# Patient Record
Sex: MALE | Race: ASIAN | NOT HISPANIC OR LATINO | Employment: STUDENT | URBAN - METROPOLITAN AREA
[De-identification: names, ages, dates, MRNs, and addresses within clinical notes are randomized per-mention and may not be internally consistent; named-entity substitution may affect disease eponyms.]

---

## 2017-03-03 ENCOUNTER — OFFICE VISIT - HEALTHEAST (OUTPATIENT)
Dept: FAMILY MEDICINE | Facility: CLINIC | Age: 22
End: 2017-03-03

## 2017-03-03 DIAGNOSIS — B86 SCABIES: ICD-10-CM

## 2017-03-31 ENCOUNTER — OFFICE VISIT - HEALTHEAST (OUTPATIENT)
Dept: FAMILY MEDICINE | Facility: CLINIC | Age: 22
End: 2017-03-31

## 2017-03-31 DIAGNOSIS — R21 RASH: ICD-10-CM

## 2017-03-31 DIAGNOSIS — S86.811A STRAIN OF CALF MUSCLE, RIGHT, INITIAL ENCOUNTER: ICD-10-CM

## 2019-08-07 ENCOUNTER — OFFICE VISIT - HEALTHEAST (OUTPATIENT)
Dept: FAMILY MEDICINE | Facility: CLINIC | Age: 24
End: 2019-08-07

## 2019-08-07 DIAGNOSIS — L73.9 FOLLICULITIS: ICD-10-CM

## 2019-08-07 DIAGNOSIS — E66.813 CLASS 3 SEVERE OBESITY WITH BODY MASS INDEX (BMI) OF 45.0 TO 49.9 IN ADULT, UNSPECIFIED OBESITY TYPE, UNSPECIFIED WHETHER SERIOUS COMORBIDITY PRESENT (H): ICD-10-CM

## 2019-08-07 DIAGNOSIS — E66.01 CLASS 3 SEVERE OBESITY WITH BODY MASS INDEX (BMI) OF 45.0 TO 49.9 IN ADULT, UNSPECIFIED OBESITY TYPE, UNSPECIFIED WHETHER SERIOUS COMORBIDITY PRESENT (H): ICD-10-CM

## 2019-08-07 DIAGNOSIS — L83 ACANTHOSIS NIGRICANS: ICD-10-CM

## 2019-08-07 ASSESSMENT — MIFFLIN-ST. JEOR: SCORE: 2345.69

## 2019-09-30 ENCOUNTER — OFFICE VISIT - HEALTHEAST (OUTPATIENT)
Dept: FAMILY MEDICINE | Facility: CLINIC | Age: 24
End: 2019-09-30

## 2019-09-30 DIAGNOSIS — Z02.89 ENCOUNTER FOR COMPLETION OF FORM WITH PATIENT: ICD-10-CM

## 2019-09-30 DIAGNOSIS — Z23 NEED FOR VACCINATION: ICD-10-CM

## 2020-02-18 ENCOUNTER — OFFICE VISIT - HEALTHEAST (OUTPATIENT)
Dept: FAMILY MEDICINE | Facility: CLINIC | Age: 25
End: 2020-02-18

## 2020-02-18 DIAGNOSIS — R19.7 DIARRHEA, UNSPECIFIED TYPE: ICD-10-CM

## 2020-02-18 LAB
ALBUMIN SERPL-MCNC: 3.9 G/DL (ref 3.5–5)
ALP SERPL-CCNC: 66 U/L (ref 45–120)
ALT SERPL W P-5'-P-CCNC: 21 U/L (ref 0–45)
ANION GAP SERPL CALCULATED.3IONS-SCNC: 11 MMOL/L (ref 5–18)
AST SERPL W P-5'-P-CCNC: 21 U/L (ref 0–40)
BILIRUB SERPL-MCNC: 1 MG/DL (ref 0–1)
BUN SERPL-MCNC: 8 MG/DL (ref 8–22)
C REACTIVE PROTEIN LHE: 4.5 MG/DL (ref 0–0.8)
CALCIUM SERPL-MCNC: 9.4 MG/DL (ref 8.5–10.5)
CHLORIDE BLD-SCNC: 106 MMOL/L (ref 98–107)
CO2 SERPL-SCNC: 24 MMOL/L (ref 22–31)
CREAT SERPL-MCNC: 0.9 MG/DL (ref 0.7–1.3)
ERYTHROCYTE [DISTWIDTH] IN BLOOD BY AUTOMATED COUNT: 11.6 % (ref 11–14.5)
GFR SERPL CREATININE-BSD FRML MDRD: >60 ML/MIN/1.73M2
GLUCOSE BLD-MCNC: 109 MG/DL (ref 70–125)
HCT VFR BLD AUTO: 43.8 % (ref 40–54)
HGB BLD-MCNC: 13.8 G/DL (ref 14–18)
LIPASE SERPL-CCNC: 15 U/L (ref 0–52)
MCH RBC QN AUTO: 26.6 PG (ref 27–34)
MCHC RBC AUTO-ENTMCNC: 31.5 G/DL (ref 32–36)
MCV RBC AUTO: 84 FL (ref 80–100)
PLATELET # BLD AUTO: 414 THOU/UL (ref 140–440)
PMV BLD AUTO: 7 FL (ref 7–10)
POTASSIUM BLD-SCNC: 3.6 MMOL/L (ref 3.5–5)
PROT SERPL-MCNC: 7.6 G/DL (ref 6–8)
RBC # BLD AUTO: 5.18 MILL/UL (ref 4.4–6.2)
SODIUM SERPL-SCNC: 141 MMOL/L (ref 136–145)
WBC: 14.6 THOU/UL (ref 4–11)

## 2020-02-19 ENCOUNTER — OFFICE VISIT - HEALTHEAST (OUTPATIENT)
Dept: FAMILY MEDICINE | Facility: CLINIC | Age: 25
End: 2020-02-19

## 2020-02-19 DIAGNOSIS — F81.9 LEARNING DISABILITIES: ICD-10-CM

## 2020-02-19 DIAGNOSIS — C78.6 PERITONEAL CARCINOMATOSIS (H): ICD-10-CM

## 2020-02-19 DIAGNOSIS — D72.829 LEUKOCYTOSIS, UNSPECIFIED TYPE: ICD-10-CM

## 2020-02-20 ENCOUNTER — COMMUNICATION - HEALTHEAST (OUTPATIENT)
Dept: FAMILY MEDICINE | Facility: CLINIC | Age: 25
End: 2020-02-20

## 2020-02-21 ENCOUNTER — HOSPITAL ENCOUNTER (OUTPATIENT)
Dept: INTERVENTIONAL RADIOLOGY/VASCULAR | Facility: CLINIC | Age: 25
Discharge: HOME | End: 2020-02-21
Attending: FAMILY MEDICINE

## 2020-03-05 ENCOUNTER — COMMUNICATION - HEALTHEAST (OUTPATIENT)
Dept: FAMILY MEDICINE | Facility: CLINIC | Age: 25
End: 2020-03-05

## 2020-03-10 ENCOUNTER — OFFICE VISIT - HEALTHEAST (OUTPATIENT)
Dept: FAMILY MEDICINE | Facility: CLINIC | Age: 25
End: 2020-03-10

## 2020-03-10 DIAGNOSIS — R19.7 DIARRHEA, UNSPECIFIED TYPE: ICD-10-CM

## 2020-03-10 DIAGNOSIS — C78.6 PERITONEAL CARCINOMATOSIS (H): ICD-10-CM

## 2020-03-10 DIAGNOSIS — Z01.818 PREOPERATIVE EXAMINATION: ICD-10-CM

## 2020-03-10 DIAGNOSIS — I15.9 HYPERTENSION, SECONDARY: ICD-10-CM

## 2020-03-10 LAB
ALBUMIN SERPL-MCNC: 4 G/DL (ref 3.5–5)
ALP SERPL-CCNC: 65 U/L (ref 45–120)
ALT SERPL W P-5'-P-CCNC: 33 U/L (ref 0–45)
ANION GAP SERPL CALCULATED.3IONS-SCNC: 15 MMOL/L (ref 5–18)
AST SERPL W P-5'-P-CCNC: 29 U/L (ref 0–40)
BILIRUB SERPL-MCNC: 0.8 MG/DL (ref 0–1)
BUN SERPL-MCNC: 6 MG/DL (ref 8–22)
CALCIUM SERPL-MCNC: 9.7 MG/DL (ref 8.5–10.5)
CHLORIDE BLD-SCNC: 106 MMOL/L (ref 98–107)
CO2 SERPL-SCNC: 23 MMOL/L (ref 22–31)
CREAT SERPL-MCNC: 0.89 MG/DL (ref 0.7–1.3)
ERYTHROCYTE [DISTWIDTH] IN BLOOD BY AUTOMATED COUNT: 12.3 % (ref 11–14.5)
GFR SERPL CREATININE-BSD FRML MDRD: >60 ML/MIN/1.73M2
GLUCOSE BLD-MCNC: 91 MG/DL (ref 70–125)
HCT VFR BLD AUTO: 44 % (ref 40–54)
HGB BLD-MCNC: 14.1 G/DL (ref 14–18)
MCH RBC QN AUTO: 26.6 PG (ref 27–34)
MCHC RBC AUTO-ENTMCNC: 32.1 G/DL (ref 32–36)
MCV RBC AUTO: 83 FL (ref 80–100)
PLATELET # BLD AUTO: 504 THOU/UL (ref 140–440)
PMV BLD AUTO: 7.2 FL (ref 7–10)
POTASSIUM BLD-SCNC: 3.8 MMOL/L (ref 3.5–5)
PROT SERPL-MCNC: 7.8 G/DL (ref 6–8)
RBC # BLD AUTO: 5.31 MILL/UL (ref 4.4–6.2)
SODIUM SERPL-SCNC: 144 MMOL/L (ref 136–145)
WBC: 11.6 THOU/UL (ref 4–11)

## 2020-03-10 ASSESSMENT — MIFFLIN-ST. JEOR: SCORE: 2315.29

## 2020-03-11 ENCOUNTER — COMMUNICATION - HEALTHEAST (OUTPATIENT)
Dept: FAMILY MEDICINE | Facility: CLINIC | Age: 25
End: 2020-03-11

## 2020-03-11 LAB
ATRIAL RATE - MUSE: 116 BPM
DIASTOLIC BLOOD PRESSURE - MUSE: 100 MMHG
INTERPRETATION ECG - MUSE: NORMAL
P AXIS - MUSE: 51 DEGREES
PR INTERVAL - MUSE: 132 MS
QRS DURATION - MUSE: 86 MS
QT - MUSE: 332 MS
QTC - MUSE: 461 MS
R AXIS - MUSE: 39 DEGREES
SYSTOLIC BLOOD PRESSURE - MUSE: 160 MMHG
T AXIS - MUSE: -6 DEGREES
VENTRICULAR RATE- MUSE: 116 BPM

## 2020-03-12 ENCOUNTER — TRANSFERRED RECORDS (OUTPATIENT)
Dept: HEALTH INFORMATION MANAGEMENT | Facility: CLINIC | Age: 25
End: 2020-03-12

## 2020-03-13 ENCOUNTER — AMBULATORY - HEALTHEAST (OUTPATIENT)
Dept: ONCOLOGY | Facility: HOSPITAL | Age: 25
End: 2020-03-13

## 2020-03-13 DIAGNOSIS — C78.6 PERITONEAL CARCINOMATOSIS (H): ICD-10-CM

## 2020-03-16 ENCOUNTER — HOSPITAL ENCOUNTER (OUTPATIENT)
Dept: INTERVENTIONAL RADIOLOGY/VASCULAR | Facility: CLINIC | Age: 25
Discharge: HOME | End: 2020-03-16
Attending: FAMILY MEDICINE

## 2020-03-16 ENCOUNTER — AMBULATORY - HEALTHEAST (OUTPATIENT)
Dept: CARE COORDINATION | Facility: CLINIC | Age: 25
End: 2020-03-16

## 2020-03-16 DIAGNOSIS — C78.6 PERITONEAL CARCINOMATOSIS (H): ICD-10-CM

## 2020-03-16 DIAGNOSIS — E66.01 MORBID OBESITY (H): ICD-10-CM

## 2020-03-16 DIAGNOSIS — F81.9 LEARNING DISABILITIES: ICD-10-CM

## 2020-03-18 ENCOUNTER — OFFICE VISIT - HEALTHEAST (OUTPATIENT)
Dept: FAMILY MEDICINE | Facility: CLINIC | Age: 25
End: 2020-03-18

## 2020-03-18 DIAGNOSIS — I10 BENIGN ESSENTIAL HYPERTENSION: ICD-10-CM

## 2020-03-18 DIAGNOSIS — C78.6 PERITONEAL CARCINOMATOSIS (H): ICD-10-CM

## 2020-03-18 DIAGNOSIS — L30.9 DERMATITIS: ICD-10-CM

## 2020-03-20 ENCOUNTER — COMMUNICATION - HEALTHEAST (OUTPATIENT)
Dept: NURSING | Facility: CLINIC | Age: 25
End: 2020-03-20

## 2020-03-20 ENCOUNTER — HOSPITAL ENCOUNTER (OUTPATIENT)
Dept: PET IMAGING | Facility: HOSPITAL | Age: 25
Discharge: HOME | End: 2020-03-20
Attending: INTERNAL MEDICINE

## 2020-03-20 DIAGNOSIS — C78.6 PERITONEAL CARCINOMATOSIS (H): ICD-10-CM

## 2020-03-20 LAB — GLUCOSE BLDC GLUCOMTR-MCNC: 88 MG/DL (ref 70–139)

## 2020-04-07 ENCOUNTER — OFFICE VISIT - HEALTHEAST (OUTPATIENT)
Dept: ONCOLOGY | Facility: CLINIC | Age: 25
End: 2020-04-07

## 2020-04-07 DIAGNOSIS — K59.01 SLOW TRANSIT CONSTIPATION: ICD-10-CM

## 2020-04-07 DIAGNOSIS — C49.9 SARCOMA (H): ICD-10-CM

## 2020-04-08 ENCOUNTER — TRANSCRIBE ORDERS (OUTPATIENT)
Dept: OTHER | Age: 25
End: 2020-04-08

## 2020-04-08 DIAGNOSIS — C49.9 SARCOMA (H): Primary | ICD-10-CM

## 2020-04-08 NOTE — TELEPHONE ENCOUNTER
ONCOLOGY INTAKE: Records Information      APPT INFORMATION:  Referring provider:  Dr. Earline Hidalgo MD  Referring provider s clinic: [facility]  Reason for visit/diagnosis: Sarcoma    Has patient been notified of appointment date and time?: Yes    RECORDS INFORMATION:  Were the records received with the referral (via [employer])? Yes    Has patient been seen for any external appt for this diagnosis? Yes    If yes, where? [facility]    Has patient had any imaging or procedures outside of [facility] for this condition? Yes      If Yes, where? [facility]    ADDITIONAL INFORMATION:  None

## 2020-04-09 ENCOUNTER — PATIENT OUTREACH (OUTPATIENT)
Dept: ONCOLOGY | Facility: CLINIC | Age: 25
End: 2020-04-09

## 2020-04-09 NOTE — TELEPHONE ENCOUNTER
RECORDS STATUS - ALL OTHER DIAGNOSIS      RECORDS RECEIVED FROM: [facility]    DATE RECEIVED: 4/9/20   NOTES STATUS DETAILS   OFFICE NOTE from referring provider YES CE-4/7/20  [facility]    OFFICE NOTE from medical oncologist YES CE-4/7/20  [facility]   DISCHARGE SUMMARY from hospital YES CE-3/10/20  [facility]  PERITONEAL CARCINOMATOSIS   DISCHARGE REPORT from the ER YES CE-2/18/20  [facility]  PERITONEAL CARCINOMATOSIS     OPERATIVE REPORT NA    MEDICATION LIST YES CE   CLINICAL TRIAL TREATMENTS TO DATE NA    LABS YES CE   PATHOLOGY REPORTS YES CE-3/16/20  [facility]  SLIDES REQUESTED 4/9   ANYTHING RELATED TO DIAGNOSIS NA    GENONOMIC TESTING NA    TYPE:     IMAGING (NEED IMAGES & REPORT)     ULTRASOUND IYES 3/12/20  IN PACS   PET YES 3/20/20  IN PACS

## 2020-04-09 NOTE — PROGRESS NOTES
Spoke at length with patient's sister Mariela.  Patient suffers from a cognitive disability and sister helps out.  Patient and Mariela will have video visit with Dr. Sims tomorrow.  Worked with Mariela today to get set up on [place] and to download the [employer] touchpoint application so video visit can occur tomorrow.    Roula Newman MBA, MSN, RN, ONC  RN Care Coordinator  [facility]

## 2020-04-10 ENCOUNTER — PRE VISIT (OUTPATIENT)
Dept: ONCOLOGY | Facility: CLINIC | Age: 25
End: 2020-04-10

## 2020-04-10 ENCOUNTER — VIRTUAL VISIT (OUTPATIENT)
Dept: ONCOLOGY | Facility: CLINIC | Age: 25
End: 2020-04-10
Attending: INTERNAL MEDICINE
Payer: COMMERCIAL

## 2020-04-10 DIAGNOSIS — C49.9 DESMOPLASTIC SMALL ROUND CELL TUMOR (H): Primary | ICD-10-CM

## 2020-04-10 PROCEDURE — 99204 OFFICE O/P NEW MOD 45 MIN: CPT | Mod: 95 | Performed by: INTERNAL MEDICINE

## 2020-04-10 RX ORDER — METOPROLOL TARTRATE 50 MG
50 TABLET ORAL DAILY
COMMUNITY
Start: 2020-03-14 | End: 2020-05-01

## 2020-04-10 RX ORDER — TRAMADOL HYDROCHLORIDE 50 MG/1
50 TABLET ORAL PRN
COMMUNITY
Start: 2020-03-10 | End: 2021-02-11

## 2020-04-10 RX ORDER — HYDROCHLOROTHIAZIDE 12.5 MG/1
12.5 TABLET ORAL DAILY
COMMUNITY
Start: 2020-03-18 | End: 2020-05-01

## 2020-04-10 NOTE — PROGRESS NOTES
"Diego Buchanan is a 24 year old male who is being evaluated via a billable video visit.      The patient has been notified of following:     \"This video visit will be conducted via a call between you and your physician/provider. We have found that certain health care needs can be provided without the need for an in-person physical exam.  This service lets us provide the care you need with a video conversation.  If a prescription is necessary we can send it directly to your pharmacy.  If lab work is needed we can place an order for that and you can then stop by our lab to have the test done at a later time.    Video visits are billed at different rates depending on your insurance coverage.  Please reach out to your insurance provider with any questions.    If during the course of the call the physician/provider feels a video visit is not appropriate, you will not be charged for this service.\"    Patient has given verbal consent for Video visit? Yes    How would you like to obtain your AVS? Qinghart    Patient would like the video invitation sent by: Send to e-mail at: [email or web address]      Video Start Time: 13:20    Diego Buchanan complains of    Chief Complaint   Patient presents with     Video Visit       I have reviewed and updated the patient's Past Medical History, Social History, Family History and Medication List.    ALLERGIES  Patient has no known allergies.     *No new   *No needs today.*  Kayla Hahn CMA on 4/10/2020 at 12:48 PM  Rooming team # [phone]      Additional provider notes:     Video-Visit Details    Type of service:  Video Visit    Video End Time (time video stopped): 14:05    Originating Location (pt. Location): Home    Distant Location (provider location):  [facility]     Mode of Communication:  Video Conference via [facility]  MEDICAL ONCOLOGY CONSULT  Apr 10, 2020    CHIEF COMPLAINT: Peritoneal carcinomatosis, possible " sarcoma    HISTORY OF PRESENT ILLNESS  Diego Buchanan is a 24 year old male with a history of learning disability and recent diagnosis of sarcoma. He is referred by Dr. Earline Hidalgo for evaluation of new diagnosis of sarcoma.    He presented initially with abdominal pain, diarrhea, and progressive abdominal distention for 3 months duration. Initial CT scan in February 2020 showed severe peritoneal carcinomatosis with large peritoneal tumor implants throughout the entire abdomen and pelvis, but mostly prominently in the pelvis.     The family decided to do traditional medications, decided against biopsy and further evaluation initially. Unfortunately, he presented to emergency room a month later with worsening symptoms. Repeat CT scan showed interval increase in the amount of peritoneal carcinomatosis. On 3/12/2020, he had ultrasound-guided core needle biopsy of a peritoneal implant (Case: [id number]), which showed a completely undifferentiated appearance, but stains with pancytokeratin, indicating an epithelial origin. 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 Tumor markers for CA-19-9, CEA, beta-hCG, alpha-fetoprotein were unremarkable. Because of the inconclusive diagnosis cancer type ID molecular testing by [employer] sent to determine the exact diagnosis and to assist in further treatment planning. 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 Relative probability of less than 5% of other subtype of sarcoma.     He has had a decrease in his oral intake and appetite. He is also having trouble passing stools. Yesterday he had a very small bowel movement containing liquid and stools are very smelly. He has not had truly normal bowel movements since about early March. His last formed stool was last week. His diet has been changed to include more bland foods. He has lost  about 15-20 lbs in the last month.    No nausea or vomiting. No fevers or chills. Abdomen feels tight and discomfort/pain is diffuse in nature and currently rated 5 out of 10. At its worst it has been up to 9 or 10. He is currently taking Tramadol as needed for pain.     ECOG performance status is 1.    No results found for any visits on 04/10/20.      REVIEW OF SYSTEMS  A 12-point ROS negative except as in HPI    Current Outpatient Medications   Medication Sig Dispense Refill     hydrochlorothiazide (HYDRODIURIL) 12.5 MG tablet Take 12.5 mg by mouth daily       metoprolol tartrate (LOPRESSOR) 50 MG tablet Take 50 mg by mouth daily       traMADol (ULTRAM) 50 MG tablet Take 50 mg by mouth as needed         No Known Allergies    PAST MEDICAL HISTORY  1. Hypertension  2. Learning disability  3. Peritoneal carcinomatosis      SOCIAL HISTORY  History   Smoking Status     Not on file   Smokeless Tobacco     Not on file    Social History    Substance and Sexual Activity      Alcohol use: Not on file     History   Drug Use Not on file       FAMILY HISTORY  No family history of cancer.    PHYSICAL EXAMINATION  There were no vitals taken for this visit.  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 Mr. 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 This is an aggressive tumor which disproportionately effects young adult males, but typically involves the abdominal and pelvic cavity. It is a malignant serosa related small round cell tumor with epithelial growth pattern. Extra-abdominal disease, such as I suspect in his case, confers poor prognosis.  With appropriate treatment, median overall survival has been reported to be 16 months.    I would like to confirm the diagnosis with additional pathologic review by a sarcoma pathologist, and additional molecular testing. Typically these tumors contain a t(11;22) translocation leading to EWSR1-WT1 fusion. This is detectable via in situ hybridization or rtPCR.    Based on the information we have I am not yet certain we should proceed with treatment, though patient is obviously symptomatic and requires treatment. Family is still uncertain if they would agree with multiagent chemotherapy. I did inform them, that if the diagnosis is confirmed, that I would typically recommend starting multiagent chemotherapy with either an alternating regimen of CAV/IMV or VIDE. At this point, he does not have resectable disease and he is at high risk for development of metastasis.     He recently had PET-CT scan, which shows his disease predominantly involves abdominal cavity with extensive peritoneal carcinomatosis, as well as intraabdominal and subdiaphragmatic lymph nodes. For further evaluation I would ideally recommend a bone marrow biopsy to rule out marrow involvement. I would also recommend MRI brain to rule out CNS involvement.     PLAN  -Request biopsy material for review ASAP  -Return 4/17 at 11:30 am to review.  -Consider MRI brain and bone marrow biopsy to complete staging evaluation prior to starting chemotherapy    Farzaneh Burciaga  M.D.   of Medicine  Hematology, Oncology and Transplantation

## 2020-04-13 NOTE — TELEPHONE ENCOUNTER
Action 4/13/20 10:29 AM - Daniela   Action Taken  Pathology received from [facility] and sent to be reviewed with filled out pathology form.

## 2020-04-14 PROCEDURE — 88341 IMHCHEM/IMCYTCHM EA ADD ANTB: CPT | Performed by: INTERNAL MEDICINE

## 2020-04-14 PROCEDURE — 00000346 ZZHCL STATISTIC REVIEW OUTSIDE SLIDES TC 88321: Performed by: INTERNAL MEDICINE

## 2020-04-14 PROCEDURE — 88323 CONSLTJ&REPRT MATRL PREP SLD: CPT | Performed by: INTERNAL MEDICINE

## 2020-04-14 PROCEDURE — 88342 IMHCHEM/IMCYTCHM 1ST ANTB: CPT | Performed by: INTERNAL MEDICINE

## 2020-04-15 LAB — COPATH REPORT: NORMAL

## 2020-04-17 ENCOUNTER — VIRTUAL VISIT (OUTPATIENT)
Dept: ONCOLOGY | Facility: CLINIC | Age: 25
End: 2020-04-17
Attending: INTERNAL MEDICINE
Payer: COMMERCIAL

## 2020-04-17 DIAGNOSIS — C49.9 DESMOPLASTIC SMALL ROUND CELL TUMOR (H): Primary | ICD-10-CM

## 2020-04-17 PROBLEM — C78.6 PERITONEAL CARCINOMATOSIS (H): Status: ACTIVE | Noted: 2020-02-20

## 2020-04-17 PROCEDURE — 99214 OFFICE O/P EST MOD 30 MIN: CPT | Mod: 95 | Performed by: INTERNAL MEDICINE

## 2020-04-17 PROCEDURE — 40000114 ZZH STATISTIC NO CHARGE CLINIC VISIT

## 2020-04-17 NOTE — PROGRESS NOTES
"Diego Buchanan is a 24 year old male who is being evaluated via a billable video visit.      The patient has been notified of following:     \"This video visit will be conducted via a call between you and your physician/provider. We have found that certain health care needs can be provided without the need for an in-person physical exam.  This service lets us provide the care you need with a video conversation.  If a prescription is necessary we can send it directly to your pharmacy.  If lab work is needed we can place an order for that and you can then stop by our lab to have the test done at a later time.    Video visits are billed at different rates depending on your insurance coverage.  Please reach out to your insurance provider with any questions.    If during the course of the call the physician/provider feels a video visit is not appropriate, you will not be charged for this service.\"    Patient has given verbal consent for Video visit? Yes    How would you like to obtain your AVS? MyChart    Patient would like the video invitation sent by: Text to cell phone: [phone]            I have reviewed and updated the patient's allergies and medication list.    Concerns: Patient has no new concerns.      Refills: N/A      Jerrod Rendon, EMT      [facility]  MEDICAL ONCOLOGY PROGRESS NOTE  Apr 10, 2020     CHIEF COMPLAINT: Peritoneal carcinomatosis, possible sarcoma    Oncologic History:  1. He presented initially with abdominal pain, diarrhea, and progressive abdominal distention for 3 months duration. 2. Initial CT scan in February 2020 showed severe peritoneal carcinomatosis with large peritoneal tumor implants throughout the entire abdomen and pelvis, but mostly prominently in the pelvis.   2. PETCT scan showed interval increase in the amount of peritoneal carcinomatosis.  3. On 3/12/2020, he had ultrasound-guided core needle biopsy of a peritoneal implant (Case: [id number]), which showed a " completely undifferentiated appearance, but stains with pancytokeratin, indicating an epithelial origin. 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 Tumor markers for CA-19-9, CEA, beta-hCG, alpha-fetoprotein were unremarkable. Cancer type ID molecular testing by [employer] sent to determine the exact diagnosis and to assist in further treatment planning. 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 Relative probability of less than 5% of other subtype of sarcoma. EWSR1-WT1 fusion detected.     HISTORY OF PRESENT ILLNESS  Diego Buchanan is a 24 year old male with a history of learning disability and recent diagnosis of sarcoma.    He continues to have decreased oral intake and appetite. He is also having trouble passing stools. He has lost about 20 lbs in the last month. Abdomen feels tight and discomfort/pain is diffuse. At its worst it has been up to 9 or 10. He is currently taking Tramadol as needed for pain.  He notes no major changes in his clinical status since last week.      Current Outpatient Medications   Medication     hydrochlorothiazide (HYDRODIURIL) 12.5 MG tablet     metoprolol tartrate (LOPRESSOR) 50 MG tablet     traMADol (ULTRAM) 50 MG tablet     No current facility-administered medications for this visit.       No Known Allergies    PAST MEDICAL HISTORY  1. Hypertension  2. Learning disability  3. Peritoneal carcinomatosis     SOCIAL HISTORY  He lives at home with his family. He is a nonsmoker.      History   Smoking Status     Not on file   Smokeless Tobacco     Not on file    Social History    Substance and Sexual Activity      Alcohol use: Not on file         History   Drug Use Not on file        FAMILY HISTORY  No family history of cancer.    PHYSICAL EXAM  None performed today as video visit.    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 Mr. 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     [employer] testing did reveal the t(11;22) translocation and resultant EWSR1-WT1 fusion. Family is still uncertain about treatment with multiagent chemotherapy. They will review and decide next steps. I would typically recommend starting multiagent chemotherapy with either an alternating regimen of CAV/IMV or VIDE. For further evaluation I would recommend a bone marrow biopsy to rule out marrow involvement and MRI brain to rule out CNS involvement.     -Patients family to reach out with decision  -If plan to proceed will obtain MRI brain and bone marrow biopsy to complete staging evaluation prior to starting chemotherapy  -CAV/IMV chemotherapy    Farzaneh Burciaga M.D.   of Medicine  Hematology, Oncology and Transplantation      Video-Visit Details    Type of service:  Video Visit    Video Start Time: 11:35    Video End Time (time video stopped): 11:55    Originating Location (pt. Location): Home    Distant Location (provider location):  [facility]     Mode of Communication:  Video Conference via [employer]

## 2020-04-23 DIAGNOSIS — C49.9 DESMOPLASTIC SMALL ROUND CELL TUMOR (H): ICD-10-CM

## 2020-04-23 DIAGNOSIS — C41.9 SARCOMA, EWINGS (H): ICD-10-CM

## 2020-04-27 LAB — COPATH REPORT: NORMAL

## 2020-04-28 ENCOUNTER — TELEPHONE (OUTPATIENT)
Dept: INTERVENTIONAL RADIOLOGY/VASCULAR | Facility: CLINIC | Age: 25
End: 2020-04-28

## 2020-04-28 NOTE — TELEPHONE ENCOUNTER
COVID Telephone Screen     Step 1  Patient has been called and travel (COVID) screen has been completed. Yes    The patient is negative for symptoms (fever, cough, sore throat, rash): Yes    If patient is positive for symptoms, do not complete Step 2 and complete Step 3    Step 2  Patient informed of the no visitor policy: Yes  Patient informed to contact ordering provider if the patient develops symptoms (fever, cough, sore throat, rash) prior to procedure (ordering provider to determine if they can still have the procedure or need to reschedule): Yes

## 2020-04-29 ENCOUNTER — APPOINTMENT (OUTPATIENT)
Dept: INTERVENTIONAL RADIOLOGY/VASCULAR | Facility: CLINIC | Age: 25
End: 2020-04-29
Attending: INTERNAL MEDICINE
Payer: COMMERCIAL

## 2020-04-29 ENCOUNTER — HOSPITAL ENCOUNTER (OUTPATIENT)
Facility: CLINIC | Age: 25
Discharge: HOME | End: 2020-04-29
Attending: RADIOLOGY | Admitting: RADIOLOGY
Payer: COMMERCIAL

## 2020-04-29 VITALS
SYSTOLIC BLOOD PRESSURE: 145 MMHG | DIASTOLIC BLOOD PRESSURE: 94 MMHG | TEMPERATURE: 96.3 F | HEART RATE: 131 BPM | OXYGEN SATURATION: 96 % | RESPIRATION RATE: 16 BRPM

## 2020-04-29 DIAGNOSIS — C49.9 SARCOMA (H): ICD-10-CM

## 2020-04-29 LAB
INR PPP: 0.97 (ref 0.86–1.14)
PLATELET # BLD AUTO: 552 10E9/L (ref 150–450)

## 2020-04-29 PROCEDURE — 85049 AUTOMATED PLATELET COUNT: CPT | Performed by: NURSE PRACTITIONER

## 2020-04-29 PROCEDURE — 40000854 ZZH STATISTIC SIMPLE TUBE INSERTION/CHARGE, PORT, CATH, FISTULOGRAM

## 2020-04-29 PROCEDURE — 27210886 ZZH ACCESSORY CR5

## 2020-04-29 PROCEDURE — 85610 PROTHROMBIN TIME: CPT | Performed by: NURSE PRACTITIONER

## 2020-04-29 PROCEDURE — C1769 GUIDE WIRE: HCPCS

## 2020-04-29 PROCEDURE — 27211193 ZZ H WOUND GLUE CR1

## 2020-04-29 PROCEDURE — 25000125 ZZHC RX 250: Performed by: NURSE PRACTITIONER

## 2020-04-29 PROCEDURE — 25000128 H RX IP 250 OP 636: Performed by: NURSE PRACTITIONER

## 2020-04-29 PROCEDURE — 36415 COLL VENOUS BLD VENIPUNCTURE: CPT

## 2020-04-29 PROCEDURE — C1751 CATH, INF, PER/CENT/MIDLINE: HCPCS

## 2020-04-29 PROCEDURE — 36558 INSERT TUNNELED CV CATH: CPT | Mod: LT

## 2020-04-29 PROCEDURE — 25000128 H RX IP 250 OP 636: Performed by: RADIOLOGY

## 2020-04-29 RX ORDER — CEFAZOLIN SODIUM 2 G/100ML
2 INJECTION, SOLUTION INTRAVENOUS
Status: COMPLETED | OUTPATIENT
Start: 2020-04-29 | End: 2020-04-29

## 2020-04-29 RX ORDER — HEPARIN SODIUM (PORCINE) LOCK FLUSH IV SOLN 100 UNIT/ML 100 UNIT/ML
500 SOLUTION INTRAVENOUS ONCE
Status: COMPLETED | OUTPATIENT
Start: 2020-04-29 | End: 2020-04-29

## 2020-04-29 RX ORDER — FENTANYL CITRATE 50 UG/ML
25-50 INJECTION, SOLUTION INTRAMUSCULAR; INTRAVENOUS EVERY 5 MIN PRN
Status: DISCONTINUED | OUTPATIENT
Start: 2020-04-29 | End: 2020-04-29 | Stop reason: HOSPADM

## 2020-04-29 RX ORDER — FLUMAZENIL 0.1 MG/ML
0.2 INJECTION, SOLUTION INTRAVENOUS
Status: DISCONTINUED | OUTPATIENT
Start: 2020-04-29 | End: 2020-04-29 | Stop reason: HOSPADM

## 2020-04-29 RX ORDER — HEPARIN SODIUM 200 [USP'U]/100ML
1 INJECTION, SOLUTION INTRAVENOUS CONTINUOUS PRN
Status: DISCONTINUED | OUTPATIENT
Start: 2020-04-29 | End: 2020-04-29 | Stop reason: HOSPADM

## 2020-04-29 RX ORDER — ACETAMINOPHEN 325 MG/1
650 TABLET ORAL
Status: DISCONTINUED | OUTPATIENT
Start: 2020-04-29 | End: 2020-04-29 | Stop reason: HOSPADM

## 2020-04-29 RX ORDER — HEPARIN SODIUM,PORCINE 10 UNIT/ML
5-10 VIAL (ML) INTRAVENOUS
Status: DISCONTINUED | OUTPATIENT
Start: 2020-04-29 | End: 2020-04-29 | Stop reason: HOSPADM

## 2020-04-29 RX ORDER — HEPARIN SODIUM,PORCINE 10 UNIT/ML
5 VIAL (ML) INTRAVENOUS EVERY 24 HOURS
Status: DISCONTINUED | OUTPATIENT
Start: 2020-04-29 | End: 2020-04-29 | Stop reason: HOSPADM

## 2020-04-29 RX ORDER — NALOXONE HYDROCHLORIDE 0.4 MG/ML
.1-.4 INJECTION, SOLUTION INTRAMUSCULAR; INTRAVENOUS; SUBCUTANEOUS
Status: DISCONTINUED | OUTPATIENT
Start: 2020-04-29 | End: 2020-04-29 | Stop reason: HOSPADM

## 2020-04-29 RX ADMIN — MIDAZOLAM HYDROCHLORIDE 1 MG: 1 INJECTION, SOLUTION INTRAMUSCULAR; INTRAVENOUS at 10:12

## 2020-04-29 RX ADMIN — LIDOCAINE HYDROCHLORIDE 10 ML: 10 INJECTION, SOLUTION INFILTRATION; PERINEURAL at 10:49

## 2020-04-29 RX ADMIN — FENTANYL CITRATE 50 MCG: 50 INJECTION, SOLUTION INTRAMUSCULAR; INTRAVENOUS at 10:13

## 2020-04-29 RX ADMIN — HEPARIN SODIUM (PORCINE) LOCK FLUSH IV SOLN 100 UNIT/ML 500 UNITS: 100 SOLUTION at 11:00

## 2020-04-29 RX ADMIN — FENTANYL CITRATE 50 MCG: 50 INJECTION, SOLUTION INTRAMUSCULAR; INTRAVENOUS at 10:47

## 2020-04-29 RX ADMIN — MIDAZOLAM HYDROCHLORIDE 1 MG: 1 INJECTION, SOLUTION INTRAMUSCULAR; INTRAVENOUS at 10:47

## 2020-04-29 RX ADMIN — CEFAZOLIN SODIUM 2 G: 2 INJECTION, SOLUTION INTRAVENOUS at 09:54

## 2020-04-29 SDOH — HEALTH STABILITY: MENTAL HEALTH: HOW OFTEN DO YOU HAVE A DRINK CONTAINING ALCOHOL?: NEVER

## 2020-04-29 NOTE — PROGRESS NOTES
PATIENT WELLNESS SCREENING      1. In the last month, have you been in contact with someone who was confirmed or suspected to have Coronavirus/COVID-19? No    2. Do you have the following symptoms?   Fever? No   Cough? No   Shortness of breath? No   Skin rash? No    3. Have you traveled internationally in the last month? No     Care Suites Admission Nursing Note    Patient Information  Name: Diego Buchanan  Age: 24 year old  Reason for admission: Tunnel Cath   Care Suites arrival time: ~0900    Patient Admission/Assessment   Pre-procedure assessment complete: Yes  If abnormal assessment/labs, provider notified: No  NPO: Yes  Medications held per instructions/orders: Yes  Consent: obtained  Patient oriented to room: Yes  Education/questions answered: Yes  Plan/other: Procedure ~1000    Discharge Planning  Accompanied by: Rick Sierra  Discharge name/phone number: Mariela- [phone]  Overnight post sedation caregiver:    Discharge location: home    Melina Siddiqi RN

## 2020-04-29 NOTE — PROCEDURES
[facility]    Procedure: Imaging Procedure Note    Date/Time: 4/29/2020 11:10 AM  Performed by: Sherry Barbosa MD  Authorized by: Sherry Barbosa MD     UNIVERSAL PROTOCOL   Site Marked: Yes  Prior Images Obtained and Reviewed:  Yes  Required items: Required blood products, implants, devices and special equipment available    Patient identity confirmed:  Verbally with patient  Patient was reevaluated immediately before administering moderate or deep sedation or anesthesia  Confirmation Checklist:  Patient's identity using two indicators, relevant allergies, procedure was appropriate and matched the consent or emergent situation and correct equipment/implants were available  Time out: Immediately prior to the procedure a time out was called    Universal Protocol: the Joint Commission Universal Protocol was followed    Preparation: Patient was prepped and draped in usual sterile fashion        See dictated procedure note for full details.  PROCEDURE   Patient Tolerance:  Patient tolerated the procedure well with no immediate complications  Describe Procedure: Tunneled right internal jugular PICC in good position  Length of time physician/provider present for 1:1 monitoring during sedation: 0

## 2020-04-29 NOTE — DISCHARGE INSTRUCTIONS
Tunnel Cath Insertion Discharge Instructions     After you go home:      You may resume your normal diet    Have an adult stay with you for 6 hours       For 24 hours - due to the sedation you received:    Relax and take it easy    Do NOT make any important or legal decisions    Do NOT drive or operate machines at home or at work    Do NOT drink alcohol    Care of Puncture Site:      Keep the dressing clean & dry    Check the site twice a day for signs of infection    Do not swim while you have the tunnel catheter (to prevent infection)    If you shower, place a waterproof cover over the dressing (such as plastic wrap)    You may take a bath if the water stays below your waist. Sponge bathe your upper body to keep the dressing from getting wet    Caring for the Catheter:      Check the skin around the tube each day for redness, swelling, drainage or tenderness. These are all signs of infection    Take your temperature daily     Check your catheter every day:      Make sure the clamps are closed over both ends of the tubing    Check that the caps are tight    If a cap comes off, you may have bleeding from the tube, or air could get into the bloodstream.  Wrap the end of the tube in a clean gauze and call your provider or dialysis unit immediately    Your catheter is not likely to fall out. It is secured with stitches to your skin.  Also, a small cuff under the skin helps to hold the catheter in place.  If the catheter does fall out, put firm pressure on the exit site with a clean gauze & seek medical attention immediately    Activity:       You may go back to normal activity in 24 hours     Avoid heavy lifting (greater than 10 pounds), strenuous activity or the overuse of your shoulder for 3 days    Bleeding:      If you start bleeding from the incision sites in your chest or neck - or have swelling in your neck, sit down and press on the site for 5-10 minutes.     If bleeding has not stopped after 10 minutes, call  your provider.        Call 911 right away if you have heavy bleeding or bleeding that does not stop.      Medicines:      You may resume all medications    Resume your Warfarin/Coumadin at your regular dose today. Follow up with your provider to have your INR rechecked    Resume your Platelet Inhibitors and Aspirin tomorrow at your regular dose    For minor pain, you may take Acetaminophen (Tylenol) or Ibuprofen (Advil)                 Call the provider who ordered this procedure if:      The site is red, swollen, hot or tender or there is drainage at the site    You have chills or a fever greater than 100 F (37.8 C)    A cap comes off    The catheter falls out    Any questions or concerns    Call  911 or go to the Emergency Room if you have:      Trouble breathing    Chest or shoulder pain not related to procedure    Bleeding that you cannot control      If you have questions call:          [facility] Radiology Dept @ [phone]

## 2020-04-29 NOTE — PROGRESS NOTES
Care Suites Post Procedure Note    Patient Information  Name: Diego Buchanan  Age: 24 year old    Post Procedure  Procedure: Dual Power Tunnel cath   Time patient returned to Care Suites: 1120  Concerns/abnormal assessment: Tachycardia  If abnormal assessment, provider notified: as baseline Plan/Other: Continue post procedure plan of care    Tunnel cath site dressing CDI, VS stable except being tachy 130's as baseline. Denies any pain or discomfort. Taking po fluids well.   Reinforce discharge instruction.  All questions are answered.  Quin Jackson RN     Care Suites Discharge Nursing Note    Patient Information  Name: Diego Buchanan  Age: 24 year old    Discharge Education:  Discharge instructions reviewed: Yes  Additional education/resources provided: NA  Patient/patient representative verbalizes understanding:  Yes     Discharge Plans:   Discharge location:  Door [address]  Discharge ride contacted: sister Mariela is here    Approximate discharge time: 1200    Discharge Criteria:  Discharge criteria met and vital signs stable: yes    Patient Belongs:  Patient belongings returned to patient: yes    Quin Jackson RN

## 2020-04-29 NOTE — IR NOTE
Interventional Radiology Intra-procedural Nursing Note    Patient Name: Diego Buchanan  Medical Record Number: [id number]  Today's Date: April 29, 2020    Start Time: 1045  End of procedure time: 1108  Procedure: Tunneled Power Line Central Venous Catheter  Report given to: Giuseppe GOMEZ, care suites  Time pt departs:  1120    Other Notes: patient into IR suite 1 via cart with ancef infusing to left PIV. Patient to table in supine position, prepped and draped with 2% chlorhexidine to right chest/neck. VSS, sinus tachycardia in 130's. Dr. Barbosa in room, timeout and procedure started. New 6 Fr dual lumen tunneled central line placed. Flushed with heparin. Patient tolerated procedure well. Debrief with Dr. Barbosa. Dressing clean, dry and intact. Ready to use. Patient back to care suites via cart.    MEDICATIONS:  Last sedation 1045  Versed 2 mg   Fentanyl 100 mcg  Ancef 2 gm  Lidocaine 13 ml  Heparin 500 units (250 units per lumen)    Whit Canas RN

## 2020-04-29 NOTE — PRE-PROCEDURE
GENERAL PRE-PROCEDURE:   Procedure:  Tunneled central power line placement with intravenous moderate sedation   Date/Time:  4/29/2020 9:40 AM    Written consent obtained?: Yes    Risks and benefits: Risks, benefits and alternatives were discussed    Consent given by:  Patient  Patient states understanding of procedure being performed: Yes    Patient's understanding of procedure matches consent: Yes    Procedure consent matches procedure scheduled: Yes    Expected level of sedation:  Moderate  Appropriately NPO:  Yes  ASA Class:  Class 3- Severe systemic disease, definite functional limitations  Mallampati  :  Grade 3- soft palate visible, posterior pharyngeal wall not visible  Lungs:  Lungs clear with good breath sounds bilaterally  Heart:  Normal heart sounds with tachycardia  History & Physical reviewed:  History and physical reviewed and no updates needed  Statement of review:  I have reviewed the lab findings, diagnostic data, medications, and the plan for sedation

## 2020-05-01 ENCOUNTER — INFUSION THERAPY VISIT (OUTPATIENT)
Dept: ONCOLOGY | Facility: CLINIC | Age: 25
End: 2020-05-01
Attending: INTERNAL MEDICINE
Payer: COMMERCIAL

## 2020-05-01 ENCOUNTER — PATIENT OUTREACH (OUTPATIENT)
Dept: ONCOLOGY | Facility: CLINIC | Age: 25
End: 2020-05-01

## 2020-05-01 ENCOUNTER — ANCILLARY PROCEDURE (OUTPATIENT)
Dept: MRI IMAGING | Facility: CLINIC | Age: 25
End: 2020-05-01
Attending: PHYSICIAN ASSISTANT
Payer: COMMERCIAL

## 2020-05-01 ENCOUNTER — ANCILLARY PROCEDURE (OUTPATIENT)
Dept: CARDIOLOGY | Facility: CLINIC | Age: 25
End: 2020-05-01
Attending: INTERNAL MEDICINE
Payer: COMMERCIAL

## 2020-05-01 ENCOUNTER — APPOINTMENT (OUTPATIENT)
Dept: LAB | Facility: CLINIC | Age: 25
End: 2020-05-01
Attending: INTERNAL MEDICINE
Payer: COMMERCIAL

## 2020-05-01 ENCOUNTER — HOME INFUSION (PRE-WILLOW HOME INFUSION) (OUTPATIENT)
Dept: PHARMACY | Facility: CLINIC | Age: 25
End: 2020-05-01

## 2020-05-01 VITALS
OXYGEN SATURATION: 96 % | SYSTOLIC BLOOD PRESSURE: 145 MMHG | TEMPERATURE: 96.6 F | DIASTOLIC BLOOD PRESSURE: 95 MMHG | HEART RATE: 112 BPM | RESPIRATION RATE: 16 BRPM

## 2020-05-01 VITALS — OXYGEN SATURATION: 95 % | RESPIRATION RATE: 16 BRPM | HEART RATE: 103 BPM

## 2020-05-01 DIAGNOSIS — C49.9 DESMOPLASTIC SMALL ROUND CELL TUMOR (H): Primary | ICD-10-CM

## 2020-05-01 DIAGNOSIS — C41.9 SARCOMA, EWINGS (H): Primary | ICD-10-CM

## 2020-05-01 DIAGNOSIS — C49.9 DESMOPLASTIC SMALL ROUND CELL TUMOR (H): ICD-10-CM

## 2020-05-01 DIAGNOSIS — C41.9 SARCOMA, EWINGS (H): ICD-10-CM

## 2020-05-01 PROCEDURE — 99215 OFFICE O/P EST HI 40 MIN: CPT | Mod: ZP | Performed by: PHYSICIAN ASSISTANT

## 2020-05-01 PROCEDURE — 96360 HYDRATION IV INFUSION INIT: CPT

## 2020-05-01 PROCEDURE — G0463 HOSPITAL OUTPT CLINIC VISIT: HCPCS | Mod: ZF

## 2020-05-01 PROCEDURE — 25000128 H RX IP 250 OP 636: Mod: ZF | Performed by: INTERNAL MEDICINE

## 2020-05-01 PROCEDURE — 25800030 ZZH RX IP 258 OP 636: Mod: ZF | Performed by: PHYSICIAN ASSISTANT

## 2020-05-01 RX ORDER — ALBUTEROL SULFATE 0.83 MG/ML
2.5 SOLUTION RESPIRATORY (INHALATION)
Status: CANCELLED | OUTPATIENT
Start: 2020-05-04

## 2020-05-01 RX ORDER — LORAZEPAM 0.5 MG/1
0.5 TABLET ORAL EVERY 4 HOURS PRN
Qty: 30 TABLET | Refills: 5 | Status: CANCELLED | OUTPATIENT
Start: 2020-05-01

## 2020-05-01 RX ORDER — HEPARIN SODIUM,PORCINE 10 UNIT/ML
5 VIAL (ML) INTRAVENOUS 2 TIMES DAILY PRN
Status: DISCONTINUED | OUTPATIENT
Start: 2020-05-01 | End: 2020-05-01 | Stop reason: HOSPADM

## 2020-05-01 RX ORDER — SODIUM CHLORIDE 9 MG/ML
1000 INJECTION, SOLUTION INTRAVENOUS CONTINUOUS PRN
Status: CANCELLED
Start: 2020-05-04

## 2020-05-01 RX ORDER — METHYLPREDNISOLONE SODIUM SUCCINATE 125 MG/2ML
125 INJECTION, POWDER, LYOPHILIZED, FOR SOLUTION INTRAMUSCULAR; INTRAVENOUS
Status: CANCELLED
Start: 2020-05-04

## 2020-05-01 RX ORDER — PROCHLORPERAZINE MALEATE 10 MG
10 TABLET ORAL EVERY 6 HOURS PRN
Qty: 30 TABLET | Refills: 5 | Status: CANCELLED | OUTPATIENT
Start: 2020-05-01

## 2020-05-01 RX ORDER — POLYETHYLENE GLYCOL 3350 17 G/17G
17 POWDER, FOR SOLUTION ORAL
Status: ON HOLD | COMMUNITY
Start: 2020-03-15 | End: 2022-01-01

## 2020-05-01 RX ORDER — HEPARIN SODIUM (PORCINE) LOCK FLUSH IV SOLN 100 UNIT/ML 100 UNIT/ML
5 SOLUTION INTRAVENOUS ONCE
Status: DISCONTINUED | OUTPATIENT
Start: 2020-05-01 | End: 2020-05-01 | Stop reason: HOSPADM

## 2020-05-01 RX ORDER — MEPERIDINE HYDROCHLORIDE 25 MG/ML
25 INJECTION INTRAMUSCULAR; INTRAVENOUS; SUBCUTANEOUS EVERY 30 MIN PRN
Status: CANCELLED | OUTPATIENT
Start: 2020-05-04

## 2020-05-01 RX ORDER — HEPARIN SODIUM,PORCINE 10 UNIT/ML
5 VIAL (ML) INTRAVENOUS ONCE
Status: COMPLETED | OUTPATIENT
Start: 2020-05-01 | End: 2020-05-01

## 2020-05-01 RX ORDER — LORAZEPAM 2 MG/ML
0.5 INJECTION INTRAMUSCULAR EVERY 4 HOURS PRN
Status: CANCELLED
Start: 2020-05-04

## 2020-05-01 RX ORDER — PALONOSETRON 0.05 MG/ML
0.25 INJECTION, SOLUTION INTRAVENOUS ONCE
Status: CANCELLED
Start: 2020-05-04

## 2020-05-01 RX ORDER — ALBUTEROL SULFATE 90 UG/1
1-2 AEROSOL, METERED RESPIRATORY (INHALATION)
Status: CANCELLED
Start: 2020-05-04

## 2020-05-01 RX ORDER — DEXAMETHASONE 4 MG/1
8 TABLET ORAL
Qty: 6 TABLET | Refills: 8 | Status: CANCELLED | OUTPATIENT
Start: 2020-05-01

## 2020-05-01 RX ORDER — METOPROLOL TARTRATE 50 MG
75 TABLET ORAL DAILY
COMMUNITY
Start: 2020-05-01 | End: 2020-06-18

## 2020-05-01 RX ORDER — GADOBUTROL 604.72 MG/ML
10 INJECTION INTRAVENOUS ONCE
Status: COMPLETED | OUTPATIENT
Start: 2020-05-01 | End: 2020-05-01

## 2020-05-01 RX ORDER — DIPHENHYDRAMINE HYDROCHLORIDE 50 MG/ML
50 INJECTION INTRAMUSCULAR; INTRAVENOUS
Status: CANCELLED
Start: 2020-05-04

## 2020-05-01 RX ORDER — HEPARIN SODIUM (PORCINE) LOCK FLUSH IV SOLN 100 UNIT/ML 100 UNIT/ML
5 SOLUTION INTRAVENOUS
Status: CANCELLED | OUTPATIENT
Start: 2020-05-04

## 2020-05-01 RX ORDER — HEPARIN SODIUM,PORCINE 10 UNIT/ML
5 VIAL (ML) INTRAVENOUS
Status: CANCELLED | OUTPATIENT
Start: 2020-05-04

## 2020-05-01 RX ORDER — NALOXONE HYDROCHLORIDE 0.4 MG/ML
.1-.4 INJECTION, SOLUTION INTRAMUSCULAR; INTRAVENOUS; SUBCUTANEOUS
Status: CANCELLED | OUTPATIENT
Start: 2020-05-04

## 2020-05-01 RX ORDER — EPINEPHRINE 1 MG/ML
0.3 INJECTION, SOLUTION INTRAMUSCULAR; SUBCUTANEOUS EVERY 5 MIN PRN
Status: CANCELLED | OUTPATIENT
Start: 2020-05-04

## 2020-05-01 RX ADMIN — Medication 4 ML: at 16:15

## 2020-05-01 RX ADMIN — Medication 5 ML: at 17:08

## 2020-05-01 RX ADMIN — Medication 5 ML: at 13:50

## 2020-05-01 RX ADMIN — SODIUM CHLORIDE 1000 ML: 9 INJECTION, SOLUTION INTRAVENOUS at 12:20

## 2020-05-01 RX ADMIN — Medication 5 ML: at 17:07

## 2020-05-01 RX ADMIN — GADOBUTROL 10 ML: 604.72 INJECTION INTRAVENOUS at 16:17

## 2020-05-01 NOTE — NURSING NOTE
Oncology Rooming Note    May 1, 2020 10:11 AM   Diego Buchanan is a 24 year old male who presents for:    Chief Complaint   Patient presents with     RECHECK     Return: [place]     Initial Vitals: BP (!) 145/95   Pulse 112   Temp 96.6  F (35.9  C) (Tympanic)   Resp 16   SpO2 96%  There is no height or weight on file to calculate BMI. There is no height or weight on file to calculate BSA.  No Pain (0) Comment: Data Unavailable   No LMP for male patient.  Allergies reviewed: Yes  Medications reviewed: Yes    Medications: Medication refills not needed today.  Pharmacy name entered into Bag Borrow or Steal: [id number] - [place], [place] - [address] AT [facility]    Clinical concerns: None       Almaz Bauman CMA

## 2020-05-01 NOTE — PROGRESS NOTES
Infusion Nursing Note:  Diego Buchanan presents today for 1 L NS.  Patient seen by provider today: Yes: KATHLEEN Howard   present during visit today: Patient and patient's sister refused  services and a waiver form has been signed.     Note: Patient presents to infusion with his sister Mariela who will accompany him to his infusions due to his learning disability. Oriented to infusion area including call bell and bathrooms. Written handouts given for Vincristine, Cyclophosphamide, Mesna, and Doxorubicin. Thermometer and My Cancer Guidebook given. KAREL completed and placed in bin to be scanned.    TORB 5/1/20 1040 Dr. Morales Sims/Ramandeep Garcia RN: Bone marrow biopsy not needed prior to start of chemotherapy.     TORB 5/1/20 1215 Leona WANG/Ramandeep Garcia RN: No chemotherapy to be given today. Give 1 L NS in infusion. Patient to have ECHO and Brain MRI today and return Monday for labs, RTC, and infusion.    Intravenous Access:  Esparza. Both lumens heparin locked per protocol. Dressing change due 5/6/20.    Post Infusion Assessment:  Patient tolerated infusion without incident.  Blood return noted pre and post infusion.  Site patent and intact, free from redness, edema or discomfort.     Discharge Plan:   Patient declined prescription refills.  Discharge instructions reviewed with: Patient and Family.  Patient and family verbalized understanding of discharge instructions and all questions answered.  Copy of AVS reviewed with patient and family. Patient will return 5/4/20 for next appointment.  Patient discharged in stable condition accompanied by: sister to ECHO and MRI.  Face to Face time: 2.    RAMANDEEP GARCIA RN

## 2020-05-01 NOTE — PROGRESS NOTES
Patient has been set up with line cares through [facility].  They will meet with family tomorrow, Saturday May 2nd for teaching and will manage line throughout chemo treatment.  Reviewed plan with [facility] home care nurse.  Family is aware of and amenable to plan.      Roula Newman MBA, MSN, RN, ONC  RN Care Coordinator  [facility]

## 2020-05-01 NOTE — PATIENT INSTRUCTIONS
Mariela    This is what we discussed today    1. Buy some Ensure or Boost (generic is OK).  Start giving Robert 2-3 a day, plus milk, juice or water. Gatorade is also a good electrolyte replacement  2. Start monitoring his bowel movements  3. Buy a thermometer  4. I'll write out chemo instructions for you on Monday    Also, I would like you to stop his hydrochlorothiazide (blood pressure pill).  Please increase his metoprolol to 1.5 pills daily. The hydrochlorothiazide can cause dehydration and he is already dehydrated.     See you Monday.  Leona Laws Triage and after hours / weekends / holidays:  [phone]    Please call the triage or after hours line if you experience a temperature greater than or equal to 100.5, shaking chills, have uncontrolled nausea, vomiting and/or diarrhea, dizziness, shortness of breath, chest pain, bleeding, unexplained bruising, or if you have any other new/concerning symptoms, questions or concerns.      If you are having any concerning symptoms or wish to speak to a provider before your next infusion visit, please call your care coordinator or triage to notify them so we can adequately serve you.     If you need a refill on a narcotic prescription or other medication, please call before your infusion appointment.     Recent Results (from the past 24 hour(s))   CBC with platelets differential    Collection Time: 05/01/20 10:02 AM   Result Value Ref Range    WBC 13.3 (H) 4.0 - 11.0 10e9/L    RBC Count 5.41 4.4 - 5.9 10e12/L    Hemoglobin 13.6 13.3 - 17.7 g/dL    Hematocrit 43.5 40.0 - 53.0 %    MCV 80 78 - 100 fl    MCH 25.1 (L) 26.5 - 33.0 pg    MCHC 31.3 (L) 31.5 - 36.5 g/dL    RDW 15.6 (H) 10.0 - 15.0 %    Platelet Count 472 (H) 150 - 450 10e9/L    Diff Method Automated Method     % Neutrophils 79.6 %    % Lymphocytes 10.9 %    % Monocytes 6.7 %    % Eosinophils 1.4 %    % Basophils 0.6 %    % Immature Granulocytes 0.8 %    Nucleated RBCs 0 0 /100    Absolute Neutrophil 10.6 (H) 1.6  - 8.3 10e9/L    Absolute Lymphocytes 1.5 0.8 - 5.3 10e9/L    Absolute Monocytes 0.9 0.0 - 1.3 10e9/L    Absolute Eosinophils 0.2 0.0 - 0.7 10e9/L    Absolute Basophils 0.1 0.0 - 0.2 10e9/L    Abs Immature Granulocytes 0.1 0 - 0.4 10e9/L    Absolute Nucleated RBC 0.0    Comprehensive metabolic panel    Collection Time: 05/01/20 10:02 AM   Result Value Ref Range    Sodium 132 (L) 133 - 144 mmol/L    Potassium 3.5 3.4 - 5.3 mmol/L    Chloride 97 94 - 109 mmol/L    Carbon Dioxide 25 20 - 32 mmol/L    Anion Gap 10 3 - 14 mmol/L    Glucose 90 70 - 99 mg/dL    Urea Nitrogen 12 7 - 30 mg/dL    Creatinine 0.86 0.66 - 1.25 mg/dL    GFR Estimate >90 >60 mL/min/[1.73_m2]    GFR Estimate If Black >90 >60 mL/min/[1.73_m2]    Calcium 9.3 8.5 - 10.1 mg/dL    Bilirubin Total 0.8 0.2 - 1.3 mg/dL    Albumin 3.5 3.4 - 5.0 g/dL    Protein Total 8.5 6.8 - 8.8 g/dL    Alkaline Phosphatase 70 40 - 150 U/L    ALT 13 0 - 70 U/L    AST 17 0 - 45 U/L

## 2020-05-01 NOTE — PROGRESS NOTES
[facility]INIC  FOLLOW-UP VISIT NOTE  Date of visit: May 1, 2020             CHIEF COMPLAINT: Peritoneal carcinomatosis, sarcoma, here to discuss POC    Oncologic History:  1. He presented initially with abdominal pain, diarrhea, and progressive abdominal distention for 3 months duration. 2. Initial CT scan in February 2020 showed severe peritoneal carcinomatosis with large peritoneal tumor implants throughout the entire abdomen and pelvis, but mostly prominently in the pelvis.   2. PETCT scan showed interval increase in the amount of peritoneal carcinomatosis.  3. On 3/12/2020, he had ultrasound-guided core needle biopsy of a peritoneal implant (Case: [id number]), which showed a completely undifferentiated appearance, but stains with pancytokeratin, indicating an epithelial origin. 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 Tumor markers for CA-19-9, CEA, beta-hCG, alpha-fetoprotein were unremarkable. Cancer type ID molecular testing by [facility] sent to determine the exact diagnosis and to assist in further treatment planning. 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 Relative probability of less than 5% of other subtype of sarcoma. EWSR1-WT1 fusion detected.  4. Discussion with DR Sims about starting palliative chemotherapy     HISTORY OF PRESENT ILLNESS  Diego Buchanan is a 24 year old male with a history of learning disability and recent diagnosis of sarcoma.  He was seen in person today prior to starting CAV.  He is here with his sister Mariela.  Diego lives at home with his mom and dad and 4 adult siblings.  3 of the other siblings do work and currently are in and out of the home.  Mariela will do the majority of the care taking for him.  Robert is his own medical decision maker, his parents are the legal guardians.  He did finish  some HS and post HS education, he has a .  He does all his ADLs himself, but doesn't leave the home without family, does not drive and is not left alone.   Per Robert- things are stable, his pain is a 2/10 in his abdomen.  He is not hungry or thirsty.  He gets hot sweats on and off during the week x 1 week now.  No fevers or chills.  No problems breathing or urinating.  He is passing gas and small stools every few days.   His mood is a little scared but most of the time he is happy.  He does wake up at night and wanders the house. He denies feeling dizzy or light headed.  He has last 65 pounds in one month.     Per Mariela- they are offering him only liquids currently.  He has drank 2 x 6 oz glass of water or juice daily for the last 2.5 weeks, since seeing Dr Sims.  They feel not giving him food has dramatically reduced his abdominal pain and now he is not taking any tramadol.      In regard to his understanding of his disease, he knows this cancer is terminal.  Mariela and the family initially discussed Asian herbal treatments (parents idea) but Mariela and the siblings discussed trying palliative chemotherapy.      EXAM: BP (!) 145/95   Pulse 112   Temp 96.6  F (35.9  C) (Tympanic)   Resp 16   SpO2 96%   Wt Readings from Last 4 Encounters:   05/01/20 113.3 kg (249 lb 12.8 oz)     Vital signs were reviewed.   Patient alert and oriented.  He is diaphoretic.    PERRLA. EOMI. No scleral icterus noted. OP without thrush/sores.  Neck exam: No palpable cervical, supraclavicular or axillary nodes bilaterally.   Heart: tachy. Esparza in place.   Lungs: clear to auscultation bilaterally.  No crackles or wheezing.   Abd: nearly quiet BS.  Abdomen is distended with lumpy firmness in the majority of the lower abdomen.  No tenderness elicited with palpation.   Extremities: No lower extremity edema.   Neuro: grossly intact.   Mood and affect is stable-- he has the comprehension of like an 8 year old.        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  [facility] testing did reveal the t(11;22) translocation and resultant EWSR1-WT1 fusion. Dr Sims recommended starting multiagent chemotherapy with either an alternating regimen of CAV/IMV.  Initially family was considering herbal medicine, but has changed their mind and would like to pursue chemotherapy.     Concerns today were regarding safety of chemotherapy.      FEN: Robert has lost 65 pounds in the last 1-2 months.  His nutrition is sub-optimal and hydration has been ~12-16 oz daily.  While he is not symptomatic for dehydration, I worry his severe malnutrition is leading towards some of his ongoing fatigue.  I also have concerns that he likely has a partial bowel obstruction.  Solid food was causing severe abdominal pain.  With a liquid diet, he has reduced his pain to 2/10 but he is not taking in liquid supplements.  Mariela and I discussed starting Boost/Ensure supplements ~3 a day, in addition to other soft foods/liquids like ice cream, soups, etc.  They were willing to try this.  If this fails, we may need to discuss enteral feeds.     GI: He is strongly at risk for a full bowel obstruction.  His abdomen is completely quiet today.  He denies pain or emesis, but Mariela and I discussed this extensively  today.  They need to be monitoring his BM daily. Given his PET was done 6 weeks ago, we'll repeat a CT CAP within a week.    ID: no f/s/c.  He has had leukocytosis that goes back a few months. He also has some diaphoresis that has been coming/going for the last ~2 weeks as well.  He has no dysuria, cough, ear/sinus/throat pain.  Continue to monitor, this is likely due to his cancer.      Line: lind was placed on 4/29, we will place orders for [facility] to help with line care and daily flushes    BP: he is taking hydrochlorothiazide and metoprolol daily for his BP.  Given his limited intake, I'll stop the hydrochlorothiazide and increase the metoprolol to 75 mg daily.    Psych: mood is stable, he has good support at home.  mariela will be his primary care give at home.  He seemed surprised to know that his cancer is not curable, but Mariela states they have discussed it at home and have told him.  His general understanding of his disease is limited.        Final plan: IVF today, ECHO today,  [facility] and [facility] this weekend.  Next week will be chemo, CT CAP and brain MRI.  Will schedule daily IVF given concerns on hydration/nutrition and periodic labs to assess for TLS.     Leona Tracey PA-C      Over 75 min of direct face to face time spent with patient with more than 50% time spent in counseling and coordinating care.

## 2020-05-02 ENCOUNTER — HOME INFUSION (PRE-WILLOW HOME INFUSION) (OUTPATIENT)
Dept: PHARMACY | Facility: CLINIC | Age: 25
End: 2020-05-02

## 2020-05-04 ENCOUNTER — APPOINTMENT (OUTPATIENT)
Dept: LAB | Facility: CLINIC | Age: 25
End: 2020-05-04
Attending: INTERNAL MEDICINE
Payer: COMMERCIAL

## 2020-05-04 ENCOUNTER — ONCOLOGY VISIT (OUTPATIENT)
Dept: ONCOLOGY | Facility: CLINIC | Age: 25
End: 2020-05-04
Attending: PHYSICIAN ASSISTANT
Payer: COMMERCIAL

## 2020-05-04 ENCOUNTER — INFUSION THERAPY VISIT (OUTPATIENT)
Dept: ONCOLOGY | Facility: CLINIC | Age: 25
End: 2020-05-04
Attending: INTERNAL MEDICINE
Payer: COMMERCIAL

## 2020-05-04 ENCOUNTER — HOME INFUSION (PRE-WILLOW HOME INFUSION) (OUTPATIENT)
Dept: PHARMACY | Facility: CLINIC | Age: 25
End: 2020-05-04

## 2020-05-04 ENCOUNTER — ANCILLARY PROCEDURE (OUTPATIENT)
Dept: CT IMAGING | Facility: CLINIC | Age: 25
End: 2020-05-04
Attending: PHYSICIAN ASSISTANT
Payer: COMMERCIAL

## 2020-05-04 ENCOUNTER — TELEPHONE (OUTPATIENT)
Dept: PHARMACY | Facility: CLINIC | Age: 25
End: 2020-05-04

## 2020-05-04 VITALS
SYSTOLIC BLOOD PRESSURE: 124 MMHG | TEMPERATURE: 98.2 F | HEART RATE: 109 BPM | OXYGEN SATURATION: 96 % | RESPIRATION RATE: 16 BRPM | DIASTOLIC BLOOD PRESSURE: 83 MMHG | WEIGHT: 252.6 LBS

## 2020-05-04 VITALS — HEIGHT: 68 IN | BODY MASS INDEX: 38.41 KG/M2

## 2020-05-04 DIAGNOSIS — C41.9 SARCOMA, EWINGS (H): ICD-10-CM

## 2020-05-04 DIAGNOSIS — C49.9 DESMOPLASTIC SMALL ROUND CELL TUMOR (H): Primary | ICD-10-CM

## 2020-05-04 DIAGNOSIS — C49.9 DESMOPLASTIC SMALL ROUND CELL TUMOR (H): ICD-10-CM

## 2020-05-04 LAB
ALBUMIN SERPL-MCNC: 3.2 G/DL (ref 3.4–5)
ALP SERPL-CCNC: 69 U/L (ref 40–150)
ALT SERPL W P-5'-P-CCNC: 13 U/L (ref 0–70)
ANION GAP SERPL CALCULATED.3IONS-SCNC: 7 MMOL/L (ref 3–14)
AST SERPL W P-5'-P-CCNC: 15 U/L (ref 0–45)
BASOPHILS # BLD AUTO: 0.1 10E9/L (ref 0–0.2)
BASOPHILS NFR BLD AUTO: 0.5 %
BILIRUB SERPL-MCNC: 0.8 MG/DL (ref 0.2–1.3)
BUN SERPL-MCNC: 9 MG/DL (ref 7–30)
CALCIUM SERPL-MCNC: 9.2 MG/DL (ref 8.5–10.1)
CHLORIDE SERPL-SCNC: 99 MMOL/L (ref 94–109)
CO2 SERPL-SCNC: 26 MMOL/L (ref 20–32)
CREAT SERPL-MCNC: 0.78 MG/DL (ref 0.66–1.25)
DIFFERENTIAL METHOD BLD: ABNORMAL
EOSINOPHIL # BLD AUTO: 0.2 10E9/L (ref 0–0.7)
EOSINOPHIL NFR BLD AUTO: 1.5 %
ERYTHROCYTE [DISTWIDTH] IN BLOOD BY AUTOMATED COUNT: 15.3 % (ref 10–15)
GFR SERPL CREATININE-BSD FRML MDRD: >90 ML/MIN/{1.73_M2}
GLUCOSE SERPL-MCNC: 132 MG/DL (ref 70–99)
HCT VFR BLD AUTO: 43.8 % (ref 40–53)
HGB BLD-MCNC: 13.6 G/DL (ref 13.3–17.7)
IMM GRANULOCYTES # BLD: 0.1 10E9/L (ref 0–0.4)
IMM GRANULOCYTES NFR BLD: 0.5 %
LYMPHOCYTES # BLD AUTO: 1.2 10E9/L (ref 0.8–5.3)
LYMPHOCYTES NFR BLD AUTO: 9.1 %
MCH RBC QN AUTO: 24.8 PG (ref 26.5–33)
MCHC RBC AUTO-ENTMCNC: 31.1 G/DL (ref 31.5–36.5)
MCV RBC AUTO: 80 FL (ref 78–100)
MONOCYTES # BLD AUTO: 0.9 10E9/L (ref 0–1.3)
MONOCYTES NFR BLD AUTO: 7.3 %
NEUTROPHILS # BLD AUTO: 10.3 10E9/L (ref 1.6–8.3)
NEUTROPHILS NFR BLD AUTO: 81.1 %
NRBC # BLD AUTO: 0 10*3/UL
NRBC BLD AUTO-RTO: 0 /100
PLATELET # BLD AUTO: 515 10E9/L (ref 150–450)
POTASSIUM SERPL-SCNC: 3.8 MMOL/L (ref 3.4–5.3)
PROT SERPL-MCNC: 8.3 G/DL (ref 6.8–8.8)
RBC # BLD AUTO: 5.49 10E12/L (ref 4.4–5.9)
SODIUM SERPL-SCNC: 132 MMOL/L (ref 133–144)
WBC # BLD AUTO: 12.8 10E9/L (ref 4–11)

## 2020-05-04 PROCEDURE — 25000128 H RX IP 250 OP 636: Mod: ZF | Performed by: PHYSICIAN ASSISTANT

## 2020-05-04 PROCEDURE — 96411 CHEMO IV PUSH ADDL DRUG: CPT

## 2020-05-04 PROCEDURE — G0498 CHEMO EXTEND IV INFUS W/PUMP: HCPCS

## 2020-05-04 PROCEDURE — 85025 COMPLETE CBC W/AUTO DIFF WBC: CPT | Performed by: INTERNAL MEDICINE

## 2020-05-04 PROCEDURE — 25800030 ZZH RX IP 258 OP 636: Mod: ZF | Performed by: INTERNAL MEDICINE

## 2020-05-04 PROCEDURE — 96361 HYDRATE IV INFUSION ADD-ON: CPT

## 2020-05-04 PROCEDURE — 96415 CHEMO IV INFUSION ADDL HR: CPT

## 2020-05-04 PROCEDURE — 99215 OFFICE O/P EST HI 40 MIN: CPT | Mod: ZP | Performed by: PHYSICIAN ASSISTANT

## 2020-05-04 PROCEDURE — 84550 ASSAY OF BLOOD/URIC ACID: CPT | Performed by: PHYSICIAN ASSISTANT

## 2020-05-04 PROCEDURE — 96375 TX/PRO/DX INJ NEW DRUG ADDON: CPT

## 2020-05-04 PROCEDURE — G0463 HOSPITAL OUTPT CLINIC VISIT: HCPCS | Mod: ZF

## 2020-05-04 PROCEDURE — 25000128 H RX IP 250 OP 636: Mod: ZF | Performed by: INTERNAL MEDICINE

## 2020-05-04 PROCEDURE — 25800030 ZZH RX IP 258 OP 636: Mod: ZF | Performed by: PHYSICIAN ASSISTANT

## 2020-05-04 PROCEDURE — 96367 TX/PROPH/DG ADDL SEQ IV INF: CPT

## 2020-05-04 PROCEDURE — 80053 COMPREHEN METABOLIC PANEL: CPT | Performed by: INTERNAL MEDICINE

## 2020-05-04 PROCEDURE — 96413 CHEMO IV INFUSION 1 HR: CPT

## 2020-05-04 PROCEDURE — G0463 HOSPITAL OUTPT CLINIC VISIT: HCPCS | Mod: 25,27

## 2020-05-04 RX ORDER — IOPAMIDOL 755 MG/ML
135 INJECTION, SOLUTION INTRAVASCULAR ONCE
Status: COMPLETED | OUTPATIENT
Start: 2020-05-04 | End: 2020-05-04

## 2020-05-04 RX ORDER — LORAZEPAM 0.5 MG/1
0.5 TABLET ORAL EVERY 4 HOURS PRN
Qty: 30 TABLET | Refills: 5 | Status: SHIPPED | OUTPATIENT
Start: 2020-05-04 | End: 2021-12-01

## 2020-05-04 RX ORDER — PROCHLORPERAZINE MALEATE 10 MG
10 TABLET ORAL EVERY 6 HOURS PRN
Qty: 30 TABLET | Refills: 5 | Status: SHIPPED | OUTPATIENT
Start: 2020-05-04 | End: 2021-12-01

## 2020-05-04 RX ORDER — HEPARIN SODIUM,PORCINE 10 UNIT/ML
5 VIAL (ML) INTRAVENOUS
Status: DISCONTINUED | OUTPATIENT
Start: 2020-05-04 | End: 2020-05-04 | Stop reason: HOSPADM

## 2020-05-04 RX ORDER — PALONOSETRON 0.05 MG/ML
0.25 INJECTION, SOLUTION INTRAVENOUS ONCE
Status: COMPLETED | OUTPATIENT
Start: 2020-05-04 | End: 2020-05-04

## 2020-05-04 RX ORDER — HEPARIN SODIUM (PORCINE) LOCK FLUSH IV SOLN 100 UNIT/ML 100 UNIT/ML
5 SOLUTION INTRAVENOUS EVERY 8 HOURS PRN
Status: DISCONTINUED | OUTPATIENT
Start: 2020-05-04 | End: 2020-05-04 | Stop reason: HOSPADM

## 2020-05-04 RX ORDER — DEXAMETHASONE 4 MG/1
8 TABLET ORAL
Qty: 6 TABLET | Refills: 8 | Status: SHIPPED | OUTPATIENT
Start: 2020-05-04 | End: 2021-01-17

## 2020-05-04 RX ADMIN — Medication 5 ML: at 08:48

## 2020-05-04 RX ADMIN — FOSAPREPITANT: 150 INJECTION, POWDER, LYOPHILIZED, FOR SOLUTION INTRAVENOUS at 10:51

## 2020-05-04 RX ADMIN — IOPAMIDOL 135 ML: 755 INJECTION, SOLUTION INTRAVASCULAR at 10:19

## 2020-05-04 RX ADMIN — PALONOSETRON HYDROCHLORIDE 0.25 MG: 0.25 INJECTION, SOLUTION INTRAVENOUS at 10:44

## 2020-05-04 RX ADMIN — VINCRISTINE SULFATE 2 MG: 1 INJECTION, SOLUTION INTRAVENOUS at 11:54

## 2020-05-04 RX ADMIN — MESNA 3000 MG: 100 INJECTION, SOLUTION INTRAVENOUS at 12:01

## 2020-05-04 RX ADMIN — SODIUM CHLORIDE 250 ML: 9 INJECTION, SOLUTION INTRAVENOUS at 10:43

## 2020-05-04 ASSESSMENT — PAIN SCALES - GENERAL: PAINLEVEL: NO PAIN (0)

## 2020-05-04 NOTE — PROGRESS NOTES
[facility] CANCER CLINIC  FOLLOW-UP VISIT NOTE  Date of visit: May 4, 2020             CHIEF COMPLAINT: Peritoneal carcinomatosis, sarcoma, here to discuss POC    Oncologic History:  1. He presented initially with abdominal pain, diarrhea, and progressive abdominal distention for 3 months duration. 2. Initial CT scan in February 2020 showed severe peritoneal carcinomatosis with large peritoneal tumor implants throughout the entire abdomen and pelvis, but mostly prominently in the pelvis.   2. PETCT scan showed interval increase in the amount of peritoneal carcinomatosis.  3. On 3/12/2020, he had ultrasound-guided core needle biopsy of a peritoneal implant (Case: [id number]), which showed a completely undifferentiated appearance, but stains with pancytokeratin, indicating an epithelial origin. 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 Tumor markers for CA-19-9, CEA, beta-hCG, alpha-fetoprotein were unremarkable. Cancer type ID molecular testing by [facility] sent to determine the exact diagnosis and to assist in further treatment planning. 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 Relative probability of less than 5% of other subtype of sarcoma. EWSR1-WT1 fusion detected.  4. Discussion with DR Sims about starting palliative chemotherapy- plan for CAV/IMV     HISTORY OF PRESENT ILLNESS  Diego Buchanan is a 24 year old male with a history of learning disability and recent diagnosis of sarcoma.  He was seen in person today prior to starting CAV.  He is here with his sister Mariela.  Diego lives at home with his mom and dad and 4 adult siblings.  3 of the other siblings do work and currently are in and out of the home.  Mariela will do the majority of the care taking for him.  Robert is his own medical decision maker, his parents are the legal  guardians.  He did finish some HS and post HS education, he has a .  He does all his ADLs himself, but doesn't leave the home without family, does not drive and is not left alone.       Per Robert- things are stable, his pain is a 3-5/10 in his abdomen (mostly left lower) he did take a tramadol yesterday which helped.  Mariela was able to give him 3 Ensure daily this weekend and 6 glasses of water or juice.  He tolerated all of this well.  He is passing gas, but no BM since last Wednesday.   He gets hot sweats on and off during the week x 1 week now.  No fevers or chills.  No problems breathing or urinating.   He denies feeling dizzy or light headed.  He has last 65 pounds in one month. However after the weekend of better food/fluids, this is stable today.       In regard to his understanding of his disease, he knows this cancer is terminal.  Mariela and the family initially discussed Asian herbal treatments (parents idea) but Mariela and the siblings discussed trying palliative chemotherapy.      EXAM: /83 (BP Location: Right arm, Patient Position: Sitting, Cuff Size: Adult Regular)   Pulse 109   Temp 98.2  F (36.8  C) (Oral)   Resp 16   Wt 114.6 kg (252 lb 9.6 oz)   SpO2 96%   Wt Readings from Last 4 Encounters:   05/04/20 114.6 kg (252 lb 9.6 oz)   05/01/20 113.3 kg (249 lb 12.8 oz)     Vital signs were reviewed.   Patient alert and oriented.  He is diaphoretic.    PERRLA. EOMI. No scleral icterus noted. OP without thrush/sores.  Neck exam: No palpable cervical, supraclavicular or axillary nodes bilaterally.   Heart: tachy. Esparza in place.   Lungs: clear to auscultation bilaterally.  No crackles or wheezing. Dullness at bases  Abd: nearly quiet BS.  Abdomen is distended with lumpy firmness in the majority of the lower abdomen.  No tenderness elicited with palpation.   Extremities: No lower extremity edema.   Neuro: grossly intact.   Mood and affect is stable-- he has the comprehension of like an 8 year  old.        5/1/2020 10:02 5/4/2020 08:51   Sodium 132 (L) 132 (L)   Potassium 3.5 3.8   Chloride 97 99   Carbon Dioxide 25 26   Urea Nitrogen 12 9   Creatinine 0.86 0.78   GFR Estimate >90 >90   GFR Estimate If Black >90 >90   Calcium 9.3 9.2   Anion Gap 10 7   Albumin 3.5 3.2 (L)   Protein Total 8.5 8.3   Bilirubin Total 0.8 0.8   Alkaline Phosphatase 70 69   ALT 13 13   AST 17 15   Glucose 90 132 (H)   WBC 13.3 (H) 12.8 (H)   Hemoglobin 13.6 13.6   Hematocrit 43.5 43.8   Platelet Count 472 (H) 515 (H)   RBC Count 5.41 5.49   MCV 80 80   MCH 25.1 (L) 24.8 (L)   MCHC 31.3 (L) 31.1 (L)   RDW 15.6 (H) 15.3 (H)   Diff Method Automated Method Automated Method   % Neutrophils 79.6 81.1   % Lymphocytes 10.9 9.1   % Monocytes 6.7 7.3   % Eosinophils 1.4 1.5   % Basophils 0.6 0.5   % Immature Granulocytes 0.8 0.5   Nucleated RBCs 0 0   Absolute Neutrophil 10.6 (H) 10.3 (H)     CT CAP IMPRESSION: In this patient with a history of metastatic sarcoma,  1. Extensive mixed solid and cystic masses throughout the abdomen and  pelvis consistent with peritoneal carcinomatosis, increased in  comparison with PET/CT 3/20/2020. The largest mass measures  approximately 20 cm in the pelvis. No evidence of bowel obstruction at  this time.  2. Metastatic mixed solid and cystic masses seen in hepatic segments 5  and 6 and hepatic segment 7. The masses appear to be contiguous with  the retrohepatic peritoneal carcinomatosis.  3. Small volume fluid about the spleen favored to represent malignant  ascites.  4. Stable mild-to-moderate right hydronephrosis related to mass effect  upon the distal ureter in the pelvis.  5. Multifocal atelectasis greatest in the left lower lobe with small  left pleural effusion, increased from comparison exam favored to  represent malignant effusion.  6. 1.0 cm right paratracheal lymph node increased in comparison with  2/18/2020, indeterminate.  7. The IVC and iliac veins are compressed due to the  extensive  peritoneal carcinomatosis without findings to suggest deep venous  Thrombosis.    BRAIN MRI: Impression: No evidence of abnormal enhancing lesions intracranially.     ECHOInterpretation Summary  Global and regional left ventricular function is normal with an EF of 55-60%.  Global right ventricular function is normal.  No significant valvular dysfunction present.  The inferior vena cava was normal in size with preserved respiratory  variability.  No pericardial effusion is present.    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  [employer] testing did reveal the t(11;22) translocation and resultant EWSR1-WT1 fusion. Dr Sims recommended starting multiagent chemotherapy with either an alternating regimen of CAV/IMV.  Initially family was considering herbal medicine, but has changed their mind and would like to pursue chemotherapy.     Concerns today were regarding safety of chemotherapy.  We had a long discussion last Friday and today- Robert has VERY advanced disease- putting pressure on his bowels, bladder, kidneys etc.  We discussed again chemo concerns- fevers/infections, bowel obstruction/perferation, in addition to N/V, dehydration, malnutrition, etc.  Family willing to move forward today.  Will have him come daily for IVF given sub optimal fluids at home.     FEN: Robert has lost 65 pounds in the last 1-2 months.  His nutrition is sub-optimal, Mariela was able to give him 4-6 glasses of water/juice a day and 3 Boost.  He tolerated this well.  Will increase to 4 Boost daily, continue juice.  Add in ice cream, jello, popsicles, etc.     GI: He is strongly at risk for a full bowel obstruction.  His abdomen is completely quiet today.   He denies pain or emesis, but Mariela and I discussed this extensively today.  They need to be monitoring his BM daily. Encouraged increase to Miralax BID given its been 4-5 days since last BM.  No SBO noted on CT CAP, reviewed this with radiology.     ID: no f/s/c.  He has had leukocytosis that goes back a few months. He also has some diaphoresis that has been coming/going for the last ~2 weeks as well.  He has no dysuria, cough, ear/sinus/throat pain.  Continue to monitor, this is likely due to his cancer.  No concerns on CT regarding infection either.     Line: lind was placed on 4/29, we will place orders for  HI to help with line care and daily flushes    BP: last week I stopped hydrochlorothiazide and increased his metoprolol daily for his BP.  Stable.    Psych: mood is stable, he has good support at home.  mariela will be his primary care give at home.  He seemed surprised to know that his cancer is not curable, but Mariela states they have discussed it at home and have told him.  His general understanding of his disease is limited.          Leona Tracey PA-C

## 2020-05-04 NOTE — PROGRESS NOTES
Infusion Nursing Note:  Diego Buchanan presents today for Cycle 1 Day 1 of Vincristine, Cytoxan, PO Mesna, and Adriamycin pump.    Patient seen by provider today: Yes: KATHLEEN Howard   present during visit today: Patient refused  services and a waiver form has been signed. Pt's sister accompanying patient.     Note: Patient is receiving chemotherapy for the first time. New patient teaching done previously. Pt received new pt folder prior to coming to infusion. Writer reinforced chemotherapy teaching/side effects and schedule. Patient instructed to call triage with questions/concerns or if he/she has chills and/or temperature greater than or equal to 100.5. Triage number: [phone]; After hours, weekends, or holidays, call [phone] and ask for oncology doctor on call.    TORB: Amara Eaton RN/ KATHLEEN Howard at 5/4/20 at 9:50AM. Patient good to go for treatment today. Has been having issues with being diaphoretic, but provider believes it is due to advanced disease. Patient normally has abdominal discomfort between 2-6/10. Will get CT moved prior to chemo, but okay to start without results. Sister is with patient and is his primary caregiver.     TORB: Amara Eaton RN/ KATHLEEN Howard at 5/4/20 at 1:21PM. RN to updated patient and sister on CT results. Aside from worsening disease, there is no bowel obstruction or anything else urgent seen on scan. Patient and sister, Mariela, updated on results.     Intravenous Access:  Esparza.    Treatment Conditions:  Lab Results   Component Value Date    HGB 13.6 05/04/2020     Lab Results   Component Value Date    WBC 12.8 05/04/2020      Lab Results   Component Value Date    ANEU 10.3 05/04/2020     Lab Results   Component Value Date     05/04/2020      Lab Results   Component Value Date     05/04/2020                   Lab Results   Component Value Date    POTASSIUM 3.8 05/04/2020           No results found for: MAG         Lab  "Results   Component Value Date    CR 0.78 05/04/2020                   Lab Results   Component Value Date    FAISAL 9.2 05/04/2020                Lab Results   Component Value Date    BILITOTAL 0.8 05/04/2020           Lab Results   Component Value Date    ALBUMIN 3.2 05/04/2020                    Lab Results   Component Value Date    ALT 13 05/04/2020           Lab Results   Component Value Date    AST 15 05/04/2020       Results reviewed, labs MET treatment parameters, ok to proceed with treatment.  ECHO/MUGA completed 5/1/20  EF 55-60%.      Post Infusion Assessment:  Patient tolerated infusion without incident.  Blood return noted pre and post infusion.  Blood return noted during Vincristine administration every 2 cc.  Site patent and intact, free from redness, edema or discomfort.  No evidence of extravasations.     Prior to discharge: Esparza is secured in place with tegaderm and flushed with 10cc NS with positive blood return noted. Purple lumen clamped.  Continuous home infusion CADD pump connected to RED lumen.   All connectors secured in place and clamps taped open. Double checked with Karly TINOCO RN    Pump started, \"running\" noted on display (CADD): YES.  Patient instructed to call our clinic or [facility] with any questions or concerns at home.  Patient verbalized understanding.    Patient set up for pump disconnect at our clinic (will disconnect here due to already being scheduled for IVF here- has coverage to be disconnected at home) on 5/6 with Neulasta Onpro at 2:45PM.      Patient told to take Mesna at 6:07PM and 10:07PM tonight; 4 hours and 8 hours after the completion of Cytoxan. (wrote down on AVS)    Discharge Plan:   Prescription refills given for Ativan, Compazine, Decadron, and PO Mesna.  Discharge instructions reviewed with: Patient and Family.  Patient and/or family verbalized understanding of discharge instructions and all questions answered.  Copy of AVS reviewed with patient " and/or family.  Patient will return 5/5/20 for next appointment.  Patient discharged in stable condition accompanied by: sister.  Departure Mode: Ambulatory.    Amara Eaton RN

## 2020-05-04 NOTE — NURSING NOTE
Oncology Rooming Note    May 4, 2020 9:00 AM   Diego Buchanan is a 24 year old male who presents for:    Chief Complaint   Patient presents with     Blood Draw     Labs drawn via CVC by RN in lab. VS taken.     Oncology Clinic Visit     Return; Ewings Sarcoma     Initial Vitals: /83 (BP Location: Right arm, Patient Position: Sitting, Cuff Size: Adult Regular)   Pulse 109   Temp 98.2  F (36.8  C) (Oral)   Resp 16   Wt 114.6 kg (252 lb 9.6 oz)   SpO2 96%  There is no height or weight on file to calculate BMI. There is no height or weight on file to calculate BSA.  No Pain (0) Comment: Data Unavailable   No LMP for male patient.  Allergies reviewed: Yes  Medications reviewed: Yes    Medications: Medication refills not needed today.  Pharmacy name entered into [employer]: [id number] - [place], [place] - [address] AT [address]    Clinical concerns: Has been having problems with sweating for approx 1 week.        Yisel Strange, CMA

## 2020-05-04 NOTE — NURSING NOTE
Chief Complaint   Patient presents with     Blood Draw     Labs drawn via CVC by RN in lab. VS taken.     Chelsie Esquivel RN

## 2020-05-04 NOTE — TELEPHONE ENCOUNTER
[facility] PRIOR AUTHORIZATION REQUEST     Drug including DOSE: Neulasta Onpro 6 mg  J Code:  NDC: [address]-50  ICD 10 code:C49.9, C41.9    Date(s) of Service: 05/04/2020    Insurance Name: [employer]  Insurance ID: [id number]    Provider: KATHLEEN Ontiveros  Provider NPI:[id number]      [facility]  NPI: [id number]

## 2020-05-04 NOTE — PROGRESS NOTES
This is a recent snapshot of the patient's [facility] medical record.  For current drug dose and complete information and questions, call [phone] or In Basket pool, fv home infusion ([id number])  CSN Number:  [id number]

## 2020-05-04 NOTE — PATIENT INSTRUCTIONS
Mariela- this is the information we discussed last Friday and today    1. Nausea:   -Give Robert 8 mg of Dexamethasone daily 5/5, 5/6, 5/7 with breakfast  -Give Robert 10 mg of compazine every 6 hours if he needs it for nausea  -Ativan is a good nausea medication to take at night if he is anxious or nauseated.  It is sedating so only use this medication in the evenings.     2. Fluids: try and give Robert >40oz of fluids daily - water, juice, milk, gatorade    3. Increase Boost/Ensure to 4 x daily.  OK to add in ice cream, jello, etc.     4. If he develops sores in his mouth- use 1 teaspoon baking soda + 1 teaspoon salt + 8 oz of water- gargle/spit every 2-3 hours    5. If he develops fevers, worsening constipation, severe pain, decreased urine output or anything concerning, call clinic or bring to ED      Contact Numbers  [facility]: [phone]        Please call the [facility] Triage line if you experience a temperature greater than or equal to 100.5, shaking chills, have uncontrolled nausea, vomiting and/or diarrhea, dizziness, shortness of breath, chest pain, bleeding, unexplained bruising, or if you have any other new/concerning symptoms, questions or concerns.     If it is after hours, weekends, or holidays, please call the main hospital  at  [phone] and ask to speak to the Oncology doctor on call.     If you are having any concerning symptoms or wish to speak to a provider before your next infusion visit, please call your care coordinator or triage to notify them so we can adequately serve you.     If you need a refill on a narcotic prescription or other medication, please call triage before your infusion appointment.     Lab Results:  Recent Results (from the past 12 hour(s))   CBC with platelets differential    Collection Time: 05/04/20  8:51 AM   Result Value Ref Range    WBC 12.8 (H) 4.0 - 11.0 10e9/L    RBC Count 5.49 4.4 - 5.9 10e12/L    Hemoglobin 13.6 13.3 - 17.7 g/dL    Hematocrit 43.8  40.0 - 53.0 %    MCV 80 78 - 100 fl    MCH 24.8 (L) 26.5 - 33.0 pg    MCHC 31.1 (L) 31.5 - 36.5 g/dL    RDW 15.3 (H) 10.0 - 15.0 %    Platelet Count 515 (H) 150 - 450 10e9/L    Diff Method Automated Method     % Neutrophils 81.1 %    % Lymphocytes 9.1 %    % Monocytes 7.3 %    % Eosinophils 1.5 %    % Basophils 0.5 %    % Immature Granulocytes 0.5 %    Nucleated RBCs 0 0 /100    Absolute Neutrophil 10.3 (H) 1.6 - 8.3 10e9/L    Absolute Lymphocytes 1.2 0.8 - 5.3 10e9/L    Absolute Monocytes 0.9 0.0 - 1.3 10e9/L    Absolute Eosinophils 0.2 0.0 - 0.7 10e9/L    Absolute Basophils 0.1 0.0 - 0.2 10e9/L    Abs Immature Granulocytes 0.1 0 - 0.4 10e9/L    Absolute Nucleated RBC 0.0    Comprehensive metabolic panel    Collection Time: 05/04/20  8:51 AM   Result Value Ref Range    Sodium 132 (L) 133 - 144 mmol/L    Potassium 3.8 3.4 - 5.3 mmol/L    Chloride 99 94 - 109 mmol/L    Carbon Dioxide 26 20 - 32 mmol/L    Anion Gap 7 3 - 14 mmol/L    Glucose 132 (H) 70 - 99 mg/dL    Urea Nitrogen 9 7 - 30 mg/dL    Creatinine 0.78 0.66 - 1.25 mg/dL    GFR Estimate >90 >60 mL/min/[1.73_m2]    GFR Estimate If Black >90 >60 mL/min/[1.73_m2]    Calcium 9.2 8.5 - 10.1 mg/dL    Bilirubin Total 0.8 0.2 - 1.3 mg/dL    Albumin 3.2 (L) 3.4 - 5.0 g/dL    Protein Total 8.3 6.8 - 8.8 g/dL    Alkaline Phosphatase 69 40 - 150 U/L    ALT 13 0 - 70 U/L    AST 15 0 - 45 U/L

## 2020-05-05 ENCOUNTER — INFUSION THERAPY VISIT (OUTPATIENT)
Dept: ONCOLOGY | Facility: CLINIC | Age: 25
End: 2020-05-05
Attending: PHYSICIAN ASSISTANT
Payer: COMMERCIAL

## 2020-05-05 VITALS
OXYGEN SATURATION: 98 % | TEMPERATURE: 97.9 F | SYSTOLIC BLOOD PRESSURE: 112 MMHG | HEART RATE: 95 BPM | DIASTOLIC BLOOD PRESSURE: 80 MMHG | RESPIRATION RATE: 20 BRPM

## 2020-05-05 DIAGNOSIS — C41.9 SARCOMA, EWINGS (H): Primary | ICD-10-CM

## 2020-05-05 DIAGNOSIS — C78.6 PERITONEAL CARCINOMATOSIS (H): ICD-10-CM

## 2020-05-05 LAB
ANION GAP SERPL CALCULATED.3IONS-SCNC: 9 MMOL/L (ref 3–14)
BUN SERPL-MCNC: 10 MG/DL (ref 7–30)
CALCIUM SERPL-MCNC: 9.5 MG/DL (ref 8.5–10.1)
CHLORIDE SERPL-SCNC: 101 MMOL/L (ref 94–109)
CO2 SERPL-SCNC: 26 MMOL/L (ref 20–32)
CREAT SERPL-MCNC: 0.63 MG/DL (ref 0.66–1.25)
GFR SERPL CREATININE-BSD FRML MDRD: >90 ML/MIN/{1.73_M2}
GLUCOSE SERPL-MCNC: 150 MG/DL (ref 70–99)
POTASSIUM SERPL-SCNC: 3.5 MMOL/L (ref 3.4–5.3)
SODIUM SERPL-SCNC: 135 MMOL/L (ref 133–144)
URATE SERPL-MCNC: 10.8 MG/DL (ref 3.5–7.2)
URATE SERPL-MCNC: 12.6 MG/DL (ref 3.5–7.2)

## 2020-05-05 PROCEDURE — 96360 HYDRATION IV INFUSION INIT: CPT

## 2020-05-05 PROCEDURE — 96361 HYDRATE IV INFUSION ADD-ON: CPT

## 2020-05-05 PROCEDURE — 80048 BASIC METABOLIC PNL TOTAL CA: CPT | Performed by: PHYSICIAN ASSISTANT

## 2020-05-05 PROCEDURE — 84550 ASSAY OF BLOOD/URIC ACID: CPT | Performed by: PHYSICIAN ASSISTANT

## 2020-05-05 PROCEDURE — 25000128 H RX IP 250 OP 636: Mod: ZF | Performed by: PHYSICIAN ASSISTANT

## 2020-05-05 PROCEDURE — 25800030 ZZH RX IP 258 OP 636: Mod: ZF | Performed by: PHYSICIAN ASSISTANT

## 2020-05-05 RX ORDER — HEPARIN SODIUM,PORCINE 10 UNIT/ML
5 VIAL (ML) INTRAVENOUS ONCE
Status: COMPLETED | OUTPATIENT
Start: 2020-05-05 | End: 2020-05-05

## 2020-05-05 RX ORDER — ALLOPURINOL 300 MG/1
300 TABLET ORAL DAILY
Qty: 30 TABLET | Refills: 0 | Status: SHIPPED | OUTPATIENT
Start: 2020-05-05 | End: 2020-09-11

## 2020-05-05 RX ADMIN — SODIUM CHLORIDE 1000 ML: 9 INJECTION, SOLUTION INTRAVENOUS at 12:05

## 2020-05-05 RX ADMIN — Medication 5 ML: at 13:59

## 2020-05-05 ASSESSMENT — PAIN SCALES - GENERAL: PAINLEVEL: NO PAIN (0)

## 2020-05-05 NOTE — PROGRESS NOTES
Infusion Nursing Note:  Diego Buchanan presents today for 1L of IVF.    Patient seen by provider today: No   present during visit today: Not Applicable.    Note: Patient accompanied by his sister, Mariela. Reports having an okay night last night. Continues to have a lot of diaphorisis, but no fevers or chills overnight. Hydrating well and denies any nausea or vomiting. Hasn't been able to have a bowel movement since last Thursday. Had discussed with provider yesterday and restarted Miralax 2X/day. Does report that his abdomen feels full. Denies any pain today, and hasn't have to take anything for pain in the last 24 hours. Forgot to take the Decadron this morning, but will take it when he gets home. Did take his Mesna last night as prescribed. HR slightly elevated today, patient denies any pain in his chest, dizziness or shortness of breath. No other new issues or concerns today.    TORB: Amara Eaton RN/ KATHLEEN Howard on 5/5/20 at 12:15PM. Recheck vitals post fluids to see if HR gets better. Patient should continue Miralax and give it another day or 2 before we add any other stool softeners in. If need to add another stool softener patient could try Milk of Magnesia or Magnesium Citrate. PA will keep an eye out for lab results today. Patient and Mariela updated on information.     TORB: Amara Eaton RN/ KATHLEEN Howard on 5/5/20 at 1:54PM. Provider aware of elevated uric acid today. Patient to push IVF at home. Will send allopurinol to [facility] pharmacy for patient to start. Will recheck labs tomorrow, please schedule lab appt at 1:30 tomorrow prior to patient's infusion. Patient and sister updated with all information.     Intravenous Access:  Esparza. Adriamycin pump running with no issues through Red lumen.     Treatment Conditions:  Lab Results   Component Value Date     05/05/2020                   Lab Results   Component Value Date    POTASSIUM 3.5 05/05/2020           No results found for: MAG          Lab Results   Component Value Date    CR 0.63 05/05/2020                   Lab Results   Component Value Date    FAISAL 9.5 05/05/2020                Lab Results   Component Value Date    BILITOTAL 0.8 05/04/2020           Lab Results   Component Value Date    ALBUMIN 3.2 05/04/2020                    Lab Results   Component Value Date    ALT 13 05/04/2020           Lab Results   Component Value Date    AST 15 05/04/2020       Results reviewed, labs MET treatment parameters, ok to proceed with treatment.      Post Infusion Assessment:  Patient tolerated infusion without incident.  Blood return noted pre and post infusion.  Site patent and intact, free from redness, edema or discomfort.  No evidence of extravasations.       Discharge Plan:   Patient declined prescription refills.  Discharge instructions reviewed with: Patient and Family.  Patient and/or family verbalized understanding of discharge instructions and all questions answered.  Copy of AVS reviewed with patient and/or family.  Patient will return 5/6/20 for next appointment.  Patient discharged in stable condition accompanied by: sister.  Departure Mode: Ambulatory.    Amara Eaton RN

## 2020-05-05 NOTE — TELEPHONE ENCOUNTER
RECORDS STATUS - ALL OTHER DIAGNOSIS      RECORDS RECEIVED FROM: [facility] - Internal Referral   DATE RECEIVED: 5/5/20   NOTES STATUS DETAILS   OFFICE NOTE from referring provider     OFFICE NOTE from medical oncologist     DISCHARGE SUMMARY from hospital     DISCHARGE REPORT from the ER     OPERATIVE REPORT     MEDICATION LIST     CLINICAL TRIAL TREATMENTS TO DATE     LABS     PATHOLOGY REPORTS     ANYTHING RELATED TO DIAGNOSIS     GENONOMIC TESTING     TYPE:     IMAGING (NEED IMAGES & REPORT)     CT SCANS     MRI     MAMMO     ULTRASOUND     PET

## 2020-05-05 NOTE — PROGRESS NOTES
This is a recent snapshot of the patient's [facility] medical record.  For current drug dose and complete information and questions, call [phone] or In Basket pool, fv home infusion ([id number])  CSN Number:  [id number]

## 2020-05-05 NOTE — PROGRESS NOTES
This is a recent snapshot of the patient's [facility] medical record.  For current drug dose and complete information and questions, call [phone] or In Basket pool, fv home infusion ([id number])  CSN Number:  [id number]

## 2020-05-05 NOTE — TELEPHONE ENCOUNTER
PA Initiation    Medication: Neulasta Onpro 6 mg  Insurance Company: [employer] - Phone [phone] Fax [phone]  Pharmacy Filling the Rx: [facility]  Filling Pharmacy Phone: [phone]  Filling Pharmacy Fax:    Start Date: 5/5/2020    Central Prior Authorization Team   Phone: [phone]

## 2020-05-05 NOTE — PATIENT INSTRUCTIONS
Contact Numbers  [facility]: [phone]        Please call the [facility] Triage line if you experience a temperature greater than or equal to 100.5, shaking chills, have uncontrolled nausea, vomiting and/or diarrhea, dizziness, shortness of breath, chest pain, bleeding, unexplained bruising, or if you have any other new/concerning symptoms, questions or concerns.     If it is after hours, weekends, or holidays, please call the main hospital  at  [phone] and ask to speak to the Oncology doctor on call.     If you are having any concerning symptoms or wish to speak to a provider before your next infusion visit, please call your care coordinator or triage to notify them so we can adequately serve you.     If you need a refill on a narcotic prescription or other medication, please call triage before your infusion appointment.       May 2020      Manjinder Monday Tuesday Wednesday Thursday Friday Saturday                            1    [facility] LAB DRAW   9:45 AM   (15 min.)   [facility] DRAW   [facility] Lab Draw    [facility] RETURN  10:05 AM   (50 min.)   Stacey Tracey PA-C   [facility]  12:30 PM   (120 min.)    ONCOLOGY INFUSION   [facility]    ECHO COMPLETE   3:00 PM   (60 min.)   ECHCR2   [facility]    MR BRAIN WWO   3:30 PM   (45 min.)   [id number]   [facility] MRI 2       3     4    P [facility] LAB DRAW   8:15 AM   (15 min.)    [facility] LAB DRAW   [facility] Lab Draw    [facility] RETURN   8:25 AM   (50 min.)   Stacey Tracey PA-C   [facility]    CT CHEST/ABDOMEN/PELVIS W  10:20 AM   (20 min.)   UCCT2   [facility] CT    [facility] ONC INFUSION 240  11:30 AM   (240 min.)    ONCOLOGY INFUSION   [facility] 5    [facility] ONC INFUSION 120  11:30 AM   (120 min.)    ONCOLOGY INFUSION   [facility] 6    [facility] ONC INFUSION 120    2:00 PM   (120 min.)   [facility] ONCOLOGY INFUSION   [facility] 7  [facility]!    [facility] ONC INFUSION 120  11:30 AM   (120 min.)   [facility] ONCOLOGY INFUSION   [facility] 8    [facility] ONC INFUSION 120  12:00 PM   (120 min.)   [facility] ONCOLOGY INFUSION   [facility]    [facility] RETURN  12:15 PM   (50 min.)   Stacey Tracey PA-C   [facility] 9    [facility] ONC INFUSION 120   8:00 AM   (120 min.)   [facility] ONCOLOGY INFUSION   [facility]   10     11     12     13     14     15    [facility] LAB DRAW  11:00 AM   (15 min.)   [facility] LAB DRAW   [facility] Lab Draw    [facility] RETURN  11:25 AM   (50 min.)   Stacey Tracey PA-C   [facility] 16       17     18     19    [facility] LAB DRAW   9:30 AM   (15 min.)   [facility] DRAW   [facility] Lab Draw    [facility] RETURN  10:05 AM   (50 min.)   Mushtaq Causey PA   [facility]    [facility] ONC INFUSION 120  11:30 AM   (120 min.)   [facility] ONCOLOGY INFUSION   [facility] 20     21     22     23       24     25     26     27     28     29     30       31 June 2020 Sunday Monday Tuesday Wednesday Thursday Friday Saturday        1     2     3     4     5    [facility] DRAW  12:15 PM   (15 min.)   [facility] DRAW   [facility] Lab Draw    VIDEO VISIT RETURN  12:45 PM   (60 min.)   Farzaneh Young MD   [facility] 6       7     8     9     10     11     12     13       14     15     16     17     18     19     20       21     22     23     24     25     26     27       28     29     30                                         Lab Results:  Recent Results (from the past 12 hour(s))   Basic metabolic panel    Collection Time: 05/05/20 12:06 PM   Result Value Ref Range    Sodium 135 133 - 144 mmol/L    Potassium 3.5 3.4 - 5.3 mmol/L    Chloride 101 94 - 109 mmol/L     Carbon Dioxide 26 20 - 32 mmol/L    Anion Gap 9 3 - 14 mmol/L    Glucose 150 (H) 70 - 99 mg/dL    Urea Nitrogen 10 7 - 30 mg/dL    Creatinine 0.63 (L) 0.66 - 1.25 mg/dL    GFR Estimate >90 >60 mL/min/[1.73_m2]    GFR Estimate If Black >90 >60 mL/min/[1.73_m2]    Calcium 9.5 8.5 - 10.1 mg/dL   Uric acid    Collection Time: 05/05/20 12:06 PM   Result Value Ref Range    Uric Acid 10.8 (H) 3.5 - 7.2 mg/dL

## 2020-05-06 ENCOUNTER — HOME INFUSION (PRE-WILLOW HOME INFUSION) (OUTPATIENT)
Dept: PHARMACY | Facility: CLINIC | Age: 25
End: 2020-05-06

## 2020-05-06 ENCOUNTER — TELEPHONE (OUTPATIENT)
Dept: ONCOLOGY | Facility: CLINIC | Age: 25
End: 2020-05-06

## 2020-05-06 ENCOUNTER — MEDICAL CORRESPONDENCE (OUTPATIENT)
Dept: HEALTH INFORMATION MANAGEMENT | Facility: CLINIC | Age: 25
End: 2020-05-06

## 2020-05-06 ENCOUNTER — PATIENT OUTREACH (OUTPATIENT)
Dept: ONCOLOGY | Facility: CLINIC | Age: 25
End: 2020-05-06

## 2020-05-06 LAB
ALBUMIN SERPL-MCNC: 3 G/DL (ref 3.4–5)
ALP SERPL-CCNC: 65 U/L (ref 40–150)
ALT SERPL W P-5'-P-CCNC: 17 U/L (ref 0–70)
ANION GAP SERPL CALCULATED.3IONS-SCNC: 5 MMOL/L (ref 3–14)
AST SERPL W P-5'-P-CCNC: 21 U/L (ref 0–45)
BASOPHILS # BLD AUTO: 0 10E9/L (ref 0–0.2)
BASOPHILS NFR BLD AUTO: 0.1 %
BILIRUB SERPL-MCNC: 0.3 MG/DL (ref 0.2–1.3)
BUN SERPL-MCNC: 14 MG/DL (ref 7–30)
CALCIUM SERPL-MCNC: 9.1 MG/DL (ref 8.5–10.1)
CHLORIDE SERPL-SCNC: 104 MMOL/L (ref 94–109)
CO2 SERPL-SCNC: 28 MMOL/L (ref 20–32)
CREAT SERPL-MCNC: 0.62 MG/DL (ref 0.66–1.25)
DIFFERENTIAL METHOD BLD: ABNORMAL
EOSINOPHIL # BLD AUTO: 0 10E9/L (ref 0–0.7)
EOSINOPHIL NFR BLD AUTO: 0.1 %
ERYTHROCYTE [DISTWIDTH] IN BLOOD BY AUTOMATED COUNT: 15.3 % (ref 10–15)
GFR SERPL CREATININE-BSD FRML MDRD: >90 ML/MIN/{1.73_M2}
GLUCOSE SERPL-MCNC: 104 MG/DL (ref 70–99)
HCT VFR BLD AUTO: 37.1 % (ref 40–53)
HGB BLD-MCNC: 12 G/DL (ref 13.3–17.7)
IMM GRANULOCYTES # BLD: 0 10E9/L (ref 0–0.4)
IMM GRANULOCYTES NFR BLD: 0.1 %
LYMPHOCYTES # BLD AUTO: 0.4 10E9/L (ref 0.8–5.3)
LYMPHOCYTES NFR BLD AUTO: 3.7 %
MCH RBC QN AUTO: 25.3 PG (ref 26.5–33)
MCHC RBC AUTO-ENTMCNC: 32.3 G/DL (ref 31.5–36.5)
MCV RBC AUTO: 78 FL (ref 78–100)
MONOCYTES # BLD AUTO: 0.2 10E9/L (ref 0–1.3)
MONOCYTES NFR BLD AUTO: 1.8 %
NEUTROPHILS # BLD AUTO: 9 10E9/L (ref 1.6–8.3)
NEUTROPHILS NFR BLD AUTO: 94.2 %
NRBC # BLD AUTO: 0 10*3/UL
NRBC BLD AUTO-RTO: 0 /100
PLATELET # BLD AUTO: 450 10E9/L (ref 150–450)
POTASSIUM SERPL-SCNC: 4.5 MMOL/L (ref 3.4–5.3)
PROT SERPL-MCNC: 7.6 G/DL (ref 6.8–8.8)
RBC # BLD AUTO: 4.74 10E12/L (ref 4.4–5.9)
SODIUM SERPL-SCNC: 137 MMOL/L (ref 133–144)
URATE SERPL-MCNC: 9.3 MG/DL (ref 3.5–7.2)
WBC # BLD AUTO: 9.5 10E9/L (ref 4–11)

## 2020-05-06 PROCEDURE — 84550 ASSAY OF BLOOD/URIC ACID: CPT | Performed by: PHYSICIAN ASSISTANT

## 2020-05-06 PROCEDURE — 85025 COMPLETE CBC W/AUTO DIFF WBC: CPT | Performed by: PHYSICIAN ASSISTANT

## 2020-05-06 PROCEDURE — 80053 COMPREHEN METABOLIC PANEL: CPT | Performed by: PHYSICIAN ASSISTANT

## 2020-05-06 NOTE — TELEPHONE ENCOUNTER
HC RN Milagros called in to triage reporting pt's temp is 94.8 and 95.2. She is there to disconnect his pump, report he is sweating profusely but feels cool to touch and looks pale. He reports feeling fine though weak and tired. Notes from yesterday's visit does report he was sweating then too. His uric acid was elevated and was supposed to come back today for lab recheck. Milagros stated she did not get lab orders, she does not have the correct lab tubes, will have office send them to her and collect today to send as stat (cbc, cmp, uric acid). VS today wnl /80 P 100 O2 99%, pt drinking about 48 oz fluids today. Discussed with Leona WANG who would also like labs drawn on Friday. Verbal orders given to Milagros who verbalized understanding.

## 2020-05-06 NOTE — PROGRESS NOTES
Patient was scheduled to come to clinic today for IVF, pump disconnect and onpro.  This has been changed to [facility].  They will go out to patient's house, give him fluids, disconnect the pump and place onpro.  Scheduled for 2 PM with pump disconnect at 2:45 PM.      Roula Newman MBA, MSN, RN, ONC  RN Care Coordinator  [facility]

## 2020-05-07 ENCOUNTER — MEDICAL CORRESPONDENCE (OUTPATIENT)
Dept: HEALTH INFORMATION MANAGEMENT | Facility: CLINIC | Age: 25
End: 2020-05-07

## 2020-05-07 ENCOUNTER — HOME INFUSION (PRE-WILLOW HOME INFUSION) (OUTPATIENT)
Dept: PHARMACY | Facility: CLINIC | Age: 25
End: 2020-05-07

## 2020-05-07 LAB
ALBUMIN SERPL-MCNC: 2.8 G/DL (ref 3.4–5)
ALP SERPL-CCNC: 60 U/L (ref 40–150)
ALT SERPL W P-5'-P-CCNC: 17 U/L (ref 0–70)
ANION GAP SERPL CALCULATED.3IONS-SCNC: 9 MMOL/L (ref 3–14)
AST SERPL W P-5'-P-CCNC: 19 U/L (ref 0–45)
BASOPHILS # BLD AUTO: 0 10E9/L (ref 0–0.2)
BASOPHILS NFR BLD AUTO: 0 %
BILIRUB SERPL-MCNC: 0.3 MG/DL (ref 0.2–1.3)
BUN SERPL-MCNC: 10 MG/DL (ref 7–30)
CALCIUM SERPL-MCNC: 8.6 MG/DL (ref 8.5–10.1)
CHLORIDE SERPL-SCNC: 106 MMOL/L (ref 94–109)
CO2 SERPL-SCNC: 26 MMOL/L (ref 20–32)
CREAT SERPL-MCNC: 0.61 MG/DL (ref 0.66–1.25)
DIFFERENTIAL METHOD BLD: ABNORMAL
EOSINOPHIL # BLD AUTO: 0.1 10E9/L (ref 0–0.7)
EOSINOPHIL NFR BLD AUTO: 0.8 %
ERYTHROCYTE [DISTWIDTH] IN BLOOD BY AUTOMATED COUNT: 15.5 % (ref 10–15)
GFR SERPL CREATININE-BSD FRML MDRD: >90 ML/MIN/{1.73_M2}
GLUCOSE SERPL-MCNC: 106 MG/DL (ref 70–99)
HCT VFR BLD AUTO: 36.5 % (ref 40–53)
HGB BLD-MCNC: 11.6 G/DL (ref 13.3–17.7)
IMM GRANULOCYTES # BLD: 0 10E9/L (ref 0–0.4)
IMM GRANULOCYTES NFR BLD: 0.2 %
LYMPHOCYTES # BLD AUTO: 1 10E9/L (ref 0.8–5.3)
LYMPHOCYTES NFR BLD AUTO: 11.3 %
MCH RBC QN AUTO: 24.9 PG (ref 26.5–33)
MCHC RBC AUTO-ENTMCNC: 31.8 G/DL (ref 31.5–36.5)
MCV RBC AUTO: 78 FL (ref 78–100)
MONOCYTES # BLD AUTO: 0.1 10E9/L (ref 0–1.3)
MONOCYTES NFR BLD AUTO: 0.9 %
NEUTROPHILS # BLD AUTO: 7.8 10E9/L (ref 1.6–8.3)
NEUTROPHILS NFR BLD AUTO: 86.8 %
NRBC # BLD AUTO: 0 10*3/UL
NRBC BLD AUTO-RTO: 0 /100
PLATELET # BLD AUTO: 439 10E9/L (ref 150–450)
POTASSIUM SERPL-SCNC: 3.4 MMOL/L (ref 3.4–5.3)
PROT SERPL-MCNC: 6.9 G/DL (ref 6.8–8.8)
RBC # BLD AUTO: 4.66 10E12/L (ref 4.4–5.9)
SODIUM SERPL-SCNC: 141 MMOL/L (ref 133–144)
URATE SERPL-MCNC: 8.1 MG/DL (ref 3.5–7.2)
WBC # BLD AUTO: 9 10E9/L (ref 4–11)

## 2020-05-07 PROCEDURE — 84550 ASSAY OF BLOOD/URIC ACID: CPT | Performed by: PHYSICIAN ASSISTANT

## 2020-05-07 PROCEDURE — 80053 COMPREHEN METABOLIC PANEL: CPT | Performed by: PHYSICIAN ASSISTANT

## 2020-05-07 PROCEDURE — 85025 COMPLETE CBC W/AUTO DIFF WBC: CPT | Performed by: PHYSICIAN ASSISTANT

## 2020-05-07 NOTE — PROGRESS NOTES
This is a recent snapshot of the patient's [facility] medical record.  For current drug dose and complete information and questions, call [phone] or In Basket pool, fv home infusion ([id number])  CSN Number:  [id number]

## 2020-05-08 ENCOUNTER — VIRTUAL VISIT (OUTPATIENT)
Dept: ONCOLOGY | Facility: CLINIC | Age: 25
End: 2020-05-08
Attending: PHYSICIAN ASSISTANT
Payer: COMMERCIAL

## 2020-05-08 ENCOUNTER — HOME INFUSION (PRE-WILLOW HOME INFUSION) (OUTPATIENT)
Dept: PHARMACY | Facility: CLINIC | Age: 25
End: 2020-05-08

## 2020-05-08 DIAGNOSIS — C41.9 SARCOMA, EWINGS (H): Primary | ICD-10-CM

## 2020-05-08 LAB
ALBUMIN SERPL-MCNC: 2.9 G/DL (ref 3.4–5)
ALP SERPL-CCNC: 65 U/L (ref 40–150)
ALT SERPL W P-5'-P-CCNC: 19 U/L (ref 0–70)
ANION GAP SERPL CALCULATED.3IONS-SCNC: 9 MMOL/L (ref 3–14)
AST SERPL W P-5'-P-CCNC: 18 U/L (ref 0–45)
BASOPHILS # BLD AUTO: 0 10E9/L (ref 0–0.2)
BASOPHILS NFR BLD AUTO: 0.1 %
BILIRUB SERPL-MCNC: 0.3 MG/DL (ref 0.2–1.3)
BUN SERPL-MCNC: 12 MG/DL (ref 7–30)
CALCIUM SERPL-MCNC: 8.6 MG/DL (ref 8.5–10.1)
CHLORIDE SERPL-SCNC: 107 MMOL/L (ref 94–109)
CO2 SERPL-SCNC: 26 MMOL/L (ref 20–32)
CREAT SERPL-MCNC: 0.64 MG/DL (ref 0.66–1.25)
DIFFERENTIAL METHOD BLD: ABNORMAL
EOSINOPHIL # BLD AUTO: 0.1 10E9/L (ref 0–0.7)
EOSINOPHIL NFR BLD AUTO: 0.4 %
ERYTHROCYTE [DISTWIDTH] IN BLOOD BY AUTOMATED COUNT: 15.5 % (ref 10–15)
GFR SERPL CREATININE-BSD FRML MDRD: >90 ML/MIN/{1.73_M2}
GLUCOSE SERPL-MCNC: 89 MG/DL (ref 70–99)
HCT VFR BLD AUTO: 36.2 % (ref 40–53)
HGB BLD-MCNC: 11.5 G/DL (ref 13.3–17.7)
IMM GRANULOCYTES # BLD: 0.2 10E9/L (ref 0–0.4)
IMM GRANULOCYTES NFR BLD: 0.8 %
LYMPHOCYTES # BLD AUTO: 1 10E9/L (ref 0.8–5.3)
LYMPHOCYTES NFR BLD AUTO: 4 %
MCH RBC QN AUTO: 25.4 PG (ref 26.5–33)
MCHC RBC AUTO-ENTMCNC: 31.8 G/DL (ref 31.5–36.5)
MCV RBC AUTO: 80 FL (ref 78–100)
MONOCYTES # BLD AUTO: 0 10E9/L (ref 0–1.3)
MONOCYTES NFR BLD AUTO: 0.1 %
NEUTROPHILS # BLD AUTO: 24.8 10E9/L (ref 1.6–8.3)
NEUTROPHILS NFR BLD AUTO: 94.6 %
NRBC # BLD AUTO: 0 10*3/UL
NRBC BLD AUTO-RTO: 0 /100
PLATELET # BLD AUTO: 399 10E9/L (ref 150–450)
POTASSIUM SERPL-SCNC: 3.8 MMOL/L (ref 3.4–5.3)
PROT SERPL-MCNC: 7 G/DL (ref 6.8–8.8)
RBC # BLD AUTO: 4.52 10E12/L (ref 4.4–5.9)
SODIUM SERPL-SCNC: 142 MMOL/L (ref 133–144)
URATE SERPL-MCNC: 7.2 MG/DL (ref 3.5–7.2)
WBC # BLD AUTO: 26.2 10E9/L (ref 4–11)

## 2020-05-08 PROCEDURE — 99214 OFFICE O/P EST MOD 30 MIN: CPT | Mod: 95 | Performed by: PHYSICIAN ASSISTANT

## 2020-05-08 PROCEDURE — 40000114 ZZH STATISTIC NO CHARGE CLINIC VISIT

## 2020-05-08 PROCEDURE — 80053 COMPREHEN METABOLIC PANEL: CPT | Performed by: PHYSICIAN ASSISTANT

## 2020-05-08 PROCEDURE — 85025 COMPLETE CBC W/AUTO DIFF WBC: CPT | Performed by: PHYSICIAN ASSISTANT

## 2020-05-08 PROCEDURE — 84550 ASSAY OF BLOOD/URIC ACID: CPT | Performed by: PHYSICIAN ASSISTANT

## 2020-05-08 NOTE — PROGRESS NOTES
"Diego Buchanan is a 25 year old male who is being evaluated via a billable video visit.      The patient has been notified of following:     \"This video visit will be conducted via a call between you and your physician/provider. We have found that certain health care needs can be provided without the need for an in-person physical exam.  This service lets us provide the care you need with a video conversation.  If a prescription is necessary we can send it directly to your pharmacy.  If lab work is needed we can place an order for that and you can then stop by our lab to have the test done at a later time.    Video visits are billed at different rates depending on your insurance coverage.  Please reach out to your insurance provider with any questions.    If during the course of the call the physician/provider feels a video visit is not appropriate, you will not be charged for this service.\"    Patient has given verbal consent for Video visit? Yes    How would you like to obtain your AVS? MyChart    Patient would like the video invitation sent by: Text to cell phone: [phone]    Will anyone else be joining your video visit? No      Secondary Video Option (Doximity), send text message to:  [phone]    I have reviewed and updated the patient's allergies and medication list.    Concerns: Patient has no new concerns.      Refills: None      KERON Clayton    [facility]  FOLLOW-UP VISIT NOTE  May 8, 2020                   CHIEF COMPLAINT: Peritoneal carcinomatosis, sarcoma, here for labs/toxicity check     Oncologic History:  1. He presented initially with abdominal pain, diarrhea, and progressive abdominal distention for 3 months duration. 2. Initial CT scan in February 2020 showed severe peritoneal carcinomatosis with large peritoneal tumor implants throughout the entire abdomen and pelvis, but mostly prominently in the pelvis.   2. PETCT scan showed interval increase " in the amount of peritoneal carcinomatosis.  3. On 3/12/2020, he had ultrasound-guided core needle biopsy of a peritoneal implant (Case: [id number]), which showed a completely undifferentiated appearance, but stains with pancytokeratin, indicating an epithelial origin. 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 Tumor markers for CA-19-9, CEA, beta-hCG, alpha-fetoprotein were unremarkable. Cancer type ID molecular testing by [employer] sent to determine the exact diagnosis and to assist in further treatment planning. 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 Relative probability of less than 5% of other subtype of sarcoma. EWSR1-WT1 fusion detected.  4. Discussion with DR Sims about starting palliative chemotherapy- plan for CAV/IMV  5. CAV started 5/4/2020     HISTORY OF PRESENT ILLNESS  Diego Buchanan is a 24 year old male with a history of learning disability and recent diagnosis of sarcoma.  Robert started CAV on 5/4.  He was called today and spoken to with his sister Mariela.  Diego lives at home with his mom and dad and 4 adult siblings.  3 of the other siblings do work and currently are in and out of the home.  Mariela will do the majority of the care taking for him.  Robert is his own medical decision maker, his parents are the legal guardians.  He did finish some HS and post HS education, he has a .  He does all his ADLs himself, but doesn't leave the home without family, does not drive and is not left alone.        Per Robert since starting chemo- things are better!  No belly pain (mostly points to LLQ) this week, except once today when he ate a bowel of pasta.  Robert has been on a liquid diet- Mariela is giving him 3 Ensure daily and 6 glasses of water or juice.  He has been doing great with the liquid diet- no belly pain.  He went 5 days with no BM, she  started giving him miralax and now he is having ~2 soft stools a day.    Mariela reports his color is better and his excessive sweating improved.  No fevers or chills.  No problems breathing or urinating.   He denies feeling dizzy or light headed.  no edema or rash.          EXAM:   Video physical exam  General: Patient appears well in no acute distress. Obese body habitus.  His coloring is less pale and no diaphoresis today!  Skin: No visualized rash or lesions on visualized skin  Eyes: EOMI, no erythema, sclera icterus or discharge noted  Resp: Appears to be breathing comfortably without accessory muscle usage, speaking in full sentences, no cough  MSK: Appears to have normal range of motion based on visualized movements  Neurologic: No apparent tremors, facial movements symmetric  Psych: affect brigh, alert and oriented, comprehension is like an 8 year old  The rest of a comprehensive physical examination is deferred due to PHE (public health emergency) video restrictions     5/5/2020 12:06 5/6/2020 17:30 5/7/2020 11:20   Sodium 135 137 141   Potassium 3.5 4.5 3.4   Chloride 101 104 106   Carbon Dioxide 26 28 26   Urea Nitrogen 10 14 10   Creatinine 0.63 (L) 0.62 (L) 0.61 (L)   GFR Estimate >90 >90 >90   GFR Estimate If Black >90 >90 >90   Calcium 9.5 9.1 8.6   Anion Gap 9 5 9   Albumin  3.0 (L) 2.8 (L)   Protein Total  7.6 6.9   Bilirubin Total  0.3 0.3   Alkaline Phosphatase  65 60   ALT  17 17   AST  21 19   Uric Acid 10.8 (H) 9.3 (H) 8.1 (H)   Glucose 150 (H) 104 (H) 106 (H)   WBC  9.5 9.0   Hemoglobin  12.0 (L) 11.6 (L)   Hematocrit  37.1 (L) 36.5 (L)   Platelet Count  450 439   RBC Count  4.74 4.66   MCV  78 78            ASSESSMENT AND PLAN  #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  [employer] testing did reveal the t(11;22) translocation and resultant EWSR1-WT1 fusion. Dr Sims recommended starting multiagent chemotherapy with either an alternating regimen of CAV/IMV.  Initially family was considering herbal medicine, but has changed their mind and would like to pursue chemotherapy. Robert's ECHO is WNL, CT CAP was repeated showing very advanced disease without emergent findings that would preclude further therapy and brain MRI negative.  Started CAV on 5/4 without concerns and clearly has shown improvements this week- eating/drinking better, moving bowels with ease, less diaphoresis and coloring is better.  Sister Mariela thrilled that he is looking better than Monday.  Will continue with close follow up and IMV on 5/19     FEN: Robert has lost 65 pounds in the last 1-2 months.  His nutrition is sub-optimal, Mariela was able to give him 4-6 glasses of water/juice a day and 3 Boost.  He tolerated this well.  Will increase to 4 Boost daily, continue juice.  Add in ice cream, jello, popsicles, etc.      GI: He is strongly at risk for a full bowel obstruction.  No SBO noted on CT CAP on 5/4.  With daily miralax, he has had a daily BM.      ID: no f/s/c.  He has had leukocytosis that goes back a few months. He also has some diaphoresis that has been coming/going for the last ~2 weeks as well.  He has no dysuria, cough, ear/sinus/throat pain.  Continue to monitor, this is likely due to his cancer.  No concerns on CT regarding infection either.      Line: lind was placed on 4/29, we will place orders for  HI to help with line care and daily flushes, they converted to home IVF daily.  Will give IVF on 5/9 and then will switch to PRN next week     BP: last week I stopped hydrochlorothiazide and increased his metoprolol daily for his BP.  Stable.     Psych: mood is stable, he has good support at home.  mariela will be his primary care give at home.  He seemed surprised to  know that his cancer is not curable, but Mariela states they have discussed it at home and have told him.  His general understanding of his disease is limited.                 Video-Visit Details    Type of service:  Video Visit    Video Start Time: 12:40  Video End Time: 12:56    Originating Location (pt. Location): Home    Distant Location (provider location):  [facility] CANCER [facility]     Platform used for Video Visit: Valentina Tracey PA-C

## 2020-05-08 NOTE — LETTER
"5/8/2020      RE: Diego Ramirez  [place] [address]       Diego Buchanan is a 25 year old male who is being evaluated via a billable video visit.      The patient has been notified of following:     \"This video visit will be conducted via a call between you and your physician/provider. We have found that certain health care needs can be provided without the need for an in-person physical exam.  This service lets us provide the care you need with a video conversation.  If a prescription is necessary we can send it directly to your pharmacy.  If lab work is needed we can place an order for that and you can then stop by our lab to have the test done at a later time.    Video visits are billed at different rates depending on your insurance coverage.  Please reach out to your insurance provider with any questions.    If during the course of the call the physician/provider feels a video visit is not appropriate, you will not be charged for this service.\"    Patient has given verbal consent for Video visit? Yes    How would you like to obtain your AVS? MyChart    Patient would like the video invitation sent by: Text to cell phone: [phone]    Will anyone else be joining your video visit? No      Secondary Video Option (Doximity), send text message to:  [phone]    I have reviewed and updated the patient's allergies and medication list.    Concerns: Patient has no new concerns.      Refills: None      KERON Clayton    [facility]  FOLLOW-UP VISIT NOTE  May 8, 2020                   CHIEF COMPLAINT: Peritoneal carcinomatosis, sarcoma, here for labs/toxicity check     Oncologic History:  1. He presented initially with abdominal pain, diarrhea, and progressive abdominal distention for 3 months duration. 2. Initial CT scan in February 2020 showed severe peritoneal carcinomatosis with large peritoneal tumor implants throughout the entire abdomen and pelvis, " but mostly prominently in the pelvis.   2. PETCT scan showed interval increase in the amount of peritoneal carcinomatosis.  3. On 3/12/2020, he had ultrasound-guided core needle biopsy of a peritoneal implant (Case: [id number]), which showed a completely undifferentiated appearance, but stains with pancytokeratin, indicating an epithelial origin. 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 Tumor markers for CA-19-9, CEA, beta-hCG, alpha-fetoprotein were unremarkable. Cancer type ID molecular testing by [employer] sent to determine the exact diagnosis and to assist in further treatment planning. 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 Relative probability of less than 5% of other subtype of sarcoma. EWSR1-WT1 fusion detected.  4. Discussion with DR Sims about starting palliative chemotherapy- plan for CAV/IMV  5. CAV started 5/4/2020     HISTORY OF PRESENT ILLNESS  Diego Buchanan is a 24 year old male with a history of learning disability and recent diagnosis of sarcoma.  Robert started CAV on 5/4.  He was called today and spoken to with his sister Mariela.  Diego lives at home with his mom and dad and 4 adult siblings.  3 of the other siblings do work and currently are in and out of the home.  Mariela will do the majority of the care taking for him.  Robert is his own medical decision maker, his parents are the legal guardians.  He did finish some HS and post HS education, he has a .  He does all his ADLs himself, but doesn't leave the home without family, does not drive and is not left alone.        Per Robert since starting chemo- things are better!  No belly pain (mostly points to LLQ) this week, except once today when he ate a bowel of pasta.  Robert has been on a liquid diet- Mariela is giving him 3 Ensure daily and 6 glasses of water or juice.  He has been doing  great with the liquid diet- no belly pain.  He went 5 days with no BM, she started giving him miralax and now he is having ~2 soft stools a day.    Mariela reports his color is better and his excessive sweating improved.  No fevers or chills.  No problems breathing or urinating.   He denies feeling dizzy or light headed.  no edema or rash.          EXAM:   Video physical exam  General: Patient appears well in no acute distress. Obese body habitus.  His coloring is less pale and no diaphoresis today!  Skin: No visualized rash or lesions on visualized skin  Eyes: EOMI, no erythema, sclera icterus or discharge noted  Resp: Appears to be breathing comfortably without accessory muscle usage, speaking in full sentences, no cough  MSK: Appears to have normal range of motion based on visualized movements  Neurologic: No apparent tremors, facial movements symmetric  Psych: affect brigh, alert and oriented, comprehension is like an 8 year old  The rest of a comprehensive physical examination is deferred due to PHE (public health emergency) video restrictions     5/5/2020 12:06 5/6/2020 17:30 5/7/2020 11:20   Sodium 135 137 141   Potassium 3.5 4.5 3.4   Chloride 101 104 106   Carbon Dioxide 26 28 26   Urea Nitrogen 10 14 10   Creatinine 0.63 (L) 0.62 (L) 0.61 (L)   GFR Estimate >90 >90 >90   GFR Estimate If Black >90 >90 >90   Calcium 9.5 9.1 8.6   Anion Gap 9 5 9   Albumin  3.0 (L) 2.8 (L)   Protein Total  7.6 6.9   Bilirubin Total  0.3 0.3   Alkaline Phosphatase  65 60   ALT  17 17   AST  21 19   Uric Acid 10.8 (H) 9.3 (H) 8.1 (H)   Glucose 150 (H) 104 (H) 106 (H)   WBC  9.5 9.0   Hemoglobin  12.0 (L) 11.6 (L)   Hematocrit  37.1 (L) 36.5 (L)   Platelet Count  450 439   RBC Count  4.74 4.66   MCV  78 78            ASSESSMENT AND PLAN  #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  [employer] testing did reveal the t(11;22) translocation and resultant EWSR1-WT1 fusion. Dr Sims recommended starting multiagent chemotherapy with either an alternating regimen of CAV/IMV.  Initially family was considering herbal medicine, but has changed their mind and would like to pursue chemotherapy. Robert's ECHO is WNL, CT CAP was repeated showing very advanced disease without emergent findings that would preclude further therapy and brain MRI negative.  Started CAV on 5/4 without concerns and clearly has shown improvements this week- eating/drinking better, moving bowels with ease, less diaphoresis and coloring is better.  Sister Mariela thrilled that he is looking better than Monday.  Will continue with close follow up and IMV on 5/19     FEN: Robert has lost 65 pounds in the last 1-2 months.  His nutrition is sub-optimal, Mariela was able to give him 4-6 glasses of water/juice a day and 3 Boost.  He tolerated this well.  Will increase to 4 Boost daily, continue juice.  Add in ice cream, jello, popsicles, etc.      GI: He is strongly at risk for a full bowel obstruction.  No SBO noted on CT CAP on 5/4.  With daily miralax, he has had a daily BM.      ID: no f/s/c.  He has had leukocytosis that goes back a few months. He also has some diaphoresis that has been coming/going for the last ~2 weeks as well.  He has no dysuria, cough, ear/sinus/throat pain.  Continue to monitor, this is likely due to his cancer.  No concerns on CT regarding infection either.      Line: lind was placed on 4/29, we will place orders for [facility] to help with line care and daily flushes, they converted to home IVF daily.  Will give IVF on 5/9 and then will switch to PRN next week     BP: last week I stopped hydrochlorothiazide and increased his metoprolol daily for his BP.  Stable.     Psych: mood is stable, he has good support at home.   mariela will be his primary care give at home.  He seemed surprised to know that his cancer is not curable, but Mariela states they have discussed it at home and have told him.  His general understanding of his disease is limited.                 Video-Visit Details    Type of service:  Video Visit    Video Start Time: 12:40  Video End Time: 12:56    Originating Location (pt. Location): Home    Distant Location (provider location):  [facility] CANCER [facility]     Platform used for Video Visit: ADELE Larios PA-C

## 2020-05-08 NOTE — PROGRESS NOTES
This is a recent snapshot of the patient's [facility] medical record.  For current drug dose and complete information and questions, call [phone] or In Basket pool, [facility] home infusion ([id number])  CSN Number:  [id number]

## 2020-05-09 ENCOUNTER — HOME INFUSION (PRE-WILLOW HOME INFUSION) (OUTPATIENT)
Dept: PHARMACY | Facility: CLINIC | Age: 25
End: 2020-05-09

## 2020-05-09 ENCOUNTER — MEDICAL CORRESPONDENCE (OUTPATIENT)
Dept: HEALTH INFORMATION MANAGEMENT | Facility: CLINIC | Age: 25
End: 2020-05-09

## 2020-05-09 LAB
ALBUMIN SERPL-MCNC: 2.8 G/DL (ref 3.4–5)
ALP SERPL-CCNC: 67 U/L (ref 40–150)
ALT SERPL W P-5'-P-CCNC: 18 U/L (ref 0–70)
ANION GAP SERPL CALCULATED.3IONS-SCNC: 6 MMOL/L (ref 3–14)
AST SERPL W P-5'-P-CCNC: 15 U/L (ref 0–45)
BASOPHILS # BLD AUTO: 0 10E9/L (ref 0–0.2)
BASOPHILS NFR BLD AUTO: 0.1 %
BILIRUB SERPL-MCNC: 0.3 MG/DL (ref 0.2–1.3)
BUN SERPL-MCNC: 9 MG/DL (ref 7–30)
CALCIUM SERPL-MCNC: 8.6 MG/DL (ref 8.5–10.1)
CHLORIDE SERPL-SCNC: 109 MMOL/L (ref 94–109)
CO2 SERPL-SCNC: 27 MMOL/L (ref 20–32)
CREAT SERPL-MCNC: 0.64 MG/DL (ref 0.66–1.25)
DIFFERENTIAL METHOD BLD: ABNORMAL
EOSINOPHIL # BLD AUTO: 0.2 10E9/L (ref 0–0.7)
EOSINOPHIL NFR BLD AUTO: 1.2 %
ERYTHROCYTE [DISTWIDTH] IN BLOOD BY AUTOMATED COUNT: 15.7 % (ref 10–15)
GFR SERPL CREATININE-BSD FRML MDRD: >90 ML/MIN/{1.73_M2}
GLUCOSE SERPL-MCNC: 75 MG/DL (ref 70–99)
HCT VFR BLD AUTO: 36.2 % (ref 40–53)
HGB BLD-MCNC: 11.2 G/DL (ref 13.3–17.7)
IMM GRANULOCYTES # BLD: 0.6 10E9/L (ref 0–0.4)
IMM GRANULOCYTES NFR BLD: 3.3 %
LYMPHOCYTES # BLD AUTO: 0.8 10E9/L (ref 0.8–5.3)
LYMPHOCYTES NFR BLD AUTO: 4.2 %
MCH RBC QN AUTO: 24.6 PG (ref 26.5–33)
MCHC RBC AUTO-ENTMCNC: 30.9 G/DL (ref 31.5–36.5)
MCV RBC AUTO: 80 FL (ref 78–100)
MONOCYTES # BLD AUTO: 0 10E9/L (ref 0–1.3)
MONOCYTES NFR BLD AUTO: 0.1 %
NEUTROPHILS # BLD AUTO: 17.5 10E9/L (ref 1.6–8.3)
NEUTROPHILS NFR BLD AUTO: 91.1 %
NRBC # BLD AUTO: 0 10*3/UL
NRBC BLD AUTO-RTO: 0 /100
PLATELET # BLD AUTO: 365 10E9/L (ref 150–450)
POTASSIUM SERPL-SCNC: 4.1 MMOL/L (ref 3.4–5.3)
PROT SERPL-MCNC: 6.5 G/DL (ref 6.8–8.8)
RBC # BLD AUTO: 4.55 10E12/L (ref 4.4–5.9)
SODIUM SERPL-SCNC: 142 MMOL/L (ref 133–144)
URATE SERPL-MCNC: 7.3 MG/DL (ref 3.5–7.2)
WBC # BLD AUTO: 19.2 10E9/L (ref 4–11)

## 2020-05-09 PROCEDURE — 84550 ASSAY OF BLOOD/URIC ACID: CPT | Performed by: PHYSICIAN ASSISTANT

## 2020-05-09 PROCEDURE — 85025 COMPLETE CBC W/AUTO DIFF WBC: CPT | Performed by: PHYSICIAN ASSISTANT

## 2020-05-09 PROCEDURE — 80053 COMPREHEN METABOLIC PANEL: CPT | Performed by: PHYSICIAN ASSISTANT

## 2020-05-10 ENCOUNTER — HOME INFUSION (PRE-WILLOW HOME INFUSION) (OUTPATIENT)
Dept: PHARMACY | Facility: CLINIC | Age: 25
End: 2020-05-10

## 2020-05-11 ENCOUNTER — HOME INFUSION (PRE-WILLOW HOME INFUSION) (OUTPATIENT)
Dept: PHARMACY | Facility: CLINIC | Age: 25
End: 2020-05-11

## 2020-05-11 NOTE — PROGRESS NOTES
This is a recent snapshot of the patient's [facility] medical record.  For current drug dose and complete information and questions, call [phone] or In Basket pool, [facility] home infusion ([id number])  CSN Number:  [id number]

## 2020-05-11 NOTE — PROGRESS NOTES
This is a recent snapshot of the patient's [facility] medical record.  For current drug dose and complete information and questions, call [phone] or In Basket pool, [facility] home infusion ([id number])  CSN Number:  [id number]

## 2020-05-11 NOTE — PROGRESS NOTES
This is a recent snapshot of the patient's [facility] medical record.  For current drug dose and complete information and questions, call [phone] or In Basket pool, [facility] home infusion ([id number])  CSN Number:  [id number]

## 2020-05-12 ENCOUNTER — HOME INFUSION (PRE-WILLOW HOME INFUSION) (OUTPATIENT)
Dept: PHARMACY | Facility: CLINIC | Age: 25
End: 2020-05-12

## 2020-05-12 NOTE — TELEPHONE ENCOUNTER
Prior Authorization Follow Up    Called [employer] - Phone [phone] Fax [phone] to check status of Neulasta Onpro 6 mg. Per rep the previous one was cancelled stating a PA is not needed, but on her end it shows that it does, she restarted a case for me # [id number].

## 2020-05-13 ENCOUNTER — HOME INFUSION (PRE-WILLOW HOME INFUSION) (OUTPATIENT)
Dept: PHARMACY | Facility: CLINIC | Age: 25
End: 2020-05-13

## 2020-05-13 ENCOUNTER — TELEPHONE (OUTPATIENT)
Dept: ONCOLOGY | Facility: CLINIC | Age: 25
End: 2020-05-13

## 2020-05-13 ENCOUNTER — TELEPHONE (OUTPATIENT)
Dept: PHARMACY | Facility: CLINIC | Age: 25
End: 2020-05-13

## 2020-05-13 ENCOUNTER — MEDICAL CORRESPONDENCE (OUTPATIENT)
Dept: HEALTH INFORMATION MANAGEMENT | Facility: CLINIC | Age: 25
End: 2020-05-13

## 2020-05-13 DIAGNOSIS — C41.9 SARCOMA, EWINGS (H): Primary | ICD-10-CM

## 2020-05-13 DIAGNOSIS — C49.9 DESMOPLASTIC SMALL ROUND CELL TUMOR (H): ICD-10-CM

## 2020-05-13 DIAGNOSIS — C78.6 PERITONEAL CARCINOMATOSIS (H): ICD-10-CM

## 2020-05-13 LAB
ALBUMIN SERPL-MCNC: 2.8 G/DL (ref 3.4–5)
ALP SERPL-CCNC: 64 U/L (ref 40–150)
ALT SERPL W P-5'-P-CCNC: 18 U/L (ref 0–70)
ANION GAP SERPL CALCULATED.3IONS-SCNC: 7 MMOL/L (ref 3–14)
ANISOCYTOSIS BLD QL SMEAR: SLIGHT
AST SERPL W P-5'-P-CCNC: 13 U/L (ref 0–45)
BASOPHILS # BLD AUTO: 0 10E9/L (ref 0–0.2)
BASOPHILS NFR BLD AUTO: 5.3 %
BILIRUB SERPL-MCNC: 0.4 MG/DL (ref 0.2–1.3)
BUN SERPL-MCNC: 6 MG/DL (ref 7–30)
CALCIUM SERPL-MCNC: 8.8 MG/DL (ref 8.5–10.1)
CHLORIDE SERPL-SCNC: 109 MMOL/L (ref 94–109)
CO2 SERPL-SCNC: 25 MMOL/L (ref 20–32)
CREAT SERPL-MCNC: 0.65 MG/DL (ref 0.66–1.25)
DIFFERENTIAL METHOD BLD: ABNORMAL
ELLIPTOCYTES BLD QL SMEAR: SLIGHT
EOSINOPHIL # BLD AUTO: 0.1 10E9/L (ref 0–0.7)
EOSINOPHIL NFR BLD AUTO: 7.9 %
ERYTHROCYTE [DISTWIDTH] IN BLOOD BY AUTOMATED COUNT: 15.4 % (ref 10–15)
GFR SERPL CREATININE-BSD FRML MDRD: >90 ML/MIN/{1.73_M2}
GLUCOSE SERPL-MCNC: 85 MG/DL (ref 70–99)
HCT VFR BLD AUTO: 35.8 % (ref 40–53)
HGB BLD-MCNC: 11.1 G/DL (ref 13.3–17.7)
LYMPHOCYTES # BLD AUTO: 0.5 10E9/L (ref 0.8–5.3)
LYMPHOCYTES NFR BLD AUTO: 71 %
MCH RBC QN AUTO: 24.6 PG (ref 26.5–33)
MCHC RBC AUTO-ENTMCNC: 31 G/DL (ref 31.5–36.5)
MCV RBC AUTO: 79 FL (ref 78–100)
MICROCYTES BLD QL SMEAR: PRESENT
MONOCYTES # BLD AUTO: 0 10E9/L (ref 0–1.3)
MONOCYTES NFR BLD AUTO: 7 %
NEUTROPHILS # BLD AUTO: 0.1 10E9/L (ref 1.6–8.3)
NEUTROPHILS NFR BLD AUTO: 8.8 %
PLATELET # BLD AUTO: 255 10E9/L (ref 150–450)
PLATELET # BLD EST: NORMAL 10*3/UL
POIKILOCYTOSIS BLD QL SMEAR: SLIGHT
POTASSIUM SERPL-SCNC: 3.8 MMOL/L (ref 3.4–5.3)
PROT SERPL-MCNC: 6.9 G/DL (ref 6.8–8.8)
RBC # BLD AUTO: 4.51 10E12/L (ref 4.4–5.9)
RBC INCLUSIONS BLD: SLIGHT
SODIUM SERPL-SCNC: 141 MMOL/L (ref 133–144)
URATE SERPL-MCNC: 7.1 MG/DL (ref 3.5–7.2)
WBC # BLD AUTO: 0.7 10E9/L (ref 4–11)

## 2020-05-13 PROCEDURE — 84550 ASSAY OF BLOOD/URIC ACID: CPT | Performed by: PHYSICIAN ASSISTANT

## 2020-05-13 PROCEDURE — 80053 COMPREHEN METABOLIC PANEL: CPT | Performed by: PHYSICIAN ASSISTANT

## 2020-05-13 PROCEDURE — 85025 COMPLETE CBC W/AUTO DIFF WBC: CPT | Performed by: PHYSICIAN ASSISTANT

## 2020-05-13 NOTE — TELEPHONE ENCOUNTER
[facility] calling with critical lab value. WBC is 0.7.    Paged to Leona Tracey PA-C @ 1303 with acknowledgement.     Called to sister Mariela to review neutropenic precautions.

## 2020-05-13 NOTE — TELEPHONE ENCOUNTER
Prior Authorization Approval    Authorization Effective Date: 4/13/2020  Authorization Expiration Date: 11/9/2020  Medication: Neulasta Onpro 6 mg  Approved Dose/Quantity: 1  Reference #: [id number]   Insurance Company: [employer] - Phone [phone] Fax [phone]  Which Pharmacy is filling the prescription (Not needed for infusion/clinic administered): [facility]  Pharmacy Notified: Yes

## 2020-05-13 NOTE — TELEPHONE ENCOUNTER
[facility] PRIOR AUTHORIZATION REQUEST     Drug including DOSE: NACL 10 ml  J Code:  NDC: [address]-47  ICD 10 code: C49.9, C41.9    Date(s) of Service: 05/04/2020    Insurance Name: [employer]   Insurance ID:[id number]    Provider: KATHLEEN Ontiveros  Provider NPI: [id number]      [facility]  NPI: [id number]

## 2020-05-13 NOTE — TELEPHONE ENCOUNTER
PA Initiation    Medication: NACL 10 ml  Insurance Company: [employer] - Phone [phone] Fax [phone]  Pharmacy Filling the Rx: [facility]  Filling Pharmacy Phone: [phone]  Filling Pharmacy Fax:    Start Date: 5/13/2020    Central Prior Authorization Team   Phone: [phone]

## 2020-05-13 NOTE — PROGRESS NOTES
This is a recent snapshot of the patient's [facility] medical record.  For current drug dose and complete information and questions, call [phone] or In Basket pool, [facility] home infusion ([id number])  CSN Number:  [id number]

## 2020-05-13 NOTE — TELEPHONE ENCOUNTER
PA Initiation    Medication: Doxorubicin 175.5 mg  Insurance Company: [employer] - Phone [phone] Fax [phone]  Pharmacy Filling the Rx: [facility]  Filling Pharmacy Phone: [phone]  Filling Pharmacy Fax:    Start Date: 5/13/2020    Central Prior Authorization Team   Phone: [phone]

## 2020-05-13 NOTE — TELEPHONE ENCOUNTER
DATE:  5/13/20  TIME OF RECEIPT FROM LAB:  1410  LAB TEST:  ANC=0.1  RESULTS PAGED TO (PROVIDER): Leona Tracey.    TIME LAB VALUE REPORTED TO PROVIDER: 2692    Second critical value called on Pt. Previous encounter today shows that JASON Mejia has called and spoken with Pt's family about neutropenic precautions.

## 2020-05-13 NOTE — TELEPHONE ENCOUNTER
[facility] PRIOR AUTHORIZATION REQUEST     Drug including DOSE: Doxorubicin 175.5 mg  J Code:  NDC: [address]-66  ICD 10 code: C49.9, C41.9    Date(s) of Service: 05/04/2020    Insurance Name: [employer]  Insurance ID: [id number]    Provider: KATHLEEN Ontiveros  Provider NPI:[id number]      [facility]  NPI: [id number]

## 2020-05-14 NOTE — PROGRESS NOTES
This is a recent snapshot of the patient's [facility] medical record.  For current drug dose and complete information and questions, call [phone] or In [facility] pool, [facility] home infusion ([id number])  CSN Number:  [id number]

## 2020-05-14 NOTE — TELEPHONE ENCOUNTER
Prior Authorization Approval    Authorization Effective Date: 4/13/2020  Authorization Expiration Date: 5/13/2021  Medication: Doxorubicin 175.5 mg  Approved Dose/Quantity:   Reference #: [id number]   Insurance Company: [employer] - Phone [phone] Fax [phone]  Which Pharmacy is filling the prescription (Not needed for infusion/clinic administered): [facility]  Pharmacy Notified: Yes

## 2020-05-15 ENCOUNTER — VIRTUAL VISIT (OUTPATIENT)
Dept: ONCOLOGY | Facility: CLINIC | Age: 25
End: 2020-05-15
Attending: PHYSICIAN ASSISTANT
Payer: COMMERCIAL

## 2020-05-15 DIAGNOSIS — C41.9 SARCOMA, EWINGS (H): Primary | ICD-10-CM

## 2020-05-15 PROCEDURE — 40000114 ZZH STATISTIC NO CHARGE CLINIC VISIT

## 2020-05-15 PROCEDURE — 99214 OFFICE O/P EST MOD 30 MIN: CPT | Mod: 95 | Performed by: PHYSICIAN ASSISTANT

## 2020-05-15 NOTE — LETTER
"5/15/2020      RE: Diego Ramirez  [place] [address]       Diego Buchanan is a 25 year old male who is being evaluated via a billable video visit.      The patient has been notified of following:     \"This video visit will be conducted via a call between you and your physician/provider. We have found that certain health care needs can be provided without the need for an in-person physical exam.  This service lets us provide the care you need with a video conversation.  If a prescription is necessary we can send it directly to your pharmacy.  If lab work is needed we can place an order for that and you can then stop by our lab to have the test done at a later time.    Video visits are billed at different rates depending on your insurance coverage.  Please reach out to your insurance provider with any questions.    If during the course of the call the physician/provider feels a video visit is not appropriate, you will not be charged for this service.\"    Patient has given verbal consent for Video visit? Yes    How would you like to obtain your AVS? MyChart    Patient would like the video invitation sent by: Text to cell phone: [phone]    Will anyone else be joining your video visit? No         Secondary Video Option (D[employer]), send text message to:  [phone]    I have reviewed and updated the patient's allergies and medication list.    Concerns: Patient has no new concerns.      Refills: None      KERON Clayton      [facility]  FOLLOW-UP VISIT NOTE  May 15, 2020                   CHIEF COMPLAINT: Peritoneal carcinomatosis, sarcoma, here for labs/toxicity check     Oncologic History:  1. He presented initially with abdominal pain, diarrhea, and progressive abdominal distention for 3 months duration. 2. Initial CT scan in February 2020 showed severe peritoneal carcinomatosis with large peritoneal tumor implants throughout the entire abdomen and " pelvis, but mostly prominently in the pelvis.   2. PETCT scan showed interval increase in the amount of peritoneal carcinomatosis.  3. On 3/12/2020, he had ultrasound-guided core needle biopsy of a peritoneal implant (Case: [id number]), which showed a completely undifferentiated appearance, but stains with pancytokeratin, indicating an epithelial origin. 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 Tumor markers for CA-19-9, CEA, beta-hCG, alpha-fetoprotein were unremarkable. Cancer type ID molecular testing by [employer] sent to determine the exact diagnosis and to assist in further treatment planning. 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 Relative probability of less than 5% of other subtype of sarcoma. EWSR1-WT1 fusion detected.  4. Discussion with DR Sims about starting palliative chemotherapy- plan for CAV/IMV  5. CAV started 5/4/2020     HISTORY OF PRESENT ILLNESS  Diego Buchanan is a 24 year old male with a history of learning disability and recent diagnosis of sarcoma.  Robert started CAV on 5/4.  He was called today and spoken to with his sister Mariela.  Diego lives at home with his mom and dad and 4 adult siblings.  3 of the other siblings do work and currently are in and out of the home.  Mariela will do the majority of the care taking for him.  Robert is his own medical decision maker, his parents are the legal guardians.  He did finish some HS and post HS education, he has a .  He does all his ADLs himself, but doesn't leave the home without family, does not drive and is not left alone.        Per Robert since starting chemo- things are better!  No belly pain (mostly points to LLQ) this week.  Robert has been on a liquid diet- Mariela is giving him 3-4 Ensure daily and 3 x 16 oz of water or juice.  He has been doing great with the liquid diet- no belly  pain.  Mariela cut back to once a day Miralax and now he has been having hard stools every 2 dasy.    Mariela reports Robert' color is better and his excessive sweating improved.  No fevers or chills.  No problems breathing or urinating. When asked directly, he has some occ pain with urination.  He denies feeling dizzy or light headed.  no edema or rash.  Unsure how Robert is sleeping as he cannot articulate.  He has bags under his eyes, Mariela worried he isn't sleeping great.         EXAM:   Video physical exam  General: Patient appears well in no acute distress. Obese body habitus.  His coloring is less pale and no diaphoresis today!  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  [employer] testing did reveal the t(11;22) translocation and resultant EWSR1-WT1 fusion. Dr Sims recommended starting multiagent chemotherapy with either an alternating regimen of CAV/IMV.  Initially family was considering herbal medicine, but has changed their mind and would like to pursue chemotherapy. Robert's ECHO is WNL, CT CAP was repeated showing very advanced disease without emergent findings that would preclude  "further therapy and brain MRI negative.  Started CAV on 5/4 without concerns and clearly has shown improvements this week- eating/drinking better, less diaphoresis and coloring is better.  Sister Mariela and mother interjected on phone- he is looking better than 2 weeks ago.  Will continue with close follow up and IMV on 5/26     FEN: Robert has lost 65 pounds in the last 1-2 months.  His nutrition is sub-optimal, Mariela was able to give him 4-6 glasses of water/juice a day and 3 Boost.  He tolerated this well.  Will increase to 4 Boost daily, continue juice.  Add in ice cream, jello, popsicles, etc. She occasionally tries mash potatoes and Robert doesn't like soft food, says it feels \"stuck\".  Will stick to liquid diet.  Was getting IVF daily this week.  Will stop this.  Has some puffy legs.      GI: He is strongly at risk for a full bowel obstruction.  No SBO noted on CT CAP on 5/4.  With some hard stools, will increase to BID     ID: no f/s/c.  He has had leukocytosis that goes back a few months. He also has some diaphoresis that has been coming/going for the last ~2 weeks as well.  He has no dysuria, cough, ear/sinus/throat pain.  Continue to monitor, this is likely due to his cancer.  No concerns on CT regarding infection either.      Line: lind was placed on 4/29, has [facility] for line cares and labs     BP: last week I stopped hydrochlorothiazide and increased his metoprolol daily for his BP.  Stable.     Psych: mood is stable, he has good support at home.  mariela will be his primary care give at home.  He seemed surprised to know that his cancer is not curable, but Mariela states they have discussed it at home and have told him.  His general understanding of his disease is limited.             Video-Visit Details    Type of service:  Video Visit    Video Start Time: 11:40  Video End Time: 12:17    Originating Location (pt. Location): Home    Distant Location (provider location):  [facility]     Platform " used for Video Visit: ADELE Larios PA-C

## 2020-05-15 NOTE — PROGRESS NOTES
"Diego Buchanan is a 25 year old male who is being evaluated via a billable video visit.      The patient has been notified of following:     \"This video visit will be conducted via a call between you and your physician/provider. We have found that certain health care needs can be provided without the need for an in-person physical exam.  This service lets us provide the care you need with a video conversation.  If a prescription is necessary we can send it directly to your pharmacy.  If lab work is needed we can place an order for that and you can then stop by our lab to have the test done at a later time.    Video visits are billed at different rates depending on your insurance coverage.  Please reach out to your insurance provider with any questions.    If during the course of the call the physician/provider feels a video visit is not appropriate, you will not be charged for this service.\"    Patient has given verbal consent for Video visit? Yes    How would you like to obtain your AVS? MyChart    Patient would like the video invitation sent by: Text to cell phone: [phone]    Will anyone else be joining your video visit? No         Secondary Video Option (Doximity), send text message to:  [phone]    I have reviewed and updated the patient's allergies and medication list.    Concerns: Patient has no new concerns.      Refills: None      KERON Clayton      [facility]  FOLLOW-UP VISIT NOTE  May 15, 2020                   CHIEF COMPLAINT: Peritoneal carcinomatosis, sarcoma, here for labs/toxicity check     Oncologic History:  1. He presented initially with abdominal pain, diarrhea, and progressive abdominal distention for 3 months duration. 2. Initial CT scan in February 2020 showed severe peritoneal carcinomatosis with large peritoneal tumor implants throughout the entire abdomen and pelvis, but mostly prominently in the pelvis.   2. PETCT scan showed interval " increase in the amount of peritoneal carcinomatosis.  3. On 3/12/2020, he had ultrasound-guided core needle biopsy of a peritoneal implant (Case: [id number]), which showed a completely undifferentiated appearance, but stains with pancytokeratin, indicating an epithelial origin. 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 Tumor markers for CA-19-9, CEA, beta-hCG, alpha-fetoprotein were unremarkable. Cancer type ID molecular testing by [email or web address] sent to determine the exact diagnosis and to assist in further treatment planning. 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 Relative probability of less than 5% of other subtype of sarcoma. EWSR1-WT1 fusion detected.  4. Discussion with DR Sims about starting palliative chemotherapy- plan for CAV/IMV  5. CAV started 5/4/2020     HISTORY OF PRESENT ILLNESS  Diego Buchanan is a 24 year old male with a history of learning disability and recent diagnosis of sarcoma.  Robert started CAV on 5/4.  He was called today and spoken to with his sister Mariela.  Diego lives at home with his mom and dad and 4 adult siblings.  3 of the other siblings do work and currently are in and out of the home.  Mariela will do the majority of the care taking for him.  Robert is his own medical decision maker, his parents are the legal guardians.  He did finish some HS and post HS education, he has a .  He does all his ADLs himself, but doesn't leave the home without family, does not drive and is not left alone.        Per Robert since starting chemo- things are better!  No belly pain (mostly points to LLQ) this week.  Robert has been on a liquid diet- Mariela is giving him 3-4 Ensure daily and 3 x 16 oz of water or juice.  He has been doing great with the liquid diet- no belly pain.  Mariela cut back to once a day Miralax and now he has been having hard stools  every 2 dasy.    Mariela reports Robert' color is better and his excessive sweating improved.  No fevers or chills.  No problems breathing or urinating. When asked directly, he has some occ pain with urination.  He denies feeling dizzy or light headed.  no edema or rash.  Unsure how Robert is sleeping as he cannot articulate.  He has bags under his eyes, Mariela worried he isn't sleeping great.         EXAM:   Video physical exam  General: Patient appears well in no acute distress. Obese body habitus.  His coloring is less pale and no diaphoresis today!  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  [employer] testing did reveal the t(11;22) translocation and resultant EWSR1-WT1 fusion. Dr Sims recommended starting multiagent chemotherapy with either an alternating regimen of CAV/IMV.  Initially family was considering herbal medicine, but has changed their mind and would like to pursue chemotherapy. Robert's ECHO is WNL, CT CAP was repeated showing very advanced disease without emergent findings that would preclude further therapy and brain MRI negative.  Started CAV on 5/4 without concerns and  "clearly has shown improvements this week- eating/drinking better, less diaphoresis and coloring is better.  Sister Mariela and mother interjected on phone- he is looking better than 2 weeks ago.  Will continue with close follow up and IMV on 5/26     FEN: Robert has lost 65 pounds in the last 1-2 months.  His nutrition is sub-optimal, Mariela was able to give him 4-6 glasses of water/juice a day and 3 Boost.  He tolerated this well.  Will increase to 4 Boost daily, continue juice.  Add in ice cream, jello, popsicles, etc. She occasionally tries mash potatoes and Robert doesn't like soft food, says it feels \"stuck\".  Will stick to liquid diet.  Was getting IVF daily this week.  Will stop this.  Has some puffy legs.      GI: He is strongly at risk for a full bowel obstruction.  No SBO noted on CT CAP on 5/4.  With some hard stools, will increase to BID     ID: no f/s/c.  He has had leukocytosis that goes back a few months. He also has some diaphoresis that has been coming/going for the last ~2 weeks as well.  He has no dysuria, cough, ear/sinus/throat pain.  Continue to monitor, this is likely due to his cancer.  No concerns on CT regarding infection either.      Line: lind was placed on 4/29, has [facility] for line cares and labs     BP: last week I stopped hydrochlorothiazide and increased his metoprolol daily for his BP.  Stable.     Psych: mood is stable, he has good support at home.  mariela will be his primary care give at home.  He seemed surprised to know that his cancer is not curable, but Mariela states they have discussed it at home and have told him.  His general understanding of his disease is limited.             Video-Visit Details    Type of service:  Video Visit    Video Start Time: 11:40  Video End Time: 12:17    Originating Location (pt. Location): Home    Distant Location (provider location):  [facility]     Platform used for Video Visit: Valentina Tracey PA-C        "

## 2020-05-19 ENCOUNTER — HOME INFUSION (PRE-WILLOW HOME INFUSION) (OUTPATIENT)
Dept: PHARMACY | Facility: CLINIC | Age: 25
End: 2020-05-19

## 2020-05-20 ENCOUNTER — HOME INFUSION (PRE-WILLOW HOME INFUSION) (OUTPATIENT)
Dept: PHARMACY | Facility: CLINIC | Age: 25
End: 2020-05-20

## 2020-05-20 NOTE — PROGRESS NOTES
This is a recent snapshot of the patient's [facility] medical record.  For current drug dose and complete information and questions, call [phone] or In Basket pool, [facility] ([id number])  CSN Number:  [id number]

## 2020-05-21 ENCOUNTER — VIRTUAL VISIT (OUTPATIENT)
Dept: ONCOLOGY | Facility: CLINIC | Age: 25
End: 2020-05-21
Attending: INTERNAL MEDICINE
Payer: COMMERCIAL

## 2020-05-21 ENCOUNTER — RECORDS - HEALTHEAST (OUTPATIENT)
Dept: ADMINISTRATIVE | Facility: OTHER | Age: 25
End: 2020-05-21

## 2020-05-21 DIAGNOSIS — R22.1 NECK SWELLING: Primary | ICD-10-CM

## 2020-05-21 PROCEDURE — 40000114 ZZH STATISTIC NO CHARGE CLINIC VISIT

## 2020-05-21 PROCEDURE — 99443: CPT | Performed by: INTERNAL MEDICINE

## 2020-05-21 NOTE — PROGRESS NOTES
"Diego Buchanan is a 25 year old male who is being evaluated via a billable telephone visit.      The patient has been notified of following:     \"This telephone visit will be conducted via a call between you and your physician/provider. We have found that certain health care needs can be provided without the need for a physical exam.  This service lets us provide the care you need with a short phone conversation.  If a prescription is necessary we can send it directly to your pharmacy.  If lab work is needed we can place an order for that and you can then stop by our lab to have the test done at a later time.    Telephone visits are billed at different rates depending on your insurance coverage. During this emergency period, for some insurers they may be billed the same as an in-person visit.  Please reach out to your insurance provider with any questions.    If during the course of the call the physician/provider feels a telephone visit is not appropriate, you will not be charged for this service.\"    Patient has given verbal consent for Telephone visit?  Yes    What phone number would you like to be contacted at? [phone]    Was only be able to get a hold of the mother. Called daughter phone was off. Mother didn't know the appt was today, didn't have any clue on how to do a video visit. Will be changing appt to telephone visit. Mother said daughter is with patient and is coming over.    How would you like to obtain your AVS? Mail a copy     Esequiel Tong LPN    Phone call duration: 25 minutes      [facility]  MEDICAL ONCOLOGY PROGRESS NOTE  May 21, 2020     CHIEF COMPLAINT: Desmoplastic small round cell tumor    Oncologic History:  1. He presented initially with abdominal pain, diarrhea, and progressive abdominal distention for 3 months duration. 2. Initial CT scan in February 2020 showed severe peritoneal carcinomatosis with large peritoneal tumor implants throughout the entire abdomen and " pelvis, but mostly prominently in the pelvis.   2. PETCT scan showed interval increase in the amount of peritoneal carcinomatosis.  3. On 3/12/2020, he had ultrasound-guided core needle biopsy of a peritoneal implant (Case: [id number]), which showed a completely undifferentiated appearance, but stains with pancytokeratin, indicating an epithelial origin. 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 Tumor markers for CA-19-9, CEA, beta-hCG, alpha-fetoprotein were unremarkable. Cancer type ID molecular testing by [employer] sent to determine the exact diagnosis and to assist in further treatment planning. 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 Relative probability of less than 5% of other subtype of sarcoma. EWSR1-WT1 fusion detected.  4. 5/1/2020, MRI brain negative for brain metastasis, and baseline CT-CAP obtained showing extensive mixed solid and cystic masses throughout the abdomen and pelvis consistent with peritoneal carcinomatosis. Largest mass measures approximately 20 cm in the pelvis. Metastatic mixed solid and cystic masses seen in hepatic segments 5 and 6 and hepatic segment 7. The masses appear to be contiguous with the retrohepatic peritoneal carcinomatosis. Small volume fluid about the spleen favored to represent malignant ascites, stable mild-to-moderate right hydronephrosis related to mass effect upon the distal ureter in the pelvis, the IVC and iliac veins are compressed due to the extensive peritoneal carcinomatosis without findings to suggest deep venous thrombosis.  5. 5/4/2020, he begins cycle 1 of CAV/IMV alternating chemotherapy.     HISTORY OF PRESENT ILLNESS  Diego Buchanan is a 25 year old male with a learning disability and recent diagnosis of DSRCT.  He tolerated his first cycle of CAV well at home. His sister, Mariela, notes that there has  been some difficulty with flushes of one of the ports. After a recent IV fluid bolus she also noted an increase in swelling in the right neck area.    Labs showed an elevation in uric acid levels. Creatinine stable. Alex on blood work occurred on day 9 (5/13) with WBC 0.7. Today WBC is 15.9. Hemoglobin 11.3. Platelets are 390.    Following his first cycle of treatment he has had significant reduction in pain. He is no longer requiring pain medications. He continues to have decreased oral intake but appetite is improving. He is also passing stools more regularly now. Overall, family is pleased that he is doing better overall.    ECOG performance status 1-2.    Current Outpatient Medications   Medication     allopurinol (ZYLOPRIM) 300 MG tablet     LORazepam (ATIVAN) 0.5 MG tablet     metoprolol tartrate (LOPRESSOR) 50 MG tablet     polyethylene glycol (MIRALAX) 17 g packet     prochlorperazine (COMPAZINE) 10 MG tablet     traMADol (ULTRAM) 50 MG tablet     dexamethasone (DECADRON) 4 MG tablet     mesna (MESNEX) 400 MG TABS tablet     No current facility-administered medications for this visit.      Facility-Administered Medications Ordered in Other Visits   Medication     sodium chloride (PF) 0.9% PF flush 30 mL      No Known Allergies    PAST MEDICAL HISTORY  1. Hypertension  2. Learning disability  3. Peritoneal carcinomatosis     SOCIAL HISTORY  He lives at home with his family. He is a nonsmoker.      History   Smoking Status     Not on file   Smokeless Tobacco     Not on file    Social History    Substance and Sexual Activity      Alcohol use: Not on file         History   Drug Use Not on file        FAMILY HISTORY  No family history of cancer.    PHYSICAL EXAM  None performed today as video visit.    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 Buchanan is a 24 year old male with advanced  intraabdominal DSRCT with extensive peritoneal disease, lymph node metastasis, and liver and bone invovlement. He has tolerated cycle 1 of chemotherapy well with improvement in symptoms.    We will plan to proceed with cycle 2 of IMV at home. Mariela requests being able to come into the [facility] with him, since he has learning disability and finds it difficult to navigate infusion days without his sister (care giver) present. We will work on this and see if any additional accommodations are possible.    We will also obtain an ultrasound of the right neck to rule out blood clot or hematoma.    -Continue with cycle 2 IMV chemotherapy as scheduled  -Neck ultrasound    Farzaneh Burciaga M.D.   of Medicine  Hematology, Oncology and Transplantation    ADDENDUM (5/22/20):   Neck ultrasound was negative for clot or hematoma.

## 2020-05-21 NOTE — LETTER
"    5/21/2020         RE: Diego Buchanan  [address]  [place] [address]        Dear Colleague,    Thank you for referring your patient, Diego Buchanan, to the [facility]. Please see a copy of my visit note below.    Diego Buchanan is a 25 year old male who is being evaluated via a billable telephone visit.      The patient has been notified of following:     \"This telephone visit will be conducted via a call between you and your physician/provider. We have found that certain health care needs can be provided without the need for a physical exam.  This service lets us provide the care you need with a short phone conversation.  If a prescription is necessary we can send it directly to your pharmacy.  If lab work is needed we can place an order for that and you can then stop by our lab to have the test done at a later time.    Telephone visits are billed at different rates depending on your insurance coverage. During this emergency period, for some insurers they may be billed the same as an in-person visit.  Please reach out to your insurance provider with any questions.    If during the course of the call the physician/provider feels a telephone visit is not appropriate, you will not be charged for this service.\"    Patient has given verbal consent for Telephone visit?  Yes    What phone number would you like to be contacted at? [phone]    Was only be able to get a hold of the mother. Called daughter phone was off. Mother didn't know the appt was today, didn't have any clue on how to do a video visit. Will be changing appt to telephone visit. Mother said daughter is with patient and is coming over.    How would you like to obtain your AVS? Mail a copy     Esequiel Tong LPN    Phone call duration: 25 minutes      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 He presented " initially with abdominal pain, diarrhea, and progressive abdominal distention for 3 months duration. 2. Initial CT scan in February 2020 showed severe peritoneal carcinomatosis with large peritoneal tumor implants throughout the entire abdomen and pelvis, but mostly prominently in the pelvis.   2. PETCT scan showed interval increase in the amount of peritoneal carcinomatosis.  3. On 3/12/2020, he had ultrasound-guided core needle biopsy of a peritoneal implant (Case: [id number]), which showed a completely undifferentiated appearance, but stains with pancytokeratin, indicating an epithelial origin. 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 Tumor markers for CA-19-9, CEA, beta-hCG, alpha-fetoprotein were unremarkable. Cancer type ID molecular testing by [facility] sent to determine the exact diagnosis and to assist in further treatment planning. 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 Relative probability of less than 5% of other subtype of sarcoma. EWSR1-WT1 fusion detected.  4. 5/1/2020, MRI brain negative for brain metastasis, and baseline CT-CAP obtained showing extensive mixed solid and cystic masses throughout the abdomen and pelvis consistent with peritoneal carcinomatosis. Largest mass measures approximately 20 cm in the pelvis. Metastatic mixed solid and cystic masses seen in hepatic segments 5 and 6 and hepatic segment 7. The masses appear to be contiguous with the retrohepatic peritoneal carcinomatosis. Small volume fluid about the spleen favored to represent malignant ascites, stable mild-to-moderate right hydronephrosis related to mass effect upon the distal ureter in the pelvis, the IVC and iliac veins are compressed due to the extensive peritoneal carcinomatosis without findings to suggest deep venous thrombosis.  5. 5/4/2020, he begins cycle 1 of  CAV/IMV alternating chemotherapy.     HISTORY OF PRESENT ILLNESS  Diego Buchanan is a 25 year old male with a learning disability and recent diagnosis of DSRCT.  He tolerated his first cycle of CAV well at home. His sister, Mariela, notes that there has been some difficulty with flushes of one of the ports. After a recent IV fluid bolus she also noted an increase in swelling in the right neck area.    Labs showed an elevation in uric acid levels. Creatinine stable. Alex on blood work occurred on day 9 (5/13) with WBC 0.7. Today WBC is 15.9. Hemoglobin 11.3. Platelets are 390.    Following his first cycle of treatment he has had significant reduction in pain. He is no longer requiring pain medications. He continues to have decreased oral intake but appetite is improving. He is also passing stools more regularly now. Overall, family is pleased that he is doing better overall.    ECOG performance status 1-2.    Current Outpatient Medications   Medication     allopurinol (ZYLOPRIM) 300 MG tablet     LORazepam (ATIVAN) 0.5 MG tablet     metoprolol tartrate (LOPRESSOR) 50 MG tablet     polyethylene glycol (MIRALAX) 17 g packet     prochlorperazine (COMPAZINE) 10 MG tablet     traMADol (ULTRAM) 50 MG tablet     dexamethasone (DECADRON) 4 MG tablet     mesna (MESNEX) 400 MG TABS tablet     No current facility-administered medications for this visit.      Facility-Administered Medications Ordered in Other Visits   Medication     sodium chloride (PF) 0.9% PF flush 30 mL      No Known Allergies    PAST MEDICAL HISTORY  1. Hypertension  2. Learning disability  3. Peritoneal carcinomatosis     SOCIAL HISTORY  He lives at home with his family. He is a nonsmoker.      History   Smoking Status     Not on file   Smokeless Tobacco     Not on file    Social History    Substance and Sexual Activity      Alcohol use: Not on file         History   Drug Use Not on file        FAMILY HISTORY  No family history of cancer.    PHYSICAL  EXAM  None performed today as video visit.    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 Buchanan is a 24 year old male with advanced intraabdominal DSRCT with extensive peritoneal disease, lymph node metastasis, and liver and bone invovlement. He has tolerated cycle 1 of chemotherapy well with improvement in symptoms.    We will plan to proceed with cycle 2 of IMV at home. Mariela requests being able to come [place] [facility] [place] since he has learning disability and finds it difficult to navigate infusion days without his sister (care giver) present. We will work on this and see if any additional accommodations are possible.    We will also obtain an ultrasound of the right neck to rule out blood clot or hematoma.    -Continue with cycle 2 IMV chemotherapy as scheduled  -Neck ultrasound    Farzaneh Burciaga M.D.   of Medicine  Hematology, Oncology and Transplantation    ADDENDUM (5/22/20):   Neck ultrasound was negative for clot or hematoma.

## 2020-05-21 NOTE — PROGRESS NOTES
This is a recent snapshot of the patient's [facility] medical record.  For current drug dose and complete information and questions, call [phone] or In Basket pool, fv home infusion ([id number])  CSN Number:  [id number]

## 2020-05-22 ENCOUNTER — INFUSION THERAPY VISIT (OUTPATIENT)
Dept: ONCOLOGY | Facility: CLINIC | Age: 25
End: 2020-05-22
Attending: INTERNAL MEDICINE
Payer: COMMERCIAL

## 2020-05-22 ENCOUNTER — ANCILLARY PROCEDURE (OUTPATIENT)
Dept: ULTRASOUND IMAGING | Facility: CLINIC | Age: 25
End: 2020-05-22
Attending: INTERNAL MEDICINE
Payer: COMMERCIAL

## 2020-05-22 DIAGNOSIS — C41.9 SARCOMA, EWINGS (H): ICD-10-CM

## 2020-05-22 DIAGNOSIS — C49.9 DESMOPLASTIC SMALL ROUND CELL TUMOR (H): Primary | ICD-10-CM

## 2020-05-22 DIAGNOSIS — R22.1 NECK SWELLING: ICD-10-CM

## 2020-05-22 DIAGNOSIS — C41.9 SARCOMA, EWINGS (H): Primary | ICD-10-CM

## 2020-05-22 DIAGNOSIS — C78.6 PERITONEAL CARCINOMATOSIS (H): ICD-10-CM

## 2020-05-22 DIAGNOSIS — C49.9 DESMOPLASTIC SMALL ROUND CELL TUMOR (H): ICD-10-CM

## 2020-05-22 LAB
ALBUMIN SERPL-MCNC: 3 G/DL (ref 3.4–5)
ALP SERPL-CCNC: 81 U/L (ref 40–150)
ALT SERPL W P-5'-P-CCNC: 11 U/L (ref 0–70)
ANION GAP SERPL CALCULATED.3IONS-SCNC: 5 MMOL/L (ref 3–14)
AST SERPL W P-5'-P-CCNC: 12 U/L (ref 0–45)
BASOPHILS # BLD AUTO: 0 10E9/L (ref 0–0.2)
BASOPHILS NFR BLD AUTO: 0 %
BILIRUB SERPL-MCNC: 0.3 MG/DL (ref 0.2–1.3)
BUN SERPL-MCNC: 5 MG/DL (ref 7–30)
CALCIUM SERPL-MCNC: 9.2 MG/DL (ref 8.5–10.1)
CHLORIDE SERPL-SCNC: 109 MMOL/L (ref 94–109)
CO2 SERPL-SCNC: 27 MMOL/L (ref 20–32)
CREAT SERPL-MCNC: 0.72 MG/DL (ref 0.66–1.25)
DIFFERENTIAL METHOD BLD: ABNORMAL
EOSINOPHIL # BLD AUTO: 0 10E9/L (ref 0–0.7)
EOSINOPHIL NFR BLD AUTO: 0 %
ERYTHROCYTE [DISTWIDTH] IN BLOOD BY AUTOMATED COUNT: 17.2 % (ref 10–15)
GFR SERPL CREATININE-BSD FRML MDRD: >90 ML/MIN/{1.73_M2}
GLUCOSE SERPL-MCNC: 84 MG/DL (ref 70–99)
HCT VFR BLD AUTO: 36.7 % (ref 40–53)
HGB BLD-MCNC: 11.3 G/DL (ref 13.3–17.7)
LYMPHOCYTES # BLD AUTO: 0.7 10E9/L (ref 0.8–5.3)
LYMPHOCYTES NFR BLD AUTO: 4.3 %
MCH RBC QN AUTO: 24.9 PG (ref 26.5–33)
MCHC RBC AUTO-ENTMCNC: 30.8 G/DL (ref 31.5–36.5)
MCV RBC AUTO: 81 FL (ref 78–100)
MONOCYTES # BLD AUTO: 0.6 10E9/L (ref 0–1.3)
MONOCYTES NFR BLD AUTO: 3.5 %
MYELOCYTES # BLD: 0.3 10E9/L
MYELOCYTES NFR BLD MANUAL: 1.7 %
NEUTROPHILS # BLD AUTO: 14.4 10E9/L (ref 1.6–8.3)
NEUTROPHILS NFR BLD AUTO: 90.5 %
NRBC # BLD AUTO: 0.3 10*3/UL
NRBC BLD AUTO-RTO: 2 /100
PLATELET # BLD AUTO: 390 10E9/L (ref 150–450)
PLATELET # BLD EST: ABNORMAL 10*3/UL
POTASSIUM SERPL-SCNC: 3.9 MMOL/L (ref 3.4–5.3)
PROT SERPL-MCNC: 7.1 G/DL (ref 6.8–8.8)
RBC # BLD AUTO: 4.53 10E12/L (ref 4.4–5.9)
RBC MORPH BLD: NORMAL
SODIUM SERPL-SCNC: 141 MMOL/L (ref 133–144)
URATE SERPL-MCNC: 7.8 MG/DL (ref 3.5–7.2)
WBC # BLD AUTO: 15.9 10E9/L (ref 4–11)

## 2020-05-22 PROCEDURE — G0463 HOSPITAL OUTPT CLINIC VISIT: HCPCS | Mod: 25

## 2020-05-22 PROCEDURE — 85025 COMPLETE CBC W/AUTO DIFF WBC: CPT | Performed by: PHYSICIAN ASSISTANT

## 2020-05-22 PROCEDURE — 25000128 H RX IP 250 OP 636: Performed by: INTERNAL MEDICINE

## 2020-05-22 PROCEDURE — 25000128 H RX IP 250 OP 636: Mod: ZF | Performed by: INTERNAL MEDICINE

## 2020-05-22 PROCEDURE — 80053 COMPREHEN METABOLIC PANEL: CPT | Performed by: PHYSICIAN ASSISTANT

## 2020-05-22 PROCEDURE — 84550 ASSAY OF BLOOD/URIC ACID: CPT | Performed by: PHYSICIAN ASSISTANT

## 2020-05-22 PROCEDURE — 36591 DRAW BLOOD OFF VENOUS DEVICE: CPT

## 2020-05-22 RX ORDER — HEPARIN SODIUM,PORCINE 10 UNIT/ML
5 VIAL (ML) INTRAVENOUS
Status: CANCELLED | OUTPATIENT
Start: 2020-05-26

## 2020-05-22 RX ORDER — ALBUTEROL SULFATE 0.83 MG/ML
2.5 SOLUTION RESPIRATORY (INHALATION)
Status: CANCELLED | OUTPATIENT
Start: 2020-05-26

## 2020-05-22 RX ORDER — ALBUTEROL SULFATE 90 UG/1
1-2 AEROSOL, METERED RESPIRATORY (INHALATION)
Status: CANCELLED
Start: 2020-05-26

## 2020-05-22 RX ORDER — HEPARIN SODIUM,PORCINE 10 UNIT/ML
5 VIAL (ML) INTRAVENOUS ONCE
Status: COMPLETED | OUTPATIENT
Start: 2020-05-22 | End: 2020-05-22

## 2020-05-22 RX ORDER — LORAZEPAM 2 MG/ML
0.5 INJECTION INTRAMUSCULAR EVERY 4 HOURS PRN
Status: CANCELLED
Start: 2020-05-26

## 2020-05-22 RX ORDER — MEPERIDINE HYDROCHLORIDE 25 MG/ML
25 INJECTION INTRAMUSCULAR; INTRAVENOUS; SUBCUTANEOUS EVERY 30 MIN PRN
Status: CANCELLED | OUTPATIENT
Start: 2020-05-26

## 2020-05-22 RX ORDER — HEPARIN SODIUM (PORCINE) LOCK FLUSH IV SOLN 100 UNIT/ML 100 UNIT/ML
5 SOLUTION INTRAVENOUS
Status: CANCELLED | OUTPATIENT
Start: 2020-05-26

## 2020-05-22 RX ORDER — DIPHENHYDRAMINE HYDROCHLORIDE 50 MG/ML
50 INJECTION INTRAMUSCULAR; INTRAVENOUS
Status: CANCELLED
Start: 2020-05-26

## 2020-05-22 RX ORDER — SODIUM CHLORIDE 9 MG/ML
1000 INJECTION, SOLUTION INTRAVENOUS CONTINUOUS PRN
Status: CANCELLED
Start: 2020-05-26

## 2020-05-22 RX ORDER — EPINEPHRINE 1 MG/ML
0.3 INJECTION, SOLUTION, CONCENTRATE INTRAVENOUS EVERY 5 MIN PRN
Status: CANCELLED | OUTPATIENT
Start: 2020-05-26

## 2020-05-22 RX ORDER — NALOXONE HYDROCHLORIDE 0.4 MG/ML
.1-.4 INJECTION, SOLUTION INTRAMUSCULAR; INTRAVENOUS; SUBCUTANEOUS
Status: CANCELLED | OUTPATIENT
Start: 2020-05-26

## 2020-05-22 RX ADMIN — Medication 5 ML: at 09:57

## 2020-05-22 RX ADMIN — SODIUM CHLORIDE, PRESERVATIVE FREE 5 ML: 5 INJECTION INTRAVENOUS at 10:47

## 2020-05-22 NOTE — NURSING NOTE
Chief Complaint   Patient presents with     Blood Draw     CVC.  Labs drawn without difficulty from purple lumen.  CVC heparin locked.  Unable to get blood return from red lumen.  MD notified     Patient has double lumen power PICC.  Labs drawn without difficulty from purple lumen.  Red lumen flushes with mild resistance but has no blood return.  Dr. Morales Sims notified.  Patient's sister who is with patient today, states that she has had difficulty flushing the red lumen at home the past few days.    5/22/2020 1018 TORB:  Dr. Gualberto SCANLON/Mushtaq Norton RN  - Please put TPA in red lumen CVC today as patient scheduled for chemo on Monday.    Infusion notified of request and patient added on to their schedule for TPA.    MUSHTAQ NORTON, RN on 5/22/2020 at 10:21 AM

## 2020-05-22 NOTE — PROGRESS NOTES
Patient arrived from lab after attempts to receive blood from Red lumen. After 3 successful attempts of receiving brisk blood return without any additional interventions in infusion, Red port heparin locked with 10units/ml and lumen clamped. Patients sister stated that Red port had some resistance to flushing at home, however red port flushed without resistance during this infusion RN's assessment. Patient will return 5/25 for infusion. Patient and sister verbalized understanding that blood return was obtained and TPA not needed at this time. Patient and sister comfortable going home.

## 2020-05-26 ENCOUNTER — INFUSION THERAPY VISIT (OUTPATIENT)
Dept: ONCOLOGY | Facility: CLINIC | Age: 25
End: 2020-05-26
Attending: INTERNAL MEDICINE
Payer: COMMERCIAL

## 2020-05-26 ENCOUNTER — APPOINTMENT (OUTPATIENT)
Dept: LAB | Facility: CLINIC | Age: 25
End: 2020-05-26
Attending: INTERNAL MEDICINE
Payer: COMMERCIAL

## 2020-05-26 ENCOUNTER — HOME INFUSION (PRE-WILLOW HOME INFUSION) (OUTPATIENT)
Dept: PHARMACY | Facility: CLINIC | Age: 25
End: 2020-05-26

## 2020-05-26 VITALS
TEMPERATURE: 98.2 F | HEART RATE: 125 BPM | OXYGEN SATURATION: 97 % | BODY MASS INDEX: 38.77 KG/M2 | SYSTOLIC BLOOD PRESSURE: 132 MMHG | WEIGHT: 255 LBS | DIASTOLIC BLOOD PRESSURE: 77 MMHG | RESPIRATION RATE: 18 BRPM

## 2020-05-26 DIAGNOSIS — C49.9 DESMOPLASTIC SMALL ROUND CELL TUMOR (H): ICD-10-CM

## 2020-05-26 DIAGNOSIS — C41.9 SARCOMA, EWINGS (H): Primary | ICD-10-CM

## 2020-05-26 LAB
ALBUMIN SERPL-MCNC: 2.9 G/DL (ref 3.4–5)
ALP SERPL-CCNC: 72 U/L (ref 40–150)
ALT SERPL W P-5'-P-CCNC: 13 U/L (ref 0–70)
ANION GAP SERPL CALCULATED.3IONS-SCNC: 9 MMOL/L (ref 3–14)
AST SERPL W P-5'-P-CCNC: 10 U/L (ref 0–45)
BASOPHILS # BLD AUTO: 0.2 10E9/L (ref 0–0.2)
BASOPHILS NFR BLD AUTO: 1.1 %
BILIRUB SERPL-MCNC: 0.3 MG/DL (ref 0.2–1.3)
BUN SERPL-MCNC: 6 MG/DL (ref 7–30)
CALCIUM SERPL-MCNC: 9 MG/DL (ref 8.5–10.1)
CHLORIDE SERPL-SCNC: 110 MMOL/L (ref 94–109)
CO2 SERPL-SCNC: 24 MMOL/L (ref 20–32)
CREAT SERPL-MCNC: 0.68 MG/DL (ref 0.66–1.25)
DIFFERENTIAL METHOD BLD: ABNORMAL
EOSINOPHIL # BLD AUTO: 0 10E9/L (ref 0–0.7)
EOSINOPHIL NFR BLD AUTO: 0.2 %
ERYTHROCYTE [DISTWIDTH] IN BLOOD BY AUTOMATED COUNT: 18.4 % (ref 10–15)
GFR SERPL CREATININE-BSD FRML MDRD: >90 ML/MIN/{1.73_M2}
GLUCOSE SERPL-MCNC: 95 MG/DL (ref 70–99)
HCT VFR BLD AUTO: 37 % (ref 40–53)
HGB BLD-MCNC: 11.3 G/DL (ref 13.3–17.7)
IMM GRANULOCYTES # BLD: 0.2 10E9/L (ref 0–0.4)
IMM GRANULOCYTES NFR BLD: 1.7 %
LYMPHOCYTES # BLD AUTO: 1 10E9/L (ref 0.8–5.3)
LYMPHOCYTES NFR BLD AUTO: 7.2 %
MCH RBC QN AUTO: 24.9 PG (ref 26.5–33)
MCHC RBC AUTO-ENTMCNC: 30.5 G/DL (ref 31.5–36.5)
MCV RBC AUTO: 82 FL (ref 78–100)
MONOCYTES # BLD AUTO: 1 10E9/L (ref 0–1.3)
MONOCYTES NFR BLD AUTO: 7.4 %
NEUTROPHILS # BLD AUTO: 11.2 10E9/L (ref 1.6–8.3)
NEUTROPHILS NFR BLD AUTO: 82.4 %
NRBC # BLD AUTO: 0 10*3/UL
NRBC BLD AUTO-RTO: 0 /100
PLATELET # BLD AUTO: 482 10E9/L (ref 150–450)
POTASSIUM SERPL-SCNC: 4.1 MMOL/L (ref 3.4–5.3)
PROT SERPL-MCNC: 7.1 G/DL (ref 6.8–8.8)
RBC # BLD AUTO: 4.53 10E12/L (ref 4.4–5.9)
SODIUM SERPL-SCNC: 142 MMOL/L (ref 133–144)
WBC # BLD AUTO: 13.6 10E9/L (ref 4–11)

## 2020-05-26 PROCEDURE — 80053 COMPREHEN METABOLIC PANEL: CPT | Performed by: INTERNAL MEDICINE

## 2020-05-26 PROCEDURE — 25800030 ZZH RX IP 258 OP 636: Mod: ZF | Performed by: INTERNAL MEDICINE

## 2020-05-26 PROCEDURE — 25000128 H RX IP 250 OP 636: Mod: ZF | Performed by: INTERNAL MEDICINE

## 2020-05-26 PROCEDURE — G0498 CHEMO EXTEND IV INFUS W/PUMP: HCPCS

## 2020-05-26 PROCEDURE — 85025 COMPLETE CBC W/AUTO DIFF WBC: CPT | Performed by: INTERNAL MEDICINE

## 2020-05-26 PROCEDURE — 96375 TX/PRO/DX INJ NEW DRUG ADDON: CPT

## 2020-05-26 PROCEDURE — 96374 THER/PROPH/DIAG INJ IV PUSH: CPT

## 2020-05-26 RX ORDER — HEPARIN SODIUM,PORCINE 10 UNIT/ML
5 VIAL (ML) INTRAVENOUS
Status: DISCONTINUED | OUTPATIENT
Start: 2020-05-26 | End: 2020-05-26 | Stop reason: HOSPADM

## 2020-05-26 RX ORDER — ETOPOSIDE 50 MG/1
100 CAPSULE ORAL DAILY
Qty: 30 CAPSULE | Refills: 0 | Status: SHIPPED | OUTPATIENT
Start: 2020-05-26 | End: 2020-06-19

## 2020-05-26 RX ORDER — GRANISETRON HYDROCHLORIDE 1 MG/ML
1 INJECTION INTRAVENOUS ONCE
Status: COMPLETED | OUTPATIENT
Start: 2020-05-26 | End: 2020-05-26

## 2020-05-26 RX ADMIN — Medication 5 ML: at 12:51

## 2020-05-26 RX ADMIN — SODIUM CHLORIDE 250 ML: 9 INJECTION, SOLUTION INTRAVENOUS at 13:50

## 2020-05-26 RX ADMIN — GRANISETRON HYDROCHLORIDE 1 MG: 1 INJECTION, SOLUTION INTRAVENOUS at 14:09

## 2020-05-26 RX ADMIN — DEXAMETHASONE SODIUM PHOSPHATE 12 MG: 10 INJECTION, SOLUTION INTRAMUSCULAR; INTRAVENOUS at 13:50

## 2020-05-26 RX ADMIN — Medication 5 ML: at 12:52

## 2020-05-26 ASSESSMENT — PAIN SCALES - GENERAL: PAINLEVEL: NO PAIN (0)

## 2020-05-26 NOTE — PROGRESS NOTES
Infusion Nursing Note:  Diego Buchanan presents today for Cycle 2 Day 1 Ifex/Mesna pump connect, PO Etoposide.    Patient seen by provider today: No   present during visit today: Patient refused  services and a waiver form has been signed.     Note: Patient presents to infusion today stating he feels well. Patient and his sister deny any concerns or symptoms today. Patient denies any pain and states he has had minimal to no pain since starting chemotherapy.     Intravenous Access:  Esparza.    Treatment Conditions:  Lab Results   Component Value Date    HGB 11.3 05/26/2020     Lab Results   Component Value Date    WBC 13.6 05/26/2020      Lab Results   Component Value Date    ANEU 11.2 05/26/2020     Lab Results   Component Value Date     05/26/2020      Lab Results   Component Value Date     05/26/2020                   Lab Results   Component Value Date    POTASSIUM 4.1 05/26/2020           No results found for: MAG         Lab Results   Component Value Date    CR 0.68 05/26/2020                   Lab Results   Component Value Date    FAISAL 9.0 05/26/2020                Lab Results   Component Value Date    BILITOTAL 0.3 05/26/2020           Lab Results   Component Value Date    ALBUMIN 2.9 05/26/2020                    Lab Results   Component Value Date    ALT 13 05/26/2020           Lab Results   Component Value Date    AST 10 05/26/2020       Results reviewed, labs MET treatment parameters, ok to proceed with treatment.      Post Infusion Assessment:  Patient tolerated infusion without incident.  Blood return noted pre and post infusion.  Site patent and intact, free from redness, edema or discomfort.  No evidence of extravasations.       Prior to discharge: Esparza is secured in place with tegaderm and flushed with 10cc NS with positive blood return noted.  Continuous home infusion CADD pump connected.    All connectors secured in place and clamps taped open.  Double checked by  "JASON Taveras.  Pump started, \"running\" noted on display (CADD): YES.  Patient instructed to call our clinic or [facility] with any questions or concerns at home.  Patient verbalized understanding.    Patient set up for pump disconnect at home with [facility] on Monday 6/1/20 at 1530 with Neulasta OnPro and Mesna bag change. [facility].          Discharge Plan:   Prescription refills given for Etoposide.  Discharge instructions reviewed with: Patient. Medication information, disconnect schedule, and discharge instructions also reviewed with Mariela, patient's sister via telephone. Written handouts for medications sent with patient.   Patient and/or family verbalized understanding of discharge instructions and all questions answered.  Copy of AVS reviewed with patient and/or family.  Patient will return 6/11/20 for next appointment.  Patient discharged in stable condition accompanied by: RN escorted patient to exit to meet sister for transportation home.   Departure Mode: Ambulatory.    Karly Sarabia RN                        "

## 2020-05-26 NOTE — ORAL ONC MGMT
"Oral Chemotherapy Monitoring Program    Primary Oncologist: Dr Morales Sims  Primary Oncology Clinic: [facility]  Cancer Diagnosis: Desmoplastic small round cell tumor (DSRCT)    Drug: etoposide  Start Date: 5/26/2020  Dose is appropriate for patients: 2.34 BSA   Expected duration of therapy: Until disease progression or unacceptable toxicity    Drug Interaction Assessment: no drug interactions identified    Subjective/Objective:  Diego Buchanan is a 25 year old male seen in clinic for an initial visit for oral chemotherapy education.  Patient's sister, Mariela, was contacted by phone as well to provide chemotherapy education.      Vitals:  BP:   BP Readings from Last 1 Encounters:   05/26/20 132/77     Wt Readings from Last 1 Encounters:   05/26/20 115.7 kg (255 lb)     Estimated body surface area is 2.36 meters squared as calculated from the following:    Height as of 5/4/20: 1.727 m (5' 8\").    Weight as of an earlier encounter on 5/26/20: 115.7 kg (255 lb).      Labs:  _  Result Component Current Result Ref Range   Sodium 142 (5/26/2020) 133 - 144 mmol/L     _  Result Component Current Result Ref Range   Potassium 4.1 (5/26/2020) 3.4 - 5.3 mmol/L     _  Result Component Current Result Ref Range   Calcium 9.0 (5/26/2020) 8.5 - 10.1 mg/dL     No results found for Mag within last 30 days.     No results found for Phos within last 30 days.     _  Result Component Current Result Ref Range   Albumin 2.9 (L) (5/26/2020) 3.4 - 5.0 g/dL     _  Result Component Current Result Ref Range   Urea Nitrogen 6 (L) (5/26/2020) 7 - 30 mg/dL     _  Result Component Current Result Ref Range   Creatinine 0.68 (5/26/2020) 0.66 - 1.25 mg/dL       _  Result Component Current Result Ref Range   AST 10 (5/26/2020) 0 - 45 U/L     _  Result Component Current Result Ref Range   ALT 13 (5/26/2020) 0 - 70 U/L     _  Result Component Current Result Ref Range   Bilirubin Total 0.3 (5/26/2020) 0.2 - 1.3 mg/dL       _  Result Component " Current Result Ref Range   WBC 13.6 (H) (5/26/2020) 4.0 - 11.0 10e9/L     _  Result Component Current Result Ref Range   Hemoglobin 11.3 (L) (5/26/2020) 13.3 - 17.7 g/dL     _  Result Component Current Result Ref Range   Platelet Count 482 (H) (5/26/2020) 150 - 450 10e9/L     _  Result Component Current Result Ref Range   Absolute Neutrophil 11.2 (H) (5/26/2020) 1.6 - 8.3 10e9/L       Assessment:  Patient is appropriate to start therapy.    Plan:  Basic chemotherapy teaching was reviewed with the patient and the patient's family (sister, Mariela) including indication, start date of therapy, dose, administration, adverse effects, missed doses, food and drug interactions, monitoring, side effect management, office contact information, and safe handling. Written materials were provided and all questions answered.    Follow-Up:  Per protocol    Liliam Rock, [facility], BCPS

## 2020-05-26 NOTE — PATIENT INSTRUCTIONS
Contact Numbers    [facility] Main Line/Triage and after hours / weekends / holidays: [phone]      Please call the triage or after hours line if you experience a temperature greater than or equal to 100.5, shaking chills, have uncontrolled nausea, vomiting and/or diarrhea, dizziness, shortness of breath, chest pain, bleeding, unexplained bruising, or if you have any other new/concerning symptoms, questions or concerns.      If you are having any concerning symptoms or wish to speak to a provider before your next infusion visit, please call your care coordinator or triage to notify them so we can adequately serve you.     If you need a refill on a narcotic prescription or other medication, please call before your infusion appointment.       May 2020      Manjinder Monday Tuesday Wednesday Thursday Friday Saturday                            1    [facility] LAB DRAW   9:45 AM   (15 min.)   [facility] DRAW   [facility] Lab Draw    [facility] RETURN  10:05 AM   (50 min.)   Stacey Tracey PA-C   LT[facility], located within [facility] ONC INFUSION 120  12:30 PM   (120 min.)    ONCOLOGY INFUSION   [facility]    ECHO COMPLETE   3:00 PM   (60 min.)   [id number]   [facility] Cardiac Services    MR BRAIN WWO   3:30 PM   (45 min.)   [id number]   [facility] MRI    LAB   6:45 PM   (15 min.)   [facility] LAB HOME INFUSION   [facility] [place], Laboratory Services 2       3     4    [facility] LAB DRAW   8:15 AM   (15 min.)    M[facility] LAB DRAW   [facility] Lab Draw    [facility] RETURN   8:25 AM   (50 min.)   Stacey Tracey PA-C   [facility]    CT CHEST/ABDOMEN/PELVIS W  10:20 AM   (20 min.)   [email or web address]   [facility] CT    [facility] ONC INFUSION 240  11:30 AM   (240 min.)   [facility] ONCOLOGY INFUSION   [facility] 5    [facility] ONC INFUSION 120  11:30 AM   (120 min.)    ONCOLOGY INFUSION   [facility] 6     7  Happy Birthday!     8    TELEPHONE VISIT  NEW  12:15 PM   (15 min.)   OTHER  SERVICES    Services Department    VIDEO VISIT RETURN  12:15 PM   (50 min.)   Stacey Tracey PA-C   [facility] 9       10     11     12     13    PHONE  10:00 AM   (15 min.)   PHONE,     Services Department 14     15    VIDEO VISIT RETURN  11:25 AM   (50 min.)   Stacey Tracey PA-C   [facility] 16       17     18     19     20     21    TELEPHONE VISIT RETURN   1:15 PM   (30 min.)   Farzaneh Young MD   [facility] 22     HEAD NECK SOFT TISSUE   8:30 AM   (45 min.)   [address] [place] [address] US    [facility] DRAW   9:30 AM   (15 min.)    [facility] LAB DRAW   [facility] Lab Draw    [facility] DRAW   9:45 AM   (15 min.)    [facility]    [facility] ONC INFUSION 120  10:30 AM   (120 min.)   [facility] ONCOLOGY INFUSION   [facility] 23       24     25     26    [facility] LAB DRAW  12:15 PM   (15 min.)    MASONIC LAB DRAW   [facility] Mason[facility] Lab Draw    [facility] ONC INFUSION 120   1:00 PM   (120 min.)    ONCOLOGY INFUSION   [facility] 27     28     29     30       31 June 2020 Sunday Monday Tuesday Wednesday Thursday Friday Saturday        1     2     3     4     5     6       7     8     9     10    CT CHEST/ABDOMEN/PELVIS W  12:00 PM   (20 min.)   [address] [place] [address] CT 11    VIDEO VISIT RETURN  10:30 AM   (30 min.)   Farzaneh Young MD   [facility] 12     13       14     15     16     17     18     19     20       21     22     23     24     25     26     27       28     29     30                                         Lab Results:  Recent Results (from the past 12 hour(s))   CBC with platelets differential    Collection Time: 05/26/20 12:49 PM   Result Value Ref Range    WBC 13.6 (H) 4.0  - 11.0 10e9/L    RBC Count 4.53 4.4 - 5.9 10e12/L    Hemoglobin 11.3 (L) 13.3 - 17.7 g/dL    Hematocrit 37.0 (L) 40.0 - 53.0 %    MCV 82 78 - 100 fl    MCH 24.9 (L) 26.5 - 33.0 pg    MCHC 30.5 (L) 31.5 - 36.5 g/dL    RDW 18.4 (H) 10.0 - 15.0 %    Platelet Count 482 (H) 150 - 450 10e9/L    Diff Method Automated Method     % Neutrophils 82.4 %    % Lymphocytes 7.2 %    % Monocytes 7.4 %    % Eosinophils 0.2 %    % Basophils 1.1 %    % Immature Granulocytes 1.7 %    Nucleated RBCs 0 0 /100    Absolute Neutrophil 11.2 (H) 1.6 - 8.3 10e9/L    Absolute Lymphocytes 1.0 0.8 - 5.3 10e9/L    Absolute Monocytes 1.0 0.0 - 1.3 10e9/L    Absolute Eosinophils 0.0 0.0 - 0.7 10e9/L    Absolute Basophils 0.2 0.0 - 0.2 10e9/L    Abs Immature Granulocytes 0.2 0 - 0.4 10e9/L    Absolute Nucleated RBC 0.0    Comprehensive metabolic panel    Collection Time: 05/26/20 12:49 PM   Result Value Ref Range    Sodium 142 133 - 144 mmol/L    Potassium 4.1 3.4 - 5.3 mmol/L    Chloride 110 (H) 94 - 109 mmol/L    Carbon Dioxide 24 20 - 32 mmol/L    Anion Gap 9 3 - 14 mmol/L    Glucose 95 70 - 99 mg/dL    Urea Nitrogen 6 (L) 7 - 30 mg/dL    Creatinine 0.68 0.66 - 1.25 mg/dL    GFR Estimate >90 >60 mL/min/[1.73_m2]    GFR Estimate If Black >90 >60 mL/min/[1.73_m2]    Calcium 9.0 8.5 - 10.1 mg/dL    Bilirubin Total 0.3 0.2 - 1.3 mg/dL    Albumin 2.9 (L) 3.4 - 5.0 g/dL    Protein Total 7.1 6.8 - 8.8 g/dL    Alkaline Phosphatase 72 40 - 150 U/L    ALT 13 0 - 70 U/L    AST 10 0 - 45 U/L

## 2020-05-27 ENCOUNTER — HOME INFUSION (PRE-WILLOW HOME INFUSION) (OUTPATIENT)
Dept: PHARMACY | Facility: CLINIC | Age: 25
End: 2020-05-27

## 2020-05-27 NOTE — PROGRESS NOTES
This is a recent snapshot of the patient's [facility] medical record.  For current drug dose and complete information and questions, call [phone] or In Basket pool, fv home infusion ([id number])  CSN Number:  [id number]

## 2020-05-28 ENCOUNTER — MEDICAL CORRESPONDENCE (OUTPATIENT)
Dept: HEALTH INFORMATION MANAGEMENT | Facility: CLINIC | Age: 25
End: 2020-05-28

## 2020-05-28 ENCOUNTER — HOME INFUSION (PRE-WILLOW HOME INFUSION) (OUTPATIENT)
Dept: PHARMACY | Facility: CLINIC | Age: 25
End: 2020-05-28

## 2020-05-28 LAB
ALBUMIN SERPL-MCNC: 2.7 G/DL (ref 3.4–5)
ALP SERPL-CCNC: 70 U/L (ref 40–150)
ALT SERPL W P-5'-P-CCNC: 11 U/L (ref 0–70)
ANION GAP SERPL CALCULATED.3IONS-SCNC: 8 MMOL/L (ref 3–14)
AST SERPL W P-5'-P-CCNC: 9 U/L (ref 0–45)
BASOPHILS # BLD AUTO: 0.1 10E9/L (ref 0–0.2)
BASOPHILS NFR BLD AUTO: 0.6 %
BILIRUB SERPL-MCNC: 0.4 MG/DL (ref 0.2–1.3)
BUN SERPL-MCNC: 9 MG/DL (ref 7–30)
CALCIUM SERPL-MCNC: 8.6 MG/DL (ref 8.5–10.1)
CHLORIDE SERPL-SCNC: 109 MMOL/L (ref 94–109)
CO2 SERPL-SCNC: 24 MMOL/L (ref 20–32)
CREAT SERPL-MCNC: 0.69 MG/DL (ref 0.66–1.25)
DIFFERENTIAL METHOD BLD: ABNORMAL
EOSINOPHIL # BLD AUTO: 0.1 10E9/L (ref 0–0.7)
EOSINOPHIL NFR BLD AUTO: 0.5 %
ERYTHROCYTE [DISTWIDTH] IN BLOOD BY AUTOMATED COUNT: 18 % (ref 10–15)
GFR SERPL CREATININE-BSD FRML MDRD: >90 ML/MIN/{1.73_M2}
GLUCOSE SERPL-MCNC: 74 MG/DL (ref 70–99)
HCT VFR BLD AUTO: 33.6 % (ref 40–53)
HGB BLD-MCNC: 10.6 G/DL (ref 13.3–17.7)
IMM GRANULOCYTES # BLD: 0.1 10E9/L (ref 0–0.4)
IMM GRANULOCYTES NFR BLD: 0.8 %
LYMPHOCYTES # BLD AUTO: 1.1 10E9/L (ref 0.8–5.3)
LYMPHOCYTES NFR BLD AUTO: 10.4 %
MAGNESIUM SERPL-MCNC: 1.9 MG/DL (ref 1.6–2.3)
MCH RBC QN AUTO: 25.1 PG (ref 26.5–33)
MCHC RBC AUTO-ENTMCNC: 31.5 G/DL (ref 31.5–36.5)
MCV RBC AUTO: 79 FL (ref 78–100)
MONOCYTES # BLD AUTO: 0.5 10E9/L (ref 0–1.3)
MONOCYTES NFR BLD AUTO: 4.4 %
NEUTROPHILS # BLD AUTO: 8.9 10E9/L (ref 1.6–8.3)
NEUTROPHILS NFR BLD AUTO: 83.3 %
NRBC # BLD AUTO: 0 10*3/UL
NRBC BLD AUTO-RTO: 0 /100
PHOSPHATE SERPL-MCNC: 4.8 MG/DL (ref 2.5–4.5)
PLATELET # BLD AUTO: 433 10E9/L (ref 150–450)
POTASSIUM SERPL-SCNC: 3.8 MMOL/L (ref 3.4–5.3)
PROT SERPL-MCNC: 6.8 G/DL (ref 6.8–8.8)
RBC # BLD AUTO: 4.23 10E12/L (ref 4.4–5.9)
SODIUM SERPL-SCNC: 141 MMOL/L (ref 133–144)
WBC # BLD AUTO: 10.7 10E9/L (ref 4–11)

## 2020-05-28 PROCEDURE — 84100 ASSAY OF PHOSPHORUS: CPT | Performed by: PHYSICIAN ASSISTANT

## 2020-05-28 PROCEDURE — 80053 COMPREHEN METABOLIC PANEL: CPT | Performed by: PHYSICIAN ASSISTANT

## 2020-05-28 PROCEDURE — 85025 COMPLETE CBC W/AUTO DIFF WBC: CPT | Performed by: PHYSICIAN ASSISTANT

## 2020-05-28 PROCEDURE — 83735 ASSAY OF MAGNESIUM: CPT | Performed by: PHYSICIAN ASSISTANT

## 2020-05-28 NOTE — PROGRESS NOTES
This is a recent snapshot of the patient's [facility] medical record.  For current drug dose and complete information and questions, call [phone] or In Basket pool, fv home infusion ([id number])  CSN Number:  [id number]

## 2020-05-29 ENCOUNTER — MEDICAL CORRESPONDENCE (OUTPATIENT)
Dept: HEALTH INFORMATION MANAGEMENT | Facility: CLINIC | Age: 25
End: 2020-05-29

## 2020-05-29 LAB
ALBUMIN SERPL-MCNC: 3 G/DL (ref 3.4–5)
ALP SERPL-CCNC: 73 U/L (ref 40–150)
ALT SERPL W P-5'-P-CCNC: 10 U/L (ref 0–70)
ANION GAP SERPL CALCULATED.3IONS-SCNC: 10 MMOL/L (ref 3–14)
AST SERPL W P-5'-P-CCNC: 12 U/L (ref 0–45)
BASOPHILS # BLD AUTO: 0 10E9/L (ref 0–0.2)
BASOPHILS NFR BLD AUTO: 0.3 %
BILIRUB SERPL-MCNC: 0.4 MG/DL (ref 0.2–1.3)
BUN SERPL-MCNC: 12 MG/DL (ref 7–30)
CALCIUM SERPL-MCNC: 8.8 MG/DL (ref 8.5–10.1)
CHLORIDE SERPL-SCNC: 108 MMOL/L (ref 94–109)
CO2 SERPL-SCNC: 24 MMOL/L (ref 20–32)
CREAT SERPL-MCNC: 0.71 MG/DL (ref 0.66–1.25)
DIFFERENTIAL METHOD BLD: ABNORMAL
EOSINOPHIL # BLD AUTO: 0 10E9/L (ref 0–0.7)
EOSINOPHIL NFR BLD AUTO: 0.3 %
ERYTHROCYTE [DISTWIDTH] IN BLOOD BY AUTOMATED COUNT: 18.3 % (ref 10–15)
GFR SERPL CREATININE-BSD FRML MDRD: >90 ML/MIN/{1.73_M2}
GLUCOSE SERPL-MCNC: 79 MG/DL (ref 70–99)
HCT VFR BLD AUTO: 34.8 % (ref 40–53)
HGB BLD-MCNC: 11 G/DL (ref 13.3–17.7)
IMM GRANULOCYTES # BLD: 0 10E9/L (ref 0–0.4)
IMM GRANULOCYTES NFR BLD: 0.4 %
LYMPHOCYTES # BLD AUTO: 1 10E9/L (ref 0.8–5.3)
LYMPHOCYTES NFR BLD AUTO: 10.9 %
MAGNESIUM SERPL-MCNC: 1.9 MG/DL (ref 1.6–2.3)
MCH RBC QN AUTO: 25.1 PG (ref 26.5–33)
MCHC RBC AUTO-ENTMCNC: 31.6 G/DL (ref 31.5–36.5)
MCV RBC AUTO: 80 FL (ref 78–100)
MONOCYTES # BLD AUTO: 0.5 10E9/L (ref 0–1.3)
MONOCYTES NFR BLD AUTO: 5.1 %
NEUTROPHILS # BLD AUTO: 7.6 10E9/L (ref 1.6–8.3)
NEUTROPHILS NFR BLD AUTO: 83 %
NRBC # BLD AUTO: 0 10*3/UL
NRBC BLD AUTO-RTO: 0 /100
PHOSPHATE SERPL-MCNC: 3.9 MG/DL (ref 2.5–4.5)
PLATELET # BLD AUTO: 456 10E9/L (ref 150–450)
POTASSIUM SERPL-SCNC: 4 MMOL/L (ref 3.4–5.3)
PROT SERPL-MCNC: 7.2 G/DL (ref 6.8–8.8)
RBC # BLD AUTO: 4.38 10E12/L (ref 4.4–5.9)
SODIUM SERPL-SCNC: 142 MMOL/L (ref 133–144)
WBC # BLD AUTO: 9.2 10E9/L (ref 4–11)

## 2020-05-29 PROCEDURE — 84100 ASSAY OF PHOSPHORUS: CPT | Performed by: PHYSICIAN ASSISTANT

## 2020-05-29 PROCEDURE — 83735 ASSAY OF MAGNESIUM: CPT | Performed by: PHYSICIAN ASSISTANT

## 2020-05-29 PROCEDURE — 80053 COMPREHEN METABOLIC PANEL: CPT | Performed by: PHYSICIAN ASSISTANT

## 2020-05-29 PROCEDURE — 85025 COMPLETE CBC W/AUTO DIFF WBC: CPT | Performed by: PHYSICIAN ASSISTANT

## 2020-05-29 NOTE — TELEPHONE ENCOUNTER
Prior Authorization Approval    Authorization Effective Date: 4/28/2020  Authorization Expiration Date: 5/28/2021  Medication: NACL 10 ml  Approved Dose/Quantity: 10ml  Reference #: [id number]   Insurance Company: [employer] - Phone [phone] Fax [phone]  Which Pharmacy is filling the prescription (Not needed for infusion/clinic administered): [facility]  Pharmacy Notified: Yes

## 2020-05-29 NOTE — PROGRESS NOTES
This is a recent snapshot of the patient's [facility] medical record.  For current drug dose and complete information and questions, call [phone] or In Basket pool, fv home infusion ([id number])  CSN Number:  [id number]

## 2020-05-30 ENCOUNTER — HOME INFUSION (PRE-WILLOW HOME INFUSION) (OUTPATIENT)
Dept: PHARMACY | Facility: CLINIC | Age: 25
End: 2020-05-30

## 2020-05-30 ENCOUNTER — MEDICAL CORRESPONDENCE (OUTPATIENT)
Dept: HEALTH INFORMATION MANAGEMENT | Facility: CLINIC | Age: 25
End: 2020-05-30

## 2020-05-30 LAB
ALBUMIN SERPL-MCNC: 3.1 G/DL (ref 3.4–5)
ALP SERPL-CCNC: 76 U/L (ref 40–150)
ALT SERPL W P-5'-P-CCNC: 11 U/L (ref 0–70)
ANION GAP SERPL CALCULATED.3IONS-SCNC: 12 MMOL/L (ref 3–14)
AST SERPL W P-5'-P-CCNC: 12 U/L (ref 0–45)
BILIRUB SERPL-MCNC: 0.5 MG/DL (ref 0.2–1.3)
BUN SERPL-MCNC: 13 MG/DL (ref 7–30)
CALCIUM SERPL-MCNC: 9.2 MG/DL (ref 8.5–10.1)
CHLORIDE SERPL-SCNC: 108 MMOL/L (ref 94–109)
CO2 SERPL-SCNC: 21 MMOL/L (ref 20–32)
CREAT SERPL-MCNC: 0.69 MG/DL (ref 0.66–1.25)
ERYTHROCYTE [DISTWIDTH] IN BLOOD BY AUTOMATED COUNT: 18.4 % (ref 10–15)
GFR SERPL CREATININE-BSD FRML MDRD: >90 ML/MIN/{1.73_M2}
GLUCOSE SERPL-MCNC: 65 MG/DL (ref 70–99)
HCT VFR BLD AUTO: 35.7 % (ref 40–53)
HGB BLD-MCNC: 11.2 G/DL (ref 13.3–17.7)
MAGNESIUM SERPL-MCNC: 1.8 MG/DL (ref 1.6–2.3)
MCH RBC QN AUTO: 25 PG (ref 26.5–33)
MCHC RBC AUTO-ENTMCNC: 31.4 G/DL (ref 31.5–36.5)
MCV RBC AUTO: 80 FL (ref 78–100)
PHOSPHATE SERPL-MCNC: 3.5 MG/DL (ref 2.5–4.5)
PLATELET # BLD AUTO: 472 10E9/L (ref 150–450)
POTASSIUM SERPL-SCNC: 4.3 MMOL/L (ref 3.4–5.3)
PROT SERPL-MCNC: 7.5 G/DL (ref 6.8–8.8)
RBC # BLD AUTO: 4.48 10E12/L (ref 4.4–5.9)
SODIUM SERPL-SCNC: 141 MMOL/L (ref 133–144)
WBC # BLD AUTO: 9.9 10E9/L (ref 4–11)

## 2020-05-30 PROCEDURE — 83735 ASSAY OF MAGNESIUM: CPT | Performed by: PHYSICIAN ASSISTANT

## 2020-05-30 PROCEDURE — 80053 COMPREHEN METABOLIC PANEL: CPT | Performed by: PHYSICIAN ASSISTANT

## 2020-05-30 PROCEDURE — 84100 ASSAY OF PHOSPHORUS: CPT | Performed by: PHYSICIAN ASSISTANT

## 2020-05-30 PROCEDURE — 85027 COMPLETE CBC AUTOMATED: CPT | Performed by: PHYSICIAN ASSISTANT

## 2020-06-01 ENCOUNTER — HOSPITAL ENCOUNTER (OUTPATIENT)
Facility: CLINIC | Age: 25
Setting detail: SPECIMEN
Discharge: HOME | End: 2020-06-01
Admitting: PHYSICIAN ASSISTANT
Payer: COMMERCIAL

## 2020-06-01 ENCOUNTER — HOME INFUSION (PRE-WILLOW HOME INFUSION) (OUTPATIENT)
Dept: PHARMACY | Facility: CLINIC | Age: 25
End: 2020-06-01

## 2020-06-01 ENCOUNTER — MEDICAL CORRESPONDENCE (OUTPATIENT)
Dept: HEALTH INFORMATION MANAGEMENT | Facility: CLINIC | Age: 25
End: 2020-06-01

## 2020-06-01 ENCOUNTER — TELEPHONE (OUTPATIENT)
Dept: ONCOLOGY | Facility: CLINIC | Age: 25
End: 2020-06-01

## 2020-06-01 LAB
ALBUMIN SERPL-MCNC: 3.2 G/DL (ref 3.4–5)
ALP SERPL-CCNC: 71 U/L (ref 40–150)
ALT SERPL W P-5'-P-CCNC: 14 U/L (ref 0–70)
ANION GAP SERPL CALCULATED.3IONS-SCNC: 10 MMOL/L (ref 3–14)
AST SERPL W P-5'-P-CCNC: 14 U/L (ref 0–45)
BASOPHILS # BLD AUTO: 0 10E9/L (ref 0–0.2)
BASOPHILS NFR BLD AUTO: 0.2 %
BILIRUB SERPL-MCNC: 0.5 MG/DL (ref 0.2–1.3)
BUN SERPL-MCNC: 18 MG/DL (ref 7–30)
CALCIUM SERPL-MCNC: 9.1 MG/DL (ref 8.5–10.1)
CHLORIDE SERPL-SCNC: 108 MMOL/L (ref 94–109)
CO2 SERPL-SCNC: 20 MMOL/L (ref 20–32)
CREAT SERPL-MCNC: 1.02 MG/DL (ref 0.66–1.25)
DIFFERENTIAL METHOD BLD: ABNORMAL
EOSINOPHIL # BLD AUTO: 0 10E9/L (ref 0–0.7)
EOSINOPHIL NFR BLD AUTO: 0.3 %
ERYTHROCYTE [DISTWIDTH] IN BLOOD BY AUTOMATED COUNT: 18.2 % (ref 10–15)
GFR SERPL CREATININE-BSD FRML MDRD: >90 ML/MIN/{1.73_M2}
GLUCOSE SERPL-MCNC: 96 MG/DL (ref 70–99)
HCT VFR BLD AUTO: 32.9 % (ref 40–53)
HGB BLD-MCNC: 10.8 G/DL (ref 13.3–17.7)
IMM GRANULOCYTES # BLD: 0 10E9/L (ref 0–0.4)
IMM GRANULOCYTES NFR BLD: 0.3 %
LYMPHOCYTES # BLD AUTO: 0.4 10E9/L (ref 0.8–5.3)
LYMPHOCYTES NFR BLD AUTO: 4.4 %
MAGNESIUM SERPL-MCNC: 2.1 MG/DL (ref 1.6–2.3)
MCH RBC QN AUTO: 25.4 PG (ref 26.5–33)
MCHC RBC AUTO-ENTMCNC: 32.8 G/DL (ref 31.5–36.5)
MCV RBC AUTO: 77 FL (ref 78–100)
MONOCYTES # BLD AUTO: 0 10E9/L (ref 0–1.3)
MONOCYTES NFR BLD AUTO: 0.3 %
NEUTROPHILS # BLD AUTO: 9 10E9/L (ref 1.6–8.3)
NEUTROPHILS NFR BLD AUTO: 94.5 %
NRBC # BLD AUTO: 0 10*3/UL
NRBC BLD AUTO-RTO: 0 /100
PHOSPHATE SERPL-MCNC: 2.8 MG/DL (ref 2.5–4.5)
PLATELET # BLD AUTO: 532 10E9/L (ref 150–450)
POTASSIUM SERPL-SCNC: 3.8 MMOL/L (ref 3.4–5.3)
PROT SERPL-MCNC: 8 G/DL (ref 6.8–8.8)
RBC # BLD AUTO: 4.25 10E12/L (ref 4.4–5.9)
SODIUM SERPL-SCNC: 138 MMOL/L (ref 133–144)
WBC # BLD AUTO: 9.5 10E9/L (ref 4–11)

## 2020-06-01 PROCEDURE — 85025 COMPLETE CBC W/AUTO DIFF WBC: CPT | Performed by: PHYSICIAN ASSISTANT

## 2020-06-01 PROCEDURE — 84100 ASSAY OF PHOSPHORUS: CPT | Performed by: PHYSICIAN ASSISTANT

## 2020-06-01 PROCEDURE — 83735 ASSAY OF MAGNESIUM: CPT | Performed by: PHYSICIAN ASSISTANT

## 2020-06-01 PROCEDURE — 80053 COMPREHEN METABOLIC PANEL: CPT | Performed by: PHYSICIAN ASSISTANT

## 2020-06-01 NOTE — PROGRESS NOTES
This is a recent snapshot of the patient's [facility] medical record.  For current drug dose and complete information and questions, call [phone] or In Basket pool, fv home infusion ([id number])  CSN Number:  [id number]

## 2020-06-01 NOTE — TELEPHONE ENCOUNTER
Pts sister and fv home infusion calling to report in the past 2 days pt has had difficulty swallowing.  Feels like throat is tight and having difficulty getting food down. Fluids do not seem to be a problem. Today he tried eating chicken and potatoes and did cough that up as It felt like it got stuck. Also took his oral etoposide and coughed up 4 or the 5 pills. Wondering what to do and if he should repeat the meds.    Paged to Dr Sims @[phone]    Please call Mariela ospina back to update.

## 2020-06-01 NOTE — TELEPHONE ENCOUNTER
Per Dr. Sims: if etoposide is still whole try to wash off and retake. Would not recommend taking a 2nd dose as pt was only dispensed enough for 6 days. Recommend pt try nausea medications before eating, if still having difficulty swallowing foods after trying nausea meds, call back and may need a swallow study or other imaging. Called mom Mariela and reached pippa steve with above information and also asked her to call clinic back that she got the message. As it is 5 pm, calls will not route through anymore and informed her after hours nurse can also reiterate message in note.

## 2020-06-02 ENCOUNTER — HOME INFUSION (PRE-WILLOW HOME INFUSION) (OUTPATIENT)
Dept: PHARMACY | Facility: CLINIC | Age: 25
End: 2020-06-02

## 2020-06-02 ENCOUNTER — TELEPHONE (OUTPATIENT)
Dept: PHARMACY | Facility: CLINIC | Age: 25
End: 2020-06-02

## 2020-06-02 NOTE — TELEPHONE ENCOUNTER
[facility]     Drug including DOSE: Ifos/Mesna  J Code:Ifos - , Mesna -   NDC: Ifos -[address]-63(20ml) and [address]-29(60ml) and Mesna- [address]-10  ICD 10 code: Z45.2, C49.9, C41.9    Date(s) of Service: 5/26/2020    Insurance Name:  Insurance ID: [id number]    Provider: Stacey Tracey  Provider NPI: [id number]    Please obtain a PA for a compound of Ifosfamide 50MG/ML, one 20ML SDV and one 60ML  SDV and Mesna 100MG/ML with ISAIAH with a start date of 5/26/2020.    [facility]  NPI: [id number]

## 2020-06-02 NOTE — PROGRESS NOTES
This is a recent snapshot of the patient's [facility] medical record.  For current drug dose and complete information and questions, call [phone] or In Basket pool, fv home infusion ([id number])  CSN Number:  [id number]

## 2020-06-03 ENCOUNTER — HOME INFUSION (PRE-WILLOW HOME INFUSION) (OUTPATIENT)
Dept: PHARMACY | Facility: CLINIC | Age: 25
End: 2020-06-03

## 2020-06-03 ENCOUNTER — TELEPHONE (OUTPATIENT)
Dept: ONCOLOGY | Facility: CLINIC | Age: 25
End: 2020-06-03

## 2020-06-03 NOTE — PROGRESS NOTES
This is a recent snapshot of the patient's [facility] medical record.  For current drug dose and complete information and questions, call [phone] or In [facility] pool, fv home infusion ([id number])  CSN Number:  [id number]

## 2020-06-03 NOTE — ORAL ONC MGMT
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  Today I spoke with Diego's sister Mariela. She said Diego is doing well right now. She said he had some nausea while he was on the chemo pump. She gave him nausea medication which was effective for him. She said on Monday he did cough up part of his dose of etoposide and so he did only get one 50mg etoposide capsule that day as the others were soiled. Dr. Sims is aware and approved this handling of the missed dose. She said he drinks 3-4 bottles of water per day. She also administers IV saline as needed when he looks dehydrated. She thanked me for the call and care.    ORAL CHEMOTHERAPY 6/3/2020   Drug Name (No Data)   Current Dosage 250mg   Current Schedule Daily   Cycle Details Other   Start Date of Last Cycle 5/27/2020   Planned next cycle start date 7/7/2020   Doses missed in last 2 weeks 0   Adherence Assessment Adherent   Adverse Effects Nausea   Nausea Grade 1   Pharmacist Intervention(nausea) Yes   Intervention(s) Patient education   Any new drug interactions? No   Is the dose as ordered appropriate for the patient? Yes       Vitals:  BP:   BP Readings from Last 1 Encounters:   05/26/20 132/77     Wt Readings from Last 1  "Encounters:   05/26/20 115.7 kg (255 lb)     Estimated body surface area is 2.36 meters squared as calculated from the following:    Height as of 5/4/20: 1.727 m (5' 8\").    Weight as of 5/26/20: 115.7 kg (255 lb).    Labs:  _  Result Component Current Result Ref Range   Sodium 138 (6/1/2020) 133 - 144 mmol/L     _  Result Component Current Result Ref Range   Potassium 3.8 (6/1/2020) 3.4 - 5.3 mmol/L     _  Result Component Current Result Ref Range   Calcium 9.1 (6/1/2020) 8.5 - 10.1 mg/dL     _  Result Component Current Result Ref Range   Magnesium 2.1 (6/1/2020) 1.6 - 2.3 mg/dL     _  Result Component Current Result Ref Range   Phosphorus 2.8 (6/1/2020) 2.5 - 4.5 mg/dL     _  Result Component Current Result Ref Range   Albumin 3.2 (L) (6/1/2020) 3.4 - 5.0 g/dL     _  Result Component Current Result Ref Range   Urea Nitrogen 18 (6/1/2020) 7 - 30 mg/dL     _  Result Component Current Result Ref Range   Creatinine 1.02 (6/1/2020) 0.66 - 1.25 mg/dL       _  Result Component Current Result Ref Range   AST 14 (6/1/2020) 0 - 45 U/L     _  Result Component Current Result Ref Range   ALT 14 (6/1/2020) 0 - 70 U/L     _  Result Component Current Result Ref Range   Bilirubin Total 0.5 (6/1/2020) 0.2 - 1.3 mg/dL       _  Result Component Current Result Ref Range   WBC 9.5 (6/1/2020) 4.0 - 11.0 10e9/L     _  Result Component Current Result Ref Range   Hemoglobin 10.8 (L) (6/1/2020) 13.3 - 17.7 g/dL     _  Result Component Current Result Ref Range   Platelet Count 532 (H) (6/1/2020) 150 - 450 10e9/L     _  Result Component Current Result Ref Range   Absolute Neutrophil 9.0 (H) (6/1/2020) 1.6 - 8.3 10e9/L       Assessment:  Diego appears to be doing well with the first round of oral etoposide.    Plan:  Continue treatment. Increase water intake to 64 ounces per day.    Follow-Up:  Pending next cycle of etoposide in July.    Refill Due:  By July 7, 2020.    Mykel Trejo PharmD  [facility]  [phone]  June 3, " 2020

## 2020-06-04 ENCOUNTER — TELEPHONE (OUTPATIENT)
Dept: PHARMACY | Facility: CLINIC | Age: 25
End: 2020-06-04

## 2020-06-04 NOTE — TELEPHONE ENCOUNTER
[facility] PRIOR AUTHORIZATION REQUEST      Drug including DOSE: Mesna  J Code:Mesna -   NDC: Mesna- [address]-67  ICD 10 code: Z45.2, C49.9, C41.9     Date(s) of Service: 5/26/2020     Insurance Name:  Insurance ID: [id number]     Provider: Stacey Tracey  Provider NPI: [id number]     Please obtain a PA for a compound of Ifosfamide 50MG/ML, one 20ML SDV and one 60ML  SDV and Mesna 100MG/ML with ISAIAH with a start date of 5/26/2020.     [facility]  NPI: [id number]

## 2020-06-04 NOTE — TELEPHONE ENCOUNTER
PA Initiation    Medication: Mesna  [facility] - Phone [phone] Fax [phone]  Pharmacy Filling the Rx: [facility]  Filling Pharmacy Phone: [phone]  Filling Pharmacy Fax:    Start Date: 6/4/2020    [facility] Prior Authorization Team   Phone: [phone]

## 2020-06-04 NOTE — PROGRESS NOTES
This is a recent snapshot of the patient's [facility] medical record.  For current drug dose and complete information and questions, call [phone] or In Basket pool, fv home infusion ([id number])  CSN Number:  [id number]

## 2020-06-04 NOTE — TELEPHONE ENCOUNTER
PA Initiation    Medication: IfVobi  Insurance Company: [facility] - Phone [phone] Fax [phone]  Pharmacy Filling the Rx: [facility]  Filling Pharmacy Phone: [phone]  Filling Pharmacy Fax:    Start Date: 6/4/2020    Central Prior Authorization Team   Phone: [phone]

## 2020-06-05 ENCOUNTER — PRE VISIT (OUTPATIENT)
Dept: ONCOLOGY | Facility: CLINIC | Age: 25
End: 2020-06-05

## 2020-06-08 ENCOUNTER — TELEPHONE (OUTPATIENT)
Dept: PHARMACY | Facility: CLINIC | Age: 25
End: 2020-06-08

## 2020-06-08 NOTE — TELEPHONE ENCOUNTER
PA Initiation    Medication: Ifos 60ml vial  Insurance Company: [employer] - Phone [phone] Fax [phone]  Pharmacy Filling the Rx: [facility]  Filling Pharmacy Phone: [phone]  Filling Pharmacy Fax:    Start Date: 6/8/2020    [place] Prior Authorization Team   Phone: [phone]

## 2020-06-08 NOTE — TELEPHONE ENCOUNTER
Prior Authorization Not Needed per Insurance    Medication: Mesna  Insurance Company: [employer] - Phone [phone] Fax [phone]  Pharmacy Filling the Rx: [facility]  Pharmacy Notified: Yes

## 2020-06-08 NOTE — TELEPHONE ENCOUNTER
Prior Authorization Approval    Authorization Effective Date: 5/5/2020  Authorization Expiration Date: 6/4/2021  Medication: Ifos  Approved Dose/Quantity:   Reference #: [id number]   Insurance Company: [employer] - Phone [phone] Fax [phone]  Which Pharmacy is filling the prescription (Not needed for infusion/clinic administered): [facility]  Pharmacy Notified: Yes  Patient Notified:  **Instructed pharmacy to notify patient when script is ready to /ship.**        Awaiting on the 60ml vial

## 2020-06-10 ENCOUNTER — ANCILLARY PROCEDURE (OUTPATIENT)
Dept: CT IMAGING | Facility: CLINIC | Age: 25
End: 2020-06-10
Attending: PHYSICIAN ASSISTANT
Payer: COMMERCIAL

## 2020-06-10 DIAGNOSIS — C41.9 SARCOMA, EWINGS (H): ICD-10-CM

## 2020-06-10 RX ORDER — IOPAMIDOL 755 MG/ML
135 INJECTION, SOLUTION INTRAVASCULAR ONCE
Status: COMPLETED | OUTPATIENT
Start: 2020-06-10 | End: 2020-06-10

## 2020-06-10 RX ADMIN — IOPAMIDOL 135 ML: 755 INJECTION, SOLUTION INTRAVASCULAR at 12:17

## 2020-06-11 ENCOUNTER — VIRTUAL VISIT (OUTPATIENT)
Dept: ONCOLOGY | Facility: CLINIC | Age: 25
End: 2020-06-11
Attending: INTERNAL MEDICINE
Payer: COMMERCIAL

## 2020-06-11 ENCOUNTER — RECORDS - HEALTHEAST (OUTPATIENT)
Dept: ADMINISTRATIVE | Facility: OTHER | Age: 25
End: 2020-06-11

## 2020-06-11 DIAGNOSIS — R31.0 GROSS HEMATURIA: ICD-10-CM

## 2020-06-11 DIAGNOSIS — C49.9 DESMOPLASTIC SMALL ROUND CELL TUMOR (H): Primary | ICD-10-CM

## 2020-06-11 PROCEDURE — 99213 OFFICE O/P EST LOW 20 MIN: CPT | Mod: GT | Performed by: INTERNAL MEDICINE

## 2020-06-11 PROCEDURE — 40000114 ZZH STATISTIC NO CHARGE CLINIC VISIT

## 2020-06-11 NOTE — LETTER
"    6/11/2020         RE: Diego Buchanan  [address]  [place] [address]        Dear Colleague,    Thank you for referring your patient, Diego Buchanan, to the [facility]. Please see a copy of my visit note below.    Diego Buchanan is a 25 year old male who is being evaluated via a billable video visit.      The patient has been notified of following:     \"This video visit will be conducted via a call between you and your physician/provider. We have found that certain health care needs can be provided without the need for an in-person physical exam.  This service lets us provide the care you need with a video conversation.  If a prescription is necessary we can send it directly to your pharmacy.  If lab work is needed we can place an order for that and you can then stop by our lab to have the test done at a later time.    Video visits are billed at different rates depending on your insurance coverage.  Please reach out to your insurance provider with any questions.    If during the course of the call the physician/provider feels a video visit is not appropriate, you will not be charged for this service.\"    Patient has given verbal consent for Video visit? Yes    Will anyone else be joining your video visit? No          Secondary Video Option (Doximity), send text message to:  [phone]    I have reviewed and updated the patient's allergies and medication list.    Concerns: The past couple of days, since treatment he has been having blood in his urine.    Pt's hands are having a black/dark coloration around the fingernails.    Refills: Metoprolol     KERON Clayton      Video-Visit Details    Type of service:  Video Visit     Video Start Time: 10:58 AM  Video End Time: 11:23    Originating Location (pt. Location): Home    Distant Location (provider location):  [facility]     Platform used for Video Visit: [facility]  MEDICAL ONCOLOGY " PROGRESS NOTE  Jun 11, 2020     CHIEF COMPLAINT: Desmoplastic small round cell tumor    Oncologic History:  1. He presented initially with abdominal pain, diarrhea, and progressive abdominal distention for 3 months duration. 2. Initial CT scan in February 2020 showed severe peritoneal carcinomatosis with large peritoneal tumor implants throughout the entire abdomen and pelvis, but mostly prominently in the pelvis.   2. PETCT scan showed interval increase in the amount of peritoneal carcinomatosis.  3. On 3/12/2020, he had ultrasound-guided core needle biopsy of a peritoneal implant (Case: [id number]), which showed a completely undifferentiated appearance, but stains with pancytokeratin, indicating an epithelial origin. 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 Tumor markers for CA-19-9, CEA, beta-hCG, alpha-fetoprotein were unremarkable. Cancer type ID molecular testing by [employer] sent to determine the exact diagnosis and to assist in further treatment planning. 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 Relative probability of less than 5% of other subtype of sarcoma. EWSR1-WT1 fusion detected.  4. 5/1/2020, MRI brain negative for brain metastasis, and baseline CT-CAP obtained showing extensive mixed solid and cystic masses throughout the abdomen and pelvis consistent with peritoneal carcinomatosis. Largest mass measures approximately 20 cm in the pelvis. Metastatic mixed solid and cystic masses seen in hepatic segments 5 and 6 and hepatic segment 7. The masses appear to be contiguous with the retrohepatic peritoneal carcinomatosis. Small volume fluid about the spleen favored to represent malignant ascites, stable mild-to-moderate right hydronephrosis related to mass effect upon the distal ureter in the pelvis, the IVC and iliac veins are compressed due to the  extensive peritoneal carcinomatosis without findings to suggest deep venous thrombosis.  5. 5/4/2020, he begins cycle 1 of CAV/IMV alternating chemotherapy.     HISTORY OF PRESENT ILLNESS  Diego Buchanan is a 25 year old male with a learning disability and recent diagnosis of DSRCT.     He tolerated cycle 2 Ifos/etoposide fairly well with expected toxicities. He had some nausea and vomiting episodes, but feels antiemetics work well. He and his sister are working on timing of   nausea medicine to make sure he always has something on a schedule. He also noticed some hematuria these last 4-5 days. He describes the urine has light red, with gradual lightening of the color. He feels it has stopped or maybe he has very little blood left in the urine.    He continues to note a significant reduction in pain from baseline. No longer requires pain medications.    Labs from 6/1/2020 (cycle 2, day 7) showed further improvement in albumin level to 3.2. Hemoglobin 10.8 (MCV 77), WBC 9.5 and Platelet count 532. Creatinine 1.02.     CT-CAP for restaging after the first 2 cycles of therapy already shows mild initial improvement in peritoneal sarcomatosis and mixed solid and cystic abdominal tumors.     ECOG performance status 1-2.    Current Outpatient Medications   Medication     allopurinol (ZYLOPRIM) 300 MG tablet     LORazepam (ATIVAN) 0.5 MG tablet     metoprolol tartrate (LOPRESSOR) 50 MG tablet     polyethylene glycol (MIRALAX) 17 g packet     prochlorperazine (COMPAZINE) 10 MG tablet     traMADol (ULTRAM) 50 MG tablet     dexamethasone (DECADRON) 4 MG tablet     etoposide (VEPESID) 50 MG capsule     mesna (MESNEX) 400 MG TABS tablet     No current facility-administered medications for this visit.      Facility-Administered Medications Ordered in Other Visits   Medication     sodium chloride (PF) 0.9% PF flush 30 mL      No Known Allergies    PAST MEDICAL HISTORY  1. Hypertension  2. Learning disability  3. Peritoneal  carcinomatosis     SOCIAL HISTORY  He lives at home with his family. He is a nonsmoker.      History   Smoking Status     Not on file   Smokeless Tobacco     Not on file    Social History    Substance and Sexual Activity      Alcohol use: Not on file         History   Drug Use Not on file        FAMILY HISTORY  No family history of cancer.    PHYSICAL EXAM  General: Well appearing, seated next to sister on couch  Head: Normocephalic, alopecia  Eyes: No icterus  ENT: oropharynx clear  Pulm: Easy breathing on room air, speaking in complete sentences  Skin: No rashes on exposed skin surfaces, he does have some mild hyperpigmentary changes over the DIP and PIP joints bilaterally.  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 Buchanan is a 24 year old male with advanced intraabdominal DSRCT with extensive peritoneal disease, lymph node metastasis, and liver and bone involvement. He has tolerated 2 cycles of chemotherapy well with anticipated side effects. CT-scan shows mild initial improvements.    We will obtain BMP and urinalysis to work-up the hematuria, which I suspect is related to Ifosfamide.  May need to review with pharmacy to see if additional Mesna could be given to prevent future occurrence. Otherwise, plan to continue chemotherapy.    -Continue with CAV/IMV, next cycle 6/18  -BMP and UA to be obtained today/tomorrow      Farzaneh Burciaga M.D.   of Medicine  Hematology, Oncology and Transplantation

## 2020-06-11 NOTE — PROGRESS NOTES
"Diego Buchanan is a 25 year old male who is being evaluated via a billable video visit.      The patient has been notified of following:     \"This video visit will be conducted via a call between you and your physician/provider. We have found that certain health care needs can be provided without the need for an in-person physical exam.  This service lets us provide the care you need with a video conversation.  If a prescription is necessary we can send it directly to your pharmacy.  If lab work is needed we can place an order for that and you can then stop by our lab to have the test done at a later time.    Video visits are billed at different rates depending on your insurance coverage.  Please reach out to your insurance provider with any questions.    If during the course of the call the physician/provider feels a video visit is not appropriate, you will not be charged for this service.\"    Patient has given verbal consent for Video visit? Yes    Will anyone else be joining your video visit? No          Secondary Video Option (Doximity), send text message to:  [phone]    I have reviewed and updated the patient's allergies and medication list.    Concerns: The past couple of days, since treatment he has been having blood in his urine.    Pt's hands are having a black/dark coloration around the fingernails.    Refills: Metoprolol     Jerrod Rendon, EMT      Video-Visit Details    Type of service:  Video Visit     Video Start Time: 10:58 AM  Video End Time: 11:23    Originating Location (pt. Location): Home    Distant Location (provider location):  [facility]     Platform used for Video Visit: [facility]  MEDICAL ONCOLOGY PROGRESS NOTE  Jun 11, 2020     CHIEF COMPLAINT: Desmoplastic small round cell tumor    Oncologic History:  1. He presented initially with abdominal pain, diarrhea, and progressive abdominal distention for 3 months duration. 2. Initial CT scan " in February 2020 showed severe peritoneal carcinomatosis with large peritoneal tumor implants throughout the entire abdomen and pelvis, but mostly prominently in the pelvis.   2. PETCT scan showed interval increase in the amount of peritoneal carcinomatosis.  3. On 3/12/2020, he had ultrasound-guided core needle biopsy of a peritoneal implant (Case: [id number]), which showed a completely undifferentiated appearance, but stains with pancytokeratin, indicating an epithelial origin. 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 Tumor markers for CA-19-9, CEA, beta-hCG, alpha-fetoprotein were unremarkable. Cancer type ID molecular testing by [employer] sent to determine the exact diagnosis and to assist in further treatment planning. 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 Relative probability of less than 5% of other subtype of sarcoma. EWSR1-WT1 fusion detected.  4. 5/1/2020, MRI brain negative for brain metastasis, and baseline CT-CAP obtained showing extensive mixed solid and cystic masses throughout the abdomen and pelvis consistent with peritoneal carcinomatosis. Largest mass measures approximately 20 cm in the pelvis. Metastatic mixed solid and cystic masses seen in hepatic segments 5 and 6 and hepatic segment 7. The masses appear to be contiguous with the retrohepatic peritoneal carcinomatosis. Small volume fluid about the spleen favored to represent malignant ascites, stable mild-to-moderate right hydronephrosis related to mass effect upon the distal ureter in the pelvis, the IVC and iliac veins are compressed due to the extensive peritoneal carcinomatosis without findings to suggest deep venous thrombosis.  5. 5/4/2020, he begins cycle 1 of CAV/IMV alternating chemotherapy.     HISTORY OF PRESENT ILLNESS  Diego Buchanan is a 25 year old male with a learning  disability and recent diagnosis of DSRCT.     He tolerated cycle 2 Ifos/etoposide fairly well with expected toxicities. He had some nausea and vomiting episodes, but feels antiemetics work well. He and his sister are working on timing of   nausea medicine to make sure he always has something on a schedule. He also noticed some hematuria these last 4-5 days. He describes the urine has light red, with gradual lightening of the color. He feels it has stopped or maybe he has very little blood left in the urine.    He continues to note a significant reduction in pain from baseline. No longer requires pain medications.    Labs from 6/1/2020 (cycle 2, day 7) showed further improvement in albumin level to 3.2. Hemoglobin 10.8 (MCV 77), WBC 9.5 and Platelet count 532. Creatinine 1.02.     CT-CAP for restaging after the first 2 cycles of therapy already shows mild initial improvement in peritoneal sarcomatosis and mixed solid and cystic abdominal tumors.     ECOG performance status 1-2.    Current Outpatient Medications   Medication     allopurinol (ZYLOPRIM) 300 MG tablet     LORazepam (ATIVAN) 0.5 MG tablet     metoprolol tartrate (LOPRESSOR) 50 MG tablet     polyethylene glycol (MIRALAX) 17 g packet     prochlorperazine (COMPAZINE) 10 MG tablet     traMADol (ULTRAM) 50 MG tablet     dexamethasone (DECADRON) 4 MG tablet     etoposide (VEPESID) 50 MG capsule     mesna (MESNEX) 400 MG TABS tablet     No current facility-administered medications for this visit.      Facility-Administered Medications Ordered in Other Visits   Medication     sodium chloride (PF) 0.9% PF flush 30 mL      No Known Allergies    PAST MEDICAL HISTORY  1. Hypertension  2. Learning disability  3. Peritoneal carcinomatosis     SOCIAL HISTORY  He lives at home with his family. He is a nonsmoker.      History   Smoking Status     Not on file   Smokeless Tobacco     Not on file    Social History    Substance and Sexual Activity      Alcohol use: Not on  file         History   Drug Use Not on file        FAMILY HISTORY  No family history of cancer.    PHYSICAL EXAM  General: Well appearing, seated next to sister on couch  Head: Normocephalic, alopecia  Eyes: No icterus  ENT: oropharynx clear  Pulm: Easy breathing on room air, speaking in complete sentences  Skin: No rashes on exposed skin surfaces, he does have some mild hyperpigmentary changes over the DIP and PIP joints bilaterally.  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 Buchanan is a 24 year old male with advanced intraabdominal DSRCT with extensive peritoneal disease, lymph node metastasis, and liver and bone involvement. He has tolerated 2 cycles of chemotherapy well with anticipated side effects. CT-scan shows mild initial improvements.    We will obtain BMP and urinalysis to work-up the hematuria, which I suspect is related to Ifosfamide.  May need to review with pharmacy to see if additional Mesna could be given to prevent future occurrence. Otherwise, plan to continue chemotherapy.    -Continue with CAV/IMV, next cycle 6/18  -BMP and UA to be obtained today/tomorrow      Farzaneh Burciaga M.D.   of Medicine  Hematology, Oncology and Transplantation

## 2020-06-12 ENCOUNTER — HOME INFUSION (PRE-WILLOW HOME INFUSION) (OUTPATIENT)
Dept: PHARMACY | Facility: CLINIC | Age: 25
End: 2020-06-12

## 2020-06-13 ENCOUNTER — HOSPITAL ENCOUNTER (OUTPATIENT)
Facility: CLINIC | Age: 25
Setting detail: SPECIMEN
Discharge: HOME | End: 2020-06-13
Admitting: PHYSICIAN ASSISTANT
Payer: COMMERCIAL

## 2020-06-13 ENCOUNTER — HOME INFUSION (PRE-WILLOW HOME INFUSION) (OUTPATIENT)
Dept: PHARMACY | Facility: CLINIC | Age: 25
End: 2020-06-13

## 2020-06-13 LAB
ALBUMIN UR-MCNC: 10 MG/DL
ANION GAP SERPL CALCULATED.3IONS-SCNC: 8 MMOL/L (ref 3–14)
APPEARANCE UR: CLEAR
BILIRUB UR QL STRIP: NEGATIVE
BUN SERPL-MCNC: 3 MG/DL (ref 7–30)
CALCIUM SERPL-MCNC: 8.5 MG/DL (ref 8.5–10.1)
CHLORIDE SERPL-SCNC: 109 MMOL/L (ref 94–109)
CO2 SERPL-SCNC: 24 MMOL/L (ref 20–32)
COLOR UR AUTO: YELLOW
CREAT SERPL-MCNC: 0.63 MG/DL (ref 0.66–1.25)
GFR SERPL CREATININE-BSD FRML MDRD: >90 ML/MIN/{1.73_M2}
GLUCOSE SERPL-MCNC: 59 MG/DL (ref 70–99)
GLUCOSE UR STRIP-MCNC: NEGATIVE MG/DL
HGB UR QL STRIP: NEGATIVE
KETONES UR STRIP-MCNC: NEGATIVE MG/DL
LEUKOCYTE ESTERASE UR QL STRIP: ABNORMAL
MUCOUS THREADS #/AREA URNS LPF: PRESENT /LPF
NITRATE UR QL: NEGATIVE
PH UR STRIP: 6.5 PH (ref 5–7)
POTASSIUM SERPL-SCNC: 4.2 MMOL/L (ref 3.4–5.3)
RBC #/AREA URNS AUTO: 8 /HPF (ref 0–2)
SODIUM SERPL-SCNC: 141 MMOL/L (ref 133–144)
SOURCE: ABNORMAL
SP GR UR STRIP: 1.01 (ref 1–1.03)
SQUAMOUS #/AREA URNS AUTO: 2 /HPF (ref 0–1)
UROBILINOGEN UR STRIP-MCNC: NORMAL MG/DL (ref 0–2)
WBC #/AREA URNS AUTO: 15 /HPF (ref 0–5)

## 2020-06-13 PROCEDURE — 80048 BASIC METABOLIC PNL TOTAL CA: CPT | Performed by: PHYSICIAN ASSISTANT

## 2020-06-13 PROCEDURE — 81001 URINALYSIS AUTO W/SCOPE: CPT | Performed by: PHYSICIAN ASSISTANT

## 2020-06-15 DIAGNOSIS — C41.9 SARCOMA, EWINGS (H): Primary | ICD-10-CM

## 2020-06-15 RX ORDER — CIPROFLOXACIN 500 MG/1
500 TABLET, FILM COATED ORAL 2 TIMES DAILY
Qty: 10 TABLET | Refills: 0 | Status: SHIPPED | OUTPATIENT
Start: 2020-06-15 | End: 2020-06-20

## 2020-06-15 NOTE — PROGRESS NOTES
This is a recent snapshot of the patient's [facility] medical record.  For current drug dose and complete information and questions, call [phone] or In Basket pool, fv home infusion ([id number])  CSN Number:  [id number]

## 2020-06-16 ENCOUNTER — HOME INFUSION (PRE-WILLOW HOME INFUSION) (OUTPATIENT)
Dept: PHARMACY | Facility: CLINIC | Age: 25
End: 2020-06-16

## 2020-06-17 ENCOUNTER — MEDICAL CORRESPONDENCE (OUTPATIENT)
Dept: HEALTH INFORMATION MANAGEMENT | Facility: CLINIC | Age: 25
End: 2020-06-17

## 2020-06-17 ENCOUNTER — HOSPITAL ENCOUNTER (OUTPATIENT)
Facility: CLINIC | Age: 25
Setting detail: SPECIMEN
Discharge: HOME | End: 2020-06-17
Admitting: PHYSICIAN ASSISTANT
Payer: COMMERCIAL

## 2020-06-17 ENCOUNTER — HOME INFUSION (PRE-WILLOW HOME INFUSION) (OUTPATIENT)
Dept: PHARMACY | Facility: CLINIC | Age: 25
End: 2020-06-17

## 2020-06-17 LAB
ALBUMIN SERPL-MCNC: 3 G/DL (ref 3.4–5)
ALP SERPL-CCNC: 75 U/L (ref 40–150)
ALT SERPL W P-5'-P-CCNC: 14 U/L (ref 0–70)
ANION GAP SERPL CALCULATED.3IONS-SCNC: 7 MMOL/L (ref 3–14)
AST SERPL W P-5'-P-CCNC: 15 U/L (ref 0–45)
BASOPHILS # BLD AUTO: 0 10E9/L (ref 0–0.2)
BASOPHILS NFR BLD AUTO: 0.2 %
BILIRUB SERPL-MCNC: 0.3 MG/DL (ref 0.2–1.3)
BUN SERPL-MCNC: 5 MG/DL (ref 7–30)
CALCIUM SERPL-MCNC: 9.1 MG/DL (ref 8.5–10.1)
CHLORIDE SERPL-SCNC: 108 MMOL/L (ref 94–109)
CO2 SERPL-SCNC: 25 MMOL/L (ref 20–32)
CREAT SERPL-MCNC: 0.64 MG/DL (ref 0.66–1.25)
DIFFERENTIAL METHOD BLD: ABNORMAL
EOSINOPHIL # BLD AUTO: 0 10E9/L (ref 0–0.7)
EOSINOPHIL NFR BLD AUTO: 0.3 %
ERYTHROCYTE [DISTWIDTH] IN BLOOD BY AUTOMATED COUNT: 19.6 % (ref 10–15)
GFR SERPL CREATININE-BSD FRML MDRD: >90 ML/MIN/{1.73_M2}
GLUCOSE SERPL-MCNC: 101 MG/DL (ref 70–99)
HCT VFR BLD AUTO: 33 % (ref 40–53)
HGB BLD-MCNC: 10.4 G/DL (ref 13.3–17.7)
IMM GRANULOCYTES # BLD: 0.1 10E9/L (ref 0–0.4)
IMM GRANULOCYTES NFR BLD: 0.7 %
LYMPHOCYTES # BLD AUTO: 0.8 10E9/L (ref 0.8–5.3)
LYMPHOCYTES NFR BLD AUTO: 5.9 %
MAGNESIUM SERPL-MCNC: 1.8 MG/DL (ref 1.6–2.3)
MCH RBC QN AUTO: 25.6 PG (ref 26.5–33)
MCHC RBC AUTO-ENTMCNC: 31.5 G/DL (ref 31.5–36.5)
MCV RBC AUTO: 81 FL (ref 78–100)
MONOCYTES # BLD AUTO: 1 10E9/L (ref 0–1.3)
MONOCYTES NFR BLD AUTO: 7.2 %
NEUTROPHILS # BLD AUTO: 11.4 10E9/L (ref 1.6–8.3)
NEUTROPHILS NFR BLD AUTO: 85.7 %
NRBC # BLD AUTO: 0.1 10*3/UL
NRBC BLD AUTO-RTO: 0 /100
PHOSPHATE SERPL-MCNC: 4.1 MG/DL (ref 2.5–4.5)
PLATELET # BLD AUTO: 401 10E9/L (ref 150–450)
POTASSIUM SERPL-SCNC: 4.1 MMOL/L (ref 3.4–5.3)
PROT SERPL-MCNC: 7.4 G/DL (ref 6.8–8.8)
RBC # BLD AUTO: 4.07 10E12/L (ref 4.4–5.9)
SODIUM SERPL-SCNC: 140 MMOL/L (ref 133–144)
WBC # BLD AUTO: 13.3 10E9/L (ref 4–11)

## 2020-06-17 PROCEDURE — 85025 COMPLETE CBC W/AUTO DIFF WBC: CPT | Performed by: PHYSICIAN ASSISTANT

## 2020-06-17 PROCEDURE — 80053 COMPREHEN METABOLIC PANEL: CPT | Performed by: PHYSICIAN ASSISTANT

## 2020-06-17 PROCEDURE — 83735 ASSAY OF MAGNESIUM: CPT | Performed by: PHYSICIAN ASSISTANT

## 2020-06-17 PROCEDURE — 84100 ASSAY OF PHOSPHORUS: CPT | Performed by: PHYSICIAN ASSISTANT

## 2020-06-17 NOTE — TELEPHONE ENCOUNTER
Prior Authorization Follow Up    Called [facility] - Phone [phone] Fax [phone] to check status of Ifos 60ml vial. Request was dismissed as once a medication is approved they do a 'expanded' approval where all strengths are approved so this vial is also approved under it with dates 05/05/2020-06/04/2021 case# [id number]. Please see telephone encounter from 06/02/2020 for approval letter.

## 2020-06-17 NOTE — TELEPHONE ENCOUNTER
Called [facility] - Phone [phone] to check status of Ifos 60ml vial. Request was dismissed as once a medication is approved they do a 'expanded' approval where all strengths are approved so this vial is also approved under it with dates 05/05/2020-06/04/2021 case# [id number].

## 2020-06-18 ENCOUNTER — VIRTUAL VISIT (OUTPATIENT)
Dept: ONCOLOGY | Facility: CLINIC | Age: 25
End: 2020-06-18
Attending: INTERNAL MEDICINE
Payer: COMMERCIAL

## 2020-06-18 ENCOUNTER — RECORDS - HEALTHEAST (OUTPATIENT)
Dept: ADMINISTRATIVE | Facility: OTHER | Age: 25
End: 2020-06-18

## 2020-06-18 VITALS — WEIGHT: 255 LBS | HEIGHT: 68 IN | BODY MASS INDEX: 38.65 KG/M2

## 2020-06-18 DIAGNOSIS — C41.9 SARCOMA, EWINGS (H): ICD-10-CM

## 2020-06-18 DIAGNOSIS — C49.9 DESMOPLASTIC SMALL ROUND CELL TUMOR (H): Primary | ICD-10-CM

## 2020-06-18 DIAGNOSIS — I10 BENIGN ESSENTIAL HYPERTENSION: ICD-10-CM

## 2020-06-18 PROCEDURE — 99214 OFFICE O/P EST MOD 30 MIN: CPT | Mod: 95 | Performed by: INTERNAL MEDICINE

## 2020-06-18 PROCEDURE — 40000114 ZZH STATISTIC NO CHARGE CLINIC VISIT

## 2020-06-18 RX ORDER — ALBUTEROL SULFATE 90 UG/1
1-2 AEROSOL, METERED RESPIRATORY (INHALATION)
Status: CANCELLED
Start: 2020-06-19

## 2020-06-18 RX ORDER — PALONOSETRON 0.05 MG/ML
0.25 INJECTION, SOLUTION INTRAVENOUS ONCE
Status: CANCELLED
Start: 2020-06-19

## 2020-06-18 RX ORDER — NALOXONE HYDROCHLORIDE 0.4 MG/ML
.1-.4 INJECTION, SOLUTION INTRAMUSCULAR; INTRAVENOUS; SUBCUTANEOUS
Status: CANCELLED | OUTPATIENT
Start: 2020-06-19

## 2020-06-18 RX ORDER — MEPERIDINE HYDROCHLORIDE 25 MG/ML
25 INJECTION INTRAMUSCULAR; INTRAVENOUS; SUBCUTANEOUS EVERY 30 MIN PRN
Status: CANCELLED | OUTPATIENT
Start: 2020-06-19

## 2020-06-18 RX ORDER — LORAZEPAM 2 MG/ML
0.5 INJECTION INTRAMUSCULAR EVERY 4 HOURS PRN
Status: CANCELLED
Start: 2020-06-19

## 2020-06-18 RX ORDER — METOPROLOL TARTRATE 50 MG
75 TABLET ORAL DAILY
Qty: 45 TABLET | Refills: 3 | Status: SHIPPED | OUTPATIENT
Start: 2020-06-18 | End: 2021-05-13

## 2020-06-18 RX ORDER — EPINEPHRINE 1 MG/ML
0.3 INJECTION, SOLUTION INTRAMUSCULAR; SUBCUTANEOUS EVERY 5 MIN PRN
Status: CANCELLED | OUTPATIENT
Start: 2020-06-19

## 2020-06-18 RX ORDER — DIPHENHYDRAMINE HYDROCHLORIDE 50 MG/ML
50 INJECTION INTRAMUSCULAR; INTRAVENOUS
Status: CANCELLED
Start: 2020-06-19

## 2020-06-18 RX ORDER — HEPARIN SODIUM (PORCINE) LOCK FLUSH IV SOLN 100 UNIT/ML 100 UNIT/ML
5 SOLUTION INTRAVENOUS
Status: CANCELLED | OUTPATIENT
Start: 2020-06-19

## 2020-06-18 RX ORDER — SODIUM CHLORIDE 9 MG/ML
1000 INJECTION, SOLUTION INTRAVENOUS CONTINUOUS PRN
Status: CANCELLED
Start: 2020-06-19

## 2020-06-18 RX ORDER — METHYLPREDNISOLONE SODIUM SUCCINATE 125 MG/2ML
125 INJECTION, POWDER, LYOPHILIZED, FOR SOLUTION INTRAMUSCULAR; INTRAVENOUS
Status: CANCELLED
Start: 2020-06-19

## 2020-06-18 RX ORDER — ALBUTEROL SULFATE 0.83 MG/ML
2.5 SOLUTION RESPIRATORY (INHALATION)
Status: CANCELLED | OUTPATIENT
Start: 2020-06-19

## 2020-06-18 RX ORDER — HEPARIN SODIUM,PORCINE 10 UNIT/ML
5 VIAL (ML) INTRAVENOUS
Status: CANCELLED | OUTPATIENT
Start: 2020-06-19

## 2020-06-18 ASSESSMENT — MIFFLIN-ST. JEOR: SCORE: 2116.17

## 2020-06-18 ASSESSMENT — PAIN SCALES - GENERAL: PAINLEVEL: NO PAIN (0)

## 2020-06-18 NOTE — LETTER
"    6/18/2020         RE: Diego Buchanan  [address]  [place] [address]        Dear Colleague,    Thank you for referring your patient, Diego Buchanan, to the [facility] CANCER [facility]. Please see a copy of my visit note below.    Diego Buchanan is a 25 year old male who is being evaluated via a billable video visit.      The patient has been notified of following:     \"This video visit will be conducted via a call between you and your physician/provider. We have found that certain health care needs can be provided without the need for an in-person physical exam.  This service lets us provide the care you need with a video conversation.  If a prescription is necessary we can send it directly to your pharmacy.  If lab work is needed we can place an order for that and you can then stop by our lab to have the test done at a later time.    Video visits are billed at different rates depending on your insurance coverage.  Please reach out to your insurance provider with any questions.    If during the course of the call the physician/provider feels a video visit is not appropriate, you will not be charged for this service.\"    Patient has given verbal consent for Video visit? Yes    Will anyone else be joining your video visit? No       I have reviewed and updated the patient's allergies and medication list.    Concerns: No concerns  Refills: Metoprolol 50mg      Yisel Strange CMA      Video-Visit Details    Type of service:  Video Visit    Video Start Time: 2:15  Video End Time: 2:35    Originating Location (pt. 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 He presented initially with abdominal pain, diarrhea, and progressive abdominal distention for 3 months " duration. 2. Initial CT scan in February 2020 showed severe peritoneal carcinomatosis with large peritoneal tumor implants throughout the entire abdomen and pelvis, but mostly prominently in the pelvis.   2. PETCT scan showed interval increase in the amount of peritoneal carcinomatosis.  3. On 3/12/2020, he had ultrasound-guided core needle biopsy of a peritoneal implant (Case: [id number]), which showed a completely undifferentiated appearance, but stains with pancytokeratin, indicating an epithelial origin. 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 Tumor markers for CA-19-9, CEA, beta-hCG, alpha-fetoprotein were unremarkable. Cancer type ID molecular testing by [employer] sent to determine the exact diagnosis and to assist in further treatment planning. 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 Relative probability of less than 5% of other subtype of sarcoma. EWSR1-WT1 fusion detected.  4. 5/1/2020, MRI brain negative for brain metastasis, and baseline CT-CAP obtained showing extensive mixed solid and cystic masses throughout the abdomen and pelvis consistent with peritoneal carcinomatosis. Largest mass measures approximately 20 cm in the pelvis. Metastatic mixed solid and cystic masses seen in hepatic segments 5 and 6 and hepatic segment 7. The masses appear to be contiguous with the retrohepatic peritoneal carcinomatosis. Small volume fluid about the spleen favored to represent malignant ascites, stable mild-to-moderate right hydronephrosis related to mass effect upon the distal ureter in the pelvis, the IVC and iliac veins are compressed due to the extensive peritoneal carcinomatosis without findings to suggest deep venous thrombosis.  5. 5/4/2020, he begins cycle 1 of CAV/IMV alternating chemotherapy.       HISTORY OF PRESENT ILLNESS  Diego Buchanan is a 25 year  old male with a learning disability and recent diagnosis of DSRCT.     He tolerated cycle 2 Ifos/etoposide fairly well with expected toxicities. He had some hematuria and dysuria and UA showed leukocyte esterase was positive and he had white cells and red cells present. He was prescribed ciprofloxacin 500 mg twice daily x 5 days, but did not start treatment yet. Today he notes hematuria has resolved, but he continues with dysuria symptoms.    Otherwise, he was able to start Feosol for anemia. He is also now eating a bit more solid food. His family is giving him rice and veggies in addition to a full liquid diet. He denies any issues with vomiting and he has 1 bm every 3-4 days. He notes the bms are still small.    He continues to note reduction in pain from baseline. No longer requires pain medications. He denies any fevers or chills. No back/flank pain. No chest pain, shortness of breath, cough. No diarrhea.    Labs from 6/17/2020 (cycle 2, day 19) show normal renal function with creatinine 0.64. Hemoglobin 10.4 (MCV 81), WBC 13.3 and Platelet count 401.    ECOG performance status 1-2.      REVIEW OF SYSTEMS  A 12 point ROS negative except as in HPI    Current Outpatient Medications   Medication     allopurinol (ZYLOPRIM) 300 MG tablet     ciprofloxacin (CIPRO) 500 MG tablet     LORazepam (ATIVAN) 0.5 MG tablet     metoprolol tartrate (LOPRESSOR) 50 MG tablet     polyethylene glycol (MIRALAX) 17 g packet     prochlorperazine (COMPAZINE) 10 MG tablet     traMADol (ULTRAM) 50 MG tablet     dexamethasone (DECADRON) 4 MG tablet     etoposide (VEPESID) 50 MG capsule     mesna (MESNEX) 400 MG TABS tablet     No current facility-administered medications for this visit.      Facility-Administered Medications Ordered in Other Visits   Medication     sodium chloride (PF) 0.9% PF flush 30 mL      No Known Allergies    PAST MEDICAL HISTORY  1. Hypertension  2. Learning disability  3. Peritoneal carcinomatosis     SOCIAL  HISTORY  He lives at home with his family. He is a nonsmoker.      History   Smoking Status     Not on file   Smokeless Tobacco     Not on file    Social History    Substance and Sexual Activity      Alcohol use: Not on file         History   Drug Use Not on file        FAMILY HISTORY  No family history of cancer.    PHYSICAL EXAM  General: Well appearing, seated next to sister on couch  Head: Normocephalic, alopecia  Eyes: No icterus  ENT: oropharynx clear  Pulm: Easy breathing on room air, speaking in complete sentences  Skin: His Esparza site appears free of infection. There is a rash underneath the Tegaderm, which is very itchy.  Neuro: Cranial nerves 2-12 grossly intact, non-focal  Psych: Euthymic    The rest of a comprehensive physical examination is deferred due to PHE (public health emergency) video visit restrictions.      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 Buchanan is a 24 year old male with advanced intraabdominal DSRCT with extensive peritoneal disease, lymph node metastasis, and liver and bone involvement. He has tolerated 2 cycles of chemotherapy well with anticipated side effects and recent CT-scan showed mild initial improvements.    We will continue with chemotherapy.    -Continue with CAV/IMV, next cycle 6/19    #2 Urinary tract infection, cystitis  Recent UA with appearance of white cells and LeukEst positive. Complete the course of ciprofloxacin 500 mg bid x 5 days.     #3 Tegaderm rash, likely  Possible allergy to the adhesive. May need alternative dressing. Hopefully infusion nurses can help sort out an alternative when he is seen tomorrow in clinic.    #4 Anemia, microcytic  Continue Feosol daily.    Farzaneh Burciaga M.D.   of Medicine  Hematology, Oncology and Transplantation

## 2020-06-18 NOTE — PROGRESS NOTES
"Diego Buchanan is a 25 year old male who is being evaluated via a billable video visit.      The patient has been notified of following:     \"This video visit will be conducted via a call between you and your physician/provider. We have found that certain health care needs can be provided without the need for an in-person physical exam.  This service lets us provide the care you need with a video conversation.  If a prescription is necessary we can send it directly to your pharmacy.  If lab work is needed we can place an order for that and you can then stop by our lab to have the test done at a later time.    Video visits are billed at different rates depending on your insurance coverage.  Please reach out to your insurance provider with any questions.    If during the course of the call the physician/provider feels a video visit is not appropriate, you will not be charged for this service.\"    Patient has given verbal consent for Video visit? Yes    Will anyone else be joining your video visit? No       I have reviewed and updated the patient's allergies and medication list.    Concerns: No concerns  Refills: Metoprolol 50mg      Yisel Strange CMA      Video-Visit Details    Type of service:  Video Visit    Video Start Time: 2:15  Video End Time: 2:35    Originating Location (pt. 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 He presented initially with abdominal pain, diarrhea, and progressive abdominal distention for 3 months duration. 2. Initial CT scan in February 2020 showed severe peritoneal carcinomatosis with large peritoneal tumor implants throughout the entire abdomen and pelvis, but mostly prominently in the pelvis.   2. PETCT scan showed interval increase in " the amount of peritoneal carcinomatosis.  3. On 3/12/2020, he had ultrasound-guided core needle biopsy of a peritoneal implant (Case: [id number]), which showed a completely undifferentiated appearance, but stains with pancytokeratin, indicating an epithelial origin. 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 Tumor markers for CA-19-9, CEA, beta-hCG, alpha-fetoprotein were unremarkable. Cancer type ID molecular testing by [employer] sent to determine the exact diagnosis and to assist in further treatment planning. 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 Relative probability of less than 5% of other subtype of sarcoma. EWSR1-WT1 fusion detected.  4. 5/1/2020, MRI brain negative for brain metastasis, and baseline CT-CAP obtained showing extensive mixed solid and cystic masses throughout the abdomen and pelvis consistent with peritoneal carcinomatosis. Largest mass measures approximately 20 cm in the pelvis. Metastatic mixed solid and cystic masses seen in hepatic segments 5 and 6 and hepatic segment 7. The masses appear to be contiguous with the retrohepatic peritoneal carcinomatosis. Small volume fluid about the spleen favored to represent malignant ascites, stable mild-to-moderate right hydronephrosis related to mass effect upon the distal ureter in the pelvis, the IVC and iliac veins are compressed due to the extensive peritoneal carcinomatosis without findings to suggest deep venous thrombosis.  5. 5/4/2020, he begins cycle 1 of CAV/IMV alternating chemotherapy.       HISTORY OF PRESENT ILLNESS  Diego Buchanan is a 25 year old male with a learning disability and recent diagnosis of DSRCT.     He tolerated cycle 2 Ifos/etoposide fairly well with expected toxicities. He had some hematuria and dysuria and UA showed leukocyte esterase was positive and he had white cells  and red cells present. He was prescribed ciprofloxacin 500 mg twice daily x 5 days, but did not start treatment yet. Today he notes hematuria has resolved, but he continues with dysuria symptoms.    Otherwise, he was able to start Feosol for anemia. He is also now eating a bit more solid food. His family is giving him rice and veggies in addition to a full liquid diet. He denies any issues with vomiting and he has 1 bm every 3-4 days. He notes the bms are still small.    He continues to note reduction in pain from baseline. No longer requires pain medications. He denies any fevers or chills. No back/flank pain. No chest pain, shortness of breath, cough. No diarrhea.    Labs from 6/17/2020 (cycle 2, day 19) show normal renal function with creatinine 0.64. Hemoglobin 10.4 (MCV 81), WBC 13.3 and Platelet count 401.    ECOG performance status 1-2.      REVIEW OF SYSTEMS  A 12 point ROS negative except as in HPI    Current Outpatient Medications   Medication     allopurinol (ZYLOPRIM) 300 MG tablet     ciprofloxacin (CIPRO) 500 MG tablet     LORazepam (ATIVAN) 0.5 MG tablet     metoprolol tartrate (LOPRESSOR) 50 MG tablet     polyethylene glycol (MIRALAX) 17 g packet     prochlorperazine (COMPAZINE) 10 MG tablet     traMADol (ULTRAM) 50 MG tablet     dexamethasone (DECADRON) 4 MG tablet     etoposide (VEPESID) 50 MG capsule     mesna (MESNEX) 400 MG TABS tablet     No current facility-administered medications for this visit.      Facility-Administered Medications Ordered in Other Visits   Medication     sodium chloride (PF) 0.9% PF flush 30 mL      No Known Allergies    PAST MEDICAL HISTORY  1. Hypertension  2. Learning disability  3. Peritoneal carcinomatosis     SOCIAL HISTORY  He lives at home with his family. He is a nonsmoker.      History   Smoking Status     Not on file   Smokeless Tobacco     Not on file    Social History    Substance and Sexual Activity      Alcohol use: Not on file         History   Drug Use  Not on file        FAMILY HISTORY  No family history of cancer.    PHYSICAL EXAM  General: Well appearing, seated next to sister on couch  Head: Normocephalic, alopecia  Eyes: No icterus  ENT: oropharynx clear  Pulm: Easy breathing on room air, speaking in complete sentences  Skin: His Esparza site appears free of infection. There is a rash underneath the Tegaderm, which is very itchy.  Neuro: Cranial nerves 2-12 grossly intact, non-focal  Psych: Euthymic    The rest of a comprehensive physical examination is deferred due to PHE (public health emergency) video visit restrictions.      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 Buchanan is a 24 year old male with advanced intraabdominal DSRCT with extensive peritoneal disease, lymph node metastasis, and liver and bone involvement. He has tolerated 2 cycles of chemotherapy well with anticipated side effects and recent CT-scan showed mild initial improvements.    We will continue with chemotherapy.    -Continue with CAV/IMV, next cycle 6/19    #2 Urinary tract infection, cystitis  Recent UA with appearance of white cells and LeukEst positive. Complete the course of ciprofloxacin 500 mg bid x 5 days.     #3 Tegaderm rash, likely  Possible allergy to the adhesive. May need alternative dressing. Hopefully infusion nurses can help sort out an alternative when he is seen tomorrow in clinic.    #4 Anemia, microcytic  Continue Feosol daily.    Farzaneh Burciaga M.D.   of Medicine  Hematology, Oncology and Transplantation

## 2020-06-19 ENCOUNTER — INFUSION THERAPY VISIT (OUTPATIENT)
Dept: ONCOLOGY | Facility: CLINIC | Age: 25
End: 2020-06-19
Attending: INTERNAL MEDICINE
Payer: COMMERCIAL

## 2020-06-19 ENCOUNTER — HOME INFUSION (PRE-WILLOW HOME INFUSION) (OUTPATIENT)
Dept: PHARMACY | Facility: CLINIC | Age: 25
End: 2020-06-19

## 2020-06-19 VITALS
WEIGHT: 234.6 LBS | DIASTOLIC BLOOD PRESSURE: 84 MMHG | TEMPERATURE: 98.3 F | RESPIRATION RATE: 18 BRPM | BODY MASS INDEX: 35.67 KG/M2 | OXYGEN SATURATION: 99 % | SYSTOLIC BLOOD PRESSURE: 134 MMHG

## 2020-06-19 DIAGNOSIS — C41.9 SARCOMA, EWINGS (H): Primary | ICD-10-CM

## 2020-06-19 DIAGNOSIS — C49.9 DESMOPLASTIC SMALL ROUND CELL TUMOR (H): ICD-10-CM

## 2020-06-19 PROCEDURE — 96415 CHEMO IV INFUSION ADDL HR: CPT

## 2020-06-19 PROCEDURE — 96413 CHEMO IV INFUSION 1 HR: CPT

## 2020-06-19 PROCEDURE — 96367 TX/PROPH/DG ADDL SEQ IV INF: CPT

## 2020-06-19 PROCEDURE — 25800030 ZZH RX IP 258 OP 636: Mod: ZF | Performed by: INTERNAL MEDICINE

## 2020-06-19 PROCEDURE — 96375 TX/PRO/DX INJ NEW DRUG ADDON: CPT

## 2020-06-19 PROCEDURE — 96411 CHEMO IV PUSH ADDL DRUG: CPT

## 2020-06-19 PROCEDURE — G0463 HOSPITAL OUTPT CLINIC VISIT: HCPCS | Mod: 25

## 2020-06-19 PROCEDURE — 36593 DECLOT VASCULAR DEVICE: CPT

## 2020-06-19 PROCEDURE — 80053 COMPREHEN METABOLIC PANEL: CPT | Performed by: INTERNAL MEDICINE

## 2020-06-19 PROCEDURE — 25000128 H RX IP 250 OP 636: Mod: ZF | Performed by: INTERNAL MEDICINE

## 2020-06-19 RX ORDER — HEPARIN SODIUM,PORCINE 10 UNIT/ML
5 VIAL (ML) INTRAVENOUS
Status: DISCONTINUED | OUTPATIENT
Start: 2020-06-19 | End: 2020-06-19 | Stop reason: HOSPADM

## 2020-06-19 RX ORDER — PALONOSETRON 0.05 MG/ML
0.25 INJECTION, SOLUTION INTRAVENOUS ONCE
Status: COMPLETED | OUTPATIENT
Start: 2020-06-19 | End: 2020-06-19

## 2020-06-19 RX ADMIN — DEXAMETHASONE SODIUM PHOSPHATE: 10 INJECTION, SOLUTION INTRAMUSCULAR; INTRAVENOUS at 12:32

## 2020-06-19 RX ADMIN — Medication 5 ML: at 14:04

## 2020-06-19 RX ADMIN — SODIUM CHLORIDE 250 ML: 9 INJECTION, SOLUTION INTRAVENOUS at 12:30

## 2020-06-19 RX ADMIN — VINCRISTINE SULFATE 2 MG: 1 INJECTION, SOLUTION INTRAVENOUS at 12:59

## 2020-06-19 RX ADMIN — MESNA 3000 MG: 100 INJECTION, SOLUTION INTRAVENOUS at 13:08

## 2020-06-19 RX ADMIN — PALONOSETRON HYDROCHLORIDE 0.25 MG: 0.25 INJECTION, SOLUTION INTRAVENOUS at 12:30

## 2020-06-19 RX ADMIN — ALTEPLASE 2 MG: 2.2 INJECTION, POWDER, LYOPHILIZED, FOR SOLUTION INTRAVENOUS at 13:09

## 2020-06-19 NOTE — PROGRESS NOTES
Infusion Nursing Note:  Diego Buchanan presents today for cycle 3, day 1 vincristine, cytoxan, adriamycin pump.    Patient seen by provider today: No   present during visit today: Not Applicable.    Note: Patient had a visit with Dr Sims yesterday.  RN called and talked to patient's sister and caregiver Mariela.  She confirmed that the patient has not had any changes since his visit with the MD yesterday.  He is taking antibiotics for his UTI.  Patient denies any blood in his urine and thinks his symptoms have improved a little.    Intravenous Access:  Esparza.  Both lumens of catheter flushed with ease on arrival, but red lumen had no blood return.  TPA instilled into red lumen and withdrawn after 45 minutes with brisk blood return.  End caps on both lumens changed today. Purple lumen used for infusion.     Patient complained of itching under the tegaderm dressing that was in place. Dressing last changed on 6/17.  RN noted a rash with some blistering under the tegaderm.  Dressing changed. Site cleansed with betadine and alcohol and skin prep applied. New IV 3000 dressing placed.  Patient stated that the site was less itchy after dressing change.  Patient sent home with a couple of opsite, IV 3000, and primapore dressings.  Sister updated.  Sergo at [facility] updated and asked to have RN assess site on 6/21 with pump disconnect.    Treatment Conditions:  Lab Results   Component Value Date    HGB 10.4 06/17/2020     Lab Results   Component Value Date    WBC 13.3 06/17/2020      Lab Results   Component Value Date    ANEU 11.4 06/17/2020     Lab Results   Component Value Date     06/17/2020      Lab Results   Component Value Date     06/17/2020                   Lab Results   Component Value Date    POTASSIUM 4.1 06/17/2020           Lab Results   Component Value Date    MAG 1.8 06/17/2020            Lab Results   Component Value Date    CR 0.64 06/17/2020                   Lab Results   Component  "Value Date    FAISAL 9.1 06/17/2020                Lab Results   Component Value Date    BILITOTAL 0.3 06/17/2020           Lab Results   Component Value Date    ALBUMIN 3.0 06/17/2020                    Lab Results   Component Value Date    ALT 14 06/17/2020           Lab Results   Component Value Date    AST 15 06/17/2020       Results reviewed, labs MET treatment parameters, ok to proceed with treatment.  ECHO/MUGA completed 5/1/20  EF 55-60%.      Post Infusion Assessment:  Patient tolerated infusion without incident.  Blood return noted pre and post infusion from purple lumen of Esparza and pre, mid, and post vincristine infusion by gravity.  Site patent and intact, free from redness, edema or discomfort.  No evidence of extravasations.  Red lumen of Esparza heplocked, purple lumen infusing at discharge.     Prior to discharge: Purple lumen of Esparza secured with IV 3000 dressing and flushed with 10cc NS with positive blood return noted.  Continuous home infusion CADD pump connected.    All connectors secured in place and clamps taped open and double checked by Hawa Willingham RN    Pump started, \"running\" noted on display (CADD): YES.  Patient instructed to call our clinic or [facility] with any questions or concerns at home.  Patient verbalized understanding.    Patient set up for pump disconnect at home with [facility] on Sunday 6/21 at 1515.  Patient to receive on-body neulasta at that time.  Plan confirmed with Sergo at [facility] and with patient's sister Mariela.       Discharge Plan:   Prescription refills given for dexamethasone and mesna.  RN reviewed instructions with Mariela by phone on how to take both medications including mesna at 7 PM and 11 PM this evening.  Patient also provided with written instructions.  Discharge instructions reviewed with: Patient.  Patient and/or family verbalized understanding of discharge instructions and all questions answered.  Copy of AVS reviewed " with patient and/or family.  Patient will return 7/10/2020 for next appointment.  Patient discharged in stable condition accompanied by: self.  Departure Mode: Ambulatory.  Face to Face time: 0.    Daniela Hooper RN

## 2020-06-19 NOTE — PROGRESS NOTES
This is a recent snapshot of the patient's [facility] medical record.  For current drug dose and complete information and questions, call [phone] or In Basket pool, fv home infusion ([id number])  CSN Number:  [id number]

## 2020-06-19 NOTE — PATIENT INSTRUCTIONS
-Take your mesna pills tonight at 7:00 PM and 11:00 PM    -Take your dexamethasone pills starting tomorrow morning with breakfast for three days (Saturday, Sunday, and Monday)     -I put an IV 3000 dressing on your central line today.  I've sent a couple home as well as a couple of other alternative dressings.  Have the home infusion nurse assess the site on Sunday and change the dressing if needed or if still bothering you    -[facility] will come out on Sunday 6/21 around 3:15 to unhook your chemo pump and put your neulasta patch on          [facility] Triage and after hours / weekends / holidays:  [phone]    Please call the triage or after hours line if you experience a temperature greater than or equal to 100.5, shaking chills, have uncontrolled nausea, vomiting and/or diarrhea, dizziness, shortness of breath, chest pain, bleeding, unexplained bruising, or if you have any other new/concerning symptoms, questions or concerns.      If you are having any concerning symptoms or wish to speak to a provider before your next infusion visit, please call your care coordinator or triage to notify them so we can adequately serve you.     If you need a refill on a narcotic prescription or other medication, please call before your infusion appointment.         June 2020 Sunday Monday Tuesday Wednesday Thursday Friday Saturday        1    LAB   6:00 AM   (15 min.)   [facility] HOME INFUSION   [facility], Laboratory Services    LAB   7:30 AM   (15 min.)   [facility] HOME INFUSION   [facility], Laboratory Services    Outpatient Visit   1:42 PM   Stacey Tracey PA-C   [facility],  Laboratory Services 2     3     4     5     6       7     8     9     10    CT CHEST/ABDOMEN/PELVIS W  12:00 PM   (20 min.)   UCCT2   [facility] CT 11    VIDEO VISIT RETURN  10:30 AM   (30 min.)   Farzaneh Young MD   [facility] 12     13    Outpatient Visit   2:05 PM   Alexy  Stacey BURKS PA-C   [facility],  Laboratory Services   14     15     16     17    Outpatient Visit  12:52 PM   [facility],  Laboratory Services 18    VIDEO VISIT RETURN   2:00 PM   (30 min.)   Farzaneh Young MD   [facility] 19    [facility] ONC INFUSION 180  12:00 PM   (180 min.)    ONCOLOGY INFUSION   [facility] 20       21     22     23     24     25     26     27       28     29     30 July 2020 Sunday Monday Tuesday Wednesday Thursday Friday Saturday                  1     2     3     4       5     6     7     8     9    VIDEO VISIT RETURN   8:30 AM   (30 min.)   Farzaneh Young MD   [facility] 10    [facility] LAB DRAW   1:30 PM   (15 min.)    [facility] DRAW   [facility] Lab Draw    [facility] ONC INFUSION 180   2:00 PM   (180 min.)    ONCOLOGY INFUSION   [facility] 11       12     13     14     15     16     17     18       19     20     21     22     23     24     25       26     27     28     29     30     31                       No results found for this or any previous visit (from the past 24 hour(s)).

## 2020-06-19 NOTE — PROGRESS NOTES
This is a recent snapshot of the patient's [facility] medical record.  For current drug dose and complete information and questions, call [phone] or In Basket pool, fv home infusion ([id number])  CSN Number:  [id number]

## 2020-06-21 ENCOUNTER — HOME INFUSION (PRE-WILLOW HOME INFUSION) (OUTPATIENT)
Dept: PHARMACY | Facility: CLINIC | Age: 25
End: 2020-06-21

## 2020-06-22 ENCOUNTER — HOME INFUSION (PRE-WILLOW HOME INFUSION) (OUTPATIENT)
Dept: PHARMACY | Facility: CLINIC | Age: 25
End: 2020-06-22

## 2020-06-22 NOTE — PROGRESS NOTES
This is a recent snapshot of the patient's [facility] medical record.  For current drug dose and complete information and questions, call [phone] or In Basket pool, fv home infusion ([id number])  CSN Number:  [id number]

## 2020-06-23 ENCOUNTER — PATIENT OUTREACH (OUTPATIENT)
Dept: CARE COORDINATION | Facility: CLINIC | Age: 25
End: 2020-06-23

## 2020-06-23 NOTE — PROGRESS NOTES
This is a recent snapshot of the patient's [facility] medical record.  For current drug dose and complete information and questions, call [phone] or In Basket pool, fv home infusion ([id number])  CSN Number:  [id number]

## 2020-06-24 NOTE — PROGRESS NOTES
Social Work Follow-Up Encounter Visit  Oncology Clinic    Data/Intervention:  Patient Name:  Diego Buchanan  /Age:  1995 (25 year old)    Reason for Follow-Up:  Patient request information about establishing POA legal guardianship.   Collaborated With:    -Patient's sister Mariela Power      Resources Provided:  health care directive form  cancer legal line number  number for [facility]    's contact information      Assessment:   inqured about Patient's sister's request in regards to the need for legal guardianship.  Sister explained the patient was a vunerable adult who did not understand questions asked of them by medical professions. Sister reported the patient would answer yes to any question regardless of what the question was and regardless if it was a yes or no question. Sister stated prior to Covid 19 they would accompany patient to their appointments to assist patient in their medical care. Sister expressed concern that patient does not understand what they are being asked and what they are responding to and concerned that patient may not receive proper care because of this.  asked about sister's experience with the staff calling sister during the patient's appointment to seek their assistance with patient's medical care. Sister stated that they did feel the patient was supported by this service. They expressed it was the first time going through this procedure and so they had some anxiety about patient being asked questions without them present.  Patient asked about obtaining legal guardianship and POA.  informed sister there were different types of guardianship and types POA depending on what the goal or purpose is in obtaining either.  stated that with either POA or legal guardianship would take away the patient's decision making capacity in some manner if not completely.  discussed a health care directive,  naming sister as a health care agent and seeking an exemption from the clinic in being able to attend clinic visits with patient.  also stated they could leave a message for patient's care team to call sister as soon as possible with any questions regarding patient's medical care during appointments. Sister appreciated this and stated they still hoped to pursue legal guardianship and possible POA.  stated if they did want to pursue either of those legal processes they would need to go through an  for POA and through their county services for the legal guardianship.  offered to supply sister with the number to cancer legal line so that they may look into a referral for an  and ask an additional questions they have about the process of obtaining legal guardianship or POA with a legal representative. Per sister's request  will send my chart message with health care directive form, cancer legal line number and the number for [facility].       Plan:  Previously provided patient/family with writer's contact information and availability.    will send my chart message with health care directive form, cancer legal line number and the number for [facility]; and message care team about coordinating with sister during appointments for [facility] care. Patient to follow up on resources provided.    will continue to remain available.      Hetal BARNETT, MIKE  - Oncology  Phone : [phone]  Pager: [phone]

## 2020-06-30 ENCOUNTER — HOME INFUSION (PRE-WILLOW HOME INFUSION) (OUTPATIENT)
Dept: PHARMACY | Facility: CLINIC | Age: 25
End: 2020-06-30

## 2020-07-01 NOTE — PROGRESS NOTES
This is a recent snapshot of the patient's [facility] medical record.  For current drug dose and complete information and questions, call [phone] or In Basket pool, [facility]v home infusion ([id number])  CSN Number:  [id number]

## 2020-07-04 ENCOUNTER — HOME INFUSION (PRE-WILLOW HOME INFUSION) (OUTPATIENT)
Dept: PHARMACY | Facility: CLINIC | Age: 25
End: 2020-07-04

## 2020-07-06 DIAGNOSIS — C41.9 SARCOMA, EWINGS (H): ICD-10-CM

## 2020-07-06 DIAGNOSIS — C49.9 DESMOPLASTIC SMALL ROUND CELL TUMOR (H): Primary | ICD-10-CM

## 2020-07-06 RX ORDER — ETOPOSIDE 50 MG/1
100 CAPSULE ORAL DAILY
Qty: 30 CAPSULE | Refills: 0 | Status: SHIPPED | OUTPATIENT
Start: 2020-07-06 | End: 2020-10-09

## 2020-07-06 NOTE — PROGRESS NOTES
This is a recent snapshot of the patient's [facility] medical record.  For current drug dose and complete information and questions, call [phone] or In Basket pool, fv home infusion ([id number])  CSN Number:  [id number]

## 2020-07-09 ENCOUNTER — VIRTUAL VISIT (OUTPATIENT)
Dept: ONCOLOGY | Facility: CLINIC | Age: 25
End: 2020-07-09
Attending: INTERNAL MEDICINE
Payer: COMMERCIAL

## 2020-07-09 ENCOUNTER — RECORDS - HEALTHEAST (OUTPATIENT)
Dept: ADMINISTRATIVE | Facility: OTHER | Age: 25
End: 2020-07-09

## 2020-07-09 DIAGNOSIS — C49.9 DESMOPLASTIC SMALL ROUND CELL TUMOR (H): Primary | ICD-10-CM

## 2020-07-09 DIAGNOSIS — C41.9 SARCOMA, EWINGS (H): ICD-10-CM

## 2020-07-09 PROCEDURE — 40001009 ZZH VIDEO/TELEPHONE VISIT; NO CHARGE

## 2020-07-09 PROCEDURE — 99214 OFFICE O/P EST MOD 30 MIN: CPT | Mod: 95 | Performed by: INTERNAL MEDICINE

## 2020-07-09 RX ORDER — ALBUTEROL SULFATE 0.83 MG/ML
2.5 SOLUTION RESPIRATORY (INHALATION)
Status: CANCELLED | OUTPATIENT
Start: 2020-07-09

## 2020-07-09 RX ORDER — LORAZEPAM 2 MG/ML
0.5 INJECTION INTRAMUSCULAR EVERY 4 HOURS PRN
Status: CANCELLED
Start: 2020-07-09

## 2020-07-09 RX ORDER — HEPARIN SODIUM (PORCINE) LOCK FLUSH IV SOLN 100 UNIT/ML 100 UNIT/ML
5 SOLUTION INTRAVENOUS
Status: CANCELLED | OUTPATIENT
Start: 2020-07-09

## 2020-07-09 RX ORDER — HEPARIN SODIUM,PORCINE 10 UNIT/ML
5 VIAL (ML) INTRAVENOUS
Status: CANCELLED | OUTPATIENT
Start: 2020-07-09

## 2020-07-09 RX ORDER — MEPERIDINE HYDROCHLORIDE 25 MG/ML
25 INJECTION INTRAMUSCULAR; INTRAVENOUS; SUBCUTANEOUS EVERY 30 MIN PRN
Status: CANCELLED | OUTPATIENT
Start: 2020-07-09

## 2020-07-09 RX ORDER — EPINEPHRINE 1 MG/ML
0.3 INJECTION, SOLUTION INTRAMUSCULAR; SUBCUTANEOUS EVERY 5 MIN PRN
Status: CANCELLED | OUTPATIENT
Start: 2020-07-09

## 2020-07-09 RX ORDER — ALBUTEROL SULFATE 90 UG/1
1-2 AEROSOL, METERED RESPIRATORY (INHALATION)
Status: CANCELLED
Start: 2020-07-09

## 2020-07-09 RX ORDER — DIPHENHYDRAMINE HYDROCHLORIDE 50 MG/ML
50 INJECTION INTRAMUSCULAR; INTRAVENOUS
Status: CANCELLED
Start: 2020-07-09

## 2020-07-09 RX ORDER — METHYLPREDNISOLONE SODIUM SUCCINATE 125 MG/2ML
125 INJECTION, POWDER, LYOPHILIZED, FOR SOLUTION INTRAMUSCULAR; INTRAVENOUS
Status: CANCELLED
Start: 2020-07-09

## 2020-07-09 RX ORDER — NALOXONE HYDROCHLORIDE 0.4 MG/ML
.1-.4 INJECTION, SOLUTION INTRAMUSCULAR; INTRAVENOUS; SUBCUTANEOUS
Status: CANCELLED | OUTPATIENT
Start: 2020-07-09

## 2020-07-09 RX ORDER — SODIUM CHLORIDE 9 MG/ML
1000 INJECTION, SOLUTION INTRAVENOUS CONTINUOUS PRN
Status: CANCELLED
Start: 2020-07-09

## 2020-07-09 ASSESSMENT — PAIN SCALES - GENERAL: PAINLEVEL: NO PAIN (0)

## 2020-07-09 NOTE — PROGRESS NOTES
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 He presented initially with abdominal pain, diarrhea, and progressive abdominal distention for 3 months duration. 2. Initial CT scan in February 2020 showed severe peritoneal carcinomatosis with large peritoneal tumor implants throughout the entire abdomen and pelvis, but mostly prominently in the pelvis.   2. PETCT scan showed interval increase in the amount of peritoneal carcinomatosis.  3. On 3/12/2020, he had ultrasound-guided core needle biopsy of a peritoneal implant (Case: [id number]), which showed a completely undifferentiated appearance, but stains with pancytokeratin, indicating an epithelial origin. 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 Tumor markers for CA-19-9, CEA, beta-hCG, alpha-fetoprotein were unremarkable. Cancer type ID molecular testing by [facility] sent to determine the exact diagnosis and to assist in further treatment planning. 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 Relative probability of less than 5% of other subtype of sarcoma. EWSR1-WT1 fusion detected.  4. 5/1/2020, MRI brain negative for brain metastasis, and baseline CT-CAP obtained showing extensive mixed solid and cystic masses throughout the abdomen and pelvis consistent with peritoneal carcinomatosis. Largest mass measures approximately 20 cm in the pelvis. Metastatic mixed solid and cystic masses seen in hepatic segments 5 and 6 and hepatic segment 7. The masses appear to be contiguous with the retrohepatic peritoneal carcinomatosis. Small volume fluid about the spleen favored to represent malignant ascites, stable mild-to-moderate right hydronephrosis related to mass effect upon the distal ureter in the pelvis, the IVC and  iliac veins are compressed due to the extensive peritoneal carcinomatosis without findings to suggest deep venous thrombosis.  5. 5/4/2020, he begins cycle 1 of CAV/IMV alternating chemotherapy.       HISTORY OF PRESENT ILLNESS  Diego Buchanan is a 25 year old male with a learning disability and recent diagnosis of DSRCT.     He tolerated cycle 3 of treatment fairly well with expected toxicities.     He rates his abdominal discomfort about 3 out of 10. He does not take pain medications. He continues eating some rice and veggies in addition to a full liquid diet. Over the last week he has lost someof his appetite. Yesterday he had tomato soup in the morning and then had chicken noodle soup in the evening. His weight is stable. He denies any nausea or vomiting.  He remains on Feosol for anemia.  He is having bowel movements, but they are still small volume. Lately stools have been more liquid in consistency.    He denies any fevers or chills. No hematuria or dysuria. No chest pain, shortness of breath, cough.    ECOG performance status 1.      REVIEW OF SYSTEMS  A 12 point ROS negative except as in HPI    Current Outpatient Medications   Medication     allopurinol (ZYLOPRIM) 300 MG tablet     etoposide (VEPESID) 50 MG capsule     LORazepam (ATIVAN) 0.5 MG tablet     metoprolol tartrate (LOPRESSOR) 50 MG tablet     polyethylene glycol (MIRALAX) 17 g packet     prochlorperazine (COMPAZINE) 10 MG tablet     traMADol (ULTRAM) 50 MG tablet     dexamethasone (DECADRON) 4 MG tablet     mesna (MESNEX) 400 MG TABS tablet     No current facility-administered medications for this visit.      Facility-Administered Medications Ordered in Other Visits   Medication     sodium chloride (PF) 0.9% PF flush 30 mL      No Known Allergies    PAST MEDICAL HISTORY  1. Hypertension  2. Learning disability  3. Peritoneal carcinomatosis     SOCIAL HISTORY  He lives at home with his family. He is a nonsmoker.      History   Smoking Status      Not on file   Smokeless Tobacco     Not on file    Social History    Substance and Sexual Activity      Alcohol use: Not on file         History   Drug Use Not on file        FAMILY HISTORY  No family history of cancer.    PHYSICAL EXAM  GENERAL: Healthy, alert and no distress. Seated next to sister on bed.  EYES: Eyes grossly normal to inspection.  No discharge or erythema, or obvious scleral/conjunctival abnormalities.  HENT: Normal cephalic/atraumatic.  External ears, nose and mouth without ulcers or lesions.  No nasal drainage visible.  NECK: No asymmetry, visible masses or scars  RESP: No audible wheeze, cough, or visible cyanosis.  No visible retractions or increased work of breathing.    SKIN: Visible skin clear. No significant rash, abnormal pigmentation or lesions.  NEURO: Cranial nerves grossly intact.  Mentation and speech appropriate for age.  PSYCH: Mentation appears normal, affect normal/bright, judgement and insight intact, normal speech and appearance well-groomed.      The rest of a comprehensive physical examination is deferred due to PHE (public health emergency) video visit restrictions.      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 He has tolerated 3 cycles of chemotherapy well with anticipated side effects and mild initial improvements.    We will continue with the current chemotherapy plan. I have let his family know that there is some flexibility of giving chemotherapy every 3 or 4 weeks, as he tolerates.     Will plan to obtain CT-scan prior to cycle 5 to ensure continued benefit of the regimen.    -Continue with CAV/IMV  -add CT-CAP in 3 weeks prior to cycle 5     #2 Anemia, microcytic, improving  Continue Feosol  "daily. Anemia appears to be improving.    #3 Malnutrition, secondary to #1 above  Continue to offer multiple small meals daily. Add in Boost shakes to supplement his nutrition. OK to add protein powder to icecream to make him tasty, calorie-packed shakes.     Farzaneh Burciaga M.D.   of Medicine  Hematology, Oncology and Transplantation        Diego Buchanan is a 25 year old male who is being evaluated via a billable video visit.      The patient has been notified of following:     \"This video visit will be conducted via a call between you and your physician/provider. We have found that certain health care needs can be provided without the need for an in-person physical exam.  This service lets us provide the care you need with a video conversation.  If a prescription is necessary we can send it directly to your pharmacy.  If lab work is needed we can place an order for that and you can then stop by our lab to have the test done at a later time.    Video visits are billed at different rates depending on your insurance coverage.  Please reach out to your insurance provider with any questions.    If during the course of the call the physician/provider feels a video visit is not appropriate, you will not be charged for this service.\"    Patient has given verbal consent for Video visit? Yes    Will anyone else be joining your video visit? No        I have reviewed and updated the patient's allergies and medication list.    Concerns: No concerns  Refills: No refills needed.     Vitals - Patient Reported  Weight (Patient Reported): 106.1 kg (234 lb)  Height (Patient Reported): 172.7 cm (5' 8\")  BMI (Based on Pt Reported Ht/Wt): 35.58      Reports no pain today.     Dox[employer]ity if cannot connect: [phone]     Yisel Strange CMA      Video-Visit Details    Type of service:  Video Visit    Video Start Time: 9:00 AM  Video End Time: 9:33 AM    Originating Location (pt. Location): Home    Distant " Location (provider location):  [facility]CER [facility]     Platform used for Video Visit: Valentina

## 2020-07-09 NOTE — LETTER
7/9/2020         RE: Diego Buchanan  [address]  [place] [address]        Dear Colleague,    Thank you for referring your patient, Diego Buchanan, to the [facility]. Please see a copy of my visit note below.    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 He presented initially with abdominal pain, diarrhea, and progressive abdominal distention for 3 months duration. 2. Initial CT scan in February 2020 showed severe peritoneal carcinomatosis with large peritoneal tumor implants throughout the entire abdomen and pelvis, but mostly prominently in the pelvis.   2. PETCT scan showed interval increase in the amount of peritoneal carcinomatosis.  3. On 3/12/2020, he had ultrasound-guided core needle biopsy of a peritoneal implant (Case: [id number]), which showed a completely undifferentiated appearance, but stains with pancytokeratin, indicating an epithelial origin. 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 Tumor markers for CA-19-9, CEA, beta-hCG, alpha-fetoprotein were unremarkable. Cancer type ID molecular testing by [facility] sent to determine the exact diagnosis and to assist in further treatment planning. 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 Relative probability of less than 5% of other subtype of sarcoma. EWSR1-WT1 fusion detected.  4. 5/1/2020, MRI brain negative for brain metastasis, and baseline CT-CAP obtained showing extensive mixed solid and cystic masses throughout the abdomen and pelvis consistent with peritoneal carcinomatosis. Largest mass measures approximately 20 cm in the pelvis. Metastatic mixed solid and cystic masses seen in hepatic segments 5 and 6 and hepatic segment 7. The masses appear to be contiguous  with the retrohepatic peritoneal carcinomatosis. Small volume fluid about the spleen favored to represent malignant ascites, stable mild-to-moderate right hydronephrosis related to mass effect upon the distal ureter in the pelvis, the IVC and iliac veins are compressed due to the extensive peritoneal carcinomatosis without findings to suggest deep venous thrombosis.  5. 5/4/2020, he begins cycle 1 of CAV/IMV alternating chemotherapy.       HISTORY OF PRESENT ILLNESS  Diego Buchanan is a 25 year old male with a learning disability and recent diagnosis of DSRCT.     He tolerated cycle 3 of treatment fairly well with expected toxicities.     He rates his abdominal discomfort about 3 out of 10. He does not take pain medications. He continues eating some rice and veggies in addition to a full liquid diet. Over the last week he has lost someof his appetite. Yesterday he had tomato soup in the morning and then had chicken noodle soup in the evening. His weight is stable. He denies any nausea or vomiting.  He remains on Feosol for anemia.  He is having bowel movements, but they are still small volume. Lately stools have been more liquid in consistency.    He denies any fevers or chills. No hematuria or dysuria. No chest pain, shortness of breath, cough.    ECOG performance status 1.      REVIEW OF SYSTEMS  A 12 point ROS negative except as in HPI    Current Outpatient Medications   Medication     allopurinol (ZYLOPRIM) 300 MG tablet     etoposide (VEPESID) 50 MG capsule     LORazepam (ATIVAN) 0.5 MG tablet     metoprolol tartrate (LOPRESSOR) 50 MG tablet     polyethylene glycol (MIRALAX) 17 g packet     prochlorperazine (COMPAZINE) 10 MG tablet     traMADol (ULTRAM) 50 MG tablet     dexamethasone (DECADRON) 4 MG tablet     mesna (MESNEX) 400 MG TABS tablet     No current facility-administered medications for this visit.      Facility-Administered Medications Ordered in Other Visits   Medication     sodium chloride (PF) 0.9%  PF flush 30 mL      No Known Allergies    PAST MEDICAL HISTORY  1. Hypertension  2. Learning disability  3. Peritoneal carcinomatosis     SOCIAL HISTORY  He lives at home with his family. He is a nonsmoker.      History   Smoking Status     Not on file   Smokeless Tobacco     Not on file    Social History    Substance and Sexual Activity      Alcohol use: Not on file         History   Drug Use Not on file        FAMILY HISTORY  No family history of cancer.    PHYSICAL EXAM  GENERAL: Healthy, alert and no distress. Seated next to sister on bed.  EYES: Eyes grossly normal to inspection.  No discharge or erythema, or obvious scleral/conjunctival abnormalities.  HENT: Normal cephalic/atraumatic.  External ears, nose and mouth without ulcers or lesions.  No nasal drainage visible.  NECK: No asymmetry, visible masses or scars  RESP: No audible wheeze, cough, or visible cyanosis.  No visible retractions or increased work of breathing.    SKIN: Visible skin clear. No significant rash, abnormal pigmentation or lesions.  NEURO: Cranial nerves grossly intact.  Mentation and speech appropriate for age.  PSYCH: Mentation appears normal, affect normal/bright, judgement and insight intact, normal speech and appearance well-groomed.      The rest of a comprehensive physical examination is deferred due to PHE (public health emergency) video visit restrictions.      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 He has tolerated 3 cycles of chemotherapy well with anticipated side effects and mild initial improvements.    We will continue with the current chemotherapy plan. I have let his family know that there is some flexibility of giving chemotherapy every 3 or 4  "weeks, as he tolerates.     Will plan to obtain CT-scan prior to cycle 5 to ensure continued benefit of the regimen.    -Continue with CAV/IMV  -add CT-CAP in 3 weeks prior to cycle 5     #2 Anemia, microcytic, improving  Continue Feosol daily. Anemia appears to be improving.    #3 Malnutrition, secondary to #1 above  Continue to offer multiple small meals daily. Add in Boost shakes to supplement his nutrition. OK to add protein powder to icecream to make him tasty, calorie-packed shakes.     Farzaneh Burciaga M.D.   of Medicine  Hematology, Oncology and Transplantation        Diego Buchanan is a 25 year old male who is being evaluated via a billable video visit.      The patient has been notified of following:     \"This video visit will be conducted via a call between you and your physician/provider. We have found that certain health care needs can be provided without the need for an in-person physical exam.  This service lets us provide the care you need with a video conversation.  If a prescription is necessary we can send it directly to your pharmacy.  If lab work is needed we can place an order for that and you can then stop by our lab to have the test done at a later time.    Video visits are billed at different rates depending on your insurance coverage.  Please reach out to your insurance provider with any questions.    If during the course of the call the physician/provider feels a video visit is not appropriate, you will not be charged for this service.\"    Patient has given verbal consent for Video visit? Yes    Will anyone else be joining your video visit? No        I have reviewed and updated the patient's allergies and medication list.    Concerns: No concerns  Refills: No refills needed.     Vitals - Patient Reported  Weight (Patient Reported): 106.1 kg (234 lb)  Height (Patient Reported): 172.7 cm (5' 8\")  BMI (Based on Pt Reported Ht/Wt): 35.58      Reports no pain today. "     Doximity if cannot connect: [phone]     Yisel Strange CMA      Video-Visit Details    Type of service:  Video Visit    Video Start Time: 9:00 AM  Video End Time: 9:33 AM    Originating Location (pt. Location): Home    Distant Location (provider location):  [facility]     Platform used for Video Visit: [email or web address]            Again, thank you for allowing me to participate in the care of your patient.        Sincerely,        Farzaneh Sims MD

## 2020-07-10 ENCOUNTER — TELEPHONE (OUTPATIENT)
Dept: ONCOLOGY | Facility: CLINIC | Age: 25
End: 2020-07-10

## 2020-07-10 NOTE — TELEPHONE ENCOUNTER
Pt's Sister Mariela calling to report that at 0600 today Pt was awakened by pain in his back midway bilaterally. Pt is having extreme fatigue and diaphoresis. Caller was INCREDIBLY nervous throughout call and kept asking if she should call an ambulance. Pt was not having any breathing issues, just intense back pain rated 10/10.   Discussed what clinic capabilities are, and that I may not hear from the Dr for a few hours even as he could be seeing Pts or in a procedure. Explained that unless pain was unmanageable my advice would be to see if medication can be added to today's infusion. Mariela did not want this delay and decided she wanted to call an ambulance to go to [facility]. Advised that [facility] may be a better option, Pt is more comfortable with [facility]. Called ahead and gave report to charge RN.

## 2020-07-15 ENCOUNTER — ANCILLARY PROCEDURE (OUTPATIENT)
Dept: CT IMAGING | Facility: CLINIC | Age: 25
End: 2020-07-15
Attending: INTERNAL MEDICINE
Payer: COMMERCIAL

## 2020-07-15 DIAGNOSIS — C41.9 SARCOMA, EWINGS (H): ICD-10-CM

## 2020-07-15 DIAGNOSIS — C49.9 DESMOPLASTIC SMALL ROUND CELL TUMOR (H): ICD-10-CM

## 2020-07-15 RX ORDER — IOPAMIDOL 755 MG/ML
135 INJECTION, SOLUTION INTRAVASCULAR ONCE
Status: COMPLETED | OUTPATIENT
Start: 2020-07-15 | End: 2020-07-15

## 2020-07-15 RX ADMIN — IOPAMIDOL 135 ML: 755 INJECTION, SOLUTION INTRAVASCULAR at 16:26

## 2020-07-16 ENCOUNTER — APPOINTMENT (OUTPATIENT)
Dept: LAB | Facility: CLINIC | Age: 25
End: 2020-07-16
Attending: INTERNAL MEDICINE
Payer: COMMERCIAL

## 2020-07-16 ENCOUNTER — INFUSION THERAPY VISIT (OUTPATIENT)
Dept: ONCOLOGY | Facility: CLINIC | Age: 25
End: 2020-07-16
Attending: INTERNAL MEDICINE
Payer: COMMERCIAL

## 2020-07-16 ENCOUNTER — HOME INFUSION (PRE-WILLOW HOME INFUSION) (OUTPATIENT)
Dept: PHARMACY | Facility: CLINIC | Age: 25
End: 2020-07-16

## 2020-07-16 VITALS
DIASTOLIC BLOOD PRESSURE: 71 MMHG | HEART RATE: 101 BPM | SYSTOLIC BLOOD PRESSURE: 114 MMHG | BODY MASS INDEX: 33.98 KG/M2 | RESPIRATION RATE: 18 BRPM | WEIGHT: 223.5 LBS | TEMPERATURE: 98.3 F | OXYGEN SATURATION: 99 %

## 2020-07-16 DIAGNOSIS — C41.9 SARCOMA, EWINGS (H): Primary | ICD-10-CM

## 2020-07-16 DIAGNOSIS — C49.9 DESMOPLASTIC SMALL ROUND CELL TUMOR (H): ICD-10-CM

## 2020-07-16 LAB
ALBUMIN SERPL-MCNC: 2.9 G/DL (ref 3.4–5)
ALP SERPL-CCNC: 78 U/L (ref 40–150)
ALT SERPL W P-5'-P-CCNC: 9 U/L (ref 0–70)
ANION GAP SERPL CALCULATED.3IONS-SCNC: 7 MMOL/L (ref 3–14)
AST SERPL W P-5'-P-CCNC: 8 U/L (ref 0–45)
BASOPHILS # BLD AUTO: 0.1 10E9/L (ref 0–0.2)
BASOPHILS NFR BLD AUTO: 1.1 %
BILIRUB SERPL-MCNC: 0.6 MG/DL (ref 0.2–1.3)
BUN SERPL-MCNC: 2 MG/DL (ref 7–30)
CALCIUM SERPL-MCNC: 8.6 MG/DL (ref 8.5–10.1)
CHLORIDE SERPL-SCNC: 106 MMOL/L (ref 94–109)
CO2 SERPL-SCNC: 26 MMOL/L (ref 20–32)
CREAT SERPL-MCNC: 0.56 MG/DL (ref 0.66–1.25)
DIFFERENTIAL METHOD BLD: ABNORMAL
EOSINOPHIL # BLD AUTO: 0.1 10E9/L (ref 0–0.7)
EOSINOPHIL NFR BLD AUTO: 0.4 %
ERYTHROCYTE [DISTWIDTH] IN BLOOD BY AUTOMATED COUNT: 23.1 % (ref 10–15)
GFR SERPL CREATININE-BSD FRML MDRD: >90 ML/MIN/{1.73_M2}
GLUCOSE SERPL-MCNC: 86 MG/DL (ref 70–99)
HCT VFR BLD AUTO: 29.3 % (ref 40–53)
HGB BLD-MCNC: 9 G/DL (ref 13.3–17.7)
IMM GRANULOCYTES # BLD: 0.1 10E9/L (ref 0–0.4)
IMM GRANULOCYTES NFR BLD: 0.8 %
LYMPHOCYTES # BLD AUTO: 0.7 10E9/L (ref 0.8–5.3)
LYMPHOCYTES NFR BLD AUTO: 5.6 %
MCH RBC QN AUTO: 26.5 PG (ref 26.5–33)
MCHC RBC AUTO-ENTMCNC: 30.7 G/DL (ref 31.5–36.5)
MCV RBC AUTO: 86 FL (ref 78–100)
MONOCYTES # BLD AUTO: 1.6 10E9/L (ref 0–1.3)
MONOCYTES NFR BLD AUTO: 12.1 %
NEUTROPHILS # BLD AUTO: 10.5 10E9/L (ref 1.6–8.3)
NEUTROPHILS NFR BLD AUTO: 80 %
NRBC # BLD AUTO: 0 10*3/UL
NRBC BLD AUTO-RTO: 0 /100
PLATELET # BLD AUTO: 544 10E9/L (ref 150–450)
POTASSIUM SERPL-SCNC: 3.3 MMOL/L (ref 3.4–5.3)
PROT SERPL-MCNC: 7.4 G/DL (ref 6.8–8.8)
RBC # BLD AUTO: 3.4 10E12/L (ref 4.4–5.9)
SODIUM SERPL-SCNC: 139 MMOL/L (ref 133–144)
WBC # BLD AUTO: 13.1 10E9/L (ref 4–11)

## 2020-07-16 PROCEDURE — 25000132 ZZH RX MED GY IP 250 OP 250 PS 637: Mod: ZF | Performed by: INTERNAL MEDICINE

## 2020-07-16 PROCEDURE — 85025 COMPLETE CBC W/AUTO DIFF WBC: CPT | Performed by: INTERNAL MEDICINE

## 2020-07-16 PROCEDURE — 96375 TX/PRO/DX INJ NEW DRUG ADDON: CPT

## 2020-07-16 PROCEDURE — 25800030 ZZH RX IP 258 OP 636: Mod: ZF | Performed by: INTERNAL MEDICINE

## 2020-07-16 PROCEDURE — 96367 TX/PROPH/DG ADDL SEQ IV INF: CPT

## 2020-07-16 PROCEDURE — 80053 COMPREHEN METABOLIC PANEL: CPT | Performed by: INTERNAL MEDICINE

## 2020-07-16 PROCEDURE — G0498 CHEMO EXTEND IV INFUS W/PUMP: HCPCS

## 2020-07-16 PROCEDURE — 25000128 H RX IP 250 OP 636: Mod: ZF | Performed by: INTERNAL MEDICINE

## 2020-07-16 PROCEDURE — 96365 THER/PROPH/DIAG IV INF INIT: CPT

## 2020-07-16 RX ORDER — GRANISETRON HYDROCHLORIDE 1 MG/ML
1 INJECTION INTRAVENOUS ONCE
Status: COMPLETED | OUTPATIENT
Start: 2020-07-16 | End: 2020-07-16

## 2020-07-16 RX ORDER — OXYCODONE HYDROCHLORIDE 5 MG/1
5 CAPSULE ORAL EVERY 6 HOURS PRN
COMMUNITY
End: 2021-02-11

## 2020-07-16 RX ORDER — POTASSIUM CHLORIDE 1500 MG/1
20 TABLET, EXTENDED RELEASE ORAL ONCE
Status: COMPLETED | OUTPATIENT
Start: 2020-07-16 | End: 2020-07-16

## 2020-07-16 RX ORDER — HEPARIN SODIUM,PORCINE 10 UNIT/ML
5 VIAL (ML) INTRAVENOUS ONCE
Status: COMPLETED | OUTPATIENT
Start: 2020-07-16 | End: 2020-07-16

## 2020-07-16 RX ADMIN — Medication 5 ML: at 10:20

## 2020-07-16 RX ADMIN — POTASSIUM CHLORIDE 20 MEQ: 149 INJECTION, SOLUTION, CONCENTRATE INTRAVENOUS at 12:52

## 2020-07-16 RX ADMIN — GRANISETRON HYDROCHLORIDE 1 MG: 1 INJECTION, SOLUTION INTRAVENOUS at 12:16

## 2020-07-16 RX ADMIN — POTASSIUM CHLORIDE 20 MEQ: 1500 TABLET, EXTENDED RELEASE ORAL at 12:42

## 2020-07-16 RX ADMIN — SODIUM CHLORIDE 250 ML: 9 INJECTION, SOLUTION INTRAVENOUS at 12:15

## 2020-07-16 RX ADMIN — DEXAMETHASONE SODIUM PHOSPHATE 12 MG: 10 INJECTION, SOLUTION INTRAMUSCULAR; INTRAVENOUS at 12:18

## 2020-07-16 ASSESSMENT — PAIN SCALES - GENERAL: PAINLEVEL: NO PAIN (0)

## 2020-07-16 NOTE — NURSING NOTE
Chief Complaint   Patient presents with     Blood Draw     labs drawn via cvc by RN in lab     /71 (BP Location: Left arm, Patient Position: Chair, Cuff Size: Adult Regular)   Pulse 101   Temp 98.3  F (36.8  C) (Oral)   Resp 18   Wt 101.4 kg (223 lb 8 oz)   SpO2 99%   BMI 33.98 kg/m      Lines accessed by RN in lab. Labs collected through red lumen and sent. Both caps changed, lines flushed with Normal Saline & Heparin. Pt tolerated well.   Pt checked in for next appointment.    Latrice Lau RN

## 2020-07-16 NOTE — PROGRESS NOTES
Infusion Nursing Note:  Diego Buchanan presents today for C4 D1 Ifex/Mesna pump connect/PO Etoposide/Potassium Replacement.    Patient seen by provider today: No   present during visit today: Hmong - sister as .    Note: Patient arrives with sister, Mariela, to infusion. Patient also understands/speaks some English. Patient was in the ED on 7/10 for acute lower back pain. Workup unremarkable per pt's sister. Sent home with 5mg Oxycodone. Pt took a dose last night which helped, but denies having back pain or pain in general today. Patient/sister's biggest concern is eating. Appetite is up and down, and has mainly been sticking to protein shakes and soft/liquid diet. Per sister, historically, patient has not tolerated eating solids well. She reports that he tried eating solid food the night before went to the ED. She is unsure of the cause. She has discussed his diet concerns with Dr. Sims. RN sent message to BOY Morrell/copied Dr. Sims to follow-up with rescheduling future appointments and dietitian referral request. Sister also inquiring about continuing on Allopurinol (completed 30 day supply). Included request in InBasket.    Intravenous Access:  Esparza. End caps changed and lumens flushed at home earlier today per sister, Mariela. Patient arrived with tegaderm dressing but wishes to change it due to itching. Slight rash noted on site. Dressing changed in clinic. Betadine and [id number] used as patient seems to have allergy to tegaderm and/or CHG. Added these to allergy list in Epic.    IR notified of sutures unattached from skin. StatLock placed. Pt/sister have not noticed line being pulled out/longer than usual. No dried blood noted around insertion site.    Per Rui from IR, given that StatLock placed, no dried blood around insertion site, or length of catheter notably extended, line is probably okay. IR provider to observe site during infusion visit. Patient/sister agreeable with  plan.    Radiologist came to assess site. Per radiologist, continue to use StatLock but cuff is unexposed so no further intervention required.    Treatment Conditions:  Lab Results   Component Value Date    HGB 9.0 07/16/2020     Lab Results   Component Value Date    WBC 13.1 07/16/2020      Lab Results   Component Value Date    ANEU 10.5 07/16/2020     Lab Results   Component Value Date     07/16/2020      Lab Results   Component Value Date     07/16/2020                   Lab Results   Component Value Date    POTASSIUM 3.3 07/16/2020           Lab Results   Component Value Date    MAG 1.8 06/17/2020            Lab Results   Component Value Date    CR 0.56 07/16/2020                   Lab Results   Component Value Date    FAISAL 8.6 07/16/2020                Lab Results   Component Value Date    BILITOTAL 0.6 07/16/2020           Lab Results   Component Value Date    ALBUMIN 2.9 07/16/2020                    Lab Results   Component Value Date    ALT 9 07/16/2020           Lab Results   Component Value Date    AST 8 07/16/2020       Results reviewed, labs MET treatment parameters, ok to proceed with treatment.    See TORB in attached RN note from Dr. Sims to replace potassium per protocol.      Post Infusion Assessment:  Patient tolerated infusion without incident.  Blood return noted pre and post infusion.  Site patent and intact, free from redness, edema or discomfort.  No evidence of extravasations.     Ifex/Mesna continuous infusion pump connected at 1415.  Positive blood return from [facility] (red lumen) at time of pump hook up. All connections secured, taped, and double-checked by Elaine Voss RN.  Pump to infuse over 144 hours at 7cc/hour.  Continuous pump will be disconnected on 7/22 at 1400 by [facility] with Neulasta On-Pro and Mesna bag change. Confirmed with JASON Martinez from [facility]. [facility] also aware of lab dates. Patient aware of pump disconnect date and time.      Discharge Plan:   Prescription refills  given for Etoposide.  Discharge instructions reviewed with: Patient and sister.  Patient and/or family verbalized understanding of discharge instructions and all questions answered.  AVS to patient via [employer].  Patient will return in 3 weeks for next appointment. IB sent to BOY Celeste to get next appointments pushed back to ~8/6. Patient/sister aware.   Patient discharged in stable condition accompanied by: self.  Departure Mode: Ambulatory.  Face to Face: 30.    Marcella Tao RN

## 2020-07-16 NOTE — PROGRESS NOTES
GRETCHEN 7/16/2020 12:09 Dr. Smis/EMELYN Willingham RN: Replace K per protocol    Hawa Willingham RN

## 2020-07-17 NOTE — PROGRESS NOTES
This is a recent snapshot of the patient's [facility] medical record.  For current drug dose and complete information and questions, call [phone] or In Basket pool, fv home infusion ([id number])  CSN Number:  [id number]

## 2020-07-19 ENCOUNTER — MEDICAL CORRESPONDENCE (OUTPATIENT)
Dept: HEALTH INFORMATION MANAGEMENT | Facility: CLINIC | Age: 25
End: 2020-07-19

## 2020-07-19 ENCOUNTER — HOME INFUSION (PRE-WILLOW HOME INFUSION) (OUTPATIENT)
Dept: PHARMACY | Facility: CLINIC | Age: 25
End: 2020-07-19

## 2020-07-19 LAB
ANION GAP SERPL CALCULATED.3IONS-SCNC: 7 MMOL/L (ref 3–14)
BUN SERPL-MCNC: 6 MG/DL (ref 7–30)
CALCIUM SERPL-MCNC: 8.7 MG/DL (ref 8.5–10.1)
CHLORIDE SERPL-SCNC: 108 MMOL/L (ref 94–109)
CO2 SERPL-SCNC: 25 MMOL/L (ref 20–32)
CREAT SERPL-MCNC: 0.58 MG/DL (ref 0.66–1.25)
GFR SERPL CREATININE-BSD FRML MDRD: >90 ML/MIN/{1.73_M2}
GLUCOSE SERPL-MCNC: 74 MG/DL (ref 70–99)
MAGNESIUM SERPL-MCNC: 1.6 MG/DL (ref 1.6–2.3)
PHOSPHATE SERPL-MCNC: 3.2 MG/DL (ref 2.5–4.5)
POTASSIUM SERPL-SCNC: 3.6 MMOL/L (ref 3.4–5.3)
SODIUM SERPL-SCNC: 140 MMOL/L (ref 133–144)

## 2020-07-19 PROCEDURE — 80048 BASIC METABOLIC PNL TOTAL CA: CPT | Performed by: PHYSICIAN ASSISTANT

## 2020-07-19 PROCEDURE — 83735 ASSAY OF MAGNESIUM: CPT | Performed by: PHYSICIAN ASSISTANT

## 2020-07-19 PROCEDURE — 84100 ASSAY OF PHOSPHORUS: CPT | Performed by: PHYSICIAN ASSISTANT

## 2020-07-20 ENCOUNTER — MEDICAL CORRESPONDENCE (OUTPATIENT)
Dept: HEALTH INFORMATION MANAGEMENT | Facility: CLINIC | Age: 25
End: 2020-07-20

## 2020-07-20 ENCOUNTER — HOME INFUSION (PRE-WILLOW HOME INFUSION) (OUTPATIENT)
Dept: PHARMACY | Facility: CLINIC | Age: 25
End: 2020-07-20

## 2020-07-20 LAB
ANION GAP SERPL CALCULATED.3IONS-SCNC: 3 MMOL/L (ref 3–14)
BUN SERPL-MCNC: 7 MG/DL (ref 7–30)
CALCIUM SERPL-MCNC: 9.2 MG/DL (ref 8.5–10.1)
CHLORIDE SERPL-SCNC: 111 MMOL/L (ref 94–109)
CO2 SERPL-SCNC: 26 MMOL/L (ref 20–32)
CREAT SERPL-MCNC: 0.58 MG/DL (ref 0.66–1.25)
GFR SERPL CREATININE-BSD FRML MDRD: >90 ML/MIN/{1.73_M2}
GLUCOSE SERPL-MCNC: 73 MG/DL (ref 70–99)
MAGNESIUM SERPL-MCNC: 2.1 MG/DL (ref 1.6–2.3)
PHOSPHATE SERPL-MCNC: 3.4 MG/DL (ref 2.5–4.5)
POTASSIUM SERPL-SCNC: 4 MMOL/L (ref 3.4–5.3)
SODIUM SERPL-SCNC: 140 MMOL/L (ref 133–144)

## 2020-07-20 PROCEDURE — 84100 ASSAY OF PHOSPHORUS: CPT | Performed by: PHYSICIAN ASSISTANT

## 2020-07-20 PROCEDURE — 83735 ASSAY OF MAGNESIUM: CPT | Performed by: PHYSICIAN ASSISTANT

## 2020-07-20 PROCEDURE — 80048 BASIC METABOLIC PNL TOTAL CA: CPT | Performed by: PHYSICIAN ASSISTANT

## 2020-07-21 ENCOUNTER — HOME INFUSION (PRE-WILLOW HOME INFUSION) (OUTPATIENT)
Dept: PHARMACY | Facility: CLINIC | Age: 25
End: 2020-07-21

## 2020-07-21 LAB
ANION GAP SERPL CALCULATED.3IONS-SCNC: 6 MMOL/L (ref 3–14)
BUN SERPL-MCNC: 10 MG/DL (ref 7–30)
CALCIUM SERPL-MCNC: 9.2 MG/DL (ref 8.5–10.1)
CHLORIDE SERPL-SCNC: 109 MMOL/L (ref 94–109)
CO2 SERPL-SCNC: 23 MMOL/L (ref 20–32)
CREAT SERPL-MCNC: 0.63 MG/DL (ref 0.66–1.25)
GFR SERPL CREATININE-BSD FRML MDRD: >90 ML/MIN/{1.73_M2}
GLUCOSE SERPL-MCNC: 74 MG/DL (ref 70–99)
MAGNESIUM SERPL-MCNC: 2.1 MG/DL (ref 1.6–2.3)
PHOSPHATE SERPL-MCNC: 3.1 MG/DL (ref 2.5–4.5)
POTASSIUM SERPL-SCNC: 3.9 MMOL/L (ref 3.4–5.3)
SODIUM SERPL-SCNC: 138 MMOL/L (ref 133–144)

## 2020-07-21 PROCEDURE — 80048 BASIC METABOLIC PNL TOTAL CA: CPT | Performed by: PHYSICIAN ASSISTANT

## 2020-07-21 PROCEDURE — 83735 ASSAY OF MAGNESIUM: CPT | Performed by: PHYSICIAN ASSISTANT

## 2020-07-21 PROCEDURE — 84100 ASSAY OF PHOSPHORUS: CPT | Performed by: PHYSICIAN ASSISTANT

## 2020-07-21 NOTE — PROGRESS NOTES
This is a recent snapshot of the patient's [facility] medical record.  For current drug dose and complete information and questions, call [phone] or In Basket pool, fv home infusion ([id number])  CSN Number:  [id number]

## 2020-07-22 ENCOUNTER — HOME INFUSION (PRE-WILLOW HOME INFUSION) (OUTPATIENT)
Dept: PHARMACY | Facility: CLINIC | Age: 25
End: 2020-07-22

## 2020-07-22 LAB
ANION GAP SERPL CALCULATED.3IONS-SCNC: 9 MMOL/L (ref 3–14)
BUN SERPL-MCNC: 11 MG/DL (ref 7–30)
CALCIUM SERPL-MCNC: 8.6 MG/DL (ref 8.5–10.1)
CHLORIDE SERPL-SCNC: 108 MMOL/L (ref 94–109)
CO2 SERPL-SCNC: 21 MMOL/L (ref 20–32)
CREAT SERPL-MCNC: 0.65 MG/DL (ref 0.66–1.25)
GFR SERPL CREATININE-BSD FRML MDRD: >90 ML/MIN/{1.73_M2}
GLUCOSE SERPL-MCNC: 119 MG/DL (ref 70–99)
MAGNESIUM SERPL-MCNC: 1.9 MG/DL (ref 1.6–2.3)
PHOSPHATE SERPL-MCNC: 2.5 MG/DL (ref 2.5–4.5)
POTASSIUM SERPL-SCNC: 3.3 MMOL/L (ref 3.4–5.3)
SODIUM SERPL-SCNC: 138 MMOL/L (ref 133–144)

## 2020-07-22 PROCEDURE — 84100 ASSAY OF PHOSPHORUS: CPT | Performed by: PHYSICIAN ASSISTANT

## 2020-07-22 PROCEDURE — 80048 BASIC METABOLIC PNL TOTAL CA: CPT | Performed by: PHYSICIAN ASSISTANT

## 2020-07-22 PROCEDURE — 83735 ASSAY OF MAGNESIUM: CPT | Performed by: PHYSICIAN ASSISTANT

## 2020-07-22 NOTE — PROGRESS NOTES
This is a recent snapshot of the patient's [facility] medical record.  For current drug dose and complete information and questions, call [phone] or In Basket pool, fv home infusion ([id number])  CSN Number:  [id number]

## 2020-07-22 NOTE — PROGRESS NOTES
Skilled nurse visit in the home, for discontinuation of Ifosfamide 21.5 g/Mesna 21.5 g in 1028 mL NS infused over 144hrs.      MOHINDER LindoN RN  Field Clinician  [facility]   [address] [place] [address]  [email or web address]  [email or web address]   Cell: [phone] Fax [phone]

## 2020-07-23 ENCOUNTER — HOME INFUSION (PRE-WILLOW HOME INFUSION) (OUTPATIENT)
Dept: PHARMACY | Facility: CLINIC | Age: 25
End: 2020-07-23

## 2020-07-23 ENCOUNTER — MEDICAL CORRESPONDENCE (OUTPATIENT)
Dept: HEALTH INFORMATION MANAGEMENT | Facility: CLINIC | Age: 25
End: 2020-07-23

## 2020-07-23 LAB
ANION GAP SERPL CALCULATED.3IONS-SCNC: 7 MMOL/L (ref 3–14)
BUN SERPL-MCNC: 11 MG/DL (ref 7–30)
CALCIUM SERPL-MCNC: 8.7 MG/DL (ref 8.5–10.1)
CHLORIDE SERPL-SCNC: 111 MMOL/L (ref 94–109)
CO2 SERPL-SCNC: 19 MMOL/L (ref 20–32)
CREAT SERPL-MCNC: 0.56 MG/DL (ref 0.66–1.25)
GFR SERPL CREATININE-BSD FRML MDRD: >90 ML/MIN/{1.73_M2}
GLUCOSE SERPL-MCNC: 80 MG/DL (ref 70–99)
MAGNESIUM SERPL-MCNC: 2.3 MG/DL (ref 1.6–2.3)
PHOSPHATE SERPL-MCNC: 2.4 MG/DL (ref 2.5–4.5)
POTASSIUM SERPL-SCNC: 3.9 MMOL/L (ref 3.4–5.3)
SODIUM SERPL-SCNC: 137 MMOL/L (ref 133–144)

## 2020-07-23 PROCEDURE — 84100 ASSAY OF PHOSPHORUS: CPT | Performed by: PHYSICIAN ASSISTANT

## 2020-07-23 PROCEDURE — 80048 BASIC METABOLIC PNL TOTAL CA: CPT | Performed by: PHYSICIAN ASSISTANT

## 2020-07-23 PROCEDURE — 83735 ASSAY OF MAGNESIUM: CPT | Performed by: PHYSICIAN ASSISTANT

## 2020-07-23 NOTE — PROGRESS NOTES
This is a recent snapshot of the patient's [facility] medical record.  For current drug dose and complete information and questions, call [phone] or In Basket pool, fv home infusion ([id number])  CSN Number:  [id number]

## 2020-07-24 ENCOUNTER — HOME INFUSION (PRE-WILLOW HOME INFUSION) (OUTPATIENT)
Dept: PHARMACY | Facility: CLINIC | Age: 25
End: 2020-07-24

## 2020-07-27 ENCOUNTER — RECORDS - HEALTHEAST (OUTPATIENT)
Dept: ADMINISTRATIVE | Facility: OTHER | Age: 25
End: 2020-07-27

## 2020-07-27 ENCOUNTER — VIRTUAL VISIT (OUTPATIENT)
Dept: ONCOLOGY | Facility: CLINIC | Age: 25
End: 2020-07-27
Attending: DIETITIAN, REGISTERED
Payer: COMMERCIAL

## 2020-07-27 DIAGNOSIS — C41.9 SARCOMA, EWINGS (H): Primary | ICD-10-CM

## 2020-07-27 PROCEDURE — 97802 MEDICAL NUTRITION INDIV IN: CPT | Mod: GT,ZF | Performed by: DIETITIAN, REGISTERED

## 2020-07-27 NOTE — LETTER
7/27/2020         RE: Diego Buchanan  [address]  [place] [address]        Dear Colleague,    Thank you for referring your patient, Diego Buchanan, to the [facility]. Please see a copy of my visit note below.    Diego Buchanan is a 25 year old male who is being evaluated via a billable telephone visit.          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 2200kcal, 100g protein/day  2. 5-6 smaller, frequent meals   Future/Additional Recommendations: appetite stimulant   Malnutrition: non-severe in the context of chronic illness     NUTRITION HISTORY  Factors affecting nutrition intake include:abdominal pain, constipation and decreased appetite  Current diet: mostly soft foods, full liquids, some solids  Current appetite/intake: poor  PEG Tube: No  Chemotherapy: CAV      RD spoke with Robert's sister, Shelia about his nutrition concerns with poor appetite and feeling sick when he eats solid foods.  With this, he has been losing weight.  He has a BM every 2-3 days.  Shelia will give him 1 serving of MiraLax when he hasn't had a BM in 3 days.  She is not able to associate his eating pain directly with constipation as he tends to have pain when eating any solids.    He has 3 smaller meals/day, mostly consisting of soft and pureed foods.  He will have rice and vegetables and chicken but tends to have more pain, upset stomach.   He eating a lot of soups, cream of wheat, mashed potatoes, diced chicken, pudding, yogurt and Boost or Ensure.  With the shakes (he recently started), he is still losing weight per his sister's report.  She tells me that he is a very picky eater as well and will not eat  "anything green, thus, she wonders if he is getting enough vitamins.     Diet Recall  Breakfast Cream of wheat   Lunch Mashed potatoes with gravy and chicken   Dinner Chicken noodle or tomato soup   Beverages Water, 2 Boost or Ensure Plus     ANTHROPOMETRICS  Height: 68\"  Weight: 223 lbs/101kg  BMI: 34  Weight Status:  Obesity Grade I BMI 30-34.9  IBW: 156 lbs (142%)  Weight History: down 25 lbs x past 2 months (13%)  Wt Readings from Last 7 Encounters:   07/16/20 101.4 kg (223 lb 8 oz)   06/19/20 106.4 kg (234 lb 9.6 oz)   06/18/20 115.7 kg (255 lb)   05/26/20 115.7 kg (255 lb)   05/04/20 114.6 kg (252 lb 9.6 oz)   05/01/20 113.3 kg (249 lb 12.8 oz)     Dosing Weight: 78kg    Medications/vitamins/minerals/herbals:   Reviewed    Labs:   Labs reviewed    NUTRITION FOCUSED PHYSICAL ASSESSMENT FOR DIAGNOSING MALNUTRITION:  Not observed     ASSESSED NUTRITION NEEDS:  Estimated Energy Needs: 2200 kcals (30 Kcal/Kg)  Justification: maintenance   Estimated Protein Needs: 100 grams protein (1.2 g pro/Kg)  Justification: maintenance  Estimated Fluid Needs: 2000  mL   Justification: maintenance    MALNUTRITION:  % Weight Loss:  > 7.5% in 3 months (severe malnutrition)  % Intake:  </= 50% for >/= 1 month (severe malnutrition)  Subcutaneous Fat Loss:  Not observed  Muscle Loss:  Not observed  Fluid Retention:  Not noted    Malnutrition Diagnosis: Severe malnutrition  In Context of:  Chronic illness or disease    NUTRITION DIAGNOSIS:  Inadequate protein-energy intake related to decreased appetite and pain as evidenced by 13% wt loss x past 2 months    INTERVENTIONS  Provided written & verbal education:   - Discussed ways to maximize and fortify foods with calories and protein via small frequent meals.    - Advised pt to aim for at least 2200kcal and 100g protein daily.    - Reviewed common barriers to eating with treatment and as treatment progresses. Discussed ways to cope with decreased appetite.  Will recommend appetite " stimulant if PO does not improve in the next 2 weeks and weight continues to drop.    - Reviewed ONS options (Ensure Enlive, Ensure Plus/Boost Plus, CIB, Benecalorie etc). Encouraged utilizing these ONS in home made shakes/smoothies to prevent flavor fatigue.  Encouraged pt to start consuming 2 ONS or home made shakes/smoothies daily.       Provided pt with corresponding education materials/handouts on:   High Calorie, High Protein Recipe booklet, Tips to Increase the Calories in Your Diet - sent to his sister, Shelia via secure email     Goals  1.  Aim for 5-6 small frequent meals  2.  Aim for 2200kcal and 100g protein/day  3. Weight maintenance       Follow-Up Plans: Pt has RD contact information for questions.        Hetal Rogers RDN, LD

## 2020-07-27 NOTE — PROGRESS NOTES
This is a recent snapshot of the patient's [facility] medical record.  For current drug dose and complete information and questions, call [phone] or In Basket pool, fv home infusion ([id number])  CSN Number:  [id number]

## 2020-07-27 NOTE — PROGRESS NOTES
"Diego Buchanan is a 25 year old male who is being evaluated via a billable telephone visit.      The patient has been notified of following:     \"This telephone visit will be conducted via a call between you and your physician/provider. We have found that certain health care needs can be provided without the need for a physical exam.  This service lets us provide the care you need with a short phone conversation.  If a prescription is necessary we can send it directly to your pharmacy.  If lab work is needed we can place an order for that and you can then stop by our lab to have the test done at a later time.    Telephone visits are billed at different rates depending on your insurance coverage. During this emergency period, for some insurers they may be billed the same as an in-person visit.  Please reach out to your insurance provider with any questions.    If during the course of the call the physician/provider feels a telephone visit is not appropriate, you will not be charged for this service.\"    Patient has given verbal consent for Telephone visit?  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 2200kcal, 100g protein/day  2. 5-6 smaller, frequent meals   Future/Additional Recommendations: appetite stimulant   Malnutrition: non-severe in the context of chronic illness     NUTRITION HISTORY  Factors affecting nutrition intake include:abdominal pain, constipation and decreased appetite  Current diet: mostly soft foods, full liquids, some solids  Current appetite/intake: poor  PEG Tube: No  Chemotherapy: MARGOT      RD spoke with Robert's sisterShelia about his nutrition concerns with poor appetite and feeling " "sick when he eats solid foods.  With this, he has been losing weight.  He has a BM every 2-3 days.  Shelia will give him 1 serving of MiraLax when he hasn't had a BM in 3 days.  She is not able to associate his eating pain directly with constipation as he tends to have pain when eating any solids.    He has 3 smaller meals/day, mostly consisting of soft and pureed foods.  He will have rice and vegetables and chicken but tends to have more pain, upset stomach.   He eating a lot of soups, cream of wheat, mashed potatoes, diced chicken, pudding, yogurt and Boost or Ensure.  With the shakes (he recently started), he is still losing weight per his sister's report.  She tells me that he is a very picky eater as well and will not eat anything green, thus, she wonders if he is getting enough vitamins.     Diet Recall  Breakfast Cream of wheat   Lunch Mashed potatoes with gravy and chicken   Dinner Chicken noodle or tomato soup   Beverages Water, 2 Boost or Ensure Plus     ANTHROPOMETRICS  Height: 68\"  Weight: 223 lbs/101kg  BMI: 34  Weight Status:  Obesity Grade I BMI 30-34.9  IBW: 156 lbs (142%)  Weight History: down 25 lbs x past 2 months (13%)  Wt Readings from Last 7 Encounters:   07/16/20 101.4 kg (223 lb 8 oz)   06/19/20 106.4 kg (234 lb 9.6 oz)   06/18/20 115.7 kg (255 lb)   05/26/20 115.7 kg (255 lb)   05/04/20 114.6 kg (252 lb 9.6 oz)   05/01/20 113.3 kg (249 lb 12.8 oz)     Dosing Weight: 78kg    Medications/vitamins/minerals/herbals:   Reviewed    Labs:   Labs reviewed    NUTRITION FOCUSED PHYSICAL ASSESSMENT FOR DIAGNOSING MALNUTRITION:  Not observed     ASSESSED NUTRITION NEEDS:  Estimated Energy Needs: 2200 kcals (30 Kcal/Kg)  Justification: maintenance   Estimated Protein Needs: 100 grams protein (1.2 g pro/Kg)  Justification: maintenance  Estimated Fluid Needs: 2000  mL   Justification: maintenance    MALNUTRITION:  % Weight Loss:  > 7.5% in 3 months (severe malnutrition)  % Intake:  </= 50% for >/= 1 month " (severe malnutrition)  Subcutaneous Fat Loss:  Not observed  Muscle Loss:  Not observed  Fluid Retention:  Not noted    Malnutrition Diagnosis: Severe malnutrition  In Context of:  Chronic illness or disease    NUTRITION DIAGNOSIS:  Inadequate protein-energy intake related to decreased appetite and pain as evidenced by 13% wt loss x past 2 months    INTERVENTIONS  Provided written & verbal education:   - Discussed ways to maximize and fortify foods with calories and protein via small frequent meals.    - Advised pt to aim for at least 2200kcal and 100g protein daily.    - Reviewed common barriers to eating with treatment and as treatment progresses. Discussed ways to cope with decreased appetite.  Will recommend appetite stimulant if PO does not improve in the next 2 weeks and weight continues to drop.    - Reviewed ONS options (Ensure Enlive, Ensure Plus/Boost Plus, CIB, Benecalorie etc). Encouraged utilizing these ONS in home made shakes/smoothies to prevent flavor fatigue.  Encouraged pt to start consuming 2 ONS or home made shakes/smoothies daily.       Provided pt with corresponding education materials/handouts on:   High Calorie, High Protein Recipe booklet, Tips to Increase the Calories in Your Diet - sent to his sister, Shelia via secure email     Goals  1.  Aim for 5-6 small frequent meals  2.  Aim for 2200kcal and 100g protein/day  3. Weight maintenance       Follow-Up Plans: Pt has RD contact information for questions.        Hetal Rogers RDN, LD

## 2020-07-27 NOTE — PROGRESS NOTES
This is a recent snapshot of the patient's [facility] medical record.  For current drug dose and complete information and questions, call [phone] or In Basket pool, fv home infusion ([id number])  CSN Number:  [id number]

## 2020-07-29 ENCOUNTER — PATIENT OUTREACH (OUTPATIENT)
Dept: ONCOLOGY | Facility: CLINIC | Age: 25
End: 2020-07-29

## 2020-07-29 ENCOUNTER — HOME INFUSION (PRE-WILLOW HOME INFUSION) (OUTPATIENT)
Dept: PHARMACY | Facility: CLINIC | Age: 25
End: 2020-07-29

## 2020-07-29 DIAGNOSIS — R31.9 HEMATURIA: Primary | ICD-10-CM

## 2020-07-29 RX ORDER — CIPROFLOXACIN 500 MG/1
500 TABLET, FILM COATED ORAL 2 TIMES DAILY
Qty: 10 TABLET | Refills: 0 | Status: SHIPPED | OUTPATIENT
Start: 2020-07-29 | End: 2020-08-03

## 2020-07-29 NOTE — PROGRESS NOTES
Noted Pt got Cipro previously on 6/15.  Paged Leona KRUEGER  Spoke with Leona. Ok to Rx Cipro 500 mg BID, but get a UA/UC prior so as not to skew results.  Called and relayed information to Pt's sister, who verbalized understanding.  Called [facility] and asked the to get a UA ASAP.

## 2020-07-29 NOTE — PROGRESS NOTES
Sister called to report some blood in his urine, which started last week on either Thursday or Friday. He feels pain on urination but he is not going more frequently and does not have urgency. She wonder if he can get an antibiotic, she believes this happened last time. I let her know that in fact it did, but he did end up submitting a urine sample before starting antibiotics. I told her I would check with his team and we would call her back.    So Blancas RN

## 2020-07-30 ENCOUNTER — MEDICAL CORRESPONDENCE (OUTPATIENT)
Dept: HEALTH INFORMATION MANAGEMENT | Facility: CLINIC | Age: 25
End: 2020-07-30

## 2020-07-30 LAB
ALBUMIN UR-MCNC: 30 MG/DL
APPEARANCE UR: ABNORMAL
BILIRUB UR QL STRIP: NEGATIVE
COLOR UR AUTO: YELLOW
GLUCOSE UR STRIP-MCNC: NEGATIVE MG/DL
HGB UR QL STRIP: ABNORMAL
KETONES UR STRIP-MCNC: 5 MG/DL
LEUKOCYTE ESTERASE UR QL STRIP: NEGATIVE
MUCOUS THREADS #/AREA URNS LPF: PRESENT /LPF
NITRATE UR QL: NEGATIVE
PH UR STRIP: 5.5 PH (ref 5–7)
RBC #/AREA URNS AUTO: 24 /HPF (ref 0–2)
SOURCE: ABNORMAL
SP GR UR STRIP: 1.02 (ref 1–1.03)
SQUAMOUS #/AREA URNS AUTO: <1 /HPF (ref 0–1)
TRANS CELLS #/AREA URNS HPF: 2 /HPF (ref 0–1)
URATE CRY #/AREA URNS HPF: ABNORMAL /HPF
UROBILINOGEN UR STRIP-MCNC: NORMAL MG/DL (ref 0–2)
WBC #/AREA URNS AUTO: 6 /HPF (ref 0–5)

## 2020-07-30 PROCEDURE — 81001 URINALYSIS AUTO W/SCOPE: CPT | Performed by: PHYSICIAN ASSISTANT

## 2020-07-30 NOTE — PROGRESS NOTES
This is a recent snapshot of the patient's [facility] medical record.  For current drug dose and complete information and questions, call [phone] or In Basket pool, fv home infusion ([id number])  CSN Number:  [id number]

## 2020-08-01 ENCOUNTER — APPOINTMENT (OUTPATIENT)
Dept: LAB | Facility: CLINIC | Age: 25
End: 2020-08-01
Attending: PHYSICIAN ASSISTANT
Payer: COMMERCIAL

## 2020-08-06 ENCOUNTER — RECORDS - HEALTHEAST (OUTPATIENT)
Dept: ADMINISTRATIVE | Facility: OTHER | Age: 25
End: 2020-08-06

## 2020-08-06 ENCOUNTER — VIRTUAL VISIT (OUTPATIENT)
Dept: ONCOLOGY | Facility: CLINIC | Age: 25
End: 2020-08-06
Attending: INTERNAL MEDICINE
Payer: COMMERCIAL

## 2020-08-06 VITALS
DIASTOLIC BLOOD PRESSURE: 70 MMHG | TEMPERATURE: 98.4 F | RESPIRATION RATE: 16 BRPM | OXYGEN SATURATION: 95 % | BODY MASS INDEX: 32.75 KG/M2 | WEIGHT: 215.4 LBS | SYSTOLIC BLOOD PRESSURE: 119 MMHG | HEART RATE: 95 BPM

## 2020-08-06 DIAGNOSIS — C49.9 DESMOPLASTIC SMALL ROUND CELL TUMOR (H): Primary | ICD-10-CM

## 2020-08-06 DIAGNOSIS — C41.9 SARCOMA, EWINGS (H): ICD-10-CM

## 2020-08-06 LAB
ALBUMIN SERPL-MCNC: 3 G/DL (ref 3.4–5)
ALP SERPL-CCNC: 90 U/L (ref 40–150)
ALT SERPL W P-5'-P-CCNC: 13 U/L (ref 0–70)
ALT SERPL W/O P-5'-P-CCNC: 13 U/L (ref 0–70)
ANION GAP SERPL CALCULATED.3IONS-SCNC: 6 MMOL/L (ref 3–14)
AST SERPL W P-5'-P-CCNC: 12 U/L (ref 0–45)
AST SERPL-CCNC: 12 U/L (ref 0–45)
BASOPHILS # BLD AUTO: 0.1 10E9/L (ref 0–0.2)
BASOPHILS NFR BLD AUTO: 0.4 %
BILIRUB SERPL-MCNC: 0.3 MG/DL (ref 0.2–1.3)
BUN SERPL-MCNC: 5 MG/DL (ref 7–30)
CALCIUM SERPL-MCNC: 8.7 MG/DL (ref 8.5–10.1)
CHLORIDE SERPL-SCNC: 111 MMOL/L (ref 94–109)
CO2 SERPL-SCNC: 25 MMOL/L (ref 20–32)
CREAT SERPL-MCNC: 0.54 MG/DL (ref 0.66–1.25)
CREAT SERPL-MCNC: 0.54 MG/DL (ref 0.66–1.25)
DIFFERENTIAL METHOD BLD: ABNORMAL
EOSINOPHIL # BLD AUTO: 0.2 10E9/L (ref 0–0.7)
EOSINOPHIL NFR BLD AUTO: 1.4 %
ERYTHROCYTE [DISTWIDTH] IN BLOOD BY AUTOMATED COUNT: 23.6 % (ref 10–15)
GFR ESTIMATE EXT - HISTORICAL: >90 ML/MIN/1.73M2
GFR ESTIMATE, IF BLACK EXT - HISTORICAL: >90 ML/MIN/1.73M2
GFR SERPL CREATININE-BSD FRML MDRD: >90 ML/MIN/{1.73_M2}
GLUCOSE SERPL-MCNC: 84 MG/DL (ref 70–99)
HCT VFR BLD AUTO: 25.8 % (ref 40–53)
HGB BLD-MCNC: 7.7 G/DL (ref 13.3–17.7)
IMM GRANULOCYTES # BLD: 0.2 10E9/L (ref 0–0.4)
IMM GRANULOCYTES NFR BLD: 1.2 %
LYMPHOCYTES # BLD AUTO: 0.7 10E9/L (ref 0.8–5.3)
LYMPHOCYTES NFR BLD AUTO: 4.5 %
MCH RBC QN AUTO: 28.3 PG (ref 26.5–33)
MCHC RBC AUTO-ENTMCNC: 29.8 G/DL (ref 31.5–36.5)
MCV RBC AUTO: 95 FL (ref 78–100)
MONOCYTES # BLD AUTO: 0.8 10E9/L (ref 0–1.3)
MONOCYTES NFR BLD AUTO: 5.3 %
NEUTROPHILS # BLD AUTO: 12.9 10E9/L (ref 1.6–8.3)
NEUTROPHILS NFR BLD AUTO: 87.2 %
NRBC # BLD AUTO: 0 10*3/UL
NRBC BLD AUTO-RTO: 0 /100
PLATELET # BLD AUTO: 252 10E9/L (ref 150–450)
POTASSIUM SERPL-SCNC: 4 MMOL/L (ref 3.4–5.3)
PROT SERPL-MCNC: 7.4 G/DL (ref 6.8–8.8)
RBC # BLD AUTO: 2.72 10E12/L (ref 4.4–5.9)
SODIUM SERPL-SCNC: 142 MMOL/L (ref 133–144)
WBC # BLD AUTO: 14.8 10E9/L (ref 4–11)

## 2020-08-06 PROCEDURE — 85025 COMPLETE CBC W/AUTO DIFF WBC: CPT | Performed by: INTERNAL MEDICINE

## 2020-08-06 PROCEDURE — 36592 COLLECT BLOOD FROM PICC: CPT

## 2020-08-06 PROCEDURE — 25000128 H RX IP 250 OP 636: Performed by: INTERNAL MEDICINE

## 2020-08-06 PROCEDURE — 40001009 ZZH VIDEO/TELEPHONE VISIT; NO CHARGE

## 2020-08-06 PROCEDURE — 80053 COMPREHEN METABOLIC PANEL: CPT | Performed by: INTERNAL MEDICINE

## 2020-08-06 PROCEDURE — 99214 OFFICE O/P EST MOD 30 MIN: CPT | Mod: 95 | Performed by: INTERNAL MEDICINE

## 2020-08-06 RX ORDER — NALOXONE HYDROCHLORIDE 0.4 MG/ML
.1-.4 INJECTION, SOLUTION INTRAMUSCULAR; INTRAVENOUS; SUBCUTANEOUS
Status: CANCELLED | OUTPATIENT
Start: 2020-08-07

## 2020-08-06 RX ORDER — HEPARIN SODIUM (PORCINE) LOCK FLUSH IV SOLN 100 UNIT/ML 100 UNIT/ML
5 SOLUTION INTRAVENOUS
Status: CANCELLED | OUTPATIENT
Start: 2020-08-07

## 2020-08-06 RX ORDER — HEPARIN SODIUM,PORCINE 10 UNIT/ML
5 VIAL (ML) INTRAVENOUS ONCE
Status: COMPLETED | OUTPATIENT
Start: 2020-08-06 | End: 2020-08-06

## 2020-08-06 RX ORDER — MEPERIDINE HYDROCHLORIDE 25 MG/ML
25 INJECTION INTRAMUSCULAR; INTRAVENOUS; SUBCUTANEOUS EVERY 30 MIN PRN
Status: CANCELLED | OUTPATIENT
Start: 2020-08-07

## 2020-08-06 RX ORDER — EPINEPHRINE 1 MG/ML
0.3 INJECTION, SOLUTION INTRAMUSCULAR; SUBCUTANEOUS EVERY 5 MIN PRN
Status: CANCELLED | OUTPATIENT
Start: 2020-08-07

## 2020-08-06 RX ORDER — SODIUM CHLORIDE 9 MG/ML
1000 INJECTION, SOLUTION INTRAVENOUS CONTINUOUS PRN
Status: CANCELLED
Start: 2020-08-07

## 2020-08-06 RX ORDER — METHYLPREDNISOLONE SODIUM SUCCINATE 125 MG/2ML
125 INJECTION, POWDER, LYOPHILIZED, FOR SOLUTION INTRAMUSCULAR; INTRAVENOUS
Status: CANCELLED
Start: 2020-08-07

## 2020-08-06 RX ORDER — LORAZEPAM 2 MG/ML
0.5 INJECTION INTRAMUSCULAR EVERY 4 HOURS PRN
Status: CANCELLED
Start: 2020-08-07

## 2020-08-06 RX ORDER — DIPHENHYDRAMINE HYDROCHLORIDE 50 MG/ML
50 INJECTION INTRAMUSCULAR; INTRAVENOUS
Status: CANCELLED
Start: 2020-08-07

## 2020-08-06 RX ORDER — HEPARIN SODIUM,PORCINE 10 UNIT/ML
5 VIAL (ML) INTRAVENOUS
Status: CANCELLED | OUTPATIENT
Start: 2020-08-07

## 2020-08-06 RX ORDER — PALONOSETRON 0.05 MG/ML
0.25 INJECTION, SOLUTION INTRAVENOUS ONCE
Status: CANCELLED
Start: 2020-08-07

## 2020-08-06 RX ORDER — ALBUTEROL SULFATE 0.83 MG/ML
2.5 SOLUTION RESPIRATORY (INHALATION)
Status: CANCELLED | OUTPATIENT
Start: 2020-08-07

## 2020-08-06 RX ORDER — ALBUTEROL SULFATE 90 UG/1
1-2 AEROSOL, METERED RESPIRATORY (INHALATION)
Status: CANCELLED
Start: 2020-08-07

## 2020-08-06 RX ADMIN — Medication 5 ML: at 12:33

## 2020-08-06 ASSESSMENT — PAIN SCALES - GENERAL: PAINLEVEL: NO PAIN (0)

## 2020-08-06 NOTE — LETTER
"    8/6/2020         RE: Diego Buchanan  [address]  [place] [address]        Dear Colleague,    Thank you for referring your patient, Diego Buchanan, to the [facility]. Please see a copy of my visit note below.    Diego Buchanan is a 25 year old male who is being evaluated via a billable video visit.      The patient has been notified of following:     \"This video visit will be conducted via a call between you and your physician/provider. We have found that certain health care needs can be provided without the need for an in-person physical exam.  This service lets us provide the care you need with a video conversation.  If a prescription is necessary we can send it directly to your pharmacy.  If lab work is needed we can place an order for that and you can then stop by our lab to have the test done at a later time.    Video visits are billed at different rates depending on your insurance coverage.  Please reach out to your insurance provider with any questions.    If during the course of the call the physician/provider feels a video visit is not appropriate, you will not be charged for this service.\"    Patient has given verbal consent for Video visit? Yes  How would you like to obtain your AVS? MyChart  If you are dropped from the video visit, the video invite should be resent to: Text to cell phone: [phone]  Will anyone else be joining your video visit? No        Video-Visit Details    Type of service:  Video Visit    Video Start Time: 5:30 PM  Video End Time: 6:09 PM    Originating Location (pt. 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 He presented initially with abdominal pain, diarrhea, and progressive abdominal distention for 3 months duration. " 2. Initial CT scan in February 2020 showed severe peritoneal carcinomatosis with large peritoneal tumor implants throughout the entire abdomen and pelvis, but mostly prominently in the pelvis.   2. PETCT scan showed interval increase in the amount of peritoneal carcinomatosis.  3. On 3/12/2020, he had ultrasound-guided core needle biopsy of a peritoneal implant (Case: [id number]), which showed a completely undifferentiated appearance, but stains with pancytokeratin, indicating an epithelial origin. 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 Tumor markers for CA-19-9, CEA, beta-hCG, alpha-fetoprotein were unremarkable. Cancer type ID molecular testing by [employer] sent to determine the exact diagnosis and to assist in further treatment planning. 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 Relative probability of less than 5% of other subtype of sarcoma. EWSR1-WT1 fusion detected.  4. 5/1/2020, MRI brain negative for brain metastasis, and baseline CT-CAP obtained showing extensive mixed solid and cystic masses throughout the abdomen and pelvis consistent with peritoneal carcinomatosis. Largest mass measures approximately 20 cm in the pelvis. Metastatic mixed solid and cystic masses seen in hepatic segments 5 and 6 and hepatic segment 7. The masses appear to be contiguous with the retrohepatic peritoneal carcinomatosis. Small volume fluid about the spleen favored to represent malignant ascites, stable mild-to-moderate right hydronephrosis related to mass effect upon the distal ureter in the pelvis, the IVC and iliac veins are compressed due to the extensive peritoneal carcinomatosis without findings to suggest deep venous thrombosis.  5. 5/4/2020, he begins CAV/IMV alternating chemotherapy.     HISTORY OF PRESENT ILLNESS  Diego Buchanan is a 25 year old male with a  learning disability and recent diagnosis of DSRCT. He has tolerated cycle 4 aside from fatigue.     Stomach pain improving. Sister has started giving him solid foods (meats, rice, veggies). She reports his appetite improved, he has been having regular bowel movements. Denies any N/V and having BM Q2-3 days.     He denies any fevers or chills. No cough. Last session of chemo, after 2-3 days he endorsed dysuria and little bit of blood, he was prescribed a course of Cipro.    He also did visit the ED 7/10 for back pain that has since improved.     HISTORY OF PRESENT ILLNESS    Current Outpatient Medications   Medication Sig Dispense Refill     allopurinol (ZYLOPRIM) 300 MG tablet Take 1 tablet (300 mg) by mouth daily 30 tablet 0     metoprolol tartrate (LOPRESSOR) 50 MG tablet Take 1.5 tablets (75 mg) by mouth daily 45 tablet 3     oxyCODONE (OXY-IR) 5 MG capsule Take 5 mg by mouth every 6 hours as needed for severe pain Prescribed in ED 7/6/20       polyethylene glycol (MIRALAX) 17 g packet Take 17 g by mouth       traMADol (ULTRAM) 50 MG tablet Take 50 mg by mouth as needed       dexamethasone (DECADRON) 4 MG tablet Take 2 tablets (8 mg) by mouth daily (with breakfast) for 3 days Start the day after doxorubicin, cyclophosphamide, and vincristine (odd cycles). 6 tablet 8     etoposide (VEPESID) 50 MG capsule Take 5 capsules (250 mg) by mouth daily for 6 days Days 1 through 6. 30 capsule 0     LORazepam (ATIVAN) 0.5 MG tablet Take 1 tablet (0.5 mg) by mouth every 4 hours as needed (Anxiety, Nausea/Vomiting or Sleep) (Patient not taking: Reported on 7/16/2020) 30 tablet 5     mesna (MESNEX) 400 MG TABS tablet Take 1 tablet (400 mg) by mouth every 4 hours for 2 doses Take one tablet 4 hours and 8 hours after end of cyclophosphamide infusion. 2 tablet 0     prochlorperazine (COMPAZINE) 10 MG tablet Take 1 tablet (10 mg) by mouth every 6 hours as needed (Nausea/Vomiting) (Patient not taking: Reported on 7/16/2020) 30  tablet 5       REVIEW OF SYSTEMS  12-point ROS negative except as in HPI    Past Medical History:   Diagnosis Date     Hypertension      Learning disability     but pt is his own gaurdian     Peritoneal carcinomatosis (H)      Past Surgical History:   Procedure Laterality Date     BIOPSY      liver     IR CVC TUNNEL PLACEMENT > 5 YRS OF AGE  4/29/2020     No family history on file.      LABS/IMAGING  CBC RESULTS:   Recent Labs   Lab Test 08/06/20  1239   WBC 14.8*   RBC 2.72*   HGB 7.7*   HCT 25.8*   MCV 95   MCH 28.3   MCHC 29.8*   RDW 23.6*        Last Comprehensive Metabolic Panel:  Sodium   Date Value Ref Range Status   08/06/2020 142 133 - 144 mmol/L Final     Potassium   Date Value Ref Range Status   08/06/2020 4.0 3.4 - 5.3 mmol/L Final     Chloride   Date Value Ref Range Status   08/06/2020 111 (H) 94 - 109 mmol/L Final     Carbon Dioxide   Date Value Ref Range Status   08/06/2020 25 20 - 32 mmol/L Final     Anion Gap   Date Value Ref Range Status   08/06/2020 6 3 - 14 mmol/L Final     Glucose   Date Value Ref Range Status   08/06/2020 84 70 - 99 mg/dL Final     Urea Nitrogen   Date Value Ref Range Status   08/06/2020 5 (L) 7 - 30 mg/dL Final     Creatinine   Date Value Ref Range Status   08/06/2020 0.54 (L) 0.66 - 1.25 mg/dL Final     GFR Estimate   Date Value Ref Range Status   08/06/2020 >90 >60 mL/min/[1.73_m2] Final     Comment:     Non  GFR Calc  Starting 12/18/2018, serum creatinine based estimated GFR (eGFR) will be   calculated using the Chronic Kidney Disease Epidemiology Collaboration   (CKD-EPI) equation.       Calcium   Date Value Ref Range Status   08/06/2020 8.7 8.5 - 10.1 mg/dL Final     Bilirubin Total   Date Value Ref Range Status   08/06/2020 0.3 0.2 - 1.3 mg/dL Final     Alkaline Phosphatase   Date Value Ref Range Status   08/06/2020 90 40 - 150 U/L Final     ALT   Date Value Ref Range Status   08/06/2020 13 0 - 70 U/L Final     AST   Date Value Ref Range Status    08/06/2020 12 0 - 45 U/L Final       CT-CAP, 7/13/2020  IMPRESSION: In this patient with a history of desmoplastic small round  cell tumor:  1. No appreciable change in extensive peritoneal carcinomatosis and  malignant ascites throughout the abdomen and pelvis, as described.  2. Cholelithiasis with increased gallbladder distention and new  gallbladder wall thickening, nonspecific in the setting of malignant  ascites. Consider further evaluation with right upper quadrant  ultrasound if there is concern for acute cholecystitis.  3. Minimally increased size of a right upper mediastinal lymph node  measuring 10 mm, previously 8 mm.  4. Continued mass effect on the distal IVC and bilateral iliac veins  which appear patent.  5. Stable to minimally decreased small left pleural effusion.  6. Mild right hydronephrosis is unchanged.     PHYSICAL EXAMINATION  There were no vitals taken for this visit.  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 CT-CAP stable/mild improvement. Unclear which regimen is working at this time, however, given clinical improvement (tolerating solid foods) will continue with current regimen.   - Continue with CAV/IMV  - CT-CAP after cycle 8      #2 Anemia, normocytic   Initially  microcytic, now normocytic. Hgb 7.7, likely 2/2 iron deficiency and chemotherapy.   - Continue Feosol daily.   - Trend, no transfusions needed at this time      #3 Malnutrition, secondary to #1 above  Saw nutritionist on 7/27, pt tolerating solid foods.   - Goals per nutritionist:   1.  Aim for 5-6 small frequent meals   2.  Aim for 2200kcal and 100g protein/day   3. Weight maintenance .   - Continue Boost shakes to supplement his nutrition    #4. Leukocytosis   Likely 2/2 growth factor. No signs of infection.   - Trend      Pt seen and discussed w/ Dr. Sims.     [facility]  [facility] Medicine, PGY2    ---  I evaluated the patient and reviewed the plan of care with the patient and the Resident. I agree with the assessment and plan documented in the clinical note.    Diego Buchanan has received 4 cycles of alternating CAV/IMV. CT-CAP shows no major change in the full extent of the peritoneal carcinomatosis. However, solid areas appear to have become cystic/necrotic and there are large cystic/loculated fluid collections, which have grown and may resorb with time. Patient continues to demonstrate clinical improvements as evidenced by reduction in pain, nausea, vomiting, and no further small bowel obstructions. He is moving his bowels regularly and his family is able to give him solid foods now. I reviewed my recommendation to continue chemotherapy and CT-CAP for reassessment after another 4 cycles of treatment. Patient and family agree.    Multiple questions answered.    Farzaneh Burciaga M.D.   of Medicine  Hematology, Oncology and Transplantation

## 2020-08-06 NOTE — PROGRESS NOTES
"Diego Buchanan is a 25 year old male who is being evaluated via a billable video visit.      The patient has been notified of following:     \"This video visit will be conducted via a call between you and your physician/provider. We have found that certain health care needs can be provided without the need for an in-person physical exam.  This service lets us provide the care you need with a video conversation.  If a prescription is necessary we can send it directly to your pharmacy.  If lab work is needed we can place an order for that and you can then stop by our lab to have the test done at a later time.    Video visits are billed at different rates depending on your insurance coverage.  Please reach out to your insurance provider with any questions.    If during the course of the call the physician/provider feels a video visit is not appropriate, you will not be charged for this service.\"    Patient has given verbal consent for Video visit? Yes  How would you like to obtain your AVS? MyChart  If you are dropped from the video visit, the video invite should be resent to: Text to cell phone: [phone]  Will anyone else be joining your video visit? No        Video-Visit Details    Type of service:  Video Visit    Video Start Time: 5:30 PM  Video End Time: 6:09 PM    Originating Location (pt. 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 He presented initially with abdominal pain, diarrhea, and progressive abdominal distention for 3 months duration. 2. Initial CT scan in February 2020 showed severe peritoneal carcinomatosis with large peritoneal tumor implants throughout the entire abdomen and pelvis, but mostly prominently in the pelvis.   2. PETCT scan showed interval increase in the " amount of peritoneal carcinomatosis.  3. On 3/12/2020, he had ultrasound-guided core needle biopsy of a peritoneal implant (Case: [id number]), which showed a completely undifferentiated appearance, but stains with pancytokeratin, indicating an epithelial origin. 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 Tumor markers for CA-19-9, CEA, beta-hCG, alpha-fetoprotein were unremarkable. Cancer type ID molecular testing by [employer] sent to determine the exact diagnosis and to assist in further treatment planning. 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 Relative probability of less than 5% of other subtype of sarcoma. EWSR1-WT1 fusion detected.  4. 5/1/2020, MRI brain negative for brain metastasis, and baseline CT-CAP obtained showing extensive mixed solid and cystic masses throughout the abdomen and pelvis consistent with peritoneal carcinomatosis. Largest mass measures approximately 20 cm in the pelvis. Metastatic mixed solid and cystic masses seen in hepatic segments 5 and 6 and hepatic segment 7. The masses appear to be contiguous with the retrohepatic peritoneal carcinomatosis. Small volume fluid about the spleen favored to represent malignant ascites, stable mild-to-moderate right hydronephrosis related to mass effect upon the distal ureter in the pelvis, the IVC and iliac veins are compressed due to the extensive peritoneal carcinomatosis without findings to suggest deep venous thrombosis.  5. 5/4/2020, he begins CAV/IMV alternating chemotherapy.     HISTORY OF PRESENT ILLNESS  Diego Buchanan is a 25 year old male with a learning disability and recent diagnosis of DSRCT. He has tolerated cycle 4 aside from fatigue.     Stomach pain improving. Sister has started giving him solid foods (meats, rice, veggies). She reports his appetite improved, he has been having  regular bowel movements. Denies any N/V and having BM Q2-3 days.     He denies any fevers or chills. No cough. Last session of chemo, after 2-3 days he endorsed dysuria and little bit of blood, he was prescribed a course of Cipro.    He also did visit the ED 7/10 for back pain that has since improved.     HISTORY OF PRESENT ILLNESS    Current Outpatient Medications   Medication Sig Dispense Refill     allopurinol (ZYLOPRIM) 300 MG tablet Take 1 tablet (300 mg) by mouth daily 30 tablet 0     metoprolol tartrate (LOPRESSOR) 50 MG tablet Take 1.5 tablets (75 mg) by mouth daily 45 tablet 3     oxyCODONE (OXY-IR) 5 MG capsule Take 5 mg by mouth every 6 hours as needed for severe pain Prescribed in ED 7/6/20       polyethylene glycol (MIRALAX) 17 g packet Take 17 g by mouth       traMADol (ULTRAM) 50 MG tablet Take 50 mg by mouth as needed       dexamethasone (DECADRON) 4 MG tablet Take 2 tablets (8 mg) by mouth daily (with breakfast) for 3 days Start the day after doxorubicin, cyclophosphamide, and vincristine (odd cycles). 6 tablet 8     etoposide (VEPESID) 50 MG capsule Take 5 capsules (250 mg) by mouth daily for 6 days Days 1 through 6. 30 capsule 0     LORazepam (ATIVAN) 0.5 MG tablet Take 1 tablet (0.5 mg) by mouth every 4 hours as needed (Anxiety, Nausea/Vomiting or Sleep) (Patient not taking: Reported on 7/16/2020) 30 tablet 5     mesna (MESNEX) 400 MG TABS tablet Take 1 tablet (400 mg) by mouth every 4 hours for 2 doses Take one tablet 4 hours and 8 hours after end of cyclophosphamide infusion. 2 tablet 0     prochlorperazine (COMPAZINE) 10 MG tablet Take 1 tablet (10 mg) by mouth every 6 hours as needed (Nausea/Vomiting) (Patient not taking: Reported on 7/16/2020) 30 tablet 5       REVIEW OF SYSTEMS  12-point ROS negative except as in HPI    Past Medical History:   Diagnosis Date     Hypertension      Learning disability     but pt is his own gaurdian     Peritoneal carcinomatosis (H)      Past Surgical History:    Procedure Laterality Date     BIOPSY      liver     IR CVC TUNNEL PLACEMENT > 5 YRS OF AGE  4/29/2020     No family history on file.      LABS/IMAGING  CBC RESULTS:   Recent Labs   Lab Test 08/06/20  1239   WBC 14.8*   RBC 2.72*   HGB 7.7*   HCT 25.8*   MCV 95   MCH 28.3   MCHC 29.8*   RDW 23.6*        Last Comprehensive Metabolic Panel:  Sodium   Date Value Ref Range Status   08/06/2020 142 133 - 144 mmol/L Final     Potassium   Date Value Ref Range Status   08/06/2020 4.0 3.4 - 5.3 mmol/L Final     Chloride   Date Value Ref Range Status   08/06/2020 111 (H) 94 - 109 mmol/L Final     Carbon Dioxide   Date Value Ref Range Status   08/06/2020 25 20 - 32 mmol/L Final     Anion Gap   Date Value Ref Range Status   08/06/2020 6 3 - 14 mmol/L Final     Glucose   Date Value Ref Range Status   08/06/2020 84 70 - 99 mg/dL Final     Urea Nitrogen   Date Value Ref Range Status   08/06/2020 5 (L) 7 - 30 mg/dL Final     Creatinine   Date Value Ref Range Status   08/06/2020 0.54 (L) 0.66 - 1.25 mg/dL Final     GFR Estimate   Date Value Ref Range Status   08/06/2020 >90 >60 mL/min/[1.73_m2] Final     Comment:     Non  GFR Calc  Starting 12/18/2018, serum creatinine based estimated GFR (eGFR) will be   calculated using the Chronic Kidney Disease Epidemiology Collaboration   (CKD-EPI) equation.       Calcium   Date Value Ref Range Status   08/06/2020 8.7 8.5 - 10.1 mg/dL Final     Bilirubin Total   Date Value Ref Range Status   08/06/2020 0.3 0.2 - 1.3 mg/dL Final     Alkaline Phosphatase   Date Value Ref Range Status   08/06/2020 90 40 - 150 U/L Final     ALT   Date Value Ref Range Status   08/06/2020 13 0 - 70 U/L Final     AST   Date Value Ref Range Status   08/06/2020 12 0 - 45 U/L Final       CT-CAP, 7/13/2020  IMPRESSION: In this patient with a history of desmoplastic small round  cell tumor:  1. No appreciable change in extensive peritoneal carcinomatosis and  malignant ascites throughout the abdomen  and pelvis, as described.  2. Cholelithiasis with increased gallbladder distention and new  gallbladder wall thickening, nonspecific in the setting of malignant  ascites. Consider further evaluation with right upper quadrant  ultrasound if there is concern for acute cholecystitis.  3. Minimally increased size of a right upper mediastinal lymph node  measuring 10 mm, previously 8 mm.  4. Continued mass effect on the distal IVC and bilateral iliac veins  which appear patent.  5. Stable to minimally decreased small left pleural effusion.  6. Mild right hydronephrosis is unchanged.     PHYSICAL EXAMINATION  There were no vitals taken for this visit.  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 CT-CAP stable/mild improvement. Unclear which regimen is working at this time, however, given clinical improvement (tolerating solid foods) will continue with current regimen.   - Continue with CAV/IMV  - CT-CAP after cycle 8      #2 Anemia, normocytic   Initially microcytic, now normocytic. Hgb 7.7, likely 2/2 iron deficiency and chemotherapy.   - Continue Feosol daily.   - Trend, no transfusions needed at this time      #3 Malnutrition, secondary to #1 above  Saw nutritionist on 7/27, pt tolerating solid foods.   -  Goals per nutritionist:   1.  Aim for 5-6 small frequent meals   2.  Aim for 2200kcal and 100g protein/day   3. Weight maintenance .   - Continue Boost shakes to supplement his nutrition    #4. Leukocytosis   Likely 2/2 growth factor. No signs of infection.   - Trend      Pt seen and discussed w/ Dr. Sims.     [facility]  Internal Medicine, PGY2    ---  I evaluated the patient and reviewed the plan of care with the patient and the Resident. I agree with the assessment and plan documented in the clinical note.    Diego Buchanan has received 4 cycles of alternating CAV/IMV. CT-CAP shows no major change in the full extent of the peritoneal carcinomatosis. However, solid areas appear to have become cystic/necrotic and there are large cystic/loculated fluid collections, which have grown and may resorb with time. Patient continues to demonstrate clinical improvements as evidenced by reduction in pain, nausea, vomiting, and no further small bowel obstructions. He is moving his bowels regularly and his family is able to give him solid foods now. I reviewed my recommendation to continue chemotherapy and CT-CAP for reassessment after another 4 cycles of treatment. Patient and family agree.    Multiple questions answered.    Farzaneh Burciaga M.D.   of Medicine  Hematology, Oncology and Transplantation

## 2020-08-06 NOTE — NURSING NOTE
Chief Complaint   Patient presents with     Blood Draw     Labs drawn via CVC by RN in lab. VS taken.      Labs collected from CVC by RN, line flushed with saline and heparin.  Vitals taken.    Ximena Hook RN

## 2020-08-07 ENCOUNTER — INFUSION THERAPY VISIT (OUTPATIENT)
Dept: ONCOLOGY | Facility: CLINIC | Age: 25
End: 2020-08-07
Attending: INTERNAL MEDICINE
Payer: COMMERCIAL

## 2020-08-07 ENCOUNTER — HOME INFUSION (PRE-WILLOW HOME INFUSION) (OUTPATIENT)
Dept: PHARMACY | Facility: CLINIC | Age: 25
End: 2020-08-07

## 2020-08-07 VITALS
SYSTOLIC BLOOD PRESSURE: 137 MMHG | DIASTOLIC BLOOD PRESSURE: 80 MMHG | OXYGEN SATURATION: 99 % | TEMPERATURE: 97.4 F | HEART RATE: 107 BPM | RESPIRATION RATE: 18 BRPM

## 2020-08-07 DIAGNOSIS — C49.9 DESMOPLASTIC SMALL ROUND CELL TUMOR (H): ICD-10-CM

## 2020-08-07 DIAGNOSIS — C41.9 SARCOMA, EWINGS (H): Primary | ICD-10-CM

## 2020-08-07 PROCEDURE — 96375 TX/PRO/DX INJ NEW DRUG ADDON: CPT

## 2020-08-07 PROCEDURE — 25800030 ZZH RX IP 258 OP 636: Mod: ZF | Performed by: INTERNAL MEDICINE

## 2020-08-07 PROCEDURE — 25000128 H RX IP 250 OP 636: Mod: ZF | Performed by: INTERNAL MEDICINE

## 2020-08-07 PROCEDURE — 96367 TX/PROPH/DG ADDL SEQ IV INF: CPT

## 2020-08-07 PROCEDURE — 96415 CHEMO IV INFUSION ADDL HR: CPT

## 2020-08-07 PROCEDURE — 96413 CHEMO IV INFUSION 1 HR: CPT

## 2020-08-07 PROCEDURE — 96411 CHEMO IV PUSH ADDL DRUG: CPT

## 2020-08-07 RX ORDER — PALONOSETRON 0.05 MG/ML
0.25 INJECTION, SOLUTION INTRAVENOUS ONCE
Status: COMPLETED | OUTPATIENT
Start: 2020-08-07 | End: 2020-08-07

## 2020-08-07 RX ORDER — HEPARIN SODIUM,PORCINE 10 UNIT/ML
5 VIAL (ML) INTRAVENOUS
Status: DISCONTINUED | OUTPATIENT
Start: 2020-08-07 | End: 2020-08-07 | Stop reason: HOSPADM

## 2020-08-07 RX ADMIN — DEXAMETHASONE SODIUM PHOSPHATE: 10 INJECTION, SOLUTION INTRAMUSCULAR; INTRAVENOUS at 12:13

## 2020-08-07 RX ADMIN — VINCRISTINE SULFATE 2 MG: 1 INJECTION, SOLUTION INTRAVENOUS at 13:24

## 2020-08-07 RX ADMIN — SODIUM CHLORIDE 250 ML: 9 INJECTION, SOLUTION INTRAVENOUS at 12:13

## 2020-08-07 RX ADMIN — PALONOSETRON HYDROCHLORIDE 0.25 MG: 0.25 INJECTION, SOLUTION INTRAVENOUS at 12:13

## 2020-08-07 RX ADMIN — MESNA 2810 MG: 100 INJECTION, SOLUTION INTRAVENOUS at 13:31

## 2020-08-07 RX ADMIN — Medication 5 ML: at 15:39

## 2020-08-07 ASSESSMENT — PAIN SCALES - GENERAL: PAINLEVEL: NO PAIN (0)

## 2020-08-07 NOTE — PROGRESS NOTES
Infusion Nursing Note:  Diego Buchanan presents today for Cycle 5, Day 1 Vincristine, Cytoxan/Mesna, Adriamycin pump.    Patient seen by provider today: No   present during visit today: Not Applicable.    Note: Pt assessed upon arrival to infusion suite. Pt had virtual visit with Dr. Sims yesterday and denies any new issues or changes since.    Intravenous Access:  Esparza. Sister Mariela changed dressing and caps 8/5. Next change due 8/12.     Treatment Conditions:  Lab Results   Component Value Date    HGB 7.7 08/06/2020     Lab Results   Component Value Date    WBC 14.8 08/06/2020      Lab Results   Component Value Date    ANEU 12.9 08/06/2020     Lab Results   Component Value Date     08/06/2020      Lab Results   Component Value Date     08/06/2020                   Lab Results   Component Value Date    POTASSIUM 4.0 08/06/2020           Lab Results   Component Value Date    MAG 2.3 07/23/2020            Lab Results   Component Value Date    CR 0.54 08/06/2020                   Lab Results   Component Value Date    FAISAL 8.7 08/06/2020                Lab Results   Component Value Date    BILITOTAL 0.3 08/06/2020           Lab Results   Component Value Date    ALBUMIN 3.0 08/06/2020                    Lab Results   Component Value Date    ALT 13 08/06/2020           Lab Results   Component Value Date    AST 12 08/06/2020     Results reviewed, labs MET treatment parameters, ok to proceed with treatment.    Post Infusion Assessment:  Patient tolerated infusion without incident.  Blood return noted pre and post infusion.  Blood return noted during Vincristine gravity administration every 2-3 cc.  Site patent and intact, free from redness, edema or discomfort.  No evidence of extravasations.     Pt connected to Adriamycin pump. Infusing through pt's purple lumen of Esparza. Settings and tape connections double checked by Karly Sarabia RN. Pt will have pump disconnected on Sunday 8/9 by [facility]  home infusion at 1530. [facility] notified of date and time.     Discharge Plan:   Patient given refills for Mesna. Pt and pt's sister aware to take one pill at 7:30pm tonight and the second pill at 11:30pm tonight.   Copy of AVS reviewed with patient and/or family.  Patient will return 8/28/20 for next appointment.  Patient discharged in stable condition accompanied by: self.  Departure Mode: Ambulatory.    Margaret Oneal RN

## 2020-08-07 NOTE — PATIENT INSTRUCTIONS
Please take your Mesna pill at 7:30pm and 11:30pm         [facility] Triage and after hours / weekends / holidays:  [phone]    Please call the triage or after hours line if you experience a temperature greater than or equal to 100.5, shaking chills, have uncontrolled nausea, vomiting and/or diarrhea, dizziness, shortness of breath, chest pain, bleeding, unexplained bruising, or if you have any other new/concerning symptoms, questions or concerns.      If you are having any concerning symptoms or wish to speak to a provider before your next infusion visit, please call your care coordinator or triage to notify them so we can adequately serve you.     If you need a refill on a narcotic prescription or other medication, please call before your infusion appointment.                 August 2020 Sunday Monday Tuesday Wednesday Thursday Friday Saturday                                 1    LAB   6:00 AM   (15 min.)   [facility] LAB HOME INFUSION   [facility] Laboratory Services   2     3     4     5     6    [facility] LAB DRAW  12:15 PM   (15 min.)   [facility] LAB DRAW   [facility] Lab Draw    VIDEO VISIT RETURN   5:00 PM   (30 min.)   Farzaneh Young MD   [facility] 7    P ONC INFUSION 180  11:30 AM   (180 min.)    ONCOLOGY INFUSION   [facility] 8       9     10     11     12     13     14     15       16     17     18     19     20     21     22       23     24     25     26     27     28    [facility] ONC INFUSION 180  12:30 PM   (180 min.)   [facility] ONCOLOGY INFUSION   [facility] 29 30 31 September 2020 Sunday Monday Tuesday Wednesday Thursday Friday Saturday             1     2     3     4     5       6     7     8     9     10     11     12       13     14     15     16     17     18    [facility] ONC INFUSION 180   1:30 PM   (180 min.)    ONCOLOGY INFUSION   [facility] 19        20     21     22     23     24     25     26       27     28     29     30                                    Lab Results:  No results found for this or any previous visit (from the past 12 hour(s)).

## 2020-08-09 ENCOUNTER — HOME INFUSION (PRE-WILLOW HOME INFUSION) (OUTPATIENT)
Dept: PHARMACY | Facility: CLINIC | Age: 25
End: 2020-08-09

## 2020-08-10 NOTE — PROGRESS NOTES
This is a recent snapshot of the patient's [facility] medical record.  For current drug dose and complete information and questions, call [phone] or In Basket pool, fv home infusion ([id number])  CSN Number:  [id number]

## 2020-08-10 NOTE — PROGRESS NOTES
This is a recent snapshot of the patient's [facility] medical record.  For current drug dose and complete information and questions, call [phone] or In Basket pool, fv home infusion ([id number])  CSN Number:  [id number]

## 2020-08-11 ENCOUNTER — RECORDS - HEALTHEAST (OUTPATIENT)
Dept: HEALTH INFORMATION MANAGEMENT | Facility: CLINIC | Age: 25
End: 2020-08-11

## 2020-08-11 DIAGNOSIS — C41.9 SARCOMA, EWINGS (H): ICD-10-CM

## 2020-08-11 DIAGNOSIS — C49.9 DESMOPLASTIC SMALL ROUND CELL TUMOR (H): Primary | ICD-10-CM

## 2020-08-11 RX ORDER — ALBUTEROL SULFATE 0.83 MG/ML
2.5 SOLUTION RESPIRATORY (INHALATION)
Status: CANCELLED | OUTPATIENT
Start: 2020-08-27

## 2020-08-11 RX ORDER — ALBUTEROL SULFATE 90 UG/1
1-2 AEROSOL, METERED RESPIRATORY (INHALATION)
Status: CANCELLED
Start: 2020-08-27

## 2020-08-11 RX ORDER — LORAZEPAM 2 MG/ML
0.5 INJECTION INTRAMUSCULAR EVERY 4 HOURS PRN
Status: CANCELLED
Start: 2020-08-27

## 2020-08-11 RX ORDER — SODIUM CHLORIDE 9 MG/ML
1000 INJECTION, SOLUTION INTRAVENOUS CONTINUOUS PRN
Status: CANCELLED
Start: 2020-08-27

## 2020-08-11 RX ORDER — HEPARIN SODIUM,PORCINE 10 UNIT/ML
5 VIAL (ML) INTRAVENOUS
Status: CANCELLED | OUTPATIENT
Start: 2020-08-27

## 2020-08-11 RX ORDER — NALOXONE HYDROCHLORIDE 0.4 MG/ML
.1-.4 INJECTION, SOLUTION INTRAMUSCULAR; INTRAVENOUS; SUBCUTANEOUS
Status: CANCELLED | OUTPATIENT
Start: 2020-08-27

## 2020-08-11 RX ORDER — METHYLPREDNISOLONE SODIUM SUCCINATE 125 MG/2ML
125 INJECTION, POWDER, LYOPHILIZED, FOR SOLUTION INTRAMUSCULAR; INTRAVENOUS
Status: CANCELLED
Start: 2020-08-27

## 2020-08-11 RX ORDER — MEPERIDINE HYDROCHLORIDE 25 MG/ML
25 INJECTION INTRAMUSCULAR; INTRAVENOUS; SUBCUTANEOUS EVERY 30 MIN PRN
Status: CANCELLED | OUTPATIENT
Start: 2020-08-27

## 2020-08-11 RX ORDER — HEPARIN SODIUM (PORCINE) LOCK FLUSH IV SOLN 100 UNIT/ML 100 UNIT/ML
5 SOLUTION INTRAVENOUS
Status: CANCELLED | OUTPATIENT
Start: 2020-08-27

## 2020-08-11 RX ORDER — DIPHENHYDRAMINE HYDROCHLORIDE 50 MG/ML
50 INJECTION INTRAMUSCULAR; INTRAVENOUS
Status: CANCELLED
Start: 2020-08-27

## 2020-08-11 RX ORDER — EPINEPHRINE 1 MG/ML
0.3 INJECTION, SOLUTION INTRAMUSCULAR; SUBCUTANEOUS EVERY 5 MIN PRN
Status: CANCELLED | OUTPATIENT
Start: 2020-08-27

## 2020-08-25 DIAGNOSIS — C41.9 SARCOMA, EWINGS (H): ICD-10-CM

## 2020-08-25 DIAGNOSIS — C49.9 DESMOPLASTIC SMALL ROUND CELL TUMOR (H): Primary | ICD-10-CM

## 2020-08-25 RX ORDER — ETOPOSIDE 50 MG/1
100 CAPSULE ORAL DAILY
Qty: 30 CAPSULE | Refills: 0 | Status: SHIPPED | OUTPATIENT
Start: 2020-08-25 | End: 2020-10-09

## 2020-08-26 ENCOUNTER — TELEPHONE (OUTPATIENT)
Dept: ONCOLOGY | Facility: CLINIC | Age: 25
End: 2020-08-26

## 2020-08-26 NOTE — TELEPHONE ENCOUNTER
[facility] Telephone Triage Note    Assessment: Family Member sister Mariela called in to triage reporting the following symptoms: Robert has had swelling in his R foot for approximately 1 week and states that it is painful to touch. Does not have any fever, Mariela states that it is pale as well.    Writer placed call to Mariela to assess symptoms today. Mariela states that she discussed further with him after phone call yesterday and he is having pain with ambulation and has new onset of blood in urine. Writer placed hold on 12:40 clinic appt with Cheryle Julian PA-C and discussed with BOY Morrell. Roula will discuss with care team and advise of further triage needs once finalized care plan decided on.

## 2020-08-27 ENCOUNTER — ANCILLARY PROCEDURE (OUTPATIENT)
Dept: ULTRASOUND IMAGING | Facility: CLINIC | Age: 25
End: 2020-08-27
Attending: INTERNAL MEDICINE
Payer: COMMERCIAL

## 2020-08-27 ENCOUNTER — ONCOLOGY VISIT (OUTPATIENT)
Dept: ONCOLOGY | Facility: CLINIC | Age: 25
End: 2020-08-27
Attending: INTERNAL MEDICINE
Payer: COMMERCIAL

## 2020-08-27 ENCOUNTER — RECORDS - HEALTHEAST (OUTPATIENT)
Dept: ADMINISTRATIVE | Facility: OTHER | Age: 25
End: 2020-08-27

## 2020-08-27 ENCOUNTER — APPOINTMENT (OUTPATIENT)
Dept: LAB | Facility: CLINIC | Age: 25
End: 2020-08-27
Attending: INTERNAL MEDICINE
Payer: COMMERCIAL

## 2020-08-27 VITALS
WEIGHT: 227.4 LBS | TEMPERATURE: 97 F | BODY MASS INDEX: 34.58 KG/M2 | HEART RATE: 90 BPM | DIASTOLIC BLOOD PRESSURE: 72 MMHG | SYSTOLIC BLOOD PRESSURE: 123 MMHG | RESPIRATION RATE: 16 BRPM | OXYGEN SATURATION: 100 %

## 2020-08-27 DIAGNOSIS — R31.9 HEMATURIA: ICD-10-CM

## 2020-08-27 DIAGNOSIS — R30.0 DYSURIA: ICD-10-CM

## 2020-08-27 DIAGNOSIS — C41.9 SARCOMA, EWINGS (H): Primary | ICD-10-CM

## 2020-08-27 DIAGNOSIS — C41.9 SARCOMA, EWINGS (H): ICD-10-CM

## 2020-08-27 DIAGNOSIS — D64.9 ANEMIA, UNSPECIFIED TYPE: Primary | ICD-10-CM

## 2020-08-27 DIAGNOSIS — M79.89 LEG SWELLING: ICD-10-CM

## 2020-08-27 LAB
ABO + RH BLD: NORMAL
ABO + RH BLD: NORMAL
ALBUMIN SERPL-MCNC: 3 G/DL (ref 3.4–5)
ALBUMIN UR-MCNC: 30 MG/DL
ALP SERPL-CCNC: 89 U/L (ref 40–150)
ALT SERPL W P-5'-P-CCNC: 13 U/L (ref 0–70)
ALT SERPL W/O P-5'-P-CCNC: 13 U/L (ref 0–70)
ANION GAP SERPL CALCULATED.3IONS-SCNC: 7 MMOL/L (ref 3–14)
APPEARANCE UR: CLEAR
AST SERPL W P-5'-P-CCNC: 12 U/L (ref 0–45)
AST SERPL-CCNC: 12 U/L (ref 0–45)
BASOPHILS # BLD AUTO: 0.1 10E9/L (ref 0–0.2)
BASOPHILS NFR BLD AUTO: 0.5 %
BILIRUB SERPL-MCNC: 0.4 MG/DL (ref 0.2–1.3)
BILIRUB UR QL STRIP: NEGATIVE
BLD GP AB SCN SERPL QL: NORMAL
BLD PROD TYP BPU: NORMAL
BLOOD BANK CMNT PATIENT-IMP: NORMAL
BUN SERPL-MCNC: 3 MG/DL (ref 7–30)
CALCIUM SERPL-MCNC: 8.5 MG/DL (ref 8.5–10.1)
CHLORIDE SERPL-SCNC: 112 MMOL/L (ref 94–109)
CO2 SERPL-SCNC: 25 MMOL/L (ref 20–32)
COLOR UR AUTO: YELLOW
CREAT SERPL-MCNC: 0.62 MG/DL (ref 0.66–1.25)
CREAT SERPL-MCNC: 0.62 MG/DL (ref 0.66–1.25)
DIFFERENTIAL METHOD BLD: ABNORMAL
EOSINOPHIL # BLD AUTO: 0 10E9/L (ref 0–0.7)
EOSINOPHIL NFR BLD AUTO: 0.2 %
ERYTHROCYTE [DISTWIDTH] IN BLOOD BY AUTOMATED COUNT: 20.8 % (ref 10–15)
FERRITIN SERPL-MCNC: 482 NG/ML (ref 26–388)
FOLATE SERPL-MCNC: 3.4 NG/ML
GFR ESTIMATE EXT - HISTORICAL: >90 ML/MIN/1.73M2
GFR ESTIMATE, IF BLACK EXT - HISTORICAL: >90 ML/MIN/1.73M2
GFR SERPL CREATININE-BSD FRML MDRD: >90 ML/MIN/{1.73_M2}
GLUCOSE SERPL-MCNC: 96 MG/DL (ref 70–99)
GLUCOSE UR STRIP-MCNC: NEGATIVE MG/DL
HCT VFR BLD AUTO: 23.5 % (ref 40–53)
HGB BLD-MCNC: 6.7 G/DL (ref 13.3–17.7)
HGB UR QL STRIP: ABNORMAL
HYALINE CASTS #/AREA URNS LPF: 1 /LPF (ref 0–2)
IMM GRANULOCYTES # BLD: 0.3 10E9/L (ref 0–0.4)
IMM GRANULOCYTES NFR BLD: 3 %
IRON SATN MFR SERPL: 22 % (ref 15–46)
IRON SERPL-MCNC: 55 UG/DL (ref 35–180)
KETONES UR STRIP-MCNC: NEGATIVE MG/DL
LEUKOCYTE ESTERASE UR QL STRIP: NEGATIVE
LYMPHOCYTES # BLD AUTO: 0.6 10E9/L (ref 0.8–5.3)
LYMPHOCYTES NFR BLD AUTO: 5.7 %
MCH RBC QN AUTO: 28.8 PG (ref 26.5–33)
MCHC RBC AUTO-ENTMCNC: 28.5 G/DL (ref 31.5–36.5)
MCV RBC AUTO: 101 FL (ref 78–100)
MONOCYTES # BLD AUTO: 1.1 10E9/L (ref 0–1.3)
MONOCYTES NFR BLD AUTO: 9.7 %
MUCOUS THREADS #/AREA URNS LPF: PRESENT /LPF
NEUTROPHILS # BLD AUTO: 8.9 10E9/L (ref 1.6–8.3)
NEUTROPHILS NFR BLD AUTO: 80.9 %
NITRATE UR QL: NEGATIVE
NRBC # BLD AUTO: 0 10*3/UL
NRBC BLD AUTO-RTO: 0 /100
NUM BPU REQUESTED: 1
PH UR STRIP: 6 PH (ref 5–7)
PLATELET # BLD AUTO: 348 10E9/L (ref 150–450)
POTASSIUM SERPL-SCNC: 3.4 MMOL/L (ref 3.4–5.3)
PROT SERPL-MCNC: 7.2 G/DL (ref 6.8–8.8)
RBC # BLD AUTO: 2.33 10E12/L (ref 4.4–5.9)
RBC #/AREA URNS AUTO: 48 /HPF (ref 0–2)
SODIUM SERPL-SCNC: 144 MMOL/L (ref 133–144)
SOURCE: ABNORMAL
SP GR UR STRIP: 1.02 (ref 1–1.03)
SPECIMEN EXP DATE BLD: NORMAL
SQUAMOUS #/AREA URNS AUTO: <1 /HPF (ref 0–1)
TIBC SERPL-MCNC: 249 UG/DL (ref 240–430)
URATE SERPL-MCNC: 5.3 MG/DL (ref 3.5–7.2)
UROBILINOGEN UR STRIP-MCNC: 0 MG/DL (ref 0–2)
VIT B12 SERPL-MCNC: 912 PG/ML (ref 193–986)
WBC # BLD AUTO: 11 10E9/L (ref 4–11)
WBC #/AREA URNS AUTO: 11 /HPF (ref 0–5)

## 2020-08-27 PROCEDURE — 36592 COLLECT BLOOD FROM PICC: CPT

## 2020-08-27 PROCEDURE — 83550 IRON BINDING TEST: CPT | Performed by: INTERNAL MEDICINE

## 2020-08-27 PROCEDURE — 86923 COMPATIBILITY TEST ELECTRIC: CPT | Performed by: PHYSICIAN ASSISTANT

## 2020-08-27 PROCEDURE — G0463 HOSPITAL OUTPT CLINIC VISIT: HCPCS | Mod: ZF

## 2020-08-27 PROCEDURE — 80053 COMPREHEN METABOLIC PANEL: CPT | Performed by: INTERNAL MEDICINE

## 2020-08-27 PROCEDURE — 88112 CYTOPATH CELL ENHANCE TECH: CPT | Performed by: PHYSICIAN ASSISTANT

## 2020-08-27 PROCEDURE — 84550 ASSAY OF BLOOD/URIC ACID: CPT | Performed by: INTERNAL MEDICINE

## 2020-08-27 PROCEDURE — 82607 VITAMIN B-12: CPT | Performed by: INTERNAL MEDICINE

## 2020-08-27 PROCEDURE — 83540 ASSAY OF IRON: CPT | Performed by: INTERNAL MEDICINE

## 2020-08-27 PROCEDURE — 87086 URINE CULTURE/COLONY COUNT: CPT | Performed by: PHYSICIAN ASSISTANT

## 2020-08-27 PROCEDURE — 86900 BLOOD TYPING SEROLOGIC ABO: CPT | Performed by: PHYSICIAN ASSISTANT

## 2020-08-27 PROCEDURE — 85025 COMPLETE CBC W/AUTO DIFF WBC: CPT | Performed by: INTERNAL MEDICINE

## 2020-08-27 PROCEDURE — 82746 ASSAY OF FOLIC ACID SERUM: CPT | Performed by: PHYSICIAN ASSISTANT

## 2020-08-27 PROCEDURE — 83550 IRON BINDING TEST: CPT | Performed by: PHYSICIAN ASSISTANT

## 2020-08-27 PROCEDURE — 81001 URINALYSIS AUTO W/SCOPE: CPT | Performed by: PHYSICIAN ASSISTANT

## 2020-08-27 PROCEDURE — 99215 OFFICE O/P EST HI 40 MIN: CPT | Mod: ZP | Performed by: PHYSICIAN ASSISTANT

## 2020-08-27 PROCEDURE — 82728 ASSAY OF FERRITIN: CPT | Performed by: INTERNAL MEDICINE

## 2020-08-27 PROCEDURE — 86901 BLOOD TYPING SEROLOGIC RH(D): CPT | Performed by: PHYSICIAN ASSISTANT

## 2020-08-27 PROCEDURE — 86850 RBC ANTIBODY SCREEN: CPT | Performed by: PHYSICIAN ASSISTANT

## 2020-08-27 PROCEDURE — 25000128 H RX IP 250 OP 636: Mod: ZF | Performed by: PHYSICIAN ASSISTANT

## 2020-08-27 RX ORDER — SULFAMETHOXAZOLE/TRIMETHOPRIM 800-160 MG
1 TABLET ORAL 2 TIMES DAILY
Qty: 14 TABLET | Refills: 0 | Status: SHIPPED | OUTPATIENT
Start: 2020-08-27 | End: 2020-09-03

## 2020-08-27 RX ORDER — HEPARIN SODIUM (PORCINE) LOCK FLUSH IV SOLN 100 UNIT/ML 100 UNIT/ML
5 SOLUTION INTRAVENOUS
Status: CANCELLED | OUTPATIENT
Start: 2020-08-27

## 2020-08-27 RX ORDER — HEPARIN SODIUM,PORCINE 10 UNIT/ML
5 VIAL (ML) INTRAVENOUS
Status: DISCONTINUED | OUTPATIENT
Start: 2020-08-27 | End: 2020-08-27 | Stop reason: HOSPADM

## 2020-08-27 RX ORDER — HEPARIN SODIUM,PORCINE 10 UNIT/ML
5 VIAL (ML) INTRAVENOUS
Status: CANCELLED | OUTPATIENT
Start: 2020-08-27

## 2020-08-27 RX ORDER — FOLIC ACID 1 MG/1
1 TABLET ORAL DAILY
Qty: 30 TABLET | Refills: 3 | Status: SHIPPED | OUTPATIENT
Start: 2020-08-27 | End: 2021-02-11

## 2020-08-27 RX ADMIN — Medication 5 ML: at 12:13

## 2020-08-27 ASSESSMENT — PAIN SCALES - GENERAL: PAINLEVEL: SEVERE PAIN (6)

## 2020-08-27 NOTE — LETTER
8/27/2020         RE: Diego Buchanan  [address]  [place] [address]        Dear Colleague,    Thank you for referring your patient, Diego Buchanan, to the [facility]. Please see a copy of my visit note below.    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 He presented initially with abdominal pain, diarrhea, and progressive abdominal distention for 3 months duration. 2. Initial CT scan in February 2020 showed severe peritoneal carcinomatosis with large peritoneal tumor implants throughout the entire abdomen and pelvis, but mostly prominently in the pelvis.   2. PETCT scan showed interval increase in the amount of peritoneal carcinomatosis.  3. On 3/12/2020, he had ultrasound-guided core needle biopsy of a peritoneal implant (Case: [id number]), which showed a completely undifferentiated appearance, but stains with pancytokeratin, indicating an epithelial origin. 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 Tumor markers for CA-19-9, CEA, beta-hCG, alpha-fetoprotein were unremarkable. Cancer type ID molecular testing by [employer] sent to determine the exact diagnosis and to assist in further treatment planning. 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 Relative probability of less than 5% of other subtype of sarcoma. [id number] fusion detected.  4. 5/1/2020, MRI brain negative for brain metastasis, and baseline CT-CAP obtained showing extensive mixed solid and cystic masses throughout the abdomen and pelvis consistent with peritoneal carcinomatosis. Largest mass measures approximately 20 cm in the pelvis. Metastatic mixed solid and cystic masses seen in hepatic segments 5 and 6 and hepatic segment 7. The masses appear to be contiguous  with the retrohepatic peritoneal carcinomatosis. Small volume fluid about the spleen favored to represent malignant ascites, stable mild-to-moderate right hydronephrosis related to mass effect upon the distal ureter in the pelvis, the IVC and iliac veins are compressed due to the extensive peritoneal carcinomatosis without findings to suggest deep venous thrombosis.  5. 5/4/2020, he begins CAV/IMV alternating chemotherapy.    Interval History:  Diego Buchanan was met with for evaluation of leg swelling. His sister assists with information collection  - They states that they first noted the swelling on Thursday. It is in both legs, but right more than left. He is also having some right foot/ankle pain which he rates at a 4/10. No recent falls or trauma. Denies any redness to skin. No pain in calves, thighs, or hips. Abdominal distension overall is stable. Bowels continue to move and he has been getting solid food intake.  - Robert also mentioned that he has had blood in his urine the past 1-2 days. Also notes some pain with urination. No new back pain. No fevers or chills. He does feel more fatigued than usual. No chest pain, shortness of breath, cough, lightheadedness, headaches.     Current Outpatient Medications   Medication Sig Dispense Refill     allopurinol (ZYLOPRIM) 300 MG tablet Take 1 tablet (300 mg) by mouth daily 30 tablet 0     dexamethasone (DECADRON) 4 MG tablet Take 2 tablets (8 mg) by mouth daily (with breakfast) for 3 days Start the day after doxorubicin, cyclophosphamide, and vincristine (odd cycles). 6 tablet 8     etoposide (VEPESID) 50 MG capsule Take 5 capsules (250 mg) by mouth daily for 6 days Days 1 through 6. 30 capsule 0     etoposide (VEPESID) 50 MG capsule Take 5 capsules (250 mg) by mouth daily for 6 days Days 1 through 6. 30 capsule 0     LORazepam (ATIVAN) 0.5 MG tablet Take 1 tablet (0.5 mg) by mouth every 4 hours as needed (Anxiety, Nausea/Vomiting or Sleep) (Patient not taking:  Reported on 7/16/2020) 30 tablet 5     mesna (MESNEX) 400 MG TABS tablet Take 1 tablet (400 mg) by mouth every 4 hours for 2 doses Take one tablet 4 hours and 8 hours after end of cyclophosphamide infusion. 2 tablet 0     mesna (MESNEX) 400 MG TABS tablet Take 1 tablet (400 mg) by mouth every 4 hours for 2 doses Take one tablet 4 hours and 8 hours after end of cyclophosphamide infusion. 2 tablet 0     metoprolol tartrate (LOPRESSOR) 50 MG tablet Take 1.5 tablets (75 mg) by mouth daily 45 tablet 3     oxyCODONE (OXY-IR) 5 MG capsule Take 5 mg by mouth every 6 hours as needed for severe pain Prescribed in ED 7/6/20       polyethylene glycol (MIRALAX) 17 g packet Take 17 g by mouth       prochlorperazine (COMPAZINE) 10 MG tablet Take 1 tablet (10 mg) by mouth every 6 hours as needed (Nausea/Vomiting) (Patient not taking: Reported on 7/16/2020) 30 tablet 5     traMADol (ULTRAM) 50 MG tablet Take 50 mg by mouth as needed         Physical Examination:  /72 (BP Location: Right arm, Patient Position: Sitting, Cuff Size: Adult Regular)   Pulse 90   Temp 97  F (36.1  C) (Tympanic)   Resp 16   Wt 103.1 kg (227 lb 6.4 oz)   SpO2 100%   BMI 34.58 kg/m    Wt Readings from Last 10 Encounters:   08/27/20 103.1 kg (227 lb 6.4 oz)   08/06/20 97.7 kg (215 lb 6.4 oz)   07/16/20 101.4 kg (223 lb 8 oz)   06/19/20 106.4 kg (234 lb 9.6 oz)   06/18/20 115.7 kg (255 lb)   05/26/20 115.7 kg (255 lb)   05/04/20 114.6 kg (252 lb 9.6 oz)   05/01/20 113.3 kg (249 lb 12.8 oz)     Constitutional: Well-appearing male in no acute distress.  Eyes: EOMI, PERRL. No scleral icterus.  Lymphatic: Neck is supple without cervical or supraclavicular lymphadenopathy.   Cardiovascular: Regular rate and rhythm. No murmurs, gallops, or rubs. Bilateral LE edema, R> L, 1-2+ pitting.  Respiratory: Clear to auscultation bilaterally. No wheezes or crackles.  Gastrointestinal: Bowel sounds present. Abdomen distended, not tender.   Neurologic: Cranial nerves  II through XII are grossly intact.  Skin: No rashes, petechiae, or bruising noted on exposed skin.  MSK: mild tenderness diffusely in the right ankle and top of right foot. No crepitus. ROM intact.     Laboratory Data:  Results for RY GOMES (MRN [id number]) as of 8/27/2020 16:23   Ref. Range 8/27/2020 12:17   Sodium Latest Ref Range: 133 - 144 mmol/L 144   Potassium Latest Ref Range: 3.4 - 5.3 mmol/L 3.4   Chloride Latest Ref Range: 94 - 109 mmol/L 112 (H)   Carbon Dioxide Latest Ref Range: 20 - 32 mmol/L 25   Urea Nitrogen Latest Ref Range: 7 - 30 mg/dL 3 (L)   Creatinine Latest Ref Range: 0.66 - 1.25 mg/dL 0.62 (L)   GFR Estimate Latest Ref Range: >60 mL/min/1.73_m2 >90   GFR Estimate If Black Latest Ref Range: >60 mL/min/1.73_m2 >90   Calcium Latest Ref Range: 8.5 - 10.1 mg/dL 8.5   Anion Gap Latest Ref Range: 3 - 14 mmol/L 7   Albumin Latest Ref Range: 3.4 - 5.0 g/dL 3.0 (L)   Protein Total Latest Ref Range: 6.8 - 8.8 g/dL 7.2   Bilirubin Total Latest Ref Range: 0.2 - 1.3 mg/dL 0.4   Alkaline Phosphatase Latest Ref Range: 40 - 150 U/L 89   ALT Latest Ref Range: 0 - 70 U/L 13   AST Latest Ref Range: 0 - 45 U/L 12   Ferritin Latest Ref Range: 26 - 388 ng/mL 482 (H)   Iron Latest Ref Range: 35 - 180 ug/dL 55   Iron Binding Cap Latest Ref Range: 240 - 430 ug/dL 249   Iron Saturation Index Latest Ref Range: 15 - 46 % 22   Uric Acid Latest Ref Range: 3.5 - 7.2 mg/dL 5.3   Vitamin B12 Latest Ref Range: 193 - 986 pg/mL 912   Glucose Latest Ref Range: 70 - 99 mg/dL 96   WBC Latest Ref Range: 4.0 - 11.0 10e9/L 11.0   Hemoglobin Latest Ref Range: 13.3 - 17.7 g/dL 6.7 (LL)   Hematocrit Latest Ref Range: 40.0 - 53.0 % 23.5 (L)   Platelet Count Latest Ref Range: 150 - 450 10e9/L 348   RBC Count Latest Ref Range: 4.4 - 5.9 10e12/L 2.33 (L)   MCV Latest Ref Range: 78 - 100 fl 101 (H)   MCH Latest Ref Range: 26.5 - 33.0 pg 28.8   MCHC Latest Ref Range: 31.5 - 36.5 g/dL 28.5 (L)   RDW Latest Ref Range: 10.0 - 15.0 % 20.8  "(H)   Diff Method Unknown Automated Method   % Neutrophils Latest Units: % 80.9   % Lymphocytes Latest Units: % 5.7   % Monocytes Latest Units: % 9.7   % Eosinophils Latest Units: % 0.2   % Basophils Latest Units: % 0.5   % Immature Granulocytes Latest Units: % 3.0   Nucleated RBCs Latest Ref Range: 0 /100 0   Absolute Neutrophil Latest Ref Range: 1.6 - 8.3 10e9/L 8.9 (H)   Absolute Lymphocytes Latest Ref Range: 0.8 - 5.3 10e9/L 0.6 (L)   Absolute Monocytes Latest Ref Range: 0.0 - 1.3 10e9/L 1.1   Absolute Eosinophils Latest Ref Range: 0.0 - 0.7 10e9/L 0.0   Absolute Basophils Latest Ref Range: 0.0 - 0.2 10e9/L 0.1   Abs Immature Granulocytes Latest Ref Range: 0 - 0.4 10e9/L 0.3   Absolute Nucleated RBC Unknown 0.0     Study Result     Exam: Ultrasound of the deep venous system of right leg dated  8/27/2020 12:47 PM     Clinical information: Edema.     Comparison: None     Ordering provider: MINA KHAN     Technique: Gray-scale evaluation with compression and Doppler  assessment of deep venous system for spontaneous and phasic flow, as  well as the presence of distal augmentation. Color flow images  obtained as needed. Gray-scale images with compression of the great  saphenous vein obtained as needed.     Findings:     Right leg:     CFV: Thrombus: No, Phasic: Yes  Femoral vein, proximal: Thrombus: No, Phasic: Yes  Femoral vein, mid: Thrombus: No, Phasic: Yes  Femoral vein, distal: Thrombus: No, Phasic: Yes  Popliteal vein: Thrombus: No, Phasic: Yes  PTV: Thrombus: No  Peroneal vein: Thrombus: No                                                                      Impression:     Right leg: No deep venous thrombosis.      Reference: \"Duplex Ultrasound in the Diagnosis of Lower-Extremity Deep  Venous Thrombosis\"- Liya Auguste MD, S; Geraldo Hillman MD  (Circulation. 2014;129:917-921. 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 CT-CAP stable/mild improvement. Unclear which regimen is working at this time, however, given clinical improvement (tolerating solid foods) will continue with current regimen.   - Continue with CAV/IMV, but delay cycle 6 until next week to allow for urinary symptoms to recover. Review with Dr. Sims.   - CT-CAP after cycle 8      #2 Anemia, normocytic   Initially microcytic, now macrocytic but with acute blood loss in urine. Hgb 6.7, will arrange for 1 unit PRBC and repeat iron/B12/folate studies. .   - Continue Feosol daily.      #3 Malnutrition, secondary to #1 above  Saw nutritionist on 7/27, pt tolerating solid foods.   - Goals per nutritionist:              1.  Aim for 5-6 small frequent meals              2.  Aim for 2200kcal and 100g protein/day              3. Weight maintenance .   - Continue Boost shakes to supplement his nutrition  - Encourage protein intake.      #4. Dysuria, hematuria  - Will treat for UTI with DS bactrim x 7 days and follow up on culture. Advised to monitor symptoms closely and let us know if symptoms worsen or do not change. Delay ifos until next week to ensure urinary symptoms have resolved. Will also check urine cytology.     #5 Bilateral leg swelling, R>L.   - RLE doppler negative for DVT. Noted low albumin on blood work, encourage adequate protein intake. May also be due to poor lymphatic drainage. Does not appear consistent with gout and no trauma to suggest a break in bone. He is able to bear weight. Advised leg elevation, tylenol as needed, and compression stocking. Call if worsens or no improvement. Will also obtain  update echo.     ADDENDUM:  Folic acid low, will start supplement    Cheryle Julian PA-C  [facility] [place] [id number] [phone]    Again, thank you for allowing me to participate in the care of your patient.        Sincerely,        Cheryle Julian PA-C

## 2020-08-27 NOTE — NURSING NOTE
"Oncology Rooming Note    August 27, 2020 1:04 PM   Diego Buchanan is a 25 year old male who presents for:    Chief Complaint   Patient presents with     Blood Draw     labs drawn from cvc by rn.  vs taken     RECHECK     pt with hx of barton sarcoma here for md visit     Initial Vitals: /72 (BP Location: Right arm, Patient Position: Sitting, Cuff Size: Adult Regular)   Pulse 90   Temp 97  F (36.1  C) (Tympanic)   Resp 16   Wt 103.1 kg (227 lb 6.4 oz)   SpO2 100%   BMI 34.58 kg/m   Estimated body mass index is 34.58 kg/m  as calculated from the following:    Height as of 6/18/20: 1.727 m (5' 8\").    Weight as of this encounter: 103.1 kg (227 lb 6.4 oz). Body surface area is 2.22 meters squared.  Severe Pain (6) Comment: Data Unavailable   No LMP for male patient.  Allergies reviewed: Yes  Medications reviewed: Yes    Medications: Medication refills not needed today.  Pharmacy name entered into [facility]:    [id number] - [place], [place] - [address] [place] [address] & [facility] [place], [place] - [phone]    Clinical concerns: right foot swollen       Katie Harding RN            "

## 2020-08-27 NOTE — NURSING NOTE
Chief Complaint   Patient presents with     Blood Draw     labs drawn from cvc by rn.  vs taken     Labs drawn from CVC by rn; urine collected and sent as well.  Good blood return noted in both lumens.  Both lumens flushed with NS and heparin.  Pt tolerated well.  VS taken.  Pt checked in for next appt.    Jacqueline Alcantara RN

## 2020-08-27 NOTE — PROGRESS NOTES
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 He presented initially with abdominal pain, diarrhea, and progressive abdominal distention for 3 months duration. 2. Initial CT scan in February 2020 showed severe peritoneal carcinomatosis with large peritoneal tumor implants throughout the entire abdomen and pelvis, but mostly prominently in the pelvis.   2. PETCT scan showed interval increase in the amount of peritoneal carcinomatosis.  3. On 3/12/2020, he had ultrasound-guided core needle biopsy of a peritoneal implant (Case: [id number]), which showed a completely undifferentiated appearance, but stains with pancytokeratin, indicating an epithelial origin. 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 Tumor markers for CA-19-9, CEA, beta-hCG, alpha-fetoprotein were unremarkable. Cancer type ID molecular testing by [employer] sent to determine the exact diagnosis and to assist in further treatment planning. 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 Relative probability of less than 5% of other subtype of sarcoma. EWSR1-WT1 fusion detected.  4. 5/1/2020, MRI brain negative for brain metastasis, and baseline CT-CAP obtained showing extensive mixed solid and cystic masses throughout the abdomen and pelvis consistent with peritoneal carcinomatosis. Largest mass measures approximately 20 cm in the pelvis. Metastatic mixed solid and cystic masses seen in hepatic segments 5 and 6 and hepatic segment 7. The masses appear to be contiguous with the retrohepatic peritoneal carcinomatosis. Small volume fluid about the spleen favored to represent malignant ascites, stable mild-to-moderate right hydronephrosis related to mass effect upon the distal ureter in the pelvis, the IVC and  iliac veins are compressed due to the extensive peritoneal carcinomatosis without findings to suggest deep venous thrombosis.  5. 5/4/2020, he begins CAV/IMV alternating chemotherapy.    Interval History:  Diego Buchanan was met with for evaluation of leg swelling. His sister assists with information collection  - They states that they first noted the swelling on Thursday. It is in both legs, but right more than left. He is also having some right foot/ankle pain which he rates at a 4/10. No recent falls or trauma. Denies any redness to skin. No pain in calves, thighs, or hips. Abdominal distension overall is stable. Bowels continue to move and he has been getting solid food intake.  - Robert also mentioned that he has had blood in his urine the past 1-2 days. Also notes some pain with urination. No new back pain. No fevers or chills. He does feel more fatigued than usual. No chest pain, shortness of breath, cough, lightheadedness, headaches.     Current Outpatient Medications   Medication Sig Dispense Refill     allopurinol (ZYLOPRIM) 300 MG tablet Take 1 tablet (300 mg) by mouth daily 30 tablet 0     dexamethasone (DECADRON) 4 MG tablet Take 2 tablets (8 mg) by mouth daily (with breakfast) for 3 days Start the day after doxorubicin, cyclophosphamide, and vincristine (odd cycles). 6 tablet 8     etoposide (VEPESID) 50 MG capsule Take 5 capsules (250 mg) by mouth daily for 6 days Days 1 through 6. 30 capsule 0     etoposide (VEPESID) 50 MG capsule Take 5 capsules (250 mg) by mouth daily for 6 days Days 1 through 6. 30 capsule 0     LORazepam (ATIVAN) 0.5 MG tablet Take 1 tablet (0.5 mg) by mouth every 4 hours as needed (Anxiety, Nausea/Vomiting or Sleep) (Patient not taking: Reported on 7/16/2020) 30 tablet 5     mesna (MESNEX) 400 MG TABS tablet Take 1 tablet (400 mg) by mouth every 4 hours for 2 doses Take one tablet 4 hours and 8 hours after end of cyclophosphamide infusion. 2 tablet 0     mesna (MESNEX) 400 MG TABS  tablet Take 1 tablet (400 mg) by mouth every 4 hours for 2 doses Take one tablet 4 hours and 8 hours after end of cyclophosphamide infusion. 2 tablet 0     metoprolol tartrate (LOPRESSOR) 50 MG tablet Take 1.5 tablets (75 mg) by mouth daily 45 tablet 3     oxyCODONE (OXY-IR) 5 MG capsule Take 5 mg by mouth every 6 hours as needed for severe pain Prescribed in ED 7/6/20       polyethylene glycol (MIRALAX) 17 g packet Take 17 g by mouth       prochlorperazine (COMPAZINE) 10 MG tablet Take 1 tablet (10 mg) by mouth every 6 hours as needed (Nausea/Vomiting) (Patient not taking: Reported on 7/16/2020) 30 tablet 5     traMADol (ULTRAM) 50 MG tablet Take 50 mg by mouth as needed         Physical Examination:  /72 (BP Location: Right arm, Patient Position: Sitting, Cuff Size: Adult Regular)   Pulse 90   Temp 97  F (36.1  C) (Tympanic)   Resp 16   Wt 103.1 kg (227 lb 6.4 oz)   SpO2 100%   BMI 34.58 kg/m    Wt Readings from Last 10 Encounters:   08/27/20 103.1 kg (227 lb 6.4 oz)   08/06/20 97.7 kg (215 lb 6.4 oz)   07/16/20 101.4 kg (223 lb 8 oz)   06/19/20 106.4 kg (234 lb 9.6 oz)   06/18/20 115.7 kg (255 lb)   05/26/20 115.7 kg (255 lb)   05/04/20 114.6 kg (252 lb 9.6 oz)   05/01/20 113.3 kg (249 lb 12.8 oz)     Constitutional: Well-appearing male in no acute distress.  Eyes: EOMI, PERRL. No scleral icterus.  Lymphatic: Neck is supple without cervical or supraclavicular lymphadenopathy.   Cardiovascular: Regular rate and rhythm. No murmurs, gallops, or rubs. Bilateral LE edema, R> L, 1-2+ pitting.  Respiratory: Clear to auscultation bilaterally. No wheezes or crackles.  Gastrointestinal: Bowel sounds present. Abdomen distended, not tender.   Neurologic: Cranial nerves II through XII are grossly intact.  Skin: No rashes, petechiae, or bruising noted on exposed skin.  MSK: mild tenderness diffusely in the right ankle and top of right foot. No crepitus. ROM intact.     Laboratory Data:  Results for RY GOMES  (MRN [id number]) as of 8/27/2020 16:23   Ref. Range 8/27/2020 12:17   Sodium Latest Ref Range: 133 - 144 mmol/L 144   Potassium Latest Ref Range: 3.4 - 5.3 mmol/L 3.4   Chloride Latest Ref Range: 94 - 109 mmol/L 112 (H)   Carbon Dioxide Latest Ref Range: 20 - 32 mmol/L 25   Urea Nitrogen Latest Ref Range: 7 - 30 mg/dL 3 (L)   Creatinine Latest Ref Range: 0.66 - 1.25 mg/dL 0.62 (L)   GFR Estimate Latest Ref Range: >60 mL/min/1.73_m2 >90   GFR Estimate If Black Latest Ref Range: >60 mL/min/1.73_m2 >90   Calcium Latest Ref Range: 8.5 - 10.1 mg/dL 8.5   Anion Gap Latest Ref Range: 3 - 14 mmol/L 7   Albumin Latest Ref Range: 3.4 - 5.0 g/dL 3.0 (L)   Protein Total Latest Ref Range: 6.8 - 8.8 g/dL 7.2   Bilirubin Total Latest Ref Range: 0.2 - 1.3 mg/dL 0.4   Alkaline Phosphatase Latest Ref Range: 40 - 150 U/L 89   ALT Latest Ref Range: 0 - 70 U/L 13   AST Latest Ref Range: 0 - 45 U/L 12   Ferritin Latest Ref Range: 26 - 388 ng/mL 482 (H)   Iron Latest Ref Range: 35 - 180 ug/dL 55   Iron Binding Cap Latest Ref Range: 240 - 430 ug/dL 249   Iron Saturation Index Latest Ref Range: 15 - 46 % 22   Uric Acid Latest Ref Range: 3.5 - 7.2 mg/dL 5.3   Vitamin B12 Latest Ref Range: 193 - 986 pg/mL 912   Glucose Latest Ref Range: 70 - 99 mg/dL 96   WBC Latest Ref Range: 4.0 - 11.0 10e9/L 11.0   Hemoglobin Latest Ref Range: 13.3 - 17.7 g/dL 6.7 (LL)   Hematocrit Latest Ref Range: 40.0 - 53.0 % 23.5 (L)   Platelet Count Latest Ref Range: 150 - 450 10e9/L 348   RBC Count Latest Ref Range: 4.4 - 5.9 10e12/L 2.33 (L)   MCV Latest Ref Range: 78 - 100 fl 101 (H)   MCH Latest Ref Range: 26.5 - 33.0 pg 28.8   MCHC Latest Ref Range: 31.5 - 36.5 g/dL 28.5 (L)   RDW Latest Ref Range: 10.0 - 15.0 % 20.8 (H)   Diff Method Unknown Automated Method   % Neutrophils Latest Units: % 80.9   % Lymphocytes Latest Units: % 5.7   % Monocytes Latest Units: % 9.7   % Eosinophils Latest Units: % 0.2   % Basophils Latest Units: % 0.5   % Immature Granulocytes  "Latest Units: % 3.0   Nucleated RBCs Latest Ref Range: 0 /100 0   Absolute Neutrophil Latest Ref Range: 1.6 - 8.3 10e9/L 8.9 (H)   Absolute Lymphocytes Latest Ref Range: 0.8 - 5.3 10e9/L 0.6 (L)   Absolute Monocytes Latest Ref Range: 0.0 - 1.3 10e9/L 1.1   Absolute Eosinophils Latest Ref Range: 0.0 - 0.7 10e9/L 0.0   Absolute Basophils Latest Ref Range: 0.0 - 0.2 10e9/L 0.1   Abs Immature Granulocytes Latest Ref Range: 0 - 0.4 10e9/L 0.3   Absolute Nucleated RBC Unknown 0.0     Study Result     Exam: Ultrasound of the deep venous system of right leg dated  8/27/2020 12:47 PM     Clinical information: Edema.     Comparison: None     Ordering provider: MINA KHAN     Technique: Gray-scale evaluation with compression and Doppler  assessment of deep venous system for spontaneous and phasic flow, as  well as the presence of distal augmentation. Color flow images  obtained as needed. Gray-scale images with compression of the great  saphenous vein obtained as needed.     Findings:     Right leg:     CFV: Thrombus: No, Phasic: Yes  Femoral vein, proximal: Thrombus: No, Phasic: Yes  Femoral vein, mid: Thrombus: No, Phasic: Yes  Femoral vein, distal: Thrombus: No, Phasic: Yes  Popliteal vein: Thrombus: No, Phasic: Yes  PTV: Thrombus: No  Peroneal vein: Thrombus: No                                                                      Impression:     Right leg: No deep venous thrombosis.      Reference: \"Duplex Ultrasound in the Diagnosis of Lower-Extremity Deep  Venous Thrombosis\"- Liya Auguste MD, S; Geraldo Hillman MD  (Circulation. 2014;129:917-921. 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 Buchanan is a 25 year old male with advanced intraabdominal DSRCT with extensive peritoneal disease, lymph node metastasis, and liver " and bone involvement .Now on CAV/IMV,  he has tolerated 4 cycles of chemotherapy well with anticipated side effects . CT-CAP stable/mild improvement. Unclear which regimen is working at this time, however, given clinical improvement (tolerating solid foods) will continue with current regimen.   - Continue with CAV/IMV, but delay cycle 6 until next week to allow for urinary symptoms to recover. Review with Dr. Sims.   - CT-CAP after cycle 8      #2 Anemia, normocytic   Initially microcytic, now macrocytic but with acute blood loss in urine. Hgb 6.7, will arrange for 1 unit PRBC and repeat iron/B12/folate studies. .   - Continue Feosol daily.      #3 Malnutrition, secondary to #1 above  Saw nutritionist on 7/27, pt tolerating solid foods.   - Goals per nutritionist:              1.  Aim for 5-6 small frequent meals              2.  Aim for 2200kcal and 100g protein/day              3. Weight maintenance .   - Continue Boost shakes to supplement his nutrition  - Encourage protein intake.      #4. Dysuria, hematuria  - Will treat for UTI with DS bactrim x 7 days and follow up on culture. Advised to monitor symptoms closely and let us know if symptoms worsen or do not change. Delay ifos until next week to ensure urinary symptoms have resolved. Will also check urine cytology.     #5 Bilateral leg swelling, R>L.   - RLE doppler negative for DVT. Noted low albumin on blood work, encourage adequate protein intake. May also be due to poor lymphatic drainage. Does not appear consistent with gout and no trauma to suggest a break in bone. He is able to bear weight. Advised leg elevation, tylenol as needed, and compression stocking. Call if worsens or no improvement. Will also obtain update echo.     ADDENDUM:  Folic acid low, will start supplement    Cheryle Julian PA-C  [facility]  [address] [place] [address] [phone]

## 2020-08-28 ENCOUNTER — INFUSION THERAPY VISIT (OUTPATIENT)
Dept: INFUSION THERAPY | Facility: CLINIC | Age: 25
End: 2020-08-28
Attending: INTERNAL MEDICINE
Payer: COMMERCIAL

## 2020-08-28 VITALS
HEART RATE: 90 BPM | DIASTOLIC BLOOD PRESSURE: 66 MMHG | TEMPERATURE: 98.2 F | OXYGEN SATURATION: 100 % | SYSTOLIC BLOOD PRESSURE: 106 MMHG | RESPIRATION RATE: 16 BRPM

## 2020-08-28 DIAGNOSIS — C49.9 DESMOPLASTIC SMALL ROUND CELL TUMOR (H): Primary | ICD-10-CM

## 2020-08-28 LAB
BACTERIA SPEC CULT: NO GROWTH
BLD PROD TYP BPU: NORMAL
BLD UNIT ID BPU: 0
BLOOD PRODUCT CODE: NORMAL
BPU ID: NORMAL
COPATH REPORT: NORMAL
Lab: NORMAL
SPECIMEN SOURCE: NORMAL
TRANSFUSION STATUS PATIENT QL: NORMAL
TRANSFUSION STATUS PATIENT QL: NORMAL

## 2020-08-28 PROCEDURE — 36430 TRANSFUSION BLD/BLD COMPNT: CPT

## 2020-08-28 PROCEDURE — P9016 RBC LEUKOCYTES REDUCED: HCPCS | Performed by: PHYSICIAN ASSISTANT

## 2020-08-28 NOTE — PATIENT INSTRUCTIONS
Dear Diego Buchanan    Thank you for choosing [facility] ([facility]) for your infusion.  The following information is a summary of our appointment as well as important reminders.      We look forward in seeing you on your next appointment here at [facility] ([facility]).  Please don t hesitate to call us at [phone] to reschedule any of your appointments or to speak with one of the [facility] registered nurses.  It was a pleasure taking care of you today.    Sincerely,    [facility]   [facility]  [address] [place] [address]  [id number]  Phone:  [phone]    HOME CARE FOR PATIENTS RECEIVING BLOOD PRODUCTS    You have just received a blood product.  During the next 48 hours please be aware of the following symptoms of a blood product reaction.    The possible symptoms of a blood reaction are:      Chills    Fever temperature above 100.0 orally    Headache    Nausea    Persistent non-productive cough    Hives    Itching    Facial swelling or flushing    Difficulty breathing or wheezing    Bloody urine      If you experience any of these symptoms contact:    [phone]  Emergency Room    If you do not live locally you should contact your local physician.

## 2020-08-28 NOTE — PROGRESS NOTES
Nursing Note  Diego Buchanan [facility] for:   Chief Complaint   Patient presents with     Infusion     1 unit PRBCs     During today's [facility] appointment, orders from Cheryle Julian PA-C were completed.  Frequency: once    Progress note:  Patient identification verified by name and date of birth.  Assessment completed.  Vitals recorded in Doc Flowsheets.  Patient was provided with education regarding medication/procedure and possible side effects.  Patient verbalized understanding.     present during visit today: Not Applicable.    Treatment Conditions: Hgb 6.7 on 8/27/20    Premedications: were not ordered.    Drug Waste Record: No    Infusion length and rate:  Started at 120 ml/hr for the first 10 minutes and then increased to 225ml/hr for the remainder of infusion. VS monitored per protocol.    Labs: were drawn yesterday    Vascular access: Esparza used for infusion. Both lumens locked after transfusion given.     Post Infusion Assessment:  Patient tolerated infusion without incident.     Discharge Plan:   Follow up plan of care with: ordering provider as scheduled.  Discharge instructions were reviewed with patient and sister.   Patient/representative verbalized understanding of discharge instructions and all questions answered.  Patient discharged [facility] in stable condition.    Yasmine Lopez RN        /66   Pulse 90   Temp 98.2  F (36.8  C) (Oral)   Resp 16   SpO2 100%

## 2020-08-28 NOTE — LETTER
8/28/2020         RE: Diego Buchanan  [address]  [place] [address]        Dear Colleague,    Thank you for referring your patient, Diego Buchanan, to the [facility] [place] [facility]. Please see a copy of my visit note below.    Nursing Note  Diego Buchanan [facility] for:   Chief Complaint   Patient presents with     Infusion     1 unit PRBCs     During today's [facility] appointment, orders from Cheryle Julian PA-C were completed.  Frequency: once    Progress note:  Patient identification verified by name and date of birth.  Assessment completed.  Vitals recorded in Doc Flowsheets.  Patient was provided with education regarding medication/procedure and possible side effects.  Patient verbalized understanding.     present during visit today: Not Applicable.    Treatment Conditions: Hgb 6.7 on 8/27/20    Premedications: were not ordered.    Drug Waste Record: No    Infusion length and rate:  Started at 120 ml/hr for the first 10 minutes and then increased to 225ml/hr for the remainder of infusion. VS monitored per protocol.    Labs: were drawn yesterday    Vascular access: Esparza used for infusion. Both lumens locked after transfusion given.     Post Infusion Assessment:  Patient tolerated infusion without incident.     Discharge Plan:   Follow up plan of care with: ordering provider as scheduled.  Discharge instructions were reviewed with patient and sister.   Patient/representative verbalized understanding of discharge instructions and all questions answered.  [facility] discharged [facility] in stable condition.    Yasmine Lopez RN        /66   Pulse 90   Temp 98.2  F (36.8  C) (Oral)   Resp 16   SpO2 100%       Again, thank you for allowing me to participate in the care of your patient.        Sincerely,        VIDEO,

## 2020-09-01 ENCOUNTER — HOSPITAL ENCOUNTER (OUTPATIENT)
Dept: CARDIOLOGY | Facility: CLINIC | Age: 25
Discharge: HOME | End: 2020-09-01
Attending: PHYSICIAN ASSISTANT | Admitting: PHYSICIAN ASSISTANT
Payer: COMMERCIAL

## 2020-09-01 DIAGNOSIS — M79.89 LEG SWELLING: ICD-10-CM

## 2020-09-01 PROCEDURE — 93306 TTE W/DOPPLER COMPLETE: CPT

## 2020-09-01 PROCEDURE — 93306 TTE W/DOPPLER COMPLETE: CPT | Mod: 26 | Performed by: STUDENT IN AN ORGANIZED HEALTH CARE EDUCATION/TRAINING PROGRAM

## 2020-09-02 DIAGNOSIS — R31.9 HEMATURIA, UNSPECIFIED TYPE: Primary | ICD-10-CM

## 2020-09-03 ENCOUNTER — INFUSION THERAPY VISIT (OUTPATIENT)
Dept: ONCOLOGY | Facility: CLINIC | Age: 25
End: 2020-09-03
Attending: PHYSICIAN ASSISTANT
Payer: COMMERCIAL

## 2020-09-03 ENCOUNTER — HOME INFUSION (PRE-WILLOW HOME INFUSION) (OUTPATIENT)
Dept: PHARMACY | Facility: CLINIC | Age: 25
End: 2020-09-03

## 2020-09-03 ENCOUNTER — ANCILLARY PROCEDURE (OUTPATIENT)
Dept: ULTRASOUND IMAGING | Facility: CLINIC | Age: 25
End: 2020-09-03
Attending: PHYSICIAN ASSISTANT
Payer: COMMERCIAL

## 2020-09-03 ENCOUNTER — RECORDS - HEALTHEAST (OUTPATIENT)
Dept: ADMINISTRATIVE | Facility: OTHER | Age: 25
End: 2020-09-03

## 2020-09-03 VITALS
RESPIRATION RATE: 16 BRPM | WEIGHT: 220 LBS | HEIGHT: 68 IN | OXYGEN SATURATION: 100 % | BODY MASS INDEX: 33.34 KG/M2 | HEART RATE: 81 BPM | TEMPERATURE: 97.9 F | DIASTOLIC BLOOD PRESSURE: 74 MMHG | SYSTOLIC BLOOD PRESSURE: 117 MMHG

## 2020-09-03 DIAGNOSIS — C41.9 SARCOMA, EWINGS (H): Primary | ICD-10-CM

## 2020-09-03 DIAGNOSIS — R31.9 HEMATURIA, UNSPECIFIED TYPE: ICD-10-CM

## 2020-09-03 DIAGNOSIS — C49.9 DESMOPLASTIC SMALL ROUND CELL TUMOR (H): Primary | ICD-10-CM

## 2020-09-03 DIAGNOSIS — C49.9 DESMOPLASTIC SMALL ROUND CELL TUMOR (H): ICD-10-CM

## 2020-09-03 DIAGNOSIS — K59.00 CONSTIPATION, UNSPECIFIED CONSTIPATION TYPE: ICD-10-CM

## 2020-09-03 LAB
ACANTHOCYTES BLD QL SMEAR: SLIGHT
ALBUMIN SERPL-MCNC: 3.2 G/DL (ref 3.4–5)
ALBUMIN UR-MCNC: 30 MG/DL
ALP SERPL-CCNC: 125 U/L (ref 40–150)
ALT SERPL W P-5'-P-CCNC: 23 U/L (ref 0–70)
ANION GAP SERPL CALCULATED.3IONS-SCNC: 7 MMOL/L (ref 3–14)
ANISOCYTOSIS BLD QL SMEAR: ABNORMAL
APPEARANCE UR: CLEAR
AST SERPL W P-5'-P-CCNC: 31 U/L (ref 0–45)
BASOPHILS # BLD AUTO: 0.1 10E9/L (ref 0–0.2)
BASOPHILS NFR BLD AUTO: 1 %
BILIRUB SERPL-MCNC: 0.3 MG/DL (ref 0.2–1.3)
BILIRUB UR QL STRIP: NEGATIVE
BUN SERPL-MCNC: 11 MG/DL (ref 7–30)
CALCIUM SERPL-MCNC: 8.4 MG/DL (ref 8.5–10.1)
CHLORIDE SERPL-SCNC: 110 MMOL/L (ref 94–109)
CO2 SERPL-SCNC: 23 MMOL/L (ref 20–32)
COLOR UR AUTO: YELLOW
CREAT SERPL-MCNC: 0.87 MG/DL (ref 0.66–1.25)
DACRYOCYTES BLD QL SMEAR: SLIGHT
DIFFERENTIAL METHOD BLD: ABNORMAL
EOSINOPHIL # BLD AUTO: 0.1 10E9/L (ref 0–0.7)
EOSINOPHIL NFR BLD AUTO: 1 %
ERYTHROCYTE [DISTWIDTH] IN BLOOD BY AUTOMATED COUNT: 19.9 % (ref 10–15)
GFR SERPL CREATININE-BSD FRML MDRD: >90 ML/MIN/{1.73_M2}
GLUCOSE SERPL-MCNC: 123 MG/DL (ref 70–99)
GLUCOSE UR STRIP-MCNC: NEGATIVE MG/DL
HCT VFR BLD AUTO: 28.5 % (ref 40–53)
HGB BLD-MCNC: 8.5 G/DL (ref 13.3–17.7)
HGB UR QL STRIP: ABNORMAL
KETONES UR STRIP-MCNC: NEGATIVE MG/DL
LEUKOCYTE ESTERASE UR QL STRIP: NEGATIVE
LYMPHOCYTES # BLD AUTO: 0.5 10E9/L (ref 0.8–5.3)
LYMPHOCYTES NFR BLD AUTO: 6 %
MCH RBC QN AUTO: 29.6 PG (ref 26.5–33)
MCHC RBC AUTO-ENTMCNC: 29.8 G/DL (ref 31.5–36.5)
MCV RBC AUTO: 99 FL (ref 78–100)
MONOCYTES # BLD AUTO: 0.1 10E9/L (ref 0–1.3)
MONOCYTES NFR BLD AUTO: 1 %
MUCOUS THREADS #/AREA URNS LPF: PRESENT /LPF
NEUTROPHILS # BLD AUTO: 8 10E9/L (ref 1.6–8.3)
NEUTROPHILS NFR BLD AUTO: 91 %
NITRATE UR QL: NEGATIVE
OVALOCYTES BLD QL SMEAR: ABNORMAL
PH UR STRIP: 6 PH (ref 5–7)
PLATELET # BLD AUTO: 428 10E9/L (ref 150–450)
PLATELET # BLD EST: ABNORMAL 10*3/UL
POIKILOCYTOSIS BLD QL SMEAR: ABNORMAL
POTASSIUM SERPL-SCNC: 3.7 MMOL/L (ref 3.4–5.3)
PROT SERPL-MCNC: 7.4 G/DL (ref 6.8–8.8)
RBC # BLD AUTO: 2.87 10E12/L (ref 4.4–5.9)
RBC #/AREA URNS AUTO: 14 /HPF (ref 0–2)
SODIUM SERPL-SCNC: 140 MMOL/L (ref 133–144)
SOURCE: ABNORMAL
SP GR UR STRIP: 1.02 (ref 1–1.03)
SQUAMOUS #/AREA URNS AUTO: 1 /HPF (ref 0–1)
UROBILINOGEN UR STRIP-MCNC: 0 MG/DL (ref 0–2)
WBC # BLD AUTO: 8.8 10E9/L (ref 4–11)
WBC #/AREA URNS AUTO: 5 /HPF (ref 0–5)

## 2020-09-03 PROCEDURE — 81001 URINALYSIS AUTO W/SCOPE: CPT | Performed by: PHYSICIAN ASSISTANT

## 2020-09-03 PROCEDURE — 25000128 H RX IP 250 OP 636: Performed by: INTERNAL MEDICINE

## 2020-09-03 PROCEDURE — 80053 COMPREHEN METABOLIC PANEL: CPT | Performed by: INTERNAL MEDICINE

## 2020-09-03 PROCEDURE — 96361 HYDRATE IV INFUSION ADD-ON: CPT

## 2020-09-03 PROCEDURE — 99215 OFFICE O/P EST HI 40 MIN: CPT | Mod: ZP | Performed by: PHYSICIAN ASSISTANT

## 2020-09-03 PROCEDURE — G0463 HOSPITAL OUTPT CLINIC VISIT: HCPCS | Mod: ZF

## 2020-09-03 PROCEDURE — 25000128 H RX IP 250 OP 636: Mod: ZF | Performed by: PHYSICIAN ASSISTANT

## 2020-09-03 PROCEDURE — G0498 CHEMO EXTEND IV INFUS W/PUMP: HCPCS

## 2020-09-03 PROCEDURE — 96375 TX/PRO/DX INJ NEW DRUG ADDON: CPT

## 2020-09-03 PROCEDURE — 85025 COMPLETE CBC W/AUTO DIFF WBC: CPT | Performed by: INTERNAL MEDICINE

## 2020-09-03 PROCEDURE — 96374 THER/PROPH/DIAG INJ IV PUSH: CPT

## 2020-09-03 PROCEDURE — 25800030 ZZH RX IP 258 OP 636: Mod: ZF | Performed by: INTERNAL MEDICINE

## 2020-09-03 PROCEDURE — 25000128 H RX IP 250 OP 636: Mod: ZF | Performed by: INTERNAL MEDICINE

## 2020-09-03 PROCEDURE — G0463 HOSPITAL OUTPT CLINIC VISIT: HCPCS | Mod: 25

## 2020-09-03 RX ORDER — HEPARIN SODIUM,PORCINE 10 UNIT/ML
5 VIAL (ML) INTRAVENOUS
Status: DISCONTINUED | OUTPATIENT
Start: 2020-09-03 | End: 2020-09-11 | Stop reason: HOSPADM

## 2020-09-03 RX ORDER — AMOXICILLIN 250 MG
1 CAPSULE ORAL 2 TIMES DAILY
Qty: 60 TABLET | Refills: 0 | Status: SHIPPED | OUTPATIENT
Start: 2020-09-03 | End: 2021-04-23

## 2020-09-03 RX ORDER — GRANISETRON HYDROCHLORIDE 1 MG/ML
1 INJECTION INTRAVENOUS ONCE
Status: COMPLETED | OUTPATIENT
Start: 2020-09-03 | End: 2020-09-03

## 2020-09-03 RX ADMIN — Medication 5 ML: at 09:17

## 2020-09-03 RX ADMIN — Medication 5 ML: at 09:18

## 2020-09-03 RX ADMIN — DEXAMETHASONE SODIUM PHOSPHATE 12 MG: 10 INJECTION, SOLUTION INTRAMUSCULAR; INTRAVENOUS at 12:21

## 2020-09-03 RX ADMIN — GRANISETRON HYDROCHLORIDE 1 MG: 1 INJECTION, SOLUTION INTRAVENOUS at 12:19

## 2020-09-03 RX ADMIN — SODIUM CHLORIDE 500 ML: 9 INJECTION, SOLUTION INTRAVENOUS at 12:19

## 2020-09-03 ASSESSMENT — MIFFLIN-ST. JEOR: SCORE: 1957.28

## 2020-09-03 ASSESSMENT — PAIN SCALES - GENERAL: PAINLEVEL: NO PAIN (0)

## 2020-09-03 NOTE — PROGRESS NOTES
Infusion Nursing Note:  Diego Buchanan presents today for Cycle 6, day 1 Ifosfamide and Mesna pump connection, IVF.    Patient seen by provider today: Yes: KATHLEEN Butler    Note: Patient presents to clinic with his sister today feeling well with no questions.  Pt did not request or require any intervention for pain today.    TORB 9/3/2020 1201 KATHLEEN Ashley/EMELYN Willingham RN: Proceed with pump connection today despite blood in urine.  Will set up follow up appts with Urology and discuss with pt and pt's sister.  Administer 500cc NS IV ONCE today    Intravenous Access:  Esparza.    Treatment Conditions:  Lab Results   Component Value Date    HGB 8.5 09/03/2020     Lab Results   Component Value Date    WBC 8.8 09/03/2020      Lab Results   Component Value Date    ANEU 8.0 09/03/2020     Lab Results   Component Value Date     09/03/2020      Lab Results   Component Value Date     09/03/2020                   Lab Results   Component Value Date    POTASSIUM 3.7 09/03/2020           Lab Results   Component Value Date    MAG 2.3 07/23/2020            Lab Results   Component Value Date    CR 0.87 09/03/2020                   Lab Results   Component Value Date    FAISAL 8.4 09/03/2020                Lab Results   Component Value Date    BILITOTAL 0.3 09/03/2020           Lab Results   Component Value Date    ALBUMIN 3.2 09/03/2020                    Lab Results   Component Value Date    ALT 23 09/03/2020           Lab Results   Component Value Date    AST 31 09/03/2020     Results reviewed, labs MET treatment parameters, ok to proceed with treatment.    Post Infusion Assessment:  Patient tolerated infusion without incident.  Blood return noted pre and post infusion.  Site patent and intact, free from redness, edema or discomfort.  No evidence of extravasations.    Ifosfamide/Mesna pump connected per protocol.  Connections taped and checked by second RN with pump running.  Pump to infuse at 7ml/hr for 144 hours.   Disconnect time, mesna bag exchange and neulasta onbody placement of 1430 on 9/9/2020 called to [facility].  Spoke with JASON Xie.    Discharge Plan:   Prescription refills given for Etoposide.  Discharge instructions reviewed with: Patient.  Patient and/or family verbalized understanding of discharge instructions and all questions answered.  Copy of AVS reviewed with patient and/or family.  Patient will return next week for next appointment--yet to be scheduled, pt's sister aware.  Patient discharged in stable condition accompanied by: sister  Departure Mode: Ambulatory.  Face to Face time: 20 minutes.    Hawa Willingham RN

## 2020-09-03 NOTE — LETTER
9/3/2020         RE: Diego Buchanan  [address]  [place] [address]        Dear Colleague,    Thank you for referring your patient, Diego Buchanan, to the [facility]. Please see a copy of my visit note below.    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 He presented initially with abdominal pain, diarrhea, and progressive abdominal distention for 3 months duration. 2. Initial CT scan in February 2020 showed severe peritoneal carcinomatosis with large peritoneal tumor implants throughout the entire abdomen and pelvis, but mostly prominently in the pelvis.   2. PETCT scan showed interval increase in the amount of peritoneal carcinomatosis.  3. On 3/12/2020, he had ultrasound-guided core needle biopsy of a peritoneal implant (Case: [id number]), which showed a completely undifferentiated appearance, but stains with pancytokeratin, indicating an epithelial origin. 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 Tumor markers for CA-19-9, CEA, beta-hCG, alpha-fetoprotein were unremarkable. Cancer type ID molecular testing by [facility] sent to determine the exact diagnosis and to assist in further treatment planning. 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 Relative probability of less than 5% of other subtype of sarcoma. EWSR1-WT1 fusion detected.  4. 5/1/2020, MRI brain negative for brain metastasis, and baseline CT-CAP obtained showing extensive mixed solid and cystic masses throughout the abdomen and pelvis consistent with peritoneal carcinomatosis. Largest mass measures approximately 20 cm in the pelvis. Metastatic mixed solid and cystic masses seen in hepatic segments 5 and 6 and hepatic segment 7. The masses appear to be contiguous  "with the retrohepatic peritoneal carcinomatosis. Small volume fluid about the spleen favored to represent malignant ascites, stable mild-to-moderate right hydronephrosis related to mass effect upon the distal ureter in the pelvis, the IVC and iliac veins are compressed due to the extensive peritoneal carcinomatosis without findings to suggest deep venous thrombosis.  5. 5/4/2020, he begins CAV/IMV alternating chemotherapy.    Interval History:  Diego Buchanan presents for follow up with his sister.  - His leg pain and swelling is improving. He is still able to walk without difficulty. No new chest pain, shortness of breath, cough. No changes to skin of either leg.  - He continues to note \"a little\" blood in his urine with some mild pain with urination. Slight improvement from last week, but he is not sure. He has not been showing his urine to his family. Denies back pain, fevers, chills, urinary frequency, urinary urgency, but history is not completely reliable. He completed that antibiotic as instructed.   - Notes abdominal pain when pressing on stomach. He is unsure if he notices this at rest. He has not asked for any pain medications. States bowels are moving, but are small and hard.   - Energy level is much improved this week. He feels the blood helped a good amount.   - Oral intake seems to be hit or miss. Sisters states they let him eat what he wants, but he is picky. They were unable to estimate caloric intake.     Current Outpatient Medications   Medication Sig Dispense Refill     folic acid (FOLVITE) 1 MG tablet Take 1 tablet (1 mg) by mouth daily 30 tablet 3     LORazepam (ATIVAN) 0.5 MG tablet Take 1 tablet (0.5 mg) by mouth every 4 hours as needed (Anxiety, Nausea/Vomiting or Sleep) 30 tablet 5     metoprolol tartrate (LOPRESSOR) 50 MG tablet Take 1.5 tablets (75 mg) by mouth daily 45 tablet 3     oxyCODONE (OXY-IR) 5 MG capsule Take 5 mg by mouth every 6 hours as needed for severe pain Prescribed in ED " "7/6/20       polyethylene glycol (MIRALAX) 17 g packet Take 17 g by mouth       senna-docusate (SENNA S) 8.6-50 MG tablet Take 1 tablet by mouth 2 times daily 60 tablet 0     sulfamethoxazole-trimethoprim (BACTRIM DS) 800-160 MG tablet Take 1 tablet by mouth 2 times daily for 7 days 14 tablet 0     traMADol (ULTRAM) 50 MG tablet Take 50 mg by mouth as needed       allopurinol (ZYLOPRIM) 300 MG tablet Take 1 tablet (300 mg) by mouth daily (Patient not taking: Reported on 8/27/2020) 30 tablet 0     dexamethasone (DECADRON) 4 MG tablet Take 2 tablets (8 mg) by mouth daily (with breakfast) for 3 days Start the day after doxorubicin, cyclophosphamide, and vincristine (odd cycles). 6 tablet 8     etoposide (VEPESID) 50 MG capsule Take 5 capsules (250 mg) by mouth daily for 6 days Days 1 through 6. (Patient not taking: Reported on 8/27/2020) 30 capsule 0     etoposide (VEPESID) 50 MG capsule Take 5 capsules (250 mg) by mouth daily for 6 days Days 1 through 6. 30 capsule 0     mesna (MESNEX) 400 MG TABS tablet Take 1 tablet (400 mg) by mouth every 4 hours for 2 doses Take one tablet 4 hours and 8 hours after end of cyclophosphamide infusion. 2 tablet 0     mesna (MESNEX) 400 MG TABS tablet Take 1 tablet (400 mg) by mouth every 4 hours for 2 doses Take one tablet 4 hours and 8 hours after end of cyclophosphamide infusion. 2 tablet 0     prochlorperazine (COMPAZINE) 10 MG tablet Take 1 tablet (10 mg) by mouth every 6 hours as needed (Nausea/Vomiting) (Patient not taking: Reported on 7/16/2020) 30 tablet 5       Physical Examination:  /74   Pulse 81   Temp 97.9  F (36.6  C) (Oral)   Resp 16   Ht 1.727 m (5' 7.99\")   Wt 99.8 kg (220 lb)   SpO2 100%   BMI 33.46 kg/m    Wt Readings from Last 10 Encounters:   09/03/20 99.8 kg (220 lb)   08/27/20 103.1 kg (227 lb 6.4 oz)   08/06/20 97.7 kg (215 lb 6.4 oz)   07/16/20 101.4 kg (223 lb 8 oz)   06/19/20 106.4 kg (234 lb 9.6 oz)   06/18/20 115.7 kg (255 lb)   05/26/20 115.7 " kg (255 lb)   05/04/20 114.6 kg (252 lb 9.6 oz)   05/01/20 113.3 kg (249 lb 12.8 oz)     Constitutional: Well-appearing male in no acute distress.  Eyes: EOMI, PERRL. No scleral icterus.  Lymphatic: Neck is supple without cervical or supraclavicular lymphadenopathy.   Cardiovascular: Regular rate and rhythm. No murmurs, gallops, or rubs. Bilateral LE edema, R> L, 1+ pitting.  Respiratory: Clear to auscultation bilaterally. No wheezes or crackles.  Gastrointestinal: Bowel sounds present. Abdomen distended, some tenderness to palpation.   Neurologic: Cranial nerves II through XII are grossly intact.  Skin: No rashes, petechiae, or bruising noted on exposed skin.  MSK: mild tenderness diffusely in the right ankle and top of right foot. No crepitus. ROM intact.     Laboratory Data:  Results for RY GOMES (MRN [id number]) as of 9/3/2020 12:49   Ref. Range 9/3/2020 09:23 9/3/2020 10:28   Sodium Latest Ref Range: 133 - 144 mmol/L 140    Potassium Latest Ref Range: 3.4 - 5.3 mmol/L 3.7    Chloride Latest Ref Range: 94 - 109 mmol/L 110 (H)    Carbon Dioxide Latest Ref Range: 20 - 32 mmol/L 23    Urea Nitrogen Latest Ref Range: 7 - 30 mg/dL 11    Creatinine Latest Ref Range: 0.66 - 1.25 mg/dL 0.87    GFR Estimate Latest Ref Range: >60 mL/min/1.73_m2 >90    GFR Estimate If Black Latest Ref Range: >60 mL/min/1.73_m2 >90    Calcium Latest Ref Range: 8.5 - 10.1 mg/dL 8.4 (L)    Anion Gap Latest Ref Range: 3 - 14 mmol/L 7    Albumin Latest Ref Range: 3.4 - 5.0 g/dL 3.2 (L)    Protein Total Latest Ref Range: 6.8 - 8.8 g/dL 7.4    Bilirubin Total Latest Ref Range: 0.2 - 1.3 mg/dL 0.3    Alkaline Phosphatase Latest Ref Range: 40 - 150 U/L 125    ALT Latest Ref Range: 0 - 70 U/L 23    AST Latest Ref Range: 0 - 45 U/L 31    Glucose Latest Ref Range: 70 - 99 mg/dL 123 (H)    WBC Latest Ref Range: 4.0 - 11.0 10e9/L 8.8    Hemoglobin Latest Ref Range: 13.3 - 17.7 g/dL 8.5 (L)    Hematocrit Latest Ref Range: 40.0 - 53.0 % 28.5 (L)     Platelet Count Latest Ref Range: 150 - 450 10e9/L 428    RBC Count Latest Ref Range: 4.4 - 5.9 10e12/L 2.87 (L)    MCV Latest Ref Range: 78 - 100 fl 99    MCH Latest Ref Range: 26.5 - 33.0 pg 29.6    MCHC Latest Ref Range: 31.5 - 36.5 g/dL 29.8 (L)    RDW Latest Ref Range: 10.0 - 15.0 % 19.9 (H)    Diff Method Unknown Manual Differential    % Neutrophils Latest Units: % 91.0    % Lymphocytes Latest Units: % 6.0    % Monocytes Latest Units: % 1.0    % Eosinophils Latest Units: % 1.0    % Basophils Latest Units: % 1.0    Absolute Neutrophil Latest Ref Range: 1.6 - 8.3 10e9/L 8.0    Absolute Lymphocytes Latest Ref Range: 0.8 - 5.3 10e9/L 0.5 (L)    Absolute Monocytes Latest Ref Range: 0.0 - 1.3 10e9/L 0.1    Absolute Eosinophils Latest Ref Range: 0.0 - 0.7 10e9/L 0.1    Absolute Basophils Latest Ref Range: 0.0 - 0.2 10e9/L 0.1    Anisocytosis Unknown Moderate    Poikilocytosis Unknown Moderate    Teardrop Cells Unknown Slight    Acanthocytes Unknown Slight    Ovalocytes Unknown Moderate    Platelet Estimate Unknown Confirming automated cell count    Color Urine Unknown  Yellow   Appearance Urine Unknown  Clear   Glucose Urine Latest Ref Range: NEG^Negative mg/dL  Negative   Bilirubin Urine Latest Ref Range: NEG^Negative   Negative   Ketones Urine Latest Ref Range: NEG^Negative mg/dL  Negative   Specific Gravity Urine Latest Ref Range: 1.003 - 1.035   1.020   pH Urine Latest Ref Range: 5.0 - 7.0 pH  6.0   Protein Albumin Urine Latest Ref Range: NEG^Negative mg/dL  30 (A)   Urobilinogen mg/dL Latest Ref Range: 0.0 - 2.0 mg/dL  0.0   Nitrite Urine Latest Ref Range: NEG^Negative   Negative   Blood Urine Latest Ref Range: NEG^Negative   Small (A)   Leukocyte Esterase Urine Latest Ref Range: NEG^Negative   Negative   Source Unknown  ur   WBC Urine Latest Ref Range: 0 - 5 /HPF  5   RBC Urine Latest Ref Range: 0 - 2 /HPF  14 (H)   Squamous Epithelial /HPF Urine Latest Ref Range: 0 - 1 /HPF  1   Mucous Urine Latest Ref Range:  NEG^Negative /LPF  Present (A)     Echocardiogram Complete   Order: [id number]   Status:  Edited Result - FINAL   Visible to patient:  No (not released) Next appt:  Today at 09:00 AM in Lab ([facility] Draw) Dx:  Leg swelling   Details     Reading Physician  Reading Date  Result Priority    Cruz Saezn MD  [phone]  2020        Narrative & Impression      [id number]  [id number]  [id number]  [id number]^AMBER^MORRO^H           [facility]  Echocardiography Laboratory  [address] [place] [id number]     Name: RY GOMES  MRN: [id number]  : 1995  Study Date: 2020 02:49 PM  Age: 25 yrs  Gender: Male  Patient Location: [facility]  Reason For Study: Leg swelling  Ordering Physician: MORRO CLARK  Referring Physician: CHARLIE TATE  Performed By: LAINE Davenport     BSA: 2.2 m2  Height: 68 in  Weight: 227 lb  HR: 96  BP: 120/71 mmHg  _____________________________________________________________________________  __        Procedure  Echocardiogram with two-dimensional, color and spectral Doppler performed.  _____________________________________________________________________________  __        Interpretation Summary  Global and regional left ventricular function is normal with an EF of 60-65%.  Global peak LV longitudinal strain is averaged at -19.5%. This is within  reported normal limits (normal <-18%).  Global right ventricular size and function are normal.  No significant valvular disease.  Pulmonary artery systolic pressure is normal.  This study was compared with the study from 2020. There has been no  change from the prior echocardiogram.  _____________________________________________________________________________  __        Left Ventricle  Global and regional left ventricular function is normal with an EF of 60-65%.  Left ventricular size is normal. LVEF 62% based on volumetric 3D analysis.  Mild concentric wall thickening consistent with left  ventricular hypertrophy  is present. Left ventricular diastolic function is normal. Global peak LV  longitudinal strain is averaged at -19.5%. This is within reported normal  limits (normal <-18%).     Right Ventricle  Global right ventricular function is normal. The right ventricle is normal  size.     Atria  Both atria appear normal.     Mitral Valve  The mitral valve is normal. Trace mitral insufficiency is present.        Aortic Valve  Aortic valve is normal in structure and function. The aortic valve is  tricuspid.     Tricuspid Valve  The tricuspid valve is normal. Trace tricuspid insufficiency is present. Right  ventricular systolic pressure is 17mmHg above the right atrial pressure.  Pulmonary artery systolic pressure is normal.     Pulmonic Valve  The valve leaflets are not well visualized. On Doppler interrogation, there is  no significant stenosis or regurgitation.     Vessels  The aorta root is normal. The inferior vena cava cannot be assessed. Ascending  aorta 2.3 cm.     Pericardium  No pericardial effusion is present.        Compared to Previous Study  This study was compared with the study from 2020 . There has been no  change.     Attestation  I have personally viewed the imaging and agree with the interpretation and  report as documented by the fellow, Olman Holguin, and/or edited by me.  _____________________________________________________________________________  __     MMode/2D Measurements & Calculations  IVSd: 1.1 cm  LVIDd: 5.1 cm  LVIDs: 3.4 cm  LVPWd: 1.2 cm  FS: 32.9 %  LV mass(C)d: 228.1 grams  LV mass(C)dI: 105.8 grams/m2  Ao root diam: 2.5 cm  asc Aorta Diam: 2.3 cm  LVOT diam: 2.5 cm  LVOT area: 4.9 cm2  LA Volume (BP): 43.7 ml     LA Volume Index (BP): 20.2 ml/m2  RWT: 0.47        Doppler Measurements & Calculations  MV E max álvaro: 82.9 cm/sec  MV A max álvaro: 71.3 cm/sec  MV E/A: 1.2  MV dec slope: 512.0 cm/sec2  PA acc time: 0.14 sec  TR max álvaro: 204.0 cm/sec  TR max P.6  mmHg  E/E' av.2  Lateral E/e': 9.1  Medial E/e': 9.4     QLAB 2DQ/CMQ  10_EDV(AP2)(aCMQ): 93.5 ml  10_ESV(AP2)(aCMQ): 34.7 ml  10_EDV(AP4)(aCMQ): 118.4 ml     10_ESV(AP4)(aCMQ): 56.7 ml  10_EDV(Bi-Plane)(aCMQ): 106.8 ml  10_EF(Bi-Plane)(aCMQ): 58.3 %  10_EF(AP2)(aCMQ): 62.9 %  10_EF(AP4)(aCMQ): 52.1 %  10_ESV(Bi-Plane)(aCMQ): 44.5 ml     QLAB 3DQ Advanced  Stroke Vol: 63.2 ml  10_EDV(3DQA): 101.7 ml  10_ESV(3DQA): 38.5 ml  10_EF(3DQA): 62.2 %  10_Tmsv 3-6: -6.0 msec  10_Tmsv 3-5: -10.0 msec  10_Tmsv 3-6 (%): -0.93 %  10_Tmsv 3-5 (%): -1.4 %     _____________________________________________________________________________  __           Report approved by: Lindsay Green 2020 04:15 PM                  Assessment and Plan:  #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 CT-CAP stable/mild improvement. Unclear which regimen is working at this time, however, given clinical improvement (tolerating solid foods) continued with current regimen.   - Continue with CAV/IMV, delayed cycle 6 last week given hematuria. As hgb and hematuria has improved, will proceed with therapy today with additional IVF today as well as repeat labs and IVF next week. Will move CT scan up to after this cycle. Case discussed with Dr. Sims. Robert is aware to let us know if the blood in his urine or pain (urinary/abdominal) worsens.      #2 Anemia, normocytic   - Noting hematuria, likely contributing.   - S/p 1 unit PRBC last week- hgb 8.5 today. Started on folic acid supplement for low folate.    - Continue Feosol daily. Will be getting labs this week.       #3 Malnutrition, secondary to #1  above  Saw nutritionist on 7/27, pt tolerating solid foods, but this sounds to be sporadic.   - Goals per nutritionist:              1.  Aim for 5-6 small frequent meals              2.  Aim for 2200kcal and 100g protein/day              3. Weight maintenance .   - Continue Boost shakes to supplement his nutrition  - Encourage protein intake.   - Will try to get another appointment with nutritionist. Noted weight loss today, but his weight has been fluctuant and he has some improvement in edema this week.      #4. Dysuria, hematuria  - Treated for UTI, but no bacteria on UC and without improvement in symptoms. Urine cytology was negative. Will send for renal US and refer to urology. Concern for stone vs tumor invasion of bladder wall. Robert knows to watch this very closely and let us know if it worsens.     #5 Bilateral leg swelling, R>L.   - RLE doppler negative for DVT, echo unremarkable.   - Noted low albumin on blood work, encourage adequate protein intake. May also be due to poor lymphatic drainage.   - Advised leg elevation, tylenol as needed, and compression stocking. Noting some improvement this week with supportive cares.      #6. Constipation. Start senna-s BID and mirlax daily prn.     Cheryle Julian PA-C  [facility]  [address] [place] [address] [phone]    Again, thank you for allowing me to participate in the care of your patient.        Sincerely,        Cheryle Julian PA-C

## 2020-09-03 NOTE — NURSING NOTE
"Oncology Rooming Note    September 3, 2020 10:09 AM   Diego Buchanan is a 25 year old male who presents for:    Chief Complaint   Patient presents with     Lab Only     labs drawn via cvc by RN in lab, saline and heparin locked, vitals completed     Oncology Clinic Visit     [facility] RETURN -      Initial Vitals: /74   Pulse 81   Temp 97.9  F (36.6  C) (Oral)   Resp 16   Ht 1.727 m (5' 7.99\")   Wt 99.8 kg (220 lb)   SpO2 100%   BMI 33.46 kg/m   Estimated body mass index is 33.46 kg/m  as calculated from the following:    Height as of this encounter: 1.727 m (5' 7.99\").    Weight as of this encounter: 99.8 kg (220 lb). Body surface area is 2.19 meters squared.  No Pain (0) Comment: Data Unavailable   No LMP for male patient.  Allergies reviewed: Yes  Medications reviewed: Yes    Medications: Medication refills not needed today.  Pharmacy name entered into [employer]:    [id number] - [place], [place] - [address] [place] [address] & [facility] [place], [place] -  [facility] [phone]    Clinical concerns: No new concerns. Cheryle Julian was notified.      Esequiel Tong LPN            "

## 2020-09-03 NOTE — PROGRESS NOTES
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 He presented initially with abdominal pain, diarrhea, and progressive abdominal distention for 3 months duration. 2. Initial CT scan in February 2020 showed severe peritoneal carcinomatosis with large peritoneal tumor implants throughout the entire abdomen and pelvis, but mostly prominently in the pelvis.   2. PETCT scan showed interval increase in the amount of peritoneal carcinomatosis.  3. On 3/12/2020, he had ultrasound-guided core needle biopsy of a peritoneal implant (Case: [id number]), which showed a completely undifferentiated appearance, but stains with pancytokeratin, indicating an epithelial origin. 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 Tumor markers for CA-19-9, CEA, beta-hCG, alpha-fetoprotein were unremarkable. Cancer type ID molecular testing by [facility] sent to determine the exact diagnosis and to assist in further treatment planning. 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 Relative probability of less than 5% of other subtype of sarcoma. EWSR1-WT1 fusion detected.  4. 5/1/2020, MRI brain negative for brain metastasis, and baseline CT-CAP obtained showing extensive mixed solid and cystic masses throughout the abdomen and pelvis consistent with peritoneal carcinomatosis. Largest mass measures approximately 20 cm in the pelvis. Metastatic mixed solid and cystic masses seen in hepatic segments 5 and 6 and hepatic segment 7. The masses appear to be contiguous with the retrohepatic peritoneal carcinomatosis. Small volume fluid about the spleen favored to represent malignant ascites, stable mild-to-moderate right hydronephrosis related to mass effect upon the distal ureter in the pelvis, the IVC and  "iliac veins are compressed due to the extensive peritoneal carcinomatosis without findings to suggest deep venous thrombosis.  5. 5/4/2020, he begins CAV/IMV alternating chemotherapy.    Interval History:  Diego Buchanan presents for follow up with his sister.  - His leg pain and swelling is improving. He is still able to walk without difficulty. No new chest pain, shortness of breath, cough. No changes to skin of either leg.  - He continues to note \"a little\" blood in his urine with some mild pain with urination. Slight improvement from last week, but he is not sure. He has not been showing his urine to his family. Denies back pain, fevers, chills, urinary frequency, urinary urgency, but history is not completely reliable. He completed that antibiotic as instructed.   - Notes abdominal pain when pressing on stomach. He is unsure if he notices this at rest. He has not asked for any pain medications. States bowels are moving, but are small and hard.   - Energy level is much improved this week. He feels the blood helped a good amount.   - Oral intake seems to be hit or miss. Sisters states they let him eat what he wants, but he is picky. They were unable to estimate caloric intake.     Current Outpatient Medications   Medication Sig Dispense Refill     folic acid (FOLVITE) 1 MG tablet Take 1 tablet (1 mg) by mouth daily 30 tablet 3     LORazepam (ATIVAN) 0.5 MG tablet Take 1 tablet (0.5 mg) by mouth every 4 hours as needed (Anxiety, Nausea/Vomiting or Sleep) 30 tablet 5     metoprolol tartrate (LOPRESSOR) 50 MG tablet Take 1.5 tablets (75 mg) by mouth daily 45 tablet 3     oxyCODONE (OXY-IR) 5 MG capsule Take 5 mg by mouth every 6 hours as needed for severe pain Prescribed in ED 7/6/20       polyethylene glycol (MIRALAX) 17 g packet Take 17 g by mouth       senna-docusate (SENNA S) 8.6-50 MG tablet Take 1 tablet by mouth 2 times daily 60 tablet 0     sulfamethoxazole-trimethoprim (BACTRIM DS) 800-160 MG tablet Take 1 " "tablet by mouth 2 times daily for 7 days 14 tablet 0     traMADol (ULTRAM) 50 MG tablet Take 50 mg by mouth as needed       allopurinol (ZYLOPRIM) 300 MG tablet Take 1 tablet (300 mg) by mouth daily (Patient not taking: Reported on 8/27/2020) 30 tablet 0     dexamethasone (DECADRON) 4 MG tablet Take 2 tablets (8 mg) by mouth daily (with breakfast) for 3 days Start the day after doxorubicin, cyclophosphamide, and vincristine (odd cycles). 6 tablet 8     etoposide (VEPESID) 50 MG capsule Take 5 capsules (250 mg) by mouth daily for 6 days Days 1 through 6. (Patient not taking: Reported on 8/27/2020) 30 capsule 0     etoposide (VEPESID) 50 MG capsule Take 5 capsules (250 mg) by mouth daily for 6 days Days 1 through 6. 30 capsule 0     mesna (MESNEX) 400 MG TABS tablet Take 1 tablet (400 mg) by mouth every 4 hours for 2 doses Take one tablet 4 hours and 8 hours after end of cyclophosphamide infusion. 2 tablet 0     mesna (MESNEX) 400 MG TABS tablet Take 1 tablet (400 mg) by mouth every 4 hours for 2 doses Take one tablet 4 hours and 8 hours after end of cyclophosphamide infusion. 2 tablet 0     prochlorperazine (COMPAZINE) 10 MG tablet Take 1 tablet (10 mg) by mouth every 6 hours as needed (Nausea/Vomiting) (Patient not taking: Reported on 7/16/2020) 30 tablet 5       Physical Examination:  /74   Pulse 81   Temp 97.9  F (36.6  C) (Oral)   Resp 16   Ht 1.727 m (5' 7.99\")   Wt 99.8 kg (220 lb)   SpO2 100%   BMI 33.46 kg/m    Wt Readings from Last 10 Encounters:   09/03/20 99.8 kg (220 lb)   08/27/20 103.1 kg (227 lb 6.4 oz)   08/06/20 97.7 kg (215 lb 6.4 oz)   07/16/20 101.4 kg (223 lb 8 oz)   06/19/20 106.4 kg (234 lb 9.6 oz)   06/18/20 115.7 kg (255 lb)   05/26/20 115.7 kg (255 lb)   05/04/20 114.6 kg (252 lb 9.6 oz)   05/01/20 113.3 kg (249 lb 12.8 oz)     Constitutional: Well-appearing male in no acute distress.  Eyes: EOMI, PERRL. No scleral icterus.  Lymphatic: Neck is supple without cervical or " supraclavicular lymphadenopathy.   Cardiovascular: Regular rate and rhythm. No murmurs, gallops, or rubs. Bilateral LE edema, R> L, 1+ pitting.  Respiratory: Clear to auscultation bilaterally. No wheezes or crackles.  Gastrointestinal: Bowel sounds present. Abdomen distended, some tenderness to palpation.   Neurologic: Cranial nerves II through XII are grossly intact.  Skin: No rashes, petechiae, or bruising noted on exposed skin.  MSK: mild tenderness diffusely in the right ankle and top of right foot. No crepitus. ROM intact.     Laboratory Data:  Results for RY GOMES (MRN [id number]) as of 9/3/2020 12:49   Ref. Range 9/3/2020 09:23 9/3/2020 10:28   Sodium Latest Ref Range: 133 - 144 mmol/L 140    Potassium Latest Ref Range: 3.4 - 5.3 mmol/L 3.7    Chloride Latest Ref Range: 94 - 109 mmol/L 110 (H)    Carbon Dioxide Latest Ref Range: 20 - 32 mmol/L 23    Urea Nitrogen Latest Ref Range: 7 - 30 mg/dL 11    Creatinine Latest Ref Range: 0.66 - 1.25 mg/dL 0.87    GFR Estimate Latest Ref Range: >60 mL/min/1.73_m2 >90    GFR Estimate If Black Latest Ref Range: >60 mL/min/1.73_m2 >90    Calcium Latest Ref Range: 8.5 - 10.1 mg/dL 8.4 (L)    Anion Gap Latest Ref Range: 3 - 14 mmol/L 7    Albumin Latest Ref Range: 3.4 - 5.0 g/dL 3.2 (L)    Protein Total Latest Ref Range: 6.8 - 8.8 g/dL 7.4    Bilirubin Total Latest Ref Range: 0.2 - 1.3 mg/dL 0.3    Alkaline Phosphatase Latest Ref Range: 40 - 150 U/L 125    ALT Latest Ref Range: 0 - 70 U/L 23    AST Latest Ref Range: 0 - 45 U/L 31    Glucose Latest Ref Range: 70 - 99 mg/dL 123 (H)    WBC Latest Ref Range: 4.0 - 11.0 10e9/L 8.8    Hemoglobin Latest Ref Range: 13.3 - 17.7 g/dL 8.5 (L)    Hematocrit Latest Ref Range: 40.0 - 53.0 % 28.5 (L)    Platelet Count Latest Ref Range: 150 - 450 10e9/L 428    RBC Count Latest Ref Range: 4.4 - 5.9 10e12/L 2.87 (L)    MCV Latest Ref Range: 78 - 100 fl 99    MCH Latest Ref Range: 26.5 - 33.0 pg 29.6    MCHC Latest Ref Range: 31.5 -  36.5 g/dL 29.8 (L)    RDW Latest Ref Range: 10.0 - 15.0 % 19.9 (H)    Diff Method Unknown Manual Differential    % Neutrophils Latest Units: % 91.0    % Lymphocytes Latest Units: % 6.0    % Monocytes Latest Units: % 1.0    % Eosinophils Latest Units: % 1.0    % Basophils Latest Units: % 1.0    Absolute Neutrophil Latest Ref Range: 1.6 - 8.3 10e9/L 8.0    Absolute Lymphocytes Latest Ref Range: 0.8 - 5.3 10e9/L 0.5 (L)    Absolute Monocytes Latest Ref Range: 0.0 - 1.3 10e9/L 0.1    Absolute Eosinophils Latest Ref Range: 0.0 - 0.7 10e9/L 0.1    Absolute Basophils Latest Ref Range: 0.0 - 0.2 10e9/L 0.1    Anisocytosis Unknown Moderate    Poikilocytosis Unknown Moderate    Teardrop Cells Unknown Slight    Acanthocytes Unknown Slight    Ovalocytes Unknown Moderate    Platelet Estimate Unknown Confirming automated cell count    Color Urine Unknown  Yellow   Appearance Urine Unknown  Clear   Glucose Urine Latest Ref Range: NEG^Negative mg/dL  Negative   Bilirubin Urine Latest Ref Range: NEG^Negative   Negative   Ketones Urine Latest Ref Range: NEG^Negative mg/dL  Negative   Specific Gravity Urine Latest Ref Range: 1.003 - 1.035   1.020   pH Urine Latest Ref Range: 5.0 - 7.0 pH  6.0   Protein Albumin Urine Latest Ref Range: NEG^Negative mg/dL  30 (A)   Urobilinogen mg/dL Latest Ref Range: 0.0 - 2.0 mg/dL  0.0   Nitrite Urine Latest Ref Range: NEG^Negative   Negative   Blood Urine Latest Ref Range: NEG^Negative   Small (A)   Leukocyte Esterase Urine Latest Ref Range: NEG^Negative   Negative   Source Unknown  ur   WBC Urine Latest Ref Range: 0 - 5 /HPF  5   RBC Urine Latest Ref Range: 0 - 2 /HPF  14 (H)   Squamous Epithelial /HPF Urine Latest Ref Range: 0 - 1 /HPF  1   Mucous Urine Latest Ref Range: NEG^Negative /LPF  Present (A)     Echocardiogram Complete   Order: [id number]   Status:  Edited Result - FINAL   Visible to patient:  No (not released) Next appt:  Today at 09:00 AM in Lab ([facility] Lab Draw) Dx:  Leg swelling    Details     Reading Physician  Reading Date  Result Priority    Cruz Saenz MD  [phone]  2020        Narrative & Impression      [id number]  [id number]  [id number]  [id number]^AMBER^MORRO^H           [facility],[place]  Echocardiography Laboratory  [address] [place] [address]     Name: RY GOMES  MRN: [id number]  : 1995  Study Date: 2020 02:49 PM  Age: 25 yrs  Gender: Male  Patient [facility]: [facility]  Reason For Study: Leg swelling  Ordering Physician: MORRO CLARK  Referring Physician: CHARLIE TATE  Performed By: LAINE Davenport     BSA: 2.2 m2  Height: 68 in  Weight: 227 lb  HR: 96  BP: 120/71 mmHg  _____________________________________________________________________________  __        Procedure  Echocardiogram with two-dimensional, color and spectral Doppler performed.  _____________________________________________________________________________  __        Interpretation Summary  Global and regional left ventricular function is normal with an EF of 60-65%.  Global peak LV longitudinal strain is averaged at -19.5%. This is within  reported normal limits (normal <-18%).  Global right ventricular size and function are normal.  No significant valvular disease.  Pulmonary artery systolic pressure is normal.  This study was compared with the study from 2020. There has been no  change from the prior echocardiogram.  _____________________________________________________________________________  __        Left Ventricle  Global and regional left ventricular function is normal with an EF of 60-65%.  Left ventricular size is normal. LVEF 62% based on volumetric 3D analysis.  Mild concentric wall thickening consistent with left ventricular hypertrophy  is present. Left ventricular diastolic function is normal. Global peak LV  longitudinal strain is averaged at -19.5%. This is within reported normal  limits (normal <-18%).     Right Ventricle  Global  right ventricular function is normal. The right ventricle is normal  size.     Atria  Both atria appear normal.     Mitral Valve  The mitral valve is normal. Trace mitral insufficiency is present.        Aortic Valve  Aortic valve is normal in structure and function. The aortic valve is  tricuspid.     Tricuspid Valve  The tricuspid valve is normal. Trace tricuspid insufficiency is present. Right  ventricular systolic pressure is 17mmHg above the right atrial pressure.  Pulmonary artery systolic pressure is normal.     Pulmonic Valve  The valve leaflets are not well visualized. On Doppler interrogation, there is  no significant stenosis or regurgitation.     Vessels  The aorta root is normal. The inferior vena cava cannot be assessed. Ascending  aorta 2.3 cm.     Pericardium  No pericardial effusion is present.        Compared to Previous Study  This study was compared with the study from 2020 . There has been no  change.     Attestation  I have personally viewed the imaging and agree with the interpretation and  report as documented by the fellow, Olman Holguin, and/or edited by me.  _____________________________________________________________________________  __     MMode/2D Measurements & Calculations  IVSd: 1.1 cm  LVIDd: 5.1 cm  LVIDs: 3.4 cm  LVPWd: 1.2 cm  FS: 32.9 %  LV mass(C)d: 228.1 grams  LV mass(C)dI: 105.8 grams/m2  Ao root diam: 2.5 cm  asc Aorta Diam: 2.3 cm  LVOT diam: 2.5 cm  LVOT area: 4.9 cm2  LA Volume (BP): 43.7 ml     LA Volume Index (BP): 20.2 ml/m2  RWT: 0.47        Doppler Measurements & Calculations  MV E max álvaro: 82.9 cm/sec  MV A max álvaro: 71.3 cm/sec  MV E/A: 1.2  MV dec slope: 512.0 cm/sec2  PA acc time: 0.14 sec  TR max álvaro: 204.0 cm/sec  TR max P.6 mmHg  E/E' av.2  Lateral E/e': 9.1  Medial E/e': 9.4     QLAB 2DQ/CMQ  10_EDV(AP2)(aCMQ): 93.5 ml  10_ESV(AP2)(aCMQ): 34.7 ml  10_EDV(AP4)(aCMQ): 118.4 ml     10_ESV(AP4)(aCMQ): 56.7 ml  10_EDV(Bi-Plane)(aCMQ): 106.8  ml  10_EF(Bi-Plane)(aCMQ): 58.3 %  10_EF(AP2)(aCMQ): 62.9 %  10_EF(AP4)(aCMQ): 52.1 %  10_ESV(Bi-Plane)(aCMQ): 44.5 ml     QLAB 3DQ Advanced  Stroke Vol: 63.2 ml  10_EDV(3DQA): 101.7 ml  10_ESV(3DQA): 38.5 ml  10_EF(3DQA): 62.2 %  10_Tmsv 3-6: -6.0 msec  10_Tmsv 3-5: -10.0 msec  10_Tmsv 3-6 (%): -0.93 %  10_Tmsv 3-5 (%): -1.4 %     _____________________________________________________________________________  __           Report approved by: Lindsay Green 09/01/2020 04:15 PM                  Assessment and Plan:  #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 CT-CAP stable/mild improvement. Unclear which regimen is working at this time, however, given clinical improvement (tolerating solid foods) continued with current regimen.   - Continue with CAV/IMV, delayed cycle 6 last week given hematuria. As hgb and hematuria has improved, will proceed with therapy today with additional IVF today as well as repeat labs and IVF next week. Will move CT scan up to after this cycle. Case discussed with Dr. Sims. Robert is aware to let us know if the blood in his urine or pain (urinary/abdominal) worsens.      #2 Anemia, normocytic   - Noting hematuria, likely contributing.   - S/p 1 unit PRBC last week- hgb 8.5 today. Started on folic acid supplement for low folate.    - Continue Feosol daily. Will be getting labs this week.       #3 Malnutrition, secondary to #1 above  Saw nutritionist on 7/27, pt tolerating solid foods, but this sounds to be sporadic.   - Goals per nutritionist:              1.  Aim for 5-6 small frequent meals              2.  Aim for 2200kcal and 100g  protein/day              3. Weight maintenance .   - Continue Boost shakes to supplement his nutrition  - Encourage protein intake.   - Will try to get another appointment with nutritionist. Noted weight loss today, but his weight has been fluctuant and he has some improvement in edema this week.      #4. Dysuria, hematuria  - Treated for UTI, but no bacteria on UC and without improvement in symptoms. Urine cytology was negative. Will send for renal US and refer to urology. Concern for stone vs tumor invasion of bladder wall. Robert knows to watch this very closely and let us know if it worsens.     #5 Bilateral leg swelling, R>L.   - RLE doppler negative for DVT, echo unremarkable.   - Noted low albumin on blood work, encourage adequate protein intake. May also be due to poor lymphatic drainage.   - Advised leg elevation, tylenol as needed, and compression stocking. Noting some improvement this week with supportive cares.      #6. Constipation. Start senna-s BID and mirlax daily prn.     Cheryle Julian PA-C  [facility]  [address] [place] [address]  [phone]

## 2020-09-03 NOTE — PATIENT INSTRUCTIONS
Contact Numbers    [facility] Main [facility]: [phone]    Call with chills and/or temperature greater than or equal to 100.5, uncontrolled nausea/vomiting, diarrhea, constipation, dizziness, shortness of breath, chest pain, bleeding, unexplained bruising, or any new/concerning symptoms, questions/concerns.     If you are having any concerning symptoms or wish to speak to a provider before your next infusion visit, please call your care coordinator or triage to notify them so we can adequately serve you.       After Hours: [phone]    If after hours, weekends, or holidays, call main hospital  and ask for Oncology doctor on call.               Lab Results:  Recent Results (from the past 12 hour(s))   Comprehensive metabolic panel    Collection Time: 09/03/20  9:23 AM   Result Value Ref Range    Sodium 140 133 - 144 mmol/L    Potassium 3.7 3.4 - 5.3 mmol/L    Chloride 110 (H) 94 - 109 mmol/L    Carbon Dioxide 23 20 - 32 mmol/L    Anion Gap 7 3 - 14 mmol/L    Glucose 123 (H) 70 - 99 mg/dL    Urea Nitrogen 11 7 - 30 mg/dL    Creatinine 0.87 0.66 - 1.25 mg/dL    GFR Estimate >90 >60 mL/min/[1.73_m2]    GFR Estimate If Black >90 >60 mL/min/[1.73_m2]    Calcium 8.4 (L) 8.5 - 10.1 mg/dL    Bilirubin Total 0.3 0.2 - 1.3 mg/dL    Albumin 3.2 (L) 3.4 - 5.0 g/dL    Protein Total 7.4 6.8 - 8.8 g/dL    Alkaline Phosphatase 125 40 - 150 U/L    ALT 23 0 - 70 U/L    AST 31 0 - 45 U/L   CBC with platelets differential    Collection Time: 09/03/20  9:23 AM   Result Value Ref Range    WBC 8.8 4.0 - 11.0 10e9/L    RBC Count 2.87 (L) 4.4 - 5.9 10e12/L    Hemoglobin 8.5 (L) 13.3 - 17.7 g/dL    Hematocrit 28.5 (L) 40.0 - 53.0 %    MCV 99 78 - 100 fl    MCH 29.6 26.5 - 33.0 pg    MCHC 29.8 (L) 31.5 - 36.5 g/dL    RDW 19.9 (H) 10.0 - 15.0 %    Platelet Count 428 150 - 450 10e9/L    Diff Method Manual Differential     % Neutrophils 91.0 %    % Lymphocytes 6.0 %    % Monocytes 1.0 %    % Eosinophils 1.0 %    % Basophils 1.0 %     Absolute Neutrophil 8.0 1.6 - 8.3 10e9/L    Absolute Lymphocytes 0.5 (L) 0.8 - 5.3 10e9/L    Absolute Monocytes 0.1 0.0 - 1.3 10e9/L    Absolute Eosinophils 0.1 0.0 - 0.7 10e9/L    Absolute Basophils 0.1 0.0 - 0.2 10e9/L    Anisocytosis Moderate     Poikilocytosis Moderate     Teardrop Cells Slight     Acanthocytes Slight     Ovalocytes Moderate     Platelet Estimate Confirming automated cell count    Routine UA with micro reflex to culture    Collection Time: 09/03/20 10:28 AM    Specimen: Urine   Result Value Ref Range    Color Urine Yellow     Appearance Urine Clear     Glucose Urine Negative NEG^Negative mg/dL    Bilirubin Urine Negative NEG^Negative    Ketones Urine Negative NEG^Negative mg/dL    Specific Gravity Urine 1.020 1.003 - 1.035    Blood Urine Small (A) NEG^Negative    pH Urine 6.0 5.0 - 7.0 pH    Protein Albumin Urine 30 (A) NEG^Negative mg/dL    Urobilinogen mg/dL 0.0 0.0 - 2.0 mg/dL    Nitrite Urine Negative NEG^Negative    Leukocyte Esterase Urine Negative NEG^Negative    Source ur     WBC Urine 5 0 - 5 /HPF    RBC Urine 14 (H) 0 - 2 /HPF    Squamous Epithelial /HPF Urine 1 0 - 1 /HPF    Mucous Urine Present (A) NEG^Negative /LPF

## 2020-09-04 ENCOUNTER — HOME INFUSION (PRE-WILLOW HOME INFUSION) (OUTPATIENT)
Dept: PHARMACY | Facility: CLINIC | Age: 25
End: 2020-09-04

## 2020-09-04 NOTE — PROGRESS NOTES
This is a recent snapshot of the patient's [facility] medical record.  For current drug dose and complete information and questions, call [phone] or In Basket pool, fv home infusion ([id number])  CSN Number:  [id number]

## 2020-09-04 NOTE — TELEPHONE ENCOUNTER
MEDICAL RECORDS REQUEST   [facility]  Urology Clinic  [address] [place] [address]  PHONE: [phone]  Fax: [phone]        FUTURE VISIT INFORMATION                                                   DOMONIQUE Yeung: 1995 scheduled for future visit at [facility]    APPOINTMENT INFORMATION:    Date: 20 2:30PM    Provider:  Jesenia Villela PA-C    Reason for Visit/Diagnosis: Hematuria     REFERRAL INFORMATION:    Referring provider: CHARLIE TATE    Specialty: [facility]LE    Referring providers clinic:  [facility]    Clinic contact number:  [phone]    RECORDS REQUESTED FOR VISIT                                                     NOTES  STATUS/DETAILS   OFFICE NOTE from referring provider  yes   OFFICE NOTE from other specialist  no   DISCHARGE SUMMARY from hospital  no   DISCHARGE REPORT from the ER  no   OPERATIVE REPORT  no   MEDICATION LIST  yes   LABS     URINALYSIS (UA)  yes     PRE-VISIT CHECKLIST      Record collection complete Yes   Appointment appropriately scheduled           (right time/right provider) Yes   MyChart activation Yes   Questionnaire complete If no, please explain: In process      Completed by: Savana López

## 2020-09-05 ENCOUNTER — HOME INFUSION (PRE-WILLOW HOME INFUSION) (OUTPATIENT)
Dept: PHARMACY | Facility: CLINIC | Age: 25
End: 2020-09-05

## 2020-09-05 ENCOUNTER — MEDICAL CORRESPONDENCE (OUTPATIENT)
Dept: HEALTH INFORMATION MANAGEMENT | Facility: CLINIC | Age: 25
End: 2020-09-05

## 2020-09-05 LAB
ANION GAP SERPL CALCULATED.3IONS-SCNC: 10 MMOL/L (ref 3–14)
BASOPHILS # BLD AUTO: 0.1 10E9/L (ref 0–0.2)
BASOPHILS NFR BLD AUTO: 1.4 %
BUN SERPL-MCNC: 11 MG/DL (ref 7–30)
CALCIUM SERPL-MCNC: 8.6 MG/DL (ref 8.5–10.1)
CHLORIDE SERPL-SCNC: 109 MMOL/L (ref 94–109)
CO2 SERPL-SCNC: 24 MMOL/L (ref 20–32)
CREAT SERPL-MCNC: 0.59 MG/DL (ref 0.66–1.25)
DIFFERENTIAL METHOD BLD: ABNORMAL
EOSINOPHIL # BLD AUTO: 0.1 10E9/L (ref 0–0.7)
EOSINOPHIL NFR BLD AUTO: 0.6 %
ERYTHROCYTE [DISTWIDTH] IN BLOOD BY AUTOMATED COUNT: 20.1 % (ref 10–15)
GFR SERPL CREATININE-BSD FRML MDRD: >90 ML/MIN/{1.73_M2}
GLUCOSE SERPL-MCNC: 62 MG/DL (ref 70–99)
HCT VFR BLD AUTO: 28.5 % (ref 40–53)
HGB BLD-MCNC: 8.9 G/DL (ref 13.3–17.7)
IMM GRANULOCYTES # BLD: 0.2 10E9/L (ref 0–0.4)
IMM GRANULOCYTES NFR BLD: 2.6 %
LYMPHOCYTES # BLD AUTO: 0.7 10E9/L (ref 0.8–5.3)
LYMPHOCYTES NFR BLD AUTO: 8.2 %
MAGNESIUM SERPL-MCNC: 1.8 MG/DL (ref 1.6–2.3)
MCH RBC QN AUTO: 30.2 PG (ref 26.5–33)
MCHC RBC AUTO-ENTMCNC: 31.2 G/DL (ref 31.5–36.5)
MCV RBC AUTO: 97 FL (ref 78–100)
MONOCYTES # BLD AUTO: 0.9 10E9/L (ref 0–1.3)
MONOCYTES NFR BLD AUTO: 11.7 %
NEUTROPHILS # BLD AUTO: 6.1 10E9/L (ref 1.6–8.3)
NEUTROPHILS NFR BLD AUTO: 75.5 %
NRBC # BLD AUTO: 0 10*3/UL
NRBC BLD AUTO-RTO: 0 /100
PHOSPHATE SERPL-MCNC: 5.1 MG/DL (ref 2.5–4.5)
PLATELET # BLD AUTO: 451 10E9/L (ref 150–450)
POTASSIUM SERPL-SCNC: 3.3 MMOL/L (ref 3.4–5.3)
RBC # BLD AUTO: 2.95 10E12/L (ref 4.4–5.9)
SODIUM SERPL-SCNC: 143 MMOL/L (ref 133–144)
WBC # BLD AUTO: 8 10E9/L (ref 4–11)

## 2020-09-05 PROCEDURE — 80048 BASIC METABOLIC PNL TOTAL CA: CPT | Performed by: PHYSICIAN ASSISTANT

## 2020-09-05 PROCEDURE — 84100 ASSAY OF PHOSPHORUS: CPT | Performed by: PHYSICIAN ASSISTANT

## 2020-09-05 PROCEDURE — 83735 ASSAY OF MAGNESIUM: CPT | Performed by: PHYSICIAN ASSISTANT

## 2020-09-05 PROCEDURE — 85025 COMPLETE CBC W/AUTO DIFF WBC: CPT | Performed by: PHYSICIAN ASSISTANT

## 2020-09-06 ENCOUNTER — HOME INFUSION (PRE-WILLOW HOME INFUSION) (OUTPATIENT)
Dept: PHARMACY | Facility: CLINIC | Age: 25
End: 2020-09-06

## 2020-09-06 ENCOUNTER — MEDICAL CORRESPONDENCE (OUTPATIENT)
Dept: HEALTH INFORMATION MANAGEMENT | Facility: CLINIC | Age: 25
End: 2020-09-06

## 2020-09-06 LAB
ANION GAP SERPL CALCULATED.3IONS-SCNC: 8 MMOL/L (ref 3–14)
ANISOCYTOSIS BLD QL SMEAR: ABNORMAL
BASOPHILS # BLD AUTO: 0.1 10E9/L (ref 0–0.2)
BASOPHILS NFR BLD AUTO: 0.8 %
BUN SERPL-MCNC: 9 MG/DL (ref 7–30)
BURR CELLS BLD QL SMEAR: SLIGHT
CALCIUM SERPL-MCNC: 8.5 MG/DL (ref 8.5–10.1)
CHLORIDE SERPL-SCNC: 108 MMOL/L (ref 94–109)
CO2 SERPL-SCNC: 26 MMOL/L (ref 20–32)
CREAT SERPL-MCNC: 0.62 MG/DL (ref 0.66–1.25)
DACRYOCYTES BLD QL SMEAR: SLIGHT
DIFFERENTIAL METHOD BLD: ABNORMAL
EOSINOPHIL # BLD AUTO: 0 10E9/L (ref 0–0.7)
EOSINOPHIL NFR BLD AUTO: 0 %
ERYTHROCYTE [DISTWIDTH] IN BLOOD BY AUTOMATED COUNT: 19.8 % (ref 10–15)
GFR SERPL CREATININE-BSD FRML MDRD: >90 ML/MIN/{1.73_M2}
GLUCOSE SERPL-MCNC: 77 MG/DL (ref 70–99)
HCT VFR BLD AUTO: 29.4 % (ref 40–53)
HGB BLD-MCNC: 9 G/DL (ref 13.3–17.7)
LYMPHOCYTES # BLD AUTO: 0.5 10E9/L (ref 0.8–5.3)
LYMPHOCYTES NFR BLD AUTO: 5.1 %
MACROCYTES BLD QL SMEAR: PRESENT
MAGNESIUM SERPL-MCNC: 1.7 MG/DL (ref 1.6–2.3)
MCH RBC QN AUTO: 29.9 PG (ref 26.5–33)
MCHC RBC AUTO-ENTMCNC: 30.6 G/DL (ref 31.5–36.5)
MCV RBC AUTO: 98 FL (ref 78–100)
METAMYELOCYTES # BLD: 0.1 10E9/L
METAMYELOCYTES NFR BLD MANUAL: 0.8 %
MONOCYTES # BLD AUTO: 0.5 10E9/L (ref 0–1.3)
MONOCYTES NFR BLD AUTO: 5.9 %
NEUTROPHILS # BLD AUTO: 8 10E9/L (ref 1.6–8.3)
NEUTROPHILS NFR BLD AUTO: 87.4 %
PHOSPHATE SERPL-MCNC: 3.7 MG/DL (ref 2.5–4.5)
PLATELET # BLD AUTO: 447 10E9/L (ref 150–450)
PLATELET # BLD EST: ABNORMAL 10*3/UL
POIKILOCYTOSIS BLD QL SMEAR: SLIGHT
POTASSIUM SERPL-SCNC: 3.2 MMOL/L (ref 3.4–5.3)
RBC # BLD AUTO: 3.01 10E12/L (ref 4.4–5.9)
SODIUM SERPL-SCNC: 142 MMOL/L (ref 133–144)
WBC # BLD AUTO: 9.1 10E9/L (ref 4–11)

## 2020-09-06 PROCEDURE — 85025 COMPLETE CBC W/AUTO DIFF WBC: CPT | Performed by: PHYSICIAN ASSISTANT

## 2020-09-06 PROCEDURE — 84100 ASSAY OF PHOSPHORUS: CPT | Performed by: PHYSICIAN ASSISTANT

## 2020-09-06 PROCEDURE — 80048 BASIC METABOLIC PNL TOTAL CA: CPT | Performed by: PHYSICIAN ASSISTANT

## 2020-09-06 PROCEDURE — 83735 ASSAY OF MAGNESIUM: CPT | Performed by: PHYSICIAN ASSISTANT

## 2020-09-07 ENCOUNTER — MEDICAL CORRESPONDENCE (OUTPATIENT)
Dept: HEALTH INFORMATION MANAGEMENT | Facility: CLINIC | Age: 25
End: 2020-09-07

## 2020-09-07 ENCOUNTER — HOME INFUSION (PRE-WILLOW HOME INFUSION) (OUTPATIENT)
Dept: PHARMACY | Facility: CLINIC | Age: 25
End: 2020-09-07

## 2020-09-07 LAB
ANION GAP SERPL CALCULATED.3IONS-SCNC: 6 MMOL/L (ref 3–14)
BASOPHILS # BLD AUTO: 0 10E9/L (ref 0–0.2)
BASOPHILS NFR BLD AUTO: 0.6 %
BUN SERPL-MCNC: 10 MG/DL (ref 7–30)
CALCIUM SERPL-MCNC: 8.6 MG/DL (ref 8.5–10.1)
CHLORIDE SERPL-SCNC: 108 MMOL/L (ref 94–109)
CO2 SERPL-SCNC: 26 MMOL/L (ref 20–32)
CREAT SERPL-MCNC: 0.56 MG/DL (ref 0.66–1.25)
DIFFERENTIAL METHOD BLD: ABNORMAL
EOSINOPHIL # BLD AUTO: 0.1 10E9/L (ref 0–0.7)
EOSINOPHIL NFR BLD AUTO: 1.4 %
ERYTHROCYTE [DISTWIDTH] IN BLOOD BY AUTOMATED COUNT: 19.7 % (ref 10–15)
GFR SERPL CREATININE-BSD FRML MDRD: >90 ML/MIN/{1.73_M2}
GLUCOSE SERPL-MCNC: 73 MG/DL (ref 70–99)
HCT VFR BLD AUTO: 28.9 % (ref 40–53)
HGB BLD-MCNC: 8.8 G/DL (ref 13.3–17.7)
IMM GRANULOCYTES # BLD: 0.1 10E9/L (ref 0–0.4)
IMM GRANULOCYTES NFR BLD: 1.2 %
LYMPHOCYTES # BLD AUTO: 0.5 10E9/L (ref 0.8–5.3)
LYMPHOCYTES NFR BLD AUTO: 8.3 %
MAGNESIUM SERPL-MCNC: 1.9 MG/DL (ref 1.6–2.3)
MCH RBC QN AUTO: 28.9 PG (ref 26.5–33)
MCHC RBC AUTO-ENTMCNC: 30.4 G/DL (ref 31.5–36.5)
MCV RBC AUTO: 95 FL (ref 78–100)
MONOCYTES # BLD AUTO: 0.4 10E9/L (ref 0–1.3)
MONOCYTES NFR BLD AUTO: 5.8 %
NEUTROPHILS # BLD AUTO: 5.3 10E9/L (ref 1.6–8.3)
NEUTROPHILS NFR BLD AUTO: 82.7 %
NRBC # BLD AUTO: 0 10*3/UL
NRBC BLD AUTO-RTO: 0 /100
PHOSPHATE SERPL-MCNC: 3.4 MG/DL (ref 2.5–4.5)
PLATELET # BLD AUTO: 444 10E9/L (ref 150–450)
POTASSIUM SERPL-SCNC: 3.4 MMOL/L (ref 3.4–5.3)
RBC # BLD AUTO: 3.04 10E12/L (ref 4.4–5.9)
SODIUM SERPL-SCNC: 140 MMOL/L (ref 133–144)
WBC # BLD AUTO: 6.4 10E9/L (ref 4–11)

## 2020-09-07 PROCEDURE — 83735 ASSAY OF MAGNESIUM: CPT | Performed by: PHYSICIAN ASSISTANT

## 2020-09-07 PROCEDURE — 84100 ASSAY OF PHOSPHORUS: CPT | Performed by: PHYSICIAN ASSISTANT

## 2020-09-07 PROCEDURE — 80048 BASIC METABOLIC PNL TOTAL CA: CPT | Performed by: PHYSICIAN ASSISTANT

## 2020-09-07 PROCEDURE — 85025 COMPLETE CBC W/AUTO DIFF WBC: CPT | Performed by: PHYSICIAN ASSISTANT

## 2020-09-08 ENCOUNTER — HOME INFUSION (PRE-WILLOW HOME INFUSION) (OUTPATIENT)
Dept: PHARMACY | Facility: CLINIC | Age: 25
End: 2020-09-08

## 2020-09-08 ENCOUNTER — MEDICAL CORRESPONDENCE (OUTPATIENT)
Dept: HEALTH INFORMATION MANAGEMENT | Facility: CLINIC | Age: 25
End: 2020-09-08

## 2020-09-08 ENCOUNTER — RECORDS - HEALTHEAST (OUTPATIENT)
Dept: HEALTH INFORMATION MANAGEMENT | Facility: CLINIC | Age: 25
End: 2020-09-08

## 2020-09-08 LAB
ANION GAP SERPL CALCULATED.3IONS-SCNC: 8 MMOL/L (ref 3–14)
ANISOCYTOSIS BLD QL SMEAR: ABNORMAL
BASOPHILS # BLD AUTO: 0 10E9/L (ref 0–0.2)
BASOPHILS NFR BLD AUTO: 0 %
BUN SERPL-MCNC: 9 MG/DL (ref 7–30)
CALCIUM SERPL-MCNC: 8.7 MG/DL (ref 8.5–10.1)
CHLORIDE SERPL-SCNC: 109 MMOL/L (ref 94–109)
CO2 SERPL-SCNC: 24 MMOL/L (ref 20–32)
CREAT SERPL-MCNC: 0.58 MG/DL (ref 0.66–1.25)
DACRYOCYTES BLD QL SMEAR: SLIGHT
DIFFERENTIAL METHOD BLD: ABNORMAL
EOSINOPHIL # BLD AUTO: 0.2 10E9/L (ref 0–0.7)
EOSINOPHIL NFR BLD AUTO: 1.7 %
ERYTHROCYTE [DISTWIDTH] IN BLOOD BY AUTOMATED COUNT: 19.2 % (ref 10–15)
GFR SERPL CREATININE-BSD FRML MDRD: >90 ML/MIN/{1.73_M2}
GLUCOSE SERPL-MCNC: 79 MG/DL (ref 70–99)
HCT VFR BLD AUTO: 27.4 % (ref 40–53)
HGB BLD-MCNC: 8.5 G/DL (ref 13.3–17.7)
LYMPHOCYTES # BLD AUTO: 0.3 10E9/L (ref 0.8–5.3)
LYMPHOCYTES NFR BLD AUTO: 3.4 %
MACROCYTES BLD QL SMEAR: PRESENT
MAGNESIUM SERPL-MCNC: 2.1 MG/DL (ref 1.6–2.3)
MCH RBC QN AUTO: 29.8 PG (ref 26.5–33)
MCHC RBC AUTO-ENTMCNC: 31 G/DL (ref 31.5–36.5)
MCV RBC AUTO: 96 FL (ref 78–100)
METAMYELOCYTES # BLD: 0.1 10E9/L
METAMYELOCYTES NFR BLD MANUAL: 0.8 %
MONOCYTES # BLD AUTO: 0 10E9/L (ref 0–1.3)
MONOCYTES NFR BLD AUTO: 0 %
MYELOCYTES # BLD: 0.1 10E9/L
MYELOCYTES NFR BLD MANUAL: 0.8 %
NEUTROPHILS # BLD AUTO: 8.4 10E9/L (ref 1.6–8.3)
NEUTROPHILS NFR BLD AUTO: 93.3 %
PHOSPHATE SERPL-MCNC: 3.3 MG/DL (ref 2.5–4.5)
PLATELET # BLD AUTO: 409 10E9/L (ref 150–450)
PLATELET # BLD EST: ABNORMAL 10*3/UL
POIKILOCYTOSIS BLD QL SMEAR: SLIGHT
POTASSIUM SERPL-SCNC: 3.4 MMOL/L (ref 3.4–5.3)
RBC # BLD AUTO: 2.85 10E12/L (ref 4.4–5.9)
RBC INCLUSIONS BLD: SLIGHT
SODIUM SERPL-SCNC: 141 MMOL/L (ref 133–144)
WBC # BLD AUTO: 9 10E9/L (ref 4–11)

## 2020-09-08 PROCEDURE — 84100 ASSAY OF PHOSPHORUS: CPT | Performed by: PHYSICIAN ASSISTANT

## 2020-09-08 PROCEDURE — 85025 COMPLETE CBC W/AUTO DIFF WBC: CPT | Performed by: PHYSICIAN ASSISTANT

## 2020-09-08 PROCEDURE — 80048 BASIC METABOLIC PNL TOTAL CA: CPT | Performed by: PHYSICIAN ASSISTANT

## 2020-09-08 PROCEDURE — 83735 ASSAY OF MAGNESIUM: CPT | Performed by: PHYSICIAN ASSISTANT

## 2020-09-08 NOTE — PROGRESS NOTES
This is a recent snapshot of the patient's [facility] medical record.  For current drug dose and complete information and questions, call [phone] or In Basket pool, fv home infusion ([id number])  CSN Number:  [id number]

## 2020-09-08 NOTE — PROGRESS NOTES
This is a recent snapshot of the patient's [facility] medical record.  For current drug dose and complete information and questions, call [phone] or In Basket pool, fv home infusion ([id number])  CSN Number:  [id number]

## 2020-09-08 NOTE — PROGRESS NOTES
This is a recent snapshot of the patient's [facility] medical record.  For current drug dose and complete information and questions, call [phone] or In Basket pool, fv home infusion ([id number])  CSN Number:  [id number]

## 2020-09-09 ENCOUNTER — ONCOLOGY VISIT (OUTPATIENT)
Dept: ONCOLOGY | Facility: CLINIC | Age: 25
End: 2020-09-09
Attending: DIETITIAN, REGISTERED
Payer: COMMERCIAL

## 2020-09-09 ENCOUNTER — HOME INFUSION (PRE-WILLOW HOME INFUSION) (OUTPATIENT)
Dept: PHARMACY | Facility: CLINIC | Age: 25
End: 2020-09-09

## 2020-09-09 ENCOUNTER — RECORDS - HEALTHEAST (OUTPATIENT)
Dept: ADMINISTRATIVE | Facility: OTHER | Age: 25
End: 2020-09-09

## 2020-09-09 ENCOUNTER — MEDICAL CORRESPONDENCE (OUTPATIENT)
Dept: HEALTH INFORMATION MANAGEMENT | Facility: CLINIC | Age: 25
End: 2020-09-09

## 2020-09-09 DIAGNOSIS — C41.9 SARCOMA, EWINGS (H): Primary | ICD-10-CM

## 2020-09-09 LAB
ANION GAP SERPL CALCULATED.3IONS-SCNC: 8 MMOL/L (ref 3–14)
BASOPHILS # BLD AUTO: 0 10E9/L (ref 0–0.2)
BASOPHILS NFR BLD AUTO: 0.4 %
BUN SERPL-MCNC: 12 MG/DL (ref 7–30)
CALCIUM SERPL-MCNC: 8.6 MG/DL (ref 8.5–10.1)
CHLORIDE SERPL-SCNC: 110 MMOL/L (ref 94–109)
CO2 SERPL-SCNC: 23 MMOL/L (ref 20–32)
CREAT SERPL-MCNC: 0.58 MG/DL (ref 0.66–1.25)
DIFFERENTIAL METHOD BLD: ABNORMAL
EOSINOPHIL # BLD AUTO: 0.1 10E9/L (ref 0–0.7)
EOSINOPHIL NFR BLD AUTO: 1.6 %
ERYTHROCYTE [DISTWIDTH] IN BLOOD BY AUTOMATED COUNT: 18.7 % (ref 10–15)
GFR SERPL CREATININE-BSD FRML MDRD: >90 ML/MIN/{1.73_M2}
GLUCOSE SERPL-MCNC: 84 MG/DL (ref 70–99)
HCT VFR BLD AUTO: 26.8 % (ref 40–53)
HGB BLD-MCNC: 8.4 G/DL (ref 13.3–17.7)
IMM GRANULOCYTES # BLD: 0.1 10E9/L (ref 0–0.4)
IMM GRANULOCYTES NFR BLD: 1.1 %
LYMPHOCYTES # BLD AUTO: 0.4 10E9/L (ref 0.8–5.3)
LYMPHOCYTES NFR BLD AUTO: 5.5 %
MAGNESIUM SERPL-MCNC: 1.8 MG/DL (ref 1.6–2.3)
MCH RBC QN AUTO: 29.4 PG (ref 26.5–33)
MCHC RBC AUTO-ENTMCNC: 31.3 G/DL (ref 31.5–36.5)
MCV RBC AUTO: 94 FL (ref 78–100)
MONOCYTES # BLD AUTO: 0.1 10E9/L (ref 0–1.3)
MONOCYTES NFR BLD AUTO: 1.5 %
NEUTROPHILS # BLD AUTO: 6.7 10E9/L (ref 1.6–8.3)
NEUTROPHILS NFR BLD AUTO: 89.9 %
NRBC # BLD AUTO: 0 10*3/UL
NRBC BLD AUTO-RTO: 0 /100
PHOSPHATE SERPL-MCNC: 2.6 MG/DL (ref 2.5–4.5)
PLATELET # BLD AUTO: 401 10E9/L (ref 150–450)
POTASSIUM SERPL-SCNC: 3.1 MMOL/L (ref 3.4–5.3)
RBC # BLD AUTO: 2.86 10E12/L (ref 4.4–5.9)
SODIUM SERPL-SCNC: 141 MMOL/L (ref 133–144)
WBC # BLD AUTO: 7.5 10E9/L (ref 4–11)

## 2020-09-09 PROCEDURE — 85025 COMPLETE CBC W/AUTO DIFF WBC: CPT | Performed by: PHYSICIAN ASSISTANT

## 2020-09-09 PROCEDURE — 97803 MED NUTRITION INDIV SUBSEQ: CPT | Mod: ZF | Performed by: DIETITIAN, REGISTERED

## 2020-09-09 PROCEDURE — 80048 BASIC METABOLIC PNL TOTAL CA: CPT | Performed by: PHYSICIAN ASSISTANT

## 2020-09-09 PROCEDURE — 84100 ASSAY OF PHOSPHORUS: CPT | Performed by: PHYSICIAN ASSISTANT

## 2020-09-09 PROCEDURE — 83735 ASSAY OF MAGNESIUM: CPT | Performed by: PHYSICIAN ASSISTANT

## 2020-09-09 NOTE — PROGRESS NOTES
This is a recent snapshot of the patient's [facility] medical record.  For current drug dose and complete information and questions, call [phone] or In Basket pool, fv home infusion ([id number])  CSN Number:  [id number]

## 2020-09-09 NOTE — PROGRESS NOTES
"Diego Buchanan is a 25 year old male who is being evaluated via a billable telephone visit.      The patient has been notified of following:     \"This telephone visit will be conducted via a call between you and your physician/provider. We have found that certain health care needs can be provided without the need for a physical exam.  This service lets us provide the care you need with a short phone conversation.  If a prescription is necessary we can send it directly to your pharmacy.  If lab work is needed we can place an order for that and you can then stop by our lab to have the test done at a later time.    Telephone visits are billed at different rates depending on your insurance coverage. During this emergency period, for some insurers they may be billed the same as an in-person visit.  Please reach out to your insurance provider with any questions.    If during the course of the call the physician/provider feels a telephone visit is not appropriate, you will not be charged for this service.\"    Patient has given verbal consent for Telephone visit?  Yes    CLINICAL NUTRITION SERVICES - REASSESSMENT NOTE   EVALUATION OF PREVIOUS PLAN OF CARE:   Referring Physician: Gualberto  Visit type: phone, with sister Mariela  Current diet: general, increased from soft/liquids to solids  Current appetite/intake: improving      Monitoring from previous assessment:   -Food intake - Mariela tells me that Robert's intake has improved now that he has been able to tolerate more solid foods. She tells me that she has been offering and giving him almost anything he wants.  He has been eating a variety of solid foods, although, he remains a picky eater.    She tells me that his BM are every 2 days.  He does not complain of constipation or abdominal pain.   -Liquid meal replacement or supplement - He is no longer drinking shakes as she is tired of them.  Mariela tried to make home made shakes/smoothies but he doesn't like these.    -Weight " trends - +/- 5 lbs fluctuation  Wt Readings from Last 6 Encounters:   09/03/20 99.8 kg (220 lb)   08/27/20 103.1 kg (227 lb 6.4 oz)   08/06/20 97.7 kg (215 lb 6.4 oz)   07/16/20 101.4 kg (223 lb 8 oz)   06/19/20 106.4 kg (234 lb 9.6 oz)   06/18/20 115.7 kg (255 lb)     Previous Goals:   1.  Aim for 5-6 small frequent meals  2.  Aim for 2200kcal and 100g protein/day  3. Weight maintenance   Evaluation: Not met   Previous Nutrition Diagnosis:   Inadequate protein-energy intake related to decreased appetite and pain as evidenced by 13% wt loss x past 2 months  Evaluation: Improving   NEW FINDINGS:   No new findings   CURRENT NUTRITION DIAGNOSIS   Inadequate oral intake related to occasional low appetite, picky eater as evidenced by  Inability to gain weight.    INTERVENTIONS   General/healthful diet - reviewed ways to add calories/protein to diet.  Reviewed high calorie/high protein foods sources.  Reviewed calorie needs for weight gain. Encouraged to aim for at least 2200kcal and 100 g protein/day.   Mariela had no further questions.   Goals   1. 2200kcal, 100g protein/day via small frequent meals  2. Weight stability/weight gain as desired per Mariela.     Follow-Up Plans: Pt has RD contact information for questions.          Hetal Rogers RDN, LD

## 2020-09-09 NOTE — LETTER
"9/9/2020     RE: Diego Buchanan  [address]  [place] [address]    Dear Colleague,    Thank you for referring your patient, Diego Buchanan, to the [facility]. Please see a copy of my visit note below.    Diego Buchanan is a 25 year old male who is being evaluated via a billable telephone visit.      The patient has been notified of following:     \"This telephone visit will be conducted via a call between you and your physician/provider. We have found that certain health care needs can be provided without the need for a physical exam.  This service lets us provide the care you need with a short phone conversation.  If a prescription is necessary we can send it directly to your pharmacy.  If lab work is needed we can place an order for that and you can then stop by our lab to have the test done at a later time.    Telephone visits are billed at different rates depending on your insurance coverage. During this emergency period, for some insurers they may be billed the same as an in-person visit.  Please reach out to your insurance provider with any questions.    If during the course of the call the physician/provider feels a telephone visit is not appropriate, you will not be charged for this service.\"    Patient has given verbal consent for Telephone visit?  Yes    CLINICAL NUTRITION SERVICES - REASSESSMENT NOTE   EVALUATION OF PREVIOUS PLAN OF CARE:   Referring Physician: Gualberto  Visit type: phone, with sister Mariela  Current diet: general, increased from soft/liquids to solids  Current appetite/intake: improving      Monitoring from previous assessment:   -Food intake - Mariela tells me that Robert's intake has improved now that he has been able to tolerate more solid foods. She tells me that she has been offering and giving him almost anything he wants.  He has been eating a variety of solid foods, although, he remains a picky eater.    She tells me that his BM are every 2 days.  He does " not complain of constipation or abdominal pain.   -Liquid meal replacement or supplement - He is no longer drinking shakes as she is tired of them.  Mariela tried to make home made shakes/smoothies but he doesn't like these.    -Weight trends - +/- 5 lbs fluctuation  Wt Readings from Last 6 Encounters:   09/03/20 99.8 kg (220 lb)   08/27/20 103.1 kg (227 lb 6.4 oz)   08/06/20 97.7 kg (215 lb 6.4 oz)   07/16/20 101.4 kg (223 lb 8 oz)   06/19/20 106.4 kg (234 lb 9.6 oz)   06/18/20 115.7 kg (255 lb)     Previous Goals:   1.  Aim for 5-6 small frequent meals  2.  Aim for 2200kcal and 100g protein/day  3. Weight maintenance   Evaluation: Not met   Previous Nutrition Diagnosis:   Inadequate protein-energy intake related to decreased appetite and pain as evidenced by 13% wt loss x past 2 months  Evaluation: Improving   NEW FINDINGS:   No new findings   CURRENT NUTRITION DIAGNOSIS   Inadequate oral intake related to occasional low appetite, picky eater as evidenced by  Inability to gain weight.    INTERVENTIONS   General/healthful diet - reviewed ways to add calories/protein to diet.  Reviewed high calorie/high protein foods sources.  Reviewed calorie needs for weight gain. Encouraged to aim for at least 2200kcal and 100 g protein/day.   Mariela had no further questions.   Goals   1. 2200kcal, 100g protein/day via small frequent meals  2. Weight stability/weight gain as desired per Mariela.     Follow-Up Plans: Pt has RD contact information for questions.          Hetal Rogers RDN, LD

## 2020-09-10 ENCOUNTER — HOME INFUSION (PRE-WILLOW HOME INFUSION) (OUTPATIENT)
Dept: PHARMACY | Facility: CLINIC | Age: 25
End: 2020-09-10

## 2020-09-10 NOTE — PROGRESS NOTES
This is a recent snapshot of the patient's [facility] medical record.  For current drug dose and complete information and questions, call [phone] or In Basket pool, [facility] home infusion ([id number])  CSN Number:  [id number]

## 2020-09-10 NOTE — PROGRESS NOTES
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 He presented initially with abdominal pain, diarrhea, and progressive abdominal distention for 3 months duration. 2. Initial CT scan in February 2020 showed severe peritoneal carcinomatosis with large peritoneal tumor implants throughout the entire abdomen and pelvis, but mostly prominently in the pelvis.   2. PETCT scan showed interval increase in the amount of peritoneal carcinomatosis.  3. On 3/12/2020, he had ultrasound-guided core needle biopsy of a peritoneal implant (Case: [id number]), which showed a completely undifferentiated appearance, but stains with pancytokeratin, indicating an epithelial origin. 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 Tumor markers for CA-19-9, CEA, beta-hCG, alpha-fetoprotein were unremarkable. Cancer type ID molecular testing by [facility] sent to determine the exact diagnosis and to assist in further treatment planning. 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 Relative probability of less than 5% of other subtype of sarcoma. EWSR1-WT1 fusion detected.  4. 5/1/2020, MRI brain negative for brain metastasis, and baseline CT-CAP obtained showing extensive mixed solid and cystic masses throughout the abdomen and pelvis consistent with peritoneal carcinomatosis. Largest mass measures approximately 20 cm in the pelvis. Metastatic mixed solid and cystic masses seen in hepatic segments 5 and 6 and hepatic segment 7. The masses appear to be contiguous with the retrohepatic peritoneal carcinomatosis. Small volume fluid about the spleen favored to represent malignant ascites, stable mild-to-moderate right hydronephrosis related to mass effect upon the distal ureter in the pelvis, the IVC and  "iliac veins are compressed due to the extensive peritoneal carcinomatosis without findings to suggest deep venous thrombosis.  5. 5/4/2020, he begins CAV/IMV alternating chemotherapy.    Interval History:  Diego Buchanan was met with for follow up. He is feeling fatigued- states he is sleeping about 11-12 hours consistently at night. Not reporting naps during the day be feels fatigue. Completed chemotherapy last week and did not note any significant side effects. No nausea. Continues with abdominal pain when he pushes on his abdomen and he feels like his belly is getting bigger.   Rash to chest wall is improving, per sister- this occurred \"a few weeks ago\" per sister from prior bandage. She has been cleaning the site well and the redness has resolved. Robert no longer reports blood in his urine or dysuria. He denies frequency, urgency, or inability to empty bladder.   - Weight continues to decline. Robert is unable to pinpoint why. He denies nausea, mouth pain. States he just sometimes does not feel like eating. Admits to abdominal pain when someone pushes on his belly but is not sure if he is having it otherwise.   - Lower extremity edema is much better and he is no longer having ankle pain.   - Bowels are slowly moving- states they are hard but having one every other day. No blood in stool per his report. Last week he reported that they were pellets, but now he thinks they are a little softer. Advised to start senna last week, but they did not.   - Denies fevers, chills.       Current Outpatient Medications   Medication Sig Dispense Refill     allopurinol (ZYLOPRIM) 300 MG tablet Take 1 tablet (300 mg) by mouth daily (Patient not taking: Reported on 8/27/2020) 30 tablet 0     dexamethasone (DECADRON) 4 MG tablet Take 2 tablets (8 mg) by mouth daily (with breakfast) for 3 days Start the day after doxorubicin, cyclophosphamide, and vincristine (odd cycles). 6 tablet 8     etoposide (VEPESID) 50 MG capsule Take 5 " capsules (250 mg) by mouth daily for 6 days Days 1 through 6. (Patient not taking: Reported on 8/27/2020) 30 capsule 0     etoposide (VEPESID) 50 MG capsule Take 5 capsules (250 mg) by mouth daily for 6 days Days 1 through 6. 30 capsule 0     folic acid (FOLVITE) 1 MG tablet Take 1 tablet (1 mg) by mouth daily 30 tablet 3     LORazepam (ATIVAN) 0.5 MG tablet Take 1 tablet (0.5 mg) by mouth every 4 hours as needed (Anxiety, Nausea/Vomiting or Sleep) 30 tablet 5     mesna (MESNEX) 400 MG TABS tablet Take 1 tablet (400 mg) by mouth every 4 hours for 2 doses Take one tablet 4 hours and 8 hours after end of cyclophosphamide infusion. 2 tablet 0     mesna (MESNEX) 400 MG TABS tablet Take 1 tablet (400 mg) by mouth every 4 hours for 2 doses Take one tablet 4 hours and 8 hours after end of cyclophosphamide infusion. 2 tablet 0     metoprolol tartrate (LOPRESSOR) 50 MG tablet Take 1.5 tablets (75 mg) by mouth daily 45 tablet 3     oxyCODONE (OXY-IR) 5 MG capsule Take 5 mg by mouth every 6 hours as needed for severe pain Prescribed in ED 7/6/20       polyethylene glycol (MIRALAX) 17 g packet Take 17 g by mouth       prochlorperazine (COMPAZINE) 10 MG tablet Take 1 tablet (10 mg) by mouth every 6 hours as needed (Nausea/Vomiting) (Patient not taking: Reported on 7/16/2020) 30 tablet 5     senna-docusate (SENNA S) 8.6-50 MG tablet Take 1 tablet by mouth 2 times daily 60 tablet 0     traMADol (ULTRAM) 50 MG tablet Take 50 mg by mouth as needed         Physical Examination:  There were no vitals taken for this visit.  Wt Readings from Last 10 Encounters:   09/03/20 99.8 kg (220 lb)   08/27/20 103.1 kg (227 lb 6.4 oz)   08/06/20 97.7 kg (215 lb 6.4 oz)   07/16/20 101.4 kg (223 lb 8 oz)   06/19/20 106.4 kg (234 lb 9.6 oz)   06/18/20 115.7 kg (255 lb)   05/26/20 115.7 kg (255 lb)   05/04/20 114.6 kg (252 lb 9.6 oz)   05/01/20 113.3 kg (249 lb 12.8 oz)     Constitutional: Well-appearing male in no acute distress.  Eyes: EOMI, PERRL.  No scleral icterus.  Lymphatic: Neck is supple without cervical or supraclavicular lymphadenopathy.   Cardiovascular: Regular rate and rhythm. No murmurs, gallops, or rubs. Bilateral LE edema is resolved  Respiratory: Clear to auscultation bilaterally. No wheezes or crackles.  Gastrointestinal: Bowel sounds present. Abdomen distended and diffusely tender to palpation. No guarding.   Neurologic: Cranial nerves II through XII are grossly intact.  Skin: No rashes, petechiae, or bruising noted on exposed skin.    Laboratory Data:  Results for RY GOMES (MRN [id number]) as of 9/11/2020 10:36   Ref. Range 9/11/2020 09:34   Sodium Latest Ref Range: 133 - 144 mmol/L 138   Potassium Latest Ref Range: 3.4 - 5.3 mmol/L 3.2 (L)   Chloride Latest Ref Range: 94 - 109 mmol/L 109   Carbon Dioxide Latest Ref Range: 20 - 32 mmol/L 21   Urea Nitrogen Latest Ref Range: 7 - 30 mg/dL 9   Creatinine Latest Ref Range: 0.66 - 1.25 mg/dL 0.66   GFR Estimate Latest Ref Range: >60 mL/min/1.73_m2 >90   GFR Estimate If Black Latest Ref Range: >60 mL/min/1.73_m2 >90   Calcium Latest Ref Range: 8.5 - 10.1 mg/dL 9.0   Anion Gap Latest Ref Range: 3 - 14 mmol/L 8   Magnesium Latest Ref Range: 1.6 - 2.3 mg/dL 2.0   Phosphorus Latest Ref Range: 2.5 - 4.5 mg/dL 2.6   Albumin Latest Ref Range: 3.4 - 5.0 g/dL 3.2 (L)   Protein Total Latest Ref Range: 6.8 - 8.8 g/dL 7.5   Bilirubin Total Latest Ref Range: 0.2 - 1.3 mg/dL 0.7   Alkaline Phosphatase Latest Ref Range: 40 - 150 U/L 130   ALT Latest Ref Range: 0 - 70 U/L 26   AST Latest Ref Range: 0 - 45 U/L 21   Glucose Latest Ref Range: 70 - 99 mg/dL 90   WBC Latest Ref Range: 4.0 - 11.0 10e9/L 17.5 (H)   Hemoglobin Latest Ref Range: 13.3 - 17.7 g/dL 8.0 (L)   Hematocrit Latest Ref Range: 40.0 - 53.0 % 25.6 (L)   Platelet Count Latest Ref Range: 150 - 450 10e9/L 320   RBC Count Latest Ref Range: 4.4 - 5.9 10e12/L 2.67 (L)   MCV Latest Ref Range: 78 - 100 fl 96   MCH Latest Ref Range: 26.5 - 33.0 pg 30.0    MCHC Latest Ref Range: 31.5 - 36.5 g/dL 31.3 (L)   RDW Latest Ref Range: 10.0 - 15.0 % 18.4 (H)   Diff Method Unknown Manual Differential   % Neutrophils Latest Units: % 99.1   % Lymphocytes Latest Units: % 0.9   % Monocytes Latest Units: % 0.0   % Eosinophils Latest Units: % 0.0   % Basophils Latest Units: % 0.0   Absolute Neutrophil Latest Ref Range: 1.6 - 8.3 10e9/L 17.3 (H)   Absolute Lymphocytes Latest Ref Range: 0.8 - 5.3 10e9/L 0.2 (L)   Absolute Monocytes Latest Ref Range: 0.0 - 1.3 10e9/L 0.0   Absolute Eosinophils Latest Ref Range: 0.0 - 0.7 10e9/L 0.0   Absolute Basophils Latest Ref Range: 0.0 - 0.2 10e9/L 0.0   Anisocytosis Unknown Slight   Poikilocytosis Unknown Slight   Polychromasia Unknown Slight   Teardrop Cells Unknown Slight   Acanthocytes Unknown Slight   Ovalocytes Unknown Slight   Macrocytes Unknown Present   Platelet Estimate Unknown Confirming automated cell count       Assessment and Plan:  #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 CT-CAP stable/mild improvement. Unclear which regimen is working at this time, however, given clinical improvement (tolerating solid foods) continued with current regimen.   - Continue with CAV/IMV, delayed cycle 6 x 1 week given hematuria- now completed. Given increase in abdominal pain/distention, will move CT scan to today.       #2 Anemia, normocytic   - Previously noting patricia hematuria, which was also seen on UA. Robert reports this has improved. He did require a PRBC transfusion given hgb of 6.7. Will arrange for a possible PRBC transfusion if hgb drops below 8.   - Remains on folic acid supplement and Feosol  daily.       #3 Malnutrition, secondary to #1 above  Met with nutritionist again on 9/9/2020, pt tolerating solid foods per report but he continues to lose weight. Robert was unable to identify why he does not feel like eating, but I suspect it may be due to pain.   - Continue Boost shakes to supplement his nutrition  - Encourage protein intake.      #4. Recent hematuria  - Renal US unremarkable,  Referred to urology. Now noting increase in protein and glucose in urine. Concern tumor invasion of bladder wall. Robert knows to watch this very closely and let us know if it worsens.     #5 H/o Bilateral leg swelling, R>L.   - RLE doppler negative for DVT, echo unremarkable.   - Noted low albumin on blood work, encourage adequate protein intake. May also be due to poor lymphatic drainage.   - Resolved with supportive cares    #6. Constipation. Advised to start senna-s BID and mirlax daily prn.     #7 Abdominal pain. While this if definitely present on palpation, Robert does not report much at rest though I suspect he may be having some. Advised tylenol prn and he also has a script for oxycodone that he can use if he is having pain. Suspect related to malignancy +/- constipation     #8 Leukocytosis, may be a more timely response from Neulasta but give increase in fatigue and tachycardia will initiate infectious work up. UA not overtly evident of UTI, blood cultures in process, and radiology is reviewing CT CAP to identify any infectious origins- thus far per verbal report there is nothing significant.     Cheryle Julian PA-C  [facility]  [address] [place] [address] [phone]

## 2020-09-11 ENCOUNTER — RESULTS ONLY (OUTPATIENT)
Dept: LAB | Facility: CLINIC | Age: 25
End: 2020-09-11

## 2020-09-11 ENCOUNTER — ONCOLOGY VISIT (OUTPATIENT)
Dept: ONCOLOGY | Facility: CLINIC | Age: 25
End: 2020-09-11
Attending: PHYSICIAN ASSISTANT
Payer: COMMERCIAL

## 2020-09-11 ENCOUNTER — ANCILLARY PROCEDURE (OUTPATIENT)
Dept: CT IMAGING | Facility: CLINIC | Age: 25
End: 2020-09-11
Attending: INTERNAL MEDICINE
Payer: COMMERCIAL

## 2020-09-11 ENCOUNTER — APPOINTMENT (OUTPATIENT)
Dept: LAB | Facility: CLINIC | Age: 25
End: 2020-09-11
Attending: PHYSICIAN ASSISTANT
Payer: COMMERCIAL

## 2020-09-11 VITALS
RESPIRATION RATE: 18 BRPM | OXYGEN SATURATION: 100 % | WEIGHT: 215 LBS | BODY MASS INDEX: 32.7 KG/M2 | HEART RATE: 117 BPM | TEMPERATURE: 98.4 F | DIASTOLIC BLOOD PRESSURE: 72 MMHG | SYSTOLIC BLOOD PRESSURE: 119 MMHG

## 2020-09-11 DIAGNOSIS — C41.9 SARCOMA, EWINGS (H): ICD-10-CM

## 2020-09-11 DIAGNOSIS — D72.829 LEUKOCYTOSIS, UNSPECIFIED TYPE: ICD-10-CM

## 2020-09-11 DIAGNOSIS — E87.6 HYPOKALEMIA: ICD-10-CM

## 2020-09-11 DIAGNOSIS — C49.9 DESMOPLASTIC SMALL ROUND CELL TUMOR (H): Primary | ICD-10-CM

## 2020-09-11 DIAGNOSIS — C49.9 DESMOPLASTIC SMALL ROUND CELL TUMOR (H): ICD-10-CM

## 2020-09-11 LAB
ACANTHOCYTES BLD QL SMEAR: SLIGHT
ALBUMIN SERPL-MCNC: 3.2 G/DL (ref 3.4–5)
ALBUMIN UR-MCNC: 100 MG/DL
ALP SERPL-CCNC: 130 U/L (ref 40–150)
ALT SERPL W P-5'-P-CCNC: 26 U/L (ref 0–70)
ANION GAP SERPL CALCULATED.3IONS-SCNC: 8 MMOL/L (ref 3–14)
ANISOCYTOSIS BLD QL SMEAR: SLIGHT
APPEARANCE UR: ABNORMAL
AST SERPL W P-5'-P-CCNC: 21 U/L (ref 0–45)
BASOPHILS # BLD AUTO: 0 10E9/L (ref 0–0.2)
BASOPHILS NFR BLD AUTO: 0 %
BILIRUB SERPL-MCNC: 0.7 MG/DL (ref 0.2–1.3)
BILIRUB UR QL STRIP: NEGATIVE
BUN SERPL-MCNC: 9 MG/DL (ref 7–30)
CALCIUM SERPL-MCNC: 9 MG/DL (ref 8.5–10.1)
CHLORIDE SERPL-SCNC: 109 MMOL/L (ref 94–109)
CO2 SERPL-SCNC: 21 MMOL/L (ref 20–32)
COLOR UR AUTO: YELLOW
CREAT SERPL-MCNC: 0.66 MG/DL (ref 0.66–1.25)
DACRYOCYTES BLD QL SMEAR: SLIGHT
DIFFERENTIAL METHOD BLD: ABNORMAL
EOSINOPHIL # BLD AUTO: 0 10E9/L (ref 0–0.7)
EOSINOPHIL NFR BLD AUTO: 0 %
ERYTHROCYTE [DISTWIDTH] IN BLOOD BY AUTOMATED COUNT: 18.4 % (ref 10–15)
GFR SERPL CREATININE-BSD FRML MDRD: >90 ML/MIN/{1.73_M2}
GLUCOSE SERPL-MCNC: 90 MG/DL (ref 70–99)
GLUCOSE UR STRIP-MCNC: 50 MG/DL
HCT VFR BLD AUTO: 25.6 % (ref 40–53)
HGB BLD-MCNC: 8 G/DL (ref 13.3–17.7)
HGB UR QL STRIP: ABNORMAL
HYALINE CASTS #/AREA URNS LPF: 3 /LPF (ref 0–2)
KETONES UR STRIP-MCNC: 5 MG/DL
LEUKOCYTE ESTERASE UR QL STRIP: NEGATIVE
LYMPHOCYTES # BLD AUTO: 0.2 10E9/L (ref 0.8–5.3)
LYMPHOCYTES NFR BLD AUTO: 0.9 %
MACROCYTES BLD QL SMEAR: PRESENT
MAGNESIUM SERPL-MCNC: 2 MG/DL (ref 1.6–2.3)
MCH RBC QN AUTO: 30 PG (ref 26.5–33)
MCHC RBC AUTO-ENTMCNC: 31.3 G/DL (ref 31.5–36.5)
MCV RBC AUTO: 96 FL (ref 78–100)
MONOCYTES # BLD AUTO: 0 10E9/L (ref 0–1.3)
MONOCYTES NFR BLD AUTO: 0 %
MUCOUS THREADS #/AREA URNS LPF: PRESENT /LPF
NEUTROPHILS # BLD AUTO: 17.3 10E9/L (ref 1.6–8.3)
NEUTROPHILS NFR BLD AUTO: 99.1 %
NITRATE UR QL: NEGATIVE
OVALOCYTES BLD QL SMEAR: SLIGHT
PH UR STRIP: 6 PH (ref 5–7)
PHOSPHATE SERPL-MCNC: 2.6 MG/DL (ref 2.5–4.5)
PLATELET # BLD AUTO: 320 10E9/L (ref 150–450)
PLATELET # BLD EST: ABNORMAL 10*3/UL
POIKILOCYTOSIS BLD QL SMEAR: SLIGHT
POLYCHROMASIA BLD QL SMEAR: SLIGHT
POTASSIUM SERPL-SCNC: 3.2 MMOL/L (ref 3.4–5.3)
PROT SERPL-MCNC: 7.5 G/DL (ref 6.8–8.8)
RBC # BLD AUTO: 2.67 10E12/L (ref 4.4–5.9)
RBC #/AREA URNS AUTO: 2 /HPF (ref 0–2)
SODIUM SERPL-SCNC: 138 MMOL/L (ref 133–144)
SOURCE: ABNORMAL
SP GR UR STRIP: 1.02 (ref 1–1.03)
SQUAMOUS #/AREA URNS AUTO: <1 /HPF (ref 0–1)
TRANS CELLS #/AREA URNS HPF: 1 /HPF
UROBILINOGEN UR STRIP-MCNC: 2 MG/DL (ref 0–2)
WBC # BLD AUTO: 17.5 10E9/L (ref 4–11)
WBC #/AREA URNS AUTO: 15 /HPF (ref 0–5)

## 2020-09-11 PROCEDURE — 86923 COMPATIBILITY TEST ELECTRIC: CPT | Performed by: PHYSICIAN ASSISTANT

## 2020-09-11 PROCEDURE — 80053 COMPREHEN METABOLIC PANEL: CPT | Performed by: PHYSICIAN ASSISTANT

## 2020-09-11 PROCEDURE — 25000128 H RX IP 250 OP 636: Mod: ZF | Performed by: PHYSICIAN ASSISTANT

## 2020-09-11 PROCEDURE — 87040 BLOOD CULTURE FOR BACTERIA: CPT | Performed by: PHYSICIAN ASSISTANT

## 2020-09-11 PROCEDURE — 87086 URINE CULTURE/COLONY COUNT: CPT | Performed by: PHYSICIAN ASSISTANT

## 2020-09-11 PROCEDURE — G0463 HOSPITAL OUTPT CLINIC VISIT: HCPCS | Mod: ZF

## 2020-09-11 PROCEDURE — 99214 OFFICE O/P EST MOD 30 MIN: CPT | Mod: ZP | Performed by: PHYSICIAN ASSISTANT

## 2020-09-11 PROCEDURE — 36592 COLLECT BLOOD FROM PICC: CPT

## 2020-09-11 PROCEDURE — 86901 BLOOD TYPING SEROLOGIC RH(D): CPT | Performed by: PHYSICIAN ASSISTANT

## 2020-09-11 PROCEDURE — 83735 ASSAY OF MAGNESIUM: CPT | Performed by: PHYSICIAN ASSISTANT

## 2020-09-11 PROCEDURE — 86850 RBC ANTIBODY SCREEN: CPT | Performed by: PHYSICIAN ASSISTANT

## 2020-09-11 PROCEDURE — 85025 COMPLETE CBC W/AUTO DIFF WBC: CPT | Performed by: PHYSICIAN ASSISTANT

## 2020-09-11 PROCEDURE — 86900 BLOOD TYPING SEROLOGIC ABO: CPT | Performed by: PHYSICIAN ASSISTANT

## 2020-09-11 PROCEDURE — 81001 URINALYSIS AUTO W/SCOPE: CPT | Performed by: PHYSICIAN ASSISTANT

## 2020-09-11 PROCEDURE — 84100 ASSAY OF PHOSPHORUS: CPT | Performed by: PHYSICIAN ASSISTANT

## 2020-09-11 RX ORDER — HEPARIN SODIUM (PORCINE) LOCK FLUSH IV SOLN 100 UNIT/ML 100 UNIT/ML
5 SOLUTION INTRAVENOUS
Status: CANCELLED | OUTPATIENT
Start: 2020-09-11

## 2020-09-11 RX ORDER — IOPAMIDOL 755 MG/ML
132 INJECTION, SOLUTION INTRAVASCULAR ONCE
Status: COMPLETED | OUTPATIENT
Start: 2020-09-11 | End: 2020-09-11

## 2020-09-11 RX ORDER — ALLOPURINOL 300 MG/1
300 TABLET ORAL DAILY
Qty: 30 TABLET | Refills: 0 | Status: SHIPPED | OUTPATIENT
Start: 2020-09-11 | End: 2021-02-11

## 2020-09-11 RX ORDER — HEPARIN SODIUM,PORCINE 10 UNIT/ML
5 VIAL (ML) INTRAVENOUS
Status: CANCELLED | OUTPATIENT
Start: 2020-09-11

## 2020-09-11 RX ORDER — POTASSIUM CHLORIDE 1500 MG/1
20 TABLET, EXTENDED RELEASE ORAL DAILY
Qty: 5 TABLET | Refills: 0 | Status: SHIPPED | OUTPATIENT
Start: 2020-09-11 | End: 2020-10-21

## 2020-09-11 RX ORDER — HEPARIN SODIUM,PORCINE 10 UNIT/ML
5 VIAL (ML) INTRAVENOUS
Status: DISCONTINUED | OUTPATIENT
Start: 2020-09-11 | End: 2020-09-11 | Stop reason: HOSPADM

## 2020-09-11 RX ADMIN — IOPAMIDOL 132 ML: 755 INJECTION, SOLUTION INTRAVASCULAR at 12:32

## 2020-09-11 RX ADMIN — Medication 5 ML: at 09:41

## 2020-09-11 RX ADMIN — Medication 5 ML: at 09:42

## 2020-09-11 ASSESSMENT — PAIN SCALES - GENERAL: PAINLEVEL: NO PAIN (0)

## 2020-09-11 NOTE — PROGRESS NOTES
This is a recent snapshot of the patient's [facility] medical record.  For current drug dose and complete information and questions, call [phone] or In Basket pool, [facility] home infusion ([id number])  CSN Number:  [id number]

## 2020-09-11 NOTE — NURSING NOTE
"Oncology Rooming Note    September 11, 2020 9:49 AM   Diego Buchanan is a 25 year old male who presents for:    Chief Complaint   Patient presents with     Lab Only     cvc, heparin locked, vitals checked     Oncology Clinic Visit     Return; Ewings Sarcoma     Initial Vitals: /72   Pulse 117   Temp 98.4  F (36.9  C)   Resp 18   Wt 97.5 kg (215 lb)   SpO2 100%   BMI 32.70 kg/m   Estimated body mass index is 32.7 kg/m  as calculated from the following:    Height as of 9/3/20: 1.727 m (5' 7.99\").    Weight as of this encounter: 97.5 kg (215 lb). Body surface area is 2.16 meters squared.  No Pain (0) Comment: Data Unavailable   No LMP for male patient.  Allergies reviewed: Yes  Medications reviewed: Yes    Medications: Medication refills not needed today.  Pharmacy name entered into [employer]:    [id number] - [place], [place] - [address] [place] [address] & [facility] [place], [place] - [id number]    Clinical concerns: Patient states there are no new concerns to discuss with provider.  Cheryle was not notified.         Loretta Rankin CMA              "

## 2020-09-11 NOTE — Clinical Note
9/11/2020         RE: Diego Buchanan  [address]  [place] [address]        Dear Colleague,    Thank you for referring your patient, Diego Buchanan, to the [facility]. Please see a copy of my visit note below.    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 He presented initially with abdominal pain, diarrhea, and progressive abdominal distention for 3 months duration. 2. Initial CT scan in February 2020 showed severe peritoneal carcinomatosis with large peritoneal tumor implants throughout the entire abdomen and pelvis, but mostly prominently in the pelvis.   2. PETCT scan showed interval increase in the amount of peritoneal carcinomatosis.  3. On 3/12/2020, he had ultrasound-guided core needle biopsy of a peritoneal implant (Case: [id number]), which showed a completely undifferentiated appearance, but stains with pancytokeratin, indicating an epithelial origin. 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 Tumor markers for CA-19-9, CEA, beta-hCG, alpha-fetoprotein were unremarkable. Cancer type ID molecular testing by [employer] sent to determine the exact diagnosis and to assist in further treatment planning. 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 Relative probability of less than 5% of other subtype of sarcoma. EWSR1-WT1 fusion detected.  4. 5/1/2020, MRI brain negative for brain metastasis, and baseline CT-CAP obtained showing extensive mixed solid and cystic masses throughout the abdomen and pelvis consistent with peritoneal carcinomatosis. Largest mass measures approximately 20 cm in the pelvis. Metastatic mixed solid and cystic masses seen in hepatic segments 5 and 6 and hepatic segment 7. The masses appear to be contiguous  "with the retrohepatic peritoneal carcinomatosis. Small volume fluid about the spleen favored to represent malignant ascites, stable mild-to-moderate right hydronephrosis related to mass effect upon the distal ureter in the pelvis, the IVC and iliac veins are compressed due to the extensive peritoneal carcinomatosis without findings to suggest deep venous thrombosis.  5. 5/4/2020, he begins CAV/IMV alternating chemotherapy.    Interval History:  Diego Buchanan was met with for follow up. He is feeling fatigued- states he is sleeping about 11 hours a day consistently at night. Not reporting naps during the day be feels fatigue. Completed chemotherapy last week and did not note any significant side effects. No nausea. Continues with abdominal pain when he pushes on his abdomen and he feels like his belly is getting bigger.   Rash to chest wall is improving, per sister- this occurred \"a few weeks ago\" per sister from prior bandage. She has been cleaning the site well and the redness has resolved. Robert no longer reports blood in his urine or dysuria. He denies frequency, urgency, or inability to empty bladder.  ***  ***    Current Outpatient Medications   Medication Sig Dispense Refill     allopurinol (ZYLOPRIM) 300 MG tablet Take 1 tablet (300 mg) by mouth daily (Patient not taking: Reported on 8/27/2020) 30 tablet 0     dexamethasone (DECADRON) 4 MG tablet Take 2 tablets (8 mg) by mouth daily (with breakfast) for 3 days Start the day after doxorubicin, cyclophosphamide, and vincristine (odd cycles). 6 tablet 8     etoposide (VEPESID) 50 MG capsule Take 5 capsules (250 mg) by mouth daily for 6 days Days 1 through 6. (Patient not taking: Reported on 8/27/2020) 30 capsule 0     etoposide (VEPESID) 50 MG capsule Take 5 capsules (250 mg) by mouth daily for 6 days Days 1 through 6. 30 capsule 0     folic acid (FOLVITE) 1 MG tablet Take 1 tablet (1 mg) by mouth daily 30 tablet 3     LORazepam (ATIVAN) 0.5 MG tablet Take 1 " tablet (0.5 mg) by mouth every 4 hours as needed (Anxiety, Nausea/Vomiting or Sleep) 30 tablet 5     mesna (MESNEX) 400 MG TABS tablet Take 1 tablet (400 mg) by mouth every 4 hours for 2 doses Take one tablet 4 hours and 8 hours after end of cyclophosphamide infusion. 2 tablet 0     mesna (MESNEX) 400 MG TABS tablet Take 1 tablet (400 mg) by mouth every 4 hours for 2 doses Take one tablet 4 hours and 8 hours after end of cyclophosphamide infusion. 2 tablet 0     metoprolol tartrate (LOPRESSOR) 50 MG tablet Take 1.5 tablets (75 mg) by mouth daily 45 tablet 3     oxyCODONE (OXY-IR) 5 MG capsule Take 5 mg by mouth every 6 hours as needed for severe pain Prescribed in ED 7/6/20       polyethylene glycol (MIRALAX) 17 g packet Take 17 g by mouth       prochlorperazine (COMPAZINE) 10 MG tablet Take 1 tablet (10 mg) by mouth every 6 hours as needed (Nausea/Vomiting) (Patient not taking: Reported on 7/16/2020) 30 tablet 5     senna-docusate (SENNA S) 8.6-50 MG tablet Take 1 tablet by mouth 2 times daily 60 tablet 0     traMADol (ULTRAM) 50 MG tablet Take 50 mg by mouth as needed         Physical Examination:  There were no vitals taken for this visit.  Wt Readings from Last 10 Encounters:   09/03/20 99.8 kg (220 lb)   08/27/20 103.1 kg (227 lb 6.4 oz)   08/06/20 97.7 kg (215 lb 6.4 oz)   07/16/20 101.4 kg (223 lb 8 oz)   06/19/20 106.4 kg (234 lb 9.6 oz)   06/18/20 115.7 kg (255 lb)   05/26/20 115.7 kg (255 lb)   05/04/20 114.6 kg (252 lb 9.6 oz)   05/01/20 113.3 kg (249 lb 12.8 oz)     Constitutional: Well-appearing male in no acute distress.***  Eyes: EOMI, PERRL. No scleral icterus.  Lymphatic: Neck is supple without cervical or supraclavicular lymphadenopathy.   Cardiovascular: Regular rate and rhythm. No murmurs, gallops, or rubs. Bilateral LE edema, R> L, 1+ pitting.  Respiratory: Clear to auscultation bilaterally. No wheezes or crackles.  Gastrointestinal: Bowel sounds present. Abdomen distended, some tenderness to  palpation.   Neurologic: Cranial nerves II through XII are grossly intact.  Skin: No rashes, petechiae, or bruising noted on exposed skin.  MSK: mild tenderness diffusely in the right ankle and top of right foot. No crepitus. ROM intact.     Laboratory Data:  Results for RY GOMES (MRN [id number]) as of 9/11/2020 10:36   Ref. Range 9/11/2020 09:34   Sodium Latest Ref Range: 133 - 144 mmol/L 138   Potassium Latest Ref Range: 3.4 - 5.3 mmol/L 3.2 (L)   Chloride Latest Ref Range: 94 - 109 mmol/L 109   Carbon Dioxide Latest Ref Range: 20 - 32 mmol/L 21   Urea Nitrogen Latest Ref Range: 7 - 30 mg/dL 9   Creatinine Latest Ref Range: 0.66 - 1.25 mg/dL 0.66   GFR Estimate Latest Ref Range: >60 mL/min/1.73_m2 >90   GFR Estimate If Black Latest Ref Range: >60 mL/min/1.73_m2 >90   Calcium Latest Ref Range: 8.5 - 10.1 mg/dL 9.0   Anion Gap Latest Ref Range: 3 - 14 mmol/L 8   Magnesium Latest Ref Range: 1.6 - 2.3 mg/dL 2.0   Phosphorus Latest Ref Range: 2.5 - 4.5 mg/dL 2.6   Albumin Latest Ref Range: 3.4 - 5.0 g/dL 3.2 (L)   Protein Total Latest Ref Range: 6.8 - 8.8 g/dL 7.5   Bilirubin Total Latest Ref Range: 0.2 - 1.3 mg/dL 0.7   Alkaline Phosphatase Latest Ref Range: 40 - 150 U/L 130   ALT Latest Ref Range: 0 - 70 U/L 26   AST Latest Ref Range: 0 - 45 U/L 21   Glucose Latest Ref Range: 70 - 99 mg/dL 90   WBC Latest Ref Range: 4.0 - 11.0 10e9/L 17.5 (H)   Hemoglobin Latest Ref Range: 13.3 - 17.7 g/dL 8.0 (L)   Hematocrit Latest Ref Range: 40.0 - 53.0 % 25.6 (L)   Platelet Count Latest Ref Range: 150 - 450 10e9/L 320   RBC Count Latest Ref Range: 4.4 - 5.9 10e12/L 2.67 (L)   MCV Latest Ref Range: 78 - 100 fl 96   MCH Latest Ref Range: 26.5 - 33.0 pg 30.0   MCHC Latest Ref Range: 31.5 - 36.5 g/dL 31.3 (L)   RDW Latest Ref Range: 10.0 - 15.0 % 18.4 (H)   Diff Method Unknown Manual Differential   % Neutrophils Latest Units: % 99.1   % Lymphocytes Latest Units: % 0.9   % Monocytes Latest Units: % 0.0   % Eosinophils Latest  Units: % 0.0   % Basophils Latest Units: % 0.0   Absolute Neutrophil Latest Ref Range: 1.6 - 8.3 10e9/L 17.3 (H)   Absolute Lymphocytes Latest Ref Range: 0.8 - 5.3 10e9/L 0.2 (L)   Absolute Monocytes Latest Ref Range: 0.0 - 1.3 10e9/L 0.0   Absolute Eosinophils Latest Ref Range: 0.0 - 0.7 10e9/L 0.0   Absolute Basophils Latest Ref Range: 0.0 - 0.2 10e9/L 0.0   Anisocytosis Unknown Slight   Poikilocytosis Unknown Slight   Polychromasia Unknown Slight   Teardrop Cells Unknown Slight   Acanthocytes Unknown Slight   Ovalocytes Unknown Slight   Macrocytes Unknown Present   Platelet Estimate Unknown Confirming automated cell count       Assessment and Plan:  #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 CT-CAP stable/mild improvement. Unclear which regimen is working at this time, however, given clinical improvement (tolerating solid foods) continued with current regimen.   - Continue with CAV/IMV, delayed cycle 6 x 1 week given hematuria- now completed. Given increase in abdominal pain/distention, will move CT scan to today.       #2 Anemia, normocytic   - Previously noting patricia hematuria, which was also seen on UA. Robert reports this has improved. He did require a PRBC transfusion given hgb of 6.7. Will arrange for a possible PRBC transfusion if hgb drops below 8.   - Remains on folic acid supplement and Feosol daily.       #3 Malnutrition, secondary to #1 above  Met with nutritionist again on 9/9/2020, pt tolerating solid foods but he continues to lose weight. Orbert was unable to identify why he does not feel like eating, but I suspect it may be due to .   - Goals per  nutritionist:              1.  Aim for 5-6 small frequent meals              2.  Aim for 2200kcal and 100g protein/day              3. Weight maintenance .   - Continue Boost shakes to supplement his nutrition  - Encourage protein intake.   - Will try to get another appointment with nutritionist. Noted weight loss today, but his weight has been fluctuant and he has some improvement in edema this week.      #4. Dysuria, hematuria  - Treated for UTI, but no bacteria on UC and without improvement in symptoms. Urine cytology was negative. Will send for renal US and refer to urology. Concern for stone vs tumor invasion of bladder wall. Robert knows to watch this very closely and let us know if it worsens.     #5 Bilateral leg swelling, R>L.   - RLE doppler negative for DVT, echo unremarkable.   - Noted low albumin on blood work, encourage adequate protein intake. May also be due to poor lymphatic drainage.   - Advised leg elevation, tylenol as needed, and compression stocking. Noting some improvement this week with supportive cares.      #6. Constipation. Start senna-s BID and mirlax daily prn.     Cheryle Julian PA-C  [facility]  [address], [place] [id number] [phone]    Again, thank you for allowing me to participate in the care of your patient.        Sincerely,        Cheryle Julian PA-C

## 2020-09-11 NOTE — PROGRESS NOTES
This is a recent snapshot of the patient's [facility] medical record.  For current drug dose and complete information and questions, call [phone] or In Basket pool, [facility] home infusion ([id number])  CSN Number:  [id number]

## 2020-09-11 NOTE — NURSING NOTE
Chief Complaint   Patient presents with     Lab Only     cvc, heparin locked, vitals checked     Katie Harding RN on 9/11/2020 at 9:40 AM

## 2020-09-12 LAB
BACTERIA SPEC CULT: NO GROWTH
Lab: NORMAL
SPECIMEN SOURCE: NORMAL

## 2020-09-14 ENCOUNTER — INFUSION THERAPY VISIT (OUTPATIENT)
Dept: INFUSION THERAPY | Facility: CLINIC | Age: 25
End: 2020-09-14
Attending: INTERNAL MEDICINE
Payer: COMMERCIAL

## 2020-09-14 ENCOUNTER — APPOINTMENT (OUTPATIENT)
Dept: LAB | Facility: CLINIC | Age: 25
End: 2020-09-14
Attending: INTERNAL MEDICINE
Payer: COMMERCIAL

## 2020-09-14 VITALS
RESPIRATION RATE: 16 BRPM | HEART RATE: 88 BPM | TEMPERATURE: 98 F | OXYGEN SATURATION: 100 % | WEIGHT: 220.1 LBS | DIASTOLIC BLOOD PRESSURE: 70 MMHG | SYSTOLIC BLOOD PRESSURE: 106 MMHG | BODY MASS INDEX: 33.47 KG/M2

## 2020-09-14 DIAGNOSIS — C49.9 DESMOPLASTIC SMALL ROUND CELL TUMOR (H): Primary | ICD-10-CM

## 2020-09-14 LAB
ABO + RH BLD: NORMAL
ABO + RH BLD: NORMAL
ALBUMIN SERPL-MCNC: 3 G/DL (ref 3.4–5)
ALP SERPL-CCNC: 253 U/L (ref 40–150)
ALT SERPL W P-5'-P-CCNC: 50 U/L (ref 0–70)
ANION GAP SERPL CALCULATED.3IONS-SCNC: 9 MMOL/L (ref 3–14)
ANISOCYTOSIS BLD QL SMEAR: SLIGHT
AST SERPL W P-5'-P-CCNC: 32 U/L (ref 0–45)
BASOPHILS # BLD AUTO: 0 10E9/L (ref 0–0.2)
BASOPHILS NFR BLD AUTO: 0.9 %
BILIRUB SERPL-MCNC: 0.2 MG/DL (ref 0.2–1.3)
BLD GP AB SCN SERPL QL: NORMAL
BLD PROD TYP BPU: NORMAL
BLD PROD TYP BPU: NORMAL
BLD UNIT ID BPU: 0
BLOOD BANK CMNT PATIENT-IMP: NORMAL
BLOOD PRODUCT CODE: NORMAL
BPU ID: NORMAL
BUN SERPL-MCNC: 6 MG/DL (ref 7–30)
CALCIUM SERPL-MCNC: 8.7 MG/DL (ref 8.5–10.1)
CHLORIDE SERPL-SCNC: 109 MMOL/L (ref 94–109)
CO2 SERPL-SCNC: 23 MMOL/L (ref 20–32)
CREAT SERPL-MCNC: 0.62 MG/DL (ref 0.66–1.25)
DIFFERENTIAL METHOD BLD: ABNORMAL
EOSINOPHIL # BLD AUTO: 0.3 10E9/L (ref 0–0.7)
EOSINOPHIL NFR BLD AUTO: 6.9 %
ERYTHROCYTE [DISTWIDTH] IN BLOOD BY AUTOMATED COUNT: 17.7 % (ref 10–15)
GFR SERPL CREATININE-BSD FRML MDRD: >90 ML/MIN/{1.73_M2}
GLUCOSE SERPL-MCNC: 91 MG/DL (ref 70–99)
HCT VFR BLD AUTO: 23.9 % (ref 40–53)
HGB BLD-MCNC: 7.5 G/DL (ref 13.3–17.7)
LYMPHOCYTES # BLD AUTO: 0.4 10E9/L (ref 0.8–5.3)
LYMPHOCYTES NFR BLD AUTO: 9.6 %
MAGNESIUM SERPL-MCNC: 1.8 MG/DL (ref 1.6–2.3)
MCH RBC QN AUTO: 29.8 PG (ref 26.5–33)
MCHC RBC AUTO-ENTMCNC: 31.4 G/DL (ref 31.5–36.5)
MCV RBC AUTO: 95 FL (ref 78–100)
MONOCYTES # BLD AUTO: 0 10E9/L (ref 0–1.3)
MONOCYTES NFR BLD AUTO: 0.9 %
NEUTROPHILS # BLD AUTO: 3.7 10E9/L (ref 1.6–8.3)
NEUTROPHILS NFR BLD AUTO: 81.7 %
NUM BPU REQUESTED: 1
PHOSPHATE SERPL-MCNC: 3.4 MG/DL (ref 2.5–4.5)
PLATELET # BLD AUTO: 228 10E9/L (ref 150–450)
PLATELET # BLD EST: ABNORMAL 10*3/UL
POIKILOCYTOSIS BLD QL SMEAR: SLIGHT
POTASSIUM SERPL-SCNC: 3.2 MMOL/L (ref 3.4–5.3)
PROT SERPL-MCNC: 7.1 G/DL (ref 6.8–8.8)
RBC # BLD AUTO: 2.52 10E12/L (ref 4.4–5.9)
RBC INCLUSIONS BLD: SLIGHT
SODIUM SERPL-SCNC: 141 MMOL/L (ref 133–144)
SPECIMEN EXP DATE BLD: NORMAL
TRANSFUSION STATUS PATIENT QL: NORMAL
TRANSFUSION STATUS PATIENT QL: NORMAL
WBC # BLD AUTO: 4.5 10E9/L (ref 4–11)

## 2020-09-14 PROCEDURE — 80053 COMPREHEN METABOLIC PANEL: CPT | Performed by: PHYSICIAN ASSISTANT

## 2020-09-14 PROCEDURE — 85025 COMPLETE CBC W/AUTO DIFF WBC: CPT | Performed by: PHYSICIAN ASSISTANT

## 2020-09-14 PROCEDURE — 25000128 H RX IP 250 OP 636: Mod: ZF | Performed by: INTERNAL MEDICINE

## 2020-09-14 PROCEDURE — 84100 ASSAY OF PHOSPHORUS: CPT | Performed by: PHYSICIAN ASSISTANT

## 2020-09-14 PROCEDURE — 36430 TRANSFUSION BLD/BLD COMPNT: CPT

## 2020-09-14 PROCEDURE — 83735 ASSAY OF MAGNESIUM: CPT | Performed by: PHYSICIAN ASSISTANT

## 2020-09-14 PROCEDURE — P9016 RBC LEUKOCYTES REDUCED: HCPCS | Performed by: PHYSICIAN ASSISTANT

## 2020-09-14 RX ORDER — HEPARIN SODIUM (PORCINE) LOCK FLUSH IV SOLN 100 UNIT/ML 100 UNIT/ML
5 SOLUTION INTRAVENOUS
Status: DISCONTINUED | OUTPATIENT
Start: 2020-09-14 | End: 2020-09-14 | Stop reason: HOSPADM

## 2020-09-14 RX ORDER — HEPARIN SODIUM,PORCINE 10 UNIT/ML
5 VIAL (ML) INTRAVENOUS
Status: DISCONTINUED | OUTPATIENT
Start: 2020-09-14 | End: 2020-09-14 | Stop reason: HOSPADM

## 2020-09-14 RX ADMIN — Medication 5 ML: at 07:07

## 2020-09-14 RX ADMIN — Medication 5 ML: at 07:06

## 2020-09-14 ASSESSMENT — PAIN SCALES - GENERAL: PAINLEVEL: MODERATE PAIN (4)

## 2020-09-14 NOTE — NURSING NOTE
Chief Complaint   Patient presents with     Blood Draw     labs drawn from cvc by rn.  vs taken     Labs drawn from CVC by rn.  Good blood return noted in both lumens.  Both lumens flushed with NS and heparin.  Pt tolerated well.  VS taken.  Pt checked in for next appt.    Jacqueline Alcantara RN

## 2020-09-14 NOTE — LETTER
2020         RE: Diego Buchanan  [address]  [place] [address]        Dear Colleague,    Thank you for referring your patient, Diego Buchanan, to the [facility]. Please see a copy of my visit note below.    Blood Product Transfusion Nursing Note:    Diego Buchanan presents today to [facility] for a 1 Unit PRBC transfusion.  During today's [facility] appointment orders from Cheryle Julian PA-C were completed.    Progress note:  ID verified by name and .  Assessment completed.  Vitals were stable throughout time in [facility].    Verbal and printed education given to patient and patient's sister regarding transfusion and possible side effects.  Patient/representative verbalized understanding.     present during visit today: Not Applicable. Patient's sister at bedside during appointment.    All pertinent labs reviewed prior to infusion: YES. Hemoglobin level today of 7.5, platelets of 228. Meets parameters for 1 unit packed red blood cell transfusion.    Date of consent or authorization: 2020    Patient tolerated the procedure well    Transfusion given over approximately 90 minutes. Infusion started at 120ml/hr for ten minutes, then increased to 220ml/hr for remainder of infusion.     Discharge Plan:    Discharge instructions were reviewed with patient Yes  Patient/representative verbalized understanding of discharge instructions and all questions answered Yes.        Nohemy Hammer RN    /67 (BP Location: Right arm, Patient Position: Sitting, Cuff Size: Adult Regular)   Pulse 85   Temp 98.2  F (36.8  C) (Tympanic)   Resp 16   Wt 99.8 kg (220 lb 1.6 oz)   SpO2 100%   BMI 33.47 kg/m      Administrations This Visit     heparin lock flush 10 UNIT/ML injection 5 mL     Admin Date  2020 Action  Given Dose  5 mL Route  Intracatheter Administered By  uSzy Alcantara RN           Admin Date  2020 Action  Given Dose  5 mL  Route  Intracatheter Administered By  Suzy Alcantara RN          sodium chloride (PF) 0.9% PF flush 20 mL     Admin Date  09/14/2020 Action  Given Dose  10 mL Route  Intracatheter Administered By  Suzy Alcantara RN                Again, thank you for allowing me to participate in the care of your patient.        Sincerely,        VIDEO,

## 2020-09-14 NOTE — PATIENT INSTRUCTIONS
Dear Diego Buchanan    Thank you for choosing [facility] ([facility]) for your Blood transfusion.  The following information is a summary of our appointment as well as important reminders.      Patient Education     Blood Transfusion  Questions and Answers  What is a transfusion?  During your treatment, we may need to give you blood. This is called a transfusion. Blood is given through a needle in the vein. It takes 1 to 4 hours. It may include:    Red blood cells. These help replace blood you may have lost through bleeding or illness. They will increase your blood s ability to carry oxygen.    Platelets. These help blood to clot. They are used for low platelet counts and some bleeding disorders.    Plasma and cryoprecipitate. These help the blood to clot. They are used to treat some bleeding disorders.    Granulocytes (a type of white blood cell). These help your body fight infection.  If you need a transfusion, your doctor will prescribe one. We will ask you to sign a consent form before your first transfusion. Do not sign this form until all your questions have been answered.  Before your transfusion, we will double check your identity for your safety.  What is the risk of getting a disease from a transfusion?  The chances are low. Donors are carefully screened before giving blood. Also, before any donated blood is used, it must be tested for infectious diseases.   Here are example of your risk for infection:    Hepatitis B: 1 in 200,000    Hepatitis C: 1 in 1.8 million    HIV: 1 in 2.3 million    HTLV-I: 1 in 3 million    Bacterial infection:  ? 1 in 500,000 if receiving red blood cells  ? 1 in 75,000 if receiving platelets    Other infections (such as West Nile virus): less than 1 in 7 million    Other diseases (such as Chagas s disease): very rare    Babesiosis: rare, but risk is higher in summer  What are some of the side effects?  While most  transfusions have no side effects, you could have some of the symptoms listed below. If youthink you may be having a reaction, tell your doctor or nurse right away.  Common reactions include:    Fever or chills    Skin rash, hives, itching or flushing    Wheezing or trouble breathing    Chest or back pain    Facial swelling    New or ongoing cough    Bruising    Red or brown urine, or less urine output    Jaundice (yellow eyes and skin)    Irritation at the infusion site.  Some symptoms may occur up to four weeks after a transfusion. If you have any kind of symptom, call your doctor.  What are the risks of not having a transfusion?    Anemia (low red blood cells). This might cause a fast heartbeat and make you feel weak, tired, and breathless. If severe, it might lead to organ failure and death.    Bleeding. If your blood doesn t clot well, blood loss might lead to anemia, brain damage or death.    Weaker immune system. Your body might be less able to fight infection or heal wounds.  Are there other options besides transfusion?  Medicine (such as erythropoietin or iron pills) can cause the body to make more red blood cells. It may take months to replace all of the red blood cells you have lost.  In some cases, you can donate your own blood before surgery. The blood may be given back to you during your surgery. This is called autologous donation.   For informational purposes only. Not to replace the advice of your health care provider.   Copyright ?  2005 [employer]. All rights reserved. [employer] 579759 - REV 04/16.    For informational purposes only. Not to replace the advice of your health care provider.  Copyright   2018 [employer]. All rights reserved.    Patient Education     After Your Blood Transfusion  Discharge Instructions  After you leave  After a blood transfusion (received red blood cells, platelets, plasma, cryo or granulocytes), you will need to watch for signs of a  reaction for the next 48 hours.  Call your clinic or 911 (or go to the Emergency room) if you have any signs of a reaction:    Shaking or chills    Fever (temperature above 100.0 F)    Headache    Nausea    Hives    Itching    Swelling of the face or feeling flushed    Ongoing dry cough (nothing is coughed up)    Trouble breathing or wheezing  Some signs of a reaction won't show up for a few [id number].  These may include:    Fatigue    Dizziness    Pink or red urine  Your clinic is:   ________________________________________  ________________________________________  For informational purposes only. Not to replace the advice of your health care provider.   Copyright   2015 [employer]. All rights reserved. [employer] [id number] - Rev 07/16.  For informational purposes only. Not to replace the advice of your health care provider.  Copyright   2018 [employer]. All rights reserved.                  We look forward in seeing you on your next appointment here at [facility] ([facility]).  Please don t hesitate to call us at [phone] to reschedule any of your appointments or to speak with one of the [facility] registered nurses.  It was a pleasure taking care of you today.    Sincerely,    [facility]  [address] [place] [address]  [id number]  Phone:  [phone]

## 2020-09-14 NOTE — PROGRESS NOTES
Blood Product Transfusion Nursing Note:    Diego Buchanan presents today to [facility] for a 1 Unit PRBC transfusion.  During today's [facility] appointment orders from Cheryle Julian PA-C were completed.    Progress note:  ID verified by name and .  Assessment completed.  Vitals were stable throughout time in [facility].    Verbal and printed education given to patient and patient's sister regarding transfusion and possible side effects.  Patient/representative verbalized understanding.     present during visit today: Not Applicable. Patient's sister at bedside during appointment.    All pertinent labs reviewed prior to infusion: YES. Hemoglobin level today of 7.5, platelets of 228. Meets parameters for 1 unit packed red blood cell transfusion.    Date of consent or authorization: 2020    Patient tolerated the procedure well    Transfusion given over approximately 90 minutes. Infusion started at 120ml/hr for ten minutes, then increased to 220ml/hr for remainder of infusion.     Discharge Plan:    Discharge instructions were reviewed with patient Yes  Patient/representative verbalized understanding of discharge instructions and all questions answered Yes.        Nohemy Hammer RN    /67 (BP Location: Right arm, Patient Position: Sitting, Cuff Size: Adult Regular)   Pulse 85   Temp 98.2  F (36.8  C) (Tympanic)   Resp 16   Wt 99.8 kg (220 lb 1.6 oz)   SpO2 100%   BMI 33.47 kg/m      Administrations This Visit     heparin lock flush 10 UNIT/ML injection 5 mL     Admin Date  2020 Action  Given Dose  5 mL Route  Intracatheter Administered By  Suzy Alcantara RN           Admin Date  2020 Action  Given Dose  5 mL Route  Intracatheter Administered By  Suzy Alcantara RN          sodium chloride (PF) 0.9% PF flush 20 mL     Admin Date  2020 Action  Given Dose  10 mL Route  Intracatheter Administered By  Suzy Alcantara RN

## 2020-09-15 ENCOUNTER — PRE VISIT (OUTPATIENT)
Dept: UROLOGY | Facility: CLINIC | Age: 25
End: 2020-09-15

## 2020-09-15 NOTE — TELEPHONE ENCOUNTER
Chief Complaint : Referral - Cheryle Julian PA-C    Hx/Sx: Hematuria, desmoplastic small round cell tumor (DSRCT)    Records/Orders: Available    Pt Contacted: N/a    At Rooming: Telephone [phone]    Fran Smith, EMT

## 2020-09-16 VITALS
OXYGEN SATURATION: 100 % | TEMPERATURE: 98.2 F | RESPIRATION RATE: 18 BRPM | SYSTOLIC BLOOD PRESSURE: 124 MMHG | HEART RATE: 100 BPM | BODY MASS INDEX: 33.98 KG/M2 | DIASTOLIC BLOOD PRESSURE: 80 MMHG | WEIGHT: 223.4 LBS

## 2020-09-16 DIAGNOSIS — E87.6 HYPOKALEMIA: Primary | ICD-10-CM

## 2020-09-16 DIAGNOSIS — C49.9 DESMOPLASTIC SMALL ROUND CELL TUMOR (H): ICD-10-CM

## 2020-09-16 LAB
ACANTHOCYTES BLD QL SMEAR: SLIGHT
ALBUMIN SERPL-MCNC: 2.9 G/DL (ref 3.4–5)
ALP SERPL-CCNC: 284 U/L (ref 40–150)
ALT SERPL W P-5'-P-CCNC: 61 U/L (ref 0–70)
ANION GAP SERPL CALCULATED.3IONS-SCNC: 8 MMOL/L (ref 3–14)
ANISOCYTOSIS BLD QL SMEAR: ABNORMAL
AST SERPL W P-5'-P-CCNC: 37 U/L (ref 0–45)
BASOPHILS # BLD AUTO: 0.2 10E9/L (ref 0–0.2)
BASOPHILS NFR BLD AUTO: 2.6 %
BILIRUB SERPL-MCNC: 0.3 MG/DL (ref 0.2–1.3)
BUN SERPL-MCNC: 5 MG/DL (ref 7–30)
CALCIUM SERPL-MCNC: 8.5 MG/DL (ref 8.5–10.1)
CHLORIDE SERPL-SCNC: 110 MMOL/L (ref 94–109)
CO2 SERPL-SCNC: 24 MMOL/L (ref 20–32)
CREAT SERPL-MCNC: 0.62 MG/DL (ref 0.66–1.25)
DACRYOCYTES BLD QL SMEAR: SLIGHT
DIFFERENTIAL METHOD BLD: ABNORMAL
EOSINOPHIL # BLD AUTO: 0.2 10E9/L (ref 0–0.7)
EOSINOPHIL NFR BLD AUTO: 2.6 %
ERYTHROCYTE [DISTWIDTH] IN BLOOD BY AUTOMATED COUNT: 18.4 % (ref 10–15)
GFR SERPL CREATININE-BSD FRML MDRD: >90 ML/MIN/{1.73_M2}
GLUCOSE SERPL-MCNC: 111 MG/DL (ref 70–99)
HCT VFR BLD AUTO: 29.8 % (ref 40–53)
HGB BLD-MCNC: 9.4 G/DL (ref 13.3–17.7)
LYMPHOCYTES # BLD AUTO: 0.4 10E9/L (ref 0.8–5.3)
LYMPHOCYTES NFR BLD AUTO: 7.1 %
MAGNESIUM SERPL-MCNC: 1.9 MG/DL (ref 1.6–2.3)
MCH RBC QN AUTO: 29.9 PG (ref 26.5–33)
MCHC RBC AUTO-ENTMCNC: 31.5 G/DL (ref 31.5–36.5)
MCV RBC AUTO: 95 FL (ref 78–100)
METAMYELOCYTES # BLD: 0.1 10E9/L
METAMYELOCYTES NFR BLD MANUAL: 0.9 %
MONOCYTES # BLD AUTO: 1.3 10E9/L (ref 0–1.3)
MONOCYTES NFR BLD AUTO: 22.1 %
MYELOCYTES # BLD: 0.1 10E9/L
MYELOCYTES NFR BLD MANUAL: 0.9 %
NEUTROPHILS # BLD AUTO: 3.5 10E9/L (ref 1.6–8.3)
NEUTROPHILS NFR BLD AUTO: 61.1 %
NRBC # BLD AUTO: 0.3 10*3/UL
NRBC BLD AUTO-RTO: 4 /100
OVALOCYTES BLD QL SMEAR: SLIGHT
PHOSPHATE SERPL-MCNC: 3.7 MG/DL (ref 2.5–4.5)
PLATELET # BLD AUTO: 253 10E9/L (ref 150–450)
PLATELET # BLD EST: ABNORMAL 10*3/UL
POIKILOCYTOSIS BLD QL SMEAR: SLIGHT
POLYCHROMASIA BLD QL SMEAR: SLIGHT
POTASSIUM SERPL-SCNC: 3 MMOL/L (ref 3.4–5.3)
PROMYELOCYTES # BLD MANUAL: 0.2 10E9/L
PROMYELOCYTES NFR BLD MANUAL: 2.7 %
PROT SERPL-MCNC: 7.2 G/DL (ref 6.8–8.8)
RBC # BLD AUTO: 3.14 10E12/L (ref 4.4–5.9)
SODIUM SERPL-SCNC: 143 MMOL/L (ref 133–144)
WBC # BLD AUTO: 5.8 10E9/L (ref 4–11)

## 2020-09-16 PROCEDURE — 84100 ASSAY OF PHOSPHORUS: CPT | Performed by: PHYSICIAN ASSISTANT

## 2020-09-16 PROCEDURE — 25000128 H RX IP 250 OP 636: Performed by: INTERNAL MEDICINE

## 2020-09-16 PROCEDURE — 85025 COMPLETE CBC W/AUTO DIFF WBC: CPT | Performed by: PHYSICIAN ASSISTANT

## 2020-09-16 PROCEDURE — 80053 COMPREHEN METABOLIC PANEL: CPT | Performed by: PHYSICIAN ASSISTANT

## 2020-09-16 PROCEDURE — 83735 ASSAY OF MAGNESIUM: CPT | Performed by: PHYSICIAN ASSISTANT

## 2020-09-16 RX ORDER — POTASSIUM CHLORIDE 1500 MG/1
20 TABLET, EXTENDED RELEASE ORAL 2 TIMES DAILY
Qty: 6 TABLET | Refills: 0 | Status: SHIPPED | OUTPATIENT
Start: 2020-09-16 | End: 2020-09-19

## 2020-09-16 RX ORDER — HEPARIN SODIUM,PORCINE 10 UNIT/ML
5 VIAL (ML) INTRAVENOUS
Status: DISCONTINUED | OUTPATIENT
Start: 2020-09-16 | End: 2020-09-24 | Stop reason: HOSPADM

## 2020-09-16 RX ADMIN — Medication 5 ML: at 10:45

## 2020-09-16 RX ADMIN — Medication 5 ML: at 10:46

## 2020-09-16 ASSESSMENT — PAIN SCALES - GENERAL: PAINLEVEL: NO PAIN (0)

## 2020-09-16 NOTE — NURSING NOTE
Chief Complaint   Patient presents with     Blood Draw     VS done, labs collected via CVC by lab RN, heparin locked x 2.  Hx desmoplastic small round cell tumor.

## 2020-09-16 NOTE — PROGRESS NOTES
Noted that potassium is low- sent in replacement supplement 20 meq BID x 3 days.     Cheryle Julian PA-C

## 2020-09-17 ENCOUNTER — VIRTUAL VISIT (OUTPATIENT)
Dept: ONCOLOGY | Facility: CLINIC | Age: 25
End: 2020-09-17
Attending: INTERNAL MEDICINE
Payer: COMMERCIAL

## 2020-09-17 ENCOUNTER — RECORDS - HEALTHEAST (OUTPATIENT)
Dept: ADMINISTRATIVE | Facility: OTHER | Age: 25
End: 2020-09-17

## 2020-09-17 DIAGNOSIS — C41.9 SARCOMA, EWINGS (H): ICD-10-CM

## 2020-09-17 DIAGNOSIS — C49.9 DESMOPLASTIC SMALL ROUND CELL TUMOR (H): Primary | ICD-10-CM

## 2020-09-17 LAB
BACTERIA SPEC CULT: NO GROWTH
BACTERIA SPEC CULT: NO GROWTH
SPECIMEN SOURCE: NORMAL
SPECIMEN SOURCE: NORMAL

## 2020-09-17 PROCEDURE — 99215 OFFICE O/P EST HI 40 MIN: CPT | Mod: 95 | Performed by: INTERNAL MEDICINE

## 2020-09-17 PROCEDURE — 40001009 ZZH VIDEO/TELEPHONE VISIT; NO CHARGE

## 2020-09-17 RX ORDER — HEPARIN SODIUM (PORCINE) LOCK FLUSH IV SOLN 100 UNIT/ML 100 UNIT/ML
5 SOLUTION INTRAVENOUS
Status: CANCELLED | OUTPATIENT
Start: 2020-10-09

## 2020-09-17 RX ORDER — EPINEPHRINE 1 MG/ML
0.3 INJECTION, SOLUTION INTRAMUSCULAR; SUBCUTANEOUS EVERY 5 MIN PRN
Status: CANCELLED | OUTPATIENT
Start: 2020-10-09

## 2020-09-17 RX ORDER — SODIUM CHLORIDE 9 MG/ML
1000 INJECTION, SOLUTION INTRAVENOUS CONTINUOUS PRN
Status: CANCELLED
Start: 2020-10-09

## 2020-09-17 RX ORDER — PALONOSETRON 0.05 MG/ML
0.25 INJECTION, SOLUTION INTRAVENOUS ONCE
Status: CANCELLED
Start: 2020-10-09

## 2020-09-17 RX ORDER — NALOXONE HYDROCHLORIDE 0.4 MG/ML
.1-.4 INJECTION, SOLUTION INTRAMUSCULAR; INTRAVENOUS; SUBCUTANEOUS
Status: CANCELLED | OUTPATIENT
Start: 2020-10-09

## 2020-09-17 RX ORDER — HEPARIN SODIUM,PORCINE 10 UNIT/ML
5 VIAL (ML) INTRAVENOUS
Status: CANCELLED | OUTPATIENT
Start: 2020-10-09

## 2020-09-17 RX ORDER — LORAZEPAM 2 MG/ML
0.5 INJECTION INTRAMUSCULAR EVERY 4 HOURS PRN
Status: CANCELLED
Start: 2020-10-09

## 2020-09-17 RX ORDER — DIPHENHYDRAMINE HYDROCHLORIDE 50 MG/ML
50 INJECTION INTRAMUSCULAR; INTRAVENOUS
Status: CANCELLED
Start: 2020-10-09

## 2020-09-17 RX ORDER — ALBUTEROL SULFATE 0.83 MG/ML
2.5 SOLUTION RESPIRATORY (INHALATION)
Status: CANCELLED | OUTPATIENT
Start: 2020-10-09

## 2020-09-17 RX ORDER — ALBUTEROL SULFATE 90 UG/1
1-2 AEROSOL, METERED RESPIRATORY (INHALATION)
Status: CANCELLED
Start: 2020-10-09

## 2020-09-17 RX ORDER — METHYLPREDNISOLONE SODIUM SUCCINATE 125 MG/2ML
125 INJECTION, POWDER, LYOPHILIZED, FOR SOLUTION INTRAMUSCULAR; INTRAVENOUS
Status: CANCELLED
Start: 2020-10-09

## 2020-09-17 RX ORDER — MEPERIDINE HYDROCHLORIDE 25 MG/ML
25 INJECTION INTRAMUSCULAR; INTRAVENOUS; SUBCUTANEOUS EVERY 30 MIN PRN
Status: CANCELLED | OUTPATIENT
Start: 2020-10-09

## 2020-09-17 NOTE — LETTER
9/17/2020         RE: Diego Buchanan  [address]  [place] [address]        Dear Colleague,    Thank you for referring your patient, Diego Buchanan, to the [facility]. Please see a copy of my visit note below.    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 He presented initially with abdominal pain, diarrhea, and progressive abdominal distention for 3 months duration. 2. Initial CT scan in February 2020 showed severe peritoneal carcinomatosis with large peritoneal tumor implants throughout the entire abdomen and pelvis, but mostly prominently in the pelvis.   2. PETCT scan showed interval increase in the amount of peritoneal carcinomatosis.  3. On 3/12/2020, he had ultrasound-guided core needle biopsy of a peritoneal implant (Case: [id number]), which showed a completely undifferentiated appearance, but stains with pancytokeratin, indicating an epithelial origin. 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 Tumor markers for CA-19-9, CEA, beta-hCG, alpha-fetoprotein were unremarkable. Cancer type ID molecular testing by [facility] sent to determine the exact diagnosis and to assist in further treatment planning. 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 Relative probability of less than 5% of other subtype of sarcoma. EWSR1-WT1 fusion detected.  4. 5/1/2020, MRI brain negative for brain metastasis, and baseline CT-CAP obtained showing extensive mixed solid and cystic masses throughout the abdomen and pelvis consistent with peritoneal carcinomatosis. Largest mass measures approximately 20 cm in the pelvis. Metastatic mixed solid and cystic masses seen in hepatic segments 5 and 6 and hepatic segment 7. The masses appear to be contiguous with the  retrohepatic peritoneal carcinomatosis. Small volume fluid about the spleen favored to represent malignant ascites, stable mild-to-moderate right hydronephrosis related to mass effect upon the distal ureter in the pelvis, the IVC and iliac veins are compressed due to the extensive peritoneal carcinomatosis without findings to suggest deep venous thrombosis.  5. 5/4/2020, he begins CAV/IMV alternating chemotherapy.  6. 9/18/2020, he switches to CAV every 21 days.     HISTORY OF PRESENT ILLNESS  Diego Buchanan is a 25 year old male with a learning disability and recent diagnosis of DSRCT. He has tolerated his treatments aside from fatigue.     He is eating solid foods (meats, rice, veggies). She reports appetite improved, he has been having regular bowel movements. Denies any N/V and having BM Q2-3 days. He has had some ongoing issues with hematuria.    He had ultrasound of the kidneys and bladder, which shows peritoneal carcinomatosis, but not significant urinary system findings.    He denies any fevers or chills. No cough.    HISTORY OF PRESENT ILLNESS    Current Outpatient Medications   Medication Sig Dispense Refill     allopurinol (ZYLOPRIM) 300 MG tablet Take 1 tablet (300 mg) by mouth daily 30 tablet 0     folic acid (FOLVITE) 1 MG tablet Take 1 tablet (1 mg) by mouth daily 30 tablet 3     LORazepam (ATIVAN) 0.5 MG tablet Take 1 tablet (0.5 mg) by mouth every 4 hours as needed (Anxiety, Nausea/Vomiting or Sleep) 30 tablet 5     metoprolol tartrate (LOPRESSOR) 50 MG tablet Take 1.5 tablets (75 mg) by mouth daily 45 tablet 3     oxyCODONE (OXY-IR) 5 MG capsule Take 5 mg by mouth every 6 hours as needed for severe pain Prescribed in ED 7/6/20       polyethylene glycol (MIRALAX) 17 g packet Take 17 g by mouth       potassium chloride ER (KLOR-CON M) 20 MEQ CR tablet Take 1 tablet (20 mEq) by mouth 2 times daily for 3 days 6 tablet 0     senna-docusate (SENNA S) 8.6-50 MG tablet Take 1 tablet by mouth 2 times daily  60 tablet 0     traMADol (ULTRAM) 50 MG tablet Take 50 mg by mouth as needed       dexamethasone (DECADRON) 4 MG tablet Take 2 tablets (8 mg) by mouth daily (with breakfast) for 3 days Start the day after doxorubicin, cyclophosphamide, and vincristine (odd cycles). (Patient not taking: Reported on 9/11/2020) 6 tablet 8     etoposide (VEPESID) 50 MG capsule Take 5 capsules (250 mg) by mouth daily for 6 days Days 1 through 6. (Patient not taking: Reported on 8/27/2020) 30 capsule 0     etoposide (VEPESID) 50 MG capsule Take 5 capsules (250 mg) by mouth daily for 6 days Days 1 through 6. 30 capsule 0     mesna (MESNEX) 400 MG TABS tablet Take 1 tablet (400 mg) by mouth every 4 hours for 2 doses Take one tablet 4 hours and 8 hours after end of cyclophosphamide infusion. 2 tablet 0     mesna (MESNEX) 400 MG TABS tablet Take 1 tablet (400 mg) by mouth every 4 hours for 2 doses Take one tablet 4 hours and 8 hours after end of cyclophosphamide infusion. 2 tablet 0     prochlorperazine (COMPAZINE) 10 MG tablet Take 1 tablet (10 mg) by mouth every 6 hours as needed (Nausea/Vomiting) (Patient not taking: Reported on 7/16/2020) 30 tablet 5       REVIEW OF SYSTEMS  12-point ROS negative except as in HPI    Past Medical History:   Diagnosis Date     Hypertension      Learning disability     but pt is his own gaurdian     Peritoneal carcinomatosis (H)      Past Surgical History:   Procedure Laterality Date     BIOPSY      liver     IR CVC TUNNEL PLACEMENT > 5 YRS OF AGE  4/29/2020     No family history on file.      LABS/IMAGING  CBC RESULTS:   Recent Labs   Lab Test 08/06/20  1239   WBC 14.8*   RBC 2.72*   HGB 7.7*   HCT 25.8*   MCV 95   MCH 28.3   MCHC 29.8*   RDW 23.6*        Last Comprehensive Metabolic Panel:  Sodium   Date Value Ref Range Status   09/16/2020 143 133 - 144 mmol/L Final     Potassium   Date Value Ref Range Status   09/16/2020 3.0 (L) 3.4 - 5.3 mmol/L Final     Chloride   Date Value Ref Range Status    09/16/2020 110 (H) 94 - 109 mmol/L Final     Carbon Dioxide   Date Value Ref Range Status   09/16/2020 24 20 - 32 mmol/L Final     Anion Gap   Date Value Ref Range Status   09/16/2020 8 3 - 14 mmol/L Final     Glucose   Date Value Ref Range Status   09/16/2020 111 (H) 70 - 99 mg/dL Final     Urea Nitrogen   Date Value Ref Range Status   09/16/2020 5 (L) 7 - 30 mg/dL Final     Creatinine   Date Value Ref Range Status   09/16/2020 0.62 (L) 0.66 - 1.25 mg/dL Final     GFR Estimate   Date Value Ref Range Status   09/16/2020 >90 >60 mL/min/[1.73_m2] Final     Comment:     Non  GFR Calc  Starting 12/18/2018, serum creatinine based estimated GFR (eGFR) will be   calculated using the Chronic Kidney Disease Epidemiology Collaboration   (CKD-EPI) equation.       Calcium   Date Value Ref Range Status   09/16/2020 8.5 8.5 - 10.1 mg/dL Final     Bilirubin Total   Date Value Ref Range Status   09/16/2020 0.3 0.2 - 1.3 mg/dL Final     Alkaline Phosphatase   Date Value Ref Range Status   09/16/2020 284 (H) 40 - 150 U/L Final     ALT   Date Value Ref Range Status   09/16/2020 61 0 - 70 U/L Final     AST   Date Value Ref Range Status   09/16/2020 37 0 - 45 U/L Final       CT-CAP, 9/11/2020  IMPRESSION: In this patient with history of desmoplastic small round  cell tumor, the current scan compared to CT dated 7/15/2020 shows:  1. Extensive peritoneal carcinomatosis with abdominal and pelvic  deposits encasing the bowel, not significantly changed compared to  prior CT dated 7/15/2020.  2. Persistent mass effect of pelvic deposits on the iliac vessels and  IVC with pelvic collaterals.  3. Increased gallbladder enhancement with hyperdense intraluminal  contents and pericholecystic fluid/wall thickening, again nonspecific  in the setting of malignant ascites. Consider ultrasound gallbladder  if clinical concern for cholecystitis.  4. Resolved left pleural effusion.  5. Unchanged mediastinal lymph nodes, and punctate  pulmonary nodules,  with no new lymphadenopathy or new suspicious pulmonary nodule  6. Mild anasarca.       PHYSICAL EXAMINATION  There were no vitals taken for this visit.  Wt Readings from Last 3 Encounters:   09/16/20 101.3 kg (223 lb 6.4 oz)   09/14/20 99.8 kg (220 lb 1.6 oz)   09/11/20 97.5 kg (215 lb)   GENERAL: NAD  HEAD: atraumatic   EYES: no scleral icterus   LUNGS: Breathing comfortably on room air  EXTREMITIES: moving upper extremities   SKIN: no skin changes  NEURO: alert, following conversations    The rest of a comprehensive physical examination is deferred due to PHE (public health emergency) video visit restrictions.      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 He has tolerated 6 alternating cycles of CAV/IMV with anticipated side effects . CT-CAP stable. Unclear which regimen is working at this time, but given 3 drugs in CAV regimen and shorter in duration, we will continue with CAV only for the next 4 cycles to see if this is driving most of his response.  - Continue with CAV every 21 days for next 3 cycles.  - CT-CAP before cycle 10     #2 Anemia, normocytic   Initially microcytic, now normocytic. Hgb 9.4, likely 2/2 iron deficiency and chemotherapy.   - Continue Feosol daily.   - Trend, no transfusions needed at this time      #3 Malnutrition, secondary to #1 above  Saw nutritionist on 7/27, pt tolerating solid foods.   - Goals per nutritionist:   1.  Aim for 5-6 small frequent meals   2.  Aim for 2200kcal and 100g protein/day   3. Weight maintenance .   - Continue Boost shakes to supplement his nutrition    Farzaneh Burciaga M.D.   of Medicine  Hematology, Oncology and  "Transplantation        Diego Buchanan is a 25 year old male who is being evaluated via a billable video visit.      The patient has been notified of following:     \"This video visit will be conducted via a call between you and your physician/provider. We have found that certain health care needs can be provided without the need for an in-person physical exam.  This service lets us provide the care you need with a video conversation.  If a prescription is necessary we can send it directly to your pharmacy.  If lab work is needed we can place an order for that and you can then stop by our lab to have the test done at a later time.    Video visits are billed at different rates depending on your insurance coverage.  Please reach out to your insurance provider with any questions.    If during the course of the call the physician/provider feels a video visit is not appropriate, you will not be charged for this service.\"    Patient has given verbal consent for Video visit? Yes  How would you like to obtain your AVS? MyChart  If you are dropped from the video visit, the video invite should be resent to: Text to cell phone: [phone]  , if needed, [phone] (sister, Mariela, is available to interpret as well)   Will anyone else be joining your video visit? No       Vitals - Patient Reported  Weight (Patient Reported): 101.2 kg (223 lb)  Height (Patient Reported): 172.7 cm (5' 7.99\")  BMI (Based on Pt Reported Ht/Wt): 33.91  Pain Score: No Pain (0)      I have reviewed and updated the patient's allergies and medication list.    Concerns: No concerns  Refills: No refills needed.        Yisel Strange CMA        Video-Visit Details    Type of service:  Video Visit    Video Start Time: 9:50 AM  Video End Time: 10:31 AM    Originating Location (pt. Location): Home    Distant Location (provider location):  [facility]     Platform used for Video Visit: Valentina            Again, thank you for " allowing me to participate in the care of your patient.        Sincerely,        Farzaneh Sims MD

## 2020-09-17 NOTE — PROGRESS NOTES
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 He presented initially with abdominal pain, diarrhea, and progressive abdominal distention for 3 months duration. 2. Initial CT scan in February 2020 showed severe peritoneal carcinomatosis with large peritoneal tumor implants throughout the entire abdomen and pelvis, but mostly prominently in the pelvis.   2. PETCT scan showed interval increase in the amount of peritoneal carcinomatosis.  3. On 3/12/2020, he had ultrasound-guided core needle biopsy of a peritoneal implant (Case: [id number]), which showed a completely undifferentiated appearance, but stains with pancytokeratin, indicating an epithelial origin. 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 Tumor markers for CA-19-9, CEA, beta-hCG, alpha-fetoprotein were unremarkable. Cancer type ID molecular testing by [facility] sent to determine the exact diagnosis and to assist in further treatment planning. 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 Relative probability of less than 5% of other subtype of sarcoma. EWSR1-WT1 fusion detected.  4. 5/1/2020, MRI brain negative for brain metastasis, and baseline CT-CAP obtained showing extensive mixed solid and cystic masses throughout the abdomen and pelvis consistent with peritoneal carcinomatosis. Largest mass measures approximately 20 cm in the pelvis. Metastatic mixed solid and cystic masses seen in hepatic segments 5 and 6 and hepatic segment 7. The masses appear to be contiguous with the retrohepatic peritoneal carcinomatosis. Small volume fluid about the spleen favored to represent malignant ascites, stable mild-to-moderate right hydronephrosis related to mass effect upon the distal ureter in the pelvis, the IVC and iliac  veins are compressed due to the extensive peritoneal carcinomatosis without findings to suggest deep venous thrombosis.  5. 5/4/2020, he begins CAV/IMV alternating chemotherapy.  6. 9/18/2020, he switches to CAV every 21 days.     HISTORY OF PRESENT ILLNESS  Diego Buchanan is a 25 year old male with a learning disability and recent diagnosis of DSRCT. He has tolerated his treatments aside from fatigue.     He is eating solid foods (meats, rice, veggies). She reports appetite improved, he has been having regular bowel movements. Denies any N/V and having BM Q2-3 days. He has had some ongoing issues with hematuria.    He had ultrasound of the kidneys and bladder, which shows peritoneal carcinomatosis, but not significant urinary system findings.    He denies any fevers or chills. No cough.    HISTORY OF PRESENT ILLNESS    Current Outpatient Medications   Medication Sig Dispense Refill     allopurinol (ZYLOPRIM) 300 MG tablet Take 1 tablet (300 mg) by mouth daily 30 tablet 0     folic acid (FOLVITE) 1 MG tablet Take 1 tablet (1 mg) by mouth daily 30 tablet 3     LORazepam (ATIVAN) 0.5 MG tablet Take 1 tablet (0.5 mg) by mouth every 4 hours as needed (Anxiety, Nausea/Vomiting or Sleep) 30 tablet 5     metoprolol tartrate (LOPRESSOR) 50 MG tablet Take 1.5 tablets (75 mg) by mouth daily 45 tablet 3     oxyCODONE (OXY-IR) 5 MG capsule Take 5 mg by mouth every 6 hours as needed for severe pain Prescribed in ED 7/6/20       polyethylene glycol (MIRALAX) 17 g packet Take 17 g by mouth       potassium chloride ER (KLOR-CON M) 20 MEQ CR tablet Take 1 tablet (20 mEq) by mouth 2 times daily for 3 days 6 tablet 0     senna-docusate (SENNA S) 8.6-50 MG tablet Take 1 tablet by mouth 2 times daily 60 tablet 0     traMADol (ULTRAM) 50 MG tablet Take 50 mg by mouth as needed       dexamethasone (DECADRON) 4 MG tablet Take 2 tablets (8 mg) by mouth daily (with breakfast) for 3 days Start the day after doxorubicin, cyclophosphamide, and  vincristine (odd cycles). (Patient not taking: Reported on 9/11/2020) 6 tablet 8     etoposide (VEPESID) 50 MG capsule Take 5 capsules (250 mg) by mouth daily for 6 days Days 1 through 6. (Patient not taking: Reported on 8/27/2020) 30 capsule 0     etoposide (VEPESID) 50 MG capsule Take 5 capsules (250 mg) by mouth daily for 6 days Days 1 through 6. 30 capsule 0     mesna (MESNEX) 400 MG TABS tablet Take 1 tablet (400 mg) by mouth every 4 hours for 2 doses Take one tablet 4 hours and 8 hours after end of cyclophosphamide infusion. 2 tablet 0     mesna (MESNEX) 400 MG TABS tablet Take 1 tablet (400 mg) by mouth every 4 hours for 2 doses Take one tablet 4 hours and 8 hours after end of cyclophosphamide infusion. 2 tablet 0     prochlorperazine (COMPAZINE) 10 MG tablet Take 1 tablet (10 mg) by mouth every 6 hours as needed (Nausea/Vomiting) (Patient not taking: Reported on 7/16/2020) 30 tablet 5       REVIEW OF SYSTEMS  12-point ROS negative except as in HPI    Past Medical History:   Diagnosis Date     Hypertension      Learning disability     but pt is his own gaurdian     Peritoneal carcinomatosis (H)      Past Surgical History:   Procedure Laterality Date     BIOPSY      liver     IR CVC TUNNEL PLACEMENT > 5 YRS OF AGE  4/29/2020     No family history on file.      LABS/IMAGING  CBC RESULTS:   Recent Labs   Lab Test 08/06/20  1239   WBC 14.8*   RBC 2.72*   HGB 7.7*   HCT 25.8*   MCV 95   MCH 28.3   MCHC 29.8*   RDW 23.6*        Last Comprehensive Metabolic Panel:  Sodium   Date Value Ref Range Status   09/16/2020 143 133 - 144 mmol/L Final     Potassium   Date Value Ref Range Status   09/16/2020 3.0 (L) 3.4 - 5.3 mmol/L Final     Chloride   Date Value Ref Range Status   09/16/2020 110 (H) 94 - 109 mmol/L Final     Carbon Dioxide   Date Value Ref Range Status   09/16/2020 24 20 - 32 mmol/L Final     Anion Gap   Date Value Ref Range Status   09/16/2020 8 3 - 14 mmol/L Final     Glucose   Date Value Ref Range  Status   09/16/2020 111 (H) 70 - 99 mg/dL Final     Urea Nitrogen   Date Value Ref Range Status   09/16/2020 5 (L) 7 - 30 mg/dL Final     Creatinine   Date Value Ref Range Status   09/16/2020 0.62 (L) 0.66 - 1.25 mg/dL Final     GFR Estimate   Date Value Ref Range Status   09/16/2020 >90 >60 mL/min/[1.73_m2] Final     Comment:     Non  GFR Calc  Starting 12/18/2018, serum creatinine based estimated GFR (eGFR) will be   calculated using the Chronic Kidney Disease Epidemiology Collaboration   (CKD-EPI) equation.       Calcium   Date Value Ref Range Status   09/16/2020 8.5 8.5 - 10.1 mg/dL Final     Bilirubin Total   Date Value Ref Range Status   09/16/2020 0.3 0.2 - 1.3 mg/dL Final     Alkaline Phosphatase   Date Value Ref Range Status   09/16/2020 284 (H) 40 - 150 U/L Final     ALT   Date Value Ref Range Status   09/16/2020 61 0 - 70 U/L Final     AST   Date Value Ref Range Status   09/16/2020 37 0 - 45 U/L Final       CT-CAP, 9/11/2020  IMPRESSION: In this patient with history of desmoplastic small round  cell tumor, the current scan compared to CT dated 7/15/2020 shows:  1. Extensive peritoneal carcinomatosis with abdominal and pelvic  deposits encasing the bowel, not significantly changed compared to  prior CT dated 7/15/2020.  2. Persistent mass effect of pelvic deposits on the iliac vessels and  IVC with pelvic collaterals.  3. Increased gallbladder enhancement with hyperdense intraluminal  contents and pericholecystic fluid/wall thickening, again nonspecific  in the setting of malignant ascites. Consider ultrasound gallbladder  if clinical concern for cholecystitis.  4. Resolved left pleural effusion.  5. Unchanged mediastinal lymph nodes, and punctate pulmonary nodules,  with no new lymphadenopathy or new suspicious pulmonary nodule  6. Mild anasarca.       PHYSICAL EXAMINATION  There were no vitals taken for this visit.  Wt Readings from Last 3 Encounters:   09/16/20 101.3 kg (223 lb 6.4 oz)  "  09/14/20 99.8 kg (220 lb 1.6 oz)   09/11/20 97.5 kg (215 lb)   GENERAL: NAD  HEAD: atraumatic   EYES: no scleral icterus   LUNGS: Breathing comfortably on room air  EXTREMITIES: moving upper extremities   SKIN: no skin changes  NEURO: alert, following conversations    The rest of a comprehensive physical examination is deferred due to PHE (public health emergency) video visit restrictions.      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 He has tolerated 6 alternating cycles of CAV/IMV with anticipated side effects . CT-CAP stable. Unclear which regimen is working at this time, but given 3 drugs in CAV regimen and shorter in duration, we will continue with CAV only for the next 4 cycles to see if this is driving most of his response.  - Continue with CAV every 21 days for next 3 cycles.  - CT-CAP before cycle 10     #2 Anemia, normocytic   Initially microcytic, now normocytic. Hgb 9.4, likely 2/2 iron deficiency and chemotherapy.   - Continue Feosol daily.   - Trend, no transfusions needed at this time      #3 Malnutrition, secondary to #1 above  Saw nutritionist on 7/27, pt tolerating solid foods.   - Goals per nutritionist:   1.  Aim for 5-6 small frequent meals   2.  Aim for 2200kcal and 100g protein/day   3. Weight maintenance .   - Continue Boost shakes to supplement his nutrition    Farzaneh Burciaga M.D.   of Medicine  Hematology, Oncology and Transplantation        Diego Buchanan is a 25 year old male who is being evaluated via a billable video visit.      The patient has been notified of following:     \"This video visit will be conducted via a call between you and your physician/provider. We have found that " "certain health care needs can be provided without the need for an in-person physical exam.  This service lets us provide the care you need with a video conversation.  If a prescription is necessary we can send it directly to your pharmacy.  If lab work is needed we can place an order for that and you can then stop by our lab to have the test done at a later time.    Video visits are billed at different rates depending on your insurance coverage.  Please reach out to your insurance provider with any questions.    If during the course of the call the physician/provider feels a video visit is not appropriate, you will not be charged for this service.\"    Patient has given verbal consent for Video visit? Yes  How would you like to obtain your AVS? MyChart  If you are dropped from the video visit, the video invite should be resent to: Text to cell phone: [phone]  , if needed, [phone] (sister, Mariela, is available to interpret as well)   Will anyone else be joining your video visit? No       Vitals - Patient Reported  Weight (Patient Reported): 101.2 kg (223 lb)  Height (Patient Reported): 172.7 cm (5' 7.99\")  BMI (Based on Pt Reported Ht/Wt): 33.91  Pain Score: No Pain (0)      I have reviewed and updated the patient's allergies and medication list.    Concerns: No concerns  Refills: No refills needed.        Yisel Strange CMA        Video-Visit Details    Type of service:  Video Visit    Video Start Time: 9:50 AM  Video End Time: 10:31 AM    Originating Location (pt. Location): Home    Distant Location (provider location):  [facility]     Platform used for Video Visit: Valentina"

## 2020-09-22 ENCOUNTER — VIRTUAL VISIT (OUTPATIENT)
Dept: UROLOGY | Facility: CLINIC | Age: 25
End: 2020-09-22
Attending: PHYSICIAN ASSISTANT
Payer: COMMERCIAL

## 2020-09-22 ENCOUNTER — PRE VISIT (OUTPATIENT)
Dept: UROLOGY | Facility: CLINIC | Age: 25
End: 2020-09-22

## 2020-09-22 ENCOUNTER — RECORDS - HEALTHEAST (OUTPATIENT)
Dept: ADMINISTRATIVE | Facility: OTHER | Age: 25
End: 2020-09-22

## 2020-09-22 DIAGNOSIS — R31.9 HEMATURIA, UNSPECIFIED TYPE: ICD-10-CM

## 2020-09-22 NOTE — LETTER
"9/22/2020       RE: Diego Buchanan  [address]  [place] [address]     Dear Colleague,    Thank you for referring your patient, Diego Buchanan, to the [facility] FOR PROSTATE AND UROLOGIC CANCERS at [facility]. Please see a copy of my visit note below.    Diego Buchanan is a 25 year old male who is being evaluated via a billable telephone visit.      The patient has been notified of following:     \"This telephone visit will be conducted via a call between you and your physician/provider. We have found that certain health care needs can be provided without the need for a physical exam.  This service lets us provide the care you need with a short phone conversation.  If a prescription is necessary we can send it directly to your pharmacy.  If lab work is needed we can place an order for that and you can then stop by our lab to have the test done at a later time.    Telephone visits are billed at different rates depending on your insurance coverage. During this emergency period, for some insurers they may be billed the same as an in-person visit.  Please reach out to your insurance provider with any questions.    If during the course of the call the physician/provider feels a telephone visit is not appropriate, you will not be charged for this service.\"    Patient has given verbal consent for Telephone visit?  Yes    What phone number would you like to be contacted at? [phone]    How would you like to obtain your AVS? Rene, mail a copy    It was my pleasure to meet Mr. Diego Buchanan, a 25 year old year old male seen in consultation today at the request of for chief complaint: Consult For (Hematuria)    HPI: Mr. Diego Buchanan has PMH significant for intellectual disability and an aggressive desmoplasma sarcoma (DSRCT), diagnosed in Spring 2020, for which he is followed by Dr. Morales Sims and has been undergoing CAV/IMV alternating chemotherapy. " "    Most recent imaging, a CT CAP from 9/11/20 shows extensive peritoneal crcinomatosis with mass effect on pelvic vessels, nonspecific gall bladder enhancement, left pleural effusion and unchanged pulmonary nodules and mediastinal LAD. Per review of records, there has been significant symptomatic benefit from chemotherapy.     Today he presents for gross hematuria, which presented at the end of August after undergoing his 4th chemo cycle. Along with the blood, he had dysuria and he has had urinalyses on 8/27 (WBC 11, RBC 48), 9/3 (WBC 5, RBC 14) and 9/11 (WBC 15, RBC 2), associated with urine cultures on 8/27 and 9/11/20 showing no growth.      Per his sister and caregiver, Mariela, \"every time the chemotherapy is disconnected, he sees blood in his urine.\"     Today the patient sees no visible blood and he denies dysuria.   He feels the is voiding small volumes, but he cannot articulate whether this is new or problematic for him.  According to Mariela he has also had difficulty passing stools, and is on a mediation now (senna?  Miralax?) that is helping.  The patient is passing several small stools per day.  He isn't eating well and he continues to lose weight, according to his sister. She is having him drink plenty of water to help with his bowels and to help hematuria.     We did ask Robert if he had ever been catheterized, and he said he hadn't.    Per review of records, Cr is normal 0.62mg/dL and imaging shows no hydronephrosis, renal masses or stones.    The patient has had anemia and required transfusions.  Currently HGB is 9.4g/dL.     REVIEW OF DIAGNOSTICS:  9/11/20 - urine culture negative  8/27/20 - UCx: negative.     Past Medical History:   Diagnosis Date     Hypertension      Learning disability     but pt is his own gaurdian     Peritoneal carcinomatosis (H)        Past Surgical History:   Procedure Laterality Date     BIOPSY      liver     IR CVC TUNNEL PLACEMENT > 5 YRS OF AGE  4/29/2020     SOCIAL HISTORY:    " reports that he has never smoked. He has never used smokeless tobacco.    Current Outpatient Medications   Medication Sig Dispense Refill     allopurinol (ZYLOPRIM) 300 MG tablet Take 1 tablet (300 mg) by mouth daily 30 tablet 0     dexamethasone (DECADRON) 4 MG tablet Take 2 tablets (8 mg) by mouth daily (with breakfast) for 3 days Start the day after doxorubicin, cyclophosphamide, and vincristine (odd cycles). (Patient not taking: Reported on 9/11/2020) 6 tablet 8     etoposide (VEPESID) 50 MG capsule Take 5 capsules (250 mg) by mouth daily for 6 days Days 1 through 6. (Patient not taking: Reported on 8/27/2020) 30 capsule 0     etoposide (VEPESID) 50 MG capsule Take 5 capsules (250 mg) by mouth daily for 6 days Days 1 through 6. 30 capsule 0     folic acid (FOLVITE) 1 MG tablet Take 1 tablet (1 mg) by mouth daily 30 tablet 3     LORazepam (ATIVAN) 0.5 MG tablet Take 1 tablet (0.5 mg) by mouth every 4 hours as needed (Anxiety, Nausea/Vomiting or Sleep) 30 tablet 5     mesna (MESNEX) 400 MG TABS tablet Take 1 tablet (400 mg) by mouth every 4 hours for 2 doses Take one tablet 4 hours and 8 hours after end of cyclophosphamide infusion. 2 tablet 0     mesna (MESNEX) 400 MG TABS tablet Take 1 tablet (400 mg) by mouth every 4 hours for 2 doses Take one tablet 4 hours and 8 hours after end of cyclophosphamide infusion. 2 tablet 0     metoprolol tartrate (LOPRESSOR) 50 MG tablet Take 1.5 tablets (75 mg) by mouth daily 45 tablet 3     oxyCODONE (OXY-IR) 5 MG capsule Take 5 mg by mouth every 6 hours as needed for severe pain Prescribed in ED 7/6/20       polyethylene glycol (MIRALAX) 17 g packet Take 17 g by mouth       prochlorperazine (COMPAZINE) 10 MG tablet Take 1 tablet (10 mg) by mouth every 6 hours as needed (Nausea/Vomiting) (Patient not taking: Reported on 7/16/2020) 30 tablet 5     senna-docusate (SENNA S) 8.6-50 MG tablet Take 1 tablet by mouth 2 times daily 60 tablet 0     traMADol (ULTRAM) 50 MG tablet Take 50  mg by mouth as needed         ALLERGIES: Tegaderm chg dressing [chlorhexidine] and Tegaderm transparent dressing (informational only)      REVIEW OF SYSTEMS:  As above in HPI    GENERAL PHYSICAL EXAM:   Vitals: There were no vitals taken for this visit.  There is no height or weight on file to calculate BMI.    NEURO: Alert and oriented x 3.  PSYCH: Normal mood and affect, pleasant and agreeable during interview and exam.    RADIOLOGY: The following tests were reviewed:   EXAMINATION: CT CHEST/ABDOMEN/PELVIS W CONTRAST, 9/11/2020 12:39 PM     TECHNIQUE:  Helical CT images from the lung apices through the  symphysis pubis were obtained with contrast.  Coronal and sagittal  reformatted images were generated at a workstation for further  assessment.     CONTRAST:  132 ml Isovue 370.     COMPARISON: CT chest abdomen pelvis 7/15/2020     HISTORY: DSRCT; Desmoplastic small round cell tumor (H); Sarcoma,  Ewings (H)     FINDINGS:     Lines and tubes: Right IJ catheter tip in the right atrium.     Mediastinum/Neck Base: No thyroid nodules. Central tracheobronchial  tree is patent. Heart size is normal. No pericardial effusion.  Normal  thoracic vasculature. Unchanged mediastinal lymph node including right  paratracheal lymph node measuring 9 mm (series 2, image 5) and 8 mm  (series 2, image 12) and left anterior cardiophrenic lymph node  (series 3, image 209).  Lungs: No consolidation. No pleural effusion or pneumothorax.  Unchanged punctate pulmonary nodules (series 6, image 140 and series  6, image 184).      Abdomen and pelvis:     Extensive solid and cystic masses throughout the peritoneal cavity  including along the peritoneum, omentum and mesentery encasing the  bowel loops. For example, there is is nodular soft tissue deposit  along the posterior surface of the liver with associated invasion of  segment 5 and 6 (series 2, image 55). Calcified deposit in the central  abdomen measuring 8.6 x 7.7 cm, previously  measuring 9.2 x 7.5 cm when  measured in a similar manner (series 3, image 315). The mass is seen  encasing the bowel loops with no definite small bowel obstruction. No  pneumatosis, no pneumoperitoneum. Unchanged configuration of the  septated loculated deposits. Small volume ascites.     Increased enhancement of the gallbladder with hyperdense intraluminal  contents mild gallbladder wall thickening/pericholecystic fluid  (series 3, image 252)     Spleen: Normal size.  Pancreas: No suspicious pancreatic lesions. The pancreatic duct is not  dilated.  Adrenal glands: No adrenal nodules.   Kidneys: No hydronephrosis or obstructing renal stones.    Bladder / Pelvic organs:  Extensive pelvic deposits with mass effect  and anterior displacement of the partially decompressed urinary  bladder. Loss of fat plane of the deposit with the urinary bladder and  rectum with no obvious invasion  Lymph nodes: No retroperitoneal, mesenteric, or pelvic  lymphadenopathy.  Peritoneum / Retroperitoneum: No free air within the abdomen.  Vessels: No infrarenal aortic aneurysm. Mass effect of the deposits on  IVC and iliac vein with multiple pelvic collaterals     Bones and soft tissues: No aggressive osseous lesion. Unchanged small  ventral hernia with hyperdense component (series 3, image 149). Mild  anasarca. Bilateral gynecomastia.                                                                   IMPRESSION: In this patient with history of desmoplastic small round  cell tumor, the current scan compared to CT dated 7/15/2020 shows:  1. Extensive peritoneal carcinomatosis with abdominal and pelvic  deposits encasing the bowel, not significantly changed compared to  prior CT dated 7/15/2020.  2. Persistent mass effect of pelvic deposits on the iliac vessels and  IVC with pelvic collaterals.  3. Increased gallbladder enhancement with hyperdense intraluminal  contents and pericholecystic fluid/wall thickening, again nonspecific  in the  setting of malignant ascites. Consider ultrasound gallbladder  if clinical concern for cholecystitis.  4. Resolved left pleural effusion.  5. Unchanged mediastinal lymph nodes, and punctate pulmonary nodules,  with no new lymphadenopathy or new suspicious pulmonary nodule  6. Mild anasarca.    LABS: The last test results for Ms. Diego Buchanan were reviewed.   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  There are no upper tract malignancies seen on multiple imaging studies.   - Discussed with patient and family that hematuria could be secondary to cyclophosphamide (despite mesna) but other pathology cannot be excluded at this time including: new bladder/urethral primary malignancies or metastatic bladder invasion.  Cystoscopy would be the next step in evaluating hematuria if it becomes important to fully define the etiology.  Will allow Oncology to set priorities.  If they feel cystoscopy would be beneficial, I will discuss with our urology team.  For Robert, an OR anesthetic would make cystoscopy less traumatic.     - Per discussion with Mariela --> their family is not desiring cystoscopy at this time.  They would prefer to save cystoscopy for refractory hematuria. Right now, the bleeding is not problematic    Phone  call duration: 24 minutes    Jesenia Villela PA-C  Department of Urologic Surgery

## 2020-09-22 NOTE — PROGRESS NOTES
"Diego Buchanan is a 25 year old male who is being evaluated via a billable telephone visit.      The patient has been notified of following:     \"This telephone visit will be conducted via a call between you and your physician/provider. We have found that certain health care needs can be provided without the need for a physical exam.  This service lets us provide the care you need with a short phone conversation.  If a prescription is necessary we can send it directly to your pharmacy.  If lab work is needed we can place an order for that and you can then stop by our lab to have the test done at a later time.    Telephone visits are billed at different rates depending on your insurance coverage. During this emergency period, for some insurers they may be billed the same as an in-person visit.  Please reach out to your insurance provider with any questions.    If during the course of the call the physician/provider feels a telephone visit is not appropriate, you will not be charged for this service.\"    Patient has given verbal consent for Telephone visit?  Yes    What phone number would you like to be contacted at? [phone]    How would you like to obtain your AVS? MyChart, mail a copy    It was my pleasure to meet Mr. Diego Buchanan, a 25 year old year old male seen in consultation today at the request of for chief complaint: Consult For (Hematuria)    HPI: Mr. Diego Buchanan has PMH significant for intellectual disability and an aggressive desmoplasma sarcoma (DSRCT), diagnosed in Spring 2020, for which he is followed by Dr. Morales Sims and has been undergoing CAV/IMV alternating chemotherapy.     Most recent imaging, a CT CAP from 9/11/20 shows extensive peritoneal crcinomatosis with mass effect on pelvic vessels, nonspecific gall bladder enhancement, left pleural effusion and unchanged pulmonary nodules and mediastinal LAD. Per review of records, there has been significant symptomatic benefit from " "chemotherapy.     Today he presents for gross hematuria, which presented at the end of August after undergoing his 4th chemo cycle. Along with the blood, he had dysuria and he has had urinalyses on 8/27 (WBC 11, RBC 48), 9/3 (WBC 5, RBC 14) and 9/11 (WBC 15, RBC 2), associated with urine cultures on 8/27 and 9/11/20 showing no growth.      Per his sister and caregiver, Mariela, \"every time the chemotherapy is disconnected, he sees blood in his urine.\"     Today the patient sees no visible blood and he denies dysuria.   He feels the is voiding small volumes, but he cannot articulate whether this is new or problematic for him.  According to Mariela he has also had difficulty passing stools, and is on a mediation now (senna?  Miralax?) that is helping.  The patient is passing several small stools per day.  He isn't eating well and he continues to lose weight, according to his sister. She is having him drink plenty of water to help with his bowels and to help hematuria.     We did ask Robert if he had ever been catheterized, and he said he hadn't.    Per review of records, Cr is normal 0.62mg/dL and imaging shows no hydronephrosis, renal masses or stones.    The patient has had anemia and required transfusions.  Currently HGB is 9.4g/dL.     REVIEW OF DIAGNOSTICS:  9/11/20 - urine culture negative  8/27/20 - UCx: negative.     Past Medical History:   Diagnosis Date     Hypertension      Learning disability     but pt is his own gaurdian     Peritoneal carcinomatosis (H)        Past Surgical History:   Procedure Laterality Date     BIOPSY      liver     IR CVC TUNNEL PLACEMENT > 5 YRS OF AGE  4/29/2020     SOCIAL HISTORY:    reports that he has never smoked. He has never used smokeless tobacco.    Current Outpatient Medications   Medication Sig Dispense Refill     allopurinol (ZYLOPRIM) 300 MG tablet Take 1 tablet (300 mg) by mouth daily 30 tablet 0     dexamethasone (DECADRON) 4 MG tablet Take 2 tablets (8 mg) by mouth daily " (with breakfast) for 3 days Start the day after doxorubicin, cyclophosphamide, and vincristine (odd cycles). (Patient not taking: Reported on 9/11/2020) 6 tablet 8     etoposide (VEPESID) 50 MG capsule Take 5 capsules (250 mg) by mouth daily for 6 days Days 1 through 6. (Patient not taking: Reported on 8/27/2020) 30 capsule 0     etoposide (VEPESID) 50 MG capsule Take 5 capsules (250 mg) by mouth daily for 6 days Days 1 through 6. 30 capsule 0     folic acid (FOLVITE) 1 MG tablet Take 1 tablet (1 mg) by mouth daily 30 tablet 3     LORazepam (ATIVAN) 0.5 MG tablet Take 1 tablet (0.5 mg) by mouth every 4 hours as needed (Anxiety, Nausea/Vomiting or Sleep) 30 tablet 5     mesna (MESNEX) 400 MG TABS tablet Take 1 tablet (400 mg) by mouth every 4 hours for 2 doses Take one tablet 4 hours and 8 hours after end of cyclophosphamide infusion. 2 tablet 0     mesna (MESNEX) 400 MG TABS tablet Take 1 tablet (400 mg) by mouth every 4 hours for 2 doses Take one tablet 4 hours and 8 hours after end of cyclophosphamide infusion. 2 tablet 0     metoprolol tartrate (LOPRESSOR) 50 MG tablet Take 1.5 tablets (75 mg) by mouth daily 45 tablet 3     oxyCODONE (OXY-IR) 5 MG capsule Take 5 mg by mouth every 6 hours as needed for severe pain Prescribed in ED 7/6/20       polyethylene glycol (MIRALAX) 17 g packet Take 17 g by mouth       prochlorperazine (COMPAZINE) 10 MG tablet Take 1 tablet (10 mg) by mouth every 6 hours as needed (Nausea/Vomiting) (Patient not taking: Reported on 7/16/2020) 30 tablet 5     senna-docusate (SENNA S) 8.6-50 MG tablet Take 1 tablet by mouth 2 times daily 60 tablet 0     traMADol (ULTRAM) 50 MG tablet Take 50 mg by mouth as needed         ALLERGIES: Tegaderm chg dressing [chlorhexidine] and Tegaderm transparent dressing (informational only)      REVIEW OF SYSTEMS:  As above in HPI    GENERAL PHYSICAL EXAM:   Vitals: There were no vitals taken for this visit.  There is no height or weight on file to calculate  BMI.    NEURO: Alert and oriented x 3.  PSYCH: Normal mood and affect, pleasant and agreeable during interview and exam.    RADIOLOGY: The following tests were reviewed:   EXAMINATION: CT CHEST/ABDOMEN/PELVIS W CONTRAST, 9/11/2020 12:39 PM     TECHNIQUE:  Helical CT images from the lung apices through the  symphysis pubis were obtained with contrast.  Coronal and sagittal  reformatted images were generated at a workstation for further  assessment.     CONTRAST:  132 ml Isovue 370.     COMPARISON: CT chest abdomen pelvis 7/15/2020     HISTORY: DSRCT; Desmoplastic small round cell tumor (H); Sarcoma,  Ewings (H)     FINDINGS:     Lines and tubes: Right IJ catheter tip in the right atrium.     Mediastinum/Neck Base: No thyroid nodules. Central tracheobronchial  tree is patent. Heart size is normal. No pericardial effusion.  Normal  thoracic vasculature. Unchanged mediastinal lymph node including right  paratracheal lymph node measuring 9 mm (series 2, image 5) and 8 mm  (series 2, image 12) and left anterior cardiophrenic lymph node  (series 3, image 209).  Lungs: No consolidation. No pleural effusion or pneumothorax.  Unchanged punctate pulmonary nodules (series 6, image 140 and series  6, image 184).      Abdomen and pelvis:     Extensive solid and cystic masses throughout the peritoneal cavity  including along the peritoneum, omentum and mesentery encasing the  bowel loops. For example, there is is nodular soft tissue deposit  along the posterior surface of the liver with associated invasion of  segment 5 and 6 (series 2, image 55). Calcified deposit in the central  abdomen measuring 8.6 x 7.7 cm, previously measuring 9.2 x 7.5 cm when  measured in a similar manner (series 3, image 315). The mass is seen  encasing the bowel loops with no definite small bowel obstruction. No  pneumatosis, no pneumoperitoneum. Unchanged configuration of the  septated loculated deposits. Small volume ascites.     Increased enhancement  of the gallbladder with hyperdense intraluminal  contents mild gallbladder wall thickening/pericholecystic fluid  (series 3, image 252)     Spleen: Normal size.  Pancreas: No suspicious pancreatic lesions. The pancreatic duct is not  dilated.  Adrenal glands: No adrenal nodules.   Kidneys: No hydronephrosis or obstructing renal stones.    Bladder / Pelvic organs:  Extensive pelvic deposits with mass effect  and anterior displacement of the partially decompressed urinary  bladder. Loss of fat plane of the deposit with the urinary bladder and  rectum with no obvious invasion  Lymph nodes: No retroperitoneal, mesenteric, or pelvic  lymphadenopathy.  Peritoneum / Retroperitoneum: No free air within the abdomen.  Vessels: No infrarenal aortic aneurysm. Mass effect of the deposits on  IVC and iliac vein with multiple pelvic collaterals     Bones and soft tissues: No aggressive osseous lesion. Unchanged small  ventral hernia with hyperdense component (series 3, image 149). Mild  anasarca. Bilateral gynecomastia.                                                                      IMPRESSION: In this patient with history of desmoplastic small round  cell tumor, the current scan compared to CT dated 7/15/2020 shows:  1. Extensive peritoneal carcinomatosis with abdominal and pelvic  deposits encasing the bowel, not significantly changed compared to  prior CT dated 7/15/2020.  2. Persistent mass effect of pelvic deposits on the iliac vessels and  IVC with pelvic collaterals.  3. Increased gallbladder enhancement with hyperdense intraluminal  contents and pericholecystic fluid/wall thickening, again nonspecific  in the setting of malignant ascites. Consider ultrasound gallbladder  if clinical concern for cholecystitis.  4. Resolved left pleural effusion.  5. Unchanged mediastinal lymph nodes, and punctate pulmonary nodules,  with no new lymphadenopathy or new suspicious pulmonary nodule  6. Mild anasarca.    LABS: The last test  results for Ms. Diego Buchanan were reviewed.   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  There are no upper tract malignancies seen on multiple imaging studies.   - Discussed with patient and family that hematuria could be secondary to cyclophosphamide (despite mesna) but other pathology cannot be excluded at this time including: new bladder/urethral primary malignancies or metastatic bladder invasion.  Cystoscopy would be the next step in evaluating hematuria if it becomes important to fully define the etiology.  Will allow Oncology to set priorities.  If they feel cystoscopy would be beneficial, I will discuss with our urology team.  For Robert, an OR anesthetic would make cystoscopy less traumatic.     - Per discussion with Mariela --> their family is not desiring cystoscopy at this time.  They would prefer to save cystoscopy for refractory hematuria. Right now, the bleeding is not problematic    Phone call duration: 24 minutes    Jesenia Villela PA-C  Department of Urologic Surgery

## 2020-09-25 ENCOUNTER — DOCUMENTATION ONLY (OUTPATIENT)
Dept: PHARMACY | Facility: CLINIC | Age: 25
End: 2020-09-25

## 2020-09-25 NOTE — PROGRESS NOTES
ORAL CHEMOTHERAPY DISCONTINUATION       Primary Oncologist:  Dr. Morales Sims  Primary Oncology Clinic: [facility]  Cancer Diagnosis:  Sarcoma  Therapy History:  Etoposide 250mg on days 1-6 of IMV cycle  Start Date: 5/27/2020  Therapy Ended On:  9/17/2020  Reason For Discontinuation: other/unknown (see additional notes)    Additional Notes:  9/17 Behtany note, pt is switching from CAV-IMV to CAV only for 4 cycles to see if CAV is driving his response to chemo.    Thanks you for the opportunity to be a part of this patient's oral chemotherapy. The oncology pharmacy will no longer be following this patient for oral chemotherapy. If there are any questions or the plan changes, feel free to contact us.    Diego Mcbride Pharmacy Intern  Oral Chemotherapy Monitoring Program  [phone]

## 2020-09-29 NOTE — PATIENT INSTRUCTIONS
Please follow up with Urology as needed. Please coordinate with Oncology as currently scheduled.     It was a pleasure meeting with you today.  Thank you for allowing me and my team the privilege of caring for you today.  YOU are the reason we are here, and I truly hope we provided you with the excellent service you deserve.  Please let us know if there is anything else we can do for you so that we can be sure you are leaving completely satisfied with your care experience.

## 2020-10-06 DIAGNOSIS — C49.9 DESMOPLASTIC SMALL ROUND CELL TUMOR (H): Primary | ICD-10-CM

## 2020-10-06 DIAGNOSIS — C41.9 SARCOMA, EWINGS (H): ICD-10-CM

## 2020-10-06 RX ORDER — METHYLPREDNISOLONE SODIUM SUCCINATE 125 MG/2ML
125 INJECTION, POWDER, LYOPHILIZED, FOR SOLUTION INTRAMUSCULAR; INTRAVENOUS
Status: CANCELLED
Start: 2020-10-30

## 2020-10-06 RX ORDER — HEPARIN SODIUM,PORCINE 10 UNIT/ML
5 VIAL (ML) INTRAVENOUS
Status: CANCELLED | OUTPATIENT
Start: 2020-10-30

## 2020-10-06 RX ORDER — PALONOSETRON 0.05 MG/ML
0.25 INJECTION, SOLUTION INTRAVENOUS ONCE
Status: CANCELLED
Start: 2020-10-30

## 2020-10-06 RX ORDER — ALBUTEROL SULFATE 90 UG/1
1-2 AEROSOL, METERED RESPIRATORY (INHALATION)
Status: CANCELLED
Start: 2020-10-30

## 2020-10-06 RX ORDER — DIPHENHYDRAMINE HYDROCHLORIDE 50 MG/ML
50 INJECTION INTRAMUSCULAR; INTRAVENOUS
Status: CANCELLED
Start: 2020-10-30

## 2020-10-06 RX ORDER — HEPARIN SODIUM (PORCINE) LOCK FLUSH IV SOLN 100 UNIT/ML 100 UNIT/ML
5 SOLUTION INTRAVENOUS
Status: CANCELLED | OUTPATIENT
Start: 2020-10-30

## 2020-10-06 RX ORDER — NALOXONE HYDROCHLORIDE 0.4 MG/ML
.1-.4 INJECTION, SOLUTION INTRAMUSCULAR; INTRAVENOUS; SUBCUTANEOUS
Status: CANCELLED | OUTPATIENT
Start: 2020-10-30

## 2020-10-06 RX ORDER — SODIUM CHLORIDE 9 MG/ML
1000 INJECTION, SOLUTION INTRAVENOUS CONTINUOUS PRN
Status: CANCELLED
Start: 2020-10-30

## 2020-10-06 RX ORDER — LORAZEPAM 2 MG/ML
0.5 INJECTION INTRAMUSCULAR EVERY 4 HOURS PRN
Status: CANCELLED
Start: 2020-10-30

## 2020-10-06 RX ORDER — MEPERIDINE HYDROCHLORIDE 25 MG/ML
25 INJECTION INTRAMUSCULAR; INTRAVENOUS; SUBCUTANEOUS EVERY 30 MIN PRN
Status: CANCELLED | OUTPATIENT
Start: 2020-10-30

## 2020-10-06 RX ORDER — ALBUTEROL SULFATE 0.83 MG/ML
2.5 SOLUTION RESPIRATORY (INHALATION)
Status: CANCELLED | OUTPATIENT
Start: 2020-10-30

## 2020-10-06 RX ORDER — EPINEPHRINE 1 MG/ML
0.3 INJECTION, SOLUTION INTRAMUSCULAR; SUBCUTANEOUS EVERY 5 MIN PRN
Status: CANCELLED | OUTPATIENT
Start: 2020-10-30

## 2020-10-09 ENCOUNTER — INFUSION THERAPY VISIT (OUTPATIENT)
Dept: ONCOLOGY | Facility: CLINIC | Age: 25
End: 2020-10-09
Attending: INTERNAL MEDICINE
Payer: COMMERCIAL

## 2020-10-09 ENCOUNTER — HOME INFUSION (PRE-WILLOW HOME INFUSION) (OUTPATIENT)
Dept: PHARMACY | Facility: CLINIC | Age: 25
End: 2020-10-09

## 2020-10-09 VITALS
OXYGEN SATURATION: 100 % | SYSTOLIC BLOOD PRESSURE: 109 MMHG | RESPIRATION RATE: 18 BRPM | TEMPERATURE: 98.6 F | HEART RATE: 84 BPM | DIASTOLIC BLOOD PRESSURE: 68 MMHG | BODY MASS INDEX: 32.7 KG/M2 | WEIGHT: 215 LBS

## 2020-10-09 DIAGNOSIS — C41.9 SARCOMA, EWINGS (H): Primary | ICD-10-CM

## 2020-10-09 DIAGNOSIS — C49.9 DESMOPLASTIC SMALL ROUND CELL TUMOR (H): ICD-10-CM

## 2020-10-09 LAB
ALBUMIN SERPL-MCNC: 3.3 G/DL (ref 3.4–5)
ALP SERPL-CCNC: 152 U/L (ref 40–150)
ALT SERPL W P-5'-P-CCNC: 21 U/L (ref 0–70)
ANION GAP SERPL CALCULATED.3IONS-SCNC: 6 MMOL/L (ref 3–14)
AST SERPL W P-5'-P-CCNC: 16 U/L (ref 0–45)
BASOPHILS # BLD AUTO: 0.1 10E9/L (ref 0–0.2)
BASOPHILS NFR BLD AUTO: 0.4 %
BILIRUB SERPL-MCNC: 1 MG/DL (ref 0.2–1.3)
BUN SERPL-MCNC: 7 MG/DL (ref 7–30)
CALCIUM SERPL-MCNC: 9 MG/DL (ref 8.5–10.1)
CHLORIDE SERPL-SCNC: 104 MMOL/L (ref 94–109)
CO2 SERPL-SCNC: 25 MMOL/L (ref 20–32)
CREAT SERPL-MCNC: 0.65 MG/DL (ref 0.66–1.25)
DIFFERENTIAL METHOD BLD: ABNORMAL
EOSINOPHIL # BLD AUTO: 0.5 10E9/L (ref 0–0.7)
EOSINOPHIL NFR BLD AUTO: 4.7 %
ERYTHROCYTE [DISTWIDTH] IN BLOOD BY AUTOMATED COUNT: 17 % (ref 10–15)
GFR SERPL CREATININE-BSD FRML MDRD: >90 ML/MIN/{1.73_M2}
GLUCOSE SERPL-MCNC: 106 MG/DL (ref 70–99)
HCT VFR BLD AUTO: 35.2 % (ref 40–53)
HGB BLD-MCNC: 11 G/DL (ref 13.3–17.7)
IMM GRANULOCYTES # BLD: 0 10E9/L (ref 0–0.4)
IMM GRANULOCYTES NFR BLD: 0.4 %
LYMPHOCYTES # BLD AUTO: 0.7 10E9/L (ref 0.8–5.3)
LYMPHOCYTES NFR BLD AUTO: 6.1 %
MCH RBC QN AUTO: 29.3 PG (ref 26.5–33)
MCHC RBC AUTO-ENTMCNC: 31.3 G/DL (ref 31.5–36.5)
MCV RBC AUTO: 94 FL (ref 78–100)
MONOCYTES # BLD AUTO: 0.9 10E9/L (ref 0–1.3)
MONOCYTES NFR BLD AUTO: 7.6 %
NEUTROPHILS # BLD AUTO: 9.1 10E9/L (ref 1.6–8.3)
NEUTROPHILS NFR BLD AUTO: 80.8 %
NRBC # BLD AUTO: 0 10*3/UL
NRBC BLD AUTO-RTO: 0 /100
PLATELET # BLD AUTO: 430 10E9/L (ref 150–450)
POTASSIUM SERPL-SCNC: 3.8 MMOL/L (ref 3.4–5.3)
PROT SERPL-MCNC: 7.7 G/DL (ref 6.8–8.8)
RBC # BLD AUTO: 3.76 10E12/L (ref 4.4–5.9)
SODIUM SERPL-SCNC: 135 MMOL/L (ref 133–144)
WBC # BLD AUTO: 11.2 10E9/L (ref 4–11)

## 2020-10-09 PROCEDURE — 96417 CHEMO IV INFUS EACH ADDL SEQ: CPT

## 2020-10-09 PROCEDURE — 250N000011 HC RX IP 250 OP 636: Performed by: INTERNAL MEDICINE

## 2020-10-09 PROCEDURE — 99207 PR NO CHARGE LOS: CPT

## 2020-10-09 PROCEDURE — 250N000011 HC RX IP 250 OP 636: Mod: JW | Performed by: INTERNAL MEDICINE

## 2020-10-09 PROCEDURE — 96413 CHEMO IV INFUSION 1 HR: CPT

## 2020-10-09 PROCEDURE — G0498 CHEMO EXTEND IV INFUS W/PUMP: HCPCS

## 2020-10-09 PROCEDURE — 96367 TX/PROPH/DG ADDL SEQ IV INF: CPT

## 2020-10-09 PROCEDURE — 258N000003 HC RX IP 258 OP 636: Performed by: INTERNAL MEDICINE

## 2020-10-09 PROCEDURE — 96415 CHEMO IV INFUSION ADDL HR: CPT

## 2020-10-09 PROCEDURE — 80053 COMPREHEN METABOLIC PANEL: CPT | Performed by: PATHOLOGY

## 2020-10-09 PROCEDURE — 96375 TX/PRO/DX INJ NEW DRUG ADDON: CPT

## 2020-10-09 PROCEDURE — 85025 COMPLETE CBC W/AUTO DIFF WBC: CPT | Performed by: PATHOLOGY

## 2020-10-09 RX ORDER — PALONOSETRON 0.05 MG/ML
0.25 INJECTION, SOLUTION INTRAVENOUS ONCE
Status: COMPLETED | OUTPATIENT
Start: 2020-10-09 | End: 2020-10-09

## 2020-10-09 RX ORDER — HEPARIN SODIUM,PORCINE 10 UNIT/ML
5 VIAL (ML) INTRAVENOUS
Status: DISCONTINUED | OUTPATIENT
Start: 2020-10-09 | End: 2020-10-09 | Stop reason: HOSPADM

## 2020-10-09 RX ADMIN — Medication 5 ML: at 13:12

## 2020-10-09 RX ADMIN — MESNA 2810 MG: 100 INJECTION, SOLUTION INTRAVENOUS at 15:15

## 2020-10-09 RX ADMIN — Medication 5 ML: at 13:11

## 2020-10-09 RX ADMIN — DEXAMETHASONE SODIUM PHOSPHATE: 10 INJECTION, SOLUTION INTRAMUSCULAR; INTRAVENOUS at 14:35

## 2020-10-09 RX ADMIN — VINCRISTINE SULFATE 2 MG: 1 INJECTION, SOLUTION INTRAVENOUS at 15:05

## 2020-10-09 RX ADMIN — SODIUM CHLORIDE 250 ML: 9 INJECTION, SOLUTION INTRAVENOUS at 14:28

## 2020-10-09 RX ADMIN — PALONOSETRON 0.25 MG: 0.05 INJECTION, SOLUTION INTRAVENOUS at 14:31

## 2020-10-09 ASSESSMENT — PAIN SCALES - GENERAL: PAINLEVEL: NO PAIN (0)

## 2020-10-09 NOTE — PATIENT INSTRUCTIONS
CARIENA DUE AT: 9:15pm and 1:15 am        [facility] Triage and after hours / weekends / holidays:  [phone]    Please call the triage or after hours line if you experience a temperature greater than or equal to 100.5, shaking chills, have uncontrolled nausea, vomiting and/or diarrhea, dizziness, shortness of breath, chest pain, bleeding, unexplained bruising, or if you have any other new/concerning symptoms, questions or concerns.      If you are having any concerning symptoms or wish to speak to a provider before your next infusion visit, please call your care coordinator or triage to notify them so we can adequately serve you.     If you need a refill on a narcotic prescription or other medication, please call before your infusion appointment.                 October 2020 Sunday Monday Tuesday Wednesday Thursday Friday Saturday                       1    LAB   6:00 AM   (15 min.)   [facility] HOME INFUSION   [facility] 2     3       4     5     6     7     8     9    [facility]   1:00 PM   (15 min.)   [facility]   [facility] ONC INFUSION 180   1:30 PM   (180 min.)   [facility] ONCOLOGY INFUSION   [facility] 10       11     12     13     14     15     16     17       18     19     20     21    [facility]  12:45 PM   (15 min.)   [facility]   [facility] 22    VIDEO VISIT RETURN   9:00 AM   (30 min.)   Farzaneh Young MD   [facility] 23     24       25     26     27     28     29     30    [facility]   1:00 PM   (15 min.)   [facility]   [facility] ONC INFUSION 180   1:30 PM   (180 min.)   [facility] ONCOLOGY INFUSION   [facility] 31 November 2020 Sunday Monday Tuesday Wednesday Thursday Friday Saturday   1     2     3     4      5     6     7       8     9     10     11     12     13     14       15     16     17     18     19    CT CHEST/ABDOMEN/PELVIS W  10:45 AM   (20 min.)   [facility]   [facility] [place]    VIDEO VISIT RETURN   1:30 PM   (30 min.)   Farzaneh Young MD   [facility] 20    [facility] ONC INFUSION 180   1:30 PM   (180 min.)   [facility] ONCOLOGY INFUSION   [facility] 21       22     23     24     25     26     27     28       29     30                                             Recent Results (from the past 24 hour(s))   CBC with platelets differential    Collection Time: 10/09/20  1:14 PM   Result Value Ref Range    WBC 11.2 (H) 4.0 - 11.0 10e9/L    RBC Count 3.76 (L) 4.4 - 5.9 10e12/L    Hemoglobin 11.0 (L) 13.3 - 17.7 g/dL    Hematocrit 35.2 (L) 40.0 - 53.0 %    MCV 94 78 - 100 fl    MCH 29.3 26.5 - 33.0 pg    MCHC 31.3 (L) 31.5 - 36.5 g/dL    RDW 17.0 (H) 10.0 - 15.0 %    Platelet Count 430 150 - 450 10e9/L    Diff Method Automated Method     % Neutrophils 80.8 %    % Lymphocytes 6.1 %    % Monocytes 7.6 %    % Eosinophils 4.7 %    % Basophils 0.4 %    % Immature Granulocytes 0.4 %    Nucleated RBCs 0 0 /100    Absolute Neutrophil 9.1 (H) 1.6 - 8.3 10e9/L    Absolute Lymphocytes 0.7 (L) 0.8 - 5.3 10e9/L    Absolute Monocytes 0.9 0.0 - 1.3 10e9/L    Absolute Eosinophils 0.5 0.0 - 0.7 10e9/L    Absolute Basophils 0.1 0.0 - 0.2 10e9/L    Abs Immature Granulocytes 0.0 0 - 0.4 10e9/L    Absolute Nucleated RBC 0.0    Comprehensive metabolic panel    Collection Time: 10/09/20  1:14 PM   Result Value Ref Range    Sodium 135 133 - 144 mmol/L    Potassium 3.8 3.4 - 5.3 mmol/L    Chloride 104 94 - 109 mmol/L    Carbon Dioxide 25 20 - 32 mmol/L    Anion Gap 6 3 - 14 mmol/L    Glucose 106 (H) 70 - 99 mg/dL    Urea Nitrogen 7 7 - 30 mg/dL    Creatinine 0.65 (L) 0.66 - 1.25 mg/dL    GFR Estimate >90 >60 mL/min/[1.73_m2]    GFR Estimate If Black >90  >60 mL/min/[1.73_m2]    Calcium 9.0 8.5 - 10.1 mg/dL    Bilirubin Total 1.0 0.2 - 1.3 mg/dL    Albumin 3.3 (L) 3.4 - 5.0 g/dL    Protein Total 7.7 6.8 - 8.8 g/dL    Alkaline Phosphatase 152 (H) 40 - 150 U/L    ALT 21 0 - 70 U/L    AST 16 0 - 45 U/L

## 2020-10-09 NOTE — PROGRESS NOTES
Infusion Nursing Note:  Diego Buchanan presents today for Cycle 7 Day 1 Vincristine, Cytoxan and Adriamycin pump connect.    Patient seen by provider today: No   present during visit today: Not Applicable.    Note: Pt arrives to infusion today with sister Mariela feeling well. Denies any new concerns or complaints. Denies any infectious s/s.     TORB 10/9/20 1415 Dr. Sims / Mehreen Vargas RN  - Okay to proceed with treatment today with elevated WBC 11.2.    Intravenous Access:  Esparza.    Treatment Conditions:  Lab Results   Component Value Date    HGB 11.0 10/09/2020     Lab Results   Component Value Date    WBC 11.2 10/09/2020      Lab Results   Component Value Date    ANEU 9.1 10/09/2020     Lab Results   Component Value Date     10/09/2020      Lab Results   Component Value Date     10/09/2020                   Lab Results   Component Value Date    POTASSIUM 3.8 10/09/2020           Lab Results   Component Value Date    MAG 1.9 09/16/2020            Lab Results   Component Value Date    CR 0.65 10/09/2020                   Lab Results   Component Value Date    FAISAL 9.0 10/09/2020                Lab Results   Component Value Date    BILITOTAL 1.0 10/09/2020           Lab Results   Component Value Date    ALBUMIN 3.3 10/09/2020                    Lab Results   Component Value Date    ALT 21 10/09/2020           Lab Results   Component Value Date    AST 16 10/09/2020     Results reviewed, labs MET treatment parameters, ok to proceed with treatment.  ECHO/MUGA completed 9/1/20  EF 60-65%.    Post Infusion Assessment:  Patient tolerated infusion without incident.  Blood return noted pre and post infusion.  Site patent and intact, free from redness, edema or discomfort.  No evidence of extravasations.     Prior to discharge: Port is secured in place with tegaderm and flushed with 10cc NS with positive blood return noted.  Continuous home infusion CADD pump connected.   All connectors  "secured in place and clamps taped open. Verified by Leda BYNUM RN.  Pump started, \"running\" noted on display (CADD): YES.  Patient instructed to call our clinic or [facility] with any questions or concerns at home.  Patient verbalized understanding.    Patient set up for pump disconnect & Neulasta OnPro at home with [facility] on 10/11/20 at 5:30pm. Verified with Terri at [facility].      Discharge Plan:   Prescription refills given for Mesna, Decadron and Folic Acid. Pt and sister verbalized an understanding of how to take decadron and Mesna as scheduled.   Copy of AVS sent to [facility].  Patient will return 10/30 for next appointment.  Patient discharged in stable condition accompanied by: self and sister.  Departure Mode: Ambulatory.  Face to Face time: 2.    Mehreen Vargas RN                      "

## 2020-10-11 ENCOUNTER — HOME INFUSION (PRE-WILLOW HOME INFUSION) (OUTPATIENT)
Dept: PHARMACY | Facility: CLINIC | Age: 25
End: 2020-10-11

## 2020-10-12 NOTE — PROGRESS NOTES
This is a recent snapshot of the patient's [facility] medical record.  For current drug dose and complete information and questions, call [phone] or In Basket pool, fv home infusion ([id number])  CSN Number:  [id number]

## 2020-10-12 NOTE — PROGRESS NOTES
Skilled nurse visit in the home, for discontinuation of Ifosfamide 21.5 g/Mesna 21.5 g in 1028 mL NS   infused over 48 hours.    Matthew Mosher RN/ [facility]   [email or web address]  [phone]

## 2020-10-21 ENCOUNTER — VIRTUAL VISIT (OUTPATIENT)
Dept: ONCOLOGY | Facility: CLINIC | Age: 25
End: 2020-10-21
Attending: INTERNAL MEDICINE
Payer: COMMERCIAL

## 2020-10-21 ENCOUNTER — RECORDS - HEALTHEAST (OUTPATIENT)
Dept: ADMINISTRATIVE | Facility: OTHER | Age: 25
End: 2020-10-21

## 2020-10-21 ENCOUNTER — APPOINTMENT (OUTPATIENT)
Dept: LAB | Facility: CLINIC | Age: 25
End: 2020-10-21
Attending: INTERNAL MEDICINE
Payer: COMMERCIAL

## 2020-10-21 VITALS
BODY MASS INDEX: 32.96 KG/M2 | RESPIRATION RATE: 16 BRPM | SYSTOLIC BLOOD PRESSURE: 138 MMHG | HEART RATE: 108 BPM | WEIGHT: 216.7 LBS | TEMPERATURE: 98 F | OXYGEN SATURATION: 99 % | DIASTOLIC BLOOD PRESSURE: 81 MMHG

## 2020-10-21 DIAGNOSIS — C49.9 DESMOPLASTIC SMALL ROUND CELL TUMOR (H): ICD-10-CM

## 2020-10-21 DIAGNOSIS — E87.6 HYPOKALEMIA: ICD-10-CM

## 2020-10-21 LAB
ALBUMIN SERPL-MCNC: 3.1 G/DL (ref 3.4–5)
ALP SERPL-CCNC: 180 U/L (ref 40–150)
ALT SERPL W P-5'-P-CCNC: 60 U/L (ref 0–70)
ANION GAP SERPL CALCULATED.3IONS-SCNC: 5 MMOL/L (ref 3–14)
AST SERPL W P-5'-P-CCNC: 19 U/L (ref 0–45)
BASOPHILS # BLD AUTO: 0 10E9/L (ref 0–0.2)
BASOPHILS NFR BLD AUTO: 1.2 %
BILIRUB SERPL-MCNC: 0.4 MG/DL (ref 0.2–1.3)
BUN SERPL-MCNC: 6 MG/DL (ref 7–30)
CALCIUM SERPL-MCNC: 8.6 MG/DL (ref 8.5–10.1)
CHLORIDE SERPL-SCNC: 107 MMOL/L (ref 94–109)
CO2 SERPL-SCNC: 26 MMOL/L (ref 20–32)
CREAT SERPL-MCNC: 0.59 MG/DL (ref 0.66–1.25)
DIFFERENTIAL METHOD BLD: ABNORMAL
EOSINOPHIL # BLD AUTO: 0.1 10E9/L (ref 0–0.7)
EOSINOPHIL NFR BLD AUTO: 3.9 %
ERYTHROCYTE [DISTWIDTH] IN BLOOD BY AUTOMATED COUNT: 16.1 % (ref 10–15)
GFR SERPL CREATININE-BSD FRML MDRD: >90 ML/MIN/{1.73_M2}
GLUCOSE SERPL-MCNC: 87 MG/DL (ref 70–99)
HCT VFR BLD AUTO: 29.6 % (ref 40–53)
HGB BLD-MCNC: 9.5 G/DL (ref 13.3–17.7)
IMM GRANULOCYTES # BLD: 0 10E9/L (ref 0–0.4)
IMM GRANULOCYTES NFR BLD: 0.4 %
LYMPHOCYTES # BLD AUTO: 0.4 10E9/L (ref 0.8–5.3)
LYMPHOCYTES NFR BLD AUTO: 16.1 %
MAGNESIUM SERPL-MCNC: 1.8 MG/DL (ref 1.6–2.3)
MCH RBC QN AUTO: 29.1 PG (ref 26.5–33)
MCHC RBC AUTO-ENTMCNC: 32.1 G/DL (ref 31.5–36.5)
MCV RBC AUTO: 91 FL (ref 78–100)
MONOCYTES # BLD AUTO: 0.4 10E9/L (ref 0–1.3)
MONOCYTES NFR BLD AUTO: 16.9 %
NEUTROPHILS # BLD AUTO: 1.6 10E9/L (ref 1.6–8.3)
NEUTROPHILS NFR BLD AUTO: 61.5 %
NRBC # BLD AUTO: 0 10*3/UL
NRBC BLD AUTO-RTO: 0 /100
PHOSPHATE SERPL-MCNC: 4 MG/DL (ref 2.5–4.5)
PLATELET # BLD AUTO: 106 10E9/L (ref 150–450)
PLATELET # BLD EST: ABNORMAL 10*3/UL
POTASSIUM SERPL-SCNC: 3.2 MMOL/L (ref 3.4–5.3)
PROT SERPL-MCNC: 7.2 G/DL (ref 6.8–8.8)
RBC # BLD AUTO: 3.26 10E12/L (ref 4.4–5.9)
SODIUM SERPL-SCNC: 139 MMOL/L (ref 133–144)
WBC # BLD AUTO: 2.5 10E9/L (ref 4–11)

## 2020-10-21 PROCEDURE — 83735 ASSAY OF MAGNESIUM: CPT | Mod: GT | Performed by: PHYSICIAN ASSISTANT

## 2020-10-21 PROCEDURE — 250N000011 HC RX IP 250 OP 636: Mod: GT | Performed by: INTERNAL MEDICINE

## 2020-10-21 PROCEDURE — 85025 COMPLETE CBC W/AUTO DIFF WBC: CPT | Mod: GT | Performed by: PHYSICIAN ASSISTANT

## 2020-10-21 PROCEDURE — 84100 ASSAY OF PHOSPHORUS: CPT | Mod: GT | Performed by: PHYSICIAN ASSISTANT

## 2020-10-21 PROCEDURE — 80053 COMPREHEN METABOLIC PANEL: CPT | Mod: GT | Performed by: PHYSICIAN ASSISTANT

## 2020-10-21 PROCEDURE — 99215 OFFICE O/P EST HI 40 MIN: CPT | Mod: 95 | Performed by: INTERNAL MEDICINE

## 2020-10-21 RX ORDER — HEPARIN SODIUM,PORCINE 10 UNIT/ML
5 VIAL (ML) INTRAVENOUS ONCE
Status: COMPLETED | OUTPATIENT
Start: 2020-10-21 | End: 2020-10-21

## 2020-10-21 RX ORDER — POTASSIUM CHLORIDE 1500 MG/1
20 TABLET, EXTENDED RELEASE ORAL DAILY
Qty: 5 TABLET | Refills: 0 | Status: SHIPPED | OUTPATIENT
Start: 2020-10-21 | End: 2020-11-20

## 2020-10-21 RX ADMIN — Medication 5 ML: at 13:06

## 2020-10-21 ASSESSMENT — PAIN SCALES - GENERAL: PAINLEVEL: NO PAIN (0)

## 2020-10-21 NOTE — NURSING NOTE
Chief Complaint   Patient presents with     Labs Only     labs drawn via cvc by RN in lab     /81 (BP Location: Left arm, Patient Position: Chair, Cuff Size: Adult Regular)   Pulse 108   Temp 98  F (36.7  C) (Oral)   Resp 16   Wt 98.3 kg (216 lb 11.2 oz)   SpO2 99%   BMI 32.96 kg/m      Lines accessed by RN in lab. Labs collected through purple lumen and sent. No blood return red lumen. Both caps changed, lines flushed with Normal Saline & Heparin. Pt tolerated well.     Latrice Lau, RN

## 2020-10-21 NOTE — LETTER
10/21/2020         RE: Diego Buchanna  [address]  [place] [address]        Dear Colleague,    Thank you for referring your patient, Diego Buchanan, to the [facility]. Please see a copy of my visit note below.    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 He presented initially with abdominal pain, diarrhea, and progressive abdominal distention for 3 months duration. 2. Initial CT scan in February 2020 showed severe peritoneal carcinomatosis with large peritoneal tumor implants throughout the entire abdomen and pelvis, but mostly prominently in the pelvis.   2. PETCT scan showed interval increase in the amount of peritoneal carcinomatosis.  3. On 3/12/2020, he had ultrasound-guided core needle biopsy of a peritoneal implant (Case: [id number]), which showed a completely undifferentiated appearance, but stains with pancytokeratin, indicating an epithelial origin. 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 Tumor markers for CA-19-9, CEA, beta-hCG, alpha-fetoprotein were unremarkable. Cancer type ID molecular testing by [employer] sent to determine the exact diagnosis and to assist in further treatment planning. 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 Relative probability of less than 5% of other subtype of sarcoma. EWSR1-WT1 fusion detected.  4. 5/1/2020, MRI brain negative for brain metastasis, and baseline CT-CAP obtained showing extensive mixed solid and cystic masses throughout the abdomen and pelvis consistent with peritoneal carcinomatosis. Largest mass measures approximately 20 cm in the pelvis. Metastatic mixed solid and cystic masses seen in hepatic segments 5 and 6 and hepatic segment 7. The masses appear to be contiguous  with the retrohepatic peritoneal carcinomatosis. Small volume fluid about the spleen favored to represent malignant ascites, stable mild-to-moderate right hydronephrosis related to mass effect upon the distal ureter in the pelvis, the IVC and iliac veins are compressed due to the extensive peritoneal carcinomatosis without findings to suggest deep venous thrombosis.  5. 5/4/2020, he begins CAV/IMV alternating chemotherapy. He receives 6 cycles of treatment. He symptomatically improves.  6. 9/11/2020, CT-CAP shows stable to mildly improved extensive peritoneal carcinomatosis. He would like to omit Ifosfamide/etoposide cycles and continue only with CAV.  7. 10/9/2020, he starts CAV every 21 days.       HISTORY OF PRESENT ILLNESS  Diego Buchanan is a 25 year old male with a learning disability and recent diagnosis of DSRCT. He has tolerated his treatments aside from fatigue and hematuria.    His most recent cycle of chemotherapy with CAV went well. He had no hematuria. He feels well and he is eating everything he want. He is eating solid foods and he has a good appetite. He has regular bowel movements every other day and takes daily Miralax and Senna to help with regularity. He denies any nausea and vomiting.    He denies any fevers or chills. No cough. No shortness of breath. He is spending most of his time at home and he is social distancing. He enjoys playing video games and doing projects in the home.    ECOG performance status 1.      Current Outpatient Medications   Medication Sig Dispense Refill     allopurinol (ZYLOPRIM) 300 MG tablet Take 1 tablet (300 mg) by mouth daily 30 tablet 0     folic acid (FOLVITE) 1 MG tablet Take 1 tablet (1 mg) by mouth daily 30 tablet 3     LORazepam (ATIVAN) 0.5 MG tablet Take 1 tablet (0.5 mg) by mouth every 4 hours as needed (Anxiety, Nausea/Vomiting or Sleep) 30 tablet 5     metoprolol tartrate (LOPRESSOR) 50 MG tablet Take 1.5 tablets (75 mg) by mouth daily 45 tablet 3      oxyCODONE (OXY-IR) 5 MG capsule Take 5 mg by mouth every 6 hours as needed for severe pain Prescribed in ED 7/6/20       polyethylene glycol (MIRALAX) 17 g packet Take 17 g by mouth       prochlorperazine (COMPAZINE) 10 MG tablet Take 1 tablet (10 mg) by mouth every 6 hours as needed (Nausea/Vomiting) 30 tablet 5     senna-docusate (SENNA S) 8.6-50 MG tablet Take 1 tablet by mouth 2 times daily 60 tablet 0     traMADol (ULTRAM) 50 MG tablet Take 50 mg by mouth as needed       dexamethasone (DECADRON) 4 MG tablet Take 2 tablets (8 mg) by mouth daily (with breakfast) for 3 days Start the day after doxorubicin, cyclophosphamide, and vincristine (odd cycles). (Patient not taking: Reported on 9/11/2020) 6 tablet 8     mesna (MESNEX) 400 MG TABS tablet Take 1 tablet (400 mg) by mouth every 4 hours for 2 doses Take one tablet 4 hours and 8 hours after end of cyclophosphamide infusion. 2 tablet 0       REVIEW OF SYSTEMS  12-point ROS negative except as in HPI    Past Medical History:   Diagnosis Date     Hypertension      Learning disability     but pt is his own gaurdian     Peritoneal carcinomatosis (H)      Past Surgical History:   Procedure Laterality Date     BIOPSY      liver     IR CVC TUNNEL PLACEMENT > 5 YRS OF AGE  4/29/2020     No family history on file.     PHYSICAL EXAMINATION  /81 (BP Location: Left arm, Patient Position: Chair, Cuff Size: Adult Regular)   Pulse 108   Temp 98  F (36.7  C) (Oral)   Resp 16   Wt 98.3 kg (216 lb 11.2 oz)   SpO2 99%   BMI 32.96 kg/m    Wt Readings from Last 3 Encounters:   10/21/20 98.3 kg (216 lb 11.2 oz)   10/09/20 97.5 kg (215 lb)   09/16/20 101.3 kg (223 lb 6.4 oz)   GENERAL: NAD  HEAD: atraumatic   EYES: no scleral icterus   LUNGS: Breathing comfortably on room air  EXTREMITIES: moving upper extremities   SKIN: no skin changes  NEURO: alert, following conversations    The rest of a comprehensive physical examination is deferred due to PHE (public health emergency)  video visit restrictions.      IMAGING  CT-CAP, 9/11/2020  IMPRESSION: In this patient with history of desmoplastic small round  cell tumor, the current scan compared to CT dated 7/15/2020 shows:  1. Extensive peritoneal carcinomatosis with abdominal and pelvic  deposits encasing the bowel, not significantly changed compared to  prior CT dated 7/15/2020.  2. Persistent mass effect of pelvic deposits on the iliac vessels and  IVC with pelvic collaterals.  3. Increased gallbladder enhancement with hyperdense intraluminal  contents and pericholecystic fluid/wall thickening, again nonspecific  in the setting of malignant ascites. Consider ultrasound gallbladder  if clinical concern for cholecystitis.  4. Resolved left pleural effusion.  5. Unchanged mediastinal lymph nodes, and punctate pulmonary nodules,  with no new lymphadenopathy or new suspicious pulmonary nodule  6. Mild anasarca.      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 He tolerated 6 alternating cycles of CAV/IMV with anticipated side effects. He developed hematuria with his last 2 cycles of Ifosfamide based therapy despite dose reduction and Mesna prophylaxis, and ultimately decided to continue with CAV alone.    - LISY visit next visit  - Continue with CAV every 21 days for next 3 cycles, next due 10/30/2020  - CT-CAP on 11/19/2020, with return to see me afterwards     #2 Anemia, normocytic   Initially microcytic, now normocytic. Hgb 9.5, likely 2/2 iron deficiency and chemotherapy.   - Continue Feosol daily.   - Trend, no transfusions needed at this time      #3 Malnutrition, secondary to #1 above  Saw nutritionist on 7/27, and he is now much better. Has an appetite and continues to  "tolerate solid foods.   - Goals per nutritionist:   1.  Aim for 5-6 small frequent meals   2.  Aim for 2200kcal and 100g protein/day   3. Weight maintenance .   - Continue Boost shakes to supplement his nutrition    Farzaneh Burciaga M.D.   of Medicine  Hematology, Oncology and Transplantation          Diego Buchanan is a 25 year old male who is being evaluated via a billable video visit.      The patient has been notified of following:     \"This video visit will be conducted via a call between you and your physician/provider. We have found that certain health care needs can be provided without the need for an in-person physical exam.  This service lets us provide the care you need with a video conversation.  If a prescription is necessary we can send it directly to your pharmacy.  If lab work is needed we can place an order for that and you can then stop by our lab to have the test done at a later time.    Video visits are billed at different rates depending on your insurance coverage.  Please reach out to your insurance provider with any questions.    If during the course of the call the physician/provider feels a video visit is not appropriate, you will not be charged for this service.\"    Patient has given verbal consent for Video visit? Yes    How would you like to obtain your AVS? MyChart     If you are dropped from the video visit, the video invite should be resent to: Send to e-mail at: [email or web address]     Will anyone else be joining your video visit? No         I have reviewed and updated the patient's allergies and pt confirmed any changes to their medication list.  Patient was asked to provide any patient recorded vital signs, height and/or weight.  Please see \"Patient Reported Vital Signs\" tab for that information.  Patient instructed to be in the virtual waiting room 10-15 minutes prior to appointment time.    /81 (BP Location: Left arm, Patient Position: Chair, Cuff " Size: Adult Regular)   Pulse 108   Temp 98  F (36.7  C) (Oral)   Resp 16   Wt 98.3 kg (216 lb 11.2 oz)   SpO2 99%   BMI 32.96 kg/m      Concerns: Patient has no new concerns.    Refills: None      KERON Clayton    Video-Visit Details    Type of service:  Video Visit    Video Start Time: 2:20 PM  Video End Time: 2:45 PM    Originating Location (pt. Location): Home    Distant Location (provider location):  [facility]     Platform used for Video Visit: AmWell              Again, thank you for allowing me to participate in the care of your patient.        Sincerely,        Farzaneh Sims MD

## 2020-10-21 NOTE — PROGRESS NOTES
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 He presented initially with abdominal pain, diarrhea, and progressive abdominal distention for 3 months duration. 2. Initial CT scan in February 2020 showed severe peritoneal carcinomatosis with large peritoneal tumor implants throughout the entire abdomen and pelvis, but mostly prominently in the pelvis.   2. PETCT scan showed interval increase in the amount of peritoneal carcinomatosis.  3. On 3/12/2020, he had ultrasound-guided core needle biopsy of a peritoneal implant (Case: [id number]), which showed a completely undifferentiated appearance, but stains with pancytokeratin, indicating an epithelial origin. 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 Tumor markers for CA-19-9, CEA, beta-hCG, alpha-fetoprotein were unremarkable. Cancer type ID molecular testing by [email or web address] sent to determine the exact diagnosis and to assist in further treatment planning. 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 Relative probability of less than 5% of other subtype of sarcoma. EWSR1-WT1 fusion detected.  4. 5/1/2020, MRI brain negative for brain metastasis, and baseline CT-CAP obtained showing extensive mixed solid and cystic masses throughout the abdomen and pelvis consistent with peritoneal carcinomatosis. Largest mass measures approximately 20 cm in the pelvis. Metastatic mixed solid and cystic masses seen in hepatic segments 5 and 6 and hepatic segment 7. The masses appear to be contiguous with the retrohepatic peritoneal carcinomatosis. Small volume fluid about the spleen favored to represent malignant ascites, stable mild-to-moderate right hydronephrosis related to mass effect upon the distal ureter in the pelvis, the IVC and iliac  veins are compressed due to the extensive peritoneal carcinomatosis without findings to suggest deep venous thrombosis.  5. 5/4/2020, he begins CAV/IMV alternating chemotherapy. He receives 6 cycles of treatment. He symptomatically improves.  6. 9/11/2020, CT-CAP shows stable to mildly improved extensive peritoneal carcinomatosis. He would like to omit Ifosfamide/etoposide cycles and continue only with CAV.  7. 10/9/2020, he starts CAV every 21 days.       HISTORY OF PRESENT ILLNESS  Diego Buchanan is a 25 year old male with a learning disability and recent diagnosis of DSRCT. He has tolerated his treatments aside from fatigue and hematuria.    His most recent cycle of chemotherapy with CAV went well. He had no hematuria. He feels well and he is eating everything he want. He is eating solid foods and he has a good appetite. He has regular bowel movements every other day and takes daily Miralax and Senna to help with regularity. He denies any nausea and vomiting.    He denies any fevers or chills. No cough. No shortness of breath. He is spending most of his time at home and he is social distancing. He enjoys playing video games and doing projects in the home.    ECOG performance status 1.      Current Outpatient Medications   Medication Sig Dispense Refill     allopurinol (ZYLOPRIM) 300 MG tablet Take 1 tablet (300 mg) by mouth daily 30 tablet 0     folic acid (FOLVITE) 1 MG tablet Take 1 tablet (1 mg) by mouth daily 30 tablet 3     LORazepam (ATIVAN) 0.5 MG tablet Take 1 tablet (0.5 mg) by mouth every 4 hours as needed (Anxiety, Nausea/Vomiting or Sleep) 30 tablet 5     metoprolol tartrate (LOPRESSOR) 50 MG tablet Take 1.5 tablets (75 mg) by mouth daily 45 tablet 3     oxyCODONE (OXY-IR) 5 MG capsule Take 5 mg by mouth every 6 hours as needed for severe pain Prescribed in ED 7/6/20       polyethylene glycol (MIRALAX) 17 g packet Take 17 g by mouth       prochlorperazine (COMPAZINE) 10 MG tablet Take 1 tablet (10 mg)  by mouth every 6 hours as needed (Nausea/Vomiting) 30 tablet 5     senna-docusate (SENNA S) 8.6-50 MG tablet Take 1 tablet by mouth 2 times daily 60 tablet 0     traMADol (ULTRAM) 50 MG tablet Take 50 mg by mouth as needed       dexamethasone (DECADRON) 4 MG tablet Take 2 tablets (8 mg) by mouth daily (with breakfast) for 3 days Start the day after doxorubicin, cyclophosphamide, and vincristine (odd cycles). (Patient not taking: Reported on 9/11/2020) 6 tablet 8     mesna (MESNEX) 400 MG TABS tablet Take 1 tablet (400 mg) by mouth every 4 hours for 2 doses Take one tablet 4 hours and 8 hours after end of cyclophosphamide infusion. 2 tablet 0       REVIEW OF SYSTEMS  12-point ROS negative except as in HPI    Past Medical History:   Diagnosis Date     Hypertension      Learning disability     but pt is his own gaurdian     Peritoneal carcinomatosis (H)      Past Surgical History:   Procedure Laterality Date     BIOPSY      liver     IR CVC TUNNEL PLACEMENT > 5 YRS OF AGE  4/29/2020     No family history on file.     PHYSICAL EXAMINATION  /81 (BP Location: Left arm, Patient Position: Chair, Cuff Size: Adult Regular)   Pulse 108   Temp 98  F (36.7  C) (Oral)   Resp 16   Wt 98.3 kg (216 lb 11.2 oz)   SpO2 99%   BMI 32.96 kg/m    Wt Readings from Last 3 Encounters:   10/21/20 98.3 kg (216 lb 11.2 oz)   10/09/20 97.5 kg (215 lb)   09/16/20 101.3 kg (223 lb 6.4 oz)   GENERAL: NAD  HEAD: atraumatic   EYES: no scleral icterus   LUNGS: Breathing comfortably on room air  EXTREMITIES: moving upper extremities   SKIN: no skin changes  NEURO: alert, following conversations    The rest of a comprehensive physical examination is deferred due to PHE (public health emergency) video visit restrictions.      IMAGING  CT-CAP, 9/11/2020  IMPRESSION: In this patient with history of desmoplastic small round  cell tumor, the current scan compared to CT dated 7/15/2020 shows:  1. Extensive peritoneal carcinomatosis with abdominal  and pelvic  deposits encasing the bowel, not significantly changed compared to  prior CT dated 7/15/2020.  2. Persistent mass effect of pelvic deposits on the iliac vessels and  IVC with pelvic collaterals.  3. Increased gallbladder enhancement with hyperdense intraluminal  contents and pericholecystic fluid/wall thickening, again nonspecific  in the setting of malignant ascites. Consider ultrasound gallbladder  if clinical concern for cholecystitis.  4. Resolved left pleural effusion.  5. Unchanged mediastinal lymph nodes, and punctate pulmonary nodules,  with no new lymphadenopathy or new suspicious pulmonary nodule  6. Mild anasarca.      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 He tolerated 6 alternating cycles of CAV/IMV with anticipated side effects. He developed hematuria with his last 2 cycles of Ifosfamide based therapy despite dose reduction and Mesna prophylaxis, and ultimately decided to continue with CAV alone.    - LISY visit next visit  - Continue with CAV every 21 days for next 3 cycles, next due 10/30/2020  - CT-CAP on 11/19/2020, with return to see me afterwards     #2 Anemia, normocytic   Initially microcytic, now normocytic. Hgb 9.5, likely 2/2 iron deficiency and chemotherapy.   - Continue Feosol daily.   - Trend, no transfusions needed at this time      #3 Malnutrition, secondary to #1 above  Saw nutritionist on 7/27, and he is now much better. Has an appetite and continues to tolerate solid foods.   - Goals per nutritionist:   1.  Aim for 5-6 small frequent meals   2.  Aim for 2200kcal and 100g protein/day   3. Weight maintenance .   - Continue Boost shakes to supplement his nutrition    Farzaneh Burciaga,  "M.D.   of Medicine  Hematology, Oncology and Transplantation          Diego Buchanan is a 25 year old male who is being evaluated via a billable video visit.      The patient has been notified of following:     \"This video visit will be conducted via a call between you and your physician/provider. We have found that certain health care needs can be provided without the need for an in-person physical exam.  This service lets us provide the care you need with a video conversation.  If a prescription is necessary we can send it directly to your pharmacy.  If lab work is needed we can place an order for that and you can then stop by our lab to have the test done at a later time.    Video visits are billed at different rates depending on your insurance coverage.  Please reach out to your insurance provider with any questions.    If during the course of the call the physician/provider feels a video visit is not appropriate, you will not be charged for this service.\"    Patient has given verbal consent for Video visit? Yes    How would you like to obtain your AVS? MyChart     If you are dropped from the video visit, the video invite should be resent to: Send to e-mail at: [email or web address]     Will anyone else be joining your video visit? No         I have reviewed and updated the patient's allergies and pt confirmed any changes to their medication list.  Patient was asked to provide any patient recorded vital signs, height and/or weight.  Please see \"Patient Reported Vital Signs\" tab for that information.  Patient instructed to be in the virtual waiting room 10-15 minutes prior to appointment time.    /81 (BP Location: Left arm, Patient Position: Chair, Cuff Size: Adult Regular)   Pulse 108   Temp 98  F (36.7  C) (Oral)   Resp 16   Wt 98.3 kg (216 lb 11.2 oz)   SpO2 99%   BMI 32.96 kg/m      Concerns: Patient has no new concerns.    Refills: None      Jerrod Rendon, EMT    Video-Visit " Details    Type of service:  Video Visit    Video Start Time: 2:20 PM  Video End Time: 2:45 PM    Originating Location (pt. Location): Home    Distant Location (provider location):  [facility]     Platform used for Video Visit: "[employer]

## 2020-10-30 ENCOUNTER — INFUSION THERAPY VISIT (OUTPATIENT)
Dept: ONCOLOGY | Facility: CLINIC | Age: 25
End: 2020-10-30
Attending: INTERNAL MEDICINE
Payer: COMMERCIAL

## 2020-10-30 VITALS
SYSTOLIC BLOOD PRESSURE: 130 MMHG | DIASTOLIC BLOOD PRESSURE: 81 MMHG | TEMPERATURE: 98.2 F | RESPIRATION RATE: 16 BRPM | WEIGHT: 220.4 LBS | BODY MASS INDEX: 33.52 KG/M2 | OXYGEN SATURATION: 98 % | HEART RATE: 88 BPM

## 2020-10-30 DIAGNOSIS — C49.9 DESMOPLASTIC SMALL ROUND CELL TUMOR (H): ICD-10-CM

## 2020-10-30 DIAGNOSIS — C41.9 SARCOMA, EWINGS (H): Primary | ICD-10-CM

## 2020-10-30 LAB
ALBUMIN SERPL-MCNC: 3.4 G/DL (ref 3.4–5)
ALP SERPL-CCNC: 136 U/L (ref 40–150)
ALT SERPL W P-5'-P-CCNC: 28 U/L (ref 0–70)
ANION GAP SERPL CALCULATED.3IONS-SCNC: 5 MMOL/L (ref 3–14)
AST SERPL W P-5'-P-CCNC: 14 U/L (ref 0–45)
BASOPHILS # BLD AUTO: 0 10E9/L (ref 0–0.2)
BASOPHILS NFR BLD AUTO: 0.4 %
BILIRUB SERPL-MCNC: 0.4 MG/DL (ref 0.2–1.3)
BUN SERPL-MCNC: 9 MG/DL (ref 7–30)
CALCIUM SERPL-MCNC: 8.6 MG/DL (ref 8.5–10.1)
CHLORIDE SERPL-SCNC: 110 MMOL/L (ref 94–109)
CO2 SERPL-SCNC: 26 MMOL/L (ref 20–32)
CREAT SERPL-MCNC: 0.62 MG/DL (ref 0.66–1.25)
DIFFERENTIAL METHOD BLD: ABNORMAL
EOSINOPHIL # BLD AUTO: 0 10E9/L (ref 0–0.7)
EOSINOPHIL NFR BLD AUTO: 0.3 %
ERYTHROCYTE [DISTWIDTH] IN BLOOD BY AUTOMATED COUNT: 18.2 % (ref 10–15)
GFR SERPL CREATININE-BSD FRML MDRD: >90 ML/MIN/{1.73_M2}
GLUCOSE SERPL-MCNC: 102 MG/DL (ref 70–99)
HCT VFR BLD AUTO: 32.8 % (ref 40–53)
HGB BLD-MCNC: 10.2 G/DL (ref 13.3–17.7)
IMM GRANULOCYTES # BLD: 0.1 10E9/L (ref 0–0.4)
IMM GRANULOCYTES NFR BLD: 0.5 %
LYMPHOCYTES # BLD AUTO: 0.5 10E9/L (ref 0.8–5.3)
LYMPHOCYTES NFR BLD AUTO: 5 %
MCH RBC QN AUTO: 29.1 PG (ref 26.5–33)
MCHC RBC AUTO-ENTMCNC: 31.1 G/DL (ref 31.5–36.5)
MCV RBC AUTO: 93 FL (ref 78–100)
MONOCYTES # BLD AUTO: 0.9 10E9/L (ref 0–1.3)
MONOCYTES NFR BLD AUTO: 9.6 %
NEUTROPHILS # BLD AUTO: 8.2 10E9/L (ref 1.6–8.3)
NEUTROPHILS NFR BLD AUTO: 84.2 %
NRBC # BLD AUTO: 0 10*3/UL
NRBC BLD AUTO-RTO: 0 /100
PLATELET # BLD AUTO: 319 10E9/L (ref 150–450)
POTASSIUM SERPL-SCNC: 3.4 MMOL/L (ref 3.4–5.3)
PROT SERPL-MCNC: 7.3 G/DL (ref 6.8–8.8)
RBC # BLD AUTO: 3.51 10E12/L (ref 4.4–5.9)
SODIUM SERPL-SCNC: 141 MMOL/L (ref 133–144)
WBC # BLD AUTO: 9.7 10E9/L (ref 4–11)

## 2020-10-30 PROCEDURE — 258N000003 HC RX IP 258 OP 636: Performed by: INTERNAL MEDICINE

## 2020-10-30 PROCEDURE — 96367 TX/PROPH/DG ADDL SEQ IV INF: CPT

## 2020-10-30 PROCEDURE — 85025 COMPLETE CBC W/AUTO DIFF WBC: CPT | Performed by: INTERNAL MEDICINE

## 2020-10-30 PROCEDURE — 96415 CHEMO IV INFUSION ADDL HR: CPT

## 2020-10-30 PROCEDURE — 250N000011 HC RX IP 250 OP 636: Performed by: INTERNAL MEDICINE

## 2020-10-30 PROCEDURE — G0498 CHEMO EXTEND IV INFUS W/PUMP: HCPCS

## 2020-10-30 PROCEDURE — 99207 PR NO CHARGE LOS: CPT

## 2020-10-30 PROCEDURE — 80053 COMPREHEN METABOLIC PANEL: CPT | Performed by: INTERNAL MEDICINE

## 2020-10-30 PROCEDURE — 96413 CHEMO IV INFUSION 1 HR: CPT

## 2020-10-30 PROCEDURE — 96375 TX/PRO/DX INJ NEW DRUG ADDON: CPT

## 2020-10-30 PROCEDURE — 96411 CHEMO IV PUSH ADDL DRUG: CPT

## 2020-10-30 RX ORDER — PALONOSETRON 0.05 MG/ML
0.25 INJECTION, SOLUTION INTRAVENOUS ONCE
Status: COMPLETED | OUTPATIENT
Start: 2020-10-30 | End: 2020-10-30

## 2020-10-30 RX ORDER — HEPARIN SODIUM,PORCINE 10 UNIT/ML
5 VIAL (ML) INTRAVENOUS ONCE
Status: COMPLETED | OUTPATIENT
Start: 2020-10-30 | End: 2020-10-30

## 2020-10-30 RX ADMIN — SODIUM CHLORIDE 250 ML: 9 INJECTION, SOLUTION INTRAVENOUS at 14:25

## 2020-10-30 RX ADMIN — SODIUM CHLORIDE, PRESERVATIVE FREE 5 ML: 5 INJECTION INTRAVENOUS at 13:51

## 2020-10-30 RX ADMIN — DEXAMETHASONE SODIUM PHOSPHATE: 10 INJECTION, SOLUTION INTRAMUSCULAR; INTRAVENOUS at 14:29

## 2020-10-30 RX ADMIN — VINCRISTINE SULFATE 2 MG: 1 INJECTION, SOLUTION INTRAVENOUS at 14:56

## 2020-10-30 RX ADMIN — MESNA 2810 MG: 100 INJECTION, SOLUTION INTRAVENOUS at 15:06

## 2020-10-30 RX ADMIN — PALONSETRON HYDROCHLORIDE 0.25 MG: 0.25 INJECTION, SOLUTION INTRAVENOUS at 14:26

## 2020-10-30 ASSESSMENT — PAIN SCALES - GENERAL: PAINLEVEL: NO PAIN (0)

## 2020-10-30 NOTE — NURSING NOTE
Chief Complaint   Patient presents with     Blood Draw     labs drawn from red lumen cvc by RN in lab      /81 (BP Location: Right arm, Patient Position: Sitting, Cuff Size: Adult Regular)   Pulse 88   Temp 98.2  F (36.8  C) (Oral)   Wt 100 kg (220 lb 6.4 oz)   SpO2 98%   BMI 33.52 kg/m      Lines accessed. Labs drawn via red lumen. Both caps changed, lines flushed with normal saline and heparin. Pt tolerated well. Checked into next appointment.    Priscilla Anderson RN on 10/30/2020 at 1:48 PM

## 2020-10-30 NOTE — PATIENT INSTRUCTIONS
Take MESNA tonight at 9pm and 1am.  Pump Disconnect on Sunday, 10/11 at 4pm.    Contact Numbers  [facility]: [phone] (for symptom and scheduling needs)    Please call the [facility] Triage line if you experience a temperature greater than or equal to 100.4, shaking chills, have uncontrolled nausea, vomiting and/or diarrhea, dizziness, shortness of breath, chest pain, bleeding, unexplained bruising, or if you have any other new/concerning symptoms, questions or concerns.     If you are having any concerning symptoms or wish to speak to a provider before your next infusion visit, please call your care coordinator or triage to notify them so we can adequately serve you.     If you need a refill on a narcotic prescription or other medication, please call triage before your infusion appointment.          October 2020 Sunday Monday Tuesday Wednesday Thursday Friday Saturday                       1    LAB   6:00 AM   (15 min.)   [facility] HOME INFUSION   [facility] 2     3       4     5     6     7     8     9    [facility] DRAW   1:00 PM   (15 min.)   [facility]   [facility] ONC INFUSION 180   1:30 PM   (180 min.)   [facility] ONCOLOGY INFUSION   [facility] 10       11     12     13     14     15     16     17       18     19     20     21    [facility] DRAW  12:45 PM   (60 min.)   [facility]   [facility]    VIDEO VISIT RETURN   2:00 PM   (90 min.)   Farzaneh Young MD   [facility] 22     23     24       25     26     27     28     29     30    [facility] DRAW   1:00 PM   (15 min.)   [facility] DRAW   [facility] ONC INFUSION 180   1:30 PM   (180 min.)   [facility] ONCOLOGY INFUSION   [facility] 31 November 2020 Sunday Monday Tuesday Wednesday  Thursday Friday Saturday   1     2     3     4     5     6     7       8     9     10     11     12     13     14       15     16     17     18     19    CT CHEST/ABDOMEN/PELVIS W  10:45 AM   (20 min.)   [facility]   [facility]    VIDEO VISIT RETURN   1:30 PM   (30 min.)   Farzaneh Young MD   [facility] 20    [facility] ONC INFUSION 180   1:30 PM   (180 min.)   [facility] ONCOLOGY INFUSION   [facility] 21       22     23     24     25     26     27     28       29     30                                              Lab Results:  Recent Results (from the past 12 hour(s))   CBC with platelets differential    Collection Time: 10/30/20  1:43 PM   Result Value Ref Range    WBC 9.7 4.0 - 11.0 10e9/L    RBC Count 3.51 (L) 4.4 - 5.9 10e12/L    Hemoglobin 10.2 (L) 13.3 - 17.7 g/dL    Hematocrit 32.8 (L) 40.0 - 53.0 %    MCV 93 78 - 100 fl    MCH 29.1 26.5 - 33.0 pg    MCHC 31.1 (L) 31.5 - 36.5 g/dL    RDW 18.2 (H) 10.0 - 15.0 %    Platelet Count 319 150 - 450 10e9/L    Diff Method Automated Method     % Neutrophils 84.2 %    % Lymphocytes 5.0 %    % Monocytes 9.6 %    % Eosinophils 0.3 %    % Basophils 0.4 %    % Immature Granulocytes 0.5 %    Nucleated RBCs 0 0 /100    Absolute Neutrophil 8.2 1.6 - 8.3 10e9/L    Absolute Lymphocytes 0.5 (L) 0.8 - 5.3 10e9/L    Absolute Monocytes 0.9 0.0 - 1.3 10e9/L    Absolute Eosinophils 0.0 0.0 - 0.7 10e9/L    Absolute Basophils 0.0 0.0 - 0.2 10e9/L    Abs Immature Granulocytes 0.1 0 - 0.4 10e9/L    Absolute Nucleated RBC 0.0    Comprehensive metabolic panel    Collection Time: 10/30/20  1:43 PM   Result Value Ref Range    Sodium 141 133 - 144 mmol/L    Potassium 3.4 3.4 - 5.3 mmol/L    Chloride 110 (H) 94 - 109 mmol/L    Carbon Dioxide 26 20 - 32 mmol/L    Anion Gap 5 3 - 14 mmol/L    Glucose 102 (H) 70 - 99 mg/dL    Urea Nitrogen 9 7 - 30 mg/dL    Creatinine 0.62 (L) 0.66 - 1.25 mg/dL    GFR  Estimate >90 >60 mL/min/[1.73_m2]    GFR Estimate If Black >90 >60 mL/min/[1.73_m2]    Calcium 8.6 8.5 - 10.1 mg/dL    Bilirubin Total 0.4 0.2 - 1.3 mg/dL    Albumin 3.4 3.4 - 5.0 g/dL    Protein Total 7.3 6.8 - 8.8 g/dL    Alkaline Phosphatase 136 40 - 150 U/L    ALT 28 0 - 70 U/L    AST 14 0 - 45 U/L

## 2020-10-30 NOTE — PROGRESS NOTES
Infusion Nursing Note:  Diego Buchanan presents today for Cycle 8 Day 1 Vincristine, Cyclophosphamide, Mesna and Doxorubicin Pump Connect.    Patient seen by provider today: No   present during visit today: Not Applicable.    Note: Patient arrives to infusion with sister Mariela feeling well. Denies any pain, shortness of breath, fever, cough, or chills. No new concerns or complaints today.     Patient and sister aware to take Mesna tonight at 9pm and 1am. Patient and sister verbalized understanding.    Intravenous Access:  Esparza.    Treatment Conditions:  Lab Results   Component Value Date    HGB 10.2 10/30/2020     Lab Results   Component Value Date    WBC 9.7 10/30/2020      Lab Results   Component Value Date    ANEU 8.2 10/30/2020     Lab Results   Component Value Date     10/30/2020      Lab Results   Component Value Date     10/30/2020                   Lab Results   Component Value Date    POTASSIUM 3.4 10/30/2020           Lab Results   Component Value Date    MAG 1.8 10/21/2020            Lab Results   Component Value Date    CR 0.62 10/30/2020                   Lab Results   Component Value Date    FAISAL 8.6 10/30/2020                Lab Results   Component Value Date    BILITOTAL 0.4 10/30/2020           Lab Results   Component Value Date    ALBUMIN 3.4 10/30/2020                    Lab Results   Component Value Date    ALT 28 10/30/2020           Lab Results   Component Value Date    AST 14 10/30/2020       Results reviewed, labs MET treatment parameters, ok to proceed with treatment.  ECHO/MUGA completed 9/1/2020  EF 60-65%.    Post Infusion Assessment:  Patient tolerated infusion without incident.  Blood return noted pre and post infusion.  Site patent and intact, free from redness, edema or discomfort.  No evidence of extravasations.     Prior to discharge: Esparza is secured in place with tegaderm and flushed with 10cc NS with positive blood return noted.  Continuous home infusion  "CADD pump connected in RED lumen.    All connectors secured in place and clamps taped open.    Pump started, \"running\" noted on display (CADD): YES.  Pump Connection double checked with Marcella Tao RN.  Patient instructed to call our clinic or [facility] with any questions or concerns at home.  Patient verbalized understanding.    Patient set up for pump disconnect and Neulasta OnPro at home with [facility] on Sunday, 11/1 at 4pm (because of daylight savings).    Verified time with Loretta at [facility].    Discharge Plan:   Prescription refills given for Mesna.  Discharge instructions reviewed with: Patient.  Patient and/or family verbalized understanding of discharge instructions and all questions answered.  AVS to patient via [employer].  Patient will return 11/20 for next appointment.   Patient discharged in stable condition accompanied by: sister.  Departure Mode: Ambulatory.    Liliam Clmeente RN                      "

## 2020-11-01 ENCOUNTER — APPOINTMENT (OUTPATIENT)
Dept: LAB | Facility: CLINIC | Age: 25
End: 2020-11-01
Attending: PHYSICIAN ASSISTANT
Payer: COMMERCIAL

## 2020-11-01 ENCOUNTER — HOME INFUSION (PRE-WILLOW HOME INFUSION) (OUTPATIENT)
Dept: PHARMACY | Facility: CLINIC | Age: 25
End: 2020-11-01

## 2020-11-02 ENCOUNTER — HOME INFUSION (PRE-WILLOW HOME INFUSION) (OUTPATIENT)
Dept: PHARMACY | Facility: CLINIC | Age: 25
End: 2020-11-02

## 2020-11-02 NOTE — PROGRESS NOTES
This is a recent snapshot of the patient's [facility] medical record.  For current drug dose and complete information and questions, call [phone] or In Basket pool, fv home infusion ([id number])  CSN Number:  [id number]

## 2020-11-02 NOTE — PROGRESS NOTES
This is a recent snapshot of the patient's [facility] medical record.  For current drug dose and complete information and questions, call [phone] or In Basket pool, fv home infusion ([id number])  CSN Number:  [id number]

## 2020-11-19 ENCOUNTER — ANCILLARY PROCEDURE (OUTPATIENT)
Dept: CT IMAGING | Facility: CLINIC | Age: 25
End: 2020-11-19
Attending: INTERNAL MEDICINE
Payer: COMMERCIAL

## 2020-11-19 ENCOUNTER — VIRTUAL VISIT (OUTPATIENT)
Dept: ONCOLOGY | Facility: CLINIC | Age: 25
End: 2020-11-19
Attending: INTERNAL MEDICINE
Payer: COMMERCIAL

## 2020-11-19 ENCOUNTER — APPOINTMENT (OUTPATIENT)
Dept: LAB | Facility: CLINIC | Age: 25
End: 2020-11-19
Attending: INTERNAL MEDICINE
Payer: COMMERCIAL

## 2020-11-19 VITALS
SYSTOLIC BLOOD PRESSURE: 121 MMHG | RESPIRATION RATE: 16 BRPM | HEART RATE: 92 BPM | WEIGHT: 216 LBS | OXYGEN SATURATION: 100 % | DIASTOLIC BLOOD PRESSURE: 75 MMHG | TEMPERATURE: 98.4 F | BODY MASS INDEX: 32.85 KG/M2

## 2020-11-19 DIAGNOSIS — C41.9 SARCOMA, EWINGS (H): ICD-10-CM

## 2020-11-19 DIAGNOSIS — C49.9 DESMOPLASTIC SMALL ROUND CELL TUMOR (H): Primary | ICD-10-CM

## 2020-11-19 DIAGNOSIS — C49.9 DESMOPLASTIC SMALL ROUND CELL TUMOR (H): ICD-10-CM

## 2020-11-19 LAB
ALBUMIN SERPL-MCNC: 3.3 G/DL (ref 3.4–5)
ALP SERPL-CCNC: 131 U/L (ref 40–150)
ALT SERPL W P-5'-P-CCNC: 23 U/L (ref 0–70)
ANION GAP SERPL CALCULATED.3IONS-SCNC: 6 MMOL/L (ref 3–14)
AST SERPL W P-5'-P-CCNC: 17 U/L (ref 0–45)
BASOPHILS # BLD AUTO: 0 10E9/L (ref 0–0.2)
BASOPHILS NFR BLD AUTO: 0.4 %
BILIRUB SERPL-MCNC: 0.4 MG/DL (ref 0.2–1.3)
BUN SERPL-MCNC: 5 MG/DL (ref 7–30)
CALCIUM SERPL-MCNC: 8.9 MG/DL (ref 8.5–10.1)
CHLORIDE SERPL-SCNC: 109 MMOL/L (ref 94–109)
CO2 SERPL-SCNC: 24 MMOL/L (ref 20–32)
CREAT SERPL-MCNC: 0.57 MG/DL (ref 0.66–1.25)
DIFFERENTIAL METHOD BLD: ABNORMAL
EOSINOPHIL # BLD AUTO: 0 10E9/L (ref 0–0.7)
EOSINOPHIL NFR BLD AUTO: 0.4 %
ERYTHROCYTE [DISTWIDTH] IN BLOOD BY AUTOMATED COUNT: 18.6 % (ref 10–15)
GFR SERPL CREATININE-BSD FRML MDRD: >90 ML/MIN/{1.73_M2}
GLUCOSE SERPL-MCNC: 89 MG/DL (ref 70–99)
HCT VFR BLD AUTO: 31.2 % (ref 40–53)
HGB BLD-MCNC: 9.6 G/DL (ref 13.3–17.7)
IMM GRANULOCYTES # BLD: 0.1 10E9/L (ref 0–0.4)
IMM GRANULOCYTES NFR BLD: 1 %
LYMPHOCYTES # BLD AUTO: 0.5 10E9/L (ref 0.8–5.3)
LYMPHOCYTES NFR BLD AUTO: 5 %
MCH RBC QN AUTO: 28.5 PG (ref 26.5–33)
MCHC RBC AUTO-ENTMCNC: 30.8 G/DL (ref 31.5–36.5)
MCV RBC AUTO: 93 FL (ref 78–100)
MONOCYTES # BLD AUTO: 1 10E9/L (ref 0–1.3)
MONOCYTES NFR BLD AUTO: 9.1 %
NEUTROPHILS # BLD AUTO: 9.1 10E9/L (ref 1.6–8.3)
NEUTROPHILS NFR BLD AUTO: 84.1 %
NRBC # BLD AUTO: 0 10*3/UL
NRBC BLD AUTO-RTO: 0 /100
PLATELET # BLD AUTO: 307 10E9/L (ref 150–450)
POTASSIUM SERPL-SCNC: 3.9 MMOL/L (ref 3.4–5.3)
PROT SERPL-MCNC: 7.8 G/DL (ref 6.8–8.8)
RBC # BLD AUTO: 3.37 10E12/L (ref 4.4–5.9)
SODIUM SERPL-SCNC: 139 MMOL/L (ref 133–144)
WBC # BLD AUTO: 10.8 10E9/L (ref 4–11)

## 2020-11-19 PROCEDURE — 99214 OFFICE O/P EST MOD 30 MIN: CPT | Mod: 95 | Performed by: INTERNAL MEDICINE

## 2020-11-19 PROCEDURE — 71260 CT THORAX DX C+: CPT | Performed by: RADIOLOGY

## 2020-11-19 PROCEDURE — 74177 CT ABD & PELVIS W/CONTRAST: CPT | Performed by: RADIOLOGY

## 2020-11-19 PROCEDURE — 36591 DRAW BLOOD OFF VENOUS DEVICE: CPT

## 2020-11-19 PROCEDURE — 80053 COMPREHEN METABOLIC PANEL: CPT | Performed by: INTERNAL MEDICINE

## 2020-11-19 PROCEDURE — 250N000011 HC RX IP 250 OP 636: Mod: GT | Performed by: INTERNAL MEDICINE

## 2020-11-19 PROCEDURE — 85025 COMPLETE CBC W/AUTO DIFF WBC: CPT | Performed by: INTERNAL MEDICINE

## 2020-11-19 RX ORDER — IOPAMIDOL 755 MG/ML
132 INJECTION, SOLUTION INTRAVASCULAR ONCE
Status: COMPLETED | OUTPATIENT
Start: 2020-11-19 | End: 2020-11-19

## 2020-11-19 RX ORDER — HEPARIN SODIUM,PORCINE 10 UNIT/ML
5 VIAL (ML) INTRAVENOUS
Status: DISCONTINUED | OUTPATIENT
Start: 2020-11-19 | End: 2020-11-20 | Stop reason: HOSPADM

## 2020-11-19 RX ADMIN — IOPAMIDOL 132 ML: 755 INJECTION, SOLUTION INTRAVASCULAR at 11:21

## 2020-11-19 RX ADMIN — Medication 5 ML: at 10:39

## 2020-11-19 ASSESSMENT — PAIN SCALES - GENERAL: PAINLEVEL: NO PAIN (0)

## 2020-11-19 NOTE — PROGRESS NOTES
"Diego Buchanan is a 25 year old male who is being evaluated via a billable video visit.      The patient has been notified of following:     \"This video visit will be conducted via a call between you and your physician/provider. We have found that certain health care needs can be provided without the need for an in-person physical exam.  This service lets us provide the care you need with a video conversation.  If a prescription is necessary we can send it directly to your pharmacy.  If lab work is needed we can place an order for that and you can then stop by our lab to have the test done at a later time.    Video visits are billed at different rates depending on your insurance coverage.  Please reach out to your insurance provider with any questions.    If during the course of the call the physician/provider feels a video visit is not appropriate, you will not be charged for this service.\"    Patient has given verbal consent for Video visit? Yes  How would you like to obtain your AVS? MyChart  If you are dropped from the video visit, the video invite should be resent to: Send to e-mail at: [email or web address]  Will anyone else be joining your video visit? No      Vitals - Patient Reported  Weight (Patient Reported): 98 kg (216 lb)  Pain Score: No Pain (0)    I have reviewed and updated patient's allergy and medication list.    Concerns: Sister to interp  Refills: None    Jeanie Potts CMA    Video-Visit Details    Type of service:  Video Visit    Video Start Time: 1:45 PM  Video End Time: 2:10 PM    Originating Location (pt. 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 He presented initially with abdominal pain, diarrhea, and progressive abdominal distention for 3 months " duration. 2. Initial CT scan in February 2020 showed severe peritoneal carcinomatosis with large peritoneal tumor implants throughout the entire abdomen and pelvis, but mostly prominently in the pelvis.   2. PETCT scan showed interval increase in the amount of peritoneal carcinomatosis.  3. On 3/12/2020, he had ultrasound-guided core needle biopsy of a peritoneal implant (Case: [id number]), which showed a completely undifferentiated appearance, but stains with pancytokeratin, indicating an epithelial origin. 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 Tumor markers for CA-19-9, CEA, beta-hCG, alpha-fetoprotein were unremarkable. Cancer type ID molecular testing by [employer] sent to determine the exact diagnosis and to assist in further treatment planning. 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 Relative probability of less than 5% of other subtype of sarcoma. EWSR1-WT1 fusion detected.  4. 5/1/2020, MRI brain negative for brain metastasis, and baseline CT-CAP obtained showing extensive mixed solid and cystic masses throughout the abdomen and pelvis consistent with peritoneal carcinomatosis. Largest mass measures approximately 20 cm in the pelvis. Metastatic mixed solid and cystic masses seen in hepatic segments 5 and 6 and hepatic segment 7. The masses appear to be contiguous with the retrohepatic peritoneal carcinomatosis. Small volume fluid about the spleen favored to represent malignant ascites, stable mild-to-moderate right hydronephrosis related to mass effect upon the distal ureter in the pelvis, the IVC and iliac veins are compressed due to the extensive peritoneal carcinomatosis without findings to suggest deep venous thrombosis.  5. 5/4/2020, he begins CAV/IMV alternating chemotherapy. He receives 6 cycles of treatment. He symptomatically improves.  6.  9/11/2020, CT-CAP shows stable to mildly improved extensive peritoneal carcinomatosis. He would like to omit Ifosfamide/etoposide cycles and continue only with CAV.  7. 10/9/2020, he starts CAV every 21 days.       HISTORY OF PRESENT ILLNESS  Diego Buchanan is a 25 year old male with a learning disability and recent diagnosis of DSRCT. He has tolerated his treatments aside from fatigue and hematuria.    His most recent cycle of chemotherapy with CAV went well. He had no hematuria. He feels well and he is eating everything he wants. He is eating solid foods and he has a good appetite. He has regular bowel movements every other day and takes daily Miralax and Senna to help with regularity. He denies any nausea and vomiting.    He denies any fevers or chills. No cough. No shortness of breath. He is spending most of his time at home and he is social distancing. He continues to enjoy playing video games and doing projects in the home.    ECOG performance status 1.      Current Outpatient Medications   Medication Sig Dispense Refill     allopurinol (ZYLOPRIM) 300 MG tablet Take 1 tablet (300 mg) by mouth daily 30 tablet 0     folic acid (FOLVITE) 1 MG tablet Take 1 tablet (1 mg) by mouth daily 30 tablet 3     metoprolol tartrate (LOPRESSOR) 50 MG tablet Take 1.5 tablets (75 mg) by mouth daily 45 tablet 3     oxyCODONE (OXY-IR) 5 MG capsule Take 5 mg by mouth every 6 hours as needed for severe pain Prescribed in ED 7/6/20       polyethylene glycol (MIRALAX) 17 g packet Take 17 g by mouth       potassium chloride ER (KLOR-CON M) 20 MEQ CR tablet Take 1 tablet (20 mEq) by mouth daily 5 tablet 0     senna-docusate (SENNA S) 8.6-50 MG tablet Take 1 tablet by mouth 2 times daily 60 tablet 0     traMADol (ULTRAM) 50 MG tablet Take 50 mg by mouth as needed       dexamethasone (DECADRON) 4 MG tablet Take 2 tablets (8 mg) by mouth daily (with breakfast) for 3 days Start the day after doxorubicin, cyclophosphamide, and vincristine  (odd cycles). (Patient not taking: Reported on 9/11/2020) 6 tablet 8     LORazepam (ATIVAN) 0.5 MG tablet Take 1 tablet (0.5 mg) by mouth every 4 hours as needed (Anxiety, Nausea/Vomiting or Sleep) (Patient not taking: Reported on 10/30/2020) 30 tablet 5     mesna (MESNEX) 400 MG TABS tablet Take 1 tablet (400 mg) by mouth every 4 hours for 2 doses Take one tablet 4 hours and 8 hours after end of cyclophosphamide infusion. 2 tablet 0     mesna (MESNEX) 400 MG TABS tablet Take 1 tablet (400 mg) by mouth every 4 hours for 2 doses Take one tablet 4 hours and 8 hours after end of cyclophosphamide infusion. 2 tablet 0     prochlorperazine (COMPAZINE) 10 MG tablet Take 1 tablet (10 mg) by mouth every 6 hours as needed (Nausea/Vomiting) (Patient not taking: Reported on 10/30/2020) 30 tablet 5       REVIEW OF SYSTEMS  12-point ROS negative except as in HPI    Past Medical History:   Diagnosis Date     Hypertension      Learning disability     but pt is his own gaurdian     Peritoneal carcinomatosis (H)      Past Surgical History:   Procedure Laterality Date     BIOPSY      liver     IR CVC TUNNEL PLACEMENT > 5 YRS OF AGE  4/29/2020     No family history on file.     PHYSICAL EXAMINATION  /75 (BP Location: Right arm, Patient Position: Sitting, Cuff Size: Adult Regular)   Pulse 92   Temp 98.4  F (36.9  C) (Oral)   Resp 16   Wt 98 kg (216 lb)   SpO2 100%   BMI 32.85 kg/m    Wt Readings from Last 3 Encounters:   11/19/20 98 kg (216 lb)   10/30/20 100 kg (220 lb 6.4 oz)   10/21/20 98.3 kg (216 lb 11.2 oz)   GENERAL: NAD  HEAD: atraumatic   EYES: no scleral icterus   LUNGS: Breathing comfortably on room air  EXTREMITIES: moving upper extremities   SKIN: no skin changes  NEURO: alert, following conversations    The rest of a comprehensive physical examination is deferred due to PHE (public health emergency) video visit restrictions.      IMAGING  CT-CAP,11/19/2020  IMPRESSION: Continued near complete replacement of  much of the  peritoneum by implants and masses which appear grossly unchanged.  Unchanged left anterior cardiophrenic angle borderline enlarged lymph  node. Unchanged ascites, which may be malignant.      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 He tolerated 6 alternating cycles of CAV/IMV with anticipated side effects. He developed hematuria with his last 2 cycles of Ifosfamide based therapy despite dose reduction and Mesna prophylaxis, and ultimately patient and family decided to continue with CAV alone. He has reached about 44% of lifetime maximum of doxorubicin.  - Continue with CAV every 21 days for next 2 cycles (per family preference), then plan to begin alternating again with IMV  - CT-CAP in 6 weeks with return to see me to review     #2 Anemia, normocytic   Initially microcytic, now normocytic. Hgb 9.5, likely 2/2 iron deficiency and chemotherapy.   - Continue Feosol daily.    - Trend, no transfusions needed at this time      #3 Malnutrition, secondary to #1 above  Saw nutritionist on 7/27. Appetite much improved and he continues to tolerate solid foods.   - Goals per nutritionist:   1.  Aim for 5-6 small frequent meals   2.  Aim for 2200kcal and 100g protein/day   3. Weight maintenance .   - Continue Boost shakes to supplement his nutrition     Farzaneh Burciaga M.D.   of Medicine  Hematology, Oncology and Transplantation

## 2020-11-19 NOTE — NURSING NOTE
Chief Complaint   Patient presents with     Blood Draw     Labs drawn via CVC by RN in lab .VS taken.        Rossy Osorio RN

## 2020-11-19 NOTE — LETTER
"    11/19/2020         RE: Diego Buchanan  [address] [place] [address]        Dear Colleague,    Thank you for referring your patient, Diego Buchanan, to the [facility]. Please see a copy of my visit note below.    Diego Buchanan is a 25 year old male who is being evaluated via a billable video visit.      The patient has been notified of following:     \"This video visit will be conducted via a call between you and your physician/provider. We have found that certain health care needs can be provided without the need for an in-person physical exam.  This service lets us provide the care you need with a video conversation.  If a prescription is necessary we can send it directly to your pharmacy.  If lab work is needed we can place an order for that and you can then stop by our lab to have the test done at a later time.    Video visits are billed at different rates depending on your insurance coverage.  Please reach out to your insurance provider with any questions.    If during the course of the call the physician/provider feels a video visit is not appropriate, you will not be charged for this service.\"    Patient has given verbal consent for Video visit? Yes  How would you like to obtain your AVS? MyChart  If you are dropped from the video visit, the video invite should be resent to: Send to e-mail at: [email or web address]  Will anyone else be joining your video visit? No      Vitals - Patient Reported  Weight (Patient Reported): 98 kg (216 lb)  Pain Score: No Pain (0)    I have reviewed and updated patient's allergy and medication list.    Concerns: Sister to interp  Refills: None    Jeanie Potts CMA    Video-Visit Details    Type of service:  Video Visit    Video Start Time: 1:45 PM  Video End Time: 2:10 PM    Originating Location (pt. Location): Home    Distant Location (provider location):  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 He presented initially with abdominal pain, diarrhea, and progressive abdominal distention for 3 months duration. 2. Initial CT scan in February 2020 showed severe peritoneal carcinomatosis with large peritoneal tumor implants throughout the entire abdomen and pelvis, but mostly prominently in the pelvis.   2. PETCT scan showed interval increase in the amount of peritoneal carcinomatosis.  3. On 3/12/2020, he had ultrasound-guided core needle biopsy of a peritoneal implant (Case: [id number]), which showed a completely undifferentiated appearance, but stains with pancytokeratin, indicating an epithelial origin. 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 Tumor markers for CA-19-9, CEA, beta-hCG, alpha-fetoprotein were unremarkable. Cancer type ID molecular testing by [facility] sent to determine the exact diagnosis and to assist in further treatment planning. 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 Relative probability of less than 5% of other subtype of sarcoma. EWSR1-WT1 fusion detected.  4. 5/1/2020, MRI brain negative for brain metastasis, and baseline CT-CAP obtained showing extensive mixed solid and cystic masses throughout the abdomen and pelvis consistent with peritoneal carcinomatosis. Largest mass measures approximately 20 cm in the pelvis. Metastatic mixed solid and cystic masses seen in hepatic segments 5 and 6 and hepatic segment 7. The masses appear to be contiguous with the retrohepatic peritoneal carcinomatosis. Small volume fluid about the spleen favored to represent malignant ascites, stable mild-to-moderate right hydronephrosis related to mass effect upon the distal ureter in the pelvis, the IVC  and iliac veins are compressed due to the extensive peritoneal carcinomatosis without findings to suggest deep venous thrombosis.  5. 5/4/2020, he begins CAV/IMV alternating chemotherapy. He receives 6 cycles of treatment. He symptomatically improves.  6. 9/11/2020, CT-CAP shows stable to mildly improved extensive peritoneal carcinomatosis. He would like to omit Ifosfamide/etoposide cycles and continue only with CAV.  7. 10/9/2020, he starts CAV every 21 days.       HISTORY OF PRESENT ILLNESS  Diego Buchanan is a 25 year old male with a learning disability and recent diagnosis of DSRCT. He has tolerated his treatments aside from fatigue and hematuria.    His most recent cycle of chemotherapy with CAV went well. He had no hematuria. He feels well and he is eating everything he wants. He is eating solid foods and he has a good appetite. He has regular bowel movements every other day and takes daily Miralax and Senna to help with regularity. He denies any nausea and vomiting.    He denies any fevers or chills. No cough. No shortness of breath. He is spending most of his time at home and he is social distancing. He continues to enjoy playing video games and doing projects in the home.    ECOG performance status 1.      Current Outpatient Medications   Medication Sig Dispense Refill     allopurinol (ZYLOPRIM) 300 MG tablet Take 1 tablet (300 mg) by mouth daily 30 tablet 0     folic acid (FOLVITE) 1 MG tablet Take 1 tablet (1 mg) by mouth daily 30 tablet 3     metoprolol tartrate (LOPRESSOR) 50 MG tablet Take 1.5 tablets (75 mg) by mouth daily 45 tablet 3     oxyCODONE (OXY-IR) 5 MG capsule Take 5 mg by mouth every 6 hours as needed for severe pain Prescribed in ED 7/6/20       polyethylene glycol (MIRALAX) 17 g packet Take 17 g by mouth       potassium chloride ER (KLOR-CON M) 20 MEQ CR tablet Take 1 tablet (20 mEq) by mouth daily 5 tablet 0     senna-docusate (SENNA S) 8.6-50 MG tablet Take 1 tablet by mouth 2 times  daily 60 tablet 0     traMADol (ULTRAM) 50 MG tablet Take 50 mg by mouth as needed       dexamethasone (DECADRON) 4 MG tablet Take 2 tablets (8 mg) by mouth daily (with breakfast) for 3 days Start the day after doxorubicin, cyclophosphamide, and vincristine (odd cycles). (Patient not taking: Reported on 9/11/2020) 6 tablet 8     LORazepam (ATIVAN) 0.5 MG tablet Take 1 tablet (0.5 mg) by mouth every 4 hours as needed (Anxiety, Nausea/Vomiting or Sleep) (Patient not taking: Reported on 10/30/2020) 30 tablet 5     mesna (MESNEX) 400 MG TABS tablet Take 1 tablet (400 mg) by mouth every 4 hours for 2 doses Take one tablet 4 hours and 8 hours after end of cyclophosphamide infusion. 2 tablet 0     mesna (MESNEX) 400 MG TABS tablet Take 1 tablet (400 mg) by mouth every 4 hours for 2 doses Take one tablet 4 hours and 8 hours after end of cyclophosphamide infusion. 2 tablet 0     prochlorperazine (COMPAZINE) 10 MG tablet Take 1 tablet (10 mg) by mouth every 6 hours as needed (Nausea/Vomiting) (Patient not taking: Reported on 10/30/2020) 30 tablet 5       REVIEW OF SYSTEMS  12-point ROS negative except as in HPI    Past Medical History:   Diagnosis Date     Hypertension      Learning disability     but pt is his own gaurdian     Peritoneal carcinomatosis (H)      Past Surgical History:   Procedure Laterality Date     BIOPSY      liver     IR CVC TUNNEL PLACEMENT > 5 YRS OF AGE  4/29/2020     No family history on file.     PHYSICAL EXAMINATION  /75 (BP Location: Right arm, Patient Position: Sitting, Cuff Size: Adult Regular)   Pulse 92   Temp 98.4  F (36.9  C) (Oral)   Resp 16   Wt 98 kg (216 lb)   SpO2 100%   BMI 32.85 kg/m    Wt Readings from Last 3 Encounters:   11/19/20 98 kg (216 lb)   10/30/20 100 kg (220 lb 6.4 oz)   10/21/20 98.3 kg (216 lb 11.2 oz)   GENERAL: NAD  HEAD: atraumatic   EYES: no scleral icterus   LUNGS: Breathing comfortably on room air  EXTREMITIES: moving upper extremities   SKIN: no skin  changes  NEURO: alert, following conversations    The rest of a comprehensive physical examination is deferred due to PHE (public health emergency) video visit restrictions.      IMAGING  CT-CAP,11/19/2020  IMPRESSION: Continued near complete replacement of much of the  peritoneum by implants and masses which appear grossly unchanged.  Unchanged left anterior cardiophrenic angle borderline enlarged lymph  node. Unchanged ascites, which may be malignant.      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 He tolerated 6 alternating cycles of CAV/IMV with anticipated side effects. He developed hematuria with his last 2 cycles of Ifosfamide based therapy despite dose reduction and Mesna prophylaxis, and ultimately patient and family decided to continue with CAV alone. He has reached about 44% of lifetime maximum of doxorubicin.  - Continue with CAV every 21 days for next 2 cycles (per family preference), then plan to begin alternating again with IMV  - CT-CAP in 6 weeks with return to see me to review     #2 Anemia, normocytic   Initially microcytic, now normocytic. Hgb 9.5, likely 2/2 iron deficiency and chemotherapy.   - Continue Feosol daily.    - Trend, no transfusions needed at this time      #3 Malnutrition, secondary to #1 above  Saw nutritionist on 7/27. Appetite much improved and he continues to tolerate solid foods.   - Goals per nutritionist:   1.  Aim for 5-6 small frequent meals   2.  Aim for 2200kcal and 100g protein/day   3. Weight maintenance .   - Continue Boost shakes to supplement his nutrition     Farzaneh Burciaga M.D.   of Medicine  Hematology, Oncology and Transplantation

## 2020-11-20 ENCOUNTER — INFUSION THERAPY VISIT (OUTPATIENT)
Dept: ONCOLOGY | Facility: CLINIC | Age: 25
End: 2020-11-20
Attending: INTERNAL MEDICINE
Payer: COMMERCIAL

## 2020-11-20 ENCOUNTER — HOME INFUSION (PRE-WILLOW HOME INFUSION) (OUTPATIENT)
Dept: PHARMACY | Facility: CLINIC | Age: 25
End: 2020-11-20

## 2020-11-20 VITALS
RESPIRATION RATE: 18 BRPM | HEART RATE: 109 BPM | DIASTOLIC BLOOD PRESSURE: 85 MMHG | SYSTOLIC BLOOD PRESSURE: 128 MMHG | TEMPERATURE: 97.8 F | OXYGEN SATURATION: 100 %

## 2020-11-20 DIAGNOSIS — C49.9 DESMOPLASTIC SMALL ROUND CELL TUMOR (H): ICD-10-CM

## 2020-11-20 DIAGNOSIS — C41.9 SARCOMA, EWINGS (H): Primary | ICD-10-CM

## 2020-11-20 PROCEDURE — 96411 CHEMO IV PUSH ADDL DRUG: CPT

## 2020-11-20 PROCEDURE — 250N000011 HC RX IP 250 OP 636: Performed by: INTERNAL MEDICINE

## 2020-11-20 PROCEDURE — G0498 CHEMO EXTEND IV INFUS W/PUMP: HCPCS

## 2020-11-20 PROCEDURE — 96367 TX/PROPH/DG ADDL SEQ IV INF: CPT

## 2020-11-20 PROCEDURE — 258N000003 HC RX IP 258 OP 636: Performed by: INTERNAL MEDICINE

## 2020-11-20 PROCEDURE — 96375 TX/PRO/DX INJ NEW DRUG ADDON: CPT

## 2020-11-20 PROCEDURE — 96415 CHEMO IV INFUSION ADDL HR: CPT

## 2020-11-20 PROCEDURE — 96413 CHEMO IV INFUSION 1 HR: CPT

## 2020-11-20 RX ORDER — EPINEPHRINE 1 MG/ML
0.3 INJECTION, SOLUTION INTRAMUSCULAR; SUBCUTANEOUS EVERY 5 MIN PRN
Status: CANCELLED | OUTPATIENT
Start: 2020-11-20

## 2020-11-20 RX ORDER — METHYLPREDNISOLONE SODIUM SUCCINATE 125 MG/2ML
125 INJECTION, POWDER, LYOPHILIZED, FOR SOLUTION INTRAMUSCULAR; INTRAVENOUS
Status: CANCELLED
Start: 2020-11-20

## 2020-11-20 RX ORDER — PALONOSETRON 0.05 MG/ML
0.25 INJECTION, SOLUTION INTRAVENOUS ONCE
Status: COMPLETED | OUTPATIENT
Start: 2020-11-20 | End: 2020-11-20

## 2020-11-20 RX ORDER — ALBUTEROL SULFATE 90 UG/1
1-2 AEROSOL, METERED RESPIRATORY (INHALATION)
Status: CANCELLED
Start: 2020-11-20

## 2020-11-20 RX ORDER — NALOXONE HYDROCHLORIDE 0.4 MG/ML
.1-.4 INJECTION, SOLUTION INTRAMUSCULAR; INTRAVENOUS; SUBCUTANEOUS
Status: CANCELLED | OUTPATIENT
Start: 2020-11-20

## 2020-11-20 RX ORDER — HEPARIN SODIUM,PORCINE 10 UNIT/ML
5 VIAL (ML) INTRAVENOUS
Status: CANCELLED | OUTPATIENT
Start: 2020-11-20

## 2020-11-20 RX ORDER — SODIUM CHLORIDE 9 MG/ML
1000 INJECTION, SOLUTION INTRAVENOUS CONTINUOUS PRN
Status: CANCELLED
Start: 2020-11-20

## 2020-11-20 RX ORDER — ALBUTEROL SULFATE 0.83 MG/ML
2.5 SOLUTION RESPIRATORY (INHALATION)
Status: CANCELLED | OUTPATIENT
Start: 2020-11-20

## 2020-11-20 RX ORDER — LORAZEPAM 2 MG/ML
0.5 INJECTION INTRAMUSCULAR EVERY 4 HOURS PRN
Status: CANCELLED
Start: 2020-11-20

## 2020-11-20 RX ORDER — DIPHENHYDRAMINE HYDROCHLORIDE 50 MG/ML
50 INJECTION INTRAMUSCULAR; INTRAVENOUS
Status: CANCELLED
Start: 2020-11-20

## 2020-11-20 RX ORDER — HEPARIN SODIUM (PORCINE) LOCK FLUSH IV SOLN 100 UNIT/ML 100 UNIT/ML
5 SOLUTION INTRAVENOUS
Status: CANCELLED | OUTPATIENT
Start: 2020-11-20

## 2020-11-20 RX ORDER — PALONOSETRON 0.05 MG/ML
0.25 INJECTION, SOLUTION INTRAVENOUS ONCE
Status: CANCELLED
Start: 2020-11-20

## 2020-11-20 RX ORDER — MEPERIDINE HYDROCHLORIDE 25 MG/ML
25 INJECTION INTRAMUSCULAR; INTRAVENOUS; SUBCUTANEOUS EVERY 30 MIN PRN
Status: CANCELLED | OUTPATIENT
Start: 2020-11-20

## 2020-11-20 RX ADMIN — SODIUM CHLORIDE 250 ML: 9 INJECTION, SOLUTION INTRAVENOUS at 13:57

## 2020-11-20 RX ADMIN — VINCRISTINE SULFATE 2 MG: 1 INJECTION, SOLUTION INTRAVENOUS at 14:29

## 2020-11-20 RX ADMIN — MESNA 2810 MG: 100 INJECTION, SOLUTION INTRAVENOUS at 14:52

## 2020-11-20 RX ADMIN — PALONOSETRON 0.25 MG: 0.05 INJECTION, SOLUTION INTRAVENOUS at 13:57

## 2020-11-20 RX ADMIN — DEXAMETHASONE SODIUM PHOSPHATE: 10 INJECTION, SOLUTION INTRAMUSCULAR; INTRAVENOUS at 14:02

## 2020-11-20 NOTE — PROGRESS NOTES
Infusion Nursing Note:  Diego Buchanan presents today for Cycle 9 Day 1 Vincristine, Cytoxan, Mesna, and Adriamycin home pump connect.    Patient seen by provider today: No   present during visit today: Not Applicable.    Note: Pt arrives to infusion today with sister, Mariela, feeling well. Visit with Dr. Sims yesterday. No changes or new complaints overnight.     Intravenous Access:  Esparza. Dsg changes and port flushes completed by sister. Due tomorrow for dsg change. Site looks clean, dry, intact.     Treatment Conditions:  Lab Results   Component Value Date    HGB 9.6 11/19/2020     Lab Results   Component Value Date    WBC 10.8 11/19/2020      Lab Results   Component Value Date    ANEU 9.1 11/19/2020     Lab Results   Component Value Date     11/19/2020      Lab Results   Component Value Date     11/19/2020                   Lab Results   Component Value Date    POTASSIUM 3.9 11/19/2020           Lab Results   Component Value Date    MAG 1.8 10/21/2020            Lab Results   Component Value Date    CR 0.57 11/19/2020                   Lab Results   Component Value Date    FAISAL 8.9 11/19/2020                Lab Results   Component Value Date    BILITOTAL 0.4 11/19/2020           Lab Results   Component Value Date    ALBUMIN 3.3 11/19/2020                    Lab Results   Component Value Date    ALT 23 11/19/2020           Lab Results   Component Value Date    AST 17 11/19/2020     Results reviewed, labs MET treatment parameters, ok to proceed with treatment.  ECHO/MUGA completed 9/1/20  EF 60-65%.    Post Infusion Assessment:  Patient tolerated infusion without incident.  Blood return noted pre and post infusion.  Site patent and intact, free from redness, edema or discomfort.  No evidence of extravasations.     Prior to discharge: Port is secured in place with tegaderm and flushed with 10cc NS with positive blood return noted.  Continuous home infusion CADD pump connected.    All  "connectors secured in place and clamps taped open.    Pump started, \"running\" noted on display (CADD): YES.  Pump Connection double checked with Elaine BURKS RN.  Patient instructed to call our clinic or [facility] with any questions or concerns at home.  Patient verbalized understanding.    Patient set up for pump disconnect and Neulasta OnPro at home with [facility] on 11/22/20 at 5pm. Verified with Kami at [facility].      Discharge Plan:   Prescription refills given for Decadron and Mesna. Pt and sister verbalized an understanding of when to take Mesna. Due at 5pm and 1am.  AVS to patient via [employer].  Patient will return 12/11 for next appointment. IB send to [facility] and Dr. Sims for check out orders for future appointments.   Patient discharged in stable condition accompanied by: self and sister.  Departure Mode: Ambulatory.  Face to Face time: 0.    Mehreen Vargas RN                      "

## 2020-11-22 ENCOUNTER — HOME INFUSION (PRE-WILLOW HOME INFUSION) (OUTPATIENT)
Dept: PHARMACY | Facility: CLINIC | Age: 25
End: 2020-11-22

## 2020-11-23 NOTE — PROGRESS NOTES
This is a recent snapshot of the patient's [facility] medical record.  For current drug dose and complete information and questions, call [phone] or In Basket pool, [facility]v home infusion ([id number])  CSN Number:  [id number]

## 2020-11-23 NOTE — PROGRESS NOTES
This is a recent snapshot of the patient's [facility] medical record.  For current drug dose and complete information and questions, call [phone] or In Basket pool, fv home infusion ([id number])  CSN Number:  [id number]

## 2020-12-01 ENCOUNTER — APPOINTMENT (OUTPATIENT)
Dept: LAB | Facility: CLINIC | Age: 25
End: 2020-12-01
Attending: PHYSICIAN ASSISTANT
Payer: COMMERCIAL

## 2020-12-01 DIAGNOSIS — C41.9 SARCOMA, EWINGS (H): Primary | ICD-10-CM

## 2020-12-08 ENCOUNTER — TELEPHONE (OUTPATIENT)
Dept: PHARMACY | Facility: CLINIC | Age: 25
End: 2020-12-08

## 2020-12-08 NOTE — TELEPHONE ENCOUNTER
[facility] PRIOR AUTHORIZATION REQUEST     Drug including DOSE: Neulasta 6 mg  J Code:  NDC:[address]-34  ICD 10 code: Z45.2, C49.9, C41.9    Date(s) of Service: 11/10/2020    Insurance Name:[employer]  Insurance ID:[id number]    Provider: Stacey Tracey  Provider NPI:[id number]      [facility]  NPI: [id number]

## 2020-12-09 VITALS
OXYGEN SATURATION: 98 % | RESPIRATION RATE: 18 BRPM | HEART RATE: 93 BPM | BODY MASS INDEX: 32.59 KG/M2 | DIASTOLIC BLOOD PRESSURE: 74 MMHG | SYSTOLIC BLOOD PRESSURE: 127 MMHG | WEIGHT: 214.3 LBS | TEMPERATURE: 98.1 F

## 2020-12-09 DIAGNOSIS — C49.9 DESMOPLASTIC SMALL ROUND CELL TUMOR (H): ICD-10-CM

## 2020-12-09 LAB
ACANTHOCYTES BLD QL SMEAR: SLIGHT
ALBUMIN SERPL-MCNC: 3.1 G/DL (ref 3.4–5)
ALP SERPL-CCNC: 119 U/L (ref 40–150)
ALT SERPL W P-5'-P-CCNC: 24 U/L (ref 0–70)
ANION GAP SERPL CALCULATED.3IONS-SCNC: 5 MMOL/L (ref 3–14)
ANISOCYTOSIS BLD QL SMEAR: ABNORMAL
AST SERPL W P-5'-P-CCNC: 12 U/L (ref 0–45)
BASOPHILS # BLD AUTO: 0.1 10E9/L (ref 0–0.2)
BASOPHILS NFR BLD AUTO: 0.9 %
BILIRUB SERPL-MCNC: 0.3 MG/DL (ref 0.2–1.3)
BUN SERPL-MCNC: 7 MG/DL (ref 7–30)
CALCIUM SERPL-MCNC: 8.6 MG/DL (ref 8.5–10.1)
CHLORIDE SERPL-SCNC: 112 MMOL/L (ref 94–109)
CO2 SERPL-SCNC: 24 MMOL/L (ref 20–32)
CREAT SERPL-MCNC: 0.71 MG/DL (ref 0.66–1.25)
DACRYOCYTES BLD QL SMEAR: SLIGHT
DIFFERENTIAL METHOD BLD: ABNORMAL
EOSINOPHIL # BLD AUTO: 0.1 10E9/L (ref 0–0.7)
EOSINOPHIL NFR BLD AUTO: 0.9 %
ERYTHROCYTE [DISTWIDTH] IN BLOOD BY AUTOMATED COUNT: 18.6 % (ref 10–15)
GFR SERPL CREATININE-BSD FRML MDRD: >90 ML/MIN/{1.73_M2}
GLUCOSE SERPL-MCNC: 95 MG/DL (ref 70–99)
HCT VFR BLD AUTO: 27.7 % (ref 40–53)
HGB BLD-MCNC: 8.5 G/DL (ref 13.3–17.7)
LYMPHOCYTES # BLD AUTO: 0.6 10E9/L (ref 0.8–5.3)
LYMPHOCYTES NFR BLD AUTO: 8.7 %
MAGNESIUM SERPL-MCNC: 1.9 MG/DL (ref 1.6–2.3)
MCH RBC QN AUTO: 28.5 PG (ref 26.5–33)
MCHC RBC AUTO-ENTMCNC: 30.7 G/DL (ref 31.5–36.5)
MCV RBC AUTO: 93 FL (ref 78–100)
MONOCYTES # BLD AUTO: 0.4 10E9/L (ref 0–1.3)
MONOCYTES NFR BLD AUTO: 6.1 %
NEUTROPHILS # BLD AUTO: 5.7 10E9/L (ref 1.6–8.3)
NEUTROPHILS NFR BLD AUTO: 83.4 %
OVALOCYTES BLD QL SMEAR: SLIGHT
PELGER HUET CELLS BLD QL SMEAR: PRESENT
PHOSPHATE SERPL-MCNC: 4.4 MG/DL (ref 2.5–4.5)
PLATELET # BLD AUTO: 282 10E9/L (ref 150–450)
PLATELET # BLD EST: ABNORMAL 10*3/UL
POIKILOCYTOSIS BLD QL SMEAR: SLIGHT
POLYCHROMASIA BLD QL SMEAR: SLIGHT
POTASSIUM SERPL-SCNC: 3.8 MMOL/L (ref 3.4–5.3)
PROT SERPL-MCNC: 7.2 G/DL (ref 6.8–8.8)
RBC # BLD AUTO: 2.98 10E12/L (ref 4.4–5.9)
RBC INCLUSIONS BLD: SLIGHT
SODIUM SERPL-SCNC: 141 MMOL/L (ref 133–144)
WBC # BLD AUTO: 6.8 10E9/L (ref 4–11)

## 2020-12-09 PROCEDURE — 83735 ASSAY OF MAGNESIUM: CPT | Performed by: PHYSICIAN ASSISTANT

## 2020-12-09 PROCEDURE — 250N000011 HC RX IP 250 OP 636: Performed by: PHYSICIAN ASSISTANT

## 2020-12-09 PROCEDURE — 85025 COMPLETE CBC W/AUTO DIFF WBC: CPT | Performed by: PHYSICIAN ASSISTANT

## 2020-12-09 PROCEDURE — 83540 ASSAY OF IRON: CPT | Performed by: PHYSICIAN ASSISTANT

## 2020-12-09 PROCEDURE — 82728 ASSAY OF FERRITIN: CPT | Performed by: PHYSICIAN ASSISTANT

## 2020-12-09 PROCEDURE — 83550 IRON BINDING TEST: CPT | Performed by: PHYSICIAN ASSISTANT

## 2020-12-09 PROCEDURE — 85045 AUTOMATED RETICULOCYTE COUNT: CPT | Performed by: PHYSICIAN ASSISTANT

## 2020-12-09 PROCEDURE — 84100 ASSAY OF PHOSPHORUS: CPT | Performed by: PHYSICIAN ASSISTANT

## 2020-12-09 PROCEDURE — 80053 COMPREHEN METABOLIC PANEL: CPT | Performed by: PHYSICIAN ASSISTANT

## 2020-12-09 PROCEDURE — 82607 VITAMIN B-12: CPT | Performed by: PHYSICIAN ASSISTANT

## 2020-12-09 RX ORDER — HEPARIN SODIUM,PORCINE 10 UNIT/ML
5 VIAL (ML) INTRAVENOUS ONCE
Status: COMPLETED | OUTPATIENT
Start: 2020-12-09 | End: 2020-12-09

## 2020-12-09 RX ADMIN — SODIUM CHLORIDE, PRESERVATIVE FREE 5 ML: 5 INJECTION INTRAVENOUS at 10:16

## 2020-12-09 RX ADMIN — SODIUM CHLORIDE, PRESERVATIVE FREE 5 ML: 5 INJECTION INTRAVENOUS at 10:15

## 2020-12-09 NOTE — TELEPHONE ENCOUNTER
PA Initiation    Medication: Neulasta 6 mg  Insurance Company: [employer] - Phone [phone] Fax [phone]  Pharmacy Filling the Rx: [facility]  Filling Pharmacy Phone:    Filling Pharmacy Fax:    Start Date: 12/9/2020    Central Prior Authorization Team   Phone: [phone]

## 2020-12-09 NOTE — NURSING NOTE
Chief Complaint   Patient presents with     Blood Draw     labs drawn via CVC by RN in lab      /74 (BP Location: Right arm, Patient Position: Sitting, Cuff Size: Adult Regular)   Pulse 93   Temp 98.1  F (36.7  C) (Oral)   Resp 18   Wt 97.2 kg (214 lb 4.8 oz)   SpO2 98%   BMI 32.59 kg/m      Lines accessed. Labs drawn via purple lumen. Both lines flushed with normal saline and heparin. Pt tolerated well. Checked into next appointment.    Priscilla Anderson RN on 12/9/2020 at 10:15 AM

## 2020-12-09 NOTE — TELEPHONE ENCOUNTER
Prior Authorization Approval    Authorization Effective Date: 11/9/2020  Authorization Expiration Date: 6/7/2021  Medication: Neulasta 6 mg  Approved Dose/Quantity: 1  Reference #: [id number]   Insurance Company: [employer] - Phone [phone] Fax [phone]  Which Pharmacy is filling the prescription (Not needed for infusion/clinic administered): [facility]

## 2020-12-09 NOTE — Clinical Note
12/9/2020         RE: Diego Buchanan  [address]  [place] [address]        Dear Colleague,    Thank you for referring your patient, Diego Buchanan, to the [facility]. Please see a copy of my visit note below.    No notes on file    Again, thank you for allowing me to participate in the care of your patient.        Sincerely,        [facility] Draw

## 2020-12-10 ENCOUNTER — RECORDS - HEALTHEAST (OUTPATIENT)
Dept: ADMINISTRATIVE | Facility: OTHER | Age: 25
End: 2020-12-10

## 2020-12-10 ENCOUNTER — VIRTUAL VISIT (OUTPATIENT)
Dept: ONCOLOGY | Facility: CLINIC | Age: 25
End: 2020-12-10
Attending: PHYSICIAN ASSISTANT
Payer: COMMERCIAL

## 2020-12-10 DIAGNOSIS — C49.9 DESMOPLASTIC SMALL ROUND CELL TUMOR (H): Primary | ICD-10-CM

## 2020-12-10 DIAGNOSIS — C41.9 SARCOMA, EWINGS (H): ICD-10-CM

## 2020-12-10 DIAGNOSIS — D64.9 ANEMIA, UNSPECIFIED TYPE: ICD-10-CM

## 2020-12-10 LAB
FERRITIN SERPL-MCNC: 432 NG/ML (ref 26–388)
IRON SATN MFR SERPL: 18 % (ref 15–46)
IRON SERPL-MCNC: 56 UG/DL (ref 35–180)
RETICS # AUTO: 109.6 10E9/L (ref 25–95)
RETICS/RBC NFR AUTO: 3.6 % (ref 0.5–2)
TIBC SERPL-MCNC: 314 UG/DL (ref 240–430)
VIT B12 SERPL-MCNC: 946 PG/ML (ref 193–986)

## 2020-12-10 PROCEDURE — 99214 OFFICE O/P EST MOD 30 MIN: CPT | Mod: 95 | Performed by: PHYSICIAN ASSISTANT

## 2020-12-10 RX ORDER — HEPARIN SODIUM (PORCINE) LOCK FLUSH IV SOLN 100 UNIT/ML 100 UNIT/ML
5 SOLUTION INTRAVENOUS
Status: CANCELLED | OUTPATIENT
Start: 2020-12-11

## 2020-12-10 RX ORDER — EPINEPHRINE 1 MG/ML
0.3 INJECTION, SOLUTION INTRAMUSCULAR; SUBCUTANEOUS EVERY 5 MIN PRN
Status: CANCELLED | OUTPATIENT
Start: 2020-12-11

## 2020-12-10 RX ORDER — MEPERIDINE HYDROCHLORIDE 25 MG/ML
25 INJECTION INTRAMUSCULAR; INTRAVENOUS; SUBCUTANEOUS EVERY 30 MIN PRN
Status: CANCELLED | OUTPATIENT
Start: 2020-12-11

## 2020-12-10 RX ORDER — LORAZEPAM 2 MG/ML
0.5 INJECTION INTRAMUSCULAR EVERY 4 HOURS PRN
Status: CANCELLED
Start: 2020-12-11

## 2020-12-10 RX ORDER — PALONOSETRON 0.05 MG/ML
0.25 INJECTION, SOLUTION INTRAVENOUS ONCE
Status: CANCELLED
Start: 2020-12-11

## 2020-12-10 RX ORDER — DIPHENHYDRAMINE HYDROCHLORIDE 50 MG/ML
50 INJECTION INTRAMUSCULAR; INTRAVENOUS
Status: CANCELLED
Start: 2020-12-11

## 2020-12-10 RX ORDER — METHYLPREDNISOLONE SODIUM SUCCINATE 125 MG/2ML
125 INJECTION, POWDER, LYOPHILIZED, FOR SOLUTION INTRAMUSCULAR; INTRAVENOUS
Status: CANCELLED
Start: 2020-12-11

## 2020-12-10 RX ORDER — HEPARIN SODIUM,PORCINE 10 UNIT/ML
5 VIAL (ML) INTRAVENOUS
Status: CANCELLED | OUTPATIENT
Start: 2020-12-11

## 2020-12-10 RX ORDER — ALBUTEROL SULFATE 0.83 MG/ML
2.5 SOLUTION RESPIRATORY (INHALATION)
Status: CANCELLED | OUTPATIENT
Start: 2020-12-11

## 2020-12-10 RX ORDER — ALBUTEROL SULFATE 90 UG/1
1-2 AEROSOL, METERED RESPIRATORY (INHALATION)
Status: CANCELLED
Start: 2020-12-11

## 2020-12-10 RX ORDER — NALOXONE HYDROCHLORIDE 0.4 MG/ML
.1-.4 INJECTION, SOLUTION INTRAMUSCULAR; INTRAVENOUS; SUBCUTANEOUS
Status: CANCELLED | OUTPATIENT
Start: 2020-12-11

## 2020-12-10 RX ORDER — SODIUM CHLORIDE 9 MG/ML
1000 INJECTION, SOLUTION INTRAVENOUS CONTINUOUS PRN
Status: CANCELLED
Start: 2020-12-11

## 2020-12-10 NOTE — LETTER
"    12/10/2020         RE: Diego Buchanan  [address]  [place] [address]        Dear Colleague,    Thank you for referring your patient, Diego Buchanan, to the [facility]. Please see a copy of my visit note below.    Diego Buchanan is a 25 year old male who is being evaluated via a billable video visit.      The patient has been notified of following:     \"This video visit will be conducted via a call between you and your physician/provider. We have found that certain health care needs can be provided without the need for an in-person physical exam.  This service lets us provide the care you need with a video conversation.  If a prescription is necessary we can send it directly to your pharmacy.  If lab work is needed we can place an order for that and you can then stop by our lab to have the test done at a later time.    Video visits are billed at different rates depending on your insurance coverage.  Please reach out to your insurance provider with any questions.    If during the course of the call the physician/provider feels a video visit is not appropriate, you will not be charged for this service.\"    Patient has given verbal consent for Video visit? Yes  How would you like to obtain your AVS? MyChart  If you are dropped from the video visit, the video invite should be resent to: Text to cell phone: [phone]  Will anyone else be joining your video visit? No    Vitals - Patient Reported  Weight (Patient Reported): 97.1 kg (214 lb)  Pain Score: No Pain (0)      I have reviewed and updated patient's allergy and medication list.    Concerns: NONE  Refills: NONE      Jeanie Potts CMA      Video-Visit Details    Type of service:  Video Visit    Video Start Time: 1:25 PM  Video End Time: 1:40 PM    Originating Location (pt. Location): Home    Distant Location (provider location):  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 He presented initially with abdominal pain, diarrhea, and progressive abdominal distention for 3 months duration. 2. Initial CT scan in February 2020 showed severe peritoneal carcinomatosis with large peritoneal tumor implants throughout the entire abdomen and pelvis, but mostly prominently in the pelvis.   2. PETCT scan showed interval increase in the amount of peritoneal carcinomatosis.  3. On 3/12/2020, he had ultrasound-guided core needle biopsy of a peritoneal implant (Case: [id number]), which showed a completely undifferentiated appearance, but stains with pancytokeratin, indicating an epithelial origin. 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 Tumor markers for CA-19-9, CEA, beta-hCG, alpha-fetoprotein were unremarkable. Cancer type ID molecular testing by [employer] sent to determine the exact diagnosis and to assist in further treatment planning. 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 Relative probability of less than 5% of other subtype of sarcoma. EWSR1-WT1 fusion detected.  4. 5/1/2020, MRI brain negative for brain metastasis, and baseline CT-CAP obtained showing extensive mixed solid and cystic masses throughout the abdomen and pelvis consistent with peritoneal carcinomatosis. Largest mass measures approximately 20 cm in the pelvis. Metastatic mixed solid and cystic masses seen in hepatic segments 5 and 6 and hepatic segment 7. The masses appear to be contiguous with the retrohepatic peritoneal carcinomatosis. Small volume fluid about the spleen favored to represent malignant ascites, stable mild-to-moderate right hydronephrosis related to mass effect upon the distal ureter in the pelvis, the IVC and iliac veins are  compressed due to the extensive peritoneal carcinomatosis without findings to suggest deep venous thrombosis.  5. 5/4/2020, he begins CAV/IMV alternating chemotherapy. He receives 6 cycles of treatment. He symptomatically improves.  6. 9/11/2020, CT-CAP shows stable to mildly improved extensive peritoneal carcinomatosis. He would like to omit Ifosfamide/etoposide cycles and continue only with CAV.  7. 10/9/2020, he starts CAV every 21 days.     HISTORY OF PRESENT ILLNESS  Diego Buchanan is a 25 year old male with a learning disability and DSRCT.     -Chemotherapy has been going okay. Seems to tolerate treatment well.   -Doing his regular activities. Has a good appetite.  -Blood in urine has resolved.   -Energy is fair. Takes some naps, though fatigue is better with this last cycle.   -Has intermittent lower abdominal pain. Not needing to take pain medication.   -Takes MiraLax prn for constipation, has bowel movements every other day.   -On 11/25, had blood in his urine x 1.   -Goes to sleep around midnight, up around 12:30pm-2pm.   -Had an allergic reaction to adhesive around [place], now resolved.     Review of Systems:  Patient denies any of the following except if noted above: fevers, chills, difficulty with energy, vision or hearing changes, chest pain, dyspnea, nausea, vomiting, diarrhea, other urinary concerns, headaches, numbness, tingling, issues with sleep or mood. He also denies lumps, bumps, rashes or skin lesions, other bleeding or bruising issues.    ECOG performance status 1.    Current Outpatient Medications   Medication Sig Dispense Refill     allopurinol (ZYLOPRIM) 300 MG tablet Take 1 tablet (300 mg) by mouth daily 30 tablet 0     folic acid (FOLVITE) 1 MG tablet Take 1 tablet (1 mg) by mouth daily 30 tablet 3     LORazepam (ATIVAN) 0.5 MG tablet Take 1 tablet (0.5 mg) by mouth every 4 hours as needed (Anxiety, Nausea/Vomiting or Sleep) 30 tablet 5     metoprolol tartrate (LOPRESSOR) 50 MG tablet  Take 1.5 tablets (75 mg) by mouth daily 45 tablet 3     oxyCODONE (OXY-IR) 5 MG capsule Take 5 mg by mouth every 6 hours as needed for severe pain Prescribed in ED 7/6/20       polyethylene glycol (MIRALAX) 17 g packet Take 17 g by mouth       senna-docusate (SENNA S) 8.6-50 MG tablet Take 1 tablet by mouth 2 times daily 60 tablet 0     traMADol (ULTRAM) 50 MG tablet Take 50 mg by mouth as needed       dexamethasone (DECADRON) 4 MG tablet Take 2 tablets (8 mg) by mouth daily (with breakfast) for 3 days Start the day after doxorubicin, cyclophosphamide, and vincristine (odd cycles). (Patient not taking: Reported on 9/11/2020) 6 tablet 8     mesna (MESNEX) 400 MG TABS tablet Take 1 tablet (400 mg) by mouth every 4 hours for 2 doses Take one tablet 4 hours and 8 hours after end of cyclophosphamide infusion. 2 tablet 0     prochlorperazine (COMPAZINE) 10 MG tablet Take 1 tablet (10 mg) by mouth every 6 hours as needed (Nausea/Vomiting) (Patient not taking: Reported on 10/30/2020) 30 tablet 5     Objective:  General: patient appears well in no acute distress, alert and oriented, speech clear and fluid  Skin: no visualized rash or lesions on visualized skin  Resp: Appears to be breathing comfortably without accessory muscle usage, speaking in full sentences, no audible wheezes or cough.  Psych: Coherent speech, normal rate and volume, able to articulate logical thoughts, able to abstract reason, no tangential thoughts, no hallucinations or delusions  Patient's affect is appropriate.    LABS   12/9/2020 10:10   Sodium 141   Potassium 3.8   Chloride 112 (H)   Carbon Dioxide 24   Urea Nitrogen 7   Creatinine 0.71   GFR Estimate >90   GFR Estimate If Black >90   Calcium 8.6   Anion Gap 5   Magnesium 1.9   Phosphorus 4.4   Albumin 3.1 (L)   Protein Total 7.2   Bilirubin Total 0.3   Alkaline Phosphatase 119   ALT 24   AST 12   Glucose 95   WBC 6.8   Hemoglobin 8.5 (L)   Hematocrit 27.7 (L)   Platelet Count 282   RBC Count 2.98  (L)   MCV 93   MCH 28.5   MCHC 30.7 (L)   RDW 18.6 (H)   Diff Method Manual Differential   % Neutrophils 83.4   % Lymphocytes 8.7   % Monocytes 6.1   % Eosinophils 0.9   % Basophils 0.9   Absolute Neutrophil 5.7   Absolute Lymphocytes 0.6 (L)   Absolute Monocytes 0.4   Absolute Eosinophils 0.1   Absolute Basophils 0.1   Anisocytosis Moderate   Poikilocytosis Slight   Polychromasia Slight   RBC Fragments Slight   Teardrop Cells Slight   Acanthocytes Slight   Ovalocytes Slight   Pelger Huet Cells Present   Platelet Estimate Confirming automated cell count     ASSESSMENT AND PLAN  #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 He tolerated 6 alternating cycles of CAV/IMV with anticipated side effects. He developed hematuria with his last 2 cycles of Ifosfamide based therapy despite dose reduction and Mesna prophylaxis, and ultimately patient and family decided to continue with CAV alone. He has reached about 44% of lifetime maximum of doxorubicin.  - Continue with CAV every 21 days for next 2 cycles (per family preference), then plan to begin alternating again with IMV.   - He is doing well today and will continue with CAV tomorrow. He will see Cheryle Julian PA-C prior to his next cycle. He will follow-up with Dr. Sims in early January with a CT CAP. He will call sooner for concerns.      #2 Anemia, normocytic   Initially microcytic, now normocytic. Hemoglobin is trending down. Will repeat anemia workup with retic, iron/TIBC, ferritin, folate, and vitamin B12. Will set him up for labs with possible transfusion next week given decline in hemoglobin. He remains on folic acid.      #3 Malnutrition, secondary to #1 above  Saw nutritionist on 7/27. Appetite much improved and  he continues to tolerate solid foods.   - Goals per nutritionist:   1.  Aim for 5-6 small frequent meals   2.  Aim for 2200kcal and 100g protein/day   3. Weight maintenance .   - Continue Boost shakes to supplement his nutrition   - No acute concerns today.    #4 Abdominal pain  Mild and intermittent. Not needing to take anything for pain currently. Question if abdominal pain may impact his sleep. Discussed trying a heat pad at bedtime to his abdomen.    Yasmine Mckeon PA-C  [facility]  [address] [place] [address] [phone]                  Again, thank you for allowing me to participate in the care of your patient.        Sincerely,        Yasmine Mckeon PA-C

## 2020-12-10 NOTE — PROGRESS NOTES
"Diego Buchanan is a 25 year old male who is being evaluated via a billable video visit.      The patient has been notified of following:     \"This video visit will be conducted via a call between you and your physician/provider. We have found that certain health care needs can be provided without the need for an in-person physical exam.  This service lets us provide the care you need with a video conversation.  If a prescription is necessary we can send it directly to your pharmacy.  If lab work is needed we can place an order for that and you can then stop by our lab to have the test done at a later time.    Video visits are billed at different rates depending on your insurance coverage.  Please reach out to your insurance provider with any questions.    If during the course of the call the physician/provider feels a video visit is not appropriate, you will not be charged for this service.\"    Patient has given verbal consent for Video visit? Yes  How would you like to obtain your AVS? MyChart  If you are dropped from the video visit, the video invite should be resent to: Text to cell phone: [phone]  Will anyone else be joining your video visit? No    Vitals - Patient Reported  Weight (Patient Reported): 97.1 kg (214 lb)  Pain Score: No Pain (0)      I have reviewed and updated patient's allergy and medication list.    Concerns: NONE  Refills: NONE      Jeanie Potts CMA      Video-Visit Details    Type of service:  Video Visit    Video Start Time: 1:25 PM  Video End Time: 1:40 PM    Originating Location (pt. 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 He presented initially with abdominal pain, diarrhea, and progressive abdominal distention for 3 months duration. 2. " Initial CT scan in February 2020 showed severe peritoneal carcinomatosis with large peritoneal tumor implants throughout the entire abdomen and pelvis, but mostly prominently in the pelvis.   2. PETCT scan showed interval increase in the amount of peritoneal carcinomatosis.  3. On 3/12/2020, he had ultrasound-guided core needle biopsy of a peritoneal implant (Case: [id number]), which showed a completely undifferentiated appearance, but stains with pancytokeratin, indicating an epithelial origin. 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 Tumor markers for CA-19-9, CEA, beta-hCG, alpha-fetoprotein were unremarkable. Cancer type ID molecular testing by [employer] sent to determine the exact diagnosis and to assist in further treatment planning. 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 Relative probability of less than 5% of other subtype of sarcoma. EWSR1-WT1 fusion detected.  4. 5/1/2020, MRI brain negative for brain metastasis, and baseline CT-CAP obtained showing extensive mixed solid and cystic masses throughout the abdomen and pelvis consistent with peritoneal carcinomatosis. Largest mass measures approximately 20 cm in the pelvis. Metastatic mixed solid and cystic masses seen in hepatic segments 5 and 6 and hepatic segment 7. The masses appear to be contiguous with the retrohepatic peritoneal carcinomatosis. Small volume fluid about the spleen favored to represent malignant ascites, stable mild-to-moderate right hydronephrosis related to mass effect upon the distal ureter in the pelvis, the IVC and iliac veins are compressed due to the extensive peritoneal carcinomatosis without findings to suggest deep venous thrombosis.  5. 5/4/2020, he begins CAV/IMV alternating chemotherapy. He receives 6 cycles of treatment. He symptomatically improves.  6. 9/11/2020, CT-CAP  shows stable to mildly improved extensive peritoneal carcinomatosis. He would like to omit Ifosfamide/etoposide cycles and continue only with CAV.  7. 10/9/2020, he starts CAV every 21 days.     HISTORY OF PRESENT ILLNESS  Diego Buchanan is a 25 year old male with a learning disability and DSRCT.     -Chemotherapy has been going okay. Seems to tolerate treatment well.   -Doing his regular activities. Has a good appetite.  -Blood in urine has resolved.   -Energy is fair. Takes some naps, though fatigue is better with this last cycle.   -Has intermittent lower abdominal pain. Not needing to take pain medication.   -Takes MiraLax prn for constipation, has bowel movements every other day.   -On 11/25, had blood in his urine x 1.   -Goes to sleep around midnight, up around 12:30pm-2pm.   -Had an allergic reaction to adhesive around [place], now resolved.     Review of Systems:  Patient denies any of the following except if noted above: fevers, chills, difficulty with energy, vision or hearing changes, chest pain, dyspnea, nausea, vomiting, diarrhea, other urinary concerns, headaches, numbness, tingling, issues with sleep or mood. He also denies lumps, bumps, rashes or skin lesions, other bleeding or bruising issues.    ECOG performance status 1.    Current Outpatient Medications   Medication Sig Dispense Refill     allopurinol (ZYLOPRIM) 300 MG tablet Take 1 tablet (300 mg) by mouth daily 30 tablet 0     folic acid (FOLVITE) 1 MG tablet Take 1 tablet (1 mg) by mouth daily 30 tablet 3     LORazepam (ATIVAN) 0.5 MG tablet Take 1 tablet (0.5 mg) by mouth every 4 hours as needed (Anxiety, Nausea/Vomiting or Sleep) 30 tablet 5     metoprolol tartrate (LOPRESSOR) 50 MG tablet Take 1.5 tablets (75 mg) by mouth daily 45 tablet 3     oxyCODONE (OXY-IR) 5 MG capsule Take 5 mg by mouth every 6 hours as needed for severe pain Prescribed in ED 7/6/20       polyethylene glycol (MIRALAX) 17 g packet Take 17 g by mouth        senna-docusate (SENNA S) 8.6-50 MG tablet Take 1 tablet by mouth 2 times daily 60 tablet 0     traMADol (ULTRAM) 50 MG tablet Take 50 mg by mouth as needed       dexamethasone (DECADRON) 4 MG tablet Take 2 tablets (8 mg) by mouth daily (with breakfast) for 3 days Start the day after doxorubicin, cyclophosphamide, and vincristine (odd cycles). (Patient not taking: Reported on 9/11/2020) 6 tablet 8     mesna (MESNEX) 400 MG TABS tablet Take 1 tablet (400 mg) by mouth every 4 hours for 2 doses Take one tablet 4 hours and 8 hours after end of cyclophosphamide infusion. 2 tablet 0     prochlorperazine (COMPAZINE) 10 MG tablet Take 1 tablet (10 mg) by mouth every 6 hours as needed (Nausea/Vomiting) (Patient not taking: Reported on 10/30/2020) 30 tablet 5     Objective:  General: patient appears well in no acute distress, alert and oriented, speech clear and fluid  Skin: no visualized rash or lesions on visualized skin  Resp: Appears to be breathing comfortably without accessory muscle usage, speaking in full sentences, no audible wheezes or cough.  Psych: Coherent speech, normal rate and volume, able to articulate logical thoughts, able to abstract reason, no tangential thoughts, no hallucinations or delusions  Patient's affect is appropriate.    LABS   12/9/2020 10:10   Sodium 141   Potassium 3.8   Chloride 112 (H)   Carbon Dioxide 24   Urea Nitrogen 7   Creatinine 0.71   GFR Estimate >90   GFR Estimate If Black >90   Calcium 8.6   Anion Gap 5   Magnesium 1.9   Phosphorus 4.4   Albumin 3.1 (L)   Protein Total 7.2   Bilirubin Total 0.3   Alkaline Phosphatase 119   ALT 24   AST 12   Glucose 95   WBC 6.8   Hemoglobin 8.5 (L)   Hematocrit 27.7 (L)   Platelet Count 282   RBC Count 2.98 (L)   MCV 93   MCH 28.5   MCHC 30.7 (L)   RDW 18.6 (H)   Diff Method Manual Differential   % Neutrophils 83.4   % Lymphocytes 8.7   % Monocytes 6.1   % Eosinophils 0.9   % Basophils 0.9   Absolute Neutrophil 5.7   Absolute Lymphocytes 0.6 (L)    Absolute Monocytes 0.4   Absolute Eosinophils 0.1   Absolute Basophils 0.1   Anisocytosis Moderate   Poikilocytosis Slight   Polychromasia Slight   RBC Fragments Slight   Teardrop Cells Slight   Acanthocytes Slight   Ovalocytes Slight   Pelger Huet Cells Present   Platelet Estimate Confirming automated cell count     ASSESSMENT AND PLAN  #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 He tolerated 6 alternating cycles of CAV/IMV with anticipated side effects. He developed hematuria with his last 2 cycles of Ifosfamide based therapy despite dose reduction and Mesna prophylaxis, and ultimately patient and family decided to continue with CAV alone. He has reached about 44% of lifetime maximum of doxorubicin.  - Continue with CAV every 21 days for next 2 cycles (per family preference), then plan to begin alternating again with IMV.   - He is doing well today and will continue with CAV tomorrow. He will see Cheryle Julian PA-C prior to his next cycle. He will follow-up with Dr. Sims in early January with a CT CAP. He will call sooner for concerns.      #2 Anemia, normocytic   Initially microcytic, now normocytic. Hemoglobin is trending down. Will repeat anemia workup with retic, iron/TIBC, ferritin, folate, and vitamin B12. Will set him up for labs with possible transfusion next week given decline in hemoglobin. He remains on folic acid.      #3 Malnutrition, secondary to #1 above  Saw nutritionist on 7/27. Appetite much improved and he continues to tolerate solid foods.   - Goals per nutritionist:   1.  Aim for 5-6 small frequent meals   2.  Aim for 2200kcal and 100g protein/day   3. Weight maintenance .   - Continue Boost shakes to supplement his nutrition   - No acute  concerns today.    #4 Abdominal pain  Mild and intermittent. Not needing to take anything for pain currently. Question if abdominal pain may impact his sleep. Discussed trying a heat pad at bedtime to his abdomen.    Yasmine Mckeon PA-C  [facility] [place] [id number] [place], [place] [address] [phone]      Addendum: Anemia labs consistent with anemia of chronic disease likely mixed with some blood loss given elevated retics. Will continue to monitor.

## 2020-12-11 ENCOUNTER — HOME INFUSION (PRE-WILLOW HOME INFUSION) (OUTPATIENT)
Dept: PHARMACY | Facility: CLINIC | Age: 25
End: 2020-12-11

## 2020-12-11 ENCOUNTER — INFUSION THERAPY VISIT (OUTPATIENT)
Dept: ONCOLOGY | Facility: CLINIC | Age: 25
End: 2020-12-11
Attending: INTERNAL MEDICINE
Payer: COMMERCIAL

## 2020-12-11 VITALS
HEART RATE: 107 BPM | OXYGEN SATURATION: 100 % | TEMPERATURE: 98.3 F | DIASTOLIC BLOOD PRESSURE: 78 MMHG | RESPIRATION RATE: 16 BRPM | SYSTOLIC BLOOD PRESSURE: 122 MMHG

## 2020-12-11 DIAGNOSIS — C41.9 SARCOMA, EWINGS (H): Primary | ICD-10-CM

## 2020-12-11 DIAGNOSIS — D64.9 ANEMIA, UNSPECIFIED TYPE: ICD-10-CM

## 2020-12-11 DIAGNOSIS — C49.9 DESMOPLASTIC SMALL ROUND CELL TUMOR (H): ICD-10-CM

## 2020-12-11 LAB — FOLATE SERPL-MCNC: 5.5 NG/ML

## 2020-12-11 PROCEDURE — 96375 TX/PRO/DX INJ NEW DRUG ADDON: CPT

## 2020-12-11 PROCEDURE — 258N000003 HC RX IP 258 OP 636: Performed by: PHYSICIAN ASSISTANT

## 2020-12-11 PROCEDURE — 82746 ASSAY OF FOLIC ACID SERUM: CPT | Performed by: PHYSICIAN ASSISTANT

## 2020-12-11 PROCEDURE — 36593 DECLOT VASCULAR DEVICE: CPT

## 2020-12-11 PROCEDURE — 96411 CHEMO IV PUSH ADDL DRUG: CPT

## 2020-12-11 PROCEDURE — 96415 CHEMO IV INFUSION ADDL HR: CPT

## 2020-12-11 PROCEDURE — G0498 CHEMO EXTEND IV INFUS W/PUMP: HCPCS

## 2020-12-11 PROCEDURE — 250N000011 HC RX IP 250 OP 636: Performed by: PHYSICIAN ASSISTANT

## 2020-12-11 PROCEDURE — 250N000011 HC RX IP 250 OP 636: Mod: JW | Performed by: PHYSICIAN ASSISTANT

## 2020-12-11 PROCEDURE — 96367 TX/PROPH/DG ADDL SEQ IV INF: CPT

## 2020-12-11 PROCEDURE — 96413 CHEMO IV INFUSION 1 HR: CPT

## 2020-12-11 PROCEDURE — 250N000011 HC RX IP 250 OP 636: Performed by: INTERNAL MEDICINE

## 2020-12-11 RX ORDER — HEPARIN SODIUM,PORCINE 10 UNIT/ML
5 VIAL (ML) INTRAVENOUS
Status: DISCONTINUED | OUTPATIENT
Start: 2020-12-11 | End: 2020-12-11 | Stop reason: HOSPADM

## 2020-12-11 RX ORDER — PALONOSETRON 0.05 MG/ML
0.25 INJECTION, SOLUTION INTRAVENOUS ONCE
Status: COMPLETED | OUTPATIENT
Start: 2020-12-11 | End: 2020-12-11

## 2020-12-11 RX ADMIN — DEXAMETHASONE SODIUM PHOSPHATE: 10 INJECTION, SOLUTION INTRAMUSCULAR; INTRAVENOUS at 13:14

## 2020-12-11 RX ADMIN — SODIUM CHLORIDE 250 ML: 9 INJECTION, SOLUTION INTRAVENOUS at 13:09

## 2020-12-11 RX ADMIN — PALONOSETRON HYDROCHLORIDE 0.25 MG: 0.25 INJECTION INTRAVENOUS at 13:12

## 2020-12-11 RX ADMIN — VINCRISTINE SULFATE 2 MG: 1 INJECTION, SOLUTION INTRAVENOUS at 13:49

## 2020-12-11 RX ADMIN — ALTEPLASE 2 MG: 2.2 INJECTION, POWDER, LYOPHILIZED, FOR SOLUTION INTRAVENOUS at 13:35

## 2020-12-11 RX ADMIN — MESNA 2810 MG: 100 INJECTION, SOLUTION INTRAVENOUS at 14:12

## 2020-12-11 RX ADMIN — Medication 5 ML: at 16:13

## 2020-12-11 ASSESSMENT — PAIN SCALES - GENERAL: PAINLEVEL: NO PAIN (0)

## 2020-12-11 NOTE — PATIENT INSTRUCTIONS
Contact Numbers  [facility] Nurse Triage: [phone]    Please call the [facility] Triage line if you experience a temperature greater than or equal to 100.5, shaking chills, have uncontrolled nausea, vomiting and/or diarrhea, dizziness, shortness of breath, chest pain, bleeding, unexplained bruising, or if you have any other new/concerning symptoms, questions or concerns.     If you are having any concerning symptoms or wish to speak to a provider before your next infusion visit, please call your care coordinator or triage to notify them so we can adequately serve you.     If you need a refill on a narcotic prescription or other medication, please call triage before your infusion appointment.       December 2020 Sunday Monday Tuesday Wednesday Thursday Friday Saturday             1    LAB   6:00 AM   (15 min.)   [facility] HOME INFUSION   [facility] Laboratory 2     3     4     5       6     7     8     9    LAB PERIPHERAL   9:45 AM   (15 min.)   [facility] DRAW   [facility] 10    VIDEO VISIT RETURN   1:05 PM   (90 min.)   Yasmine Mckeon PA-C   [facility] 11    [facility] ONC INFUSION 180  12:30 PM   (180 min.)    ONCOLOGY INFUSION   [facility] 12       13     14     15     16     17     18    LAB PERIPHERAL  12:30 PM   (15 min.)    [facility] DRAW   [facility] ONC INFUSION 180   1:00 PM   (180 min.)    ONCOLOGY INFUSION   [facility] 19       20     21     22     23     24     25     26       27     28     29    LAB PERIPHERAL  10:15 AM   (15 min.)   [facility] DRAW   [facility] 30    VIDEO VISIT RETURN   3:05 PM   (50 min.)   Cheryle Julian PA-C   [facility] 31    [facility] ONC INFUSION 180   2:00 PM   (180 min.)    ONCOLOGY INFUSION   [facility]  Cancer Clinic                       January 2021 Sunday Monday Tuesday Wednesday Thursday Friday Saturday                            1     2       3     4     5     6    CT CHEST/ABDOMEN/PELVIS W   9:25 AM   (20 min.)   [id number] [place] 7     8    VIDEO VISIT RETURN  10:45 AM   (30 min.)   Farzaneh Young MD   [facility] 9       10     11     12     13     14     15     16       17     18     19     20     21     22     23       24     25     26     27     28     29     30       31                                                   Lab Results:  No results found for this or any previous visit (from the past 12 hour(s)).

## 2020-12-11 NOTE — PROGRESS NOTES
Infusion Nursing Note:  Diego Buchnaan presents today for D1 C10 Vincristine, Cytoxan, Mesna, Adriamycin pump connect.    Patient seen by provider today: No    Intravenous Access:  Esparza. Dressing c/d/i.  Per patient's sister, dressing change due 12/13/20. States she completes this at home.    Treatment Conditions:  Lab Results   Component Value Date    HGB 8.5 12/09/2020     Lab Results   Component Value Date    WBC 6.8 12/09/2020      Lab Results   Component Value Date    ANEU 5.7 12/09/2020     Lab Results   Component Value Date     12/09/2020      Lab Results   Component Value Date     12/09/2020                   Lab Results   Component Value Date    POTASSIUM 3.8 12/09/2020           Lab Results   Component Value Date    MAG 1.9 12/09/2020            Lab Results   Component Value Date    CR 0.71 12/09/2020                   Lab Results   Component Value Date    FAISAL 8.6 12/09/2020                Lab Results   Component Value Date    BILITOTAL 0.3 12/09/2020           Lab Results   Component Value Date    ALBUMIN 3.1 12/09/2020                    Lab Results   Component Value Date    ALT 24 12/09/2020           Lab Results   Component Value Date    AST 12 12/09/2020       Results reviewed, labs MET treatment parameters, ok to proceed with treatment.  ECHO completed 9/1/20: EF 60-65%    Note: Patient evaluated by KATHLEEN Peña via virtual visit on 12/10/20. Patient denied any new issues or concerns. Sister reports that red lumen of Esparza is difficult to flush and home RN had difficulty getting a blood return. Writer able to flush line, but unable to witness blood return. tPA ordered and dwelled in line for 45 minutes. Able to flush and witness blood return at that time.    Prior to discharge: Esparza is secured in place with tegaderm and flushed with 10cc NS with positive blood return noted.  Continuous home infusion CADD pump connected.    All connectors secured in place and clamps taped  "open.    Pump started, \"running\" noted on display (CADD): YES.  Pump Connection double checked with Karly TINOCO RN.  Patient instructed to call our clinic or [facility] with any questions or concerns at home.  Patient verbalized understanding.    Patient set up for pump disconnect and Neulasta OnPro at home with [facility] on Sunday 12/13/20 @ 1630. Verified with Charley at [facility].      Patient instructed to take oral Mesna @ 2015 (Fri 12/11) and 0015 (Sat 12/12). Patient and sister verbalized understanding of times.    Post Infusion Assessment:  Patient tolerated infusion without incident.  Blood return noted pre and post infusion.  Site patent and intact, free from redness, edema or discomfort.  No evidence of extravasations.    Discharge Plan:   Prescription refills given for mesna and decadron.  Discharge instructions reviewed with: Patient and Family.  Patient and/or family verbalized understanding of discharge instructions and all questions answered.  AVS to patient via [employer].  Patient will return 12/14/20 for next appointment.   Patient discharged in stable condition accompanied by: self and sister, Mariela.  Departure Mode: Ambulatory.    Milagros An RN    "

## 2020-12-13 ENCOUNTER — HOME INFUSION (PRE-WILLOW HOME INFUSION) (OUTPATIENT)
Dept: PHARMACY | Facility: CLINIC | Age: 25
End: 2020-12-13

## 2020-12-14 ENCOUNTER — HOME INFUSION (PRE-WILLOW HOME INFUSION) (OUTPATIENT)
Dept: PHARMACY | Facility: CLINIC | Age: 25
End: 2020-12-14

## 2020-12-14 NOTE — PROGRESS NOTES
This is a recent snapshot of the patient's [facility] medical record.  For current drug dose and complete information and questions, call [phone] or In Basket pool, fv home infusion ([id number])  CSN Number:  [id number]

## 2020-12-14 NOTE — PROGRESS NOTES
This is a recent snapshot of the patient's [facility] medical record.  For current drug dose and complete information and questions, call [phone] or In Basket pool, fv home infusion ([id number])  CSN Number:  [id number]

## 2020-12-15 NOTE — PROGRESS NOTES
This is a recent snapshot of the patient's [facility] medical record.  For current drug dose and complete information and questions, call [phone] or In Basket pool, fv home infusion ([id number])  CSN Number:  [id number]

## 2020-12-17 VITALS
SYSTOLIC BLOOD PRESSURE: 130 MMHG | TEMPERATURE: 98.2 F | BODY MASS INDEX: 32.09 KG/M2 | DIASTOLIC BLOOD PRESSURE: 77 MMHG | WEIGHT: 211 LBS | HEART RATE: 103 BPM | OXYGEN SATURATION: 98 % | RESPIRATION RATE: 16 BRPM

## 2020-12-17 DIAGNOSIS — D64.9 ANEMIA, UNSPECIFIED TYPE: ICD-10-CM

## 2020-12-17 LAB
ABO + RH BLD: NORMAL
ABO + RH BLD: NORMAL
ANISOCYTOSIS BLD QL SMEAR: SLIGHT
BASOPHILS # BLD AUTO: 0 10E9/L (ref 0–0.2)
BASOPHILS NFR BLD AUTO: 0 %
BLD GP AB SCN SERPL QL: NORMAL
BLOOD BANK CMNT PATIENT-IMP: NORMAL
DACRYOCYTES BLD QL SMEAR: SLIGHT
DIFFERENTIAL METHOD BLD: ABNORMAL
EOSINOPHIL # BLD AUTO: 0 10E9/L (ref 0–0.7)
EOSINOPHIL NFR BLD AUTO: 0 %
ERYTHROCYTE [DISTWIDTH] IN BLOOD BY AUTOMATED COUNT: 18.2 % (ref 10–15)
HCT VFR BLD AUTO: 28.6 % (ref 40–53)
HGB BLD-MCNC: 8.7 G/DL (ref 13.3–17.7)
LYMPHOCYTES # BLD AUTO: 0.3 10E9/L (ref 0.8–5.3)
LYMPHOCYTES NFR BLD AUTO: 2.6 %
MCH RBC QN AUTO: 28.6 PG (ref 26.5–33)
MCHC RBC AUTO-ENTMCNC: 30.4 G/DL (ref 31.5–36.5)
MCV RBC AUTO: 94 FL (ref 78–100)
MONOCYTES # BLD AUTO: 0 10E9/L (ref 0–1.3)
MONOCYTES NFR BLD AUTO: 0 %
NEUTROPHILS # BLD AUTO: 10.5 10E9/L (ref 1.6–8.3)
NEUTROPHILS NFR BLD AUTO: 97.4 %
PLATELET # BLD AUTO: 237 10E9/L (ref 150–450)
PLATELET # BLD EST: ABNORMAL 10*3/UL
POIKILOCYTOSIS BLD QL SMEAR: SLIGHT
RBC # BLD AUTO: 3.04 10E12/L (ref 4.4–5.9)
SPECIMEN EXP DATE BLD: NORMAL
WBC # BLD AUTO: 10.8 10E9/L (ref 4–11)

## 2020-12-17 PROCEDURE — 85025 COMPLETE CBC W/AUTO DIFF WBC: CPT | Performed by: PHYSICIAN ASSISTANT

## 2020-12-17 PROCEDURE — 86901 BLOOD TYPING SEROLOGIC RH(D): CPT | Performed by: PHYSICIAN ASSISTANT

## 2020-12-17 PROCEDURE — 86850 RBC ANTIBODY SCREEN: CPT | Performed by: PHYSICIAN ASSISTANT

## 2020-12-17 PROCEDURE — 86900 BLOOD TYPING SEROLOGIC ABO: CPT | Performed by: PHYSICIAN ASSISTANT

## 2020-12-17 RX ORDER — HEPARIN SODIUM,PORCINE 10 UNIT/ML
5 VIAL (ML) INTRAVENOUS
Status: DISCONTINUED | OUTPATIENT
Start: 2020-12-17 | End: 2021-03-11 | Stop reason: HOSPADM

## 2020-12-17 ASSESSMENT — PAIN SCALES - GENERAL: PAINLEVEL: NO PAIN (0)

## 2020-12-17 NOTE — NURSING NOTE
Chief Complaint   Patient presents with     Blood Draw     Labs drawn via CVC by RN in lab. VS taken.     Rossy Osorio RN

## 2020-12-22 NOTE — PROGRESS NOTES
"  Diego Buchanan is a 25 year old male who is being evaluated via a billable video visit.      The patient has been notified of following:     \"This video visit will be conducted via a call between you and your physician/provider. We have found that certain health care needs can be provided without the need for an in-person physical exam.  This service lets us provide the care you need with a video conversation.  If a prescription is necessary we can send it directly to your pharmacy.  If lab work is needed we can place an order for that and you can then stop by our lab to have the test done at a later time.    Video visits are billed at different rates depending on your insurance coverage.  Please reach out to your insurance provider with any questions.    If during the course of the call the physician/provider feels a video visit is not appropriate, you will not be charged for this service.\"    Patient has given verbal consent for Video visit? Yes  How would you like to obtain your AVS? MyChart  If you are dropped from the video visit, the video invite should be resent to: Send to e-mail at: [email or web address]  Will anyone else be joining your video visit? No      Vitals - Patient Reported  Weight (Patient Reported): 98 kg (216 lb)  Height (Patient Reported): 172.7 cm (5' 8\")  BMI (Based on Pt Reported Ht/Wt): 32.84  Pain Score: No Pain (0)        I have reviewed and updated patient's allergy and medication list.    Concerns: NONE  Refills: NONE      Jeanie Potts CMA    Video-Visit Details    Type of service:  Video Visit    Video Start Time: 3:56 PM  Video End Time: 4:06 PM    Originating Location (pt. 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 He presented initially with abdominal pain, diarrhea, and progressive abdominal distention for 3 months duration. 2. Initial CT scan in February 2020 showed severe peritoneal carcinomatosis with large peritoneal tumor implants throughout the entire abdomen and pelvis, but mostly prominently in the pelvis.   2. PETCT scan showed interval increase in the amount of peritoneal carcinomatosis.  3. On 3/12/2020, he had ultrasound-guided core needle biopsy of a peritoneal implant (Case: [id number]), which showed a completely undifferentiated appearance, but stains with pancytokeratin, indicating an epithelial origin. 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 Tumor markers for CA-19-9, CEA, beta-hCG, alpha-fetoprotein were unremarkable. Cancer type ID molecular testing by [employer] sent to determine the exact diagnosis and to assist in further treatment planning. 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 Relative probability of less than 5% of other subtype of sarcoma. EWSR1-WT1 fusion detected.  4. 5/1/2020, MRI brain negative for brain metastasis, and baseline CT-CAP obtained showing extensive mixed solid and cystic masses throughout the abdomen and pelvis consistent with peritoneal carcinomatosis. Largest mass measures approximately 20 cm in the pelvis. Metastatic mixed solid and cystic masses seen in hepatic segments 5 and 6 and hepatic segment 7. The masses appear to be contiguous with the retrohepatic peritoneal carcinomatosis. Small volume fluid about the spleen favored to represent malignant ascites, stable mild-to-moderate right hydronephrosis related to mass effect upon the distal ureter in the pelvis, the IVC and iliac veins are compressed due to the extensive peritoneal carcinomatosis without findings to suggest deep venous thrombosis.  5. 5/4/2020,  he begins CAV/IMV alternating chemotherapy. He receives 6 cycles of treatment. He symptomatically improves.  6. 9/11/2020, CT-CAP shows stable to mildly improved extensive peritoneal carcinomatosis. He would like to omit Ifosfamide/etoposide cycles and continue only with CAV.  7. 10/9/2020, he starts CAV every 21 days.     HISTORY OF PRESENT ILLNESS  Diego Buchanan was met with over video with sister for follow up  - Not having any pain. Sister feels the abdominal mass may be larger but is unsure if this is due to weight loss.   - Still having hard, small stools.  Occasionally taking senna and MiraLAX.  - Energy level is diminished, but not significantly so.  He still able to get things done around the house.  - Little blood in the urine 2 days ago, but this is resolved.  This has been overall stable.  Denies any new urinary symptoms.  -Reaction near Esparza is now resolved    Review of Systems:  Patient denies any of the following except if noted above: fevers, chills, difficulty with energy, vision or hearing changes, chest pain, dyspnea, nausea, vomiting, diarrhea, other urinary concerns, headaches, numbness, tingling, issues with sleep or mood. He also denies lumps, bumps, rashes or skin lesions, other bleeding or bruising issues.    ECOG performance status 1.    Current Outpatient Medications   Medication Sig Dispense Refill     allopurinol (ZYLOPRIM) 300 MG tablet Take 1 tablet (300 mg) by mouth daily 30 tablet 0     dexamethasone (DECADRON) 4 MG tablet Take 2 tablets (8 mg) by mouth daily (with breakfast) for 3 days Start the day after doxorubicin, cyclophosphamide, and vincristine (odd cycles). (Patient not taking: Reported on 9/11/2020) 6 tablet 8     folic acid (FOLVITE) 1 MG tablet Take 1 tablet (1 mg) by mouth daily 30 tablet 3     LORazepam (ATIVAN) 0.5 MG tablet Take 1 tablet (0.5 mg) by mouth every 4 hours as needed (Anxiety, Nausea/Vomiting or Sleep) 30 tablet 5     mesna (MESNEX) 400 MG TABS tablet  Take 1 tablet (400 mg) by mouth every 4 hours for 2 doses Take one tablet 4 hours and 8 hours after end of cyclophosphamide infusion. 2 tablet 0     mesna (MESNEX) 400 MG TABS tablet Take 1 tablet (400 mg) by mouth every 4 hours for 2 doses Take one tablet 4 hours and 8 hours after end of cyclophosphamide infusion. 2 tablet 0     metoprolol tartrate (LOPRESSOR) 50 MG tablet Take 1.5 tablets (75 mg) by mouth daily 45 tablet 3     oxyCODONE (OXY-IR) 5 MG capsule Take 5 mg by mouth every 6 hours as needed for severe pain Prescribed in ED 7/6/20       polyethylene glycol (MIRALAX) 17 g packet Take 17 g by mouth       prochlorperazine (COMPAZINE) 10 MG tablet Take 1 tablet (10 mg) by mouth every 6 hours as needed (Nausea/Vomiting) (Patient not taking: Reported on 10/30/2020) 30 tablet 5     senna-docusate (SENNA S) 8.6-50 MG tablet Take 1 tablet by mouth 2 times daily 60 tablet 0     traMADol (ULTRAM) 50 MG tablet Take 50 mg by mouth as needed       Objective:  General: patient appears well in no acute distress, alert and oriented, speech clear and fluid  Skin: no visualized rash or lesions on visualized skin  Resp: Appears to be breathing comfortably without accessory muscle usage, speaking in full sentences, no audible wheezes or cough.  Psych: Coherent speech, normal rate and volume, able to articulate logical thoughts, able to abstract reason, no tangential thoughts, no hallucinations or delusions  Patient's affect is appropriate.    LABS  Results for RY GOMES (MRN [id number]) as of 12/30/2020 15:37   Ref. Range 12/29/2020 11:05   Sodium Latest Ref Range: 133 - 144 mmol/L 140   Potassium Latest Ref Range: 3.4 - 5.3 mmol/L 3.8   Chloride Latest Ref Range: 94 - 109 mmol/L 109   Carbon Dioxide Latest Ref Range: 20 - 32 mmol/L 26   Urea Nitrogen Latest Ref Range: 7 - 30 mg/dL 7   Creatinine Latest Ref Range: 0.66 - 1.25 mg/dL 0.62 (L)   GFR Estimate Latest Ref Range: >60 mL/min/1.73_m2 >90   GFR Estimate If Black  Latest Ref Range: >60 mL/min/1.73_m2 >90   Calcium Latest Ref Range: 8.5 - 10.1 mg/dL 8.7   Anion Gap Latest Ref Range: 3 - 14 mmol/L 5   Magnesium Latest Ref Range: 1.6 - 2.3 mg/dL 2.0   Phosphorus Latest Ref Range: 2.5 - 4.5 mg/dL 4.0   Albumin Latest Ref Range: 3.4 - 5.0 g/dL 3.4   Protein Total Latest Ref Range: 6.8 - 8.8 g/dL 7.1   Bilirubin Total Latest Ref Range: 0.2 - 1.3 mg/dL 0.3   Alkaline Phosphatase Latest Ref Range: 40 - 150 U/L 130   ALT Latest Ref Range: 0 - 70 U/L 37   AST Latest Ref Range: 0 - 45 U/L 21   Glucose Latest Ref Range: 70 - 99 mg/dL 85   WBC Latest Ref Range: 4.0 - 11.0 10e9/L 6.6   Hemoglobin Latest Ref Range: 13.3 - 17.7 g/dL 8.4 (L)   Hematocrit Latest Ref Range: 40.0 - 53.0 % 27.2 (L)   Platelet Count Latest Ref Range: 150 - 450 10e9/L 219   RBC Count Latest Ref Range: 4.4 - 5.9 10e12/L 2.92 (L)   MCV Latest Ref Range: 78 - 100 fl 93   MCH Latest Ref Range: 26.5 - 33.0 pg 28.8   MCHC Latest Ref Range: 31.5 - 36.5 g/dL 30.9 (L)   RDW Latest Ref Range: 10.0 - 15.0 % 18.8 (H)   Diff Method Unknown Manual Differential   % Neutrophils Latest Units: % 72.9   % Lymphocytes Latest Units: % 7.0   % Monocytes Latest Units: % 12.2   % Eosinophils Latest Units: % 0.9   % Basophils Latest Units: % 0.9   % Metamyelocytes Latest Units: % 2.6   % Myelocytes Latest Units: % 3.5   Nucleated RBCs Latest Ref Range: 0 /100 1 (H)   Absolute Neutrophil Latest Ref Range: 1.6 - 8.3 10e9/L 4.8   Absolute Lymphocytes Latest Ref Range: 0.8 - 5.3 10e9/L 0.5 (L)   Absolute Monocytes Latest Ref Range: 0.0 - 1.3 10e9/L 0.8   Absolute Eosinophils Latest Ref Range: 0.0 - 0.7 10e9/L 0.1   Absolute Basophils Latest Ref Range: 0.0 - 0.2 10e9/L 0.1   Absolute Metamyelocytes Latest Ref Range: 0 10e9/L 0.2 (H)   Absolute Myelocytes Latest Ref Range: 0 10e9/L 0.2 (H)   Absolute Nucleated RBC Unknown 0.1   Anisocytosis Unknown Slight   Poikilocytosis Unknown Slight   Polychromasia Unknown Slight   Teardrop Cells Unknown  Slight   Platelet Estimate Unknown Confirming automated cell count       ASSESSMENT AND PLAN  #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 He tolerated 6 alternating cycles of CAV/IMV with anticipated side effects. He developed hematuria with his last 2 cycles of Ifosfamide based therapy despite dose reduction and Mesna prophylaxis, and ultimately patient and family decided to continue with CAV alone.  - Continue with CAV every 21 days for next 2 cycles (per family preference), then plan to begin alternating again with IMV.   - He is doing well today and will continue with CAV tomorrow.  He will follow-up with Dr. Sims in early January with a CT CAP. He will call sooner for concerns.      #2 Anemia, normocytic   Initially microcytic, now normocytic. Hemoglobin is trending down. He remains on folic acid. Monitor closely     #3 Malnutrition, secondary to #1 above  Saw nutritionist on 7/27. Appetite much improved and he continues to tolerate solid foods.   - Goals per nutritionist:   1.  Aim for 5-6 small frequent meals   2.  Aim for 2200kcal and 100g protein/day   3. Weight maintenance .   - Continue Boost shakes to supplement his nutrition   - No acute concerns today.    #4 Abdominal pain  Mild and intermittent. Not needing to take anything for pain currently. Question if abdominal pain may impact his sleep. Discussed trying a heat pad at bedtime to his abdomen. Reports improvement today.     Cheryle Julian PA-C  [facility]  [address] [place] [address] [phone]

## 2020-12-29 VITALS
TEMPERATURE: 97.2 F | BODY MASS INDEX: 32.94 KG/M2 | WEIGHT: 216.6 LBS | DIASTOLIC BLOOD PRESSURE: 67 MMHG | RESPIRATION RATE: 16 BRPM | SYSTOLIC BLOOD PRESSURE: 106 MMHG | OXYGEN SATURATION: 100 % | HEART RATE: 98 BPM

## 2020-12-29 DIAGNOSIS — C49.9 DESMOPLASTIC SMALL ROUND CELL TUMOR (H): ICD-10-CM

## 2020-12-29 LAB
ALBUMIN SERPL-MCNC: 3.4 G/DL (ref 3.4–5)
ALP SERPL-CCNC: 130 U/L (ref 40–150)
ALT SERPL W P-5'-P-CCNC: 37 U/L (ref 0–70)
ANION GAP SERPL CALCULATED.3IONS-SCNC: 5 MMOL/L (ref 3–14)
ANISOCYTOSIS BLD QL SMEAR: SLIGHT
AST SERPL W P-5'-P-CCNC: 21 U/L (ref 0–45)
BASOPHILS # BLD AUTO: 0.1 10E9/L (ref 0–0.2)
BASOPHILS NFR BLD AUTO: 0.9 %
BILIRUB SERPL-MCNC: 0.3 MG/DL (ref 0.2–1.3)
BUN SERPL-MCNC: 7 MG/DL (ref 7–30)
CALCIUM SERPL-MCNC: 8.7 MG/DL (ref 8.5–10.1)
CHLORIDE SERPL-SCNC: 109 MMOL/L (ref 94–109)
CO2 SERPL-SCNC: 26 MMOL/L (ref 20–32)
CREAT SERPL-MCNC: 0.62 MG/DL (ref 0.66–1.25)
DACRYOCYTES BLD QL SMEAR: SLIGHT
DIFFERENTIAL METHOD BLD: ABNORMAL
EOSINOPHIL # BLD AUTO: 0.1 10E9/L (ref 0–0.7)
EOSINOPHIL NFR BLD AUTO: 0.9 %
ERYTHROCYTE [DISTWIDTH] IN BLOOD BY AUTOMATED COUNT: 18.8 % (ref 10–15)
GFR SERPL CREATININE-BSD FRML MDRD: >90 ML/MIN/{1.73_M2}
GLUCOSE SERPL-MCNC: 85 MG/DL (ref 70–99)
HCT VFR BLD AUTO: 27.2 % (ref 40–53)
HGB BLD-MCNC: 8.4 G/DL (ref 13.3–17.7)
LYMPHOCYTES # BLD AUTO: 0.5 10E9/L (ref 0.8–5.3)
LYMPHOCYTES NFR BLD AUTO: 7 %
MAGNESIUM SERPL-MCNC: 2 MG/DL (ref 1.6–2.3)
MCH RBC QN AUTO: 28.8 PG (ref 26.5–33)
MCHC RBC AUTO-ENTMCNC: 30.9 G/DL (ref 31.5–36.5)
MCV RBC AUTO: 93 FL (ref 78–100)
METAMYELOCYTES # BLD: 0.2 10E9/L
METAMYELOCYTES NFR BLD MANUAL: 2.6 %
MONOCYTES # BLD AUTO: 0.8 10E9/L (ref 0–1.3)
MONOCYTES NFR BLD AUTO: 12.2 %
MYELOCYTES # BLD: 0.2 10E9/L
MYELOCYTES NFR BLD MANUAL: 3.5 %
NEUTROPHILS # BLD AUTO: 4.8 10E9/L (ref 1.6–8.3)
NEUTROPHILS NFR BLD AUTO: 72.9 %
NRBC # BLD AUTO: 0.1 10*3/UL
NRBC BLD AUTO-RTO: 1 /100
PHOSPHATE SERPL-MCNC: 4 MG/DL (ref 2.5–4.5)
PLATELET # BLD AUTO: 219 10E9/L (ref 150–450)
PLATELET # BLD EST: ABNORMAL 10*3/UL
POIKILOCYTOSIS BLD QL SMEAR: SLIGHT
POLYCHROMASIA BLD QL SMEAR: SLIGHT
POTASSIUM SERPL-SCNC: 3.8 MMOL/L (ref 3.4–5.3)
PROT SERPL-MCNC: 7.1 G/DL (ref 6.8–8.8)
RBC # BLD AUTO: 2.92 10E12/L (ref 4.4–5.9)
SODIUM SERPL-SCNC: 140 MMOL/L (ref 133–144)
WBC # BLD AUTO: 6.6 10E9/L (ref 4–11)

## 2020-12-29 PROCEDURE — 84100 ASSAY OF PHOSPHORUS: CPT | Performed by: PHYSICIAN ASSISTANT

## 2020-12-29 PROCEDURE — 83735 ASSAY OF MAGNESIUM: CPT | Performed by: PHYSICIAN ASSISTANT

## 2020-12-29 PROCEDURE — 80053 COMPREHEN METABOLIC PANEL: CPT | Performed by: PHYSICIAN ASSISTANT

## 2020-12-29 PROCEDURE — 85025 COMPLETE CBC W/AUTO DIFF WBC: CPT | Performed by: PHYSICIAN ASSISTANT

## 2020-12-29 PROCEDURE — 250N000011 HC RX IP 250 OP 636: Performed by: INTERNAL MEDICINE

## 2020-12-29 RX ORDER — HEPARIN SODIUM,PORCINE 10 UNIT/ML
5 VIAL (ML) INTRAVENOUS ONCE
Status: COMPLETED | OUTPATIENT
Start: 2020-12-29 | End: 2020-12-29

## 2020-12-29 RX ADMIN — Medication 5 ML: at 11:05

## 2020-12-29 ASSESSMENT — PAIN SCALES - GENERAL: PAINLEVEL: NO PAIN (0)

## 2020-12-29 NOTE — NURSING NOTE
Chief Complaint   Patient presents with     Blood Draw     Labs drawn via CVC by RN. VS taken     Labs drawn from CVC by rn.  Good blood return noted in both lumens.  Both lumens flushed with NS and heparin.  Pt tolerated well.  VS taken.    Anand Barnes RN

## 2020-12-30 ENCOUNTER — VIRTUAL VISIT (OUTPATIENT)
Dept: ONCOLOGY | Facility: CLINIC | Age: 25
End: 2020-12-30
Attending: INTERNAL MEDICINE
Payer: COMMERCIAL

## 2020-12-30 DIAGNOSIS — R31.9 HEMATURIA, UNSPECIFIED TYPE: ICD-10-CM

## 2020-12-30 DIAGNOSIS — C41.9 SARCOMA, EWINGS (H): ICD-10-CM

## 2020-12-30 DIAGNOSIS — C49.9 DESMOPLASTIC SMALL ROUND CELL TUMOR (H): Primary | ICD-10-CM

## 2020-12-30 PROCEDURE — 99214 OFFICE O/P EST MOD 30 MIN: CPT | Mod: 95 | Performed by: PHYSICIAN ASSISTANT

## 2020-12-30 RX ORDER — ALBUTEROL SULFATE 90 UG/1
1-2 AEROSOL, METERED RESPIRATORY (INHALATION)
Status: CANCELLED
Start: 2021-01-01

## 2020-12-30 RX ORDER — MEPERIDINE HYDROCHLORIDE 25 MG/ML
25 INJECTION INTRAMUSCULAR; INTRAVENOUS; SUBCUTANEOUS EVERY 30 MIN PRN
Status: CANCELLED | OUTPATIENT
Start: 2021-01-01

## 2020-12-30 RX ORDER — SODIUM CHLORIDE 9 MG/ML
1000 INJECTION, SOLUTION INTRAVENOUS CONTINUOUS PRN
Status: CANCELLED
Start: 2021-01-01

## 2020-12-30 RX ORDER — LORAZEPAM 2 MG/ML
0.5 INJECTION INTRAMUSCULAR EVERY 4 HOURS PRN
Status: CANCELLED
Start: 2021-01-01

## 2020-12-30 RX ORDER — METHYLPREDNISOLONE SODIUM SUCCINATE 125 MG/2ML
125 INJECTION, POWDER, LYOPHILIZED, FOR SOLUTION INTRAMUSCULAR; INTRAVENOUS
Status: CANCELLED
Start: 2021-01-01

## 2020-12-30 RX ORDER — HEPARIN SODIUM,PORCINE 10 UNIT/ML
5 VIAL (ML) INTRAVENOUS
Status: CANCELLED | OUTPATIENT
Start: 2021-01-01

## 2020-12-30 RX ORDER — DIPHENHYDRAMINE HYDROCHLORIDE 50 MG/ML
50 INJECTION INTRAMUSCULAR; INTRAVENOUS
Status: CANCELLED
Start: 2021-01-01

## 2020-12-30 RX ORDER — HEPARIN SODIUM (PORCINE) LOCK FLUSH IV SOLN 100 UNIT/ML 100 UNIT/ML
5 SOLUTION INTRAVENOUS
Status: CANCELLED | OUTPATIENT
Start: 2021-01-01

## 2020-12-30 RX ORDER — ALBUTEROL SULFATE 0.83 MG/ML
2.5 SOLUTION RESPIRATORY (INHALATION)
Status: CANCELLED | OUTPATIENT
Start: 2021-01-01

## 2020-12-30 RX ORDER — PALONOSETRON 0.05 MG/ML
0.25 INJECTION, SOLUTION INTRAVENOUS ONCE
Status: CANCELLED
Start: 2021-01-01

## 2020-12-30 RX ORDER — NALOXONE HYDROCHLORIDE 0.4 MG/ML
.1-.4 INJECTION, SOLUTION INTRAMUSCULAR; INTRAVENOUS; SUBCUTANEOUS
Status: CANCELLED | OUTPATIENT
Start: 2021-01-01

## 2020-12-30 RX ORDER — EPINEPHRINE 1 MG/ML
0.3 INJECTION, SOLUTION INTRAMUSCULAR; SUBCUTANEOUS EVERY 5 MIN PRN
Status: CANCELLED | OUTPATIENT
Start: 2021-01-01

## 2020-12-30 NOTE — LETTER
"    12/30/2020         RE: Diego Buchanan  [address]  [place] [address]        Dear Colleague,    Thank you for referring your patient, Diego Buchanan, to the [facility]. Please see a copy of my visit note below.      Diego Buchanan is a 25 year old male who is being evaluated via a billable video visit.      The patient has been notified of following:     \"This video visit will be conducted via a call between you and your physician/provider. We have found that certain health care needs can be provided without the need for an in-person physical exam.  This service lets us provide the care you need with a video conversation.  If a prescription is necessary we can send it directly to your pharmacy.  If lab work is needed we can place an order for that and you can then stop by our lab to have the test done at a later time.    Video visits are billed at different rates depending on your insurance coverage.  Please reach out to your insurance provider with any questions.    If during the course of the call the physician/provider feels a video visit is not appropriate, you will not be charged for this service.\"    Patient has given verbal consent for Video visit? Yes  How would you like to obtain your AVS? MyChart  If you are dropped from the video visit, the video invite should be resent to: Send to e-mail at: [email or web address]  Will anyone else be joining your video visit? No      Vitals - Patient Reported  Weight (Patient Reported): 98 kg (216 lb)  Height (Patient Reported): 172.7 cm (5' 8\")  BMI (Based on Pt Reported Ht/Wt): 32.84  Pain Score: No Pain (0)        I have reviewed and updated patient's allergy and medication list.    Concerns: NONE  Refills: NONE      Jeanie Potts CMA    Video-Visit Details    Type of service:  Video Visit    Video Start Time: 3:56 PM  Video End Time: 4:06 PM    Originating Location (pt. 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 He presented initially with abdominal pain, diarrhea, and progressive abdominal distention for 3 months duration. 2. Initial CT scan in February 2020 showed severe peritoneal carcinomatosis with large peritoneal tumor implants throughout the entire abdomen and pelvis, but mostly prominently in the pelvis.   2. PETCT scan showed interval increase in the amount of peritoneal carcinomatosis.  3. On 3/12/2020, he had ultrasound-guided core needle biopsy of a peritoneal implant (Case: [id number]), which showed a completely undifferentiated appearance, but stains with pancytokeratin, indicating an epithelial origin. 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 Tumor markers for CA-19-9, CEA, beta-hCG, alpha-fetoprotein were unremarkable. Cancer type ID molecular testing by [employer] sent to determine the exact diagnosis and to assist in further treatment planning. 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 Relative probability of less than 5% of other subtype of sarcoma. EWSR1-WT1 fusion detected.  4. 5/1/2020, MRI brain negative for brain metastasis, and baseline CT-CAP obtained showing extensive mixed solid and cystic masses throughout the abdomen and pelvis consistent with peritoneal carcinomatosis. Largest mass measures approximately 20 cm in the pelvis. Metastatic mixed solid and cystic masses seen in hepatic segments 5 and 6 and hepatic segment 7. The masses appear to be contiguous with the retrohepatic peritoneal carcinomatosis. Small volume fluid about the spleen favored to represent malignant ascites, stable  mild-to-moderate right hydronephrosis related to mass effect upon the distal ureter in the pelvis, the IVC and iliac veins are compressed due to the extensive peritoneal carcinomatosis without findings to suggest deep venous thrombosis.  5. 5/4/2020, he begins CAV/IMV alternating chemotherapy. He receives 6 cycles of treatment. He symptomatically improves.  6. 9/11/2020, CT-CAP shows stable to mildly improved extensive peritoneal carcinomatosis. He would like to omit Ifosfamide/etoposide cycles and continue only with CAV.  7. 10/9/2020, he starts CAV every 21 days.     HISTORY OF PRESENT ILLNESS  Diego Buchanan was met with over video with sister for follow up  - Not having any pain. Sister feels the abdominal mass may be larger but is unsure if this is due to weight loss.   - Still having hard, small stools.  Occasionally taking senna and MiraLAX.  - Energy level is diminished, but not significantly so.  He still able to get things done around the house.  - Little blood in the urine 2 days ago, but this is resolved.  This has been overall stable.  Denies any new urinary symptoms.  -Reaction near [place] is now resolved    Review of Systems:  Patient denies any of the following except if noted above: fevers, chills, difficulty with energy, vision or hearing changes, chest pain, dyspnea, nausea, vomiting, diarrhea, other urinary concerns, headaches, numbness, tingling, issues with sleep or mood. He also denies lumps, bumps, rashes or skin lesions, other bleeding or bruising issues.    ECOG performance status 1.    Current Outpatient Medications   Medication Sig Dispense Refill     allopurinol (ZYLOPRIM) 300 MG tablet Take 1 tablet (300 mg) by mouth daily 30 tablet 0     dexamethasone (DECADRON) 4 MG tablet Take 2 tablets (8 mg) by mouth daily (with breakfast) for 3 days Start the day after doxorubicin, cyclophosphamide, and vincristine (odd cycles). (Patient not taking: Reported on 9/11/2020) 6 tablet 8     folic  acid (FOLVITE) 1 MG tablet Take 1 tablet (1 mg) by mouth daily 30 tablet 3     LORazepam (ATIVAN) 0.5 MG tablet Take 1 tablet (0.5 mg) by mouth every 4 hours as needed (Anxiety, Nausea/Vomiting or Sleep) 30 tablet 5     mesna (MESNEX) 400 MG TABS tablet Take 1 tablet (400 mg) by mouth every 4 hours for 2 doses Take one tablet 4 hours and 8 hours after end of cyclophosphamide infusion. 2 tablet 0     mesna (MESNEX) 400 MG TABS tablet Take 1 tablet (400 mg) by mouth every 4 hours for 2 doses Take one tablet 4 hours and 8 hours after end of cyclophosphamide infusion. 2 tablet 0     metoprolol tartrate (LOPRESSOR) 50 MG tablet Take 1.5 tablets (75 mg) by mouth daily 45 tablet 3     oxyCODONE (OXY-IR) 5 MG capsule Take 5 mg by mouth every 6 hours as needed for severe pain Prescribed in ED 7/6/20       polyethylene glycol (MIRALAX) 17 g packet Take 17 g by mouth       prochlorperazine (COMPAZINE) 10 MG tablet Take 1 tablet (10 mg) by mouth every 6 hours as needed (Nausea/Vomiting) (Patient not taking: Reported on 10/30/2020) 30 tablet 5     senna-docusate (SENNA S) 8.6-50 MG tablet Take 1 tablet by mouth 2 times daily 60 tablet 0     traMADol (ULTRAM) 50 MG tablet Take 50 mg by mouth as needed       Objective:  General: patient appears well in no acute distress, alert and oriented, speech clear and fluid  Skin: no visualized rash or lesions on visualized skin  Resp: Appears to be breathing comfortably without accessory muscle usage, speaking in full sentences, no audible wheezes or cough.  Psych: Coherent speech, normal rate and volume, able to articulate logical thoughts, able to abstract reason, no tangential thoughts, no hallucinations or delusions  Patient's affect is appropriate.    LABS  Results for RY GOMES (MRN [id number]) as of 12/30/2020 15:37   Ref. Range 12/29/2020 11:05   Sodium Latest Ref Range: 133 - 144 mmol/L 140   Potassium Latest Ref Range: 3.4 - 5.3 mmol/L 3.8   Chloride Latest Ref Range: 94 -  109 mmol/L 109   Carbon Dioxide Latest Ref Range: 20 - 32 mmol/L 26   Urea Nitrogen Latest Ref Range: 7 - 30 mg/dL 7   Creatinine Latest Ref Range: 0.66 - 1.25 mg/dL 0.62 (L)   GFR Estimate Latest Ref Range: >60 mL/min/1.73_m2 >90   GFR Estimate If Black Latest Ref Range: >60 mL/min/1.73_m2 >90   Calcium Latest Ref Range: 8.5 - 10.1 mg/dL 8.7   Anion Gap Latest Ref Range: 3 - 14 mmol/L 5   Magnesium Latest Ref Range: 1.6 - 2.3 mg/dL 2.0   Phosphorus Latest Ref Range: 2.5 - 4.5 mg/dL 4.0   Albumin Latest Ref Range: 3.4 - 5.0 g/dL 3.4   Protein Total Latest Ref Range: 6.8 - 8.8 g/dL 7.1   Bilirubin Total Latest Ref Range: 0.2 - 1.3 mg/dL 0.3   Alkaline Phosphatase Latest Ref Range: 40 - 150 U/L 130   ALT Latest Ref Range: 0 - 70 U/L 37   AST Latest Ref Range: 0 - 45 U/L 21   Glucose Latest Ref Range: 70 - 99 mg/dL 85   WBC Latest Ref Range: 4.0 - 11.0 10e9/L 6.6   Hemoglobin Latest Ref Range: 13.3 - 17.7 g/dL 8.4 (L)   Hematocrit Latest Ref Range: 40.0 - 53.0 % 27.2 (L)   Platelet Count Latest Ref Range: 150 - 450 10e9/L 219   RBC Count Latest Ref Range: 4.4 - 5.9 10e12/L 2.92 (L)   MCV Latest Ref Range: 78 - 100 fl 93   MCH Latest Ref Range: 26.5 - 33.0 pg 28.8   MCHC Latest Ref Range: 31.5 - 36.5 g/dL 30.9 (L)   RDW Latest Ref Range: 10.0 - 15.0 % 18.8 (H)   Diff Method Unknown Manual Differential   % Neutrophils Latest Units: % 72.9   % Lymphocytes Latest Units: % 7.0   % Monocytes Latest Units: % 12.2   % Eosinophils Latest Units: % 0.9   % Basophils Latest Units: % 0.9   % Metamyelocytes Latest Units: % 2.6   % Myelocytes Latest Units: % 3.5   Nucleated RBCs Latest Ref Range: 0 /100 1 (H)   Absolute Neutrophil Latest Ref Range: 1.6 - 8.3 10e9/L 4.8   Absolute Lymphocytes Latest Ref Range: 0.8 - 5.3 10e9/L 0.5 (L)   Absolute Monocytes Latest Ref Range: 0.0 - 1.3 10e9/L 0.8   Absolute Eosinophils Latest Ref Range: 0.0 - 0.7 10e9/L 0.1   Absolute Basophils Latest Ref Range: 0.0 - 0.2 10e9/L 0.1   Absolute  Metamyelocytes Latest Ref Range: 0 10e9/L 0.2 (H)   Absolute Myelocytes Latest Ref Range: 0 10e9/L 0.2 (H)   Absolute Nucleated RBC Unknown 0.1   Anisocytosis Unknown Slight   Poikilocytosis Unknown Slight   Polychromasia Unknown Slight   Teardrop Cells Unknown Slight   Platelet Estimate Unknown Confirming automated cell count       ASSESSMENT AND PLAN  #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 He tolerated 6 alternating cycles of CAV/IMV with anticipated side effects. He developed hematuria with his last 2 cycles of Ifosfamide based therapy despite dose reduction and Mesna prophylaxis, and ultimately patient and family decided to continue with CAV alone.  - Continue with CAV every 21 days for next 2 cycles (per family preference), then plan to begin alternating again with IMV.   - He is doing well today and will continue with CAV tomorrow.  He will follow-up with Dr. Sims in early January with a CT CAP. He will call sooner for concerns.      #2 Anemia, normocytic   Initially microcytic, now normocytic. Hemoglobin is trending down. He remains on folic acid. Monitor closely     #3 Malnutrition, secondary to #1 above  Saw nutritionist on 7/27. Appetite much improved and he continues to tolerate solid foods.   - Goals per nutritionist:   1.  Aim for 5-6 small frequent meals   2.  Aim for 2200kcal and 100g protein/day   3. Weight maintenance .   - Continue Boost shakes to supplement his nutrition   - No acute concerns today.    #4 Abdominal pain  Mild and intermittent. Not needing to take anything for pain currently. Question if abdominal pain may impact his sleep. Discussed trying a heat pad at bedtime to his abdomen. Reports improvement today.     Cheryle Julian,  ADELE  [facility]  [address], [place] [address] [phone]

## 2020-12-31 ENCOUNTER — HOME INFUSION (PRE-WILLOW HOME INFUSION) (OUTPATIENT)
Dept: PHARMACY | Facility: CLINIC | Age: 25
End: 2020-12-31

## 2020-12-31 ENCOUNTER — INFUSION THERAPY VISIT (OUTPATIENT)
Dept: ONCOLOGY | Facility: CLINIC | Age: 25
End: 2020-12-31
Attending: INTERNAL MEDICINE
Payer: COMMERCIAL

## 2020-12-31 VITALS
HEART RATE: 95 BPM | TEMPERATURE: 98.3 F | RESPIRATION RATE: 18 BRPM | SYSTOLIC BLOOD PRESSURE: 112 MMHG | OXYGEN SATURATION: 100 % | DIASTOLIC BLOOD PRESSURE: 68 MMHG

## 2020-12-31 DIAGNOSIS — C49.9 DESMOPLASTIC SMALL ROUND CELL TUMOR (H): ICD-10-CM

## 2020-12-31 DIAGNOSIS — C41.9 SARCOMA, EWINGS (H): Primary | ICD-10-CM

## 2020-12-31 PROCEDURE — 99207 PR NO BILLABLE SERVICE THIS VISIT: CPT

## 2020-12-31 PROCEDURE — 96413 CHEMO IV INFUSION 1 HR: CPT

## 2020-12-31 PROCEDURE — 96411 CHEMO IV PUSH ADDL DRUG: CPT

## 2020-12-31 PROCEDURE — G0498 CHEMO EXTEND IV INFUS W/PUMP: HCPCS

## 2020-12-31 PROCEDURE — 250N000011 HC RX IP 250 OP 636: Performed by: INTERNAL MEDICINE

## 2020-12-31 PROCEDURE — 96375 TX/PRO/DX INJ NEW DRUG ADDON: CPT

## 2020-12-31 PROCEDURE — 96367 TX/PROPH/DG ADDL SEQ IV INF: CPT

## 2020-12-31 PROCEDURE — 258N000003 HC RX IP 258 OP 636: Performed by: INTERNAL MEDICINE

## 2020-12-31 PROCEDURE — 96415 CHEMO IV INFUSION ADDL HR: CPT

## 2020-12-31 RX ORDER — PALONOSETRON 0.05 MG/ML
0.25 INJECTION, SOLUTION INTRAVENOUS ONCE
Status: COMPLETED | OUTPATIENT
Start: 2020-12-31 | End: 2020-12-31

## 2020-12-31 RX ORDER — HEPARIN SODIUM,PORCINE 10 UNIT/ML
5 VIAL (ML) INTRAVENOUS
Status: DISCONTINUED | OUTPATIENT
Start: 2020-12-31 | End: 2021-01-04 | Stop reason: HOSPADM

## 2020-12-31 RX ORDER — HEPARIN SODIUM (PORCINE) LOCK FLUSH IV SOLN 100 UNIT/ML 100 UNIT/ML
5 SOLUTION INTRAVENOUS
Status: DISCONTINUED | OUTPATIENT
Start: 2020-12-31 | End: 2021-01-04 | Stop reason: HOSPADM

## 2020-12-31 RX ADMIN — MESNA 2810 MG: 100 INJECTION, SOLUTION INTRAVENOUS at 11:36

## 2020-12-31 RX ADMIN — FOSAPREPITANT: 150 INJECTION, POWDER, LYOPHILIZED, FOR SOLUTION INTRAVENOUS at 11:02

## 2020-12-31 RX ADMIN — Medication 5 ML: at 13:47

## 2020-12-31 RX ADMIN — VINCRISTINE SULFATE 2 MG: 1 INJECTION, SOLUTION INTRAVENOUS at 11:29

## 2020-12-31 RX ADMIN — PALONOSETRON HYDROCHLORIDE 0.25 MG: 0.25 INJECTION, SOLUTION INTRAVENOUS at 10:53

## 2020-12-31 RX ADMIN — SODIUM CHLORIDE 250 ML: 9 INJECTION, SOLUTION INTRAVENOUS at 10:50

## 2020-12-31 ASSESSMENT — PAIN SCALES - GENERAL: PAINLEVEL: NO PAIN (0)

## 2020-12-31 NOTE — PATIENT INSTRUCTIONS
Your mesna is due today at 5:35 pm and 9:35 pm.      Contact Numbers:   [facility]: [phone]    Call triage to speak with triage if you are experiencing chills and/or temperature greater than or equal to 100.5, uncontrolled nausea/vomiting, diarrhea, constipation, dizziness, shortness of breath, chest pain, bleeding, unexplained bruising, or any new/concerning symptoms, questions/concerns.     If you are having any concerning symptoms or wish to speak to a provider before your next infusion visit, please call your care coordinator or triage to notify them so we can adequately serve you.     If you need a refill on a medication or narcotic prescription, please call triage or your care coordinator before your infusion appointment.                 December 2020 Sunday Monday Tuesday Wednesday Thursday Friday Saturday             1    LAB   6:00 AM   (15 min.)   [facility] HOME INFUSION   [facility] 2     3     4     5       6     7     8     9    LAB PERIPHERAL   9:45 AM   (15 min.)   [facility]   [facility] 10    VIDEO VISIT RETURN   1:05 PM   (90 min.)   Yasmine Mckeon PA-C   [facility] 11    [facility] ONC INFUSION 180  12:30 PM   (180 min.)   [facility]   [facility] 12       13     14     15     16     17    LAB PERIPHERAL   9:15 AM   (15 min.)   [facility]   [facility] 18     19       20     21     22     23     24     25     26       27     28     29    LAB PERIPHERAL  10:15 AM   (15 min.)   [facility]   [facility] 30    VIDEO VISIT RETURN   3:05 PM   (90 min.)   Cheryle Julian PA-C   [facility] 31    [facility] ONC INFUSION 180  10:00 AM   (180 min.)   [facility]   [facility]                       January 2021 Sunday Monday  Tuesday Wednesday Thursday Friday Saturday                            1    LAB   6:30 AM   (15 min.)   [facility] HOME INFUSION   [facility] 2       3     4     5     6    CT CHEST/ABDOMEN/PELVIS W   9:25 AM   (20 min.)   [id number]   [facility] 7     8    VIDEO VISIT RETURN  10:45 AM   (30 min.)   Farzaneh Young MD   [facility] 9       10     11     12     13     14     15     16       17     18     19     20     21     22     23       24     25     26     27     28     29     30       31                                                   Lab Results:  No results found for this or any previous visit (from the past 12 hour(s)).

## 2020-12-31 NOTE — PROGRESS NOTES
Infusion Nursing Note:  Diego Buchanan presents today for cycle 11 day 1 Vincristine, cytoxan, adriamycin pump connection.    Patient seen by provider today: No   present during visit today: Not Applicable.    Note: Patient arrives with his sister, stating he feels well. Patient had a virtual visit with KATHLEEN Butler yesterday and patient and sister state no chances since.    Patient and sister aware to take mesna at 5:35 pm and 9:35 pm    Intravenous Access:  [place].  Dressing done by sister yesterday.    Treatment Conditions:  Lab Results   Component Value Date    HGB 8.4 12/29/2020     Lab Results   Component Value Date    WBC 6.6 12/29/2020      Lab Results   Component Value Date    ANEU 4.8 12/29/2020     Lab Results   Component Value Date     12/29/2020      Lab Results   Component Value Date     12/29/2020                   Lab Results   Component Value Date    POTASSIUM 3.8 12/29/2020           Lab Results   Component Value Date    MAG 2.0 12/29/2020            Lab Results   Component Value Date    CR 0.62 12/29/2020                   Lab Results   Component Value Date    FAISAL 8.7 12/29/2020                Lab Results   Component Value Date    BILITOTAL 0.3 12/29/2020           Lab Results   Component Value Date    ALBUMIN 3.4 12/29/2020                    Lab Results   Component Value Date    ALT 37 12/29/2020           Lab Results   Component Value Date    AST 21 12/29/2020       Results reviewed, labs MET treatment parameters, ok to proceed with treatment.  ECHO/MUGA completed 9/1/20  EF 60-65%.      Post Infusion Assessment:  Patient tolerated infusion without incident.  Blood return noted pre and post infusion.    Site patent and intact, free from redness, edema or discomfort.  No evidence of extravasations.      Prior to discharge: Port is secured in place with tegaderm and flushed with 10cc NS with positive blood return noted.  Continuous home infusion adriamycin pump  connected.    All connectors secured in place and clamps taped open. Patient instructed to call  [facility] with any questions or concerns at home with the pump.  Patient verbalized understanding.    Patient and [facility] aware of set up for pump disconnect at home with [facility] on Saturday 1/2/21 at 1:30pm.         Discharge Plan:   Prescription refills given for mesna and dex.  Discharge instructions reviewed with: Patient.  Patient and/or family verbalized understanding of discharge instructions and all questions answered.  AVS to patient via [employer].  Patient will return 1/8/20 for next appointment.   Patient discharged in stable condition accompanied by: sister  Departure Mode: Ambulatory.    Precious Cosme RN

## 2021-01-04 ENCOUNTER — HEALTH MAINTENANCE LETTER (OUTPATIENT)
Age: 26
End: 2021-01-04

## 2021-01-05 NOTE — PROGRESS NOTES
This is a recent snapshot of the patient's [facility] medical record.  For current drug dose and complete information and questions, call [phone] or In [facility] pool, [facility]v home infusion ([id number])  CSN Number:  [id number]

## 2021-01-06 ENCOUNTER — ANCILLARY PROCEDURE (OUTPATIENT)
Dept: CT IMAGING | Facility: CLINIC | Age: 26
End: 2021-01-06
Attending: INTERNAL MEDICINE
Payer: COMMERCIAL

## 2021-01-06 DIAGNOSIS — C41.9 SARCOMA, EWINGS (H): ICD-10-CM

## 2021-01-06 PROCEDURE — 74177 CT ABD & PELVIS W/CONTRAST: CPT | Mod: GC | Performed by: RADIOLOGY

## 2021-01-06 PROCEDURE — 71260 CT THORAX DX C+: CPT | Mod: GC | Performed by: RADIOLOGY

## 2021-01-06 RX ORDER — IOPAMIDOL 755 MG/ML
132 INJECTION, SOLUTION INTRAVASCULAR ONCE
Status: COMPLETED | OUTPATIENT
Start: 2021-01-06 | End: 2021-01-06

## 2021-01-06 RX ADMIN — IOPAMIDOL 132 ML: 755 INJECTION, SOLUTION INTRAVASCULAR at 09:38

## 2021-01-08 ENCOUNTER — VIRTUAL VISIT (OUTPATIENT)
Dept: ONCOLOGY | Facility: CLINIC | Age: 26
End: 2021-01-08
Attending: INTERNAL MEDICINE
Payer: COMMERCIAL

## 2021-01-08 ENCOUNTER — RECORDS - HEALTHEAST (OUTPATIENT)
Dept: ADMINISTRATIVE | Facility: OTHER | Age: 26
End: 2021-01-08

## 2021-01-08 DIAGNOSIS — C41.9 SARCOMA, EWINGS (H): ICD-10-CM

## 2021-01-08 DIAGNOSIS — C49.9 DESMOPLASTIC SMALL ROUND CELL TUMOR (H): Primary | ICD-10-CM

## 2021-01-08 PROCEDURE — 999N001193 HC VIDEO/TELEPHONE VISIT; NO CHARGE

## 2021-01-08 PROCEDURE — 99214 OFFICE O/P EST MOD 30 MIN: CPT | Mod: 95 | Performed by: INTERNAL MEDICINE

## 2021-01-08 RX ORDER — SODIUM CHLORIDE 9 MG/ML
1000 INJECTION, SOLUTION INTRAVENOUS CONTINUOUS PRN
Status: CANCELLED
Start: 2021-01-23

## 2021-01-08 RX ORDER — HEPARIN SODIUM,PORCINE 10 UNIT/ML
5 VIAL (ML) INTRAVENOUS
Status: CANCELLED | OUTPATIENT
Start: 2021-01-23

## 2021-01-08 RX ORDER — NALOXONE HYDROCHLORIDE 0.4 MG/ML
.1-.4 INJECTION, SOLUTION INTRAMUSCULAR; INTRAVENOUS; SUBCUTANEOUS
Status: CANCELLED | OUTPATIENT
Start: 2021-01-23

## 2021-01-08 RX ORDER — DIPHENHYDRAMINE HYDROCHLORIDE 50 MG/ML
50 INJECTION INTRAMUSCULAR; INTRAVENOUS
Status: CANCELLED
Start: 2021-01-23

## 2021-01-08 RX ORDER — LORAZEPAM 2 MG/ML
0.5 INJECTION INTRAMUSCULAR EVERY 4 HOURS PRN
Status: CANCELLED
Start: 2021-01-23

## 2021-01-08 RX ORDER — MEPERIDINE HYDROCHLORIDE 25 MG/ML
25 INJECTION INTRAMUSCULAR; INTRAVENOUS; SUBCUTANEOUS EVERY 30 MIN PRN
Status: CANCELLED | OUTPATIENT
Start: 2021-01-23

## 2021-01-08 RX ORDER — ALBUTEROL SULFATE 0.83 MG/ML
2.5 SOLUTION RESPIRATORY (INHALATION)
Status: CANCELLED | OUTPATIENT
Start: 2021-01-23

## 2021-01-08 RX ORDER — METHYLPREDNISOLONE SODIUM SUCCINATE 125 MG/2ML
125 INJECTION, POWDER, LYOPHILIZED, FOR SOLUTION INTRAMUSCULAR; INTRAVENOUS
Status: CANCELLED
Start: 2021-01-23

## 2021-01-08 RX ORDER — HEPARIN SODIUM (PORCINE) LOCK FLUSH IV SOLN 100 UNIT/ML 100 UNIT/ML
5 SOLUTION INTRAVENOUS
Status: CANCELLED | OUTPATIENT
Start: 2021-01-23

## 2021-01-08 RX ORDER — EPINEPHRINE 1 MG/ML
0.3 INJECTION, SOLUTION INTRAMUSCULAR; SUBCUTANEOUS EVERY 5 MIN PRN
Status: CANCELLED | OUTPATIENT
Start: 2021-01-23

## 2021-01-08 RX ORDER — ALBUTEROL SULFATE 90 UG/1
1-2 AEROSOL, METERED RESPIRATORY (INHALATION)
Status: CANCELLED
Start: 2021-01-23

## 2021-01-08 NOTE — LETTER
"    1/8/2021         RE: Diego Buchanan  [address]  [place] [address]        Dear Colleague,    Thank you for referring your patient, Diego Buchanan, to the [facility]. Please see a copy of my visit note below.    Diego is a 25 year old who is being evaluated via a billable video visit.      How would you like to obtain your AVS? MyChart  If the video visit is dropped, the invitation should be resent by: Text to cell phone: [phone]  Will anyone else be joining your video visit? No      Vitals - Patient Reported  Weight (Patient Reported): 98 kg (216 lb)  Height (Patient Reported): 172.7 cm (5' 8\")  BMI (Based on Pt Reported Ht/Wt): 32.84  Pain Score: No Pain (0)      I have reviewed and updated patient's allergy and medication list.    Concerns: NONE  Refills: NONE      Jeanie Potts CMA    Video-Visit Details    Type of service:  Video Visit    Video Start Time: 11:00 AM   Video End Time:11:30 PM    Originating Location (pt. 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 He presented initially with abdominal pain, diarrhea, and progressive abdominal distention for 3 months duration. 2. Initial CT scan in February 2020 showed severe peritoneal carcinomatosis with large peritoneal tumor implants throughout the entire abdomen and pelvis, but mostly prominently in the pelvis.   2. PETCT scan showed interval increase in the amount of peritoneal carcinomatosis.  3. On 3/12/2020, he had ultrasound-guided core needle biopsy of a peritoneal implant (Case: [id number]), which showed a completely undifferentiated appearance, but stains with pancytokeratin, indicating an epithelial origin. 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 Tumor markers for CA-19-9, CEA, beta-hCG, alpha-fetoprotein were unremarkable. Cancer type ID molecular testing by [employer] sent to determine the exact diagnosis and to assist in further treatment planning. 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 Relative probability of less than 5% of other subtype of sarcoma. EWSR1-WT1 fusion detected.  4. 5/1/2020, MRI brain negative for brain metastasis, and baseline CT-CAP obtained showing extensive mixed solid and cystic masses throughout the abdomen and pelvis consistent with peritoneal carcinomatosis. Largest mass measures approximately 20 cm in the pelvis. Metastatic mixed solid and cystic masses seen in hepatic segments 5 and 6 and hepatic segment 7. The masses appear to be contiguous with the retrohepatic peritoneal carcinomatosis. Small volume fluid about the spleen favored to represent malignant ascites, stable mild-to-moderate right hydronephrosis related to mass effect upon the distal ureter in the pelvis, the IVC and iliac veins are compressed due to the extensive peritoneal carcinomatosis without findings to suggest deep venous thrombosis.  5. 5/4/2020, he begins CAV/IMV alternating chemotherapy. He receives 6 cycles of treatment. He symptomatically improves.  6. 9/11/2020, CT-CAP shows stable to mildly improved extensive peritoneal carcinomatosis. He would like to omit Ifosfamide/etoposide cycles and continue only with CAV.  7. 10/9/2020, he starts CAV every 21 days.    History of Present Illness:  Diego Buchanan is a 25 year old with advanced DSRCT. He presents via video visit with his sister for follow up.  He feels well and denies having any pain. He is eating pretty well, with no limitations, but he has had some weight loss.     He denies nausea or vomiting. He has small, hard stools and is taking senna and MiraLAX as  needed. Overall energy level is diminished, but he does all he wants to do around the house. No blood in the urine, shortness of breath, cough. No abdominal pain.    ECOG performance status 1.      Current Outpatient Medications   Medication Sig Dispense Refill     allopurinol (ZYLOPRIM) 300 MG tablet Take 1 tablet (300 mg) by mouth daily 30 tablet 0     folic acid (FOLVITE) 1 MG tablet Take 1 tablet (1 mg) by mouth daily 30 tablet 3     LORazepam (ATIVAN) 0.5 MG tablet Take 1 tablet (0.5 mg) by mouth every 4 hours as needed (Anxiety, Nausea/Vomiting or Sleep) 30 tablet 5     metoprolol tartrate (LOPRESSOR) 50 MG tablet Take 1.5 tablets (75 mg) by mouth daily 45 tablet 3     oxyCODONE (OXY-IR) 5 MG capsule Take 5 mg by mouth every 6 hours as needed for severe pain Prescribed in ED 7/6/20       polyethylene glycol (MIRALAX) 17 g packet Take 17 g by mouth       prochlorperazine (COMPAZINE) 10 MG tablet Take 1 tablet (10 mg) by mouth every 6 hours as needed (Nausea/Vomiting) 30 tablet 5     senna-docusate (SENNA S) 8.6-50 MG tablet Take 1 tablet by mouth 2 times daily 60 tablet 0     traMADol (ULTRAM) 50 MG tablet Take 50 mg by mouth as needed       dexamethasone (DECADRON) 4 MG tablet Take 2 tablets (8 mg) by mouth daily (with breakfast) for 3 days Start the day after doxorubicin, cyclophosphamide, and vincristine (odd cycles). (Patient not taking: Reported on 9/11/2020) 6 tablet 8     mesna (MESNEX) 400 MG TABS tablet Take 1 tablet (400 mg) by mouth every 4 hours for 2 doses Take one tablet 4 hours and 8 hours after end of cyclophosphamide infusion. 2 tablet 0     mesna (MESNEX) 400 MG TABS tablet Take 1 tablet (400 mg) by mouth every 4 hours for 2 doses Take one tablet 4 hours and 8 hours after end of cyclophosphamide infusion. 2 tablet 0     mesna (MESNEX) 400 MG TABS tablet Take 1 tablet (400 mg) by mouth every 4 hours for 2 doses Take one tablet 4 hours and 8 hours after end of cyclophosphamide infusion. 2 tablet  0     Physical Examination:  General: patient appears well in no acute distress, alert and oriented, speech clear and fluid  Skin: no visualized rash or lesions on visualized skin  Resp: Appears to be breathing comfortably without accessory muscle usage, speaking in full sentences, no audible wheezes or cough.  Psych: Coherent speech, normal rate and volume, able to articulate logical thoughts, able to abstract reason, no tangential thoughts, no hallucinations or delusions  Patient's affect is appropriate.    The rest of a comprehensive physical examination is deferred due to PHE (public health emergency) video visit restrictions.    Imaging:  Per my read, there is a mixed response in the mixed solid and cystic masses throughout the peritoneum. Some of the tumors are stable to mildly worse, with some of the peritoneal masses a bit larger, a few are smaller, one cystic tumor in the left abdomen is smaller, while tumors lower in the pelvis appear slightly larger.      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 He tolerated 6 alternating cycles of CAV/IMV with anticipated side effects. He developed hematuria with his last 2 cycles of Ifosfamide based therapy despite dose reduction and Mesna prophylaxis, and ultimately patient and family decided to continue with CAV alone.    He has continued to do well with regards to control of his disease. However, given some increase in the bulk of disease in the pelvis I have recommended we resume alternating therapy. Also, as we do not have many options for multiagent chemotherapy regimens following the current regimen, would like to maximize all components of the regimen before we run into attaining his  lifetime doses of doxorubicin.     Patient and family agree. We will resume his next cycle with IMV.      Farzaneh Burciaga M.D.   of Medicine  Hematology, Oncology and Transplantation

## 2021-01-08 NOTE — PROGRESS NOTES
"Diego is a 25 year old who is being evaluated via a billable video visit.      How would you like to obtain your AVS? MyChart  If the video visit is dropped, the invitation should be resent by: Text to cell phone: [phone]  Will anyone else be joining your video visit? No      Vitals - Patient Reported  Weight (Patient Reported): 98 kg (216 lb)  Height (Patient Reported): 172.7 cm (5' 8\")  BMI (Based on Pt Reported Ht/Wt): 32.84  Pain Score: No Pain (0)      I have reviewed and updated patient's allergy and medication list.    Concerns: NONE  Refills: NONE      Jeanie Potts CMA    Video-Visit Details    Type of service:  Video Visit    Video Start Time: 11:00 AM   Video End Time:11:30 PM    Originating Location (pt. 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 He presented initially with abdominal pain, diarrhea, and progressive abdominal distention for 3 months duration. 2. Initial CT scan in February 2020 showed severe peritoneal carcinomatosis with large peritoneal tumor implants throughout the entire abdomen and pelvis, but mostly prominently in the pelvis.   2. PETCT scan showed interval increase in the amount of peritoneal carcinomatosis.  3. On 3/12/2020, he had ultrasound-guided core needle biopsy of a peritoneal implant (Case: [id number]), which showed a completely undifferentiated appearance, but stains with pancytokeratin, indicating an epithelial origin. 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 Tumor markers for CA-19-9, CEA, beta-hCG, alpha-fetoprotein were unremarkable. Cancer type ID molecular testing by [employer] sent to determine the exact diagnosis and to assist in " further treatment planning. 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 Relative probability of less than 5% of other subtype of sarcoma. EWSR1-WT1 fusion detected.  4. 5/1/2020, MRI brain negative for brain metastasis, and baseline CT-CAP obtained showing extensive mixed solid and cystic masses throughout the abdomen and pelvis consistent with peritoneal carcinomatosis. Largest mass measures approximately 20 cm in the pelvis. Metastatic mixed solid and cystic masses seen in hepatic segments 5 and 6 and hepatic segment 7. The masses appear to be contiguous with the retrohepatic peritoneal carcinomatosis. Small volume fluid about the spleen favored to represent malignant ascites, stable mild-to-moderate right hydronephrosis related to mass effect upon the distal ureter in the pelvis, the IVC and iliac veins are compressed due to the extensive peritoneal carcinomatosis without findings to suggest deep venous thrombosis.  5. 5/4/2020, he begins CAV/IMV alternating chemotherapy. He receives 6 cycles of treatment. He symptomatically improves.  6. 9/11/2020, CT-CAP shows stable to mildly improved extensive peritoneal carcinomatosis. He would like to omit Ifosfamide/etoposide cycles and continue only with CAV.  7. 10/9/2020, he starts CAV every 21 days.    History of Present Illness:  Diego Buchanan is a 25 year old with advanced DSRCT. He presents via video visit with his sister for follow up.  He feels well and denies having any pain. He is eating pretty well, with no limitations, but he has had some weight loss.     He denies nausea or vomiting. He has small, hard stools and is taking senna and MiraLAX as needed. Overall energy level is diminished, but he does all he wants to do around the house. No blood in the urine, shortness of breath, cough. No abdominal pain.    ECOG performance status 1.      Current Outpatient Medications   Medication Sig  Dispense Refill     allopurinol (ZYLOPRIM) 300 MG tablet Take 1 tablet (300 mg) by mouth daily 30 tablet 0     folic acid (FOLVITE) 1 MG tablet Take 1 tablet (1 mg) by mouth daily 30 tablet 3     LORazepam (ATIVAN) 0.5 MG tablet Take 1 tablet (0.5 mg) by mouth every 4 hours as needed (Anxiety, Nausea/Vomiting or Sleep) 30 tablet 5     metoprolol tartrate (LOPRESSOR) 50 MG tablet Take 1.5 tablets (75 mg) by mouth daily 45 tablet 3     oxyCODONE (OXY-IR) 5 MG capsule Take 5 mg by mouth every 6 hours as needed for severe pain Prescribed in ED 7/6/20       polyethylene glycol (MIRALAX) 17 g packet Take 17 g by mouth       prochlorperazine (COMPAZINE) 10 MG tablet Take 1 tablet (10 mg) by mouth every 6 hours as needed (Nausea/Vomiting) 30 tablet 5     senna-docusate (SENNA S) 8.6-50 MG tablet Take 1 tablet by mouth 2 times daily 60 tablet 0     traMADol (ULTRAM) 50 MG tablet Take 50 mg by mouth as needed       dexamethasone (DECADRON) 4 MG tablet Take 2 tablets (8 mg) by mouth daily (with breakfast) for 3 days Start the day after doxorubicin, cyclophosphamide, and vincristine (odd cycles). (Patient not taking: Reported on 9/11/2020) 6 tablet 8     mesna (MESNEX) 400 MG TABS tablet Take 1 tablet (400 mg) by mouth every 4 hours for 2 doses Take one tablet 4 hours and 8 hours after end of cyclophosphamide infusion. 2 tablet 0     mesna (MESNEX) 400 MG TABS tablet Take 1 tablet (400 mg) by mouth every 4 hours for 2 doses Take one tablet 4 hours and 8 hours after end of cyclophosphamide infusion. 2 tablet 0     mesna (MESNEX) 400 MG TABS tablet Take 1 tablet (400 mg) by mouth every 4 hours for 2 doses Take one tablet 4 hours and 8 hours after end of cyclophosphamide infusion. 2 tablet 0     Physical Examination:  General: patient appears well in no acute distress, alert and oriented, speech clear and fluid  Skin: no visualized rash or lesions on visualized skin  Resp: Appears to be breathing comfortably without accessory  muscle usage, speaking in full sentences, no audible wheezes or cough.  Psych: Coherent speech, normal rate and volume, able to articulate logical thoughts, able to abstract reason, no tangential thoughts, no hallucinations or delusions  Patient's affect is appropriate.    The rest of a comprehensive physical examination is deferred due to PHE (public health emergency) video visit restrictions.    Imaging:  Per my read, there is a mixed response in the mixed solid and cystic masses throughout the peritoneum. Some of the tumors are stable to mildly worse, with some of the peritoneal masses a bit larger, a few are smaller, one cystic tumor in the left abdomen is smaller, while tumors lower in the pelvis appear slightly larger.      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 He tolerated 6 alternating cycles of CAV/IMV with anticipated side effects. He developed hematuria with his last 2 cycles of Ifosfamide based therapy despite dose reduction and Mesna prophylaxis, and ultimately patient and family decided to continue with CAV alone.    He has continued to do well with regards to control of his disease. However, given some increase in the bulk of disease in the pelvis I have recommended we resume alternating therapy. Also, as we do not have many options for multiagent chemotherapy regimens following the current regimen, would like to maximize all components of the regimen before we run into attaining his lifetime doses of doxorubicin.     Patient and family agree. We will resume his next cycle with IMV.      Farzaneh Burciaga M.D.   of Medicine  Hematology, Oncology and Transplantation

## 2021-01-17 RX ORDER — DOXORUBICIN HYDROCHLORIDE 2 MG/ML
INJECTION, SOLUTION INTRAVENOUS
COMMUNITY
Start: 2020-12-31 | End: 2021-06-25

## 2021-01-17 RX ORDER — POTASSIUM CHLORIDE 1500 MG/1
TABLET, EXTENDED RELEASE ORAL
COMMUNITY
Start: 2020-10-21 | End: 2021-02-11

## 2021-01-17 RX ORDER — ETOPOSIDE 50 MG/1
CAPSULE ORAL
COMMUNITY
Start: 2020-08-25 | End: 2021-04-22

## 2021-01-17 RX ORDER — PEGFILGRASTIM 6 MG/0.6ML
KIT SUBCUTANEOUS
Status: ON HOLD | COMMUNITY
Start: 2021-01-02 | End: 2022-01-01

## 2021-01-17 RX ORDER — SODIUM CHLORIDE 9 MG/ML
INJECTION, SOLUTION INTRAVENOUS
COMMUNITY
Start: 2020-12-28 | End: 2021-06-25

## 2021-01-20 ENCOUNTER — APPOINTMENT (OUTPATIENT)
Dept: LAB | Facility: CLINIC | Age: 26
End: 2021-01-20
Attending: INTERNAL MEDICINE
Payer: COMMERCIAL

## 2021-01-20 LAB
ALBUMIN SERPL-MCNC: 3.4 G/DL (ref 3.4–5)
ALP SERPL-CCNC: 130 U/L (ref 40–150)
ALT SERPL W P-5'-P-CCNC: 34 U/L (ref 0–70)
ANION GAP SERPL CALCULATED.3IONS-SCNC: 5 MMOL/L (ref 3–14)
ANISOCYTOSIS BLD QL SMEAR: ABNORMAL
AST SERPL W P-5'-P-CCNC: 14 U/L (ref 0–45)
BASOPHILS # BLD AUTO: 0 10E9/L (ref 0–0.2)
BASOPHILS NFR BLD AUTO: 0 %
BILIRUB SERPL-MCNC: 0.6 MG/DL (ref 0.2–1.3)
BUN SERPL-MCNC: 11 MG/DL (ref 7–30)
CALCIUM SERPL-MCNC: 8.9 MG/DL (ref 8.5–10.1)
CHLORIDE SERPL-SCNC: 109 MMOL/L (ref 94–109)
CO2 SERPL-SCNC: 26 MMOL/L (ref 20–32)
CREAT SERPL-MCNC: 0.64 MG/DL (ref 0.66–1.25)
DACRYOCYTES BLD QL SMEAR: ABNORMAL
DIFFERENTIAL METHOD BLD: ABNORMAL
EOSINOPHIL # BLD AUTO: 0 10E9/L (ref 0–0.7)
EOSINOPHIL NFR BLD AUTO: 0 %
ERYTHROCYTE [DISTWIDTH] IN BLOOD BY AUTOMATED COUNT: 20.7 % (ref 10–15)
GFR SERPL CREATININE-BSD FRML MDRD: >90 ML/MIN/{1.73_M2}
GLUCOSE SERPL-MCNC: 84 MG/DL (ref 70–99)
HCT VFR BLD AUTO: 25.3 % (ref 40–53)
HGB BLD-MCNC: 7.6 G/DL (ref 13.3–17.7)
LYMPHOCYTES # BLD AUTO: 0.4 10E9/L (ref 0.8–5.3)
LYMPHOCYTES NFR BLD AUTO: 6.1 %
MCH RBC QN AUTO: 29.5 PG (ref 26.5–33)
MCHC RBC AUTO-ENTMCNC: 30 G/DL (ref 31.5–36.5)
MCV RBC AUTO: 98 FL (ref 78–100)
MONOCYTES # BLD AUTO: 0.2 10E9/L (ref 0–1.3)
MONOCYTES NFR BLD AUTO: 3.5 %
NEUTROPHILS # BLD AUTO: 6.1 10E9/L (ref 1.6–8.3)
NEUTROPHILS NFR BLD AUTO: 90.4 %
PLATELET # BLD AUTO: 234 10E9/L (ref 150–450)
PLATELET # BLD EST: ABNORMAL 10*3/UL
POIKILOCYTOSIS BLD QL SMEAR: ABNORMAL
POLYCHROMASIA BLD QL SMEAR: SLIGHT
POTASSIUM SERPL-SCNC: 3.7 MMOL/L (ref 3.4–5.3)
PROT SERPL-MCNC: 7.4 G/DL (ref 6.8–8.8)
RBC # BLD AUTO: 2.58 10E12/L (ref 4.4–5.9)
RBC INCLUSIONS BLD: SLIGHT
SODIUM SERPL-SCNC: 140 MMOL/L (ref 133–144)
WBC # BLD AUTO: 6.7 10E9/L (ref 4–11)

## 2021-01-20 PROCEDURE — 86923 COMPATIBILITY TEST ELECTRIC: CPT | Performed by: PHYSICIAN ASSISTANT

## 2021-01-20 PROCEDURE — 86900 BLOOD TYPING SEROLOGIC ABO: CPT | Performed by: PHYSICIAN ASSISTANT

## 2021-01-20 PROCEDURE — 250N000011 HC RX IP 250 OP 636: Mod: GT | Performed by: PHYSICIAN ASSISTANT

## 2021-01-20 PROCEDURE — 85025 COMPLETE CBC W/AUTO DIFF WBC: CPT | Mod: GT | Performed by: INTERNAL MEDICINE

## 2021-01-20 PROCEDURE — 80053 COMPREHEN METABOLIC PANEL: CPT | Mod: GT | Performed by: INTERNAL MEDICINE

## 2021-01-20 PROCEDURE — 86901 BLOOD TYPING SEROLOGIC RH(D): CPT | Performed by: PHYSICIAN ASSISTANT

## 2021-01-20 PROCEDURE — 86850 RBC ANTIBODY SCREEN: CPT | Performed by: PHYSICIAN ASSISTANT

## 2021-01-20 RX ORDER — HEPARIN SODIUM,PORCINE 10 UNIT/ML
5 VIAL (ML) INTRAVENOUS ONCE
Status: COMPLETED | OUTPATIENT
Start: 2021-01-20 | End: 2021-01-20

## 2021-01-20 RX ADMIN — SODIUM CHLORIDE, PRESERVATIVE FREE 5 ML: 5 INJECTION INTRAVENOUS at 10:40

## 2021-01-20 ASSESSMENT — PAIN SCALES - GENERAL: PAINLEVEL: NO PAIN (0)

## 2021-01-20 NOTE — NURSING NOTE
Chief Complaint   Patient presents with     Blood Draw     labs drawn via CVC by RN in lab      /72   Pulse 94   Temp 96.6  F (35.9  C) (Tympanic)   Resp 18   Wt 99.4 kg (219 lb 3.2 oz)   SpO2 99%   BMI 33.34 kg/m      Lines accessed. Labs drawn via red lumen. Both lines flushed with normal saline and heparin. Pt tolerated well.    Priscilla Anderson RN on 1/20/2021 at 10:39 AM

## 2021-01-21 ENCOUNTER — TELEPHONE (OUTPATIENT)
Dept: ONCOLOGY | Facility: CLINIC | Age: 26
End: 2021-01-21

## 2021-01-21 ENCOUNTER — INFUSION THERAPY VISIT (OUTPATIENT)
Dept: ONCOLOGY | Facility: CLINIC | Age: 26
End: 2021-01-21
Attending: PHYSICIAN ASSISTANT
Payer: COMMERCIAL

## 2021-01-21 ENCOUNTER — HOME INFUSION (PRE-WILLOW HOME INFUSION) (OUTPATIENT)
Dept: PHARMACY | Facility: CLINIC | Age: 26
End: 2021-01-21

## 2021-01-21 ENCOUNTER — RECORDS - HEALTHEAST (OUTPATIENT)
Dept: ADMINISTRATIVE | Facility: OTHER | Age: 26
End: 2021-01-21

## 2021-01-21 VITALS
BODY MASS INDEX: 33.34 KG/M2 | SYSTOLIC BLOOD PRESSURE: 120 MMHG | DIASTOLIC BLOOD PRESSURE: 72 MMHG | RESPIRATION RATE: 18 BRPM | HEART RATE: 94 BPM | WEIGHT: 219.2 LBS | TEMPERATURE: 96.6 F | OXYGEN SATURATION: 99 %

## 2021-01-21 VITALS
SYSTOLIC BLOOD PRESSURE: 120 MMHG | RESPIRATION RATE: 20 BRPM | DIASTOLIC BLOOD PRESSURE: 73 MMHG | OXYGEN SATURATION: 100 % | HEART RATE: 105 BPM | TEMPERATURE: 97.8 F

## 2021-01-21 DIAGNOSIS — C49.9 DESMOPLASTIC SMALL ROUND CELL TUMOR (H): ICD-10-CM

## 2021-01-21 DIAGNOSIS — R31.9 HEMATURIA, UNSPECIFIED TYPE: ICD-10-CM

## 2021-01-21 DIAGNOSIS — D64.9 ANEMIA, UNSPECIFIED TYPE: ICD-10-CM

## 2021-01-21 DIAGNOSIS — C41.9 SARCOMA, EWINGS (H): Primary | ICD-10-CM

## 2021-01-21 DIAGNOSIS — R53.81 PHYSICAL DECONDITIONING: ICD-10-CM

## 2021-01-21 LAB
ABO + RH BLD: NORMAL
ABO + RH BLD: NORMAL
ALBUMIN UR-MCNC: NEGATIVE MG/DL
APPEARANCE UR: CLEAR
BILIRUB UR QL STRIP: NEGATIVE
BLD GP AB SCN SERPL QL: NORMAL
BLD PROD TYP BPU: NORMAL
BLOOD BANK CMNT PATIENT-IMP: NORMAL
COLOR UR AUTO: YELLOW
GLUCOSE UR STRIP-MCNC: NEGATIVE MG/DL
HGB UR QL STRIP: ABNORMAL
KETONES UR STRIP-MCNC: NEGATIVE MG/DL
LEUKOCYTE ESTERASE UR QL STRIP: NEGATIVE
MUCOUS THREADS #/AREA URNS LPF: PRESENT /LPF
NITRATE UR QL: NEGATIVE
NUM BPU REQUESTED: 1
PH UR STRIP: 6 PH (ref 5–7)
RBC #/AREA URNS AUTO: 30 /HPF (ref 0–2)
SOURCE: ABNORMAL
SP GR UR STRIP: 1.02 (ref 1–1.03)
SPECIMEN EXP DATE BLD: NORMAL
SQUAMOUS #/AREA URNS AUTO: 1 /HPF (ref 0–1)
UROBILINOGEN UR STRIP-MCNC: 0 MG/DL (ref 0–2)
WBC #/AREA URNS AUTO: 4 /HPF (ref 0–5)

## 2021-01-21 PROCEDURE — G0498 CHEMO EXTEND IV INFUS W/PUMP: HCPCS

## 2021-01-21 PROCEDURE — 250N000011 HC RX IP 250 OP 636: Performed by: PHYSICIAN ASSISTANT

## 2021-01-21 PROCEDURE — 258N000003 HC RX IP 258 OP 636: Performed by: INTERNAL MEDICINE

## 2021-01-21 PROCEDURE — 99215 OFFICE O/P EST HI 40 MIN: CPT | Mod: 95 | Performed by: PHYSICIAN ASSISTANT

## 2021-01-21 PROCEDURE — 96374 THER/PROPH/DIAG INJ IV PUSH: CPT

## 2021-01-21 PROCEDURE — 96375 TX/PRO/DX INJ NEW DRUG ADDON: CPT

## 2021-01-21 PROCEDURE — 250N000011 HC RX IP 250 OP 636: Performed by: INTERNAL MEDICINE

## 2021-01-21 PROCEDURE — 81001 URINALYSIS AUTO W/SCOPE: CPT | Performed by: PHYSICIAN ASSISTANT

## 2021-01-21 RX ORDER — HEPARIN SODIUM (PORCINE) LOCK FLUSH IV SOLN 100 UNIT/ML 100 UNIT/ML
5 SOLUTION INTRAVENOUS
Status: CANCELLED | OUTPATIENT
Start: 2021-01-21

## 2021-01-21 RX ORDER — HEPARIN SODIUM,PORCINE 10 UNIT/ML
5 VIAL (ML) INTRAVENOUS
Status: CANCELLED | OUTPATIENT
Start: 2021-01-21

## 2021-01-21 RX ORDER — GRANISETRON HYDROCHLORIDE 1 MG/ML
1 INJECTION INTRAVENOUS ONCE
Status: COMPLETED | OUTPATIENT
Start: 2021-01-21 | End: 2021-01-21

## 2021-01-21 RX ORDER — ETOPOSIDE 50 MG/1
100 CAPSULE ORAL DAILY
Qty: 24 CAPSULE | Refills: 0 | Status: SHIPPED | OUTPATIENT
Start: 2021-01-21 | End: 2021-04-22

## 2021-01-21 RX ADMIN — SODIUM CHLORIDE 250 ML: 9 INJECTION, SOLUTION INTRAVENOUS at 12:18

## 2021-01-21 RX ADMIN — DEXAMETHASONE SODIUM PHOSPHATE 12 MG: 10 INJECTION, SOLUTION INTRAMUSCULAR; INTRAVENOUS at 13:15

## 2021-01-21 RX ADMIN — GRANISETRON HYDROCHLORIDE 1 MG: 1 INJECTION, SOLUTION INTRAVENOUS at 12:18

## 2021-01-21 ASSESSMENT — PAIN SCALES - GENERAL: PAINLEVEL: NO PAIN (0)

## 2021-01-21 NOTE — ORAL ONC MGMT
"Oral Chemotherapy Monitoring Program    Lab Monitoring Plan  Labs drawn during infusions  Subjective/Objective:  Diego Buchanan is a 25 year old male seen in clinic for an initial visit for oral chemotherapy education. I spoke with Robert and his sister today during his infusion. He has taken etoposide before (last fill in August).     ORAL CHEMOTHERAPY 6/3/2020 1/21/2021   Assessment Type - New Teach   Diagnosis Code - (No Data)   Providers - (No Data)   Clinic Name/Location - [facility]   Drug Name (No Data) Etoposide   Dose - 200 mg   Current Schedule Daily Daily   Cycle Details Other (No Data)   Start Date of Last Cycle 5/27/2020 1/21/2021   Planned next cycle start date 7/7/2020 2/11/2021   Doses missed in last 2 weeks 0 -   Adherence Assessment Adherent -   Adverse Effects Nausea -   Nausea Grade 1 -   Pharmacist Intervention(nausea) Yes -   Any new drug interactions? No No   Is the dose as ordered appropriate for the patient? Yes -       Last PHQ-2 Score on record:   PHQ-2 ( 1999 [employer]) 9/22/2020   Q1: Little interest or pleasure in doing things 0   Q2: Feeling down, depressed or hopeless 0   PHQ-2 Score 0       Vitals:  BP:   BP Readings from Last 1 Encounters:   01/21/21 120/73     Wt Readings from Last 1 Encounters:   01/20/21 99.4 kg (219 lb 3.2 oz)     Estimated body surface area is 2.18 meters squared as calculated from the following:    Height as of 9/3/20: 1.727 m (5' 7.99\").    Weight as of 1/20/21: 99.4 kg (219 lb 3.2 oz).    Labs:  _  Result Component Current Result Ref Range   Sodium 140 (1/20/2021) 133 - 144 mmol/L     _  Result Component Current Result Ref Range   Potassium 3.7 (1/20/2021) 3.4 - 5.3 mmol/L     _  Result Component Current Result Ref Range   Calcium 8.9 (1/20/2021) 8.5 - 10.1 mg/dL     _  Result Component Current Result Ref Range   Magnesium 2.0 (12/29/2020) 1.6 - 2.3 mg/dL     _  Result Component Current Result Ref Range   Phosphorus 4.0 (12/29/2020) 2.5 - 4.5 mg/dL     _  Result " Component Current Result Ref Range   Albumin 3.4 (1/20/2021) 3.4 - 5.0 g/dL     _  Result Component Current Result Ref Range   Urea Nitrogen 11 (1/20/2021) 7 - 30 mg/dL     _  Result Component Current Result Ref Range   Creatinine 0.64 (L) (1/20/2021) 0.66 - 1.25 mg/dL     _  Result Component Current Result Ref Range   AST 14 (1/20/2021) 0 - 45 U/L     _  Result Component Current Result Ref Range   ALT 34 (1/20/2021) 0 - 70 U/L     _  Result Component Current Result Ref Range   Bilirubin Total 0.6 (1/20/2021) 0.2 - 1.3 mg/dL     _  Result Component Current Result Ref Range   WBC 6.7 (1/20/2021) 4.0 - 11.0 10e9/L     _  Result Component Current Result Ref Range   Hemoglobin 7.6 (L) (1/20/2021) 13.3 - 17.7 g/dL     _  Result Component Current Result Ref Range   Platelet Count 234 (1/20/2021) 150 - 450 10e9/L     _  Result Component Current Result Ref Range   Absolute Neutrophil 6.1 (1/20/2021) 1.6 - 8.3 10e9/L       Assessment:  Patient is appropriate to start therapy. Hemoglobin being monitored closely by Yasmine Mckeon with blood transfusion either tomorrow or this weekend.     Plan:  Basic chemotherapy teaching was reviewed with the patient and his sister including indication, start date of therapy, dose, administration, adverse effects, missed doses, food and drug interactions, monitoring, side effect management, office contact information, and safe handling. Written materials were provided and all questions answered.    Follow-Up:  Initial assessment in one week     Katty Fagan  Pharmacy Intern  [facility]  [phone]

## 2021-01-21 NOTE — PROGRESS NOTES
Infusion Nursing Note:  Diego Buchanan presents today for Cycle 12 Day 1 Ifosfamide/Mesna pump connect.    Patient seen by provider today: Yes: KATHLEEN Peña   present during visit today: Not Applicable.    Note: per Yasmine, ok to proceed with chemotherapy today; pt will receive blood transfusion tomorrow for hemoglobin 7.6. Pt also to collect a UA today, but does not need to wait for results. Sister present in infusion room throughout appointment today.    Pain: denies    Intravenous Access:  Esparza. Dressing changes at home per sister; not due today per sister.    Treatment Conditions:  Lab Results   Component Value Date    HGB 7.6 01/20/2021     Lab Results   Component Value Date    WBC 6.7 01/20/2021      Lab Results   Component Value Date    ANEU 6.1 01/20/2021     Lab Results   Component Value Date     01/20/2021      Lab Results   Component Value Date     01/20/2021                   Lab Results   Component Value Date    POTASSIUM 3.7 01/20/2021           Lab Results   Component Value Date     Lab Results   Component Value Date    CR 0.64 01/20/2021                   Lab Results   Component Value Date    FAISAL 8.9 01/20/2021                Lab Results   Component Value Date    BILITOTAL 0.6 01/20/2021           Lab Results   Component Value Date    ALBUMIN 3.4 01/20/2021                    Lab Results   Component Value Date    ALT 34 01/20/2021           Lab Results   Component Value Date    AST 14 01/20/2021       Results reviewed, labs MET treatment parameters, ok to proceed with treatment.      Post Infusion Assessment:  Patient tolerated infusion without incident.  Blood return noted pre and post infusion.  Site patent and intact, free from redness, edema or discomfort.  No evidence of extravasations.     Prior to discharge: Esparza is secured in place with tegaderm and flushed with 10cc NS with positive blood return noted.  Continuous home infusion CADD pump connected.    All  "connectors secured in place and clamps taped open.    Pump started, \"running\" noted on display (CADD): YES.  Pump Connection double checked with Precious Cosme RN.  Patient instructed to call our clinic or [facility] with any questions or concerns at home.  Patient verbalized understanding.    Patient set up for chemo pump disconnect/Mesna bag change/Neulasta Onpro at home with [facility] on 1/27. Mesna pump disconnect on 1/28 also per [facility]. Terri SAM GOMEZ, aware of dates and times.         Discharge Plan:   Prescription refills given for Etoposide.  AVS to patient via MYCHART.  Patient will return tomorrow for PRBCs. Next chemotherapy infusion arranged for 2/12.   Patient discharged in stable condition accompanied by: sister.  Departure Mode: Ambulatory.    LAUREN KNIGHT RN    "

## 2021-01-21 NOTE — LETTER
"    1/21/2021         RE: Diego Buchanan  [address]  [place] [address]        Dear Colleague,    Thank you for referring your patient, Diego Buchanan, to the [facility]. Please see a copy of my visit note below.    Diego is a 25 year old who is being evaluated via a billable video visit.      How would you like to obtain your AVS? MyChart  If the video visit is dropped, the invitation should be resent by: Text to cell phone: [phone]  Will anyone else be joining your video visit? No    Vitals - Patient Reported  Weight (Patient Reported): 99.3 kg (219 lb)  Height (Patient Reported): 172.7 cm (5' 7.99\")  BMI (Based on Pt Reported Ht/Wt): 33.31  Pain Score: No Pain (0)      Renuka REDDY      Video-Visit Details    Type of service:  Video Visit    Video Start Time: 10:26 AM    Video End Time:10:40 AM    Originating Location (pt. 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 He presented initially with abdominal pain, diarrhea, and progressive abdominal distention for 3 months duration. 2. Initial CT scan in February 2020 showed severe peritoneal carcinomatosis with large peritoneal tumor implants throughout the entire abdomen and pelvis, but mostly prominently in the pelvis.   2. PETCT scan showed interval increase in the amount of peritoneal carcinomatosis.  3. On 3/12/2020, he had ultrasound-guided core needle biopsy of a peritoneal implant (Case: [id number]), which showed a completely undifferentiated appearance, but stains with pancytokeratin, indicating an epithelial origin. 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 Tumor markers for CA-19-9, CEA, beta-hCG, alpha-fetoprotein were unremarkable. Cancer type ID molecular testing by [employer] sent to determine the exact diagnosis and to assist in further treatment planning. 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 Relative probability of less than 5% of other subtype of sarcoma. EWSR1-WT1 fusion detected.  4. 5/1/2020, MRI brain negative for brain metastasis, and baseline CT-CAP obtained showing extensive mixed solid and cystic masses throughout the abdomen and pelvis consistent with peritoneal carcinomatosis. Largest mass measures approximately 20 cm in the pelvis. Metastatic mixed solid and cystic masses seen in hepatic segments 5 and 6 and hepatic segment 7. The masses appear to be contiguous with the retrohepatic peritoneal carcinomatosis. Small volume fluid about the spleen favored to represent malignant ascites, stable mild-to-moderate right hydronephrosis related to mass effect upon the distal ureter in the pelvis, the IVC and iliac veins are compressed due to the extensive peritoneal carcinomatosis without findings to suggest deep venous thrombosis.  5. 5/4/2020, he begins CAV/IMV alternating chemotherapy. He receives 6 cycles of treatment. He symptomatically improves.  6. 9/11/2020, CT-CAP shows stable to mildly improved extensive peritoneal carcinomatosis. He would like to omit Ifosfamide/etoposide cycles and continue only with CAV.  7. 10/9/2020, he starts CAV every 21 days.  8. 1/6/2021, CT CAP showed a mixed response in the mixed solid and cystic masses throughout the peritoneum. Some of the tumors are stable to mildly worse, with some of the peritoneal masses a bit larger, a few are smaller, one cystic tumor in the left abdomen is smaller, while tumors lower in the pelvis appear  slightly larger.     HISTORY OF PRESENT ILLNESS  Diego Buchanan was met with over video with sister for follow up  -Has been tired.   -Denies any pain.   -Has been eating well.  -Has been doing okay with his bowel movements every other day.   -Has gone on occasional walks.   -Has intermittent blood in urine. Has mild pain with urination.   -Naps on occasion.     Review of Systems:  Patient denies any of the following except if noted above: fevers, chills, vision or hearing changes, chest pain, dyspnea, nausea, vomiting, diarrhea, headaches, numbness, tingling, issues with sleep or mood.       Current Outpatient Medications   Medication Sig Dispense Refill     allopurinol (ZYLOPRIM) 300 MG tablet Take 1 tablet (300 mg) by mouth daily 30 tablet 0     DOXOrubicin (ADRIAMYCIN) 2 MG/ML injection        etoposide (VEPESID) 50 MG capsule        folic acid (FOLVITE) 1 MG tablet Take 1 tablet (1 mg) by mouth daily 30 tablet 3     LORazepam (ATIVAN) 0.5 MG tablet Take 1 tablet (0.5 mg) by mouth every 4 hours as needed (Anxiety, Nausea/Vomiting or Sleep) 30 tablet 5     metoprolol tartrate (LOPRESSOR) 50 MG tablet Take 1.5 tablets (75 mg) by mouth daily 45 tablet 3     NEULASTA ONPRO 6 MG/0.6ML injection        oxyCODONE (OXY-IR) 5 MG capsule Take 5 mg by mouth every 6 hours as needed for severe pain Prescribed in ED 7/6/20       polyethylene glycol (MIRALAX) 17 g packet Take 17 g by mouth       potassium chloride ER (KLOR-CON M) 20 MEQ CR tablet        prochlorperazine (COMPAZINE) 10 MG tablet Take 1 tablet (10 mg) by mouth every 6 hours as needed (Nausea/Vomiting) 30 tablet 5     senna-docusate (SENNA S) 8.6-50 MG tablet Take 1 tablet by mouth 2 times daily 60 tablet 0     sodium chloride 0.9% infusion        traMADol (ULTRAM) 50 MG tablet Take 50 mg by mouth as needed       mesna (MESNEX) 400 MG TABS tablet Take 1 tablet (400 mg) by mouth every 4 hours for 2 doses Take one tablet 4 hours and 8 hours after end of  cyclophosphamide infusion. 2 tablet 0     mesna (MESNEX) 400 MG TABS tablet Take 1 tablet (400 mg) by mouth every 4 hours for 2 doses Take one tablet 4 hours and 8 hours after end of cyclophosphamide infusion. 2 tablet 0     mesna (MESNEX) 400 MG TABS tablet Take 1 tablet (400 mg) by mouth every 4 hours for 2 doses Take one tablet 4 hours and 8 hours after end of cyclophosphamide infusion. 2 tablet 0     Objective:  General: patient appears well in no acute distress, alert and oriented, speech clear and fluid  Skin: no visualized rash or lesions on visualized skin  Resp: Appears to be breathing comfortably without accessory muscle usage, speaking in full sentences, no audible wheezes or cough.  Psych: Coherent speech, normal rate and volume, able to articulate logical thoughts, able to abstract reason, no tangential thoughts, no hallucinations or delusions  Patient's affect is appropriate.    LABS   1/20/2021 10:35   Sodium 140   Potassium 3.7   Chloride 109   Carbon Dioxide 26   Urea Nitrogen 11   Creatinine 0.64 (L)   GFR Estimate >90   GFR Estimate If Black >90   Calcium 8.9   Anion Gap 5   Albumin 3.4   Protein Total 7.4   Bilirubin Total 0.6   Alkaline Phosphatase 130   ALT 34   AST 14   Glucose 84   WBC 6.7   Hemoglobin 7.6 (L)   Hematocrit 25.3 (L)   Platelet Count 234   RBC Count 2.58 (L)   MCV 98   MCH 29.5   MCHC 30.0 (L)   RDW 20.7 (H)   Diff Method Manual Differential   % Neutrophils 90.4   % Lymphocytes 6.1   % Monocytes 3.5   % Eosinophils 0.0   % Basophils 0.0   Absolute Neutrophil 6.1   Absolute Lymphocytes 0.4 (L)   Absolute Monocytes 0.2   Absolute Eosinophils 0.0   Absolute Basophils 0.0   Anisocytosis Moderate   Poikilocytosis Moderate   Polychromasia Slight   RBC Fragments Slight   Teardrop Cells Moderate   Platelet Estimate Confirming automated cell count       ASSESSMENT AND PLAN  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 Mr. 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 He tolerated 6 alternating cycles of CAV/IMV with anticipated side effects. He developed hematuria with his last 2 cycles of Ifosfamide based therapy despite dose reduction and Mesna prophylaxis, and ultimately patient and family decided to continue with CAV alone. Due to a mixed response on his last CT, it was decided to add back in alternating with IMV.  -He will continue with IMV today, alternating with CAV every 21 days. I will see him back in 3 weeks prior to his next cycle. Will plan to repeat imaging in April.      Anemia, normocytic. Initially microcytic, now normocytic. Hemoglobin is trending down. He remains on folic acid, last check of folate was normal in December 2020. Given worsening of anemia today, will plan to give a blood transfusion either tomorrow or this weekend. He is currently asymptomatic, but I anticipate further worsening of his anemia with IMV, so we will plan to give blood now.      Deconditioning. I encouraged him to go for daily walks for at least 10 minutes per day.     Hematuria. Recurrent. He previously saw urology in September 2020 who felt this could be secondary to cyclophosphamide (despite mesna), new bladder/urethral primary malignancies, or metastatic bladder invasion. He and his family opted against a cystoscopy at that time. Will check a UA today to rule out infection. I suspect it is due to metastatic bladder invasion.    Yasmine Mckeon PA-C  [facility] [address] [address] [phone]

## 2021-01-21 NOTE — PROGRESS NOTES
"Diego is a 25 year old who is being evaluated via a billable video visit.      How would you like to obtain your AVS? MyChart  If the video visit is dropped, the invitation should be resent by: Text to cell phone: [phone]  Will anyone else be joining your video visit? No    Vitals - Patient Reported  Weight (Patient Reported): 99.3 kg (219 lb)  Height (Patient Reported): 172.7 cm (5' 7.99\")  BMI (Based on Pt Reported Ht/Wt): 33.31  Pain Score: No Pain (0)      Renukaroya Camargo BARRY      Video-Visit Details    Type of service:  Video Visit    Video Start Time: 10:26 AM    Video End Time:10:40 AM    Originating Location (pt. 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 He presented initially with abdominal pain, diarrhea, and progressive abdominal distention for 3 months duration. 2. Initial CT scan in February 2020 showed severe peritoneal carcinomatosis with large peritoneal tumor implants throughout the entire abdomen and pelvis, but mostly prominently in the pelvis.   2. PETCT scan showed interval increase in the amount of peritoneal carcinomatosis.  3. On 3/12/2020, he had ultrasound-guided core needle biopsy of a peritoneal implant (Case: [id number]), which showed a completely undifferentiated appearance, but stains with pancytokeratin, indicating an epithelial origin. 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 Tumor markers for CA-19-9, CEA, beta-hCG, alpha-fetoprotein were unremarkable. Cancer type ID molecular testing by [employer] sent to " determine the exact diagnosis and to assist in further treatment planning. 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 Relative probability of less than 5% of other subtype of sarcoma. EWSR1-WT1 fusion detected.  4. 5/1/2020, MRI brain negative for brain metastasis, and baseline CT-CAP obtained showing extensive mixed solid and cystic masses throughout the abdomen and pelvis consistent with peritoneal carcinomatosis. Largest mass measures approximately 20 cm in the pelvis. Metastatic mixed solid and cystic masses seen in hepatic segments 5 and 6 and hepatic segment 7. The masses appear to be contiguous with the retrohepatic peritoneal carcinomatosis. Small volume fluid about the spleen favored to represent malignant ascites, stable mild-to-moderate right hydronephrosis related to mass effect upon the distal ureter in the pelvis, the IVC and iliac veins are compressed due to the extensive peritoneal carcinomatosis without findings to suggest deep venous thrombosis.  5. 5/4/2020, he begins CAV/IMV alternating chemotherapy. He receives 6 cycles of treatment. He symptomatically improves.  6. 9/11/2020, CT-CAP shows stable to mildly improved extensive peritoneal carcinomatosis. He would like to omit Ifosfamide/etoposide cycles and continue only with CAV.  7. 10/9/2020, he starts CAV every 21 days.  8. 1/6/2021, CT CAP showed a mixed response in the mixed solid and cystic masses throughout the peritoneum. Some of the tumors are stable to mildly worse, with some of the peritoneal masses a bit larger, a few are smaller, one cystic tumor in the left abdomen is smaller, while tumors lower in the pelvis appear slightly larger.     HISTORY OF PRESENT ILLNESS  Diego Buchanan was met with over video with sister for follow up  -Has been tired.   -Denies any pain.   -Has been eating well.  -Has been doing okay with his bowel movements every other day.   -Has gone  on occasional walks.   -Has intermittent blood in urine. Has mild pain with urination.   -Naps on occasion.     Review of Systems:  Patient denies any of the following except if noted above: fevers, chills, vision or hearing changes, chest pain, dyspnea, nausea, vomiting, diarrhea, headaches, numbness, tingling, issues with sleep or mood.       Current Outpatient Medications   Medication Sig Dispense Refill     allopurinol (ZYLOPRIM) 300 MG tablet Take 1 tablet (300 mg) by mouth daily 30 tablet 0     DOXOrubicin (ADRIAMYCIN) 2 MG/ML injection        etoposide (VEPESID) 50 MG capsule        folic acid (FOLVITE) 1 MG tablet Take 1 tablet (1 mg) by mouth daily 30 tablet 3     LORazepam (ATIVAN) 0.5 MG tablet Take 1 tablet (0.5 mg) by mouth every 4 hours as needed (Anxiety, Nausea/Vomiting or Sleep) 30 tablet 5     metoprolol tartrate (LOPRESSOR) 50 MG tablet Take 1.5 tablets (75 mg) by mouth daily 45 tablet 3     NEULASTA ONPRO 6 MG/0.6ML injection        oxyCODONE (OXY-IR) 5 MG capsule Take 5 mg by mouth every 6 hours as needed for severe pain Prescribed in ED 7/6/20       polyethylene glycol (MIRALAX) 17 g packet Take 17 g by mouth       potassium chloride ER (KLOR-CON M) 20 MEQ CR tablet        prochlorperazine (COMPAZINE) 10 MG tablet Take 1 tablet (10 mg) by mouth every 6 hours as needed (Nausea/Vomiting) 30 tablet 5     senna-docusate (SENNA S) 8.6-50 MG tablet Take 1 tablet by mouth 2 times daily 60 tablet 0     sodium chloride 0.9% infusion        traMADol (ULTRAM) 50 MG tablet Take 50 mg by mouth as needed       mesna (MESNEX) 400 MG TABS tablet Take 1 tablet (400 mg) by mouth every 4 hours for 2 doses Take one tablet 4 hours and 8 hours after end of cyclophosphamide infusion. 2 tablet 0     mesna (MESNEX) 400 MG TABS tablet Take 1 tablet (400 mg) by mouth every 4 hours for 2 doses Take one tablet 4 hours and 8 hours after end of cyclophosphamide infusion. 2 tablet 0     mesna (MESNEX) 400 MG TABS tablet Take  1 tablet (400 mg) by mouth every 4 hours for 2 doses Take one tablet 4 hours and 8 hours after end of cyclophosphamide infusion. 2 tablet 0     Objective:  General: patient appears well in no acute distress, alert and oriented, speech clear and fluid  Skin: no visualized rash or lesions on visualized skin  Resp: Appears to be breathing comfortably without accessory muscle usage, speaking in full sentences, no audible wheezes or cough.  Psych: Coherent speech, normal rate and volume, able to articulate logical thoughts, able to abstract reason, no tangential thoughts, no hallucinations or delusions  Patient's affect is appropriate.    LABS   1/20/2021 10:35   Sodium 140   Potassium 3.7   Chloride 109   Carbon Dioxide 26   Urea Nitrogen 11   Creatinine 0.64 (L)   GFR Estimate >90   GFR Estimate If Black >90   Calcium 8.9   Anion Gap 5   Albumin 3.4   Protein Total 7.4   Bilirubin Total 0.6   Alkaline Phosphatase 130   ALT 34   AST 14   Glucose 84   WBC 6.7   Hemoglobin 7.6 (L)   Hematocrit 25.3 (L)   Platelet Count 234   RBC Count 2.58 (L)   MCV 98   MCH 29.5   MCHC 30.0 (L)   RDW 20.7 (H)   Diff Method Manual Differential   % Neutrophils 90.4   % Lymphocytes 6.1   % Monocytes 3.5   % Eosinophils 0.0   % Basophils 0.0   Absolute Neutrophil 6.1   Absolute Lymphocytes 0.4 (L)   Absolute Monocytes 0.2   Absolute Eosinophils 0.0   Absolute Basophils 0.0   Anisocytosis Moderate   Poikilocytosis Moderate   Polychromasia Slight   RBC Fragments Slight   Teardrop Cells Moderate   Platelet Estimate Confirming automated cell count       ASSESSMENT AND PLAN  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 Mr. 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 He tolerated 6 alternating cycles of CAV/IMV with anticipated side effects. He  developed hematuria with his last 2 cycles of Ifosfamide based therapy despite dose reduction and Mesna prophylaxis, and ultimately patient and family decided to continue with CAV alone. Due to a mixed response on his last CT, it was decided to add back in alternating with IMV.  -He will continue with IMV today, alternating with CAV every 21 days. I will see him back in 3 weeks prior to his next cycle. Will plan to repeat imaging in April.      Anemia, normocytic. Initially microcytic, now normocytic. Hemoglobin is trending down. He remains on folic acid, last check of folate was normal in December 2020. Given worsening of anemia today, will plan to give a blood transfusion either tomorrow or this weekend. He is currently asymptomatic, but I anticipate further worsening of his anemia with IMV, so we will plan to give blood now.      Deconditioning. I encouraged him to go for daily walks for at least 10 minutes per day.     Hematuria. Recurrent. He previously saw urology in September 2020 who felt this could be secondary to cyclophosphamide (despite mesna), new bladder/urethral primary malignancies, or metastatic bladder invasion. He and his family opted against a cystoscopy at that time. Will check a UA today to rule out infection. I suspect it is due to metastatic bladder invasion.    Yasmine Mckeon PA-C  [facility]  [address] [place] [address] [phone]

## 2021-01-21 NOTE — PATIENT INSTRUCTIONS
Contact Numbers    [facility] Main Line: [phone]  [facility] Triage and after hours / weekends / holidays: [phone]      Please call the triage or after hours line if you experience a temperature greater than or equal to 100.4, shaking chills, have uncontrolled nausea, vomiting and/or diarrhea, dizziness, shortness of breath, chest pain, bleeding, unexplained bruising, or if you have any other new/concerning symptoms, questions or concerns.      If you are having any concerning symptoms or wish to speak to a provider before your next infusion visit, please call your care coordinator or triage to notify them so we can adequately serve you.     If you need a refill on a narcotic prescription or other medication, please call before your infusion appointment.       January 2021 Sunday Monday Tuesday Wednesday Thursday Friday Saturday                            1    LAB   6:30 AM   (15 min.)   [facility] HOME INFUSION   [facility] 2       3     4     5     6    CT CHEST/ABDOMEN/PELVIS W   9:25 AM   (60 min.)   [id number]   [facility] [place] 7     8    VIDEO VISIT RETURN  10:45 AM   (60 min.)   Farzaneh Young MD   [facility] 9       10     11     12     13     14     15     16       17     18     19     20    LAB PERIPHERAL  10:30 AM   (15 min.)   [facility] DRAW   [facility] 21    VIDEO VISIT RETURN  10:05 AM   (90 min.)   Yasmine Mckeon PA-C   [facility] ONC INFUSION 180  11:30 AM   (180 min.)   [facility] ONCOL[facility]Y INFUSION   [facility] 22    [facility] ONC INFUSION 360  12:00 PM   (360 min.)   [facility] ONCOLOGY INFUSION   [facility] 23       24     25     26     27     28     29     30       31                                               February 2021 Sunday Monday Tuesday Wednesday Thursday Friday  Saturday        1     2     3     4     5     6       7     8     9     10    LAB PERIPHERAL  10:30 AM   (15 min.)   [facility] LAB DRAW   [facility] 11    VIDEO VISIT RETURN  10:55 AM   (50 min.)   Yasmine Mckeon PA-C   [facility] 12    [facility] ONC INFUSION 180  12:30 PM   (180 min.)    ONCOLOGY INFUSION   [facility] 13       14     15     16     17     18     19     20       21     22     23     24     25     26     27       28                                                  Recent Results (from the past 24 hour(s))   Routine UA with micro reflex to culture    Collection Time: 01/21/21  2:30 PM    Specimen: Unspecified Urine   Result Value Ref Range    Color Urine Yellow     Appearance Urine Clear     Glucose Urine Negative NEG^Negative mg/dL    Bilirubin Urine Negative NEG^Negative    Ketones Urine Negative NEG^Negative mg/dL    Specific Gravity Urine 1.016 1.003 - 1.035    Blood Urine Small (A) NEG^Negative    pH Urine 6.0 5.0 - 7.0 pH    Protein Albumin Urine Negative NEG^Negative mg/dL    Urobilinogen mg/dL 0.0 0.0 - 2.0 mg/dL    Nitrite Urine Negative NEG^Negative    Leukocyte Esterase Urine Negative NEG^Negative    Source Unspecified Urine     WBC Urine 4 0 - 5 /HPF    RBC Urine 30 (H) 0 - 2 /HPF    Squamous Epithelial /HPF Urine 1 0 - 1 /HPF    Mucous Urine Present (A) NEG^Negative /LPF

## 2021-01-22 ENCOUNTER — HOME INFUSION (PRE-WILLOW HOME INFUSION) (OUTPATIENT)
Dept: PHARMACY | Facility: CLINIC | Age: 26
End: 2021-01-22

## 2021-01-22 ENCOUNTER — INFUSION THERAPY VISIT (OUTPATIENT)
Dept: ONCOLOGY | Facility: CLINIC | Age: 26
End: 2021-01-22
Attending: INTERNAL MEDICINE
Payer: COMMERCIAL

## 2021-01-22 VITALS
HEART RATE: 104 BPM | DIASTOLIC BLOOD PRESSURE: 80 MMHG | TEMPERATURE: 97.9 F | OXYGEN SATURATION: 100 % | SYSTOLIC BLOOD PRESSURE: 138 MMHG | RESPIRATION RATE: 20 BRPM

## 2021-01-22 DIAGNOSIS — C49.9 DESMOPLASTIC SMALL ROUND CELL TUMOR (H): Primary | ICD-10-CM

## 2021-01-22 LAB
BLD PROD TYP BPU: NORMAL
BLD UNIT ID BPU: 0
BLOOD PRODUCT CODE: NORMAL
BPU ID: NORMAL
TRANSFUSION STATUS PATIENT QL: NORMAL
TRANSFUSION STATUS PATIENT QL: NORMAL

## 2021-01-22 PROCEDURE — 250N000011 HC RX IP 250 OP 636: Mod: GT | Performed by: PHYSICIAN ASSISTANT

## 2021-01-22 PROCEDURE — 250N000011 HC RX IP 250 OP 636: Performed by: PHYSICIAN ASSISTANT

## 2021-01-22 PROCEDURE — P9016 RBC LEUKOCYTES REDUCED: HCPCS | Performed by: PHYSICIAN ASSISTANT

## 2021-01-22 PROCEDURE — 36430 TRANSFUSION BLD/BLD COMPNT: CPT

## 2021-01-22 RX ORDER — HEPARIN SODIUM,PORCINE 10 UNIT/ML
5 VIAL (ML) INTRAVENOUS
Status: DISCONTINUED | OUTPATIENT
Start: 2021-01-22 | End: 2021-01-22 | Stop reason: HOSPADM

## 2021-01-22 RX ADMIN — Medication 5 ML: at 14:17

## 2021-01-22 NOTE — PATIENT INSTRUCTIONS
Post Blood Products Discharge Instructions    You have just received a blood product.  During the next 48 hours, please be aware of the following symptoms of a blood product reaction.  If you should experience any of these symptoms call your physician.    -Temperature 100.0 or greater  -Shaking or chills  -Shortness of breath or wheezing  -Headache  -Persistent non-productive cough  -Hives  -Itching  -Extreme weakness  -Facial swelling or flushing  -Red urine    If you experience any of these symptoms, please call triage at [phone]

## 2021-01-22 NOTE — PROGRESS NOTES
Infusion Nursing Note:  Diego Buchanan presents today for 1 unit RBCs.    Patient seen by provider today: No   present during visit today: Not Applicable.    Note: Robert arrives to infusion today doing well. He denies pain, fevers, chills, nausea/vomiting, diarrhea or constipation. No shortness of breath or dizziness/lightheadedness.     Intravenous Access:  Esparza.    Treatment Conditions:  Lab Results   Component Value Date    HGB 7.6 01/20/2021     Lab Results   Component Value Date    WBC 6.7 01/20/2021      Lab Results   Component Value Date    ANEU 6.1 01/20/2021     Lab Results   Component Value Date     01/20/2021      Results reviewed, labs MET treatment parameters, ok to proceed with treatment.  Blood transfusion consent signed 08/27/20.    Post Infusion Assessment:  Patient tolerated infusion without incident.  Blood return noted pre and post infusion.  Esparza heparin locked prior to discharge.     Discharge Plan:   Patient declined prescription refills.  AVS to patient via [employer].  Patient will return 2/13 for next appointment.   Patient discharged in stable condition accompanied by: sister.    Janel Franco, RN

## 2021-01-23 ENCOUNTER — HOME INFUSION (PRE-WILLOW HOME INFUSION) (OUTPATIENT)
Dept: PHARMACY | Facility: CLINIC | Age: 26
End: 2021-01-23

## 2021-01-23 ENCOUNTER — MEDICAL CORRESPONDENCE (OUTPATIENT)
Dept: HEALTH INFORMATION MANAGEMENT | Facility: CLINIC | Age: 26
End: 2021-01-23

## 2021-01-23 LAB
ANION GAP SERPL CALCULATED.3IONS-SCNC: 6 MMOL/L (ref 3–14)
BUN SERPL-MCNC: 9 MG/DL (ref 7–30)
CALCIUM SERPL-MCNC: 8.5 MG/DL (ref 8.5–10.1)
CHLORIDE SERPL-SCNC: 111 MMOL/L (ref 94–109)
CO2 SERPL-SCNC: 26 MMOL/L (ref 20–32)
CREAT SERPL-MCNC: 0.61 MG/DL (ref 0.66–1.25)
GFR SERPL CREATININE-BSD FRML MDRD: >90 ML/MIN/{1.73_M2}
GLUCOSE SERPL-MCNC: 68 MG/DL (ref 70–99)
MAGNESIUM SERPL-MCNC: 2 MG/DL (ref 1.6–2.3)
PHOSPHATE SERPL-MCNC: 4.6 MG/DL (ref 2.5–4.5)
POTASSIUM SERPL-SCNC: 4.1 MMOL/L (ref 3.4–5.3)
SODIUM SERPL-SCNC: 143 MMOL/L (ref 133–144)

## 2021-01-23 PROCEDURE — 80048 BASIC METABOLIC PNL TOTAL CA: CPT | Performed by: PHYSICIAN ASSISTANT

## 2021-01-23 PROCEDURE — 83735 ASSAY OF MAGNESIUM: CPT | Performed by: PHYSICIAN ASSISTANT

## 2021-01-23 PROCEDURE — 84100 ASSAY OF PHOSPHORUS: CPT | Performed by: PHYSICIAN ASSISTANT

## 2021-01-24 ENCOUNTER — MEDICAL CORRESPONDENCE (OUTPATIENT)
Dept: HEALTH INFORMATION MANAGEMENT | Facility: CLINIC | Age: 26
End: 2021-01-24

## 2021-01-24 ENCOUNTER — HOME INFUSION (PRE-WILLOW HOME INFUSION) (OUTPATIENT)
Dept: PHARMACY | Facility: CLINIC | Age: 26
End: 2021-01-24

## 2021-01-24 LAB
ANION GAP SERPL CALCULATED.3IONS-SCNC: 5 MMOL/L (ref 3–14)
BUN SERPL-MCNC: 10 MG/DL (ref 7–30)
CALCIUM SERPL-MCNC: 8.8 MG/DL (ref 8.5–10.1)
CHLORIDE SERPL-SCNC: 110 MMOL/L (ref 94–109)
CO2 SERPL-SCNC: 27 MMOL/L (ref 20–32)
CREAT SERPL-MCNC: 0.58 MG/DL (ref 0.66–1.25)
GFR SERPL CREATININE-BSD FRML MDRD: >90 ML/MIN/{1.73_M2}
GLUCOSE SERPL-MCNC: 75 MG/DL (ref 70–99)
MAGNESIUM SERPL-MCNC: 2 MG/DL (ref 1.6–2.3)
PHOSPHATE SERPL-MCNC: 4.4 MG/DL (ref 2.5–4.5)
POTASSIUM SERPL-SCNC: 3.9 MMOL/L (ref 3.4–5.3)
SODIUM SERPL-SCNC: 142 MMOL/L (ref 133–144)

## 2021-01-24 PROCEDURE — 80048 BASIC METABOLIC PNL TOTAL CA: CPT | Performed by: INTERNAL MEDICINE

## 2021-01-24 PROCEDURE — 84100 ASSAY OF PHOSPHORUS: CPT | Performed by: INTERNAL MEDICINE

## 2021-01-24 PROCEDURE — 83735 ASSAY OF MAGNESIUM: CPT | Performed by: INTERNAL MEDICINE

## 2021-01-25 ENCOUNTER — MEDICAL CORRESPONDENCE (OUTPATIENT)
Dept: HEALTH INFORMATION MANAGEMENT | Facility: CLINIC | Age: 26
End: 2021-01-25

## 2021-01-25 ENCOUNTER — HOME INFUSION (PRE-WILLOW HOME INFUSION) (OUTPATIENT)
Dept: PHARMACY | Facility: CLINIC | Age: 26
End: 2021-01-25

## 2021-01-25 LAB
ANION GAP SERPL CALCULATED.3IONS-SCNC: 8 MMOL/L (ref 3–14)
BUN SERPL-MCNC: 12 MG/DL (ref 7–30)
CALCIUM SERPL-MCNC: 8.7 MG/DL (ref 8.5–10.1)
CHLORIDE SERPL-SCNC: 110 MMOL/L (ref 94–109)
CO2 SERPL-SCNC: 24 MMOL/L (ref 20–32)
CREAT SERPL-MCNC: 0.63 MG/DL (ref 0.66–1.25)
GFR SERPL CREATININE-BSD FRML MDRD: >90 ML/MIN/{1.73_M2}
GLUCOSE SERPL-MCNC: 84 MG/DL (ref 70–99)
MAGNESIUM SERPL-MCNC: 2.1 MG/DL (ref 1.6–2.3)
PHOSPHATE SERPL-MCNC: 4.6 MG/DL (ref 2.5–4.5)
POTASSIUM SERPL-SCNC: 4 MMOL/L (ref 3.4–5.3)
SODIUM SERPL-SCNC: 142 MMOL/L (ref 133–144)

## 2021-01-25 PROCEDURE — 84100 ASSAY OF PHOSPHORUS: CPT | Performed by: INTERNAL MEDICINE

## 2021-01-25 PROCEDURE — 80048 BASIC METABOLIC PNL TOTAL CA: CPT | Performed by: INTERNAL MEDICINE

## 2021-01-25 PROCEDURE — 83735 ASSAY OF MAGNESIUM: CPT | Performed by: INTERNAL MEDICINE

## 2021-01-25 NOTE — PROGRESS NOTES
This is a recent snapshot of the patient's [facility] medical record.  For current drug dose and complete information and questions, call [phone] or In Basket pool, fv home infusion ([id number])  CSN Number:  [id number]

## 2021-01-25 NOTE — PROGRESS NOTES
This is a recent snapshot of the patient's [facility] medical record.  For current drug dose and complete information and questions, call [phone] or In Basket pool, fv home infusion ([id number])  CSN Number:  [id number]

## 2021-01-25 NOTE — PROGRESS NOTES
This is a recent snapshot of the patient's [facility] medical record.  For current drug dose and complete information and questions, call [phone] or In Basket pool, fv home infusion ([id number])  CSN Number:  [id number]

## 2021-01-25 NOTE — PROGRESS NOTES
This is a recent snapshot of the patient's [facility] medical record.  For current drug dose and complete information and questions, call [phone] or In Basket pool, fv home infusion ([id number])  CSN Number:  [id number]

## 2021-01-26 ENCOUNTER — HOME INFUSION (PRE-WILLOW HOME INFUSION) (OUTPATIENT)
Dept: PHARMACY | Facility: CLINIC | Age: 26
End: 2021-01-26

## 2021-01-26 LAB
ANION GAP SERPL CALCULATED.3IONS-SCNC: 6 MMOL/L (ref 3–14)
BUN SERPL-MCNC: 12 MG/DL (ref 7–30)
CALCIUM SERPL-MCNC: 8.8 MG/DL (ref 8.5–10.1)
CHLORIDE SERPL-SCNC: 112 MMOL/L (ref 94–109)
CO2 SERPL-SCNC: 24 MMOL/L (ref 20–32)
CREAT SERPL-MCNC: 0.54 MG/DL (ref 0.66–1.25)
GFR SERPL CREATININE-BSD FRML MDRD: >90 ML/MIN/{1.73_M2}
GLUCOSE SERPL-MCNC: 83 MG/DL (ref 70–99)
MAGNESIUM SERPL-MCNC: 2.1 MG/DL (ref 1.6–2.3)
PHOSPHATE SERPL-MCNC: 3.6 MG/DL (ref 2.5–4.5)
POTASSIUM SERPL-SCNC: 3.9 MMOL/L (ref 3.4–5.3)
SODIUM SERPL-SCNC: 142 MMOL/L (ref 133–144)

## 2021-01-26 PROCEDURE — 80048 BASIC METABOLIC PNL TOTAL CA: CPT | Performed by: INTERNAL MEDICINE

## 2021-01-26 PROCEDURE — 83735 ASSAY OF MAGNESIUM: CPT | Performed by: INTERNAL MEDICINE

## 2021-01-26 PROCEDURE — 84100 ASSAY OF PHOSPHORUS: CPT | Performed by: INTERNAL MEDICINE

## 2021-01-26 NOTE — PROGRESS NOTES
This is a recent snapshot of the patient's [facility] medical record.  For current drug dose and complete information and questions, call [phone] or In Basket pool, fv home infusion ([id number])  CSN Number:  [id number]

## 2021-01-27 ENCOUNTER — HOME INFUSION (PRE-WILLOW HOME INFUSION) (OUTPATIENT)
Dept: PHARMACY | Facility: CLINIC | Age: 26
End: 2021-01-27

## 2021-01-27 ENCOUNTER — MEDICAL CORRESPONDENCE (OUTPATIENT)
Dept: HEALTH INFORMATION MANAGEMENT | Facility: CLINIC | Age: 26
End: 2021-01-27

## 2021-01-27 LAB
ALBUMIN SERPL-MCNC: 3.6 G/DL (ref 3.4–5)
ALP SERPL-CCNC: 93 U/L (ref 40–150)
ALT SERPL W P-5'-P-CCNC: 17 U/L (ref 0–70)
ANION GAP SERPL CALCULATED.3IONS-SCNC: 4 MMOL/L (ref 3–14)
ANISOCYTOSIS BLD QL SMEAR: SLIGHT
AST SERPL W P-5'-P-CCNC: 11 U/L (ref 0–45)
BASOPHILS # BLD AUTO: 0.1 10E9/L (ref 0–0.2)
BASOPHILS NFR BLD AUTO: 1.8 %
BILIRUB SERPL-MCNC: 0.7 MG/DL (ref 0.2–1.3)
BUN SERPL-MCNC: 14 MG/DL (ref 7–30)
CALCIUM SERPL-MCNC: 8.7 MG/DL (ref 8.5–10.1)
CHLORIDE SERPL-SCNC: 110 MMOL/L (ref 94–109)
CO2 SERPL-SCNC: 26 MMOL/L (ref 20–32)
CREAT SERPL-MCNC: 0.6 MG/DL (ref 0.66–1.25)
DIFFERENTIAL METHOD BLD: ABNORMAL
ELLIPTOCYTES BLD QL SMEAR: SLIGHT
EOSINOPHIL # BLD AUTO: 0 10E9/L (ref 0–0.7)
EOSINOPHIL NFR BLD AUTO: 0 %
ERYTHROCYTE [DISTWIDTH] IN BLOOD BY AUTOMATED COUNT: 21.2 % (ref 10–15)
GFR SERPL CREATININE-BSD FRML MDRD: >90 ML/MIN/{1.73_M2}
GLUCOSE SERPL-MCNC: 80 MG/DL (ref 70–99)
HCT VFR BLD AUTO: 27.2 % (ref 40–53)
HGB BLD-MCNC: 8.5 G/DL (ref 13.3–17.7)
LYMPHOCYTES # BLD AUTO: 0.2 10E9/L (ref 0.8–5.3)
LYMPHOCYTES NFR BLD AUTO: 4.5 %
MAGNESIUM SERPL-MCNC: 2.1 MG/DL (ref 1.6–2.3)
MCH RBC QN AUTO: 29 PG (ref 26.5–33)
MCHC RBC AUTO-ENTMCNC: 31.3 G/DL (ref 31.5–36.5)
MCV RBC AUTO: 93 FL (ref 78–100)
METAMYELOCYTES # BLD: 0.3 10E9/L
METAMYELOCYTES NFR BLD MANUAL: 5.5 %
MONOCYTES # BLD AUTO: 0 10E9/L (ref 0–1.3)
MONOCYTES NFR BLD AUTO: 0 %
NEUTROPHILS # BLD AUTO: 4.9 10E9/L (ref 1.6–8.3)
NEUTROPHILS NFR BLD AUTO: 88.2 %
OVALOCYTES BLD QL SMEAR: SLIGHT
PHOSPHATE SERPL-MCNC: 3.4 MG/DL (ref 2.5–4.5)
PLATELET # BLD AUTO: 280 10E9/L (ref 150–450)
PLATELET # BLD EST: NORMAL 10*3/UL
POIKILOCYTOSIS BLD QL SMEAR: ABNORMAL
POLYCHROMASIA BLD QL SMEAR: ABNORMAL
POTASSIUM SERPL-SCNC: 3.8 MMOL/L (ref 3.4–5.3)
PROT SERPL-MCNC: 7.2 G/DL (ref 6.8–8.8)
RBC # BLD AUTO: 2.93 10E12/L (ref 4.4–5.9)
RBC INCLUSIONS BLD: SLIGHT
SODIUM SERPL-SCNC: 140 MMOL/L (ref 133–144)
WBC # BLD AUTO: 5.5 10E9/L (ref 4–11)

## 2021-01-27 PROCEDURE — 80053 COMPREHEN METABOLIC PANEL: CPT | Performed by: INTERNAL MEDICINE

## 2021-01-27 PROCEDURE — 84100 ASSAY OF PHOSPHORUS: CPT | Performed by: INTERNAL MEDICINE

## 2021-01-27 PROCEDURE — 85025 COMPLETE CBC W/AUTO DIFF WBC: CPT | Performed by: INTERNAL MEDICINE

## 2021-01-27 PROCEDURE — 83735 ASSAY OF MAGNESIUM: CPT | Performed by: INTERNAL MEDICINE

## 2021-01-27 NOTE — PROGRESS NOTES
This is a recent snapshot of the patient's [facility] medical record.  For current drug dose and complete information and questions, call [phone] or In [facility] pool, fv home infusion ([id number])  CSN Number:  [id number]

## 2021-01-28 ENCOUNTER — HOME INFUSION (PRE-WILLOW HOME INFUSION) (OUTPATIENT)
Dept: PHARMACY | Facility: CLINIC | Age: 26
End: 2021-01-28

## 2021-01-28 ENCOUNTER — TELEPHONE (OUTPATIENT)
Dept: ONCOLOGY | Facility: CLINIC | Age: 26
End: 2021-01-28

## 2021-01-28 ENCOUNTER — MEDICAL CORRESPONDENCE (OUTPATIENT)
Dept: HEALTH INFORMATION MANAGEMENT | Facility: CLINIC | Age: 26
End: 2021-01-28

## 2021-01-28 LAB
ANION GAP SERPL CALCULATED.3IONS-SCNC: 5 MMOL/L (ref 3–14)
BUN SERPL-MCNC: 13 MG/DL (ref 7–30)
CALCIUM SERPL-MCNC: 8.2 MG/DL (ref 8.5–10.1)
CHLORIDE SERPL-SCNC: 111 MMOL/L (ref 94–109)
CO2 SERPL-SCNC: 24 MMOL/L (ref 20–32)
CREAT SERPL-MCNC: 0.56 MG/DL (ref 0.66–1.25)
GFR SERPL CREATININE-BSD FRML MDRD: >90 ML/MIN/{1.73_M2}
GLUCOSE SERPL-MCNC: 126 MG/DL (ref 70–99)
MAGNESIUM SERPL-MCNC: 2 MG/DL (ref 1.6–2.3)
PHOSPHATE SERPL-MCNC: 3.1 MG/DL (ref 2.5–4.5)
POTASSIUM SERPL-SCNC: 3.4 MMOL/L (ref 3.4–5.3)
SODIUM SERPL-SCNC: 140 MMOL/L (ref 133–144)

## 2021-01-28 PROCEDURE — 83735 ASSAY OF MAGNESIUM: CPT | Performed by: INTERNAL MEDICINE

## 2021-01-28 PROCEDURE — 80048 BASIC METABOLIC PNL TOTAL CA: CPT | Performed by: INTERNAL MEDICINE

## 2021-01-28 PROCEDURE — 84100 ASSAY OF PHOSPHORUS: CPT | Performed by: INTERNAL MEDICINE

## 2021-01-28 NOTE — TELEPHONE ENCOUNTER
Oral Chemotherapy Monitoring Program    Subjective/Objective:  Diego Buchanan is a 25 year old male contacted by phone for a follow-up visit for oral chemotherapy. I spoke to Diego's sister, Mariela, regarding the restart of etoposide therapy. She reports that he was able to start therapy on 1/21/21 as planned and that he has not complained of any side effects. She reports that he completed all 6 days without missing any doses. Denied having any questions at this time.     ORAL CHEMOTHERAPY 6/3/2020 1/21/2021 1/28/2021   Assessment Type - New Teach Initial Follow up   Diagnosis Code - (No Data) (No Data)   Providers - (No Data) Dr. Morales Sims   [facility]   Drug Name (No Data) Etoposide Etoposide   Dose - 200 mg 200 mg   Current Schedule Daily Daily Daily   Cycle Details Other (No Data) Other   Start Date of Last Cycle 5/27/2020 1/21/2021 1/21/2021   Planned next cycle start date 7/7/2020 2/11/2021 2/11/2021   Doses missed in last 2 weeks 0 - 0   Adherence Assessment Adherent - Adherent   Adverse Effects Nausea - No AE identified during assessment   Nausea Grade 1 - -   Pharmacist Intervention(nausea) Yes - -   Any new drug interactions? No No No   Is the dose as ordered appropriate for the patient? Yes - Yes   Is the patient currently in pain? - - No   Has the patient been assessed within the past 6 months for depression? - - Yes   Has the patient missed any days of school, work, or other routine activity? - - No   Since the last time we talked, have you been hospitalized or used the emergency room? - - No       Last PHQ-2 Score on record:   PHQ-2 ( 1999 [employer]) 9/22/2020   Q1: Little interest or pleasure in doing things 0   Q2: Feeling down, depressed or hopeless 0   PHQ-2 Score 0       Vitals:  BP:   BP Readings from Last 1 Encounters:   01/22/21 138/80     Wt Readings from Last 1 Encounters:   01/20/21 99.4 kg (219 lb 3.2 oz)     Estimated body surface area is 2.18 meters squared  "as calculated from the following:    Height as of 9/3/20: 1.727 m (5' 7.99\").    Weight as of 1/20/21: 99.4 kg (219 lb 3.2 oz).    Labs:  _  Result Component Current Result Ref Range   Sodium 140 (1/27/2021) 133 - 144 mmol/L     _  Result Component Current Result Ref Range   Potassium 3.8 (1/27/2021) 3.4 - 5.3 mmol/L     _  Result Component Current Result Ref Range   Calcium 8.7 (1/27/2021) 8.5 - 10.1 mg/dL     _  Result Component Current Result Ref Range   Magnesium 2.1 (1/27/2021) 1.6 - 2.3 mg/dL     _  Result Component Current Result Ref Range   Phosphorus 3.4 (1/27/2021) 2.5 - 4.5 mg/dL     _  Result Component Current Result Ref Range   Albumin 3.6 (1/27/2021) 3.4 - 5.0 g/dL     _  Result Component Current Result Ref Range   Urea Nitrogen 14 (1/27/2021) 7 - 30 mg/dL     _  Result Component Current Result Ref Range   Creatinine 0.60 (L) (1/27/2021) 0.66 - 1.25 mg/dL     _  Result Component Current Result Ref Range   AST 11 (1/27/2021) 0 - 45 U/L     _  Result Component Current Result Ref Range   ALT 17 (1/27/2021) 0 - 70 U/L     _  Result Component Current Result Ref Range   Bilirubin Total 0.7 (1/27/2021) 0.2 - 1.3 mg/dL     _  Result Component Current Result Ref Range   WBC 5.5 (1/27/2021) 4.0 - 11.0 10e9/L     _  Result Component Current Result Ref Range   Hemoglobin 8.5 (L) (1/27/2021) 13.3 - 17.7 g/dL     _  Result Component Current Result Ref Range   Platelet Count 280 (1/27/2021) 150 - 450 10e9/L     _  Result Component Current Result Ref Range   Absolute Neutrophil 4.9 (1/27/2021) 1.6 - 8.3 10e9/L     Assessment/Plan:  Diego is tolerating the start of therapy well. Continue etoposide therapy as planned.     Follow-Up:  2/11: appt with Yasmine Rich Due:  2/8 for 2/11      Maria E NealD, BCACP  Oral Chemotherapy Monitoring Program  [facility]  [phone]   "

## 2021-01-29 NOTE — PROGRESS NOTES
This is a recent snapshot of the patient's [facility] medical record.  For current drug dose and complete information and questions, call [phone] or In Basket pool, fv home infusion ([id number])  CSN Number:  [id number]

## 2021-02-01 ENCOUNTER — APPOINTMENT (OUTPATIENT)
Dept: LAB | Facility: CLINIC | Age: 26
End: 2021-02-01
Attending: INTERNAL MEDICINE
Payer: COMMERCIAL

## 2021-02-09 NOTE — PROGRESS NOTES
This is a recent snapshot of the patient's [facility] medical record.  For current drug dose and complete information and questions, call [phone] or In Basket pool, fv home infusion ([id number])  CSN Number:  [id number]

## 2021-02-10 VITALS
RESPIRATION RATE: 18 BRPM | OXYGEN SATURATION: 100 % | SYSTOLIC BLOOD PRESSURE: 108 MMHG | HEART RATE: 70 BPM | WEIGHT: 230.9 LBS | DIASTOLIC BLOOD PRESSURE: 71 MMHG | TEMPERATURE: 98.2 F | BODY MASS INDEX: 35.12 KG/M2

## 2021-02-10 DIAGNOSIS — C41.9 SARCOMA, EWINGS (H): ICD-10-CM

## 2021-02-10 LAB
ALBUMIN SERPL-MCNC: 3.5 G/DL (ref 3.4–5)
ALP SERPL-CCNC: 144 U/L (ref 40–150)
ALT SERPL W P-5'-P-CCNC: 27 U/L (ref 0–70)
ANION GAP SERPL CALCULATED.3IONS-SCNC: 6 MMOL/L (ref 3–14)
AST SERPL W P-5'-P-CCNC: 14 U/L (ref 0–45)
BASOPHILS # BLD AUTO: 0.1 10E9/L (ref 0–0.2)
BASOPHILS NFR BLD AUTO: 0.5 %
BILIRUB SERPL-MCNC: 0.4 MG/DL (ref 0.2–1.3)
BUN SERPL-MCNC: 12 MG/DL (ref 7–30)
CALCIUM SERPL-MCNC: 8.9 MG/DL (ref 8.5–10.1)
CHLORIDE SERPL-SCNC: 111 MMOL/L (ref 94–109)
CO2 SERPL-SCNC: 25 MMOL/L (ref 20–32)
CREAT SERPL-MCNC: 0.69 MG/DL (ref 0.66–1.25)
DIFFERENTIAL METHOD BLD: ABNORMAL
EOSINOPHIL # BLD AUTO: 0.2 10E9/L (ref 0–0.7)
EOSINOPHIL NFR BLD AUTO: 2 %
ERYTHROCYTE [DISTWIDTH] IN BLOOD BY AUTOMATED COUNT: 22.1 % (ref 10–15)
GFR SERPL CREATININE-BSD FRML MDRD: >90 ML/MIN/{1.73_M2}
GLUCOSE SERPL-MCNC: 90 MG/DL (ref 70–99)
HCT VFR BLD AUTO: 30.6 % (ref 40–53)
HGB BLD-MCNC: 9.3 G/DL (ref 13.3–17.7)
IMM GRANULOCYTES # BLD: 0.5 10E9/L (ref 0–0.4)
IMM GRANULOCYTES NFR BLD: 3.8 %
LYMPHOCYTES # BLD AUTO: 0.6 10E9/L (ref 0.8–5.3)
LYMPHOCYTES NFR BLD AUTO: 5.1 %
MCH RBC QN AUTO: 29.1 PG (ref 26.5–33)
MCHC RBC AUTO-ENTMCNC: 30.4 G/DL (ref 31.5–36.5)
MCV RBC AUTO: 96 FL (ref 78–100)
MONOCYTES # BLD AUTO: 0.8 10E9/L (ref 0–1.3)
MONOCYTES NFR BLD AUTO: 6.5 %
NEUTROPHILS # BLD AUTO: 9.9 10E9/L (ref 1.6–8.3)
NEUTROPHILS NFR BLD AUTO: 82.1 %
NRBC # BLD AUTO: 0 10*3/UL
NRBC BLD AUTO-RTO: 0 /100
PLATELET # BLD AUTO: 266 10E9/L (ref 150–450)
POTASSIUM SERPL-SCNC: 3.9 MMOL/L (ref 3.4–5.3)
PROT SERPL-MCNC: 7.4 G/DL (ref 6.8–8.8)
RBC # BLD AUTO: 3.2 10E12/L (ref 4.4–5.9)
SODIUM SERPL-SCNC: 142 MMOL/L (ref 133–144)
WBC # BLD AUTO: 12.1 10E9/L (ref 4–11)

## 2021-02-10 PROCEDURE — 80053 COMPREHEN METABOLIC PANEL: CPT | Performed by: INTERNAL MEDICINE

## 2021-02-10 PROCEDURE — 85025 COMPLETE CBC W/AUTO DIFF WBC: CPT | Performed by: INTERNAL MEDICINE

## 2021-02-10 ASSESSMENT — PAIN SCALES - GENERAL: PAINLEVEL: NO PAIN (0)

## 2021-02-10 NOTE — NURSING NOTE
Chief Complaint   Patient presents with     Blood Draw     labs drawn via CVC by RN in lab      /71 (BP Location: Right arm, Patient Position: Sitting, Cuff Size: Adult Regular)   Pulse 70   Temp 98.2  F (36.8  C) (Oral)   Resp 18   Wt 104.7 kg (230 lb 14.4 oz)   SpO2 100%   BMI 35.12 kg/m      Lines accessed. Labs drawn via red lumen. Both lines flushed with normal saline and heparin. Pt tolerated well. Checked into next appointment.    Priscilla Anderson RN on 2/10/2021 at 11:02 AM

## 2021-02-11 ENCOUNTER — VIRTUAL VISIT (OUTPATIENT)
Dept: ONCOLOGY | Facility: CLINIC | Age: 26
End: 2021-02-11
Attending: INTERNAL MEDICINE
Payer: COMMERCIAL

## 2021-02-11 ENCOUNTER — RECORDS - HEALTHEAST (OUTPATIENT)
Dept: ADMINISTRATIVE | Facility: OTHER | Age: 26
End: 2021-02-11

## 2021-02-11 ENCOUNTER — HOME INFUSION (PRE-WILLOW HOME INFUSION) (OUTPATIENT)
Dept: PHARMACY | Facility: CLINIC | Age: 26
End: 2021-02-11

## 2021-02-11 DIAGNOSIS — C41.9 SARCOMA, EWINGS (H): ICD-10-CM

## 2021-02-11 DIAGNOSIS — R53.81 PHYSICAL DECONDITIONING: ICD-10-CM

## 2021-02-11 DIAGNOSIS — D64.9 ANEMIA, NORMOCYTIC NORMOCHROMIC: ICD-10-CM

## 2021-02-11 DIAGNOSIS — R31.9 HEMATURIA, UNSPECIFIED TYPE: ICD-10-CM

## 2021-02-11 DIAGNOSIS — D72.828 NEUTROPHILIA: ICD-10-CM

## 2021-02-11 DIAGNOSIS — C49.9 DESMOPLASTIC SMALL ROUND CELL TUMOR (H): Primary | ICD-10-CM

## 2021-02-11 PROCEDURE — 99214 OFFICE O/P EST MOD 30 MIN: CPT | Mod: 95 | Performed by: PHYSICIAN ASSISTANT

## 2021-02-11 PROCEDURE — 999N001193 HC VIDEO/TELEPHONE VISIT; NO CHARGE

## 2021-02-11 RX ORDER — METHYLPREDNISOLONE SODIUM SUCCINATE 125 MG/2ML
125 INJECTION, POWDER, LYOPHILIZED, FOR SOLUTION INTRAMUSCULAR; INTRAVENOUS
Status: CANCELLED
Start: 2021-02-12

## 2021-02-11 RX ORDER — DIPHENHYDRAMINE HYDROCHLORIDE 50 MG/ML
50 INJECTION INTRAMUSCULAR; INTRAVENOUS
Status: CANCELLED
Start: 2021-02-12

## 2021-02-11 RX ORDER — LORAZEPAM 2 MG/ML
0.5 INJECTION INTRAMUSCULAR EVERY 4 HOURS PRN
Status: CANCELLED
Start: 2021-02-12

## 2021-02-11 RX ORDER — ALBUTEROL SULFATE 0.83 MG/ML
2.5 SOLUTION RESPIRATORY (INHALATION)
Status: CANCELLED | OUTPATIENT
Start: 2021-02-12

## 2021-02-11 RX ORDER — HEPARIN SODIUM (PORCINE) LOCK FLUSH IV SOLN 100 UNIT/ML 100 UNIT/ML
5 SOLUTION INTRAVENOUS
Status: CANCELLED | OUTPATIENT
Start: 2021-02-12

## 2021-02-11 RX ORDER — SODIUM CHLORIDE 9 MG/ML
1000 INJECTION, SOLUTION INTRAVENOUS CONTINUOUS PRN
Status: CANCELLED
Start: 2021-02-12

## 2021-02-11 RX ORDER — HEPARIN SODIUM,PORCINE 10 UNIT/ML
5 VIAL (ML) INTRAVENOUS
Status: CANCELLED | OUTPATIENT
Start: 2021-02-12

## 2021-02-11 RX ORDER — NALOXONE HYDROCHLORIDE 0.4 MG/ML
.1-.4 INJECTION, SOLUTION INTRAMUSCULAR; INTRAVENOUS; SUBCUTANEOUS
Status: CANCELLED | OUTPATIENT
Start: 2021-02-12

## 2021-02-11 RX ORDER — PALONOSETRON 0.05 MG/ML
0.25 INJECTION, SOLUTION INTRAVENOUS ONCE
Status: CANCELLED | OUTPATIENT
Start: 2021-02-12

## 2021-02-11 RX ORDER — MEPERIDINE HYDROCHLORIDE 25 MG/ML
25 INJECTION INTRAMUSCULAR; INTRAVENOUS; SUBCUTANEOUS EVERY 30 MIN PRN
Status: CANCELLED | OUTPATIENT
Start: 2021-02-12

## 2021-02-11 RX ORDER — ALBUTEROL SULFATE 90 UG/1
1-2 AEROSOL, METERED RESPIRATORY (INHALATION)
Status: CANCELLED
Start: 2021-02-12

## 2021-02-11 RX ORDER — EPINEPHRINE 1 MG/ML
0.3 INJECTION, SOLUTION INTRAMUSCULAR; SUBCUTANEOUS EVERY 5 MIN PRN
Status: CANCELLED | OUTPATIENT
Start: 2021-02-12

## 2021-02-11 NOTE — LETTER
2/11/2021         RE: Diego Buchanan  [address]  [place] [address]        Dear Colleague,    Thank you for referring your patient, Diego Buchanan, to the [facility]. Please see a copy of my visit note below.    Diego is a 25 year old who is being evaluated via a billable video visit.      How would you like to obtain your AVS? MyChart  If the video visit is dropped, the invitation should be resent by: Text to cell phone: [phone]  Will anyone else be joining your video visit? No       I have reviewed and updated the patient's allergies and medication list.    Concerns: none  Refills: none     Vitals - Patient Reported  Weight (Patient Reported): 104.3 kg (230 lb)    Harriet Rogers CMA      Video-Visit Details    Type of service:  Video Visit    Video Start Time: 11:16 AM    Video End Time:11:28 AM    Originating Location (pt. 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 He presented initially with abdominal pain, diarrhea, and progressive abdominal distention for 3 months duration. 2. Initial CT scan in February 2020 showed severe peritoneal carcinomatosis with large peritoneal tumor implants throughout the entire abdomen and pelvis, but mostly prominently in the pelvis.   2. PETCT scan showed interval increase in the amount of peritoneal carcinomatosis.  3. On 3/12/2020, he had ultrasound-guided core needle biopsy of a peritoneal implant (Case: [id number]), which showed a completely undifferentiated appearance, but stains with pancytokeratin, indicating an epithelial origin. 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 Tumor markers for CA-19-9, CEA, beta-hCG, alpha-fetoprotein were unremarkable. Cancer type ID molecular testing by [employer] sent to determine the exact diagnosis and to assist in further treatment planning. 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 Relative probability of less than 5% of other subtype of sarcoma. EWSR1-WT1 fusion detected.  4. 5/1/2020, MRI brain negative for brain metastasis, and baseline CT-CAP obtained showing extensive mixed solid and cystic masses throughout the abdomen and pelvis consistent with peritoneal carcinomatosis. Largest mass measures approximately 20 cm in the pelvis. Metastatic mixed solid and cystic masses seen in hepatic segments 5 and 6 and hepatic segment 7. The masses appear to be contiguous with the retrohepatic peritoneal carcinomatosis. Small volume fluid about the spleen favored to represent malignant ascites, stable mild-to-moderate right hydronephrosis related to mass effect upon the distal ureter in the pelvis, the IVC and iliac veins are compressed due to the extensive peritoneal carcinomatosis without findings to suggest deep venous thrombosis.  5. 5/4/2020, he begins CAV/IMV alternating chemotherapy. He receives 6 cycles of treatment. He symptomatically improves.  6. 9/11/2020, CT-CAP shows stable to mildly improved extensive peritoneal carcinomatosis. He would like to omit Ifosfamide/etoposide cycles and continue only with CAV.  7. 10/9/2020, he starts CAV every 21 days.  8. 1/6/2021, CT CAP showed a mixed response in the mixed solid and cystic masses throughout the peritoneum. Some of the tumors are stable to mildly worse, with some of the peritoneal masses a bit larger, a few are smaller, one cystic tumor in the left abdomen is smaller, while tumors lower in the pelvis appear  slightly larger.     HISTORY OF PRESENT ILLNESS  Diego Buchanan was met with over video with sister for follow up.  -Has been doing okay.  -No blood in urine lately.  -Eating and drinking well.   -Has been having bowel movements every other day. Sometimes, stools are hard. Has been taking stool softener bid.   -Has not been for any walks.   -Likes to play video games. Final Fantasy 15 and [employer].     Review of Systems:  Patient denies any of the following except if noted above: fevers, chills, vision or hearing changes, chest pain, dyspnea, abdominal pain, nausea, vomiting, diarrhea, urinary concerns, headaches, numbness, tingling, issues with sleep or mood.     Current Outpatient Medications   Medication Sig Dispense Refill     allopurinol (ZYLOPRIM) 300 MG tablet Take 1 tablet (300 mg) by mouth daily 30 tablet 0     DOXOrubicin (ADRIAMYCIN) 2 MG/ML injection        etoposide (VEPESID) 50 MG capsule        folic acid (FOLVITE) 1 MG tablet Take 1 tablet (1 mg) by mouth daily 30 tablet 3     LORazepam (ATIVAN) 0.5 MG tablet Take 1 tablet (0.5 mg) by mouth every 4 hours as needed (Anxiety, Nausea/Vomiting or Sleep) 30 tablet 5     metoprolol tartrate (LOPRESSOR) 50 MG tablet Take 1.5 tablets (75 mg) by mouth daily 45 tablet 3     NEULASTA ONPRO 6 MG/0.6ML injection        oxyCODONE (OXY-IR) 5 MG capsule Take 5 mg by mouth every 6 hours as needed for severe pain Prescribed in ED 7/6/20       polyethylene glycol (MIRALAX) 17 g packet Take 17 g by mouth       potassium chloride ER (KLOR-CON M) 20 MEQ CR tablet        prochlorperazine (COMPAZINE) 10 MG tablet Take 1 tablet (10 mg) by mouth every 6 hours as needed (Nausea/Vomiting) 30 tablet 5     senna-docusate (SENNA S) 8.6-50 MG tablet Take 1 tablet by mouth 2 times daily 60 tablet 0     sodium chloride 0.9% infusion        traMADol (ULTRAM) 50 MG tablet Take 50 mg by mouth as needed       etoposide (VEPESID) 50 MG capsule Take 4 capsules (200 mg) by mouth daily for 6  days Days 1 through 6. 24 capsule 0     mesna (MESNEX) 400 MG TABS tablet Take 1 tablet (400 mg) by mouth every 4 hours for 2 doses Take one tablet 4 hours and 8 hours after end of cyclophosphamide infusion. 2 tablet 0     mesna (MESNEX) 400 MG TABS tablet Take 1 tablet (400 mg) by mouth every 4 hours for 2 doses Take one tablet 4 hours and 8 hours after end of cyclophosphamide infusion. 2 tablet 0     mesna (MESNEX) 400 MG TABS tablet Take 1 tablet (400 mg) by mouth every 4 hours for 2 doses Take one tablet 4 hours and 8 hours after end of cyclophosphamide infusion. 2 tablet 0     Objective:  General: patient appears well in no acute distress, alert and oriented, speech clear and fluid  Skin: no visualized rash or lesions on visualized skin  Resp: Appears to be breathing comfortably without accessory muscle usage, speaking in full sentences, no audible wheezes or cough.  Psych: Coherent speech, normal rate and volume, able to articulate logical thoughts, able to abstract reason, no tangential thoughts, no hallucinations or delusions  Patient's affect is appropriate.    LABS   2/10/2021 10:51   Sodium 142   Potassium 3.9   Chloride 111 (H)   Carbon Dioxide 25   Urea Nitrogen 12   Creatinine 0.69   GFR Estimate >90   GFR Estimate If Black >90   Calcium 8.9   Anion Gap 6   Albumin 3.5   Protein Total 7.4   Bilirubin Total 0.4   Alkaline Phosphatase 144   ALT 27   AST 14   Glucose 90   WBC 12.1 (H)   Hemoglobin 9.3 (L)   Hematocrit 30.6 (L)   Platelet Count 266   RBC Count 3.20 (L)   MCV 96   MCH 29.1   MCHC 30.4 (L)   RDW 22.1 (H)   Diff Method Automated Method   % Neutrophils 82.1   % Lymphocytes 5.1   % Monocytes 6.5   % Eosinophils 2.0   % Basophils 0.5   % Immature Granulocytes 3.8   Nucleated RBCs 0   Absolute Neutrophil 9.9 (H)   Absolute Lymphocytes 0.6 (L)   Absolute Monocytes 0.8   Absolute Eosinophils 0.2   Absolute Basophils 0.1   Abs Immature Granulocytes 0.5 (H)   Absolute Nucleated RBC 0.0     ASSESSMENT  AND PLAN  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 Mr. 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 He tolerated 6 alternating cycles of CAV/IMV with anticipated side effects. He developed hematuria with his last 2 cycles of Ifosfamide based therapy despite dose reduction and Mesna prophylaxis, and ultimately patient and family decided to continue with CAV alone. Due to a mixed response on his last CT, it was decided to add back in alternating with IMV.  -He tolerating resuming IMV well. His hematuria has actually been better lately. He will continue with his next cycle of CAV tomorrow. He will continue alternating IMV with CAV every 21 days. I will see him back in 3 weeks prior to his next cycle. Will plan to repeat imaging in April.      Anemia, normocytic. Initially microcytic, now normocytic. Hemoglobin is trending up. He is not taking folate recently. I suspect with no hematuria recently his hemoglobin is improving. Will continue to monitor closely.     Deconditioning. I encouraged him to go for daily walks for at least 10 minutes per day once very cold weather resolves.     Hematuria. Recurrent. Better over the last few weeks. He previously saw urology in September 2020 who felt this could be secondary to cyclophosphamide (despite mesna), new bladder/urethral primary malignancies, or metastatic bladder invasion. He and his family opted against a cystoscopy at that time. I suspect it is due to metastatic bladder invasion.    Neutrophilia. Secondary to Neulasta. No signs/symptoms of infection currently.     Yasmine Mckeon PA-C  [facility] [address] [id number] [phone]

## 2021-02-11 NOTE — PROGRESS NOTES
Diego is a 25 year old who is being evaluated via a billable video visit.      How would you like to obtain your AVS? [employer]  If the video visit is dropped, the invitation should be resent by: Text to cell phone: [phone]  Will anyone else be joining your video visit? No       I have reviewed and updated the patient's allergies and medication list.    Concerns: none  Refills: none     Vitals - Patient Reported  Weight (Patient Reported): 104.3 kg (230 lb)    Harriet Rogers CMA      Video-Visit Details    Type of service:  Video Visit    Video Start Time: 11:16 AM    Video End Time:11:28 AM    Originating Location (pt. 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 He presented initially with abdominal pain, diarrhea, and progressive abdominal distention for 3 months duration. 2. Initial CT scan in February 2020 showed severe peritoneal carcinomatosis with large peritoneal tumor implants throughout the entire abdomen and pelvis, but mostly prominently in the pelvis.   2. PETCT scan showed interval increase in the amount of peritoneal carcinomatosis.  3. On 3/12/2020, he had ultrasound-guided core needle biopsy of a peritoneal implant (Case: [id number]), which showed a completely undifferentiated appearance, but stains with pancytokeratin, indicating an epithelial origin. 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 Tumor markers for CA-19-9, CEA, beta-hCG, alpha-fetoprotein were unremarkable. Cancer type ID molecular testing by [facility] sent to  determine the exact diagnosis and to assist in further treatment planning. 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 Relative probability of less than 5% of other subtype of sarcoma. EWSR1-WT1 fusion detected.  4. 5/1/2020, MRI brain negative for brain metastasis, and baseline CT-CAP obtained showing extensive mixed solid and cystic masses throughout the abdomen and pelvis consistent with peritoneal carcinomatosis. Largest mass measures approximately 20 cm in the pelvis. Metastatic mixed solid and cystic masses seen in hepatic segments 5 and 6 and hepatic segment 7. The masses appear to be contiguous with the retrohepatic peritoneal carcinomatosis. Small volume fluid about the spleen favored to represent malignant ascites, stable mild-to-moderate right hydronephrosis related to mass effect upon the distal ureter in the pelvis, the IVC and iliac veins are compressed due to the extensive peritoneal carcinomatosis without findings to suggest deep venous thrombosis.  5. 5/4/2020, he begins CAV/IMV alternating chemotherapy. He receives 6 cycles of treatment. He symptomatically improves.  6. 9/11/2020, CT-CAP shows stable to mildly improved extensive peritoneal carcinomatosis. He would like to omit Ifosfamide/etoposide cycles and continue only with CAV.  7. 10/9/2020, he starts CAV every 21 days.  8. 1/6/2021, CT CAP showed a mixed response in the mixed solid and cystic masses throughout the peritoneum. Some of the tumors are stable to mildly worse, with some of the peritoneal masses a bit larger, a few are smaller, one cystic tumor in the left abdomen is smaller, while tumors lower in the pelvis appear slightly larger.     HISTORY OF PRESENT ILLNESS  Diego Buchanan was met with over video with sister for follow up.  -Has been doing okay.  -No blood in urine lately.  -Eating and drinking well.   -Has been having bowel movements every other day.  Sometimes, stools are hard. Has been taking stool softener bid.   -Has not been for any walks.   -Likes to play video games. Final Fantasy 15 and [employer].     Review of Systems:  Patient denies any of the following except if noted above: fevers, chills, vision or hearing changes, chest pain, dyspnea, abdominal pain, nausea, vomiting, diarrhea, urinary concerns, headaches, numbness, tingling, issues with sleep or mood.     Current Outpatient Medications   Medication Sig Dispense Refill     allopurinol (ZYLOPRIM) 300 MG tablet Take 1 tablet (300 mg) by mouth daily 30 tablet 0     DOXOrubicin (ADRIAMYCIN) 2 MG/ML injection        etoposide (VEPESID) 50 MG capsule        folic acid (FOLVITE) 1 MG tablet Take 1 tablet (1 mg) by mouth daily 30 tablet 3     LORazepam (ATIVAN) 0.5 MG tablet Take 1 tablet (0.5 mg) by mouth every 4 hours as needed (Anxiety, Nausea/Vomiting or Sleep) 30 tablet 5     metoprolol tartrate (LOPRESSOR) 50 MG tablet Take 1.5 tablets (75 mg) by mouth daily 45 tablet 3     NEULASTA ONPRO 6 MG/0.6ML injection        oxyCODONE (OXY-IR) 5 MG capsule Take 5 mg by mouth every 6 hours as needed for severe pain Prescribed in ED 7/6/20       polyethylene glycol (MIRALAX) 17 g packet Take 17 g by mouth       potassium chloride ER (KLOR-CON M) 20 MEQ CR tablet        prochlorperazine (COMPAZINE) 10 MG tablet Take 1 tablet (10 mg) by mouth every 6 hours as needed (Nausea/Vomiting) 30 tablet 5     senna-docusate (SENNA S) 8.6-50 MG tablet Take 1 tablet by mouth 2 times daily 60 tablet 0     sodium chloride 0.9% infusion        traMADol (ULTRAM) 50 MG tablet Take 50 mg by mouth as needed       etoposide (VEPESID) 50 MG capsule Take 4 capsules (200 mg) by mouth daily for 6 days Days 1 through 6. 24 capsule 0     mesna (MESNEX) 400 MG TABS tablet Take 1 tablet (400 mg) by mouth every 4 hours for 2 doses Take one tablet 4 hours and 8 hours after end of cyclophosphamide infusion. 2 tablet 0     mesna (MESNEX) 400 MG  TABS tablet Take 1 tablet (400 mg) by mouth every 4 hours for 2 doses Take one tablet 4 hours and 8 hours after end of cyclophosphamide infusion. 2 tablet 0     mesna (MESNEX) 400 MG TABS tablet Take 1 tablet (400 mg) by mouth every 4 hours for 2 doses Take one tablet 4 hours and 8 hours after end of cyclophosphamide infusion. 2 tablet 0     Objective:  General: patient appears well in no acute distress, alert and oriented, speech clear and fluid  Skin: no visualized rash or lesions on visualized skin  Resp: Appears to be breathing comfortably without accessory muscle usage, speaking in full sentences, no audible wheezes or cough.  Psych: Coherent speech, normal rate and volume, able to articulate logical thoughts, able to abstract reason, no tangential thoughts, no hallucinations or delusions  Patient's affect is appropriate.    LABS   2/10/2021 10:51   Sodium 142   Potassium 3.9   Chloride 111 (H)   Carbon Dioxide 25   Urea Nitrogen 12   Creatinine 0.69   GFR Estimate >90   GFR Estimate If Black >90   Calcium 8.9   Anion Gap 6   Albumin 3.5   Protein Total 7.4   Bilirubin Total 0.4   Alkaline Phosphatase 144   ALT 27   AST 14   Glucose 90   WBC 12.1 (H)   Hemoglobin 9.3 (L)   Hematocrit 30.6 (L)   Platelet Count 266   RBC Count 3.20 (L)   MCV 96   MCH 29.1   MCHC 30.4 (L)   RDW 22.1 (H)   Diff Method Automated Method   % Neutrophils 82.1   % Lymphocytes 5.1   % Monocytes 6.5   % Eosinophils 2.0   % Basophils 0.5   % Immature Granulocytes 3.8   Nucleated RBCs 0   Absolute Neutrophil 9.9 (H)   Absolute Lymphocytes 0.6 (L)   Absolute Monocytes 0.8   Absolute Eosinophils 0.2   Absolute Basophils 0.1   Abs Immature Granulocytes 0.5 (H)   Absolute Nucleated RBC 0.0     ASSESSMENT AND PLAN  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 Mr. Buchanan is a 25 year old male with advanced intraabdominal DSRCT with extensive peritoneal  disease, lymph node metastasis, and liver and bone involvement. He tolerated 6 alternating cycles of CAV/IMV with anticipated side effects. He developed hematuria with his last 2 cycles of Ifosfamide based therapy despite dose reduction and Mesna prophylaxis, and ultimately patient and family decided to continue with CAV alone. Due to a mixed response on his last CT, it was decided to add back in alternating with IMV.  -He tolerating resuming IMV well. His hematuria has actually been better lately. He will continue with his next cycle of CAV tomorrow. He will continue alternating IMV with CAV every 21 days. I will see him back in 3 weeks prior to his next cycle. Will plan to repeat imaging in April.      Anemia, normocytic. Initially microcytic, now normocytic. Hemoglobin is trending up. He is not taking folate recently. I suspect with no hematuria recently his hemoglobin is improving. Will continue to monitor closely.     Deconditioning. I encouraged him to go for daily walks for at least 10 minutes per day once very cold weather resolves.     Hematuria. Recurrent. Better over the last few weeks. He previously saw urology in September 2020 who felt this could be secondary to cyclophosphamide (despite mesna), new bladder/urethral primary malignancies, or metastatic bladder invasion. He and his family opted against a cystoscopy at that time. I suspect it is due to metastatic bladder invasion.    Neutrophilia. Secondary to Neulasta. No signs/symptoms of infection currently.     Yasmine Mckeon PA-C  [facility]  [address] [place] [address]  [phone]

## 2021-02-12 ENCOUNTER — HOME INFUSION (PRE-WILLOW HOME INFUSION) (OUTPATIENT)
Dept: PHARMACY | Facility: CLINIC | Age: 26
End: 2021-02-12

## 2021-02-12 ENCOUNTER — INFUSION THERAPY VISIT (OUTPATIENT)
Dept: ONCOLOGY | Facility: CLINIC | Age: 26
End: 2021-02-12
Attending: INTERNAL MEDICINE
Payer: COMMERCIAL

## 2021-02-12 VITALS
BODY MASS INDEX: 35.54 KG/M2 | WEIGHT: 233.7 LBS | DIASTOLIC BLOOD PRESSURE: 78 MMHG | RESPIRATION RATE: 16 BRPM | HEART RATE: 107 BPM | SYSTOLIC BLOOD PRESSURE: 120 MMHG | OXYGEN SATURATION: 100 % | TEMPERATURE: 97.7 F

## 2021-02-12 DIAGNOSIS — C41.9 SARCOMA, EWINGS (H): Primary | ICD-10-CM

## 2021-02-12 DIAGNOSIS — C49.9 DESMOPLASTIC SMALL ROUND CELL TUMOR (H): ICD-10-CM

## 2021-02-12 PROCEDURE — 96411 CHEMO IV PUSH ADDL DRUG: CPT

## 2021-02-12 PROCEDURE — 258N000003 HC RX IP 258 OP 636: Performed by: PHYSICIAN ASSISTANT

## 2021-02-12 PROCEDURE — 96415 CHEMO IV INFUSION ADDL HR: CPT

## 2021-02-12 PROCEDURE — 96375 TX/PRO/DX INJ NEW DRUG ADDON: CPT

## 2021-02-12 PROCEDURE — G0498 CHEMO EXTEND IV INFUS W/PUMP: HCPCS

## 2021-02-12 PROCEDURE — 250N000011 HC RX IP 250 OP 636: Performed by: PHYSICIAN ASSISTANT

## 2021-02-12 PROCEDURE — 96413 CHEMO IV INFUSION 1 HR: CPT

## 2021-02-12 PROCEDURE — 96367 TX/PROPH/DG ADDL SEQ IV INF: CPT

## 2021-02-12 RX ORDER — PALONOSETRON 0.05 MG/ML
0.25 INJECTION, SOLUTION INTRAVENOUS ONCE
Status: COMPLETED | OUTPATIENT
Start: 2021-02-12 | End: 2021-02-12

## 2021-02-12 RX ADMIN — DEXAMETHASONE SODIUM PHOSPHATE: 10 INJECTION, SOLUTION INTRAMUSCULAR; INTRAVENOUS at 13:34

## 2021-02-12 RX ADMIN — PALONOSETRON 0.25 MG: 0.05 INJECTION, SOLUTION INTRAVENOUS at 13:32

## 2021-02-12 RX ADMIN — SODIUM CHLORIDE 250 ML: 9 INJECTION, SOLUTION INTRAVENOUS at 13:32

## 2021-02-12 RX ADMIN — VINCRISTINE SULFATE 2 MG: 1 INJECTION, SOLUTION INTRAVENOUS at 14:03

## 2021-02-12 RX ADMIN — MESNA 2500 MG: 100 INJECTION, SOLUTION INTRAVENOUS at 14:11

## 2021-02-12 ASSESSMENT — PAIN SCALES - GENERAL: PAINLEVEL: NO PAIN (0)

## 2021-02-12 NOTE — PROGRESS NOTES
Infusion Nursing Note:  Diego Buchanan presents today for D1 C13 Vincristine, Cytoxan/Mesna, Adriamycin pump connect .    Patient seen by provider today: No    Intravenous Access:  Esparza.  Dressing change completed at home by sister Mariela.    Treatment Conditions:  Lab Results   Component Value Date    HGB 9.3 02/10/2021     Lab Results   Component Value Date    WBC 12.1 02/10/2021      Lab Results   Component Value Date    ANEU 9.9 02/10/2021     Lab Results   Component Value Date     02/10/2021      Lab Results   Component Value Date     02/10/2021                   Lab Results   Component Value Date    POTASSIUM 3.9 02/10/2021           Lab Results   Component Value Date    MAG 2.0 01/28/2021            Lab Results   Component Value Date    CR 0.69 02/10/2021                   Lab Results   Component Value Date    FAISAL 8.9 02/10/2021                Lab Results   Component Value Date    BILITOTAL 0.4 02/10/2021           Lab Results   Component Value Date    ALBUMIN 3.5 02/10/2021                    Lab Results   Component Value Date    ALT 27 02/10/2021           Lab Results   Component Value Date    AST 14 02/10/2021       Results reviewed, labs MET treatment parameters, ok to proceed with treatment.  ECHO/MUGA completed 9/1/2020  EF 60-65%.    Note: Patient presents to infusion accompanied by sisterMariela. Evaluated by KATHLEEN Peña on 2/11/2021. Denied any new issues or concerns.    Patient and sister aware of oral Mesna doses: Friday 2/12 @ 2015 and Saturday 2/13 @ 0015.    Adriamycin continuous infusion pump connected at 1630.  Positive blood return from purple lumen of Esparza at time of pump hook up. Connections open and taped; verified by second RN. Pump to infuse over 2 days. Pump will be disconnected on Sunday 2/14/21 by [facility]. Verified disconnect date/time with JASON Whitehead. Patient and sister aware of pump disconnect date and time.     Post Infusion Assessment:  Patient tolerated infusion  without incident.  Blood return noted pre and post infusion.  Site patent and intact, free from redness, edema or discomfort.  No evidence of extravasations.    Discharge Plan:   Prescription refills given for mesna and decadron.  Discharge instructions reviewed with: Patient and sister, Mariela.  Patient and/or family verbalized understanding of discharge instructions and all questions answered.  Copy of AVS reviewed with patient and/or family.  Patient will return 3/3-labs, 3/4-LISY, 3/5-infusion for next appointment.  Patient discharged in stable condition accompanied by: self and sister.  Departure Mode: Ambulatory.    Milagros An RN

## 2021-02-12 NOTE — PATIENT INSTRUCTIONS
Contact Numbers  [facility] Nurse Triage: [phone]    Please call the [facility] Triage line if you experience a temperature greater than or equal to 100.5, shaking chills, have uncontrolled nausea, vomiting and/or diarrhea, dizziness, shortness of breath, chest pain, bleeding, unexplained bruising, or if you have any other new/concerning symptoms, questions or concerns.     If you are having any concerning symptoms or wish to speak to a provider before your next infusion visit, please call your care coordinator or triage to notify them so we can adequately serve you.     If you need a refill on a prescription or other medication, please call triage before your infusion appointment.     Patient Instructions:    Mesna:  --Friday 2/12 @ 8:15pm  --Saturday 2/13 @ 12:15am    Adriamycin Pump Disconnect:  --Sunday 2/14 @ 4:30pm      February 2021 Sunday Monday Tuesday Wednesday Thursday Friday Saturday        1    LAB   1:00 PM   (15 min.)   [facility] HOME INFUSION   [facility] 2     3     4     5     6       7     8     9     10    LAB PERIPHERAL  10:30 AM   (15 min.)   [facility] DRAW   [facility] 11    VIDEO VISIT RETURN  10:55 AM   (90 min.)   Yasmine Mckeon PA-C   [facility] 12    [facility] ONC INFUSION 180  12:30 PM   (180 min.)   [facility]FUSION   [facility] 13       14     15     16     17     18     19     20       21     22     23     24     25     26     27       28 March 2021 Sunday Monday Tuesday Wednesday Thursday Friday Saturday        1     2     3    LAB PERIPHERAL  10:45 AM   (15 min.)    [facility] DRAW   [facility] 4    VIDEO VISIT RETURN  10:55 AM   (50 min.)   Yasmine Mckeon PA-C   [facility] 5    [facility] ONC INFUSION 180  12:30 PM   (180 min.)     ONCOLOGY INFUSION   [facility] 6       7     8     9     10     11     12     13       14     15     16     17     18     19     20       21     22     23     24    LAB PERIPHERAL  10:45 AM   (15 min.)   [facility] LAB DRAW   [facility] 25    VIDEO VISIT RETURN  10:55 AM   (50 min.)   Yasmine Mckeon PA-C   [facility] 26    [facility] ONC INFUSION 180  12:30 PM   (180 min.)    ONCOLOGY INFUSION   [facility] 27       28     29     30     31                                    Lab Results:  No results found for this or any previous visit (from the past 12 hour(s)).

## 2021-02-13 ENCOUNTER — TELEPHONE (OUTPATIENT)
Dept: PHARMACY | Facility: CLINIC | Age: 26
End: 2021-02-13

## 2021-02-14 ENCOUNTER — HOME INFUSION (PRE-WILLOW HOME INFUSION) (OUTPATIENT)
Dept: PHARMACY | Facility: CLINIC | Age: 26
End: 2021-02-14

## 2021-02-15 NOTE — PROGRESS NOTES
This is a recent snapshot of the patient's [facility] medical record.  For current drug dose and complete information and questions, call [phone] or In Basket pool, fv home infusion ([id number])  CSN Number:  [id number]

## 2021-02-15 NOTE — PROGRESS NOTES
This is a recent snapshot of the patient's [facility] medical record.  For current drug dose and complete information and questions, call [phone] or In Basket pool, fv home infusion ([id number])  CSN Number:  [id number]

## 2021-03-03 VITALS
HEART RATE: 90 BPM | RESPIRATION RATE: 16 BRPM | TEMPERATURE: 97.9 F | DIASTOLIC BLOOD PRESSURE: 62 MMHG | OXYGEN SATURATION: 100 % | BODY MASS INDEX: 35.65 KG/M2 | WEIGHT: 234.4 LBS | SYSTOLIC BLOOD PRESSURE: 118 MMHG

## 2021-03-03 DIAGNOSIS — C41.9 SARCOMA, EWINGS (H): ICD-10-CM

## 2021-03-03 LAB
ALBUMIN SERPL-MCNC: 3.4 G/DL (ref 3.4–5)
ALP SERPL-CCNC: 105 U/L (ref 40–150)
ALT SERPL W P-5'-P-CCNC: 17 U/L (ref 0–70)
ANION GAP SERPL CALCULATED.3IONS-SCNC: 6 MMOL/L (ref 3–14)
ANISOCYTOSIS BLD QL SMEAR: SLIGHT
AST SERPL W P-5'-P-CCNC: 8 U/L (ref 0–45)
BASOPHILS # BLD AUTO: 0 10E9/L (ref 0–0.2)
BASOPHILS NFR BLD AUTO: 0 %
BILIRUB SERPL-MCNC: 0.3 MG/DL (ref 0.2–1.3)
BUN SERPL-MCNC: 11 MG/DL (ref 7–30)
CALCIUM SERPL-MCNC: 8.5 MG/DL (ref 8.5–10.1)
CHLORIDE SERPL-SCNC: 111 MMOL/L (ref 94–109)
CO2 SERPL-SCNC: 24 MMOL/L (ref 20–32)
CREAT SERPL-MCNC: 0.78 MG/DL (ref 0.66–1.25)
DACRYOCYTES BLD QL SMEAR: SLIGHT
DIFFERENTIAL METHOD BLD: ABNORMAL
EOSINOPHIL # BLD AUTO: 0.3 10E9/L (ref 0–0.7)
EOSINOPHIL NFR BLD AUTO: 3.5 %
ERYTHROCYTE [DISTWIDTH] IN BLOOD BY AUTOMATED COUNT: 19.7 % (ref 10–15)
GFR SERPL CREATININE-BSD FRML MDRD: >90 ML/MIN/{1.73_M2}
GLUCOSE SERPL-MCNC: 97 MG/DL (ref 70–99)
HCT VFR BLD AUTO: 27.2 % (ref 40–53)
HGB BLD-MCNC: 8.5 G/DL (ref 13.3–17.7)
LYMPHOCYTES # BLD AUTO: 0.3 10E9/L (ref 0.8–5.3)
LYMPHOCYTES NFR BLD AUTO: 3.5 %
MACROCYTES BLD QL SMEAR: PRESENT
MCH RBC QN AUTO: 30.6 PG (ref 26.5–33)
MCHC RBC AUTO-ENTMCNC: 31.3 G/DL (ref 31.5–36.5)
MCV RBC AUTO: 98 FL (ref 78–100)
MONOCYTES # BLD AUTO: 0.4 10E9/L (ref 0–1.3)
MONOCYTES NFR BLD AUTO: 6.1 %
MYELOCYTES # BLD: 0.1 10E9/L
MYELOCYTES NFR BLD MANUAL: 1.7 %
NEUTROPHILS # BLD AUTO: 6.1 10E9/L (ref 1.6–8.3)
NEUTROPHILS NFR BLD AUTO: 85.2 %
OVALOCYTES BLD QL SMEAR: ABNORMAL
PLATELET # BLD AUTO: 174 10E9/L (ref 150–450)
PLATELET # BLD EST: ABNORMAL 10*3/UL
POIKILOCYTOSIS BLD QL SMEAR: ABNORMAL
POLYCHROMASIA BLD QL SMEAR: SLIGHT
POTASSIUM SERPL-SCNC: 3.8 MMOL/L (ref 3.4–5.3)
PROT SERPL-MCNC: 7 G/DL (ref 6.8–8.8)
RBC # BLD AUTO: 2.78 10E12/L (ref 4.4–5.9)
SODIUM SERPL-SCNC: 141 MMOL/L (ref 133–144)
WBC # BLD AUTO: 7.2 10E9/L (ref 4–11)

## 2021-03-03 PROCEDURE — 250N000011 HC RX IP 250 OP 636: Performed by: INTERNAL MEDICINE

## 2021-03-03 PROCEDURE — 80053 COMPREHEN METABOLIC PANEL: CPT | Performed by: INTERNAL MEDICINE

## 2021-03-03 PROCEDURE — 85025 COMPLETE CBC W/AUTO DIFF WBC: CPT | Performed by: INTERNAL MEDICINE

## 2021-03-03 RX ORDER — HEPARIN SODIUM,PORCINE 10 UNIT/ML
5 VIAL (ML) INTRAVENOUS ONCE
Status: COMPLETED | OUTPATIENT
Start: 2021-03-03 | End: 2021-03-03

## 2021-03-03 RX ADMIN — Medication 5 ML: at 11:09

## 2021-03-03 ASSESSMENT — PAIN SCALES - GENERAL: PAINLEVEL: NO PAIN (0)

## 2021-03-03 NOTE — NURSING NOTE
Chief Complaint   Patient presents with     Blood Draw     Labs drawn via CVC by rn in lab. VS taken.     Labs collected from CVC by RN, line flushed with saline and heparin.  Vitals taken. Pt checked in for appointment(s).    Isaiah Peters RN

## 2021-03-04 ENCOUNTER — VIRTUAL VISIT (OUTPATIENT)
Dept: ONCOLOGY | Facility: CLINIC | Age: 26
End: 2021-03-04
Attending: PHYSICIAN ASSISTANT
Payer: COMMERCIAL

## 2021-03-04 ENCOUNTER — RECORDS - HEALTHEAST (OUTPATIENT)
Dept: ADMINISTRATIVE | Facility: OTHER | Age: 26
End: 2021-03-04

## 2021-03-04 DIAGNOSIS — C49.9 DESMOPLASTIC SMALL ROUND CELL TUMOR (H): Primary | ICD-10-CM

## 2021-03-04 DIAGNOSIS — R53.81 PHYSICAL DECONDITIONING: ICD-10-CM

## 2021-03-04 DIAGNOSIS — R31.9 HEMATURIA, UNSPECIFIED TYPE: ICD-10-CM

## 2021-03-04 DIAGNOSIS — C41.9 SARCOMA, EWINGS (H): ICD-10-CM

## 2021-03-04 DIAGNOSIS — D64.9 ANEMIA, NORMOCYTIC NORMOCHROMIC: ICD-10-CM

## 2021-03-04 PROCEDURE — 999N001193 HC VIDEO/TELEPHONE VISIT; NO CHARGE

## 2021-03-04 PROCEDURE — 99214 OFFICE O/P EST MOD 30 MIN: CPT | Mod: 95 | Performed by: PHYSICIAN ASSISTANT

## 2021-03-04 RX ORDER — DIPHENHYDRAMINE HYDROCHLORIDE 50 MG/ML
50 INJECTION INTRAMUSCULAR; INTRAVENOUS
Status: CANCELLED
Start: 2021-03-05

## 2021-03-04 RX ORDER — NALOXONE HYDROCHLORIDE 0.4 MG/ML
.1-.4 INJECTION, SOLUTION INTRAMUSCULAR; INTRAVENOUS; SUBCUTANEOUS
Status: CANCELLED | OUTPATIENT
Start: 2021-03-05

## 2021-03-04 RX ORDER — ALBUTEROL SULFATE 0.83 MG/ML
2.5 SOLUTION RESPIRATORY (INHALATION)
Status: CANCELLED | OUTPATIENT
Start: 2021-03-05

## 2021-03-04 RX ORDER — MEPERIDINE HYDROCHLORIDE 25 MG/ML
25 INJECTION INTRAMUSCULAR; INTRAVENOUS; SUBCUTANEOUS EVERY 30 MIN PRN
Status: CANCELLED | OUTPATIENT
Start: 2021-03-05

## 2021-03-04 RX ORDER — EPINEPHRINE 1 MG/ML
0.3 INJECTION, SOLUTION INTRAMUSCULAR; SUBCUTANEOUS EVERY 5 MIN PRN
Status: CANCELLED | OUTPATIENT
Start: 2021-03-05

## 2021-03-04 RX ORDER — LORAZEPAM 2 MG/ML
0.5 INJECTION INTRAMUSCULAR EVERY 4 HOURS PRN
Status: CANCELLED
Start: 2021-03-05

## 2021-03-04 RX ORDER — ALBUTEROL SULFATE 90 UG/1
1-2 AEROSOL, METERED RESPIRATORY (INHALATION)
Status: CANCELLED
Start: 2021-03-05

## 2021-03-04 RX ORDER — HEPARIN SODIUM (PORCINE) LOCK FLUSH IV SOLN 100 UNIT/ML 100 UNIT/ML
5 SOLUTION INTRAVENOUS
Status: CANCELLED | OUTPATIENT
Start: 2021-03-05

## 2021-03-04 RX ORDER — SODIUM CHLORIDE 9 MG/ML
1000 INJECTION, SOLUTION INTRAVENOUS CONTINUOUS PRN
Status: CANCELLED
Start: 2021-03-05

## 2021-03-04 RX ORDER — METHYLPREDNISOLONE SODIUM SUCCINATE 125 MG/2ML
125 INJECTION, POWDER, LYOPHILIZED, FOR SOLUTION INTRAMUSCULAR; INTRAVENOUS
Status: CANCELLED
Start: 2021-03-05

## 2021-03-04 RX ORDER — HEPARIN SODIUM,PORCINE 10 UNIT/ML
5 VIAL (ML) INTRAVENOUS
Status: CANCELLED | OUTPATIENT
Start: 2021-03-05

## 2021-03-04 NOTE — PROGRESS NOTES
"Diego is a 25 year old who is being evaluated via a billable video visit.      How would you like to obtain your AVS? MyChart  If the video visit is dropped, the invitation should be resent by: Send to e-mail at: [email or web address]  Will anyone else be joining your video visit? No    Vitals - Patient Reported  Weight (Patient Reported): 106.1 kg (234 lb)  Height (Patient Reported): 172.7 cm (5' 8\")  BMI (Based on Pt Reported Ht/Wt): 35.58  Pain Score: No Pain (0)      I have reviewed and updated patient's allergy and medication list.    Concerns: NONE  Refills: NONE      Jeanie Potts CMA      Video-Visit Details    Type of service:  Video Visit    Video Start Time: 11:14 AM    Video End Time:11:25 AM    Originating Location (pt. Location): Home    Distant Location (provider location):  [facility]     Platform used for Video Visit: 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 He presented initially with abdominal pain, diarrhea, and progressive abdominal distention for 3 months duration. 2. Initial CT scan in February 2020 showed severe peritoneal carcinomatosis with large peritoneal tumor implants throughout the entire abdomen and pelvis, but mostly prominently in the pelvis.   2. PETCT scan showed interval increase in the amount of peritoneal carcinomatosis.  3. On 3/12/2020, he had ultrasound-guided core needle biopsy of a peritoneal implant (Case: [id number]), which showed a completely undifferentiated appearance, but stains with pancytokeratin, indicating an epithelial origin. 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 Tumor markers for CA-19-9, CEA, beta-hCG, alpha-fetoprotein were unremarkable. Cancer type ID molecular testing by [employer] sent to determine the exact diagnosis and to " assist in further treatment planning. 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 Relative probability of less than 5% of other subtype of sarcoma. EWSR1-WT1 fusion detected.  4. 5/1/2020, MRI brain negative for brain metastasis, and baseline CT-CAP obtained showing extensive mixed solid and cystic masses throughout the abdomen and pelvis consistent with peritoneal carcinomatosis. Largest mass measures approximately 20 cm in the pelvis. Metastatic mixed solid and cystic masses seen in hepatic segments 5 and 6 and hepatic segment 7. The masses appear to be contiguous with the retrohepatic peritoneal carcinomatosis. Small volume fluid about the spleen favored to represent malignant ascites, stable mild-to-moderate right hydronephrosis related to mass effect upon the distal ureter in the pelvis, the IVC and iliac veins are compressed due to the extensive peritoneal carcinomatosis without findings to suggest deep venous thrombosis.  5. 5/4/2020, he begins CAV/IMV alternating chemotherapy. He receives 6 cycles of treatment. He symptomatically improves.  6. 9/11/2020, CT-CAP shows stable to mildly improved extensive peritoneal carcinomatosis. He would like to omit Ifosfamide/etoposide cycles and continue only with CAV.  7. 10/9/2020, he starts CAV every 21 days.  8. 1/6/2021, CT CAP showed a mixed response in the mixed solid and cystic masses throughout the peritoneum. Some of the tumors are stable to mildly worse, with some of the peritoneal masses a bit larger, a few are smaller, one cystic tumor in the left abdomen is smaller, while tumors lower in the pelvis appear slightly larger.     HISTORY OF PRESENT ILLNESS  Diego Buchanan was met with over video with sister for follow up.  -Video games are going well.   -Has not on walks.   -Has intermittent blood in his urine, none today.  -Stools are doing okay. Has been having bowel movements every other day.    -Has a good appetite.     Review of Systems:  Patient denies any of the following except if noted above: fevers, chills, difficulty with energy, vision or hearing changes, chest pain, dyspnea, abdominal pain, nausea, vomiting, diarrhea, constipation, other urinary concerns, headaches, numbness, tingling, issues with sleep or mood.     Current Outpatient Medications   Medication Sig Dispense Refill     DOXOrubicin (ADRIAMYCIN) 2 MG/ML injection        etoposide (VEPESID) 50 MG capsule        LORazepam (ATIVAN) 0.5 MG tablet Take 1 tablet (0.5 mg) by mouth every 4 hours as needed (Anxiety, Nausea/Vomiting or Sleep) 30 tablet 5     metoprolol tartrate (LOPRESSOR) 50 MG tablet Take 1.5 tablets (75 mg) by mouth daily 45 tablet 3     NEULASTA ONPRO 6 MG/0.6ML injection        polyethylene glycol (MIRALAX) 17 g packet Take 17 g by mouth       prochlorperazine (COMPAZINE) 10 MG tablet Take 1 tablet (10 mg) by mouth every 6 hours as needed (Nausea/Vomiting) 30 tablet 5     senna-docusate (SENNA S) 8.6-50 MG tablet Take 1 tablet by mouth 2 times daily 60 tablet 0     sodium chloride 0.9% infusion        etoposide (VEPESID) 50 MG capsule Take 4 capsules (200 mg) by mouth daily for 6 days Days 1 through 6. 24 capsule 0     mesna (MESNEX) 400 MG TABS tablet Take 1 tablet (400 mg) by mouth every 4 hours for 2 doses Take one tablet 4 hours and 8 hours after end of cyclophosphamide infusion. 2 tablet 0     Objective:  Vital Signs 3/3/2021   Systolic 118   Diastolic 62   Pulse 90   Temperature 97.9   Respirations 16   Weight (LB) 234 lb 6.4 oz   O2 100     General: patient appears well in no acute distress, alert and oriented, speech clear and fluid  Skin: no visualized rash or lesions on visualized skin  Resp: Appears to be breathing comfortably without accessory muscle usage, speaking in full sentences, no audible wheezes or cough.  Psych: Coherent speech, normal rate and volume, able to articulate logical thoughts, able to  abstract reason, no tangential thoughts, no hallucinations or delusions  Patient's affect is appropriate.    LABS   3/3/2021 11:15   Sodium 141   Potassium 3.8   Chloride 111 (H)   Carbon Dioxide 24   Urea Nitrogen 11   Creatinine 0.78   GFR Estimate >90   GFR Estimate If Black >90   Calcium 8.5   Anion Gap 6   Albumin 3.4   Protein Total 7.0   Bilirubin Total 0.3   Alkaline Phosphatase 105   ALT 17   AST 8   Glucose 97   WBC 7.2   Hemoglobin 8.5 (L)   Hematocrit 27.2 (L)   Platelet Count 174   RBC Count 2.78 (L)   MCV 98   MCH 30.6   MCHC 31.3 (L)   RDW 19.7 (H)   Diff Method Manual Differential   % Neutrophils 85.2   % Lymphocytes 3.5   % Monocytes 6.1   % Eosinophils 3.5   % Basophils 0.0   % Myelocytes 1.7   Absolute Neutrophil 6.1   Absolute Lymphocytes 0.3 (L)   Absolute Monocytes 0.4   Absolute Eosinophils 0.3   Absolute Basophils 0.0   Absolute Myelocytes 0.1 (H)   Anisocytosis Slight   Poikilocytosis Moderate   Polychromasia Slight   Teardrop Cells Slight   Ovalocytes Moderate   Macrocytes Present   Platelet Estimate Confirming automated cell count     ASSESSMENT AND PLAN  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 Mr. 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 He tolerated 6 alternating cycles of CAV/IMV with anticipated side effects. He developed hematuria with his last 2 cycles of Ifosfamide based therapy despite dose reduction and Mesna prophylaxis, and ultimately patient and family decided to continue with CAV alone. Due to a mixed response on his last CT, it was decided to add back in alternating with IMV.  -He tolerating resuming IMV well. His hematuria was better, but resumed intermittently. He will continue with his next cycle of IMV tomorrow. He will continue alternating IMV with CAV every 21  days.  I will see him back in 3 weeks. Will plan to repeat imaging in eary April.      Anemia, normocytic. Initially microcytic, now normocytic. Hemoglobin was trending up, now down again. Likely multifactorial with chemotherapy and intermittent hematuria. No current transfusion needs. Will continue to monitor closely.     Deconditioning. I again encouraged him to go for daily walks for at least 10 minutes per day once very cold weather resolves. Could consider referral for physical therapy for deconditioning. Will revisit this at his next visit.     Hematuria. Recurrent. Worse over the last few weeks, though still intermittent. He previously saw urology in September 2020 who felt this could be secondary to cyclophosphamide (despite mesna), new bladder/urethral primary malignancies, or metastatic bladder invasion. He and his family opted against a cystoscopy at that time. I suspect it is due to metastatic bladder invasion. We again revisited this conversation and if his hemoglobin does drop below 8 again, I would like him to consider a cystoscopy.     Yasmine Mckeon PA-C  [facility]  [address], [place] [id number] [phone]

## 2021-03-04 NOTE — LETTER
"    3/4/2021         RE: Diego Buchanan  [address]  [place] [address]        Dear Colleague,    Thank you for referring your patient, Diego Buchanan, to the [facility]. Please see a copy of my visit note below.    Diego is a 25 year old who is being evaluated via a billable video visit.      How would you like to obtain your AVS? MyChart  If the video visit is dropped, the invitation should be resent by: Send to e-mail at: [email or web address]  Will anyone else be joining your video visit? No    Vitals - Patient Reported  Weight (Patient Reported): 106.1 kg (234 lb)  Height (Patient Reported): 172.7 cm (5' 8\")  BMI (Based on Pt Reported Ht/Wt): 35.58  Pain Score: No Pain (0)      I have reviewed and updated patient's allergy and medication list.    Concerns: NONE  Refills: NONE      Jeanie Potts CMA      Video-Visit Details    Type of service:  Video Visit    Video Start Time: 11:14 AM    Video End Time:11:25 AM    Originating Location (pt. 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 He presented initially with abdominal pain, diarrhea, and progressive abdominal distention for 3 months duration. 2. Initial CT scan in February 2020 showed severe peritoneal carcinomatosis with large peritoneal tumor implants throughout the entire abdomen and pelvis, but mostly prominently in the pelvis.   2. PETCT scan showed interval increase in the amount of peritoneal carcinomatosis.  3. On 3/12/2020, he had ultrasound-guided core needle biopsy of a peritoneal implant (Case: [id number]), which showed a completely undifferentiated appearance, but stains with pancytokeratin, indicating an epithelial origin. 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 Tumor markers for CA-19-9, CEA, beta-hCG, alpha-fetoprotein were unremarkable. Cancer type ID molecular testing by [employer] sent to determine the exact diagnosis and to assist in further treatment planning. 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 Relative probability of less than 5% of other subtype of sarcoma. EWSR1-WT1 fusion detected.  4. 5/1/2020, MRI brain negative for brain metastasis, and baseline CT-CAP obtained showing extensive mixed solid and cystic masses throughout the abdomen and pelvis consistent with peritoneal carcinomatosis. Largest mass measures approximately 20 cm in the pelvis. Metastatic mixed solid and cystic masses seen in hepatic segments 5 and 6 and hepatic segment 7. The masses appear to be contiguous with the retrohepatic peritoneal carcinomatosis. Small volume fluid about the spleen favored to represent malignant ascites, stable mild-to-moderate right hydronephrosis related to mass effect upon the distal ureter in the pelvis, the IVC and iliac veins are compressed due to the extensive peritoneal carcinomatosis without findings to suggest deep venous thrombosis.  5. 5/4/2020, he begins CAV/IMV alternating chemotherapy. He receives 6 cycles of treatment. He symptomatically improves.  6. 9/11/2020, CT-CAP shows stable to mildly improved extensive peritoneal carcinomatosis. He would like to omit Ifosfamide/etoposide cycles and continue only with CAV.  7. 10/9/2020, he starts CAV every 21 days.  8. 1/6/2021, CT CAP showed a mixed response in the mixed solid and cystic masses throughout the peritoneum. Some of the tumors are stable to mildly worse, with some of the peritoneal masses a bit larger, a few are smaller, one cystic tumor in the left abdomen is smaller, while tumors lower in the pelvis appear slightly larger.     HISTORY OF  PRESENT ILLNESS  Diego Buchanan was met with over video with sister for follow up.  -Video games are going well.   -Has not on walks.   -Has intermittent blood in his urine, none today.  -Stools are doing okay. Has been having bowel movements every other day.   -Has a good appetite.     Review of Systems:  Patient denies any of the following except if noted above: fevers, chills, difficulty with energy, vision or hearing changes, chest pain, dyspnea, abdominal pain, nausea, vomiting, diarrhea, constipation, other urinary concerns, headaches, numbness, tingling, issues with sleep or mood.     Current Outpatient Medications   Medication Sig Dispense Refill     DOXOrubicin (ADRIAMYCIN) 2 MG/ML injection        etoposide (VEPESID) 50 MG capsule        LORazepam (ATIVAN) 0.5 MG tablet Take 1 tablet (0.5 mg) by mouth every 4 hours as needed (Anxiety, Nausea/Vomiting or Sleep) 30 tablet 5     metoprolol tartrate (LOPRESSOR) 50 MG tablet Take 1.5 tablets (75 mg) by mouth daily 45 tablet 3     NEULASTA ONPRO 6 MG/0.6ML injection        polyethylene glycol (MIRALAX) 17 g packet Take 17 g by mouth       prochlorperazine (COMPAZINE) 10 MG tablet Take 1 tablet (10 mg) by mouth every 6 hours as needed (Nausea/Vomiting) 30 tablet 5     senna-docusate (SENNA S) 8.6-50 MG tablet Take 1 tablet by mouth 2 times daily 60 tablet 0     sodium chloride 0.9% infusion        etoposide (VEPESID) 50 MG capsule Take 4 capsules (200 mg) by mouth daily for 6 days Days 1 through 6. 24 capsule 0     mesna (MESNEX) 400 MG TABS tablet Take 1 tablet (400 mg) by mouth every 4 hours for 2 doses Take one tablet 4 hours and 8 hours after end of cyclophosphamide infusion. 2 tablet 0     Objective:  Vital Signs 3/3/2021   Systolic 118   Diastolic 62   Pulse 90   Temperature 97.9   Respirations 16   Weight (LB) 234 lb 6.4 oz   O2 100     General: patient appears well in no acute distress, alert and oriented, speech clear and fluid  Skin: no visualized rash  or lesions on visualized skin  Resp: Appears to be breathing comfortably without accessory muscle usage, speaking in full sentences, no audible wheezes or cough.  Psych: Coherent speech, normal rate and volume, able to articulate logical thoughts, able to abstract reason, no tangential thoughts, no hallucinations or delusions  Patient's affect is appropriate.    LABS   3/3/2021 11:15   Sodium 141   Potassium 3.8   Chloride 111 (H)   Carbon Dioxide 24   Urea Nitrogen 11   Creatinine 0.78   GFR Estimate >90   GFR Estimate If Black >90   Calcium 8.5   Anion Gap 6   Albumin 3.4   Protein Total 7.0   Bilirubin Total 0.3   Alkaline Phosphatase 105   ALT 17   AST 8   Glucose 97   WBC 7.2   Hemoglobin 8.5 (L)   Hematocrit 27.2 (L)   Platelet Count 174   RBC Count 2.78 (L)   MCV 98   MCH 30.6   MCHC 31.3 (L)   RDW 19.7 (H)   Diff Method Manual Differential   % Neutrophils 85.2   % Lymphocytes 3.5   % Monocytes 6.1   % Eosinophils 3.5   % Basophils 0.0   % Myelocytes 1.7   Absolute Neutrophil 6.1   Absolute Lymphocytes 0.3 (L)   Absolute Monocytes 0.4   Absolute Eosinophils 0.3   Absolute Basophils 0.0   Absolute Myelocytes 0.1 (H)   Anisocytosis Slight   Poikilocytosis Moderate   Polychromasia Slight   Teardrop Cells Slight   Ovalocytes Moderate   Macrocytes Present   Platelet Estimate Confirming automated cell count     ASSESSMENT AND PLAN  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 Mr. 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 He tolerated 6 alternating cycles of CAV/IMV with anticipated side effects. He developed hematuria with his last 2 cycles of Ifosfamide based therapy despite dose reduction and Mesna prophylaxis, and ultimately patient and family decided to continue with CAV alone. Due to a mixed response on his  last CT, it was decided to add back in alternating with IMV.  -He tolerating resuming IMV well. His hematuria was better, but resumed intermittently. He will continue with his next cycle of IMV tomorrow. He will continue alternating IMV with CAV every 21 days.  I will see him back in 3 weeks. Will plan to repeat imaging in eary April.      Anemia, normocytic. Initially microcytic, now normocytic. Hemoglobin was trending up, now down again. Likely multifactorial with chemotherapy and intermittent hematuria. No current transfusion needs. Will continue to monitor closely.     Deconditioning. I again encouraged him to go for daily walks for at least 10 minutes per day once very cold weather resolves. Could consider referral for physical therapy for deconditioning. Will revisit this at his next visit.     Hematuria. Recurrent. Worse over the last few weeks, though still intermittent. He previously saw urology in September 2020 who felt this could be secondary to cyclophosphamide (despite mesna), new bladder/urethral primary malignancies, or metastatic bladder invasion. He and his family opted against a cystoscopy at that time. I suspect it is due to metastatic bladder invasion. We again revisited this conversation and if his hemoglobin does drop below 8 again, I would like him to consider a cystoscopy.     Yasmine Mckeon PA-C  [facility]  [address] [place] [address] [phone]

## 2021-03-05 ENCOUNTER — HOME INFUSION (PRE-WILLOW HOME INFUSION) (OUTPATIENT)
Dept: PHARMACY | Facility: CLINIC | Age: 26
End: 2021-03-05

## 2021-03-05 ENCOUNTER — INFUSION THERAPY VISIT (OUTPATIENT)
Dept: ONCOLOGY | Facility: CLINIC | Age: 26
End: 2021-03-05
Attending: INTERNAL MEDICINE
Payer: COMMERCIAL

## 2021-03-05 VITALS
OXYGEN SATURATION: 100 % | TEMPERATURE: 98.5 F | HEART RATE: 99 BPM | SYSTOLIC BLOOD PRESSURE: 122 MMHG | RESPIRATION RATE: 18 BRPM | DIASTOLIC BLOOD PRESSURE: 81 MMHG

## 2021-03-05 DIAGNOSIS — C49.9 DESMOPLASTIC SMALL ROUND CELL TUMOR (H): ICD-10-CM

## 2021-03-05 DIAGNOSIS — C41.9 SARCOMA, EWINGS (H): Primary | ICD-10-CM

## 2021-03-05 PROCEDURE — 258N000003 HC RX IP 258 OP 636: Performed by: PHYSICIAN ASSISTANT

## 2021-03-05 PROCEDURE — 96375 TX/PRO/DX INJ NEW DRUG ADDON: CPT

## 2021-03-05 PROCEDURE — 250N000011 HC RX IP 250 OP 636: Performed by: INTERNAL MEDICINE

## 2021-03-05 PROCEDURE — 96374 THER/PROPH/DIAG INJ IV PUSH: CPT | Mod: 59

## 2021-03-05 PROCEDURE — 250N000011 HC RX IP 250 OP 636: Performed by: PHYSICIAN ASSISTANT

## 2021-03-05 PROCEDURE — G0498 CHEMO EXTEND IV INFUS W/PUMP: HCPCS

## 2021-03-05 RX ORDER — HEPARIN SODIUM,PORCINE 10 UNIT/ML
5 VIAL (ML) INTRAVENOUS
Status: DISCONTINUED | OUTPATIENT
Start: 2021-03-05 | End: 2021-03-05 | Stop reason: HOSPADM

## 2021-03-05 RX ORDER — GRANISETRON HYDROCHLORIDE 1 MG/ML
1 INJECTION INTRAVENOUS ONCE
Status: COMPLETED | OUTPATIENT
Start: 2021-03-05 | End: 2021-03-05

## 2021-03-05 RX ORDER — ETOPOSIDE 50 MG/1
100 CAPSULE ORAL DAILY
Qty: 24 CAPSULE | Refills: 0 | Status: SHIPPED | OUTPATIENT
Start: 2021-03-05 | End: 2021-05-13

## 2021-03-05 RX ADMIN — DEXAMETHASONE SODIUM PHOSPHATE 12 MG: 10 INJECTION, SOLUTION INTRAMUSCULAR; INTRAVENOUS at 13:05

## 2021-03-05 RX ADMIN — SODIUM CHLORIDE 250 ML: 9 INJECTION, SOLUTION INTRAVENOUS at 12:58

## 2021-03-05 RX ADMIN — Medication 5 ML: at 13:08

## 2021-03-05 RX ADMIN — GRANISETRON HYDROCHLORIDE 1 MG: 1 INJECTION, SOLUTION INTRAVENOUS at 13:00

## 2021-03-05 NOTE — PROGRESS NOTES
Infusion Nursing Note:  Diego Buchanan presents today for C14D1 Ifosfamide/Mesna pump. Po Etoposide.  Patient seen by provider today: Ashley, Yasmine WANG, 3/4/21   present during visit today: Not Applicable.    Note: Patient arrived infusion accompanied by sister. Denies changes overnight. Provider visit 3/4/21. No new complaints made. Otherwise well.      Intravenous Access:  Esparza.  Dressing done at home by his sister (Mariela).    Treatment Conditions:  Lab Results   Component Value Date    HGB 8.5 03/03/2021     Lab Results   Component Value Date    WBC 7.2 03/03/2021      Lab Results   Component Value Date    ANEU 6.1 03/03/2021     Lab Results   Component Value Date     03/03/2021      Lab Results   Component Value Date     03/03/2021                   Lab Results   Component Value Date    POTASSIUM 3.8 03/03/2021           Lab Results   Component Value Date    MAG 2.0 01/28/2021            Lab Results   Component Value Date    CR 0.78 03/03/2021                   Lab Results   Component Value Date    FAISAL 8.5 03/03/2021                Lab Results   Component Value Date    BILITOTAL 0.3 03/03/2021           Lab Results   Component Value Date    ALBUMIN 3.4 03/03/2021                    Lab Results   Component Value Date    ALT 17 03/03/2021           Lab Results   Component Value Date    AST 8 03/03/2021       Results reviewed, labs MET treatment parameters, ok to proceed with treatment.      Post Infusion Assessment:  Patient tolerated infusion without incident.  Blood return noted pre and post infusion.  Site patent and intact, free from redness, edema or discomfort.  No evidence of extravasations.  Access discontinued per protocol.     Infusion:  Continous Infusion of Fluorouracil at 7 ml/hour for 144 hours, double checked with Cristal Aceves RN.    Post Infusion Assessment:    Results reviewed, copy given to patient.  Proceed with treatment.    Prior to discharge: Port is secured in place  "with tegaderm and flushed with 10cc NS with positive blood return noted.  Continuous home infusion CADD pump connected.    All connectors secured in place and clamps taped open.    Pump started, \"running\" noted on display (CADD): *YES. Double checked with Cristal Aceves RN.  Patient instructed to call our clinic or [facility] with any questions or concerns at home.  Patient verbalized understanding.    Patient set up for Mesna change pump and On[employer] Neulasta at [facility] on 3/11/21@1430H. Pump disconnect 3/12/21 at 1430h. Verified with Michelle GOMEZ.         Discharge Plan:   Prescription refills given for Etoposide.   Discharge instructions reviewed with: Patient.  Patient and/or family verbalized understanding of discharge instructions and all questions answered.  AVS to patient via [employer].  Patient will return 3/25/21 for next appointment.   Patient discharged in stable condition accompanied by: self.  Departure Mode: Ambulatory.      AUBREY SÁNCHEZ, RN                        "

## 2021-03-05 NOTE — PATIENT INSTRUCTIONS
Contact Numbers  [facility]: [phone]    After Hours:  [phone]  Triage: [phone]    Please call the [facility] Triage line if you experience a temperature greater than or equal to 100.5, shaking chills, have uncontrolled nausea, vomiting and/or diarrhea, dizziness, shortness of breath, chest pain, bleeding, unexplained bruising, or if you have any other new/concerning symptoms, questions or concerns.     If it is after hours, weekends, or holidays, please call the main hospital  at  [phone] and ask to speak to the Oncology doctor on call.     If you are having any concerning symptoms or wish to speak to a provider before your next infusion visit, please call your care coordinator or triage to notify them so we can adequately serve you.     If you need a refill on a narcotic prescription or other medication, please call triage before your infusion appointment.         March 2021 Sunday Monday Tuesday Wednesday Thursday Friday Saturday        1    LAB   1:00 PM   (15 min.)   [facility] HOME INFUSION   [facility] 2     3    LAB PERIPHERAL  10:45 AM   (15 min.)   [facility] DRAW   [facility] 4    VIDEO VISIT RETURN  10:55 AM   (90 min.)   Yasmine Mckeon PA-C   [facility] 5    [facility] ONC INFUSION 180  12:30 PM   (180 min.)   [facility] ON[facility]FUSION   [facility] 6       7     8     9     10     11     12     13       14     15     16     17     18     19     20       21     22     23     24    LAB PERIPHERAL  10:45 AM   (15 min.)   [facility]   [facility] 25    VIDEO VISIT RETURN  10:55 AM   (50 min.)   Yasmine Mckeon PA-C   [facility] 26    [facility] ONC INFUSION 180  12:30 PM   (180 min.)   [facility] ONCOL[facility] INFUSION   [facility] 27       28     29     30     31                                 April 2021 Sunday Monday Tuesday Wednesday Thursday Friday Saturday                       1     2     3       4     5     6     7    CT CHEST/ABDOMEN/PELVIS W  10:10 AM   (20 min.)   [email or web address]   [facility] [place] [address]    VIDEO VISIT RETURN   9:00 AM   (30 min.)   Farzaneh Young MD   [facility] 9     10       11     12     13     14     15     16    [facility] ON[facility] INFUSION 180  12:30 PM   (180 min.)   [facility] ON[facility] INFUSION   [facility] 17       18     19     20     21     22     23     24       25     26     27     28     29     30                          Lab Results:  No results found for this or any previous visit (from the past 12 hour(s)).

## 2021-03-07 ENCOUNTER — HOME INFUSION (PRE-WILLOW HOME INFUSION) (OUTPATIENT)
Dept: PHARMACY | Facility: CLINIC | Age: 26
End: 2021-03-07

## 2021-03-07 ENCOUNTER — MEDICAL CORRESPONDENCE (OUTPATIENT)
Dept: HEALTH INFORMATION MANAGEMENT | Facility: CLINIC | Age: 26
End: 2021-03-07

## 2021-03-07 ENCOUNTER — TELEPHONE (OUTPATIENT)
Dept: ONCOLOGY | Facility: CLINIC | Age: 26
End: 2021-03-07

## 2021-03-07 LAB
ALBUMIN SERPL-MCNC: 3.5 G/DL (ref 3.4–5)
ALP SERPL-CCNC: 94 U/L (ref 40–150)
ALT SERPL W P-5'-P-CCNC: 28 U/L (ref 0–70)
ANION GAP SERPL CALCULATED.3IONS-SCNC: 5 MMOL/L (ref 3–14)
AST SERPL W P-5'-P-CCNC: 14 U/L (ref 0–45)
BASOPHILS # BLD AUTO: 0.1 10E9/L (ref 0–0.2)
BASOPHILS NFR BLD AUTO: 0.9 %
BILIRUB SERPL-MCNC: 0.6 MG/DL (ref 0.2–1.3)
BUN SERPL-MCNC: 9 MG/DL (ref 7–30)
CALCIUM SERPL-MCNC: 8.6 MG/DL (ref 8.5–10.1)
CHLORIDE SERPL-SCNC: 109 MMOL/L (ref 94–109)
CO2 SERPL-SCNC: 26 MMOL/L (ref 20–32)
CREAT SERPL-MCNC: 0.65 MG/DL (ref 0.66–1.25)
DIFFERENTIAL METHOD BLD: ABNORMAL
EOSINOPHIL # BLD AUTO: 0.1 10E9/L (ref 0–0.7)
EOSINOPHIL NFR BLD AUTO: 0.5 %
ERYTHROCYTE [DISTWIDTH] IN BLOOD BY AUTOMATED COUNT: 20.3 % (ref 10–15)
GFR SERPL CREATININE-BSD FRML MDRD: >90 ML/MIN/{1.73_M2}
GLUCOSE SERPL-MCNC: 78 MG/DL (ref 70–99)
HCT VFR BLD AUTO: 30.6 % (ref 40–53)
HGB BLD-MCNC: 9.5 G/DL (ref 13.3–17.7)
IMM GRANULOCYTES # BLD: 0.2 10E9/L (ref 0–0.4)
IMM GRANULOCYTES NFR BLD: 1.8 %
LYMPHOCYTES # BLD AUTO: 0.5 10E9/L (ref 0.8–5.3)
LYMPHOCYTES NFR BLD AUTO: 5.1 %
MAGNESIUM SERPL-MCNC: 2.1 MG/DL (ref 1.6–2.3)
MCH RBC QN AUTO: 30.2 PG (ref 26.5–33)
MCHC RBC AUTO-ENTMCNC: 31 G/DL (ref 31.5–36.5)
MCV RBC AUTO: 97 FL (ref 78–100)
MONOCYTES # BLD AUTO: 0.8 10E9/L (ref 0–1.3)
MONOCYTES NFR BLD AUTO: 8.2 %
NEUTROPHILS # BLD AUTO: 7.8 10E9/L (ref 1.6–8.3)
NEUTROPHILS NFR BLD AUTO: 83.5 %
NRBC # BLD AUTO: 0 10*3/UL
NRBC BLD AUTO-RTO: 0 /100
PHOSPHATE SERPL-MCNC: 4.3 MG/DL (ref 2.5–4.5)
PLATELET # BLD AUTO: 262 10E9/L (ref 150–450)
POTASSIUM SERPL-SCNC: 3.9 MMOL/L (ref 3.4–5.3)
PROT SERPL-MCNC: 7.1 G/DL (ref 6.8–8.8)
RBC # BLD AUTO: 3.15 10E12/L (ref 4.4–5.9)
SODIUM SERPL-SCNC: 140 MMOL/L (ref 133–144)
WBC # BLD AUTO: 9.4 10E9/L (ref 4–11)

## 2021-03-07 PROCEDURE — 83735 ASSAY OF MAGNESIUM: CPT | Performed by: INTERNAL MEDICINE

## 2021-03-07 PROCEDURE — 85025 COMPLETE CBC W/AUTO DIFF WBC: CPT | Performed by: INTERNAL MEDICINE

## 2021-03-07 PROCEDURE — 80053 COMPREHEN METABOLIC PANEL: CPT | Performed by: INTERNAL MEDICINE

## 2021-03-07 PROCEDURE — 84100 ASSAY OF PHOSPHORUS: CPT | Performed by: INTERNAL MEDICINE

## 2021-03-08 ENCOUNTER — MEDICAL CORRESPONDENCE (OUTPATIENT)
Dept: HEALTH INFORMATION MANAGEMENT | Facility: CLINIC | Age: 26
End: 2021-03-08

## 2021-03-08 ENCOUNTER — HOME INFUSION (PRE-WILLOW HOME INFUSION) (OUTPATIENT)
Dept: PHARMACY | Facility: CLINIC | Age: 26
End: 2021-03-08

## 2021-03-08 LAB
ALBUMIN SERPL-MCNC: 3.6 G/DL (ref 3.4–5)
ALP SERPL-CCNC: 100 U/L (ref 40–150)
ALT SERPL W P-5'-P-CCNC: 25 U/L (ref 0–70)
ANION GAP SERPL CALCULATED.3IONS-SCNC: 6 MMOL/L (ref 3–14)
AST SERPL W P-5'-P-CCNC: 10 U/L (ref 0–45)
BASOPHILS # BLD AUTO: 0.1 10E9/L (ref 0–0.2)
BASOPHILS NFR BLD AUTO: 0.6 %
BILIRUB SERPL-MCNC: 0.5 MG/DL (ref 0.2–1.3)
BUN SERPL-MCNC: 9 MG/DL (ref 7–30)
CALCIUM SERPL-MCNC: 8.9 MG/DL (ref 8.5–10.1)
CHLORIDE SERPL-SCNC: 108 MMOL/L (ref 94–109)
CO2 SERPL-SCNC: 25 MMOL/L (ref 20–32)
CREAT SERPL-MCNC: 0.65 MG/DL (ref 0.66–1.25)
DIFFERENTIAL METHOD BLD: ABNORMAL
EOSINOPHIL # BLD AUTO: 0 10E9/L (ref 0–0.7)
EOSINOPHIL NFR BLD AUTO: 0.3 %
ERYTHROCYTE [DISTWIDTH] IN BLOOD BY AUTOMATED COUNT: 19.9 % (ref 10–15)
GFR SERPL CREATININE-BSD FRML MDRD: >90 ML/MIN/{1.73_M2}
GLUCOSE SERPL-MCNC: 90 MG/DL (ref 70–99)
HCT VFR BLD AUTO: 30.4 % (ref 40–53)
HGB BLD-MCNC: 9.5 G/DL (ref 13.3–17.7)
IMM GRANULOCYTES # BLD: 0.1 10E9/L (ref 0–0.4)
IMM GRANULOCYTES NFR BLD: 1.4 %
LYMPHOCYTES # BLD AUTO: 0.4 10E9/L (ref 0.8–5.3)
LYMPHOCYTES NFR BLD AUTO: 3.6 %
MAGNESIUM SERPL-MCNC: 2.1 MG/DL (ref 1.6–2.3)
MCH RBC QN AUTO: 30.5 PG (ref 26.5–33)
MCHC RBC AUTO-ENTMCNC: 31.3 G/DL (ref 31.5–36.5)
MCV RBC AUTO: 98 FL (ref 78–100)
MONOCYTES # BLD AUTO: 1 10E9/L (ref 0–1.3)
MONOCYTES NFR BLD AUTO: 10.1 %
NEUTROPHILS # BLD AUTO: 8.2 10E9/L (ref 1.6–8.3)
NEUTROPHILS NFR BLD AUTO: 84 %
NRBC # BLD AUTO: 0 10*3/UL
NRBC BLD AUTO-RTO: 0 /100
PHOSPHATE SERPL-MCNC: 4.1 MG/DL (ref 2.5–4.5)
PLATELET # BLD AUTO: 296 10E9/L (ref 150–450)
POTASSIUM SERPL-SCNC: 3.9 MMOL/L (ref 3.4–5.3)
PROT SERPL-MCNC: 7.1 G/DL (ref 6.8–8.8)
RBC # BLD AUTO: 3.11 10E12/L (ref 4.4–5.9)
SODIUM SERPL-SCNC: 139 MMOL/L (ref 133–144)
WBC # BLD AUTO: 9.8 10E9/L (ref 4–11)

## 2021-03-08 PROCEDURE — 84100 ASSAY OF PHOSPHORUS: CPT | Performed by: INTERNAL MEDICINE

## 2021-03-08 PROCEDURE — 80053 COMPREHEN METABOLIC PANEL: CPT | Performed by: INTERNAL MEDICINE

## 2021-03-08 PROCEDURE — 85025 COMPLETE CBC W/AUTO DIFF WBC: CPT | Performed by: INTERNAL MEDICINE

## 2021-03-08 PROCEDURE — 83735 ASSAY OF MAGNESIUM: CPT | Performed by: INTERNAL MEDICINE

## 2021-03-08 NOTE — PROGRESS NOTES
This is a recent snapshot of the patient's [facility] medical record.  For current drug dose and complete information and questions, call [phone] or In Basket pool, fv home infusion ([id number])  CSN Number:  [id number]

## 2021-03-09 ENCOUNTER — HOME INFUSION (PRE-WILLOW HOME INFUSION) (OUTPATIENT)
Dept: PHARMACY | Facility: CLINIC | Age: 26
End: 2021-03-09

## 2021-03-09 ENCOUNTER — MEDICAL CORRESPONDENCE (OUTPATIENT)
Dept: HEALTH INFORMATION MANAGEMENT | Facility: CLINIC | Age: 26
End: 2021-03-09

## 2021-03-09 LAB
ALBUMIN SERPL-MCNC: 3.7 G/DL (ref 3.4–5)
ALP SERPL-CCNC: 98 U/L (ref 40–150)
ALT SERPL W P-5'-P-CCNC: 22 U/L (ref 0–70)
ANION GAP SERPL CALCULATED.3IONS-SCNC: 8 MMOL/L (ref 3–14)
AST SERPL W P-5'-P-CCNC: 7 U/L (ref 0–45)
BILIRUB SERPL-MCNC: 0.9 MG/DL (ref 0.2–1.3)
BUN SERPL-MCNC: 10 MG/DL (ref 7–30)
CALCIUM SERPL-MCNC: 9 MG/DL (ref 8.5–10.1)
CHLORIDE SERPL-SCNC: 105 MMOL/L (ref 94–109)
CO2 SERPL-SCNC: 25 MMOL/L (ref 20–32)
CREAT SERPL-MCNC: 0.7 MG/DL (ref 0.66–1.25)
ERYTHROCYTE [DISTWIDTH] IN BLOOD BY AUTOMATED COUNT: 19.2 % (ref 10–15)
GFR SERPL CREATININE-BSD FRML MDRD: >90 ML/MIN/{1.73_M2}
GLUCOSE SERPL-MCNC: 83 MG/DL (ref 70–99)
HCT VFR BLD AUTO: 30.4 % (ref 40–53)
HGB BLD-MCNC: 9.7 G/DL (ref 13.3–17.7)
MAGNESIUM SERPL-MCNC: 2.3 MG/DL (ref 1.6–2.3)
MCH RBC QN AUTO: 30.3 PG (ref 26.5–33)
MCHC RBC AUTO-ENTMCNC: 31.9 G/DL (ref 31.5–36.5)
MCV RBC AUTO: 95 FL (ref 78–100)
PHOSPHATE SERPL-MCNC: 3.9 MG/DL (ref 2.5–4.5)
PLATELET # BLD AUTO: 319 10E9/L (ref 150–450)
POTASSIUM SERPL-SCNC: 3.9 MMOL/L (ref 3.4–5.3)
PROT SERPL-MCNC: 7.4 G/DL (ref 6.8–8.8)
RBC # BLD AUTO: 3.2 10E12/L (ref 4.4–5.9)
SODIUM SERPL-SCNC: 138 MMOL/L (ref 133–144)
WBC # BLD AUTO: 8.9 10E9/L (ref 4–11)

## 2021-03-09 PROCEDURE — 80053 COMPREHEN METABOLIC PANEL: CPT | Performed by: INTERNAL MEDICINE

## 2021-03-09 PROCEDURE — 83735 ASSAY OF MAGNESIUM: CPT | Performed by: INTERNAL MEDICINE

## 2021-03-09 PROCEDURE — 85027 COMPLETE CBC AUTOMATED: CPT | Performed by: INTERNAL MEDICINE

## 2021-03-09 PROCEDURE — 84100 ASSAY OF PHOSPHORUS: CPT | Performed by: INTERNAL MEDICINE

## 2021-03-09 NOTE — PROGRESS NOTES
This is a recent snapshot of the patient's [facility] medical record.  For current drug dose and complete information and questions, call [phone] or In Basket pool, fv home infusion ([id number])  CSN Number:  [id number]

## 2021-03-10 NOTE — PROGRESS NOTES
This is a recent snapshot of the patient's [facility] medical record.  For current drug dose and complete information and questions, call [phone] or In Basket pool, fv home infusion ([id number])  CSN Number:  [id number]

## 2021-03-10 NOTE — PROGRESS NOTES
This is a recent snapshot of the patient's [facility] medical record.  For current drug dose and complete information and questions, call [phone] or In Basket pool, fv home infusion ([id number])  CSN Number:  [id number]

## 2021-03-11 ENCOUNTER — HOME INFUSION (PRE-WILLOW HOME INFUSION) (OUTPATIENT)
Dept: PHARMACY | Facility: CLINIC | Age: 26
End: 2021-03-11

## 2021-03-11 ENCOUNTER — MEDICAL CORRESPONDENCE (OUTPATIENT)
Dept: HEALTH INFORMATION MANAGEMENT | Facility: CLINIC | Age: 26
End: 2021-03-11

## 2021-03-11 LAB
ALBUMIN SERPL-MCNC: 3.6 G/DL (ref 3.4–5)
ALP SERPL-CCNC: 96 U/L (ref 40–150)
ALT SERPL W P-5'-P-CCNC: 17 U/L (ref 0–70)
ANION GAP SERPL CALCULATED.3IONS-SCNC: 8 MMOL/L (ref 3–14)
AST SERPL W P-5'-P-CCNC: 9 U/L (ref 0–45)
BILIRUB SERPL-MCNC: 0.8 MG/DL (ref 0.2–1.3)
BUN SERPL-MCNC: 10 MG/DL (ref 7–30)
CALCIUM SERPL-MCNC: 8.7 MG/DL (ref 8.5–10.1)
CHLORIDE SERPL-SCNC: 106 MMOL/L (ref 94–109)
CO2 SERPL-SCNC: 23 MMOL/L (ref 20–32)
CREAT SERPL-MCNC: 0.67 MG/DL (ref 0.66–1.25)
ERYTHROCYTE [DISTWIDTH] IN BLOOD BY AUTOMATED COUNT: 18.5 % (ref 10–15)
GFR SERPL CREATININE-BSD FRML MDRD: >90 ML/MIN/{1.73_M2}
GLUCOSE SERPL-MCNC: 140 MG/DL (ref 70–99)
HCT VFR BLD AUTO: 28.8 % (ref 40–53)
HGB BLD-MCNC: 8.9 G/DL (ref 13.3–17.7)
MAGNESIUM SERPL-MCNC: 2.2 MG/DL (ref 1.6–2.3)
MCH RBC QN AUTO: 30 PG (ref 26.5–33)
MCHC RBC AUTO-ENTMCNC: 30.9 G/DL (ref 31.5–36.5)
MCV RBC AUTO: 97 FL (ref 78–100)
PHOSPHATE SERPL-MCNC: 2.8 MG/DL (ref 2.5–4.5)
PLATELET # BLD AUTO: 315 10E9/L (ref 150–450)
POTASSIUM SERPL-SCNC: 3.2 MMOL/L (ref 3.4–5.3)
PROT SERPL-MCNC: 7.6 G/DL (ref 6.8–8.8)
RBC # BLD AUTO: 2.97 10E12/L (ref 4.4–5.9)
SODIUM SERPL-SCNC: 137 MMOL/L (ref 133–144)
WBC # BLD AUTO: 6.9 10E9/L (ref 4–11)

## 2021-03-11 PROCEDURE — 85027 COMPLETE CBC AUTOMATED: CPT | Performed by: INTERNAL MEDICINE

## 2021-03-11 PROCEDURE — 83735 ASSAY OF MAGNESIUM: CPT | Performed by: INTERNAL MEDICINE

## 2021-03-11 PROCEDURE — 80053 COMPREHEN METABOLIC PANEL: CPT | Performed by: INTERNAL MEDICINE

## 2021-03-11 PROCEDURE — 84100 ASSAY OF PHOSPHORUS: CPT | Performed by: INTERNAL MEDICINE

## 2021-03-12 ENCOUNTER — HOME INFUSION (PRE-WILLOW HOME INFUSION) (OUTPATIENT)
Dept: PHARMACY | Facility: CLINIC | Age: 26
End: 2021-03-12

## 2021-03-12 NOTE — PROGRESS NOTES
This is a recent snapshot of the patient's [facility] medical record.  For current drug dose and complete information and questions, call [phone] or In Basket pool, fv home infusion ([id number])  CSN Number:  [id number]

## 2021-03-15 NOTE — PROGRESS NOTES
This is a recent snapshot of the patient's [facility] medical record.  For current drug dose and complete information and questions, call [phone] or In Basket pool, fv home infusion ([id number])  CSN Number:  [id number]

## 2021-03-24 ENCOUNTER — ANCILLARY PROCEDURE (OUTPATIENT)
Dept: CT IMAGING | Facility: CLINIC | Age: 26
End: 2021-03-24
Attending: INTERNAL MEDICINE
Payer: COMMERCIAL

## 2021-03-24 VITALS
HEART RATE: 83 BPM | DIASTOLIC BLOOD PRESSURE: 73 MMHG | OXYGEN SATURATION: 100 % | RESPIRATION RATE: 16 BRPM | TEMPERATURE: 97 F | BODY MASS INDEX: 35.44 KG/M2 | SYSTOLIC BLOOD PRESSURE: 114 MMHG | WEIGHT: 233 LBS

## 2021-03-24 DIAGNOSIS — C49.9 DESMOPLASTIC SMALL ROUND CELL TUMOR (H): ICD-10-CM

## 2021-03-24 DIAGNOSIS — C41.9 SARCOMA, EWINGS (H): ICD-10-CM

## 2021-03-24 LAB
ALBUMIN SERPL-MCNC: 3.6 G/DL (ref 3.4–5)
ALP SERPL-CCNC: 440 U/L (ref 40–150)
ALT SERPL W P-5'-P-CCNC: 571 U/L (ref 0–70)
ANION GAP SERPL CALCULATED.3IONS-SCNC: 7 MMOL/L (ref 3–14)
ANISOCYTOSIS BLD QL SMEAR: ABNORMAL
AST SERPL W P-5'-P-CCNC: 370 U/L (ref 0–45)
BASOPHILS # BLD AUTO: 0 10E9/L (ref 0–0.2)
BASOPHILS NFR BLD AUTO: 0 %
BILIRUB SERPL-MCNC: 2 MG/DL (ref 0.2–1.3)
BUN SERPL-MCNC: 8 MG/DL (ref 7–30)
BURR CELLS BLD QL SMEAR: SLIGHT
CALCIUM SERPL-MCNC: 9 MG/DL (ref 8.5–10.1)
CHLORIDE SERPL-SCNC: 106 MMOL/L (ref 94–109)
CO2 SERPL-SCNC: 26 MMOL/L (ref 20–32)
CREAT SERPL-MCNC: 0.62 MG/DL (ref 0.66–1.25)
DACRYOCYTES BLD QL SMEAR: ABNORMAL
DIFFERENTIAL METHOD BLD: ABNORMAL
EOSINOPHIL # BLD AUTO: 0.2 10E9/L (ref 0–0.7)
EOSINOPHIL NFR BLD AUTO: 1.8 %
ERYTHROCYTE [DISTWIDTH] IN BLOOD BY AUTOMATED COUNT: 19.8 % (ref 10–15)
GFR SERPL CREATININE-BSD FRML MDRD: >90 ML/MIN/{1.73_M2}
GLUCOSE SERPL-MCNC: 94 MG/DL (ref 70–99)
HCT VFR BLD AUTO: 33.3 % (ref 40–53)
HGB BLD-MCNC: 10.2 G/DL (ref 13.3–17.7)
LYMPHOCYTES # BLD AUTO: 0.3 10E9/L (ref 0.8–5.3)
LYMPHOCYTES NFR BLD AUTO: 2.6 %
MCH RBC QN AUTO: 29.7 PG (ref 26.5–33)
MCHC RBC AUTO-ENTMCNC: 30.6 G/DL (ref 31.5–36.5)
MCV RBC AUTO: 97 FL (ref 78–100)
MONOCYTES # BLD AUTO: 0.6 10E9/L (ref 0–1.3)
MONOCYTES NFR BLD AUTO: 6.1 %
MYELOCYTES # BLD: 0.2 10E9/L
MYELOCYTES NFR BLD MANUAL: 1.7 %
NEUTROPHILS # BLD AUTO: 9 10E9/L (ref 1.6–8.3)
NEUTROPHILS NFR BLD AUTO: 87.8 %
OVALOCYTES BLD QL SMEAR: SLIGHT
PLATELET # BLD AUTO: 275 10E9/L (ref 150–450)
PLATELET # BLD EST: ABNORMAL 10*3/UL
POIKILOCYTOSIS BLD QL SMEAR: ABNORMAL
POLYCHROMASIA BLD QL SMEAR: SLIGHT
POTASSIUM SERPL-SCNC: 3.6 MMOL/L (ref 3.4–5.3)
PROT SERPL-MCNC: 7.8 G/DL (ref 6.8–8.8)
RBC # BLD AUTO: 3.43 10E12/L (ref 4.4–5.9)
SODIUM SERPL-SCNC: 139 MMOL/L (ref 133–144)
WBC # BLD AUTO: 10.2 10E9/L (ref 4–11)

## 2021-03-24 PROCEDURE — 250N000011 HC RX IP 250 OP 636: Performed by: INTERNAL MEDICINE

## 2021-03-24 PROCEDURE — 71260 CT THORAX DX C+: CPT | Mod: GC | Performed by: RADIOLOGY

## 2021-03-24 PROCEDURE — 80053 COMPREHEN METABOLIC PANEL: CPT | Performed by: INTERNAL MEDICINE

## 2021-03-24 PROCEDURE — 85025 COMPLETE CBC W/AUTO DIFF WBC: CPT | Performed by: INTERNAL MEDICINE

## 2021-03-24 PROCEDURE — 74177 CT ABD & PELVIS W/CONTRAST: CPT | Mod: GC | Performed by: RADIOLOGY

## 2021-03-24 PROCEDURE — 36592 COLLECT BLOOD FROM PICC: CPT

## 2021-03-24 RX ORDER — IOPAMIDOL 755 MG/ML
135 INJECTION, SOLUTION INTRAVASCULAR ONCE
Status: COMPLETED | OUTPATIENT
Start: 2021-03-24 | End: 2021-03-24

## 2021-03-24 RX ORDER — HEPARIN SODIUM,PORCINE 10 UNIT/ML
5 VIAL (ML) INTRAVENOUS ONCE
Status: COMPLETED | OUTPATIENT
Start: 2021-03-24 | End: 2021-03-24

## 2021-03-24 RX ADMIN — Medication 5 ML: at 11:06

## 2021-03-24 RX ADMIN — IOPAMIDOL 135 ML: 755 INJECTION, SOLUTION INTRAVASCULAR at 13:57

## 2021-03-24 ASSESSMENT — PAIN SCALES - GENERAL: PAINLEVEL: NO PAIN (0)

## 2021-03-24 NOTE — NURSING NOTE
Chief Complaint   Patient presents with     Blood Draw     Labs drawn via CVC by RN in lab. VS taken.      Labs collected from CVC by RN, line flushed with saline and heparin.  Vitals taken. Pt checked in for appointment(s).    Leah ROTHMAN RN PHN BSN  BMT/Oncology Lab

## 2021-03-25 ENCOUNTER — VIRTUAL VISIT (OUTPATIENT)
Dept: ONCOLOGY | Facility: CLINIC | Age: 26
End: 2021-03-25
Attending: PHYSICIAN ASSISTANT
Payer: COMMERCIAL

## 2021-03-25 DIAGNOSIS — R31.9 HEMATURIA, UNSPECIFIED TYPE: ICD-10-CM

## 2021-03-25 DIAGNOSIS — R79.89 ELEVATED LFTS: Primary | ICD-10-CM

## 2021-03-25 DIAGNOSIS — D64.9 ANEMIA, NORMOCYTIC NORMOCHROMIC: ICD-10-CM

## 2021-03-25 DIAGNOSIS — R53.81 PHYSICAL DECONDITIONING: ICD-10-CM

## 2021-03-25 DIAGNOSIS — C41.9 SARCOMA, EWINGS (H): ICD-10-CM

## 2021-03-25 DIAGNOSIS — R79.89 ELEVATED LFTS: ICD-10-CM

## 2021-03-25 DIAGNOSIS — C49.9 DESMOPLASTIC SMALL ROUND CELL TUMOR (H): ICD-10-CM

## 2021-03-25 LAB
ALBUMIN SERPL-MCNC: 3.5 G/DL (ref 3.4–5)
ALP SERPL-CCNC: 436 U/L (ref 40–150)
ALT SERPL W P-5'-P-CCNC: 425 U/L (ref 0–70)
ANION GAP SERPL CALCULATED.3IONS-SCNC: 4 MMOL/L (ref 3–14)
AST SERPL W P-5'-P-CCNC: 112 U/L (ref 0–45)
BASOPHILS # BLD AUTO: 0.1 10E9/L (ref 0–0.2)
BASOPHILS NFR BLD AUTO: 0.7 %
BILIRUB DIRECT SERPL-MCNC: 0.5 MG/DL (ref 0–0.2)
BILIRUB SERPL-MCNC: 1 MG/DL (ref 0.2–1.3)
BUN SERPL-MCNC: 8 MG/DL (ref 7–30)
CALCIUM SERPL-MCNC: 8.8 MG/DL (ref 8.5–10.1)
CHLORIDE SERPL-SCNC: 109 MMOL/L (ref 94–109)
CO2 SERPL-SCNC: 26 MMOL/L (ref 20–32)
CREAT SERPL-MCNC: 0.78 MG/DL (ref 0.66–1.25)
DIFFERENTIAL METHOD BLD: ABNORMAL
EOSINOPHIL # BLD AUTO: 0.2 10E9/L (ref 0–0.7)
EOSINOPHIL NFR BLD AUTO: 1.8 %
ERYTHROCYTE [DISTWIDTH] IN BLOOD BY AUTOMATED COUNT: 20 % (ref 10–15)
GFR SERPL CREATININE-BSD FRML MDRD: >90 ML/MIN/{1.73_M2}
GGT SERPL-CCNC: 698 U/L (ref 0–75)
GLUCOSE SERPL-MCNC: 70 MG/DL (ref 70–99)
HCT VFR BLD AUTO: 32.6 % (ref 40–53)
HGB BLD-MCNC: 9.8 G/DL (ref 13.3–17.7)
IMM GRANULOCYTES # BLD: 0.4 10E9/L (ref 0–0.4)
IMM GRANULOCYTES NFR BLD: 3.3 %
LYMPHOCYTES # BLD AUTO: 0.5 10E9/L (ref 0.8–5.3)
LYMPHOCYTES NFR BLD AUTO: 4.2 %
MCH RBC QN AUTO: 29.6 PG (ref 26.5–33)
MCHC RBC AUTO-ENTMCNC: 30.1 G/DL (ref 31.5–36.5)
MCV RBC AUTO: 99 FL (ref 78–100)
MONOCYTES # BLD AUTO: 0.8 10E9/L (ref 0–1.3)
MONOCYTES NFR BLD AUTO: 7.4 %
NEUTROPHILS # BLD AUTO: 9.3 10E9/L (ref 1.6–8.3)
NEUTROPHILS NFR BLD AUTO: 82.6 %
NRBC # BLD AUTO: 0 10*3/UL
NRBC BLD AUTO-RTO: 0 /100
PLATELET # BLD AUTO: 272 10E9/L (ref 150–450)
POTASSIUM SERPL-SCNC: 3.5 MMOL/L (ref 3.4–5.3)
PROT SERPL-MCNC: 7.5 G/DL (ref 6.8–8.8)
RBC # BLD AUTO: 3.31 10E12/L (ref 4.4–5.9)
SODIUM SERPL-SCNC: 139 MMOL/L (ref 133–144)
WBC # BLD AUTO: 11.3 10E9/L (ref 4–11)

## 2021-03-25 PROCEDURE — 99215 OFFICE O/P EST HI 40 MIN: CPT | Mod: 95 | Performed by: PHYSICIAN ASSISTANT

## 2021-03-25 PROCEDURE — 80053 COMPREHEN METABOLIC PANEL: CPT | Performed by: INTERNAL MEDICINE

## 2021-03-25 PROCEDURE — 82977 ASSAY OF GGT: CPT | Performed by: INTERNAL MEDICINE

## 2021-03-25 PROCEDURE — 999N001193 HC VIDEO/TELEPHONE VISIT; NO CHARGE

## 2021-03-25 PROCEDURE — 87340 HEPATITIS B SURFACE AG IA: CPT | Performed by: INTERNAL MEDICINE

## 2021-03-25 PROCEDURE — 250N000011 HC RX IP 250 OP 636: Performed by: PHYSICIAN ASSISTANT

## 2021-03-25 PROCEDURE — 82248 BILIRUBIN DIRECT: CPT | Performed by: INTERNAL MEDICINE

## 2021-03-25 PROCEDURE — 86803 HEPATITIS C AB TEST: CPT | Performed by: INTERNAL MEDICINE

## 2021-03-25 PROCEDURE — 36592 COLLECT BLOOD FROM PICC: CPT

## 2021-03-25 PROCEDURE — 86706 HEP B SURFACE ANTIBODY: CPT | Performed by: INTERNAL MEDICINE

## 2021-03-25 PROCEDURE — 85025 COMPLETE CBC W/AUTO DIFF WBC: CPT | Performed by: INTERNAL MEDICINE

## 2021-03-25 PROCEDURE — 86704 HEP B CORE ANTIBODY TOTAL: CPT | Performed by: INTERNAL MEDICINE

## 2021-03-25 PROCEDURE — 36591 DRAW BLOOD OFF VENOUS DEVICE: CPT

## 2021-03-25 RX ORDER — HEPARIN SODIUM,PORCINE 10 UNIT/ML
5 VIAL (ML) INTRAVENOUS ONCE
Status: COMPLETED | OUTPATIENT
Start: 2021-03-25 | End: 2021-03-25

## 2021-03-25 RX ADMIN — Medication 5 ML: at 15:55

## 2021-03-25 NOTE — PROGRESS NOTES
Diego is a 25 year old who is being evaluated via a billable video visit.      How would you like to obtain your AVS? MyChart  If the video visit is dropped, the invitation should be resent by: Send to e-mail at: [email or web address]  Will anyone else be joining your video visit? No     BARRY Crowley    Video-Visit Details    Type of service:  Video Visit    Video Start Time: 11:13 AM    Video End Time:11:24 AM    Originating Location (pt. 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 He presented initially with abdominal pain, diarrhea, and progressive abdominal distention for 3 months duration. 2. Initial CT scan in February 2020 showed severe peritoneal carcinomatosis with large peritoneal tumor implants throughout the entire abdomen and pelvis, but mostly prominently in the pelvis.   2. PETCT scan showed interval increase in the amount of peritoneal carcinomatosis.  3. On 3/12/2020, he had ultrasound-guided core needle biopsy of a peritoneal implant (Case: [id number]), which showed a completely undifferentiated appearance, but stains with pancytokeratin, indicating an epithelial origin. 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 Tumor markers for CA-19-9, CEA, beta-hCG, alpha-fetoprotein were unremarkable. Cancer type ID molecular testing by [employer] sent to determine the exact diagnosis and to assist in further treatment planning. 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 Relative probability of less than 5% of other subtype of sarcoma. EWSR1-WT1 fusion detected.  4. 5/1/2020, MRI brain negative for brain metastasis, and baseline CT-CAP obtained showing extensive mixed solid and cystic masses throughout the abdomen and pelvis consistent with peritoneal carcinomatosis. Largest mass measures approximately 20 cm in the pelvis. Metastatic mixed solid and cystic masses seen in hepatic segments 5 and 6 and hepatic segment 7. The masses appear to be contiguous with the retrohepatic peritoneal carcinomatosis. Small volume fluid about the spleen favored to represent malignant ascites, stable mild-to-moderate right hydronephrosis related to mass effect upon the distal ureter in the pelvis, the IVC and iliac veins are compressed due to the extensive peritoneal carcinomatosis without findings to suggest deep venous thrombosis.  5. 5/4/2020, he begins CAV/IMV alternating chemotherapy. He receives 6 cycles of treatment. He symptomatically improves.  6. 9/11/2020, CT-CAP shows stable to mildly improved extensive peritoneal carcinomatosis. He would like to omit Ifosfamide/etoposide cycles and continue only with CAV.  7. 10/9/2020, he starts CAV every 21 days.  8. 1/6/2021, CT CAP showed a mixed response in the mixed solid and cystic masses throughout the peritoneum. Some of the tumors are stable to mildly worse, with some of the peritoneal masses a bit larger, a few are smaller, one cystic tumor in the left abdomen is smaller, while tumors lower in the pelvis appear slightly larger.     HISTORY OF PRESENT ILLNESS  Diego Buchanan was met with over video with sister for follow up.  -Has been feeling okay, same as usual.   -Denies any abdominal pain.   -Denies any Tylenol use.   -Denies any herbal supplement use.   -Unsure if mom giving him herbs to drink.   -Denies any alcohol use.   -No recent blood in urine.    Current Outpatient Medications   Medication Sig Dispense Refill     DOXOrubicin  (ADRIAMYCIN) 2 MG/ML injection        etoposide (VEPESID) 50 MG capsule        LORazepam (ATIVAN) 0.5 MG tablet Take 1 tablet (0.5 mg) by mouth every 4 hours as needed (Anxiety, Nausea/Vomiting or Sleep) 30 tablet 5     metoprolol tartrate (LOPRESSOR) 50 MG tablet Take 1.5 tablets (75 mg) by mouth daily 45 tablet 3     NEULASTA ONPRO 6 MG/0.6ML injection        polyethylene glycol (MIRALAX) 17 g packet Take 17 g by mouth       prochlorperazine (COMPAZINE) 10 MG tablet Take 1 tablet (10 mg) by mouth every 6 hours as needed (Nausea/Vomiting) 30 tablet 5     senna-docusate (SENNA S) 8.6-50 MG tablet Take 1 tablet by mouth 2 times daily 60 tablet 0     sodium chloride 0.9% infusion        etoposide (VEPESID) 50 MG capsule Take 4 capsules (200 mg) by mouth daily for 6 days Days 1 through 6. 24 capsule 0     etoposide (VEPESID) 50 MG capsule Take 4 capsules (200 mg) by mouth daily for 6 days Days 1 through 6. 24 capsule 0     mesna (MESNEX) 400 MG TABS tablet Take 1 tablet (400 mg) by mouth every 4 hours for 2 doses Take one tablet 4 hours and 8 hours after end of cyclophosphamide infusion. 2 tablet 0     Objective:  Vital Signs 3/3/2021   Systolic 118   Diastolic 62   Pulse 90   Temperature 97.9   Respirations 16   Weight (LB) 234 lb 6.4 oz   O2 100     General: patient appears well in no acute distress, alert and oriented, speech clear and fluid  Skin: no visualized rash or lesions on visualized skin  Resp: Appears to be breathing comfortably without accessory muscle usage, speaking in full sentences, no audible wheezes or cough.  Psych: Coherent speech, normal rate and volume, able to articulate logical thoughts, able to abstract reason, no tangential thoughts, no hallucinations or delusions  Patient's affect is appropriate.    LABS  Pending from today.    IMAGING  CT CAP on 3/25/21 shows the followin. Continued slight decrease in overall burden of peritoneal  carcinomatosis with persistent encasement of bowel  without evidence of  bowel obstruction.     2. Decreased size of the lesion in hepatic segments V/VI with  continued peritoneal nodularity along the adjacent posterior right  hepatic capsule.      3. Stable size of the previously noted anterior cardiophrenic and  right paratracheal lymph nodes.     4. Mildly dilated common bile duct and mild intrahepatic biliary  dilation without evident cause of obstruction. Possible gallstone  towards the fundus with some mild prominence of the cystic duct wall.  Correlation with clinical symptoms for biliary obstruction and  cholangitis is suggested. If there is concern clinically, findings  could be initially evaluated with a right upper quadrant ultrasound if  clinically warranted.    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 Mr. 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 He tolerated 6 alternating cycles of CAV/IMV with anticipated side effects. He developed hematuria with his last 2 cycles of Ifosfamide based therapy despite dose reduction and Mesna prophylaxis, and ultimately patient and family decided to continue with CAV alone. Due to a mixed response on his last CT, it was decided to add back in alternating with IMV.  -He tolerating resuming IMV well. His hematuria continues intermittently, better in the last few weeks. Given his elevated LFT's, I will hold chemotherapy until this is evaluated further. His CT CAP shows overall slightly improvement in his disease. He will tentatively see me back in 1 week for consideration of resuming chemotherapy.     Anemia, normocytic. Initially microcytic, now normocytic. Hemoglobin trends up during periods of time without hematuria and down when he has more hematuria. Likely multifactorial with chemotherapy and  intermittent hematuria. No current transfusion needs. Will continue to monitor closely.     Deconditioning. I again encouraged him to go for daily walks for at least 10 minutes per day. Could consider referral for physical therapy for deconditioning.     Hematuria. Recurrent. He previously saw urology in September 2020 who felt this could be secondary to cyclophosphamide (despite mesna), new bladder/urethral primary malignancies, or metastatic bladder invasion. He and his family opted against a cystoscopy at that time. I suspect it is due to metastatic bladder invasion. We again revisited this conversation and if his hemoglobin does drop below 8 again, I would like him to consider a cystoscopy.     COVID vaccine. Recommend he get signed up for the COVID vaccine, as he is now eligible.     Elevated LFT's. Unclear cause. No recent alcohol or Tylenol use. Unclear if he has been taking any herbal supplements. CT CAP does not show worsening of his disease. Will recheck LFT's and will also check hepatitis B/C, and GGT. If LFT's not trending down, may consider an MRCP. CT CAP does show midlly dilated common bile duct and mild intrahepatic biliary dilation.     Yasmine Mckeon PA-C  [facility]  [address], [place] [address] [phone]

## 2021-03-25 NOTE — NURSING NOTE
Chief Complaint   Patient presents with     Blood Draw     Labs drawn via CVC by RN in lab.      Labs collected from CVC by RN, line flushed with saline and heparin.      Leah ROTHMAN RN PHN BSN  BMT/Oncology Lab

## 2021-03-25 NOTE — LETTER
3/25/2021         RE: Diego Buchanan  [address]  [place] [address]        Dear Colleague,    Thank you for referring your patient, Diego Buchanan, to the [facility]. Please see a copy of my visit note below.    Diego is a 25 year old who is being evaluated via a billable video visit.       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 He presented initially with abdominal pain, diarrhea, and progressive abdominal distention for 3 months duration. 2. Initial CT scan in February 2020 showed severe peritoneal carcinomatosis with large peritoneal tumor implants throughout the entire abdomen and pelvis, but mostly prominently in the pelvis.   2. PETCT scan showed interval increase in the amount of peritoneal carcinomatosis.  3. On 3/12/2020, he had ultrasound-guided core needle biopsy of a peritoneal implant (Case: [id number]), which showed a completely undifferentiated appearance, but stains with pancytokeratin, indicating an epithelial origin. 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 Tumor markers for CA-19-9, CEA, beta-hCG, alpha-fetoprotein were unremarkable. Cancer type ID molecular testing by [employer] sent to determine the exact diagnosis and to assist in further treatment planning. 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 Relative probability of less than 5% of other subtype of sarcoma. EWSR1-WT1 fusion detected.  4. 5/1/2020, MRI brain negative for brain metastasis, and baseline CT-CAP obtained showing extensive mixed solid and cystic masses  throughout the abdomen and pelvis consistent with peritoneal carcinomatosis. Largest mass measures approximately 20 cm in the pelvis. Metastatic mixed solid and cystic masses seen in hepatic segments 5 and 6 and hepatic segment 7. The masses appear to be contiguous with the retrohepatic peritoneal carcinomatosis. Small volume fluid about the spleen favored to represent malignant ascites, stable mild-to-moderate right hydronephrosis related to mass effect upon the distal ureter in the pelvis, the IVC and iliac veins are compressed due to the extensive peritoneal carcinomatosis without findings to suggest deep venous thrombosis.  5. 5/4/2020, he begins CAV/IMV alternating chemotherapy. He receives 6 cycles of treatment. He symptomatically improves.  6. 9/11/2020, CT-CAP shows stable to mildly improved extensive peritoneal carcinomatosis. He would like to omit Ifosfamide/etoposide cycles and continue only with CAV.  7. 10/9/2020, he starts CAV every 21 days.  8. 1/6/2021, CT CAP showed a mixed response in the mixed solid and cystic masses throughout the peritoneum. Some of the tumors are stable to mildly worse, with some of the peritoneal masses a bit larger, a few are smaller, one cystic tumor in the left abdomen is smaller, while tumors lower in the pelvis appear slightly larger.     HISTORY OF PRESENT ILLNESS  Diego Buchanan was met with over video with sister for follow up.  -Has been feeling okay, same as usual.   -Denies any abdominal pain.   -Denies any Tylenol use.   -Denies any herbal supplement use.   -Unsure if mom giving him herbs to drink.   -Denies any alcohol use.   -No recent blood in urine.    Current Outpatient Medications   Medication Sig Dispense Refill     DOXOrubicin (ADRIAMYCIN) 2 MG/ML injection        etoposide (VEPESID) 50 MG capsule        LORazepam (ATIVAN) 0.5 MG tablet Take 1 tablet (0.5 mg) by mouth every 4 hours as needed (Anxiety, Nausea/Vomiting or Sleep) 30 tablet 5     metoprolol  tartrate (LOPRESSOR) 50 MG tablet Take 1.5 tablets (75 mg) by mouth daily 45 tablet 3     NEULASTA ONPRO 6 MG/0.6ML injection        polyethylene glycol (MIRALAX) 17 g packet Take 17 g by mouth       prochlorperazine (COMPAZINE) 10 MG tablet Take 1 tablet (10 mg) by mouth every 6 hours as needed (Nausea/Vomiting) 30 tablet 5     senna-docusate (SENNA S) 8.6-50 MG tablet Take 1 tablet by mouth 2 times daily 60 tablet 0     sodium chloride 0.9% infusion        etoposide (VEPESID) 50 MG capsule Take 4 capsules (200 mg) by mouth daily for 6 days Days 1 through 6. 24 capsule 0     etoposide (VEPESID) 50 MG capsule Take 4 capsules (200 mg) by mouth daily for 6 days Days 1 through 6. 24 capsule 0     mesna (MESNEX) 400 MG TABS tablet Take 1 tablet (400 mg) by mouth every 4 hours for 2 doses Take one tablet 4 hours and 8 hours after end of cyclophosphamide infusion. 2 tablet 0     Objective:  Vital Signs 3/3/2021   Systolic 118   Diastolic 62   Pulse 90   Temperature 97.9   Respirations 16   Weight (LB) 234 lb 6.4 oz   O2 100     General: patient appears well in no acute distress, alert and oriented, speech clear and fluid  Skin: no visualized rash or lesions on visualized skin  Resp: Appears to be breathing comfortably without accessory muscle usage, speaking in full sentences, no audible wheezes or cough.  Psych: Coherent speech, normal rate and volume, able to articulate logical thoughts, able to abstract reason, no tangential thoughts, no hallucinations or delusions  Patient's affect is appropriate.    LABS  Pending from today.    IMAGING  CT CAP on 3/25/21 shows the followin. Continued slight decrease in overall burden of peritoneal  carcinomatosis with persistent encasement of bowel without evidence of  bowel obstruction.     2. Decreased size of the lesion in hepatic segments V/VI with  continued peritoneal nodularity along the adjacent posterior right  hepatic capsule.      3. Stable size of the previously  noted anterior cardiophrenic and  right paratracheal lymph nodes.     4. Mildly dilated common bile duct and mild intrahepatic biliary  dilation without evident cause of obstruction. Possible gallstone  towards the fundus with some mild prominence of the cystic duct wall.  Correlation with clinical symptoms for biliary obstruction and  cholangitis is suggested. If there is concern clinically, findings  could be initially evaluated with a right upper quadrant ultrasound if  clinically warranted.    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 Mr. 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 He tolerated 6 alternating cycles of CAV/IMV with anticipated side effects. He developed hematuria with his last 2 cycles of Ifosfamide based therapy despite dose reduction and Mesna prophylaxis, and ultimately patient and family decided to continue with CAV alone. Due to a mixed response on his last CT, it was decided to add back in alternating with IMV.  -He tolerating resuming IMV well. His hematuria continues intermittently, better in the last few weeks. Given his elevated LFT's, I will hold chemotherapy until this is evaluated further. His CT CAP shows overall slightly improvement in his disease. He will tentatively see me back in 1 week for consideration of resuming chemotherapy.     Anemia, normocytic. Initially microcytic, now normocytic. Hemoglobin trends up during periods of time without hematuria and down when he has more hematuria. Likely multifactorial with chemotherapy and intermittent hematuria. No current transfusion needs. Will continue to monitor closely.     Deconditioning. I again encouraged him to go for daily walks for at least 10 minutes per day. Could consider referral for physical therapy  for deconditioning.     Hematuria. Recurrent. He previously saw urology in September 2020 who felt this could be secondary to cyclophosphamide (despite mesna), new bladder/urethral primary malignancies, or metastatic bladder invasion. He and his family opted against a cystoscopy at that time. I suspect it is due to metastatic bladder invasion. We again revisited this conversation and if his hemoglobin does drop below 8 again, I would like him to consider a cystoscopy.     COVID vaccine. Recommend he get signed up for the COVID vaccine, as he is now eligible.     Elevated LFT's. Unclear cause. No recent alcohol or Tylenol use. Unclear if he has been taking any herbal supplements. CT CAP does not show worsening of his disease. Will recheck LFT's and will also check hepatitis B/C, and GGT. If LFT's not trending down, may consider an MRCP. CT CAP does show midlly dilated common bile duct and mild intrahepatic biliary dilation.     Yasmine Mckeon PA-C  [facility]  [address] [place], [place] [address] [phone]

## 2021-03-26 DIAGNOSIS — R79.89 ELEVATED LFTS: Primary | ICD-10-CM

## 2021-03-26 LAB
HBV CORE AB SERPL QL IA: NONREACTIVE
HBV SURFACE AB SERPL IA-ACNC: 1.37 M[IU]/ML
HBV SURFACE AG SERPL QL IA: NONREACTIVE
HCV AB SERPL QL IA: NONREACTIVE

## 2021-03-31 VITALS
RESPIRATION RATE: 16 BRPM | OXYGEN SATURATION: 100 % | TEMPERATURE: 96.7 F | WEIGHT: 236.3 LBS | HEART RATE: 86 BPM | BODY MASS INDEX: 35.94 KG/M2 | DIASTOLIC BLOOD PRESSURE: 71 MMHG | SYSTOLIC BLOOD PRESSURE: 116 MMHG

## 2021-03-31 DIAGNOSIS — C41.9 SARCOMA, EWINGS (H): ICD-10-CM

## 2021-03-31 LAB
ALBUMIN SERPL-MCNC: 3.6 G/DL (ref 3.4–5)
ALP SERPL-CCNC: 186 U/L (ref 40–150)
ALT SERPL W P-5'-P-CCNC: 81 U/L (ref 0–70)
ANION GAP SERPL CALCULATED.3IONS-SCNC: 3 MMOL/L (ref 3–14)
AST SERPL W P-5'-P-CCNC: 11 U/L (ref 0–45)
BASOPHILS # BLD AUTO: 0.1 10E9/L (ref 0–0.2)
BASOPHILS NFR BLD AUTO: 0.9 %
BILIRUB SERPL-MCNC: 0.7 MG/DL (ref 0.2–1.3)
BUN SERPL-MCNC: 10 MG/DL (ref 7–30)
CALCIUM SERPL-MCNC: 9.2 MG/DL (ref 8.5–10.1)
CHLORIDE SERPL-SCNC: 109 MMOL/L (ref 94–109)
CO2 SERPL-SCNC: 27 MMOL/L (ref 20–32)
CREAT SERPL-MCNC: 0.71 MG/DL (ref 0.66–1.25)
DIFFERENTIAL METHOD BLD: ABNORMAL
EOSINOPHIL # BLD AUTO: 0.1 10E9/L (ref 0–0.7)
EOSINOPHIL NFR BLD AUTO: 1.5 %
ERYTHROCYTE [DISTWIDTH] IN BLOOD BY AUTOMATED COUNT: 18.4 % (ref 10–15)
GFR SERPL CREATININE-BSD FRML MDRD: >90 ML/MIN/{1.73_M2}
GLUCOSE SERPL-MCNC: 107 MG/DL (ref 70–99)
HCT VFR BLD AUTO: 33.4 % (ref 40–53)
HGB BLD-MCNC: 10.3 G/DL (ref 13.3–17.7)
IMM GRANULOCYTES # BLD: 0.1 10E9/L (ref 0–0.4)
IMM GRANULOCYTES NFR BLD: 0.9 %
LYMPHOCYTES # BLD AUTO: 0.5 10E9/L (ref 0.8–5.3)
LYMPHOCYTES NFR BLD AUTO: 5.2 %
MCH RBC QN AUTO: 29.9 PG (ref 26.5–33)
MCHC RBC AUTO-ENTMCNC: 30.8 G/DL (ref 31.5–36.5)
MCV RBC AUTO: 97 FL (ref 78–100)
MONOCYTES # BLD AUTO: 0.5 10E9/L (ref 0–1.3)
MONOCYTES NFR BLD AUTO: 5.1 %
NEUTROPHILS # BLD AUTO: 8.1 10E9/L (ref 1.6–8.3)
NEUTROPHILS NFR BLD AUTO: 86.4 %
NRBC # BLD AUTO: 0 10*3/UL
NRBC BLD AUTO-RTO: 0 /100
PLATELET # BLD AUTO: 271 10E9/L (ref 150–450)
POTASSIUM SERPL-SCNC: 3.7 MMOL/L (ref 3.4–5.3)
PROT SERPL-MCNC: 7.6 G/DL (ref 6.8–8.8)
RBC # BLD AUTO: 3.44 10E12/L (ref 4.4–5.9)
SODIUM SERPL-SCNC: 140 MMOL/L (ref 133–144)
WBC # BLD AUTO: 9.4 10E9/L (ref 4–11)

## 2021-03-31 PROCEDURE — 36592 COLLECT BLOOD FROM PICC: CPT

## 2021-03-31 PROCEDURE — 250N000011 HC RX IP 250 OP 636: Performed by: INTERNAL MEDICINE

## 2021-03-31 PROCEDURE — 80053 COMPREHEN METABOLIC PANEL: CPT | Performed by: INTERNAL MEDICINE

## 2021-03-31 PROCEDURE — 85025 COMPLETE CBC W/AUTO DIFF WBC: CPT | Performed by: INTERNAL MEDICINE

## 2021-03-31 RX ORDER — HEPARIN SODIUM,PORCINE 10 UNIT/ML
5 VIAL (ML) INTRAVENOUS
Status: DISCONTINUED | OUTPATIENT
Start: 2021-03-31 | End: 2021-04-08 | Stop reason: HOSPADM

## 2021-03-31 RX ADMIN — Medication 5 ML: at 15:49

## 2021-03-31 RX ADMIN — Medication 5 ML: at 15:50

## 2021-03-31 ASSESSMENT — PAIN SCALES - GENERAL: PAINLEVEL: NO PAIN (0)

## 2021-03-31 NOTE — NURSING NOTE
Chief Complaint   Patient presents with     Blood Draw     labs drawn from cvc by rn.  vs taken     Labs drawn from CVC by rn.  Good blood return noted in both lumens.  Both lumens flushed with NS and heparin.  Pt tolerated well.  VS taken.      Jacqueline Alcantara RN

## 2021-04-01 ENCOUNTER — VIRTUAL VISIT (OUTPATIENT)
Dept: ONCOLOGY | Facility: CLINIC | Age: 26
End: 2021-04-01
Attending: INTERNAL MEDICINE
Payer: COMMERCIAL

## 2021-04-01 DIAGNOSIS — R53.81 PHYSICAL DECONDITIONING: ICD-10-CM

## 2021-04-01 DIAGNOSIS — C49.9 DESMOPLASTIC SMALL ROUND CELL TUMOR (H): Primary | ICD-10-CM

## 2021-04-01 DIAGNOSIS — R31.9 HEMATURIA, UNSPECIFIED TYPE: ICD-10-CM

## 2021-04-01 DIAGNOSIS — C41.9 SARCOMA, EWINGS (H): ICD-10-CM

## 2021-04-01 DIAGNOSIS — D64.9 ANEMIA, NORMOCYTIC NORMOCHROMIC: ICD-10-CM

## 2021-04-01 DIAGNOSIS — R79.89 ELEVATED LFTS: ICD-10-CM

## 2021-04-01 PROCEDURE — 999N001193 HC VIDEO/TELEPHONE VISIT; NO CHARGE

## 2021-04-01 PROCEDURE — 99214 OFFICE O/P EST MOD 30 MIN: CPT | Mod: 95 | Performed by: PHYSICIAN ASSISTANT

## 2021-04-01 RX ORDER — LORAZEPAM 2 MG/ML
0.5 INJECTION INTRAMUSCULAR EVERY 4 HOURS PRN
Status: CANCELLED
Start: 2021-04-02

## 2021-04-01 RX ORDER — METHYLPREDNISOLONE SODIUM SUCCINATE 125 MG/2ML
125 INJECTION, POWDER, LYOPHILIZED, FOR SOLUTION INTRAMUSCULAR; INTRAVENOUS
Status: CANCELLED
Start: 2021-04-02

## 2021-04-01 RX ORDER — PALONOSETRON 0.05 MG/ML
0.25 INJECTION, SOLUTION INTRAVENOUS ONCE
Status: CANCELLED | OUTPATIENT
Start: 2021-04-02

## 2021-04-01 RX ORDER — SODIUM CHLORIDE 9 MG/ML
1000 INJECTION, SOLUTION INTRAVENOUS CONTINUOUS PRN
Status: CANCELLED
Start: 2021-04-02

## 2021-04-01 RX ORDER — HEPARIN SODIUM (PORCINE) LOCK FLUSH IV SOLN 100 UNIT/ML 100 UNIT/ML
5 SOLUTION INTRAVENOUS
Status: CANCELLED | OUTPATIENT
Start: 2021-04-02

## 2021-04-01 RX ORDER — ALBUTEROL SULFATE 90 UG/1
1-2 AEROSOL, METERED RESPIRATORY (INHALATION)
Status: CANCELLED
Start: 2021-04-02

## 2021-04-01 RX ORDER — EPINEPHRINE 1 MG/ML
0.3 INJECTION, SOLUTION INTRAMUSCULAR; SUBCUTANEOUS EVERY 5 MIN PRN
Status: CANCELLED | OUTPATIENT
Start: 2021-04-02

## 2021-04-01 RX ORDER — ALBUTEROL SULFATE 0.83 MG/ML
2.5 SOLUTION RESPIRATORY (INHALATION)
Status: CANCELLED | OUTPATIENT
Start: 2021-04-02

## 2021-04-01 RX ORDER — HEPARIN SODIUM,PORCINE 10 UNIT/ML
5 VIAL (ML) INTRAVENOUS
Status: CANCELLED | OUTPATIENT
Start: 2021-04-02

## 2021-04-01 RX ORDER — NALOXONE HYDROCHLORIDE 0.4 MG/ML
.1-.4 INJECTION, SOLUTION INTRAMUSCULAR; INTRAVENOUS; SUBCUTANEOUS
Status: CANCELLED | OUTPATIENT
Start: 2021-04-02

## 2021-04-01 RX ORDER — DIPHENHYDRAMINE HYDROCHLORIDE 50 MG/ML
50 INJECTION INTRAMUSCULAR; INTRAVENOUS
Status: CANCELLED
Start: 2021-04-02

## 2021-04-01 RX ORDER — MEPERIDINE HYDROCHLORIDE 25 MG/ML
25 INJECTION INTRAMUSCULAR; INTRAVENOUS; SUBCUTANEOUS EVERY 30 MIN PRN
Status: CANCELLED | OUTPATIENT
Start: 2021-04-02

## 2021-04-01 NOTE — LETTER
4/1/2021         RE: Diego Buchanan  [address]  [place] [address]        Dear Colleague,    Thank you for referring your patient, Diego Buchanan, to the [facility]. Please see a copy of my visit note below.    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 He presented initially with abdominal pain, diarrhea, and progressive abdominal distention for 3 months duration. 2. Initial CT scan in February 2020 showed severe peritoneal carcinomatosis with large peritoneal tumor implants throughout the entire abdomen and pelvis, but mostly prominently in the pelvis.   2. PETCT scan showed interval increase in the amount of peritoneal carcinomatosis.  3. On 3/12/2020, he had ultrasound-guided core needle biopsy of a peritoneal implant (Case: [id number]), which showed a completely undifferentiated appearance, but stains with pancytokeratin, indicating an epithelial origin. 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 Tumor markers for CA-19-9, CEA, beta-hCG, alpha-fetoprotein were unremarkable. Cancer type ID molecular testing by [employer] sent to determine the exact diagnosis and to assist in further treatment planning. 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 Relative probability of less than 5% of other subtype of sarcoma. EWSR1-WT1 fusion detected.  4. 5/1/2020, MRI brain negative for brain metastasis, and baseline CT-CAP obtained showing extensive mixed solid and cystic masses throughout the abdomen and pelvis consistent with peritoneal carcinomatosis. Largest mass measures approximately 20 cm in the pelvis. Metastatic mixed solid and cystic masses seen in hepatic segments 5 and 6 and hepatic segment 7. The masses appear to be contiguous  with the retrohepatic peritoneal carcinomatosis. Small volume fluid about the spleen favored to represent malignant ascites, stable mild-to-moderate right hydronephrosis related to mass effect upon the distal ureter in the pelvis, the IVC and iliac veins are compressed due to the extensive peritoneal carcinomatosis without findings to suggest deep venous thrombosis.  5. 5/4/2020, he begins CAV/IMV alternating chemotherapy. He receives 6 cycles of treatment. He symptomatically improves.  6. 9/11/2020, CT-CAP shows stable to mildly improved extensive peritoneal carcinomatosis. He would like to omit Ifosfamide/etoposide cycles and continue only with CAV.  7. 10/9/2020, he starts CAV every 21 days.  8. 1/6/2021, CT CAP showed a mixed response in the mixed solid and cystic masses throughout the peritoneum. Some of the tumors are stable to mildly worse, with some of the peritoneal masses a bit larger, a few are smaller, one cystic tumor in the left abdomen is smaller, while tumors lower in the pelvis appear slightly larger.     HISTORY OF PRESENT ILLNESS  Diego Buchanan was met with over video with sister for follow up.  -Denies any new symptoms.   -Denies any blood in urine.   -Denies any herbal supplement. Confirmed with patient's mom as well.   -Denies any pain.    Current Outpatient Medications   Medication Sig Dispense Refill     polyethylene glycol (MIRALAX) 17 g packet Take 17 g by mouth       senna-docusate (SENNA S) 8.6-50 MG tablet Take 1 tablet by mouth 2 times daily 60 tablet 0     sodium chloride 0.9% infusion        DOXOrubicin (ADRIAMYCIN) 2 MG/ML injection        etoposide (VEPESID) 50 MG capsule Take 4 capsules (200 mg) by mouth daily for 6 days Days 1 through 6. 24 capsule 0     etoposide (VEPESID) 50 MG capsule Take 4 capsules (200 mg) by mouth daily for 6 days Days 1 through 6. 24 capsule 0     etoposide (VEPESID) 50 MG capsule        LORazepam (ATIVAN) 0.5 MG tablet Take 1 tablet (0.5 mg) by mouth  every 4 hours as needed (Anxiety, Nausea/Vomiting or Sleep) (Patient not taking: Reported on 4/1/2021) 30 tablet 5     mesna (MESNEX) 400 MG TABS tablet Take 1 tablet (400 mg) by mouth every 4 hours for 2 doses Take one tablet 4 hours and 8 hours after end of cyclophosphamide infusion. 2 tablet 0     metoprolol tartrate (LOPRESSOR) 50 MG tablet Take 1.5 tablets (75 mg) by mouth daily (Patient not taking: Reported on 4/1/2021) 45 tablet 3     NEULASTA ONPRO 6 MG/0.6ML injection        prochlorperazine (COMPAZINE) 10 MG tablet Take 1 tablet (10 mg) by mouth every 6 hours as needed (Nausea/Vomiting) (Patient not taking: Reported on 4/1/2021) 30 tablet 5     Objective:  Vital Signs 3/31/2021   Systolic 116   Diastolic 71   Pulse 86   Temperature 96.7   Respirations 16   Weight (LB) 236 lb 4.8 oz   O2 100   General: patient appears well in no acute distress, alert and oriented, speech clear and fluid  Skin: no visualized rash or lesions on visualized skin  Resp: Appears to be breathing comfortably without accessory muscle usage, speaking in full sentences, no audible wheezes or cough.  Psych: Coherent speech, normal rate and volume, able to articulate logical thoughts, able to abstract reason, no tangential thoughts, no hallucinations or delusions  Patient's affect is appropriate.    LABS   3/31/2021 15:53   Sodium 140   Potassium 3.7   Chloride 109   Carbon Dioxide 27   Urea Nitrogen 10   Creatinine 0.71   GFR Estimate >90   GFR Estimate If Black >90   Calcium 9.2   Anion Gap 3   Albumin 3.6   Protein Total 7.6   Bilirubin Total 0.7   Alkaline Phosphatase 186 (H)   ALT 81 (H)   AST 11   Glucose 107 (H)   WBC 9.4   Hemoglobin 10.3 (L)   Hematocrit 33.4 (L)   Platelet Count 271   RBC Count 3.44 (L)   MCV 97   MCH 29.9   MCHC 30.8 (L)   RDW 18.4 (H)   Diff Method Automated Method   % Neutrophils 86.4   % Lymphocytes 5.2   % Monocytes 5.1   % Eosinophils 1.5   % Basophils 0.9   % Immature Granulocytes 0.9   Nucleated RBCs 0    Absolute Neutrophil 8.1   Absolute Lymphocytes 0.5 (L)   Absolute Monocytes 0.5   Absolute Eosinophils 0.1   Absolute Basophils 0.1   Abs Immature Granulocytes 0.1   Absolute Nucleated RBC 0.0     IMAGING  CT CAP on 3/25/21 shows the followin. Continued slight decrease in overall burden of peritoneal  carcinomatosis with persistent encasement of bowel without evidence of  bowel obstruction.     2. Decreased size of the lesion in hepatic segments V/VI with  continued peritoneal nodularity along the adjacent posterior right  hepatic capsule.      3. Stable size of the previously noted anterior cardiophrenic and  right paratracheal lymph nodes.     4. Mildly dilated common bile duct and mild intrahepatic biliary  dilation without evident cause of obstruction. Possible gallstone  towards the fundus with some mild prominence of the cystic duct wall.  Correlation with clinical symptoms for biliary obstruction and  cholangitis is suggested. If there is concern clinically, findings  could be initially evaluated with a right upper quadrant ultrasound if  clinically warranted.    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 Mr. 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 He tolerated 6 alternating cycles of CAV/IMV with anticipated side effects. He developed hematuria with his last 2 cycles of Ifosfamide based therapy despite dose reduction and Mesna prophylaxis, and ultimately patient and family decided to continue with CAV alone. Due to a mixed response on his last CT, it was decided to add back in alternating with IMV.  -He tolerating resuming IMV well. His hematuria continues intermittently, better in the last few weeks.   -CT CAP was again reviewed with the patient and his mother and sister and  shows stable to improved disease. Will likely repeat imaging in about 12 weeks.  -LFT elevation is significantly improved.  -He will continue with CAV tomorrow. He will follow-up with Dr. Sims in 3 weeks prior to IMV.     Anemia, normocytic. Initially microcytic, now normocytic. Hemoglobin trends up during periods of time without hematuria and down when he has more hematuria. Likely multifactorial with chemotherapy and intermittent hematuria. No current transfusion needs. Will continue to monitor closely.     Deconditioning. I again encouraged him to go for daily walks for at least 10 minutes per day. Discussed referral for physical therapy for deconditioning, which he declined for now.     Hematuria. Recurrent. He previously saw urology in September 2020 who felt this could be secondary to cyclophosphamide (despite mesna), new bladder/urethral primary malignancies, or metastatic bladder invasion. He and his family opted against a cystoscopy at that time. I suspect it is due to metastatic bladder invasion. We previously discused if his hemoglobin does drop below 8 again, I would like him to consider a cystoscopy.     COVID vaccine. Again recommend he get signed up for the COVID vaccine, as he is now eligible.     Elevated LFT's. Unclear cause. No recent alcohol or Tylenol use. No known herbal supplement use. Hepatitis B/C testing was negative. Now much improved. Will resume chemotherapy, as above.     Yasmine Mckeon PA-C  [facility] [address] [id number] [phone]          Again, thank you for allowing me to participate in the care of your patient.      Sincerely,    Yasmine Mckeon PA-C

## 2021-04-01 NOTE — PROGRESS NOTES
"Diego is a 25 year old who is being evaluated via a billable video visit.      How would you like to obtain your AVS? MyChart  If the video visit is dropped, the invitation should be resent by: Send to e-mail at: [email or web address]  Will anyone else be joining your video visit? No   Vitals - Patient Reported  Weight (Patient Reported): 107.2 kg (236 lb 5.3 oz)  Height (Patient Reported): 172.7 cm (5' 7.99\")  BMI (Based on Pt Reported Ht/Wt): 35.94  Pain Score: No Pain (0)    Video-Visit Details    Type of service:  Video Visit    Video Start Time: 11:18 AM    Video End Time:11:33 AM    Originating Location (pt. Location): Home    Distant Location (provider location):  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 He presented initially with abdominal pain, diarrhea, and progressive abdominal distention for 3 months duration. 2. Initial CT scan in February 2020 showed severe peritoneal carcinomatosis with large peritoneal tumor implants throughout the entire abdomen and pelvis, but mostly prominently in the pelvis.   2. PETCT scan showed interval increase in the amount of peritoneal carcinomatosis.  3. On 3/12/2020, he had ultrasound-guided core needle biopsy of a peritoneal implant (Case: [id number]), which showed a completely undifferentiated appearance, but stains with pancytokeratin, indicating an epithelial origin. 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 Tumor markers for CA-19-9, CEA, beta-hCG, alpha-fetoprotein were unremarkable. Cancer type ID molecular testing by [employer] sent to determine the exact diagnosis and to assist in further treatment planning. The molecular test showed probability 90% for sarcoma with " primitive neuroectodermal subtype (probability 90%). Relative probability of less than 5% of other subtype of sarcoma. EWSR1-WT1 fusion detected.  4. 5/1/2020, MRI brain negative for brain metastasis, and baseline CT-CAP obtained showing extensive mixed solid and cystic masses throughout the abdomen and pelvis consistent with peritoneal carcinomatosis. Largest mass measures approximately 20 cm in the pelvis. Metastatic mixed solid and cystic masses seen in hepatic segments 5 and 6 and hepatic segment 7. The masses appear to be contiguous with the retrohepatic peritoneal carcinomatosis. Small volume fluid about the spleen favored to represent malignant ascites, stable mild-to-moderate right hydronephrosis related to mass effect upon the distal ureter in the pelvis, the IVC and iliac veins are compressed due to the extensive peritoneal carcinomatosis without findings to suggest deep venous thrombosis.  5. 5/4/2020, he begins CAV/IMV alternating chemotherapy. He receives 6 cycles of treatment. He symptomatically improves.  6. 9/11/2020, CT-CAP shows stable to mildly improved extensive peritoneal carcinomatosis. He would like to omit Ifosfamide/etoposide cycles and continue only with CAV.  7. 10/9/2020, he starts CAV every 21 days.  8. 1/6/2021, CT CAP showed a mixed response in the mixed solid and cystic masses throughout the peritoneum. Some of the tumors are stable to mildly worse, with some of the peritoneal masses a bit larger, a few are smaller, one cystic tumor in the left abdomen is smaller, while tumors lower in the pelvis appear slightly larger.     HISTORY OF PRESENT ILLNESS  Diego Buchanan was met with over video with sister for follow up.  -Denies any new symptoms.   -Denies any blood in urine.   -Denies any herbal supplement. Confirmed with patient's mom as well.   -Denies any pain.    Current Outpatient Medications   Medication Sig Dispense Refill     polyethylene glycol (MIRALAX) 17 g packet Take 17 g by  mouth       senna-docusate (SENNA S) 8.6-50 MG tablet Take 1 tablet by mouth 2 times daily 60 tablet 0     sodium chloride 0.9% infusion        DOXOrubicin (ADRIAMYCIN) 2 MG/ML injection        etoposide (VEPESID) 50 MG capsule Take 4 capsules (200 mg) by mouth daily for 6 days Days 1 through 6. 24 capsule 0     etoposide (VEPESID) 50 MG capsule Take 4 capsules (200 mg) by mouth daily for 6 days Days 1 through 6. 24 capsule 0     etoposide (VEPESID) 50 MG capsule        LORazepam (ATIVAN) 0.5 MG tablet Take 1 tablet (0.5 mg) by mouth every 4 hours as needed (Anxiety, Nausea/Vomiting or Sleep) (Patient not taking: Reported on 4/1/2021) 30 tablet 5     mesna (MESNEX) 400 MG TABS tablet Take 1 tablet (400 mg) by mouth every 4 hours for 2 doses Take one tablet 4 hours and 8 hours after end of cyclophosphamide infusion. 2 tablet 0     metoprolol tartrate (LOPRESSOR) 50 MG tablet Take 1.5 tablets (75 mg) by mouth daily (Patient not taking: Reported on 4/1/2021) 45 tablet 3     NEULASTA ONPRO 6 MG/0.6ML injection        prochlorperazine (COMPAZINE) 10 MG tablet Take 1 tablet (10 mg) by mouth every 6 hours as needed (Nausea/Vomiting) (Patient not taking: Reported on 4/1/2021) 30 tablet 5     Objective:  Vital Signs 3/31/2021   Systolic 116   Diastolic 71   Pulse 86   Temperature 96.7   Respirations 16   Weight (LB) 236 lb 4.8 oz   O2 100   General: patient appears well in no acute distress, alert and oriented, speech clear and fluid  Skin: no visualized rash or lesions on visualized skin  Resp: Appears to be breathing comfortably without accessory muscle usage, speaking in full sentences, no audible wheezes or cough.  Psych: Coherent speech, normal rate and volume, able to articulate logical thoughts, able to abstract reason, no tangential thoughts, no hallucinations or delusions  Patient's affect is appropriate.    LABS   3/31/2021 15:53   Sodium 140   Potassium 3.7   Chloride 109   Carbon Dioxide 27   Urea Nitrogen 10    Creatinine 0.71   GFR Estimate >90   GFR Estimate If Black >90   Calcium 9.2   Anion Gap 3   Albumin 3.6   Protein Total 7.6   Bilirubin Total 0.7   Alkaline Phosphatase 186 (H)   ALT 81 (H)   AST 11   Glucose 107 (H)   WBC 9.4   Hemoglobin 10.3 (L)   Hematocrit 33.4 (L)   Platelet Count 271   RBC Count 3.44 (L)   MCV 97   MCH 29.9   MCHC 30.8 (L)   RDW 18.4 (H)   Diff Method Automated Method   % Neutrophils 86.4   % Lymphocytes 5.2   % Monocytes 5.1   % Eosinophils 1.5   % Basophils 0.9   % Immature Granulocytes 0.9   Nucleated RBCs 0   Absolute Neutrophil 8.1   Absolute Lymphocytes 0.5 (L)   Absolute Monocytes 0.5   Absolute Eosinophils 0.1   Absolute Basophils 0.1   Abs Immature Granulocytes 0.1   Absolute Nucleated RBC 0.0     IMAGING  CT CAP on 3/25/21 shows the followin. Continued slight decrease in overall burden of peritoneal  carcinomatosis with persistent encasement of bowel without evidence of  bowel obstruction.     2. Decreased size of the lesion in hepatic segments V/VI with  continued peritoneal nodularity along the adjacent posterior right  hepatic capsule.      3. Stable size of the previously noted anterior cardiophrenic and  right paratracheal lymph nodes.     4. Mildly dilated common bile duct and mild intrahepatic biliary  dilation without evident cause of obstruction. Possible gallstone  towards the fundus with some mild prominence of the cystic duct wall.  Correlation with clinical symptoms for biliary obstruction and  cholangitis is suggested. If there is concern clinically, findings  could be initially evaluated with a right upper quadrant ultrasound if  clinically warranted.    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 Mr. Buchanan is a 25 year old male with advanced intraabdominal DSRCT with extensive peritoneal disease, lymph node metastasis, and liver and bone  involvement. He tolerated 6 alternating cycles of CAV/IMV with anticipated side effects. He developed hematuria with his last 2 cycles of Ifosfamide based therapy despite dose reduction and Mesna prophylaxis, and ultimately patient and family decided to continue with CAV alone. Due to a mixed response on his last CT, it was decided to add back in alternating with IMV.  -He tolerating resuming IMV well. His hematuria continues intermittently, better in the last few weeks.   -CT CAP was again reviewed with the patient and his mother and sister and shows stable to improved disease. Will likely repeat imaging in about 12 weeks.  -LFT elevation is significantly improved.  -He will continue with CAV tomorrow. He will follow-up with Dr. Sims in 3 weeks prior to IMV.     Anemia, normocytic. Initially microcytic, now normocytic. Hemoglobin trends up during periods of time without hematuria and down when he has more hematuria. Likely multifactorial with chemotherapy and intermittent hematuria. No current transfusion needs. Will continue to monitor closely.     Deconditioning. I again encouraged him to go for daily walks for at least 10 minutes per day. Discussed referral for physical therapy for deconditioning, which he declined for now.     Hematuria. Recurrent. He previously saw urology in September 2020 who felt this could be secondary to cyclophosphamide (despite mesna), new bladder/urethral primary malignancies, or metastatic bladder invasion. He and his family opted against a cystoscopy at that time. I suspect it is due to metastatic bladder invasion. We previously discused if his hemoglobin does drop below 8 again, I would like him to consider a cystoscopy.     COVID vaccine. Again recommend he get signed up for the COVID vaccine, as he is now eligible.     Elevated LFT's. Unclear cause. No recent alcohol or Tylenol use. No known herbal supplement use. Hepatitis B/C testing was negative. Now much improved. Will  resume chemotherapy, as above.     Yasmine Mckeon PA-C  [facility]  [address] [place] [address] [phone]

## 2021-04-02 ENCOUNTER — HOME INFUSION (PRE-WILLOW HOME INFUSION) (OUTPATIENT)
Dept: PHARMACY | Facility: CLINIC | Age: 26
End: 2021-04-02

## 2021-04-02 ENCOUNTER — INFUSION THERAPY VISIT (OUTPATIENT)
Dept: ONCOLOGY | Facility: CLINIC | Age: 26
End: 2021-04-02
Attending: INTERNAL MEDICINE
Payer: COMMERCIAL

## 2021-04-02 VITALS
HEART RATE: 100 BPM | SYSTOLIC BLOOD PRESSURE: 108 MMHG | WEIGHT: 238.4 LBS | BODY MASS INDEX: 36.26 KG/M2 | RESPIRATION RATE: 20 BRPM | TEMPERATURE: 98.4 F | OXYGEN SATURATION: 100 % | DIASTOLIC BLOOD PRESSURE: 66 MMHG

## 2021-04-02 DIAGNOSIS — C41.9 SARCOMA, EWINGS (H): Primary | ICD-10-CM

## 2021-04-02 DIAGNOSIS — C49.9 DESMOPLASTIC SMALL ROUND CELL TUMOR (H): ICD-10-CM

## 2021-04-02 PROCEDURE — 250N000011 HC RX IP 250 OP 636: Performed by: PHYSICIAN ASSISTANT

## 2021-04-02 PROCEDURE — 96375 TX/PRO/DX INJ NEW DRUG ADDON: CPT

## 2021-04-02 PROCEDURE — 258N000003 HC RX IP 258 OP 636: Performed by: PHYSICIAN ASSISTANT

## 2021-04-02 PROCEDURE — 96411 CHEMO IV PUSH ADDL DRUG: CPT

## 2021-04-02 PROCEDURE — 96413 CHEMO IV INFUSION 1 HR: CPT

## 2021-04-02 PROCEDURE — G0498 CHEMO EXTEND IV INFUS W/PUMP: HCPCS

## 2021-04-02 PROCEDURE — 96415 CHEMO IV INFUSION ADDL HR: CPT

## 2021-04-02 PROCEDURE — 96367 TX/PROPH/DG ADDL SEQ IV INF: CPT

## 2021-04-02 RX ORDER — PALONOSETRON 0.05 MG/ML
0.25 INJECTION, SOLUTION INTRAVENOUS ONCE
Status: COMPLETED | OUTPATIENT
Start: 2021-04-02 | End: 2021-04-02

## 2021-04-02 RX ORDER — HEPARIN SODIUM,PORCINE 10 UNIT/ML
5 VIAL (ML) INTRAVENOUS
Status: DISCONTINUED | OUTPATIENT
Start: 2021-04-02 | End: 2021-04-02 | Stop reason: HOSPADM

## 2021-04-02 RX ADMIN — VINCRISTINE SULFATE 2 MG: 1 INJECTION, SOLUTION INTRAVENOUS at 12:11

## 2021-04-02 RX ADMIN — PALONOSETRON 0.25 MG: 0.05 INJECTION, SOLUTION INTRAVENOUS at 11:24

## 2021-04-02 RX ADMIN — MESNA 2500 MG: 100 INJECTION, SOLUTION INTRAVENOUS at 12:24

## 2021-04-02 RX ADMIN — SODIUM CHLORIDE 250 ML: 9 INJECTION, SOLUTION INTRAVENOUS at 11:24

## 2021-04-02 RX ADMIN — FOSAPREPITANT: 150 INJECTION, POWDER, LYOPHILIZED, FOR SOLUTION INTRAVENOUS at 11:25

## 2021-04-02 ASSESSMENT — PAIN SCALES - GENERAL: PAINLEVEL: NO PAIN (0)

## 2021-04-02 NOTE — PROGRESS NOTES
Infusion Nursing Note:  Diego Buchanan presents today for Day 1 Cycle 15 Vincristine, Cytoxan, Doxorubicin pump connect.    Patient seen by provider today: No   present during visit today: Not Applicable.    Note: Robert arrives to infusion today doing well, accompanied by his sister. He was assessed by video visit yesterday by KATHLEEN Peña. He offers no changes overnight. Patient's sister requests refill of dexamethasone.     Mesna to be given tonight @ 6:30 pm and 10:30 pm.  Dexamethasone to start tomorrow and continue for 3 doses.     Intravenous Access:  Esparza.    Treatment Conditions:  Lab Results   Component Value Date    HGB 10.3 03/31/2021     Lab Results   Component Value Date    WBC 9.4 03/31/2021      Lab Results   Component Value Date    ANEU 8.1 03/31/2021     Lab Results   Component Value Date     03/31/2021      Lab Results   Component Value Date     03/31/2021                   Lab Results   Component Value Date    POTASSIUM 3.7 03/31/2021           Lab Results   Component Value Date    MAG 2.2 03/11/2021            Lab Results   Component Value Date    CR 0.71 03/31/2021                   Lab Results   Component Value Date    FAISAL 9.2 03/31/2021                Lab Results   Component Value Date    BILITOTAL 0.7 03/31/2021           Lab Results   Component Value Date    ALBUMIN 3.6 03/31/2021                    Lab Results   Component Value Date    ALT 81 03/31/2021           Lab Results   Component Value Date    AST 11 03/31/2021       Results reviewed, labs MET treatment parameters, ok to proceed with treatment.  ECHO/MUGA completed 9/1/20  EF 60-65%.    Post Infusion Assessment:  Patient tolerated infusion without incident.  Blood return noted pre and post infusion and prior to pump connect.Doxorubicin CADD pump connected at 1445 and set to run at 5 ml/hr. Patient set up for at-home pump disconnect with [facility] on 4/4 at 1445 with Neulasta On Pro placement. Verified with  Loretta RN at [facility].   All connections verified as taped and open and pump display RUNNING by second RN.     Discharge Plan:   Prescription refills given for dexamethasone and mesna.  AVS to patient via MYCHART.  Patient will return 4/21 and 4/22 for next MD and infusion appointments.   Patient discharged in stable condition accompanied by: sister.  Departure Mode: Ambulatory.    Janel Franco RN

## 2021-04-03 ENCOUNTER — TELEPHONE (OUTPATIENT)
Dept: ONCOLOGY | Facility: CLINIC | Age: 26
End: 2021-04-03

## 2021-04-04 ENCOUNTER — HOME INFUSION (PRE-WILLOW HOME INFUSION) (OUTPATIENT)
Dept: PHARMACY | Facility: CLINIC | Age: 26
End: 2021-04-04

## 2021-04-04 ENCOUNTER — DOCUMENTATION ONLY (OUTPATIENT)
Dept: PHARMACY | Facility: CLINIC | Age: 26
End: 2021-04-04

## 2021-04-05 NOTE — PROGRESS NOTES
This is a recent snapshot of the patient's [facility] medical record.  For current drug dose and complete information and questions, call [phone] or In Basket pool, [facility]v home infusion ([id number])  CSN Number:  [id number]

## 2021-04-05 NOTE — PROGRESS NOTES
Skilled nurse visit in the home, for discontinuation of Doxorubicin 160 mg IV at 1441 infused over 48 hours. Esparza flushed with NS 10 mls and Heparin 5 mls. Neulasta Onpro applied to right upper arm at 1455.  [email or web address]  [phone]

## 2021-04-05 NOTE — PROGRESS NOTES
This is a recent snapshot of the patient's [facility] medical record.  For current drug dose and complete information and questions, call [phone] or In Basket pool, [facility]v home infusion ([id number])  CSN Number:  [id number]

## 2021-04-13 NOTE — PROGRESS NOTES
This is a recent snapshot of the patient's [facility] medical record.  For current drug dose and complete information and questions, call [phone] or In Basket pool, fv home infusion ([id number])  CSN Number:  [id number]

## 2021-04-21 VITALS
SYSTOLIC BLOOD PRESSURE: 109 MMHG | BODY MASS INDEX: 36.35 KG/M2 | OXYGEN SATURATION: 100 % | TEMPERATURE: 98.1 F | WEIGHT: 239 LBS | RESPIRATION RATE: 16 BRPM | DIASTOLIC BLOOD PRESSURE: 69 MMHG | HEART RATE: 74 BPM

## 2021-04-21 DIAGNOSIS — C41.9 SARCOMA, EWINGS (H): ICD-10-CM

## 2021-04-21 LAB
ALBUMIN SERPL-MCNC: 3.4 G/DL (ref 3.4–5)
ALP SERPL-CCNC: 84 U/L (ref 40–150)
ALT SERPL W P-5'-P-CCNC: 20 U/L (ref 0–70)
ANION GAP SERPL CALCULATED.3IONS-SCNC: 6 MMOL/L (ref 3–14)
AST SERPL W P-5'-P-CCNC: 7 U/L (ref 0–45)
BASOPHILS # BLD AUTO: 0 10E9/L (ref 0–0.2)
BASOPHILS NFR BLD AUTO: 0.4 %
BILIRUB SERPL-MCNC: 0.5 MG/DL (ref 0.2–1.3)
BUN SERPL-MCNC: 10 MG/DL (ref 7–30)
CALCIUM SERPL-MCNC: 8.9 MG/DL (ref 8.5–10.1)
CHLORIDE SERPL-SCNC: 110 MMOL/L (ref 94–109)
CO2 SERPL-SCNC: 25 MMOL/L (ref 20–32)
CREAT SERPL-MCNC: 0.72 MG/DL (ref 0.66–1.25)
DIFFERENTIAL METHOD BLD: ABNORMAL
EOSINOPHIL # BLD AUTO: 0 10E9/L (ref 0–0.7)
EOSINOPHIL NFR BLD AUTO: 0.5 %
ERYTHROCYTE [DISTWIDTH] IN BLOOD BY AUTOMATED COUNT: 16.9 % (ref 10–15)
GFR SERPL CREATININE-BSD FRML MDRD: >90 ML/MIN/{1.73_M2}
GLUCOSE SERPL-MCNC: 93 MG/DL (ref 70–99)
HCT VFR BLD AUTO: 31.6 % (ref 40–53)
HGB BLD-MCNC: 9.9 G/DL (ref 13.3–17.7)
IMM GRANULOCYTES # BLD: 0.1 10E9/L (ref 0–0.4)
IMM GRANULOCYTES NFR BLD: 1.6 %
LYMPHOCYTES # BLD AUTO: 0.4 10E9/L (ref 0.8–5.3)
LYMPHOCYTES NFR BLD AUTO: 4.8 %
MCH RBC QN AUTO: 30.7 PG (ref 26.5–33)
MCHC RBC AUTO-ENTMCNC: 31.3 G/DL (ref 31.5–36.5)
MCV RBC AUTO: 98 FL (ref 78–100)
MONOCYTES # BLD AUTO: 0.8 10E9/L (ref 0–1.3)
MONOCYTES NFR BLD AUTO: 10.1 %
NEUTROPHILS # BLD AUTO: 6.2 10E9/L (ref 1.6–8.3)
NEUTROPHILS NFR BLD AUTO: 82.6 %
NRBC # BLD AUTO: 0 10*3/UL
NRBC BLD AUTO-RTO: 0 /100
PLATELET # BLD AUTO: 186 10E9/L (ref 150–450)
POTASSIUM SERPL-SCNC: 3.7 MMOL/L (ref 3.4–5.3)
PROT SERPL-MCNC: 7.2 G/DL (ref 6.8–8.8)
RBC # BLD AUTO: 3.22 10E12/L (ref 4.4–5.9)
SODIUM SERPL-SCNC: 140 MMOL/L (ref 133–144)
WBC # BLD AUTO: 7.5 10E9/L (ref 4–11)

## 2021-04-21 PROCEDURE — 85025 COMPLETE CBC W/AUTO DIFF WBC: CPT | Performed by: INTERNAL MEDICINE

## 2021-04-21 PROCEDURE — 80053 COMPREHEN METABOLIC PANEL: CPT | Performed by: INTERNAL MEDICINE

## 2021-04-21 PROCEDURE — 250N000011 HC RX IP 250 OP 636: Performed by: INTERNAL MEDICINE

## 2021-04-21 RX ORDER — HEPARIN SODIUM,PORCINE 10 UNIT/ML
5 VIAL (ML) INTRAVENOUS ONCE
Status: COMPLETED | OUTPATIENT
Start: 2021-04-21 | End: 2021-04-21

## 2021-04-21 RX ADMIN — Medication 5 ML: at 11:53

## 2021-04-21 ASSESSMENT — PAIN SCALES - GENERAL: PAINLEVEL: NO PAIN (0)

## 2021-04-21 NOTE — NURSING NOTE
Chief Complaint   Patient presents with     Blood Draw     Labs drawn via CVC by RN in lab. VS taken.      Labs collected from CVC by RN, line flushed with saline and heparin.  Vitals taken.    Leah ROTHMAN RN PHN BSN  BMT/Oncology Lab

## 2021-04-22 ENCOUNTER — VIRTUAL VISIT (OUTPATIENT)
Dept: ONCOLOGY | Facility: CLINIC | Age: 26
End: 2021-04-22
Attending: INTERNAL MEDICINE
Payer: COMMERCIAL

## 2021-04-22 DIAGNOSIS — C41.9 SARCOMA, EWINGS (H): ICD-10-CM

## 2021-04-22 DIAGNOSIS — C49.9 DESMOPLASTIC SMALL ROUND CELL TUMOR (H): Primary | ICD-10-CM

## 2021-04-22 PROCEDURE — 99214 OFFICE O/P EST MOD 30 MIN: CPT | Mod: 95 | Performed by: INTERNAL MEDICINE

## 2021-04-22 RX ORDER — ALTEPLASE 2.2 MG/2ML
INJECTION, POWDER, LYOPHILIZED, FOR SOLUTION INTRAVENOUS
Status: ON HOLD | COMMUNITY
Start: 2021-01-26 | End: 2022-01-01

## 2021-04-22 RX ORDER — DIPHENHYDRAMINE HYDROCHLORIDE 50 MG/ML
50 INJECTION INTRAMUSCULAR; INTRAVENOUS
Status: CANCELLED
Start: 2021-04-24

## 2021-04-22 RX ORDER — HEPARIN SODIUM (PORCINE) LOCK FLUSH IV SOLN 100 UNIT/ML 100 UNIT/ML
5 SOLUTION INTRAVENOUS
Status: CANCELLED | OUTPATIENT
Start: 2021-04-24

## 2021-04-22 RX ORDER — ALLOPURINOL 300 MG/1
TABLET ORAL
Status: ON HOLD | COMMUNITY
Start: 2020-09-11 | End: 2022-01-01

## 2021-04-22 RX ORDER — ALBUTEROL SULFATE 0.83 MG/ML
2.5 SOLUTION RESPIRATORY (INHALATION)
Status: CANCELLED | OUTPATIENT
Start: 2021-04-24

## 2021-04-22 RX ORDER — LORAZEPAM 2 MG/ML
0.5 INJECTION INTRAMUSCULAR EVERY 4 HOURS PRN
Status: CANCELLED
Start: 2021-04-24

## 2021-04-22 RX ORDER — ALBUTEROL SULFATE 90 UG/1
1-2 AEROSOL, METERED RESPIRATORY (INHALATION)
Status: CANCELLED
Start: 2021-04-24

## 2021-04-22 RX ORDER — SODIUM CHLORIDE 9 MG/ML
1000 INJECTION, SOLUTION INTRAVENOUS CONTINUOUS PRN
Status: CANCELLED
Start: 2021-04-24

## 2021-04-22 RX ORDER — MEPERIDINE HYDROCHLORIDE 25 MG/ML
25 INJECTION INTRAMUSCULAR; INTRAVENOUS; SUBCUTANEOUS EVERY 30 MIN PRN
Status: CANCELLED | OUTPATIENT
Start: 2021-04-24

## 2021-04-22 RX ORDER — HEPARIN SODIUM,PORCINE 10 UNIT/ML
5 VIAL (ML) INTRAVENOUS
Status: CANCELLED | OUTPATIENT
Start: 2021-04-24

## 2021-04-22 RX ORDER — METHYLPREDNISOLONE SODIUM SUCCINATE 125 MG/2ML
125 INJECTION, POWDER, LYOPHILIZED, FOR SOLUTION INTRAMUSCULAR; INTRAVENOUS
Status: CANCELLED
Start: 2021-04-24

## 2021-04-22 RX ORDER — DEXAMETHASONE 4 MG/1
TABLET ORAL
COMMUNITY
Start: 2021-02-12 | End: 2021-06-25

## 2021-04-22 RX ORDER — NALOXONE HYDROCHLORIDE 0.4 MG/ML
.1-.4 INJECTION, SOLUTION INTRAMUSCULAR; INTRAVENOUS; SUBCUTANEOUS
Status: CANCELLED | OUTPATIENT
Start: 2021-04-24

## 2021-04-22 RX ORDER — FOLIC ACID 1 MG/1
TABLET ORAL
Status: ON HOLD | COMMUNITY
Start: 2020-10-09 | End: 2022-01-01

## 2021-04-22 RX ORDER — MESNA 100 MG/ML
INJECTION, SOLUTION INTRAVENOUS
COMMUNITY
Start: 2021-03-10 | End: 2021-06-25

## 2021-04-22 RX ORDER — EPINEPHRINE 1 MG/ML
0.3 INJECTION, SOLUTION INTRAMUSCULAR; SUBCUTANEOUS EVERY 5 MIN PRN
Status: CANCELLED | OUTPATIENT
Start: 2021-04-24

## 2021-04-22 RX ORDER — WATER 10 ML/10ML
INJECTION INTRAMUSCULAR; INTRAVENOUS; SUBCUTANEOUS
COMMUNITY
Start: 2021-01-26 | End: 2021-06-25

## 2021-04-22 NOTE — LETTER
4/22/2021         RE: Diego Buchanan  [address]  [place] [address]        Dear Colleague,    Thank you for referring your patient, Diego Buchanan, to the [facility]. Please see a copy of my visit note below.    Diego is a 25 year old who is being evaluated via a billable video visit.      How would you like to obtain your AVS? MyChart  If the video visit is dropped, the invitation should be resent by: Text to cell phone: [phone]  Will anyone else be joining your video visit? No      Video-Visit Details  Type of service:  Video Visit  Video Start Time: 9:42 AM   Video End Time: 10:08 AM  Originating Location (pt. 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 He presented initially with abdominal pain, diarrhea, and progressive abdominal distention for 3 months duration. 2. Initial CT scan in February 2020 showed severe peritoneal carcinomatosis with large peritoneal tumor implants throughout the entire abdomen and pelvis, but mostly prominently in the pelvis.   2. PETCT scan showed interval increase in the amount of peritoneal carcinomatosis.  3. On 3/12/2020, he had ultrasound-guided core needle biopsy of a peritoneal implant (Case: [id number]), which showed a completely undifferentiated appearance, but stains with pancytokeratin, indicating an epithelial origin. 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 Tumor markers for CA-19-9, CEA, beta-hCG, alpha-fetoprotein were unremarkable. Cancer type ID molecular testing by [employer] sent to determine the exact diagnosis and to assist in further treatment planning. 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 Relative probability of less than 5% of other subtype of sarcoma. EWSR1-WT1 fusion detected.  4. 5/1/2020, MRI brain negative for brain metastasis, and baseline CT-CAP obtained showing extensive mixed solid and cystic masses throughout the abdomen and pelvis consistent with peritoneal carcinomatosis. Largest mass measures approximately 20 cm in the pelvis. Metastatic mixed solid and cystic masses seen in hepatic segments 5 and 6 and hepatic segment 7. The masses appear to be contiguous with the retrohepatic peritoneal carcinomatosis. Small volume fluid about the spleen favored to represent malignant ascites, stable mild-to-moderate right hydronephrosis related to mass effect upon the distal ureter in the pelvis, the IVC and iliac veins are compressed due to the extensive peritoneal carcinomatosis without findings to suggest deep venous thrombosis.  5. 5/4/2020, he begins CAV/IMV alternating chemotherapy. He receives 6 cycles of treatment. He symptomatically improves.  6. 9/11/2020, CT-CAP shows stable to mildly improved extensive peritoneal carcinomatosis. He would like to omit Ifosfamide/etoposide cycles and continue only with CAV.  7. 10/9/2020, he starts CAV every 21 days.  8. 1/6/2021, CT CAP showed a mixed response in the mixed solid and cystic masses throughout the peritoneum. Some of the tumors are stable to mildly worse, with some of the peritoneal masses a bit larger, a few are smaller, one cystic tumor in the left abdomen is smaller, while tumors lower in the pelvis appear slightly larger. IMV added back on 1/21/2021.  9. 3/24/2021, CT CAP showed continued slight decrease in overall burden of peritoneal carcinomatosis with persistent encasement of bowel without evidence of bowel obstruction.     HISTORY OF PRESENT ILLNESS  Diego Buchanan was met with over video with sister for follow up. He is feeling well. No new concerns today. Eating  and drinking well. Has gained back a couple of pounds. His energy level is good. He's able to stay active. No new areas of pain. Bowels are moving well. No issues with blood in urine.    Current Outpatient Medications   Medication Sig Dispense Refill     DOXOrubicin (ADRIAMYCIN) 2 MG/ML injection        etoposide (VEPESID) 50 MG capsule        LORazepam (ATIVAN) 0.5 MG tablet Take 1 tablet (0.5 mg) by mouth every 4 hours as needed (Anxiety, Nausea/Vomiting or Sleep) 30 tablet 5     metoprolol tartrate (LOPRESSOR) 50 MG tablet Take 1.5 tablets (75 mg) by mouth daily 45 tablet 3     NEULASTA ONPRO 6 MG/0.6ML injection        polyethylene glycol (MIRALAX) 17 g packet Take 17 g by mouth       prochlorperazine (COMPAZINE) 10 MG tablet Take 1 tablet (10 mg) by mouth every 6 hours as needed (Nausea/Vomiting) 30 tablet 5     senna-docusate (SENNA S) 8.6-50 MG tablet Take 1 tablet by mouth 2 times daily 60 tablet 0     sodium chloride 0.9% infusion        etoposide (VEPESID) 50 MG capsule Take 4 capsules (200 mg) by mouth daily for 6 days Days 1 through 6. 24 capsule 0     etoposide (VEPESID) 50 MG capsule Take 4 capsules (200 mg) by mouth daily for 6 days Days 1 through 6. 24 capsule 0     mesna (MESNEX) 400 MG TABS tablet Take 1 tablet (400 mg) by mouth every 4 hours for 2 doses Take one tablet 4 hours and 8 hours after end of cyclophosphamide infusion. 2 tablet 0     mesna (MESNEX) 400 MG TABS tablet Take 1 tablet (400 mg) by mouth every 4 hours for 2 doses Take one tablet 4 hours and 8 hours after end of cyclophosphamide infusion. 2 tablet 0     Objective:  No vitals were taken for this video visit.  General: patient appears well in no acute distress, alert and oriented, speech clear and fluid  Skin: no rash or lesions on visualized skin  Resp: Appears to be breathing comfortably without accessory muscle usage, speaking in full sentences, no audible wheezes or cough.  Psych: Coherent speech, normal rate and volume, able  to articulate logical thoughts, able to abstract reason, no tangential thoughts, no hallucinations or delusions  Patient's affect is appropriate.    The rest of a comprehensive physical examination is deferred due to PHE (public health emergency) video visit restrictions.      LABS:  Recent Labs   Lab Test 21  1148 21  1553 21  1550 21  1505 21  1355 21  0925   WBC 7.5 9.4 11.3* 6.9 8.9 9.8   RBC 3.22* 3.44* 3.31* 2.97* 3.20* 3.11*   HGB 9.9* 10.3* 9.8* 8.9* 9.7* 9.5*   HCT 31.6* 33.4* 32.6* 28.8* 30.4* 30.4*   MCV 98 97 99 97 95 98   MCH 30.7 29.9 29.6 30.0 30.3 30.5   MCHC 31.3* 30.8* 30.1* 30.9* 31.9 31.3*   RDW 16.9* 18.4* 20.0* 18.5* 19.2* 19.9*    271 272 315 319 296   NEUTROPHIL 82.6 86.4 82.6  --   --  84.0    140 139 137 138 139   POTASSIUM 3.7 3.7 3.5 3.2* 3.9 3.9   CHLORIDE 110* 109 109 106 105 108   CO2 25 27 26 23 25 25   ANIONGAP 6 3 4 8 8 6   GLC 93 107* 70 140* 83 90   BUN 10 10 8 10 10 9   CR 0.72 0.71 0.78 0.67 0.70 0.65*   MAG  --   --   --  2.2 2.3 2.1   FAISAL 8.9 9.2 8.8 8.7 9.0 8.9   PROTTOTAL 7.2 7.6 7.5 7.6 7.4 7.1   ALBUMIN 3.4 3.6 3.5 3.6 3.7 3.6   BILITOTAL 0.5 0.7 1.0 0.8 0.9 0.5   ALKPHOS 84 186* 436* 96 98 100   AST 7 11 112* 9 7 10   ALT 20 81* 425* 17 22 25       IMAGING  CT CAP on 3/25/21 shows the followin. Continued slight decrease in overall burden of peritoneal  carcinomatosis with persistent encasement of bowel without evidence of  bowel obstruction.   2. Decreased size of the lesion in hepatic segments V/VI with  continued peritoneal nodularity along the adjacent posterior right  hepatic capsule.   3. Stable size of the previously noted anterior cardiophrenic and  right paratracheal lymph nodes.  4. Mildly dilated common bile duct and mild intrahepatic biliary  dilation without evident cause of obstruction. Possible gallstone  towards the fundus with some mild prominence of the cystic duct wall.  Correlation with clinical  symptoms for biliary obstruction and  cholangitis is suggested. If there is concern clinically, findings  could be initially evaluated with a right upper quadrant ultrasound if  clinically warranted.      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 He tolerated 6 alternating cycles of CAV/IMV with anticipated side effects. He developed hematuria with his last 2 cycles of Ifosfamide based therapy despite dose reduction and Mesna prophylaxis, and ultimately patient and family decided to continue with CAV alone. Due to a mixed response on his last CT, it was decided to add back in alternating IMV. He has not had recurrence of the hematuria. It is likely that the hematuria was caused by tumor invasion of the bladder, and now with some response (and time off ifos), this has improved.  -Will continue with IMV tomorrow. There are a couple more alternating cycles to complete a year. Will reassess at that time and decide if a couple more cycles are thought to be beneficial based on response at that time.   -Discussed lengthening interval between cycles per patient's mother's preference. Given that he's deriving some benefit from the chemotherapy, and seems to be tolerating it well, our preference would be to continue with cycles every 3 weeks.    #2 Transaminitis, now resolved.   Discussed with patient and his family to let us know what supplements are being used.    #3 Anemia, normocytic. Initially microcytic, now normocytic  Hemoglobin trends up during periods of time without hematuria and down when he has more hematuria. Likely multifactorial with chemotherapy and intermittent hematuria. No current transfusion needs. Will continue to monitor closely.     #4  Deconditioning  Encouraged him to go for daily walks for at least 10 minutes per day. Discussed referral for physical therapy for deconditioning, which he declined for now.     #5 History of recurrent hematuria, resolved  He previously saw urology in September 2020 who felt this could be secondary to cyclophosphamide (despite mesna), new bladder/urethral primary malignancies, or metastatic bladder invasion. He and his family opted against a cystoscopy at that time. If hemoglobin drops below 8 again, we would like him to consider a cystoscopy.       Patient was seen and discussed with Dr. Burciaga.    Harriet Turk MD/PhD  Heme/Onc Fellow    ---  I evaluated the patient and reviewed the plan of care with the patient and the  Oncology Fellow. I agree with the assessment and plan documented in the clinical note.    Mr. Buchanan is doing well on alternating CAV/IMV chemotherapy with gradual reduction in the size of intraperitoneal disease and bone and liver metastases. Unclear if he will ever debulk to the extent necessary to receive whole abdomen radiation and stem cell rescue. For now will continue with palliative CAV/IMV chemotherapy, and I will see back to gauge his progress in 2 months with CT-CAP.    Farzaneh Burciaga M.D.   of Medicine  Hematology, Oncology and Transplantation          Again, thank you for allowing me to participate in the care of your patient.        Sincerely,        Farzaneh Sims MD

## 2021-04-22 NOTE — PROGRESS NOTES
Diego is a 25 year old who is being evaluated via a billable video visit.      How would you like to obtain your AVS? [employer]  If the video visit is dropped, the invitation should be resent by: Text to cell phone: [phone]  Will anyone else be joining your video visit? No      Video-Visit Details  Type of service:  Video Visit  Video Start Time: 9:42 AM   Video End Time: 10:08 AM  Originating Location (pt. 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 He presented initially with abdominal pain, diarrhea, and progressive abdominal distention for 3 months duration. 2. Initial CT scan in February 2020 showed severe peritoneal carcinomatosis with large peritoneal tumor implants throughout the entire abdomen and pelvis, but mostly prominently in the pelvis.   2. PETCT scan showed interval increase in the amount of peritoneal carcinomatosis.  3. On 3/12/2020, he had ultrasound-guided core needle biopsy of a peritoneal implant (Case: [id number]), which showed a completely undifferentiated appearance, but stains with pancytokeratin, indicating an epithelial origin. 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 Tumor markers for CA-19-9, CEA, beta-hCG, alpha-fetoprotein were unremarkable. Cancer type ID molecular testing by [employer] sent to determine the exact diagnosis and to assist in further treatment planning. 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 Relative probability of less than 5% of other subtype of sarcoma. EWSR1-WT1 fusion detected.  4. 5/1/2020, MRI brain negative for brain metastasis, and baseline CT-CAP obtained showing  extensive mixed solid and cystic masses throughout the abdomen and pelvis consistent with peritoneal carcinomatosis. Largest mass measures approximately 20 cm in the pelvis. Metastatic mixed solid and cystic masses seen in hepatic segments 5 and 6 and hepatic segment 7. The masses appear to be contiguous with the retrohepatic peritoneal carcinomatosis. Small volume fluid about the spleen favored to represent malignant ascites, stable mild-to-moderate right hydronephrosis related to mass effect upon the distal ureter in the pelvis, the IVC and iliac veins are compressed due to the extensive peritoneal carcinomatosis without findings to suggest deep venous thrombosis.  5. 5/4/2020, he begins CAV/IMV alternating chemotherapy. He receives 6 cycles of treatment. He symptomatically improves.  6. 9/11/2020, CT-CAP shows stable to mildly improved extensive peritoneal carcinomatosis. He would like to omit Ifosfamide/etoposide cycles and continue only with CAV.  7. 10/9/2020, he starts CAV every 21 days.  8. 1/6/2021, CT CAP showed a mixed response in the mixed solid and cystic masses throughout the peritoneum. Some of the tumors are stable to mildly worse, with some of the peritoneal masses a bit larger, a few are smaller, one cystic tumor in the left abdomen is smaller, while tumors lower in the pelvis appear slightly larger. IMV added back on 1/21/2021.  9. 3/24/2021, CT CAP showed continued slight decrease in overall burden of peritoneal carcinomatosis with persistent encasement of bowel without evidence of bowel obstruction.     HISTORY OF PRESENT ILLNESS  Diego Buchanan was met with over video with sister for follow up. He is feeling well. No new concerns today. Eating and drinking well. Has gained back a couple of pounds. His energy level is good. He's able to stay active. No new areas of pain. Bowels are moving well. No issues with blood in urine.    Current Outpatient Medications   Medication Sig Dispense Refill      DOXOrubicin (ADRIAMYCIN) 2 MG/ML injection        etoposide (VEPESID) 50 MG capsule        LORazepam (ATIVAN) 0.5 MG tablet Take 1 tablet (0.5 mg) by mouth every 4 hours as needed (Anxiety, Nausea/Vomiting or Sleep) 30 tablet 5     metoprolol tartrate (LOPRESSOR) 50 MG tablet Take 1.5 tablets (75 mg) by mouth daily 45 tablet 3     NEULASTA ONPRO 6 MG/0.6ML injection        polyethylene glycol (MIRALAX) 17 g packet Take 17 g by mouth       prochlorperazine (COMPAZINE) 10 MG tablet Take 1 tablet (10 mg) by mouth every 6 hours as needed (Nausea/Vomiting) 30 tablet 5     senna-docusate (SENNA S) 8.6-50 MG tablet Take 1 tablet by mouth 2 times daily 60 tablet 0     sodium chloride 0.9% infusion        etoposide (VEPESID) 50 MG capsule Take 4 capsules (200 mg) by mouth daily for 6 days Days 1 through 6. 24 capsule 0     etoposide (VEPESID) 50 MG capsule Take 4 capsules (200 mg) by mouth daily for 6 days Days 1 through 6. 24 capsule 0     mesna (MESNEX) 400 MG TABS tablet Take 1 tablet (400 mg) by mouth every 4 hours for 2 doses Take one tablet 4 hours and 8 hours after end of cyclophosphamide infusion. 2 tablet 0     mesna (MESNEX) 400 MG TABS tablet Take 1 tablet (400 mg) by mouth every 4 hours for 2 doses Take one tablet 4 hours and 8 hours after end of cyclophosphamide infusion. 2 tablet 0     Objective:  No vitals were taken for this video visit.  General: patient appears well in no acute distress, alert and oriented, speech clear and fluid  Skin: no rash or lesions on visualized skin  Resp: Appears to be breathing comfortably without accessory muscle usage, speaking in full sentences, no audible wheezes or cough.  Psych: Coherent speech, normal rate and volume, able to articulate logical thoughts, able to abstract reason, no tangential thoughts, no hallucinations or delusions  Patient's affect is appropriate.    The rest of a comprehensive physical examination is deferred due to PHE (public health emergency) video  visit restrictions.      LABS:  Recent Labs   Lab Test 21  1148 21  1553 21  1550 21  1505 21  1355 21  0925   WBC 7.5 9.4 11.3* 6.9 8.9 9.8   RBC 3.22* 3.44* 3.31* 2.97* 3.20* 3.11*   HGB 9.9* 10.3* 9.8* 8.9* 9.7* 9.5*   HCT 31.6* 33.4* 32.6* 28.8* 30.4* 30.4*   MCV 98 97 99 97 95 98   MCH 30.7 29.9 29.6 30.0 30.3 30.5   MCHC 31.3* 30.8* 30.1* 30.9* 31.9 31.3*   RDW 16.9* 18.4* 20.0* 18.5* 19.2* 19.9*    271 272 315 319 296   NEUTROPHIL 82.6 86.4 82.6  --   --  84.0    140 139 137 138 139   POTASSIUM 3.7 3.7 3.5 3.2* 3.9 3.9   CHLORIDE 110* 109 109 106 105 108   CO2 25 27 26 23 25 25   ANIONGAP 6 3 4 8 8 6   GLC 93 107* 70 140* 83 90   BUN 10 10 8 10 10 9   CR 0.72 0.71 0.78 0.67 0.70 0.65*   MAG  --   --   --  2.2 2.3 2.1   FAISAL 8.9 9.2 8.8 8.7 9.0 8.9   PROTTOTAL 7.2 7.6 7.5 7.6 7.4 7.1   ALBUMIN 3.4 3.6 3.5 3.6 3.7 3.6   BILITOTAL 0.5 0.7 1.0 0.8 0.9 0.5   ALKPHOS 84 186* 436* 96 98 100   AST 7 11 112* 9 7 10   ALT 20 81* 425* 17 22 25       IMAGING  CT CAP on 3/25/21 shows the followin. Continued slight decrease in overall burden of peritoneal  carcinomatosis with persistent encasement of bowel without evidence of  bowel obstruction.   2. Decreased size of the lesion in hepatic segments V/VI with  continued peritoneal nodularity along the adjacent posterior right  hepatic capsule.   3. Stable size of the previously noted anterior cardiophrenic and  right paratracheal lymph nodes.  4. Mildly dilated common bile duct and mild intrahepatic biliary  dilation without evident cause of obstruction. Possible gallstone  towards the fundus with some mild prominence of the cystic duct wall.  Correlation with clinical symptoms for biliary obstruction and  cholangitis is suggested. If there is concern clinically, findings  could be initially evaluated with a right upper quadrant ultrasound if  clinically warranted.      ASSESSMENT AND PLAN  #1 Desmoplastic small round  cell tumor (DSRCT) with peritoneal carcinomatosis and lymph node, bone and liver metastases  Mr. 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 He tolerated 6 alternating cycles of CAV/IMV with anticipated side effects. He developed hematuria with his last 2 cycles of Ifosfamide based therapy despite dose reduction and Mesna prophylaxis, and ultimately patient and family decided to continue with CAV alone. Due to a mixed response on his last CT, it was decided to add back in alternating IMV. He has not had recurrence of the hematuria. It is likely that the hematuria was caused by tumor invasion of the bladder, and now with some response (and time off ifos), this has improved.  -Will continue with IMV tomorrow. There are a couple more alternating cycles to complete a year. Will reassess at that time and decide if a couple more cycles are thought to be beneficial based on response at that time.   -Discussed lengthening interval between cycles per patient's mother's preference. Given that he's deriving some benefit from the chemotherapy, and seems to be tolerating it well, our preference would be to continue with cycles every 3 weeks.    #2 Transaminitis, now resolved.   Discussed with patient and his family to let us know what supplements are being used.    #3 Anemia, normocytic. Initially microcytic, now normocytic  Hemoglobin trends up during periods of time without hematuria and down when he has more hematuria. Likely multifactorial with chemotherapy and intermittent hematuria. No current transfusion needs. Will continue to monitor closely.     #4 Deconditioning  Encouraged him to go for daily walks for at least 10 minutes per day. Discussed referral for physical therapy for deconditioning, which he declined for now.     #5 History of recurrent hematuria, resolved  He previously saw urology in September 2020 who  felt this could be secondary to cyclophosphamide (despite mesna), new bladder/urethral primary malignancies, or metastatic bladder invasion. He and his family opted against a cystoscopy at that time. If hemoglobin drops below 8 again, we would like him to consider a cystoscopy.       Patient was seen and discussed with Dr. Burciaga.    Harriet Turk MD/PhD  Heme/Onc Fellow    ---  I evaluated the patient and reviewed the plan of care with the patient and the  Oncology Fellow. I agree with the assessment and plan documented in the clinical note.    Mr. Buchanan is doing well on alternating CAV/IMV chemotherapy with gradual reduction in the size of intraperitoneal disease and bone and liver metastases. Unclear if he will ever debulk to the extent necessary to receive whole abdomen radiation and stem cell rescue. For now will continue with palliative CAV/IMV chemotherapy, and I will see back to gauge his progress in 2 months with CT-CAP.    Farzaneh Burciaga M.D.   of Medicine  Hematology, Oncology and Transplantation

## 2021-04-23 ENCOUNTER — INFUSION THERAPY VISIT (OUTPATIENT)
Dept: ONCOLOGY | Facility: CLINIC | Age: 26
End: 2021-04-23
Attending: INTERNAL MEDICINE
Payer: COMMERCIAL

## 2021-04-23 ENCOUNTER — HOME INFUSION (PRE-WILLOW HOME INFUSION) (OUTPATIENT)
Dept: PHARMACY | Facility: CLINIC | Age: 26
End: 2021-04-23

## 2021-04-23 VITALS
OXYGEN SATURATION: 98 % | TEMPERATURE: 98.2 F | DIASTOLIC BLOOD PRESSURE: 69 MMHG | RESPIRATION RATE: 18 BRPM | HEART RATE: 98 BPM | SYSTOLIC BLOOD PRESSURE: 111 MMHG

## 2021-04-23 DIAGNOSIS — K59.00 CONSTIPATION, UNSPECIFIED CONSTIPATION TYPE: ICD-10-CM

## 2021-04-23 DIAGNOSIS — C41.9 SARCOMA, EWINGS (H): Primary | ICD-10-CM

## 2021-04-23 DIAGNOSIS — C49.9 DESMOPLASTIC SMALL ROUND CELL TUMOR (H): ICD-10-CM

## 2021-04-23 PROCEDURE — 36593 DECLOT VASCULAR DEVICE: CPT

## 2021-04-23 PROCEDURE — 96375 TX/PRO/DX INJ NEW DRUG ADDON: CPT

## 2021-04-23 PROCEDURE — 258N000003 HC RX IP 258 OP 636: Performed by: INTERNAL MEDICINE

## 2021-04-23 PROCEDURE — 96374 THER/PROPH/DIAG INJ IV PUSH: CPT

## 2021-04-23 PROCEDURE — G0498 CHEMO EXTEND IV INFUS W/PUMP: HCPCS

## 2021-04-23 PROCEDURE — 250N000011 HC RX IP 250 OP 636: Performed by: INTERNAL MEDICINE

## 2021-04-23 RX ORDER — ETOPOSIDE 50 MG/1
100 CAPSULE ORAL DAILY
Qty: 24 CAPSULE | Refills: 0 | Status: SHIPPED | OUTPATIENT
Start: 2021-04-23 | End: 2021-05-13

## 2021-04-23 RX ORDER — AMOXICILLIN 250 MG
1 CAPSULE ORAL 2 TIMES DAILY
Qty: 60 TABLET | Refills: 0 | Status: ON HOLD | OUTPATIENT
Start: 2021-04-23 | End: 2022-01-01

## 2021-04-23 RX ADMIN — GRANISETRON HYDROCHLORIDE 1 MG: 1 INJECTION, SOLUTION INTRAVENOUS at 10:15

## 2021-04-23 RX ADMIN — DEXAMETHASONE SODIUM PHOSPHATE 12 MG: 10 INJECTION, SOLUTION INTRAMUSCULAR; INTRAVENOUS at 10:02

## 2021-04-23 RX ADMIN — ALTEPLASE 2 MG: 2.2 INJECTION, POWDER, LYOPHILIZED, FOR SOLUTION INTRAVENOUS at 09:46

## 2021-04-23 RX ADMIN — SODIUM CHLORIDE 250 ML: 9 INJECTION, SOLUTION INTRAVENOUS at 10:01

## 2021-04-23 ASSESSMENT — PAIN SCALES - GENERAL: PAINLEVEL: NO PAIN (0)

## 2021-04-23 NOTE — PATIENT INSTRUCTIONS
Contact Numbers    [facility] [place]: [phone]  [facility] Triage and after hours / weekends / holidays: [phone]      Please call the triage or after hours line if you experience a temperature greater than or equal to 100.4, shaking chills, have uncontrolled nausea, vomiting and/or diarrhea, dizziness, shortness of breath, chest pain, bleeding, unexplained bruising, or if you have any other new/concerning symptoms, questions or concerns.      If you are having any concerning symptoms or wish to speak to a provider before your next infusion visit, please call your care coordinator or triage to notify them so we can adequately serve you.     If you need a refill on a narcotic prescription or other medication, please call before your infusion appointment.       April 2021 Sunday Monday Tuesday Wednesday Thursday Friday Saturday                       1    VIDEO VISIT RETURN  10:55 AM   (90 min.)   Yasmine Mckeon PA-C   [facility]    LAB   1:30 PM   (15 min.)   [facility] LAB HOME INFUSION   [facility] 2    [facility] ONC INFUSION 180  10:30 AM   (180 min.)   [facility]   [facility] 3       4     5     6     7     8     9     10       11     12     13     14     15     16     17       18     19     20     21    LAB CENTRAL  11:15 AM   (30 min.)   [facility]   [facility] 22    VIDEO VISIT RETURN   9:00 AM   (60 min.)   Farzaneh Young MD   [facility] 23    [facility] ONC INFUSION 180   9:00 AM   (180 min.)   [facility]ION   [facility] 24       25     26     27     28     29     30                      May 2021      Manjinder Monday Tuesday Wednesday Thursday Friday Saturday                                 1       2     3     4     5     6     7  Happy Birthday!     8       9     10     11     12    LAB CENTRAL   3:45 PM   (15  min.)   [facility] LAB DRAW   [facility] 13    VIDEO VISIT RETURN   8:45 AM   (45 min.)   Yasmine Mckeon PA-C   [facility] 14    [facility] ONC INFUSION 180   7:00 AM   (180 min.)    ONCOLOGY INFUSION   [facility] 15       16     17     18     19     20     21     22       23     24     25     26     27     28     29       30     31                                           No results found for this or any previous visit (from the past 24 hour(s)).

## 2021-04-23 NOTE — PROGRESS NOTES
Infusion Nursing Note:  Diego Buchanan presents today for Cycle 16 Day 1 Ifosfamide/Mesna pump connect.    Patient seen by provider today: No. Pt had visit with Dr. Sims yesterday.   present during visit today: Not Applicable. Pt with good understanding of English. Sister interprets.     Note: pt presents to infusion today feeling generally well with no new complaints. Sister verifies no new concerns since yesterday. Sister changed pt's Esparza dressing at home yesterday and noted that skin under Biopatch is slightly reddened. Pt states that skin is intact but is wondering what to do to prevent further breakdown. This RN did not take dressing off today so did not visualize site. This RN consulted Kalani Spivey, Vascular Access RN. Per sister Posada to make sure that skin is completely dry prior to placing Biopatch/dressing; pt/sister should follow up with provider at next visit or call earlier for concerns. This RN relayed this information to sister, who verified understanding. This RN also stated that it would be ok to leave the site open to air for a few minutes with dressing changes. Sister will call with any further breakdown or questions/concerns.     Alteplase instilled to purple lumen of Esparza with brisk, positive blood return 30 minutes later. Red lumen used for infusion and pump connect today.     Pain: denies    Intravenous Access:  Esparza.    Treatment Conditions:  Lab Results   Component Value Date    HGB 9.9 04/21/2021     Lab Results   Component Value Date    WBC 7.5 04/21/2021      Lab Results   Component Value Date    ANEU 6.2 04/21/2021     Lab Results   Component Value Date     04/21/2021      Lab Results   Component Value Date     04/21/2021                   Lab Results   Component Value Date    POTASSIUM 3.7 04/21/2021           Lab Results   Component Value Date                  Lab Results   Component Value Date    CR 0.72 04/21/2021                   Lab Results  "  Component Value Date    FAISAL 8.9 04/21/2021                Lab Results   Component Value Date    BILITOTAL 0.5 04/21/2021           Lab Results   Component Value Date    ALBUMIN 3.4 04/21/2021                    Lab Results   Component Value Date    ALT 20 04/21/2021           Lab Results   Component Value Date    AST 7 04/21/2021       Results reviewed, labs MET treatment parameters, ok to proceed with treatment.      Post Infusion Assessment:  Patient tolerated infusion without incident.  Blood return noted pre and post infusion.  Site patent and intact, free from redness, edema or discomfort.  No evidence of extravasations.  Red lumen of Esparza used for pump connect today.    Prior to discharge: Esparza is secured in place with tegaderm and flushed with 10cc NS with positive blood return noted.  Continuous home infusion CADD pump connected.    All connectors secured in place and clamps taped open.    Pump started, \"running\" noted on display (CADD): YES.  Pump Connection double checked with Karly Sarabia RN.  Patient instructed to call our clinic or [facility] with any questions or concerns at home.  Patient verbalized understanding.    Patient set up for Ifosfamide/Mesna pump disconnect/Mesna connect/Neulasta Onpro at home with [facility] on 4/29. [facility] will then return on 4/30 to disconnect Mesna pump. [facility] JASON Roy aware of dates and times; Kirill will also verify when labs need to be drawn at home this cycle.         Discharge Plan:   Prescription refills given for Etoposide, senna.  Copy of AVS reviewed with patient and/or family.  Patient will return 5/14 for next appointment.  Patient discharged in stable condition accompanied by: sister.  Departure Mode: Ambulatory.  Face to face time: 3 minutes.    LAUREN KNIGHT RN    "

## 2021-04-25 ENCOUNTER — HOME INFUSION (PRE-WILLOW HOME INFUSION) (OUTPATIENT)
Dept: PHARMACY | Facility: CLINIC | Age: 26
End: 2021-04-25

## 2021-04-25 ENCOUNTER — MEDICAL CORRESPONDENCE (OUTPATIENT)
Dept: HEALTH INFORMATION MANAGEMENT | Facility: CLINIC | Age: 26
End: 2021-04-25

## 2021-04-25 LAB
ALBUMIN SERPL-MCNC: 3.3 G/DL (ref 3.4–5)
ALP SERPL-CCNC: 83 U/L (ref 40–150)
ALT SERPL W P-5'-P-CCNC: 38 U/L (ref 0–70)
ANION GAP SERPL CALCULATED.3IONS-SCNC: 6 MMOL/L (ref 3–14)
AST SERPL W P-5'-P-CCNC: 15 U/L (ref 0–45)
BILIRUB SERPL-MCNC: 0.5 MG/DL (ref 0.2–1.3)
BUN SERPL-MCNC: 8 MG/DL (ref 7–30)
CALCIUM SERPL-MCNC: 8 MG/DL (ref 8.5–10.1)
CHLORIDE SERPL-SCNC: 109 MMOL/L (ref 94–109)
CO2 SERPL-SCNC: 27 MMOL/L (ref 20–32)
CREAT SERPL-MCNC: 0.69 MG/DL (ref 0.66–1.25)
ERYTHROCYTE [DISTWIDTH] IN BLOOD BY AUTOMATED COUNT: 17.5 % (ref 10–15)
GFR SERPL CREATININE-BSD FRML MDRD: >90 ML/MIN/{1.73_M2}
GLUCOSE SERPL-MCNC: 71 MG/DL (ref 70–99)
HCT VFR BLD AUTO: 30.7 % (ref 40–53)
HGB BLD-MCNC: 9.5 G/DL (ref 13.3–17.7)
MAGNESIUM SERPL-MCNC: 2 MG/DL (ref 1.6–2.3)
MCH RBC QN AUTO: 30.4 PG (ref 26.5–33)
MCHC RBC AUTO-ENTMCNC: 30.9 G/DL (ref 31.5–36.5)
MCV RBC AUTO: 98 FL (ref 78–100)
PHOSPHATE SERPL-MCNC: 4.1 MG/DL (ref 2.5–4.5)
PLATELET # BLD AUTO: 278 10E9/L (ref 150–450)
POTASSIUM SERPL-SCNC: 3.9 MMOL/L (ref 3.4–5.3)
PROT SERPL-MCNC: 6.8 G/DL (ref 6.8–8.8)
RBC # BLD AUTO: 3.13 10E12/L (ref 4.4–5.9)
SODIUM SERPL-SCNC: 142 MMOL/L (ref 133–144)
WBC # BLD AUTO: 5.8 10E9/L (ref 4–11)

## 2021-04-25 PROCEDURE — 84100 ASSAY OF PHOSPHORUS: CPT | Performed by: INTERNAL MEDICINE

## 2021-04-25 PROCEDURE — 85027 COMPLETE CBC AUTOMATED: CPT | Performed by: INTERNAL MEDICINE

## 2021-04-25 PROCEDURE — 83735 ASSAY OF MAGNESIUM: CPT | Performed by: INTERNAL MEDICINE

## 2021-04-25 PROCEDURE — 80053 COMPREHEN METABOLIC PANEL: CPT | Performed by: INTERNAL MEDICINE

## 2021-04-26 ENCOUNTER — HOME INFUSION (PRE-WILLOW HOME INFUSION) (OUTPATIENT)
Dept: PHARMACY | Facility: CLINIC | Age: 26
End: 2021-04-26

## 2021-04-26 ENCOUNTER — MEDICAL CORRESPONDENCE (OUTPATIENT)
Dept: HEALTH INFORMATION MANAGEMENT | Facility: CLINIC | Age: 26
End: 2021-04-26

## 2021-04-26 LAB
ALBUMIN SERPL-MCNC: 3.7 G/DL (ref 3.4–5)
ALP SERPL-CCNC: 86 U/L (ref 40–150)
ALT SERPL W P-5'-P-CCNC: 30 U/L (ref 0–70)
ANION GAP SERPL CALCULATED.3IONS-SCNC: 9 MMOL/L (ref 3–14)
AST SERPL W P-5'-P-CCNC: 9 U/L (ref 0–45)
BILIRUB SERPL-MCNC: 0.6 MG/DL (ref 0.2–1.3)
BUN SERPL-MCNC: 9 MG/DL (ref 7–30)
CALCIUM SERPL-MCNC: 8.9 MG/DL (ref 8.5–10.1)
CHLORIDE SERPL-SCNC: 106 MMOL/L (ref 94–109)
CO2 SERPL-SCNC: 24 MMOL/L (ref 20–32)
CREAT SERPL-MCNC: 0.64 MG/DL (ref 0.66–1.25)
ERYTHROCYTE [DISTWIDTH] IN BLOOD BY AUTOMATED COUNT: 17.4 % (ref 10–15)
GFR SERPL CREATININE-BSD FRML MDRD: >90 ML/MIN/{1.73_M2}
GLUCOSE SERPL-MCNC: 85 MG/DL (ref 70–99)
HCT VFR BLD AUTO: 32.5 % (ref 40–53)
HGB BLD-MCNC: 10.3 G/DL (ref 13.3–17.7)
MAGNESIUM SERPL-MCNC: 2.3 MG/DL (ref 1.6–2.3)
MCH RBC QN AUTO: 30.9 PG (ref 26.5–33)
MCHC RBC AUTO-ENTMCNC: 31.7 G/DL (ref 31.5–36.5)
MCV RBC AUTO: 98 FL (ref 78–100)
PHOSPHATE SERPL-MCNC: 3.9 MG/DL (ref 2.5–4.5)
PLATELET # BLD AUTO: 316 10E9/L (ref 150–450)
POTASSIUM SERPL-SCNC: 4.1 MMOL/L (ref 3.4–5.3)
PROT SERPL-MCNC: 7.5 G/DL (ref 6.8–8.8)
RBC # BLD AUTO: 3.33 10E12/L (ref 4.4–5.9)
SODIUM SERPL-SCNC: 139 MMOL/L (ref 133–144)
WBC # BLD AUTO: 8.9 10E9/L (ref 4–11)

## 2021-04-26 PROCEDURE — 80053 COMPREHEN METABOLIC PANEL: CPT | Performed by: INTERNAL MEDICINE

## 2021-04-26 PROCEDURE — 84100 ASSAY OF PHOSPHORUS: CPT | Performed by: INTERNAL MEDICINE

## 2021-04-26 PROCEDURE — 83735 ASSAY OF MAGNESIUM: CPT | Performed by: INTERNAL MEDICINE

## 2021-04-26 PROCEDURE — 85027 COMPLETE CBC AUTOMATED: CPT | Performed by: INTERNAL MEDICINE

## 2021-04-27 ENCOUNTER — HOME INFUSION (PRE-WILLOW HOME INFUSION) (OUTPATIENT)
Dept: PHARMACY | Facility: CLINIC | Age: 26
End: 2021-04-27

## 2021-04-27 ENCOUNTER — MEDICAL CORRESPONDENCE (OUTPATIENT)
Dept: HEALTH INFORMATION MANAGEMENT | Facility: CLINIC | Age: 26
End: 2021-04-27

## 2021-04-27 LAB
ALBUMIN SERPL-MCNC: 3.4 G/DL (ref 3.4–5)
ALP SERPL-CCNC: 80 U/L (ref 40–150)
ALT SERPL W P-5'-P-CCNC: 24 U/L (ref 0–70)
ANION GAP SERPL CALCULATED.3IONS-SCNC: 8 MMOL/L (ref 3–14)
AST SERPL W P-5'-P-CCNC: 10 U/L (ref 0–45)
BILIRUB SERPL-MCNC: 0.5 MG/DL (ref 0.2–1.3)
BUN SERPL-MCNC: 10 MG/DL (ref 7–30)
CALCIUM SERPL-MCNC: 8.7 MG/DL (ref 8.5–10.1)
CHLORIDE SERPL-SCNC: 106 MMOL/L (ref 94–109)
CO2 SERPL-SCNC: 24 MMOL/L (ref 20–32)
CREAT SERPL-MCNC: 0.65 MG/DL (ref 0.66–1.25)
ERYTHROCYTE [DISTWIDTH] IN BLOOD BY AUTOMATED COUNT: 17.2 % (ref 10–15)
GFR SERPL CREATININE-BSD FRML MDRD: >90 ML/MIN/{1.73_M2}
GLUCOSE SERPL-MCNC: 135 MG/DL (ref 70–99)
HCT VFR BLD AUTO: 32.2 % (ref 40–53)
HGB BLD-MCNC: 10.1 G/DL (ref 13.3–17.7)
MAGNESIUM SERPL-MCNC: 2.2 MG/DL (ref 1.6–2.3)
MCH RBC QN AUTO: 30.7 PG (ref 26.5–33)
MCHC RBC AUTO-ENTMCNC: 31.4 G/DL (ref 31.5–36.5)
MCV RBC AUTO: 98 FL (ref 78–100)
PHOSPHATE SERPL-MCNC: 4.2 MG/DL (ref 2.5–4.5)
PLATELET # BLD AUTO: 344 10E9/L (ref 150–450)
POTASSIUM SERPL-SCNC: 3.9 MMOL/L (ref 3.4–5.3)
PROT SERPL-MCNC: 7.3 G/DL (ref 6.8–8.8)
RBC # BLD AUTO: 3.29 10E12/L (ref 4.4–5.9)
SODIUM SERPL-SCNC: 138 MMOL/L (ref 133–144)
WBC # BLD AUTO: 8.7 10E9/L (ref 4–11)

## 2021-04-27 PROCEDURE — 83735 ASSAY OF MAGNESIUM: CPT | Performed by: INTERNAL MEDICINE

## 2021-04-27 PROCEDURE — 85027 COMPLETE CBC AUTOMATED: CPT | Performed by: INTERNAL MEDICINE

## 2021-04-27 PROCEDURE — 84100 ASSAY OF PHOSPHORUS: CPT | Performed by: INTERNAL MEDICINE

## 2021-04-27 PROCEDURE — 80053 COMPREHEN METABOLIC PANEL: CPT | Performed by: INTERNAL MEDICINE

## 2021-04-27 NOTE — PROGRESS NOTES
This is a recent snapshot of the patient's [facility] medical record.  For current drug dose and complete information and questions, call [phone] or In Basket pool, fv home infusion ([id number])  CSN Number:  [id number]

## 2021-04-27 NOTE — PROGRESS NOTES
This is a recent snapshot of the patient's [facility] medical record.  For current drug dose and complete information and questions, call [phone] or In Basket pool, fv home infusion ([id number])  CSN Number:  [id number]

## 2021-04-27 NOTE — PROGRESS NOTES
This is a recent snapshot of the patient's [facility] medical record.  For current drug dose and complete information and questions, call [phone] or In Basket pool, fv home infusion ([id number])  CSN Number:  [id number]

## 2021-04-29 ENCOUNTER — HOME INFUSION (PRE-WILLOW HOME INFUSION) (OUTPATIENT)
Dept: PHARMACY | Facility: CLINIC | Age: 26
End: 2021-04-29

## 2021-04-29 ENCOUNTER — MEDICAL CORRESPONDENCE (OUTPATIENT)
Dept: HEALTH INFORMATION MANAGEMENT | Facility: CLINIC | Age: 26
End: 2021-04-29

## 2021-04-29 LAB
ALBUMIN SERPL-MCNC: 3.5 G/DL (ref 3.4–5)
ALP SERPL-CCNC: 74 U/L (ref 40–150)
ALT SERPL W P-5'-P-CCNC: 18 U/L (ref 0–70)
ANION GAP SERPL CALCULATED.3IONS-SCNC: 7 MMOL/L (ref 3–14)
AST SERPL W P-5'-P-CCNC: 9 U/L (ref 0–45)
BILIRUB SERPL-MCNC: 0.6 MG/DL (ref 0.2–1.3)
BUN SERPL-MCNC: 16 MG/DL (ref 7–30)
CALCIUM SERPL-MCNC: 8.6 MG/DL (ref 8.5–10.1)
CHLORIDE SERPL-SCNC: 107 MMOL/L (ref 94–109)
CO2 SERPL-SCNC: 25 MMOL/L (ref 20–32)
CREAT SERPL-MCNC: 0.76 MG/DL (ref 0.66–1.25)
ERYTHROCYTE [DISTWIDTH] IN BLOOD BY AUTOMATED COUNT: 16.9 % (ref 10–15)
GFR SERPL CREATININE-BSD FRML MDRD: >90 ML/MIN/{1.73_M2}
GLUCOSE SERPL-MCNC: 82 MG/DL (ref 70–99)
HCT VFR BLD AUTO: 29.4 % (ref 40–53)
HGB BLD-MCNC: 9.5 G/DL (ref 13.3–17.7)
MAGNESIUM SERPL-MCNC: 2.3 MG/DL (ref 1.6–2.3)
MCH RBC QN AUTO: 30.8 PG (ref 26.5–33)
MCHC RBC AUTO-ENTMCNC: 32.3 G/DL (ref 31.5–36.5)
MCV RBC AUTO: 96 FL (ref 78–100)
PHOSPHATE SERPL-MCNC: 3.7 MG/DL (ref 2.5–4.5)
PLATELET # BLD AUTO: 327 10E9/L (ref 150–450)
POTASSIUM SERPL-SCNC: 3.7 MMOL/L (ref 3.4–5.3)
PROT SERPL-MCNC: 7.2 G/DL (ref 6.8–8.8)
RBC # BLD AUTO: 3.08 10E12/L (ref 4.4–5.9)
SODIUM SERPL-SCNC: 139 MMOL/L (ref 133–144)
WBC # BLD AUTO: 5 10E9/L (ref 4–11)

## 2021-04-29 PROCEDURE — 84100 ASSAY OF PHOSPHORUS: CPT | Performed by: INTERNAL MEDICINE

## 2021-04-29 PROCEDURE — 80053 COMPREHEN METABOLIC PANEL: CPT | Performed by: INTERNAL MEDICINE

## 2021-04-29 PROCEDURE — 83735 ASSAY OF MAGNESIUM: CPT | Performed by: INTERNAL MEDICINE

## 2021-04-29 PROCEDURE — 85027 COMPLETE CBC AUTOMATED: CPT | Performed by: INTERNAL MEDICINE

## 2021-04-29 NOTE — PROGRESS NOTES
This is a recent snapshot of the patient's [facility] medical record.  For current drug dose and complete information and questions, call [phone] or In Basket pool, fv home infusion ([id number])  CSN Number:  [id number]

## 2021-04-30 ENCOUNTER — HOME INFUSION (PRE-WILLOW HOME INFUSION) (OUTPATIENT)
Dept: PHARMACY | Facility: CLINIC | Age: 26
End: 2021-04-30

## 2021-05-01 ENCOUNTER — TELEPHONE (OUTPATIENT)
Dept: ONCOLOGY | Facility: CLINIC | Age: 26
End: 2021-05-01

## 2021-05-04 NOTE — PROGRESS NOTES
This is a recent snapshot of the patient's [facility] medical record.  For current drug dose and complete information and questions, call [phone] or In Basket pool, fv home infusion ([id number])  CSN Number:  [id number]

## 2021-05-05 NOTE — PROGRESS NOTES
This is a recent snapshot of the patient's [facility] medical record.  For current drug dose and complete information and questions, call [phone] or In Basket pool, fv home infusion ([id number])  CSN Number:  [id number]

## 2021-05-11 ENCOUNTER — IMMUNIZATION (OUTPATIENT)
Dept: NURSING | Facility: CLINIC | Age: 26
End: 2021-05-11
Payer: COMMERCIAL

## 2021-05-11 PROCEDURE — 0001A PR COVID VAC PFIZER DIL RECON 30 MCG/0.3 ML IM: CPT

## 2021-05-11 PROCEDURE — 91300 PR COVID VAC PFIZER DIL RECON 30 MCG/0.3 ML IM: CPT

## 2021-05-12 ENCOUNTER — TELEPHONE (OUTPATIENT)
Dept: PHARMACY | Facility: CLINIC | Age: 26
End: 2021-05-12

## 2021-05-12 VITALS
WEIGHT: 250.6 LBS | DIASTOLIC BLOOD PRESSURE: 71 MMHG | BODY MASS INDEX: 38.11 KG/M2 | HEART RATE: 95 BPM | OXYGEN SATURATION: 100 % | RESPIRATION RATE: 16 BRPM | TEMPERATURE: 98.4 F | SYSTOLIC BLOOD PRESSURE: 118 MMHG

## 2021-05-12 DIAGNOSIS — C41.9 SARCOMA, EWINGS (H): ICD-10-CM

## 2021-05-12 LAB
ACANTHOCYTES BLD QL SMEAR: SLIGHT
ALBUMIN SERPL-MCNC: 3.6 G/DL (ref 3.4–5)
ALP SERPL-CCNC: 110 U/L (ref 40–150)
ALT SERPL W P-5'-P-CCNC: 18 U/L (ref 0–70)
ANION GAP SERPL CALCULATED.3IONS-SCNC: 5 MMOL/L (ref 3–14)
ANISOCYTOSIS BLD QL SMEAR: SLIGHT
AST SERPL W P-5'-P-CCNC: 14 U/L (ref 0–45)
BASOPHILS # BLD AUTO: 0.2 10E9/L (ref 0–0.2)
BASOPHILS NFR BLD AUTO: 1.8 %
BILIRUB SERPL-MCNC: 0.4 MG/DL (ref 0.2–1.3)
BUN SERPL-MCNC: 8 MG/DL (ref 7–30)
CALCIUM SERPL-MCNC: 8.4 MG/DL (ref 8.5–10.1)
CHLORIDE SERPL-SCNC: 112 MMOL/L (ref 94–109)
CO2 SERPL-SCNC: 26 MMOL/L (ref 20–32)
CREAT SERPL-MCNC: 0.74 MG/DL (ref 0.66–1.25)
DACRYOCYTES BLD QL SMEAR: SLIGHT
DIFFERENTIAL METHOD BLD: ABNORMAL
EOSINOPHIL # BLD AUTO: 0 10E9/L (ref 0–0.7)
EOSINOPHIL NFR BLD AUTO: 0 %
ERYTHROCYTE [DISTWIDTH] IN BLOOD BY AUTOMATED COUNT: 17.5 % (ref 10–15)
GFR SERPL CREATININE-BSD FRML MDRD: >90 ML/MIN/{1.73_M2}
GLUCOSE SERPL-MCNC: 106 MG/DL (ref 70–99)
HCT VFR BLD AUTO: 31.4 % (ref 40–53)
HGB BLD-MCNC: 9.8 G/DL (ref 13.3–17.7)
LYMPHOCYTES # BLD AUTO: 0.2 10E9/L (ref 0.8–5.3)
LYMPHOCYTES NFR BLD AUTO: 1.7 %
MACROCYTES BLD QL SMEAR: PRESENT
MCH RBC QN AUTO: 30.2 PG (ref 26.5–33)
MCHC RBC AUTO-ENTMCNC: 31.2 G/DL (ref 31.5–36.5)
MCV RBC AUTO: 97 FL (ref 78–100)
METAMYELOCYTES # BLD: 0.2 10E9/L
METAMYELOCYTES NFR BLD MANUAL: 1.7 %
MONOCYTES # BLD AUTO: 0.4 10E9/L (ref 0–1.3)
MONOCYTES NFR BLD AUTO: 3.5 %
MYELOCYTES # BLD: 0.1 10E9/L
MYELOCYTES NFR BLD MANUAL: 0.9 %
NEUTROPHILS # BLD AUTO: 10.6 10E9/L (ref 1.6–8.3)
NEUTROPHILS NFR BLD AUTO: 90.4 %
NRBC # BLD AUTO: 0.1 10*3/UL
NRBC BLD AUTO-RTO: 1 /100
PLATELET # BLD AUTO: 235 10E9/L (ref 150–450)
PLATELET # BLD EST: ABNORMAL 10*3/UL
POIKILOCYTOSIS BLD QL SMEAR: SLIGHT
POLYCHROMASIA BLD QL SMEAR: SLIGHT
POTASSIUM SERPL-SCNC: 3.8 MMOL/L (ref 3.4–5.3)
PROT SERPL-MCNC: 7.2 G/DL (ref 6.8–8.8)
RBC # BLD AUTO: 3.24 10E12/L (ref 4.4–5.9)
RBC INCLUSIONS BLD: SLIGHT
SODIUM SERPL-SCNC: 142 MMOL/L (ref 133–144)
WBC # BLD AUTO: 11.7 10E9/L (ref 4–11)

## 2021-05-12 PROCEDURE — 250N000011 HC RX IP 250 OP 636: Performed by: INTERNAL MEDICINE

## 2021-05-12 PROCEDURE — 85025 COMPLETE CBC W/AUTO DIFF WBC: CPT | Performed by: INTERNAL MEDICINE

## 2021-05-12 PROCEDURE — 80053 COMPREHEN METABOLIC PANEL: CPT | Performed by: INTERNAL MEDICINE

## 2021-05-12 RX ORDER — HEPARIN SODIUM,PORCINE 10 UNIT/ML
5 VIAL (ML) INTRAVENOUS ONCE
Status: COMPLETED | OUTPATIENT
Start: 2021-05-12 | End: 2021-05-12

## 2021-05-12 RX ADMIN — Medication 5 ML: at 15:52

## 2021-05-12 ASSESSMENT — PAIN SCALES - GENERAL: PAINLEVEL: NO PAIN (0)

## 2021-05-12 NOTE — NURSING NOTE
Chief Complaint   Patient presents with     Blood Draw     Labs drawn via cvc by RN in lab. VS taken.      Labs collected from CVC by RN, good blood return noted, line flushed with saline and heparin locked.  Vitals taken. Pt tolerated well.     Daniela Riddle RN

## 2021-05-12 NOTE — TELEPHONE ENCOUNTER
Drug including DOSE: Doxorubicin  J Code:      NDC:   ICD 10 code: C49.9, C41.9, Z45.2     Date(s) of Service: 05/14/2021        Insurance Name: [facility]  Insurance ID: [id number]   Group:      Ordering Physician: Farzaneh Sims  NPI: [id number]     [facility]  NPI: [id number]

## 2021-05-13 ENCOUNTER — VIRTUAL VISIT (OUTPATIENT)
Dept: ONCOLOGY | Facility: CLINIC | Age: 26
End: 2021-05-13
Attending: PHYSICIAN ASSISTANT
Payer: COMMERCIAL

## 2021-05-13 DIAGNOSIS — R53.81 PHYSICAL DECONDITIONING: ICD-10-CM

## 2021-05-13 DIAGNOSIS — R31.9 HEMATURIA, UNSPECIFIED TYPE: ICD-10-CM

## 2021-05-13 DIAGNOSIS — D64.9 ANEMIA, NORMOCYTIC NORMOCHROMIC: ICD-10-CM

## 2021-05-13 DIAGNOSIS — C41.9 SARCOMA, EWINGS (H): ICD-10-CM

## 2021-05-13 DIAGNOSIS — C49.9 DESMOPLASTIC SMALL ROUND CELL TUMOR (H): Primary | ICD-10-CM

## 2021-05-13 PROCEDURE — 999N001193 HC VIDEO/TELEPHONE VISIT; NO CHARGE

## 2021-05-13 PROCEDURE — 99214 OFFICE O/P EST MOD 30 MIN: CPT | Mod: 95 | Performed by: PHYSICIAN ASSISTANT

## 2021-05-13 RX ORDER — ALBUTEROL SULFATE 0.83 MG/ML
2.5 SOLUTION RESPIRATORY (INHALATION)
Status: CANCELLED | OUTPATIENT
Start: 2021-05-14

## 2021-05-13 RX ORDER — METHYLPREDNISOLONE SODIUM SUCCINATE 125 MG/2ML
125 INJECTION, POWDER, LYOPHILIZED, FOR SOLUTION INTRAMUSCULAR; INTRAVENOUS
Status: CANCELLED
Start: 2021-05-14

## 2021-05-13 RX ORDER — MEPERIDINE HYDROCHLORIDE 25 MG/ML
25 INJECTION INTRAMUSCULAR; INTRAVENOUS; SUBCUTANEOUS EVERY 30 MIN PRN
Status: CANCELLED | OUTPATIENT
Start: 2021-05-14

## 2021-05-13 RX ORDER — HEPARIN SODIUM (PORCINE) LOCK FLUSH IV SOLN 100 UNIT/ML 100 UNIT/ML
5 SOLUTION INTRAVENOUS
Status: CANCELLED | OUTPATIENT
Start: 2021-05-14

## 2021-05-13 RX ORDER — ALBUTEROL SULFATE 90 UG/1
1-2 AEROSOL, METERED RESPIRATORY (INHALATION)
Status: CANCELLED
Start: 2021-05-14

## 2021-05-13 RX ORDER — NALOXONE HYDROCHLORIDE 0.4 MG/ML
.1-.4 INJECTION, SOLUTION INTRAMUSCULAR; INTRAVENOUS; SUBCUTANEOUS
Status: CANCELLED | OUTPATIENT
Start: 2021-05-14

## 2021-05-13 RX ORDER — LORAZEPAM 2 MG/ML
0.5 INJECTION INTRAMUSCULAR EVERY 4 HOURS PRN
Status: CANCELLED
Start: 2021-05-14

## 2021-05-13 RX ORDER — EPINEPHRINE 1 MG/ML
0.3 INJECTION, SOLUTION INTRAMUSCULAR; SUBCUTANEOUS EVERY 5 MIN PRN
Status: CANCELLED | OUTPATIENT
Start: 2021-05-14

## 2021-05-13 RX ORDER — HEPARIN SODIUM,PORCINE 10 UNIT/ML
5 VIAL (ML) INTRAVENOUS
Status: CANCELLED | OUTPATIENT
Start: 2021-05-14

## 2021-05-13 RX ORDER — DIPHENHYDRAMINE HYDROCHLORIDE 50 MG/ML
50 INJECTION INTRAMUSCULAR; INTRAVENOUS
Status: CANCELLED
Start: 2021-05-14

## 2021-05-13 RX ORDER — PALONOSETRON 0.05 MG/ML
0.25 INJECTION, SOLUTION INTRAVENOUS ONCE
Status: CANCELLED | OUTPATIENT
Start: 2021-05-14

## 2021-05-13 RX ORDER — SODIUM CHLORIDE 9 MG/ML
1000 INJECTION, SOLUTION INTRAVENOUS CONTINUOUS PRN
Status: CANCELLED
Start: 2021-05-14

## 2021-05-13 NOTE — TELEPHONE ENCOUNTER
PA Initiation    Medication: Doxorubicin renewal  Insurance Company: [facility] - Phone [phone] Fax [phone]  Pharmacy Filling the Rx: [facility]  Filling Pharmacy Phone:    Filling Pharmacy Fax:    Start Date: 5/13/2021    Central Prior Authorization Team   Phone: [phone]

## 2021-05-13 NOTE — LETTER
5/13/2021         RE: Diego Buchanan  [address]  [place] [address]        Dear Colleague,    Thank you for referring your patient, Diego Buchanan, to the [facility]. Please see a copy of my visit note below.    Diego is a 26 year old who is being evaluated via a billable video visit.      How would you like to obtain your AVS? MyChart     If the video visit is dropped, the invitation should be resent by: Text to cell phone: [phone]     Will anyone else be joining your video visit? Ashley Tong LPN      Video-Visit Details    Type of service:  Video Visit    Video Start Time: 8:17 AM    Video End Time: 8:24 AM    Originating Location (pt. 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 He presented initially with abdominal pain, diarrhea, and progressive abdominal distention for 3 months duration. 2. Initial CT scan in February 2020 showed severe peritoneal carcinomatosis with large peritoneal tumor implants throughout the entire abdomen and pelvis, but mostly prominently in the pelvis.   2. PETCT scan showed interval increase in the amount of peritoneal carcinomatosis.  3. On 3/12/2020, he had ultrasound-guided core needle biopsy of a peritoneal implant (Case: [id number]), which showed a completely undifferentiated appearance, but stains with pancytokeratin, indicating an epithelial origin. 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 Tumor markers for CA-19-9, CEA, beta-hCG, alpha-fetoprotein were unremarkable. Cancer type ID molecular testing by [employer] sent to determine the exact diagnosis and to assist in further treatment planning.  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 Relative probability of less than 5% of other subtype of sarcoma. EWSR1-WT1 fusion detected.  4. 5/1/2020, MRI brain negative for brain metastasis, and baseline CT-CAP obtained showing extensive mixed solid and cystic masses throughout the abdomen and pelvis consistent with peritoneal carcinomatosis. Largest mass measures approximately 20 cm in the pelvis. Metastatic mixed solid and cystic masses seen in hepatic segments 5 and 6 and hepatic segment 7. The masses appear to be contiguous with the retrohepatic peritoneal carcinomatosis. Small volume fluid about the spleen favored to represent malignant ascites, stable mild-to-moderate right hydronephrosis related to mass effect upon the distal ureter in the pelvis, the IVC and iliac veins are compressed due to the extensive peritoneal carcinomatosis without findings to suggest deep venous thrombosis.  5. 5/4/2020, he begins CAV/IMV alternating chemotherapy. He receives 6 cycles of treatment. He symptomatically improves.  6. 9/11/2020, CT-CAP shows stable to mildly improved extensive peritoneal carcinomatosis. He would like to omit Ifosfamide/etoposide cycles and continue only with CAV.  7. 10/9/2020, he starts CAV every 21 days.  8. 1/6/2021, CT CAP showed a mixed response in the mixed solid and cystic masses throughout the peritoneum. Some of the tumors are stable to mildly worse, with some of the peritoneal masses a bit larger, a few are smaller, one cystic tumor in the left abdomen is smaller, while tumors lower in the pelvis appear slightly larger.  9. 3/24/2021, CT CAP showed continued slight decrease in overall burden of peritoneal carcinomatosis with persistent encasement of bowel without evidence of bowel obstruction.     HISTORY OF PRESENT ILLNESS  Diego Buchanan was met with over video with sister for follow up.    -Doing okay overall.   -Denies any  pain.  -Denies any blood in urine.  -Has bowel movements about every other day.   -Eating and drinking well.     Review of Systems:  Patient denies any of the following except if noted above: fevers, chills, difficulty with energy, vision or hearing changes, chest pain, dyspnea, abdominal pain, nausea, vomiting, diarrhea, constipation, urinary concerns, headaches, numbness, tingling, issues with sleep or mood.     Current Outpatient Medications   Medication Sig Dispense Refill     allopurinol (ZYLOPRIM) 300 MG tablet        CATHFLO ACTIVASE 2 MG injection        dexamethasone (DECADRON) 4 MG tablet        DOXOrubicin (ADRIAMYCIN) 2 MG/ML injection        folic acid (FOLVITE) 1 MG tablet        LORazepam (ATIVAN) 0.5 MG tablet Take 1 tablet (0.5 mg) by mouth every 4 hours as needed (Anxiety, Nausea/Vomiting or Sleep) 30 tablet 5     mesna (MESNEX) 100 MG/ML undiluted injection        mesna (MESNEX) 400 MG TABS tablet Take 1 tablet (400 mg) by mouth every 4 hours for 2 doses Take one tablet 4 hours and 8 hours after end of cyclophosphamide infusion. 2 tablet 0     metoprolol tartrate (LOPRESSOR) 50 MG tablet Take 1.5 tablets (75 mg) by mouth daily 45 tablet 3     NEULASTA ONPRO 6 MG/0.6ML injection        polyethylene glycol (MIRALAX) 17 g packet Take 17 g by mouth       prochlorperazine (COMPAZINE) 10 MG tablet Take 1 tablet (10 mg) by mouth every 6 hours as needed (Nausea/Vomiting) 30 tablet 5     senna-docusate (SENNA S) 8.6-50 MG tablet Take 1 tablet by mouth 2 times daily 60 tablet 0     sodium chloride 0.9% infusion        Water For Injection Sterile (STERILE WATER, PRESERVATIVE FREE,) injection        etoposide (VEPESID) 50 MG capsule Take 4 capsules (200 mg) by mouth daily for 6 days Days 1 through 6. 24 capsule 0     etoposide (VEPESID) 50 MG capsule Take 4 capsules (200 mg) by mouth daily for 6 days Days 1 through 6. 24 capsule 0     Objective:  General: patient appears well in no acute distress, alert and  oriented, speech clear and fluid  Skin: no visualized rash or lesions on visualized skin  Resp: Appears to be breathing comfortably without accessory muscle usage, speaking in full sentences, no audible wheezes or cough.  Psych: Coherent speech, normal rate and volume, able to articulate logical thoughts, able to abstract reason, no tangential thoughts, no hallucinations or delusions  Patient's affect is appropriate.    LABS   5/12/2021 15:59   Sodium 142   Potassium 3.8   Chloride 112 (H)   Carbon Dioxide 26   Urea Nitrogen 8   Creatinine 0.74   GFR Estimate >90   GFR Estimate If Black >90   Calcium 8.4 (L)   Anion Gap 5   Albumin 3.6   Protein Total 7.2   Bilirubin Total 0.4   Alkaline Phosphatase 110   ALT 18   AST 14   Glucose 106 (H)   WBC 11.7 (H)   Hemoglobin 9.8 (L)   Hematocrit 31.4 (L)   Platelet Count 235   RBC Count 3.24 (L)   MCV 97   MCH 30.2   MCHC 31.2 (L)   RDW 17.5 (H)   Diff Method Manual Differential   % Neutrophils 90.4   % Lymphocytes 1.7   % Monocytes 3.5   % Eosinophils 0.0   % Basophils 1.8   % Metamyelocytes 1.7   % Myelocytes 0.9   Nucleated RBCs 1 (H)   Absolute Neutrophil 10.6 (H)   Absolute Lymphocytes 0.2 (L)   Absolute Monocytes 0.4   Absolute Eosinophils 0.0   Absolute Basophils 0.2   Absolute Metamyelocytes 0.2 (H)   Absolute Myelocytes 0.1 (H)   Absolute Nucleated RBC 0.1   Anisocytosis Slight   Poikilocytosis Slight   Polychromasia Slight   RBC Fragments Slight   Teardrop Cells Slight   Acanthocytes Slight   Macrocytes Present   Platelet Estimate Confirming automated cell count     ASSESSMENT AND PLAN  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 Mr. Buchanan is a 26 year old male with advanced intraabdominal DSRCT with extensive peritoneal disease, lymph node metastasis, and liver and bone involvement. He tolerated 6 alternating cycles of CAV/IMV with anticipated side effects. He developed  hematuria with his last 2 cycles of Ifosfamide based therapy despite dose reduction and Mesna prophylaxis, and ultimately patient and family decided to continue with CAV alone. Due to a mixed response on his January 2021 CT, it was decided to add back in CAV alternating with IMV.  -He tolerating resuming IMV well. His hematuria continues intermittently, none recently.  -His March CT CAP shows stable to improved disease. Will repeat imaging in mid-June.  -He will continue with cyclophosphamide, dactinomycin (replacing adriamycin to avoid heart toxicity), and vincristine tomorrow. He will follow-up with me in 3 weeks prior to IMV.     Anemia, normocytic. Initially microcytic, now normocytic. Hemoglobin trends up during periods of time without hematuria and down when he has more hematuria. Likely multifactorial with chemotherapy and intermittent hematuria. No current transfusion needs. Will continue to monitor closely.     Deconditioning. I again encouraged him to go for daily walks for at least 10 minutes per day. Previously, discussed referral for physical therapy for deconditioning, which he declined for now.     Hematuria. Recurrent. He previously saw urology in September 2020 who felt this could be secondary to cyclophosphamide (despite mesna), new bladder/urethral primary malignancies, or metastatic bladder invasion. He and his family opted against a cystoscopy at that time. I suspect it is due to metastatic bladder invasion. We previously discussed if his hemoglobin does drop below 8 again, I would like him to consider a cystoscopy.     COVID vaccine. 1st dose on 5/11, no side effects except sore arm. He is scheduled for his second dose.    History of hypertension. Patient remains off of metoprolol. BP yesterday was normal. Okay to continue to hold.     Yasmine Mckeon PA-C  [facility]  [address] [place] [id number] [phone]              Again, thank you for allowing me to participate  in the care of your patient.        Sincerely,        Yasmine Mckeon PA-C

## 2021-05-13 NOTE — PROGRESS NOTES
Diego is a 26 year old who is being evaluated via a billable video visit.      How would you like to obtain your AVS? [employer]     If the video visit is dropped, the invitation should be resent by: Text to cell phone: [phone]     Will anyone else be joining your video visit? Ashley     Esequiel Tong LPN      Video-Visit Details    Type of service:  Video Visit    Video Start Time: 8:17 AM    Video End Time: 8:24 AM    Originating Location (pt. 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 He presented initially with abdominal pain, diarrhea, and progressive abdominal distention for 3 months duration. 2. Initial CT scan in February 2020 showed severe peritoneal carcinomatosis with large peritoneal tumor implants throughout the entire abdomen and pelvis, but mostly prominently in the pelvis.   2. PETCT scan showed interval increase in the amount of peritoneal carcinomatosis.  3. On 3/12/2020, he had ultrasound-guided core needle biopsy of a peritoneal implant (Case: [id number]), which showed a completely undifferentiated appearance, but stains with pancytokeratin, indicating an epithelial origin. 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 Tumor markers for CA-19-9, CEA, beta-hCG, alpha-fetoprotein were unremarkable. Cancer type ID molecular testing by [facility] sent to determine the exact diagnosis and to assist in further treatment planning. 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 Relative probability of less than 5% of other subtype of sarcoma. EWSR1-WT1 fusion detected.  4. 5/1/2020, MRI brain negative for brain  metastasis, and baseline CT-CAP obtained showing extensive mixed solid and cystic masses throughout the abdomen and pelvis consistent with peritoneal carcinomatosis. Largest mass measures approximately 20 cm in the pelvis. Metastatic mixed solid and cystic masses seen in hepatic segments 5 and 6 and hepatic segment 7. The masses appear to be contiguous with the retrohepatic peritoneal carcinomatosis. Small volume fluid about the spleen favored to represent malignant ascites, stable mild-to-moderate right hydronephrosis related to mass effect upon the distal ureter in the pelvis, the IVC and iliac veins are compressed due to the extensive peritoneal carcinomatosis without findings to suggest deep venous thrombosis.  5. 5/4/2020, he begins CAV/IMV alternating chemotherapy. He receives 6 cycles of treatment. He symptomatically improves.  6. 9/11/2020, CT-CAP shows stable to mildly improved extensive peritoneal carcinomatosis. He would like to omit Ifosfamide/etoposide cycles and continue only with CAV.  7. 10/9/2020, he starts CAV every 21 days.  8. 1/6/2021, CT CAP showed a mixed response in the mixed solid and cystic masses throughout the peritoneum. Some of the tumors are stable to mildly worse, with some of the peritoneal masses a bit larger, a few are smaller, one cystic tumor in the left abdomen is smaller, while tumors lower in the pelvis appear slightly larger.  9. 3/24/2021, CT CAP showed continued slight decrease in overall burden of peritoneal carcinomatosis with persistent encasement of bowel without evidence of bowel obstruction.     HISTORY OF PRESENT ILLNESS  Diego Buchanan was met with over video with sister for follow up.    -Doing okay overall.   -Denies any pain.  -Denies any blood in urine.  -Has bowel movements about every other day.   -Eating and drinking well.     Review of Systems:  Patient denies any of the following except if noted above: fevers, chills, difficulty with energy, vision or  hearing changes, chest pain, dyspnea, abdominal pain, nausea, vomiting, diarrhea, constipation, urinary concerns, headaches, numbness, tingling, issues with sleep or mood.     Current Outpatient Medications   Medication Sig Dispense Refill     allopurinol (ZYLOPRIM) 300 MG tablet        CATHFLO ACTIVASE 2 MG injection        dexamethasone (DECADRON) 4 MG tablet        DOXOrubicin (ADRIAMYCIN) 2 MG/ML injection        folic acid (FOLVITE) 1 MG tablet        LORazepam (ATIVAN) 0.5 MG tablet Take 1 tablet (0.5 mg) by mouth every 4 hours as needed (Anxiety, Nausea/Vomiting or Sleep) 30 tablet 5     mesna (MESNEX) 100 MG/ML undiluted injection        mesna (MESNEX) 400 MG TABS tablet Take 1 tablet (400 mg) by mouth every 4 hours for 2 doses Take one tablet 4 hours and 8 hours after end of cyclophosphamide infusion. 2 tablet 0     metoprolol tartrate (LOPRESSOR) 50 MG tablet Take 1.5 tablets (75 mg) by mouth daily 45 tablet 3     NEULASTA ONPRO 6 MG/0.6ML injection        polyethylene glycol (MIRALAX) 17 g packet Take 17 g by mouth       prochlorperazine (COMPAZINE) 10 MG tablet Take 1 tablet (10 mg) by mouth every 6 hours as needed (Nausea/Vomiting) 30 tablet 5     senna-docusate (SENNA S) 8.6-50 MG tablet Take 1 tablet by mouth 2 times daily 60 tablet 0     sodium chloride 0.9% infusion        Water For Injection Sterile (STERILE WATER, PRESERVATIVE FREE,) injection        etoposide (VEPESID) 50 MG capsule Take 4 capsules (200 mg) by mouth daily for 6 days Days 1 through 6. 24 capsule 0     etoposide (VEPESID) 50 MG capsule Take 4 capsules (200 mg) by mouth daily for 6 days Days 1 through 6. 24 capsule 0     Objective:  General: patient appears well in no acute distress, alert and oriented, speech clear and fluid  Skin: no visualized rash or lesions on visualized skin  Resp: Appears to be breathing comfortably without accessory muscle usage, speaking in full sentences, no audible wheezes or cough.  Psych: Coherent  speech, normal rate and volume, able to articulate logical thoughts, able to abstract reason, no tangential thoughts, no hallucinations or delusions  Patient's affect is appropriate.    LABS   5/12/2021 15:59   Sodium 142   Potassium 3.8   Chloride 112 (H)   Carbon Dioxide 26   Urea Nitrogen 8   Creatinine 0.74   GFR Estimate >90   GFR Estimate If Black >90   Calcium 8.4 (L)   Anion Gap 5   Albumin 3.6   Protein Total 7.2   Bilirubin Total 0.4   Alkaline Phosphatase 110   ALT 18   AST 14   Glucose 106 (H)   WBC 11.7 (H)   Hemoglobin 9.8 (L)   Hematocrit 31.4 (L)   Platelet Count 235   RBC Count 3.24 (L)   MCV 97   MCH 30.2   MCHC 31.2 (L)   RDW 17.5 (H)   Diff Method Manual Differential   % Neutrophils 90.4   % Lymphocytes 1.7   % Monocytes 3.5   % Eosinophils 0.0   % Basophils 1.8   % Metamyelocytes 1.7   % Myelocytes 0.9   Nucleated RBCs 1 (H)   Absolute Neutrophil 10.6 (H)   Absolute Lymphocytes 0.2 (L)   Absolute Monocytes 0.4   Absolute Eosinophils 0.0   Absolute Basophils 0.2   Absolute Metamyelocytes 0.2 (H)   Absolute Myelocytes 0.1 (H)   Absolute Nucleated RBC 0.1   Anisocytosis Slight   Poikilocytosis Slight   Polychromasia Slight   RBC Fragments Slight   Teardrop Cells Slight   Acanthocytes Slight   Macrocytes Present   Platelet Estimate Confirming automated cell count     ASSESSMENT AND PLAN  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 Mr. Buchanan is a 26 year old male with advanced intraabdominal DSRCT with extensive peritoneal disease, lymph node metastasis, and liver and bone involvement. He tolerated 6 alternating cycles of CAV/IMV with anticipated side effects. He developed hematuria with his last 2 cycles of Ifosfamide based therapy despite dose reduction and Mesna prophylaxis, and ultimately patient and family decided to continue with CAV alone. Due to a mixed response on his January 2021 CT, it was decided  to add back in CAV alternating with IMV.  -He tolerating resuming IMV well. His hematuria continues intermittently, none recently.  -His March CT CAP shows stable to improved disease. Will repeat imaging in mid-June.  -He will continue with cyclophosphamide, dactinomycin (replacing adriamycin to avoid heart toxicity), and vincristine tomorrow. He will follow-up with me in 3 weeks prior to IMV.     Anemia, normocytic. Initially microcytic, now normocytic. Hemoglobin trends up during periods of time without hematuria and down when he has more hematuria. Likely multifactorial with chemotherapy and intermittent hematuria. No current transfusion needs. Will continue to monitor closely.     Deconditioning. I again encouraged him to go for daily walks for at least 10 minutes per day. Previously, discussed referral for physical therapy for deconditioning, which he declined for now.     Hematuria. Recurrent. He previously saw urology in September 2020 who felt this could be secondary to cyclophosphamide (despite mesna), new bladder/urethral primary malignancies, or metastatic bladder invasion. He and his family opted against a cystoscopy at that time. I suspect it is due to metastatic bladder invasion. We previously discussed if his hemoglobin does drop below 8 again, I would like him to consider a cystoscopy.     COVID vaccine. 1st dose on 5/11, no side effects except sore arm. He is scheduled for his second dose.    History of hypertension. Patient remains off of metoprolol. BP yesterday was normal. Okay to continue to hold.     Yasmine Mckeon PA-C  [facility]  [address] [place] [address] [id number] [phone]

## 2021-05-14 ENCOUNTER — INFUSION THERAPY VISIT (OUTPATIENT)
Dept: ONCOLOGY | Facility: CLINIC | Age: 26
End: 2021-05-14
Attending: PHYSICIAN ASSISTANT
Payer: COMMERCIAL

## 2021-05-14 VITALS
TEMPERATURE: 98.2 F | SYSTOLIC BLOOD PRESSURE: 114 MMHG | WEIGHT: 252.7 LBS | OXYGEN SATURATION: 98 % | BODY MASS INDEX: 38.43 KG/M2 | DIASTOLIC BLOOD PRESSURE: 71 MMHG | HEART RATE: 79 BPM | RESPIRATION RATE: 16 BRPM

## 2021-05-14 DIAGNOSIS — C41.9 SARCOMA, EWINGS (H): ICD-10-CM

## 2021-05-14 DIAGNOSIS — C49.9 DESMOPLASTIC SMALL ROUND CELL TUMOR (H): Primary | ICD-10-CM

## 2021-05-14 PROCEDURE — 250N000011 HC RX IP 250 OP 636: Performed by: PHYSICIAN ASSISTANT

## 2021-05-14 PROCEDURE — 96417 CHEMO IV INFUS EACH ADDL SEQ: CPT

## 2021-05-14 PROCEDURE — 96413 CHEMO IV INFUSION 1 HR: CPT

## 2021-05-14 PROCEDURE — 96411 CHEMO IV PUSH ADDL DRUG: CPT

## 2021-05-14 PROCEDURE — 96372 THER/PROPH/DIAG INJ SC/IM: CPT | Performed by: PHYSICIAN ASSISTANT

## 2021-05-14 PROCEDURE — 96375 TX/PRO/DX INJ NEW DRUG ADDON: CPT

## 2021-05-14 PROCEDURE — 250N000011 HC RX IP 250 OP 636: Performed by: INTERNAL MEDICINE

## 2021-05-14 PROCEDURE — 96415 CHEMO IV INFUSION ADDL HR: CPT

## 2021-05-14 PROCEDURE — 96367 TX/PROPH/DG ADDL SEQ IV INF: CPT

## 2021-05-14 PROCEDURE — 258N000003 HC RX IP 258 OP 636: Performed by: INTERNAL MEDICINE

## 2021-05-14 PROCEDURE — 96377 APPLICATON ON-BODY INJECTOR: CPT | Performed by: PHYSICIAN ASSISTANT

## 2021-05-14 RX ORDER — PALONOSETRON 0.05 MG/ML
0.25 INJECTION, SOLUTION INTRAVENOUS ONCE
Status: COMPLETED | OUTPATIENT
Start: 2021-05-14 | End: 2021-05-14

## 2021-05-14 RX ORDER — DEXAMETHASONE 4 MG/1
8 TABLET ORAL
Qty: 6 TABLET | Refills: 8 | Status: SHIPPED | OUTPATIENT
Start: 2021-05-14 | End: 2021-06-25

## 2021-05-14 RX ORDER — HEPARIN SODIUM,PORCINE 10 UNIT/ML
5 VIAL (ML) INTRAVENOUS
Status: DISCONTINUED | OUTPATIENT
Start: 2021-05-14 | End: 2021-05-14 | Stop reason: HOSPADM

## 2021-05-14 RX ORDER — HEPARIN SODIUM (PORCINE) LOCK FLUSH IV SOLN 100 UNIT/ML 100 UNIT/ML
5 SOLUTION INTRAVENOUS
Status: DISCONTINUED | OUTPATIENT
Start: 2021-05-14 | End: 2021-05-14 | Stop reason: HOSPADM

## 2021-05-14 RX ADMIN — DEXAMETHASONE SODIUM PHOSPHATE: 10 INJECTION, SOLUTION INTRAMUSCULAR; INTRAVENOUS at 07:54

## 2021-05-14 RX ADMIN — SODIUM CHLORIDE 250 ML: 9 INJECTION, SOLUTION INTRAVENOUS at 07:53

## 2021-05-14 RX ADMIN — Medication 5 ML: at 12:38

## 2021-05-14 RX ADMIN — VINCRISTINE SULFATE 2 MG: 1 INJECTION, SOLUTION INTRAVENOUS at 10:32

## 2021-05-14 RX ADMIN — MESNA 2615 MG: 100 INJECTION, SOLUTION INTRAVENOUS at 10:40

## 2021-05-14 RX ADMIN — PEGFILGRASTIM 6 MG: KIT SUBCUTANEOUS at 13:06

## 2021-05-14 RX ADMIN — DACTINOMYCIN 2.46 MG: 0.5 INJECTION, POWDER, LYOPHILIZED, FOR SOLUTION INTRAVENOUS at 10:04

## 2021-05-14 RX ADMIN — PALONOSETRON HYDROCHLORIDE 0.25 MG: 0.25 INJECTION, SOLUTION INTRAVENOUS at 07:53

## 2021-05-14 ASSESSMENT — PAIN SCALES - GENERAL: PAINLEVEL: NO PAIN (0)

## 2021-05-14 NOTE — PROGRESS NOTES
Infusion Nursing Note:  Diego Buchanan presents today for Day 1 Cycle 17 Dactinomycin, Vincristine, Cytoxan.    Patient seen by provider today: No   present during visit today: Not Applicable.    Note: Robert arrives to infusion today with his sister. He is doing well and was assessed by KATHLEEN Peña 2 days ago. He offers no changes or concerns. WBC is slightly elevated today but he received his COVID vaccine 5/11. He denies fevers, chills or any symptoms of infections today. Per sister's report, Esparza site is healing well and she is allowing more time to dry before applying Biopatch. Reviewed instructions for home  Dexamethasone and refill sent with patient today.     Will take oral mesna at home @ 1645 and 2045 pm tonight.     Neulasta Onpro On-Body injector applied to left arm at 1310 with light facing elbow.  Writer discussed Neulasta injection would start tomorrow 5/15 at 1610, approximately 27 hours after application applied today.  Written and Verbal instruction reviewed with patient.  Pt instructed when the dose delivery starts, it will take about 45 minutes to complete.  Pt aware Neulasta Onpro On-Body should have green flashing light and to call triage or on-call MD if injector flashes red or appears to be leaking. Pt aware to keep Onpro On-Body Neulasta 4 inches away from electrical equipment and to avoid showering 4 hours prior to injection.   Neulasta Onpro Lot number: see MAR     Intravenous Access:  Esparza.    Treatment Conditions:  Lab Results   Component Value Date    HGB 9.8 05/12/2021     Lab Results   Component Value Date    WBC 11.7 05/12/2021      Lab Results   Component Value Date    ANEU 10.6 05/12/2021     Lab Results   Component Value Date     05/12/2021      Lab Results   Component Value Date     05/12/2021                   Lab Results   Component Value Date    POTASSIUM 3.8 05/12/2021           Lab Results   Component Value Date    MAG 2.3 04/29/2021             Lab Results   Component Value Date    CR 0.74 05/12/2021                   Lab Results   Component Value Date    FAISAL 8.4 05/12/2021                Lab Results   Component Value Date    BILITOTAL 0.4 05/12/2021           Lab Results   Component Value Date    ALBUMIN 3.6 05/12/2021                    Lab Results   Component Value Date    ALT 18 05/12/2021           Lab Results   Component Value Date    AST 14 05/12/2021       Results reviewed, labs MET treatment parameters, ok to proceed with treatment.    Post Infusion Assessment:  Patient tolerated infusion without incident.  Blood return noted pre and post infusion.  Blood return noted during Vincristine administration every 2-3 ml through free flowing .  Site patent and intact, free from redness, edema or discomfort.  No evidence of extravasations.  Access discontinued per protocol.     Discharge Plan:   Prescription refills given for dexamethasone and mesna.  AVS to patient via [employer].  Patient will return 6/2 and 6/3 for labs and LISY visit, and 6/4 for next chemo appointment.   Patient discharged in stable condition accompanied by: sister.  Departure Mode: Ambulatory.    Janel Franco RN

## 2021-05-18 NOTE — TELEPHONE ENCOUNTER
Prior Authorization Approval    Authorization Effective Date: 4/13/2021  Authorization Expiration Date: 5/14/2022  Medication: Doxorubicin renewal  Approved Dose/Quantity:   Reference #: [id number]   Insurance Company: [employer] - Phone [phone] Fax [phone]  Which Pharmacy is filling the prescription (Not needed for infusion/clinic administered): [facility]  Pharmacy Notified: Yes

## 2021-05-26 VITALS — DIASTOLIC BLOOD PRESSURE: 100 MMHG | HEART RATE: 116 BPM | OXYGEN SATURATION: 97 % | SYSTOLIC BLOOD PRESSURE: 158 MMHG

## 2021-05-27 VITALS — SYSTOLIC BLOOD PRESSURE: 160 MMHG | HEART RATE: 109 BPM | DIASTOLIC BLOOD PRESSURE: 90 MMHG | OXYGEN SATURATION: 97 %

## 2021-05-30 VITALS — WEIGHT: 314.5 LBS | BODY MASS INDEX: 47.54 KG/M2

## 2021-05-30 VITALS — BODY MASS INDEX: 47.01 KG/M2 | WEIGHT: 311 LBS

## 2021-05-31 NOTE — PROGRESS NOTES
Assessment & Plan  1. Folliculitis  The rash of the bilateral forearms appears to be folliculitis.  He has redness and swelling at the base of every hair follicle.  We will start treatment with topical ointment.  I discussed with the patient and his family that if no improvement with ointment could try oral antibiotics.  Also if no improvement given that the rash is been there for 3 to 4 years would refer to dermatology.    - mupirocin (BACTROBAN) 2 % ointment; Apply to affected area two times a day  Dispense: 30 g; Refill: 0      2. Class 3 severe obesity with body mass index (BMI) of 45.0 to 49.9 in adult, unspecified obesity type, unspecified whether serious comorbidity present (H)    - Glycosylated Hemoglobin A1C; Future  - Lipid Cascade; Future    3. Acanthosis nigricans  Family is not sure how long he has had the discoloration of the back of his neck.  Given his morbid obesity will screen for diabetes    Phuong Barclay MD    Subjective  Chief Complaint:  itching (alll over body for some time )    HPI:   Diego Buchanan is a 24 y.o. male with developmental delay and morbid obesity who presents for rash.  He states the rashes been there for at least 2 years.  He was seen for the rash in 2017 and was treated for scabies and then with hydrocortisone for itchiness.  He reports that the creams never helped.  He continues to have itchiness of his arms and his back.  He is not sure if the rash has spread.  He does not have any known allergies.  He has no family members with similar rashes.      Mom is been trying topical moisturizing creams.      Allergies:  has No Known Allergies.    SH/FH:  Social History and Family History reviewed and updated.   Tobacco Status:  He  reports that he has never smoked. He does not have any smokeless tobacco history on file.    Review of Systems:    Constitutional:  No Fever  Skin:  Skin Lesions  Objective  Vitals:    08/07/19 1148   BP: 130/70   Pulse: 60   Weight: (!) 306 lb  "(138.8 kg)   Height: 5' 8.2\" (1.732 m)       Physical Exam:  GENERAL: Alert, well-appearing, in no acute distress.  Morbidly obese  PSYCH: Pleasant mood, affect appropriate.  Good eye contact.  SKIN: On the forearms there is erythema and swelling at the base of hair follicles.  No rash of the hands.  No apparent bug bites.  On the back there is a thick acanthosis nigricans.there is acne on his back as well  HEAD: Normocephalic, atraumatic    "

## 2021-06-01 ENCOUNTER — IMMUNIZATION (OUTPATIENT)
Dept: NURSING | Facility: CLINIC | Age: 26
End: 2021-06-01
Attending: INTERNAL MEDICINE
Payer: COMMERCIAL

## 2021-06-01 ENCOUNTER — APPOINTMENT (OUTPATIENT)
Dept: LAB | Facility: CLINIC | Age: 26
End: 2021-06-01
Attending: INTERNAL MEDICINE
Payer: COMMERCIAL

## 2021-06-01 PROCEDURE — 0002A PR COVID VAC PFIZER DIL RECON 30 MCG/0.3 ML IM: CPT

## 2021-06-01 PROCEDURE — 91300 PR COVID VAC PFIZER DIL RECON 30 MCG/0.3 ML IM: CPT

## 2021-06-01 NOTE — PROGRESS NOTES
OFFICE VISIT - FAMILY MEDICINE     ASSESSMENT AND PLAN     1. Encounter for completion of form with patient     2. Need for vaccination  Influenza, Seasonal Quad, PF =/> 6months   [employer] form was completed, copy will be scanned in the patient's chart.  He was given influenza vaccine today and he tolerated well.  Make a separate appointment for his general physical exam and lab work.  CHIEF COMPLAINT   Paperwork ([employer], here with sister-Mariela)    HPI   Diego Buchanan is a 24 y.o. male.  Updated MIIC  Patient is accompanied by sister, he does have learning disability, currently going to a  center, he uses [employer],  they do have a form to be completed today.  While patient is here he would like to get the flu vaccine.    Review of Systems As per HPI, otherwise negative.    OBJECTIVE   /80 (Patient Site: Right Arm, Patient Position: Sitting, Cuff Size: Adult Large)   Pulse 88   Wt (!) 313 lb 12 oz (142.3 kg)   SpO2 97%   BMI 47.43 kg/m    Physical Exam   Constitutional: He appears well-developed and well-nourished.   Psychiatric: He has a normal mood and affect. His behavior is normal.       [facility]     Family History   Problem Relation Age of Onset     Hypertension Mother      Social History     Socioeconomic History     Marital status: Single     Spouse name: Not on file     Number of children: Not on file     Years of education: Not on file     Highest education level: Not on file   Occupational History     Not on file   Social Needs     Financial resource strain: Not on file     Food insecurity:     Worry: Not on file     Inability: Not on file     Transportation needs:     Medical: Not on file     Non-medical: Not on file   Tobacco Use     Smoking status: Never Smoker     Smokeless tobacco: Never Used   Substance and Sexual Activity     Alcohol use: No     Drug use: Not on file     Sexual activity: Never     Partners: Female   Lifestyle     Physical activity:     Days per week: Not  on file     Minutes per session: Not on file     Stress: Not on file   Relationships     Social connections:     Talks on phone: Not on file     Gets together: Not on file     Attends Denominational service: Not on file     Active member of club or organization: Not on file     Attends meetings of clubs or organizations: Not on file     Relationship status: Not on file     Intimate partner violence:     Fear of current or ex partner: Not on file     Emotionally abused: Not on file     Physically abused: Not on file     Forced sexual activity: Not on file   Other Topics Concern     Not on file   Social History Narrative     Not on file     Relevant history was reviewed with the patient today, unless noted in HPI, nothing is pertinent for this visit.  [facility]     Patient Active Problem List    Diagnosis Date Noted     Hyperlipidemia 10/14/2015     Overview Note:     LDL minimally >130 (10/2015)       Morbid obesity (H) 10/13/2015     Learning disabilities 10/12/2015     No past surgical history on file.    RESULTS/CONSULTS (Lab/Rad)   No results found for this or any previous visit (from the past 168 hour(s)).  No results found.  MEDICATIONS     Current Outpatient Medications on File Prior to Visit   Medication Sig Dispense Refill     [DISCONTINUED] hydrocortisone (WESTCORT) 0.2 % cream Apply bid to itchy spots twice a day for up to 3 weeks 60 g 0     [DISCONTINUED] lidocaine (XYLOCAINE) 5 % ointment Apply up to quid as needed to right claf 60 g 0     No current facility-administered medications on file prior to visit.        HEALTH MAINTENANCE / SCREENING   PHQ-2 Total Score: 0 (8/7/2019 11:51 AM)  , No data recorded,No data recorded  Immunization History   Administered Date(s) Administered     DTaP / Hep B / IPV 1995, 01/04/1996, 03/06/1996     DTaP / IPV 05/11/2000     DTaP, historic 05/13/1997     HPV 9 Valent 10/12/2015     HiB, historic,unspecified 1995, 01/04/1996, 03/06/1996     Influenza, Live, Nasal  LAIV3 10/22/2010     Influenza, inj, historic,unspecified 10/17/2007, 10/22/2010, 12/07/2011     Influenza, seasonal,quad inj 6-35 mos 01/21/2013, 09/26/2013, 10/21/2014     Influenza,seasonal quad, PF, =/> 6months 12/13/2016, 09/30/2019     Influenza,seasonal,quad inj =/> 6months 10/12/2015     MMR 05/13/1997, 05/11/2000     Tdap 10/17/2007     Varicella 10/17/2007, 12/20/2013     Health Maintenance   Topic     HIV SCREENING      HPV VACCINES (2 - Male 3-dose series)     PREVENTIVE CARE VISIT      TD 18+ HE      ADVANCE CARE PLANNING      INFLUENZA VACCINE RULE BASED      TDAP ADULT ONE TIME DOSE        Jc Glass MD  Family Medicine, [facility]     This note was dictated using a voice recognition software.  Any grammatical or context distortion are unintentional and inherent to the software.

## 2021-06-02 VITALS
WEIGHT: 255 LBS | OXYGEN SATURATION: 100 % | SYSTOLIC BLOOD PRESSURE: 120 MMHG | BODY MASS INDEX: 38.78 KG/M2 | TEMPERATURE: 98.2 F | RESPIRATION RATE: 16 BRPM | DIASTOLIC BLOOD PRESSURE: 81 MMHG | HEART RATE: 108 BPM

## 2021-06-02 DIAGNOSIS — R31.0 GROSS HEMATURIA: ICD-10-CM

## 2021-06-02 DIAGNOSIS — C49.9 DESMOPLASTIC SMALL ROUND CELL TUMOR (H): ICD-10-CM

## 2021-06-02 DIAGNOSIS — C41.9 SARCOMA, EWINGS (H): ICD-10-CM

## 2021-06-02 LAB
ALBUMIN SERPL-MCNC: 3.6 G/DL (ref 3.4–5)
ALBUMIN UR-MCNC: NEGATIVE MG/DL
ALP SERPL-CCNC: 88 U/L (ref 40–150)
ALT SERPL W P-5'-P-CCNC: 30 U/L (ref 0–70)
ANION GAP SERPL CALCULATED.3IONS-SCNC: 5 MMOL/L (ref 3–14)
APPEARANCE UR: CLEAR
AST SERPL W P-5'-P-CCNC: 9 U/L (ref 0–45)
BASOPHILS # BLD AUTO: 0.1 10E9/L (ref 0–0.2)
BASOPHILS NFR BLD AUTO: 0.6 %
BILIRUB SERPL-MCNC: 0.8 MG/DL (ref 0.2–1.3)
BILIRUB UR QL STRIP: NEGATIVE
BUN SERPL-MCNC: 10 MG/DL (ref 7–30)
CALCIUM SERPL-MCNC: 8.8 MG/DL (ref 8.5–10.1)
CHLORIDE SERPL-SCNC: 110 MMOL/L (ref 94–109)
CO2 SERPL-SCNC: 26 MMOL/L (ref 20–32)
COLOR UR AUTO: YELLOW
CREAT SERPL-MCNC: 0.75 MG/DL (ref 0.66–1.25)
DIFFERENTIAL METHOD BLD: ABNORMAL
EOSINOPHIL # BLD AUTO: 0.1 10E9/L (ref 0–0.7)
EOSINOPHIL NFR BLD AUTO: 1.3 %
ERYTHROCYTE [DISTWIDTH] IN BLOOD BY AUTOMATED COUNT: 16.6 % (ref 10–15)
GFR SERPL CREATININE-BSD FRML MDRD: >90 ML/MIN/{1.73_M2}
GLUCOSE SERPL-MCNC: 100 MG/DL (ref 70–99)
GLUCOSE UR STRIP-MCNC: NEGATIVE MG/DL
HCT VFR BLD AUTO: 35.1 % (ref 40–53)
HGB BLD-MCNC: 11 G/DL (ref 13.3–17.7)
HGB UR QL STRIP: NEGATIVE
IMM GRANULOCYTES # BLD: 0 10E9/L (ref 0–0.4)
IMM GRANULOCYTES NFR BLD: 0.4 %
KETONES UR STRIP-MCNC: NEGATIVE MG/DL
LEUKOCYTE ESTERASE UR QL STRIP: NEGATIVE
LYMPHOCYTES # BLD AUTO: 0.3 10E9/L (ref 0.8–5.3)
LYMPHOCYTES NFR BLD AUTO: 3 %
MCH RBC QN AUTO: 30.1 PG (ref 26.5–33)
MCHC RBC AUTO-ENTMCNC: 31.3 G/DL (ref 31.5–36.5)
MCV RBC AUTO: 96 FL (ref 78–100)
MONOCYTES # BLD AUTO: 0.8 10E9/L (ref 0–1.3)
MONOCYTES NFR BLD AUTO: 8.4 %
MUCOUS THREADS #/AREA URNS LPF: PRESENT /LPF
NEUTROPHILS # BLD AUTO: 7.8 10E9/L (ref 1.6–8.3)
NEUTROPHILS NFR BLD AUTO: 86.3 %
NITRATE UR QL: NEGATIVE
NRBC # BLD AUTO: 0 10*3/UL
NRBC BLD AUTO-RTO: 0 /100
PH UR STRIP: 6 PH (ref 5–7)
PLATELET # BLD AUTO: 267 10E9/L (ref 150–450)
POTASSIUM SERPL-SCNC: 3.5 MMOL/L (ref 3.4–5.3)
PROT SERPL-MCNC: 7.3 G/DL (ref 6.8–8.8)
RBC # BLD AUTO: 3.66 10E12/L (ref 4.4–5.9)
RBC #/AREA URNS AUTO: <1 /HPF (ref 0–2)
SODIUM SERPL-SCNC: 141 MMOL/L (ref 133–144)
SOURCE: ABNORMAL
SP GR UR STRIP: 1.02 (ref 1–1.03)
SQUAMOUS #/AREA URNS AUTO: 1 /HPF (ref 0–1)
UROBILINOGEN UR STRIP-MCNC: 0 MG/DL (ref 0–2)
WBC # BLD AUTO: 9 10E9/L (ref 4–11)
WBC #/AREA URNS AUTO: 2 /HPF (ref 0–5)

## 2021-06-02 PROCEDURE — 80053 COMPREHEN METABOLIC PANEL: CPT | Performed by: INTERNAL MEDICINE

## 2021-06-02 PROCEDURE — 85025 COMPLETE CBC W/AUTO DIFF WBC: CPT | Performed by: INTERNAL MEDICINE

## 2021-06-02 PROCEDURE — 81001 URINALYSIS AUTO W/SCOPE: CPT | Performed by: INTERNAL MEDICINE

## 2021-06-02 PROCEDURE — 250N000011 HC RX IP 250 OP 636: Performed by: INTERNAL MEDICINE

## 2021-06-02 RX ORDER — HEPARIN SODIUM,PORCINE 10 UNIT/ML
5 VIAL (ML) INTRAVENOUS
Status: DISCONTINUED | OUTPATIENT
Start: 2021-06-02 | End: 2021-06-10 | Stop reason: HOSPADM

## 2021-06-02 RX ADMIN — Medication 5 ML: at 15:43

## 2021-06-02 ASSESSMENT — PAIN SCALES - GENERAL: PAINLEVEL: NO PAIN (0)

## 2021-06-03 ENCOUNTER — VIRTUAL VISIT (OUTPATIENT)
Dept: ONCOLOGY | Facility: CLINIC | Age: 26
End: 2021-06-03
Attending: PHYSICIAN ASSISTANT
Payer: COMMERCIAL

## 2021-06-03 VITALS
HEART RATE: 88 BPM | BODY MASS INDEX: 47.43 KG/M2 | SYSTOLIC BLOOD PRESSURE: 130 MMHG | DIASTOLIC BLOOD PRESSURE: 80 MMHG | OXYGEN SATURATION: 97 % | WEIGHT: 313.75 LBS

## 2021-06-03 VITALS — HEIGHT: 68 IN | WEIGHT: 306 LBS | BODY MASS INDEX: 46.38 KG/M2

## 2021-06-03 DIAGNOSIS — C49.9 DESMOPLASTIC SMALL ROUND CELL TUMOR (H): Primary | ICD-10-CM

## 2021-06-03 DIAGNOSIS — R53.81 PHYSICAL DECONDITIONING: ICD-10-CM

## 2021-06-03 DIAGNOSIS — K59.00 CONSTIPATION, UNSPECIFIED CONSTIPATION TYPE: ICD-10-CM

## 2021-06-03 DIAGNOSIS — R31.9 HEMATURIA, UNSPECIFIED TYPE: ICD-10-CM

## 2021-06-03 DIAGNOSIS — C41.9 SARCOMA, EWINGS (H): ICD-10-CM

## 2021-06-03 DIAGNOSIS — D64.9 ANEMIA, NORMOCYTIC NORMOCHROMIC: ICD-10-CM

## 2021-06-03 PROCEDURE — 999N001193 HC VIDEO/TELEPHONE VISIT; NO CHARGE

## 2021-06-03 PROCEDURE — 99214 OFFICE O/P EST MOD 30 MIN: CPT | Mod: 95 | Performed by: PHYSICIAN ASSISTANT

## 2021-06-03 RX ORDER — HEPARIN SODIUM,PORCINE 10 UNIT/ML
5 VIAL (ML) INTRAVENOUS
Status: CANCELLED | OUTPATIENT
Start: 2021-06-04

## 2021-06-03 RX ORDER — LORAZEPAM 2 MG/ML
0.5 INJECTION INTRAMUSCULAR EVERY 4 HOURS PRN
Status: CANCELLED
Start: 2021-06-04

## 2021-06-03 RX ORDER — ALBUTEROL SULFATE 90 UG/1
1-2 AEROSOL, METERED RESPIRATORY (INHALATION)
Status: CANCELLED
Start: 2021-06-04

## 2021-06-03 RX ORDER — ALBUTEROL SULFATE 0.83 MG/ML
2.5 SOLUTION RESPIRATORY (INHALATION)
Status: CANCELLED | OUTPATIENT
Start: 2021-06-04

## 2021-06-03 RX ORDER — MEPERIDINE HYDROCHLORIDE 25 MG/ML
25 INJECTION INTRAMUSCULAR; INTRAVENOUS; SUBCUTANEOUS EVERY 30 MIN PRN
Status: CANCELLED | OUTPATIENT
Start: 2021-06-04

## 2021-06-03 RX ORDER — METHYLPREDNISOLONE SODIUM SUCCINATE 125 MG/2ML
125 INJECTION, POWDER, LYOPHILIZED, FOR SOLUTION INTRAMUSCULAR; INTRAVENOUS
Status: CANCELLED
Start: 2021-06-04

## 2021-06-03 RX ORDER — HEPARIN SODIUM (PORCINE) LOCK FLUSH IV SOLN 100 UNIT/ML 100 UNIT/ML
5 SOLUTION INTRAVENOUS
Status: CANCELLED | OUTPATIENT
Start: 2021-06-04

## 2021-06-03 RX ORDER — EPINEPHRINE 1 MG/ML
0.3 INJECTION, SOLUTION INTRAMUSCULAR; SUBCUTANEOUS EVERY 5 MIN PRN
Status: CANCELLED | OUTPATIENT
Start: 2021-06-04

## 2021-06-03 RX ORDER — DIPHENHYDRAMINE HYDROCHLORIDE 50 MG/ML
50 INJECTION INTRAMUSCULAR; INTRAVENOUS
Status: CANCELLED
Start: 2021-06-04

## 2021-06-03 RX ORDER — SODIUM CHLORIDE 9 MG/ML
1000 INJECTION, SOLUTION INTRAVENOUS CONTINUOUS PRN
Status: CANCELLED
Start: 2021-06-04

## 2021-06-03 RX ORDER — NALOXONE HYDROCHLORIDE 0.4 MG/ML
.1-.4 INJECTION, SOLUTION INTRAMUSCULAR; INTRAVENOUS; SUBCUTANEOUS
Status: CANCELLED | OUTPATIENT
Start: 2021-06-04

## 2021-06-03 NOTE — LETTER
6/3/2021         RE: Diego Buchanan  [address]  [place] [address]        Dear Colleague,    Thank you for referring your patient, Diego Buchanan, to the [facility]. Please see a copy of my visit note below.    Diego is a 26 year old who is being evaluated via a billable video visit.      How would you like to obtain your AVS? MyChart     If the video visit is dropped, the invitation should be resent by: Text to cell phone: [phone]     Will anyone else be joining your video visit? No     Vitals - Patient Reported  Weight (Patient Reported): 115.7 kg (255 lb)  Pain Score: No Pain (0)    Esequiel Tong LPN    Video-Visit Details    Type of service:  Video Visit    Video Start Time: 2:21 PM    Video End Time: 2:31 PM    Originating Location (pt. 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 He presented initially with abdominal pain, diarrhea, and progressive abdominal distention for 3 months duration. 2. Initial CT scan in February 2020 showed severe peritoneal carcinomatosis with large peritoneal tumor implants throughout the entire abdomen and pelvis, but mostly prominently in the pelvis.   2. PETCT scan showed interval increase in the amount of peritoneal carcinomatosis.  3. On 3/12/2020, he had ultrasound-guided core needle biopsy of a peritoneal implant (Case: [id number]), which showed a completely undifferentiated appearance, but stains with pancytokeratin, indicating an epithelial origin. 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 Tumor markers for CA-19-9, CEA, beta-hCG, alpha-fetoprotein were unremarkable. Cancer type ID molecular testing by [employer] sent to determine the exact diagnosis and to assist in further treatment planning. 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 Relative probability of less than 5% of other subtype of sarcoma. EWSR1-WT1 fusion detected.  4. 5/1/2020, MRI brain negative for brain metastasis, and baseline CT-CAP obtained showing extensive mixed solid and cystic masses throughout the abdomen and pelvis consistent with peritoneal carcinomatosis. Largest mass measures approximately 20 cm in the pelvis. Metastatic mixed solid and cystic masses seen in hepatic segments 5 and 6 and hepatic segment 7. The masses appear to be contiguous with the retrohepatic peritoneal carcinomatosis. Small volume fluid about the spleen favored to represent malignant ascites, stable mild-to-moderate right hydronephrosis related to mass effect upon the distal ureter in the pelvis, the IVC and iliac veins are compressed due to the extensive peritoneal carcinomatosis without findings to suggest deep venous thrombosis.  5. 5/4/2020, he begins CAV/IMV alternating chemotherapy. He receives 6 cycles of treatment. He symptomatically improves.  6. 9/11/2020, CT-CAP shows stable to mildly improved extensive peritoneal carcinomatosis. He would like to omit Ifosfamide/etoposide cycles and continue only with CAV.  7. 10/9/2020, he starts CAV every 21 days.  8. 1/6/2021, CT CAP showed a mixed response in the mixed solid and cystic masses throughout the peritoneum. Some of the tumors are stable to mildly worse, with some of the peritoneal masses a bit larger, a few are smaller, one cystic tumor in the left abdomen is smaller, while tumors lower in the pelvis appear slightly larger.  9. 3/24/2021, CT CAP showed continued slight decrease in overall  burden of peritoneal carcinomatosis with persistent encasement of bowel without evidence of bowel obstruction.     HISTORY OF PRESENT ILLNESS  Diego Buchanan was met with over video with sister for follow up.  -Feel okay overall.  -Denies any pain.  -Denies any blood in his urine.  -Continues to have bowel movements every other day. Sometimes strains.   -Eating and drinking well. Appetite is doing well.    Review of Systems:  Patient denies any of the following except if noted above: fevers, chills, difficulty with energy, vision or hearing changes, chest pain, dyspnea, abdominal pain, nausea, vomiting, diarrhea, urinary concerns, headaches, numbness, tingling, issues with sleep or mood.     Current Outpatient Medications   Medication Sig Dispense Refill     allopurinol (ZYLOPRIM) 300 MG tablet        CATHFLO ACTIVASE 2 MG injection        dexamethasone (DECADRON) 4 MG tablet        DOXOrubicin (ADRIAMYCIN) 2 MG/ML injection        folic acid (FOLVITE) 1 MG tablet        LORazepam (ATIVAN) 0.5 MG tablet Take 1 tablet (0.5 mg) by mouth every 4 hours as needed (Anxiety, Nausea/Vomiting or Sleep) 30 tablet 5     mesna (MESNEX) 100 MG/ML undiluted injection        mesna (MESNEX) 400 MG TABS tablet Take 1 tablet (400 mg) by mouth every 4 hours for 2 doses Take one tablet 4 hours and 8 hours after end of cyclophosphamide infusion. 2 tablet 0     NEULASTA ONPRO 6 MG/0.6ML injection        polyethylene glycol (MIRALAX) 17 g packet Take 17 g by mouth       prochlorperazine (COMPAZINE) 10 MG tablet Take 1 tablet (10 mg) by mouth every 6 hours as needed (Nausea/Vomiting) 30 tablet 5     senna-docusate (SENNA S) 8.6-50 MG tablet Take 1 tablet by mouth 2 times daily 60 tablet 0     sodium chloride 0.9% infusion        Water For Injection Sterile (STERILE WATER, PRESERVATIVE FREE,) injection        dexamethasone (DECADRON) 4 MG tablet Take 2 tablets (8 mg) by mouth daily (with breakfast) for 3 days Start the day after  doxorubicin, cyclophosphamide, and vincristine (odd cycles). 6 tablet 8     mesna (MESNEX) 400 MG TABS tablet Take 1 tablet (400 mg) by mouth every 4 hours for 2 doses Take one tablet 4 hours and 8 hours after end of cyclophosphamide infusion. 2 tablet 0     pegfilgrastim (NEULASTA ONPRO KIT) 6 MG/0.6ML injection Inject 0.6 mLs (6 mg) Subcutaneous once for 1 dose 0.6 mL 0     Objective:  General: patient appears well in no acute distress, alert and oriented, speech clear and fluid  Skin: no visualized rash or lesions on visualized skin  Resp: Appears to be breathing comfortably without accessory muscle usage, speaking in full sentences, no audible wheezes or cough.  Psych: Coherent speech, normal rate and volume, able to articulate logical thoughts, able to abstract reason, no tangential thoughts, no hallucinations or delusions  Patient's affect is appropriate.    LABS  Most Recent 3 CBC's:  Recent Labs   Lab Test 06/02/21  1544 05/12/21  1559 04/29/21  1035   WBC 9.0 11.7* 5.0   HGB 11.0* 9.8* 9.5*   MCV 96 97 96    235 327    Most Recent 3 BMP's:  Recent Labs   Lab Test 06/02/21  1544 05/12/21  1559 04/29/21  1035    142 139   POTASSIUM 3.5 3.8 3.7   CHLORIDE 110* 112* 107   CO2 26 26 25   BUN 10 8 16   CR 0.75 0.74 0.76   ANIONGAP 5 5 7   FAISAL 8.8 8.4* 8.6   * 106* 82    Most Recent 2 LFT's:  Recent Labs   Lab Test 06/02/21  1544 05/12/21  1559   AST 9 14   ALT 30 18   ALKPHOS 88 110   BILITOTAL 0.8 0.4    Most Recent TSH and T4:No lab results found.  I reviewed the above labs today.    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 Mr. Buchanan is a 26 year old male with advanced intraabdominal DSRCT with extensive peritoneal disease, lymph node metastasis, and liver and bone involvement. He tolerated 6 alternating cycles of CAV/IMV with anticipated side effects. He developed hematuria  with his last 2 cycles of Ifosfamide based therapy despite dose reduction and Mesna prophylaxis, and ultimately patient and family decided to continue with CAV alone. Due to a mixed response on his January 2021 CT, it was decided to add back in CAV alternating with IMV.  -He tolerating resuming IMV well. He has had no hematuria recently.  -His March CT CAP shows stable to improved disease. Will repeat imaging in mid-June.  -He will continue with IMV tomorrow.     Anemia, normocytic. Initially microcytic, now normocytic. Hemoglobin trends up during periods of time without hematuria and down when he has more hematuria. Likely multifactorial with chemotherapy and intermittent hematuria. No current transfusion needs. Hemoglobin now is looking better. Will continue to monitor closely.     Deconditioning. I previously encouraged him to go for daily walks for at least 10 minutes per day. Again, discussed referral for physical therapy for deconditioning, which he agrees to try.     Hematuria. Recurrent. He previously saw urology in September 2020 who felt this could be secondary to cyclophosphamide (despite mesna), new bladder/urethral primary malignancies, or metastatic bladder invasion. He and his family opted against a cystoscopy at that time. I suspect it is due to metastatic bladder invasion. We previously discussed if his hemoglobin does drop below 8 again, I would like him to consider a cystoscopy. His hemoglobin is now trending up.    COVID vaccine. Patient completed his 2 dose series with a sore arm as his only side effect.    History of hypertension. Patient remains off of metoprolol. BP yesterday was normal. Okay to continue to hold.     Constipation. Okay to take Miralax prn.    Yasmine Mckeon PA-C  [facility]  [address], [place] [id number] [phone]            Again, thank you for allowing me to participate in the care of your patient.        Sincerely,        Yasmine Mckeon,  ADELE

## 2021-06-03 NOTE — PROGRESS NOTES
Diego is a 26 year old who is being evaluated via a billable video visit.      How would you like to obtain your AVS? MyChart     If the video visit is dropped, the invitation should be resent by: Text to cell phone: [phone]     Will anyone else be joining your video visit? No     Vitals - Patient Reported  Weight (Patient Reported): 115.7 kg (255 lb)  Pain Score: No Pain (0)    Esequiel Tong LPN    Video-Visit Details    Type of service:  Video Visit    Video Start Time: 2:21 PM    Video End Time: 2:31 PM    Originating Location (pt. Location): Home    Distant Location (provider location):  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 He presented initially with abdominal pain, diarrhea, and progressive abdominal distention for 3 months duration. 2. Initial CT scan in February 2020 showed severe peritoneal carcinomatosis with large peritoneal tumor implants throughout the entire abdomen and pelvis, but mostly prominently in the pelvis.   2. PETCT scan showed interval increase in the amount of peritoneal carcinomatosis.  3. On 3/12/2020, he had ultrasound-guided core needle biopsy of a peritoneal implant (Case: [id number]), which showed a completely undifferentiated appearance, but stains with pancytokeratin, indicating an epithelial origin. 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 Tumor markers for CA-19-9, CEA, beta-hCG, alpha-fetoprotein were unremarkable. Cancer type ID molecular testing by [employer] sent to determine the exact diagnosis and to assist in further treatment planning. 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 Relative probability of less than 5% of other subtype of sarcoma. EWSR1-WT1 fusion detected.  4. 5/1/2020, MRI brain negative for brain metastasis, and baseline CT-CAP obtained showing extensive mixed solid and cystic masses throughout the abdomen and pelvis consistent with peritoneal carcinomatosis. Largest mass measures approximately 20 cm in the pelvis. Metastatic mixed solid and cystic masses seen in hepatic segments 5 and 6 and hepatic segment 7. The masses appear to be contiguous with the retrohepatic peritoneal carcinomatosis. Small volume fluid about the spleen favored to represent malignant ascites, stable mild-to-moderate right hydronephrosis related to mass effect upon the distal ureter in the pelvis, the IVC and iliac veins are compressed due to the extensive peritoneal carcinomatosis without findings to suggest deep venous thrombosis.  5. 5/4/2020, he begins CAV/IMV alternating chemotherapy. He receives 6 cycles of treatment. He symptomatically improves.  6. 9/11/2020, CT-CAP shows stable to mildly improved extensive peritoneal carcinomatosis. He would like to omit Ifosfamide/etoposide cycles and continue only with CAV.  7. 10/9/2020, he starts CAV every 21 days.  8. 1/6/2021, CT CAP showed a mixed response in the mixed solid and cystic masses throughout the peritoneum. Some of the tumors are stable to mildly worse, with some of the peritoneal masses a bit larger, a few are smaller, one cystic tumor in the left abdomen is smaller, while tumors lower in the pelvis appear slightly larger.  9. 3/24/2021, CT CAP showed continued slight decrease in overall burden of peritoneal carcinomatosis with persistent encasement of bowel without evidence of bowel obstruction.     HISTORY OF PRESENT ILLNESS  Diego Buchanan was met with over video with sister for follow up.  -Feel okay overall.  -Denies any  pain.  -Denies any blood in his urine.  -Continues to have bowel movements every other day. Sometimes strains.   -Eating and drinking well. Appetite is doing well.    Review of Systems:  Patient denies any of the following except if noted above: fevers, chills, difficulty with energy, vision or hearing changes, chest pain, dyspnea, abdominal pain, nausea, vomiting, diarrhea, urinary concerns, headaches, numbness, tingling, issues with sleep or mood.     Current Outpatient Medications   Medication Sig Dispense Refill     allopurinol (ZYLOPRIM) 300 MG tablet        CATHFLO ACTIVASE 2 MG injection        dexamethasone (DECADRON) 4 MG tablet        DOXOrubicin (ADRIAMYCIN) 2 MG/ML injection        folic acid (FOLVITE) 1 MG tablet        LORazepam (ATIVAN) 0.5 MG tablet Take 1 tablet (0.5 mg) by mouth every 4 hours as needed (Anxiety, Nausea/Vomiting or Sleep) 30 tablet 5     mesna (MESNEX) 100 MG/ML undiluted injection        mesna (MESNEX) 400 MG TABS tablet Take 1 tablet (400 mg) by mouth every 4 hours for 2 doses Take one tablet 4 hours and 8 hours after end of cyclophosphamide infusion. 2 tablet 0     NEULASTA ONPRO 6 MG/0.6ML injection        polyethylene glycol (MIRALAX) 17 g packet Take 17 g by mouth       prochlorperazine (COMPAZINE) 10 MG tablet Take 1 tablet (10 mg) by mouth every 6 hours as needed (Nausea/Vomiting) 30 tablet 5     senna-docusate (SENNA S) 8.6-50 MG tablet Take 1 tablet by mouth 2 times daily 60 tablet 0     sodium chloride 0.9% infusion        Water For Injection Sterile (STERILE WATER, PRESERVATIVE FREE,) injection        dexamethasone (DECADRON) 4 MG tablet Take 2 tablets (8 mg) by mouth daily (with breakfast) for 3 days Start the day after doxorubicin, cyclophosphamide, and vincristine (odd cycles). 6 tablet 8     mesna (MESNEX) 400 MG TABS tablet Take 1 tablet (400 mg) by mouth every 4 hours for 2 doses Take one tablet 4 hours and 8 hours after end of cyclophosphamide infusion. 2 tablet 0      pegfilgrastim (NEULASTA ONPRO KIT) 6 MG/0.6ML injection Inject 0.6 mLs (6 mg) Subcutaneous once for 1 dose 0.6 mL 0     Objective:  General: patient appears well in no acute distress, alert and oriented, speech clear and fluid  Skin: no visualized rash or lesions on visualized skin  Resp: Appears to be breathing comfortably without accessory muscle usage, speaking in full sentences, no audible wheezes or cough.  Psych: Coherent speech, normal rate and volume, able to articulate logical thoughts, able to abstract reason, no tangential thoughts, no hallucinations or delusions  Patient's affect is appropriate.    LABS  Most Recent 3 CBC's:  Recent Labs   Lab Test 06/02/21  1544 05/12/21  1559 04/29/21  1035   WBC 9.0 11.7* 5.0   HGB 11.0* 9.8* 9.5*   MCV 96 97 96    235 327    Most Recent 3 BMP's:  Recent Labs   Lab Test 06/02/21  1544 05/12/21  1559 04/29/21  1035    142 139   POTASSIUM 3.5 3.8 3.7   CHLORIDE 110* 112* 107   CO2 26 26 25   BUN 10 8 16   CR 0.75 0.74 0.76   ANIONGAP 5 5 7   FAISAL 8.8 8.4* 8.6   * 106* 82    Most Recent 2 LFT's:  Recent Labs   Lab Test 06/02/21  1544 05/12/21  1559   AST 9 14   ALT 30 18   ALKPHOS 88 110   BILITOTAL 0.8 0.4    Most Recent TSH and T4:No lab results found.  I reviewed the above labs today.    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 Mr. Buchanan is a 26 year old male with advanced intraabdominal DSRCT with extensive peritoneal disease, lymph node metastasis, and liver and bone involvement. He tolerated 6 alternating cycles of CAV/IMV with anticipated side effects. He developed hematuria with his last 2 cycles of Ifosfamide based therapy despite dose reduction and Mesna prophylaxis, and ultimately patient and family decided to continue with CAV alone. Due to a mixed response on his January 2021 CT, it was decided to add back in CAV  alternating with IMV.  -He tolerating resuming IMV well. He has had no hematuria recently.  -His March CT CAP shows stable to improved disease. Will repeat imaging in mid-June.  -He will continue with IMV tomorrow.     Anemia, normocytic. Initially microcytic, now normocytic. Hemoglobin trends up during periods of time without hematuria and down when he has more hematuria. Likely multifactorial with chemotherapy and intermittent hematuria. No current transfusion needs. Hemoglobin now is looking better. Will continue to monitor closely.     Deconditioning. I previously encouraged him to go for daily walks for at least 10 minutes per day. Again, discussed referral for physical therapy for deconditioning, which he agrees to try.     Hematuria. Recurrent. He previously saw urology in September 2020 who felt this could be secondary to cyclophosphamide (despite mesna), new bladder/urethral primary malignancies, or metastatic bladder invasion. He and his family opted against a cystoscopy at that time. I suspect it is due to metastatic bladder invasion. We previously discussed if his hemoglobin does drop below 8 again, I would like him to consider a cystoscopy. His hemoglobin is now trending up.    COVID vaccine. Patient completed his 2 dose series with a sore arm as his only side effect.    History of hypertension. Patient remains off of metoprolol. BP yesterday was normal. Okay to continue to hold.     Constipation. Okay to take Miralax prn.    Yasmine Mckeon PA-C  [facility]  [address] [place] [address] [phone]

## 2021-06-04 ENCOUNTER — INFUSION THERAPY VISIT (OUTPATIENT)
Dept: ONCOLOGY | Facility: CLINIC | Age: 26
End: 2021-06-04
Attending: PHYSICIAN ASSISTANT
Payer: COMMERCIAL

## 2021-06-04 ENCOUNTER — HOME INFUSION (PRE-WILLOW HOME INFUSION) (OUTPATIENT)
Dept: PHARMACY | Facility: CLINIC | Age: 26
End: 2021-06-04

## 2021-06-04 VITALS
RESPIRATION RATE: 18 BRPM | OXYGEN SATURATION: 100 % | TEMPERATURE: 97.8 F | DIASTOLIC BLOOD PRESSURE: 73 MMHG | HEART RATE: 87 BPM | SYSTOLIC BLOOD PRESSURE: 104 MMHG

## 2021-06-04 VITALS
WEIGHT: 300 LBS | HEIGHT: 68 IN | DIASTOLIC BLOOD PRESSURE: 100 MMHG | BODY MASS INDEX: 45.47 KG/M2 | SYSTOLIC BLOOD PRESSURE: 160 MMHG | TEMPERATURE: 97.4 F

## 2021-06-04 VITALS
WEIGHT: 311.25 LBS | OXYGEN SATURATION: 97 % | BODY MASS INDEX: 47.05 KG/M2 | HEART RATE: 113 BPM | SYSTOLIC BLOOD PRESSURE: 130 MMHG | DIASTOLIC BLOOD PRESSURE: 65 MMHG

## 2021-06-04 VITALS — WEIGHT: 298 LBS | BODY MASS INDEX: 45.31 KG/M2

## 2021-06-04 DIAGNOSIS — C49.9 DESMOPLASTIC SMALL ROUND CELL TUMOR (H): ICD-10-CM

## 2021-06-04 DIAGNOSIS — C41.9 SARCOMA, EWINGS (H): Primary | ICD-10-CM

## 2021-06-04 PROCEDURE — G0498 CHEMO EXTEND IV INFUS W/PUMP: HCPCS

## 2021-06-04 PROCEDURE — 96365 THER/PROPH/DIAG IV INF INIT: CPT | Mod: 59

## 2021-06-04 PROCEDURE — 250N000011 HC RX IP 250 OP 636: Performed by: PHYSICIAN ASSISTANT

## 2021-06-04 PROCEDURE — 96375 TX/PRO/DX INJ NEW DRUG ADDON: CPT

## 2021-06-04 PROCEDURE — 258N000003 HC RX IP 258 OP 636: Performed by: PHYSICIAN ASSISTANT

## 2021-06-04 RX ORDER — GRANISETRON HYDROCHLORIDE 1 MG/ML
1 INJECTION INTRAVENOUS ONCE
Status: COMPLETED | OUTPATIENT
Start: 2021-06-04 | End: 2021-06-04

## 2021-06-04 RX ORDER — ETOPOSIDE 50 MG/1
100 CAPSULE ORAL DAILY
Qty: 24 CAPSULE | Refills: 0 | Status: SHIPPED | OUTPATIENT
Start: 2021-06-04 | End: 2021-06-25

## 2021-06-04 RX ADMIN — SODIUM CHLORIDE 250 ML: 9 INJECTION, SOLUTION INTRAVENOUS at 08:30

## 2021-06-04 RX ADMIN — GRANISETRON HYDROCHLORIDE 1 MG: 1 INJECTION, SOLUTION INTRAVENOUS at 08:30

## 2021-06-04 RX ADMIN — DEXAMETHASONE SODIUM PHOSPHATE 12 MG: 10 INJECTION, SOLUTION INTRAMUSCULAR; INTRAVENOUS at 08:33

## 2021-06-04 ASSESSMENT — PAIN SCALES - GENERAL: PAINLEVEL: NO PAIN (0)

## 2021-06-04 NOTE — PROGRESS NOTES
Infusion Nursing Note:  Diego Buchanan presents today for Cycle 18 Day 1 Ifosfamide/Mesna pump connect.    Patient seen by provider today: No. Pt had visit with KATHLEEN Peña, yesterday   present during visit today: Not Applicable. Sister here to interpret.     Note: pt presents to infusion room today feeling well with no new complaints since visit with Yasmine yesterday.     Pain: denies    Intravenous Access:  Esparza. Dressing will be changed at home on Sunday per sister.    Treatment Conditions:  Lab Results   Component Value Date    HGB 11.0 06/02/2021     Lab Results   Component Value Date    WBC 9.0 06/02/2021      Lab Results   Component Value Date    ANEU 7.8 06/02/2021     Lab Results   Component Value Date     06/02/2021      Lab Results   Component Value Date     06/02/2021                   Lab Results   Component Value Date    POTASSIUM 3.5 06/02/2021           Lab Results   Component Value Date                  Lab Results   Component Value Date    CR 0.75 06/02/2021                   Lab Results   Component Value Date    FAISAL 8.8 06/02/2021                Lab Results   Component Value Date    BILITOTAL 0.8 06/02/2021           Lab Results   Component Value Date    ALBUMIN 3.6 06/02/2021                    Lab Results   Component Value Date    ALT 30 06/02/2021           Lab Results   Component Value Date    AST 9 06/02/2021       Results reviewed, labs MET treatment parameters, ok to proceed with treatment.      Post Infusion Assessment:  Patient tolerated infusion without incident.  Blood return noted pre and post infusion.  Site patent and intact, free from redness, edema or discomfort.  No evidence of extravasations.     Prior to discharge: Esparza is secured in place with tegaderm and flushed with 10cc NS with positive blood return noted.  Continuous home infusion CADD pump connected to red lumen.    All connectors secured in place and clamps taped open.    Pump started,  "\"running\" noted on display (CADD): YES.  Pump Connection double checked with Cristal Rouse RN.  Patient instructed to call our clinic or [facility] with any questions or concerns at home.  Patient verbalized understanding.    Per BERRY Koroma RN, pt is set up for lab draws at home 6/6-6/8. Mesna bag change/Neulasta Onpro will be on 6/10 at 1100. Pump disconnect at home with [facility] on 6/11 at 1100. BERRY Koroma RN, aware of all dates and times.         Discharge Plan:   Prescription refills given for Etoposide.  Copy of AVS reviewed with patient and/or family.  Patient will return 6/16 for CT scan and 6/17 for follow up with Dr. Sims. Next cycle chemotherapy arranged for 6/25.  Patient discharged in stable condition accompanied by: sister.  Departure Mode: Ambulatory.    LAUREN KNIGHT RN    "

## 2021-06-04 NOTE — PATIENT INSTRUCTIONS
Contact Numbers    [facility] [place]: [phone]  [facility] [place] and after hours / weekends / holidays: [phone]      Please call the triage or after hours line if you experience a temperature greater than or equal to 100.4, shaking chills, have uncontrolled nausea, vomiting and/or diarrhea, dizziness, shortness of breath, chest pain, bleeding, unexplained bruising, or if you have any other new/concerning symptoms, questions or concerns.      If you are having any concerning symptoms or wish to speak to a provider before your next infusion visit, please call your care coordinator or triage to notify them so we can adequately serve you.     If you need a refill on a narcotic prescription or other medication, please call before your infusion appointment.       June 2021 Sunday Monday Tuesday Wednesday Thursday Friday Saturday             1    COVID-19 PFIZER (21 DAY)   3:50 PM   (5 min.)   CP COVID VACCINE 21 DAY PFIZER   [facility] Vaccination [facility] 2    LAB CENTRAL   3:45 PM   (15 min.)   [facility] DRAW   [facility] 3    VIDEO VISIT RETURN   2:00 PM   (45 min.)   Yasmine Mckeon PA-C   [facility] 4    ONC INFUSION 3 HR (180 MIN)   8:00 AM   (180 min.)    ONC INFUSION NURSE   [facility] 5       6     7     8     9     10     11     12       13     14     15     16    LAB CENTRAL  11:00 AM   (15 min.)   [facility] DRAW   [facility]    CT CHEST/ABDOMEN/PELVIS W  11:40 AM   (20 min.)   [facility]   [facility] 17    VIDEO VISIT RETURN   4:30 PM   (30 min.)   Farzaneh Young MD   [facility] 18     19       20     21     22     23    LAB CENTRAL   3:45 PM   (15 min.)   [facility] DRAW   [facility] 24    VIDEO VISIT RETURN   2:00 PM   (45 min.)   Linda  Yasmine Moura PA-C   [facility] 25    ONC INFUSION 3 HR (180 MIN)   8:00 AM   (180 min.)    ONC INFUSION NURSE   [facility] 26       27 28 29 30 July 2021 Sunday Monday Tuesday Wednesday Thursday Friday Saturday                       1     2     3       4     5     6     7     8     9     10       11     12     13     14     15     16     17       18     19     20     21     22     23     24       25     26     27     28     29     30     31                  No results found for this or any previous visit (from the past 24 hour(s)).       Statement Selected

## 2021-06-06 ENCOUNTER — HOME INFUSION (PRE-WILLOW HOME INFUSION) (OUTPATIENT)
Dept: PHARMACY | Facility: CLINIC | Age: 26
End: 2021-06-06

## 2021-06-06 ENCOUNTER — DOCUMENTATION ONLY (OUTPATIENT)
Dept: ONCOLOGY | Facility: CLINIC | Age: 26
End: 2021-06-06

## 2021-06-06 ENCOUNTER — MEDICAL CORRESPONDENCE (OUTPATIENT)
Dept: HEALTH INFORMATION MANAGEMENT | Facility: CLINIC | Age: 26
End: 2021-06-06

## 2021-06-06 LAB
ALBUMIN SERPL-MCNC: 3 G/DL (ref 3.4–5)
ALP SERPL-CCNC: 82 U/L (ref 40–150)
ALT SERPL W P-5'-P-CCNC: 27 U/L (ref 0–70)
ANION GAP SERPL CALCULATED.3IONS-SCNC: 7 MMOL/L (ref 3–14)
AST SERPL W P-5'-P-CCNC: 14 U/L (ref 0–45)
BILIRUB SERPL-MCNC: 0.5 MG/DL (ref 0.2–1.3)
BUN SERPL-MCNC: 12 MG/DL (ref 7–30)
CALCIUM SERPL-MCNC: 7.9 MG/DL (ref 8.5–10.1)
CHLORIDE SERPL-SCNC: 112 MMOL/L (ref 94–109)
CO2 SERPL-SCNC: 24 MMOL/L (ref 20–32)
CREAT SERPL-MCNC: 0.76 MG/DL (ref 0.66–1.25)
ERYTHROCYTE [DISTWIDTH] IN BLOOD BY AUTOMATED COUNT: 16.7 % (ref 10–15)
GFR SERPL CREATININE-BSD FRML MDRD: >90 ML/MIN/{1.73_M2}
GLUCOSE SERPL-MCNC: 70 MG/DL (ref 70–99)
HCT VFR BLD AUTO: 32.4 % (ref 40–53)
HGB BLD-MCNC: 10.2 G/DL (ref 13.3–17.7)
MAGNESIUM SERPL-MCNC: 2 MG/DL (ref 1.6–2.3)
MCH RBC QN AUTO: 30.1 PG (ref 26.5–33)
MCHC RBC AUTO-ENTMCNC: 31.5 G/DL (ref 31.5–36.5)
MCV RBC AUTO: 96 FL (ref 78–100)
PHOSPHATE SERPL-MCNC: 3.8 MG/DL (ref 2.5–4.5)
PLATELET # BLD AUTO: 248 10E9/L (ref 150–450)
POTASSIUM SERPL-SCNC: 3.4 MMOL/L (ref 3.4–5.3)
PROT SERPL-MCNC: 6.2 G/DL (ref 6.8–8.8)
RBC # BLD AUTO: 3.39 10E12/L (ref 4.4–5.9)
SODIUM SERPL-SCNC: 143 MMOL/L (ref 133–144)
WBC # BLD AUTO: 4.9 10E9/L (ref 4–11)

## 2021-06-06 PROCEDURE — 84100 ASSAY OF PHOSPHORUS: CPT

## 2021-06-06 PROCEDURE — 83735 ASSAY OF MAGNESIUM: CPT

## 2021-06-06 PROCEDURE — 85027 COMPLETE CBC AUTOMATED: CPT

## 2021-06-06 PROCEDURE — 80053 COMPREHEN METABOLIC PANEL: CPT

## 2021-06-06 NOTE — PROGRESS NOTES
Hospital Follow-up Visit:    Assessment/Plan:     1. Peritoneal carcinomatosis (H)  - Ambulatory referral to Oncology/Hematology Adult  Will have his scheduling call patient to arrange PET scan.  2. Benign essential hypertension  Currently not taking medication, does have some swelling in the lower extremities, will DC amlodipine, start HCTZ 12.5 mg, resume metoprolol.  - blood pressure monitor Kit; Check blood pressure daily  Dispense: 1 each; Refill: 0  - hydroCHLOROthiazide (HYDRODIURIL) 12.5 MG tablet; Take 1 tablet (12.5 mg total) by mouth daily.  Dispense: 60 tablet; Refill: 4  3. Dermatitis  - triamcinolone (KENALOG) 0.1 % cream; Apply topically 2 (two) times a day.  Dispense: 80 g; Refill: 1        Subjective:     Diego Buchanan is a 24 y.o. male who presents for a hospital discharge follow up.  Recent hospitalization for abdominal pain, he does have peritoneal carcinomatosis, recent biopsy with pathology report is showing differentiated tumors.  Blood pressure has been elevated, not taking any blood pressure medication prescribed from the hospital.  Does have developmental delay, Sister Mariela brought him here today.  Mom is at home.      Hospital/Nursing Home/IP Rehab [facility]  Date of Admission: 03/10/20  Date of Discharge:03/14/20  Reason(s) for Admission: Abdominal Pain            Do you have any problems taking your medication regularly?  None       Have you had any changes in your medication since discharge? None       Have you had any difficulty following your discharge or treatment plan?  No    Summary of hospitalization:  Hospital discharge summary reviewed  Diagnostic Tests/Treatments reviewed.  Follow up needed: PET scan needs to be arranged and follow-up with oncologist.  Other Healthcare Providers Involved in Patient's Care: Patient Care Team:  Jc Glass MD as PCP - General (Family Medicine)  Jc Glass MD as Assigned PCP      Update since  discharge: rash in the lower extremities with some mild swelling.  Information from family, SNF, care coordination:      Post Discharge Medication Reconciliation: discharge medications reconciled and changed, per note/orders (see AVS)  Plan of care communicated with: Sister Mariela Power    Objective:     Vitals:    03/18/20 1610 03/18/20 1613   BP: (!) 165/95 (!) 158/100   Pulse: (!) 116    SpO2: 97%          Physical Exam:  Physical Examination: General appearance - alert, well appearing, and in no distress  Neck - supple, no significant adenopathy  Chest - clear to auscultation, no wheezes, rales or rhonchi, symmetric air entry  Heart - normal rate, regular rhythm, normal S1, S2, no murmurs, rubs, clicks or gallops  Abdomen - soft, nontender, nondistended, no masses or organomegaly  Back exam - full range of motion, no tenderness, palpable spasm or pain on motion  Skin - normal coloration and turgor, no rashes, mild dry scaly skin left lateral leg, abdomen.      Coding guidelines for this visit:  Type of Medical   Decision Making Face-to-Face Visit       within 7 Days of discharge Face-to-Face Visit        within 14 days of discharge   Moderate Complexity 12014 [id number]   High Complexity [id number]       Electronically signed by Jc Glass MD 03/18/20 4:08 PM

## 2021-06-06 NOTE — TELEPHONE ENCOUNTER
New Appointment Needed  What is the reason for the visit:    Pre-Op Appt Request  When is the surgery? :  3/16/20  [facility] the [facility]  Who is the surgeon? :  n/a  What type of surgery is being done?: [facility]  Provider Preference: Any available  How soon do you need to be seen?: next week  Waitlist offered?: No  Okay to leave a detailed message:  Yes

## 2021-06-06 NOTE — PROGRESS NOTES
Preoperative Exam    Scheduled Procedure: renal biopsy, US guided  Surgery Date:  3/16/20  Surgery Location: [facility], fax [phone]    Surgeon:  Interventional Radiology    Assessment/Plan:   1. Preoperative examination  - HM2(CBC w/o Differential)  - Comprehensive Metabolic Panel    2. Peritoneal carcinomatosis (H)  Extensive carcinomatosis.  Experiencing constant pain rated at 3-5. Family currently not treating pain, not receiving any medications. Counseled family (primary caregivers) on pain management and recommend scheduled tramadol 5-10mg every six hours.  If pain not controlled, call and will adjust plan.  If pain worsens acutely repeat CT.  Sister feels comfortable with this. Check labs today. If significant derangements consider recommendation for ED/admission  - HM2(CBC w/o Differential)  - Comprehensive Metabolic Panel  - traMADoL (ULTRAM) 50 mg tablet; Take 1-2 tablets by mouth every six hours as needed for pain (Patient taking differently: Take  mg by mouth every 6 (six) hours as needed for pain. Take 1-2 tablets by mouth every six hours as needed for pain)  Dispense: 30 tablet; Refill: 0    3. Diarrhea, unspecified type  Increasing watery stools. Check stool cultures.   - Culture, Stool; Future  - C. difficile Toxigenic by PCR; Future    4. Hypertension, secondary  EKG with sinus tachycardia likely due to hypovolemia secondary to frequent stools and ascites.  Check labs. If look okay plan to start CCB.    - Electrocardiogram Perform and Read    Surgical Procedure Risk: Low (reported cardiac risk generally < 1%)  Have you had prior anesthesia?: No  Have you or any family members had a previous anesthesia reaction:  No  Do you or any family members have a history of a clotting or bleeding disorder?: No  Cardiac Risk Assessment: no increased risk for major cardiac complications    APPROVAL GIVEN to proceed with proposed procedure, without further diagnostic evaluation    Please  Note:  No additional special considerations    Functional Status: Partially Dependent: intellectual disabilities, sister and mom are primary caregivers  Patient plans to recover at home with family.     Subjective:      Diego Buchanan is a 24 y.o. male who presents for a preoperative consultation.      He was seen in the ED 2/18 for abdominal pain and diarrhea.  CT unfortunately showed diffuse peritoneal carcinomatosis.  Family declined admission for workup.  Ultrasound biopsy has unfortunately been delayed due to concerns from family but is now scheduled 3/16.  Primary source not apparent from imaging. Plan to follow up with oncology after completion of biopsy.      Today his sister notes that his diarrhea has been increasing. Previously four times a day, now sometimes 5-10.  Small amounts of liquid stool.  No blood or mucous. Stool studies never obtained though previously ordered.  Tolerating liquids and solids well.      Pain: Constant lower abdominal/pelvic pain.  Rates 3-5/10.  Sister has been unsure how to determine when and how to treat his pain. First dose of Tramadol given last night 50mg. He states this was at least partially effective.  Sister has trouble judging his pain from his description.       Blood pressure:  No prior h/o HTN.  One month ago 160/90.  Today 160/100.  No HA,chest pain, shortness of breath.  CTA in ED showed no thoracic metastases.     All other systems reviewed and are negative, other than those listed in the HPI.    Pertinent History  Do you have difficulty breathing or chest pain after walking up a flight of stairs: No  History of obstructive sleep apnea: No  Steroid use in the last 6 months: No  Frequent Aspirin/NSAID use: No  Prior Blood Transfusion: No  Prior Blood Transfusion Reaction: No  If for some reason prior to, during or after the procedure, if it is medically indicated, would you be willing to have a blood transfusion?:  There is no transfusion refusal.    Current  Outpatient Medications   Medication Sig Dispense Refill     ondansetron (ZOFRAN) 4 MG tablet Take 1-2 tab po 4 times daily prn nausea, max daily dose 4 tab 20 tablet 0     traMADoL (ULTRAM) 50 mg tablet Take 1-2 tablets by mouth every six hours as needed for pain 30 tablet 0     No current facility-administered medications for this visit.         No Known Allergies    Patient Active Problem List   Diagnosis     Learning disabilities     Morbid obesity (H)     Hyperlipidemia     Peritoneal carcinomatosis (H)       No past medical history on file.    No past surgical history on file.    Social History     Socioeconomic History     Marital status: Single     Spouse name: Not on file     Number of children: Not on file     Years of education: Not on file     Highest education level: Not on file   Occupational History     Not on file   Social Needs     Financial resource strain: Not on file     Food insecurity     Worry: Not on file     Inability: Not on file     Transportation needs     Medical: Not on file     Non-medical: Not on file   Tobacco Use     Smoking status: Never Smoker     Smokeless tobacco: Never Used     Tobacco comment: no exposure to secondhand smoke   Substance and Sexual Activity     Alcohol use: No     Drug use: Not on file     Sexual activity: Never     Partners: Female   Lifestyle     Physical activity     Days per week: Not on file     Minutes per session: Not on file     Stress: Not on file   Relationships     Social connections     Talks on phone: Not on file     Gets together: Not on file     Attends [employer] service: Not on file     Active member of club or organization: Not on file     Attends meetings of clubs or organizations: Not on file     Relationship status: Not on file     Intimate partner violence     Fear of current or ex partner: Not on file     Emotionally abused: Not on file     Physically abused: Not on file     Forced sexual activity: Not on file   Other Topics Concern     Not  "on file   Social History Narrative     Not on file       Patient Care Team:  Jc Glass MD as PCP - General (Family Medicine)  Jc Glass MD as Assigned PCP          Objective:     Vitals:    03/10/20 1333 03/10/20 1342 03/10/20 1417   BP: (!) 160/100 (!) 170/98 (!) 160/100   Temp: 97.4  F (36.3  C)     TempSrc: Oral     Weight: (!) 300 lb (136.1 kg)     Height: 5' 8\" (1.727 m)           Physical Exam:  GENERAL: Alert, well appearing  PSYCH: Pleasant mood, affect appropriate.   SKIN: No atypical lesions  EYES: Conjunctiva pink, sclera white, no exudates. NICKI.  EOMs intact. Corneal light reflex bilaterally, red reflex present.Undilated fundoscopic exam normal  EARS: TMs pearly grey, no bulging, redness, retraction.  MOUTH: Pharynx moist, pink without exudate. No tonsillar enlargement  NECK: No lymphadenopathy. Thyroid borders smooth without enlargement, nodules.   CV: Regular rate and rhythm without murmurs, rubs or gallops.  RESP: Lung sounds clear  ABDOMEN: BS+. Abdomen obese, taught. Generalized TTP. Unable to appreciate organomegaly.   PV : No edema  NEURO: Alert, oriented     Patient Instructions     Recommendations from today's visit                                                       1. Pain: I recommend scheduling Tramadol every 6-8 hours during the day. Start with 1 tab each dose and if this is not effective you may use 2 tabs each dose.      2. Diarrhea:  We will send you home with stool kits to check for bacteria.  We will also check your electrolytes today with blood work.     3. Blood pressure:  We will likely start Robert on a blood pressure medication but I would like to see how his labs look first.  We will call you with results.      Next appointment: as scheduled with oncology following biopsy     To reschedule your appointment, please call the clinic directly at [phone].   It was a pleasure seeing you today! I look forward to seeing you again.               " Before Your Surgery       Call your surgeon if there is any change in your health. This includes signs of a cold or flu (such as a sore throat, runny nose, cough, rash or fever).       Do not smoke, drink alcohol or take over the counter medicine (unless your surgeon or primary care doctor tells you to) for the 24 hours before and after surgery.       If you take prescribed drugs: Follow your doctor s orders about which medicines to take and which to stop until after surgery.      Eating and drinking prior to surgery: follow the instructions from your surgeon.      Take a shower or bath the night before surgery. Use the soap your surgeon gave you to gently clean your skin. If you do not have soap from your surgeon, use your regular soap. Do not shave or scrub the surgery site. Wear clean pajamas and have clean sheets on your bed.             Hold all supplements, aspirin and NSAIDs for 7 days prior to surgery.      EKG:  Sinus tachycardia  Most Recent EKG     Units 03/10/20  1442   VENTRATE    ATRIALRATE    QRSDURATION ms 86   QTINTERVAL ms 332   QTCCALC ms 461   P [place] degrees 51   RAXIS degrees 39   TAXIS degrees -6   MUSEDX  Sinus tachycardia  Otherwise normal ECG  No previous ECGs available         Labs:  Recent Results (from the past 24 hour(s))   Electrocardiogram Perform and Read    Collection Time: 03/10/20  2:42 PM   Result Value Ref Range    SYSTOLIC BLOOD PRESSURE 160 mmHg    DIASTOLIC BLOOD PRESSURE 100 mmHg    VENTRICULAR RATE 116 BPM    ATRIAL RATE 116 BPM    P-R INTERVAL 132 ms    QRS DURATION 86 ms    Q-T INTERVAL 332 ms    QTC CALCULATION (BEZET) 461 ms    P Axis 51 degrees    R AXIS 39 degrees    T AXIS -6 degrees    MUSE DIAGNOSIS       Sinus tachycardia  Otherwise normal ECG  No previous ECGs available     Comprehensive Metabolic Panel    Collection Time: 03/10/20  3:01 PM   Result Value Ref Range    Sodium 144 136 - 145 mmol/L    Potassium 3.8 3.5 - 5.0 mmol/L    Chloride 106 98 -  107 mmol/L    CO2 23 22 - 31 mmol/L    Anion Gap, Calculation 15 5 - 18 mmol/L    Glucose 91 70 - 125 mg/dL    BUN 6 (L) 8 - 22 mg/dL    Creatinine 0.89 0.70 - 1.30 mg/dL    GFR MDRD Af Amer >60 >60 mL/min/1.73m2    GFR MDRD Non Af Amer >60 >60 mL/min/1.73m2    Bilirubin, Total 0.8 0.0 - 1.0 mg/dL    Calcium 9.7 8.5 - 10.5 mg/dL    Protein, Total 7.8 6.0 - 8.0 g/dL    Albumin 4.0 3.5 - 5.0 g/dL    Alkaline Phosphatase 65 45 - 120 U/L    AST 29 0 - 40 U/L    ALT 33 0 - 45 U/L   HM2(CBC w/o Differential)    Collection Time: 03/10/20  3:01 PM   Result Value Ref Range    WBC 11.6 (H) 4.0 - 11.0 thou/uL    RBC 5.31 4.40 - 6.20 mill/uL    Hemoglobin 14.1 14.0 - 18.0 g/dL    Hematocrit 44.0 40.0 - 54.0 %    MCV 83 80 - 100 fL    MCH 26.6 (L) 27.0 - 34.0 pg    MCHC 32.1 32.0 - 36.0 g/dL    RDW 12.3 11.0 - 14.5 %    Platelets 504 (H) 140 - 440 thou/uL    MPV 7.2 7.0 - 10.0 fL   Comprehensive Metabolic Panel    Collection Time: 03/10/20  9:21 PM   Result Value Ref Range    Sodium 141 136 - 145 mmol/L    Potassium 3.6 3.5 - 5.0 mmol/L    Chloride 106 98 - 107 mmol/L    CO2 23 22 - 31 mmol/L    Anion Gap, Calculation 12 5 - 18 mmol/L    Glucose 98 70 - 125 mg/dL    BUN 7 (L) 8 - 22 mg/dL    Creatinine 0.83 0.70 - 1.30 mg/dL    GFR MDRD Af Amer >60 >60 mL/min/1.73m2    GFR MDRD Non Af Amer >60 >60 mL/min/1.73m2    Bilirubin, Total 0.6 0.0 - 1.0 mg/dL    Calcium 9.4 8.5 - 10.5 mg/dL    Protein, Total 7.7 6.0 - 8.0 g/dL    Albumin 3.7 3.5 - 5.0 g/dL    Alkaline Phosphatase 62 45 - 120 U/L    AST 29 0 - 40 U/L    ALT 33 0 - 45 U/L   Lipase    Collection Time: 03/10/20  9:21 PM   Result Value Ref Range    Lipase 25 0 - 52 U/L   Magnesium    Collection Time: 03/10/20  9:21 PM   Result Value Ref Range    Magnesium 1.9 1.8 - 2.6 mg/dL   HM2(CBC w/o Differential)    Collection Time: 03/10/20  9:21 PM   Result Value Ref Range    WBC 13.0 (H) 4.0 - 11.0 thou/uL    RBC 4.95 4.40 - 6.20 mill/uL    Hemoglobin 12.8 (L) 14.0 - 18.0 g/dL     Hematocrit 40.9 40.0 - 54.0 %    MCV 83 80 - 100 fL    MCH 25.9 (L) 27.0 - 34.0 pg    MCHC 31.3 (L) 32.0 - 36.0 g/dL    RDW 14.0 11.0 - 14.5 %    Platelets 483 (H) 140 - 440 thou/uL    MPV 9.4 8.5 - 12.5 fL   Lactic Acid    Collection Time: 03/10/20  9:21 PM   Result Value Ref Range    Lactic Acid 1.2 0.5 - 2.2 mmol/L   Type and Screen    Collection Time: 03/11/20  6:04 AM   Result Value Ref Range    ABORh B POS     Antibody Screen Negative    Protime-INR    Collection Time: 03/11/20  6:04 AM   Result Value Ref Range    INR 1.04 0.90 - 1.10   APTT(PTT)    Collection Time: 03/11/20  6:04 AM   Result Value Ref Range    PTT 33 24 - 37 seconds   HM1 (CBC with Diff)    Collection Time: 03/11/20  6:04 AM   Result Value Ref Range    WBC 12.3 (H) 4.0 - 11.0 thou/uL    RBC 4.64 4.40 - 6.20 mill/uL    Hemoglobin 12.2 (L) 14.0 - 18.0 g/dL    Hematocrit 39.1 (L) 40.0 - 54.0 %    MCV 84 80 - 100 fL    MCH 26.3 (L) 27.0 - 34.0 pg    MCHC 31.2 (L) 32.0 - 36.0 g/dL    RDW 14.2 11.0 - 14.5 %    Platelets 412 140 - 440 thou/uL    MPV 9.2 8.5 - 12.5 fL    Neutrophils % 79 (H) 50 - 70 %    Lymphocytes % 10 (L) 20 - 40 %    Monocytes % 7 2 - 10 %    Eosinophils % 2 0 - 6 %    Basophils % 1 0 - 2 %    Neutrophils Absolute 9.8 (H) 2.0 - 7.7 thou/uL    Lymphocytes Absolute 1.3 0.8 - 4.4 thou/uL    Monocytes Absolute 0.9 0.0 - 0.9 thou/uL    Eosinophils Absolute 0.3 0.0 - 0.4 thou/uL    Basophils Absolute 0.1 0.0 - 0.2 thou/uL       Immunization History   Administered Date(s) Administered     DTaP / Hep B / IPV 1995, 01/04/1996, 03/06/1996     DTaP / IPV 05/11/2000     DTaP, historic 05/13/1997     HPV 9 Valent 10/12/2015     HiB, historic,unspecified 1995, 01/04/1996, 03/06/1996     Influenza, Live, Nasal LAIV3 10/22/2010     Influenza, inj, historic,unspecified 10/17/2007, 10/22/2010, 12/07/2011     Influenza, seasonal,quad inj 6-35 mos 01/21/2013, 09/26/2013, 10/21/2014     Influenza,seasonal quad, PF, =/> 6months 12/13/2016,  09/30/2019     Influenza,seasonal,quad inj =/> 6months 10/12/2015     MMR 05/13/1997, 05/11/2000     Tdap 10/17/2007     Varicella 10/17/2007, 12/20/2013           Electronically signed by Delma Wasserman CNP 03/10/20 2:10 PM  5-8 times/day.

## 2021-06-06 NOTE — PATIENT INSTRUCTIONS - HE
Recommendations from today's visit                                                       1. Pain: I recommend scheduling Tramadol every 6-8 hours during the day. Start with 1 tab each dose and if this is not effective you may use 2 tabs each dose.      2. Diarrhea:  We will send you home with stool kits to check for bacteria.  We will also check your electrolytes today with blood work.     3. Blood pressure:  We will likely start Robert on a blood pressure medication but I would like to see how his labs look first.  We will call you with results.      Next appointment: as scheduled with oncology following biopsy     To reschedule your appointment, please call the clinic directly at [phone].   It was a pleasure seeing you today! I look forward to seeing you again.               Before Your Surgery       Call your surgeon if there is any change in your health. This includes signs of a cold or flu (such as a sore throat, runny nose, cough, rash or fever).       Do not smoke, drink alcohol or take over the counter medicine (unless your surgeon or primary care doctor tells you to) for the 24 hours before and after surgery.       If you take prescribed drugs: Follow your doctor s orders about which medicines to take and which to stop until after surgery.      Eating and drinking prior to surgery: follow the instructions from your surgeon.      Take a shower or bath the night before surgery. Use the soap your surgeon gave you to gently clean your skin. If you do not have soap from your surgeon, use your regular soap. Do not shave or scrub the surgery site. Wear clean pajamas and have clean sheets on your bed.             Hold all supplements, aspirin and NSAIDs for 7 days prior to surgery.

## 2021-06-06 NOTE — PROGRESS NOTES
OFFICE VISIT - FAMILY MEDICINE     ASSESSMENT AND PLAN     1. Diarrhea, unspecified type  C-Reactive Protein    Comprehensive Metabolic Panel    HM2(CBC w/o Differential)    Lipase    Culture, Stool    Ova and Parasite, Stool   Diarrhea of unclear etiology, we did review possible differential, treatment option with brat diet etc., we did some initial lab works, CBC was mildly elevated, I called and spoke with patient's sister over the phone, recommendation was to have family bring the patient to the ER to exclude any acute abdomen.  They will follow-up at the clinic thereafter.    CHIEF COMPLAINT   Diarrhea (started a couple days ago. Starts out with stomach pain-then has diarrhea. After eating or drinking anything then has diarrhea, watery diarrhea brown colored.) and Back Pain (low; started sometime last week. Non-injury, states he does sit alot during the day. Pain level is at a 2 to 5, not taking anything for back pain.)    HPI   Diego Buchanan is a 24 y.o. male.  Updated MIIC    Diarrhea for the past few days, patient is not a good historian because of  mild developmental delay, his sister is giving  part of the history, apparently stool has been loose for the past few days, about 5-6 stools a day, mild to moderate quantity, no bloody diarrhea.  He did complain of  stomachat the beginning, but nothing recently.  Denies any fever chills.  No recent trip outside of [place].  Mom tried some traditional herbal remedy with no improvement, she gave him Pepto-Bismol the night before his visit, has not seen the effect yet.    Review of Systems As per HPI, otherwise negative.    OBJECTIVE   /65 (Patient Site: Left Arm, Patient Position: Sitting, Cuff Size: Adult Large)   Pulse (!) 113   Wt (!) 311 lb 4 oz (141.2 kg)   SpO2 97%   BMI 47.05 kg/m    Physical Exam   Constitutional: He is oriented to person, place, and time. He appears well-developed and well-nourished.   HENT:   Head: Normocephalic and  atraumatic.   Neck: Normal range of motion. Neck supple. No JVD present. No tracheal deviation present. No thyromegaly present.   Cardiovascular: Normal rate, regular rhythm, normal heart sounds and intact distal pulses. Exam reveals no gallop and no friction rub.   No murmur heard.  Pulmonary/Chest: Effort normal and breath sounds normal. No respiratory distress. He has no wheezes. He has no rales.   Abdominal: He exhibits no distension. There is abdominal tenderness (Nonspecific). There is no rebound and no guarding.   Musculoskeletal:         General: No tenderness or edema.   Lymphadenopathy:     He has no cervical adenopathy.   Neurological: He is alert and oriented to person, place, and time. Coordination normal.   Psychiatric: He has a normal mood and affect. Judgment and thought content normal.       PFSH     Family History   Problem Relation Age of Onset     Hypertension Mother      Social History     Socioeconomic History     Marital status: Single     Spouse name: Not on file     Number of children: Not on file     Years of education: Not on file     Highest education level: Not on file   Occupational History     Not on file   Social Needs     Financial resource strain: Not on file     Food insecurity:     Worry: Not on file     Inability: Not on file     Transportation needs:     Medical: Not on file     Non-medical: Not on file   Tobacco Use     Smoking status: Never Smoker     Smokeless tobacco: Never Used     Tobacco comment: no exposure to secondhand smoke   Substance and Sexual Activity     Alcohol use: No     Drug use: Not on file     Sexual activity: Never     Partners: Female   Lifestyle     Physical activity:     Days per week: Not on file     Minutes per session: Not on file     Stress: Not on file   Relationships     Social connections:     Talks on phone: Not on file     Gets together: Not on file     Attends Voodoo service: Not on file     Active member of club or organization: Not on  file     Attends meetings of clubs or organizations: Not on file     Relationship status: Not on file     Intimate partner violence:     Fear of current or ex partner: Not on file     Emotionally abused: Not on file     Physically abused: Not on file     Forced sexual activity: Not on file   Other Topics Concern     Not on file   Social History Narrative     Not on file     Relevant history was reviewed with the patient today, unless noted in HPI, nothing is pertinent for this visit.  [facility]     Patient Active Problem List    Diagnosis Date Noted     Hyperlipidemia 10/14/2015     Overview Note:     LDL minimally >130 (10/2015)       Morbid obesity (H) 10/13/2015     Learning disabilities 10/12/2015     No past surgical history on file.    RESULTS/CONSULTS (Lab/Rad)     Recent Results (from the past 168 hour(s))   C-Reactive Protein   Result Value Ref Range    CRP 4.5 (H) 0.0 - 0.8 mg/dL   Comprehensive Metabolic Panel   Result Value Ref Range    Sodium 141 136 - 145 mmol/L    Potassium 3.6 3.5 - 5.0 mmol/L    Chloride 106 98 - 107 mmol/L    CO2 24 22 - 31 mmol/L    Anion Gap, Calculation 11 5 - 18 mmol/L    Glucose 109 70 - 125 mg/dL    BUN 8 8 - 22 mg/dL    Creatinine 0.90 0.70 - 1.30 mg/dL    GFR MDRD Af Amer >60 >60 mL/min/1.73m2    GFR MDRD Non Af Amer >60 >60 mL/min/1.73m2    Bilirubin, Total 1.0 0.0 - 1.0 mg/dL    Calcium 9.4 8.5 - 10.5 mg/dL    Protein, Total 7.6 6.0 - 8.0 g/dL    Albumin 3.9 3.5 - 5.0 g/dL    Alkaline Phosphatase 66 45 - 120 U/L    AST 21 0 - 40 U/L    ALT 21 0 - 45 U/L   HM2(CBC w/o Differential)   Result Value Ref Range    WBC 14.6 (H) 4.0 - 11.0 thou/uL    RBC 5.18 4.40 - 6.20 mill/uL    Hemoglobin 13.8 (L) 14.0 - 18.0 g/dL    Hematocrit 43.8 40.0 - 54.0 %    MCV 84 80 - 100 fL    MCH 26.6 (L) 27.0 - 34.0 pg    MCHC 31.5 (L) 32.0 - 36.0 g/dL    RDW 11.6 11.0 - 14.5 %    Platelets 414 140 - 440 thou/uL    MPV 7.0 7.0 - 10.0 fL   Lipase   Result Value Ref Range    Lipase 15 0 - 52 U/L    Lactic Acid   Result Value Ref Range    Lactic Acid 1.2 0.5 - 2.2 mmol/L   Comprehensive Metabolic Panel   Result Value Ref Range    Sodium 142 136 - 145 mmol/L    Potassium 3.6 3.5 - 5.0 mmol/L    Chloride 107 98 - 107 mmol/L    CO2 23 22 - 31 mmol/L    Anion Gap, Calculation 12 5 - 18 mmol/L    Glucose 115 70 - 125 mg/dL    BUN 8 8 - 22 mg/dL    Creatinine 0.84 0.70 - 1.30 mg/dL    GFR MDRD Af Amer >60 >60 mL/min/1.73m2    GFR MDRD Non Af Amer >60 >60 mL/min/1.73m2    Bilirubin, Total 0.9 0.0 - 1.0 mg/dL    Calcium 9.3 8.5 - 10.5 mg/dL    Protein, Total 7.8 6.0 - 8.0 g/dL    Albumin 3.7 3.5 - 5.0 g/dL    Alkaline Phosphatase 68 45 - 120 U/L    AST 22 0 - 40 U/L    ALT 20 0 - 45 U/L   Lipase   Result Value Ref Range    Lipase 16 0 - 52 U/L   HM1 (CBC with Diff)   Result Value Ref Range    WBC 14.7 (H) 4.0 - 11.0 thou/uL    RBC 5.04 4.40 - 6.20 mill/uL    Hemoglobin 13.4 (L) 14.0 - 18.0 g/dL    Hematocrit 41.7 40.0 - 54.0 %    MCV 83 80 - 100 fL    MCH 26.6 (L) 27.0 - 34.0 pg    MCHC 32.1 32.0 - 36.0 g/dL    RDW 13.2 11.0 - 14.5 %    Platelets 428 140 - 440 thou/uL    MPV 9.4 8.5 - 12.5 fL    Neutrophils % 79 (H) 50 - 70 %    Lymphocytes % 11 (L) 20 - 40 %    Monocytes % 8 2 - 10 %    Eosinophils % 1 0 - 6 %    Basophils % 0 0 - 2 %    Neutrophils Absolute 11.6 (H) 2.0 - 7.7 thou/uL    Lymphocytes Absolute 1.6 0.8 - 4.4 thou/uL    Monocytes Absolute 1.1 (H) 0.0 - 0.9 thou/uL    Eosinophils Absolute 0.2 0.0 - 0.4 thou/uL    Basophils Absolute 0.1 0.0 - 0.2 thou/uL     Ct Abdomen Pelvis Without Oral Without Iv Contrast    Result Date: 2/18/2020  EXAM: CT ABDOMEN PELVIS WO ORAL WO IV CONTRAST LOCATION: [facility] DATE/TIME: 2/18/2020 6:41 PM INDICATION: Indication Not Listed - See Reason for Exam abd pain, loose stools, tachycardia, elevated wbc. COMPARISON: None. TECHNIQUE: CT scan of the abdomen and pelvis was performed without oral or IV contrast. Multiplanar reformats were obtained. Dose reduction  techniques were used. CONTRAST: None. FINDINGS: LOWER CHEST: Normal. HEPATOBILIARY: Normal. PANCREAS: Normal. SPLEEN: Normal. ADRENAL GLANDS: Normal. KIDNEY/BLADDER: Moderate hydronephrosis right renal collecting system 2 the level of the pelvis secondary to peritoneal/pelvic mass. No hydronephrosis on the left side. BOWEL: There is extensive peritoneal carcinomatosis throughout the abdomen and pelvis with large tumor implants several measuring 8 cm in size. The peritoneal tumor centrally in the pelvis measures 17 x 15 cm. Tiny amount of ascites. Rectosigmoid colon poorly seen due to the large amount of tumor in the pelvis. No bowel obstruction. LYMPH NODES: No retroperitoneal lymphadenopathy. VASCULATURE: Unremarkable. PELVIC ORGANS: Normal. MUSCULOSKELETAL: Normal.     1.  Severe peritoneal carcinomatosis with large peritoneal tumor implants throughout the entire abdomen and pelvis most prominently in the pelvis. 2.  No convincing evidence for site of primary tumor. Rectosigmoid colon poorly seen due to compressed from peritoneal tumor. Mild-to-moderate hydronephrosis right kidney due to tumor compression. 3.  For tissue diagnosis, ultrasound-guided core biopsy could be performed where the peritoneal implants. NOTE: ABNORMAL REPORT THE DICTATION ABOVE DESCRIBES AN ABNORMALITY FOR WHICH FOLLOW-UP IS NEEDED.     Cta Chest Pe Run    Result Date: 2/18/2020  EXAM: CTA CHEST PE RUN LOCATION: [facility] DATE/TIME: 2/18/2020 8:46 PM INDICATION: New diagnosis of carcinomatosis and tachycardia COMPARISON: None. TECHNIQUE: CT angiogram chest during arterial phase injection IV contrast. 2D and 3D MIP reconstructions were performed by the CT technologist. Dose reduction techniques were used. CONTRAST: Iohexol (Omni) 80mL FINDINGS: ANGIOGRAM CHEST: No evidence for pulmonary embolism. Pulmonary arteries normal in caliber. Thoracic aorta normal in caliber. No aortic dissection or other acute abnormality. HEART: Cardiac  chambers within normal limits. No pericardial effusion. LUNGS AND PLEURA: No pulmonary mass, consolidation, or suspicious pulmonary nodule. No pleural effusion or pneumothorax. MEDIASTINUM: No adenopathy or mass. MUSCULOSKELETAL: Negative.     1.  No evidence for pulmonary embolism or thoracic metastatic disease.     MEDICATIONS     No current outpatient medications on file prior to visit.     No current facility-administered medications on file prior to visit.        HEALTH MAINTENANCE / SCREENING   PHQ-2 Total Score: 0 (8/7/2019 11:51 AM)  , No data recorded,No data recorded  Immunization History   Administered Date(s) Administered     DTaP / Hep B / IPV 1995, 01/04/1996, 03/06/1996     DTaP / IPV 05/11/2000     DTaP, historic 05/13/1997     HPV 9 Valent 10/12/2015     HiB, historic,unspecified 1995, 01/04/1996, 03/06/1996     Influenza, Live, Nasal LAIV3 10/22/2010     Influenza, inj, historic,unspecified 10/17/2007, 10/22/2010, 12/07/2011     Influenza, seasonal,quad inj 6-35 mos 01/21/2013, 09/26/2013, 10/21/2014     Influenza,seasonal quad, PF, =/> 6months 12/13/2016, 09/30/2019     Influenza,seasonal,quad inj =/> 6months 10/12/2015     MMR 05/13/1997, 05/11/2000     Tdap 10/17/2007     Varicella 10/17/2007, 12/20/2013     Health Maintenance   Topic     HIV SCREENING      HPV VACCINES (2 - Male 3-dose series)     PREVENTIVE CARE VISIT      TD 18+ HE      ADVANCE CARE PLANNING      INFLUENZA VACCINE RULE BASED      TDAP ADULT ONE TIME DOSE        Jc Glass MD  Family Medicine, [facility]     This note was dictated using a voice recognition software.  Any grammatical or context distortion are unintentional and inherent to the software.

## 2021-06-06 NOTE — TELEPHONE ENCOUNTER
Antwon Jason, patient's sister. Scheduled preop Dangelo Wasserman on 3/10/20 @1:40pm. Kidney Biopsy will be done @[facility].

## 2021-06-06 NOTE — TELEPHONE ENCOUNTER
Question following Office Visit  When did you see your provider: Yesterday  What is your question: We had this Image guided kidney biopsy scheduled for tomorrow and my mother cancelled this and then asked to reschedule this and now they are requiring a Pre-op.  Please return call to discuss why the pre-op is now required.   Okay to leave a detailed message: Yes

## 2021-06-06 NOTE — TELEPHONE ENCOUNTER
Planned to call sister to discuss labs and plan. Patient presented to ED last night with worsening abdominal pain and now admitted.

## 2021-06-06 NOTE — PROGRESS NOTES
OFFICE VISIT - FAMILY MEDICINE     ASSESSMENT AND PLAN     1. Peritoneal carcinomatosis (H)  US External Imaging Biopsy    Image Guided Kidney Biopsy    CANCELED: Ambulatory referral to Interventional Radiology    CANCELED: Fine Needle Aspiration   2. Learning disabilities     Peritoneal carcinomatosis, likely from a gastrointestinal tumor, next step is to get a biopsy, and follow-up with with oncologist.  I spoke with the radiologist they prefer a CT scan guided biopsy with IV sedation.  Also spoke with Dr. Atkinson at the oncology clinic and they will be seeing the patient after the biopsy with pathology report if oncology diagnosis is confirmed.    CHIEF COMPLAINT   Follow-up (2/18/20 for abdominal pain; diarrhea @[facility]. Discuss lab and imaging results with patient and family.)    HPI   Diego Buchanan is a 24 y.o. male.  Updated MIIC    Following up on ER visit.  Was initially seen here for diarrhea and elevated white blood cell count, was seen in the ER, CT scan did show diffuse peritoneal carcinomatosis.  Admission was discussed with the patient and family, but they prefer to follow-up outpatient.  Overall has been doing fine, diarrhea symptoms seems to be subsiding.  Denies any fever or chills, no difficulty with urination..  ER notes imaging and labs reviewed.    Review of Systems As per HPI, otherwise negative.    OBJECTIVE   /90 (Patient Site: Left Arm, Patient Position: Sitting, Cuff Size: Adult Large)   Pulse (!) 109   SpO2 97%   Physical Exam   Constitutional: He is oriented to person, place, and time. He appears well-developed and well-nourished.   HENT:   Head: Normocephalic and atraumatic.   Cardiovascular: Normal rate, regular rhythm, normal heart sounds and intact distal pulses. Exam reveals no gallop and no friction rub.   No murmur heard.  Pulmonary/Chest: Effort normal and breath sounds normal. No respiratory distress. He has no wheezes. He has no rales.   Abdominal: He exhibits  no distension. There is no abdominal tenderness. There is no rebound.   Musculoskeletal:         General: No tenderness or edema.   Neurological: He is alert and oriented to person, place, and time. Coordination normal.   Psychiatric: He has a normal mood and affect. Judgment and thought content normal.       Formerly [facility], [facility]     Family History   Problem Relation Age of Onset     Hypertension Mother      Social History     Socioeconomic History     Marital status: Single     Spouse name: Not on file     Number of children: Not on file     Years of education: Not on file     Highest education level: Not on file   Occupational History     Not on file   Social Needs     Financial resource strain: Not on file     Food insecurity:     Worry: Not on file     Inability: Not on file     Transportation needs:     Medical: Not on file     Non-medical: Not on file   Tobacco Use     Smoking status: Never Smoker     Smokeless tobacco: Never Used     Tobacco comment: no exposure to secondhand smoke   Substance and Sexual Activity     Alcohol use: No     Drug use: Not on file     Sexual activity: Never     Partners: Female   Lifestyle     Physical activity:     Days per week: Not on file     Minutes per session: Not on file     Stress: Not on file   Relationships     Social connections:     Talks on phone: Not on file     Gets together: Not on file     Attends [employer] service: Not on file     Active member of club or organization: Not on file     Attends meetings of clubs or organizations: Not on file     Relationship status: Not on file     Intimate partner violence:     Fear of current or ex partner: Not on file     Emotionally abused: Not on file     Physically abused: Not on file     Forced sexual activity: Not on file   Other Topics Concern     Not on file   Social History Narrative     Not on file     Relevant history was reviewed with the patient today, unless noted in HPI, nothing is pertinent for this visit.  [facility]     Patient  Active Problem List    Diagnosis Date Noted     Hyperlipidemia 10/14/2015     Overview Note:     LDL minimally >130 (10/2015)       Morbid obesity (H) 10/13/2015     Learning disabilities 10/12/2015     No past surgical history on file.    RESULTS/CONSULTS (Lab/Rad)     Recent Results (from the past 168 hour(s))   C-Reactive Protein   Result Value Ref Range    CRP 4.5 (H) 0.0 - 0.8 mg/dL   Comprehensive Metabolic Panel   Result Value Ref Range    Sodium 141 136 - 145 mmol/L    Potassium 3.6 3.5 - 5.0 mmol/L    Chloride 106 98 - 107 mmol/L    CO2 24 22 - 31 mmol/L    Anion Gap, Calculation 11 5 - 18 mmol/L    Glucose 109 70 - 125 mg/dL    BUN 8 8 - 22 mg/dL    Creatinine 0.90 0.70 - 1.30 mg/dL    GFR MDRD Af Amer >60 >60 mL/min/1.73m2    GFR MDRD Non Af Amer >60 >60 mL/min/1.73m2    Bilirubin, Total 1.0 0.0 - 1.0 mg/dL    Calcium 9.4 8.5 - 10.5 mg/dL    Protein, Total 7.6 6.0 - 8.0 g/dL    Albumin 3.9 3.5 - 5.0 g/dL    Alkaline Phosphatase 66 45 - 120 U/L    AST 21 0 - 40 U/L    ALT 21 0 - 45 U/L   HM2(CBC w/o Differential)   Result Value Ref Range    WBC 14.6 (H) 4.0 - 11.0 thou/uL    RBC 5.18 4.40 - 6.20 mill/uL    Hemoglobin 13.8 (L) 14.0 - 18.0 g/dL    Hematocrit 43.8 40.0 - 54.0 %    MCV 84 80 - 100 fL    MCH 26.6 (L) 27.0 - 34.0 pg    MCHC 31.5 (L) 32.0 - 36.0 g/dL    RDW 11.6 11.0 - 14.5 %    Platelets 414 140 - 440 thou/uL    MPV 7.0 7.0 - 10.0 fL   Lipase   Result Value Ref Range    Lipase 15 0 - 52 U/L   Lactic Acid   Result Value Ref Range    Lactic Acid 1.2 0.5 - 2.2 mmol/L   Comprehensive Metabolic Panel   Result Value Ref Range    Sodium 142 136 - 145 mmol/L    Potassium 3.6 3.5 - 5.0 mmol/L    Chloride 107 98 - 107 mmol/L    CO2 23 22 - 31 mmol/L    Anion Gap, Calculation 12 5 - 18 mmol/L    Glucose 115 70 - 125 mg/dL    BUN 8 8 - 22 mg/dL    Creatinine 0.84 0.70 - 1.30 mg/dL    GFR MDRD Af Amer >60 >60 mL/min/1.73m2    GFR MDRD Non Af Amer >60 >60 mL/min/1.73m2    Bilirubin, Total 0.9 0.0 - 1.0 mg/dL     Calcium 9.3 8.5 - 10.5 mg/dL    Protein, Total 7.8 6.0 - 8.0 g/dL    Albumin 3.7 3.5 - 5.0 g/dL    Alkaline Phosphatase 68 45 - 120 U/L    AST 22 0 - 40 U/L    ALT 20 0 - 45 U/L   Lipase   Result Value Ref Range    Lipase 16 0 - 52 U/L   HM1 (CBC with Diff)   Result Value Ref Range    WBC 14.7 (H) 4.0 - 11.0 thou/uL    RBC 5.04 4.40 - 6.20 mill/uL    Hemoglobin 13.4 (L) 14.0 - 18.0 g/dL    Hematocrit 41.7 40.0 - 54.0 %    MCV 83 80 - 100 fL    MCH 26.6 (L) 27.0 - 34.0 pg    MCHC 32.1 32.0 - 36.0 g/dL    RDW 13.2 11.0 - 14.5 %    Platelets 428 140 - 440 thou/uL    MPV 9.4 8.5 - 12.5 fL    Neutrophils % 79 (H) 50 - 70 %    Lymphocytes % 11 (L) 20 - 40 %    Monocytes % 8 2 - 10 %    Eosinophils % 1 0 - 6 %    Basophils % 0 0 - 2 %    Neutrophils Absolute 11.6 (H) 2.0 - 7.7 thou/uL    Lymphocytes Absolute 1.6 0.8 - 4.4 thou/uL    Monocytes Absolute 1.1 (H) 0.0 - 0.9 thou/uL    Eosinophils Absolute 0.2 0.0 - 0.4 thou/uL    Basophils Absolute 0.1 0.0 - 0.2 thou/uL     Ct Abdomen Pelvis Without Oral Without Iv Contrast    Result Date: 2/18/2020  EXAM: CT ABDOMEN PELVIS WO ORAL WO IV CONTRAST LOCATION: [facility] DATE/TIME: 2/18/2020 6:41 PM INDICATION: Indication Not Listed - See Reason for Exam abd pain, loose stools, tachycardia, elevated wbc. COMPARISON: None. TECHNIQUE: CT scan of the abdomen and pelvis was performed without oral or IV contrast. Multiplanar reformats were obtained. Dose reduction techniques were used. CONTRAST: None. FINDINGS: LOWER CHEST: Normal. HEPATOBILIARY: Normal. PANCREAS: Normal. SPLEEN: Normal. ADRENAL GLANDS: Normal. KIDNEY/BLADDER: Moderate hydronephrosis right renal collecting system 2 the level of the pelvis secondary to peritoneal/pelvic mass. No hydronephrosis on the left side. BOWEL: There is extensive peritoneal carcinomatosis throughout the abdomen and pelvis with large tumor implants several measuring 8 cm in size. The peritoneal tumor centrally in the pelvis measures  17 x 15 cm. Tiny amount of ascites. Rectosigmoid colon poorly seen due to the large amount of tumor in the pelvis. No bowel obstruction. LYMPH NODES: No retroperitoneal lymphadenopathy. VASCULATURE: Unremarkable. PELVIC ORGANS: Normal. MUSCULOSKELETAL: Normal.     1.  Severe peritoneal carcinomatosis with large peritoneal tumor implants throughout the entire abdomen and pelvis most prominently in the pelvis. 2.  No convincing evidence for site of primary tumor. Rectosigmoid colon poorly seen due to compressed from peritoneal tumor. Mild-to-moderate hydronephrosis right kidney due to tumor compression. 3.  For tissue diagnosis, ultrasound-guided core biopsy could be performed where the peritoneal implants. NOTE: ABNORMAL REPORT THE DICTATION ABOVE DESCRIBES AN ABNORMALITY FOR WHICH FOLLOW-UP IS NEEDED.     Cta Chest Pe Run    Result Date: 2/18/2020  EXAM: CTA CHEST PE RUN LOCATION: [facility] DATE/TIME: 2/18/2020 8:46 PM INDICATION: New diagnosis of carcinomatosis and tachycardia COMPARISON: None. TECHNIQUE: CT angiogram chest during arterial phase injection IV contrast. 2D and 3D MIP reconstructions were performed by the CT technologist. Dose reduction techniques were used. CONTRAST: Iohexol (Omni) 80mL FINDINGS: ANGIOGRAM CHEST: No evidence for pulmonary embolism. Pulmonary arteries normal in caliber. Thoracic aorta normal in caliber. No aortic dissection or other acute abnormality. HEART: Cardiac chambers within normal limits. No pericardial effusion. LUNGS AND PLEURA: No pulmonary mass, consolidation, or suspicious pulmonary nodule. No pleural effusion or pneumothorax. MEDIASTINUM: No adenopathy or mass. MUSCULOSKELETAL: Negative.     1.  No evidence for pulmonary embolism or thoracic metastatic disease.     MEDICATIONS     Current Outpatient Medications on File Prior to Visit   Medication Sig Dispense Refill     ondansetron (ZOFRAN) 4 MG tablet Take 1-2 tab po 4 times daily prn nausea, max daily dose  4 tab 20 tablet 0     traMADoL (ULTRAM) 50 mg tablet Take 1 tablet (50 mg total) by mouth every 6 (six) hours as needed for pain. 20 tablet 0     Current Facility-Administered Medications on File Prior to Visit   Medication Dose Route Frequency Provider Last Rate Last Dose     [COMPLETED] iohexoL 350 mg iodine/mL injection 80 mL (OMNIPAQUE)  80 mL Intravenous Once in imaging Akua Berry MD   80 mL at 02/18/20 2043     [COMPLETED] ondansetron injection 8 mg (ZOFRAN)  8 mg Intravenous Once Akua Berry MD   8 mg at 02/18/20 1745     [COMPLETED] sodium chloride 0.9% 2,000 mL  2,000 mL Intravenous Once Akua Berry MD   Stopped at 02/18/20 1956     [COMPLETED] traMADoL tablet 50 mg (ULTRAM)  50 mg Oral Once Akua Berry MD   50 mg at 02/18/20 2027     [DISCONTINUED] naloxone injection 0.2-0.4 mg (NARCAN)  0.2-0.4 mg Intravenous PRN Akua Berry MD         [DISCONTINUED] naloxone injection 0.2-0.4 mg (NARCAN)  0.2-0.4 mg Intramuscular PRN Akua Berry MD         [DISCONTINUED] sodium chloride flush 10 mL (NS)  10 mL Intravenous Line Care Akua Berry MD           HEALTH MAINTENANCE / SCREENING   PHQ-2 Total Score: 0 (8/7/2019 11:51 AM)  , No data recorded,No data recorded  Immunization History   Administered Date(s) Administered     DTaP / Hep B / IPV 1995, 01/04/1996, 03/06/1996     DTaP / IPV 05/11/2000     DTaP, historic 05/13/1997     HPV 9 Valent 10/12/2015     HiB, historic,unspecified 1995, 01/04/1996, 03/06/1996     Influenza, Live, Nasal LAIV3 10/22/2010     Influenza, inj, historic,unspecified 10/17/2007, 10/22/2010, 12/07/2011     Influenza, seasonal,quad inj 6-35 mos 01/21/2013, 09/26/2013, 10/21/2014     Influenza,seasonal quad, PF, =/> 6months 12/13/2016, 09/30/2019     Influenza,seasonal,quad inj =/> 6months 10/12/2015     MMR 05/13/1997, 05/11/2000     Tdap 10/17/2007     Varicella 10/17/2007, 12/20/2013     Health  Maintenance   Topic     HIV SCREENING      HPV VACCINES (2 - Male 3-dose series)     PREVENTIVE CARE VISIT      TD 18+ HE      ADVANCE CARE PLANNING      INFLUENZA VACCINE RULE BASED      TDAP ADULT ONE TIME DOSE        Jc Glass MD  Family Medicine, [facility]     This note was dictated using a voice recognition software.  Any grammatical or context distortion are unintentional and inherent to the software.

## 2021-06-06 NOTE — TELEPHONE ENCOUNTER
Dr. Glass is on vacation from 3/9/20 to 3/16/20. Patient will need to be scheduled w/different provider.

## 2021-06-07 NOTE — PROGRESS NOTES
The clinic Community Health Worker talked with the patient and his sister Mariela today at the request of the PCP to discuss possible Clinic Care Coordination enrollment.  The service was described to the patient and sister and immediate needs were discussed.  The patient declined enrollement at this time.  The PCP is encouraged to refer in the future if the patient's needs change.    Declined enrollment, no concerns per sister Mariela and family. Patient lives with family and all members are very supportive in Diego's care. CHW provided contact number for CHW if needs change in the future.        Clinic Care Coordination Contact  [facility]       Clinical Data: Care Coordinator Outreach  Outreach attempted x 1.   Patient not available at time of call, requested to call back 3/23  Care Coordinator will try to reach patient again in 1-2 business days.

## 2021-06-07 NOTE — PROGRESS NOTES
[facility] Hematology and Oncology Progress Note    Patient: Diego Buchanan  MRN: [id number]  Date of Service: 04/07/2020        Reason for Visit    Chief Complaint   Patient presents with     HE Cancer       Assessment and Plan  Cancer Staging  No matching staging information was found for the patient.    ECOG Performance   ECOG Performance Status: 1     Distress Assessment  Distress Assessment Score: Unable to rate    Pain  Currently in Pain: Unable to assess      #.  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  Learning disability  #.  Elevated blood pressure.      Please refer to my initial consultation note by Dr. Golmdan on 3/11 and my note on 3/13/2020 for further details.  In summary, he has progressive abdominal pain and distention for 3 months of duration.  He had initial CT scan in February 2020 and it showed severe peritoneal carcinomatosis with large peritoneal tumor implants throughout the entire abdomen and pelvis mostly prominently in the pelvis.  The family decided to do traditional medication, and decided against biopsy and further evaluation.  Unfortunately, he presented to emergency room about a month later with worsening symptoms and repeat CT scan showed interval increase in the amount of peritoneal carcinomatosis.  At that point we agreed to proceed with biopsy and he proceeded with ultrasound-guided core needle biopsy of the peritoneal implants.  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  Tumor markers for CA-19-9, CEA, beta-hCG, alpha-fetoprotein, LDH were all unremarkable.  Because of the inconclusive diagnosis, we sent additional tests for cancer type ID molecular testing by [employer] to determine the exact diagnosis and to assist in further treatment planning.  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  Relative probability of less than 5% of other subtype of sarcoma.     I informed the patient and his sister with the finding.  I informed them that this is a rare type of cancer and it is more pediatric prevalent tumor type.  I also described the overview treatment of systemic chemotherapy +/- radiation +/- surgery.  Because of the significant disease burden, I recommended initiating treatment as soon as possible. I also discussed his case with Dr. Sims, a sarcoma expert at [facility]. He is willing to evaluate Robert and discussion about further treatment plan.   After thorough discussion, Mariela agrees to referral to [facility].    #.  Constipation secondary to increased tumor burden in whole abdomen and pelvis.   Recommended to take stool softeners schedule I tablet twice a day.  I also recommend to take MiraLAX scheduled daily and twice a day if needed.  He can try with suppository if necessary.  I sent a prescription for lactulose if those does not work.  I am not clear Reglan like medication may work for him.  He is advised to call us back with any concern until he is seen at [facility].    Total phone call time: 20 minutes.     Problem List    1. Sarcoma (H)  Ambulatory referral to Oncology/Hematology Adult   2. Slow transit constipation  lactulose (ENULOSE) 10 gram/15 mL solution      ______________________________________________________________________________    History of Present Illness    This telephone visit was completed with the help of her sister, Mariela.   Robert is having progressive fatigue.  Not able to sleep well due to abdominal discomfort.  He has very severe constipation and he passed flatus only.  He has been taking stool softener regularly and MiraLAX as needed.  He does not remember when was his last bowel movement.  He has pain and he takes tramadol very occasionally.  His mom is giving him more vegetables in  his diet.    Pain Status  Currently in Pain: Unable to assess    Review of Systems    Oncology Nurse Assessment/CMA Intake: Constitutional  Constitutional (WDL): Exceptions to WDL  Fatigue: Fatigue not relieved by rest - Limiting instrumental ADL(not sleeping well at night due to constipation)  Fever: None  Chills: None  Neurosensory  Neurosensory (WDL): Exceptions to WDL  Peripheral Motor Neuropathy: None  Ataxia: None  Peripheral Sensory Neuropathy: None  Syncope: None  Eye   Eye Disorder (WDL): Assessment not pertinent to visit  Ear  Ear Disorder (WDL): Assessment not pertinent to visit  Cardiovascular  Cardiovascular (WDL): All cardiovascular elements are within defined limits  Pulmonary  Respiratory (WDL): Within Defined Limits  Gastrointestinal  Gastrointestinal (WDL): Exceptions to WDL  Anorexia: Oral intake altered without significant weight loss or malnutrition, oral nutritional supplements indicated(due constipation)  Constipation: Persistent symptoms with regular use of laxatives or enemas, limiting instrumental ADL(unknown when last BM was)  Diarrhea: None  Dysphagia: None  Esophagitis: None  Nausea: None  Pharyngitis: None  Vomiting: None  Dysgeusia: None  Dry Mouth: None  Genitourinary  Genitourinary (WDL): All genitourinary elements are within defined limits  Lymphatic  Lymph (WDL): Assessment not pertinent to visit  Musculoskeletal and Connective Tissue  Musculoskeletal and Connetive Tissue Disorders (WDL): Exceptions to WDL  Bone Pain: None  Muscle Weakness : None  Myalgia: Mild pain(back, abd)  Integumentary  Integumentary (WDL): All integumentary elements are within defined limits  Patient Coping     Distress Assessment  Distress Assessment Score: Unable to rate  Accompanied by  Accompanied by: Family Member(Sister, Mother)  Oral Chemo Adherence         Past History  No past medical history on file.    Physical Exam    Recent Vitals 3/20/2020   Height -   Weight 298 lbs   BSA (m2) 2.55 m2   BP -    Pulse -   Temp -   Temp src -   SpO2 -   Some recent data might be hidden         Lab Results    No results found for this or any previous visit (from the past 168 hour(s)).    Imaging    Us Muscle Biopsy    Result Date: 3/12/2020  EXAM: 1. PERCUTANEOUS CORE BIOPSY AND FINE-NEEDLE ASPIRATION OF PERITONEAL MASS 2. ULTRASOUND GUIDANCE LOCATION: [facility] DATE/TIME: 3/12/2020 2:04 PM INDICATION: Multiple peritoneal masses. PROCEDURE: Informed consent obtained. Site marked. Prior images reviewed. Required items made available. Patient identity was confirmed verbally and with arm band. Patient reevaluated immediately before administering sedation. Universal protocol was followed. Time out performed. The site was prepped and draped in sterile fashion. 10 mL of 1 percent lidocaine was infused into the local soft tissues. Using standard technique and under direct ultrasound guidance, a 18 gauge biopsy needle was used to make four core biopsies. Fine-needle aspiration also requested by pathology and performed. Tissue was submitted to Pathology. The patient tolerated the procedure well. No complications. RADIOLOGIC SUPERVISION AND INTERPRETATION: ULTRASOUND GUIDANCE: Images demonstrate the biopsy needle in satisfactory position.     Status post US-guided core biopsy and fine-needle aspiration of peritoneal mass. Reference CPT Code: 26241, 31062, 23323.    Ct Abdomen Pelvis Without Oral With Iv Contrast    Result Date: 3/10/2020  EXAM: CT ABDOMEN PELVIS WO ORAL W IV CONTRAST LOCATION: [facility] DATE/TIME: 3/10/2020 10:41 PM INDICATION: Lower abdominal pain with diarrhea and recent diagnosis of peritoneal carcinomatosis. COMPARISON: 2/18/2020. TECHNIQUE: CT scan of the abdomen and pelvis was performed following injection of IV contrast. Multiplanar reformats were obtained. Dose reduction techniques were used. CONTRAST: 100 mL Omnipaque 350. FINDINGS: LOWER CHEST: Minimal atelectasis in the lung bases.  No focal infiltrate, consolidation or pleural fluid. HEPATOBILIARY: Probable gallstone within the gallbladder lumen. No biliary dilatation. Allowing for motion artifact there is diffuse fatty infiltration of the liver. Additionally, there is some ill-defined area of decreased attenuation in the posterior aspect right hepatic lobe (series 2, image 58) which could represent more focal fatty infiltration versus underlying intrahepatic mass. PANCREAS: No significant mass, duct dilatation, or inflammatory change. SPLEEN: Normal size. ADRENAL GLANDS: No significant nodules. KIDNEYS/BLADDER: No significant change in the moderate right-sided hydronephrosis and right ureteral dilatation extending down the proximal right ureter where there is narrowing in the proximal right ureter related to mass effect from the extensive peritoneal carcinomatosis/multiple soft tissue masses throughout the abdomen. No evidence for left-sided hydronephrosis. Moderate bladder distention with mild mass effect upon the dome of the bladder due to diffuse peritoneal carcinomatosis. BOWEL: The bowel is extremely difficult to evaluate due to extensive peritoneal carcinomatosis. There is a large amount of stool within the colon which has increased since the prior examination. The colon is not well visualized distally due to extensive pelvic peritoneal carcinomatosis. The colon, therefore, could be obstructed given this process. The small bowel also is difficult to evaluate but demonstrates no evidence for overt dilatation. Duodenum, stomach and distal esophagus unremarkable. Visualized aspects of the proximal appendix normal in caliber. ADDITIONAL FINDINGS: Extensive soft tissue masses throughout the abdomen and pelvis compatible with diffuse peritoneal carcinomatosis. The largest mass deep within the pelvis measures larger on today's study at 20 x 16 cm as compared to 17 x 15 cm on the  prior CT. Multiple large masses are present throughout the  abdomen elsewhere. Interval increase in the amount of associated locules of fluid and ascites since prior study. Minimal free fluid along the inferior right paracolic gutter. Normal caliber abdominal aorta. MUSCULOSKELETAL: Degenerative changes in the spine.     1.  Interval increase in the amount of peritoneal carcinomatosis throughout the abdomen and pelvis with interval increase in soft tissue masses as detailed above. The soft tissue mass deep within the pelvis has increased to 20 x 16 cm as compared to 17 x  15 cm on the prior CT. Additionally there is greater fluid throughout the abdominal and pelvic cavities, loculated throughout these soft tissue masses. This is also likely creating increased abdominal distension. Furthermore, there is a greater amount of stool in the colon with the distal colon not well visualized likely related to chronic obstruction of the distal colon from this extensive peritoneal carcinomatosis process. No small bowel dilatation at this time. 2.  Probable gallstone within the gallbladder lumen. 3.  Diffuse fatty infiltration of the liver. More focal ill-defined area of low-attenuation posterior aspect right hepatic lobe is indeterminate and could represent streak artifact versus a new hepatic lesion or metastasis. 4.  Remainder of findings as detailed above.     Nm Pet Ct Skull To Mid Thigh    Result Date: 3/20/2020  EXAM: NM PET CT SKULL TO MID THIGH LOCATION: [facility] DATE/TIME: 3/20/2020 3:04 PM INDICATION: Peritoneal carcinomatosis, unknown primary. Initial treatment strategy for staging. COMPARISON: CT from 03/10/2020 is reviewed. TECHNIQUE: Serum glucose level 88 mg/dL. One hour post intravenous administration of 15.2 mCi F-18 FDG, PET imaging was performed from the skull base to the mid thighs utilizing attenuation correction with concurrent axial CT and PET/CT image fusion. Dose reduction techniques were used. FINDINGS: Extensive markedly FDG avid (SUVmax 10.5)  peritoneal soft tissue nodularity. 1.9 x 1.3 cm moderately FDG avid (SUVmax 5.6) left costophrenic angle lymph node. 1.0 x 0.8 cm minimally FDG avid (SUVmax 1.8) right level II cervical lymph node. Tiny  FDG avid left pleural effusion. Small amount of ascites. Partial sacralization of L5 on the right.     1.  Extensive peritoneal carcinomatosis with a metastatic left costophrenic lymph node and a tiny likely malignant left pleural effusion. No primary tumor site is evident. 2.  Minimally FDG avid, normal-sized right cervical lymph node is likely reactive.        Signed by: Earline Hidalgo MD

## 2021-06-07 NOTE — PROGRESS NOTES
"Diego Buchanan is a 24 y.o. male who is being evaluated via a billable telephone visit.      The patient has been notified of following:     \"This telephone visit will be conducted via a call between you and your physician/provider. We have found that certain health care needs can be provided without the need for a physical exam.  This service lets us provide the care you need with a short phone conversation.  If a prescription is necessary we can send it directly to your pharmacy.  If lab work is needed we can place an order for that and you can then stop by our lab to have the test done at a later time.    If during the course of the call the physician/provider feels a telephone visit is not appropriate, you will not be charged for this service.\"     Patient has given verbal consent to a Telephone visit? Yes, per sister and mother. Patient has mental delay/learning disabilities so unable to answer all questions.      Diego Buchanan complains of  No chief complaint on file.      I have reviewed and updated the patient's Past Medical History, Social History, Family History and Medication List.    ALLERGIES  Patient has no known allergies.    Additional provider notes: see toxicity assessment.    Phone call duration: [phone]. Dr. Hidalgo will call Mother, sister back.     Wilbert #[id number]    Skylar Huitron RN      "

## 2021-06-09 ENCOUNTER — TELEPHONE (OUTPATIENT)
Dept: PHARMACY | Facility: CLINIC | Age: 26
End: 2021-06-09

## 2021-06-09 NOTE — TELEPHONE ENCOUNTER
[facility] PRIOR AUTHORIZATION REQUEST     Drug including DOSE: Neulasta Onpro   J Code:  NDC: 97464335853  ICD 10 code: Z45.2, C49.9, C41.9    Date(s) of Service: 06/08/2021    Insurance Name:[employer]  Insurance ID: [id number]    Provider: Farzaneh Sims  Provider NPI: [id number]      [facility]  NPI: [id number]

## 2021-06-09 NOTE — PROGRESS NOTES
Assessment/Plan:        There are no diagnoses linked to this encounter.   Probable scabies by exam and by history   PLAN - 1. Will treat Diego and his brother with permethrin. We went over how to   Use this and to repeat in 14 days if necessary.   FU will be prn We discussed laundering as well.         Subjective:    Patient ID: Diego Buchanan is a 21 y.o. male.    Rash   This is a new problem. The current episode started in the past 7 days. The problem has been gradually worsening since onset. The affected locations include the torso, back, left arm, left hand, right arm and right hand. The rash is characterized by itchiness (Extremely itchy). Associated with: rash in brother  Pertinent negatives include no cough, facial edema, fatigue, fever or sore throat. Past treatments include nothing.   Mom says, through , that another son had similar lesions several years ago, was given a cream and this worked. We reviewed his chat and found that in 2012 he was sseen   By Dr. Dietrich. At that time,it was thought that he had scabies. He was treated with permethrin and got better.     The following portions of the patient's history were reviewed and updated as appropriate: allergies, current medications, past family history, past medical history, past social history, past surgical history and problem list.    Review of Systems   Constitutional: Negative for appetite change, fatigue and fever.   HENT: Negative for sore throat and trouble swallowing.    Respiratory: Negative for cough.    Skin: Positive for rash.   All other systems reviewed and are negative.            Objective:    Physical Exam   Constitutional: He appears well-nourished. No distress.   Skin: Skin is warm and dry. Rash noted. There is erythema.   Scattered lesions on the arms bilaterally. No actual burrowing seen. No lesions on face. These lesions are red, maculopapular.    Nursing note and vitals reviewed.

## 2021-06-09 NOTE — TELEPHONE ENCOUNTER
[facility] PRIOR AUTHORIZATION REQUEST     Drug including DOSE: Onpro  J Code:   NDC: [address]-73  ICD 10 code: C49.9, C41.9    Date(s) of Service: 6/7/2021    Insurance Name:   Insurance ID: [id number]    Provider: Farzaneh Sims  Provider NPI: [id number]    Renewal of Auth      [facility]  NPI: [id number]

## 2021-06-09 NOTE — PROGRESS NOTES
"Assessment and Plan    1. Rash  I discussed with Chanel and Mom that scabies is still my best diagnosis; however it could be something else.  Advised retreat with the permethrin cream that they still have, may apply Westcort for comfort, but it will not \"cure\" rash.  Offered referral to dermatology - they want to wait and see how the next steps dicussed work  - hydrocortisone (WESTCORT) 0.2 % cream; Apply bid to itchy spots twice a day for up to 3 weeks  Dispense: 60 g; Refill: 0    2. Strain of calf muscle, right, initial encounter - Chanel and his Mom wanted to try a topical medication - discussed this was not a cure and that I could refer to PT - they declined for now.  - lidocaine (XYLOCAINE) 5 % ointment; Apply up to quid as needed to right calf  Dispense: 60 g; Refill: 0      Charley Arias MD     -------------------------------------------    Chief Complaint   Patient presents with     Skin Problem     spots locate on the skin      Leg Pain     having leg pain and having difficulty bending down     1) RASH  Chanel saw Dr. Lombardo on 3/3/17 for an itchy rash which she diagnosed as scabies.  She prescribed permethrin cream and he used it once-he did not repeat the treatment. Chanel's Mother assures me all the bedding was washed.  The rash may have responded slightly to the initial application of permethrin, but is back now.  Mom bought some over the counter and put it on - got better    Currently Chanel's right arm has the only active spot of itchiness and redness- this came before applying the permethrin.  Mom says rash has  come and gone for 2 months, and is very itchy    They deny use of  new lotions , soaps or detergents    I wondered if this might be rash associated with celiac disease: Chanel  does get diarrhea sometimes - once in a while.  He has no gas or loose stools or pain, nor food intolerance      2) CALF PAIN  Pain in right calf muscle starting two months ago.  Came on slowly.  No injury. The pain is the " same whether he is resting or walking.  Chanel does not do any sports.  He is not working - he is applying for a job.  Sometimes wakes him up at night.  No swelling redness or warmth      Other concerns:    Patient Active Problem List   Diagnosis     Learning disabilities     Morbid obesity     Hyperlipidemia     Scabies         Current Outpatient Prescriptions:      hydrocortisone (WESTCORT) 0.2 % cream, Apply bid to itchy spots twice a day for up to 3 weeks, Disp: 60 g, Rfl: 0     lidocaine (XYLOCAINE) 5 % ointment, Apply up to quid as needed to right claf, Disp: 60 g, Rfl: 0     permethrin (ELIMITE) 5 % cream, Apply from head to soles of feet. Leave on for 8 hours and wash off, Disp: 60 g, Rfl: 1        History   Smoking Status     Never Smoker   Smokeless Tobacco     Not on file       History   Alcohol Use No       Vitals:    03/31/17 1213   BP: 128/82   Pulse: 83   SpO2: 99%     Body mass index is 47.54 kg/(m^2).     EXAM:    General appearance - alert, well appearing, and in no distress and morbidly obese  Mental status - Has difficulty with some questions and with describing onset of rash  Musculoskeletal - pain to palpation of right calf muscle - but no warmth or erythema of calf.  Also pain with resisted plantar flexion on right.  Skin - on right extensor surface of forearm there is small group of scaly papules; also widely scattered tiny pink papules on both arms

## 2021-06-10 ENCOUNTER — HOME INFUSION (PRE-WILLOW HOME INFUSION) (OUTPATIENT)
Dept: PHARMACY | Facility: CLINIC | Age: 26
End: 2021-06-10

## 2021-06-10 ENCOUNTER — MEDICAL CORRESPONDENCE (OUTPATIENT)
Dept: HEALTH INFORMATION MANAGEMENT | Facility: CLINIC | Age: 26
End: 2021-06-10

## 2021-06-10 LAB
ALBUMIN SERPL-MCNC: 3.4 G/DL (ref 3.4–5)
ALP SERPL-CCNC: 71 U/L (ref 40–150)
ALT SERPL W P-5'-P-CCNC: 22 U/L (ref 0–70)
ANION GAP SERPL CALCULATED.3IONS-SCNC: 8 MMOL/L (ref 3–14)
ANISOCYTOSIS BLD QL SMEAR: SLIGHT
AST SERPL W P-5'-P-CCNC: 6 U/L (ref 0–45)
BASOPHILS # BLD AUTO: 0 10E9/L (ref 0–0.2)
BASOPHILS NFR BLD AUTO: 0.9 %
BILIRUB SERPL-MCNC: 0.5 MG/DL (ref 0.2–1.3)
BUN SERPL-MCNC: 10 MG/DL (ref 7–30)
CALCIUM SERPL-MCNC: 8.2 MG/DL (ref 8.5–10.1)
CHLORIDE SERPL-SCNC: 110 MMOL/L (ref 94–109)
CO2 SERPL-SCNC: 23 MMOL/L (ref 20–32)
CREAT SERPL-MCNC: 0.62 MG/DL (ref 0.66–1.25)
DACRYOCYTES BLD QL SMEAR: ABNORMAL
DIFFERENTIAL METHOD BLD: ABNORMAL
ELLIPTOCYTES BLD QL SMEAR: SLIGHT
EOSINOPHIL # BLD AUTO: 0.1 10E9/L (ref 0–0.7)
EOSINOPHIL NFR BLD AUTO: 1.7 %
ERYTHROCYTE [DISTWIDTH] IN BLOOD BY AUTOMATED COUNT: 16.3 % (ref 10–15)
GFR SERPL CREATININE-BSD FRML MDRD: >90 ML/MIN/{1.73_M2}
GLUCOSE SERPL-MCNC: 76 MG/DL (ref 70–99)
HCT VFR BLD AUTO: 31.6 % (ref 40–53)
HGB BLD-MCNC: 10 G/DL (ref 13.3–17.7)
LYMPHOCYTES # BLD AUTO: 0.3 10E9/L (ref 0.8–5.3)
LYMPHOCYTES NFR BLD AUTO: 7.7 %
MAGNESIUM SERPL-MCNC: 2.2 MG/DL (ref 1.6–2.3)
MCH RBC QN AUTO: 29.4 PG (ref 26.5–33)
MCHC RBC AUTO-ENTMCNC: 31.6 G/DL (ref 31.5–36.5)
MCV RBC AUTO: 93 FL (ref 78–100)
MICROCYTES BLD QL SMEAR: PRESENT
MONOCYTES # BLD AUTO: 0.1 10E9/L (ref 0–1.3)
MONOCYTES NFR BLD AUTO: 1.7 %
NEUTROPHILS # BLD AUTO: 3.9 10E9/L (ref 1.6–8.3)
NEUTROPHILS NFR BLD AUTO: 88 %
PHOSPHATE SERPL-MCNC: 3.4 MG/DL (ref 2.5–4.5)
PLATELET # BLD AUTO: 245 10E9/L (ref 150–450)
PLATELET # BLD EST: ABNORMAL 10*3/UL
POIKILOCYTOSIS BLD QL SMEAR: ABNORMAL
POTASSIUM SERPL-SCNC: 3.8 MMOL/L (ref 3.4–5.3)
PROT SERPL-MCNC: 6.8 G/DL (ref 6.8–8.8)
RBC # BLD AUTO: 3.4 10E12/L (ref 4.4–5.9)
RBC INCLUSIONS BLD: ABNORMAL
SODIUM SERPL-SCNC: 141 MMOL/L (ref 133–144)
WBC # BLD AUTO: 4.4 10E9/L (ref 4–11)

## 2021-06-10 PROCEDURE — 85025 COMPLETE CBC W/AUTO DIFF WBC: CPT | Performed by: INTERNAL MEDICINE

## 2021-06-10 PROCEDURE — 84100 ASSAY OF PHOSPHORUS: CPT | Performed by: INTERNAL MEDICINE

## 2021-06-10 PROCEDURE — 83735 ASSAY OF MAGNESIUM: CPT | Performed by: INTERNAL MEDICINE

## 2021-06-10 PROCEDURE — 80053 COMPREHEN METABOLIC PANEL: CPT | Performed by: INTERNAL MEDICINE

## 2021-06-11 ENCOUNTER — HOME INFUSION (PRE-WILLOW HOME INFUSION) (OUTPATIENT)
Dept: PHARMACY | Facility: CLINIC | Age: 26
End: 2021-06-11

## 2021-06-11 ENCOUNTER — DOCUMENTATION ONLY (OUTPATIENT)
Dept: PHARMACY | Facility: CLINIC | Age: 26
End: 2021-06-11

## 2021-06-11 NOTE — PROGRESS NOTES
Skilled nurse visit in the home, for discontinuation of Mesna. 2620 mg of Mesna infused over 24 hours.  Christine Caballero RN, BSN   [facility]  [phone]  [email or web address]

## 2021-06-15 ENCOUNTER — HOSPITAL ENCOUNTER (OUTPATIENT)
Dept: PHYSICAL THERAPY | Facility: CLINIC | Age: 26
Setting detail: THERAPIES SERIES
End: 2021-06-15
Attending: PHYSICIAN ASSISTANT
Payer: COMMERCIAL

## 2021-06-15 DIAGNOSIS — R53.81 PHYSICAL DECONDITIONING: ICD-10-CM

## 2021-06-15 PROCEDURE — 97110 THERAPEUTIC EXERCISES: CPT | Mod: GP | Performed by: PHYSICAL THERAPIST

## 2021-06-15 PROCEDURE — 97161 PT EVAL LOW COMPLEX 20 MIN: CPT | Mod: GP | Performed by: PHYSICAL THERAPIST

## 2021-06-15 ASSESSMENT — 6 MINUTE WALK TEST (6MWT): TOTAL DISTANCE WALKED (FT): 644

## 2021-06-15 NOTE — TELEPHONE ENCOUNTER
PA Initiation    Medication: Renewal  Insurance Company: [facility] - Phone [phone] Fax [phone]  Pharmacy Filling the Rx: [facility]  Filling Pharmacy Phone:    Filling Pharmacy Fax:    Start Date: 6/15/2021    Central Prior Authorization Team   Phone: [phone]

## 2021-06-15 NOTE — PROGRESS NOTES
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 6 cycles of chemo beginning 5/2020. 10/9/20 started CAV every 21 days. He has a disability, cognitively that of elementary age. Sister present and answers questions for him. Last year lost a lot of weight, was 300lbs, lost 100lbs, now back to 250. has not been active when it comes to outdoor activity. Has little dumbbells. Balance is okay. Not doing any exercises.    Pertinent Visual History  Reports it is okay   Current Community Support Family/friend caregiver  (Sister, Mariela )   Living environment [facility]   Home/Community Accessibility Comments Stairs w/no railing    Patient/Family Goals Statement To be more active, get body moving more    Fall Risk Screen   Fall screen completed by PT   Have you fallen 2 or more times in the past year? No   Have you fallen and had an injury in the past year? No   Is patient a fall risk? No   Pain   Patient currently in pain No   Vitals Signs   Heart Rate 98   SpO2 99   Blood Pressure 121/71   Cognitive Status Examination   Orientation orientation to person, place and time   Level of Consciousness alert   Follows Commands and Answers Questions 100% of the time   Personal Safety and Judgment intact   Integumentary   Integumentary No deficits were identified    Posture   Posture Comments Slightly forward shoulders    Range of Motion (ROM)   ROM Comment WNL's   Strength   Strength Comments UE: shoulder flex: 4/5, all other 5/5, LEs: 5/5 all MMT    Bed Mobility   Bed Mobility Comments Independent   Transfer Skills   Transfer Comments 30 sec STS: 12 reps w/o UE use    Gait   Gait Comments Pt ambulates with slowed gait, decreased step length and foot clearance. Likely more a lack of motivation to walk faster/move his body, likely is physically able to.    Gait Special Tests   Gait Special Tests 25 FOOT TIMED WALK;SIX MINUTE WALK TEST   Gait Special Tests 25 Foot Timed Walk   Seconds 12.3   Steps 20 Steps   Gait Special Tests Six Minute Walk Test   Feet 644 Feet   Comments 196 meters - vitals after: Hr: 100, O2:100%, BP: 123/66    Balance Special Tests   Balance Special Tests Single leg stance right;Single leg stance left;Modified CTSIB Conditions;Sit to stand reps   Balance Special Tests Single Leg Stance Right,   Right, seconds 5 Seconds   Balance Special Tests Single Leg Stance Left   Left, seconds 2 Seconds   Balance Special Tests Modified CTSIB Conditions   Condition 1, seconds 30 Seconds   Condition 2, seconds 30 Seconds   Modified CTSIB Comments Pt fearful of eyes closed/balance challenge so discontinued    Balance Special Tests Sit to Stand Reps in 30 Seconds   Reps in 30 seconds 12   Height standard height chair    Comments Increased respiration at end.    Sensory Examination   Sensory Perception no deficits were identified   Planned Therapy Interventions   Planned Therapy Interventions balance training;gait training;neuromuscular re-education;ROM;strengthening;stretching;transfer training   Clinical Impression   Criteria for Skilled Therapeutic Interventions Met yes, treatment indicated   PT Diagnosis Deconditioning   Influenced by the following impairments UE weakness, fatigue, lack of motivation, cognition, impaired activity tolerance    Functional limitations due  to impairments community mobility    Clinical Presentation Stable/Uncomplicated   Clinical Decision Making (Complexity) Low complexity   Therapy Frequency 1 time/week   Predicted Duration of Therapy Intervention (days/wks) Up to 90 days as needed    Risk & Benefits of therapy have been explained Yes   Patient, Family & other staff in agreement with plan of care Yes   Clinical Impression Comments Patient will benefit from skilled PT services in order to improve activity tolerance and increase overall physical activity to promote improved health and well-being    Education Assessment   Barriers to Learning Cognitive  (Cognitively at elementary school level )   GOALS   PT Eval Goals 1;2;3   Goal 1   Goal Identifier 6MWT    Goal Description Pt to improve distance on 6MWT by 60 meters (256 meters) or more in order to demo a significant improvement in activity tolerance for improved community mobility    Target Date 09/12/21   Goal 2   Goal Identifier STS    Goal Description Pt to perform 16 or more STS in 30 sec w/o use of UEs to improve functional LE strength    Target Date 09/12/21   Goal 3   Goal Identifier HEP    Goal Description Pt to be independent in HEP including daily walking with family to promote overall health and well-being and improve activity tolerance    Target Date 09/12/21   Total Evaluation Time   PT Eval, Low Complexity Minutes (07164) 30   Therapy Certification   Certification date from 06/08/21   Certification date to 09/12/21   Medical Diagnosis Physical deconditioning    Certification I certify the need for these services furnished under this plan of treatment and while under my care.  (Physician co-signature of this document indicates review and certification of the therapy plan).

## 2021-06-15 NOTE — TELEPHONE ENCOUNTER
Prior Authorization Approval     Authorization Effective Date: 5/16/2021  Authorization Expiration Date: 12/12/2021  Medication: Renewal  Approved Dose/Quantity:   Reference #: [id number]   Insurance Company: [employer] - Phone [phone] Fax [phone]  Which Pharmacy is filling the prescription (Not needed for infusion/clinic administered): [facility]  Pharmacy Notified: Yes

## 2021-06-15 NOTE — TELEPHONE ENCOUNTER
Prior Authorization Approval    Authorization Effective Date: 5/16/2021  Authorization Expiration Date: 12/12/2021  Medication: Renewal  Approved Dose/Quantity:   Reference #: [id number]   Insurance Company: [employer] - Phone [phone] Fax [phone]  Which Pharmacy is filling the prescription (Not needed for infusion/clinic administered): [facility]  Pharmacy Notified: Yes

## 2021-06-15 NOTE — PROGRESS NOTES
[facility]        OUTPATIENT PHYSICAL THERAPY FUNCTIONAL EVALUATION  PLAN OF TREATMENT FOR OUTPATIENT REHABILITATION  (COMPLETE FOR INITIAL CLAIMS ONLY)  Patient's Last Name, First Name, M.I.  YOB: 1995  Diego Buchanan        Provider's Name   [facility]   Medical Record No.  [id number]     Start of Care Date:  06/15/21   Onset Date:  06/03/21   Type:     _X__PT   ____OT  ____SLP Medical Diagnosis:  Physical deconditioning      PT Diagnosis:  Deconditioning Visits from SOC:  1                              __________________________________________________________________________________  Plan of Treatment/Functional Goals:  balance training, gait training, neuromuscular re-education, ROM, strengthening, stretching, transfer training           GOALS  6MWT   Pt to improve distance on 6MWT by 60 meters (256 meters) or more in order to demo a significant improvement in activity tolerance for improved community mobility   09/12/21    STS   Pt to perform 16 or more STS in 30 sec w/o use of UEs to improve functional LE strength   09/12/21    HEP   Pt to be independent in HEP including daily walking with family to promote overall health and well-being and improve activity tolerance   09/12/21                                                           Therapy Frequency:  1 time/week   Predicted Duration of Therapy Intervention:  Up to 90 days as needed     Pia Laboy, PT                                    I CERTIFY THE NEED FOR THESE SERVICES FURNISHED UNDER        THIS PLAN OF TREATMENT AND WHILE UNDER MY CARE     (Physician co-signature of this document indicates review and certification of the therapy plan).                Certification Date From:  06/08/21   Certification Date To:  09/12/21    Referring Provider:  Yasmine Mckeon PA-C    Initial Assessment  See  Epic Evaluation- Start of Care Date: 06/15/21

## 2021-06-16 ENCOUNTER — ANCILLARY PROCEDURE (OUTPATIENT)
Dept: CT IMAGING | Facility: CLINIC | Age: 26
End: 2021-06-16
Attending: INTERNAL MEDICINE
Payer: COMMERCIAL

## 2021-06-16 VITALS
TEMPERATURE: 98.6 F | BODY MASS INDEX: 39.83 KG/M2 | DIASTOLIC BLOOD PRESSURE: 72 MMHG | RESPIRATION RATE: 16 BRPM | HEART RATE: 90 BPM | OXYGEN SATURATION: 100 % | WEIGHT: 261.9 LBS | SYSTOLIC BLOOD PRESSURE: 116 MMHG

## 2021-06-16 DIAGNOSIS — C41.9 SARCOMA, EWINGS (H): ICD-10-CM

## 2021-06-16 DIAGNOSIS — C49.9 DESMOPLASTIC SMALL ROUND CELL TUMOR (H): ICD-10-CM

## 2021-06-16 LAB
ALBUMIN SERPL-MCNC: 3.5 G/DL (ref 3.4–5)
ALP SERPL-CCNC: 126 U/L (ref 40–150)
ALT SERPL W P-5'-P-CCNC: 36 U/L (ref 0–70)
ANION GAP SERPL CALCULATED.3IONS-SCNC: 4 MMOL/L (ref 3–14)
AST SERPL W P-5'-P-CCNC: 12 U/L (ref 0–45)
BASOPHILS # BLD AUTO: 0.1 10E9/L (ref 0–0.2)
BASOPHILS NFR BLD AUTO: 0.8 %
BILIRUB SERPL-MCNC: 0.4 MG/DL (ref 0.2–1.3)
BUN SERPL-MCNC: 10 MG/DL (ref 7–30)
CALCIUM SERPL-MCNC: 8.4 MG/DL (ref 8.5–10.1)
CHLORIDE SERPL-SCNC: 111 MMOL/L (ref 94–109)
CO2 SERPL-SCNC: 22 MMOL/L (ref 20–32)
CREAT SERPL-MCNC: 0.7 MG/DL (ref 0.66–1.25)
DIFFERENTIAL METHOD BLD: ABNORMAL
EOSINOPHIL # BLD AUTO: 0.3 10E9/L (ref 0–0.7)
EOSINOPHIL NFR BLD AUTO: 2.1 %
ERYTHROCYTE [DISTWIDTH] IN BLOOD BY AUTOMATED COUNT: 17.2 % (ref 10–15)
GFR SERPL CREATININE-BSD FRML MDRD: >90 ML/MIN/{1.73_M2}
GLUCOSE SERPL-MCNC: 93 MG/DL (ref 70–99)
HCT VFR BLD AUTO: 31.3 % (ref 40–53)
HGB BLD-MCNC: 10.1 G/DL (ref 13.3–17.7)
IMM GRANULOCYTES # BLD: 0.3 10E9/L (ref 0–0.4)
IMM GRANULOCYTES NFR BLD: 2.5 %
LYMPHOCYTES # BLD AUTO: 0.5 10E9/L (ref 0.8–5.3)
LYMPHOCYTES NFR BLD AUTO: 3.4 %
MCH RBC QN AUTO: 30.1 PG (ref 26.5–33)
MCHC RBC AUTO-ENTMCNC: 32.3 G/DL (ref 31.5–36.5)
MCV RBC AUTO: 93 FL (ref 78–100)
MONOCYTES # BLD AUTO: 1.7 10E9/L (ref 0–1.3)
MONOCYTES NFR BLD AUTO: 12.8 %
NEUTROPHILS # BLD AUTO: 10.5 10E9/L (ref 1.6–8.3)
NEUTROPHILS NFR BLD AUTO: 78.4 %
NRBC # BLD AUTO: 0.1 10*3/UL
NRBC BLD AUTO-RTO: 1 /100
PLATELET # BLD AUTO: 167 10E9/L (ref 150–450)
POTASSIUM SERPL-SCNC: 3.6 MMOL/L (ref 3.4–5.3)
PROT SERPL-MCNC: 6.9 G/DL (ref 6.8–8.8)
RBC # BLD AUTO: 3.35 10E12/L (ref 4.4–5.9)
SODIUM SERPL-SCNC: 137 MMOL/L (ref 133–144)
WBC # BLD AUTO: 13.3 10E9/L (ref 4–11)

## 2021-06-16 PROCEDURE — 250N000011 HC RX IP 250 OP 636: Performed by: INTERNAL MEDICINE

## 2021-06-16 PROCEDURE — 74177 CT ABD & PELVIS W/CONTRAST: CPT | Performed by: RADIOLOGY

## 2021-06-16 PROCEDURE — 71260 CT THORAX DX C+: CPT | Performed by: RADIOLOGY

## 2021-06-16 PROCEDURE — 85025 COMPLETE CBC W/AUTO DIFF WBC: CPT | Performed by: INTERNAL MEDICINE

## 2021-06-16 PROCEDURE — 80053 COMPREHEN METABOLIC PANEL: CPT | Performed by: INTERNAL MEDICINE

## 2021-06-16 RX ORDER — IOPAMIDOL 755 MG/ML
135 INJECTION, SOLUTION INTRAVASCULAR ONCE
Status: COMPLETED | OUTPATIENT
Start: 2021-06-16 | End: 2021-06-16

## 2021-06-16 RX ORDER — HEPARIN SODIUM,PORCINE 10 UNIT/ML
5 VIAL (ML) INTRAVENOUS ONCE
Status: COMPLETED | OUTPATIENT
Start: 2021-06-16 | End: 2021-06-16

## 2021-06-16 RX ADMIN — Medication 5 ML: at 11:02

## 2021-06-16 RX ADMIN — IOPAMIDOL 135 ML: 755 INJECTION, SOLUTION INTRAVASCULAR at 11:55

## 2021-06-16 ASSESSMENT — PAIN SCALES - GENERAL: PAINLEVEL: NO PAIN (0)

## 2021-06-16 NOTE — PROGRESS NOTES
This is a recent snapshot of the patient's [facility] medical record.  For current drug dose and complete information and questions, call [phone] or In Basket pool, [facility] home infusion ([id number])  CSN Number:  [id number]

## 2021-06-16 NOTE — NURSING NOTE
Chief Complaint   Patient presents with     Blood Draw     Labs drawn  by RN in lab. VS taken.      Labs collected via  by RN. CVC lines flushed with saline and heparin locked. Blood return noted from both red and purple lumens but inadequate amount for lab draw.  Vitals taken. Pt tolerated well.     Daniela Riddle RN

## 2021-06-17 ENCOUNTER — VIRTUAL VISIT (OUTPATIENT)
Dept: ONCOLOGY | Facility: CLINIC | Age: 26
End: 2021-06-17
Attending: INTERNAL MEDICINE
Payer: COMMERCIAL

## 2021-06-17 DIAGNOSIS — C49.9 DESMOPLASTIC SMALL ROUND CELL TUMOR (H): Primary | ICD-10-CM

## 2021-06-17 DIAGNOSIS — C41.9 SARCOMA, EWINGS (H): ICD-10-CM

## 2021-06-17 PROCEDURE — 99214 OFFICE O/P EST MOD 30 MIN: CPT | Mod: 95 | Performed by: INTERNAL MEDICINE

## 2021-06-17 PROCEDURE — 999N001193 HC VIDEO/TELEPHONE VISIT; NO CHARGE

## 2021-06-17 NOTE — PROGRESS NOTES
Diego is a 26 year old who is being evaluated via a billable video visit.      How would you like to obtain your AVS? MyChart  If the video visit is dropped, the invitation should be resent by: Send to e-mail at: [email or web address]  Will anyone else be joining your video visit? No      I have reviewed and updated the patient's allergies and medication list.    Concerns: none  Refills: none     Vitals - Patient Reported  Weight (Patient Reported): 120.7 kg (266 lb)  Height (Patient Reported): 152.4 cm (5')  BMI (Based on Pt Reported Ht/Wt): 51.95  Pain Score: No Pain (0)    Harriet Rogers CMA    Video-Visit Details  Type of service:  Video Visit  Video Start Time: 4:45 PM  Video End Time:5:15 PM  Originating Location (pt. Location): Home  Distant Location (provider location):  [facility]   Platform used for Video Visit: [facility] ONCOLOGY PROGRESS NOTE  Sarcoma Clinic  Jun 17, 2021    CHIEF COMPLAINT: Desmoplastic small round cell tumor    Oncologic History:  1. He presented initially with abdominal pain, diarrhea, and progressive abdominal distention for 3 months duration. 2. Initial CT scan in February 2020 showed severe peritoneal carcinomatosis with large peritoneal tumor implants throughout the entire abdomen and pelvis, but mostly prominently in the pelvis.   2. PETCT scan showed interval increase in the amount of peritoneal carcinomatosis.  3. On 3/12/2020, he had ultrasound-guided core needle biopsy of a peritoneal implant (Case: [id number]), which showed a completely undifferentiated appearance, but stains with pancytokeratin, indicating an epithelial origin. 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 Tumor markers for CA-19-9, CEA, beta-hCG, alpha-fetoprotein were unremarkable. Cancer type ID molecular testing by [facility] sent to determine the exact diagnosis and to assist in further  treatment planning. 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 Relative probability of less than 5% of other subtype of sarcoma. EWSR1-WT1 fusion detected.  4. 5/1/2020, MRI brain negative for brain metastasis, and baseline CT-CAP obtained showing extensive mixed solid and cystic masses throughout the abdomen and pelvis consistent with peritoneal carcinomatosis. Largest mass measures approximately 20 cm in the pelvis. Metastatic mixed solid and cystic masses seen in hepatic segments 5 and 6 and hepatic segment 7. The masses appear to be contiguous with the retrohepatic peritoneal carcinomatosis. Small volume fluid about the spleen favored to represent malignant ascites, stable mild-to-moderate right hydronephrosis related to mass effect upon the distal ureter in the pelvis, the IVC and iliac veins are compressed due to the extensive peritoneal carcinomatosis without findings to suggest deep venous thrombosis.  5. 5/4/2020, he begins CAV/IMV alternating chemotherapy. He receives 6 cycles of treatment. He symptomatically improves.  6. 9/11/2020, CT-CAP shows stable to mildly improved extensive peritoneal carcinomatosis. He would like to omit Ifosfamide/etoposide cycles and continue only with CAV.  7. 10/9/2020, he starts CAV every 21 days.  8. 1/6/2021, CT CAP showed a mixed response in the mixed solid and cystic masses throughout the peritoneum. Some of the tumors are stable to mildly worse, with some of the peritoneal masses a bit larger, a few are smaller, one cystic tumor in the left abdomen is smaller, while tumors lower in the pelvis appear slightly larger.  9. 3/24/2021, CT CAP showed continued slight decrease in overall burden of peritoneal carcinomatosis with persistent encasement of bowel without evidence of bowel obstruction.     HISTORY OF PRESENT ILLNESS  Diego Buchanan presents via video with sister for follow up.     He is feeling  well. No new concerns today. Eating and drinking well. Has gained back a couple of pounds. His energy level is good. He's able to stay active. No new areas of pain. Bowels are moving well. No issues with blood in urine.    Review of Systems:  12-point ROS is negative except as in HPI    Current Outpatient Medications   Medication Sig Dispense Refill     allopurinol (ZYLOPRIM) 300 MG tablet        CATHFLO ACTIVASE 2 MG injection        folic acid (FOLVITE) 1 MG tablet        LORazepam (ATIVAN) 0.5 MG tablet Take 1 tablet (0.5 mg) by mouth every 4 hours as needed (Anxiety, Nausea/Vomiting or Sleep) 30 tablet 5     mesna (MESNEX) 400 MG TABS tablet Take 1 tablet (400 mg) by mouth every 4 hours for 2 doses Take one tablet 4 hours and 8 hours after end of cyclophosphamide infusion. 2 tablet 0     NEULASTA ONPRO 6 MG/0.6ML injection        polyethylene glycol (MIRALAX) 17 g packet Take 17 g by mouth       prochlorperazine (COMPAZINE) 10 MG tablet Take 1 tablet (10 mg) by mouth every 6 hours as needed (Nausea/Vomiting) 30 tablet 5     senna-docusate (SENNA S) 8.6-50 MG tablet Take 1 tablet by mouth 2 times daily 60 tablet 0     dexamethasone (DECADRON) 4 MG tablet Take 2 tablets (8 mg) by mouth daily (with breakfast) for 3 days Start the day after doxorubicin, cyclophosphamide, and vincristine (odd cycles). 6 tablet 8     mesna (MESNEX) 400 MG TABS tablet Take 1 tablet (400 mg) by mouth every 4 hours for 2 doses Take one tablet 4 hours and 8 hours after end of cyclophosphamide infusion. 2 tablet 0     Objective:  General: patient appears well in no acute distress, alert and oriented, speech clear and fluid  Skin: no visualized rash or lesions on visualized skin  Resp: Appears to be breathing comfortably without accessory muscle usage, speaking in full sentences, no audible wheezes or cough.  Psych: Coherent speech, normal rate and volume, able to articulate logical thoughts, able to abstract reason, no tangential thoughts,  no hallucinations or delusions  Patient's affect is appropriate.    Labs:  Orders Only on 06/16/2021   Component Date Value Ref Range Status     Sodium 06/16/2021 137  133 - 144 mmol/L Final     Potassium 06/16/2021 3.6  3.4 - 5.3 mmol/L Final     Chloride 06/16/2021 111* 94 - 109 mmol/L Final     Carbon Dioxide 06/16/2021 22  20 - 32 mmol/L Final     Anion Gap 06/16/2021 4  3 - 14 mmol/L Final     Glucose 06/16/2021 93  70 - 99 mg/dL Final     Urea Nitrogen 06/16/2021 10  7 - 30 mg/dL Final     Creatinine 06/16/2021 0.70  0.66 - 1.25 mg/dL Final     GFR Estimate 06/16/2021 >90  >60 mL/min/[1.73_m2] Final     GFR Estimate If Black 06/16/2021 >90  >60 mL/min/[1.73_m2] Final     Calcium 06/16/2021 8.4* 8.5 - 10.1 mg/dL Final     Bilirubin Total 06/16/2021 0.4  0.2 - 1.3 mg/dL Final     Albumin 06/16/2021 3.5  3.4 - 5.0 g/dL Final     Protein Total 06/16/2021 6.9  6.8 - 8.8 g/dL Final     Alkaline Phosphatase 06/16/2021 126  40 - 150 U/L Final     ALT 06/16/2021 36  0 - 70 U/L Final     AST 06/16/2021 12  0 - 45 U/L Final     WBC 06/16/2021 13.3* 4.0 - 11.0 10e9/L Final     RBC Count 06/16/2021 3.35* 4.4 - 5.9 10e12/L Final     Hemoglobin 06/16/2021 10.1* 13.3 - 17.7 g/dL Final     Hematocrit 06/16/2021 31.3* 40.0 - 53.0 % Final     MCV 06/16/2021 93  78 - 100 fl Final     MCH 06/16/2021 30.1  26.5 - 33.0 pg Final     MCHC 06/16/2021 32.3  31.5 - 36.5 g/dL Final     RDW 06/16/2021 17.2* 10.0 - 15.0 % Final     Platelet Count 06/16/2021 167  150 - 450 10e9/L Final     Diff Method 06/16/2021 Automated Method   Final     % Neutrophils 06/16/2021 78.4  % Final     % Lymphocytes 06/16/2021 3.4  % Final     % Monocytes 06/16/2021 12.8  % Final     % Eosinophils 06/16/2021 2.1  % Final     % Basophils 06/16/2021 0.8  % Final     % Immature Granulocytes 06/16/2021 2.5  % Final     Nucleated RBCs 06/16/2021 1* 0 /100 Final     Absolute Neutrophil 06/16/2021 10.5* 1.6 - 8.3 10e9/L Final     Absolute Lymphocytes 06/16/2021 0.5*  0.8 - 5.3 10e9/L Final     Absolute Monocytes 06/16/2021 1.7* 0.0 - 1.3 10e9/L Final     Absolute Eosinophils 06/16/2021 0.3  0.0 - 0.7 10e9/L Final     Absolute Basophils 06/16/2021 0.1  0.0 - 0.2 10e9/L Final     Abs Immature Granulocytes 06/16/2021 0.3  0 - 0.4 10e9/L Final     Absolute Nucleated RBC 06/16/2021 0.1   Final       Imaging:  CT Chest/Abdomen/Pelvis w Contrast  Narrative: EXAM: CT CHEST/ABDOMEN/PELVIS W CONTRAST  LOCATION: [facility]  DATE/TIME: 6/16/2021 11:34 AM    INDICATION: Soft tissue sarcoma, retroperitoneal/intra-abdominal, assess treatment response. Intra-abdominal desmoplastic small round cell tumor with peritoneal carcinomatosis, tatiana, hepatic, and osseous metastatic disease.  COMPARISON: 3/24/2021  TECHNIQUE: CT scan of the chest, abdomen, and pelvis was performed following injection of IV contrast. Multiplanar reformats were obtained. Dose reduction techniques were used.   CONTRAST: Isovue 370 135cc    FINDINGS:   LUNGS AND PLEURA: No consolidation. No pleural effusions. No pneumothorax.    MEDIASTINUM/AXILLAE: Right port. Visualized thyroid gland is unremarkable. No thoracic adenopathy. No cardiomegaly. No pericardial effusion. Normal caliber thoracic aorta. No acute abnormality. Although not a dedicated PE study, no gross PE.    CORONARY ARTERY CALCIFICATION: None.    HEPATOBILIARY: Along the medial margin of the posterior segments of the right hepatic lobe, there is a similar appearance of a lobulated heterogeneously enhancing mass measuring 6.3 x 2.6 cm. This previously measured 6.8 x 2.5 cm. Subjectively, it   appears similar. Previously seen separate low-density mass or lesion in the posterior right hepatic lobe is barely visible today on series 3, image 240.    Gallbladder appears similar to prior. Previously seen biliary ductal dilation is no longer visualized.    PANCREAS: Normal.    SPLEEN: Normal.    ADRENAL GLANDS: Normal.    KIDNEYS/BLADDER: No stones or  hydronephrosis. The distal ureters are not well seen due to the pelvic disease. Urinary bladder is grossly similar to prior.    BOWEL: Stomach unremarkable. Small bowel negative for obstruction. Distal small bowel not well seen due to the peritoneal disease. Appendix not visualized. Mild to moderate stool burden in the colon. Colon is also obscured by intra-abdominal disease.    LYMPH NODES: No bulky retroperitoneal lymphadenopathy. There are scattered, shotty nonspecific mesenteric lymph nodes. There is extensive peritoneal disease with numerous implants, especially in the lower abdomen and pelvis, where there is confluent   masslike disease with partial calcification. Overall degree of tumor burden is similar to prior.     In the right abdomen, there is a well-circumscribed low-density mass measuring 8.8 x 8.0 cm on series 3, image 385. This previously measured 9.6 x 6.8 cm. In the mid abdomen is a 3.2 x 3.4 cm low-density lesion, previously measuring 4.2 x 4.1 cm.    No large ascites. No free air.    VASCULATURE: No abdominal aortic aneurysm.    PELVIC ORGANS: Prostate grossly unremarkable by CT.    MUSCULOSKELETAL: There are a few small scattered sclerotic lesions throughout the visualized bones.  Impression: IMPRESSION:  1.  Liver masses are similar to prior.    2.  Brashear of peritoneal carcinomatosis is similar to prior.    3.  Slight decrease in the sizes of in the cystic masses in the abdomen.      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 He tolerated 6 alternating cycles of CAV/IMV with anticipated side effects. He developed hematuria with his last 2 cycles of Ifosfamide based therapy despite dose reduction and Mesna prophylaxis, and ultimately patient  and family decided to continue with CAV alone. Due to a mixed response on his last CT, it was decided to add back in alternating IMV. He has not had recurrence of the hematuria. It is likely that the hematuria was caused by tumor invasion of the bladder, and now with some response (and time off ifos), this has improved. CT-scan today shows stable to mildly improved burden of disease.  -Continue with plan for upcoming CAV. Discussed taking a short break after his next round of chemotherapy.  -Reassess later if a couple more cycles might be beneficial given the control to date     #3 Anemia, normocytic. Initially microcytic, now normocytic  Hemoglobin trends up during periods of time without hematuria and down when he has more hematuria. Likely multifactorial with chemotherapy and intermittent hematuria. No current transfusion needs. Will continue to monitor closely.      #4 Deconditioning  Continue to encourage physical activity and walk for 10 minutes every day.     #5 History of recurrent hematuria, resolved  He previously saw urology in September 2020 who felt this could be secondary to cyclophosphamide (despite mesna), new bladder/urethral primary malignancies, or metastatic bladder invasion.  -consider cystoscopy if worsens again    Farzaneh Burciaga M.D.   of Medicine  Hematology, Oncology and Transplantation

## 2021-06-17 NOTE — PATIENT INSTRUCTIONS - HE
Patient Instructions by Phuong Barclay MD at 8/7/2019 11:40 AM     Author: Phuong Barclay MD Service: -- Author Type: Physician    Filed: 8/7/2019 12:11 PM Encounter Date: 8/7/2019 Status: Addendum    : Phuong Barclay MD (Physician)    Related Notes: Original Note by Phuong Barclay MD (Physician) filed at 8/7/2019 12:09 PM       1. Try Clindamycin cream for at least 10 days.  If no improvement, then return to clinic or let me know.  We could try a pill or referral to dermatology    2. Blood test today for Diabetes and High cholesterol    3. Try fragrance free lotion on the dry areas on the neck and back     4. Try to let the area dry after bathing or with sweating    Patient Education     Folliculitis  Folliculitis is an inflammation of a hair follicle. A hair follicle is the little pocket where a hair grows out of the skin. Bacteria normally live on the skin. But sometimes bacteria can get trapped in a follicle and cause infection. This causes a bumpy rash. The area over the follicles is red and raised. It may itch or be painful. The bumps may have fluid (pus) inside. The pus may leak and then form crusts. Sores can spread to other areas of the body. Once it goes away, folliculitis can come back at any time. Severe cases may cause permanent hair loss and scarring.  Folliculitis can happen anywhere on the body where hair grows. It can be caused by rubbing from tight clothing. Ingrown hairs can cause it. Soaking in a hot tub or swimming pool that has bacteria in the water can cause it. It may also occur if a hair follicle is blocked by a bandage.  Sores often go away in a few days with no treatment. In some cases, medicine may be given. A small piece of skin or pus may be taken to find the type of bacteria causing the infection.  Home care  The healthcare provider may prescribe an antibiotic cream or ointment.  Oral antibiotics may also be prescribed. Or you may be told to use an  over-the-counter antibiotic cream. Follow all instructions when using any of these medicines.  General care:    Apply warm, moist compresses to the sores for 20 minutes up to 3 times a day. You can make a compress by soaking a cloth in warm water. Squeeze out excess water.    Dont cut, poke, or squeeze the sores. This can be painful and spread infection.    Dont scratch the affected area. Scratching can delay healing.    Dont shave the areas affected by folliculitis.    If the sores leak fluid, cover the area with a nonstick gauze bandage. Use as little tape as possible. Carefully discard all soiled bandages.    Dress in loose cotton clothing.    Change sheets and blankets if they are soiled by pus. Wash all clothes, towels, sheets, and cloth diapers in soap and hot water. Do not share clothes, towels, or sheets with other family members.    Do not soak the sores in bath water. This can spread infection. Instead, keep the area clean by gently washing sores with soap and warm water.    Wash your hands or use antibacterial gels often to prevent spreading the bacteria.  Follow-up care  Follow up with your healthcare provider, or as advised.  When to seek medical advice  Call your healthcare provider right away if any of these occur:    Fever of 100.4 F (38 C) or higher    Spreading of the rash    Rash does not get better with treatment    Redness or swelling that gets worse    Rash becomes more painful    Foul-smelling fluid leaking from the skin    Rash improves, but then comes back   Date Last Reviewed: 11/1/2016 2000-2017 [email or web address]. [address] [place] [address], [place] [address]. All rights reserved. This information is not intended as a substitute for professional medical care. Always follow your healthcare professional's instructions.

## 2021-06-17 NOTE — LETTER
6/17/2021         RE: Diego Buchanan  [address]  [place] [address]        Dear Colleague,    Thank you for referring your patient, Diego Buchanan, to the [facility]. Please see a copy of my visit note below.    Diego is a 26 year old who is being evaluated via a billable video visit.      How would you like to obtain your AVS? MyChart  If the video visit is dropped, the invitation should be resent by: Send to e-mail at: [email or web address]  Will anyone else be joining your video visit? No  {If patient encounters technical issues they should call [phone]}    I have reviewed and updated the patient's allergies and medication list.    Concerns: none  Refills: none     Vitals - Patient Reported  Weight (Patient Reported): 120.7 kg (266 lb)  Height (Patient Reported): 152.4 cm (5')  BMI (Based on Pt Reported Ht/Wt): 51.95  Pain Score: No Pain (0)    Harriet Rogers CMA    Video-Visit Details  Type of service:  Video Visit  Video Start Time: 4:45 PM  Video End Time:5:15 PM  Originating Location (pt. 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 He presented initially with abdominal pain, diarrhea, and progressive abdominal distention for 3 months duration. 2. Initial CT scan in February 2020 showed severe peritoneal carcinomatosis with large peritoneal tumor implants throughout the entire abdomen and pelvis, but mostly prominently in the pelvis.   2. PETCT scan showed interval increase in the amount of peritoneal carcinomatosis.  3. On 3/12/2020, he had ultrasound-guided core needle biopsy of a peritoneal implant (Case: [id number]), which showed a completely undifferentiated appearance, but stains with pancytokeratin, indicating an  epithelial origin. 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 Tumor markers for CA-19-9, CEA, beta-hCG, alpha-fetoprotein were unremarkable. Cancer type ID molecular testing by [employer] sent to determine the exact diagnosis and to assist in further treatment planning. 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 Relative probability of less than 5% of other subtype of sarcoma. EWSR1-WT1 fusion detected.  4. 5/1/2020, MRI brain negative for brain metastasis, and baseline CT-CAP obtained showing extensive mixed solid and cystic masses throughout the abdomen and pelvis consistent with peritoneal carcinomatosis. Largest mass measures approximately 20 cm in the pelvis. Metastatic mixed solid and cystic masses seen in hepatic segments 5 and 6 and hepatic segment 7. The masses appear to be contiguous with the retrohepatic peritoneal carcinomatosis. Small volume fluid about the spleen favored to represent malignant ascites, stable mild-to-moderate right hydronephrosis related to mass effect upon the distal ureter in the pelvis, the IVC and iliac veins are compressed due to the extensive peritoneal carcinomatosis without findings to suggest deep venous thrombosis.  5. 5/4/2020, he begins CAV/IMV alternating chemotherapy. He receives 6 cycles of treatment. He symptomatically improves.  6. 9/11/2020, CT-CAP shows stable to mildly improved extensive peritoneal carcinomatosis. He would like to omit Ifosfamide/etoposide cycles and continue only with CAV.  7. 10/9/2020, he starts CAV every 21 days.  8. 1/6/2021, CT CAP showed a mixed response in the mixed solid and cystic masses throughout the peritoneum. Some of the tumors are stable to mildly worse, with some of the peritoneal masses a bit larger, a few are smaller, one cystic tumor in the left abdomen is smaller, while tumors  lower in the pelvis appear slightly larger.  9. 3/24/2021, CT CAP showed continued slight decrease in overall burden of peritoneal carcinomatosis with persistent encasement of bowel without evidence of bowel obstruction.     HISTORY OF PRESENT ILLNESS  Diego Buchanan presents via video with sister for follow up.     He is feeling well. No new concerns today. Eating and drinking well. Has gained back a couple of pounds. His energy level is good. He's able to stay active. No new areas of pain. Bowels are moving well. No issues with blood in urine.    Review of Systems:  12-point ROS is negative except as in HPI    Current Outpatient Medications   Medication Sig Dispense Refill     allopurinol (ZYLOPRIM) 300 MG tablet        CATHFLO ACTIVASE 2 MG injection        folic acid (FOLVITE) 1 MG tablet        LORazepam (ATIVAN) 0.5 MG tablet Take 1 tablet (0.5 mg) by mouth every 4 hours as needed (Anxiety, Nausea/Vomiting or Sleep) 30 tablet 5     mesna (MESNEX) 400 MG TABS tablet Take 1 tablet (400 mg) by mouth every 4 hours for 2 doses Take one tablet 4 hours and 8 hours after end of cyclophosphamide infusion. 2 tablet 0     NEULASTA ONPRO 6 MG/0.6ML injection        polyethylene glycol (MIRALAX) 17 g packet Take 17 g by mouth       prochlorperazine (COMPAZINE) 10 MG tablet Take 1 tablet (10 mg) by mouth every 6 hours as needed (Nausea/Vomiting) 30 tablet 5     senna-docusate (SENNA S) 8.6-50 MG tablet Take 1 tablet by mouth 2 times daily 60 tablet 0     dexamethasone (DECADRON) 4 MG tablet Take 2 tablets (8 mg) by mouth daily (with breakfast) for 3 days Start the day after doxorubicin, cyclophosphamide, and vincristine (odd cycles). 6 tablet 8     mesna (MESNEX) 400 MG TABS tablet Take 1 tablet (400 mg) by mouth every 4 hours for 2 doses Take one tablet 4 hours and 8 hours after end of cyclophosphamide infusion. 2 tablet 0     Objective:  General: patient appears well in no acute distress, alert and oriented, speech clear  and fluid  Skin: no visualized rash or lesions on visualized skin  Resp: Appears to be breathing comfortably without accessory muscle usage, speaking in full sentences, no audible wheezes or cough.  Psych: Coherent speech, normal rate and volume, able to articulate logical thoughts, able to abstract reason, no tangential thoughts, no hallucinations or delusions  Patient's affect is appropriate.    Labs:  Orders Only on 06/16/2021   Component Date Value Ref Range Status     Sodium 06/16/2021 137  133 - 144 mmol/L Final     Potassium 06/16/2021 3.6  3.4 - 5.3 mmol/L Final     Chloride 06/16/2021 111* 94 - 109 mmol/L Final     Carbon Dioxide 06/16/2021 22  20 - 32 mmol/L Final     Anion Gap 06/16/2021 4  3 - 14 mmol/L Final     Glucose 06/16/2021 93  70 - 99 mg/dL Final     Urea Nitrogen 06/16/2021 10  7 - 30 mg/dL Final     Creatinine 06/16/2021 0.70  0.66 - 1.25 mg/dL Final     GFR Estimate 06/16/2021 >90  >60 mL/min/[1.73_m2] Final     GFR Estimate If Black 06/16/2021 >90  >60 mL/min/[1.73_m2] Final     Calcium 06/16/2021 8.4* 8.5 - 10.1 mg/dL Final     Bilirubin Total 06/16/2021 0.4  0.2 - 1.3 mg/dL Final     Albumin 06/16/2021 3.5  3.4 - 5.0 g/dL Final     Protein Total 06/16/2021 6.9  6.8 - 8.8 g/dL Final     Alkaline Phosphatase 06/16/2021 126  40 - 150 U/L Final     ALT 06/16/2021 36  0 - 70 U/L Final     AST 06/16/2021 12  0 - 45 U/L Final     WBC 06/16/2021 13.3* 4.0 - 11.0 10e9/L Final     RBC Count 06/16/2021 3.35* 4.4 - 5.9 10e12/L Final     Hemoglobin 06/16/2021 10.1* 13.3 - 17.7 g/dL Final     Hematocrit 06/16/2021 31.3* 40.0 - 53.0 % Final     MCV 06/16/2021 93  78 - 100 fl Final     MCH 06/16/2021 30.1  26.5 - 33.0 pg Final     MCHC 06/16/2021 32.3  31.5 - 36.5 g/dL Final     RDW 06/16/2021 17.2* 10.0 - 15.0 % Final     Platelet Count 06/16/2021 167  150 - 450 10e9/L Final     Diff Method 06/16/2021 Automated Method   Final     % Neutrophils 06/16/2021 78.4  % Final     % Lymphocytes 06/16/2021 3.4  %  Final     % Monocytes 06/16/2021 12.8  % Final     % Eosinophils 06/16/2021 2.1  % Final     % Basophils 06/16/2021 0.8  % Final     % Immature Granulocytes 06/16/2021 2.5  % Final     Nucleated RBCs 06/16/2021 1* 0 /100 Final     Absolute Neutrophil 06/16/2021 10.5* 1.6 - 8.3 10e9/L Final     Absolute Lymphocytes 06/16/2021 0.5* 0.8 - 5.3 10e9/L Final     Absolute Monocytes 06/16/2021 1.7* 0.0 - 1.3 10e9/L Final     Absolute Eosinophils 06/16/2021 0.3  0.0 - 0.7 10e9/L Final     Absolute Basophils 06/16/2021 0.1  0.0 - 0.2 10e9/L Final     Abs Immature Granulocytes 06/16/2021 0.3  0 - 0.4 10e9/L Final     Absolute Nucleated RBC 06/16/2021 0.1   Final       Imaging:  CT Chest/Abdomen/Pelvis w Contrast  Narrative: EXAM: CT CHEST/ABDOMEN/PELVIS W CONTRAST  LOCATION: [facility]  DATE/TIME: 6/16/2021 11:34 AM    INDICATION: Soft tissue sarcoma, retroperitoneal/intra-abdominal, assess treatment response. Intra-abdominal desmoplastic small round cell tumor with peritoneal carcinomatosis, tatiana, hepatic, and osseous metastatic disease.  COMPARISON: 3/24/2021  TECHNIQUE: CT scan of the chest, abdomen, and pelvis was performed following injection of IV contrast. Multiplanar reformats were obtained. Dose reduction techniques were used.   CONTRAST: Isovue 370 135cc    FINDINGS:   LUNGS AND PLEURA: No consolidation. No pleural effusions. No pneumothorax.    MEDIASTINUM/AXILLAE: Right port. Visualized thyroid gland is unremarkable. No thoracic adenopathy. No cardiomegaly. No pericardial effusion. Normal caliber thoracic aorta. No acute abnormality. Although not a dedicated PE study, no gross PE.    CORONARY ARTERY CALCIFICATION: None.    HEPATOBILIARY: Along the medial margin of the posterior segments of the right hepatic lobe, there is a similar appearance of a lobulated heterogeneously enhancing mass measuring 6.3 x 2.6 cm. This previously measured 6.8 x 2.5 cm. Subjectively, it   appears similar. Previously seen  separate low-density mass or lesion in the posterior right hepatic lobe is barely visible today on series 3, image 240.    Gallbladder appears similar to prior. Previously seen biliary ductal dilation is no longer visualized.    PANCREAS: Normal.    SPLEEN: Normal.    ADRENAL GLANDS: Normal.    KIDNEYS/BLADDER: No stones or hydronephrosis. The distal ureters are not well seen due to the pelvic disease. Urinary bladder is grossly similar to prior.    BOWEL: Stomach unremarkable. Small bowel negative for obstruction. Distal small bowel not well seen due to the peritoneal disease. Appendix not visualized. Mild to moderate stool burden in the colon. Colon is also obscured by intra-abdominal disease.    LYMPH NODES: No bulky retroperitoneal lymphadenopathy. There are scattered, shotty nonspecific mesenteric lymph nodes. There is extensive peritoneal disease with numerous implants, especially in the lower abdomen and pelvis, where there is confluent   masslike disease with partial calcification. Overall degree of tumor burden is similar to prior.     In the right abdomen, there is a well-circumscribed low-density mass measuring 8.8 x 8.0 cm on series 3, image 385. This previously measured 9.6 x 6.8 cm. In the mid abdomen is a 3.2 x 3.4 cm low-density lesion, previously measuring 4.2 x 4.1 cm.    No large ascites. No free air.    VASCULATURE: No abdominal aortic aneurysm.    PELVIC ORGANS: Prostate grossly unremarkable by CT.    MUSCULOSKELETAL: There are a few small scattered sclerotic lesions throughout the visualized bones.  Impression: IMPRESSION:  1.  Liver masses are similar to prior.    2.  [place] of peritoneal carcinomatosis is similar to prior.    3.  Slight decrease in the sizes of in the cystic masses in the abdomen.      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 Buchanan is a 25 year old male with advanced  intraabdominal DSRCT with extensive peritoneal disease, lymph node metastasis, and liver and bone involvement. He tolerated 6 alternating cycles of CAV/IMV with anticipated side effects. He developed hematuria with his last 2 cycles of Ifosfamide based therapy despite dose reduction and Mesna prophylaxis, and ultimately patient and family decided to continue with CAV alone. Due to a mixed response on his last CT, it was decided to add back in alternating IMV. He has not had recurrence of the hematuria. It is likely that the hematuria was caused by tumor invasion of the bladder, and now with some response (and time off ifos), this has improved. CT-scan today shows stable to mildly improved burden of disease.  -Continue with plan for upcoming CAV. Discussed taking a short break after his next round of chemotherapy.  -Reassess later if a couple more cycles might be beneficial given the control to date     #3 Anemia, normocytic. Initially microcytic, now normocytic  Hemoglobin trends up during periods of time without hematuria and down when he has more hematuria. Likely multifactorial with chemotherapy and intermittent hematuria. No current transfusion needs. Will continue to monitor closely.      #4 Deconditioning  Continue to encourage physical activity and walk for 10 minutes every day.     #5 History of recurrent hematuria, resolved  He previously saw urology in September 2020 who felt this could be secondary to cyclophosphamide (despite mesna), new bladder/urethral primary malignancies, or metastatic bladder invasion.  -consider cystoscopy if worsens again    Farzaneh Burciaga M.D.   of Medicine  Hematology, Oncology and Transplantation              Again, thank you for allowing me to participate in the care of your patient.        Sincerely,        Farzaneh Sims MD

## 2021-06-18 RX ORDER — DIPHENHYDRAMINE HYDROCHLORIDE 50 MG/ML
50 INJECTION INTRAMUSCULAR; INTRAVENOUS
Status: CANCELLED
Start: 2021-06-26

## 2021-06-18 RX ORDER — EPINEPHRINE 1 MG/ML
0.3 INJECTION, SOLUTION INTRAMUSCULAR; SUBCUTANEOUS EVERY 5 MIN PRN
Status: CANCELLED | OUTPATIENT
Start: 2021-06-26

## 2021-06-18 RX ORDER — ALBUTEROL SULFATE 90 UG/1
1-2 AEROSOL, METERED RESPIRATORY (INHALATION)
Status: CANCELLED
Start: 2021-06-26

## 2021-06-18 RX ORDER — HEPARIN SODIUM (PORCINE) LOCK FLUSH IV SOLN 100 UNIT/ML 100 UNIT/ML
5 SOLUTION INTRAVENOUS
Status: CANCELLED | OUTPATIENT
Start: 2021-06-26

## 2021-06-18 RX ORDER — ALBUTEROL SULFATE 0.83 MG/ML
2.5 SOLUTION RESPIRATORY (INHALATION)
Status: CANCELLED | OUTPATIENT
Start: 2021-06-26

## 2021-06-18 RX ORDER — MEPERIDINE HYDROCHLORIDE 25 MG/ML
25 INJECTION INTRAMUSCULAR; INTRAVENOUS; SUBCUTANEOUS EVERY 30 MIN PRN
Status: CANCELLED | OUTPATIENT
Start: 2021-06-26

## 2021-06-18 RX ORDER — NALOXONE HYDROCHLORIDE 0.4 MG/ML
.1-.4 INJECTION, SOLUTION INTRAMUSCULAR; INTRAVENOUS; SUBCUTANEOUS
Status: CANCELLED | OUTPATIENT
Start: 2021-06-26

## 2021-06-18 RX ORDER — SODIUM CHLORIDE 9 MG/ML
1000 INJECTION, SOLUTION INTRAVENOUS CONTINUOUS PRN
Status: CANCELLED
Start: 2021-06-26

## 2021-06-18 RX ORDER — METHYLPREDNISOLONE SODIUM SUCCINATE 125 MG/2ML
125 INJECTION, POWDER, LYOPHILIZED, FOR SOLUTION INTRAMUSCULAR; INTRAVENOUS
Status: CANCELLED
Start: 2021-06-26

## 2021-06-18 RX ORDER — HEPARIN SODIUM,PORCINE 10 UNIT/ML
5 VIAL (ML) INTRAVENOUS
Status: CANCELLED | OUTPATIENT
Start: 2021-06-26

## 2021-06-18 RX ORDER — LORAZEPAM 2 MG/ML
0.5 INJECTION INTRAMUSCULAR EVERY 4 HOURS PRN
Status: CANCELLED
Start: 2021-06-26

## 2021-06-18 RX ORDER — PALONOSETRON 0.05 MG/ML
0.25 INJECTION, SOLUTION INTRAVENOUS ONCE
Status: CANCELLED | OUTPATIENT
Start: 2021-06-26

## 2021-06-22 NOTE — PROGRESS NOTES
This is a recent snapshot of the patient's [facility] medical record.  For current drug dose and complete information and questions, call [phone] or In Basket pool, [facility] home infusion ([id number])  CSN Number:  [id number]

## 2021-06-23 VITALS
DIASTOLIC BLOOD PRESSURE: 73 MMHG | TEMPERATURE: 98.1 F | OXYGEN SATURATION: 98 % | BODY MASS INDEX: 41.82 KG/M2 | RESPIRATION RATE: 16 BRPM | WEIGHT: 275 LBS | SYSTOLIC BLOOD PRESSURE: 118 MMHG | HEART RATE: 107 BPM

## 2021-06-23 DIAGNOSIS — C41.9 SARCOMA, EWINGS (H): ICD-10-CM

## 2021-06-23 LAB
ALBUMIN SERPL-MCNC: 3.7 G/DL (ref 3.4–5)
ALP SERPL-CCNC: 113 U/L (ref 40–150)
ALT SERPL W P-5'-P-CCNC: 24 U/L (ref 0–70)
ANION GAP SERPL CALCULATED.3IONS-SCNC: 5 MMOL/L (ref 3–14)
AST SERPL W P-5'-P-CCNC: 16 U/L (ref 0–45)
BASOPHILS # BLD AUTO: 0.1 10E9/L (ref 0–0.2)
BASOPHILS NFR BLD AUTO: 0.5 %
BILIRUB SERPL-MCNC: 0.4 MG/DL (ref 0.2–1.3)
BUN SERPL-MCNC: 11 MG/DL (ref 7–30)
CALCIUM SERPL-MCNC: 8.6 MG/DL (ref 8.5–10.1)
CHLORIDE SERPL-SCNC: 111 MMOL/L (ref 94–109)
CO2 SERPL-SCNC: 24 MMOL/L (ref 20–32)
CREAT SERPL-MCNC: 0.82 MG/DL (ref 0.66–1.25)
DIFFERENTIAL METHOD BLD: ABNORMAL
EOSINOPHIL # BLD AUTO: 0.3 10E9/L (ref 0–0.7)
EOSINOPHIL NFR BLD AUTO: 2 %
ERYTHROCYTE [DISTWIDTH] IN BLOOD BY AUTOMATED COUNT: 17.6 % (ref 10–15)
GFR SERPL CREATININE-BSD FRML MDRD: >90 ML/MIN/{1.73_M2}
GLUCOSE SERPL-MCNC: 109 MG/DL (ref 70–99)
HCT VFR BLD AUTO: 34.7 % (ref 40–53)
HGB BLD-MCNC: 10.8 G/DL (ref 13.3–17.7)
IMM GRANULOCYTES # BLD: 0.1 10E9/L (ref 0–0.4)
IMM GRANULOCYTES NFR BLD: 0.5 %
LYMPHOCYTES # BLD AUTO: 0.4 10E9/L (ref 0.8–5.3)
LYMPHOCYTES NFR BLD AUTO: 3.3 %
MCH RBC QN AUTO: 30.4 PG (ref 26.5–33)
MCHC RBC AUTO-ENTMCNC: 31.1 G/DL (ref 31.5–36.5)
MCV RBC AUTO: 98 FL (ref 78–100)
MONOCYTES # BLD AUTO: 0.7 10E9/L (ref 0–1.3)
MONOCYTES NFR BLD AUTO: 5.4 %
NEUTROPHILS # BLD AUTO: 11.7 10E9/L (ref 1.6–8.3)
NEUTROPHILS NFR BLD AUTO: 88.3 %
NRBC # BLD AUTO: 0 10*3/UL
NRBC BLD AUTO-RTO: 0 /100
PLATELET # BLD AUTO: 277 10E9/L (ref 150–450)
POTASSIUM SERPL-SCNC: 3.7 MMOL/L (ref 3.4–5.3)
PROT SERPL-MCNC: 7 G/DL (ref 6.8–8.8)
RBC # BLD AUTO: 3.55 10E12/L (ref 4.4–5.9)
SODIUM SERPL-SCNC: 140 MMOL/L (ref 133–144)
WBC # BLD AUTO: 13.2 10E9/L (ref 4–11)

## 2021-06-23 PROCEDURE — 250N000011 HC RX IP 250 OP 636: Performed by: INTERNAL MEDICINE

## 2021-06-23 PROCEDURE — 80053 COMPREHEN METABOLIC PANEL: CPT | Performed by: INTERNAL MEDICINE

## 2021-06-23 PROCEDURE — 85025 COMPLETE CBC W/AUTO DIFF WBC: CPT | Performed by: INTERNAL MEDICINE

## 2021-06-23 RX ORDER — HEPARIN SODIUM,PORCINE 10 UNIT/ML
5 VIAL (ML) INTRAVENOUS
Status: DISCONTINUED | OUTPATIENT
Start: 2021-06-23 | End: 2021-07-01 | Stop reason: HOSPADM

## 2021-06-23 RX ADMIN — Medication 5 ML: at 15:51

## 2021-06-23 ASSESSMENT — PAIN SCALES - GENERAL: PAINLEVEL: NO PAIN (0)

## 2021-06-24 ENCOUNTER — VIRTUAL VISIT (OUTPATIENT)
Dept: ONCOLOGY | Facility: CLINIC | Age: 26
End: 2021-06-24
Attending: PHYSICIAN ASSISTANT
Payer: COMMERCIAL

## 2021-06-24 DIAGNOSIS — D64.9 ANEMIA, NORMOCYTIC NORMOCHROMIC: ICD-10-CM

## 2021-06-24 DIAGNOSIS — C49.9 DESMOPLASTIC SMALL ROUND CELL TUMOR (H): Primary | ICD-10-CM

## 2021-06-24 DIAGNOSIS — R53.81 PHYSICAL DECONDITIONING: ICD-10-CM

## 2021-06-24 PROCEDURE — 99214 OFFICE O/P EST MOD 30 MIN: CPT | Mod: 95 | Performed by: PHYSICIAN ASSISTANT

## 2021-06-24 PROCEDURE — 999N001193 HC VIDEO/TELEPHONE VISIT; NO CHARGE

## 2021-06-24 NOTE — LETTER
"    6/24/2021         RE: Diego Buchanan  [address]  [place] [address]        Dear Colleague,    Thank you for referring your patient, Diego Buchanan, to the [facility]. Please see a copy of my visit note below.    Diego is a 26 year old who is being evaluated via a billable video visit.      How would you like to obtain your AVS? MyChart  If the video visit is dropped, the invitation should be resent by: Send to e-mail at: [email or web address]  Will anyone else be joining your video visit? No    I have reviewed and updated the patient's allergies and medication list.    Concerns: none  Refills: none     Vitals - Patient Reported  Weight (Patient Reported): 121.1 kg (267 lb)  Height (Patient Reported): 172.7 cm (5' 8\")  BMI (Based on Pt Reported Ht/Wt): 40.6  Pain Score: No Pain (0)    Harriet Rogers CMA        Video-Visit Details    Type of service:  Video Visit    Video Start Time: 1:49 PM    Video End Time:1:56 PM    Originating Location (pt. 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 He presented initially with abdominal pain, diarrhea, and progressive abdominal distention for 3 months duration. 2. Initial CT scan in February 2020 showed severe peritoneal carcinomatosis with large peritoneal tumor implants throughout the entire abdomen and pelvis, but mostly prominently in the pelvis.   2. PETCT scan showed interval increase in the amount of peritoneal carcinomatosis.  3. On 3/12/2020, he had ultrasound-guided core needle biopsy of a peritoneal implant (Case: [id number]), which showed a completely undifferentiated appearance, but stains with pancytokeratin, indicating an epithelial origin. 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 Tumor markers for CA-19-9, CEA, beta-hCG, alpha-fetoprotein were unremarkable. Cancer type ID molecular testing by [facility] sent to determine the exact diagnosis and to assist in further treatment planning. 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 Relative probability of less than 5% of other subtype of sarcoma. EWSR1-WT1 fusion detected.  4. 5/1/2020, MRI brain negative for brain metastasis, and baseline CT-CAP obtained showing extensive mixed solid and cystic masses throughout the abdomen and pelvis consistent with peritoneal carcinomatosis. Largest mass measures approximately 20 cm in the pelvis. Metastatic mixed solid and cystic masses seen in hepatic segments 5 and 6 and hepatic segment 7. The masses appear to be contiguous with the retrohepatic peritoneal carcinomatosis. Small volume fluid about the spleen favored to represent malignant ascites, stable mild-to-moderate right hydronephrosis related to mass effect upon the distal ureter in the pelvis, the IVC and iliac veins are compressed due to the extensive peritoneal carcinomatosis without findings to suggest deep venous thrombosis.  5. 5/4/2020, he begins CAV/IMV alternating chemotherapy. He receives 6 cycles of treatment. He symptomatically improves.  6. 9/11/2020, CT-CAP shows stable to mildly improved extensive peritoneal carcinomatosis. He would like to omit Ifosfamide/etoposide cycles and continue only with CAV.  7. 10/9/2020, he starts CAV every 21 days.  8. 1/6/2021, CT CAP showed a mixed response in the mixed solid and cystic masses throughout the peritoneum. Some of the tumors are stable to mildly worse, with some of the peritoneal masses a bit larger, a few are smaller, one cystic tumor in the left abdomen is smaller, while tumors lower in the pelvis appear slightly larger.  9. 3/24/2021, CT  CAP showed continued slight decrease in overall burden of peritoneal carcinomatosis with persistent encasement of bowel without evidence of bowel obstruction.     HISTORY OF PRESENT ILLNESS  Diego Buchanan was met with over video with sister for follow up.  -Denies any pain.   -Denies any blood in his urine.   -Eating and drinking well.   -Met with physical therapy once so far.     Current Outpatient Medications   Medication Sig Dispense Refill     allopurinol (ZYLOPRIM) 300 MG tablet        CATHFLO ACTIVASE 2 MG injection        dexamethasone (DECADRON) 4 MG tablet Take 2 tablets (8 mg) by mouth daily (with breakfast) for 3 days Start the day after doxorubicin, cyclophosphamide, and vincristine (odd cycles). 6 tablet 8     dexamethasone (DECADRON) 4 MG tablet        DOXOrubicin (ADRIAMYCIN) 2 MG/ML injection        etoposide (VEPESID) 50 MG capsule Take 4 capsules (200 mg) by mouth daily for 6 days Days 1 through 6. 24 capsule 0     folic acid (FOLVITE) 1 MG tablet        LORazepam (ATIVAN) 0.5 MG tablet Take 1 tablet (0.5 mg) by mouth every 4 hours as needed (Anxiety, Nausea/Vomiting or Sleep) 30 tablet 5     mesna (MESNEX) 100 MG/ML undiluted injection        mesna (MESNEX) 400 MG TABS tablet Take 1 tablet (400 mg) by mouth every 4 hours for 2 doses Take one tablet 4 hours and 8 hours after end of cyclophosphamide infusion. 2 tablet 0     mesna (MESNEX) 400 MG TABS tablet Take 1 tablet (400 mg) by mouth every 4 hours for 2 doses Take one tablet 4 hours and 8 hours after end of cyclophosphamide infusion. 2 tablet 0     NEULASTA ONPRO 6 MG/0.6ML injection        pegfilgrastim (NEULASTA ONPRO KIT) 6 MG/0.6ML injection Inject 0.6 mLs (6 mg) Subcutaneous once for 1 dose 0.6 mL 0     polyethylene glycol (MIRALAX) 17 g packet Take 17 g by mouth       prochlorperazine (COMPAZINE) 10 MG tablet Take 1 tablet (10 mg) by mouth every 6 hours as needed (Nausea/Vomiting) 30 tablet 5     senna-docusate (SENNA S) 8.6-50 MG tablet  "Take 1 tablet by mouth 2 times daily 60 tablet 0     sodium chloride 0.9% infusion        Water For Injection Sterile (STERILE WATER, PRESERVATIVE FREE,) injection        Objective:  General: alert and no distress  Psych: Alert and oriented times; coherent speech, normal rate and volume, able to articulate logical thoughts, able to abstract reason, no tangential thoughts, no hallucinations or delusions  Patient's affect is appropriate.   Pulm: Speaking in full sentences, unlabored, no audible wheezes or cough.  The rest of a comprehensive physical examination is deferred due to PHE (public health emergency) video restrictions\"    LABS  Most Recent 3 CBC's:  Recent Labs   Lab Test 06/23/21  1555 06/16/21  1124 06/10/21  1105   WBC 13.2* 13.3* 4.4   HGB 10.8* 10.1* 10.0*   MCV 98 93 93    167 245    Most Recent 3 BMP's:  Recent Labs   Lab Test 06/23/21  1555 06/16/21  1124 06/10/21  1105    137 141   POTASSIUM 3.7 3.6 3.8   CHLORIDE 111* 111* 110*   CO2 24 22 23   BUN 11 10 10   CR 0.82 0.70 0.62*   ANIONGAP 5 4 8   FAISAL 8.6 8.4* 8.2*   * 93 76    Most Recent 2 LFT's:  Recent Labs   Lab Test 06/23/21  1555 06/16/21  1124   AST 16 12   ALT 24 36   ALKPHOS 113 126   BILITOTAL 0.4 0.4   I reviewed the above labs today.    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 Mr. Buchanan is a 26 year old male with advanced intraabdominal DSRCT with extensive peritoneal disease, lymph node metastasis, and liver and bone involvement. He tolerated 6 alternating cycles of CAV/IMV with anticipated side effects. He developed hematuria with his last 2 cycles of Ifosfamide based therapy despite dose reduction and Mesna prophylaxis, and ultimately patient and family decided to continue with CAV alone. Due to a mixed response on his January 2021 CT, it was decided to add back in CAV alternating with IMV.  -He " tolerating resuming IMV well. He has had no hematuria recently.  -His March and June 2021 CT CAP's showed stable to improved disease.   -Plan is to proceed with CAV tomorrow and then take a break from chemotherapy.  -He will have repeat imaging in 2 months. He will call sooner for concerns.     Anemia, normocytic. Initially microcytic, now normocytic. Hemoglobin trends up during periods of time without hematuria and down when he has more hematuria. Likely multifactorial with chemotherapy and intermittent hematuria. No current transfusion needs. Hemoglobin now is looking better. Will continue to monitor closely.     Deconditioning. I previously encouraged him to go for daily walks for at least 10 minutes per day. He has been with PT once and is scheduled for follow-up. He felt the initial visit went well.     Hematuria. Recurrent. He previously saw urology in September 2020 who felt this could be secondary to cyclophosphamide (despite mesna), new bladder/urethral primary malignancies, or metastatic bladder invasion. He and his family opted against a cystoscopy at that time. I suspect it is due to metastatic bladder invasion. We previously discussed if his hemoglobin does drop below 8 again, I would like him to consider a cystoscopy. His hemoglobin is now trending up.    COVID vaccine. Patient completed his 2 dose series with a sore arm as his only side effect.    History of hypertension. Patient remains off of metoprolol. BP yesterday was normal. Okay to continue to hold.     Constipation. Okay to take Miralax prn.    Yasmine Mckeon PA-C  [facility] [address] [id number] [phone]          Again, thank you for allowing me to participate in the care of your patient.        Sincerely,        Yasmine Mckeon PA-C

## 2021-06-24 NOTE — PROGRESS NOTES
"Diego is a 26 year old who is being evaluated via a billable video visit.      How would you like to obtain your AVS? MyChart  If the video visit is dropped, the invitation should be resent by: Send to e-mail at: [email or web address]  Will anyone else be joining your video visit? No    I have reviewed and updated the patient's allergies and medication list.    Concerns: none  Refills: none     Vitals - Patient Reported  Weight (Patient Reported): 121.1 kg (267 lb)  Height (Patient Reported): 172.7 cm (5' 8\")  BMI (Based on Pt Reported Ht/Wt): 40.6  Pain Score: No Pain (0)    Harriet Rogers CMA        Video-Visit Details    Type of service:  Video Visit    Video Start Time: 1:49 PM    Video End Time:1:56 PM    Originating Location (pt. 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 He presented initially with abdominal pain, diarrhea, and progressive abdominal distention for 3 months duration. 2. Initial CT scan in February 2020 showed severe peritoneal carcinomatosis with large peritoneal tumor implants throughout the entire abdomen and pelvis, but mostly prominently in the pelvis.   2. PETCT scan showed interval increase in the amount of peritoneal carcinomatosis.  3. On 3/12/2020, he had ultrasound-guided core needle biopsy of a peritoneal implant (Case: [id number]), which showed a completely undifferentiated appearance, but stains with pancytokeratin, indicating an epithelial origin. 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 Tumor markers for CA-19-9, CEA, beta-hCG, alpha-fetoprotein were unremarkable. Cancer type ID molecular testing by [facility] sent to determine the exact diagnosis and to " assist in further treatment planning. 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 Relative probability of less than 5% of other subtype of sarcoma. EWSR1-WT1 fusion detected.  4. 5/1/2020, MRI brain negative for brain metastasis, and baseline CT-CAP obtained showing extensive mixed solid and cystic masses throughout the abdomen and pelvis consistent with peritoneal carcinomatosis. Largest mass measures approximately 20 cm in the pelvis. Metastatic mixed solid and cystic masses seen in hepatic segments 5 and 6 and hepatic segment 7. The masses appear to be contiguous with the retrohepatic peritoneal carcinomatosis. Small volume fluid about the spleen favored to represent malignant ascites, stable mild-to-moderate right hydronephrosis related to mass effect upon the distal ureter in the pelvis, the IVC and iliac veins are compressed due to the extensive peritoneal carcinomatosis without findings to suggest deep venous thrombosis.  5. 5/4/2020, he begins CAV/IMV alternating chemotherapy. He receives 6 cycles of treatment. He symptomatically improves.  6. 9/11/2020, CT-CAP shows stable to mildly improved extensive peritoneal carcinomatosis. He would like to omit Ifosfamide/etoposide cycles and continue only with CAV.  7. 10/9/2020, he starts CAV every 21 days.  8. 1/6/2021, CT CAP showed a mixed response in the mixed solid and cystic masses throughout the peritoneum. Some of the tumors are stable to mildly worse, with some of the peritoneal masses a bit larger, a few are smaller, one cystic tumor in the left abdomen is smaller, while tumors lower in the pelvis appear slightly larger.  9. 3/24/2021, CT CAP showed continued slight decrease in overall burden of peritoneal carcinomatosis with persistent encasement of bowel without evidence of bowel obstruction.     HISTORY OF PRESENT ILLNESS  Diego Buchanan was met with over video with sister for follow  up.  -Denies any pain.   -Denies any blood in his urine.   -Eating and drinking well.   -Met with physical therapy once so far.     Current Outpatient Medications   Medication Sig Dispense Refill     allopurinol (ZYLOPRIM) 300 MG tablet        CATHFLO ACTIVASE 2 MG injection        dexamethasone (DECADRON) 4 MG tablet Take 2 tablets (8 mg) by mouth daily (with breakfast) for 3 days Start the day after doxorubicin, cyclophosphamide, and vincristine (odd cycles). 6 tablet 8     dexamethasone (DECADRON) 4 MG tablet        DOXOrubicin (ADRIAMYCIN) 2 MG/ML injection        etoposide (VEPESID) 50 MG capsule Take 4 capsules (200 mg) by mouth daily for 6 days Days 1 through 6. 24 capsule 0     folic acid (FOLVITE) 1 MG tablet        LORazepam (ATIVAN) 0.5 MG tablet Take 1 tablet (0.5 mg) by mouth every 4 hours as needed (Anxiety, Nausea/Vomiting or Sleep) 30 tablet 5     mesna (MESNEX) 100 MG/ML undiluted injection        mesna (MESNEX) 400 MG TABS tablet Take 1 tablet (400 mg) by mouth every 4 hours for 2 doses Take one tablet 4 hours and 8 hours after end of cyclophosphamide infusion. 2 tablet 0     mesna (MESNEX) 400 MG TABS tablet Take 1 tablet (400 mg) by mouth every 4 hours for 2 doses Take one tablet 4 hours and 8 hours after end of cyclophosphamide infusion. 2 tablet 0     NEULASTA ONPRO 6 MG/0.6ML injection        pegfilgrastim (NEULASTA ONPRO KIT) 6 MG/0.6ML injection Inject 0.6 mLs (6 mg) Subcutaneous once for 1 dose 0.6 mL 0     polyethylene glycol (MIRALAX) 17 g packet Take 17 g by mouth       prochlorperazine (COMPAZINE) 10 MG tablet Take 1 tablet (10 mg) by mouth every 6 hours as needed (Nausea/Vomiting) 30 tablet 5     senna-docusate (SENNA S) 8.6-50 MG tablet Take 1 tablet by mouth 2 times daily 60 tablet 0     sodium chloride 0.9% infusion        Water For Injection Sterile (STERILE WATER, PRESERVATIVE FREE,) injection        Objective:  General: alert and no distress  Psych: Alert and oriented times;  "coherent speech, normal rate and volume, able to articulate logical thoughts, able to abstract reason, no tangential thoughts, no hallucinations or delusions  Patient's affect is appropriate.   Pulm: Speaking in full sentences, unlabored, no audible wheezes or cough.  The rest of a comprehensive physical examination is deferred due to PHE (public health emergency) video restrictions\"    LABS  Most Recent 3 CBC's:  Recent Labs   Lab Test 06/23/21  1555 06/16/21  1124 06/10/21  1105   WBC 13.2* 13.3* 4.4   HGB 10.8* 10.1* 10.0*   MCV 98 93 93    167 245    Most Recent 3 BMP's:  Recent Labs   Lab Test 06/23/21  1555 06/16/21  1124 06/10/21  1105    137 141   POTASSIUM 3.7 3.6 3.8   CHLORIDE 111* 111* 110*   CO2 24 22 23   BUN 11 10 10   CR 0.82 0.70 0.62*   ANIONGAP 5 4 8   FAISAL 8.6 8.4* 8.2*   * 93 76    Most Recent 2 LFT's:  Recent Labs   Lab Test 06/23/21  1555 06/16/21  1124   AST 16 12   ALT 24 36   ALKPHOS 113 126   BILITOTAL 0.4 0.4   I reviewed the above labs today.    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 Mr. Buchanan is a 26 year old male with advanced intraabdominal DSRCT with extensive peritoneal disease, lymph node metastasis, and liver and bone involvement. He tolerated 6 alternating cycles of CAV/IMV with anticipated side effects. He developed hematuria with his last 2 cycles of Ifosfamide based therapy despite dose reduction and Mesna prophylaxis, and ultimately patient and family decided to continue with CAV alone. Due to a mixed response on his January 2021 CT, it was decided to add back in CAV alternating with IMV.  -He tolerating resuming IMV well. He has had no hematuria recently.  -His March and June 2021 CT CAP's showed stable to improved disease.   -Plan is to proceed with CAV tomorrow and then take a break from chemotherapy.  -He will have repeat imaging in 2 " months. He will call sooner for concerns.     Anemia, normocytic. Initially microcytic, now normocytic. Hemoglobin trends up during periods of time without hematuria and down when he has more hematuria. Likely multifactorial with chemotherapy and intermittent hematuria. No current transfusion needs. Hemoglobin now is looking better. Will continue to monitor closely.     Deconditioning. I previously encouraged him to go for daily walks for at least 10 minutes per day. He has been with PT once and is scheduled for follow-up. He felt the initial visit went well.     Hematuria. Recurrent. He previously saw urology in September 2020 who felt this could be secondary to cyclophosphamide (despite mesna), new bladder/urethral primary malignancies, or metastatic bladder invasion. He and his family opted against a cystoscopy at that time. I suspect it is due to metastatic bladder invasion. We previously discussed if his hemoglobin does drop below 8 again, I would like him to consider a cystoscopy. His hemoglobin is now trending up.    COVID vaccine. Patient completed his 2 dose series with a sore arm as his only side effect.    History of hypertension. Patient remains off of metoprolol. BP yesterday was normal. Okay to continue to hold.     Constipation. Okay to take Miralax prn.    Yasmine Mckeon PA-C  [facility]  [address] [place] [address] [phone]

## 2021-06-25 ENCOUNTER — INFUSION THERAPY VISIT (OUTPATIENT)
Dept: ONCOLOGY | Facility: CLINIC | Age: 26
End: 2021-06-25
Attending: PHYSICIAN ASSISTANT
Payer: COMMERCIAL

## 2021-06-25 VITALS
SYSTOLIC BLOOD PRESSURE: 112 MMHG | WEIGHT: 267.3 LBS | RESPIRATION RATE: 16 BRPM | DIASTOLIC BLOOD PRESSURE: 76 MMHG | OXYGEN SATURATION: 99 % | BODY MASS INDEX: 40.65 KG/M2 | TEMPERATURE: 98.5 F | HEART RATE: 89 BPM

## 2021-06-25 DIAGNOSIS — C41.9 SARCOMA, EWINGS (H): Primary | ICD-10-CM

## 2021-06-25 DIAGNOSIS — C49.9 DESMOPLASTIC SMALL ROUND CELL TUMOR (H): ICD-10-CM

## 2021-06-25 PROCEDURE — 96415 CHEMO IV INFUSION ADDL HR: CPT

## 2021-06-25 PROCEDURE — 96367 TX/PROPH/DG ADDL SEQ IV INF: CPT

## 2021-06-25 PROCEDURE — 96377 APPLICATON ON-BODY INJECTOR: CPT | Mod: 59

## 2021-06-25 PROCEDURE — 250N000011 HC RX IP 250 OP 636: Performed by: INTERNAL MEDICINE

## 2021-06-25 PROCEDURE — 96372 THER/PROPH/DIAG INJ SC/IM: CPT | Performed by: INTERNAL MEDICINE

## 2021-06-25 PROCEDURE — 96375 TX/PRO/DX INJ NEW DRUG ADDON: CPT

## 2021-06-25 PROCEDURE — 96411 CHEMO IV PUSH ADDL DRUG: CPT

## 2021-06-25 PROCEDURE — 258N000003 HC RX IP 258 OP 636: Performed by: INTERNAL MEDICINE

## 2021-06-25 PROCEDURE — 96413 CHEMO IV INFUSION 1 HR: CPT

## 2021-06-25 RX ORDER — PALONOSETRON 0.05 MG/ML
0.25 INJECTION, SOLUTION INTRAVENOUS ONCE
Status: COMPLETED | OUTPATIENT
Start: 2021-06-25 | End: 2021-06-25

## 2021-06-25 RX ORDER — DEXAMETHASONE 4 MG/1
8 TABLET ORAL
Qty: 6 TABLET | Refills: 8 | Status: SHIPPED | OUTPATIENT
Start: 2021-06-25 | End: 2022-04-21

## 2021-06-25 RX ORDER — HEPARIN SODIUM (PORCINE) LOCK FLUSH IV SOLN 100 UNIT/ML 100 UNIT/ML
5 SOLUTION INTRAVENOUS
Status: DISCONTINUED | OUTPATIENT
Start: 2021-06-25 | End: 2021-06-25 | Stop reason: HOSPADM

## 2021-06-25 RX ORDER — DEXAMETHASONE 4 MG/1
8 TABLET ORAL
Qty: 6 TABLET | Refills: 8 | Status: CANCELLED | OUTPATIENT
Start: 2021-06-26 | End: 2021-06-29

## 2021-06-25 RX ADMIN — SODIUM CHLORIDE 250 ML: 9 INJECTION, SOLUTION INTRAVENOUS at 08:29

## 2021-06-25 RX ADMIN — DACTINOMYCIN 2.46 MG: 0.5 INJECTION, POWDER, LYOPHILIZED, FOR SOLUTION INTRAVENOUS at 09:45

## 2021-06-25 RX ADMIN — FOSAPREPITANT: 150 INJECTION, POWDER, LYOPHILIZED, FOR SOLUTION INTRAVENOUS at 08:34

## 2021-06-25 RX ADMIN — VINCRISTINE SULFATE 2 MG: 1 INJECTION, SOLUTION INTRAVENOUS at 09:53

## 2021-06-25 RX ADMIN — PEGFILGRASTIM 6 MG: KIT SUBCUTANEOUS at 12:09

## 2021-06-25 RX ADMIN — MESNA 2615 MG: 100 INJECTION, SOLUTION INTRAVENOUS at 09:59

## 2021-06-25 RX ADMIN — PALONOSETRON HYDROCHLORIDE 0.25 MG: 0.25 INJECTION, SOLUTION INTRAVENOUS at 08:29

## 2021-06-25 ASSESSMENT — PAIN SCALES - GENERAL: PAINLEVEL: NO PAIN (0)

## 2021-06-25 NOTE — PATIENT INSTRUCTIONS
Contact Numbers  [facility]: [phone] (for symptom and scheduling needs)    Please call the [facility] Triage line if you experience a temperature greater than or equal to 100.4, shaking chills, have uncontrolled nausea, vomiting and/or diarrhea, dizziness, shortness of breath, chest pain, bleeding, unexplained bruising, or if you have any other new/concerning symptoms, questions or concerns.     If you are having any concerning symptoms or wish to speak to a provider before your next infusion visit, please call your care coordinator or triage to notify them so we can adequately serve you.     If you need a refill on a narcotic prescription or other medication, please call triage before your infusion appointment.        June 2021 Sunday Monday Tuesday Wednesday Thursday Friday Saturday             1    LAB   6:45 AM   (15 min.)   [facility]   [facility]    LAB   3:15 PM   (15 min.)   [facility] HOME [facility]FUS[facility]   [facility]    COVID-19 PFIZER (21 DAY)   3:50 PM   (5 min.)   CP COVID VACCINE 21 DAY PFIZER   [facility]    [facility]   3:45 PM   (15 min.)   [facility] 3    VIDEO VISIT RETURN   2:00 PM   (45 min.)   Yasmine Mckeon PA-C   [facility] 4    ONC INFUSION 3 HR (180 MIN)   8:00 AM   (180 min.)    ONC INFUSION NURSE   [facility] 5       6    LAB   2:15 PM   (15 min.)   [facility] HOME [facility]   [facility]    LAB   5:45 PM   (15 min.)   [facility]   [facility] 7     8     9     10     11     12       13     14     15    NEURO EVAL  11:45 AM   (45 min.)   Pia Laboy PT   [facility] 16    LAB CENTRAL  11:00 AM   (15 min.)   [facility]   [facility]  [facility]    CT CHEST/ABDOMEN/PELVIS W  11:40 AM   (20 min.)   [facility]   [facility] 17    VIDEO VISIT RETURN   4:30 PM   (30 min.)   Farzaneh Young MD   [facility] 18     19       20     21     22     23    LAB CENTRAL   3:45 PM   (15 min.)   [facility]   [facility] 24    VIDEO VISIT RETURN   2:00 PM   (45 min.)   Yasmine Mckeon PA-C   [facility] 25    ONC INFUSION 3 HR (180 MIN)   8:00 AM   (180 min.)    ONC INFUSION NURSE   [facility] 26       27     28     29     30                                July 2021 Sunday Monday Tuesday Wednesday Thursday Friday Saturday                       1     2     3       4     5    NEURO TREATMENT   9:45 AM   (60 min.)   Pia Laboy PT   [facility] 6     7     8     9     10       11     12    NEURO TREATMENT   9:45 AM   (60 min.)   Pia Laboy PT   [facility] 13     14     15     16     17       18     19    NEURO TREATMENT  10:30 AM   (60 min.)   Pia Laboy PT   [facility] 20     21     22     23     24       25     26    NEURO TREATMENT   1:00 PM   (60 min.)   Pia Laboy PT   [facility] 27     28     29     30     31                 Orders Only on 06/23/2021   Component Date Value Ref Range Status     Sodium 06/23/2021 140  133 - 144 mmol/L Final     Potassium 06/23/2021 3.7  3.4 - 5.3 mmol/L Final     Chloride 06/23/2021 111* 94 - 109 mmol/L Final     Carbon Dioxide 06/23/2021 24  20 - 32 mmol/L Final     Anion Gap 06/23/2021 5  3 - 14 mmol/L Final     Glucose 06/23/2021 109* 70 - 99 mg/dL Final     Urea Nitrogen 06/23/2021 11  7 - 30 mg/dL Final     Creatinine 06/23/2021 0.82  0.66 - 1.25 mg/dL Final      GFR Estimate 06/23/2021 >90  >60 mL/min/[1.73_m2] Final    Comment: Non  GFR Calc  Starting 12/18/2018, serum creatinine based estimated GFR (eGFR) will be   calculated using the Chronic Kidney Disease Epidemiology Collaboration   (CKD-EPI) equation.       GFR Estimate If Black 06/23/2021 >90  >60 mL/min/[1.73_m2] Final    Comment:  GFR Calc  Starting 12/18/2018, serum creatinine based estimated GFR (eGFR) will be   calculated using the Chronic Kidney Disease Epidemiology Collaboration   (CKD-EPI) equation.       Calcium 06/23/2021 8.6  8.5 - 10.1 mg/dL Final     Bilirubin Total 06/23/2021 0.4  0.2 - 1.3 mg/dL Final     Albumin 06/23/2021 3.7  3.4 - 5.0 g/dL Final     Protein Total 06/23/2021 7.0  6.8 - 8.8 g/dL Final     Alkaline Phosphatase 06/23/2021 113  40 - 150 U/L Final     ALT 06/23/2021 24  0 - 70 U/L Final     AST 06/23/2021 16  0 - 45 U/L Final     WBC 06/23/2021 13.2* 4.0 - 11.0 10e9/L Final     RBC Count 06/23/2021 3.55* 4.4 - 5.9 10e12/L Final     Hemoglobin 06/23/2021 10.8* 13.3 - 17.7 g/dL Final     Hematocrit 06/23/2021 34.7* 40.0 - 53.0 % Final     MCV 06/23/2021 98  78 - 100 fl Final     MCH 06/23/2021 30.4  26.5 - 33.0 pg Final     MCHC 06/23/2021 31.1* 31.5 - 36.5 g/dL Final     RDW 06/23/2021 17.6* 10.0 - 15.0 % Final     Platelet Count 06/23/2021 277  150 - 450 10e9/L Final     Diff Method 06/23/2021 Automated Method   Final     % Neutrophils 06/23/2021 88.3  % Final     % Lymphocytes 06/23/2021 3.3  % Final     % Monocytes 06/23/2021 5.4  % Final     % Eosinophils 06/23/2021 2.0  % Final     % Basophils 06/23/2021 0.5  % Final     % Immature Granulocytes 06/23/2021 0.5  % Final     Nucleated RBCs 06/23/2021 0  0 /100 Final     Absolute Neutrophil 06/23/2021 11.7* 1.6 - 8.3 10e9/L Final     Absolute Lymphocytes 06/23/2021 0.4* 0.8 - 5.3 10e9/L Final     Absolute Monocytes 06/23/2021 0.7  0.0 - 1.3 10e9/L Final     Absolute Eosinophils 06/23/2021 0.3  0.0 -  0.7 10e9/L Final     Absolute Basophils 06/23/2021 0.1  0.0 - 0.2 10e9/L Final     Abs Immature Granulocytes 06/23/2021 0.1  0 - 0.4 10e9/L Final     Absolute Nucleated RBC 06/23/2021 0.0   Final

## 2021-06-25 NOTE — PROGRESS NOTES
Infusion Nursing Note:  Diego Buchanan presents today for Day 1 Cycle 19 Dactinomycin, Vincristine, Cytoxan.    Patient seen by provider today: No   present during visit today: Not Applicable.    Note: Robert arrives to infusion today doing well. He was assessed by KATHLEEN Peña yesterday and offers no changes or concerns. Plan to complete today's chemotherapy then take 2 month break.     Confirmed with sister that patient will take oral dexamethasone as prescribed and Mesna today at 4 PM and 8 PM.     Intravenous Access:  Esparza.    Treatment Conditions:  Lab Results   Component Value Date    HGB 10.8 06/23/2021     Lab Results   Component Value Date    WBC 13.2 06/23/2021      Lab Results   Component Value Date    ANEU 11.7 06/23/2021     Lab Results   Component Value Date     06/23/2021      Lab Results   Component Value Date     06/23/2021                   Lab Results   Component Value Date    POTASSIUM 3.7 06/23/2021           Lab Results   Component Value Date    MAG 2.2 06/10/2021            Lab Results   Component Value Date    CR 0.82 06/23/2021                   Lab Results   Component Value Date    FAISAL 8.6 06/23/2021                Lab Results   Component Value Date    BILITOTAL 0.4 06/23/2021           Lab Results   Component Value Date    ALBUMIN 3.7 06/23/2021                    Lab Results   Component Value Date    ALT 24 06/23/2021           Lab Results   Component Value Date    AST 16 06/23/2021       Results reviewed, labs MET treatment parameters, ok to proceed with treatment.  ECHO/MUGA completed 9/1/20  EF 60-65%.    Post Infusion Assessment:  Patient tolerated infusion without incident.  Blood return noted pre and post infusion.  Blood return noted every 2-3 ml through free flowing NS line during Dactinomycin and Vincristine administration.   Esparza heparin locked prior to discharge.  Neulasta Onpro On-Body injector applied to right arm at 1215 with light facing  elbow.  Writer discussed Neulasta injection would start tomorrow 6/26 at 1515, approximately 27 hours after application applied today.  Written and Verbal instruction reviewed with patient.  Pt instructed when the dose delivery starts, it will take about 45 minutes to complete.  Pt aware Neulasta Onpro On-Body should have green flashing light and to call triage or on-call MD if injector flashes red or appears to be leaking. Pt aware to keep Onpro On-Body Neulasta 4 inches away from electrical equipment and to avoid showering 4 hours prior to injection.   Neulasta Onpro Lot number: see MAR    Discharge Plan:   Prescription refills given for mesna and dexamethasone.  AVS to patient via [employer].  Patient will return in 2 months for next appointment. Patient's sister is aware to call if not scheduled in the next week or so.   Patient discharged in stable condition accompanied by: sister.  Departure Mode: Ambulatory.    Janel Franco RN

## 2021-06-26 ENCOUNTER — TELEPHONE (OUTPATIENT)
Dept: PHARMACY | Facility: CLINIC | Age: 26
End: 2021-06-26

## 2021-06-26 NOTE — TELEPHONE ENCOUNTER
PA Initiation    Medication: Mesna  Insurance Company: [employer] - Phone [phone] Fax [phone]  Pharmacy Filling the Rx: [facility]  Filling Pharmacy Phone:    Filling Pharmacy Fax:    Start Date: 6/26/2021    Central Prior Authorization Team   Phone: [phone]

## 2021-06-26 NOTE — TELEPHONE ENCOUNTER
Drug including DOSE: Ifosfamide  J Code: Ifos -   NDC: [address]-12  ICD 10 code: Z45.2, C49.9, C41.9     Date(s) of Service: 06/05/2021-06/26/22        Insurance Name:   Insurance ID: [id number]  Group:      Ordering Physician: Stacey Tracey  NPI: [id number]     [facility]  NPI: [id number]

## 2021-06-26 NOTE — TELEPHONE ENCOUNTER
Drug including DOSE:   J Code: Mesna -   NDC: [address]-28  ICD 10 code: Z45.2, C49.9, C41.9     Date(s) of Service: 06/05/21-06/26/22        Insurance Name:   Insurance ID: [id number]  Group:      Ordering Physician: Stacey Tracey  NPI: [id number]     [facility]  NPI: [id number]

## 2021-06-26 NOTE — TELEPHONE ENCOUNTER
PA Initiation    Medication: [email or web address]  Insurance Company: [employer] - Phone [phone] Fax [phone]  Pharmacy Filling the Rx: [facility]  Filling Pharmacy Phone:    Filling Pharmacy Fax:    Start Date: 6/26/2021    Central Prior Authorization Team   Phone: [phone]

## 2021-06-28 NOTE — PROGRESS NOTES
This is a recent snapshot of the patient's [facility] medical record.  For current drug dose and complete information and questions, call [phone] or In Basket pool, fv home infusion ([id number])  CSN Number:  [id number]

## 2021-06-29 NOTE — TELEPHONE ENCOUNTER
Prior Authorization Not Needed per Insurance    Medication: Mesna  Insurance Company: [employer] - Phone [phone] Fax [phone]  Pharmacy Filling the Rx: [facility]  Pharmacy Notified: Yes

## 2021-06-29 NOTE — TELEPHONE ENCOUNTER
Prior Authorization Approval    Authorization Effective Date: 5/28/2021  Authorization Expiration Date: 6/26/2022  Medication: ifos  Approved Dose/Quantity:   Reference #: [id number]   Insurance Company: [employer] - Phone [phone] Fax [phone]   Which Pharmacy is filling the prescription (Not needed for infusion/clinic administered): [facility]  Pharmacy Notified: Yes

## 2021-06-30 NOTE — PROGRESS NOTES
This is a recent snapshot of the patient's [facility] medical record.  For current drug dose and complete information and questions, call [phone] or In Basket pool, fv home infusion ([id number])  CSN Number:  [id number]

## 2021-06-30 NOTE — PROGRESS NOTES
This is a recent snapshot of the patient's [facility] medical record.  For current drug dose and complete information and questions, call [phone] or In [facility] pool, fv home infusion ([id number])  CSN Number:  [id number]

## 2021-07-01 ENCOUNTER — APPOINTMENT (OUTPATIENT)
Dept: LAB | Facility: CLINIC | Age: 26
End: 2021-07-01
Attending: INTERNAL MEDICINE
Payer: COMMERCIAL

## 2021-07-12 ENCOUNTER — HOSPITAL ENCOUNTER (OUTPATIENT)
Dept: PHYSICAL THERAPY | Facility: CLINIC | Age: 26
Setting detail: THERAPIES SERIES
End: 2021-07-12
Payer: COMMERCIAL

## 2021-07-12 PROCEDURE — 97110 THERAPEUTIC EXERCISES: CPT | Mod: GP | Performed by: PHYSICAL THERAPIST

## 2021-07-14 ENCOUNTER — DOCUMENTATION ONLY (OUTPATIENT)
Dept: ONCOLOGY | Facility: CLINIC | Age: 26
End: 2021-07-14

## 2021-08-03 RX ORDER — HEPARIN SODIUM (PORCINE) LOCK FLUSH IV SOLN 100 UNIT/ML 100 UNIT/ML
5 SOLUTION INTRAVENOUS
Status: CANCELLED | OUTPATIENT
Start: 2021-08-03

## 2021-08-03 RX ORDER — HEPARIN SODIUM,PORCINE 10 UNIT/ML
5 VIAL (ML) INTRAVENOUS
Status: CANCELLED | OUTPATIENT
Start: 2021-08-03

## 2021-08-06 ENCOUNTER — HOME INFUSION (PRE-WILLOW HOME INFUSION) (OUTPATIENT)
Dept: PHARMACY | Facility: CLINIC | Age: 26
End: 2021-08-06

## 2021-08-07 ENCOUNTER — HOME INFUSION (PRE-WILLOW HOME INFUSION) (OUTPATIENT)
Dept: PHARMACY | Facility: CLINIC | Age: 26
End: 2021-08-07

## 2021-08-13 ENCOUNTER — HOME INFUSION (PRE-WILLOW HOME INFUSION) (OUTPATIENT)
Dept: PHARMACY | Facility: CLINIC | Age: 26
End: 2021-08-13

## 2021-08-13 NOTE — PROGRESS NOTES
This is a recent snapshot of the patient's [facility] medical record.  For current drug dose and complete information and questions, call [phone] or In Basket pool, fv home infusion ([id number])  CSN Number:  [id number]

## 2021-08-13 NOTE — PROGRESS NOTES
This is a recent snapshot of the patient's [facility] medical record.  For current drug dose and complete information and questions, call [phone] or In Basket pool, fv home infusion ([id number])  CSN Number:  [id number]

## 2021-08-17 NOTE — PROGRESS NOTES
This is a recent snapshot of the patient's [facility] medical record.  For current drug dose and complete information and questions, call [phone] or In Basket pool, fv home infusion ([id number])  CSN Number:  [id number]

## 2021-08-18 ENCOUNTER — LAB (OUTPATIENT)
Dept: LAB | Facility: CLINIC | Age: 26
End: 2021-08-18
Attending: INTERNAL MEDICINE
Payer: COMMERCIAL

## 2021-08-18 ENCOUNTER — ANCILLARY PROCEDURE (OUTPATIENT)
Dept: CT IMAGING | Facility: CLINIC | Age: 26
End: 2021-08-18
Attending: INTERNAL MEDICINE
Payer: COMMERCIAL

## 2021-08-18 VITALS
OXYGEN SATURATION: 99 % | WEIGHT: 268.3 LBS | TEMPERATURE: 99 F | DIASTOLIC BLOOD PRESSURE: 74 MMHG | BODY MASS INDEX: 40.8 KG/M2 | SYSTOLIC BLOOD PRESSURE: 124 MMHG | RESPIRATION RATE: 16 BRPM | HEART RATE: 89 BPM

## 2021-08-18 DIAGNOSIS — Z95.828 PORT-A-CATH IN PLACE: Primary | ICD-10-CM

## 2021-08-18 DIAGNOSIS — C41.9 SARCOMA, EWINGS (H): ICD-10-CM

## 2021-08-18 DIAGNOSIS — C49.9 DESMOPLASTIC SMALL ROUND CELL TUMOR (H): ICD-10-CM

## 2021-08-18 LAB
ALBUMIN SERPL-MCNC: 3.7 G/DL (ref 3.4–5)
ALP SERPL-CCNC: 81 U/L (ref 40–150)
ALT SERPL W P-5'-P-CCNC: 18 U/L (ref 0–70)
ANION GAP SERPL CALCULATED.3IONS-SCNC: 9 MMOL/L (ref 3–14)
AST SERPL W P-5'-P-CCNC: 11 U/L (ref 0–45)
BASOPHILS # BLD AUTO: 0.1 10E3/UL (ref 0–0.2)
BASOPHILS NFR BLD AUTO: 1 %
BILIRUB SERPL-MCNC: 0.9 MG/DL (ref 0.2–1.3)
BUN SERPL-MCNC: 9 MG/DL (ref 7–30)
CALCIUM SERPL-MCNC: 9.3 MG/DL (ref 8.5–10.1)
CHLORIDE BLD-SCNC: 110 MMOL/L (ref 94–109)
CO2 SERPL-SCNC: 24 MMOL/L (ref 20–32)
CREAT SERPL-MCNC: 0.79 MG/DL (ref 0.66–1.25)
EOSINOPHIL # BLD AUTO: 0.2 10E3/UL (ref 0–0.7)
EOSINOPHIL NFR BLD AUTO: 2 %
ERYTHROCYTE [DISTWIDTH] IN BLOOD BY AUTOMATED COUNT: 15.3 % (ref 10–15)
GFR SERPL CREATININE-BSD FRML MDRD: >90 ML/MIN/1.73M2
GLUCOSE BLD-MCNC: 98 MG/DL (ref 70–99)
HCT VFR BLD AUTO: 40.6 % (ref 40–53)
HGB BLD-MCNC: 12.8 G/DL (ref 13.3–17.7)
IMM GRANULOCYTES # BLD: 0 10E3/UL
IMM GRANULOCYTES NFR BLD: 0 %
LYMPHOCYTES # BLD AUTO: 0.5 10E3/UL (ref 0.8–5.3)
LYMPHOCYTES NFR BLD AUTO: 5 %
MCH RBC QN AUTO: 27.5 PG (ref 26.5–33)
MCHC RBC AUTO-ENTMCNC: 31.5 G/DL (ref 31.5–36.5)
MCV RBC AUTO: 87 FL (ref 78–100)
MONOCYTES # BLD AUTO: 0.5 10E3/UL (ref 0–1.3)
MONOCYTES NFR BLD AUTO: 5 %
NEUTROPHILS # BLD AUTO: 9.4 10E3/UL (ref 1.6–8.3)
NEUTROPHILS NFR BLD AUTO: 87 %
NRBC # BLD AUTO: 0 10E3/UL
NRBC BLD AUTO-RTO: 0 /100
PLATELET # BLD AUTO: 269 10E3/UL (ref 150–450)
POTASSIUM BLD-SCNC: 3.4 MMOL/L (ref 3.4–5.3)
PROT SERPL-MCNC: 7.6 G/DL (ref 6.8–8.8)
RBC # BLD AUTO: 4.66 10E6/UL (ref 4.4–5.9)
SODIUM SERPL-SCNC: 143 MMOL/L (ref 133–144)
WBC # BLD AUTO: 10.7 10E3/UL (ref 4–11)

## 2021-08-18 PROCEDURE — 71260 CT THORAX DX C+: CPT | Mod: GC | Performed by: RADIOLOGY

## 2021-08-18 PROCEDURE — 85025 COMPLETE CBC W/AUTO DIFF WBC: CPT

## 2021-08-18 PROCEDURE — 74177 CT ABD & PELVIS W/CONTRAST: CPT | Mod: GC | Performed by: RADIOLOGY

## 2021-08-18 PROCEDURE — 36592 COLLECT BLOOD FROM PICC: CPT

## 2021-08-18 PROCEDURE — 250N000011 HC RX IP 250 OP 636: Performed by: INTERNAL MEDICINE

## 2021-08-18 PROCEDURE — 80053 COMPREHEN METABOLIC PANEL: CPT

## 2021-08-18 RX ORDER — HEPARIN SODIUM,PORCINE 10 UNIT/ML
5 VIAL (ML) INTRAVENOUS ONCE
Status: COMPLETED | OUTPATIENT
Start: 2021-08-18 | End: 2021-08-18

## 2021-08-18 RX ORDER — HEPARIN SODIUM (PORCINE) LOCK FLUSH IV SOLN 100 UNIT/ML 100 UNIT/ML
500 SOLUTION INTRAVENOUS ONCE
Status: COMPLETED | OUTPATIENT
Start: 2021-08-18 | End: 2021-08-18

## 2021-08-18 RX ORDER — IOPAMIDOL 755 MG/ML
135 INJECTION, SOLUTION INTRAVASCULAR ONCE
Status: COMPLETED | OUTPATIENT
Start: 2021-08-18 | End: 2021-08-18

## 2021-08-18 RX ADMIN — Medication 5 ML: at 10:55

## 2021-08-18 RX ADMIN — IOPAMIDOL 135 ML: 755 INJECTION, SOLUTION INTRAVASCULAR at 11:38

## 2021-08-18 RX ADMIN — HEPARIN SODIUM (PORCINE) LOCK FLUSH IV SOLN 100 UNIT/ML 500 UNITS: 100 SOLUTION at 11:44

## 2021-08-18 RX ADMIN — Medication 5 ML: at 10:54

## 2021-08-18 ASSESSMENT — PAIN SCALES - GENERAL: PAINLEVEL: NO PAIN (0)

## 2021-08-18 NOTE — NURSING NOTE
Chief Complaint   Patient presents with     Blood Draw     Labs drawn via cvc by rn in lab. VS taken.     Labs collected from CVC by RN, line flushed with saline and heparin.  Vitals taken.    Isaiah Peters RN

## 2021-08-19 ENCOUNTER — VIRTUAL VISIT (OUTPATIENT)
Dept: ONCOLOGY | Facility: CLINIC | Age: 26
End: 2021-08-19
Attending: INTERNAL MEDICINE
Payer: COMMERCIAL

## 2021-08-19 DIAGNOSIS — C41.9 SARCOMA, EWINGS (H): ICD-10-CM

## 2021-08-19 DIAGNOSIS — C49.9 DESMOPLASTIC SMALL ROUND CELL TUMOR (H): Primary | ICD-10-CM

## 2021-08-19 PROCEDURE — 99214 OFFICE O/P EST MOD 30 MIN: CPT | Mod: 95 | Performed by: INTERNAL MEDICINE

## 2021-08-19 NOTE — PROGRESS NOTES
Diego is a 26 year old who is being evaluated via a billable video visit.      How would you like to obtain your AVS? [employer]     If the video visit is dropped, the invitation should be resent by: Send to e-mail at: [email or web address]     Will anyone else be joining your video visit? No          Esequiel Tong LPN    Video-Visit Details  Type of service:  Video Visit  Video Start Time: 10:15 AM  Video End Time:10:45 AM  Originating Location (pt. 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 He presented initially with abdominal pain, diarrhea, and progressive abdominal distention for 3 months duration. 2. Initial CT scan in February 2020 showed severe peritoneal carcinomatosis with large peritoneal tumor implants throughout the entire abdomen and pelvis, but mostly prominently in the pelvis.   2. PETCT scan showed interval increase in the amount of peritoneal carcinomatosis.  3. On 3/12/2020, he had ultrasound-guided core needle biopsy of a peritoneal implant (Case: [id number]), which showed a completely undifferentiated appearance, but stains with pancytokeratin, indicating an epithelial origin. 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 Tumor markers for CA-19-9, CEA, beta-hCG, alpha-fetoprotein were unremarkable. Cancer type ID molecular testing by [employer] sent to determine the exact diagnosis and to assist in further treatment planning. 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 Relative probability of less than 5% of other subtype of sarcoma. EWSR1-WT1 fusion detected.  4. 5/1/2020, MRI brain negative for brain  metastasis, and baseline CT-CAP obtained showing extensive mixed solid and cystic masses throughout the abdomen and pelvis consistent with peritoneal carcinomatosis. Largest mass measures approximately 20 cm in the pelvis. Metastatic mixed solid and cystic masses seen in hepatic segments 5 and 6 and hepatic segment 7. The masses appear to be contiguous with the retrohepatic peritoneal carcinomatosis. Small volume fluid about the spleen favored to represent malignant ascites, stable mild-to-moderate right hydronephrosis related to mass effect upon the distal ureter in the pelvis, the IVC and iliac veins are compressed due to the extensive peritoneal carcinomatosis without findings to suggest deep venous thrombosis.  5. 5/4/2020, he begins CAV/IMV alternating chemotherapy. He receives 6 cycles of treatment. He symptomatically improves.  6. 9/11/2020, CT-CAP shows stable to mildly improved extensive peritoneal carcinomatosis. He would like to omit Ifosfamide/etoposide cycles and continue only with CAV.  7. 10/9/2020, he starts CAV every 21 days.  8. 1/6/2021, CT CAP showed a mixed response in the mixed solid and cystic masses throughout the peritoneum. Some of the tumors are stable to mildly worse, with some of the peritoneal masses a bit larger, a few are smaller, one cystic tumor in the left abdomen is smaller, while tumors lower in the pelvis appear slightly larger.  9. 3/24/2021, CT CAP showed continued slight decrease in overall burden of peritoneal carcinomatosis with persistent encasement of bowel without evidence of bowel obstruction.     HISTORY OF PRESENT ILLNESS  Diego Buchanan presents via video with sister and mother present, for follow up.     We reviewed the 8/18/21 CT abdomen and pelvis, which shows overall stability of the multiple intra-abdominal tumors.    He is feeling well. No new concerns today. Eating and drinking well. Has gained back a couple of pounds. His energy level is good. He's able to  stay active. No new areas of pain. Bowels are moving well. No issues with blood in urine.    Review of Systems:  12-point ROS is negative except as in HPI    Current Outpatient Medications   Medication Sig Dispense Refill     allopurinol (ZYLOPRIM) 300 MG tablet        CATHFLO ACTIVASE 2 MG injection        folic acid (FOLVITE) 1 MG tablet        LORazepam (ATIVAN) 0.5 MG tablet Take 1 tablet (0.5 mg) by mouth every 4 hours as needed (Anxiety, Nausea/Vomiting or Sleep) 30 tablet 5     mesna (MESNEX) 400 MG TABS tablet Take 1 tablet (400 mg) by mouth every 4 hours for 2 doses Take one tablet 4 hours and 8 hours after end of cyclophosphamide infusion. 2 tablet 0     NEULASTA ONPRO 6 MG/0.6ML injection        polyethylene glycol (MIRALAX) 17 g packet Take 17 g by mouth       prochlorperazine (COMPAZINE) 10 MG tablet Take 1 tablet (10 mg) by mouth every 6 hours as needed (Nausea/Vomiting) 30 tablet 5     senna-docusate (SENNA S) 8.6-50 MG tablet Take 1 tablet by mouth 2 times daily 60 tablet 0     dexamethasone (DECADRON) 4 MG tablet Take 2 tablets (8 mg) by mouth daily (with breakfast) for 3 days Start the day after doxorubicin, cyclophosphamide, and vincristine (odd cycles). 6 tablet 8     mesna (MESNEX) 400 MG TABS tablet Take 1 tablet (400 mg) by mouth every 4 hours for 2 doses Take one tablet 4 hours and 8 hours after end of cyclophosphamide infusion. 2 tablet 0     Objective:  General: patient appears well in no acute distress, alert and oriented, speech clear and fluid  Skin: no visualized rash or lesions on visualized skin  Resp: Appears to be breathing comfortably without accessory muscle usage, speaking in full sentences, no audible wheezes or cough.  Psych: Coherent speech, normal rate and volume, able to articulate logical thoughts, able to abstract reason, no tangential thoughts, no hallucinations or delusions  Patient's affect is appropriate.    Labs:  Orders Only on 06/16/2021   Component Date Value Ref  Range Status     Sodium 06/16/2021 137  133 - 144 mmol/L Final     Potassium 06/16/2021 3.6  3.4 - 5.3 mmol/L Final     Chloride 06/16/2021 111* 94 - 109 mmol/L Final     Carbon Dioxide 06/16/2021 22  20 - 32 mmol/L Final     Anion Gap 06/16/2021 4  3 - 14 mmol/L Final     Glucose 06/16/2021 93  70 - 99 mg/dL Final     Urea Nitrogen 06/16/2021 10  7 - 30 mg/dL Final     Creatinine 06/16/2021 0.70  0.66 - 1.25 mg/dL Final     GFR Estimate 06/16/2021 >90  >60 mL/min/[1.73_m2] Final     GFR Estimate If Black 06/16/2021 >90  >60 mL/min/[1.73_m2] Final     Calcium 06/16/2021 8.4* 8.5 - 10.1 mg/dL Final     Bilirubin Total 06/16/2021 0.4  0.2 - 1.3 mg/dL Final     Albumin 06/16/2021 3.5  3.4 - 5.0 g/dL Final     Protein Total 06/16/2021 6.9  6.8 - 8.8 g/dL Final     Alkaline Phosphatase 06/16/2021 126  40 - 150 U/L Final     ALT 06/16/2021 36  0 - 70 U/L Final     AST 06/16/2021 12  0 - 45 U/L Final     WBC 06/16/2021 13.3* 4.0 - 11.0 10e9/L Final     RBC Count 06/16/2021 3.35* 4.4 - 5.9 10e12/L Final     Hemoglobin 06/16/2021 10.1* 13.3 - 17.7 g/dL Final     Hematocrit 06/16/2021 31.3* 40.0 - 53.0 % Final     MCV 06/16/2021 93  78 - 100 fl Final     MCH 06/16/2021 30.1  26.5 - 33.0 pg Final     MCHC 06/16/2021 32.3  31.5 - 36.5 g/dL Final     RDW 06/16/2021 17.2* 10.0 - 15.0 % Final     Platelet Count 06/16/2021 167  150 - 450 10e9/L Final     Diff Method 06/16/2021 Automated Method   Final     % Neutrophils 06/16/2021 78.4  % Final     % Lymphocytes 06/16/2021 3.4  % Final     % Monocytes 06/16/2021 12.8  % Final     % Eosinophils 06/16/2021 2.1  % Final     % Basophils 06/16/2021 0.8  % Final     % Immature Granulocytes 06/16/2021 2.5  % Final     Nucleated RBCs 06/16/2021 1* 0 /100 Final     Absolute Neutrophil 06/16/2021 10.5* 1.6 - 8.3 10e9/L Final     Absolute Lymphocytes 06/16/2021 0.5* 0.8 - 5.3 10e9/L Final     Absolute Monocytes 06/16/2021 1.7* 0.0 - 1.3 10e9/L Final     Absolute Eosinophils 06/16/2021 0.3  0.0  - 0.7 10e9/L Final     Absolute Basophils 06/16/2021 0.1  0.0 - 0.2 10e9/L Final     Abs Immature Granulocytes 06/16/2021 0.3  0 - 0.4 10e9/L Final     Absolute Nucleated RBC 06/16/2021 0.1   Final       Imaging:  CT Chest/Abdomen/Pelvis w Contrast  Narrative: CT of the Chest, Abdomen and Pelvis with contrast, 8/18/2021 11:48 AM.    Comparison: CT chest, abdomen, and pelvis 6/16/2021, 3/24/2021,  1/6/2021.    History: Desmoplastic small round cell tumor (H); Sarcoma, Ewings (H).    Technique: Axial images of the chest, abdomen and pelvis were obtained  with contrast. Coronal reconstructions were provided. Images were  reviewed in bone, lung, and soft tissue windows.     Findings:  Support devices: Right internal jugular central catheter tip  terminates in the high SVC.    Chest:  Thyroid gland is normal. Heart size is normal. No pericardial  effusion. No significant coronary artery calcifications. Thoracic  aorta is normal in caliber with conventional three-vessel branching  pattern. Main pulmonary artery is normal in caliber.    No mediastinal, hilar or axillary adenopathy by size criteria.  Unchanged prominent right paratracheal lymph node (series 3, image  79). The esophagus is normal.    Trachea and central airways are clear. No focal airspace opacity,  pleural effusion, or pneumothorax. Scattered calcified granulomas. No  suspicious or enlarged pulmonary nodules.    Abdomen and Pelvis:     Peripheral heterogeneously enhancing masses along the surface of the  right hepatic lobe are not significantly changed. For example, in  hepatic segment 6/7 measuring 7.0 x 2.3 cm (series 3, image 289) and  3.4 x 1.8 cm in hepatic segment 7 (series 3, image 2 and 43). No intra  or extrahepatic biliary duct dilation. Gallbladder is normal. Common  bile duct is not enlarged. Spleen is normal. Homogenous enhancement of  the pancreas. Main pancreatic duct is not dilated.    No adrenal mass. Symmetric renal enhancement. No  hydronephrosis,  calcified stone, or contour deforming mass. Bladder is unremarkable.  Prostate size is within normal limits.    There are extensive peritoneal soft tissue deposits including multiple  partially calcified solid and cystic mass encasing loops of small  bowel. For example, seen on series 3:  -Large ill-defined cystic and solid pelvic mass with central  calcifications measuring approximately 13 x 10 cm, not significant  changed when using similar measuring technique (series 3, image 549).  There is associated mass effect and encasement of the sigmoid colon.  -Partially calcified mass in the left lower quadrant measuring 6.4 x  5.7 cm, previously measuring 7.3 x 5.6 cm (image 406).  -Fluid attenuating cystic structure in the mid abdomen measuring 12.7  x 8.7 cm, previously measuring 10.1 x 3.6 cm.  -Partially calcified peritoneal deposits at the level of the umbilicus  measuring 2.3 x 1.2 cm (series 3, image 453, previously measuring 2.3  x 1.6 cm.  -Solid peritoneal mass in the anterior peritoneum measuring 2.5 x 2.3  cm (series 3, image 479), previously measuring 2.4 x 2.1 cm    No small or large bowel dilation. No pneumatosis or portal venous gas.  No free intraperitoneal air. No intra-abdominal or pelvic free fluid.    Abdominal aorta and major branches are normal in caliber and patent  without significant atherosclerotic disease the main portal vein and  major branches are patent.    No bulky retroperitoneal or mesenteric adenopathy. Scattered prominent  retroperitoneal lymph nodes are unchanged.    Bones and Soft Tissues:   No suspicious osseous lesion. No suspicious mass. Multilevel  degenerative changes of the thoracolumbar spine.  Impression: Impression:   1.  Minimal change of extensive peritoneal carcinomatosis and stable  hepatic metastatic disease burden.   2.  Interval increase in size of a fluid attenuating cystic lesion in  the mid abdomen.    I have personally reviewed the examination  and initial interpretation  and I agree with the findings.    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 He tolerated 6 alternating cycles of CAV/IMV with anticipated side effects. He developed hematuria with his last 2 cycles of Ifosfamide based therapy despite dose reduction and Mesna prophylaxis, and ultimately patient and family decided to continue with CAV alone. Due to a mixed response after 4 cycles it was decided to resume alternating IMV. CT-scan from 8/18/21 shows stable to mildly improved burden of disease. Robert is happy and doing well and family wants to maximize quality of life balance with need for treatments. Plan is to continue chemotherapy holiday for another 3 months, per consensus of patient and family.   -RTC in 3 months in November     #3 Anemia, normocytic. Initially microcytic, now normocytic  Improved and up to 12.8 g/dL off of chemotherapy.      #4 Deconditioning  Continue to encourage physical activity and walk for 10 minutes every day.     #5 History of recurrent hematuria, resolved  He previously saw urology in September 2020 who felt this could be secondary to cyclophosphamide (despite mesna), new bladder/urethral primary malignancies, or metastatic bladder invasion.  -consider cystoscopy if worsens again    Family knows to contact us with any questions or concerns during this chemotherapy holiday.    Farzaneh Burciaga M.D.   of Medicine  Hematology, Oncology and Transplantation

## 2021-08-19 NOTE — LETTER
8/19/2021         RE: Diego Buchanan  [address]  [place] [address]        Dear Colleague,    Thank you for referring your patient, Diego Buchanan, to the [facility]CER [facility]. Please see a copy of my visit note below.    Diego is a 26 year old who is being evaluated via a billable video visit.      How would you like to obtain your AVS? MyChart     If the video visit is dropped, the invitation should be resent by: Send to e-mail at: [email or web address]     Will anyone else be joining your video visit? Ashley Tong LPN    Video-Visit Details  Type of service:  Video Visit  Video Start Time: 10:15 AM  Video End Time:10:45 AM  Originating Location (pt. 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 He presented initially with abdominal pain, diarrhea, and progressive abdominal distention for 3 months duration. 2. Initial CT scan in February 2020 showed severe peritoneal carcinomatosis with large peritoneal tumor implants throughout the entire abdomen and pelvis, but mostly prominently in the pelvis.   2. PETCT scan showed interval increase in the amount of peritoneal carcinomatosis.  3. On 3/12/2020, he had ultrasound-guided core needle biopsy of a peritoneal implant (Case: [id number]), which showed a completely undifferentiated appearance, but stains with pancytokeratin, indicating an epithelial origin. 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 Tumor markers for CA-19-9, CEA, beta-hCG, alpha-fetoprotein were unremarkable. Cancer type ID molecular testing by [employer] sent to determine the exact diagnosis and to assist in further treatment planning. 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 Relative probability of less than 5% of other subtype of sarcoma. EWSR1-WT1 fusion detected.  4. 5/1/2020, MRI brain negative for brain metastasis, and baseline CT-CAP obtained showing extensive mixed solid and cystic masses throughout the abdomen and pelvis consistent with peritoneal carcinomatosis. Largest mass measures approximately 20 cm in the pelvis. Metastatic mixed solid and cystic masses seen in hepatic segments 5 and 6 and hepatic segment 7. The masses appear to be contiguous with the retrohepatic peritoneal carcinomatosis. Small volume fluid about the spleen favored to represent malignant ascites, stable mild-to-moderate right hydronephrosis related to mass effect upon the distal ureter in the pelvis, the IVC and iliac veins are compressed due to the extensive peritoneal carcinomatosis without findings to suggest deep venous thrombosis.  5. 5/4/2020, he begins CAV/IMV alternating chemotherapy. He receives 6 cycles of treatment. He symptomatically improves.  6. 9/11/2020, CT-CAP shows stable to mildly improved extensive peritoneal carcinomatosis. He would like to omit Ifosfamide/etoposide cycles and continue only with CAV.  7. 10/9/2020, he starts CAV every 21 days.  8. 1/6/2021, CT CAP showed a mixed response in the mixed solid and cystic masses throughout the peritoneum. Some of the tumors are stable to mildly worse, with some of the peritoneal masses a bit larger, a few are smaller, one cystic tumor in the left abdomen is smaller, while tumors lower in the pelvis appear slightly larger.  9. 3/24/2021, CT CAP showed continued slight decrease in overall burden of peritoneal carcinomatosis with persistent encasement of bowel without evidence of bowel obstruction.     HISTORY OF PRESENT ILLNESS  Diego Buchanan presents via video with sister and mother present, for follow up.     We reviewed the  8/18/21 CT abdomen and pelvis, which shows overall stability of the multiple intra-abdominal tumors.    He is feeling well. No new concerns today. Eating and drinking well. Has gained back a couple of pounds. His energy level is good. He's able to stay active. No new areas of pain. Bowels are moving well. No issues with blood in urine.    Review of Systems:  12-point ROS is negative except as in HPI    Current Outpatient Medications   Medication Sig Dispense Refill     allopurinol (ZYLOPRIM) 300 MG tablet        CATHFLO ACTIVASE 2 MG injection        folic acid (FOLVITE) 1 MG tablet        LORazepam (ATIVAN) 0.5 MG tablet Take 1 tablet (0.5 mg) by mouth every 4 hours as needed (Anxiety, Nausea/Vomiting or Sleep) 30 tablet 5     mesna (MESNEX) 400 MG TABS tablet Take 1 tablet (400 mg) by mouth every 4 hours for 2 doses Take one tablet 4 hours and 8 hours after end of cyclophosphamide infusion. 2 tablet 0     NEULASTA ONPRO 6 MG/0.6ML injection        polyethylene glycol (MIRALAX) 17 g packet Take 17 g by mouth       prochlorperazine (COMPAZINE) 10 MG tablet Take 1 tablet (10 mg) by mouth every 6 hours as needed (Nausea/Vomiting) 30 tablet 5     senna-docusate (SENNA S) 8.6-50 MG tablet Take 1 tablet by mouth 2 times daily 60 tablet 0     dexamethasone (DECADRON) 4 MG tablet Take 2 tablets (8 mg) by mouth daily (with breakfast) for 3 days Start the day after doxorubicin, cyclophosphamide, and vincristine (odd cycles). 6 tablet 8     mesna (MESNEX) 400 MG TABS tablet Take 1 tablet (400 mg) by mouth every 4 hours for 2 doses Take one tablet 4 hours and 8 hours after end of cyclophosphamide infusion. 2 tablet 0     Objective:  General: patient appears well in no acute distress, alert and oriented, speech clear and fluid  Skin: no visualized rash or lesions on visualized skin  Resp: Appears to be breathing comfortably without accessory muscle usage, speaking in full sentences, no audible wheezes or cough.  Psych: Coherent  speech, normal rate and volume, able to articulate logical thoughts, able to abstract reason, no tangential thoughts, no hallucinations or delusions  Patient's affect is appropriate.    Labs:  Orders Only on 06/16/2021   Component Date Value Ref Range Status     Sodium 06/16/2021 137  133 - 144 mmol/L Final     Potassium 06/16/2021 3.6  3.4 - 5.3 mmol/L Final     Chloride 06/16/2021 111* 94 - 109 mmol/L Final     Carbon Dioxide 06/16/2021 22  20 - 32 mmol/L Final     Anion Gap 06/16/2021 4  3 - 14 mmol/L Final     Glucose 06/16/2021 93  70 - 99 mg/dL Final     Urea Nitrogen 06/16/2021 10  7 - 30 mg/dL Final     Creatinine 06/16/2021 0.70  0.66 - 1.25 mg/dL Final     GFR Estimate 06/16/2021 >90  >60 mL/min/[1.73_m2] Final     GFR Estimate If Black 06/16/2021 >90  >60 mL/min/[1.73_m2] Final     Calcium 06/16/2021 8.4* 8.5 - 10.1 mg/dL Final     Bilirubin Total 06/16/2021 0.4  0.2 - 1.3 mg/dL Final     Albumin 06/16/2021 3.5  3.4 - 5.0 g/dL Final     Protein Total 06/16/2021 6.9  6.8 - 8.8 g/dL Final     Alkaline Phosphatase 06/16/2021 126  40 - 150 U/L Final     ALT 06/16/2021 36  0 - 70 U/L Final     AST 06/16/2021 12  0 - 45 U/L Final     WBC 06/16/2021 13.3* 4.0 - 11.0 10e9/L Final     RBC Count 06/16/2021 3.35* 4.4 - 5.9 10e12/L Final     Hemoglobin 06/16/2021 10.1* 13.3 - 17.7 g/dL Final     Hematocrit 06/16/2021 31.3* 40.0 - 53.0 % Final     MCV 06/16/2021 93  78 - 100 fl Final     MCH 06/16/2021 30.1  26.5 - 33.0 pg Final     MCHC 06/16/2021 32.3  31.5 - 36.5 g/dL Final     RDW 06/16/2021 17.2* 10.0 - 15.0 % Final     Platelet Count 06/16/2021 167  150 - 450 10e9/L Final     Diff Method 06/16/2021 Automated Method   Final     % Neutrophils 06/16/2021 78.4  % Final     % Lymphocytes 06/16/2021 3.4  % Final     % Monocytes 06/16/2021 12.8  % Final     % Eosinophils 06/16/2021 2.1  % Final     % Basophils 06/16/2021 0.8  % Final     % Immature Granulocytes 06/16/2021 2.5  % Final     Nucleated RBCs 06/16/2021 1* 0  /100 Final     Absolute Neutrophil 06/16/2021 10.5* 1.6 - 8.3 10e9/L Final     Absolute Lymphocytes 06/16/2021 0.5* 0.8 - 5.3 10e9/L Final     Absolute Monocytes 06/16/2021 1.7* 0.0 - 1.3 10e9/L Final     Absolute Eosinophils 06/16/2021 0.3  0.0 - 0.7 10e9/L Final     Absolute Basophils 06/16/2021 0.1  0.0 - 0.2 10e9/L Final     Abs Immature Granulocytes 06/16/2021 0.3  0 - 0.4 10e9/L Final     Absolute Nucleated RBC 06/16/2021 0.1   Final       Imaging:  CT Chest/Abdomen/Pelvis w Contrast  Narrative: CT of the Chest, Abdomen and Pelvis with contrast, 8/18/2021 11:48 AM.    Comparison: CT chest, abdomen, and pelvis 6/16/2021, 3/24/2021,  1/6/2021.    History: Desmoplastic small round cell tumor (H); Sarcoma, Ewings (H).    Technique: Axial images of the chest, abdomen and pelvis were obtained  with contrast. Coronal reconstructions were provided. Images were  reviewed in bone, lung, and soft tissue windows.     Findings:  Support devices: Right internal jugular central catheter tip  terminates in the high SVC.    Chest:  Thyroid gland is normal. Heart size is normal. No pericardial  effusion. No significant coronary artery calcifications. Thoracic  aorta is normal in caliber with conventional three-vessel branching  pattern. Main pulmonary artery is normal in caliber.    No mediastinal, hilar or axillary adenopathy by size criteria.  Unchanged prominent right paratracheal lymph node (series 3, image  79). The esophagus is normal.    Trachea and central airways are clear. No focal airspace opacity,  pleural effusion, or pneumothorax. Scattered calcified granulomas. No  suspicious or enlarged pulmonary nodules.    Abdomen and Pelvis:     Peripheral heterogeneously enhancing masses along the surface of the  right hepatic lobe are not significantly changed. For example, in  hepatic segment 6/7 measuring 7.0 x 2.3 cm (series 3, image 289) and  3.4 x 1.8 cm in hepatic segment 7 (series 3, image 2 and 43). No intra  or  extrahepatic biliary duct dilation. Gallbladder is normal. Common  bile duct is not enlarged. Spleen is normal. Homogenous enhancement of  the pancreas. Main pancreatic duct is not dilated.    No adrenal mass. Symmetric renal enhancement. No hydronephrosis,  calcified stone, or contour deforming mass. Bladder is unremarkable.  Prostate size is within normal limits.    There are extensive peritoneal soft tissue deposits including multiple  partially calcified solid and cystic mass encasing loops of small  bowel. For example, seen on series 3:  -Large ill-defined cystic and solid pelvic mass with central  calcifications measuring approximately 13 x 10 cm, not significant  changed when using similar measuring technique (series 3, image 549).  There is associated mass effect and encasement of the sigmoid colon.  -Partially calcified mass in the left lower quadrant measuring 6.4 x  5.7 cm, previously measuring 7.3 x 5.6 cm (image 406).  -Fluid attenuating cystic structure in the mid abdomen measuring 12.7  x 8.7 cm, previously measuring 10.1 x 3.6 cm.  -Partially calcified peritoneal deposits at the level of the umbilicus  measuring 2.3 x 1.2 cm (series 3, image 453, previously measuring 2.3  x 1.6 cm.  -Solid peritoneal mass in the anterior peritoneum measuring 2.5 x 2.3  cm (series 3, image 479), previously measuring 2.4 x 2.1 cm    No small or large bowel dilation. No pneumatosis or portal venous gas.  No free intraperitoneal air. No intra-abdominal or pelvic free fluid.    Abdominal aorta and major branches are normal in caliber and patent  without significant atherosclerotic disease the main portal vein and  major branches are patent.    No bulky retroperitoneal or mesenteric adenopathy. Scattered prominent  retroperitoneal lymph nodes are unchanged.    Bones and Soft Tissues:   No suspicious osseous lesion. No suspicious mass. Multilevel  degenerative changes of the thoracolumbar spine.  Impression: Impression:    1.  Minimal change of extensive peritoneal carcinomatosis and stable  hepatic metastatic disease burden.   2.  Interval increase in size of a fluid attenuating cystic lesion in  the mid abdomen.    I have personally reviewed the examination and initial interpretation  and I agree with the findings.    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 He tolerated 6 alternating cycles of CAV/IMV with anticipated side effects. He developed hematuria with his last 2 cycles of Ifosfamide based therapy despite dose reduction and Mesna prophylaxis, and ultimately patient and family decided to continue with CAV alone. Due to a mixed response after 4 cycles it was decided to resume alternating IMV. CT-scan from 8/18/21 shows stable to mildly improved burden of disease. Robert is happy and doing well and family wants to maximize quality of life balance with need for treatments. Plan is to continue chemotherapy holiday for another 3 months, per consensus of patient and family.   -RTC in 3 months in November     #3 Anemia, normocytic. Initially microcytic, now normocytic  Improved and up to 12.8 g/dL off of chemotherapy.      #4 Deconditioning  Continue to encourage physical activity and walk for 10 minutes every day.     #5 History of recurrent hematuria, resolved  He previously saw urology in September 2020 who felt this could be secondary to cyclophosphamide (despite mesna), new bladder/urethral primary malignancies, or metastatic bladder invasion.  -consider cystoscopy if worsens again    Family knows to contact us with any questions or concerns during this chemotherapy holiday.    Farzaneh Burciaga M.D.  Assistant  Professor of Medicine  Hematology, Oncology and Transplantation              Again, thank you for allowing me to participate in the care of your patient.        Sincerely,        Farzaneh Sims MD

## 2021-08-20 ENCOUNTER — HOME INFUSION (PRE-WILLOW HOME INFUSION) (OUTPATIENT)
Dept: PHARMACY | Facility: CLINIC | Age: 26
End: 2021-08-20

## 2021-08-23 NOTE — PROGRESS NOTES
This is a recent snapshot of the patient's [facility] medical record.  For current drug dose and complete information and questions, call [phone] or In Basket pool, fv home infusion ([id number])  CSN Number:  [id number]

## 2021-08-27 ENCOUNTER — HOME INFUSION (PRE-WILLOW HOME INFUSION) (OUTPATIENT)
Dept: PHARMACY | Facility: CLINIC | Age: 26
End: 2021-08-27

## 2021-09-01 NOTE — PROGRESS NOTES
This is a recent snapshot of the patient's [facility] medical record.  For current drug dose and complete information and questions, call [phone] or In Basket pool, fv home infusion ([id number])  CSN Number:  [id number]

## 2021-09-03 ENCOUNTER — HOME INFUSION (PRE-WILLOW HOME INFUSION) (OUTPATIENT)
Dept: PHARMACY | Facility: CLINIC | Age: 26
End: 2021-09-03

## 2021-09-10 ENCOUNTER — HOME INFUSION (PRE-WILLOW HOME INFUSION) (OUTPATIENT)
Dept: PHARMACY | Facility: CLINIC | Age: 26
End: 2021-09-10

## 2021-09-13 NOTE — PROGRESS NOTES
This is a recent snapshot of the patient's [facility] medical record.  For current drug dose and complete information and questions, call [phone] or In Basket pool, fv home infusion ([id number])  CSN Number:  [id number]

## 2021-09-18 ENCOUNTER — HOME INFUSION (PRE-WILLOW HOME INFUSION) (OUTPATIENT)
Dept: PHARMACY | Facility: CLINIC | Age: 26
End: 2021-09-18

## 2021-09-24 ENCOUNTER — HOME INFUSION (PRE-WILLOW HOME INFUSION) (OUTPATIENT)
Dept: PHARMACY | Facility: CLINIC | Age: 26
End: 2021-09-24

## 2021-09-24 NOTE — PROGRESS NOTES
This is a recent snapshot of the patient's [facility] medical record.  For current drug dose and complete information and questions, call [phone] or In [facility] pool, fv home infusion ([id number])  CSN Number:  [id number]

## 2021-09-30 NOTE — PROGRESS NOTES
This is a recent snapshot of the patient's [facility] medical record.  For current drug dose and complete information and questions, call [phone] or In [facility] pool, fv home infusion ([id number])  CSN Number:  [id number]

## 2021-10-01 ENCOUNTER — HOME INFUSION (PRE-WILLOW HOME INFUSION) (OUTPATIENT)
Dept: PHARMACY | Facility: CLINIC | Age: 26
End: 2021-10-01

## 2021-10-05 NOTE — PROGRESS NOTES
This is a recent snapshot of the patient's [facility] medical record.  For current drug dose and complete information and questions, call [phone] or In Basket pool, fv home infusion ([id number])  CSN Number:  [id number]

## 2021-10-08 ENCOUNTER — HOME INFUSION (PRE-WILLOW HOME INFUSION) (OUTPATIENT)
Dept: PHARMACY | Facility: CLINIC | Age: 26
End: 2021-10-08

## 2021-10-10 ENCOUNTER — HEALTH MAINTENANCE LETTER (OUTPATIENT)
Age: 26
End: 2021-10-10

## 2021-10-13 NOTE — PROGRESS NOTES
This is a recent snapshot of the patient's [facility] medical record.  For current drug dose and complete information and questions, call [phone] or In Basket pool, fv home infusion ([id number])  CSN Number:  [id number]

## 2021-10-15 ENCOUNTER — HOME INFUSION (PRE-WILLOW HOME INFUSION) (OUTPATIENT)
Dept: PHARMACY | Facility: CLINIC | Age: 26
End: 2021-10-15
Payer: COMMERCIAL

## 2021-10-22 ENCOUNTER — HOME INFUSION (PRE-WILLOW HOME INFUSION) (OUTPATIENT)
Dept: PHARMACY | Facility: CLINIC | Age: 26
End: 2021-10-22
Payer: COMMERCIAL

## 2021-10-29 ENCOUNTER — HOME INFUSION (PRE-WILLOW HOME INFUSION) (OUTPATIENT)
Dept: PHARMACY | Facility: CLINIC | Age: 26
End: 2021-10-29
Payer: COMMERCIAL

## 2021-11-02 ENCOUNTER — ANCILLARY PROCEDURE (OUTPATIENT)
Dept: CT IMAGING | Facility: CLINIC | Age: 26
End: 2021-11-02
Attending: INTERNAL MEDICINE
Payer: COMMERCIAL

## 2021-11-02 ENCOUNTER — LAB (OUTPATIENT)
Dept: LAB | Facility: CLINIC | Age: 26
End: 2021-11-02
Attending: INTERNAL MEDICINE
Payer: COMMERCIAL

## 2021-11-02 VITALS
HEART RATE: 120 BPM | RESPIRATION RATE: 18 BRPM | BODY MASS INDEX: 45.21 KG/M2 | OXYGEN SATURATION: 96 % | WEIGHT: 297.3 LBS | DIASTOLIC BLOOD PRESSURE: 88 MMHG | SYSTOLIC BLOOD PRESSURE: 134 MMHG | TEMPERATURE: 98.1 F

## 2021-11-02 DIAGNOSIS — C41.9 SARCOMA, EWINGS (H): ICD-10-CM

## 2021-11-02 DIAGNOSIS — Z95.828 PORT-A-CATH IN PLACE: Primary | ICD-10-CM

## 2021-11-02 DIAGNOSIS — C49.9 DESMOPLASTIC SMALL ROUND CELL TUMOR (H): ICD-10-CM

## 2021-11-02 LAB
ALBUMIN SERPL-MCNC: 3.8 G/DL (ref 3.4–5)
ALP SERPL-CCNC: 83 U/L (ref 40–150)
ALT SERPL W P-5'-P-CCNC: 35 U/L (ref 0–70)
ANION GAP SERPL CALCULATED.3IONS-SCNC: 6 MMOL/L (ref 3–14)
AST SERPL W P-5'-P-CCNC: 17 U/L (ref 0–45)
BASOPHILS # BLD AUTO: 0.1 10E3/UL (ref 0–0.2)
BASOPHILS NFR BLD AUTO: 1 %
BILIRUB SERPL-MCNC: 0.6 MG/DL (ref 0.2–1.3)
BUN SERPL-MCNC: 11 MG/DL (ref 7–30)
CALCIUM SERPL-MCNC: 9.6 MG/DL (ref 8.5–10.1)
CHLORIDE BLD-SCNC: 106 MMOL/L (ref 94–109)
CO2 SERPL-SCNC: 26 MMOL/L (ref 20–32)
CREAT SERPL-MCNC: 0.88 MG/DL (ref 0.66–1.25)
EOSINOPHIL # BLD AUTO: 0.4 10E3/UL (ref 0–0.7)
EOSINOPHIL NFR BLD AUTO: 4 %
ERYTHROCYTE [DISTWIDTH] IN BLOOD BY AUTOMATED COUNT: 15.2 % (ref 10–15)
GFR SERPL CREATININE-BSD FRML MDRD: >90 ML/MIN/1.73M2
GLUCOSE BLD-MCNC: 122 MG/DL (ref 70–99)
HCT VFR BLD AUTO: 42.9 % (ref 40–53)
HGB BLD-MCNC: 13.9 G/DL (ref 13.3–17.7)
IMM GRANULOCYTES # BLD: 0.1 10E3/UL
IMM GRANULOCYTES NFR BLD: 1 %
LYMPHOCYTES # BLD AUTO: 0.9 10E3/UL (ref 0.8–5.3)
LYMPHOCYTES NFR BLD AUTO: 9 %
MCH RBC QN AUTO: 26.9 PG (ref 26.5–33)
MCHC RBC AUTO-ENTMCNC: 32.4 G/DL (ref 31.5–36.5)
MCV RBC AUTO: 83 FL (ref 78–100)
MONOCYTES # BLD AUTO: 0.7 10E3/UL (ref 0–1.3)
MONOCYTES NFR BLD AUTO: 7 %
NEUTROPHILS # BLD AUTO: 8 10E3/UL (ref 1.6–8.3)
NEUTROPHILS NFR BLD AUTO: 78 %
NRBC # BLD AUTO: 0 10E3/UL
NRBC BLD AUTO-RTO: 0 /100
PLATELET # BLD AUTO: 350 10E3/UL (ref 150–450)
POTASSIUM BLD-SCNC: 3.8 MMOL/L (ref 3.4–5.3)
PROT SERPL-MCNC: 7.9 G/DL (ref 6.8–8.8)
RBC # BLD AUTO: 5.16 10E6/UL (ref 4.4–5.9)
SODIUM SERPL-SCNC: 138 MMOL/L (ref 133–144)
WBC # BLD AUTO: 10.1 10E3/UL (ref 4–11)

## 2021-11-02 PROCEDURE — 36592 COLLECT BLOOD FROM PICC: CPT

## 2021-11-02 PROCEDURE — 250N000011 HC RX IP 250 OP 636: Performed by: INTERNAL MEDICINE

## 2021-11-02 PROCEDURE — 74177 CT ABD & PELVIS W/CONTRAST: CPT | Mod: GC | Performed by: STUDENT IN AN ORGANIZED HEALTH CARE EDUCATION/TRAINING PROGRAM

## 2021-11-02 PROCEDURE — 85025 COMPLETE CBC W/AUTO DIFF WBC: CPT

## 2021-11-02 PROCEDURE — 82040 ASSAY OF SERUM ALBUMIN: CPT

## 2021-11-02 PROCEDURE — 71260 CT THORAX DX C+: CPT | Mod: GC | Performed by: STUDENT IN AN ORGANIZED HEALTH CARE EDUCATION/TRAINING PROGRAM

## 2021-11-02 RX ORDER — HEPARIN SODIUM (PORCINE) LOCK FLUSH IV SOLN 100 UNIT/ML 100 UNIT/ML
500 SOLUTION INTRAVENOUS ONCE
Status: DISCONTINUED | OUTPATIENT
Start: 2021-11-02 | End: 2022-01-01

## 2021-11-02 RX ORDER — IOPAMIDOL 755 MG/ML
135 INJECTION, SOLUTION INTRAVASCULAR ONCE
Status: COMPLETED | OUTPATIENT
Start: 2021-11-02 | End: 2021-11-02

## 2021-11-02 RX ORDER — HEPARIN SODIUM,PORCINE 10 UNIT/ML
5 VIAL (ML) INTRAVENOUS ONCE
Status: COMPLETED | OUTPATIENT
Start: 2021-11-02 | End: 2021-11-02

## 2021-11-02 RX ADMIN — Medication 5 ML: at 07:58

## 2021-11-02 RX ADMIN — IOPAMIDOL 135 ML: 755 INJECTION, SOLUTION INTRAVASCULAR at 08:31

## 2021-11-02 ASSESSMENT — PAIN SCALES - GENERAL: PAINLEVEL: NO PAIN (0)

## 2021-11-02 NOTE — NURSING NOTE
Chief Complaint   Patient presents with     Blood Draw     Labs drawn via cvc by RN in lab. VS taken.      Labs collected from CVC by RN. Red line flushed with saline and heparin, purple line heparin locked. Vitals taken. Pt tachy 116-120 in lab, asymptomatic. Noted in 11/4 appointment note for Dr. Nicole Sims.     Daniela Riddle RN

## 2021-11-04 ENCOUNTER — VIRTUAL VISIT (OUTPATIENT)
Dept: ONCOLOGY | Facility: CLINIC | Age: 26
End: 2021-11-04
Attending: INTERNAL MEDICINE
Payer: COMMERCIAL

## 2021-11-04 DIAGNOSIS — C49.9 DESMOPLASTIC SMALL ROUND CELL TUMOR (H): Primary | ICD-10-CM

## 2021-11-04 PROCEDURE — G0463 HOSPITAL OUTPT CLINIC VISIT: HCPCS | Mod: PN,RTG | Performed by: INTERNAL MEDICINE

## 2021-11-04 PROCEDURE — 99214 OFFICE O/P EST MOD 30 MIN: CPT | Mod: 95 | Performed by: INTERNAL MEDICINE

## 2021-11-04 NOTE — LETTER
11/4/2021         RE: Diego Buchanan  [address]  [place] [address]        Dear Colleague,    Thank you for referring your patient, Diego Buchanan, to the [facility]. Please see a copy of my visit note below.    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 He presented initially with abdominal pain, diarrhea, and progressive abdominal distention for 3 months duration. 2. Initial CT scan in February 2020 showed severe peritoneal carcinomatosis with large peritoneal tumor implants throughout the entire abdomen and pelvis, but mostly prominently in the pelvis.   2. PETCT scan showed interval increase in the amount of peritoneal carcinomatosis.  3. On 3/12/2020, he had ultrasound-guided core needle biopsy of a peritoneal implant (Case: [id number]), which showed a completely undifferentiated appearance, but stains with pancytokeratin, indicating an epithelial origin. 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 Tumor markers for CA-19-9, CEA, beta-hCG, alpha-fetoprotein were unremarkable. Cancer type ID molecular testing by [facility] sent to determine the exact diagnosis and to assist in further treatment planning. 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 Relative probability of less than 5% of other subtype of sarcoma. EWSR1-WT1 fusion detected.  4. 5/1/2020, MRI brain negative for brain metastasis, and baseline CT-CAP obtained showing extensive mixed solid and cystic masses throughout the abdomen and pelvis consistent with peritoneal carcinomatosis. Largest mass measures approximately 20 cm in the pelvis. Metastatic mixed solid and cystic masses seen in hepatic segments 5 and 6 and hepatic segment 7. The masses appear to be contiguous  with the retrohepatic peritoneal carcinomatosis. Small volume fluid about the spleen favored to represent malignant ascites, stable mild-to-moderate right hydronephrosis related to mass effect upon the distal ureter in the pelvis, the IVC and iliac veins are compressed due to the extensive peritoneal carcinomatosis without findings to suggest deep venous thrombosis.  5. 5/4/2020, he begins CAV/IMV alternating chemotherapy. He receives 6 cycles of treatment. He symptomatically improves.  6. 9/11/2020, CT-CAP shows stable to mildly improved extensive peritoneal carcinomatosis. He would like to omit Ifosfamide/etoposide cycles and continue only with CAV.  7. 10/9/2020, he starts CAV every 21 days.  8. 1/6/2021, CT CAP showed a mixed response in the mixed solid and cystic masses throughout the peritoneum. Some of the tumors are stable to mildly worse, with some of the peritoneal masses a bit larger, a few are smaller, one cystic tumor in the left abdomen is smaller, while tumors lower in the pelvis appear slightly larger.  9. 3/24/2021, CT CAP showed continued slight decrease in overall burden of peritoneal carcinomatosis with persistent encasement of bowel without evidence of bowel obstruction.  10. 6/25/2021, he receives cycle 19 CAV, then takes a chemotherapy holiday.    History of Present Illness:  Diego Buchanan presents via video with sister and mother present, for follow up.     We reviewed the 11/2/2021 CT abdomen and pelvis, which shows overall stability of the multiple intra-abdominal tumors.    He is feeling well. No new concerns today. Eating and drinking well. His energy level is good. He's able to stay active. His hair has grown in more normally for him. He denies any new areas of pain. His bowels are moving well. No nausea or vomiting. No fevers or chills.    Review of Systems:  12-point ROS is negative except as in HPI    Current Outpatient Medications   Medication Sig Dispense Refill     allopurinol  (ZYLOPRIM) 300 MG tablet  (Patient not taking: Reported on 11/4/2021)       CATHFLO ACTIVASE 2 MG injection  (Patient not taking: Reported on 11/4/2021)       dexamethasone (DECADRON) 4 MG tablet Take 2 tablets (8 mg) by mouth daily (with breakfast) for 3 days Start the day after doxorubicin, cyclophosphamide, and vincristine (odd cycles). 6 tablet 8     folic acid (FOLVITE) 1 MG tablet  (Patient not taking: Reported on 11/4/2021)       LORazepam (ATIVAN) 0.5 MG tablet Take 1 tablet (0.5 mg) by mouth every 4 hours as needed (Anxiety, Nausea/Vomiting or Sleep) (Patient not taking: Reported on 11/4/2021) 30 tablet 5     mesna (MESNEX) 400 MG TABS tablet Take 1 tablet (400 mg) by mouth every 4 hours for 2 doses Take one tablet 4 hours and 8 hours after end of cyclophosphamide infusion. 2 tablet 0     mesna (MESNEX) 400 MG TABS tablet Take 1 tablet (400 mg) by mouth every 4 hours for 2 doses Take one tablet 4 hours and 8 hours after end of cyclophosphamide infusion. (Patient not taking: Reported on 11/4/2021) 2 tablet 0     NEULASTA ONPRO 6 MG/0.6ML injection  (Patient not taking: Reported on 11/4/2021)       polyethylene glycol (MIRALAX) 17 g packet Take 17 g by mouth (Patient not taking: Reported on 11/4/2021)       prochlorperazine (COMPAZINE) 10 MG tablet Take 1 tablet (10 mg) by mouth every 6 hours as needed (Nausea/Vomiting) (Patient not taking: Reported on 11/4/2021) 30 tablet 5     senna-docusate (SENNA S) 8.6-50 MG tablet Take 1 tablet by mouth 2 times daily (Patient not taking: Reported on 11/4/2021) 60 tablet 0     Objective:  General: patient appears well in no acute distress, alert and oriented, speech clear and fluid  Skin: no visualized rash or lesions on visualized skin  Resp: Appears to be breathing comfortably without accessory muscle usage, speaking in full sentences, no audible wheezes or cough.  Psych: Coherent speech, normal rate and volume, able to articulate logical thoughts, able to abstract  reason, no tangential thoughts, no hallucinations or delusions  Patient's affect is appropriate.    Labs:  Lab on 11/02/2021   Component Date Value Ref Range Status     Sodium 11/02/2021 138  133 - 144 mmol/L Final     Potassium 11/02/2021 3.8  3.4 - 5.3 mmol/L Final     Chloride 11/02/2021 106  94 - 109 mmol/L Final     Carbon Dioxide (CO2) 11/02/2021 26  20 - 32 mmol/L Final     Anion Gap 11/02/2021 6  3 - 14 mmol/L Final     Urea Nitrogen 11/02/2021 11  7 - 30 mg/dL Final     Creatinine 11/02/2021 0.88  0.66 - 1.25 mg/dL Final     Calcium 11/02/2021 9.6  8.5 - 10.1 mg/dL Final     Glucose 11/02/2021 122* 70 - 99 mg/dL Final     Alkaline Phosphatase 11/02/2021 83  40 - 150 U/L Final     AST 11/02/2021 17  0 - 45 U/L Final     ALT 11/02/2021 35  0 - 70 U/L Final     Protein Total 11/02/2021 7.9  6.8 - 8.8 g/dL Final     Albumin 11/02/2021 3.8  3.4 - 5.0 g/dL Final     Bilirubin Total 11/02/2021 0.6  0.2 - 1.3 mg/dL Final     GFR Estimate 11/02/2021 >90  >60 mL/min/1.73m2 Final     WBC Count 11/02/2021 10.1  4.0 - 11.0 10e3/uL Final     RBC Count 11/02/2021 5.16  4.40 - 5.90 10e6/uL Final     Hemoglobin 11/02/2021 13.9  13.3 - 17.7 g/dL Final     Hematocrit 11/02/2021 42.9  40.0 - 53.0 % Final     MCV 11/02/2021 83  78 - 100 fL Final     MCH 11/02/2021 26.9  26.5 - 33.0 pg Final     MCHC 11/02/2021 32.4  31.5 - 36.5 g/dL Final     RDW 11/02/2021 15.2* 10.0 - 15.0 % Final     Platelet Count 11/02/2021 350  150 - 450 10e3/uL Final     % Neutrophils 11/02/2021 78  % Final     % Lymphocytes 11/02/2021 9  % Final     % Monocytes 11/02/2021 7  % Final     % Eosinophils 11/02/2021 4  % Final     % Basophils 11/02/2021 1  % Final     % Immature Granulocytes 11/02/2021 1  % Final     NRBCs per 100 WBC 11/02/2021 0  <1 /100 Final     Absolute Neutrophils 11/02/2021 8.0  1.6 - 8.3 10e3/uL Final     Absolute Lymphocytes 11/02/2021 0.9  0.8 - 5.3 10e3/uL Final     Absolute Monocytes 11/02/2021 0.7  0.0 - 1.3 10e3/uL Final      Absolute Eosinophils 11/02/2021 0.4  0.0 - 0.7 10e3/uL Final     Absolute Basophils 11/02/2021 0.1  0.0 - 0.2 10e3/uL Final     Absolute Immature Granulocytes 11/02/2021 0.1* <=0.0 10e3/uL Final     Absolute NRBCs 11/02/2021 0.0  10e3/uL Final       Imaging:  EXAMINATION: CT CHEST/ABDOMEN/PELVIS W CONTRAST, 11/2/2021 8:44 AM     TECHNIQUE:  Helical CT images from the thoracic inlet through the  symphysis pubis were obtained  with contrast. 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 Treated with chemotherapy.     FINDINGS:     Support devices: Right IJ Port-A-Cath tip is in the mid superior vena  cava.     CHEST:      LOWER NECK: No enlarged supraclavicular nodes are present. No thyroid  nodule in the visualized thyroid.  MEDIASTINUM AND KASSANDRA: No mediastinal or hilar lymphadenopathy. Soft  tissue in the anterior mediastinum likely representing thymic tissue  (series 3, image 85)  HEART AND PERICARDIUM: Normal heart size. No pericardial fluid or  thickening.  THORACIC VESSELS: Normal caliber of the aorta and main pulmonary  artery.  AIRWAYS: The central tracheobronchial tree is clear.  LUNGS: No pulmonary consolidation. Scattered small calcified pulmonary  nodules similar to prior, for example 2 mm subpleural nodule in the  right middle lobe (series 4, image 171). No new or suspicious  pulmonary nodules. No pleural effusion or pneumothorax.     ABDOMEN:     LIVER: Heterogeneously enhancing, ill marginated masses in the right  hepatic lobe and in hepatic segment 6/7 measures 7.1 x 3.5 compared to  6.9 x 3.2 cm,, when measured similarly (series 3, image 282)..  Relatively unchanged peripheral hypodensity along segment 7/8 (series  2, image  50)  BILIARY: No stones, gallbladder wall thickening, or pericholecystic  fluid. No extrahepatic biliary ductal dilation.  SPLEEN: Unremarkable.  PANCREAS: Normal CT appearance.   ADRENALS: No nodules.   KIDNEYS AND URETERS: No solid lesions. No calculi. No  hydroureteronephrosis.  BLADDER: Unremarkable.  REPRODUCTIVE: Unremarkable.  BOWEL: No abnormally dilated bowel loop  MESENTERY/PERITONEUM: Again seen are extensive peritoneal soft tissue  deposits including multiple solid and cystic masses closely associated  with the bowel loops.   -Large fluid attenuating cystic structure in the mid abdomen measures  8 x 9 x 10.1 cm compared to 8.3 x 9.8 x 11.9 cm (series 6, image 21)  -Ill marginated cystic and solid mass in the pelvis measuring 12.8 x  10.2 cm compared to 12.7 x 9.9 cm lesion is encasing the sigmoid colon  -Calcified mass in the left lower quadrant measures 6.8 x 6 cm  compared to 6.4 x 5.7 cm when measured similarly (series 2, image 86)  -Nodule at the level of the umbilicus measures 2.6 x 1.3 cm compared  to 2.5 x 1.2 cm (series 2, image 89)  -Nodule along the left paracolic gutter masses 3 x 2.1 cm compared to  3 x 2.1 cm cm (series 3, image 408)     LYMPH NODES: No bulky retroperitoneal or mesenteric adenopathy.  Scattered prominent retroperitoneal lymph nodes are unchanged.  VESSELS: Normal, without aneurysm or significant atherosclerotic  disease.     BONES: Mild multilevel degenerative changes of the visualized spine.  Incidental transitional lumbosacral anatomy with pseudoarticulation of  the right L5 transverse process with S1. No aggressive osseous  lesions. Unchanged focal sclerosis in left atrium.  SOFT TISSUES: Unremarkable.                                                                   IMPRESSION: In this patient with desmoplastic small round cell tumor,  the current scan compared to prior CT 8/18/2021 shows:  1. Stable to minimal increase in size of of hepatic metastasis,  recommend  attention on follow-up. No new hepatic metastasis.  2. Grossly unchanged peritoneal carcinomatosis.     I have personally reviewed the examination and initial interpretation  and I agree with the findings.     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 He tolerated CAV/IMV for 19 cycles, and Robert is happy to have been off of chemotherapy since June 25th. He continues to do well clinically and he has has kept his weight stable. He and family continue to maximize quality of life during this period. Per consensus of patient and family we will defer chemotherapy another 3 months. If further rounds of chemotherapy are needed could consider further rounds of CAV (dactinomycin containing)/IMV, Topotecan/cyclophosphamide, Irinotecan/temozolomide.     -RTC in 3 months in February 2022 (7-8 months since last chemotherapy)  - Family knows to contact us with any questions or concerns during chemotherapy break.    Farzaneh Burciaga M.D.   of Medicine  Hematology, Oncology and Transplantation

## 2021-11-04 NOTE — PROGRESS NOTES
Diego is a 26 year old who is being evaluated via a billable video visit.      How would you like to obtain your AVS? [employer]  If the video visit is dropped, the invitation should be resent by: Text to cell phone: [phone]  Will anyone else be joining your video visit? No      Video-Visit Details  Type of service:  Video Visit  Video Start Time: 12:45 PM  Video End Time:1:15 PM  Originating Location (pt. Location): Home  Distant Location (provider location):  [facility]   Platform used for Video Visit: [employer]     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 He presented initially with abdominal pain, diarrhea, and progressive abdominal distention for 3 months duration. 2. Initial CT scan in February 2020 showed severe peritoneal carcinomatosis with large peritoneal tumor implants throughout the entire abdomen and pelvis, but mostly prominently in the pelvis.   2. PETCT scan showed interval increase in the amount of peritoneal carcinomatosis.  3. On 3/12/2020, he had ultrasound-guided core needle biopsy of a peritoneal implant (Case: [id number]), which showed a completely undifferentiated appearance, but stains with pancytokeratin, indicating an epithelial origin. 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 Tumor markers for CA-19-9, CEA, beta-hCG, alpha-fetoprotein were unremarkable. Cancer type ID molecular testing by [facility] sent to determine the exact diagnosis and to assist in further treatment planning. 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 Relative probability of less than 5% of other subtype of sarcoma. EWSR1-WT1 fusion detected.  4. 5/1/2020, MRI brain negative for brain metastasis, and baseline CT-CAP obtained showing  extensive mixed solid and cystic masses throughout the abdomen and pelvis consistent with peritoneal carcinomatosis. Largest mass measures approximately 20 cm in the pelvis. Metastatic mixed solid and cystic masses seen in hepatic segments 5 and 6 and hepatic segment 7. The masses appear to be contiguous with the retrohepatic peritoneal carcinomatosis. Small volume fluid about the spleen favored to represent malignant ascites, stable mild-to-moderate right hydronephrosis related to mass effect upon the distal ureter in the pelvis, the IVC and iliac veins are compressed due to the extensive peritoneal carcinomatosis without findings to suggest deep venous thrombosis.  5. 5/4/2020, he begins CAV/IMV alternating chemotherapy. He receives 6 cycles of treatment. He symptomatically improves.  6. 9/11/2020, CT-CAP shows stable to mildly improved extensive peritoneal carcinomatosis. He would like to omit Ifosfamide/etoposide cycles and continue only with CAV.  7. 10/9/2020, he starts CAV every 21 days.  8. 1/6/2021, CT CAP showed a mixed response in the mixed solid and cystic masses throughout the peritoneum. Some of the tumors are stable to mildly worse, with some of the peritoneal masses a bit larger, a few are smaller, one cystic tumor in the left abdomen is smaller, while tumors lower in the pelvis appear slightly larger.  9. 3/24/2021, CT CAP showed continued slight decrease in overall burden of peritoneal carcinomatosis with persistent encasement of bowel without evidence of bowel obstruction.  10. 6/25/2021, he receives cycle 19 CAV, then takes a chemotherapy holiday.    History of Present Illness:  iDego Buchanan presents via video with sister and mother present, for follow up.     We reviewed the 11/2/2021 CT abdomen and pelvis, which shows overall stability of the multiple intra-abdominal tumors.    He is feeling well. No new concerns today. Eating and drinking well. His energy level is good. He's able to stay  active. His hair has grown in more normally for him. He denies any new areas of pain. His bowels are moving well. No nausea or vomiting. No fevers or chills.    Review of Systems:  12-point ROS is negative except as in HPI    Current Outpatient Medications   Medication Sig Dispense Refill     allopurinol (ZYLOPRIM) 300 MG tablet  (Patient not taking: Reported on 11/4/2021)       CATHFLO ACTIVASE 2 MG injection  (Patient not taking: Reported on 11/4/2021)       dexamethasone (DECADRON) 4 MG tablet Take 2 tablets (8 mg) by mouth daily (with breakfast) for 3 days Start the day after doxorubicin, cyclophosphamide, and vincristine (odd cycles). 6 tablet 8     folic acid (FOLVITE) 1 MG tablet  (Patient not taking: Reported on 11/4/2021)       LORazepam (ATIVAN) 0.5 MG tablet Take 1 tablet (0.5 mg) by mouth every 4 hours as needed (Anxiety, Nausea/Vomiting or Sleep) (Patient not taking: Reported on 11/4/2021) 30 tablet 5     mesna (MESNEX) 400 MG TABS tablet Take 1 tablet (400 mg) by mouth every 4 hours for 2 doses Take one tablet 4 hours and 8 hours after end of cyclophosphamide infusion. 2 tablet 0     mesna (MESNEX) 400 MG TABS tablet Take 1 tablet (400 mg) by mouth every 4 hours for 2 doses Take one tablet 4 hours and 8 hours after end of cyclophosphamide infusion. (Patient not taking: Reported on 11/4/2021) 2 tablet 0     NEULASTA ONPRO 6 MG/0.6ML injection  (Patient not taking: Reported on 11/4/2021)       polyethylene glycol (MIRALAX) 17 g packet Take 17 g by mouth (Patient not taking: Reported on 11/4/2021)       prochlorperazine (COMPAZINE) 10 MG tablet Take 1 tablet (10 mg) by mouth every 6 hours as needed (Nausea/Vomiting) (Patient not taking: Reported on 11/4/2021) 30 tablet 5     senna-docusate (SENNA S) 8.6-50 MG tablet Take 1 tablet by mouth 2 times daily (Patient not taking: Reported on 11/4/2021) 60 tablet 0     Objective:  General: patient appears well in no acute distress, alert and oriented, speech  clear and fluid  Skin: no visualized rash or lesions on visualized skin  Resp: Appears to be breathing comfortably without accessory muscle usage, speaking in full sentences, no audible wheezes or cough.  Psych: Coherent speech, normal rate and volume, able to articulate logical thoughts, able to abstract reason, no tangential thoughts, no hallucinations or delusions  Patient's affect is appropriate.    Labs:  Lab on 11/02/2021   Component Date Value Ref Range Status     Sodium 11/02/2021 138  133 - 144 mmol/L Final     Potassium 11/02/2021 3.8  3.4 - 5.3 mmol/L Final     Chloride 11/02/2021 106  94 - 109 mmol/L Final     Carbon Dioxide (CO2) 11/02/2021 26  20 - 32 mmol/L Final     Anion Gap 11/02/2021 6  3 - 14 mmol/L Final     Urea Nitrogen 11/02/2021 11  7 - 30 mg/dL Final     Creatinine 11/02/2021 0.88  0.66 - 1.25 mg/dL Final     Calcium 11/02/2021 9.6  8.5 - 10.1 mg/dL Final     Glucose 11/02/2021 122* 70 - 99 mg/dL Final     Alkaline Phosphatase 11/02/2021 83  40 - 150 U/L Final     AST 11/02/2021 17  0 - 45 U/L Final     ALT 11/02/2021 35  0 - 70 U/L Final     Protein Total 11/02/2021 7.9  6.8 - 8.8 g/dL Final     Albumin 11/02/2021 3.8  3.4 - 5.0 g/dL Final     Bilirubin Total 11/02/2021 0.6  0.2 - 1.3 mg/dL Final     GFR Estimate 11/02/2021 >90  >60 mL/min/1.73m2 Final     WBC Count 11/02/2021 10.1  4.0 - 11.0 10e3/uL Final     RBC Count 11/02/2021 5.16  4.40 - 5.90 10e6/uL Final     Hemoglobin 11/02/2021 13.9  13.3 - 17.7 g/dL Final     Hematocrit 11/02/2021 42.9  40.0 - 53.0 % Final     MCV 11/02/2021 83  78 - 100 fL Final     MCH 11/02/2021 26.9  26.5 - 33.0 pg Final     MCHC 11/02/2021 32.4  31.5 - 36.5 g/dL Final     RDW 11/02/2021 15.2* 10.0 - 15.0 % Final     Platelet Count 11/02/2021 350  150 - 450 10e3/uL Final     % Neutrophils 11/02/2021 78  % Final     % Lymphocytes 11/02/2021 9  % Final     % Monocytes 11/02/2021 7  % Final     % Eosinophils 11/02/2021 4  % Final     % Basophils 11/02/2021 1   % Final     % Immature Granulocytes 11/02/2021 1  % Final     NRBCs per 100 WBC 11/02/2021 0  <1 /100 Final     Absolute Neutrophils 11/02/2021 8.0  1.6 - 8.3 10e3/uL Final     Absolute Lymphocytes 11/02/2021 0.9  0.8 - 5.3 10e3/uL Final     Absolute Monocytes 11/02/2021 0.7  0.0 - 1.3 10e3/uL Final     Absolute Eosinophils 11/02/2021 0.4  0.0 - 0.7 10e3/uL Final     Absolute Basophils 11/02/2021 0.1  0.0 - 0.2 10e3/uL Final     Absolute Immature Granulocytes 11/02/2021 0.1* <=0.0 10e3/uL Final     Absolute NRBCs 11/02/2021 0.0  10e3/uL Final       Imaging:  EXAMINATION: CT CHEST/ABDOMEN/PELVIS W CONTRAST, 11/2/2021 8:44 AM     TECHNIQUE:  Helical CT images from the thoracic inlet through the  symphysis pubis were obtained  with contrast. 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 Treated with chemotherapy.     FINDINGS:     Support devices: Right IJ Port-A-Cath tip is in the mid superior vena  cava.     CHEST:      LOWER NECK: No enlarged supraclavicular nodes are present. No thyroid  nodule in the visualized thyroid.  MEDIASTINUM AND KASSANDRA: No mediastinal or hilar lymphadenopathy. Soft  tissue in the anterior mediastinum likely representing thymic tissue  (series 3, image 85)  HEART AND PERICARDIUM: Normal heart size. No pericardial fluid or  thickening.  THORACIC VESSELS: Normal caliber of the aorta and main pulmonary  artery.  AIRWAYS: The central tracheobronchial tree is clear.  LUNGS: No pulmonary consolidation. Scattered small calcified pulmonary  nodules similar to prior, for example 2 mm subpleural nodule in the  right middle lobe (series 4, image 171). No new or suspicious  pulmonary nodules. No pleural effusion or  pneumothorax.     ABDOMEN:     LIVER: Heterogeneously enhancing, ill marginated masses in the right  hepatic lobe and in hepatic segment 6/7 measures 7.1 x 3.5 compared to  6.9 x 3.2 cm,, when measured similarly (series 3, image 282)..  Relatively unchanged peripheral hypodensity along segment 7/8 (series  2, image 50)  BILIARY: No stones, gallbladder wall thickening, or pericholecystic  fluid. No extrahepatic biliary ductal dilation.  SPLEEN: Unremarkable.  PANCREAS: Normal CT appearance.   ADRENALS: No nodules.   KIDNEYS AND URETERS: No solid lesions. No calculi. No  hydroureteronephrosis.  BLADDER: Unremarkable.  REPRODUCTIVE: Unremarkable.  BOWEL: No abnormally dilated bowel loop  MESENTERY/PERITONEUM: Again seen are extensive peritoneal soft tissue  deposits including multiple solid and cystic masses closely associated  with the bowel loops.   -Large fluid attenuating cystic structure in the mid abdomen measures  8 x 9 x 10.1 cm compared to 8.3 x 9.8 x 11.9 cm (series 6, image 21)  -Ill marginated cystic and solid mass in the pelvis measuring 12.8 x  10.2 cm compared to 12.7 x 9.9 cm lesion is encasing the sigmoid colon  -Calcified mass in the left lower quadrant measures 6.8 x 6 cm  compared to 6.4 x 5.7 cm when measured similarly (series 2, image 86)  -Nodule at the level of the umbilicus measures 2.6 x 1.3 cm compared  to 2.5 x 1.2 cm (series 2, image 89)  -Nodule along the left paracolic gutter masses 3 x 2.1 cm compared to  3 x 2.1 cm cm (series 3, image 408)     LYMPH NODES: No bulky retroperitoneal or mesenteric adenopathy.  Scattered prominent retroperitoneal lymph nodes are unchanged.  VESSELS: Normal, without aneurysm or significant atherosclerotic  disease.     BONES: Mild multilevel degenerative changes of the visualized spine.  Incidental transitional lumbosacral anatomy with pseudoarticulation of  the right L5 transverse process with S1. No aggressive osseous  lesions. Unchanged focal sclerosis in  left atrium.  SOFT TISSUES: Unremarkable.                                                                   IMPRESSION: In this patient with desmoplastic small round cell tumor,  the current scan compared to prior CT 8/18/2021 shows:  1. Stable to minimal increase in size of of hepatic metastasis,  recommend attention on follow-up. No new hepatic metastasis.  2. Grossly unchanged peritoneal carcinomatosis.     I have personally reviewed the examination and initial interpretation  and I agree with the findings.     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 He tolerated CAV/IMV for 19 cycles, and Robert is happy to have been off of chemotherapy since June 25th. He continues to do well clinically and he has has kept his weight stable. He and family continue to maximize quality of life during this period. Per consensus of patient and family we will defer chemotherapy another 3 months. If further rounds of chemotherapy are needed could consider further rounds of CAV (dactinomycin containing)/IMV, Topotecan/cyclophosphamide, Irinotecan/temozolomide.     -RTC in 3 months in February 2022 (7-8 months since last chemotherapy)  - Family knows to contact us with any questions or concerns during chemotherapy break.    Farzaneh Burciaga M.D.   of Medicine  Hematology, Oncology and Transplantation

## 2021-11-05 ENCOUNTER — HOME INFUSION (PRE-WILLOW HOME INFUSION) (OUTPATIENT)
Dept: PHARMACY | Facility: CLINIC | Age: 26
End: 2021-11-05
Payer: COMMERCIAL

## 2021-11-05 NOTE — PROGRESS NOTES
This is a recent snapshot of the patient's [facility] medical record.  For current drug dose and complete information and questions, call [phone] or In Basket pool, fv home infusion ([id number])  CSN Number:  [id number]

## 2021-11-09 DIAGNOSIS — C41.9 SARCOMA, EWINGS (H): Primary | ICD-10-CM

## 2021-11-10 ENCOUNTER — HOME INFUSION (PRE-WILLOW HOME INFUSION) (OUTPATIENT)
Dept: PHARMACY | Facility: CLINIC | Age: 26
End: 2021-11-10
Payer: COMMERCIAL

## 2021-11-10 ENCOUNTER — APPOINTMENT (OUTPATIENT)
Dept: INTERPRETER SERVICES | Facility: CLINIC | Age: 26
End: 2021-11-10
Payer: COMMERCIAL

## 2021-11-10 DIAGNOSIS — C41.9 SARCOMA, EWINGS (H): ICD-10-CM

## 2021-11-10 DIAGNOSIS — Z11.59 ENCOUNTER FOR SCREENING FOR OTHER VIRAL DISEASES: ICD-10-CM

## 2021-11-10 DIAGNOSIS — C78.6 PERITONEAL CARCINOMATOSIS (H): Primary | ICD-10-CM

## 2021-11-11 DIAGNOSIS — C41.9 SARCOMA, EWINGS (H): Primary | ICD-10-CM

## 2021-11-11 NOTE — PROGRESS NOTES
This is a recent snapshot of the patient's [facility] medical record.  For current drug dose and complete information and questions, call [phone] or In Basket pool, fv home infusion ([id number])  CSN Number:  [id number]

## 2021-11-12 ENCOUNTER — LAB (OUTPATIENT)
Dept: LAB | Facility: CLINIC | Age: 26
End: 2021-11-12
Attending: INTERNAL MEDICINE
Payer: COMMERCIAL

## 2021-11-12 DIAGNOSIS — Z11.59 ENCOUNTER FOR SCREENING FOR OTHER VIRAL DISEASES: ICD-10-CM

## 2021-11-12 PROCEDURE — U0005 INFEC AGEN DETEC AMPLI PROBE: HCPCS

## 2021-11-12 PROCEDURE — U0003 INFECTIOUS AGENT DETECTION BY NUCLEIC ACID (DNA OR RNA); SEVERE ACUTE RESPIRATORY SYNDROME CORONAVIRUS 2 (SARS-COV-2) (CORONAVIRUS DISEASE [COVID-19]), AMPLIFIED PROBE TECHNIQUE, MAKING USE OF HIGH THROUGHPUT TECHNOLOGIES AS DESCRIBED BY CMS-2020-01-R: HCPCS

## 2021-11-13 LAB — SARS-COV-2 RNA RESP QL NAA+PROBE: NEGATIVE

## 2021-11-16 ENCOUNTER — HOME INFUSION (PRE-WILLOW HOME INFUSION) (OUTPATIENT)
Dept: PHARMACY | Facility: CLINIC | Age: 26
End: 2021-11-16

## 2021-11-16 ENCOUNTER — ANCILLARY PROCEDURE (OUTPATIENT)
Dept: ULTRASOUND IMAGING | Facility: CLINIC | Age: 26
End: 2021-11-16
Attending: INTERNAL MEDICINE
Payer: COMMERCIAL

## 2021-11-16 DIAGNOSIS — C41.9 SARCOMA, EWINGS (H): ICD-10-CM

## 2021-11-16 PROCEDURE — 36589 REMOVAL TUNNELED CV CATH: CPT | Performed by: PHYSICIAN ASSISTANT

## 2021-11-16 RX ORDER — LIDOCAINE HYDROCHLORIDE 10 MG/ML
5 INJECTION, SOLUTION EPIDURAL; INFILTRATION; INTRACAUDAL; PERINEURAL ONCE
Status: COMPLETED | OUTPATIENT
Start: 2021-11-16 | End: 2021-11-16

## 2021-11-16 RX ADMIN — LIDOCAINE HYDROCHLORIDE 5 ML: 10 INJECTION, SOLUTION EPIDURAL; INFILTRATION; INTRACAUDAL; PERINEURAL at 14:57

## 2021-11-16 NOTE — DISCHARGE INSTRUCTIONS
A collaboration between [facility] and [facility]  Experts in minimally invasive, targeted treatments performed using imaging guidance    Tunneled Central Venous Catheter Removal    Today you had your existing tunneled central venous catheter removed because it was no longer needed for treatment.    Your catheter was removed by    After you go home:  - Avoid lying flat or bending at the waist for 2 hours following removal of the catheter to reduce risk of bleeding from catheter site.  - Keep any applied tape/gauze dressings clean and dry. Change tape/gauze dressings if they get wet or soiled.  - You may shower following the procedure, however cover and protect from moisture any tape/gauze dressings.   - You may remove tape/gauze dressings after 3 days if the site looks like it is in the process of healing or scabbing over.  - Avoid strenuous activities (elevating heart rate) or lifting more than 10 pounds for the next 3 days. Walking, elliptical and golf are examples of acceptable activities.  - If there is bleeding or oozing from the procedure site, apply firm pressure to the area above your collar bone (where the catheter entered the bloodstream) for 10 minutes.  If the bleeding continues, continue to hold pressure and seek medical attention at the phone numbers below.  - Mild procedure site discomfort can be treated with an ice pack and over-the-counter pain relievers.           Call our Interventional Radiology (IR) service if:  - If you start bleeding from the procedure site. Our radiology provider can help you decide if you need to return to the hospital.  - If you have new or worsening pain related to the procedure.  - If you have concerning swelling at the procedure site.  - If you develop hives or a rash or any unexplained itching.  - If you have a fever of greater than 100.5  F and chills in the first 5 days after procedure.  - Any other concerns  related to your procedure.    Contact Number:  [phone]  ([employer] control desk)  - Monday - Friday 8:00 am - 4:30 pm    After hours for urgent concerns:  [phone]  - After 4:30 pm Monday - Friday, Weekends and Holidays.   - Ask for Interventional Radiology on-call.  Someone is available 24 hours a day.  - [facility] toll free number:  [phone]

## 2021-11-18 ENCOUNTER — HOME INFUSION (PRE-WILLOW HOME INFUSION) (OUTPATIENT)
Dept: PHARMACY | Facility: CLINIC | Age: 26
End: 2021-11-18
Payer: COMMERCIAL

## 2021-11-19 NOTE — PROGRESS NOTES
This is a recent snapshot of the patient's [facility] medical record.  For current drug dose and complete information and questions, call [phone] or In Basket pool, fv home infusion ([id number])  CSN Number:  [id number]

## 2022-01-01 ENCOUNTER — ANESTHESIA (OUTPATIENT)
Dept: SURGERY | Facility: AMBULATORY SURGERY CENTER | Age: 27
End: 2022-01-01

## 2022-01-01 ENCOUNTER — LAB REQUISITION (OUTPATIENT)
Dept: LAB | Facility: CLINIC | Age: 27
End: 2022-01-01
Payer: COMMERCIAL

## 2022-01-01 ENCOUNTER — INFUSION THERAPY VISIT (OUTPATIENT)
Dept: ONCOLOGY | Facility: CLINIC | Age: 27
End: 2022-01-01
Attending: INTERNAL MEDICINE
Payer: COMMERCIAL

## 2022-01-01 ENCOUNTER — APPOINTMENT (OUTPATIENT)
Dept: INTERPRETER SERVICES | Facility: CLINIC | Age: 27
End: 2022-01-01
Attending: INTERNAL MEDICINE
Payer: COMMERCIAL

## 2022-01-01 ENCOUNTER — HOME INFUSION (PRE-WILLOW HOME INFUSION) (OUTPATIENT)
Dept: PHARMACY | Facility: CLINIC | Age: 27
End: 2022-01-01

## 2022-01-01 ENCOUNTER — APPOINTMENT (OUTPATIENT)
Dept: LAB | Facility: CLINIC | Age: 27
End: 2022-01-01
Attending: PHYSICIAN ASSISTANT
Payer: COMMERCIAL

## 2022-01-01 ENCOUNTER — ANESTHESIA EVENT (OUTPATIENT)
Dept: SURGERY | Facility: AMBULATORY SURGERY CENTER | Age: 27
End: 2022-01-01

## 2022-01-01 ENCOUNTER — ANCILLARY PROCEDURE (OUTPATIENT)
Dept: CT IMAGING | Facility: CLINIC | Age: 27
End: 2022-01-01
Attending: REGISTERED NURSE
Payer: COMMERCIAL

## 2022-01-01 ENCOUNTER — ONCOLOGY VISIT (OUTPATIENT)
Dept: ONCOLOGY | Facility: CLINIC | Age: 27
End: 2022-01-01
Attending: PHYSICIAN ASSISTANT
Payer: COMMERCIAL

## 2022-01-01 ENCOUNTER — PATIENT OUTREACH (OUTPATIENT)
Dept: ONCOLOGY | Facility: CLINIC | Age: 27
End: 2022-01-01

## 2022-01-01 ENCOUNTER — APPOINTMENT (OUTPATIENT)
Dept: GENERAL RADIOLOGY | Facility: CLINIC | Age: 27
End: 2022-01-01
Payer: COMMERCIAL

## 2022-01-01 ENCOUNTER — HOSPITAL ENCOUNTER (INPATIENT)
Facility: CLINIC | Age: 27
LOS: 14 days | Discharge: HOME | End: 2022-12-21
Attending: EMERGENCY MEDICINE | Admitting: PEDIATRICS
Payer: COMMERCIAL

## 2022-01-01 ENCOUNTER — ONCOLOGY VISIT (OUTPATIENT)
Dept: ONCOLOGY | Facility: CLINIC | Age: 27
End: 2022-01-01
Attending: REGISTERED NURSE
Payer: COMMERCIAL

## 2022-01-01 ENCOUNTER — DOCUMENTATION ONLY (OUTPATIENT)
Dept: PHARMACY | Facility: CLINIC | Age: 27
End: 2022-01-01

## 2022-01-01 ENCOUNTER — HOSPITAL ENCOUNTER (INPATIENT)
Facility: CLINIC | Age: 27
LOS: 2 days | Discharge: HOME | End: 2022-11-17
Attending: INTERNAL MEDICINE | Admitting: INTERNAL MEDICINE
Payer: COMMERCIAL

## 2022-01-01 ENCOUNTER — PREP FOR PROCEDURE (OUTPATIENT)
Dept: PULMONOLOGY | Facility: CLINIC | Age: 27
End: 2022-01-01

## 2022-01-01 ENCOUNTER — APPOINTMENT (OUTPATIENT)
Dept: LAB | Facility: CLINIC | Age: 27
End: 2022-01-01
Attending: INTERNAL MEDICINE
Payer: COMMERCIAL

## 2022-01-01 ENCOUNTER — TELEPHONE (OUTPATIENT)
Dept: PHARMACY | Facility: CLINIC | Age: 27
End: 2022-01-01
Payer: COMMERCIAL

## 2022-01-01 ENCOUNTER — APPOINTMENT (OUTPATIENT)
Dept: GENERAL RADIOLOGY | Facility: CLINIC | Age: 27
End: 2022-01-01
Attending: PHYSICIAN ASSISTANT
Payer: COMMERCIAL

## 2022-01-01 ENCOUNTER — TELEPHONE (OUTPATIENT)
Dept: ONCOLOGY | Facility: CLINIC | Age: 27
End: 2022-01-01

## 2022-01-01 ENCOUNTER — ONCOLOGY VISIT (OUTPATIENT)
Dept: ONCOLOGY | Facility: CLINIC | Age: 27
End: 2022-01-01
Payer: COMMERCIAL

## 2022-01-01 ENCOUNTER — HOSPITAL ENCOUNTER (OUTPATIENT)
Facility: CLINIC | Age: 27
End: 2022-01-01
Attending: INTERNAL MEDICINE | Admitting: INTERNAL MEDICINE
Payer: COMMERCIAL

## 2022-01-01 ENCOUNTER — APPOINTMENT (OUTPATIENT)
Dept: CT IMAGING | Facility: CLINIC | Age: 27
End: 2022-01-01
Payer: COMMERCIAL

## 2022-01-01 ENCOUNTER — DOCUMENTATION ONLY (OUTPATIENT)
Dept: ONCOLOGY | Facility: CLINIC | Age: 27
End: 2022-01-01

## 2022-01-01 ENCOUNTER — ANCILLARY PROCEDURE (OUTPATIENT)
Dept: CT IMAGING | Facility: CLINIC | Age: 27
End: 2022-01-01
Payer: COMMERCIAL

## 2022-01-01 ENCOUNTER — PATIENT OUTREACH (OUTPATIENT)
Dept: CARE COORDINATION | Facility: CLINIC | Age: 27
End: 2022-01-01

## 2022-01-01 ENCOUNTER — APPOINTMENT (OUTPATIENT)
Dept: ULTRASOUND IMAGING | Facility: CLINIC | Age: 27
End: 2022-01-01
Payer: COMMERCIAL

## 2022-01-01 ENCOUNTER — OFFICE VISIT (OUTPATIENT)
Dept: INFUSION THERAPY | Facility: CLINIC | Age: 27
End: 2022-01-01
Attending: INTERNAL MEDICINE
Payer: COMMERCIAL

## 2022-01-01 ENCOUNTER — ONCOLOGY VISIT (OUTPATIENT)
Dept: ONCOLOGY | Facility: CLINIC | Age: 27
End: 2022-01-01
Attending: STUDENT IN AN ORGANIZED HEALTH CARE EDUCATION/TRAINING PROGRAM
Payer: COMMERCIAL

## 2022-01-01 ENCOUNTER — APPOINTMENT (OUTPATIENT)
Dept: LAB | Facility: CLINIC | Age: 27
End: 2022-01-01
Attending: STUDENT IN AN ORGANIZED HEALTH CARE EDUCATION/TRAINING PROGRAM
Payer: COMMERCIAL

## 2022-01-01 ENCOUNTER — ANCILLARY PROCEDURE (OUTPATIENT)
Dept: ULTRASOUND IMAGING | Facility: CLINIC | Age: 27
End: 2022-01-01
Attending: REGISTERED NURSE
Payer: COMMERCIAL

## 2022-01-01 ENCOUNTER — APPOINTMENT (OUTPATIENT)
Dept: GENERAL RADIOLOGY | Facility: CLINIC | Age: 27
End: 2022-01-01
Attending: INTERNAL MEDICINE
Payer: COMMERCIAL

## 2022-01-01 ENCOUNTER — HEALTH MAINTENANCE LETTER (OUTPATIENT)
Age: 27
End: 2022-01-01

## 2022-01-01 ENCOUNTER — APPOINTMENT (OUTPATIENT)
Dept: LAB | Facility: CLINIC | Age: 27
End: 2022-01-01
Payer: COMMERCIAL

## 2022-01-01 ENCOUNTER — HOSPITAL ENCOUNTER (OUTPATIENT)
Facility: AMBULATORY SURGERY CENTER | Age: 27
Discharge: HOME | End: 2022-12-02
Attending: RADIOLOGY
Payer: COMMERCIAL

## 2022-01-01 ENCOUNTER — HOME INFUSION (PRE-WILLOW HOME INFUSION) (OUTPATIENT)
Dept: PHARMACY | Facility: CLINIC | Age: 27
End: 2022-01-01
Payer: COMMERCIAL

## 2022-01-01 ENCOUNTER — APPOINTMENT (OUTPATIENT)
Dept: LAB | Facility: CLINIC | Age: 27
End: 2022-01-01
Attending: REGISTERED NURSE
Payer: COMMERCIAL

## 2022-01-01 ENCOUNTER — TELEPHONE (OUTPATIENT)
Dept: ONCOLOGY | Facility: CLINIC | Age: 27
End: 2022-01-01
Payer: COMMERCIAL

## 2022-01-01 ENCOUNTER — TELEPHONE (OUTPATIENT)
Dept: SURGERY | Facility: CLINIC | Age: 27
End: 2022-01-01

## 2022-01-01 VITALS
OXYGEN SATURATION: 98 % | WEIGHT: 285.6 LBS | HEART RATE: 125 BPM | SYSTOLIC BLOOD PRESSURE: 128 MMHG | TEMPERATURE: 98 F | DIASTOLIC BLOOD PRESSURE: 80 MMHG | BODY MASS INDEX: 43.29 KG/M2 | RESPIRATION RATE: 16 BRPM

## 2022-01-01 VITALS — DIASTOLIC BLOOD PRESSURE: 70 MMHG | HEART RATE: 98 BPM | OXYGEN SATURATION: 100 % | SYSTOLIC BLOOD PRESSURE: 114 MMHG

## 2022-01-01 VITALS
DIASTOLIC BLOOD PRESSURE: 44 MMHG | OXYGEN SATURATION: 98 % | HEART RATE: 109 BPM | BODY MASS INDEX: 37.92 KG/M2 | TEMPERATURE: 97.5 F | SYSTOLIC BLOOD PRESSURE: 104 MMHG | RESPIRATION RATE: 20 BRPM | HEIGHT: 68 IN | WEIGHT: 250.22 LBS

## 2022-01-01 VITALS
BODY MASS INDEX: 43.5 KG/M2 | OXYGEN SATURATION: 100 % | RESPIRATION RATE: 18 BRPM | WEIGHT: 287 LBS | SYSTOLIC BLOOD PRESSURE: 124 MMHG | HEART RATE: 102 BPM | TEMPERATURE: 98.2 F | DIASTOLIC BLOOD PRESSURE: 81 MMHG

## 2022-01-01 VITALS
SYSTOLIC BLOOD PRESSURE: 131 MMHG | DIASTOLIC BLOOD PRESSURE: 79 MMHG | BODY MASS INDEX: 27.13 KG/M2 | HEART RATE: 129 BPM | RESPIRATION RATE: 18 BRPM | OXYGEN SATURATION: 96 % | TEMPERATURE: 97.2 F | HEIGHT: 68 IN | WEIGHT: 179 LBS

## 2022-01-01 VITALS
SYSTOLIC BLOOD PRESSURE: 125 MMHG | BODY MASS INDEX: 44.12 KG/M2 | HEART RATE: 108 BPM | OXYGEN SATURATION: 97 % | TEMPERATURE: 98.6 F | DIASTOLIC BLOOD PRESSURE: 84 MMHG | WEIGHT: 291.1 LBS

## 2022-01-01 VITALS
RESPIRATION RATE: 20 BRPM | OXYGEN SATURATION: 99 % | TEMPERATURE: 98.6 F | WEIGHT: 285.7 LBS | BODY MASS INDEX: 43.3 KG/M2 | DIASTOLIC BLOOD PRESSURE: 84 MMHG | SYSTOLIC BLOOD PRESSURE: 121 MMHG | HEART RATE: 145 BPM | HEIGHT: 68 IN

## 2022-01-01 VITALS
OXYGEN SATURATION: 96 % | TEMPERATURE: 98.7 F | RESPIRATION RATE: 16 BRPM | HEART RATE: 117 BPM | HEIGHT: 68 IN | DIASTOLIC BLOOD PRESSURE: 84 MMHG | BODY MASS INDEX: 43.3 KG/M2 | SYSTOLIC BLOOD PRESSURE: 132 MMHG

## 2022-01-01 VITALS
DIASTOLIC BLOOD PRESSURE: 83 MMHG | SYSTOLIC BLOOD PRESSURE: 130 MMHG | WEIGHT: 282.8 LBS | TEMPERATURE: 98.9 F | BODY MASS INDEX: 42.86 KG/M2 | HEART RATE: 118 BPM | OXYGEN SATURATION: 100 % | RESPIRATION RATE: 16 BRPM

## 2022-01-01 VITALS
HEART RATE: 98 BPM | SYSTOLIC BLOOD PRESSURE: 133 MMHG | RESPIRATION RATE: 16 BRPM | WEIGHT: 283.3 LBS | TEMPERATURE: 98 F | DIASTOLIC BLOOD PRESSURE: 87 MMHG | OXYGEN SATURATION: 98 % | BODY MASS INDEX: 42.94 KG/M2

## 2022-01-01 VITALS
DIASTOLIC BLOOD PRESSURE: 77 MMHG | HEART RATE: 118 BPM | RESPIRATION RATE: 14 BRPM | SYSTOLIC BLOOD PRESSURE: 120 MMHG | OXYGEN SATURATION: 99 % | TEMPERATURE: 98 F

## 2022-01-01 VITALS
BODY MASS INDEX: 42.48 KG/M2 | HEART RATE: 96 BPM | TEMPERATURE: 98.2 F | DIASTOLIC BLOOD PRESSURE: 83 MMHG | SYSTOLIC BLOOD PRESSURE: 134 MMHG | WEIGHT: 280.3 LBS | OXYGEN SATURATION: 99 %

## 2022-01-01 VITALS
SYSTOLIC BLOOD PRESSURE: 131 MMHG | RESPIRATION RATE: 16 BRPM | BODY MASS INDEX: 38.46 KG/M2 | TEMPERATURE: 98.1 F | HEIGHT: 68 IN | WEIGHT: 253.8 LBS | HEART RATE: 133 BPM | OXYGEN SATURATION: 98 % | DIASTOLIC BLOOD PRESSURE: 79 MMHG

## 2022-01-01 VITALS
OXYGEN SATURATION: 97 % | HEART RATE: 139 BPM | BODY MASS INDEX: 41.22 KG/M2 | SYSTOLIC BLOOD PRESSURE: 127 MMHG | RESPIRATION RATE: 16 BRPM | WEIGHT: 272 LBS | TEMPERATURE: 97.6 F | DIASTOLIC BLOOD PRESSURE: 79 MMHG

## 2022-01-01 VITALS
SYSTOLIC BLOOD PRESSURE: 122 MMHG | TEMPERATURE: 98.2 F | HEART RATE: 114 BPM | OXYGEN SATURATION: 100 % | DIASTOLIC BLOOD PRESSURE: 84 MMHG

## 2022-01-01 VITALS
RESPIRATION RATE: 16 BRPM | OXYGEN SATURATION: 97 % | SYSTOLIC BLOOD PRESSURE: 109 MMHG | TEMPERATURE: 97.9 F | HEART RATE: 129 BPM | DIASTOLIC BLOOD PRESSURE: 66 MMHG

## 2022-01-01 VITALS
DIASTOLIC BLOOD PRESSURE: 82 MMHG | SYSTOLIC BLOOD PRESSURE: 137 MMHG | TEMPERATURE: 98.6 F | OXYGEN SATURATION: 97 % | HEART RATE: 127 BPM

## 2022-01-01 VITALS
OXYGEN SATURATION: 97 % | DIASTOLIC BLOOD PRESSURE: 86 MMHG | SYSTOLIC BLOOD PRESSURE: 157 MMHG | TEMPERATURE: 96.6 F | RESPIRATION RATE: 16 BRPM | HEART RATE: 121 BPM

## 2022-01-01 VITALS
WEIGHT: 286.6 LBS | DIASTOLIC BLOOD PRESSURE: 77 MMHG | TEMPERATURE: 98.2 F | SYSTOLIC BLOOD PRESSURE: 115 MMHG | BODY MASS INDEX: 43.44 KG/M2 | RESPIRATION RATE: 18 BRPM | OXYGEN SATURATION: 99 % | HEART RATE: 103 BPM

## 2022-01-01 VITALS
OXYGEN SATURATION: 98 % | SYSTOLIC BLOOD PRESSURE: 128 MMHG | RESPIRATION RATE: 16 BRPM | HEART RATE: 108 BPM | DIASTOLIC BLOOD PRESSURE: 82 MMHG | BODY MASS INDEX: 44.35 KG/M2 | WEIGHT: 292.6 LBS | TEMPERATURE: 98.9 F

## 2022-01-01 DIAGNOSIS — C49.9 MALIGNANT NEOPLASM OF CONNECTIVE AND SOFT TISSUE, UNSPECIFIED (H): ICD-10-CM

## 2022-01-01 DIAGNOSIS — C49.9 DESMOPLASTIC SMALL ROUND CELL TUMOR (H): Primary | ICD-10-CM

## 2022-01-01 DIAGNOSIS — C41.9 SARCOMA, EWINGS (H): Primary | ICD-10-CM

## 2022-01-01 DIAGNOSIS — G89.3 CANCER ASSOCIATED PAIN: ICD-10-CM

## 2022-01-01 DIAGNOSIS — R11.0 NAUSEA: ICD-10-CM

## 2022-01-01 DIAGNOSIS — J18.9 PNEUMONIA OF BOTH LUNGS DUE TO INFECTIOUS ORGANISM, UNSPECIFIED PART OF LUNG: ICD-10-CM

## 2022-01-01 DIAGNOSIS — I10 HYPERTENSION, UNSPECIFIED TYPE: ICD-10-CM

## 2022-01-01 DIAGNOSIS — C49.9 DESMOPLASTIC SMALL ROUND CELL TUMOR (H): ICD-10-CM

## 2022-01-01 DIAGNOSIS — C78.7 LIVER METASTASIS: ICD-10-CM

## 2022-01-01 DIAGNOSIS — E87.6 HYPOKALEMIA: ICD-10-CM

## 2022-01-01 DIAGNOSIS — D61.818 PANCYTOPENIA (H): ICD-10-CM

## 2022-01-01 DIAGNOSIS — J90 PLEURAL EFFUSION: Primary | ICD-10-CM

## 2022-01-01 DIAGNOSIS — C48.2 MALIGNANT NEOPLASM OF PERITONEUM, UNSPECIFIED (H): ICD-10-CM

## 2022-01-01 DIAGNOSIS — C49.9 DSRCT (DESMOPLASTIC SMALL ROUND CELL TUMOR) (H): Primary | ICD-10-CM

## 2022-01-01 DIAGNOSIS — C79.51 BONE METASTASIS: ICD-10-CM

## 2022-01-01 DIAGNOSIS — C41.9 SARCOMA, EWINGS (H): ICD-10-CM

## 2022-01-01 DIAGNOSIS — D64.9 ANEMIA, UNSPECIFIED TYPE: ICD-10-CM

## 2022-01-01 DIAGNOSIS — Z45.2 ENCOUNTER FOR ADJUSTMENT AND MANAGEMENT OF VASCULAR ACCESS DEVICE: ICD-10-CM

## 2022-01-01 DIAGNOSIS — R53.81 PHYSICAL DECONDITIONING: ICD-10-CM

## 2022-01-01 DIAGNOSIS — E83.42 HYPOMAGNESEMIA: ICD-10-CM

## 2022-01-01 DIAGNOSIS — Z95.828 PORT-A-CATH IN PLACE: Primary | ICD-10-CM

## 2022-01-01 DIAGNOSIS — C78.6 PERITONEAL CARCINOMATOSIS (H): ICD-10-CM

## 2022-01-01 DIAGNOSIS — J18.9 PNEUMONIA OF LEFT UPPER LOBE DUE TO INFECTIOUS ORGANISM: Primary | ICD-10-CM

## 2022-01-01 DIAGNOSIS — R19.7 DIARRHEA, UNSPECIFIED TYPE: ICD-10-CM

## 2022-01-01 DIAGNOSIS — E87.6 HYPOKALEMIA: Primary | ICD-10-CM

## 2022-01-01 DIAGNOSIS — I10 HYPERTENSION, UNSPECIFIED TYPE: Primary | ICD-10-CM

## 2022-01-01 DIAGNOSIS — E83.39 HYPOPHOSPHATEMIA: ICD-10-CM

## 2022-01-01 DIAGNOSIS — R00.0 TACHYCARDIA: ICD-10-CM

## 2022-01-01 DIAGNOSIS — D70.9 NEUTROPENIC FEVER (H): ICD-10-CM

## 2022-01-01 DIAGNOSIS — C78.7 SECONDARY MALIGNANT NEOPLASM OF LIVER (H): ICD-10-CM

## 2022-01-01 DIAGNOSIS — R50.81 NEUTROPENIC FEVER (H): ICD-10-CM

## 2022-01-01 DIAGNOSIS — J90 PLEURAL EFFUSION: ICD-10-CM

## 2022-01-01 DIAGNOSIS — R21 RASH: ICD-10-CM

## 2022-01-01 DIAGNOSIS — K59.00 CONSTIPATION, UNSPECIFIED CONSTIPATION TYPE: ICD-10-CM

## 2022-01-01 DIAGNOSIS — J18.9 PNEUMONIA OF LEFT UPPER LOBE DUE TO INFECTIOUS ORGANISM: ICD-10-CM

## 2022-01-01 DIAGNOSIS — R18.0 MALIGNANT ASCITES (H): ICD-10-CM

## 2022-01-01 DIAGNOSIS — Z20.822 CONTACT WITH AND (SUSPECTED) EXPOSURE TO COVID-19: ICD-10-CM

## 2022-01-01 DIAGNOSIS — C77.9 MALIGNANT NEOPLASM METASTATIC TO LYMPH NODES, UNSPECIFIED LYMPH NODE REGION (H): ICD-10-CM

## 2022-01-01 LAB
ABO/RH(D): NORMAL
ALBUMIN BODY FLUID SOURCE: NORMAL
ALBUMIN FLD-MCNC: 2.1 G/DL
ALBUMIN MFR UR ELPH: 182 MG/DL
ALBUMIN MFR UR ELPH: 20.9 %
ALBUMIN SERPL BCG-MCNC: 2.8 G/DL (ref 3.5–5.2)
ALBUMIN SERPL BCG-MCNC: 2.9 G/DL (ref 3.5–5.2)
ALBUMIN SERPL BCG-MCNC: 3 G/DL (ref 3.5–5.2)
ALBUMIN SERPL BCG-MCNC: 3.1 G/DL (ref 3.5–5.2)
ALBUMIN SERPL BCG-MCNC: 3.2 G/DL (ref 3.5–5.2)
ALBUMIN SERPL BCG-MCNC: 3.3 G/DL (ref 3.5–5.2)
ALBUMIN SERPL BCG-MCNC: 3.4 G/DL (ref 3.5–5.2)
ALBUMIN SERPL BCG-MCNC: 3.6 G/DL (ref 3.5–5.2)
ALBUMIN SERPL BCG-MCNC: 3.7 G/DL (ref 3.5–5.2)
ALBUMIN SERPL BCG-MCNC: 3.8 G/DL (ref 3.5–5.2)
ALBUMIN SERPL BCG-MCNC: 3.8 G/DL (ref 3.5–5.2)
ALBUMIN SERPL BCG-MCNC: 4.2 G/DL (ref 3.5–5.2)
ALBUMIN SERPL BCG-MCNC: 4.3 G/DL (ref 3.5–5.2)
ALBUMIN SERPL ELPH-MCNC: 2.4 G/DL (ref 3.7–5.1)
ALBUMIN SERPL-MCNC: 3.3 G/DL (ref 3.4–5)
ALBUMIN SERPL-MCNC: 3.3 G/DL (ref 3.4–5)
ALBUMIN SERPL-MCNC: 3.4 G/DL (ref 3.4–5)
ALBUMIN SERPL-MCNC: 3.5 G/DL (ref 3.4–5)
ALBUMIN SERPL-MCNC: 3.5 G/DL (ref 3.4–5)
ALBUMIN SERPL-MCNC: 3.5 G/DL (ref 3.5–5)
ALBUMIN SERPL-MCNC: 3.7 G/DL (ref 3.4–5)
ALBUMIN SERPL-MCNC: 3.8 G/DL (ref 3.4–5)
ALBUMIN SERPL-MCNC: 3.9 G/DL (ref 3.4–5)
ALBUMIN SERPL-MCNC: 3.9 G/DL (ref 3.4–5)
ALBUMIN UR-MCNC: 100 MG/DL
ALBUMIN UR-MCNC: 200 MG/DL
ALBUMIN UR-MCNC: 200 MG/DL
ALBUMIN UR-MCNC: 300 MG/DL
ALBUMIN UR-MCNC: 300 MG/DL
ALP SERPL-CCNC: 102 U/L (ref 40–150)
ALP SERPL-CCNC: 107 U/L (ref 40–129)
ALP SERPL-CCNC: 109 U/L (ref 40–129)
ALP SERPL-CCNC: 112 U/L (ref 40–150)
ALP SERPL-CCNC: 118 U/L (ref 40–129)
ALP SERPL-CCNC: 118 U/L (ref 40–150)
ALP SERPL-CCNC: 131 U/L (ref 40–129)
ALP SERPL-CCNC: 131 U/L (ref 40–150)
ALP SERPL-CCNC: 138 U/L (ref 40–150)
ALP SERPL-CCNC: 145 U/L (ref 40–129)
ALP SERPL-CCNC: 152 U/L (ref 40–129)
ALP SERPL-CCNC: 153 U/L (ref 40–129)
ALP SERPL-CCNC: 156 U/L (ref 40–129)
ALP SERPL-CCNC: 157 U/L (ref 40–129)
ALP SERPL-CCNC: 162 U/L (ref 40–129)
ALP SERPL-CCNC: 166 U/L (ref 40–129)
ALP SERPL-CCNC: 170 U/L (ref 40–129)
ALP SERPL-CCNC: 173 U/L (ref 40–129)
ALP SERPL-CCNC: 177 U/L (ref 40–129)
ALP SERPL-CCNC: 178 U/L (ref 40–129)
ALP SERPL-CCNC: 180 U/L (ref 40–129)
ALP SERPL-CCNC: 182 U/L (ref 40–129)
ALP SERPL-CCNC: 196 U/L (ref 40–129)
ALP SERPL-CCNC: 83 U/L (ref 40–129)
ALP SERPL-CCNC: 88 U/L (ref 40–129)
ALP SERPL-CCNC: 90 U/L (ref 40–150)
ALP SERPL-CCNC: 91 U/L (ref 40–150)
ALP SERPL-CCNC: 91 U/L (ref 45–120)
ALP SERPL-CCNC: 92 U/L (ref 40–129)
ALP SERPL-CCNC: 92 U/L (ref 40–150)
ALP SERPL-CCNC: 93 U/L (ref 40–150)
ALP SERPL-CCNC: 93 U/L (ref 40–150)
ALP SERPL-CCNC: 94 U/L (ref 40–129)
ALP SERPL-CCNC: 94 U/L (ref 40–150)
ALP SERPL-CCNC: 97 U/L (ref 40–150)
ALP SERPL-CCNC: 98 U/L (ref 40–129)
ALP SERPL-CCNC: 98 U/L (ref 40–150)
ALPHA1 GLOB MFR UR ELPH: 8.2 %
ALPHA1 GLOB SERPL ELPH-MCNC: 0.7 G/DL (ref 0.2–0.4)
ALPHA2 GLOB MFR UR ELPH: 10.5 %
ALPHA2 GLOB SERPL ELPH-MCNC: 1.3 G/DL (ref 0.5–0.9)
ALT SERPL W P-5'-P-CCNC: 10 U/L (ref 10–50)
ALT SERPL W P-5'-P-CCNC: 104 U/L (ref 10–50)
ALT SERPL W P-5'-P-CCNC: 12 U/L (ref 10–50)
ALT SERPL W P-5'-P-CCNC: 13 U/L (ref 10–50)
ALT SERPL W P-5'-P-CCNC: 14 U/L (ref 10–50)
ALT SERPL W P-5'-P-CCNC: 14 U/L (ref 10–50)
ALT SERPL W P-5'-P-CCNC: 15 U/L (ref 10–50)
ALT SERPL W P-5'-P-CCNC: 17 U/L (ref 10–50)
ALT SERPL W P-5'-P-CCNC: 18 U/L (ref 0–45)
ALT SERPL W P-5'-P-CCNC: 20 U/L (ref 10–50)
ALT SERPL W P-5'-P-CCNC: 21 U/L (ref 10–50)
ALT SERPL W P-5'-P-CCNC: 22 U/L (ref 10–50)
ALT SERPL W P-5'-P-CCNC: 25 U/L (ref 0–70)
ALT SERPL W P-5'-P-CCNC: 26 U/L (ref 0–70)
ALT SERPL W P-5'-P-CCNC: 26 U/L (ref 10–50)
ALT SERPL W P-5'-P-CCNC: 26 U/L (ref 10–50)
ALT SERPL W P-5'-P-CCNC: 27 U/L (ref 10–50)
ALT SERPL W P-5'-P-CCNC: 28 U/L (ref 0–70)
ALT SERPL W P-5'-P-CCNC: 29 U/L (ref 0–70)
ALT SERPL W P-5'-P-CCNC: 30 U/L (ref 0–70)
ALT SERPL W P-5'-P-CCNC: 31 U/L (ref 0–70)
ALT SERPL W P-5'-P-CCNC: 31 U/L (ref 0–70)
ALT SERPL W P-5'-P-CCNC: 32 U/L (ref 0–70)
ALT SERPL W P-5'-P-CCNC: 33 U/L (ref 0–70)
ALT SERPL W P-5'-P-CCNC: 33 U/L (ref 10–50)
ALT SERPL W P-5'-P-CCNC: 34 U/L (ref 0–70)
ALT SERPL W P-5'-P-CCNC: 37 U/L (ref 10–50)
ALT SERPL W P-5'-P-CCNC: 38 U/L (ref 0–70)
ALT SERPL W P-5'-P-CCNC: 38 U/L (ref 0–70)
ALT SERPL W P-5'-P-CCNC: 38 U/L (ref 10–50)
ALT SERPL W P-5'-P-CCNC: 40 U/L (ref 10–50)
ALT SERPL W P-5'-P-CCNC: 42 U/L (ref 0–70)
ALT SERPL W P-5'-P-CCNC: 63 U/L (ref 10–50)
ALT SERPL W P-5'-P-CCNC: 82 U/L (ref 10–50)
ALT SERPL W P-5'-P-CCNC: 9 U/L (ref 10–50)
AMORPH CRY #/AREA URNS HPF: ABNORMAL /HPF
AMORPH CRY #/AREA URNS HPF: ABNORMAL /HPF
ANA SER QL IF: NEGATIVE
ANCA AB PATTERN SER IF-IMP: NORMAL
ANION GAP SERPL CALCULATED.3IONS-SCNC: 10 MMOL/L (ref 3–14)
ANION GAP SERPL CALCULATED.3IONS-SCNC: 10 MMOL/L (ref 7–15)
ANION GAP SERPL CALCULATED.3IONS-SCNC: 11 MMOL/L (ref 3–14)
ANION GAP SERPL CALCULATED.3IONS-SCNC: 11 MMOL/L (ref 7–15)
ANION GAP SERPL CALCULATED.3IONS-SCNC: 12 MMOL/L (ref 7–15)
ANION GAP SERPL CALCULATED.3IONS-SCNC: 13 MMOL/L (ref 7–15)
ANION GAP SERPL CALCULATED.3IONS-SCNC: 14 MMOL/L (ref 7–15)
ANION GAP SERPL CALCULATED.3IONS-SCNC: 14 MMOL/L (ref 7–15)
ANION GAP SERPL CALCULATED.3IONS-SCNC: 15 MMOL/L (ref 7–15)
ANION GAP SERPL CALCULATED.3IONS-SCNC: 16 MMOL/L (ref 7–15)
ANION GAP SERPL CALCULATED.3IONS-SCNC: 17 MMOL/L (ref 7–15)
ANION GAP SERPL CALCULATED.3IONS-SCNC: 17 MMOL/L (ref 7–15)
ANION GAP SERPL CALCULATED.3IONS-SCNC: 4 MMOL/L (ref 3–14)
ANION GAP SERPL CALCULATED.3IONS-SCNC: 4 MMOL/L (ref 3–14)
ANION GAP SERPL CALCULATED.3IONS-SCNC: 6 MMOL/L (ref 3–14)
ANION GAP SERPL CALCULATED.3IONS-SCNC: 7 MMOL/L (ref 3–14)
ANION GAP SERPL CALCULATED.3IONS-SCNC: 7 MMOL/L (ref 3–14)
ANION GAP SERPL CALCULATED.3IONS-SCNC: 8 MMOL/L (ref 3–14)
ANION GAP SERPL CALCULATED.3IONS-SCNC: 8 MMOL/L (ref 7–15)
ANION GAP SERPL CALCULATED.3IONS-SCNC: 9 MMOL/L (ref 3–14)
ANION GAP SERPL CALCULATED.3IONS-SCNC: 9 MMOL/L (ref 3–14)
ANION GAP SERPL CALCULATED.3IONS-SCNC: 9 MMOL/L (ref 5–18)
ANION GAP SERPL CALCULATED.3IONS-SCNC: 9 MMOL/L (ref 7–15)
ANTIBODY SCREEN: NEGATIVE
APPEARANCE FLD: ABNORMAL
APPEARANCE UR: ABNORMAL
APPEARANCE UR: CLEAR
AST SERPL W P-5'-P-CCNC: 10 U/L (ref 10–50)
AST SERPL W P-5'-P-CCNC: 10 U/L (ref 10–50)
AST SERPL W P-5'-P-CCNC: 12 U/L (ref 10–50)
AST SERPL W P-5'-P-CCNC: 14 U/L (ref 0–45)
AST SERPL W P-5'-P-CCNC: 14 U/L (ref 0–45)
AST SERPL W P-5'-P-CCNC: 14 U/L (ref 10–50)
AST SERPL W P-5'-P-CCNC: 15 U/L (ref 0–40)
AST SERPL W P-5'-P-CCNC: 15 U/L (ref 0–45)
AST SERPL W P-5'-P-CCNC: 15 U/L (ref 10–50)
AST SERPL W P-5'-P-CCNC: 15 U/L (ref 10–50)
AST SERPL W P-5'-P-CCNC: 16 U/L (ref 0–45)
AST SERPL W P-5'-P-CCNC: 16 U/L (ref 10–50)
AST SERPL W P-5'-P-CCNC: 17 U/L (ref 0–45)
AST SERPL W P-5'-P-CCNC: 19 U/L (ref 0–45)
AST SERPL W P-5'-P-CCNC: 19 U/L (ref 10–50)
AST SERPL W P-5'-P-CCNC: 19 U/L (ref 10–50)
AST SERPL W P-5'-P-CCNC: 20 U/L (ref 0–45)
AST SERPL W P-5'-P-CCNC: 20 U/L (ref 0–45)
AST SERPL W P-5'-P-CCNC: 20 U/L (ref 10–50)
AST SERPL W P-5'-P-CCNC: 20 U/L (ref 10–50)
AST SERPL W P-5'-P-CCNC: 21 U/L (ref 0–45)
AST SERPL W P-5'-P-CCNC: 21 U/L (ref 10–50)
AST SERPL W P-5'-P-CCNC: 21 U/L (ref 10–50)
AST SERPL W P-5'-P-CCNC: 22 U/L (ref 10–50)
AST SERPL W P-5'-P-CCNC: 22 U/L (ref 10–50)
AST SERPL W P-5'-P-CCNC: 23 U/L (ref 0–45)
AST SERPL W P-5'-P-CCNC: 24 U/L (ref 10–50)
AST SERPL W P-5'-P-CCNC: 25 U/L (ref 0–45)
AST SERPL W P-5'-P-CCNC: 25 U/L (ref 10–50)
AST SERPL W P-5'-P-CCNC: 25 U/L (ref 10–50)
AST SERPL W P-5'-P-CCNC: 26 U/L (ref 10–50)
AST SERPL W P-5'-P-CCNC: 26 U/L (ref 10–50)
AST SERPL W P-5'-P-CCNC: 28 U/L (ref 10–50)
AST SERPL W P-5'-P-CCNC: 30 U/L (ref 0–45)
AST SERPL W P-5'-P-CCNC: 40 U/L (ref 10–50)
AST SERPL W P-5'-P-CCNC: 47 U/L (ref 0–45)
AST SERPL W P-5'-P-CCNC: 51 U/L (ref 10–50)
ATRIAL RATE - MUSE: 116 BPM
ATRIAL RATE - MUSE: 140 BPM
ATRIAL RATE - MUSE: 147 BPM
B-GLOBULIN MFR UR ELPH: 44.8 %
B-GLOBULIN SERPL ELPH-MCNC: 0.7 G/DL (ref 0.6–1)
BACTERIA BLD CULT: NO GROWTH
BACTERIA FLD CULT: NO GROWTH
BACTERIA UR CULT: NO GROWTH
BASOPHILS # BLD AUTO: 0.1 10E3/UL (ref 0–0.2)
BASOPHILS # BLD AUTO: 0.2 10E3/UL (ref 0–0.2)
BASOPHILS # BLD MANUAL: 0 10E3/UL (ref 0–0.2)
BASOPHILS # BLD MANUAL: 0.1 10E3/UL (ref 0–0.2)
BASOPHILS NFR BLD AUTO: 0 %
BASOPHILS NFR BLD AUTO: 1 %
BASOPHILS NFR BLD AUTO: 2 %
BASOPHILS NFR BLD MANUAL: 0 %
BASOPHILS NFR BLD MANUAL: 1 %
BASOPHILS NFR BLD MANUAL: 2 %
BILIRUB SERPL-MCNC: 0.4 MG/DL
BILIRUB SERPL-MCNC: 0.4 MG/DL (ref 0.2–1.3)
BILIRUB SERPL-MCNC: 0.4 MG/DL (ref 0.2–1.3)
BILIRUB SERPL-MCNC: 0.5 MG/DL
BILIRUB SERPL-MCNC: 0.5 MG/DL (ref 0.2–1.3)
BILIRUB SERPL-MCNC: 0.6 MG/DL
BILIRUB SERPL-MCNC: 0.6 MG/DL (ref 0.2–1.3)
BILIRUB SERPL-MCNC: 0.6 MG/DL (ref 0.2–1.3)
BILIRUB SERPL-MCNC: 0.6 MG/DL (ref 0–1)
BILIRUB SERPL-MCNC: 0.7 MG/DL
BILIRUB SERPL-MCNC: 0.7 MG/DL (ref 0.2–1.3)
BILIRUB SERPL-MCNC: 0.7 MG/DL (ref 0.2–1.3)
BILIRUB SERPL-MCNC: 0.8 MG/DL
BILIRUB SERPL-MCNC: 0.8 MG/DL (ref 0.2–1.3)
BILIRUB SERPL-MCNC: 0.8 MG/DL (ref 0.2–1.3)
BILIRUB SERPL-MCNC: 0.9 MG/DL
BILIRUB SERPL-MCNC: 0.9 MG/DL (ref 0.2–1.3)
BILIRUB SERPL-MCNC: 0.9 MG/DL (ref 0.2–1.3)
BILIRUB SERPL-MCNC: 1 MG/DL
BILIRUB SERPL-MCNC: 1 MG/DL
BILIRUB SERPL-MCNC: 1 MG/DL (ref 0.2–1.3)
BILIRUB SERPL-MCNC: 1.1 MG/DL
BILIRUB SERPL-MCNC: 1.1 MG/DL (ref 0.2–1.3)
BILIRUB SERPL-MCNC: 1.3 MG/DL
BILIRUB SERPL-MCNC: 1.6 MG/DL
BILIRUB SERPL-MCNC: 1.6 MG/DL
BILIRUB SERPL-MCNC: 1.8 MG/DL
BILIRUB SERPL-MCNC: 2.1 MG/DL
BILIRUB SERPL-MCNC: 2.1 MG/DL
BILIRUB UR QL STRIP: NEGATIVE
BLD PROD TYP BPU: NORMAL
BLOOD COMPONENT TYPE: NORMAL
BUN SERPL-MCNC: 10 MG/DL (ref 6–20)
BUN SERPL-MCNC: 10 MG/DL (ref 6–20)
BUN SERPL-MCNC: 10 MG/DL (ref 8–22)
BUN SERPL-MCNC: 10.2 MG/DL (ref 6–20)
BUN SERPL-MCNC: 10.3 MG/DL (ref 6–20)
BUN SERPL-MCNC: 10.6 MG/DL (ref 6–20)
BUN SERPL-MCNC: 10.8 MG/DL (ref 6–20)
BUN SERPL-MCNC: 11 MG/DL (ref 7–30)
BUN SERPL-MCNC: 11.1 MG/DL (ref 6–20)
BUN SERPL-MCNC: 11.1 MG/DL (ref 6–20)
BUN SERPL-MCNC: 11.5 MG/DL (ref 6–20)
BUN SERPL-MCNC: 12 MG/DL (ref 7–30)
BUN SERPL-MCNC: 12.3 MG/DL (ref 6–20)
BUN SERPL-MCNC: 12.7 MG/DL (ref 6–20)
BUN SERPL-MCNC: 12.9 MG/DL (ref 6–20)
BUN SERPL-MCNC: 14 MG/DL (ref 7–30)
BUN SERPL-MCNC: 15.3 MG/DL (ref 6–20)
BUN SERPL-MCNC: 4 MG/DL (ref 7–30)
BUN SERPL-MCNC: 5 MG/DL (ref 6–20)
BUN SERPL-MCNC: 5 MG/DL (ref 7–30)
BUN SERPL-MCNC: 5.2 MG/DL (ref 6–20)
BUN SERPL-MCNC: 5.7 MG/DL (ref 6–20)
BUN SERPL-MCNC: 6 MG/DL (ref 7–30)
BUN SERPL-MCNC: 6.1 MG/DL (ref 6–20)
BUN SERPL-MCNC: 6.9 MG/DL (ref 6–20)
BUN SERPL-MCNC: 6.9 MG/DL (ref 6–20)
BUN SERPL-MCNC: 7 MG/DL (ref 7–30)
BUN SERPL-MCNC: 7 MG/DL (ref 7–30)
BUN SERPL-MCNC: 8 MG/DL (ref 7–30)
BUN SERPL-MCNC: 9 MG/DL (ref 7–30)
BUN SERPL-MCNC: 9 MG/DL (ref 7–30)
BUN SERPL-MCNC: 9.2 MG/DL (ref 6–20)
BUN SERPL-MCNC: 9.3 MG/DL (ref 6–20)
BUN SERPL-MCNC: 9.4 MG/DL (ref 6–20)
BUN SERPL-MCNC: 9.8 MG/DL (ref 6–20)
BUN SERPL-MCNC: 9.8 MG/DL (ref 6–20)
C COLI+JEJUNI+LARI FUSA STL QL NAA+PROBE: NOT DETECTED
C DIFF TOX B STL QL: NEGATIVE
C DIFF TOX B STL QL: NEGATIVE
C PNEUM DNA SPEC QL NAA+PROBE: NOT DETECTED
C-ANCA TITR SER IF: NORMAL {TITER}
C3 SERPL-MCNC: 170 MG/DL (ref 81–157)
C4 SERPL-MCNC: 30 MG/DL (ref 13–39)
CALCIUM SERPL-MCNC: 8.1 MG/DL (ref 8.6–10)
CALCIUM SERPL-MCNC: 8.2 MG/DL (ref 8.6–10)
CALCIUM SERPL-MCNC: 8.2 MG/DL (ref 8.6–10)
CALCIUM SERPL-MCNC: 8.3 MG/DL (ref 8.6–10)
CALCIUM SERPL-MCNC: 8.4 MG/DL (ref 8.6–10)
CALCIUM SERPL-MCNC: 8.4 MG/DL (ref 8.6–10)
CALCIUM SERPL-MCNC: 8.5 MG/DL (ref 8.5–10.1)
CALCIUM SERPL-MCNC: 8.5 MG/DL (ref 8.6–10)
CALCIUM SERPL-MCNC: 8.6 MG/DL (ref 8.5–10.1)
CALCIUM SERPL-MCNC: 8.6 MG/DL (ref 8.5–10.5)
CALCIUM SERPL-MCNC: 8.6 MG/DL (ref 8.6–10)
CALCIUM SERPL-MCNC: 8.7 MG/DL (ref 8.5–10.1)
CALCIUM SERPL-MCNC: 8.8 MG/DL (ref 8.5–10.1)
CALCIUM SERPL-MCNC: 8.8 MG/DL (ref 8.6–10)
CALCIUM SERPL-MCNC: 8.9 MG/DL (ref 8.5–10.1)
CALCIUM SERPL-MCNC: 8.9 MG/DL (ref 8.6–10)
CALCIUM SERPL-MCNC: 9 MG/DL (ref 8.5–10.1)
CALCIUM SERPL-MCNC: 9 MG/DL (ref 8.6–10)
CALCIUM SERPL-MCNC: 9.1 MG/DL (ref 8.5–10.1)
CALCIUM SERPL-MCNC: 9.1 MG/DL (ref 8.6–10)
CALCIUM SERPL-MCNC: 9.2 MG/DL (ref 8.5–10.1)
CALCIUM SERPL-MCNC: 9.2 MG/DL (ref 8.6–10)
CALCIUM SERPL-MCNC: 9.3 MG/DL (ref 8.5–10.1)
CALCIUM SERPL-MCNC: 9.4 MG/DL (ref 8.5–10.1)
CALCIUM SERPL-MCNC: 9.5 MG/DL (ref 8.6–10)
CALCIUM SERPL-MCNC: 9.7 MG/DL (ref 8.6–10)
CAOX CRY #/AREA URNS HPF: ABNORMAL /HPF
CELL COUNT BODY FLUID SOURCE: ABNORMAL
CHLORIDE BLD-SCNC: 105 MMOL/L (ref 94–109)
CHLORIDE BLD-SCNC: 105 MMOL/L (ref 94–109)
CHLORIDE BLD-SCNC: 106 MMOL/L (ref 94–109)
CHLORIDE BLD-SCNC: 108 MMOL/L (ref 94–109)
CHLORIDE BLD-SCNC: 108 MMOL/L (ref 98–107)
CHLORIDE BLD-SCNC: 109 MMOL/L (ref 94–109)
CHLORIDE BLD-SCNC: 109 MMOL/L (ref 94–109)
CHLORIDE BLD-SCNC: 110 MMOL/L (ref 94–109)
CHLORIDE BLD-SCNC: 111 MMOL/L (ref 94–109)
CHLORIDE BLD-SCNC: 112 MMOL/L (ref 94–109)
CHLORIDE SERPL-SCNC: 101 MMOL/L (ref 98–107)
CHLORIDE SERPL-SCNC: 101 MMOL/L (ref 98–107)
CHLORIDE SERPL-SCNC: 102 MMOL/L (ref 98–107)
CHLORIDE SERPL-SCNC: 102 MMOL/L (ref 98–107)
CHLORIDE SERPL-SCNC: 103 MMOL/L (ref 98–107)
CHLORIDE SERPL-SCNC: 104 MMOL/L (ref 98–107)
CHLORIDE SERPL-SCNC: 105 MMOL/L (ref 98–107)
CHLORIDE SERPL-SCNC: 106 MMOL/L (ref 98–107)
CHLORIDE SERPL-SCNC: 106 MMOL/L (ref 98–107)
CHLORIDE SERPL-SCNC: 107 MMOL/L (ref 98–107)
CHLORIDE SERPL-SCNC: 107 MMOL/L (ref 98–107)
CHLORIDE SERPL-SCNC: 108 MMOL/L (ref 98–107)
CHLORIDE SERPL-SCNC: 109 MMOL/L (ref 98–107)
CHLORIDE SERPL-SCNC: 109 MMOL/L (ref 98–107)
CHLORIDE SERPL-SCNC: 110 MMOL/L (ref 98–107)
CO2 SERPL-SCNC: 18 MMOL/L (ref 20–32)
CO2 SERPL-SCNC: 19 MMOL/L (ref 20–32)
CO2 SERPL-SCNC: 21 MMOL/L (ref 20–32)
CO2 SERPL-SCNC: 22 MMOL/L (ref 20–32)
CO2 SERPL-SCNC: 22 MMOL/L (ref 20–32)
CO2 SERPL-SCNC: 22 MMOL/L (ref 22–31)
CO2 SERPL-SCNC: 23 MMOL/L (ref 20–32)
CO2 SERPL-SCNC: 24 MMOL/L (ref 20–32)
CO2 SERPL-SCNC: 25 MMOL/L (ref 20–32)
CO2 SERPL-SCNC: 26 MMOL/L (ref 20–32)
CODING SYSTEM: NORMAL
COLOR FLD: YELLOW
COLOR UR AUTO: ABNORMAL
COLOR UR AUTO: YELLOW
COLOR UR AUTO: YELLOW
CREAT SERPL-MCNC: 0.54 MG/DL (ref 0.66–1.25)
CREAT SERPL-MCNC: 0.62 MG/DL (ref 0.66–1.25)
CREAT SERPL-MCNC: 0.63 MG/DL (ref 0.66–1.25)
CREAT SERPL-MCNC: 0.66 MG/DL (ref 0.66–1.25)
CREAT SERPL-MCNC: 0.67 MG/DL (ref 0.66–1.25)
CREAT SERPL-MCNC: 0.68 MG/DL (ref 0.66–1.25)
CREAT SERPL-MCNC: 0.69 MG/DL (ref 0.66–1.25)
CREAT SERPL-MCNC: 0.69 MG/DL (ref 0.67–1.17)
CREAT SERPL-MCNC: 0.7 MG/DL (ref 0.66–1.25)
CREAT SERPL-MCNC: 0.7 MG/DL (ref 0.66–1.25)
CREAT SERPL-MCNC: 0.74 MG/DL (ref 0.66–1.25)
CREAT SERPL-MCNC: 0.74 MG/DL (ref 0.67–1.17)
CREAT SERPL-MCNC: 0.76 MG/DL (ref 0.66–1.25)
CREAT SERPL-MCNC: 0.76 MG/DL (ref 0.66–1.25)
CREAT SERPL-MCNC: 0.77 MG/DL (ref 0.66–1.25)
CREAT SERPL-MCNC: 0.78 MG/DL (ref 0.67–1.17)
CREAT SERPL-MCNC: 0.79 MG/DL (ref 0.66–1.25)
CREAT SERPL-MCNC: 0.8 MG/DL (ref 0.67–1.17)
CREAT SERPL-MCNC: 0.8 MG/DL (ref 0.7–1.3)
CREAT SERPL-MCNC: 0.81 MG/DL (ref 0.67–1.17)
CREAT SERPL-MCNC: 0.82 MG/DL (ref 0.66–1.25)
CREAT SERPL-MCNC: 0.82 MG/DL (ref 0.67–1.17)
CREAT SERPL-MCNC: 0.84 MG/DL (ref 0.66–1.25)
CREAT SERPL-MCNC: 0.86 MG/DL (ref 0.67–1.17)
CREAT SERPL-MCNC: 0.87 MG/DL (ref 0.66–1.25)
CREAT SERPL-MCNC: 0.87 MG/DL (ref 0.67–1.17)
CREAT SERPL-MCNC: 0.88 MG/DL (ref 0.67–1.17)
CREAT SERPL-MCNC: 0.93 MG/DL (ref 0.67–1.17)
CREAT SERPL-MCNC: 0.94 MG/DL (ref 0.67–1.17)
CREAT SERPL-MCNC: 0.94 MG/DL (ref 0.67–1.17)
CREAT SERPL-MCNC: 0.95 MG/DL (ref 0.67–1.17)
CREAT SERPL-MCNC: 0.96 MG/DL (ref 0.67–1.17)
CREAT SERPL-MCNC: 0.96 MG/DL (ref 0.67–1.17)
CREAT SERPL-MCNC: 0.98 MG/DL (ref 0.67–1.17)
CREAT SERPL-MCNC: 1.03 MG/DL (ref 0.67–1.17)
CREAT SERPL-MCNC: 1.08 MG/DL (ref 0.67–1.17)
CREAT SERPL-MCNC: 1.16 MG/DL (ref 0.67–1.17)
CREAT UR-MCNC: 88.4 MG/DL
CREAT UR-MCNC: 88.4 MG/DL
CROSSMATCH: NORMAL
CRP SERPL-MCNC: 79.4 MG/L
CRYOGLOB SER QL: ABNORMAL
DACRYOCYTES BLD QL SMEAR: SLIGHT
DEPRECATED HCO3 PLAS-SCNC: 16 MMOL/L (ref 22–29)
DEPRECATED HCO3 PLAS-SCNC: 16 MMOL/L (ref 22–29)
DEPRECATED HCO3 PLAS-SCNC: 17 MMOL/L (ref 22–29)
DEPRECATED HCO3 PLAS-SCNC: 18 MMOL/L (ref 22–29)
DEPRECATED HCO3 PLAS-SCNC: 18 MMOL/L (ref 22–29)
DEPRECATED HCO3 PLAS-SCNC: 19 MMOL/L (ref 22–29)
DEPRECATED HCO3 PLAS-SCNC: 20 MMOL/L (ref 22–29)
DEPRECATED HCO3 PLAS-SCNC: 21 MMOL/L (ref 22–29)
DEPRECATED HCO3 PLAS-SCNC: 22 MMOL/L (ref 22–29)
DEPRECATED HCO3 PLAS-SCNC: 23 MMOL/L (ref 22–29)
DIASTOLIC BLOOD PRESSURE - MUSE: NORMAL MMHG
EC STX1 GENE STL QL NAA+PROBE: NOT DETECTED
EC STX2 GENE STL QL NAA+PROBE: NOT DETECTED
ELLIPTOCYTES BLD QL SMEAR: SLIGHT
EOSINOPHIL # BLD AUTO: 0 10E3/UL (ref 0–0.7)
EOSINOPHIL # BLD AUTO: 0 10E3/UL (ref 0–0.7)
EOSINOPHIL # BLD AUTO: 0.1 10E3/UL (ref 0–0.7)
EOSINOPHIL # BLD AUTO: 0.2 10E3/UL (ref 0–0.7)
EOSINOPHIL # BLD AUTO: 0.3 10E3/UL (ref 0–0.7)
EOSINOPHIL # BLD AUTO: 0.4 10E3/UL (ref 0–0.7)
EOSINOPHIL # BLD MANUAL: 0 10E3/UL (ref 0–0.7)
EOSINOPHIL # BLD MANUAL: 0.1 10E3/UL (ref 0–0.7)
EOSINOPHIL # BLD MANUAL: 0.3 10E3/UL (ref 0–0.7)
EOSINOPHIL # BLD MANUAL: 0.4 10E3/UL (ref 0–0.7)
EOSINOPHIL NFR BLD AUTO: 0 %
EOSINOPHIL NFR BLD AUTO: 0 %
EOSINOPHIL NFR BLD AUTO: 1 %
EOSINOPHIL NFR BLD AUTO: 2 %
EOSINOPHIL NFR BLD AUTO: 3 %
EOSINOPHIL NFR BLD AUTO: 3 %
EOSINOPHIL NFR BLD MANUAL: 0 %
EOSINOPHIL NFR BLD MANUAL: 1 %
EOSINOPHIL NFR BLD MANUAL: 3 %
EOSINOPHIL NFR BLD MANUAL: 4 %
ERYTHROCYTE [DISTWIDTH] IN BLOOD BY AUTOMATED COUNT: 15.5 % (ref 10–15)
ERYTHROCYTE [DISTWIDTH] IN BLOOD BY AUTOMATED COUNT: 15.6 % (ref 10–15)
ERYTHROCYTE [DISTWIDTH] IN BLOOD BY AUTOMATED COUNT: 15.7 % (ref 10–15)
ERYTHROCYTE [DISTWIDTH] IN BLOOD BY AUTOMATED COUNT: 15.8 % (ref 10–15)
ERYTHROCYTE [DISTWIDTH] IN BLOOD BY AUTOMATED COUNT: 15.8 % (ref 10–15)
ERYTHROCYTE [DISTWIDTH] IN BLOOD BY AUTOMATED COUNT: 15.9 % (ref 10–15)
ERYTHROCYTE [DISTWIDTH] IN BLOOD BY AUTOMATED COUNT: 15.9 % (ref 10–15)
ERYTHROCYTE [DISTWIDTH] IN BLOOD BY AUTOMATED COUNT: 16 % (ref 10–15)
ERYTHROCYTE [DISTWIDTH] IN BLOOD BY AUTOMATED COUNT: 16.1 % (ref 10–15)
ERYTHROCYTE [DISTWIDTH] IN BLOOD BY AUTOMATED COUNT: 16.2 % (ref 10–15)
ERYTHROCYTE [DISTWIDTH] IN BLOOD BY AUTOMATED COUNT: 16.3 % (ref 10–15)
ERYTHROCYTE [DISTWIDTH] IN BLOOD BY AUTOMATED COUNT: 16.4 % (ref 10–15)
ERYTHROCYTE [DISTWIDTH] IN BLOOD BY AUTOMATED COUNT: 16.5 % (ref 10–15)
ERYTHROCYTE [DISTWIDTH] IN BLOOD BY AUTOMATED COUNT: 16.5 % (ref 10–15)
ERYTHROCYTE [DISTWIDTH] IN BLOOD BY AUTOMATED COUNT: 16.6 % (ref 10–15)
ERYTHROCYTE [DISTWIDTH] IN BLOOD BY AUTOMATED COUNT: 16.8 % (ref 10–15)
ERYTHROCYTE [DISTWIDTH] IN BLOOD BY AUTOMATED COUNT: 17 % (ref 10–15)
ERYTHROCYTE [DISTWIDTH] IN BLOOD BY AUTOMATED COUNT: 17.1 % (ref 10–15)
ERYTHROCYTE [DISTWIDTH] IN BLOOD BY AUTOMATED COUNT: 17.1 % (ref 10–15)
ERYTHROCYTE [DISTWIDTH] IN BLOOD BY AUTOMATED COUNT: 17.2 % (ref 10–15)
ERYTHROCYTE [DISTWIDTH] IN BLOOD BY AUTOMATED COUNT: 17.3 % (ref 10–15)
ERYTHROCYTE [DISTWIDTH] IN BLOOD BY AUTOMATED COUNT: 17.4 % (ref 10–15)
ERYTHROCYTE [DISTWIDTH] IN BLOOD BY AUTOMATED COUNT: 17.5 % (ref 10–15)
ERYTHROCYTE [DISTWIDTH] IN BLOOD BY AUTOMATED COUNT: 17.7 % (ref 10–15)
ERYTHROCYTE [DISTWIDTH] IN BLOOD BY AUTOMATED COUNT: 17.8 % (ref 10–15)
ERYTHROCYTE [DISTWIDTH] IN BLOOD BY AUTOMATED COUNT: 17.8 % (ref 10–15)
ERYTHROCYTE [DISTWIDTH] IN BLOOD BY AUTOMATED COUNT: 17.9 % (ref 10–15)
ERYTHROCYTE [DISTWIDTH] IN BLOOD BY AUTOMATED COUNT: 18.5 % (ref 10–15)
ERYTHROCYTE [DISTWIDTH] IN BLOOD BY AUTOMATED COUNT: 19.2 % (ref 10–15)
ERYTHROCYTE [DISTWIDTH] IN BLOOD BY AUTOMATED COUNT: 20.2 % (ref 10–15)
ERYTHROCYTE [DISTWIDTH] IN BLOOD BY AUTOMATED COUNT: 20.3 % (ref 10–15)
FERRITIN SERPL-MCNC: 1509 NG/ML (ref 31–409)
FLUAV H1 2009 PAND RNA SPEC QL NAA+PROBE: NOT DETECTED
FLUAV H1 RNA SPEC QL NAA+PROBE: NOT DETECTED
FLUAV H3 RNA SPEC QL NAA+PROBE: NOT DETECTED
FLUAV RNA SPEC QL NAA+PROBE: NEGATIVE
FLUAV RNA SPEC QL NAA+PROBE: NEGATIVE
FLUAV RNA SPEC QL NAA+PROBE: NOT DETECTED
FLUBV RNA RESP QL NAA+PROBE: NEGATIVE
FLUBV RNA RESP QL NAA+PROBE: NEGATIVE
FLUBV RNA SPEC QL NAA+PROBE: NOT DETECTED
FOLATE SERPL-MCNC: 5.8 NG/ML (ref 4.6–34.8)
GAMMA GLOB MFR UR ELPH: 15.6 %
GAMMA GLOB SERPL ELPH-MCNC: 0.8 G/DL (ref 0.7–1.6)
GFR SERPL CREATININE-BSD FRML MDRD: 89 ML/MIN/1.73M2
GFR SERPL CREATININE-BSD FRML MDRD: >90 ML/MIN/1.73M2
GLUCOSE BLD-MCNC: 101 MG/DL (ref 70–99)
GLUCOSE BLD-MCNC: 103 MG/DL (ref 70–99)
GLUCOSE BLD-MCNC: 105 MG/DL (ref 70–99)
GLUCOSE BLD-MCNC: 110 MG/DL (ref 70–99)
GLUCOSE BLD-MCNC: 116 MG/DL (ref 70–99)
GLUCOSE BLD-MCNC: 117 MG/DL (ref 70–99)
GLUCOSE BLD-MCNC: 122 MG/DL (ref 70–99)
GLUCOSE BLD-MCNC: 125 MG/DL (ref 70–125)
GLUCOSE BLD-MCNC: 148 MG/DL (ref 70–99)
GLUCOSE BLD-MCNC: 195 MG/DL (ref 70–99)
GLUCOSE BLD-MCNC: 73 MG/DL (ref 70–99)
GLUCOSE BLD-MCNC: 76 MG/DL (ref 70–99)
GLUCOSE BLD-MCNC: 83 MG/DL (ref 70–99)
GLUCOSE BLD-MCNC: 87 MG/DL (ref 70–99)
GLUCOSE BLD-MCNC: 89 MG/DL (ref 70–99)
GLUCOSE BLD-MCNC: 95 MG/DL (ref 70–99)
GLUCOSE BLD-MCNC: 97 MG/DL (ref 70–99)
GLUCOSE BLD-MCNC: 98 MG/DL (ref 70–99)
GLUCOSE SERPL-MCNC: 102 MG/DL (ref 70–99)
GLUCOSE SERPL-MCNC: 103 MG/DL (ref 70–99)
GLUCOSE SERPL-MCNC: 104 MG/DL (ref 70–99)
GLUCOSE SERPL-MCNC: 106 MG/DL (ref 70–99)
GLUCOSE SERPL-MCNC: 107 MG/DL (ref 70–99)
GLUCOSE SERPL-MCNC: 108 MG/DL (ref 70–99)
GLUCOSE SERPL-MCNC: 109 MG/DL (ref 70–99)
GLUCOSE SERPL-MCNC: 109 MG/DL (ref 70–99)
GLUCOSE SERPL-MCNC: 113 MG/DL (ref 70–99)
GLUCOSE SERPL-MCNC: 115 MG/DL (ref 70–99)
GLUCOSE SERPL-MCNC: 117 MG/DL (ref 70–99)
GLUCOSE SERPL-MCNC: 118 MG/DL (ref 70–99)
GLUCOSE SERPL-MCNC: 120 MG/DL (ref 70–99)
GLUCOSE SERPL-MCNC: 129 MG/DL (ref 70–99)
GLUCOSE SERPL-MCNC: 129 MG/DL (ref 70–99)
GLUCOSE SERPL-MCNC: 130 MG/DL (ref 70–99)
GLUCOSE SERPL-MCNC: 132 MG/DL (ref 70–99)
GLUCOSE SERPL-MCNC: 135 MG/DL (ref 70–99)
GLUCOSE SERPL-MCNC: 141 MG/DL (ref 70–99)
GLUCOSE SERPL-MCNC: 84 MG/DL (ref 70–99)
GLUCOSE SERPL-MCNC: 91 MG/DL (ref 70–99)
GLUCOSE SERPL-MCNC: 95 MG/DL (ref 70–99)
GLUCOSE UR STRIP-MCNC: 50 MG/DL
GLUCOSE UR STRIP-MCNC: 70 MG/DL
GLUCOSE UR STRIP-MCNC: NEGATIVE MG/DL
GRAM STAIN RESULT: NORMAL
GRAM STAIN RESULT: NORMAL
GRANULAR CAST: 4 /LPF
HADV DNA SPEC QL NAA+PROBE: NOT DETECTED
HCOV PNL SPEC NAA+PROBE: NOT DETECTED
HCT VFR BLD AUTO: 21.7 % (ref 40–53)
HCT VFR BLD AUTO: 22.4 % (ref 40–53)
HCT VFR BLD AUTO: 22.6 % (ref 40–53)
HCT VFR BLD AUTO: 22.6 % (ref 40–53)
HCT VFR BLD AUTO: 23.1 % (ref 40–53)
HCT VFR BLD AUTO: 23.2 % (ref 40–53)
HCT VFR BLD AUTO: 23.4 % (ref 40–53)
HCT VFR BLD AUTO: 23.8 % (ref 40–53)
HCT VFR BLD AUTO: 23.9 % (ref 40–53)
HCT VFR BLD AUTO: 24.4 % (ref 40–53)
HCT VFR BLD AUTO: 24.5 % (ref 40–53)
HCT VFR BLD AUTO: 24.6 % (ref 40–53)
HCT VFR BLD AUTO: 25.2 % (ref 40–53)
HCT VFR BLD AUTO: 25.3 % (ref 40–53)
HCT VFR BLD AUTO: 25.4 % (ref 40–53)
HCT VFR BLD AUTO: 25.5 % (ref 40–53)
HCT VFR BLD AUTO: 25.5 % (ref 40–53)
HCT VFR BLD AUTO: 26 % (ref 40–53)
HCT VFR BLD AUTO: 26.1 % (ref 40–53)
HCT VFR BLD AUTO: 26.7 % (ref 40–53)
HCT VFR BLD AUTO: 27.2 % (ref 40–53)
HCT VFR BLD AUTO: 27.6 % (ref 40–53)
HCT VFR BLD AUTO: 27.6 % (ref 40–53)
HCT VFR BLD AUTO: 27.7 % (ref 40–53)
HCT VFR BLD AUTO: 27.8 % (ref 40–53)
HCT VFR BLD AUTO: 28 % (ref 40–53)
HCT VFR BLD AUTO: 28.2 % (ref 40–53)
HCT VFR BLD AUTO: 30.2 % (ref 40–53)
HCT VFR BLD AUTO: 30.3 % (ref 40–53)
HCT VFR BLD AUTO: 30.8 % (ref 40–53)
HCT VFR BLD AUTO: 32.7 % (ref 40–53)
HCT VFR BLD AUTO: 32.8 % (ref 40–53)
HCT VFR BLD AUTO: 33.5 % (ref 40–53)
HCT VFR BLD AUTO: 34.4 % (ref 40–53)
HCT VFR BLD AUTO: 34.9 % (ref 40–53)
HCT VFR BLD AUTO: 35.3 % (ref 40–53)
HCT VFR BLD AUTO: 36.2 % (ref 40–53)
HCT VFR BLD AUTO: 36.8 % (ref 40–53)
HCT VFR BLD AUTO: 36.9 % (ref 40–53)
HCT VFR BLD AUTO: 39.5 % (ref 40–53)
HGB BLD-MCNC: 10.2 G/DL (ref 13.3–17.7)
HGB BLD-MCNC: 10.6 G/DL (ref 13.3–17.7)
HGB BLD-MCNC: 11.1 G/DL (ref 13.3–17.7)
HGB BLD-MCNC: 11.8 G/DL (ref 13.3–17.7)
HGB BLD-MCNC: 12.4 G/DL (ref 13.3–17.7)
HGB BLD-MCNC: 6.7 G/DL (ref 13.3–17.7)
HGB BLD-MCNC: 6.8 G/DL (ref 13.3–17.7)
HGB BLD-MCNC: 6.8 G/DL (ref 13.3–17.7)
HGB BLD-MCNC: 6.9 G/DL (ref 13.3–17.7)
HGB BLD-MCNC: 7 G/DL (ref 13.3–17.7)
HGB BLD-MCNC: 7.1 G/DL (ref 13.3–17.7)
HGB BLD-MCNC: 7.3 G/DL (ref 13.3–17.7)
HGB BLD-MCNC: 7.3 G/DL (ref 13.3–17.7)
HGB BLD-MCNC: 7.4 G/DL (ref 13.3–17.7)
HGB BLD-MCNC: 7.5 G/DL (ref 13.3–17.7)
HGB BLD-MCNC: 7.6 G/DL (ref 13.3–17.7)
HGB BLD-MCNC: 7.6 G/DL (ref 13.3–17.7)
HGB BLD-MCNC: 7.8 G/DL (ref 13.3–17.7)
HGB BLD-MCNC: 7.9 G/DL (ref 13.3–17.7)
HGB BLD-MCNC: 8 G/DL (ref 13.3–17.7)
HGB BLD-MCNC: 8.1 G/DL (ref 13.3–17.7)
HGB BLD-MCNC: 8.2 G/DL (ref 13.3–17.7)
HGB BLD-MCNC: 8.2 G/DL (ref 13.3–17.7)
HGB BLD-MCNC: 8.3 G/DL (ref 13.3–17.7)
HGB BLD-MCNC: 8.5 G/DL (ref 13.3–17.7)
HGB BLD-MCNC: 8.6 G/DL (ref 13.3–17.7)
HGB BLD-MCNC: 8.6 G/DL (ref 13.3–17.7)
HGB BLD-MCNC: 8.7 G/DL (ref 13.3–17.7)
HGB BLD-MCNC: 8.8 G/DL (ref 13.3–17.7)
HGB BLD-MCNC: 8.9 G/DL (ref 13.3–17.7)
HGB BLD-MCNC: 9.1 G/DL (ref 13.3–17.7)
HGB BLD-MCNC: 9.1 G/DL (ref 13.3–17.7)
HGB BLD-MCNC: 9.3 G/DL (ref 13.3–17.7)
HGB BLD-MCNC: 9.6 G/DL (ref 13.3–17.7)
HGB BLD-MCNC: 9.8 G/DL (ref 13.3–17.7)
HGB UR QL STRIP: ABNORMAL
HGB UR QL STRIP: NEGATIVE
HMPV RNA SPEC QL NAA+PROBE: NOT DETECTED
HOLD SPECIMEN: NORMAL
HPIV1 RNA SPEC QL NAA+PROBE: NOT DETECTED
HPIV2 RNA SPEC QL NAA+PROBE: NOT DETECTED
HPIV3 RNA SPEC QL NAA+PROBE: NOT DETECTED
HPIV4 RNA SPEC QL NAA+PROBE: NOT DETECTED
HYALINE CASTS: 1 /LPF
HYALINE CASTS: 7 /LPF
IMM GRANULOCYTES # BLD: 0 10E3/UL
IMM GRANULOCYTES # BLD: 0 10E3/UL
IMM GRANULOCYTES # BLD: 0.1 10E3/UL
IMM GRANULOCYTES NFR BLD: 0 %
IMM GRANULOCYTES NFR BLD: 0 %
IMM GRANULOCYTES NFR BLD: 1 %
INR PPP: 1.15 (ref 0.85–1.15)
INTERPRETATION ECG - MUSE: NORMAL
IRON BINDING CAPACITY (ROCHE): 230 UG/DL (ref 240–430)
IRON SATN MFR SERPL: 17 % (ref 15–46)
IRON SERPL-MCNC: 39 UG/DL (ref 61–157)
ISSUE DATE AND TIME: NORMAL
KAPPA LC FREE SER-MCNC: 3.78 MG/DL (ref 0.33–1.94)
KAPPA LC FREE/LAMBDA FREE SER NEPH: 0.99 {RATIO} (ref 0.26–1.65)
KETONES UR STRIP-MCNC: 10 MG/DL
KETONES UR STRIP-MCNC: ABNORMAL MG/DL
KETONES UR STRIP-MCNC: NEGATIVE MG/DL
LACTATE SERPL-SCNC: 0.8 MMOL/L (ref 0.7–2)
LACTATE SERPL-SCNC: 0.9 MMOL/L (ref 0.7–2)
LACTATE SERPL-SCNC: 0.9 MMOL/L (ref 0.7–2)
LACTATE SERPL-SCNC: 1 MMOL/L (ref 0.7–2)
LACTATE SERPL-SCNC: 1.1 MMOL/L (ref 0.7–2)
LACTATE SERPL-SCNC: 1.5 MMOL/L (ref 0.7–2)
LAMBDA LC FREE SERPL-MCNC: 3.81 MG/DL (ref 0.57–2.63)
LEUKOCYTE ESTERASE UR QL STRIP: NEGATIVE
LYMPHOCYTES # BLD AUTO: 0.3 10E3/UL (ref 0.8–5.3)
LYMPHOCYTES # BLD AUTO: 0.4 10E3/UL (ref 0.8–5.3)
LYMPHOCYTES # BLD AUTO: 0.5 10E3/UL (ref 0.8–5.3)
LYMPHOCYTES # BLD AUTO: 0.6 10E3/UL (ref 0.8–5.3)
LYMPHOCYTES # BLD AUTO: 0.8 10E3/UL (ref 0.8–5.3)
LYMPHOCYTES # BLD AUTO: 0.8 10E3/UL (ref 0.8–5.3)
LYMPHOCYTES # BLD MANUAL: 0.1 10E3/UL (ref 0.8–5.3)
LYMPHOCYTES # BLD MANUAL: 0.2 10E3/UL (ref 0.8–5.3)
LYMPHOCYTES # BLD MANUAL: 0.2 10E3/UL (ref 0.8–5.3)
LYMPHOCYTES # BLD MANUAL: 0.3 10E3/UL (ref 0.8–5.3)
LYMPHOCYTES # BLD MANUAL: 0.3 10E3/UL (ref 0.8–5.3)
LYMPHOCYTES # BLD MANUAL: 0.4 10E3/UL (ref 0.8–5.3)
LYMPHOCYTES # BLD MANUAL: 0.4 10E3/UL (ref 0.8–5.3)
LYMPHOCYTES # BLD MANUAL: 0.5 10E3/UL (ref 0.8–5.3)
LYMPHOCYTES NFR BLD AUTO: 3 %
LYMPHOCYTES NFR BLD AUTO: 3 %
LYMPHOCYTES NFR BLD AUTO: 4 %
LYMPHOCYTES NFR BLD AUTO: 5 %
LYMPHOCYTES NFR BLD AUTO: 6 %
LYMPHOCYTES NFR BLD MANUAL: 1 %
LYMPHOCYTES NFR BLD MANUAL: 2 %
LYMPHOCYTES NFR BLD MANUAL: 20 %
LYMPHOCYTES NFR BLD MANUAL: 21 %
LYMPHOCYTES NFR BLD MANUAL: 3 %
LYMPHOCYTES NFR BLD MANUAL: 34 %
LYMPHOCYTES NFR BLD MANUAL: 35 %
LYMPHOCYTES NFR BLD MANUAL: 43 %
LYMPHOCYTES NFR FLD MANUAL: 1 %
M PNEUMO DNA SPEC QL NAA+PROBE: NOT DETECTED
M PROTEIN MFR UR ELPH: 2.3 %
M PROTEIN SERPL ELPH-MCNC: 0.1 G/DL
MAGNESIUM SERPL-MCNC: 1.5 MG/DL (ref 1.7–2.3)
MAGNESIUM SERPL-MCNC: 1.6 MG/DL (ref 1.7–2.3)
MAGNESIUM SERPL-MCNC: 1.6 MG/DL (ref 1.7–2.3)
MAGNESIUM SERPL-MCNC: 1.7 MG/DL (ref 1.7–2.3)
MAGNESIUM SERPL-MCNC: 1.8 MG/DL (ref 1.7–2.3)
MAGNESIUM SERPL-MCNC: 1.9 MG/DL (ref 1.7–2.3)
MAGNESIUM SERPL-MCNC: 2 MG/DL (ref 1.6–2.3)
MAGNESIUM SERPL-MCNC: 2 MG/DL (ref 1.6–2.3)
MAGNESIUM SERPL-MCNC: 2 MG/DL (ref 1.7–2.3)
MAGNESIUM SERPL-MCNC: 2 MG/DL (ref 1.8–2.6)
MAGNESIUM SERPL-MCNC: 2.1 MG/DL (ref 1.6–2.3)
MAGNESIUM SERPL-MCNC: 2.1 MG/DL (ref 1.6–2.3)
MAGNESIUM SERPL-MCNC: 2.2 MG/DL (ref 1.6–2.3)
MAGNESIUM SERPL-MCNC: 2.2 MG/DL (ref 1.7–2.3)
MAGNESIUM SERPL-MCNC: 2.2 MG/DL (ref 1.7–2.3)
MAGNESIUM SERPL-MCNC: 2.4 MG/DL (ref 1.6–2.3)
MAGNESIUM SERPL-MCNC: 2.4 MG/DL (ref 1.6–2.3)
MAGNESIUM SERPL-MCNC: 2.4 MG/DL (ref 1.7–2.3)
MAGNESIUM SERPL-MCNC: 2.9 MG/DL (ref 1.7–2.3)
MCH RBC QN AUTO: 24.4 PG (ref 26.5–33)
MCH RBC QN AUTO: 24.4 PG (ref 26.5–33)
MCH RBC QN AUTO: 24.5 PG (ref 26.5–33)
MCH RBC QN AUTO: 24.6 PG (ref 26.5–33)
MCH RBC QN AUTO: 24.6 PG (ref 26.5–33)
MCH RBC QN AUTO: 24.7 PG (ref 26.5–33)
MCH RBC QN AUTO: 24.8 PG (ref 26.5–33)
MCH RBC QN AUTO: 24.9 PG (ref 26.5–33)
MCH RBC QN AUTO: 25 PG (ref 26.5–33)
MCH RBC QN AUTO: 25 PG (ref 26.5–33)
MCH RBC QN AUTO: 25.2 PG (ref 26.5–33)
MCH RBC QN AUTO: 25.3 PG (ref 26.5–33)
MCH RBC QN AUTO: 25.3 PG (ref 26.5–33)
MCH RBC QN AUTO: 25.5 PG (ref 26.5–33)
MCH RBC QN AUTO: 25.8 PG (ref 26.5–33)
MCH RBC QN AUTO: 25.9 PG (ref 26.5–33)
MCH RBC QN AUTO: 25.9 PG (ref 26.5–33)
MCH RBC QN AUTO: 26 PG (ref 26.5–33)
MCH RBC QN AUTO: 26.3 PG (ref 26.5–33)
MCH RBC QN AUTO: 26.8 PG (ref 26.5–33)
MCH RBC QN AUTO: 27.1 PG (ref 26.5–33)
MCH RBC QN AUTO: 27.4 PG (ref 26.5–33)
MCH RBC QN AUTO: 27.6 PG (ref 26.5–33)
MCH RBC QN AUTO: 27.7 PG (ref 26.5–33)
MCH RBC QN AUTO: 27.7 PG (ref 26.5–33)
MCH RBC QN AUTO: 28.2 PG (ref 26.5–33)
MCH RBC QN AUTO: 29.6 PG (ref 26.5–33)
MCH RBC QN AUTO: 30.1 PG (ref 26.5–33)
MCH RBC QN AUTO: 30.1 PG (ref 26.5–33)
MCH RBC QN AUTO: 30.2 PG (ref 26.5–33)
MCH RBC QN AUTO: 30.5 PG (ref 26.5–33)
MCH RBC QN AUTO: 30.5 PG (ref 26.5–33)
MCH RBC QN AUTO: 30.7 PG (ref 26.5–33)
MCH RBC QN AUTO: 30.7 PG (ref 26.5–33)
MCH RBC QN AUTO: 30.8 PG (ref 26.5–33)
MCHC RBC AUTO-ENTMCNC: 29.4 G/DL (ref 31.5–36.5)
MCHC RBC AUTO-ENTMCNC: 29.6 G/DL (ref 31.5–36.5)
MCHC RBC AUTO-ENTMCNC: 29.8 G/DL (ref 31.5–36.5)
MCHC RBC AUTO-ENTMCNC: 29.9 G/DL (ref 31.5–36.5)
MCHC RBC AUTO-ENTMCNC: 30 G/DL (ref 31.5–36.5)
MCHC RBC AUTO-ENTMCNC: 30.1 G/DL (ref 31.5–36.5)
MCHC RBC AUTO-ENTMCNC: 30.1 G/DL (ref 31.5–36.5)
MCHC RBC AUTO-ENTMCNC: 30.2 G/DL (ref 31.5–36.5)
MCHC RBC AUTO-ENTMCNC: 30.2 G/DL (ref 31.5–36.5)
MCHC RBC AUTO-ENTMCNC: 30.3 G/DL (ref 31.5–36.5)
MCHC RBC AUTO-ENTMCNC: 30.6 G/DL (ref 31.5–36.5)
MCHC RBC AUTO-ENTMCNC: 30.6 G/DL (ref 31.5–36.5)
MCHC RBC AUTO-ENTMCNC: 30.8 G/DL (ref 31.5–36.5)
MCHC RBC AUTO-ENTMCNC: 30.9 G/DL (ref 31.5–36.5)
MCHC RBC AUTO-ENTMCNC: 30.9 G/DL (ref 31.5–36.5)
MCHC RBC AUTO-ENTMCNC: 31 G/DL (ref 31.5–36.5)
MCHC RBC AUTO-ENTMCNC: 31.1 G/DL (ref 31.5–36.5)
MCHC RBC AUTO-ENTMCNC: 31.1 G/DL (ref 31.5–36.5)
MCHC RBC AUTO-ENTMCNC: 31.2 G/DL (ref 31.5–36.5)
MCHC RBC AUTO-ENTMCNC: 31.2 G/DL (ref 31.5–36.5)
MCHC RBC AUTO-ENTMCNC: 31.3 G/DL (ref 31.5–36.5)
MCHC RBC AUTO-ENTMCNC: 31.4 G/DL (ref 31.5–36.5)
MCHC RBC AUTO-ENTMCNC: 31.5 G/DL (ref 31.5–36.5)
MCHC RBC AUTO-ENTMCNC: 31.5 G/DL (ref 31.5–36.5)
MCHC RBC AUTO-ENTMCNC: 31.6 G/DL (ref 31.5–36.5)
MCHC RBC AUTO-ENTMCNC: 31.6 G/DL (ref 31.5–36.5)
MCHC RBC AUTO-ENTMCNC: 31.8 G/DL (ref 31.5–36.5)
MCHC RBC AUTO-ENTMCNC: 31.9 G/DL (ref 31.5–36.5)
MCHC RBC AUTO-ENTMCNC: 32 G/DL (ref 31.5–36.5)
MCHC RBC AUTO-ENTMCNC: 32.1 G/DL (ref 31.5–36.5)
MCHC RBC AUTO-ENTMCNC: 32.3 G/DL (ref 31.5–36.5)
MCHC RBC AUTO-ENTMCNC: 32.6 G/DL (ref 31.5–36.5)
MCHC RBC AUTO-ENTMCNC: 32.7 G/DL (ref 31.5–36.5)
MCV RBC AUTO: 78 FL (ref 78–100)
MCV RBC AUTO: 79 FL (ref 78–100)
MCV RBC AUTO: 79 FL (ref 78–100)
MCV RBC AUTO: 80 FL (ref 78–100)
MCV RBC AUTO: 81 FL (ref 78–100)
MCV RBC AUTO: 82 FL (ref 78–100)
MCV RBC AUTO: 83 FL (ref 78–100)
MCV RBC AUTO: 83 FL (ref 78–100)
MCV RBC AUTO: 84 FL (ref 78–100)
MCV RBC AUTO: 85 FL (ref 78–100)
MCV RBC AUTO: 86 FL (ref 78–100)
MCV RBC AUTO: 87 FL (ref 78–100)
MCV RBC AUTO: 88 FL (ref 78–100)
MCV RBC AUTO: 90 FL (ref 78–100)
MCV RBC AUTO: 93 FL (ref 78–100)
MCV RBC AUTO: 94 FL (ref 78–100)
MCV RBC AUTO: 95 FL (ref 78–100)
MCV RBC AUTO: 96 FL (ref 78–100)
MCV RBC AUTO: 97 FL (ref 78–100)
METAMYELOCYTES # BLD MANUAL: 0 10E3/UL
METAMYELOCYTES # BLD MANUAL: 0 10E3/UL
METAMYELOCYTES NFR BLD MANUAL: 1 %
METAMYELOCYTES NFR BLD MANUAL: 1 %
MONOCYTES # BLD AUTO: 0.1 10E3/UL (ref 0–1.3)
MONOCYTES # BLD AUTO: 0.2 10E3/UL (ref 0–1.3)
MONOCYTES # BLD AUTO: 0.8 10E3/UL (ref 0–1.3)
MONOCYTES # BLD AUTO: 0.9 10E3/UL (ref 0–1.3)
MONOCYTES # BLD AUTO: 1 10E3/UL (ref 0–1.3)
MONOCYTES # BLD AUTO: 1.1 10E3/UL (ref 0–1.3)
MONOCYTES # BLD AUTO: 1.2 10E3/UL (ref 0–1.3)
MONOCYTES # BLD MANUAL: 0 10E3/UL (ref 0–1.3)
MONOCYTES # BLD MANUAL: 0.1 10E3/UL (ref 0–1.3)
MONOCYTES # BLD MANUAL: 0.6 10E3/UL (ref 0–1.3)
MONOCYTES NFR BLD AUTO: 1 %
MONOCYTES NFR BLD AUTO: 10 %
MONOCYTES NFR BLD AUTO: 12 %
MONOCYTES NFR BLD AUTO: 2 %
MONOCYTES NFR BLD AUTO: 6 %
MONOCYTES NFR BLD AUTO: 6 %
MONOCYTES NFR BLD AUTO: 7 %
MONOCYTES NFR BLD AUTO: 8 %
MONOCYTES NFR BLD AUTO: 8 %
MONOCYTES NFR BLD AUTO: 9 %
MONOCYTES NFR BLD MANUAL: 0 %
MONOCYTES NFR BLD MANUAL: 1 %
MONOCYTES NFR BLD MANUAL: 1 %
MONOCYTES NFR BLD MANUAL: 5 %
MONOCYTES NFR BLD MANUAL: 6 %
MONOCYTES NFR BLD MANUAL: 7 %
MONOS+MACROS NFR FLD MANUAL: NORMAL %
MUCOUS THREADS #/AREA URNS LPF: PRESENT /LPF
NEUTROPHILS # BLD AUTO: 10 10E3/UL (ref 1.6–8.3)
NEUTROPHILS # BLD AUTO: 10 10E3/UL (ref 1.6–8.3)
NEUTROPHILS # BLD AUTO: 10.3 10E3/UL (ref 1.6–8.3)
NEUTROPHILS # BLD AUTO: 10.9 10E3/UL (ref 1.6–8.3)
NEUTROPHILS # BLD AUTO: 11 10E3/UL (ref 1.6–8.3)
NEUTROPHILS # BLD AUTO: 14.7 10E3/UL (ref 1.6–8.3)
NEUTROPHILS # BLD AUTO: 15.2 10E3/UL (ref 1.6–8.3)
NEUTROPHILS # BLD AUTO: 7.3 10E3/UL (ref 1.6–8.3)
NEUTROPHILS # BLD AUTO: 7.3 10E3/UL (ref 1.6–8.3)
NEUTROPHILS # BLD AUTO: 7.6 10E3/UL (ref 1.6–8.3)
NEUTROPHILS # BLD AUTO: 8.1 10E3/UL (ref 1.6–8.3)
NEUTROPHILS # BLD AUTO: 8.9 10E3/UL (ref 1.6–8.3)
NEUTROPHILS # BLD AUTO: 9 10E3/UL (ref 1.6–8.3)
NEUTROPHILS # BLD AUTO: 9.3 10E3/UL (ref 1.6–8.3)
NEUTROPHILS # BLD AUTO: 9.6 10E3/UL (ref 1.6–8.3)
NEUTROPHILS # BLD MANUAL: 0.4 10E3/UL (ref 1.6–8.3)
NEUTROPHILS # BLD MANUAL: 0.4 10E3/UL (ref 1.6–8.3)
NEUTROPHILS # BLD MANUAL: 0.6 10E3/UL (ref 1.6–8.3)
NEUTROPHILS # BLD MANUAL: 1.3 10E3/UL (ref 1.6–8.3)
NEUTROPHILS # BLD MANUAL: 10.5 10E3/UL (ref 1.6–8.3)
NEUTROPHILS # BLD MANUAL: 2 10E3/UL (ref 1.6–8.3)
NEUTROPHILS # BLD MANUAL: 8.1 10E3/UL (ref 1.6–8.3)
NEUTROPHILS # BLD MANUAL: 8.7 10E3/UL (ref 1.6–8.3)
NEUTROPHILS NFR BLD AUTO: 79 %
NEUTROPHILS NFR BLD AUTO: 81 %
NEUTROPHILS NFR BLD AUTO: 82 %
NEUTROPHILS NFR BLD AUTO: 83 %
NEUTROPHILS NFR BLD AUTO: 83 %
NEUTROPHILS NFR BLD AUTO: 84 %
NEUTROPHILS NFR BLD AUTO: 85 %
NEUTROPHILS NFR BLD AUTO: 85 %
NEUTROPHILS NFR BLD AUTO: 86 %
NEUTROPHILS NFR BLD AUTO: 86 %
NEUTROPHILS NFR BLD AUTO: 87 %
NEUTROPHILS NFR BLD AUTO: 91 %
NEUTROPHILS NFR BLD AUTO: 92 %
NEUTROPHILS NFR BLD MANUAL: 50 %
NEUTROPHILS NFR BLD MANUAL: 57 %
NEUTROPHILS NFR BLD MANUAL: 62 %
NEUTROPHILS NFR BLD MANUAL: 70 %
NEUTROPHILS NFR BLD MANUAL: 73 %
NEUTROPHILS NFR BLD MANUAL: 90 %
NEUTROPHILS NFR BLD MANUAL: 94 %
NEUTROPHILS NFR BLD MANUAL: 95 %
NEUTS BAND NFR FLD MANUAL: NORMAL %
NITRATE UR QL: NEGATIVE
NOROV GI+II ORF1-ORF2 JNC STL QL NAA+PR: NOT DETECTED
NRBC # BLD AUTO: 0 10E3/UL
NRBC # BLD AUTO: 0.1 10E3/UL
NRBC BLD AUTO-RTO: 0 /100
NRBC BLD MANUAL-RTO: 1 %
NRBC BLD MANUAL-RTO: 2 %
NRBC BLD MANUAL-RTO: 3 %
NRBC BLD MANUAL-RTO: 5 %
OTHER CELLS FLD MANUAL: 99 %
P AXIS - MUSE: 36 DEGREES
P AXIS - MUSE: 49 DEGREES
P AXIS - MUSE: NORMAL DEGREES
PATH REPORT.COMMENTS IMP SPEC: NORMAL
PATH REPORT.FINAL DX SPEC: NORMAL
PATH REPORT.FINAL DX SPEC: NORMAL
PATH REPORT.GROSS SPEC: NORMAL
PATH REPORT.MICROSCOPIC SPEC OTHER STN: NORMAL
PATH REPORT.RELEVANT HX SPEC: NORMAL
PATH REPORT.RELEVANT HX SPEC: NORMAL
PH UR STRIP: 6 [PH] (ref 5–7)
PHOSPHATE SERPL-MCNC: 1.7 MG/DL (ref 2.5–4.5)
PHOSPHATE SERPL-MCNC: 1.7 MG/DL (ref 2.5–4.5)
PHOSPHATE SERPL-MCNC: 1.9 MG/DL (ref 2.5–4.5)
PHOSPHATE SERPL-MCNC: 1.9 MG/DL (ref 2.5–4.5)
PHOSPHATE SERPL-MCNC: 2 MG/DL (ref 2.5–4.5)
PHOSPHATE SERPL-MCNC: 2 MG/DL (ref 2.5–4.5)
PHOSPHATE SERPL-MCNC: 2.1 MG/DL (ref 2.5–4.5)
PHOSPHATE SERPL-MCNC: 2.1 MG/DL (ref 2.5–4.5)
PHOSPHATE SERPL-MCNC: 2.2 MG/DL (ref 2.5–4.5)
PHOSPHATE SERPL-MCNC: 2.2 MG/DL (ref 2.5–4.5)
PHOSPHATE SERPL-MCNC: 2.3 MG/DL (ref 2.5–4.5)
PHOSPHATE SERPL-MCNC: 2.4 MG/DL (ref 2.5–4.5)
PHOSPHATE SERPL-MCNC: 2.5 MG/DL (ref 2.5–4.5)
PHOSPHATE SERPL-MCNC: 2.6 MG/DL (ref 2.5–4.5)
PHOSPHATE SERPL-MCNC: 2.8 MG/DL (ref 2.5–4.5)
PHOSPHATE SERPL-MCNC: 2.9 MG/DL (ref 2.5–4.5)
PHOSPHATE SERPL-MCNC: 2.9 MG/DL (ref 2.5–4.5)
PHOSPHATE SERPL-MCNC: 3 MG/DL (ref 2.5–4.5)
PHOSPHATE SERPL-MCNC: 3.1 MG/DL (ref 2.5–4.5)
PHOSPHATE SERPL-MCNC: 3.1 MG/DL (ref 2.5–4.5)
PHOSPHATE SERPL-MCNC: 3.2 MG/DL (ref 2.5–4.5)
PHOSPHATE SERPL-MCNC: 3.3 MG/DL (ref 2.5–4.5)
PHOSPHATE SERPL-MCNC: 3.5 MG/DL (ref 2.5–4.5)
PHOSPHATE SERPL-MCNC: 3.7 MG/DL (ref 2.5–4.5)
PHOSPHATE SERPL-MCNC: 4.3 MG/DL (ref 2.5–4.5)
PLAT MORPH BLD: ABNORMAL
PLAT MORPH BLD: NORMAL
PLATELET # BLD AUTO: 10 10E3/UL (ref 150–450)
PLATELET # BLD AUTO: 16 10E3/UL (ref 150–450)
PLATELET # BLD AUTO: 18 10E3/UL (ref 150–450)
PLATELET # BLD AUTO: 194 10E3/UL (ref 150–450)
PLATELET # BLD AUTO: 20 10E3/UL (ref 150–450)
PLATELET # BLD AUTO: 21 10E3/UL (ref 150–450)
PLATELET # BLD AUTO: 21 10E3/UL (ref 150–450)
PLATELET # BLD AUTO: 22 10E3/UL (ref 150–450)
PLATELET # BLD AUTO: 23 10E3/UL (ref 150–450)
PLATELET # BLD AUTO: 23 10E3/UL (ref 150–450)
PLATELET # BLD AUTO: 236 10E3/UL (ref 150–450)
PLATELET # BLD AUTO: 237 10E3/UL (ref 150–450)
PLATELET # BLD AUTO: 24 10E3/UL (ref 150–450)
PLATELET # BLD AUTO: 25 10E3/UL (ref 150–450)
PLATELET # BLD AUTO: 258 10E3/UL (ref 150–450)
PLATELET # BLD AUTO: 26 10E3/UL (ref 150–450)
PLATELET # BLD AUTO: 27 10E3/UL (ref 150–450)
PLATELET # BLD AUTO: 28 10E3/UL (ref 150–450)
PLATELET # BLD AUTO: 28 10E3/UL (ref 150–450)
PLATELET # BLD AUTO: 289 10E3/UL (ref 150–450)
PLATELET # BLD AUTO: 30 10E3/UL (ref 150–450)
PLATELET # BLD AUTO: 302 10E3/UL (ref 150–450)
PLATELET # BLD AUTO: 311 10E3/UL (ref 150–450)
PLATELET # BLD AUTO: 315 10E3/UL (ref 150–450)
PLATELET # BLD AUTO: 319 10E3/UL (ref 150–450)
PLATELET # BLD AUTO: 333 10E3/UL (ref 150–450)
PLATELET # BLD AUTO: 34 10E3/UL (ref 150–450)
PLATELET # BLD AUTO: 343 10E3/UL (ref 150–450)
PLATELET # BLD AUTO: 353 10E3/UL (ref 150–450)
PLATELET # BLD AUTO: 357 10E3/UL (ref 150–450)
PLATELET # BLD AUTO: 358 10E3/UL (ref 150–450)
PLATELET # BLD AUTO: 373 10E3/UL (ref 150–450)
PLATELET # BLD AUTO: 379 10E3/UL (ref 150–450)
PLATELET # BLD AUTO: 39 10E3/UL (ref 150–450)
PLATELET # BLD AUTO: 394 10E3/UL (ref 150–450)
PLATELET # BLD AUTO: 396 10E3/UL (ref 150–450)
PLATELET # BLD AUTO: 459 10E3/UL (ref 150–450)
PLATELET # BLD AUTO: 463 10E3/UL (ref 150–450)
PLATELET # BLD AUTO: 49 10E3/UL (ref 150–450)
PLATELET # BLD AUTO: 85 10E3/UL (ref 150–450)
POLYCHROMASIA BLD QL SMEAR: SLIGHT
POTASSIUM BLD-SCNC: 3.3 MMOL/L (ref 3.4–5.3)
POTASSIUM BLD-SCNC: 3.4 MMOL/L (ref 3.4–5.3)
POTASSIUM BLD-SCNC: 3.4 MMOL/L (ref 3.4–5.3)
POTASSIUM BLD-SCNC: 3.4 MMOL/L (ref 3.5–5)
POTASSIUM BLD-SCNC: 3.6 MMOL/L (ref 3.4–5.3)
POTASSIUM BLD-SCNC: 3.7 MMOL/L (ref 3.4–5.3)
POTASSIUM BLD-SCNC: 3.8 MMOL/L (ref 3.4–5.3)
POTASSIUM BLD-SCNC: 4 MMOL/L (ref 3.4–5.3)
POTASSIUM BLD-SCNC: 7.7 MMOL/L (ref 3.4–5.3)
POTASSIUM SERPL-SCNC: 2.8 MMOL/L (ref 3.4–5.3)
POTASSIUM SERPL-SCNC: 3 MMOL/L (ref 3.4–5.3)
POTASSIUM SERPL-SCNC: 3.1 MMOL/L (ref 3.4–5.3)
POTASSIUM SERPL-SCNC: 3.2 MMOL/L (ref 3.4–5.3)
POTASSIUM SERPL-SCNC: 3.3 MMOL/L (ref 3.4–5.3)
POTASSIUM SERPL-SCNC: 3.4 MMOL/L (ref 3.4–5.3)
POTASSIUM SERPL-SCNC: 3.5 MMOL/L (ref 3.4–5.3)
POTASSIUM SERPL-SCNC: 3.5 MMOL/L (ref 3.4–5.3)
POTASSIUM SERPL-SCNC: 3.6 MMOL/L (ref 3.4–5.3)
POTASSIUM SERPL-SCNC: 3.7 MMOL/L (ref 3.4–5.3)
POTASSIUM SERPL-SCNC: 3.8 MMOL/L (ref 3.4–5.3)
POTASSIUM SERPL-SCNC: 3.8 MMOL/L (ref 3.4–5.3)
POTASSIUM SERPL-SCNC: 4 MMOL/L (ref 3.4–5.3)
POTASSIUM SERPL-SCNC: 4.3 MMOL/L (ref 3.4–5.3)
PR INTERVAL - MUSE: 120 MS
PR INTERVAL - MUSE: 150 MS
PR INTERVAL - MUSE: NORMAL MS
PROCALCITONIN SERPL IA-MCNC: 0.39 NG/ML
PROMYELOCYTES # BLD MANUAL: 0 10E3/UL
PROMYELOCYTES # BLD MANUAL: 0 10E3/UL
PROMYELOCYTES NFR BLD MANUAL: 1 %
PROMYELOCYTES NFR BLD MANUAL: 1 %
PROT FLD-MCNC: 3.5 G/DL
PROT PATTERN SERPL ELPH-IMP: ABNORMAL
PROT PATTERN SERPL IFE-IMP: NORMAL
PROT PATTERN UR ELPH-IMP: ABNORMAL
PROT SERPL-MCNC: 5.7 G/DL (ref 6.4–8.3)
PROT SERPL-MCNC: 6.3 G/DL (ref 6.4–8.3)
PROT SERPL-MCNC: 6.4 G/DL (ref 6.4–8.3)
PROT SERPL-MCNC: 6.4 G/DL (ref 6.4–8.3)
PROT SERPL-MCNC: 6.6 G/DL (ref 6.4–8.3)
PROT SERPL-MCNC: 6.7 G/DL (ref 6.4–8.3)
PROT SERPL-MCNC: 6.7 G/DL (ref 6.4–8.3)
PROT SERPL-MCNC: 6.8 G/DL (ref 6.4–8.3)
PROT SERPL-MCNC: 6.9 G/DL (ref 6.4–8.3)
PROT SERPL-MCNC: 7 G/DL (ref 6.4–8.3)
PROT SERPL-MCNC: 7 G/DL (ref 6.4–8.3)
PROT SERPL-MCNC: 7.1 G/DL (ref 6.4–8.3)
PROT SERPL-MCNC: 7.1 G/DL (ref 6.8–8.8)
PROT SERPL-MCNC: 7.2 G/DL (ref 6.4–8.3)
PROT SERPL-MCNC: 7.2 G/DL (ref 6.4–8.3)
PROT SERPL-MCNC: 7.3 G/DL (ref 6.4–8.3)
PROT SERPL-MCNC: 7.3 G/DL (ref 6–8)
PROT SERPL-MCNC: 7.4 G/DL (ref 6.8–8.8)
PROT SERPL-MCNC: 7.4 G/DL (ref 6.8–8.8)
PROT SERPL-MCNC: 7.5 G/DL (ref 6.4–8.3)
PROT SERPL-MCNC: 7.5 G/DL (ref 6.8–8.8)
PROT SERPL-MCNC: 7.7 G/DL (ref 6.8–8.8)
PROT SERPL-MCNC: 7.9 G/DL (ref 6.8–8.8)
PROT SERPL-MCNC: 8 G/DL (ref 6.8–8.8)
PROT SERPL-MCNC: 8 G/DL (ref 6.8–8.8)
PROT SERPL-MCNC: 8.1 G/DL (ref 6.8–8.8)
PROT SERPL-MCNC: 8.4 G/DL (ref 6.8–8.8)
PROT SERPL-MCNC: 8.4 G/DL (ref 6.8–8.8)
PROT/CREAT 24H UR: 2.06 MG/MG CR (ref 0–0.2)
PROTEIN BODY FLUID SOURCE: NORMAL
QRS DURATION - MUSE: 72 MS
QRS DURATION - MUSE: 78 MS
QRS DURATION - MUSE: 84 MS
QT - MUSE: 288 MS
QT - MUSE: 294 MS
QT - MUSE: 342 MS
QTC - MUSE: 447 MS
QTC - MUSE: 448 MS
QTC - MUSE: 475 MS
R AXIS - MUSE: 26 DEGREES
R AXIS - MUSE: 28 DEGREES
R AXIS - MUSE: 34 DEGREES
RADIOLOGIST FLAGS: ABNORMAL
RBC # BLD AUTO: 2.66 10E6/UL (ref 4.4–5.9)
RBC # BLD AUTO: 2.68 10E6/UL (ref 4.4–5.9)
RBC # BLD AUTO: 2.75 10E6/UL (ref 4.4–5.9)
RBC # BLD AUTO: 2.77 10E6/UL (ref 4.4–5.9)
RBC # BLD AUTO: 2.79 10E6/UL (ref 4.4–5.9)
RBC # BLD AUTO: 2.8 10E6/UL (ref 4.4–5.9)
RBC # BLD AUTO: 2.82 10E6/UL (ref 4.4–5.9)
RBC # BLD AUTO: 2.85 10E6/UL (ref 4.4–5.9)
RBC # BLD AUTO: 2.85 10E6/UL (ref 4.4–5.9)
RBC # BLD AUTO: 2.87 10E6/UL (ref 4.4–5.9)
RBC # BLD AUTO: 2.9 10E6/UL (ref 4.4–5.9)
RBC # BLD AUTO: 2.93 10E6/UL (ref 4.4–5.9)
RBC # BLD AUTO: 2.94 10E6/UL (ref 4.4–5.9)
RBC # BLD AUTO: 2.96 10E6/UL (ref 4.4–5.9)
RBC # BLD AUTO: 2.97 10E6/UL (ref 4.4–5.9)
RBC # BLD AUTO: 2.98 10E6/UL (ref 4.4–5.9)
RBC # BLD AUTO: 2.99 10E6/UL (ref 4.4–5.9)
RBC # BLD AUTO: 3.01 10E6/UL (ref 4.4–5.9)
RBC # BLD AUTO: 3.06 10E6/UL (ref 4.4–5.9)
RBC # BLD AUTO: 3.06 10E6/UL (ref 4.4–5.9)
RBC # BLD AUTO: 3.14 10E6/UL (ref 4.4–5.9)
RBC # BLD AUTO: 3.15 10E6/UL (ref 4.4–5.9)
RBC # BLD AUTO: 3.18 10E6/UL (ref 4.4–5.9)
RBC # BLD AUTO: 3.18 10E6/UL (ref 4.4–5.9)
RBC # BLD AUTO: 3.25 10E6/UL (ref 4.4–5.9)
RBC # BLD AUTO: 3.29 10E6/UL (ref 4.4–5.9)
RBC # BLD AUTO: 3.38 10E6/UL (ref 4.4–5.9)
RBC # BLD AUTO: 3.39 10E6/UL (ref 4.4–5.9)
RBC # BLD AUTO: 3.48 10E6/UL (ref 4.4–5.9)
RBC # BLD AUTO: 3.49 10E6/UL (ref 4.4–5.9)
RBC # BLD AUTO: 3.58 10E6/UL (ref 4.4–5.9)
RBC # BLD AUTO: 3.68 10E6/UL (ref 4.4–5.9)
RBC # BLD AUTO: 3.77 10E6/UL (ref 4.4–5.9)
RBC # BLD AUTO: 3.94 10E6/UL (ref 4.4–5.9)
RBC # BLD AUTO: 4.01 10E6/UL (ref 4.4–5.9)
RBC # BLD AUTO: 4.02 10E6/UL (ref 4.4–5.9)
RBC # BLD AUTO: 4.26 10E6/UL (ref 4.4–5.9)
RBC # BLD AUTO: 4.31 10E6/UL (ref 4.4–5.9)
RBC # BLD AUTO: 4.35 10E6/UL (ref 4.4–5.9)
RBC # BLD AUTO: 4.62 10E6/UL (ref 4.4–5.9)
RBC MORPH BLD: ABNORMAL
RBC MORPH BLD: NORMAL
RBC URINE: 0 /HPF
RBC URINE: 14 /HPF
RBC URINE: 2 /HPF
RBC URINE: 36 /HPF
RBC URINE: >182 /HPF
RETICS # AUTO: 0 10E6/UL (ref 0.03–0.1)
RETICS # AUTO: 0 10E6/UL (ref 0.03–0.1)
RETICS/RBC NFR AUTO: 0.1 % (ref 0.5–2)
RETICS/RBC NFR AUTO: 0.1 % (ref 0.5–2)
RSV RNA SPEC NAA+PROBE: NEGATIVE
RSV RNA SPEC NAA+PROBE: POSITIVE
RSV RNA SPEC QL NAA+PROBE: NOT DETECTED
RSV RNA SPEC QL NAA+PROBE: NOT DETECTED
RV+EV RNA SPEC QL NAA+PROBE: NOT DETECTED
RVA NSP5 STL QL NAA+PROBE: NOT DETECTED
SALMONELLA SP RPOD STL QL NAA+PROBE: NOT DETECTED
SARS-COV-2 RNA RESP QL NAA+PROBE: NEGATIVE
SARS-COV-2 RNA RESP QL NAA+PROBE: NEGATIVE
SHIGELLA SP+EIEC IPAH STL QL NAA+PROBE: NOT DETECTED
SODIUM SERPL-SCNC: 132 MMOL/L (ref 136–145)
SODIUM SERPL-SCNC: 132 MMOL/L (ref 136–145)
SODIUM SERPL-SCNC: 133 MMOL/L (ref 133–144)
SODIUM SERPL-SCNC: 134 MMOL/L (ref 136–145)
SODIUM SERPL-SCNC: 134 MMOL/L (ref 136–145)
SODIUM SERPL-SCNC: 136 MMOL/L (ref 136–145)
SODIUM SERPL-SCNC: 137 MMOL/L (ref 133–144)
SODIUM SERPL-SCNC: 137 MMOL/L (ref 136–145)
SODIUM SERPL-SCNC: 138 MMOL/L (ref 136–145)
SODIUM SERPL-SCNC: 138 MMOL/L (ref 136–145)
SODIUM SERPL-SCNC: 139 MMOL/L (ref 133–144)
SODIUM SERPL-SCNC: 139 MMOL/L (ref 136–145)
SODIUM SERPL-SCNC: 140 MMOL/L (ref 133–144)
SODIUM SERPL-SCNC: 140 MMOL/L (ref 136–145)
SODIUM SERPL-SCNC: 141 MMOL/L (ref 133–144)
SODIUM SERPL-SCNC: 141 MMOL/L (ref 136–145)
SODIUM SERPL-SCNC: 142 MMOL/L (ref 133–144)
SODIUM SERPL-SCNC: 142 MMOL/L (ref 133–144)
SODIUM SERPL-SCNC: 144 MMOL/L (ref 133–144)
SP GR UR STRIP: 1.02 (ref 1–1.03)
SP GR UR STRIP: 1.03 (ref 1–1.03)
SP GR UR STRIP: 1.03 (ref 1–1.03)
SPECIMEN EXPIRATION DATE: NORMAL
SQUAMOUS EPITHELIAL: 1 /HPF
SQUAMOUS EPITHELIAL: 2 /HPF
SQUAMOUS EPITHELIAL: <1 /HPF
SYSTOLIC BLOOD PRESSURE - MUSE: NORMAL MMHG
T AXIS - MUSE: 15 DEGREES
T AXIS - MUSE: 21 DEGREES
T AXIS - MUSE: 22 DEGREES
TOTAL PROTEIN SERUM FOR ELP: 5.8 G/DL (ref 6.4–8.3)
TOXIC GRANULES BLD QL SMEAR: PRESENT
TRANSFERRIN SERPL-MCNC: 196 MG/DL (ref 200–360)
TRANSITIONAL EPI: <1 /HPF
UNIT ABO/RH: NORMAL
UNIT NUMBER: NORMAL
UNIT STATUS: NORMAL
UNIT TYPE ISBT: 6200
UNIT TYPE ISBT: 7300
UROBILINOGEN UR STRIP-MCNC: 3 MG/DL
UROBILINOGEN UR STRIP-MCNC: NORMAL MG/DL
V CHOL+PARA RFBL+TRKH+TNAA STL QL NAA+PR: NOT DETECTED
VARIANT LYMPHS BLD QL SMEAR: PRESENT
VENTRICULAR RATE- MUSE: 116 BPM
VENTRICULAR RATE- MUSE: 140 BPM
VENTRICULAR RATE- MUSE: 145 BPM
VIT B12 SERPL-MCNC: <150 PG/ML (ref 232–1245)
WBC # BLD AUTO: 0.1 10E3/UL (ref 4–11)
WBC # BLD AUTO: 0.2 10E3/UL (ref 4–11)
WBC # BLD AUTO: 0.3 10E3/UL (ref 4–11)
WBC # BLD AUTO: 0.6 10E3/UL (ref 4–11)
WBC # BLD AUTO: 0.7 10E3/UL (ref 4–11)
WBC # BLD AUTO: 0.9 10E3/UL (ref 4–11)
WBC # BLD AUTO: 1.1 10E3/UL (ref 4–11)
WBC # BLD AUTO: 1.9 10E3/UL (ref 4–11)
WBC # BLD AUTO: 10.8 10E3/UL (ref 4–11)
WBC # BLD AUTO: 11.3 10E3/UL (ref 4–11)
WBC # BLD AUTO: 11.3 10E3/UL (ref 4–11)
WBC # BLD AUTO: 11.7 10E3/UL (ref 4–11)
WBC # BLD AUTO: 11.8 10E3/UL (ref 4–11)
WBC # BLD AUTO: 12.1 10E3/UL (ref 4–11)
WBC # BLD AUTO: 12.2 10E3/UL (ref 4–11)
WBC # BLD AUTO: 12.7 10E3/UL (ref 4–11)
WBC # BLD AUTO: 12.9 10E3/UL (ref 4–11)
WBC # BLD AUTO: 13 10E3/UL (ref 4–11)
WBC # BLD AUTO: 16.9 10E3/UL (ref 4–11)
WBC # BLD AUTO: 17.4 10E3/UL (ref 4–11)
WBC # BLD AUTO: 2.7 10E3/UL (ref 4–11)
WBC # BLD AUTO: 8.2 10E3/UL (ref 4–11)
WBC # BLD AUTO: 8.6 10E3/UL (ref 4–11)
WBC # BLD AUTO: 8.6 10E3/UL (ref 4–11)
WBC # BLD AUTO: 9.1 10E3/UL (ref 4–11)
WBC # BLD AUTO: 9.1 10E3/UL (ref 4–11)
WBC # BLD AUTO: 9.2 10E3/UL (ref 4–11)
WBC # BLD AUTO: 9.7 10E3/UL (ref 4–11)
WBC # BLD AUTO: 9.7 10E3/UL (ref 4–11)
WBC # FLD AUTO: 73 /UL
WBC URINE: 0 /HPF
WBC URINE: 17 /HPF
WBC URINE: 3 /HPF
WBC URINE: 4 /HPF
WBC URINE: 4 /HPF
Y ENTERO RECN STL QL NAA+PROBE: NOT DETECTED

## 2022-01-01 PROCEDURE — 85025 COMPLETE CBC W/AUTO DIFF WBC: CPT

## 2022-01-01 PROCEDURE — 96375 TX/PRO/DX INJ NEW DRUG ADDON: CPT

## 2022-01-01 PROCEDURE — 250N000011 HC RX IP 250 OP 636: Performed by: STUDENT IN AN ORGANIZED HEALTH CARE EDUCATION/TRAINING PROGRAM

## 2022-01-01 PROCEDURE — 86923 COMPATIBILITY TEST ELECTRIC: CPT | Performed by: STUDENT IN AN ORGANIZED HEALTH CARE EDUCATION/TRAINING PROGRAM

## 2022-01-01 PROCEDURE — 120N000002 HC R&B MED SURG/OB UMMC

## 2022-01-01 PROCEDURE — 87040 BLOOD CULTURE FOR BACTERIA: CPT | Performed by: STUDENT IN AN ORGANIZED HEALTH CARE EDUCATION/TRAINING PROGRAM

## 2022-01-01 PROCEDURE — 80053 COMPREHEN METABOLIC PANEL: CPT

## 2022-01-01 PROCEDURE — 250N000013 HC RX MED GY IP 250 OP 250 PS 637

## 2022-01-01 PROCEDURE — 250N000013 HC RX MED GY IP 250 OP 250 PS 637: Performed by: STUDENT IN AN ORGANIZED HEALTH CARE EDUCATION/TRAINING PROGRAM

## 2022-01-01 PROCEDURE — 96415 CHEMO IV INFUSION ADDL HR: CPT

## 2022-01-01 PROCEDURE — 99233 SBSQ HOSP IP/OBS HIGH 50: CPT | Performed by: STUDENT IN AN ORGANIZED HEALTH CARE EDUCATION/TRAINING PROGRAM

## 2022-01-01 PROCEDURE — G0463 HOSPITAL OUTPT CLINIC VISIT: HCPCS | Mod: 25

## 2022-01-01 PROCEDURE — 36415 COLL VENOUS BLD VENIPUNCTURE: CPT | Performed by: HOSPITALIST

## 2022-01-01 PROCEDURE — 83735 ASSAY OF MAGNESIUM: CPT | Performed by: REGISTERED NURSE

## 2022-01-01 PROCEDURE — 83735 ASSAY OF MAGNESIUM: CPT | Performed by: PEDIATRICS

## 2022-01-01 PROCEDURE — 83735 ASSAY OF MAGNESIUM: CPT | Performed by: STUDENT IN AN ORGANIZED HEALTH CARE EDUCATION/TRAINING PROGRAM

## 2022-01-01 PROCEDURE — G0463 HOSPITAL OUTPT CLINIC VISIT: HCPCS

## 2022-01-01 PROCEDURE — 85027 COMPLETE CBC AUTOMATED: CPT

## 2022-01-01 PROCEDURE — 36415 COLL VENOUS BLD VENIPUNCTURE: CPT | Performed by: STUDENT IN AN ORGANIZED HEALTH CARE EDUCATION/TRAINING PROGRAM

## 2022-01-01 PROCEDURE — 99221 1ST HOSP IP/OBS SF/LOW 40: CPT | Mod: GC | Performed by: DERMATOLOGY

## 2022-01-01 PROCEDURE — 250N000011 HC RX IP 250 OP 636: Performed by: REGISTERED NURSE

## 2022-01-01 PROCEDURE — 99233 SBSQ HOSP IP/OBS HIGH 50: CPT | Mod: GC | Performed by: STUDENT IN AN ORGANIZED HEALTH CARE EDUCATION/TRAINING PROGRAM

## 2022-01-01 PROCEDURE — 83735 ASSAY OF MAGNESIUM: CPT | Performed by: INTERNAL MEDICINE

## 2022-01-01 PROCEDURE — 84157 ASSAY OF PROTEIN OTHER: CPT

## 2022-01-01 PROCEDURE — 250N000013 HC RX MED GY IP 250 OP 250 PS 637: Performed by: PEDIATRICS

## 2022-01-01 PROCEDURE — 96411 CHEMO IV PUSH ADDL DRUG: CPT

## 2022-01-01 PROCEDURE — 86334 IMMUNOFIX E-PHORESIS SERUM: CPT | Mod: 26

## 2022-01-01 PROCEDURE — 84100 ASSAY OF PHOSPHORUS: CPT | Performed by: DERMATOLOGY

## 2022-01-01 PROCEDURE — 86850 RBC ANTIBODY SCREEN: CPT | Performed by: STUDENT IN AN ORGANIZED HEALTH CARE EDUCATION/TRAINING PROGRAM

## 2022-01-01 PROCEDURE — 0054A HC ADMIN COVID VAC PFIZER TRS-SUCR, BOOSTER: CPT | Performed by: INTERNAL MEDICINE

## 2022-01-01 PROCEDURE — 250N000013 HC RX MED GY IP 250 OP 250 PS 637: Performed by: INTERNAL MEDICINE

## 2022-01-01 PROCEDURE — 71275 CT ANGIOGRAPHY CHEST: CPT | Mod: GC | Performed by: RADIOLOGY

## 2022-01-01 PROCEDURE — 84132 ASSAY OF SERUM POTASSIUM: CPT | Performed by: STUDENT IN AN ORGANIZED HEALTH CARE EDUCATION/TRAINING PROGRAM

## 2022-01-01 PROCEDURE — 85018 HEMOGLOBIN: CPT | Performed by: PEDIATRICS

## 2022-01-01 PROCEDURE — 99233 SBSQ HOSP IP/OBS HIGH 50: CPT | Mod: FS | Performed by: STUDENT IN AN ORGANIZED HEALTH CARE EDUCATION/TRAINING PROGRAM

## 2022-01-01 PROCEDURE — 80053 COMPREHEN METABOLIC PANEL: CPT | Performed by: INTERNAL MEDICINE

## 2022-01-01 PROCEDURE — 96377 APPLICATON ON-BODY INJECTOR: CPT | Mod: 59

## 2022-01-01 PROCEDURE — 258N000003 HC RX IP 258 OP 636: Performed by: INTERNAL MEDICINE

## 2022-01-01 PROCEDURE — 36415 COLL VENOUS BLD VENIPUNCTURE: CPT | Performed by: PEDIATRICS

## 2022-01-01 PROCEDURE — 258N000003 HC RX IP 258 OP 636: Performed by: REGISTERED NURSE

## 2022-01-01 PROCEDURE — 80053 COMPREHEN METABOLIC PANEL: CPT | Performed by: STUDENT IN AN ORGANIZED HEALTH CARE EDUCATION/TRAINING PROGRAM

## 2022-01-01 PROCEDURE — 250N000011 HC RX IP 250 OP 636: Performed by: INTERNAL MEDICINE

## 2022-01-01 PROCEDURE — 84100 ASSAY OF PHOSPHORUS: CPT | Performed by: INTERNAL MEDICINE

## 2022-01-01 PROCEDURE — 99232 SBSQ HOSP IP/OBS MODERATE 35: CPT | Mod: GC | Performed by: STUDENT IN AN ORGANIZED HEALTH CARE EDUCATION/TRAINING PROGRAM

## 2022-01-01 PROCEDURE — 84100 ASSAY OF PHOSPHORUS: CPT | Performed by: STUDENT IN AN ORGANIZED HEALTH CARE EDUCATION/TRAINING PROGRAM

## 2022-01-01 PROCEDURE — 71045 X-RAY EXAM CHEST 1 VIEW: CPT

## 2022-01-01 PROCEDURE — 258N000003 HC RX IP 258 OP 636: Performed by: PEDIATRICS

## 2022-01-01 PROCEDURE — 82746 ASSAY OF FOLIC ACID SERUM: CPT

## 2022-01-01 PROCEDURE — 999N000285 HC STATISTIC VASC ACCESS LAB DRAW WITH PIV START

## 2022-01-01 PROCEDURE — 36415 COLL VENOUS BLD VENIPUNCTURE: CPT

## 2022-01-01 PROCEDURE — 74176 CT ABD & PELVIS W/O CONTRAST: CPT

## 2022-01-01 PROCEDURE — 258N000003 HC RX IP 258 OP 636: Performed by: DERMATOLOGY

## 2022-01-01 PROCEDURE — 99223 1ST HOSP IP/OBS HIGH 75: CPT | Mod: AI | Performed by: PEDIATRICS

## 2022-01-01 PROCEDURE — 71045 X-RAY EXAM CHEST 1 VIEW: CPT | Mod: 26 | Performed by: RADIOLOGY

## 2022-01-01 PROCEDURE — 76857 US EXAM PELVIC LIMITED: CPT

## 2022-01-01 PROCEDURE — 87040 BLOOD CULTURE FOR BACTERIA: CPT | Performed by: INTERNAL MEDICINE

## 2022-01-01 PROCEDURE — 85025 COMPLETE CBC W/AUTO DIFF WBC: CPT | Performed by: INTERNAL MEDICINE

## 2022-01-01 PROCEDURE — 250N000011 HC RX IP 250 OP 636: Performed by: PEDIATRICS

## 2022-01-01 PROCEDURE — 80053 COMPREHEN METABOLIC PANEL: CPT | Performed by: REGISTERED NURSE

## 2022-01-01 PROCEDURE — 85025 COMPLETE CBC W/AUTO DIFF WBC: CPT | Performed by: REGISTERED NURSE

## 2022-01-01 PROCEDURE — 87040 BLOOD CULTURE FOR BACTERIA: CPT | Performed by: EMERGENCY MEDICINE

## 2022-01-01 PROCEDURE — 85027 COMPLETE CBC AUTOMATED: CPT | Performed by: STUDENT IN AN ORGANIZED HEALTH CARE EDUCATION/TRAINING PROGRAM

## 2022-01-01 PROCEDURE — 93005 ELECTROCARDIOGRAM TRACING: CPT

## 2022-01-01 PROCEDURE — 99233 SBSQ HOSP IP/OBS HIGH 50: CPT | Performed by: PHYSICIAN ASSISTANT

## 2022-01-01 PROCEDURE — 93010 ELECTROCARDIOGRAM REPORT: CPT | Performed by: INTERNAL MEDICINE

## 2022-01-01 PROCEDURE — 999N000128 HC STATISTIC PERIPHERAL IV START W/O US GUIDANCE

## 2022-01-01 PROCEDURE — 999N000127 HC STATISTIC PERIPHERAL IV START W US GUIDANCE

## 2022-01-01 PROCEDURE — G0498 CHEMO EXTEND IV INFUS W/PUMP: HCPCS

## 2022-01-01 PROCEDURE — 250N000009 HC RX 250: Performed by: PEDIATRICS

## 2022-01-01 PROCEDURE — 250N000011 HC RX IP 250 OP 636

## 2022-01-01 PROCEDURE — 99356 PR PROLONGED SERV,INPATIENT,1ST HR: CPT | Performed by: INTERNAL MEDICINE

## 2022-01-01 PROCEDURE — 84100 ASSAY OF PHOSPHORUS: CPT | Performed by: PEDIATRICS

## 2022-01-01 PROCEDURE — 99233 SBSQ HOSP IP/OBS HIGH 50: CPT | Mod: GC | Performed by: INTERNAL MEDICINE

## 2022-01-01 PROCEDURE — 74177 CT ABD & PELVIS W/CONTRAST: CPT | Performed by: RADIOLOGY

## 2022-01-01 PROCEDURE — 258N000003 HC RX IP 258 OP 636

## 2022-01-01 PROCEDURE — 96413 CHEMO IV INFUSION 1 HR: CPT

## 2022-01-01 PROCEDURE — 36591 DRAW BLOOD OFF VENOUS DEVICE: CPT | Performed by: REGISTERED NURSE

## 2022-01-01 PROCEDURE — 85007 BL SMEAR W/DIFF WBC COUNT: CPT | Performed by: INTERNAL MEDICINE

## 2022-01-01 PROCEDURE — 85060 BLOOD SMEAR INTERPRETATION: CPT | Performed by: PATHOLOGY

## 2022-01-01 PROCEDURE — 87086 URINE CULTURE/COLONY COUNT: CPT | Performed by: STUDENT IN AN ORGANIZED HEALTH CARE EDUCATION/TRAINING PROGRAM

## 2022-01-01 PROCEDURE — 80048 BASIC METABOLIC PNL TOTAL CA: CPT | Performed by: INTERNAL MEDICINE

## 2022-01-01 PROCEDURE — 49083 ABD PARACENTESIS W/IMAGING: CPT | Performed by: RADIOLOGY

## 2022-01-01 PROCEDURE — 80053 COMPREHEN METABOLIC PANEL: CPT | Performed by: EMERGENCY MEDICINE

## 2022-01-01 PROCEDURE — 99215 OFFICE O/P EST HI 40 MIN: CPT | Mod: 95 | Performed by: INTERNAL MEDICINE

## 2022-01-01 PROCEDURE — P9016 RBC LEUKOCYTES REDUCED: HCPCS | Performed by: STUDENT IN AN ORGANIZED HEALTH CARE EDUCATION/TRAINING PROGRAM

## 2022-01-01 PROCEDURE — P9016 RBC LEUKOCYTES REDUCED: HCPCS

## 2022-01-01 PROCEDURE — 250N000013 HC RX MED GY IP 250 OP 250 PS 637: Performed by: NURSE PRACTITIONER

## 2022-01-01 PROCEDURE — 99207 PR APP CREDIT; MD BILLING SHARED VISIT: CPT

## 2022-01-01 PROCEDURE — 99215 OFFICE O/P EST HI 40 MIN: CPT | Performed by: REGISTERED NURSE

## 2022-01-01 PROCEDURE — 87040 BLOOD CULTURE FOR BACTERIA: CPT

## 2022-01-01 PROCEDURE — 87070 CULTURE OTHR SPECIMN AEROBIC: CPT

## 2022-01-01 PROCEDURE — 96367 TX/PROPH/DG ADDL SEQ IV INF: CPT

## 2022-01-01 PROCEDURE — 84132 ASSAY OF SERUM POTASSIUM: CPT | Performed by: PEDIATRICS

## 2022-01-01 PROCEDURE — 83605 ASSAY OF LACTIC ACID: CPT | Performed by: PEDIATRICS

## 2022-01-01 PROCEDURE — 250N000009 HC RX 250: Performed by: REGISTERED NURSE

## 2022-01-01 PROCEDURE — 99253 IP/OBS CNSLTJ NEW/EST LOW 45: CPT | Mod: GC | Performed by: INTERNAL MEDICINE

## 2022-01-01 PROCEDURE — 96372 THER/PROPH/DIAG INJ SC/IM: CPT | Performed by: INTERNAL MEDICINE

## 2022-01-01 PROCEDURE — 36415 COLL VENOUS BLD VENIPUNCTURE: CPT | Performed by: PHYSICIAN ASSISTANT

## 2022-01-01 PROCEDURE — 258N000003 HC RX IP 258 OP 636: Performed by: EMERGENCY MEDICINE

## 2022-01-01 PROCEDURE — 84132 ASSAY OF SERUM POTASSIUM: CPT

## 2022-01-01 PROCEDURE — 250N000013 HC RX MED GY IP 250 OP 250 PS 637: Performed by: PHYSICIAN ASSISTANT

## 2022-01-01 PROCEDURE — 85014 HEMATOCRIT: CPT | Performed by: INTERNAL MEDICINE

## 2022-01-01 PROCEDURE — 36591 DRAW BLOOD OFF VENOUS DEVICE: CPT | Performed by: STUDENT IN AN ORGANIZED HEALTH CARE EDUCATION/TRAINING PROGRAM

## 2022-01-01 PROCEDURE — 99357 PR PROLONGED SERV,INPATIENT,EA ADDL 30 MIN: CPT | Performed by: PHYSICIAN ASSISTANT

## 2022-01-01 PROCEDURE — 258N000003 HC RX IP 258 OP 636: Performed by: STUDENT IN AN ORGANIZED HEALTH CARE EDUCATION/TRAINING PROGRAM

## 2022-01-01 PROCEDURE — 86923 COMPATIBILITY TEST ELECTRIC: CPT

## 2022-01-01 PROCEDURE — 49083 ABD PARACENTESIS W/IMAGING: CPT

## 2022-01-01 PROCEDURE — 0HB6XZX EXCISION OF BACK SKIN, EXTERNAL APPROACH, DIAGNOSTIC: ICD-10-PCS | Performed by: STUDENT IN AN ORGANIZED HEALTH CARE EDUCATION/TRAINING PROGRAM

## 2022-01-01 PROCEDURE — 96377 APPLICATON ON-BODY INJECTOR: CPT | Mod: 59 | Performed by: INTERNAL MEDICINE

## 2022-01-01 PROCEDURE — 99213 OFFICE O/P EST LOW 20 MIN: CPT | Mod: 95 | Performed by: REGISTERED NURSE

## 2022-01-01 PROCEDURE — 85027 COMPLETE CBC AUTOMATED: CPT | Performed by: INTERNAL MEDICINE

## 2022-01-01 PROCEDURE — 3E04317 INTRODUCTION OF OTHER THROMBOLYTIC INTO CENTRAL VEIN, PERCUTANEOUS APPROACH: ICD-10-PCS | Performed by: INTERNAL MEDICINE

## 2022-01-01 PROCEDURE — 36591 DRAW BLOOD OFF VENOUS DEVICE: CPT

## 2022-01-01 PROCEDURE — 87506 IADNA-DNA/RNA PROBE TQ 6-11: CPT

## 2022-01-01 PROCEDURE — 84145 PROCALCITONIN (PCT): CPT

## 2022-01-01 PROCEDURE — 85041 AUTOMATED RBC COUNT: CPT

## 2022-01-01 PROCEDURE — 85045 AUTOMATED RETICULOCYTE COUNT: CPT

## 2022-01-01 PROCEDURE — 36416 COLLJ CAPILLARY BLOOD SPEC: CPT | Performed by: STUDENT IN AN ORGANIZED HEALTH CARE EDUCATION/TRAINING PROGRAM

## 2022-01-01 PROCEDURE — 85007 BL SMEAR W/DIFF WBC COUNT: CPT | Performed by: STUDENT IN AN ORGANIZED HEALTH CARE EDUCATION/TRAINING PROGRAM

## 2022-01-01 PROCEDURE — 86901 BLOOD TYPING SEROLOGIC RH(D): CPT

## 2022-01-01 PROCEDURE — 84100 ASSAY OF PHOSPHORUS: CPT | Performed by: REGISTERED NURSE

## 2022-01-01 PROCEDURE — 84155 ASSAY OF PROTEIN SERUM: CPT | Performed by: STUDENT IN AN ORGANIZED HEALTH CARE EDUCATION/TRAINING PROGRAM

## 2022-01-01 PROCEDURE — 250N000011 HC RX IP 250 OP 636: Performed by: PHYSICIAN ASSISTANT

## 2022-01-01 PROCEDURE — 83521 IG LIGHT CHAINS FREE EACH: CPT | Performed by: STUDENT IN AN ORGANIZED HEALTH CARE EDUCATION/TRAINING PROGRAM

## 2022-01-01 PROCEDURE — 85014 HEMATOCRIT: CPT | Performed by: STUDENT IN AN ORGANIZED HEALTH CARE EDUCATION/TRAINING PROGRAM

## 2022-01-01 PROCEDURE — 96365 THER/PROPH/DIAG IV INF INIT: CPT | Performed by: EMERGENCY MEDICINE

## 2022-01-01 PROCEDURE — 84166 PROTEIN E-PHORESIS/URINE/CSF: CPT | Mod: 26

## 2022-01-01 PROCEDURE — 85045 AUTOMATED RETICULOCYTE COUNT: CPT | Performed by: PHYSICIAN ASSISTANT

## 2022-01-01 PROCEDURE — P9037 PLATE PHERES LEUKOREDU IRRAD: HCPCS | Performed by: STUDENT IN AN ORGANIZED HEALTH CARE EDUCATION/TRAINING PROGRAM

## 2022-01-01 PROCEDURE — 96365 THER/PROPH/DIAG IV INF INIT: CPT | Mod: 59

## 2022-01-01 PROCEDURE — 96365 THER/PROPH/DIAG IV INF INIT: CPT

## 2022-01-01 PROCEDURE — 11105 PUNCH BX SKIN EA SEP/ADDL: CPT | Mod: GC | Performed by: DERMATOLOGY

## 2022-01-01 PROCEDURE — 999N000248 HC STATISTIC IV INSERT WITH US BY RN

## 2022-01-01 PROCEDURE — 87205 SMEAR GRAM STAIN: CPT

## 2022-01-01 PROCEDURE — 86901 BLOOD TYPING SEROLOGIC RH(D): CPT | Performed by: STUDENT IN AN ORGANIZED HEALTH CARE EDUCATION/TRAINING PROGRAM

## 2022-01-01 PROCEDURE — 250N000011 HC RX IP 250 OP 636: Performed by: EMERGENCY MEDICINE

## 2022-01-01 PROCEDURE — 250N000009 HC RX 250: Performed by: STUDENT IN AN ORGANIZED HEALTH CARE EDUCATION/TRAINING PROGRAM

## 2022-01-01 PROCEDURE — 87493 C DIFF AMPLIFIED PROBE: CPT | Performed by: STUDENT IN AN ORGANIZED HEALTH CARE EDUCATION/TRAINING PROGRAM

## 2022-01-01 PROCEDURE — 99417 PROLNG OP E/M EACH 15 MIN: CPT | Performed by: STUDENT IN AN ORGANIZED HEALTH CARE EDUCATION/TRAINING PROGRAM

## 2022-01-01 PROCEDURE — 99356 PR PROLONGED SERV,INPATIENT,1ST HR: CPT | Mod: GC | Performed by: INTERNAL MEDICINE

## 2022-01-01 PROCEDURE — 99222 1ST HOSP IP/OBS MODERATE 55: CPT | Mod: AI | Performed by: INTERNAL MEDICINE

## 2022-01-01 PROCEDURE — 83735 ASSAY OF MAGNESIUM: CPT | Performed by: PHYSICIAN ASSISTANT

## 2022-01-01 PROCEDURE — 88350 IMFLUOR EA ADDL 1ANTB STN PX: CPT | Mod: 26 | Performed by: DERMATOLOGY

## 2022-01-01 PROCEDURE — 85014 HEMATOCRIT: CPT | Performed by: EMERGENCY MEDICINE

## 2022-01-01 PROCEDURE — 999N000065 XR CHEST PORT 1 VIEW

## 2022-01-01 PROCEDURE — 91305 HC RX IP 250 OP 636: CPT | Performed by: INTERNAL MEDICINE

## 2022-01-01 PROCEDURE — 87040 BLOOD CULTURE FOR BACTERIA: CPT | Performed by: NURSE PRACTITIONER

## 2022-01-01 PROCEDURE — G0463 HOSPITAL OUTPT CLINIC VISIT: HCPCS | Mod: PN,RTG | Performed by: INTERNAL MEDICINE

## 2022-01-01 PROCEDURE — 36593 DECLOT VASCULAR DEVICE: CPT

## 2022-01-01 PROCEDURE — 85025 COMPLETE CBC W/AUTO DIFF WBC: CPT | Performed by: HOSPITALIST

## 2022-01-01 PROCEDURE — 86160 COMPLEMENT ANTIGEN: CPT | Performed by: STUDENT IN AN ORGANIZED HEALTH CARE EDUCATION/TRAINING PROGRAM

## 2022-01-01 PROCEDURE — 82595 ASSAY OF CRYOGLOBULIN: CPT | Performed by: STUDENT IN AN ORGANIZED HEALTH CARE EDUCATION/TRAINING PROGRAM

## 2022-01-01 PROCEDURE — 99207 PR NO BILLABLE SERVICE THIS VISIT: CPT

## 2022-01-01 PROCEDURE — 96372 THER/PROPH/DIAG INJ SC/IM: CPT | Performed by: REGISTERED NURSE

## 2022-01-01 PROCEDURE — 86850 RBC ANTIBODY SCREEN: CPT | Performed by: INTERNAL MEDICINE

## 2022-01-01 PROCEDURE — 88346 IMFLUOR 1ST 1ANTB STAIN PX: CPT | Mod: 26 | Performed by: DERMATOLOGY

## 2022-01-01 PROCEDURE — 88305 TISSUE EXAM BY PATHOLOGIST: CPT | Mod: 26 | Performed by: DERMATOLOGY

## 2022-01-01 PROCEDURE — 99223 1ST HOSP IP/OBS HIGH 75: CPT | Performed by: INTERNAL MEDICINE

## 2022-01-01 PROCEDURE — G0463 HOSPITAL OUTPT CLINIC VISIT: HCPCS | Mod: PN,RTG | Performed by: REGISTERED NURSE

## 2022-01-01 PROCEDURE — 99356 PR PROLONGED SERV,INPATIENT,1ST HR: CPT | Performed by: PHYSICIAN ASSISTANT

## 2022-01-01 PROCEDURE — 87633 RESP VIRUS 12-25 TARGETS: CPT

## 2022-01-01 PROCEDURE — 99239 HOSP IP/OBS DSCHRG MGMT >30: CPT | Mod: FS | Performed by: STUDENT IN AN ORGANIZED HEALTH CARE EDUCATION/TRAINING PROGRAM

## 2022-01-01 PROCEDURE — 99285 EMERGENCY DEPT VISIT HI MDM: CPT | Mod: FS | Performed by: EMERGENCY MEDICINE

## 2022-01-01 PROCEDURE — 83605 ASSAY OF LACTIC ACID: CPT

## 2022-01-01 PROCEDURE — 71045 X-RAY EXAM CHEST 1 VIEW: CPT | Mod: 26

## 2022-01-01 PROCEDURE — 86334 IMMUNOFIX E-PHORESIS SERUM: CPT | Performed by: STUDENT IN AN ORGANIZED HEALTH CARE EDUCATION/TRAINING PROGRAM

## 2022-01-01 PROCEDURE — 99233 SBSQ HOSP IP/OBS HIGH 50: CPT | Performed by: INTERNAL MEDICINE

## 2022-01-01 PROCEDURE — 82042 OTHER SOURCE ALBUMIN QUAN EA: CPT

## 2022-01-01 PROCEDURE — 86901 BLOOD TYPING SEROLOGIC RH(D): CPT | Performed by: INTERNAL MEDICINE

## 2022-01-01 PROCEDURE — 84165 PROTEIN E-PHORESIS SERUM: CPT | Mod: 26

## 2022-01-01 PROCEDURE — 83605 ASSAY OF LACTIC ACID: CPT | Performed by: EMERGENCY MEDICINE

## 2022-01-01 PROCEDURE — 71260 CT THORAX DX C+: CPT | Performed by: RADIOLOGY

## 2022-01-01 PROCEDURE — 85025 COMPLETE CBC W/AUTO DIFF WBC: CPT | Performed by: STUDENT IN AN ORGANIZED HEALTH CARE EDUCATION/TRAINING PROGRAM

## 2022-01-01 PROCEDURE — 81001 URINALYSIS AUTO W/SCOPE: CPT | Performed by: INTERNAL MEDICINE

## 2022-01-01 PROCEDURE — 84166 PROTEIN E-PHORESIS/URINE/CSF: CPT | Performed by: STUDENT IN AN ORGANIZED HEALTH CARE EDUCATION/TRAINING PROGRAM

## 2022-01-01 PROCEDURE — 85014 HEMATOCRIT: CPT

## 2022-01-01 PROCEDURE — 76857 US EXAM PELVIC LIMITED: CPT | Mod: 26 | Performed by: RADIOLOGY

## 2022-01-01 PROCEDURE — 36415 COLL VENOUS BLD VENIPUNCTURE: CPT | Performed by: NURSE PRACTITIONER

## 2022-01-01 PROCEDURE — 99215 OFFICE O/P EST HI 40 MIN: CPT

## 2022-01-01 PROCEDURE — 80069 RENAL FUNCTION PANEL: CPT | Performed by: INTERNAL MEDICINE

## 2022-01-01 PROCEDURE — 99207 PR SC NO CHARGE VISIT: CPT | Performed by: INTERNAL MEDICINE

## 2022-01-01 PROCEDURE — 82040 ASSAY OF SERUM ALBUMIN: CPT

## 2022-01-01 PROCEDURE — 87637 SARSCOV2&INF A&B&RSV AMP PRB: CPT | Performed by: EMERGENCY MEDICINE

## 2022-01-01 PROCEDURE — 82570 ASSAY OF URINE CREATININE: CPT | Performed by: STUDENT IN AN ORGANIZED HEALTH CARE EDUCATION/TRAINING PROGRAM

## 2022-01-01 PROCEDURE — 81001 URINALYSIS AUTO W/SCOPE: CPT | Performed by: STUDENT IN AN ORGANIZED HEALTH CARE EDUCATION/TRAINING PROGRAM

## 2022-01-01 PROCEDURE — 83550 IRON BINDING TEST: CPT

## 2022-01-01 PROCEDURE — 86850 RBC ANTIBODY SCREEN: CPT

## 2022-01-01 PROCEDURE — 99214 OFFICE O/P EST MOD 30 MIN: CPT | Performed by: PHYSICIAN ASSISTANT

## 2022-01-01 PROCEDURE — 99223 1ST HOSP IP/OBS HIGH 75: CPT | Mod: GC | Performed by: INTERNAL MEDICINE

## 2022-01-01 PROCEDURE — 250N000009 HC RX 250: Performed by: DERMATOLOGY

## 2022-01-01 PROCEDURE — 36415 COLL VENOUS BLD VENIPUNCTURE: CPT | Performed by: INTERNAL MEDICINE

## 2022-01-01 PROCEDURE — 0W9G3ZX DRAINAGE OF PERITONEAL CAVITY, PERCUTANEOUS APPROACH, DIAGNOSTIC: ICD-10-PCS | Performed by: STUDENT IN AN ORGANIZED HEALTH CARE EDUCATION/TRAINING PROGRAM

## 2022-01-01 PROCEDURE — 84156 ASSAY OF PROTEIN URINE: CPT | Performed by: STUDENT IN AN ORGANIZED HEALTH CARE EDUCATION/TRAINING PROGRAM

## 2022-01-01 PROCEDURE — 96417 CHEMO IV INFUS EACH ADDL SEQ: CPT

## 2022-01-01 PROCEDURE — 86038 ANTINUCLEAR ANTIBODIES: CPT | Performed by: STUDENT IN AN ORGANIZED HEALTH CARE EDUCATION/TRAINING PROGRAM

## 2022-01-01 PROCEDURE — 84450 TRANSFERASE (AST) (SGOT): CPT | Performed by: STUDENT IN AN ORGANIZED HEALTH CARE EDUCATION/TRAINING PROGRAM

## 2022-01-01 PROCEDURE — 82607 VITAMIN B-12: CPT

## 2022-01-01 PROCEDURE — 82310 ASSAY OF CALCIUM: CPT | Performed by: INTERNAL MEDICINE

## 2022-01-01 PROCEDURE — 84165 PROTEIN E-PHORESIS SERUM: CPT | Mod: TC | Performed by: STUDENT IN AN ORGANIZED HEALTH CARE EDUCATION/TRAINING PROGRAM

## 2022-01-01 PROCEDURE — 99215 OFFICE O/P EST HI 40 MIN: CPT | Performed by: INTERNAL MEDICINE

## 2022-01-01 PROCEDURE — 81003 URINALYSIS AUTO W/O SCOPE: CPT | Performed by: EMERGENCY MEDICINE

## 2022-01-01 PROCEDURE — 81003 URINALYSIS AUTO W/O SCOPE: CPT

## 2022-01-01 PROCEDURE — 86036 ANCA SCREEN EACH ANTIBODY: CPT | Performed by: STUDENT IN AN ORGANIZED HEALTH CARE EDUCATION/TRAINING PROGRAM

## 2022-01-01 PROCEDURE — 82040 ASSAY OF SERUM ALBUMIN: CPT | Performed by: REGISTERED NURSE

## 2022-01-01 PROCEDURE — 89051 BODY FLUID CELL COUNT: CPT

## 2022-01-01 PROCEDURE — 74176 CT ABD & PELVIS W/O CONTRAST: CPT | Mod: 26 | Performed by: RADIOLOGY

## 2022-01-01 PROCEDURE — 85027 COMPLETE CBC AUTOMATED: CPT | Performed by: PHYSICIAN ASSISTANT

## 2022-01-01 PROCEDURE — 99207 PR SC NO CHARGE VISIT: CPT | Performed by: PHYSICIAN ASSISTANT

## 2022-01-01 PROCEDURE — 82728 ASSAY OF FERRITIN: CPT

## 2022-01-01 PROCEDURE — 87040 BLOOD CULTURE FOR BACTERIA: CPT | Performed by: HOSPITALIST

## 2022-01-01 PROCEDURE — 86140 C-REACTIVE PROTEIN: CPT | Performed by: HOSPITALIST

## 2022-01-01 PROCEDURE — 83735 ASSAY OF MAGNESIUM: CPT

## 2022-01-01 PROCEDURE — 88350 IMFLUOR EA ADDL 1ANTB STN PX: CPT | Mod: TC | Performed by: STUDENT IN AN ORGANIZED HEALTH CARE EDUCATION/TRAINING PROGRAM

## 2022-01-01 PROCEDURE — P9016 RBC LEUKOCYTES REDUCED: HCPCS | Performed by: INTERNAL MEDICINE

## 2022-01-01 PROCEDURE — 99285 EMERGENCY DEPT VISIT HI MDM: CPT | Mod: 25 | Performed by: EMERGENCY MEDICINE

## 2022-01-01 PROCEDURE — 84100 ASSAY OF PHOSPHORUS: CPT

## 2022-01-01 PROCEDURE — 36416 COLLJ CAPILLARY BLOOD SPEC: CPT | Performed by: PEDIATRICS

## 2022-01-01 PROCEDURE — 86923 COMPATIBILITY TEST ELECTRIC: CPT | Performed by: INTERNAL MEDICINE

## 2022-01-01 PROCEDURE — 83735 ASSAY OF MAGNESIUM: CPT | Performed by: DERMATOLOGY

## 2022-01-01 PROCEDURE — 99215 OFFICE O/P EST HI 40 MIN: CPT | Performed by: STUDENT IN AN ORGANIZED HEALTH CARE EDUCATION/TRAINING PROGRAM

## 2022-01-01 PROCEDURE — 81003 URINALYSIS AUTO W/O SCOPE: CPT | Performed by: STUDENT IN AN ORGANIZED HEALTH CARE EDUCATION/TRAINING PROGRAM

## 2022-01-01 PROCEDURE — 250N000013 HC RX MED GY IP 250 OP 250 PS 637: Performed by: DERMATOLOGY

## 2022-01-01 PROCEDURE — 84466 ASSAY OF TRANSFERRIN: CPT

## 2022-01-01 PROCEDURE — 86850 RBC ANTIBODY SCREEN: CPT | Performed by: PEDIATRICS

## 2022-01-01 PROCEDURE — C9803 HOPD COVID-19 SPEC COLLECT: HCPCS | Performed by: EMERGENCY MEDICINE

## 2022-01-01 PROCEDURE — 87637 SARSCOV2&INF A&B&RSV AMP PRB: CPT

## 2022-01-01 PROCEDURE — 11104 PUNCH BX SKIN SINGLE LESION: CPT | Mod: GC | Performed by: DERMATOLOGY

## 2022-01-01 PROCEDURE — 84100 ASSAY OF PHOSPHORUS: CPT | Performed by: PHYSICIAN ASSISTANT

## 2022-01-01 PROCEDURE — 99214 OFFICE O/P EST MOD 30 MIN: CPT | Performed by: REGISTERED NURSE

## 2022-01-01 PROCEDURE — 99238 HOSP IP/OBS DSCHRG MGMT 30/<: CPT | Mod: GC | Performed by: STUDENT IN AN ORGANIZED HEALTH CARE EDUCATION/TRAINING PROGRAM

## 2022-01-01 PROCEDURE — 36415 COLL VENOUS BLD VENIPUNCTURE: CPT | Performed by: EMERGENCY MEDICINE

## 2022-01-01 PROCEDURE — 36591 DRAW BLOOD OFF VENOUS DEVICE: CPT | Performed by: INTERNAL MEDICINE

## 2022-01-01 PROCEDURE — 85610 PROTHROMBIN TIME: CPT

## 2022-01-01 PROCEDURE — 87493 C DIFF AMPLIFIED PROBE: CPT

## 2022-01-01 PROCEDURE — 36592 COLLECT BLOOD FROM PICC: CPT | Performed by: INTERNAL MEDICINE

## 2022-01-01 PROCEDURE — 82310 ASSAY OF CALCIUM: CPT | Performed by: REGISTERED NURSE

## 2022-01-01 PROCEDURE — 49083 ABD PARACENTESIS W/IMAGING: CPT | Performed by: STUDENT IN AN ORGANIZED HEALTH CARE EDUCATION/TRAINING PROGRAM

## 2022-01-01 PROCEDURE — 85004 AUTOMATED DIFF WBC COUNT: CPT | Performed by: REGISTERED NURSE

## 2022-01-01 RX ORDER — EPINEPHRINE 1 MG/ML
0.3 INJECTION, SOLUTION INTRAMUSCULAR; SUBCUTANEOUS EVERY 5 MIN PRN
Status: CANCELLED | OUTPATIENT
Start: 2022-01-01

## 2022-01-01 RX ORDER — ALBUTEROL SULFATE 0.83 MG/ML
2.5 SOLUTION RESPIRATORY (INHALATION)
Status: CANCELLED | OUTPATIENT
Start: 2022-01-01

## 2022-01-01 RX ORDER — ATROPINE SULFATE 0.4 MG/ML
0.4 AMPUL (ML) INJECTION
Status: CANCELLED | OUTPATIENT
Start: 2022-01-01

## 2022-01-01 RX ORDER — CEFTRIAXONE 1 G/1
1 INJECTION, POWDER, FOR SOLUTION INTRAMUSCULAR; INTRAVENOUS ONCE
Status: COMPLETED | OUTPATIENT
Start: 2022-01-01 | End: 2022-01-01

## 2022-01-01 RX ORDER — HEPARIN SODIUM (PORCINE) LOCK FLUSH IV SOLN 100 UNIT/ML 100 UNIT/ML
5 SOLUTION INTRAVENOUS
Status: DISCONTINUED | OUTPATIENT
Start: 2022-01-01 | End: 2022-01-01 | Stop reason: HOSPADM

## 2022-01-01 RX ORDER — HEPARIN SODIUM (PORCINE) LOCK FLUSH IV SOLN 100 UNIT/ML 100 UNIT/ML
5 SOLUTION INTRAVENOUS
Status: CANCELLED | OUTPATIENT
Start: 2022-01-01

## 2022-01-01 RX ORDER — PIPERACILLIN SODIUM, TAZOBACTAM SODIUM 3; .375 G/15ML; G/15ML
3.38 INJECTION, POWDER, LYOPHILIZED, FOR SOLUTION INTRAVENOUS EVERY 6 HOURS
Status: DISCONTINUED | OUTPATIENT
Start: 2022-01-01 | End: 2022-01-01

## 2022-01-01 RX ORDER — DIPHENHYDRAMINE HCL 25 MG
25 CAPSULE ORAL ONCE
Status: COMPLETED | OUTPATIENT
Start: 2022-01-01 | End: 2022-01-01

## 2022-01-01 RX ORDER — PALONOSETRON 0.05 MG/ML
0.25 INJECTION, SOLUTION INTRAVENOUS ONCE
Status: COMPLETED | OUTPATIENT
Start: 2022-01-01 | End: 2022-01-01

## 2022-01-01 RX ORDER — ALBUTEROL SULFATE 90 UG/1
1-2 AEROSOL, METERED RESPIRATORY (INHALATION)
Status: CANCELLED
Start: 2022-01-01

## 2022-01-01 RX ORDER — PROCHLORPERAZINE MALEATE 10 MG
10 TABLET ORAL EVERY 6 HOURS PRN
Qty: 30 TABLET | Refills: 0 | Status: ON HOLD | OUTPATIENT
Start: 2022-01-01 | End: 2022-01-01

## 2022-01-01 RX ORDER — ALBUTEROL SULFATE 90 UG/1
1-2 AEROSOL, METERED RESPIRATORY (INHALATION)
Status: CANCELLED
Start: 2023-01-01

## 2022-01-01 RX ORDER — EPINEPHRINE 1 MG/ML
0.3 INJECTION, SOLUTION, CONCENTRATE INTRAVENOUS EVERY 5 MIN PRN
Status: CANCELLED | OUTPATIENT
Start: 2022-01-01

## 2022-01-01 RX ORDER — LIDOCAINE 40 MG/G
CREAM TOPICAL
Status: CANCELLED | OUTPATIENT
Start: 2022-01-01

## 2022-01-01 RX ORDER — ACETAMINOPHEN 325 MG/1
325 TABLET ORAL EVERY 4 HOURS PRN
Status: DISCONTINUED | OUTPATIENT
Start: 2022-01-01 | End: 2022-01-01

## 2022-01-01 RX ORDER — LOPERAMIDE HCL 2 MG
2 CAPSULE ORAL 4 TIMES DAILY PRN
Status: DISCONTINUED | OUTPATIENT
Start: 2022-01-01 | End: 2022-01-01

## 2022-01-01 RX ORDER — ATROPINE SULFATE 0.4 MG/ML
0.4 AMPUL (ML) INJECTION
Status: DISCONTINUED | OUTPATIENT
Start: 2022-01-01 | End: 2022-01-01 | Stop reason: HOSPADM

## 2022-01-01 RX ORDER — HEPARIN SODIUM,PORCINE 10 UNIT/ML
5 VIAL (ML) INTRAVENOUS
Status: CANCELLED | OUTPATIENT
Start: 2022-01-01

## 2022-01-01 RX ORDER — HEPARIN SODIUM (PORCINE) LOCK FLUSH IV SOLN 100 UNIT/ML 100 UNIT/ML
500 SOLUTION INTRAVENOUS ONCE
Status: COMPLETED | OUTPATIENT
Start: 2022-01-01 | End: 2022-01-01

## 2022-01-01 RX ORDER — LORAZEPAM 2 MG/ML
0.5 INJECTION INTRAMUSCULAR EVERY 4 HOURS PRN
Status: CANCELLED | OUTPATIENT
Start: 2022-01-01

## 2022-01-01 RX ORDER — GRANISETRON HYDROCHLORIDE 1 MG/ML
1 INJECTION INTRAVENOUS ONCE
Status: CANCELLED
Start: 2022-01-01 | End: 2022-01-01

## 2022-01-01 RX ORDER — POTASSIUM CHLORIDE 750 MG/1
40 TABLET, EXTENDED RELEASE ORAL ONCE
Status: COMPLETED | OUTPATIENT
Start: 2022-01-01 | End: 2022-01-01

## 2022-01-01 RX ORDER — GRANISETRON HYDROCHLORIDE 1 MG/ML
1 INJECTION INTRAVENOUS ONCE
Status: COMPLETED | OUTPATIENT
Start: 2022-01-01 | End: 2022-01-01

## 2022-01-01 RX ORDER — ONDANSETRON 4 MG/1
4 TABLET, ORALLY DISINTEGRATING ORAL EVERY 30 MIN PRN
Status: CANCELLED | OUTPATIENT
Start: 2022-01-01

## 2022-01-01 RX ORDER — TRAMADOL HYDROCHLORIDE 50 MG/1
50 TABLET ORAL EVERY 6 HOURS PRN
Status: DISCONTINUED | OUTPATIENT
Start: 2022-01-01 | End: 2022-01-01 | Stop reason: HOSPADM

## 2022-01-01 RX ORDER — DEXTROSE MONOHYDRATE 50 MG/ML
10-20 INJECTION, SOLUTION INTRAVENOUS
Status: COMPLETED | OUTPATIENT
Start: 2022-01-01 | End: 2022-01-01

## 2022-01-01 RX ORDER — MEPERIDINE HYDROCHLORIDE 25 MG/ML
25 INJECTION INTRAMUSCULAR; INTRAVENOUS; SUBCUTANEOUS EVERY 30 MIN PRN
Status: CANCELLED | OUTPATIENT
Start: 2022-01-01

## 2022-01-01 RX ORDER — PROCHLORPERAZINE MALEATE 5 MG
10 TABLET ORAL EVERY 6 HOURS PRN
Status: DISCONTINUED | OUTPATIENT
Start: 2022-01-01 | End: 2022-01-01 | Stop reason: HOSPADM

## 2022-01-01 RX ORDER — HEPARIN SODIUM,PORCINE 10 UNIT/ML
5 VIAL (ML) INTRAVENOUS
Status: CANCELLED | OUTPATIENT
Start: 2023-01-01

## 2022-01-01 RX ORDER — METOPROLOL TARTRATE 25 MG/1
25 TABLET, FILM COATED ORAL 2 TIMES DAILY
Status: DISCONTINUED | OUTPATIENT
Start: 2022-01-01 | End: 2022-01-01 | Stop reason: HOSPADM

## 2022-01-01 RX ORDER — ACETAMINOPHEN 500 MG
1000 TABLET ORAL ONCE
Status: COMPLETED | OUTPATIENT
Start: 2022-01-01 | End: 2022-01-01

## 2022-01-01 RX ORDER — LIDOCAINE HYDROCHLORIDE 10 MG/ML
20 INJECTION, SOLUTION EPIDURAL; INFILTRATION; INTRACAUDAL; PERINEURAL ONCE
Status: CANCELLED | OUTPATIENT
Start: 2022-01-01 | End: 2022-01-01

## 2022-01-01 RX ORDER — ATROPINE SULFATE 0.4 MG/ML
0.4 AMPUL (ML) INJECTION
Status: CANCELLED | OUTPATIENT
Start: 2023-01-01

## 2022-01-01 RX ORDER — MAGNESIUM SULFATE HEPTAHYDRATE 40 MG/ML
4 INJECTION, SOLUTION INTRAVENOUS ONCE
Status: COMPLETED | OUTPATIENT
Start: 2022-01-01 | End: 2022-01-01

## 2022-01-01 RX ORDER — LIDOCAINE/PRILOCAINE 2.5 %-2.5%
CREAM (GRAM) TOPICAL
Status: CANCELLED
Start: 2022-01-01

## 2022-01-01 RX ORDER — UBIDECARENONE 75 MG
100 CAPSULE ORAL DAILY
Status: DISCONTINUED | OUTPATIENT
Start: 2022-01-01 | End: 2022-01-01 | Stop reason: HOSPADM

## 2022-01-01 RX ORDER — ACETAMINOPHEN 325 MG/1
325 TABLET ORAL EVERY 4 HOURS
Status: DISCONTINUED | OUTPATIENT
Start: 2022-01-01 | End: 2022-01-01

## 2022-01-01 RX ORDER — POTASSIUM CHLORIDE 750 MG/1
20 TABLET, EXTENDED RELEASE ORAL ONCE
Status: COMPLETED | OUTPATIENT
Start: 2022-01-01 | End: 2022-01-01

## 2022-01-01 RX ORDER — DIPHENHYDRAMINE HCL 25 MG
25 CAPSULE ORAL EVERY 6 HOURS PRN
Qty: 30 CAPSULE | Refills: 0 | Status: SHIPPED | OUTPATIENT
Start: 2022-01-01 | End: 2023-01-01

## 2022-01-01 RX ORDER — ETOPOSIDE 50 MG/1
75 CAPSULE ORAL DAILY
Qty: 24 CAPSULE | Refills: 0 | Status: ON HOLD | OUTPATIENT
Start: 2022-01-01 | End: 2022-01-01

## 2022-01-01 RX ORDER — DIPHENHYDRAMINE HYDROCHLORIDE 50 MG/ML
50 INJECTION INTRAMUSCULAR; INTRAVENOUS
Status: CANCELLED
Start: 2022-01-01

## 2022-01-01 RX ORDER — HEPARIN SODIUM,PORCINE 10 UNIT/ML
5 VIAL (ML) INTRAVENOUS
Status: DISCONTINUED | OUTPATIENT
Start: 2022-01-01 | End: 2022-01-01 | Stop reason: HOSPADM

## 2022-01-01 RX ORDER — POTASSIUM CHLORIDE 1.5 G/1.58G
40 POWDER, FOR SOLUTION ORAL ONCE
Status: COMPLETED | OUTPATIENT
Start: 2022-01-01 | End: 2022-01-01

## 2022-01-01 RX ORDER — IOPAMIDOL 755 MG/ML
125 INJECTION, SOLUTION INTRAVASCULAR ONCE
Status: COMPLETED | OUTPATIENT
Start: 2022-01-01 | End: 2022-01-01

## 2022-01-01 RX ORDER — NALOXONE HYDROCHLORIDE 0.4 MG/ML
0.4 INJECTION, SOLUTION INTRAMUSCULAR; INTRAVENOUS; SUBCUTANEOUS
Status: DISCONTINUED | OUTPATIENT
Start: 2022-01-01 | End: 2022-01-01 | Stop reason: HOSPADM

## 2022-01-01 RX ORDER — DACTINOMYCIN 0.5 MG/ML
2.5 INJECTION, POWDER, LYOPHILIZED, FOR SOLUTION INTRAVENOUS ONCE
Status: COMPLETED | OUTPATIENT
Start: 2022-01-01 | End: 2022-01-01

## 2022-01-01 RX ORDER — PROCHLORPERAZINE 25 MG
25 SUPPOSITORY, RECTAL RECTAL EVERY 12 HOURS PRN
Status: DISCONTINUED | OUTPATIENT
Start: 2022-01-01 | End: 2022-01-01 | Stop reason: HOSPADM

## 2022-01-01 RX ORDER — POTASSIUM CHLORIDE 20MEQ/15ML
20 LIQUID (ML) ORAL ONCE
Status: COMPLETED | OUTPATIENT
Start: 2022-01-01 | End: 2022-01-01

## 2022-01-01 RX ORDER — ONDANSETRON 2 MG/ML
4 INJECTION INTRAMUSCULAR; INTRAVENOUS EVERY 6 HOURS PRN
Status: DISCONTINUED | OUTPATIENT
Start: 2022-01-01 | End: 2022-01-01 | Stop reason: HOSPADM

## 2022-01-01 RX ORDER — NALOXONE HYDROCHLORIDE 0.4 MG/ML
0.2 INJECTION, SOLUTION INTRAMUSCULAR; INTRAVENOUS; SUBCUTANEOUS
Status: DISCONTINUED | OUTPATIENT
Start: 2022-01-01 | End: 2022-01-01 | Stop reason: HOSPADM

## 2022-01-01 RX ORDER — METOPROLOL TARTRATE 50 MG
50 TABLET ORAL DAILY
Qty: 30 TABLET | Refills: 1 | Status: ON HOLD | OUTPATIENT
Start: 2022-01-01 | End: 2022-01-01

## 2022-01-01 RX ORDER — POLYETHYLENE GLYCOL 3350 17 G/17G
17 POWDER, FOR SOLUTION ORAL DAILY PRN
Status: DISCONTINUED | OUTPATIENT
Start: 2022-01-01 | End: 2022-01-01 | Stop reason: HOSPADM

## 2022-01-01 RX ORDER — ACETAMINOPHEN 325 MG/1
650 TABLET ORAL EVERY 4 HOURS PRN
Qty: 30 TABLET | Refills: 0 | Status: ON HOLD | OUTPATIENT
Start: 2022-01-01 | End: 2023-01-01

## 2022-01-01 RX ORDER — MEPERIDINE HYDROCHLORIDE 25 MG/ML
25 INJECTION INTRAMUSCULAR; INTRAVENOUS; SUBCUTANEOUS EVERY 30 MIN PRN
Status: CANCELLED | OUTPATIENT
Start: 2023-01-01

## 2022-01-01 RX ORDER — LIDOCAINE/PRILOCAINE 2.5 %-2.5%
CREAM (GRAM) TOPICAL ONCE
Status: COMPLETED | OUTPATIENT
Start: 2022-01-01 | End: 2022-01-01

## 2022-01-01 RX ORDER — ALBUTEROL SULFATE 0.83 MG/ML
2.5 SOLUTION RESPIRATORY (INHALATION)
Status: CANCELLED | OUTPATIENT
Start: 2023-01-01

## 2022-01-01 RX ORDER — METHYLPREDNISOLONE SODIUM SUCCINATE 125 MG/2ML
125 INJECTION, POWDER, LYOPHILIZED, FOR SOLUTION INTRAMUSCULAR; INTRAVENOUS
Status: CANCELLED
Start: 2023-01-01

## 2022-01-01 RX ORDER — NALOXONE HYDROCHLORIDE 0.4 MG/ML
0.2 INJECTION, SOLUTION INTRAMUSCULAR; INTRAVENOUS; SUBCUTANEOUS
Status: CANCELLED | OUTPATIENT
Start: 2022-01-01

## 2022-01-01 RX ORDER — IOPAMIDOL 755 MG/ML
130 INJECTION, SOLUTION INTRAVASCULAR ONCE
Status: COMPLETED | OUTPATIENT
Start: 2022-01-01 | End: 2022-01-01

## 2022-01-01 RX ORDER — DEXTROSE MONOHYDRATE 50 MG/ML
INJECTION, SOLUTION INTRAVENOUS
Status: DISPENSED
Start: 2022-01-01 | End: 2022-01-01

## 2022-01-01 RX ORDER — HEPARIN SODIUM (PORCINE) LOCK FLUSH IV SOLN 100 UNIT/ML 100 UNIT/ML
5-10 SOLUTION INTRAVENOUS
Status: DISCONTINUED | OUTPATIENT
Start: 2022-01-01 | End: 2022-01-01 | Stop reason: HOSPADM

## 2022-01-01 RX ORDER — EPINEPHRINE 1 MG/ML
0.3 INJECTION, SOLUTION INTRAMUSCULAR; SUBCUTANEOUS EVERY 5 MIN PRN
Status: CANCELLED | OUTPATIENT
Start: 2023-01-01

## 2022-01-01 RX ORDER — LOPERAMIDE HCL 2 MG
2 CAPSULE ORAL 2 TIMES DAILY
Status: DISCONTINUED | OUTPATIENT
Start: 2022-01-01 | End: 2022-01-01

## 2022-01-01 RX ORDER — METOPROLOL TARTRATE 50 MG
50 TABLET ORAL DAILY
Status: DISCONTINUED | OUTPATIENT
Start: 2022-01-01 | End: 2022-01-01

## 2022-01-01 RX ORDER — HEPARIN SODIUM (PORCINE) LOCK FLUSH IV SOLN 100 UNIT/ML 100 UNIT/ML
5 SOLUTION INTRAVENOUS
Status: CANCELLED | OUTPATIENT
Start: 2023-01-01

## 2022-01-01 RX ORDER — HEPARIN SODIUM (PORCINE) LOCK FLUSH IV SOLN 100 UNIT/ML 100 UNIT/ML
5 SOLUTION INTRAVENOUS ONCE
Status: COMPLETED | OUTPATIENT
Start: 2022-01-01 | End: 2022-01-01

## 2022-01-01 RX ORDER — HEPARIN SODIUM,PORCINE 10 UNIT/ML
5-10 VIAL (ML) INTRAVENOUS
Status: DISCONTINUED | OUTPATIENT
Start: 2022-01-01 | End: 2022-01-01 | Stop reason: HOSPADM

## 2022-01-01 RX ORDER — DIPHENHYDRAMINE HYDROCHLORIDE 50 MG/ML
50 INJECTION INTRAMUSCULAR; INTRAVENOUS
Status: CANCELLED
Start: 2023-01-01

## 2022-01-01 RX ORDER — HYDROMORPHONE HYDROCHLORIDE 1 MG/ML
0.2 INJECTION, SOLUTION INTRAMUSCULAR; INTRAVENOUS; SUBCUTANEOUS EVERY 5 MIN PRN
Status: CANCELLED | OUTPATIENT
Start: 2022-01-01

## 2022-01-01 RX ORDER — HEPARIN SODIUM (PORCINE) LOCK FLUSH IV SOLN 100 UNIT/ML 100 UNIT/ML
5 SOLUTION INTRAVENOUS
Status: COMPLETED | OUTPATIENT
Start: 2022-01-01 | End: 2022-01-01

## 2022-01-01 RX ORDER — POTASSIUM CHLORIDE 1500 MG/1
40 TABLET, EXTENDED RELEASE ORAL DAILY
Qty: 4 TABLET | Refills: 0 | Status: ON HOLD | OUTPATIENT
Start: 2022-01-01 | End: 2022-01-01

## 2022-01-01 RX ORDER — DACTINOMYCIN 0.5 MG/ML
2.5 INJECTION, POWDER, LYOPHILIZED, FOR SOLUTION INTRAVENOUS ONCE
Status: CANCELLED
Start: 2022-01-01

## 2022-01-01 RX ORDER — LIDOCAINE 40 MG/G
CREAM TOPICAL
Status: DISCONTINUED | OUTPATIENT
Start: 2022-01-01 | End: 2022-01-01 | Stop reason: HOSPADM

## 2022-01-01 RX ORDER — DIPHENHYDRAMINE HCL 25 MG
25 CAPSULE ORAL EVERY 6 HOURS PRN
Status: DISCONTINUED | OUTPATIENT
Start: 2022-01-01 | End: 2022-01-01 | Stop reason: HOSPADM

## 2022-01-01 RX ORDER — AZITHROMYCIN 250 MG/1
TABLET, FILM COATED ORAL
Qty: 6 TABLET | Refills: 0 | Status: ON HOLD | OUTPATIENT
Start: 2022-01-01 | End: 2022-01-01

## 2022-01-01 RX ORDER — MEPERIDINE HYDROCHLORIDE 25 MG/ML
12.5 INJECTION INTRAMUSCULAR; INTRAVENOUS; SUBCUTANEOUS
Status: CANCELLED | OUTPATIENT
Start: 2022-01-01

## 2022-01-01 RX ORDER — POLYETHYLENE GLYCOL 3350 17 G/17G
17 POWDER, FOR SOLUTION ORAL DAILY
Qty: 510 G | Refills: 0 | Status: ON HOLD | OUTPATIENT
Start: 2022-01-01 | End: 2022-01-01

## 2022-01-01 RX ORDER — TRIAMCINOLONE ACETONIDE 1 MG/G
OINTMENT TOPICAL 2 TIMES DAILY PRN
Status: DISCONTINUED | OUTPATIENT
Start: 2022-01-01 | End: 2022-01-01 | Stop reason: HOSPADM

## 2022-01-01 RX ORDER — PALONOSETRON 0.05 MG/ML
0.25 INJECTION, SOLUTION INTRAVENOUS ONCE
Status: CANCELLED
Start: 2022-01-01

## 2022-01-01 RX ORDER — ACETAMINOPHEN 325 MG/1
650 TABLET ORAL EVERY 6 HOURS PRN
Status: DISCONTINUED | OUTPATIENT
Start: 2022-01-01 | End: 2022-01-01 | Stop reason: HOSPADM

## 2022-01-01 RX ORDER — DEXTROSE MONOHYDRATE 50 MG/ML
10-20 INJECTION, SOLUTION INTRAVENOUS
Status: DISCONTINUED | OUTPATIENT
Start: 2022-01-01 | End: 2022-01-01

## 2022-01-01 RX ORDER — DIPHENHYDRAMINE HCL 25 MG
25 CAPSULE ORAL EVERY 6 HOURS PRN
Status: COMPLETED | OUTPATIENT
Start: 2022-01-01 | End: 2022-01-01

## 2022-01-01 RX ORDER — LOPERAMIDE HCL 2 MG
2 CAPSULE ORAL 3 TIMES DAILY
Status: DISCONTINUED | OUTPATIENT
Start: 2022-01-01 | End: 2022-01-01

## 2022-01-01 RX ORDER — ACETAMINOPHEN 325 MG/1
975 TABLET ORAL ONCE
Status: DISCONTINUED | OUTPATIENT
Start: 2022-01-01 | End: 2022-01-01 | Stop reason: HOSPADM

## 2022-01-01 RX ORDER — METHYLPREDNISOLONE SODIUM SUCCINATE 125 MG/2ML
125 INJECTION, POWDER, LYOPHILIZED, FOR SOLUTION INTRAMUSCULAR; INTRAVENOUS
Status: CANCELLED
Start: 2022-01-01

## 2022-01-01 RX ORDER — TRIAMCINOLONE ACETONIDE 1 MG/G
OINTMENT TOPICAL 2 TIMES DAILY
Status: DISCONTINUED | OUTPATIENT
Start: 2022-01-01 | End: 2022-01-01

## 2022-01-01 RX ORDER — LORAZEPAM 2 MG/ML
0.5 INJECTION INTRAMUSCULAR EVERY 4 HOURS PRN
Status: CANCELLED | OUTPATIENT
Start: 2023-01-01

## 2022-01-01 RX ORDER — AMOXICILLIN 250 MG
1 CAPSULE ORAL 2 TIMES DAILY PRN
Qty: 60 TABLET | Refills: 0 | Status: ON HOLD | OUTPATIENT
Start: 2022-01-01 | End: 2022-01-01

## 2022-01-01 RX ORDER — LIDOCAINE HYDROCHLORIDE 10 MG/ML
20 INJECTION, SOLUTION EPIDURAL; INFILTRATION; INTRACAUDAL; PERINEURAL ONCE
Status: COMPLETED | OUTPATIENT
Start: 2022-01-01 | End: 2022-01-01

## 2022-01-01 RX ORDER — CEFAZOLIN SODIUM IN 0.9 % NACL 3 G/100 ML
3 INTRAVENOUS SOLUTION, PIGGYBACK (ML) INTRAVENOUS
Status: CANCELLED | OUTPATIENT
Start: 2022-01-01

## 2022-01-01 RX ORDER — BENZONATATE 100 MG/1
100 CAPSULE ORAL 3 TIMES DAILY PRN
Status: DISCONTINUED | OUTPATIENT
Start: 2022-01-01 | End: 2022-01-01 | Stop reason: HOSPADM

## 2022-01-01 RX ORDER — POTASSIUM CHLORIDE 750 MG/1
40 TABLET, EXTENDED RELEASE ORAL ONCE
Status: CANCELLED | OUTPATIENT
Start: 2022-01-01 | End: 2022-01-01

## 2022-01-01 RX ORDER — IOPAMIDOL 755 MG/ML
76 INJECTION, SOLUTION INTRAVASCULAR ONCE
Status: COMPLETED | OUTPATIENT
Start: 2022-01-01 | End: 2022-01-01

## 2022-01-01 RX ORDER — ONDANSETRON 4 MG/1
4 TABLET, ORALLY DISINTEGRATING ORAL EVERY 6 HOURS PRN
Status: DISCONTINUED | OUTPATIENT
Start: 2022-01-01 | End: 2022-01-01 | Stop reason: HOSPADM

## 2022-01-01 RX ORDER — OXYCODONE HYDROCHLORIDE 5 MG/1
5 TABLET ORAL EVERY 4 HOURS PRN
Status: CANCELLED | OUTPATIENT
Start: 2022-01-01

## 2022-01-01 RX ORDER — AMOXICILLIN 250 MG
1 CAPSULE ORAL 2 TIMES DAILY PRN
Status: DISCONTINUED | OUTPATIENT
Start: 2022-01-01 | End: 2022-01-01 | Stop reason: HOSPADM

## 2022-01-01 RX ORDER — FENTANYL CITRATE 50 UG/ML
25 INJECTION, SOLUTION INTRAMUSCULAR; INTRAVENOUS
Status: CANCELLED | OUTPATIENT
Start: 2022-01-01

## 2022-01-01 RX ORDER — LOPERAMIDE HCL 2 MG
2 CAPSULE ORAL 4 TIMES DAILY
Status: DISCONTINUED | OUTPATIENT
Start: 2022-01-01 | End: 2022-01-01 | Stop reason: HOSPADM

## 2022-01-01 RX ORDER — SODIUM CHLORIDE, SODIUM LACTATE, POTASSIUM CHLORIDE, CALCIUM CHLORIDE 600; 310; 30; 20 MG/100ML; MG/100ML; MG/100ML; MG/100ML
INJECTION, SOLUTION INTRAVENOUS CONTINUOUS
Status: ACTIVE | OUTPATIENT
Start: 2022-01-01 | End: 2022-01-01

## 2022-01-01 RX ORDER — MAGNESIUM SULFATE HEPTAHYDRATE 40 MG/ML
2 INJECTION, SOLUTION INTRAVENOUS ONCE
Status: COMPLETED | OUTPATIENT
Start: 2022-01-01 | End: 2022-01-01

## 2022-01-01 RX ORDER — METOPROLOL TARTRATE 25 MG/1
25 TABLET, FILM COATED ORAL 2 TIMES DAILY
Qty: 60 TABLET | Refills: 0 | Status: SHIPPED | OUTPATIENT
Start: 2022-01-01 | End: 2023-01-01

## 2022-01-01 RX ORDER — TRIAMCINOLONE ACETONIDE 1 MG/G
OINTMENT TOPICAL 2 TIMES DAILY PRN
Qty: 15 G | Refills: 0 | Status: ON HOLD | OUTPATIENT
Start: 2022-01-01 | End: 2023-01-01

## 2022-01-01 RX ORDER — TEMOZOLOMIDE 250 MG/1
100 CAPSULE ORAL DAILY
Qty: 5 CAPSULE | Refills: 0 | Status: ON HOLD | OUTPATIENT
Start: 2022-01-01 | End: 2022-01-01

## 2022-01-01 RX ORDER — POLYETHYLENE GLYCOL 3350 17 G/17G
17 POWDER, FOR SOLUTION ORAL 2 TIMES DAILY
Status: DISCONTINUED | OUTPATIENT
Start: 2022-01-01 | End: 2022-01-01

## 2022-01-01 RX ORDER — HEPARIN SODIUM (PORCINE) LOCK FLUSH IV SOLN 100 UNIT/ML 100 UNIT/ML
5 SOLUTION INTRAVENOUS
Status: DISCONTINUED | OUTPATIENT
Start: 2022-01-01 | End: 2022-01-01 | Stop reason: CLARIF

## 2022-01-01 RX ORDER — ONDANSETRON 2 MG/ML
4 INJECTION INTRAMUSCULAR; INTRAVENOUS EVERY 30 MIN PRN
Status: CANCELLED | OUTPATIENT
Start: 2022-01-01

## 2022-01-01 RX ORDER — HEPARIN SODIUM (PORCINE) LOCK FLUSH IV SOLN 100 UNIT/ML 100 UNIT/ML
5 SOLUTION INTRAVENOUS DAILY PRN
Status: DISCONTINUED | OUTPATIENT
Start: 2022-01-01 | End: 2022-01-01 | Stop reason: HOSPADM

## 2022-01-01 RX ORDER — ACETAMINOPHEN 325 MG/1
650 TABLET ORAL EVERY 4 HOURS PRN
Status: DISCONTINUED | OUTPATIENT
Start: 2022-01-01 | End: 2022-01-01 | Stop reason: HOSPADM

## 2022-01-01 RX ORDER — FENTANYL CITRATE 50 UG/ML
25 INJECTION, SOLUTION INTRAMUSCULAR; INTRAVENOUS EVERY 5 MIN PRN
Status: CANCELLED | OUTPATIENT
Start: 2022-01-01

## 2022-01-01 RX ORDER — DIPHENHYDRAMINE HCL 25 MG
25 CAPSULE ORAL
Status: COMPLETED | OUTPATIENT
Start: 2022-01-01 | End: 2022-01-01

## 2022-01-01 RX ORDER — LOPERAMIDE HCL 2 MG
2 CAPSULE ORAL 4 TIMES DAILY
Qty: 60 CAPSULE | Refills: 0 | Status: ON HOLD | OUTPATIENT
Start: 2022-01-01 | End: 2023-01-01

## 2022-01-01 RX ORDER — LIDOCAINE 4 G/G
1 PATCH TOPICAL
Status: DISCONTINUED | OUTPATIENT
Start: 2022-01-01 | End: 2022-01-01 | Stop reason: HOSPADM

## 2022-01-01 RX ORDER — LOPERAMIDE HCL 2 MG
2 CAPSULE ORAL 3 TIMES DAILY PRN
Status: DISCONTINUED | OUTPATIENT
Start: 2022-01-01 | End: 2022-01-01

## 2022-01-01 RX ORDER — TEMOZOLOMIDE 250 MG/1
100 CAPSULE ORAL DAILY
Qty: 5 CAPSULE | Refills: 0 | Status: ON HOLD | OUTPATIENT
Start: 2022-01-01 | End: 2023-01-01

## 2022-01-01 RX ORDER — FENTANYL CITRATE 50 UG/ML
50 INJECTION, SOLUTION INTRAMUSCULAR; INTRAVENOUS EVERY 5 MIN PRN
Status: CANCELLED | OUTPATIENT
Start: 2022-01-01

## 2022-01-01 RX ORDER — HEPARIN SODIUM (PORCINE) LOCK FLUSH IV SOLN 100 UNIT/ML 100 UNIT/ML
5 SOLUTION INTRAVENOUS EVERY 8 HOURS
Status: DISCONTINUED | OUTPATIENT
Start: 2022-01-01 | End: 2022-01-01 | Stop reason: HOSPADM

## 2022-01-01 RX ORDER — HEPARIN SODIUM,PORCINE 10 UNIT/ML
5-10 VIAL (ML) INTRAVENOUS EVERY 24 HOURS
Status: DISCONTINUED | OUTPATIENT
Start: 2022-01-01 | End: 2022-01-01 | Stop reason: HOSPADM

## 2022-01-01 RX ORDER — SODIUM CHLORIDE, SODIUM LACTATE, POTASSIUM CHLORIDE, CALCIUM CHLORIDE 600; 310; 30; 20 MG/100ML; MG/100ML; MG/100ML; MG/100ML
INJECTION, SOLUTION INTRAVENOUS CONTINUOUS
Status: CANCELLED | OUTPATIENT
Start: 2022-01-01

## 2022-01-01 RX ORDER — CEFAZOLIN SODIUM IN 0.9 % NACL 3 G/100 ML
3 INTRAVENOUS SOLUTION, PIGGYBACK (ML) INTRAVENOUS
Status: DISCONTINUED | OUTPATIENT
Start: 2022-01-01 | End: 2022-01-01 | Stop reason: HOSPADM

## 2022-01-01 RX ORDER — HYDROMORPHONE HYDROCHLORIDE 1 MG/ML
0.4 INJECTION, SOLUTION INTRAMUSCULAR; INTRAVENOUS; SUBCUTANEOUS EVERY 5 MIN PRN
Status: CANCELLED | OUTPATIENT
Start: 2022-01-01

## 2022-01-01 RX ORDER — ENOXAPARIN SODIUM 100 MG/ML
40 INJECTION SUBCUTANEOUS EVERY 24 HOURS
Status: DISCONTINUED | OUTPATIENT
Start: 2022-01-01 | End: 2022-01-01 | Stop reason: HOSPADM

## 2022-01-01 RX ORDER — SODIUM CHLORIDE, SODIUM LACTATE, POTASSIUM CHLORIDE, CALCIUM CHLORIDE 600; 310; 30; 20 MG/100ML; MG/100ML; MG/100ML; MG/100ML
INJECTION, SOLUTION INTRAVENOUS CONTINUOUS
Status: DISCONTINUED | OUTPATIENT
Start: 2022-01-01 | End: 2022-01-01 | Stop reason: HOSPADM

## 2022-01-01 RX ORDER — POTASSIUM CHLORIDE 7.45 MG/ML
10 INJECTION INTRAVENOUS
Status: COMPLETED | OUTPATIENT
Start: 2022-01-01 | End: 2022-01-01

## 2022-01-01 RX ORDER — ONDANSETRON 4 MG/1
4 TABLET, FILM COATED ORAL DAILY
Qty: 30 TABLET | Refills: 1 | Status: ON HOLD | OUTPATIENT
Start: 2022-01-01 | End: 2022-01-01

## 2022-01-01 RX ORDER — OXYCODONE HYDROCHLORIDE 5 MG/1
5 TABLET ORAL EVERY 4 HOURS PRN
Status: DISCONTINUED | OUTPATIENT
Start: 2022-01-01 | End: 2022-01-01 | Stop reason: HOSPADM

## 2022-01-01 RX ORDER — THIAMINE HYDROCHLORIDE 100 MG/ML
100 INJECTION, SOLUTION INTRAMUSCULAR; INTRAVENOUS DAILY
Status: COMPLETED | OUTPATIENT
Start: 2022-01-01 | End: 2022-01-01

## 2022-01-01 RX ORDER — GUAIFENESIN 600 MG/1
600 TABLET, EXTENDED RELEASE ORAL 2 TIMES DAILY PRN
Status: DISCONTINUED | OUTPATIENT
Start: 2022-01-01 | End: 2022-01-01

## 2022-01-01 RX ADMIN — FILGRASTIM 480 MCG: 480 INJECTION, SOLUTION INTRAVENOUS; SUBCUTANEOUS at 21:03

## 2022-01-01 RX ADMIN — DEXAMETHASONE SODIUM PHOSPHATE: 10 INJECTION, SOLUTION INTRAMUSCULAR; INTRAVENOUS at 11:25

## 2022-01-01 RX ADMIN — DEXTROSE MONOHYDRATE 20 ML: 50 INJECTION, SOLUTION INTRAVENOUS at 20:37

## 2022-01-01 RX ADMIN — LIDOCAINE PATCH 4% 1 PATCH: 40 PATCH TOPICAL at 08:55

## 2022-01-01 RX ADMIN — Medication 15 MMOL: at 17:16

## 2022-01-01 RX ADMIN — Medication 5 ML: at 12:30

## 2022-01-01 RX ADMIN — VINCRISTINE SULFATE 2 MG: 1 INJECTION, SOLUTION INTRAVENOUS at 10:59

## 2022-01-01 RX ADMIN — PIPERACILLIN AND TAZOBACTAM 3.38 G: 3; .375 INJECTION, POWDER, LYOPHILIZED, FOR SOLUTION INTRAVENOUS at 13:11

## 2022-01-01 RX ADMIN — VITAM B12 100 MCG: 100 TAB at 08:53

## 2022-01-01 RX ADMIN — LIDOCAINE PATCH 4% 1 PATCH: 40 PATCH TOPICAL at 08:40

## 2022-01-01 RX ADMIN — PEGFILGRASTIM 6 MG: KIT SUBCUTANEOUS at 15:10

## 2022-01-01 RX ADMIN — FILGRASTIM 480 MCG: 480 INJECTION, SOLUTION INTRAVENOUS; SUBCUTANEOUS at 20:25

## 2022-01-01 RX ADMIN — HYDROMORPHONE HYDROCHLORIDE 1 MG: 2 TABLET ORAL at 10:43

## 2022-01-01 RX ADMIN — LIDOCAINE PATCH 4% 1 PATCH: 40 PATCH TOPICAL at 08:06

## 2022-01-01 RX ADMIN — POTASSIUM PHOSPHATE, MONOBASIC AND POTASSIUM PHOSPHATE, DIBASIC 15 MMOL: 224; 236 INJECTION, SOLUTION, CONCENTRATE INTRAVENOUS at 13:11

## 2022-01-01 RX ADMIN — TRAMADOL HYDROCHLORIDE 50 MG: 50 TABLET, COATED ORAL at 14:12

## 2022-01-01 RX ADMIN — HYDROMORPHONE HYDROCHLORIDE 1 MG: 2 TABLET ORAL at 05:14

## 2022-01-01 RX ADMIN — LOPERAMIDE HYDROCHLORIDE 2 MG: 2 CAPSULE ORAL at 08:05

## 2022-01-01 RX ADMIN — TRAMADOL HYDROCHLORIDE 50 MG: 50 TABLET, COATED ORAL at 03:14

## 2022-01-01 RX ADMIN — METOPROLOL TARTRATE 50 MG: 50 TABLET, FILM COATED ORAL at 08:43

## 2022-01-01 RX ADMIN — METOPROLOL TARTRATE 25 MG: 25 TABLET, FILM COATED ORAL at 08:34

## 2022-01-01 RX ADMIN — TRIAMCINOLONE ACETONIDE: 1 OINTMENT TOPICAL at 20:37

## 2022-01-01 RX ADMIN — METOPROLOL TARTRATE 25 MG: 25 TABLET, FILM COATED ORAL at 20:36

## 2022-01-01 RX ADMIN — TRAMADOL HYDROCHLORIDE 50 MG: 50 TABLET, COATED ORAL at 08:06

## 2022-01-01 RX ADMIN — POTASSIUM CHLORIDE 40 MEQ: 750 TABLET, EXTENDED RELEASE ORAL at 13:11

## 2022-01-01 RX ADMIN — GRANISETRON HYDROCHLORIDE 1 MG: 1 INJECTION, SOLUTION INTRAVENOUS at 13:53

## 2022-01-01 RX ADMIN — PIPERACILLIN AND TAZOBACTAM 3.38 G: 3; .375 INJECTION, POWDER, LYOPHILIZED, FOR SOLUTION INTRAVENOUS at 07:03

## 2022-01-01 RX ADMIN — CEFEPIME HYDROCHLORIDE 2 G: 2 INJECTION, POWDER, FOR SOLUTION INTRAVENOUS at 19:53

## 2022-01-01 RX ADMIN — METOPROLOL TARTRATE 25 MG: 25 TABLET, FILM COATED ORAL at 08:29

## 2022-01-01 RX ADMIN — OXYCODONE HYDROCHLORIDE 5 MG: 5 TABLET ORAL at 14:25

## 2022-01-01 RX ADMIN — FILGRASTIM 480 MCG: 480 INJECTION, SOLUTION INTRAVENOUS; SUBCUTANEOUS at 21:28

## 2022-01-01 RX ADMIN — OXYCODONE HYDROCHLORIDE 5 MG: 5 TABLET ORAL at 08:55

## 2022-01-01 RX ADMIN — LOPERAMIDE HYDROCHLORIDE 2 MG: 2 CAPSULE ORAL at 20:35

## 2022-01-01 RX ADMIN — ACETAMINOPHEN 650 MG: 325 TABLET, FILM COATED ORAL at 17:10

## 2022-01-01 RX ADMIN — PIPERACILLIN AND TAZOBACTAM 3.38 G: 3; .375 INJECTION, POWDER, LYOPHILIZED, FOR SOLUTION INTRAVENOUS at 00:53

## 2022-01-01 RX ADMIN — CEFEPIME HYDROCHLORIDE 2 G: 2 INJECTION, POWDER, FOR SOLUTION INTRAVENOUS at 03:23

## 2022-01-01 RX ADMIN — THIAMINE HYDROCHLORIDE 100 MG: 100 INJECTION, SOLUTION INTRAMUSCULAR; INTRAVENOUS at 09:19

## 2022-01-01 RX ADMIN — TRIAMCINOLONE ACETONIDE: 1 OINTMENT TOPICAL at 08:23

## 2022-01-01 RX ADMIN — DEXTROSE MONOHYDRATE 20 ML: 50 INJECTION, SOLUTION INTRAVENOUS at 20:17

## 2022-01-01 RX ADMIN — PIPERACILLIN AND TAZOBACTAM 3.38 G: 3; .375 INJECTION, POWDER, LYOPHILIZED, FOR SOLUTION INTRAVENOUS at 17:45

## 2022-01-01 RX ADMIN — DEXAMETHASONE SODIUM PHOSPHATE: 10 INJECTION, SOLUTION INTRAMUSCULAR; INTRAVENOUS at 11:26

## 2022-01-01 RX ADMIN — LIDOCAINE PATCH 4% 1 PATCH: 40 PATCH TOPICAL at 08:35

## 2022-01-01 RX ADMIN — ACETAMINOPHEN 650 MG: 325 TABLET, FILM COATED ORAL at 22:46

## 2022-01-01 RX ADMIN — DEXTROSE MONOHYDRATE 20 ML: 50 INJECTION, SOLUTION INTRAVENOUS at 20:35

## 2022-01-01 RX ADMIN — METOPROLOL TARTRATE 25 MG: 25 TABLET, FILM COATED ORAL at 08:05

## 2022-01-01 RX ADMIN — METOPROLOL TARTRATE 25 MG: 25 TABLET, FILM COATED ORAL at 20:40

## 2022-01-01 RX ADMIN — VITAM B12 100 MCG: 100 TAB at 08:29

## 2022-01-01 RX ADMIN — SODIUM CHLORIDE, POTASSIUM CHLORIDE, SODIUM LACTATE AND CALCIUM CHLORIDE 1000 ML: 600; 310; 30; 20 INJECTION, SOLUTION INTRAVENOUS at 16:26

## 2022-01-01 RX ADMIN — METOPROLOL TARTRATE 25 MG: 25 TABLET, FILM COATED ORAL at 08:40

## 2022-01-01 RX ADMIN — TRAMADOL HYDROCHLORIDE 50 MG: 50 TABLET, COATED ORAL at 20:36

## 2022-01-01 RX ADMIN — MESNA 3000 MG: 100 INJECTION, SOLUTION INTRAVENOUS at 13:14

## 2022-01-01 RX ADMIN — CEFEPIME HYDROCHLORIDE 2 G: 2 INJECTION, POWDER, FOR SOLUTION INTRAVENOUS at 03:49

## 2022-01-01 RX ADMIN — Medication 5 ML: at 09:50

## 2022-01-01 RX ADMIN — POTASSIUM & SODIUM PHOSPHATES POWDER PACK 280-160-250 MG 1 PACKET: 280-160-250 PACK at 11:43

## 2022-01-01 RX ADMIN — Medication 5 ML: at 13:21

## 2022-01-01 RX ADMIN — ENOXAPARIN SODIUM 40 MG: 40 INJECTION SUBCUTANEOUS at 08:43

## 2022-01-01 RX ADMIN — DACTINOMYCIN 2.5 MG: 0.5 INJECTION, POWDER, LYOPHILIZED, FOR SOLUTION INTRAVENOUS at 15:15

## 2022-01-01 RX ADMIN — AZITHROMYCIN MONOHYDRATE 500 MG: 500 INJECTION, POWDER, LYOPHILIZED, FOR SOLUTION INTRAVENOUS at 17:42

## 2022-01-01 RX ADMIN — DEXTROSE MONOHYDRATE 20 ML: 50 INJECTION, SOLUTION INTRAVENOUS at 21:29

## 2022-01-01 RX ADMIN — POTASSIUM CHLORIDE 40 MEQ: 1.5 POWDER, FOR SOLUTION ORAL at 12:02

## 2022-01-01 RX ADMIN — SODIUM CHLORIDE 250 ML: 9 INJECTION, SOLUTION INTRAVENOUS at 14:24

## 2022-01-01 RX ADMIN — GRANISETRON HYDROCHLORIDE 1 MG: 1 INJECTION, SOLUTION INTRAVENOUS at 10:12

## 2022-01-01 RX ADMIN — PIPERACILLIN AND TAZOBACTAM 3.38 G: 3; .375 INJECTION, POWDER, LYOPHILIZED, FOR SOLUTION INTRAVENOUS at 12:38

## 2022-01-01 RX ADMIN — PIPERACILLIN AND TAZOBACTAM 3.38 G: 3; .375 INJECTION, POWDER, LYOPHILIZED, FOR SOLUTION INTRAVENOUS at 13:56

## 2022-01-01 RX ADMIN — ACETAMINOPHEN 650 MG: 325 TABLET, FILM COATED ORAL at 14:20

## 2022-01-01 RX ADMIN — PIPERACILLIN AND TAZOBACTAM 3.38 G: 3; .375 INJECTION, POWDER, LYOPHILIZED, FOR SOLUTION INTRAVENOUS at 18:42

## 2022-01-01 RX ADMIN — POTASSIUM CHLORIDE 40 MEQ: 750 TABLET, EXTENDED RELEASE ORAL at 08:53

## 2022-01-01 RX ADMIN — CEFEPIME HYDROCHLORIDE 2 G: 2 INJECTION, POWDER, FOR SOLUTION INTRAVENOUS at 11:02

## 2022-01-01 RX ADMIN — LIDOCAINE PATCH 4% 1 PATCH: 40 PATCH TOPICAL at 08:05

## 2022-01-01 RX ADMIN — TRIAMCINOLONE ACETONIDE: 1 OINTMENT TOPICAL at 20:46

## 2022-01-01 RX ADMIN — FILGRASTIM 480 MCG: 480 INJECTION, SOLUTION INTRAVENOUS; SUBCUTANEOUS at 20:50

## 2022-01-01 RX ADMIN — DEXTROSE MONOHYDRATE 20 ML: 50 INJECTION, SOLUTION INTRAVENOUS at 20:25

## 2022-01-01 RX ADMIN — POTASSIUM CHLORIDE 20 MEQ: 750 TABLET, EXTENDED RELEASE ORAL at 20:36

## 2022-01-01 RX ADMIN — DEXTROSE MONOHYDRATE 10 ML: 50 INJECTION, SOLUTION INTRAVENOUS at 20:11

## 2022-01-01 RX ADMIN — ACETAMINOPHEN 650 MG: 325 TABLET, FILM COATED ORAL at 07:57

## 2022-01-01 RX ADMIN — LOPERAMIDE HYDROCHLORIDE 2 MG: 2 CAPSULE ORAL at 14:12

## 2022-01-01 RX ADMIN — DEXAMETHASONE SODIUM PHOSPHATE: 10 INJECTION, SOLUTION INTRAMUSCULAR; INTRAVENOUS at 11:55

## 2022-01-01 RX ADMIN — DIPHENHYDRAMINE HYDROCHLORIDE AND ZINC ACETATE: 10; 1 CREAM TOPICAL at 21:41

## 2022-01-01 RX ADMIN — METOPROLOL TARTRATE 25 MG: 25 TABLET, FILM COATED ORAL at 08:06

## 2022-01-01 RX ADMIN — ATROPINE SULFATE 0.4 MG: 0.4 INJECTION, SOLUTION INTRAVENOUS at 12:19

## 2022-01-01 RX ADMIN — CEFEPIME HYDROCHLORIDE 2 G: 2 INJECTION, POWDER, FOR SOLUTION INTRAVENOUS at 21:00

## 2022-01-01 RX ADMIN — OXYCODONE HYDROCHLORIDE 5 MG: 5 TABLET ORAL at 08:12

## 2022-01-01 RX ADMIN — POLYETHYLENE GLYCOL 3350 17 G: 17 POWDER, FOR SOLUTION ORAL at 07:44

## 2022-01-01 RX ADMIN — ACETAMINOPHEN 650 MG: 325 TABLET, FILM COATED ORAL at 08:06

## 2022-01-01 RX ADMIN — POTASSIUM CHLORIDE 10 MEQ: 7.46 INJECTION, SOLUTION INTRAVENOUS at 00:48

## 2022-01-01 RX ADMIN — DEXAMETHASONE SODIUM PHOSPHATE: 10 INJECTION, SOLUTION INTRAMUSCULAR; INTRAVENOUS at 09:48

## 2022-01-01 RX ADMIN — TRAMADOL HYDROCHLORIDE 50 MG: 50 TABLET, COATED ORAL at 16:23

## 2022-01-01 RX ADMIN — PALONOSETRON HYDROCHLORIDE 0.25 MG: 0.25 INJECTION INTRAVENOUS at 10:10

## 2022-01-01 RX ADMIN — POTASSIUM CHLORIDE 40 MEQ: 750 TABLET, EXTENDED RELEASE ORAL at 08:40

## 2022-01-01 RX ADMIN — LIDOCAINE PATCH 4% 1 PATCH: 40 PATCH TOPICAL at 08:20

## 2022-01-01 RX ADMIN — POTASSIUM & SODIUM PHOSPHATES POWDER PACK 280-160-250 MG 1 PACKET: 280-160-250 PACK at 13:11

## 2022-01-01 RX ADMIN — CEFEPIME HYDROCHLORIDE 2 G: 2 INJECTION, POWDER, FOR SOLUTION INTRAVENOUS at 20:40

## 2022-01-01 RX ADMIN — SODIUM PHOSPHATE, MONOBASIC, MONOHYDRATE AND SODIUM PHOSPHATE, DIBASIC, ANHYDROUS 15 MMOL: 276; 142 INJECTION, SOLUTION INTRAVENOUS at 14:55

## 2022-01-01 RX ADMIN — METOPROLOL TARTRATE 25 MG: 25 TABLET, FILM COATED ORAL at 08:53

## 2022-01-01 RX ADMIN — POTASSIUM CHLORIDE 10 MEQ: 7.46 INJECTION, SOLUTION INTRAVENOUS at 23:46

## 2022-01-01 RX ADMIN — POTASSIUM PHOSPHATE, MONOBASIC AND POTASSIUM PHOSPHATE, DIBASIC 15 MMOL: 224; 236 INJECTION, SOLUTION, CONCENTRATE INTRAVENOUS at 12:22

## 2022-01-01 RX ADMIN — SODIUM CHLORIDE 250 ML: 9 INJECTION, SOLUTION INTRAVENOUS at 13:58

## 2022-01-01 RX ADMIN — POTASSIUM CHLORIDE 20 MEQ: 750 TABLET, EXTENDED RELEASE ORAL at 10:43

## 2022-01-01 RX ADMIN — ACETAMINOPHEN 325 MG: 325 TABLET, FILM COATED ORAL at 22:35

## 2022-01-01 RX ADMIN — DIPHENHYDRAMINE HYDROCHLORIDE 25 MG: 25 CAPSULE ORAL at 08:14

## 2022-01-01 RX ADMIN — SENNOSIDES AND DOCUSATE SODIUM 1 TABLET: 50; 8.6 TABLET ORAL at 08:42

## 2022-01-01 RX ADMIN — TRIAMCINOLONE ACETONIDE: 1 OINTMENT TOPICAL at 20:45

## 2022-01-01 RX ADMIN — ACETAMINOPHEN 650 MG: 325 TABLET, FILM COATED ORAL at 00:04

## 2022-01-01 RX ADMIN — LOPERAMIDE HYDROCHLORIDE 2 MG: 2 CAPSULE ORAL at 08:35

## 2022-01-01 RX ADMIN — TRIAMCINOLONE ACETONIDE: 1 OINTMENT TOPICAL at 20:20

## 2022-01-01 RX ADMIN — POLYETHYLENE GLYCOL 3350 17 G: 17 POWDER, FOR SOLUTION ORAL at 12:11

## 2022-01-01 RX ADMIN — SODIUM CHLORIDE, PRESERVATIVE FREE 5 ML: 5 INJECTION INTRAVENOUS at 08:36

## 2022-01-01 RX ADMIN — DEXTROSE MONOHYDRATE 100 MG: 50 INJECTION, SOLUTION INTRAVENOUS at 11:50

## 2022-01-01 RX ADMIN — FILGRASTIM 480 MCG: 480 INJECTION, SOLUTION INTRAVENOUS; SUBCUTANEOUS at 20:10

## 2022-01-01 RX ADMIN — METOPROLOL TARTRATE 25 MG: 25 TABLET, FILM COATED ORAL at 09:07

## 2022-01-01 RX ADMIN — DEXTROSE MONOHYDRATE 20 ML: 50 INJECTION, SOLUTION INTRAVENOUS at 21:03

## 2022-01-01 RX ADMIN — Medication 5 ML: at 15:19

## 2022-01-01 RX ADMIN — TRIAMCINOLONE ACETONIDE: 1 OINTMENT TOPICAL at 12:40

## 2022-01-01 RX ADMIN — DEXTROSE MONOHYDRATE 20 ML: 50 INJECTION, SOLUTION INTRAVENOUS at 21:06

## 2022-01-01 RX ADMIN — HYDROMORPHONE HYDROCHLORIDE 1 MG: 2 TABLET ORAL at 03:37

## 2022-01-01 RX ADMIN — TRAMADOL HYDROCHLORIDE 50 MG: 50 TABLET, COATED ORAL at 11:41

## 2022-01-01 RX ADMIN — Medication 15 MMOL: at 21:40

## 2022-01-01 RX ADMIN — VITAM B12 100 MCG: 100 TAB at 08:40

## 2022-01-01 RX ADMIN — THIAMINE HYDROCHLORIDE 100 MG: 100 INJECTION, SOLUTION INTRAMUSCULAR; INTRAVENOUS at 09:23

## 2022-01-01 RX ADMIN — DIPHENHYDRAMINE HYDROCHLORIDE 25 MG: 25 CAPSULE ORAL at 21:08

## 2022-01-01 RX ADMIN — Medication 5 ML: at 13:17

## 2022-01-01 RX ADMIN — ACETAMINOPHEN 650 MG: 325 TABLET, FILM COATED ORAL at 01:09

## 2022-01-01 RX ADMIN — OXYCODONE HYDROCHLORIDE 5 MG: 5 TABLET ORAL at 13:10

## 2022-01-01 RX ADMIN — IOPAMIDOL 130 ML: 755 INJECTION, SOLUTION INTRAVASCULAR at 12:40

## 2022-01-01 RX ADMIN — MESNA 3000 MG: 100 INJECTION, SOLUTION INTRAVENOUS at 11:08

## 2022-01-01 RX ADMIN — VITAM B12 100 MCG: 100 TAB at 07:57

## 2022-01-01 RX ADMIN — Medication 500 UNITS: at 08:02

## 2022-01-01 RX ADMIN — POTASSIUM CHLORIDE 20 MEQ: 750 TABLET, EXTENDED RELEASE ORAL at 00:39

## 2022-01-01 RX ADMIN — VITAM B12 100 MCG: 100 TAB at 17:16

## 2022-01-01 RX ADMIN — POTASSIUM CHLORIDE 40 MEQ: 750 TABLET, EXTENDED RELEASE ORAL at 08:50

## 2022-01-01 RX ADMIN — HYDROMORPHONE HYDROCHLORIDE 1 MG: 2 TABLET ORAL at 23:38

## 2022-01-01 RX ADMIN — DIPHENHYDRAMINE HYDROCHLORIDE 25 MG: 25 CAPSULE ORAL at 08:40

## 2022-01-01 RX ADMIN — CEFEPIME HYDROCHLORIDE 2 G: 2 INJECTION, POWDER, FOR SOLUTION INTRAVENOUS at 20:37

## 2022-01-01 RX ADMIN — BNT162B2 30 MCG: 0.23 INJECTION, SUSPENSION INTRAMUSCULAR at 14:44

## 2022-01-01 RX ADMIN — POTASSIUM CHLORIDE 20 MEQ: 20 SOLUTION ORAL at 16:38

## 2022-01-01 RX ADMIN — POTASSIUM CHLORIDE 40 MEQ: 750 TABLET, EXTENDED RELEASE ORAL at 21:19

## 2022-01-01 RX ADMIN — VINCRISTINE SULFATE 2 MG: 1 INJECTION, SOLUTION INTRAVENOUS at 12:52

## 2022-01-01 RX ADMIN — POLYETHYLENE GLYCOL 3350, SODIUM SULFATE ANHYDROUS, SODIUM BICARBONATE, SODIUM CHLORIDE, POTASSIUM CHLORIDE 1000 ML: 236; 22.74; 6.74; 5.86; 2.97 POWDER, FOR SOLUTION ORAL at 14:24

## 2022-01-01 RX ADMIN — PIPERACILLIN AND TAZOBACTAM 3.38 G: 3; .375 INJECTION, POWDER, LYOPHILIZED, FOR SOLUTION INTRAVENOUS at 19:17

## 2022-01-01 RX ADMIN — CEFEPIME HYDROCHLORIDE 2 G: 2 INJECTION, POWDER, FOR SOLUTION INTRAVENOUS at 21:49

## 2022-01-01 RX ADMIN — ACETAMINOPHEN 325 MG: 325 TABLET, FILM COATED ORAL at 21:10

## 2022-01-01 RX ADMIN — POTASSIUM PHOSPHATE, MONOBASIC AND POTASSIUM PHOSPHATE, DIBASIC 15 MMOL: 224; 236 INJECTION, SOLUTION, CONCENTRATE INTRAVENOUS at 13:41

## 2022-01-01 RX ADMIN — ALTEPLASE 2 MG: 2.2 INJECTION, POWDER, LYOPHILIZED, FOR SOLUTION INTRAVENOUS at 10:45

## 2022-01-01 RX ADMIN — DIPHENHYDRAMINE HYDROCHLORIDE AND ZINC ACETATE: 10; 1 CREAM TOPICAL at 07:57

## 2022-01-01 RX ADMIN — HYDROMORPHONE HYDROCHLORIDE 1 MG: 2 TABLET ORAL at 12:16

## 2022-01-01 RX ADMIN — DIPHENHYDRAMINE HYDROCHLORIDE AND ZINC ACETATE: 10; 1 CREAM TOPICAL at 17:35

## 2022-01-01 RX ADMIN — ALTEPLASE 2 MG: 2.2 INJECTION, POWDER, LYOPHILIZED, FOR SOLUTION INTRAVENOUS at 00:40

## 2022-01-01 RX ADMIN — LOPERAMIDE HYDROCHLORIDE 2 MG: 2 CAPSULE ORAL at 13:28

## 2022-01-01 RX ADMIN — TRAMADOL HYDROCHLORIDE 50 MG: 50 TABLET, COATED ORAL at 11:49

## 2022-01-01 RX ADMIN — DEXTROSE MONOHYDRATE 100 MG: 50 INJECTION, SOLUTION INTRAVENOUS at 16:21

## 2022-01-01 RX ADMIN — DEXAMETHASONE SODIUM PHOSPHATE: 10 INJECTION, SOLUTION INTRAMUSCULAR; INTRAVENOUS at 15:46

## 2022-01-01 RX ADMIN — ACETAMINOPHEN 650 MG: 325 TABLET, FILM COATED ORAL at 14:12

## 2022-01-01 RX ADMIN — VINCRISTINE SULFATE 2 MG: 1 INJECTION, SOLUTION INTRAVENOUS at 09:30

## 2022-01-01 RX ADMIN — ACETAMINOPHEN 325 MG: 325 TABLET, FILM COATED ORAL at 08:28

## 2022-01-01 RX ADMIN — HEPARIN SODIUM (PORCINE) LOCK FLUSH IV SOLN 100 UNIT/ML 500 UNITS: 100 SOLUTION at 10:08

## 2022-01-01 RX ADMIN — CEFEPIME HYDROCHLORIDE 2 G: 2 INJECTION, POWDER, FOR SOLUTION INTRAVENOUS at 03:37

## 2022-01-01 RX ADMIN — METOPROLOL TARTRATE 25 MG: 25 TABLET, FILM COATED ORAL at 20:09

## 2022-01-01 RX ADMIN — POTASSIUM PHOSPHATE, MONOBASIC AND POTASSIUM PHOSPHATE, DIBASIC 15 MMOL: 224; 236 INJECTION, SOLUTION, CONCENTRATE INTRAVENOUS at 11:41

## 2022-01-01 RX ADMIN — LOPERAMIDE HYDROCHLORIDE 2 MG: 2 CAPSULE ORAL at 16:23

## 2022-01-01 RX ADMIN — HYDROMORPHONE HYDROCHLORIDE 1 MG: 2 TABLET ORAL at 20:36

## 2022-01-01 RX ADMIN — DEXTROSE MONOHYDRATE 100 MG: 50 INJECTION, SOLUTION INTRAVENOUS at 12:17

## 2022-01-01 RX ADMIN — POTASSIUM PHOSPHATE, MONOBASIC AND POTASSIUM PHOSPHATE, DIBASIC 15 MMOL: 224; 236 INJECTION, SOLUTION, CONCENTRATE INTRAVENOUS at 10:19

## 2022-01-01 RX ADMIN — METOPROLOL TARTRATE 25 MG: 25 TABLET, FILM COATED ORAL at 19:53

## 2022-01-01 RX ADMIN — POTASSIUM CHLORIDE 20 MEQ: 750 TABLET, EXTENDED RELEASE ORAL at 10:58

## 2022-01-01 RX ADMIN — LOPERAMIDE HYDROCHLORIDE 2 MG: 2 CAPSULE ORAL at 20:55

## 2022-01-01 RX ADMIN — DIPHENHYDRAMINE HYDROCHLORIDE AND ZINC ACETATE: 10; 1 CREAM TOPICAL at 20:40

## 2022-01-01 RX ADMIN — LOPERAMIDE HYDROCHLORIDE 2 MG: 2 CAPSULE ORAL at 08:53

## 2022-01-01 RX ADMIN — METOPROLOL TARTRATE 25 MG: 25 TABLET, FILM COATED ORAL at 17:31

## 2022-01-01 RX ADMIN — CEFEPIME HYDROCHLORIDE 2 G: 2 INJECTION, POWDER, FOR SOLUTION INTRAVENOUS at 03:14

## 2022-01-01 RX ADMIN — Medication 500 UNITS: at 11:58

## 2022-01-01 RX ADMIN — ALTEPLASE 2 MG: 2.2 INJECTION, POWDER, LYOPHILIZED, FOR SOLUTION INTRAVENOUS at 22:46

## 2022-01-01 RX ADMIN — LOPERAMIDE HYDROCHLORIDE 2 MG: 2 CAPSULE ORAL at 20:36

## 2022-01-01 RX ADMIN — SODIUM CHLORIDE, POTASSIUM CHLORIDE, SODIUM LACTATE AND CALCIUM CHLORIDE 1000 ML: 600; 310; 30; 20 INJECTION, SOLUTION INTRAVENOUS at 21:52

## 2022-01-01 RX ADMIN — SODIUM CHLORIDE 1000 ML: 9 INJECTION, SOLUTION INTRAVENOUS at 20:39

## 2022-01-01 RX ADMIN — MAGNESIUM SULFATE IN WATER 2 G: 40 INJECTION, SOLUTION INTRAVENOUS at 22:49

## 2022-01-01 RX ADMIN — CEFEPIME HYDROCHLORIDE 2 G: 2 INJECTION, POWDER, FOR SOLUTION INTRAVENOUS at 12:10

## 2022-01-01 RX ADMIN — SODIUM CHLORIDE 250 ML: 9 INJECTION, SOLUTION INTRAVENOUS at 11:37

## 2022-01-01 RX ADMIN — METOPROLOL TARTRATE 25 MG: 25 TABLET, FILM COATED ORAL at 19:59

## 2022-01-01 RX ADMIN — DEXTROSE MONOHYDRATE 10 ML: 50 INJECTION, SOLUTION INTRAVENOUS at 20:50

## 2022-01-01 RX ADMIN — POTASSIUM & SODIUM PHOSPHATES POWDER PACK 280-160-250 MG 1 PACKET: 280-160-250 PACK at 08:05

## 2022-01-01 RX ADMIN — ATROPINE SULFATE 0.4 MG: 0.4 INJECTION, SOLUTION INTRAVENOUS at 11:46

## 2022-01-01 RX ADMIN — DEXTROSE MONOHYDRATE 10 ML: 50 INJECTION, SOLUTION INTRAVENOUS at 20:49

## 2022-01-01 RX ADMIN — SODIUM CHLORIDE, POTASSIUM CHLORIDE, SODIUM LACTATE AND CALCIUM CHLORIDE 1000 ML: 600; 310; 30; 20 INJECTION, SOLUTION INTRAVENOUS at 09:40

## 2022-01-01 RX ADMIN — ACETAMINOPHEN 650 MG: 325 TABLET, FILM COATED ORAL at 21:34

## 2022-01-01 RX ADMIN — SODIUM CHLORIDE 250 ML: 9 INJECTION, SOLUTION INTRAVENOUS at 09:20

## 2022-01-01 RX ADMIN — LIDOCAINE PATCH 4% 1 PATCH: 40 PATCH TOPICAL at 10:58

## 2022-01-01 RX ADMIN — CEFEPIME HYDROCHLORIDE 2 G: 2 INJECTION, POWDER, FOR SOLUTION INTRAVENOUS at 12:25

## 2022-01-01 RX ADMIN — Medication 5 ML: at 13:32

## 2022-01-01 RX ADMIN — OXYCODONE HYDROCHLORIDE 5 MG: 5 TABLET ORAL at 18:03

## 2022-01-01 RX ADMIN — LOPERAMIDE HYDROCHLORIDE 2 MG: 2 CAPSULE ORAL at 11:43

## 2022-01-01 RX ADMIN — METOPROLOL TARTRATE 25 MG: 25 TABLET, FILM COATED ORAL at 08:28

## 2022-01-01 RX ADMIN — TRAMADOL HYDROCHLORIDE 50 MG: 50 TABLET, COATED ORAL at 17:10

## 2022-01-01 RX ADMIN — CEFEPIME HYDROCHLORIDE 2 G: 2 INJECTION, POWDER, FOR SOLUTION INTRAVENOUS at 05:54

## 2022-01-01 RX ADMIN — PIPERACILLIN AND TAZOBACTAM 3.38 G: 3; .375 INJECTION, POWDER, LYOPHILIZED, FOR SOLUTION INTRAVENOUS at 12:15

## 2022-01-01 RX ADMIN — Medication 5 ML: at 16:23

## 2022-01-01 RX ADMIN — PIPERACILLIN AND TAZOBACTAM 3.38 G: 3; .375 INJECTION, POWDER, LYOPHILIZED, FOR SOLUTION INTRAVENOUS at 06:02

## 2022-01-01 RX ADMIN — PEGFILGRASTIM 6 MG: KIT SUBCUTANEOUS at 11:51

## 2022-01-01 RX ADMIN — PIPERACILLIN AND TAZOBACTAM 3.38 G: 3; .375 INJECTION, POWDER, LYOPHILIZED, FOR SOLUTION INTRAVENOUS at 18:55

## 2022-01-01 RX ADMIN — DEXTROSE MONOHYDRATE 100 MG: 50 INJECTION, SOLUTION INTRAVENOUS at 12:21

## 2022-01-01 RX ADMIN — FOSAPREPITANT: 150 INJECTION, POWDER, LYOPHILIZED, FOR SOLUTION INTRAVENOUS at 11:35

## 2022-01-01 RX ADMIN — HEPARIN SODIUM (PORCINE) LOCK FLUSH IV SOLN 100 UNIT/ML 500 UNITS: 100 SOLUTION at 12:41

## 2022-01-01 RX ADMIN — Medication 5 ML: at 13:15

## 2022-01-01 RX ADMIN — LIDOCAINE PATCH 4% 1 PATCH: 40 PATCH TOPICAL at 08:52

## 2022-01-01 RX ADMIN — ATROPINE SULFATE 0.4 MG: 0.4 INJECTION, SOLUTION INTRAVENOUS at 16:15

## 2022-01-01 RX ADMIN — POTASSIUM CHLORIDE 40 MEQ: 750 TABLET, EXTENDED RELEASE ORAL at 08:20

## 2022-01-01 RX ADMIN — ACETAMINOPHEN 325 MG: 325 TABLET, FILM COATED ORAL at 10:15

## 2022-01-01 RX ADMIN — POTASSIUM CHLORIDE 40 MEQ: 1.5 POWDER, FOR SOLUTION ORAL at 08:43

## 2022-01-01 RX ADMIN — TRIAMCINOLONE ACETONIDE: 1 OINTMENT TOPICAL at 07:57

## 2022-01-01 RX ADMIN — VITAM B12 100 MCG: 100 TAB at 08:28

## 2022-01-01 RX ADMIN — POTASSIUM & SODIUM PHOSPHATES POWDER PACK 280-160-250 MG 1 PACKET: 280-160-250 PACK at 15:58

## 2022-01-01 RX ADMIN — DIPHENHYDRAMINE HYDROCHLORIDE 25 MG: 25 CAPSULE ORAL at 13:35

## 2022-01-01 RX ADMIN — SODIUM CHLORIDE, POTASSIUM CHLORIDE, SODIUM LACTATE AND CALCIUM CHLORIDE 500 ML: 600; 310; 30; 20 INJECTION, SOLUTION INTRAVENOUS at 00:31

## 2022-01-01 RX ADMIN — HYDROMORPHONE HYDROCHLORIDE 1 MG: 2 TABLET ORAL at 19:52

## 2022-01-01 RX ADMIN — Medication 5 ML: at 12:32

## 2022-01-01 RX ADMIN — FILGRASTIM 480 MCG: 480 INJECTION, SOLUTION INTRAVENOUS; SUBCUTANEOUS at 21:06

## 2022-01-01 RX ADMIN — PIPERACILLIN AND TAZOBACTAM 3.38 G: 3; .375 INJECTION, POWDER, LYOPHILIZED, FOR SOLUTION INTRAVENOUS at 17:31

## 2022-01-01 RX ADMIN — POTASSIUM CHLORIDE 40 MEQ: 750 TABLET, EXTENDED RELEASE ORAL at 18:55

## 2022-01-01 RX ADMIN — SODIUM CHLORIDE 250 ML: 9 INJECTION, SOLUTION INTRAVENOUS at 11:26

## 2022-01-01 RX ADMIN — DEXTROSE MONOHYDRATE 100 MG: 50 INJECTION, SOLUTION INTRAVENOUS at 11:49

## 2022-01-01 RX ADMIN — MESNA 3000 MG: 100 INJECTION, SOLUTION INTRAVENOUS at 09:39

## 2022-01-01 RX ADMIN — METOPROLOL TARTRATE 25 MG: 25 TABLET, FILM COATED ORAL at 23:26

## 2022-01-01 RX ADMIN — DEXTROSE MONOHYDRATE 20 ML: 50 INJECTION, SOLUTION INTRAVENOUS at 21:51

## 2022-01-01 RX ADMIN — PALONOSETRON HYDROCHLORIDE 0.25 MG: 0.25 INJECTION INTRAVENOUS at 08:54

## 2022-01-01 RX ADMIN — ACETAMINOPHEN 325 MG: 325 TABLET, FILM COATED ORAL at 03:23

## 2022-01-01 RX ADMIN — SODIUM CHLORIDE, POTASSIUM CHLORIDE, SODIUM LACTATE AND CALCIUM CHLORIDE 1000 ML: 600; 310; 30; 20 INJECTION, SOLUTION INTRAVENOUS at 10:55

## 2022-01-01 RX ADMIN — POTASSIUM & SODIUM PHOSPHATES POWDER PACK 280-160-250 MG 1 PACKET: 280-160-250 PACK at 12:43

## 2022-01-01 RX ADMIN — OXYCODONE HYDROCHLORIDE 5 MG: 5 TABLET ORAL at 21:34

## 2022-01-01 RX ADMIN — DEXTROSE MONOHYDRATE 20 ML: 50 INJECTION, SOLUTION INTRAVENOUS at 21:30

## 2022-01-01 RX ADMIN — DEXTROSE MONOHYDRATE 10 ML: 50 INJECTION, SOLUTION INTRAVENOUS at 21:19

## 2022-01-01 RX ADMIN — POTASSIUM CHLORIDE 40 MEQ: 1.5 POWDER, FOR SOLUTION ORAL at 11:02

## 2022-01-01 RX ADMIN — GRANISETRON HYDROCHLORIDE 1 MG: 1 INJECTION, SOLUTION INTRAVENOUS at 14:24

## 2022-01-01 RX ADMIN — HYDROMORPHONE HYDROCHLORIDE 1 MG: 2 TABLET ORAL at 08:28

## 2022-01-01 RX ADMIN — METOPROLOL TARTRATE 25 MG: 25 TABLET, FILM COATED ORAL at 08:13

## 2022-01-01 RX ADMIN — VITAM B12 100 MCG: 100 TAB at 08:20

## 2022-01-01 RX ADMIN — POTASSIUM & SODIUM PHOSPHATES POWDER PACK 280-160-250 MG 1 PACKET: 280-160-250 PACK at 08:20

## 2022-01-01 RX ADMIN — PIPERACILLIN AND TAZOBACTAM 3.38 G: 3; .375 INJECTION, POWDER, LYOPHILIZED, FOR SOLUTION INTRAVENOUS at 06:21

## 2022-01-01 RX ADMIN — Medication 500 UNITS: at 09:11

## 2022-01-01 RX ADMIN — ACETAMINOPHEN 650 MG: 325 TABLET, FILM COATED ORAL at 20:36

## 2022-01-01 RX ADMIN — PIPERACILLIN AND TAZOBACTAM 3.38 G: 3; .375 INJECTION, POWDER, LYOPHILIZED, FOR SOLUTION INTRAVENOUS at 06:16

## 2022-01-01 RX ADMIN — DEXAMETHASONE SODIUM PHOSPHATE: 10 INJECTION, SOLUTION INTRAMUSCULAR; INTRAVENOUS at 14:27

## 2022-01-01 RX ADMIN — ATROPINE SULFATE 0.4 MG: 0.4 INJECTION, SOLUTION INTRAMUSCULAR; INTRAVENOUS; SUBCUTANEOUS at 12:50

## 2022-01-01 RX ADMIN — POTASSIUM CHLORIDE 20 MEQ: 750 TABLET, EXTENDED RELEASE ORAL at 12:15

## 2022-01-01 RX ADMIN — PIPERACILLIN AND TAZOBACTAM 3.38 G: 3; .375 INJECTION, POWDER, LYOPHILIZED, FOR SOLUTION INTRAVENOUS at 06:13

## 2022-01-01 RX ADMIN — PIPERACILLIN AND TAZOBACTAM 3.38 G: 3; .375 INJECTION, POWDER, LYOPHILIZED, FOR SOLUTION INTRAVENOUS at 00:41

## 2022-01-01 RX ADMIN — ACETAMINOPHEN 650 MG: 325 TABLET, FILM COATED ORAL at 16:23

## 2022-01-01 RX ADMIN — POTASSIUM CHLORIDE 40 MEQ: 750 TABLET, EXTENDED RELEASE ORAL at 03:52

## 2022-01-01 RX ADMIN — HYDROMORPHONE HYDROCHLORIDE 1 MG: 2 TABLET ORAL at 21:24

## 2022-01-01 RX ADMIN — TRIAMCINOLONE ACETONIDE: 1 OINTMENT TOPICAL at 20:39

## 2022-01-01 RX ADMIN — METOPROLOL TARTRATE 25 MG: 25 TABLET, FILM COATED ORAL at 08:42

## 2022-01-01 RX ADMIN — PIPERACILLIN AND TAZOBACTAM 3.38 G: 3; .375 INJECTION, POWDER, LYOPHILIZED, FOR SOLUTION INTRAVENOUS at 12:32

## 2022-01-01 RX ADMIN — VITAM B12 100 MCG: 100 TAB at 08:42

## 2022-01-01 RX ADMIN — DEXAMETHASONE SODIUM PHOSPHATE 12 MG: 10 INJECTION, SOLUTION INTRAMUSCULAR; INTRAVENOUS at 13:58

## 2022-01-01 RX ADMIN — CEFTRIAXONE SODIUM 1 G: 1 INJECTION, POWDER, FOR SOLUTION INTRAMUSCULAR; INTRAVENOUS at 16:08

## 2022-01-01 RX ADMIN — SODIUM CHLORIDE 500 ML: 9 INJECTION, SOLUTION INTRAVENOUS at 11:12

## 2022-01-01 RX ADMIN — PEGFILGRASTIM 6 MG: KIT SUBCUTANEOUS at 13:30

## 2022-01-01 RX ADMIN — SODIUM CHLORIDE 250 ML: 9 INJECTION, SOLUTION INTRAVENOUS at 10:08

## 2022-01-01 RX ADMIN — ENOXAPARIN SODIUM 40 MG: 40 INJECTION SUBCUTANEOUS at 07:44

## 2022-01-01 RX ADMIN — Medication 5 ML: at 08:55

## 2022-01-01 RX ADMIN — VITAM B12 100 MCG: 100 TAB at 08:36

## 2022-01-01 RX ADMIN — METOPROLOL TARTRATE 25 MG: 25 TABLET, FILM COATED ORAL at 20:37

## 2022-01-01 RX ADMIN — PIPERACILLIN AND TAZOBACTAM 3.38 G: 3; .375 INJECTION, POWDER, LYOPHILIZED, FOR SOLUTION INTRAVENOUS at 00:04

## 2022-01-01 RX ADMIN — POTASSIUM PHOSPHATE, MONOBASIC AND POTASSIUM PHOSPHATE, DIBASIC 15 MMOL: 224; 236 INJECTION, SOLUTION, CONCENTRATE INTRAVENOUS at 03:15

## 2022-01-01 RX ADMIN — POTASSIUM & SODIUM PHOSPHATES POWDER PACK 280-160-250 MG 1 PACKET: 280-160-250 PACK at 16:07

## 2022-01-01 RX ADMIN — POTASSIUM CHLORIDE 10 MEQ: 7.46 INJECTION, SOLUTION INTRAVENOUS at 22:35

## 2022-01-01 RX ADMIN — DACTINOMYCIN 2.5 MG: 0.5 INJECTION, POWDER, LYOPHILIZED, FOR SOLUTION INTRAVENOUS at 11:46

## 2022-01-01 RX ADMIN — MAGNESIUM SULFATE HEPTAHYDRATE 4 G: 40 INJECTION, SOLUTION INTRAVENOUS at 10:54

## 2022-01-01 RX ADMIN — TRAMADOL HYDROCHLORIDE 50 MG: 50 TABLET, COATED ORAL at 23:38

## 2022-01-01 RX ADMIN — POTASSIUM CHLORIDE 20 MEQ: 750 TABLET, EXTENDED RELEASE ORAL at 09:58

## 2022-01-01 RX ADMIN — LOPERAMIDE HYDROCHLORIDE 2 MG: 2 CAPSULE ORAL at 08:20

## 2022-01-01 RX ADMIN — DEXTROSE MONOHYDRATE 25 ML: 50 INJECTION, SOLUTION INTRAVENOUS at 20:35

## 2022-01-01 RX ADMIN — TRIAMCINOLONE ACETONIDE: 1 OINTMENT TOPICAL at 08:59

## 2022-01-01 RX ADMIN — METOPROLOL TARTRATE 25 MG: 25 TABLET, FILM COATED ORAL at 20:56

## 2022-01-01 RX ADMIN — MAGNESIUM SULFATE IN WATER 4 G: 40 INJECTION, SOLUTION INTRAVENOUS at 11:40

## 2022-01-01 RX ADMIN — VITAM B12 100 MCG: 100 TAB at 08:05

## 2022-01-01 RX ADMIN — DEXAMETHASONE SODIUM PHOSPHATE: 10 INJECTION, SOLUTION INTRAMUSCULAR; INTRAVENOUS at 11:02

## 2022-01-01 RX ADMIN — LIDOCAINE AND PRILOCAINE: 25; 25 CREAM TOPICAL at 19:15

## 2022-01-01 RX ADMIN — VITAM B12 100 MCG: 100 TAB at 08:06

## 2022-01-01 RX ADMIN — PIPERACILLIN AND TAZOBACTAM 3.38 G: 3; .375 INJECTION, POWDER, LYOPHILIZED, FOR SOLUTION INTRAVENOUS at 01:35

## 2022-01-01 RX ADMIN — DIPHENHYDRAMINE HYDROCHLORIDE AND ZINC ACETATE: 10; 1 CREAM TOPICAL at 08:29

## 2022-01-01 RX ADMIN — SODIUM CHLORIDE 500 ML: 9 INJECTION, SOLUTION INTRAVENOUS at 16:07

## 2022-01-01 RX ADMIN — POTASSIUM CHLORIDE 10 MEQ: 7.46 INJECTION, SOLUTION INTRAVENOUS at 21:24

## 2022-01-01 RX ADMIN — ACETAMINOPHEN 650 MG: 325 TABLET, FILM COATED ORAL at 08:05

## 2022-01-01 RX ADMIN — LIDOCAINE PATCH 4% 1 PATCH: 40 PATCH TOPICAL at 08:14

## 2022-01-01 RX ADMIN — METOPROLOL TARTRATE 25 MG: 25 TABLET, FILM COATED ORAL at 20:44

## 2022-01-01 RX ADMIN — CEFEPIME HYDROCHLORIDE 2 G: 2 INJECTION, POWDER, FOR SOLUTION INTRAVENOUS at 11:00

## 2022-01-01 RX ADMIN — POTASSIUM CHLORIDE 40 MEQ: 750 TABLET, EXTENDED RELEASE ORAL at 14:12

## 2022-01-01 RX ADMIN — THIAMINE HYDROCHLORIDE 100 MG: 100 INJECTION, SOLUTION INTRAMUSCULAR; INTRAVENOUS at 09:40

## 2022-01-01 RX ADMIN — DEXAMETHASONE SODIUM PHOSPHATE: 10 INJECTION, SOLUTION INTRAMUSCULAR; INTRAVENOUS at 09:03

## 2022-01-01 RX ADMIN — ALTEPLASE 2 MG: 2.2 INJECTION, POWDER, LYOPHILIZED, FOR SOLUTION INTRAVENOUS at 14:50

## 2022-01-01 RX ADMIN — LOPERAMIDE HYDROCHLORIDE 2 MG: 2 CAPSULE ORAL at 19:59

## 2022-01-01 RX ADMIN — SODIUM CHLORIDE 250 ML: 9 INJECTION, SOLUTION INTRAVENOUS at 15:47

## 2022-01-01 RX ADMIN — ACETAMINOPHEN 650 MG: 325 TABLET, FILM COATED ORAL at 03:14

## 2022-01-01 RX ADMIN — POTASSIUM PHOSPHATE, MONOBASIC POTASSIUM PHOSPHATE, DIBASIC 15 MMOL: 224; 236 INJECTION, SOLUTION, CONCENTRATE INTRAVENOUS at 12:24

## 2022-01-01 RX ADMIN — Medication 5 ML: at 11:52

## 2022-01-01 RX ADMIN — ONDANSETRON 4 MG: 2 INJECTION INTRAMUSCULAR; INTRAVENOUS at 11:10

## 2022-01-01 RX ADMIN — POTASSIUM CHLORIDE 40 MEQ: 750 TABLET, EXTENDED RELEASE ORAL at 08:05

## 2022-01-01 RX ADMIN — DIPHENHYDRAMINE HYDROCHLORIDE 25 MG: 25 CAPSULE ORAL at 18:42

## 2022-01-01 RX ADMIN — THIAMINE HYDROCHLORIDE 100 MG: 100 INJECTION, SOLUTION INTRAMUSCULAR; INTRAVENOUS at 13:35

## 2022-01-01 RX ADMIN — LOPERAMIDE HYDROCHLORIDE 2 MG: 2 CAPSULE ORAL at 12:43

## 2022-01-01 RX ADMIN — TRIAMCINOLONE ACETONIDE: 1 OINTMENT TOPICAL at 20:36

## 2022-01-01 RX ADMIN — POTASSIUM CHLORIDE 40 MEQ: 750 TABLET, EXTENDED RELEASE ORAL at 18:26

## 2022-01-01 RX ADMIN — METOPROLOL TARTRATE 25 MG: 25 TABLET, FILM COATED ORAL at 07:44

## 2022-01-01 RX ADMIN — PIPERACILLIN AND TAZOBACTAM 3.38 G: 3; .375 INJECTION, POWDER, LYOPHILIZED, FOR SOLUTION INTRAVENOUS at 12:10

## 2022-01-01 RX ADMIN — METOPROLOL TARTRATE 25 MG: 25 TABLET, FILM COATED ORAL at 21:27

## 2022-01-01 RX ADMIN — HYDROMORPHONE HYDROCHLORIDE 1 MG: 2 TABLET ORAL at 08:14

## 2022-01-01 RX ADMIN — IOPAMIDOL 125 ML: 755 INJECTION, SOLUTION INTRAVASCULAR at 10:07

## 2022-01-01 RX ADMIN — FILGRASTIM 480 MCG: 480 INJECTION, SOLUTION INTRAVENOUS; SUBCUTANEOUS at 20:36

## 2022-01-01 RX ADMIN — PIPERACILLIN AND TAZOBACTAM 3.38 G: 3; .375 INJECTION, POWDER, LYOPHILIZED, FOR SOLUTION INTRAVENOUS at 18:05

## 2022-01-01 RX ADMIN — CEFEPIME HYDROCHLORIDE 2 G: 2 INJECTION, POWDER, FOR SOLUTION INTRAVENOUS at 04:25

## 2022-01-01 RX ADMIN — SODIUM CHLORIDE 250 ML: 9 INJECTION, SOLUTION INTRAVENOUS at 08:54

## 2022-01-01 RX ADMIN — PIPERACILLIN AND TAZOBACTAM 3.38 G: 3; .375 INJECTION, POWDER, LYOPHILIZED, FOR SOLUTION INTRAVENOUS at 12:23

## 2022-01-01 RX ADMIN — SODIUM CHLORIDE 250 ML: 9 INJECTION, SOLUTION INTRAVENOUS at 11:07

## 2022-01-01 RX ADMIN — SODIUM CHLORIDE, POTASSIUM CHLORIDE, SODIUM LACTATE AND CALCIUM CHLORIDE: 600; 310; 30; 20 INJECTION, SOLUTION INTRAVENOUS at 01:30

## 2022-01-01 RX ADMIN — CEFEPIME HYDROCHLORIDE 2 G: 2 INJECTION, POWDER, FOR SOLUTION INTRAVENOUS at 14:40

## 2022-01-01 RX ADMIN — GRANISETRON HYDROCHLORIDE 1 MG: 1 INJECTION, SOLUTION INTRAVENOUS at 10:58

## 2022-01-01 RX ADMIN — PIPERACILLIN AND TAZOBACTAM 3.38 G: 3; .375 INJECTION, POWDER, LYOPHILIZED, FOR SOLUTION INTRAVENOUS at 07:08

## 2022-01-01 RX ADMIN — IOPAMIDOL 76 ML: 755 INJECTION, SOLUTION INTRAVASCULAR at 13:45

## 2022-01-01 RX ADMIN — Medication 5 ML: at 11:43

## 2022-01-01 RX ADMIN — CEFEPIME HYDROCHLORIDE 2 G: 2 INJECTION, POWDER, FOR SOLUTION INTRAVENOUS at 20:02

## 2022-01-01 RX ADMIN — POTASSIUM & SODIUM PHOSPHATES POWDER PACK 280-160-250 MG 1 PACKET: 280-160-250 PACK at 16:45

## 2022-01-01 RX ADMIN — TRIAMCINOLONE ACETONIDE: 1 OINTMENT TOPICAL at 08:54

## 2022-01-01 RX ADMIN — PIPERACILLIN AND TAZOBACTAM 3.38 G: 3; .375 INJECTION, POWDER, LYOPHILIZED, FOR SOLUTION INTRAVENOUS at 08:08

## 2022-01-01 RX ADMIN — DEXAMETHASONE SODIUM PHOSPHATE: 10 INJECTION, SOLUTION INTRAMUSCULAR; INTRAVENOUS at 10:11

## 2022-01-01 RX ADMIN — SODIUM CHLORIDE 250 ML: 9 INJECTION, SOLUTION INTRAVENOUS at 11:44

## 2022-01-01 RX ADMIN — ACETAMINOPHEN 650 MG: 325 TABLET, FILM COATED ORAL at 08:29

## 2022-01-01 RX ADMIN — METOPROLOL TARTRATE 25 MG: 25 TABLET, FILM COATED ORAL at 21:02

## 2022-01-01 RX ADMIN — PIPERACILLIN AND TAZOBACTAM 3.38 G: 3; .375 INJECTION, POWDER, LYOPHILIZED, FOR SOLUTION INTRAVENOUS at 01:14

## 2022-01-01 RX ADMIN — LIDOCAINE PATCH 4% 1 PATCH: 40 PATCH TOPICAL at 08:29

## 2022-01-01 RX ADMIN — SENNOSIDES AND DOCUSATE SODIUM 1 TABLET: 50; 8.6 TABLET ORAL at 07:44

## 2022-01-01 RX ADMIN — DEXTROSE MONOHYDRATE 20 ML: 50 INJECTION, SOLUTION INTRAVENOUS at 21:05

## 2022-01-01 RX ADMIN — VITAM B12 100 MCG: 100 TAB at 08:14

## 2022-01-01 RX ADMIN — SODIUM CHLORIDE 250 ML: 9 INJECTION, SOLUTION INTRAVENOUS at 11:25

## 2022-01-01 RX ADMIN — POTASSIUM CHLORIDE 20 MEQ: 750 TABLET, EXTENDED RELEASE ORAL at 16:07

## 2022-01-01 RX ADMIN — POTASSIUM PHOSPHATE, MONOBASIC AND POTASSIUM PHOSPHATE, DIBASIC 15 MMOL: 224; 236 INJECTION, SOLUTION, CONCENTRATE INTRAVENOUS at 06:05

## 2022-01-01 RX ADMIN — PALONOSETRON HYDROCHLORIDE 0.25 MG: 0.25 INJECTION INTRAVENOUS at 11:35

## 2022-01-01 RX ADMIN — POTASSIUM CHLORIDE 40 MEQ: 750 TABLET, EXTENDED RELEASE ORAL at 08:35

## 2022-01-01 RX ADMIN — ACETAMINOPHEN 1000 MG: 500 TABLET ORAL at 17:42

## 2022-01-01 RX ADMIN — FILGRASTIM 480 MCG: 480 INJECTION, SOLUTION INTRAVENOUS; SUBCUTANEOUS at 20:35

## 2022-01-01 RX ADMIN — DACTINOMYCIN 2.5 MG: 0.5 INJECTION, POWDER, LYOPHILIZED, FOR SOLUTION INTRAVENOUS at 13:17

## 2022-01-01 RX ADMIN — Medication 5 ML: at 09:14

## 2022-01-01 RX ADMIN — PIPERACILLIN AND TAZOBACTAM 3.38 G: 3; .375 INJECTION, POWDER, LYOPHILIZED, FOR SOLUTION INTRAVENOUS at 00:36

## 2022-01-01 RX ADMIN — TRAMADOL HYDROCHLORIDE 50 MG: 50 TABLET, COATED ORAL at 17:35

## 2022-01-01 RX ADMIN — SODIUM CHLORIDE, POTASSIUM CHLORIDE, SODIUM LACTATE AND CALCIUM CHLORIDE 1000 ML: 600; 310; 30; 20 INJECTION, SOLUTION INTRAVENOUS at 09:07

## 2022-01-01 RX ADMIN — DIPHENHYDRAMINE HYDROCHLORIDE AND ZINC ACETATE: 10; 1 CREAM TOPICAL at 17:10

## 2022-01-01 RX ADMIN — TRAMADOL HYDROCHLORIDE 50 MG: 50 TABLET, COATED ORAL at 07:57

## 2022-01-01 RX ADMIN — LOPERAMIDE HYDROCHLORIDE 2 MG: 2 CAPSULE ORAL at 13:35

## 2022-01-01 RX ADMIN — LIDOCAINE HYDROCHLORIDE 10 ML: 10 INJECTION, SOLUTION EPIDURAL; INFILTRATION; INTRACAUDAL; PERINEURAL at 12:51

## 2022-01-01 RX ADMIN — TRAMADOL HYDROCHLORIDE 50 MG: 50 TABLET, COATED ORAL at 08:29

## 2022-01-01 RX ADMIN — LOPERAMIDE HYDROCHLORIDE 2 MG: 2 CAPSULE ORAL at 16:08

## 2022-01-01 RX ADMIN — Medication 5 ML: at 11:56

## 2022-01-01 RX ADMIN — POTASSIUM CHLORIDE 40 MEQ: 750 TABLET, EXTENDED RELEASE ORAL at 11:41

## 2022-01-01 RX ADMIN — MAGNESIUM SULFATE IN WATER 4 G: 40 INJECTION, SOLUTION INTRAVENOUS at 08:36

## 2022-01-01 RX ADMIN — TRIAMCINOLONE ACETONIDE: 1 OINTMENT TOPICAL at 08:08

## 2022-01-01 RX ADMIN — Medication 5 ML: at 09:17

## 2022-01-01 RX ADMIN — OXYCODONE HYDROCHLORIDE 5 MG: 5 TABLET ORAL at 17:45

## 2022-01-01 RX ADMIN — Medication 500 UNITS: at 11:06

## 2022-01-01 RX ADMIN — PIPERACILLIN AND TAZOBACTAM 3.38 G: 3; .375 INJECTION, POWDER, LYOPHILIZED, FOR SOLUTION INTRAVENOUS at 19:34

## 2022-01-01 RX ADMIN — POTASSIUM CHLORIDE 40 MEQ: 750 TABLET, EXTENDED RELEASE ORAL at 06:10

## 2022-01-01 RX ADMIN — PIPERACILLIN AND TAZOBACTAM 3.38 G: 3; .375 INJECTION, POWDER, LYOPHILIZED, FOR SOLUTION INTRAVENOUS at 01:27

## 2022-01-01 RX ADMIN — POTASSIUM & SODIUM PHOSPHATES POWDER PACK 280-160-250 MG 1 PACKET: 280-160-250 PACK at 08:53

## 2022-01-01 RX ADMIN — POTASSIUM CHLORIDE 20 MEQ: 750 TABLET, EXTENDED RELEASE ORAL at 10:54

## 2022-01-01 RX ADMIN — SODIUM CHLORIDE, POTASSIUM CHLORIDE, SODIUM LACTATE AND CALCIUM CHLORIDE: 600; 310; 30; 20 INJECTION, SOLUTION INTRAVENOUS at 14:25

## 2022-01-01 RX ADMIN — LIDOCAINE PATCH 4% 1 PATCH: 40 PATCH TOPICAL at 07:57

## 2022-01-01 RX ADMIN — TRIAMCINOLONE ACETONIDE: 1 OINTMENT TOPICAL at 11:45

## 2022-01-01 RX ADMIN — DEXAMETHASONE SODIUM PHOSPHATE: 10 INJECTION, SOLUTION INTRAMUSCULAR; INTRAVENOUS at 11:16

## 2022-01-01 ASSESSMENT — ACTIVITIES OF DAILY LIVING (ADL)
ADLS_ACUITY_SCORE: 22
ADLS_ACUITY_SCORE: 36
ADLS_ACUITY_SCORE: 22
ADLS_ACUITY_SCORE: 36
ADLS_ACUITY_SCORE: 22
ADLS_ACUITY_SCORE: 23
ADLS_ACUITY_SCORE: 36
ADLS_ACUITY_SCORE: 22
ADLS_ACUITY_SCORE: 24
ADLS_ACUITY_SCORE: 35
ADLS_ACUITY_SCORE: 22
ADLS_ACUITY_SCORE: 26
ADLS_ACUITY_SCORE: 22
ADLS_ACUITY_SCORE: 24
ADLS_ACUITY_SCORE: 26
ADLS_ACUITY_SCORE: 22
ADLS_ACUITY_SCORE: 24
ADLS_ACUITY_SCORE: 26
ADLS_ACUITY_SCORE: 23
ADLS_ACUITY_SCORE: 26
ADLS_ACUITY_SCORE: 26
ADLS_ACUITY_SCORE: 22
ADLS_ACUITY_SCORE: 24
ADLS_ACUITY_SCORE: 26
ADLS_ACUITY_SCORE: 22
ADLS_ACUITY_SCORE: 35
ADLS_ACUITY_SCORE: 26
ADLS_ACUITY_SCORE: 22
ADLS_ACUITY_SCORE: 22
ADLS_ACUITY_SCORE: 24
ADLS_ACUITY_SCORE: 22
DRESSING/BATHING_DIFFICULTY: NO
ADLS_ACUITY_SCORE: 26
ADLS_ACUITY_SCORE: 24
CHANGE_IN_FUNCTIONAL_STATUS_SINCE_ONSET_OF_CURRENT_ILLNESS/INJURY: NO
ADLS_ACUITY_SCORE: 36
ADLS_ACUITY_SCORE: 22
ADLS_ACUITY_SCORE: 22
ADLS_ACUITY_SCORE: 26
ADLS_ACUITY_SCORE: 22
ADLS_ACUITY_SCORE: 22
ADLS_ACUITY_SCORE: 35
ADLS_ACUITY_SCORE: 24
ADLS_ACUITY_SCORE: 22
ADLS_ACUITY_SCORE: 24
ADLS_ACUITY_SCORE: 24
WEAR_GLASSES_OR_BLIND: NO
ADLS_ACUITY_SCORE: 22
ADLS_ACUITY_SCORE: 22
CONCENTRATING,_REMEMBERING_OR_MAKING_DECISIONS_DIFFICULTY: YES
ADLS_ACUITY_SCORE: 22
ADLS_ACUITY_SCORE: 24
ADLS_ACUITY_SCORE: 24
ADLS_ACUITY_SCORE: 26
ADLS_ACUITY_SCORE: 26
ADLS_ACUITY_SCORE: 22
FALL_HISTORY_WITHIN_LAST_SIX_MONTHS: NO
ADLS_ACUITY_SCORE: 22
ADLS_ACUITY_SCORE: 26
ADLS_ACUITY_SCORE: 23
ADLS_ACUITY_SCORE: 24
ADLS_ACUITY_SCORE: 36
ADLS_ACUITY_SCORE: 24
ADLS_ACUITY_SCORE: 22
ADLS_ACUITY_SCORE: 26
ADLS_ACUITY_SCORE: 24
ADLS_ACUITY_SCORE: 22
ADLS_ACUITY_SCORE: 26
ADLS_ACUITY_SCORE: 22
ADLS_ACUITY_SCORE: 26
ADLS_ACUITY_SCORE: 22
ADLS_ACUITY_SCORE: 24
ADLS_ACUITY_SCORE: 22
ADLS_ACUITY_SCORE: 24
ADLS_ACUITY_SCORE: 26
ADLS_ACUITY_SCORE: 22
ADLS_ACUITY_SCORE: 36
ADLS_ACUITY_SCORE: 22
TRANSFERRING: 0-->INDEPENDENT
ADLS_ACUITY_SCORE: 35
ADLS_ACUITY_SCORE: 35
ADLS_ACUITY_SCORE: 24
ADLS_ACUITY_SCORE: 36
ADLS_ACUITY_SCORE: 23
ADLS_ACUITY_SCORE: 26
ADLS_ACUITY_SCORE: 35
ADLS_ACUITY_SCORE: 22
WALKING_OR_CLIMBING_STAIRS: STAIR CLIMBING DIFFICULTY, REQUIRES EQUIPMENT
ADLS_ACUITY_SCORE: 24
DIFFICULTY_EATING/SWALLOWING: NO
ADLS_ACUITY_SCORE: 23
ADLS_ACUITY_SCORE: 22
ADLS_ACUITY_SCORE: 35
ADLS_ACUITY_SCORE: 22
ADLS_ACUITY_SCORE: 22
ADLS_ACUITY_SCORE: 35
ADLS_ACUITY_SCORE: 22
ADLS_ACUITY_SCORE: 26
ADLS_ACUITY_SCORE: 22
DOING_ERRANDS_INDEPENDENTLY_DIFFICULTY: YES
ADLS_ACUITY_SCORE: 22
DEPENDENT_IADLS:: CLEANING;COOKING;LAUNDRY
ADLS_ACUITY_SCORE: 22
ADLS_ACUITY_SCORE: 24
ADLS_ACUITY_SCORE: 22
ADLS_ACUITY_SCORE: 22
ADLS_ACUITY_SCORE: 23
ADLS_ACUITY_SCORE: 22
ADLS_ACUITY_SCORE: 26
ADLS_ACUITY_SCORE: 22
ADLS_ACUITY_SCORE: 24
ADLS_ACUITY_SCORE: 24
ADLS_ACUITY_SCORE: 26
ADLS_ACUITY_SCORE: 23
ADLS_ACUITY_SCORE: 22
ADLS_ACUITY_SCORE: 35
ADLS_ACUITY_SCORE: 36
ADLS_ACUITY_SCORE: 22
ADLS_ACUITY_SCORE: 22
ADLS_ACUITY_SCORE: 24
ADLS_ACUITY_SCORE: 22
ADLS_ACUITY_SCORE: 24
ADLS_ACUITY_SCORE: 22
ADLS_ACUITY_SCORE: 26
ADLS_ACUITY_SCORE: 35
ADLS_ACUITY_SCORE: 26
ADLS_ACUITY_SCORE: 23
ADLS_ACUITY_SCORE: 24
ADLS_ACUITY_SCORE: 22
ADLS_ACUITY_SCORE: 26
ADLS_ACUITY_SCORE: 35
ADLS_ACUITY_SCORE: 22
ADLS_ACUITY_SCORE: 24
TOILETING_ISSUES: NO
ADLS_ACUITY_SCORE: 33
ADLS_ACUITY_SCORE: 26
ADLS_ACUITY_SCORE: 22
ADLS_ACUITY_SCORE: 35
ADLS_ACUITY_SCORE: 22
ADLS_ACUITY_SCORE: 26
ADLS_ACUITY_SCORE: 26
ADLS_ACUITY_SCORE: 24
ADLS_ACUITY_SCORE: 22
ADLS_ACUITY_SCORE: 26
ADLS_ACUITY_SCORE: 22
ADLS_ACUITY_SCORE: 26
ADLS_ACUITY_SCORE: 35
ADLS_ACUITY_SCORE: 22
ADLS_ACUITY_SCORE: 22
WALKING_OR_CLIMBING_STAIRS_DIFFICULTY: YES
ADLS_ACUITY_SCORE: 26
TRANSFERRING: 0-->ASSISTANCE NEEDED (DEVELOPMENTALLY APPROPRIATE)
ADLS_ACUITY_SCORE: 22
ADLS_ACUITY_SCORE: 24
ADLS_ACUITY_SCORE: 22
ADLS_ACUITY_SCORE: 24
ADLS_ACUITY_SCORE: 24
ADLS_ACUITY_SCORE: 22
ADLS_ACUITY_SCORE: 36
ADLS_ACUITY_SCORE: 22

## 2022-01-01 ASSESSMENT — ENCOUNTER SYMPTOMS
SHORTNESS OF BREATH: 0
FATIGUE: 1
PSYCHIATRIC NEGATIVE: 1
MUSCULOSKELETAL NEGATIVE: 1
SINUS PAIN: 0
HEMATOLOGIC/LYMPHATIC NEGATIVE: 1
NAUSEA: 0
CHILLS: 1
EYES NEGATIVE: 1
SINUS PRESSURE: 0
ABDOMINAL DISTENTION: 1
ALLERGIC/IMMUNOLOGIC NEGATIVE: 1
RHINORRHEA: 0
SORE THROAT: 0
DIARRHEA: 1
BLOOD IN STOOL: 0
NEUROLOGICAL NEGATIVE: 1
ENDOCRINE NEGATIVE: 1
FEVER: 1
VOMITING: 0
CARDIOVASCULAR NEGATIVE: 1
ABDOMINAL PAIN: 0
COUGH: 1

## 2022-01-01 ASSESSMENT — PAIN SCALES - GENERAL
PAINLEVEL: NO PAIN (0)

## 2022-01-30 ENCOUNTER — HEALTH MAINTENANCE LETTER (OUTPATIENT)
Age: 27
End: 2022-01-30

## 2022-01-30 PROBLEM — C49.9: Status: ACTIVE | Noted: 2020-03-12

## 2022-02-01 ENCOUNTER — LAB (OUTPATIENT)
Dept: LAB | Facility: CLINIC | Age: 27
End: 2022-02-01
Attending: INTERNAL MEDICINE
Payer: COMMERCIAL

## 2022-02-01 ENCOUNTER — ANCILLARY PROCEDURE (OUTPATIENT)
Dept: CT IMAGING | Facility: CLINIC | Age: 27
End: 2022-02-01
Attending: INTERNAL MEDICINE
Payer: COMMERCIAL

## 2022-02-01 VITALS
HEART RATE: 103 BPM | OXYGEN SATURATION: 96 % | TEMPERATURE: 98.5 F | DIASTOLIC BLOOD PRESSURE: 93 MMHG | WEIGHT: 307.1 LBS | BODY MASS INDEX: 46.71 KG/M2 | RESPIRATION RATE: 18 BRPM | SYSTOLIC BLOOD PRESSURE: 138 MMHG

## 2022-02-01 DIAGNOSIS — C41.9 SARCOMA, EWINGS (H): ICD-10-CM

## 2022-02-01 LAB
ALBUMIN SERPL-MCNC: 3.7 G/DL (ref 3.4–5)
ALP SERPL-CCNC: 76 U/L (ref 40–150)
ALT SERPL W P-5'-P-CCNC: 30 U/L (ref 0–70)
ANION GAP SERPL CALCULATED.3IONS-SCNC: 7 MMOL/L (ref 3–14)
AST SERPL W P-5'-P-CCNC: 18 U/L (ref 0–45)
BASOPHILS # BLD AUTO: 0.1 10E3/UL (ref 0–0.2)
BASOPHILS NFR BLD AUTO: 1 %
BILIRUB SERPL-MCNC: 0.7 MG/DL (ref 0.2–1.3)
BUN SERPL-MCNC: 8 MG/DL (ref 7–30)
CALCIUM SERPL-MCNC: 8.9 MG/DL (ref 8.5–10.1)
CHLORIDE BLD-SCNC: 110 MMOL/L (ref 94–109)
CO2 SERPL-SCNC: 21 MMOL/L (ref 20–32)
CREAT SERPL-MCNC: 0.93 MG/DL (ref 0.66–1.25)
EOSINOPHIL # BLD AUTO: 0.3 10E3/UL (ref 0–0.7)
EOSINOPHIL NFR BLD AUTO: 3 %
ERYTHROCYTE [DISTWIDTH] IN BLOOD BY AUTOMATED COUNT: 14.7 % (ref 10–15)
GFR SERPL CREATININE-BSD FRML MDRD: >90 ML/MIN/1.73M2
GLUCOSE BLD-MCNC: 117 MG/DL (ref 70–99)
HCT VFR BLD AUTO: 41.8 % (ref 40–53)
HGB BLD-MCNC: 13.4 G/DL (ref 13.3–17.7)
IMM GRANULOCYTES # BLD: 0 10E3/UL
IMM GRANULOCYTES NFR BLD: 0 %
LYMPHOCYTES # BLD AUTO: 1.1 10E3/UL (ref 0.8–5.3)
LYMPHOCYTES NFR BLD AUTO: 12 %
MCH RBC QN AUTO: 27.3 PG (ref 26.5–33)
MCHC RBC AUTO-ENTMCNC: 32.1 G/DL (ref 31.5–36.5)
MCV RBC AUTO: 85 FL (ref 78–100)
MONOCYTES # BLD AUTO: 0.6 10E3/UL (ref 0–1.3)
MONOCYTES NFR BLD AUTO: 6 %
NEUTROPHILS # BLD AUTO: 7.8 10E3/UL (ref 1.6–8.3)
NEUTROPHILS NFR BLD AUTO: 78 %
NRBC # BLD AUTO: 0 10E3/UL
NRBC BLD AUTO-RTO: 0 /100
PLATELET # BLD AUTO: 334 10E3/UL (ref 150–450)
POTASSIUM BLD-SCNC: 4 MMOL/L (ref 3.4–5.3)
PROT SERPL-MCNC: 8 G/DL (ref 6.8–8.8)
RBC # BLD AUTO: 4.91 10E6/UL (ref 4.4–5.9)
SODIUM SERPL-SCNC: 138 MMOL/L (ref 133–144)
WBC # BLD AUTO: 9.8 10E3/UL (ref 4–11)

## 2022-02-01 PROCEDURE — 36415 COLL VENOUS BLD VENIPUNCTURE: CPT

## 2022-02-01 PROCEDURE — 71260 CT THORAX DX C+: CPT | Performed by: RADIOLOGY

## 2022-02-01 PROCEDURE — 74177 CT ABD & PELVIS W/CONTRAST: CPT | Performed by: RADIOLOGY

## 2022-02-01 PROCEDURE — 82040 ASSAY OF SERUM ALBUMIN: CPT

## 2022-02-01 PROCEDURE — 85025 COMPLETE CBC W/AUTO DIFF WBC: CPT

## 2022-02-01 PROCEDURE — 80053 COMPREHEN METABOLIC PANEL: CPT

## 2022-02-01 RX ORDER — IOPAMIDOL 755 MG/ML
135 INJECTION, SOLUTION INTRAVASCULAR ONCE
Status: COMPLETED | OUTPATIENT
Start: 2022-02-01 | End: 2022-02-01

## 2022-02-01 RX ADMIN — IOPAMIDOL 135 ML: 755 INJECTION, SOLUTION INTRAVASCULAR at 09:26

## 2022-02-01 ASSESSMENT — PAIN SCALES - GENERAL: PAINLEVEL: NO PAIN (0)

## 2022-02-01 NOTE — NURSING NOTE
Chief Complaint   Patient presents with     Blood Draw     Labs drawn via piv placed by RN in lab. VS taken.      Labs drawn from PIV placed by RN. Line flushed with saline. Vitals taken. Pt checked in for appointment(s).    Daniela Riddle RN

## 2022-02-03 ENCOUNTER — VIRTUAL VISIT (OUTPATIENT)
Dept: ONCOLOGY | Facility: CLINIC | Age: 27
End: 2022-02-03
Attending: INTERNAL MEDICINE
Payer: COMMERCIAL

## 2022-02-03 DIAGNOSIS — C49.9 DESMOPLASTIC SMALL ROUND CELL TUMOR (H): Primary | ICD-10-CM

## 2022-02-03 PROCEDURE — 99215 OFFICE O/P EST HI 40 MIN: CPT | Mod: 95 | Performed by: INTERNAL MEDICINE

## 2022-02-03 PROCEDURE — G0463 HOSPITAL OUTPT CLINIC VISIT: HCPCS | Mod: PN,RTG | Performed by: INTERNAL MEDICINE

## 2022-02-03 NOTE — NURSING NOTE
Patient verified meds and allergies are correct via patients echeck in.    Aditya Warren, Virtual Facilitator

## 2022-02-03 NOTE — PROGRESS NOTES
Diego is a 26 year old who is being evaluated via a billable video visit.      How would you like to obtain your AVS? [employer]  If the video visit is dropped, the invitation should be resent by: Send to e-mail at: [email or web address]  Will anyone else be joining your video visit? Yes: Mariela - sister. How would they like to receive their invitation? Text to cell phone: in person        Aditya Warren    Video-Visit Details  Type of service:  Video Visit  Video Start Time: 1:15 PM  Video End Time:1:42 PM  Originating Location (pt. 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 He presented initially with abdominal pain, diarrhea, and progressive abdominal distention for 3 months duration. 2. Initial CT scan in February 2020 showed severe peritoneal carcinomatosis with large peritoneal tumor implants throughout the entire abdomen and pelvis, but mostly prominently in the pelvis.   2. PETCT scan showed interval increase in the amount of peritoneal carcinomatosis.  3. On 3/12/2020, he had ultrasound-guided core needle biopsy of a peritoneal implant (Case: [id number]), which showed a completely undifferentiated appearance, but stains with pancytokeratin, indicating an epithelial origin. 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 Tumor markers for CA-19-9, CEA, beta-hCG, alpha-fetoprotein were unremarkable. Cancer type ID molecular testing by [employer] sent to determine the exact diagnosis and to assist in further treatment planning. 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 Relative probability of less than 5% of other subtype of  sarcoma. EWSR1-WT1 fusion detected.  4. 5/1/2020, MRI brain negative for brain metastasis, and baseline CT-CAP obtained showing extensive mixed solid and cystic masses throughout the abdomen and pelvis consistent with peritoneal carcinomatosis. Largest mass measures approximately 20 cm in the pelvis. Metastatic mixed solid and cystic masses seen in hepatic segments 5 and 6 and hepatic segment 7. The masses appear to be contiguous with the retrohepatic peritoneal carcinomatosis. Small volume fluid about the spleen favored to represent malignant ascites, stable mild-to-moderate right hydronephrosis related to mass effect upon the distal ureter in the pelvis, the IVC and iliac veins are compressed due to the extensive peritoneal carcinomatosis without findings to suggest deep venous thrombosis.  5. 5/4/2020, he begins CAV/IMV alternating chemotherapy. He receives 6 cycles of treatment. He symptomatically improves.  6. 9/11/2020, CT-CAP shows stable to mildly improved extensive peritoneal carcinomatosis. He would like to omit Ifosfamide/etoposide cycles and continue only with CAV.  7. 10/9/2020, he starts CAV every 21 days.  8. 1/6/2021, CT CAP showed a mixed response in the mixed solid and cystic masses throughout the peritoneum. Some of the tumors are stable to mildly worse, with some of the peritoneal masses a bit larger, a few are smaller, one cystic tumor in the left abdomen is smaller, while tumors lower in the pelvis appear slightly larger.  9. 3/24/2021, CT CAP showed continued slight decrease in overall burden of peritoneal carcinomatosis with persistent encasement of bowel without evidence of bowel obstruction.  10. 6/25/2021, he receives cycle 19 CAV, then takes a chemotherapy holiday.    History of Present Illness:  Diego Buchanan's sister and caregiver presents.    We reviewed Madi' 2/1/22 CT abdomen and pelvis, which shows some worsening since November in the liver metastases and in the  intra-abdominal tumors, predominantly in the lower abdomen and pelvis.    He is reportedly doing well. No new concerns today. Eating and drinking well.  He's able to stay active. No known nausea or vomiting. No new fevers or chills.    Review of Systems:  12-point ROS is negative except as in HPI    Current Outpatient Medications   Medication Sig Dispense Refill     allopurinol (ZYLOPRIM) 300 MG tablet  (Patient not taking: Reported on 11/4/2021)       CATHFLO ACTIVASE 2 MG injection  (Patient not taking: Reported on 11/4/2021)       dexamethasone (DECADRON) 4 MG tablet Take 2 tablets (8 mg) by mouth daily (with breakfast) for 3 days Start the day after doxorubicin, cyclophosphamide, and vincristine (odd cycles). 6 tablet 8     folic acid (FOLVITE) 1 MG tablet  (Patient not taking: Reported on 11/4/2021)       mesna (MESNEX) 400 MG TABS tablet Take 1 tablet (400 mg) by mouth every 4 hours for 2 doses Take one tablet 4 hours and 8 hours after end of cyclophosphamide infusion. 2 tablet 0     NEULASTA ONPRO 6 MG/0.6ML injection  (Patient not taking: Reported on 11/4/2021)       polyethylene glycol (MIRALAX) 17 g packet Take 17 g by mouth (Patient not taking: Reported on 11/4/2021)       senna-docusate (SENNA S) 8.6-50 MG tablet Take 1 tablet by mouth 2 times daily (Patient not taking: Reported on 11/4/2021) 60 tablet 0     Objective:  Patient in bathroom during our appointment. Visit conducted with sister.    Labs:  Lab on 02/01/2022   Component Date Value Ref Range Status     Sodium 02/01/2022 138  133 - 144 mmol/L Final     Potassium 02/01/2022 4.0  3.4 - 5.3 mmol/L Final     Chloride 02/01/2022 110* 94 - 109 mmol/L Final     Carbon Dioxide (CO2) 02/01/2022 21  20 - 32 mmol/L Final     Anion Gap 02/01/2022 7  3 - 14 mmol/L Final     Urea Nitrogen 02/01/2022 8  7 - 30 mg/dL Final     Creatinine 02/01/2022 0.93  0.66 - 1.25 mg/dL Final     Calcium 02/01/2022 8.9  8.5 - 10.1 mg/dL Final     Glucose 02/01/2022 117* 70 -  99 mg/dL Final     Alkaline Phosphatase 02/01/2022 76  40 - 150 U/L Final     AST 02/01/2022 18  0 - 45 U/L Final     ALT 02/01/2022 30  0 - 70 U/L Final     Protein Total 02/01/2022 8.0  6.8 - 8.8 g/dL Final     Albumin 02/01/2022 3.7  3.4 - 5.0 g/dL Final     Bilirubin Total 02/01/2022 0.7  0.2 - 1.3 mg/dL Final     GFR Estimate 02/01/2022 >90  >60 mL/min/1.73m2 Final     WBC Count 02/01/2022 9.8  4.0 - 11.0 10e3/uL Final     RBC Count 02/01/2022 4.91  4.40 - 5.90 10e6/uL Final     Hemoglobin 02/01/2022 13.4  13.3 - 17.7 g/dL Final     Hematocrit 02/01/2022 41.8  40.0 - 53.0 % Final     MCV 02/01/2022 85  78 - 100 fL Final     MCH 02/01/2022 27.3  26.5 - 33.0 pg Final     MCHC 02/01/2022 32.1  31.5 - 36.5 g/dL Final     RDW 02/01/2022 14.7  10.0 - 15.0 % Final     Platelet Count 02/01/2022 334  150 - 450 10e3/uL Final     % Neutrophils 02/01/2022 78  % Final     % Lymphocytes 02/01/2022 12  % Final     % Monocytes 02/01/2022 6  % Final     % Eosinophils 02/01/2022 3  % Final     % Basophils 02/01/2022 1  % Final     % Immature Granulocytes 02/01/2022 0  % Final     NRBCs per 100 WBC 02/01/2022 0  <1 /100 Final     Absolute Neutrophils 02/01/2022 7.8  1.6 - 8.3 10e3/uL Final     Absolute Lymphocytes 02/01/2022 1.1  0.8 - 5.3 10e3/uL Final     Absolute Monocytes 02/01/2022 0.6  0.0 - 1.3 10e3/uL Final     Absolute Eosinophils 02/01/2022 0.3  0.0 - 0.7 10e3/uL Final     Absolute Basophils 02/01/2022 0.1  0.0 - 0.2 10e3/uL Final     Absolute Immature Granulocytes 02/01/2022 0.0  <=0.4 10e3/uL Final     Absolute NRBCs 02/01/2022 0.0  10e3/uL Final           Imaging:  CT Chest/Abdomen/Pelvis w Contrast  Narrative: EXAMINATION: CT CHEST/ABDOMEN/PELVIS W CONTRAST 2/1/2022 9:35 AM    TECHNIQUE: Helical CT images from the thoracic inlet through the  symphysis pubis were obtained with intravenous contrast. Contrast  dose: Isovue 370 135 mL    COMPARISON: CT 11/2/2021    HISTORY: Sarcoma, Ewings.    FINDINGS:    Chest:    Thyroid gland is unremarkable. No supraclavicular or axillary  lymphadenopathy. Slightly increased right mid and upper paratracheal  lymph nodes. For example, there is a right paratracheal lymph node  measuring 9 mm in short axis (image 3:61 previously 6 mm. Cardiac size  is normal. No pericardial effusion. Aortic and pulmonary artery  caliber within normal limits. Visualized portions of the aortic arch  branching vessels are unremarkable. Esophagus is unremarkable.    Trachea and bronchi are patent and clear of debris. No evidence of  airspace disease. No new or enlarging suspicious pulmonary nodules.  Calcified granuloma in the inferior right middle lobe. No pleural  effusion. No pneumothorax.    Abdomen and pelvis:   Multiple new and increasing hypoattenuating hepatic lesions. For  example, there is new 2.3 cm lesion along left hepatic dome (image  3:203), a new 18 mm lesion in hepatic segment 7 (image 3:213) and a  new 2.0 cm lesion in hepatic segment IVb (image 3:235). Increased  subcapsular masslike hypoattenuation along the medial aspect of  hepatic segment 6 measuring up to 3.4 cm maximal thickness (image  3:283).     Gallbladder is partially dilated and unremarkable. No intra- or  extrahepatic biliary dilation. Slightly increased abnormal nodularity  along the splenic surface. The pancreas and adrenal glands are  unremarkable. Kidneys demonstrate symmetric enhancement without solid  lesions, hydronephrosis, or nephrolithiasis. The the mid to distal  ureters are not well visualized. Urinary bladder is minimally dilated.  No evidence of bowel obstruction. Multiple loops of small bowel in the  sigmoid colon extend through the area of diffuse peritoneal nodularity  throughout the lower abdomen. No focal dilated loops of bowel. The  appendix is not visualized.    Extensive heterogeneous peritoneal nodularity, diffusely increased.  Large conglomerate within the low pelvis measures 15.5 x 15.8 cm in  diameter,  previously 12.8 x 12.7 cm measured in similar axis. Stable  loculated cyst within the mid abdomen measuring up to 10.5 cm. No free  fluid. Right inguinal lymph node measuring 13 mm (image 3:598).  Abdominal aorta is normal in caliber and patent. Celiac and mesenteric  artery origins are unremarkable. Portal veins and IVC are patent. The  large confluent pelvic mass exerts mass effect on the iliac veins.    Bones and soft tissues:   No suspicious osseous lesions. Transitional anatomy at the lumbosacral  junction with sacralization of L5 on the right. Soft tissue is  unremarkable.  Impression: IMPRESSION:   In this patient with desmoplastic small round cell tumor, there is  evidence of disease progression:  1. Increased extensive peritoneal carcinomatosis.  2. Multiple new and enlarging hepatic metastasis.  3. Increased prominent mediastinal lymph nodes.  4. No evidence of bowel obstruction.    I have personally reviewed the examination and initial interpretation  and I agree with the findings.    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 He tolerated CAV/IMV for 19 cycles. Robert has been happy be off of chemotherapy, now since June 2021.     Family continues to maximize quality of life during this period. Family would like some time to review together, if chemotherapy warranted given the evidence of growth. For now they are thinking to defer chemotherapy another 2 months. They agree to repeat scans in April and consider treatment at that time.    Could consider further rounds of CAV (dactinomycin containing)/IMV, Topotecan/cyclophosphamide, Irinotecan/temozolomide.     -RTC in 2 months in April 2022 (9-10 months  since last chemotherapy)  - Family knows to contact us with any questions or concerns in the meantime    Farzaneh Burciaga M.D.   of Medicine  Hematology, Oncology and Transplantation

## 2022-02-03 NOTE — LETTER
2/3/2022         RE: Diego Buchanan  [address]  [place] [address]        Dear Colleague,    Thank you for referring your patient, Diego Buchanan, to the [facility]. Please see a copy of my visit note below.    Diego is a 26 year old who is being evaluated via a billable video visit.      How would you like to obtain your AVS? MyChart  If the video visit is dropped, the invitation should be resent by: Send to e-mail at: [email or web address]  Will anyone else be joining your video visit? Yes: Mariela - sister. How would they like to receive their invitation? Text to cell phone: in person        Aditya Warren    Video-Visit Details  Type of service:  Video Visit  Video Start Time: 1:15 PM  Video End Time:1:42 PM  Originating Location (pt. 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 He presented initially with abdominal pain, diarrhea, and progressive abdominal distention for 3 months duration. 2. Initial CT scan in February 2020 showed severe peritoneal carcinomatosis with large peritoneal tumor implants throughout the entire abdomen and pelvis, but mostly prominently in the pelvis.   2. PETCT scan showed interval increase in the amount of peritoneal carcinomatosis.  3. On 3/12/2020, he had ultrasound-guided core needle biopsy of a peritoneal implant (Case: [id number]), which showed a completely undifferentiated appearance, but stains with pancytokeratin, indicating an epithelial origin. 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 Tumor markers for CA-19-9, CEA, beta-hCG, alpha-fetoprotein were unremarkable. Cancer type ID molecular testing by [employer] sent to  determine the exact diagnosis and to assist in further treatment planning. 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 Relative probability of less than 5% of other subtype of sarcoma. EWSR1-WT1 fusion detected.  4. 5/1/2020, MRI brain negative for brain metastasis, and baseline CT-CAP obtained showing extensive mixed solid and cystic masses throughout the abdomen and pelvis consistent with peritoneal carcinomatosis. Largest mass measures approximately 20 cm in the pelvis. Metastatic mixed solid and cystic masses seen in hepatic segments 5 and 6 and hepatic segment 7. The masses appear to be contiguous with the retrohepatic peritoneal carcinomatosis. Small volume fluid about the spleen favored to represent malignant ascites, stable mild-to-moderate right hydronephrosis related to mass effect upon the distal ureter in the pelvis, the IVC and iliac veins are compressed due to the extensive peritoneal carcinomatosis without findings to suggest deep venous thrombosis.  5. 5/4/2020, he begins CAV/IMV alternating chemotherapy. He receives 6 cycles of treatment. He symptomatically improves.  6. 9/11/2020, CT-CAP shows stable to mildly improved extensive peritoneal carcinomatosis. He would like to omit Ifosfamide/etoposide cycles and continue only with CAV.  7. 10/9/2020, he starts CAV every 21 days.  8. 1/6/2021, CT CAP showed a mixed response in the mixed solid and cystic masses throughout the peritoneum. Some of the tumors are stable to mildly worse, with some of the peritoneal masses a bit larger, a few are smaller, one cystic tumor in the left abdomen is smaller, while tumors lower in the pelvis appear slightly larger.  9. 3/24/2021, CT CAP showed continued slight decrease in overall burden of peritoneal carcinomatosis with persistent encasement of bowel without evidence of bowel obstruction.  10. 6/25/2021, he receives cycle 19 CAV, then takes a  chemotherapy holiday.    History of Present Illness:  Diego Buchanan's sister and caregiver presents.    We reviewed Madi' 2/1/22 CT abdomen and pelvis, which shows some worsening since November in the liver metastases and in the intra-abdominal tumors, predominantly in the lower abdomen and pelvis.    He is reportedly doing well. No new concerns today. Eating and drinking well.  He's able to stay active. No known nausea or vomiting. No new fevers or chills.    Review of Systems:  12-point ROS is negative except as in HPI    Current Outpatient Medications   Medication Sig Dispense Refill     allopurinol (ZYLOPRIM) 300 MG tablet  (Patient not taking: Reported on 11/4/2021)       CATHFLO ACTIVASE 2 MG injection  (Patient not taking: Reported on 11/4/2021)       dexamethasone (DECADRON) 4 MG tablet Take 2 tablets (8 mg) by mouth daily (with breakfast) for 3 days Start the day after doxorubicin, cyclophosphamide, and vincristine (odd cycles). 6 tablet 8     folic acid (FOLVITE) 1 MG tablet  (Patient not taking: Reported on 11/4/2021)       mesna (MESNEX) 400 MG TABS tablet Take 1 tablet (400 mg) by mouth every 4 hours for 2 doses Take one tablet 4 hours and 8 hours after end of cyclophosphamide infusion. 2 tablet 0     NEULASTA ONPRO 6 MG/0.6ML injection  (Patient not taking: Reported on 11/4/2021)       polyethylene glycol (MIRALAX) 17 g packet Take 17 g by mouth (Patient not taking: Reported on 11/4/2021)       senna-docusate (SENNA S) 8.6-50 MG tablet Take 1 tablet by mouth 2 times daily (Patient not taking: Reported on 11/4/2021) 60 tablet 0     Objective:  Patient in bathroom during our appointment. Visit conducted with sister.    Labs:  Lab on 02/01/2022   Component Date Value Ref Range Status     Sodium 02/01/2022 138  133 - 144 mmol/L Final     Potassium 02/01/2022 4.0  3.4 - 5.3 mmol/L Final     Chloride 02/01/2022 110* 94 - 109 mmol/L Final     Carbon Dioxide (CO2) 02/01/2022 21  20 - 32 mmol/L Final      Anion Gap 02/01/2022 7  3 - 14 mmol/L Final     Urea Nitrogen 02/01/2022 8  7 - 30 mg/dL Final     Creatinine 02/01/2022 0.93  0.66 - 1.25 mg/dL Final     Calcium 02/01/2022 8.9  8.5 - 10.1 mg/dL Final     Glucose 02/01/2022 117* 70 - 99 mg/dL Final     Alkaline Phosphatase 02/01/2022 76  40 - 150 U/L Final     AST 02/01/2022 18  0 - 45 U/L Final     ALT 02/01/2022 30  0 - 70 U/L Final     Protein Total 02/01/2022 8.0  6.8 - 8.8 g/dL Final     Albumin 02/01/2022 3.7  3.4 - 5.0 g/dL Final     Bilirubin Total 02/01/2022 0.7  0.2 - 1.3 mg/dL Final     GFR Estimate 02/01/2022 >90  >60 mL/min/1.73m2 Final     WBC Count 02/01/2022 9.8  4.0 - 11.0 10e3/uL Final     RBC Count 02/01/2022 4.91  4.40 - 5.90 10e6/uL Final     Hemoglobin 02/01/2022 13.4  13.3 - 17.7 g/dL Final     Hematocrit 02/01/2022 41.8  40.0 - 53.0 % Final     MCV 02/01/2022 85  78 - 100 fL Final     MCH 02/01/2022 27.3  26.5 - 33.0 pg Final     MCHC 02/01/2022 32.1  31.5 - 36.5 g/dL Final     RDW 02/01/2022 14.7  10.0 - 15.0 % Final     Platelet Count 02/01/2022 334  150 - 450 10e3/uL Final     % Neutrophils 02/01/2022 78  % Final     % Lymphocytes 02/01/2022 12  % Final     % Monocytes 02/01/2022 6  % Final     % Eosinophils 02/01/2022 3  % Final     % Basophils 02/01/2022 1  % Final     % Immature Granulocytes 02/01/2022 0  % Final     NRBCs per 100 WBC 02/01/2022 0  <1 /100 Final     Absolute Neutrophils 02/01/2022 7.8  1.6 - 8.3 10e3/uL Final     Absolute Lymphocytes 02/01/2022 1.1  0.8 - 5.3 10e3/uL Final     Absolute Monocytes 02/01/2022 0.6  0.0 - 1.3 10e3/uL Final     Absolute Eosinophils 02/01/2022 0.3  0.0 - 0.7 10e3/uL Final     Absolute Basophils 02/01/2022 0.1  0.0 - 0.2 10e3/uL Final     Absolute Immature Granulocytes 02/01/2022 0.0  <=0.4 10e3/uL Final     Absolute NRBCs 02/01/2022 0.0  10e3/uL Final           Imaging:  CT Chest/Abdomen/Pelvis w Contrast  Narrative: EXAMINATION: CT CHEST/ABDOMEN/PELVIS W CONTRAST 2/1/2022 9:35  AM    TECHNIQUE: Helical CT images from the thoracic inlet through the  symphysis pubis were obtained with intravenous contrast. Contrast  dose: Isovue 370 135 mL    COMPARISON: CT 11/2/2021    HISTORY: Sarcoma, Ewings.    FINDINGS:    Chest:   Thyroid gland is unremarkable. No supraclavicular or axillary  lymphadenopathy. Slightly increased right mid and upper paratracheal  lymph nodes. For example, there is a right paratracheal lymph node  measuring 9 mm in short axis (image 3:61 previously 6 mm. Cardiac size  is normal. No pericardial effusion. Aortic and pulmonary artery  caliber within normal limits. Visualized portions of the aortic arch  branching vessels are unremarkable. Esophagus is unremarkable.    Trachea and bronchi are patent and clear of debris. No evidence of  airspace disease. No new or enlarging suspicious pulmonary nodules.  Calcified granuloma in the inferior right middle lobe. No pleural  effusion. No pneumothorax.    Abdomen and pelvis:   Multiple new and increasing hypoattenuating hepatic lesions. For  example, there is new 2.3 cm lesion along left hepatic dome (image  3:203), a new 18 mm lesion in hepatic segment 7 (image 3:213) and a  new 2.0 cm lesion in hepatic segment IVb (image 3:235). Increased  subcapsular masslike hypoattenuation along the medial aspect of  hepatic segment 6 measuring up to 3.4 cm maximal thickness (image  3:283).     Gallbladder is partially dilated and unremarkable. No intra- or  extrahepatic biliary dilation. Slightly increased abnormal nodularity  along the splenic surface. The pancreas and adrenal glands are  unremarkable. Kidneys demonstrate symmetric enhancement without solid  lesions, hydronephrosis, or nephrolithiasis. The the mid to distal  ureters are not well visualized. Urinary bladder is minimally dilated.  No evidence of bowel obstruction. Multiple loops of small bowel in the  sigmoid colon extend through the area of diffuse peritoneal  nodularity  throughout the lower abdomen. No focal dilated loops of bowel. The  appendix is not visualized.    Extensive heterogeneous peritoneal nodularity, diffusely increased.  Large conglomerate within the low pelvis measures 15.5 x 15.8 cm in  diameter, previously 12.8 x 12.7 cm measured in similar axis. Stable  loculated cyst within the mid abdomen measuring up to 10.5 cm. No free  fluid. Right inguinal lymph node measuring 13 mm (image 3:598).  Abdominal aorta is normal in caliber and patent. Celiac and mesenteric  artery origins are unremarkable. Portal veins and IVC are patent. The  large confluent pelvic mass exerts mass effect on the iliac veins.    Bones and soft tissues:   No suspicious osseous lesions. Transitional anatomy at the lumbosacral  junction with sacralization of L5 on the right. Soft tissue is  unremarkable.  Impression: IMPRESSION:   In this patient with desmoplastic small round cell tumor, there is  evidence of disease progression:  1. Increased extensive peritoneal carcinomatosis.  2. Multiple new and enlarging hepatic metastasis.  3. Increased prominent mediastinal lymph nodes.  4. No evidence of bowel obstruction.    I have personally reviewed the examination and initial interpretation  and I agree with the findings.    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 He tolerated CAV/IMV for 19 cycles. Robert has been happy be off of chemotherapy, now since June 2021.     Family continues to maximize quality of life during this period. Family would like some time to review together, if chemotherapy warranted given the evidence of growth. For now they are thinking to defer chemotherapy  another 2 months. They agree to repeat scans in April and consider treatment at that time.    Could consider further rounds of CAV (dactinomycin containing)/IMV, Topotecan/cyclophosphamide, Irinotecan/temozolomide.     -RTC in 2 months in April 2022 (9-10 months since last chemotherapy)  - Family knows to contact us with any questions or concerns in the meantime          Again, thank you for allowing me to participate in the care of your patient.      Sincerely,    Farzaneh Sims MD

## 2022-02-08 NOTE — PROGRESS NOTES
This is a recent snapshot of the patient's [facility] medical record.  For current drug dose and complete information and questions, call [phone] or In Basket pool, fv home infusion ([id number])  CSN Number:  [id number]

## 2022-02-10 ENCOUNTER — PATIENT OUTREACH (OUTPATIENT)
Dept: CARE COORDINATION | Facility: CLINIC | Age: 27
End: 2022-02-10
Payer: COMMERCIAL

## 2022-02-10 NOTE — PROGRESS NOTES
Oncology Distress Screening Follow-up  Clinical Social Work  [facility]    Identified Concern and Score From Distress Screening:   3. How concerned are you about feeling depressed or very sad?  7 Abnormal            Date of Distress Screenin/3/22      Data: Mr. 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  called Patient today with intention of introducing them to psychosocial services and support, and following up on elevated distress.        Intervention/Education Provided: Phone call to patient about distress screening. No answer - message left. Provided contact information in order to reach writer.       Follow-up Required: Will remain available for support and await patient's return call.          Hetal BARNETT, ANGELINE  - Oncology  Phone : [phone]  Pager: [phone]

## 2022-02-16 NOTE — PROGRESS NOTES
This is a recent snapshot of the patient's [facility] medical record.  For current drug dose and complete information and questions, call [phone] or In Basket pool, fv home infusion ([id number])  CSN Number:  [id number]

## 2022-02-18 NOTE — PROGRESS NOTES
[facility]    Outpatient Physical Therapy Discharge Note  Patient: Diego Buchanan  : 1995    Beginning/End Dates of Reporting Period:  6/15/21 to 21 (2 visits)    Referring Provider: Yasmine Mckeon PA-C    Therapy Diagnosis: Physical deconditioning      Client Self Report: Been walking by myself. Things are going well. Moving a bit better.     Objective Measurements:  Objective Measure: NuStep  Details: level 3 resistance, seat:9, arms: 11 x 10 minutes - cued SPM to 60+ to inc difficulty - 0.35 miles           Goals:  Goal Identifier 6MWT    Goal Description Pt to improve distance on 6MWT by 60 meters (256 meters) or more in order to demo a significant improvement in activity tolerance for improved community mobility    Target Date 21   Date Met      Progress (detail required for progress note):       Goal Identifier STS    Goal Description Pt to perform 16 or more STS in 30 sec w/o use of UEs to improve functional LE strength    Target Date 21   Date Met      Progress (detail required for progress note):       Goal Identifier HEP    Goal Description Pt to be independent in HEP including daily walking with family to promote overall health and well-being and improve activity tolerance    Target Date 21   Date Met      Progress (detail required for progress note):         Plan:  Discharge from therapy.    Discharge:    Reason for Discharge: Patient has failed to schedule further appointments.    Equipment Issued: N/A    Discharge Plan: Patient to continue home program.

## 2022-03-02 NOTE — PROGRESS NOTES
This is a recent snapshot of the patient's [facility] medical record.  For current drug dose and complete information and questions, call [phone] or In Basket pool, fv home infusion ([id number])  CSN Number:  [id number]

## 2022-03-10 NOTE — PROGRESS NOTES
This is a recent snapshot of the patient's [facility] medical record.  For current drug dose and complete information and questions, call [phone] or In Basket pool, fv home infusion ([id number])  CSN Number:  [id number]

## 2022-03-24 NOTE — PROGRESS NOTES
This is a recent snapshot of the patient's [facility] medical record.  For current drug dose and complete information and questions, call [phone] or In Basket pool, fv home infusion ([id number])  CSN Number:  [id number]

## 2022-04-12 ENCOUNTER — LAB (OUTPATIENT)
Dept: LAB | Facility: CLINIC | Age: 27
End: 2022-04-12
Attending: INTERNAL MEDICINE
Payer: COMMERCIAL

## 2022-04-12 ENCOUNTER — ANCILLARY PROCEDURE (OUTPATIENT)
Dept: CT IMAGING | Facility: CLINIC | Age: 27
End: 2022-04-12
Attending: INTERNAL MEDICINE
Payer: COMMERCIAL

## 2022-04-12 VITALS
WEIGHT: 292.6 LBS | OXYGEN SATURATION: 97 % | HEART RATE: 124 BPM | BODY MASS INDEX: 44.5 KG/M2 | RESPIRATION RATE: 14 BRPM | TEMPERATURE: 97.7 F | DIASTOLIC BLOOD PRESSURE: 91 MMHG | SYSTOLIC BLOOD PRESSURE: 116 MMHG

## 2022-04-12 DIAGNOSIS — C49.9 DESMOPLASTIC SMALL ROUND CELL TUMOR (H): ICD-10-CM

## 2022-04-12 DIAGNOSIS — C41.9 SARCOMA, EWINGS (H): ICD-10-CM

## 2022-04-12 LAB
ALBUMIN SERPL-MCNC: 3.9 G/DL (ref 3.4–5)
ALP SERPL-CCNC: 117 U/L (ref 40–150)
ALT SERPL W P-5'-P-CCNC: 50 U/L (ref 0–70)
ANION GAP SERPL CALCULATED.3IONS-SCNC: 12 MMOL/L (ref 3–14)
AST SERPL W P-5'-P-CCNC: 30 U/L (ref 0–45)
BASOPHILS # BLD AUTO: 0.1 10E3/UL (ref 0–0.2)
BASOPHILS NFR BLD AUTO: 1 %
BILIRUB SERPL-MCNC: 0.9 MG/DL (ref 0.2–1.3)
BUN SERPL-MCNC: 10 MG/DL (ref 7–30)
CALCIUM SERPL-MCNC: 9.9 MG/DL (ref 8.5–10.1)
CHLORIDE BLD-SCNC: 108 MMOL/L (ref 94–109)
CO2 SERPL-SCNC: 20 MMOL/L (ref 20–32)
CREAT SERPL-MCNC: 0.86 MG/DL (ref 0.66–1.25)
EOSINOPHIL # BLD AUTO: 0.3 10E3/UL (ref 0–0.7)
EOSINOPHIL NFR BLD AUTO: 3 %
ERYTHROCYTE [DISTWIDTH] IN BLOOD BY AUTOMATED COUNT: 15.5 % (ref 10–15)
GFR SERPL CREATININE-BSD FRML MDRD: >90 ML/MIN/1.73M2
GLUCOSE BLD-MCNC: 122 MG/DL (ref 70–99)
HCT VFR BLD AUTO: 43.7 % (ref 40–53)
HGB BLD-MCNC: 14 G/DL (ref 13.3–17.7)
IMM GRANULOCYTES # BLD: 0.1 10E3/UL
IMM GRANULOCYTES NFR BLD: 1 %
LYMPHOCYTES # BLD AUTO: 1.2 10E3/UL (ref 0.8–5.3)
LYMPHOCYTES NFR BLD AUTO: 10 %
MCH RBC QN AUTO: 27 PG (ref 26.5–33)
MCHC RBC AUTO-ENTMCNC: 32 G/DL (ref 31.5–36.5)
MCV RBC AUTO: 84 FL (ref 78–100)
MONOCYTES # BLD AUTO: 0.8 10E3/UL (ref 0–1.3)
MONOCYTES NFR BLD AUTO: 6 %
NEUTROPHILS # BLD AUTO: 9.3 10E3/UL (ref 1.6–8.3)
NEUTROPHILS NFR BLD AUTO: 79 %
NRBC # BLD AUTO: 0 10E3/UL
NRBC BLD AUTO-RTO: 0 /100
PLATELET # BLD AUTO: 392 10E3/UL (ref 150–450)
POTASSIUM BLD-SCNC: 3.8 MMOL/L (ref 3.4–5.3)
PROT SERPL-MCNC: 8.5 G/DL (ref 6.8–8.8)
RBC # BLD AUTO: 5.19 10E6/UL (ref 4.4–5.9)
SODIUM SERPL-SCNC: 140 MMOL/L (ref 133–144)
WBC # BLD AUTO: 11.7 10E3/UL (ref 4–11)

## 2022-04-12 PROCEDURE — 74177 CT ABD & PELVIS W/CONTRAST: CPT | Mod: GC | Performed by: RADIOLOGY

## 2022-04-12 PROCEDURE — 85025 COMPLETE CBC W/AUTO DIFF WBC: CPT

## 2022-04-12 PROCEDURE — 36415 COLL VENOUS BLD VENIPUNCTURE: CPT

## 2022-04-12 PROCEDURE — 80053 COMPREHEN METABOLIC PANEL: CPT

## 2022-04-12 PROCEDURE — 71260 CT THORAX DX C+: CPT | Mod: GC | Performed by: RADIOLOGY

## 2022-04-12 RX ORDER — IOPAMIDOL 755 MG/ML
135 INJECTION, SOLUTION INTRAVASCULAR ONCE
Status: COMPLETED | OUTPATIENT
Start: 2022-04-12 | End: 2022-04-12

## 2022-04-12 RX ADMIN — IOPAMIDOL 135 ML: 755 INJECTION, SOLUTION INTRAVASCULAR at 09:53

## 2022-04-12 ASSESSMENT — PAIN SCALES - GENERAL: PAINLEVEL: NO PAIN (0)

## 2022-04-12 NOTE — NURSING NOTE
Chief Complaint   Patient presents with     Blood Draw     Labs drawn via PIV by RN in lab. VS taken.      Labs drawn via peripheral IV. Vital signs taken. Per patient, recently stopped Metoprolol. Denies chest pain, SOB, weakness. Paged provider with HR. Hiral Walker RN

## 2022-04-14 ENCOUNTER — VIRTUAL VISIT (OUTPATIENT)
Dept: ONCOLOGY | Facility: CLINIC | Age: 27
End: 2022-04-14
Attending: INTERNAL MEDICINE
Payer: COMMERCIAL

## 2022-04-14 DIAGNOSIS — C41.9 SARCOMA, EWINGS (H): Primary | ICD-10-CM

## 2022-04-14 DIAGNOSIS — C49.9 DESMOPLASTIC SMALL ROUND CELL TUMOR (H): ICD-10-CM

## 2022-04-14 PROCEDURE — 99215 OFFICE O/P EST HI 40 MIN: CPT | Mod: 95 | Performed by: INTERNAL MEDICINE

## 2022-04-14 PROCEDURE — G0463 HOSPITAL OUTPT CLINIC VISIT: HCPCS | Mod: PN,RTG | Performed by: INTERNAL MEDICINE

## 2022-04-14 NOTE — PROGRESS NOTES
Diego is a 26 year old who is being evaluated via a billable video visit.      How would you like to obtain your AVS? [employer]  If the video visit is dropped, the invitation should be resent by: Text to cell phone: [phone]  Will anyone else be joining your video visit? Ashley Turnerdeana West TOMASZ    Video-Visit Details  Type of service:  Video Visit  Video Start Time: 2:45 PM  Video End Time:3:15 PM  Originating Location (pt. 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 He presented initially with abdominal pain, diarrhea, and progressive abdominal distention for 3 months duration. 2. Initial CT scan in February 2020 showed severe peritoneal carcinomatosis with large peritoneal tumor implants throughout the entire abdomen and pelvis, but mostly prominently in the pelvis.   2. PETCT scan showed interval increase in the amount of peritoneal carcinomatosis.  3. On 3/12/2020, he had ultrasound-guided core needle biopsy of a peritoneal implant (Case: [id number]), which showed a completely undifferentiated appearance, but stains with pancytokeratin, indicating an epithelial origin. 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 Tumor markers for CA-19-9, CEA, beta-hCG, alpha-fetoprotein were unremarkable. Cancer type ID molecular testing by [employer] sent to determine the exact diagnosis and to assist in further treatment planning. 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 Relative probability of less than 5% of other subtype of sarcoma. EWSR1-WT1 fusion detected.  4. 5/1/2020, MRI brain negative for brain metastasis, and baseline  CT-CAP obtained showing extensive mixed solid and cystic masses throughout the abdomen and pelvis consistent with peritoneal carcinomatosis. Largest mass measures approximately 20 cm in the pelvis. Metastatic mixed solid and cystic masses seen in hepatic segments 5 and 6 and hepatic segment 7. The masses appear to be contiguous with the retrohepatic peritoneal carcinomatosis. Small volume fluid about the spleen favored to represent malignant ascites, stable mild-to-moderate right hydronephrosis related to mass effect upon the distal ureter in the pelvis, the IVC and iliac veins are compressed due to the extensive peritoneal carcinomatosis without findings to suggest deep venous thrombosis.  5. 5/4/2020, he begins CAV/IMV alternating chemotherapy. He receives 6 cycles of treatment. He symptomatically improves.  6. 9/11/2020, CT-CAP shows stable to mildly improved extensive peritoneal carcinomatosis. He would like to omit Ifosfamide/etoposide cycles and continue only with CAV.  7. 10/9/2020, he starts CAV every 21 days.  8. 1/6/2021, CT CAP showed a mixed response in the mixed solid and cystic masses throughout the peritoneum. Some of the tumors are stable to mildly worse, with some of the peritoneal masses a bit larger, a few are smaller, one cystic tumor in the left abdomen is smaller, while tumors lower in the pelvis appear slightly larger.  9. 3/24/2021, CT CAP showed continued slight decrease in overall burden of peritoneal carcinomatosis with persistent encasement of bowel without evidence of bowel obstruction.  10. 6/25/2021, he receives cycle 19 CAV, then takes a chemotherapy holiday.    History of Present Illness:  Diego Buchanan, his sister and caregiver, and his mother presents.    Off of chemotherapy Robert has noticed a return of severe constipation. He is now taking Miralax twice daily to help with going to the rest room. He is going every 2-3 days and he has not much output. He spends a lot of time in the  bathroom. Urination is normal, though, and he denies pain or hematuria. Appetite is good and he is eating well. He endorses abdominal fullness, but no pain. No fevers or chills.    Review of Systems:  12-point ROS is negative except as in HPI    Current Outpatient Medications   Medication Sig Dispense Refill     allopurinol (ZYLOPRIM) 300 MG tablet  (Patient not taking: No sig reported)       CATHFLO ACTIVASE 2 MG injection  (Patient not taking: No sig reported)       dexamethasone (DECADRON) 4 MG tablet Take 2 tablets (8 mg) by mouth daily (with breakfast) for 3 days Start the day after doxorubicin, cyclophosphamide, and vincristine (odd cycles). 6 tablet 8     folic acid (FOLVITE) 1 MG tablet  (Patient not taking: No sig reported)       mesna (MESNEX) 400 MG TABS tablet Take 1 tablet (400 mg) by mouth every 4 hours for 2 doses Take one tablet 4 hours and 8 hours after end of cyclophosphamide infusion. 2 tablet 0     NEULASTA ONPRO 6 MG/0.6ML injection  (Patient not taking: No sig reported)       polyethylene glycol (MIRALAX) 17 g packet Take 17 g by mouth (Patient not taking: No sig reported)       senna-docusate (SENNA S) 8.6-50 MG tablet Take 1 tablet by mouth 2 times daily (Patient not taking: No sig reported) 60 tablet 0     Objective:  GENERAL: Healthy, alert and no distress  EYES: Eyes grossly normal to inspection.  No discharge or erythema, or obvious scleral/conjunctival abnormalities.  HENT: Normal cephalic/atraumatic.  External ears, nose and mouth without ulcers or lesions.  No nasal drainage visible.  NECK: No asymmetry, visible masses or scars  RESP: No audible wheeze, cough, or visible cyanosis.  No visible retractions or increased work of breathing.    SKIN: Visible skin clear. No significant rash, abnormal pigmentation or lesions.  NEURO: Cranial nerves grossly intact.  Mentation and speech appropriate for age.  PSYCH: Mentation appears normal, affect normal/bright, judgement and insight intact,  normal speech and appearance well-groomed.      Labs:  Lab on 04/12/2022   Component Date Value Ref Range Status     Sodium 04/12/2022 140  133 - 144 mmol/L Final     Potassium 04/12/2022 3.8  3.4 - 5.3 mmol/L Final     Chloride 04/12/2022 108  94 - 109 mmol/L Final     Carbon Dioxide (CO2) 04/12/2022 20  20 - 32 mmol/L Final     Anion Gap 04/12/2022 12  3 - 14 mmol/L Final     Urea Nitrogen 04/12/2022 10  7 - 30 mg/dL Final     Creatinine 04/12/2022 0.86  0.66 - 1.25 mg/dL Final     Calcium 04/12/2022 9.9  8.5 - 10.1 mg/dL Final     Glucose 04/12/2022 122 (A) 70 - 99 mg/dL Final     Alkaline Phosphatase 04/12/2022 117  40 - 150 U/L Final     AST 04/12/2022 30  0 - 45 U/L Final     ALT 04/12/2022 50  0 - 70 U/L Final     Protein Total 04/12/2022 8.5  6.8 - 8.8 g/dL Final     Albumin 04/12/2022 3.9  3.4 - 5.0 g/dL Final     Bilirubin Total 04/12/2022 0.9  0.2 - 1.3 mg/dL Final     GFR Estimate 04/12/2022 >90  >60 mL/min/1.73m2 Final     WBC Count 04/12/2022 11.7 (A) 4.0 - 11.0 10e3/uL Final     RBC Count 04/12/2022 5.19  4.40 - 5.90 10e6/uL Final     Hemoglobin 04/12/2022 14.0  13.3 - 17.7 g/dL Final     Hematocrit 04/12/2022 43.7  40.0 - 53.0 % Final     MCV 04/12/2022 84  78 - 100 fL Final     MCH 04/12/2022 27.0  26.5 - 33.0 pg Final     MCHC 04/12/2022 32.0  31.5 - 36.5 g/dL Final     RDW 04/12/2022 15.5 (A) 10.0 - 15.0 % Final     Platelet Count 04/12/2022 392  150 - 450 10e3/uL Final     % Neutrophils 04/12/2022 79  % Final     % Lymphocytes 04/12/2022 10  % Final     % Monocytes 04/12/2022 6  % Final     % Eosinophils 04/12/2022 3  % Final     % Basophils 04/12/2022 1  % Final     % Immature Granulocytes 04/12/2022 1  % Final     NRBCs per 100 WBC 04/12/2022 0  <1 /100 Final     Absolute Neutrophils 04/12/2022 9.3 (A) 1.6 - 8.3 10e3/uL Final     Absolute Lymphocytes 04/12/2022 1.2  0.8 - 5.3 10e3/uL Final     Absolute Monocytes 04/12/2022 0.8  0.0 - 1.3 10e3/uL Final     Absolute Eosinophils 04/12/2022 0.3   0.0 - 0.7 10e3/uL Final     Absolute Basophils 04/12/2022 0.1  0.0 - 0.2 10e3/uL Final     Absolute Immature Granulocytes 04/12/2022 0.1  <=0.4 10e3/uL Final     Absolute NRBCs 04/12/2022 0.0  10e3/uL Final         Imaging:  CT Chest/Abdomen/Pelvis w Contrast  Narrative: EXAM: CT CHEST/ABDOMEN/PELVIS W CONTRAST, 4/12/2022    TECHNIQUE:  Helical CT images from the thoracic inlet through the  symphysis pubis were obtained after the administration of intravenous  contrast. Coronal and sagittal reformatted images were generated at a  workstation for further assessment.     HISTORY: Desmoplastic small round cell tumor (H).    COMPARISON: CT 2/1/2022, 11/2/2021, and 5/4/2020.    FINDINGS:     Chest: No suspicious pulmonary lesion, acute airspace disease, pleural  effusion, or pneumothorax. There are enlarging bilateral  supraclavicular lymph nodes now measuring up to 2.0 x 1.3 cm left.  Increased size of a few prominent bilateral internal mammary lymph  nodes and several prominent and mildly enlarged anterior cardiophrenic  lymph node. Heart is normal size without pericardial effusion.  Nonaneurysmal thoracic aorta. No central pulmonary embolism.  Visualized thyroid gland is unremarkable..    Abdomen/Pelvis:   Interval increase in extensive peritoneal nodularity, greatest in the  lower abdomen/pelvis, since 2/1/2021 with increased superior extension  along the midline anterior abdominal wall and and upper quadrants.  Unchanged 10 cm cystic lesion in the anterior central abdomen. The  mass abuts multiple loops of small and large bowel. Associated mass  effect on the bladder is similar.    Multiple new and enlarging hypoattenuating lesions in the liver and  spleen. Layering biliary sludge without radiopaque stone. Mildly  atrophic pancreas without focal lesion or ductal dilation. The adrenal  glands, kidneys, ureters, and urinary bladder are unremarkable. Slight  interval enlargement of multiple retroperitoneal lymph nodes,  for  example nodes anterior and posterior to the IVC.    Bones / Soft Tissues: No suspicious osseous lesion or acute osseous  abnormality.  Impression: IMPRESSION:   Disease progression with increased peritoneal nodularity and worsening  metastatic burden in the liver, spleen, and lymph nodes (including  thoracic nodes). No appreciable disease in the bones or lungs.    I have personally reviewed the examination and initial interpretation  and I agree with the findings.    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 He tolerated CAV/IMV for 19 cycles and then was on chemotherapy break from June 2021-April 2022.     Given his increase in symptoms he would like to consider further rounds of CAV (dactinomycin containing)/IMV as this has worked previously. Other options include Topotecan/cyclophosphamide, Irinotecan/temozolomide, currently options for later lines of therapy.    We will resume CAV/IMV in 1-2 weeks.    Farzaneh Burciaga M.D.   of Medicine  Hematology, Oncology and Transplantation

## 2022-04-14 NOTE — LETTER
4/14/2022         RE: Diego Buchanan  [address]  [place] [address]        Dear Colleague,    Thank you for referring your patient, Diego Buchanan, to the [facility] CANCER [facility]. Please see a copy of my visit note below.    Diego is a 26 year old who is being evaluated via a billable video visit.      How would you like to obtain your AVS? MyChart  If the video visit is dropped, the invitation should be resent by: Text to cell phone: [phone]  Will anyone else be joining your video visit? Ashley TOBLERT    Video-Visit Details  Type of service:  Video Visit  Video Start Time: 2:45 PM  Video End Time:3:15 PM  Originating Location (pt. 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 He presented initially with abdominal pain, diarrhea, and progressive abdominal distention for 3 months duration. 2. Initial CT scan in February 2020 showed severe peritoneal carcinomatosis with large peritoneal tumor implants throughout the entire abdomen and pelvis, but mostly prominently in the pelvis.   2. PETCT scan showed interval increase in the amount of peritoneal carcinomatosis.  3. On 3/12/2020, he had ultrasound-guided core needle biopsy of a peritoneal implant (Case: [id number]), which showed a completely undifferentiated appearance, but stains with pancytokeratin, indicating an epithelial origin. 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 Tumor markers for CA-19-9, CEA, beta-hCG, alpha-fetoprotein were unremarkable. Cancer type ID molecular testing by [employer] sent to determine the exact diagnosis and to assist in further treatment planning. 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 Relative probability of less than 5% of other subtype of sarcoma. EWSR1-WT1 fusion detected.  4. 5/1/2020, MRI brain negative for brain metastasis, and baseline CT-CAP obtained showing extensive mixed solid and cystic masses throughout the abdomen and pelvis consistent with peritoneal carcinomatosis. Largest mass measures approximately 20 cm in the pelvis. Metastatic mixed solid and cystic masses seen in hepatic segments 5 and 6 and hepatic segment 7. The masses appear to be contiguous with the retrohepatic peritoneal carcinomatosis. Small volume fluid about the spleen favored to represent malignant ascites, stable mild-to-moderate right hydronephrosis related to mass effect upon the distal ureter in the pelvis, the IVC and iliac veins are compressed due to the extensive peritoneal carcinomatosis without findings to suggest deep venous thrombosis.  5. 5/4/2020, he begins CAV/IMV alternating chemotherapy. He receives 6 cycles of treatment. He symptomatically improves.  6. 9/11/2020, CT-CAP shows stable to mildly improved extensive peritoneal carcinomatosis. He would like to omit Ifosfamide/etoposide cycles and continue only with CAV.  7. 10/9/2020, he starts CAV every 21 days.  8. 1/6/2021, CT CAP showed a mixed response in the mixed solid and cystic masses throughout the peritoneum. Some of the tumors are stable to mildly worse, with some of the peritoneal masses a bit larger, a few are smaller, one cystic tumor in the left abdomen is smaller, while tumors lower in the pelvis appear slightly larger.  9. 3/24/2021, CT CAP showed continued slight decrease in overall burden of peritoneal carcinomatosis with persistent encasement of bowel without evidence of bowel obstruction.  10. 6/25/2021, he receives cycle 19 CAV, then takes a chemotherapy holiday.    History of Present Illness:  Diego Buchanan, his sister and caregiver, and  his mother presents.    Off of chemotherapy Robert has noticed a return of severe constipation. He is now taking Miralax twice daily to help with going to the rest room. He is going every 2-3 days and he has not much output. He spends a lot of time in the bathroom. Urination is normal, though, and he denies pain or hematuria. Appetite is good and he is eating well. He endorses abdominal fullness, but no pain. No fevers or chills.    Review of Systems:  12-point ROS is negative except as in HPI    Current Outpatient Medications   Medication Sig Dispense Refill     allopurinol (ZYLOPRIM) 300 MG tablet  (Patient not taking: No sig reported)       CATHFLO ACTIVASE 2 MG injection  (Patient not taking: No sig reported)       dexamethasone (DECADRON) 4 MG tablet Take 2 tablets (8 mg) by mouth daily (with breakfast) for 3 days Start the day after doxorubicin, cyclophosphamide, and vincristine (odd cycles). 6 tablet 8     folic acid (FOLVITE) 1 MG tablet  (Patient not taking: No sig reported)       mesna (MESNEX) 400 MG TABS tablet Take 1 tablet (400 mg) by mouth every 4 hours for 2 doses Take one tablet 4 hours and 8 hours after end of cyclophosphamide infusion. 2 tablet 0     NEULASTA ONPRO 6 MG/0.6ML injection  (Patient not taking: No sig reported)       polyethylene glycol (MIRALAX) 17 g packet Take 17 g by mouth (Patient not taking: No sig reported)       senna-docusate (SENNA S) 8.6-50 MG tablet Take 1 tablet by mouth 2 times daily (Patient not taking: No sig reported) 60 tablet 0     Objective:  GENERAL: Healthy, alert and no distress  EYES: Eyes grossly normal to inspection.  No discharge or erythema, or obvious scleral/conjunctival abnormalities.  HENT: Normal cephalic/atraumatic.  External ears, nose and mouth without ulcers or lesions.  No nasal drainage visible.  NECK: No asymmetry, visible masses or scars  RESP: No audible wheeze, cough, or visible cyanosis.  No visible retractions or increased work of breathing.     SKIN: Visible skin clear. No significant rash, abnormal pigmentation or lesions.  NEURO: Cranial nerves grossly intact.  Mentation and speech appropriate for age.  PSYCH: Mentation appears normal, affect normal/bright, judgement and insight intact, normal speech and appearance well-groomed.      Labs:  Lab on 04/12/2022   Component Date Value Ref Range Status     Sodium 04/12/2022 140  133 - 144 mmol/L Final     Potassium 04/12/2022 3.8  3.4 - 5.3 mmol/L Final     Chloride 04/12/2022 108  94 - 109 mmol/L Final     Carbon Dioxide (CO2) 04/12/2022 20  20 - 32 mmol/L Final     Anion Gap 04/12/2022 12  3 - 14 mmol/L Final     Urea Nitrogen 04/12/2022 10  7 - 30 mg/dL Final     Creatinine 04/12/2022 0.86  0.66 - 1.25 mg/dL Final     Calcium 04/12/2022 9.9  8.5 - 10.1 mg/dL Final     Glucose 04/12/2022 122 (A) 70 - 99 mg/dL Final     Alkaline Phosphatase 04/12/2022 117  40 - 150 U/L Final     AST 04/12/2022 30  0 - 45 U/L Final     ALT 04/12/2022 50  0 - 70 U/L Final     Protein Total 04/12/2022 8.5  6.8 - 8.8 g/dL Final     Albumin 04/12/2022 3.9  3.4 - 5.0 g/dL Final     Bilirubin Total 04/12/2022 0.9  0.2 - 1.3 mg/dL Final     GFR Estimate 04/12/2022 >90  >60 mL/min/1.73m2 Final     WBC Count 04/12/2022 11.7 (A) 4.0 - 11.0 10e3/uL Final     RBC Count 04/12/2022 5.19  4.40 - 5.90 10e6/uL Final     Hemoglobin 04/12/2022 14.0  13.3 - 17.7 g/dL Final     Hematocrit 04/12/2022 43.7  40.0 - 53.0 % Final     MCV 04/12/2022 84  78 - 100 fL Final     MCH 04/12/2022 27.0  26.5 - 33.0 pg Final     MCHC 04/12/2022 32.0  31.5 - 36.5 g/dL Final     RDW 04/12/2022 15.5 (A) 10.0 - 15.0 % Final     Platelet Count 04/12/2022 392  150 - 450 10e3/uL Final     % Neutrophils 04/12/2022 79  % Final     % Lymphocytes 04/12/2022 10  % Final     % Monocytes 04/12/2022 6  % Final     % Eosinophils 04/12/2022 3  % Final     % Basophils 04/12/2022 1  % Final     % Immature Granulocytes 04/12/2022 1  % Final     NRBCs per 100 WBC 04/12/2022 0  <1  /100 Final     Absolute Neutrophils 04/12/2022 9.3 (A) 1.6 - 8.3 10e3/uL Final     Absolute Lymphocytes 04/12/2022 1.2  0.8 - 5.3 10e3/uL Final     Absolute Monocytes 04/12/2022 0.8  0.0 - 1.3 10e3/uL Final     Absolute Eosinophils 04/12/2022 0.3  0.0 - 0.7 10e3/uL Final     Absolute Basophils 04/12/2022 0.1  0.0 - 0.2 10e3/uL Final     Absolute Immature Granulocytes 04/12/2022 0.1  <=0.4 10e3/uL Final     Absolute NRBCs 04/12/2022 0.0  10e3/uL Final         Imaging:  CT Chest/Abdomen/Pelvis w Contrast  Narrative: EXAM: CT CHEST/ABDOMEN/PELVIS W CONTRAST, 4/12/2022    TECHNIQUE:  Helical CT images from the thoracic inlet through the  symphysis pubis were obtained after the administration of intravenous  contrast. Coronal and sagittal reformatted images were generated at a  workstation for further assessment.     HISTORY: Desmoplastic small round cell tumor (H).    COMPARISON: CT 2/1/2022, 11/2/2021, and 5/4/2020.    FINDINGS:     Chest: No suspicious pulmonary lesion, acute airspace disease, pleural  effusion, or pneumothorax. There are enlarging bilateral  supraclavicular lymph nodes now measuring up to 2.0 x 1.3 cm left.  Increased size of a few prominent bilateral internal mammary lymph  nodes and several prominent and mildly enlarged anterior cardiophrenic  lymph node. Heart is normal size without pericardial effusion.  Nonaneurysmal thoracic aorta. No central pulmonary embolism.  Visualized thyroid gland is unremarkable..    Abdomen/Pelvis:   Interval increase in extensive peritoneal nodularity, greatest in the  lower abdomen/pelvis, since 2/1/2021 with increased superior extension  along the midline anterior abdominal wall and and upper quadrants.  Unchanged 10 cm cystic lesion in the anterior central abdomen. The  mass abuts multiple loops of small and large bowel. Associated mass  effect on the bladder is similar.    Multiple new and enlarging hypoattenuating lesions in the liver and  spleen. Layering  biliary sludge without radiopaque stone. Mildly  atrophic pancreas without focal lesion or ductal dilation. The adrenal  glands, kidneys, ureters, and urinary bladder are unremarkable. Slight  interval enlargement of multiple retroperitoneal lymph nodes, for  example nodes anterior and posterior to the IVC.    Bones / Soft Tissues: No suspicious osseous lesion or acute osseous  abnormality.  Impression: IMPRESSION:   Disease progression with increased peritoneal nodularity and worsening  metastatic burden in the liver, spleen, and lymph nodes (including  thoracic nodes). No appreciable disease in the bones or lungs.    I have personally reviewed the examination and initial interpretation  and I agree with the findings.    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 He tolerated CAV/IMV for 19 cycles and then was on chemotherapy break from June 2021-April 2022.     Given his increase in symptoms he would like to consider further rounds of CAV (dactinomycin containing)/IMV as this has worked previously. Other options include Topotecan/cyclophosphamide, Irinotecan/temozolomide, currently options for later lines of therapy.    We will resume CAV/IMV in 1-2 weeks.          Again, thank you for allowing me to participate in the care of your patient.      Sincerely,    Farzaneh Sims MD

## 2022-04-15 DIAGNOSIS — C41.9 SARCOMA, EWINGS (H): Primary | ICD-10-CM

## 2022-04-18 ENCOUNTER — LAB (OUTPATIENT)
Dept: LAB | Facility: CLINIC | Age: 27
End: 2022-04-18
Attending: INTERNAL MEDICINE
Payer: COMMERCIAL

## 2022-04-18 DIAGNOSIS — C41.9 SARCOMA, EWINGS (H): Primary | ICD-10-CM

## 2022-04-18 DIAGNOSIS — C41.9 SARCOMA, EWINGS (H): ICD-10-CM

## 2022-04-18 PROCEDURE — U0003 INFECTIOUS AGENT DETECTION BY NUCLEIC ACID (DNA OR RNA); SEVERE ACUTE RESPIRATORY SYNDROME CORONAVIRUS 2 (SARS-COV-2) (CORONAVIRUS DISEASE [COVID-19]), AMPLIFIED PROBE TECHNIQUE, MAKING USE OF HIGH THROUGHPUT TECHNOLOGIES AS DESCRIBED BY CMS-2020-01-R: HCPCS

## 2022-04-18 PROCEDURE — U0005 INFEC AGEN DETEC AMPLI PROBE: HCPCS

## 2022-04-18 RX ORDER — HEPARIN SODIUM,PORCINE 10 UNIT/ML
5 VIAL (ML) INTRAVENOUS
Status: CANCELLED | OUTPATIENT
Start: 2022-04-24

## 2022-04-18 RX ORDER — EPINEPHRINE 1 MG/ML
0.3 INJECTION, SOLUTION INTRAMUSCULAR; SUBCUTANEOUS EVERY 5 MIN PRN
Status: CANCELLED | OUTPATIENT
Start: 2022-04-22

## 2022-04-18 RX ORDER — MEPERIDINE HYDROCHLORIDE 25 MG/ML
25 INJECTION INTRAMUSCULAR; INTRAVENOUS; SUBCUTANEOUS EVERY 30 MIN PRN
Status: CANCELLED | OUTPATIENT
Start: 2022-04-22

## 2022-04-18 RX ORDER — HEPARIN SODIUM (PORCINE) LOCK FLUSH IV SOLN 100 UNIT/ML 100 UNIT/ML
5 SOLUTION INTRAVENOUS
Status: CANCELLED | OUTPATIENT
Start: 2022-04-22

## 2022-04-18 RX ORDER — NALOXONE HYDROCHLORIDE 0.4 MG/ML
0.2 INJECTION, SOLUTION INTRAMUSCULAR; INTRAVENOUS; SUBCUTANEOUS
Status: CANCELLED | OUTPATIENT
Start: 2022-04-22

## 2022-04-18 RX ORDER — METHYLPREDNISOLONE SODIUM SUCCINATE 125 MG/2ML
125 INJECTION, POWDER, LYOPHILIZED, FOR SOLUTION INTRAMUSCULAR; INTRAVENOUS
Status: CANCELLED
Start: 2022-04-22

## 2022-04-18 RX ORDER — DIPHENHYDRAMINE HYDROCHLORIDE 50 MG/ML
50 INJECTION INTRAMUSCULAR; INTRAVENOUS
Status: CANCELLED
Start: 2022-04-22

## 2022-04-18 RX ORDER — HEPARIN SODIUM,PORCINE 10 UNIT/ML
5 VIAL (ML) INTRAVENOUS
Status: CANCELLED | OUTPATIENT
Start: 2022-04-22

## 2022-04-18 RX ORDER — LORAZEPAM 2 MG/ML
0.5 INJECTION INTRAMUSCULAR EVERY 4 HOURS PRN
Status: CANCELLED | OUTPATIENT
Start: 2022-04-22

## 2022-04-18 RX ORDER — ALBUTEROL SULFATE 0.83 MG/ML
2.5 SOLUTION RESPIRATORY (INHALATION)
Status: CANCELLED | OUTPATIENT
Start: 2022-04-22

## 2022-04-18 RX ORDER — PALONOSETRON 0.05 MG/ML
0.25 INJECTION, SOLUTION INTRAVENOUS ONCE
Status: CANCELLED
Start: 2022-04-22

## 2022-04-18 RX ORDER — ALBUTEROL SULFATE 90 UG/1
1-2 AEROSOL, METERED RESPIRATORY (INHALATION)
Status: CANCELLED
Start: 2022-04-22

## 2022-04-18 RX ORDER — HEPARIN SODIUM (PORCINE) LOCK FLUSH IV SOLN 100 UNIT/ML 100 UNIT/ML
5 SOLUTION INTRAVENOUS
Status: CANCELLED | OUTPATIENT
Start: 2022-04-24

## 2022-04-19 LAB — SARS-COV-2 RNA RESP QL NAA+PROBE: NEGATIVE

## 2022-04-20 ENCOUNTER — ANESTHESIA EVENT (OUTPATIENT)
Dept: SURGERY | Facility: AMBULATORY SURGERY CENTER | Age: 27
End: 2022-04-20
Payer: COMMERCIAL

## 2022-04-21 ENCOUNTER — HOSPITAL ENCOUNTER (OUTPATIENT)
Facility: AMBULATORY SURGERY CENTER | Age: 27
Discharge: HOME | End: 2022-04-21
Attending: RADIOLOGY
Payer: COMMERCIAL

## 2022-04-21 ENCOUNTER — PATIENT OUTREACH (OUTPATIENT)
Dept: ONCOLOGY | Facility: CLINIC | Age: 27
End: 2022-04-21

## 2022-04-21 ENCOUNTER — TELEPHONE (OUTPATIENT)
Dept: ONCOLOGY | Facility: CLINIC | Age: 27
End: 2022-04-21

## 2022-04-21 ENCOUNTER — ANESTHESIA (OUTPATIENT)
Dept: SURGERY | Facility: AMBULATORY SURGERY CENTER | Age: 27
End: 2022-04-21
Payer: COMMERCIAL

## 2022-04-21 VITALS
OXYGEN SATURATION: 98 % | DIASTOLIC BLOOD PRESSURE: 86 MMHG | RESPIRATION RATE: 18 BRPM | BODY MASS INDEX: 44.25 KG/M2 | HEIGHT: 68 IN | SYSTOLIC BLOOD PRESSURE: 128 MMHG | WEIGHT: 292 LBS | HEART RATE: 113 BPM | TEMPERATURE: 96.9 F

## 2022-04-21 DIAGNOSIS — C41.9 SARCOMA, EWINGS (H): Primary | ICD-10-CM

## 2022-04-21 DIAGNOSIS — C49.9 DESMOPLASTIC SMALL ROUND CELL TUMOR (H): ICD-10-CM

## 2022-04-21 LAB — INR PPP: 0.98 (ref 0.85–1.15)

## 2022-04-21 PROCEDURE — 36561 INSERT TUNNELED CV CATH: CPT | Mod: RT | Performed by: RADIOLOGY

## 2022-04-21 DEVICE — CATH PORT POWERPORT CLEARVUE SLIM 6FR 5616000
Type: IMPLANTABLE DEVICE | Site: CHEST | Status: NON-FUNCTIONAL
Removed: 2023-01-20

## 2022-04-21 RX ORDER — HEPARIN SODIUM,PORCINE 10 UNIT/ML
5-10 VIAL (ML) INTRAVENOUS
Status: DISCONTINUED | OUTPATIENT
Start: 2022-04-21 | End: 2022-04-22 | Stop reason: HOSPADM

## 2022-04-21 RX ORDER — DEXTROSE MONOHYDRATE 25 G/50ML
25-50 INJECTION, SOLUTION INTRAVENOUS
Status: DISCONTINUED | OUTPATIENT
Start: 2022-04-21 | End: 2022-04-22 | Stop reason: HOSPADM

## 2022-04-21 RX ORDER — ONDANSETRON 2 MG/ML
4 INJECTION INTRAMUSCULAR; INTRAVENOUS EVERY 30 MIN PRN
Status: DISCONTINUED | OUTPATIENT
Start: 2022-04-21 | End: 2022-04-22 | Stop reason: HOSPADM

## 2022-04-21 RX ORDER — HEPARIN SODIUM (PORCINE) LOCK FLUSH IV SOLN 100 UNIT/ML 100 UNIT/ML
5-10 SOLUTION INTRAVENOUS
Status: DISCONTINUED | OUTPATIENT
Start: 2022-04-21 | End: 2022-04-22 | Stop reason: HOSPADM

## 2022-04-21 RX ORDER — LIDOCAINE HYDROCHLORIDE 10 MG/ML
INJECTION, SOLUTION EPIDURAL; INFILTRATION; INTRACAUDAL; PERINEURAL PRN
Status: DISCONTINUED | OUTPATIENT
Start: 2022-04-21 | End: 2022-04-21 | Stop reason: HOSPADM

## 2022-04-21 RX ORDER — ONDANSETRON 4 MG/1
4 TABLET, FILM COATED ORAL EVERY 6 HOURS PRN
Qty: 30 TABLET | Refills: 6 | Status: SHIPPED | OUTPATIENT
Start: 2022-04-21 | End: 2022-01-01

## 2022-04-21 RX ORDER — ACETAMINOPHEN 325 MG/1
975 TABLET ORAL ONCE
Status: COMPLETED | OUTPATIENT
Start: 2022-04-21 | End: 2022-04-21

## 2022-04-21 RX ORDER — LIDOCAINE 40 MG/G
CREAM TOPICAL
Status: DISCONTINUED | OUTPATIENT
Start: 2022-04-21 | End: 2022-04-22 | Stop reason: HOSPADM

## 2022-04-21 RX ORDER — KETOROLAC TROMETHAMINE 30 MG/ML
15 INJECTION, SOLUTION INTRAMUSCULAR; INTRAVENOUS EVERY 6 HOURS PRN
Status: DISCONTINUED | OUTPATIENT
Start: 2022-04-21 | End: 2022-04-22 | Stop reason: HOSPADM

## 2022-04-21 RX ORDER — NICOTINE POLACRILEX 4 MG
15-30 LOZENGE BUCCAL
Status: DISCONTINUED | OUTPATIENT
Start: 2022-04-21 | End: 2022-04-22 | Stop reason: HOSPADM

## 2022-04-21 RX ORDER — OXYCODONE HYDROCHLORIDE 5 MG/1
5 TABLET ORAL EVERY 4 HOURS PRN
Status: DISCONTINUED | OUTPATIENT
Start: 2022-04-21 | End: 2022-04-22 | Stop reason: HOSPADM

## 2022-04-21 RX ORDER — HEPARIN SODIUM,PORCINE 10 UNIT/ML
5-10 VIAL (ML) INTRAVENOUS EVERY 24 HOURS
Status: DISCONTINUED | OUTPATIENT
Start: 2022-04-21 | End: 2022-04-22 | Stop reason: HOSPADM

## 2022-04-21 RX ORDER — LORAZEPAM 2 MG/ML
.5-1 INJECTION INTRAMUSCULAR
Status: DISCONTINUED | OUTPATIENT
Start: 2022-04-21 | End: 2022-04-22 | Stop reason: HOSPADM

## 2022-04-21 RX ORDER — CEFAZOLIN SODIUM IN 0.9 % NACL 3 G/100 ML
3 INTRAVENOUS SOLUTION, PIGGYBACK (ML) INTRAVENOUS
Status: COMPLETED | OUTPATIENT
Start: 2022-04-21 | End: 2022-04-21

## 2022-04-21 RX ORDER — SODIUM CHLORIDE, SODIUM LACTATE, POTASSIUM CHLORIDE, CALCIUM CHLORIDE 600; 310; 30; 20 MG/100ML; MG/100ML; MG/100ML; MG/100ML
INJECTION, SOLUTION INTRAVENOUS CONTINUOUS
Status: DISCONTINUED | OUTPATIENT
Start: 2022-04-21 | End: 2022-04-22 | Stop reason: HOSPADM

## 2022-04-21 RX ORDER — LABETALOL HYDROCHLORIDE 5 MG/ML
10 INJECTION, SOLUTION INTRAVENOUS
Status: DISCONTINUED | OUTPATIENT
Start: 2022-04-21 | End: 2022-04-22 | Stop reason: HOSPADM

## 2022-04-21 RX ORDER — LIDOCAINE HYDROCHLORIDE 20 MG/ML
INJECTION, SOLUTION INFILTRATION; PERINEURAL PRN
Status: DISCONTINUED | OUTPATIENT
Start: 2022-04-21 | End: 2022-04-21

## 2022-04-21 RX ORDER — FENTANYL CITRATE 50 UG/ML
25 INJECTION, SOLUTION INTRAMUSCULAR; INTRAVENOUS
Status: DISCONTINUED | OUTPATIENT
Start: 2022-04-21 | End: 2022-04-22 | Stop reason: HOSPADM

## 2022-04-21 RX ORDER — DACTINOMYCIN 0.5 MG/ML
2.5 INJECTION, POWDER, LYOPHILIZED, FOR SOLUTION INTRAVENOUS ONCE
Status: CANCELLED
Start: 2022-04-22

## 2022-04-21 RX ORDER — PROCHLORPERAZINE MALEATE 10 MG
10 TABLET ORAL EVERY 6 HOURS PRN
Qty: 30 TABLET | Refills: 5 | Status: ON HOLD | OUTPATIENT
Start: 2022-04-21 | End: 2022-01-01

## 2022-04-21 RX ORDER — MEPERIDINE HYDROCHLORIDE 25 MG/ML
12.5 INJECTION INTRAMUSCULAR; INTRAVENOUS; SUBCUTANEOUS
Status: DISCONTINUED | OUTPATIENT
Start: 2022-04-21 | End: 2022-04-22 | Stop reason: HOSPADM

## 2022-04-21 RX ORDER — ALBUTEROL SULFATE 0.83 MG/ML
2.5 SOLUTION RESPIRATORY (INHALATION) EVERY 4 HOURS PRN
Status: DISCONTINUED | OUTPATIENT
Start: 2022-04-21 | End: 2022-04-22 | Stop reason: HOSPADM

## 2022-04-21 RX ORDER — HYDRALAZINE HYDROCHLORIDE 20 MG/ML
2.5-5 INJECTION INTRAMUSCULAR; INTRAVENOUS EVERY 10 MIN PRN
Status: DISCONTINUED | OUTPATIENT
Start: 2022-04-21 | End: 2022-04-22 | Stop reason: HOSPADM

## 2022-04-21 RX ORDER — SODIUM CHLORIDE 9 MG/ML
INJECTION, SOLUTION INTRAVENOUS CONTINUOUS
Status: DISCONTINUED | OUTPATIENT
Start: 2022-04-21 | End: 2022-04-22 | Stop reason: HOSPADM

## 2022-04-21 RX ORDER — FENTANYL CITRATE 50 UG/ML
25 INJECTION, SOLUTION INTRAMUSCULAR; INTRAVENOUS EVERY 5 MIN PRN
Status: DISCONTINUED | OUTPATIENT
Start: 2022-04-21 | End: 2022-04-22 | Stop reason: HOSPADM

## 2022-04-21 RX ORDER — ONDANSETRON 4 MG/1
4 TABLET, ORALLY DISINTEGRATING ORAL EVERY 30 MIN PRN
Status: DISCONTINUED | OUTPATIENT
Start: 2022-04-21 | End: 2022-04-22 | Stop reason: HOSPADM

## 2022-04-21 RX ORDER — PROPOFOL 10 MG/ML
INJECTION, EMULSION INTRAVENOUS CONTINUOUS PRN
Status: DISCONTINUED | OUTPATIENT
Start: 2022-04-21 | End: 2022-04-21

## 2022-04-21 RX ORDER — HYDROMORPHONE HYDROCHLORIDE 1 MG/ML
0.2 INJECTION, SOLUTION INTRAMUSCULAR; INTRAVENOUS; SUBCUTANEOUS EVERY 5 MIN PRN
Status: DISCONTINUED | OUTPATIENT
Start: 2022-04-21 | End: 2022-04-22 | Stop reason: HOSPADM

## 2022-04-21 RX ADMIN — PROPOFOL 150 MCG/KG/MIN: 10 INJECTION, EMULSION INTRAVENOUS at 08:36

## 2022-04-21 RX ADMIN — Medication 3 G: at 08:18

## 2022-04-21 RX ADMIN — PROPOFOL 150 MCG/KG/MIN: 10 INJECTION, EMULSION INTRAVENOUS at 08:21

## 2022-04-21 RX ADMIN — LIDOCAINE HYDROCHLORIDE 100 MG: 20 INJECTION, SOLUTION INFILTRATION; PERINEURAL at 08:21

## 2022-04-21 RX ADMIN — ACETAMINOPHEN 975 MG: 325 TABLET ORAL at 07:28

## 2022-04-21 RX ADMIN — SODIUM CHLORIDE, SODIUM LACTATE, POTASSIUM CHLORIDE, CALCIUM CHLORIDE: 600; 310; 30; 20 INJECTION, SOLUTION INTRAVENOUS at 07:29

## 2022-04-21 RX ADMIN — PROPOFOL 150 MCG/KG/MIN: 10 INJECTION, EMULSION INTRAVENOUS at 08:43

## 2022-04-21 NOTE — TELEPHONE ENCOUNTER
Prior Authorization Not Needed per Insurance    Medication: Etoposide- PA not needed  Insurance Company: [employer] - Phone [phone] Fax [phone]  Expected CoPay: $0    Pharmacy Filling the Rx: [facility] - [place], [place] - [address] [place] [address]  Pharmacy Notified: No  Patient Notified: Yes

## 2022-04-21 NOTE — DISCHARGE INSTRUCTIONS
A collaboration between [facility] and [facility]  Experts in minimally invasive, targeted treatments performed using imaging guidance    Venous Access Device,  Port Catheter or Tunneled or Non-Tunneled Central Line Placement    Today you had a procedure today to install a venous access device; either a tunneled central vein catheter or a subcutaneous port catheter.    After you go home:  Drink plenty of fluids.  Generally 6-8 (8 ounce) glasses a day is recommended.  Resume your regular diet unless otherwise ordered by a medical provider.  Keep any applied tape/gauze dressings clean and dry.  Change tape/gauze dressings if they get wet or soiled.  You may shower the following day after procedure, however cover and protect from moisture any tape/gauze dressings.  You may let water hit and run over dried skin glue, but do not scrub.  Pat the area dry after showering.  Port placement incisions are closed with absorbable suture, meaning they do not need to be removed at a later date, and a topical skin adhesive (skin glue).  This glue will wear off in 7-14 days.  Do not remove before this time.  If 14 days have passed and residual glue is present, you may gently remove it.  Do not apply gels, lotions, or ointments to the glue site for the first 10 days as this may cause the glue to prematurely soften and fail.  Do not perform strenuous activities or lift greater than 10 pounds for the next three days.  If there is bleeding or oozing from the procedure site, apply firm pressure to the area for 5-10 minutes.  If the bleeding continues seek medical advice at the numbers below.  Mild procedure site discomfort can be treated with an ice pack and over-the-counter pain relievers.  You received 975 mg of Tylenol at 7:30 am, ok to take more after 1:30 pm today. Then follow bottle instructions.        For 24 hours after any sedation used:  Relax and take it easy.  No strenuous  activities.  Do not drive or operate machines at home or at work.  No alcohol consumption.  Do not make any important or legal decisions.    Call our Interventional Radiology (IR) service if:  If you start bleeding from the procedure site.  If you do start to bleed from the site, lie down and hold some pressure on the site.  Our radiology provider can help you decide if you need to return to the hospital.  If you have new or worsening pain related to the procedure.  If you have concerning swelling at the procedure site.  If you develop persistent nausea or vomiting.  If you develop hives or a rash or any unexplained itching.  If you have a fever of greater than 100.5  F and chills in the first 5 days after procedure.  Any other concerns related to your procedure.      [facility]  Interventional Radiology (IR)  [address]  [address] [facility] [place] [address]    Contact Number:  [phone]  (IR control desk)  Monday - Friday 8:00 am - 4:30 pm    After hours for urgent concerns:  [phone]  After 4:30 pm Monday - Friday, Weekends and Holidays.   Ask for Interventional Radiology on-call.  Someone is available 24 hours a day.  [facility] toll free number:  [phone]

## 2022-04-21 NOTE — ANESTHESIA CARE TRANSFER NOTE
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    Anesthesia Type:   No value filed.     Note:    Oropharynx: spontaneously breathing  Level of Consciousness: awake  Oxygen Supplementation: room air    Independent Airway: airway patency satisfactory and stable  Dentition: dentition unchanged  Vital Signs Stable: post-procedure vital signs reviewed and stable  Report to RN Given: handoff report given  Patient transferred to: Phase II    Handoff Report: Identifed the Patient, Identified the Reponsible Provider, Reviewed the pertinent medical history, Discussed the surgical course, Reviewed Intra-OP anesthesia mangement and issues during anesthesia, Set expectations for post-procedure period and Allowed opportunity for questions and acknowledgement of understanding      Vitals:  Vitals Value Taken Time   /81 04/21/22 0900   Temp 36.1  C (96.9  F) 04/21/22 0900   Pulse 114 04/21/22 0900   Resp 16 04/21/22 0900   SpO2 98 % 04/21/22 0900       Electronically Signed By: STEVEN Tapia CRNA  April 21, 2022  9:03 AM

## 2022-04-21 NOTE — PROCEDURES
Diego Buchanan  [id number]    Completed placement of a 6 Polish, 26 cm, single lumen venous chest power-injectable port via RIJV.  Tip lying at the SVC/right atrial junction. PORT is ready for immediate use.  Dx:  Bhanu GREEN

## 2022-04-21 NOTE — BRIEF OP NOTE
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  Complications: None.  Implants:   Implant Name Type Inv. Item Serial No.  Lot No. LRB No. Used Action   CATH PORT POWERPORT CLEARVUE SLIM [id number] - [id number] Port CATH PORT POWERPORT CLEARVUE SLIM [id number]   [employer] [id number] Right 1 Implanted       6 Sami 26 cm single lumen PowerPort ClearVUE Slim port placed via right internal jugular vein. Tip in the high right atrium. Heparin locked and ready for use.

## 2022-04-22 ENCOUNTER — APPOINTMENT (OUTPATIENT)
Dept: LAB | Facility: CLINIC | Age: 27
End: 2022-04-22
Attending: INTERNAL MEDICINE
Payer: COMMERCIAL

## 2022-04-22 ENCOUNTER — INFUSION THERAPY VISIT (OUTPATIENT)
Dept: ONCOLOGY | Facility: CLINIC | Age: 27
End: 2022-04-22
Attending: INTERNAL MEDICINE
Payer: COMMERCIAL

## 2022-04-22 VITALS
OXYGEN SATURATION: 98 % | TEMPERATURE: 98 F | WEIGHT: 293.5 LBS | HEIGHT: 68 IN | SYSTOLIC BLOOD PRESSURE: 123 MMHG | HEART RATE: 117 BPM | DIASTOLIC BLOOD PRESSURE: 85 MMHG | RESPIRATION RATE: 16 BRPM | BODY MASS INDEX: 44.48 KG/M2

## 2022-04-22 DIAGNOSIS — C49.9 DESMOPLASTIC SMALL ROUND CELL TUMOR (H): ICD-10-CM

## 2022-04-22 DIAGNOSIS — C41.9 SARCOMA, EWINGS (H): Primary | ICD-10-CM

## 2022-04-22 DIAGNOSIS — R52 PAIN: ICD-10-CM

## 2022-04-22 LAB
ALBUMIN SERPL-MCNC: 3.7 G/DL (ref 3.4–5)
ALP SERPL-CCNC: 110 U/L (ref 40–150)
ALT SERPL W P-5'-P-CCNC: 37 U/L (ref 0–70)
ANION GAP SERPL CALCULATED.3IONS-SCNC: 10 MMOL/L (ref 3–14)
AST SERPL W P-5'-P-CCNC: 26 U/L (ref 0–45)
BASOPHILS # BLD AUTO: 0.1 10E3/UL (ref 0–0.2)
BASOPHILS NFR BLD AUTO: 1 %
BILIRUB SERPL-MCNC: 0.6 MG/DL (ref 0.2–1.3)
BUN SERPL-MCNC: 5 MG/DL (ref 7–30)
CALCIUM SERPL-MCNC: 9.1 MG/DL (ref 8.5–10.1)
CHLORIDE BLD-SCNC: 108 MMOL/L (ref 94–109)
CO2 SERPL-SCNC: 24 MMOL/L (ref 20–32)
CREAT SERPL-MCNC: 0.89 MG/DL (ref 0.66–1.25)
EOSINOPHIL # BLD AUTO: 0.3 10E3/UL (ref 0–0.7)
EOSINOPHIL NFR BLD AUTO: 3 %
ERYTHROCYTE [DISTWIDTH] IN BLOOD BY AUTOMATED COUNT: 15.3 % (ref 10–15)
GFR SERPL CREATININE-BSD FRML MDRD: >90 ML/MIN/1.73M2
GLUCOSE BLD-MCNC: 101 MG/DL (ref 70–99)
HCT VFR BLD AUTO: 40.9 % (ref 40–53)
HGB BLD-MCNC: 12.7 G/DL (ref 13.3–17.7)
IMM GRANULOCYTES # BLD: 0.1 10E3/UL
IMM GRANULOCYTES NFR BLD: 1 %
LYMPHOCYTES # BLD AUTO: 1.1 10E3/UL (ref 0.8–5.3)
LYMPHOCYTES NFR BLD AUTO: 11 %
MCH RBC QN AUTO: 26 PG (ref 26.5–33)
MCHC RBC AUTO-ENTMCNC: 31.1 G/DL (ref 31.5–36.5)
MCV RBC AUTO: 84 FL (ref 78–100)
MONOCYTES # BLD AUTO: 0.6 10E3/UL (ref 0–1.3)
MONOCYTES NFR BLD AUTO: 6 %
NEUTROPHILS # BLD AUTO: 8.1 10E3/UL (ref 1.6–8.3)
NEUTROPHILS NFR BLD AUTO: 78 %
NRBC # BLD AUTO: 0 10E3/UL
NRBC BLD AUTO-RTO: 0 /100
PLATELET # BLD AUTO: 361 10E3/UL (ref 150–450)
POTASSIUM BLD-SCNC: 3.9 MMOL/L (ref 3.4–5.3)
PROT SERPL-MCNC: 8 G/DL (ref 6.8–8.8)
RBC # BLD AUTO: 4.89 10E6/UL (ref 4.4–5.9)
SODIUM SERPL-SCNC: 142 MMOL/L (ref 133–144)
WBC # BLD AUTO: 10.1 10E3/UL (ref 4–11)

## 2022-04-22 PROCEDURE — 96415 CHEMO IV INFUSION ADDL HR: CPT

## 2022-04-22 PROCEDURE — 96411 CHEMO IV PUSH ADDL DRUG: CPT

## 2022-04-22 PROCEDURE — 96377 APPLICATON ON-BODY INJECTOR: CPT | Mod: 59

## 2022-04-22 PROCEDURE — 96372 THER/PROPH/DIAG INJ SC/IM: CPT | Performed by: INTERNAL MEDICINE

## 2022-04-22 PROCEDURE — 80053 COMPREHEN METABOLIC PANEL: CPT | Performed by: INTERNAL MEDICINE

## 2022-04-22 PROCEDURE — 96375 TX/PRO/DX INJ NEW DRUG ADDON: CPT

## 2022-04-22 PROCEDURE — 96367 TX/PROPH/DG ADDL SEQ IV INF: CPT

## 2022-04-22 PROCEDURE — 250N000011 HC RX IP 250 OP 636: Performed by: INTERNAL MEDICINE

## 2022-04-22 PROCEDURE — 85025 COMPLETE CBC W/AUTO DIFF WBC: CPT | Performed by: INTERNAL MEDICINE

## 2022-04-22 PROCEDURE — 96413 CHEMO IV INFUSION 1 HR: CPT

## 2022-04-22 PROCEDURE — 258N000003 HC RX IP 258 OP 636: Performed by: INTERNAL MEDICINE

## 2022-04-22 RX ORDER — HEPARIN SODIUM (PORCINE) LOCK FLUSH IV SOLN 100 UNIT/ML 100 UNIT/ML
500 SOLUTION INTRAVENOUS ONCE
Status: COMPLETED | OUTPATIENT
Start: 2022-04-22 | End: 2022-04-22

## 2022-04-22 RX ORDER — HEPARIN SODIUM (PORCINE) LOCK FLUSH IV SOLN 100 UNIT/ML 100 UNIT/ML
5 SOLUTION INTRAVENOUS
Status: DISCONTINUED | OUTPATIENT
Start: 2022-04-22 | End: 2022-04-22 | Stop reason: HOSPADM

## 2022-04-22 RX ORDER — DACTINOMYCIN 0.5 MG/ML
2.5 INJECTION, POWDER, LYOPHILIZED, FOR SOLUTION INTRAVENOUS ONCE
Status: CANCELLED
Start: 2022-04-22

## 2022-04-22 RX ORDER — LIDOCAINE/PRILOCAINE 2.5 %-2.5%
CREAM (GRAM) TOPICAL PRN
Qty: 30 G | Refills: 1 | Status: ON HOLD | OUTPATIENT
Start: 2022-04-22 | End: 2022-01-01

## 2022-04-22 RX ORDER — PALONOSETRON 0.05 MG/ML
0.25 INJECTION, SOLUTION INTRAVENOUS ONCE
Status: COMPLETED | OUTPATIENT
Start: 2022-04-22 | End: 2022-04-22

## 2022-04-22 RX ORDER — LIDOCAINE/PRILOCAINE 2.5 %-2.5%
CREAM (GRAM) TOPICAL
Status: CANCELLED
Start: 2022-04-22

## 2022-04-22 RX ORDER — DACTINOMYCIN 0.5 MG/ML
2.5 INJECTION, POWDER, LYOPHILIZED, FOR SOLUTION INTRAVENOUS ONCE
Status: COMPLETED | OUTPATIENT
Start: 2022-04-22 | End: 2022-04-22

## 2022-04-22 RX ADMIN — DACTINOMYCIN 2.5 MG: 0.5 INJECTION, POWDER, LYOPHILIZED, FOR SOLUTION INTRAVENOUS at 11:10

## 2022-04-22 RX ADMIN — MESNA 3000 MG: 100 INJECTION, SOLUTION INTRAVENOUS at 09:11

## 2022-04-22 RX ADMIN — VINCRISTINE SULFATE 2 MG: 1 INJECTION, SOLUTION INTRAVENOUS at 09:02

## 2022-04-22 RX ADMIN — PEGFILGRASTIM 6 MG: KIT SUBCUTANEOUS at 11:25

## 2022-04-22 RX ADMIN — Medication 5 ML: at 11:30

## 2022-04-22 RX ADMIN — PALONOSETRON HYDROCHLORIDE 0.25 MG: 0.25 INJECTION INTRAVENOUS at 08:00

## 2022-04-22 RX ADMIN — SODIUM CHLORIDE 250 ML: 9 INJECTION, SOLUTION INTRAVENOUS at 08:08

## 2022-04-22 RX ADMIN — Medication 500 UNITS: at 06:50

## 2022-04-22 RX ADMIN — DEXAMETHASONE SODIUM PHOSPHATE: 10 INJECTION, SOLUTION INTRAMUSCULAR; INTRAVENOUS at 08:08

## 2022-04-22 ASSESSMENT — PAIN SCALES - GENERAL: PAINLEVEL: NO PAIN (0)

## 2022-04-22 NOTE — PATIENT INSTRUCTIONS
[place] - take 2 tablets at 3pm and 2 tablets at 7pm.     Neulasta injection will start on Saturday 4/23 at 2:30, approximately 27 hours after application applied today.    When the dose delivery starts, it will take about 45 minutes to complete. You can take it off your arm around 3:15pm when the light is solid green.   Neulasta Onpro On-Body should have green flashing light.  Call triage or on-call MD if injector flashes red or appears to be leaking.   Keep Onpro On-Body Neulasta 4 inches away from electrical equipment.  Avoid showering 4 hours prior to injection.       Contact Numbers    [facility] Main Line/Triage and after hours / weekends / holidays: [phone]      Please call the triage or after hours line if you experience a temperature greater than or equal to 100.5, shaking chills, have uncontrolled nausea, vomiting and/or diarrhea, dizziness, shortness of breath, chest pain, bleeding, unexplained bruising, or if you have any other new/concerning symptoms, questions or concerns.      If you are having any concerning symptoms or wish to speak to a provider before your next infusion visit, please call your care coordinator or triage to notify them so we can adequately serve you.     If you need a refill on a narcotic prescription or other medication, please call before your infusion appointment.

## 2022-04-22 NOTE — PROGRESS NOTES
[facility]: Cancer Care Plan of Care Education Note                                    Discussion with Patient:                                                      Patient is restarting chemo.  Reviewed infection protocols, when to call RNCC.          Goals        General    Healthy Coping           Assessment:                                                      Assessment completed with:: Patient    Current living arrangement:: I live in a private home with family                  Intervention/Education provided during outreach:                                                       Questions answered.    Follow-up 2 days post chemo.    Signature:  Roula Newman MBA, MSN, RN, ONC  RN Care Coordinator  [facility]

## 2022-04-22 NOTE — NURSING NOTE
"Chief Complaint   Patient presents with     Port Draw     Labs drawn via port by RN in lab.  VS taken       Port accessed with 20 gauge 3/4\" Power needle by RN, labs collected, line flushed with saline and heparin.  Vitals taken. Pt checked in for appointment(s).    Mariola Clark RN    "

## 2022-04-24 NOTE — ANESTHESIA PREPROCEDURE EVALUATION
Anesthesia Pre-Procedure Evaluation    Patient: Diego Buchanan   MRN: [id number] : 1995        Procedure : Procedure(s):  INSERTION, VASCULAR ACCESS PORT          Past Medical History:   Diagnosis Date     Hypertension      Learning disability     but pt is his own gaurdian     Peritoneal carcinomatosis (H)       Past Surgical History:   Procedure Laterality Date     BIOPSY      liver     INSERT PORT VASCULAR ACCESS Right 2022    Procedure: INSERTION, VASCULAR ACCESS PORT;  Surgeon: Daniel Sarmiento MD;  Location: [facility] OR     IR CHEST PORT PLACEMENT > 5 YRS OF AGE  2022     IR CVC TUNNEL PLACEMENT > 5 YRS OF AGE  2020     IR CVC TUNNEL REMOVAL RIGHT  2021     US MUSCLE BIOPSY  3/12/2020      Allergies   Allergen Reactions     Tegaderm Chg Dressing [Chlorhexidine]      Tegaderm Transparent Dressing (Informational Only) Itching     Tegaderm dressing; Okay for short periods of time      Social History     Tobacco Use     Smoking status: Never Smoker     Smokeless tobacco: Never Used   Substance Use Topics     Alcohol use: Never      Wt Readings from Last 1 Encounters:   22 133.1 kg (293 lb 8 oz)        Anesthesia Evaluation   Pt has had prior anesthetic.     No history of anesthetic complications       ROS/MED HX  ENT/Pulmonary:     (+) GURINDER risk factors, hypertension, obese,     Neurologic:  - neg neurologic ROS     Cardiovascular:     (+) hypertension-----    METS/Exercise Tolerance: 3 - Able to walk 1-2 blocks without stopping    Hematologic:  - neg hematologic  ROS     Musculoskeletal:  - neg musculoskeletal ROS     GI/Hepatic:  - neg GI/hepatic ROS     Renal/Genitourinary:  - neg Renal ROS     Endo:  - neg endo ROS   (+) Obesity,     Psychiatric/Substance Use:  - neg psychiatric ROS     Infectious Disease:  - neg infectious disease ROS     Malignancy:   (+) Malignancy, History of Other.    Other:  - neg other ROS          Physical Exam    Airway  airway exam normal            Respiratory Devices and Support         Dental  no notable dental history         Cardiovascular   cardiovascular exam normal          Pulmonary   pulmonary exam normal                OUTSIDE LABS:  CBC:   Lab Results   Component Value Date    WBC 10.1 04/22/2022    WBC 11.7 (H) 04/12/2022    HGB 12.7 (L) 04/22/2022    HGB 14.0 04/12/2022    HCT 40.9 04/22/2022    HCT 43.7 04/12/2022     04/22/2022     04/12/2022     BMP:   Lab Results   Component Value Date     04/22/2022     04/12/2022    POTASSIUM 3.9 04/22/2022    POTASSIUM 3.8 04/12/2022    CHLORIDE 108 04/22/2022    CHLORIDE 108 04/12/2022    CO2 24 04/22/2022    CO2 20 04/12/2022    BUN 5 (L) 04/22/2022    BUN 10 04/12/2022    CR 0.89 04/22/2022    CR 0.86 04/12/2022     (H) 04/22/2022     (H) 04/12/2022     COAGS:   Lab Results   Component Value Date    PTT 33 03/11/2020    INR 0.98 04/21/2022     POC: No results found for: BGM, HCG, HCGS  HEPATIC:   Lab Results   Component Value Date    ALBUMIN 3.7 04/22/2022    PROTTOTAL 8.0 04/22/2022    ALT 37 04/22/2022    AST 26 04/22/2022     (H) 03/25/2021    ALKPHOS 110 04/22/2022    BILITOTAL 0.6 04/22/2022     OTHER:   Lab Results   Component Value Date    LACT 1.2 03/11/2020    A1C 5.8 12/13/2016    FAISAL 9.1 04/22/2022    PHOS 3.4 06/10/2021    MAG 2.2 06/10/2021    LIPASE 25 03/10/2020    CRP 4.5 (H) 02/18/2020       Anesthesia Plan    ASA Status:  3   NPO Status:  NPO Appropriate    Anesthesia Type: MAC.     - Reason for MAC: straight local not clinically adequate   Induction: N/a.   Maintenance: TIVA.        Consents    Anesthesia Plan(s) and associated risks, benefits, and realistic alternatives discussed. Questions answered and patient/representative(s) expressed understanding.    - Discussed:     - Discussed with:  Patient         Postoperative Care    Pain management: Multi-modal analgesia.   PONV prophylaxis: Ondansetron (or other 5HT-3)     Comments:                 Finesse Saavedra MD

## 2022-04-24 NOTE — ANESTHESIA POSTPROCEDURE EVALUATION
Patient: Diego Buchanan    Procedure: Procedure(s):  INSERTION, VASCULAR ACCESS PORT       Anesthesia Type:  MAC    Note:  Disposition: Outpatient   Postop Pain Control: Uneventful            Sign Out: Well controlled pain   PONV: No   Neuro/Psych: Uneventful            Sign Out: Acceptable/Baseline neuro status   Airway/Respiratory: Uneventful            Sign Out: Acceptable/Baseline resp. status   CV/Hemodynamics: Uneventful            Sign Out: Acceptable CV status; No obvious hypovolemia; No obvious fluid overload   Other NRE: NONE   DID A NON-ROUTINE EVENT OCCUR? No           Last vitals:  Vitals Value Taken Time   /85 04/22/22 0811   Temp 36.7  C (98  F) 04/22/22 0637   Pulse 117 04/22/22 0637   Resp 16 04/22/22 0637   SpO2 98 % 04/22/22 0637       Electronically Signed By: Finesse Saavedra MD  April 23, 2022  8:15 PM  
Not Applicable

## 2022-04-28 ENCOUNTER — TELEPHONE (OUTPATIENT)
Dept: ONCOLOGY | Facility: CLINIC | Age: 27
End: 2022-04-28
Payer: COMMERCIAL

## 2022-04-28 NOTE — PROGRESS NOTES
CLINICAL NUTRITION SERVICES- ONCOLOGY DISTRESS SCREENING     Identified Concern and Score From Distress Screening: This patient has scored >= 6 on Nutrition question 1 and/or 2 on the Oncology Distress Screening questionaire. Nutritionist Referral recommended. See Onc Distress Screening Flowsheet     Date of Distress Screenin/14     Findings: Diego reports that his appetite is good currently. No nutrition questions at this time.      Follow-up Required: LUIS [address], [place]  [phone]

## 2022-05-11 ENCOUNTER — TELEPHONE (OUTPATIENT)
Dept: ONCOLOGY | Facility: CLINIC | Age: 27
End: 2022-05-11
Payer: COMMERCIAL

## 2022-05-11 NOTE — TELEPHONE ENCOUNTER
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  Spoke to sister Shelia     ORAL CHEMOTHERAPY 2/13/2021 3/7/2021 4/3/2021 5/1/2021 6/6/2021 7/14/2021 5/11/2022   Assessment Type Chart Review Chart Review Chart Review Chart Review Chart Review Chart Review Initial Work up   Diagnosis Code (No Data) - (No Data) (No Data) (No Data) (No Data) (No Data)   Providers Dr. Morales Nielsen   [facility]   Drug Name Etoposide Etoposide Etoposide Etoposide Etoposide Etoposide Etoposide   Dose 200 mg 200 mg 200 mg 200 mg 200 mg 200 mg 200 mg   Current Schedule Daily Daily Daily Daily Daily Daily Daily   Cycle Details Other Other Other Other Other Drug on Hold -   Start Date of Last Cycle 2/11/2021 - - - - - -   Planned next cycle start date - - - - - - 5/13/2022   Doses missed in last 2 weeks - - - - - - -   Adherence Assessment - - - - - - -   Adverse Effects - - - - - - -   Nausea - - - - - - -   Pharmacist Intervention(nausea) - - - - - - -   Any new drug interactions? - - - - - - No   Is the dose as ordered appropriate for the patient? - - - - - - -   Is the patient currently in pain? - - - - - - -   Has the patient been assessed within the past 6 months for depression? - - - - - - -   Has the patient missed any days of school, work, or other routine activity? - - - - - - -   Since the last time we talked, have you been hospitalized or used the  "emergency room? - - - - - - -       Vitals:  BP:   BP Readings from Last 1 Encounters:   04/22/22 123/85     Wt Readings from Last 1 Encounters:   04/22/22 133.1 kg (293 lb 8 oz)     Estimated body surface area is 2.53 meters squared as calculated from the following:    Height as of 4/22/22: 1.73 m (5' 8.11\").    Weight as of 4/22/22: 133.1 kg (293 lb 8 oz).      Labs:  _  Result Component Current Result Ref Range   Sodium 142 (4/22/2022) 133 - 144 mmol/L     _  Result Component Current Result Ref Range   Potassium 3.9 (4/22/2022) 3.4 - 5.3 mmol/L     _  Result Component Current Result Ref Range   Calcium 9.1 (4/22/2022) 8.5 - 10.1 mg/dL     No results found for Mag within last 30 days.     No results found for Phos within last 30 days.     _  Result Component Current Result Ref Range   Albumin 3.7 (4/22/2022) 3.4 - 5.0 g/dL     _  Result Component Current Result Ref Range   Urea Nitrogen 5 (L) (4/22/2022) 7 - 30 mg/dL     _  Result Component Current Result Ref Range   Creatinine 0.89 (4/22/2022) 0.66 - 1.25 mg/dL       _  Result Component Current Result Ref Range   AST 26 (4/22/2022) 0 - 45 U/L     _  Result Component Current Result Ref Range   ALT 37 (4/22/2022) 0 - 70 U/L     _  Result Component Current Result Ref Range   Bilirubin Total 0.6 (4/22/2022) 0.2 - 1.3 mg/dL       _  Result Component Current Result Ref Range   WBC Count 10.1 (4/22/2022) 4.0 - 11.0 10e3/uL     _  Result Component Current Result Ref Range   Hemoglobin 12.7 (L) (4/22/2022) 13.3 - 17.7 g/dL     _  Result Component Current Result Ref Range   Platelet Count 361 (4/22/2022) 150 - 450 10e3/uL     No results found for ANC within last 30 days.       Assessment:  Patient is appropriate to start therapy.    Plan:  Basic chemotherapy teaching was reviewed with the patient's caregiver (Sister Shelia)  including indication, start date of therapy, dose, administration, adverse effects, missed doses, food and drug interactions, monitoring, side effect " management, office contact information, and safe handling. Written materials were provided and all questions answered.    Follow up- 1 week after start of therapy    Maria E GarciaD, BCOP  May 11, 2022

## 2022-05-13 NOTE — PROGRESS NOTES
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 He presented initially with abdominal pain, diarrhea, and progressive abdominal distention for 3 months duration. 2. Initial CT scan in February 2020 showed severe peritoneal carcinomatosis with large peritoneal tumor implants throughout the entire abdomen and pelvis, but mostly prominently in the pelvis.   2. PETCT scan showed interval increase in the amount of peritoneal carcinomatosis.  3. On 3/12/2020, he had ultrasound-guided core needle biopsy of a peritoneal implant (Case: [id number]), which showed a completely undifferentiated appearance, but stains with pancytokeratin, indicating an epithelial origin. 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 Tumor markers for CA-19-9, CEA, beta-hCG, alpha-fetoprotein were unremarkable. Cancer type ID molecular testing by [facility] sent to determine the exact diagnosis and to assist in further treatment planning. 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 Relative probability of less than 5% of other subtype of sarcoma. EWSR1-WT1 fusion detected.  4. 5/1/2020, MRI brain negative for brain metastasis, and baseline CT-CAP obtained showing extensive mixed solid and cystic masses throughout the abdomen and pelvis consistent with peritoneal carcinomatosis. Largest mass measures approximately 20 cm in the pelvis. Metastatic mixed solid and cystic masses seen in hepatic segments 5 and 6 and hepatic segment 7. The masses appear to be contiguous with the retrohepatic peritoneal carcinomatosis. Small volume fluid about the spleen favored to represent malignant ascites, stable mild-to-moderate right hydronephrosis related to mass effect upon the distal ureter in the pelvis, the IVC and iliac  veins are compressed due to the extensive peritoneal carcinomatosis without findings to suggest deep venous thrombosis.  5. 5/4/2020, he begins CAV/IMV alternating chemotherapy. He receives 6 cycles of treatment. He symptomatically improves.  6. 9/11/2020, CT-CAP shows stable to mildly improved extensive peritoneal carcinomatosis. He would like to omit Ifosfamide/etoposide cycles and continue only with CAV.  7. 10/9/2020, he starts CAV every 21 days.  8. 1/6/2021, CT CAP showed a mixed response in the mixed solid and cystic masses throughout the peritoneum. Some of the tumors are stable to mildly worse, with some of the peritoneal masses a bit larger, a few are smaller, one cystic tumor in the left abdomen is smaller, while tumors lower in the pelvis appear slightly larger.  9. 3/24/2021, CT CAP showed continued slight decrease in overall burden of peritoneal carcinomatosis with persistent encasement of bowel without evidence of bowel obstruction.  10. 6/25/2021, he receives cycle 19 CAV, then takes a chemotherapy holiday.    History of Present Illness:  Diego Buchanan presents today for follow-up prior to IMV with his sister and caregiver. He tolerated resumption of CAV remarkably well with really no concerning side effect. The constipation has improved with use of Miralax 1-2 times daily. He denies any nausea, mucositis, fatigue, numbness or tingling or fever/chills following his infusion 3 weeks ago. Appetite is good. Neither he or his sister have concerns today.    Review of Systems:  12-point ROS is negative except as in HPI    Current Outpatient Medications   Medication Sig Dispense Refill     lidocaine-prilocaine (EMLA) 2.5-2.5 % external cream Apply topically as needed for moderate pain 30 g 1     allopurinol (ZYLOPRIM) 300 MG tablet  (Patient not taking: No sig reported)       CATHFLO ACTIVASE 2 MG injection  (Patient not taking: No sig reported)       etoposide (VEPESID) 50 MG capsule Take 4 capsules (200  mg) by mouth daily for 6 days Days 1 through 6. 24 capsule 0     folic acid (FOLVITE) 1 MG tablet  (Patient not taking: No sig reported)       mesna (MESNEX) 400 MG TABS tablet Take 2 tablets (800 mg) by mouth every 4 hours for 2 doses Take two tablets 4 hours and 8 hours after end of cyclophosphamide infusion. 4 tablet 0     NEULASTA ONPRO 6 MG/0.6ML injection  (Patient not taking: No sig reported)       ondansetron (ZOFRAN) 4 MG tablet Take 1 tablet (4 mg) by mouth every 6 hours as needed for nausea (Patient not taking: Reported on 5/13/2022) 30 tablet 6     polyethylene glycol (MIRALAX) 17 g packet Take 17 g by mouth (Patient not taking: No sig reported)       prochlorperazine (COMPAZINE) 10 MG tablet Take 1 tablet (10 mg) by mouth every 6 hours as needed (Nausea/Vomiting) (Patient not taking: Reported on 5/13/2022) 30 tablet 5     senna-docusate (SENNA S) 8.6-50 MG tablet Take 1 tablet by mouth 2 times daily (Patient not taking: No sig reported) 60 tablet 0     Objective:/80   Pulse (!) 125   Temp 98  F (36.7  C) (Oral)   Resp 16   Wt 129.5 kg (285 lb 9.6 oz)   SpO2 98%   BMI 43.29 kg/m    General: Well-appearing male in no acute distress.  Eyes: EOMI, PERRL. No scleral icterus.  ENT: Oral mucosa is moist without lesions or thrush.   Lymphatic: Neck is supple without cervical or supraclavicular lymphadenopathy.   Cardiovascular: RRR No murmurs, gallops, or rubs. No peripheral edema.  Respiratory: CTA bilaterally. No wheezes or crackles.  Gastrointestinal: BS +. Abdomen soft, non-tender.  Skin: No rashes, petechiae, or bruising noted on exposed skin.    Labs:   Most Recent 3 CBC's:  Recent Labs   Lab Test 05/13/22  1156 04/22/22  0649 04/12/22  0925   WBC 12.1* 10.1 11.7*   HGB 12.4* 12.7* 14.0   MCV 86 84 84    361 392   ANEUTAUTO 10.0* 8.1 9.3*    Most Recent 3 BMP's:  Recent Labs   Lab Test 05/13/22  1156 04/22/22  0649 04/12/22  0925    142 140   POTASSIUM 3.6 3.9 3.8   CHLORIDE 106  108 108   CO2 24 24 20   BUN 8 5* 10   CR 0.84 0.89 0.86   ANIONGAP 11 10 12   FAISAL 9.4 9.1 9.9   GLC 98 101* 122*   PROTTOTAL 8.4 8.0 8.5   ALBUMIN 3.9 3.7 3.9    Most Recent 2 LFT's:  Recent Labs   Lab Test 05/13/22  1156 04/22/22  0649   AST 23 26   ALT 34 37   ALKPHOS 102 110   BILITOTAL 0.7 0.6    Most Recent TSH and T4:No lab results found.    I reviewed the above labs today.    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 Buchanan is 27 year old male with advanced intraabdominal DSRCT with extensive peritoneal disease, lymph node metastasis, and liver and bone involvement. He tolerated CAV/IMV for 19 cycles and then was on chemotherapy break from June 2021-April 2022. Given his symptoms he desired to resume chemotherapy with further CAV/IMV as this worked well previously. He resumed treatment with CAV on 4/22/22.  When on treatment previously he did experience hematuria questionably related to the ifosfamide despite use of Mesna versus metastatic bladder invasion.  We will watch this closely as he resumes IMV.  Alternative treatment options include topotecan/cyclophosphamide or irinotecan/temozolomide.   -Proceed with cycle 2 (IMV) today with neulasta support  -Check CBC with diff, CMP, magnesium and phosphorus days 3, 5,7  -RTC in 3 weeks with LISY for cycle 3 (CAV)    #HTN  #Tachycardia  Previously on metoprolol 75 mg for his BP with good relief, stopped due to improvement. Now with recurrent tachycardia and intermittent higher pressure. Will resume at 50 mg daily and adjust based on response. Hold if systolic < 105 or HR <65    45 minutes spent on the date of the encounter doing chart review, review of test results, interpretation of tests, patient visit and documentation     Yasmine Perez CNP  [facility] [address] [address]  [phone]

## 2022-05-13 NOTE — PROGRESS NOTES
Infusion Nursing Note:  Diego Buchanan presents today for Day 1 Cycle 2 Ifos/Mesna pump connect. .    Patient seen by provider today: Yes: Yasmine Perez CNP   present during visit today: Not Applicable.    Note: Patient presents to infusion feeling ok. Patient denies acute discomfort and states no acute complaints or concerns not addressed during visit with Yasmine Perez CNP. Patient and sister both voice understanding to start PO Etoposide today as ordered. Patient educated on Ifos/MESNA mechanism of action, possible side effects, and overall schedule of labs/bag exchange/disconnect with verbalized understanding. Sister Mariela requesting refill on Metoprolol. Patient would previously take medication PRN for elevated BP-last Metoprolol 75mg prescription ended 5/13/2021.    TORB. 5/13/2022. 1351. Yasmine Perez CNP. Nitin Duron RN. cbc, cmp, mg and phos lab on days 3,5,7 during cycle. Patient to hold Metoprolol if systolic is less than 105 and or heart rate less than 65.     Sister Mariela states she will buy a blood pressure cuff and hold medication as recommended if situation arises.    The Following schedule has been reviewed with both patient, sister, [facility], and RN Care Coordinator Roberta Menard.   5/13 Day 1: Ifos/MESNA pump connect   5/14 Day 2:   5/15 Day 3: lab  5/16 Day 4:  5/17 Day 5: lab  5/18 Day 6: last day of PO Etoposide   5/19: Day 7: Lab IFOS/MESNA disconnect/bag exchange MESNA only  5/20: Day 8 pump disconnect and Neulasta injection      Intravenous Access:  Implanted Port.    Treatment Conditions:  Lab Results   Component Value Date    HGB 12.4 (L) 05/13/2022    WBC 12.1 (H) 05/13/2022    ANEU 11.7 (H) 06/23/2021    ANEUTAUTO 10.0 (H) 05/13/2022     05/13/2022      Lab Results   Component Value Date     05/13/2022    POTASSIUM 3.6 05/13/2022    MAG 2.2 06/10/2021    CR 0.84 05/13/2022    FAISAL 9.4 05/13/2022    BILITOTAL 0.7 05/13/2022    ALBUMIN 3.9 05/13/2022    ALT 34 05/13/2022  "   AST 23 05/13/2022     Results reviewed, labs MET treatment parameters, ok to proceed with treatment.      Post Infusion Assessment:  Patient tolerated infusion without incident.  Blood return noted pre and post infusion.  Site patent and intact, free from redness, edema or discomfort.  No evidence of extravasations.     Prior to discharge: Port is secured in place with tegaderm and flushed with 10cc NS with positive blood return noted.  Continuous home infusion CADD connected.    All connectors secured in place, clamps taped open, all verified with Karly DUNAWAY RN  Pump started, \"running\" noted on display (CADD): Yes  Patient instructed to call our clinic or [facility] with any questions or concerns at home.  Patient verbalized understanding.    Patient set up for pump disconnect/bag exchange  at home with [facility] on 5/19/2022 at 1530 . Appointment for labs and bag exchange/disconne t/neulasta verified by Nimco Desir RN from [facility].   CADD pump maintenance  education reviewed: Assess tubing for any kinks, notify [facility] of any alarms or leaks.     Discharge Plan:   Prescription refills given for Etoposide. Pt will receive Metoprolol from [facility].  Discharge instructions reviewed with: Patient.  Patient and/or family verbalized understanding of discharge instructions and all questions answered.  AVS to patient via [employer].  Patient will return 6/3 for next appointment.   Patient discharged in stable condition accompanied by: sister.  Departure Mode: Ambulatory.  Face to Face time: 0 minutes.      Nitin Duron RN                      "

## 2022-05-13 NOTE — NURSING NOTE
"Oncology Rooming Note    May 13, 2022 12:20 PM   Diego Buchanan is a 27 year old male who presents for:    Chief Complaint   Patient presents with     Port Draw     Labs drawn via port by RN in lab.  VS taken     Initial Vitals: /80   Pulse (!) 125   Temp 98  F (36.7  C) (Oral)   Resp 16   Wt 129.5 kg (285 lb 9.6 oz)   SpO2 98%   BMI 43.29 kg/m   Estimated body mass index is 43.29 kg/m  as calculated from the following:    Height as of 4/22/22: 1.73 m (5' 8.11\").    Weight as of this encounter: 129.5 kg (285 lb 9.6 oz). Body surface area is 2.49 meters squared.  No Pain (0) Comment: Data Unavailable   No LMP for male patient.  Allergies reviewed: Yes  Medications reviewed: Yes    Medications: MEDICATION REFILLS NEEDED TODAY. Provider was notified. Pt would like a prescription for Metoprolol.    Pharmacy name entered into infibond:    [id number] - [place], [place] - [address] [place] [address] & [facility] [place], [place] - [phone]    Clinical concerns: none       Sulma Lopes"

## 2022-05-13 NOTE — NURSING NOTE
"Chief Complaint   Patient presents with     Port Draw     Labs drawn via port by RN in lab.  VS taken       Port accessed with 20 gauge 3/4\" gripper needle by RN, labs collected, line flushed with saline and heparin.  Vitals taken. Pt checked in for appointment(s).    Mariola Clark RN    "

## 2022-05-13 NOTE — PATIENT INSTRUCTIONS
5/13 Day 1: Ifos/MESNA pump connect   5/14 Day 2:   5/15 Day 3: lab  5/16 Day 4:  5/17 Day 5: lab  5/18 Day 6:  5/19: Day 7: Lab IFOS/MESNA disconnect/bag exchange MESNA only  5/20: Day 8 pump disconnect and Neulasta injection      [facility] Triage and after hours / weekends / holidays:  [phone]    Please call the triage or after hours line if you experience a temperature greater than or equal to 100.4, shaking chills, have uncontrolled nausea, vomiting and/or diarrhea, dizziness, shortness of breath, chest pain, bleeding, unexplained bruising, or if you have any other new/concerning symptoms, questions or concerns.      If you are having any concerning symptoms or wish to speak to a provider before your next infusion visit, please call your care coordinator or triage to notify them so we can adequately serve you.     If you need a refill on a narcotic prescription or other medication, please call before your infusion appointment.                 May 2022      Manjinder Monday Tuesday Wednesday Thursday Friday Saturday   1     2     3     4     5     6     7  Happy Birthday!       8     9     10     11     12     13    LAB CENTRAL  11:45 AM   (15 min.)   [facility] DRAW   [facility]    RETURN  12:00 PM   (45 min.)   Yasmine Perez CNP   [facility]    ONC INFUSION 3 HR (180 MIN)   2:00 PM   (180 min.)    ONC INFUSION NURSE   [facility] 14       15     16     17     18     19     20     21       22     23     24     25     26     27     28       29     30     31 June 2022 Sunday Monday Tuesday Wednesday Thursday Friday Saturday                  1     2    RETURN   4:30 PM   (30 min.)   Farzaneh Young MD   [facility] 3    LAB PERIPHERAL   7:45 AM   (15 min.)    [facility]   [facility]    ONC INFUSION 4 HR (240  MIN)   8:00 AM   (240 min.)    ONC INFUSION NURSE   [facility] 4       5     6     7     8     9     10     11       12     13     14     15     16     17     18       19     20     21     22     23     24    VIDEO VISIT RETURN   9:00 AM   (45 min.)   Yasmine Mckeon PA-C   [facility]    LAB PERIPHERAL  12:00 PM   (15 min.)   [facility] LAB DRAW   [facility]    ONC INFUSION 3 HR (180 MIN)  12:30 PM   (180 min.)    ONC INFUSION NURSE   [facility] 25       26     27     28     29     30                                 Lab Results:  Recent Results (from the past 12 hour(s))   Comprehensive metabolic panel    Collection Time: 05/13/22 11:56 AM   Result Value Ref Range    Sodium 141 133 - 144 mmol/L    Potassium 3.6 3.4 - 5.3 mmol/L    Chloride 106 94 - 109 mmol/L    Carbon Dioxide (CO2) 24 20 - 32 mmol/L    Anion Gap 11 3 - 14 mmol/L    Urea Nitrogen 8 7 - 30 mg/dL    Creatinine 0.84 0.66 - 1.25 mg/dL    Calcium 9.4 8.5 - 10.1 mg/dL    Glucose 98 70 - 99 mg/dL    Alkaline Phosphatase 102 40 - 150 U/L    AST 23 0 - 45 U/L    ALT 34 0 - 70 U/L    Protein Total 8.4 6.8 - 8.8 g/dL    Albumin 3.9 3.4 - 5.0 g/dL    Bilirubin Total 0.7 0.2 - 1.3 mg/dL    GFR Estimate >90 >60 mL/min/1.73m2   CBC with platelets and differential    Collection Time: 05/13/22 11:56 AM   Result Value Ref Range    WBC Count 12.1 (H) 4.0 - 11.0 10e3/uL    RBC Count 4.62 4.40 - 5.90 10e6/uL    Hemoglobin 12.4 (L) 13.3 - 17.7 g/dL    Hematocrit 39.5 (L) 40.0 - 53.0 %    MCV 86 78 - 100 fL    MCH 26.8 26.5 - 33.0 pg    MCHC 31.4 (L) 31.5 - 36.5 g/dL    RDW 17.2 (H) 10.0 - 15.0 %    Platelet Count 343 150 - 450 10e3/uL    % Neutrophils 82 %    % Lymphocytes 6 %    % Monocytes 9 %    % Eosinophils 0 %    % Basophils 2 %    % Immature Granulocytes 1 %    NRBCs per 100 WBC 0 <1 /100    Absolute Neutrophils 10.0 (H) 1.6 - 8.3 10e3/uL     Absolute Lymphocytes 0.8 0.8 - 5.3 10e3/uL    Absolute Monocytes 1.1 0.0 - 1.3 10e3/uL    Absolute Eosinophils 0.0 0.0 - 0.7 10e3/uL    Absolute Basophils 0.2 0.0 - 0.2 10e3/uL    Absolute Immature Granulocytes 0.1 <=0.4 10e3/uL    Absolute NRBCs 0.0 10e3/uL

## 2022-05-19 NOTE — ORAL ONC MGMT
Subjective/Objective:  Diego Buchanan is a 27 year old male contacted by phone for a follow-up visit for oral chemotherapy.  Pt confirms they took Etoposide 200mg daily as prescribed. Pt reports no missed doses. Pt reports starting cycle on 5/13/22. Pt reports tolerating the medication well. Pt denies side effects including vomiting, diarrhea, constipation, loss of appetite and fatigue. Diego experienced some nausea, but then received Compazine and Zofran presciptions that relieved this. No ongoing issues with nausea. He is up and walking about and sister reports a good appetite.    ORAL CHEMOTHERAPY 3/7/2021 4/3/2021 5/1/2021 6/6/2021 7/14/2021 5/11/2022 5/19/2022   Assessment Type Chart Review Chart Review Chart Review Chart Review Chart Review Initial Work up Initial Follow up   Diagnosis Code - (No Data) (No Data) (No Data) (No Data) (No Data) -   Providers Dr. Morales Nielsen   [facility]   Drug Name Etoposide Etoposide Etoposide Etoposide Etoposide Etoposide Etoposide   Dose 200 mg 200 mg 200 mg 200 mg 200 mg 200 mg 200 mg   Current Schedule Daily Daily Daily Daily Daily Daily Daily   Cycle Details Other Other Other Other Drug on Hold - -   Start Date of Last Cycle - - - - - - 5/13/2022   Planned next cycle start date - - - - - 5/13/2022 -   Doses missed in last 2 weeks - - - - - - 0   Adherence Assessment - - - - - - Adherent   Adverse Effects - - - - - - Nausea   Nausea - - - - - - Grade 1   Pharmacist Intervention(nausea) - - - - - - No   Any new drug interactions? - - - - - No No   Is the dose as ordered appropriate for the patient? - - - - - - Yes   Is the patient currently in pain? - - - - - - -   Has the patient been assessed within the past 6 months for depression? - - - - - - -   Has the patient missed any days of school, work, or other  "routine activity? - - - - - - -   Since the last time we talked, have you been hospitalized or used the emergency room? - - - - - - No       Vitals:  BP:   BP Readings from Last 1 Encounters:   05/13/22 120/77     Wt Readings from Last 1 Encounters:   05/13/22 129.5 kg (285 lb 9.6 oz)     Estimated body surface area is 2.49 meters squared as calculated from the following:    Height as of 4/22/22: 1.73 m (5' 8.11\").    Weight as of 5/13/22: 129.5 kg (285 lb 9.6 oz).    Labs:  _  Result Component Current Result Ref Range   Sodium 137 (5/17/2022) 133 - 144 mmol/L     Result Component Current Result Ref Range   Potassium 3.8 (5/17/2022) 3.4 - 5.3 mmol/L     Result Component Current Result Ref Range   Calcium 9.4 (5/17/2022) 8.5 - 10.1 mg/dL     Result Component Current Result Ref Range   Magnesium 2.2 (5/17/2022) 1.6 - 2.3 mg/dL     Result Component Current Result Ref Range   Phosphorus 3.1 (5/17/2022) 2.5 - 4.5 mg/dL     Result Component Current Result Ref Range   Albumin 3.8 (5/17/2022) 3.4 - 5.0 g/dL     Result Component Current Result Ref Range   Urea Nitrogen 11 (5/17/2022) 7 - 30 mg/dL     Result Component Current Result Ref Range   Creatinine 0.77 (5/17/2022) 0.66 - 1.25 mg/dL     Result Component Current Result Ref Range   AST 17 (5/17/2022) 0 - 45 U/L     Result Component Current Result Ref Range   ALT 33 (5/17/2022) 0 - 70 U/L     Result Component Current Result Ref Range   Bilirubin Total 1.1 (5/17/2022) 0.2 - 1.3 mg/dL     Result Component Current Result Ref Range   WBC Count 9.2 (5/17/2022) 4.0 - 11.0 10e3/uL     Result Component Current Result Ref Range   Hemoglobin 11.8 (L) (5/17/2022) 13.3 - 17.7 g/dL     Result Component Current Result Ref Range   Platelet Count 379 (5/17/2022) 150 - 450 10e3/uL     Result Component Current Result Ref Range   Absolute Neutrophils 8.7 (H) (5/17/2022) 1.6 - 8.3 10e3/uL       Assessment/Plan:  Pt is currently tolerating therapy well. Plan to continue treatment plan as " prescribed. Pt verbalized understanding and agrees to plan. Encouraged pt to call with any issues or concerns.    Follow-Up:  6/2: Dr Sims visit      Kelley Herrera, PharmD  Oral Chemotherapy Monitoring Program  [facility]  [phone]  May 19, 2022

## 2022-05-20 NOTE — PROGRESS NOTES
Skilled nurse visit in the home for discontinuation of Ifos/mesna 18.07gm in 1008 mL over 144 hours. Neulasta onpro applied at 1600. Mesna 3.77gm IV over 24 hours in 260 mL started at 1615.     Patient reporting no side effects related to therapy at this time.    Giovana De RN, OCN  I

## 2022-05-25 NOTE — PROGRESS NOTES
This is a recent snapshot of the patient's [facility] medical record.  For current drug dose and complete information and questions, call [phone] or In Basket pool, [facility] home infusion ([id number])  CSN Number:  [id number]

## 2022-06-01 NOTE — DISCHARGE INSTRUCTIONS
Discharge Summary     MRN: [id number], Monica Apgar is a 76year old female admitted for fevers. Chief Complaint   Patient presents with   â¢ Shortness of Breath   . Admission Date/Time     2022  4:55 PM     Discharge Date/Time  2022  5:10 PM     Consultants:   Infectious Disease  Urology    Hospital Course  Ms. Yovani Crump is very pleasant 76year old lady with oncological history significant for AML, who was admitted for new onset fevers. She was started on IV Zosyn however, her ID work up was negative for infection, fevers resolved. Her hospital course was complicated by lethargy and urinary retention. She was seen by urology, no intervention needed and she spontaneously began voiding. She c/o of retention one again and was sent home with a hernández catheter and to follow up with urology. She was discharged with ongoing medrol dose ayush and plan for follow up n the BMT clinic. Lab Results  No results found for this or any previous visit (from the past 24 hour(s)). Test Results Pending At Discharge    Discharge Diagnosis  Fever of unknown origin   Plan    Patient Active Problem List   Diagnosis   â¢ At high risk of tumor lysis syndrome   â¢ At risk for infection   â¢ Chronic back pain   â¢ Hypothyroidism   â¢ Chronic Hypoxic Respiratory Failure   â¢ Anxiety   â¢ Depression   â¢ Coagulopathy (CMS/HCC)   â¢ Acute leukemia (CMS/HCC)   â¢ Severe protein-calorie malnutrition (CMS/HCC)   â¢ Hypokalemia   â¢ Acute respiratory failure (CMS/HCC)   â¢ GI bleed   â¢ Atrial fibrillation with RVR (CMS/HCC)   â¢ Fever of unknown origin   â¢ C. difficile diarrhea   â¢ Pancytopenia due to antineoplastic chemotherapy (CMS/HCC)   â¢ Pneumonia of left lower lobe due to infectious organism     Immunizations:  Pneumovax: Not Applicable. Influenza: Not Applicable. Condition at Discharge:  Good  Discharge Instructions  Follow up with BMT clinic.      Discharge Medications       Summary of your Discharge Medications      Take these MRI Contrast Discharge Instructions    The IV contrast you received today will pass out of your body in your  urine. This will happen in the next 24 hours. You will not feel this process.  Your urine will not change color.    Drink at least 4 extra glasses of water or juice today (unless your doctor  has restricted your fluids). This reduces the stress on your kidneys.  You may take your regular medicines.    If you are on dialysis: It is best to have dialysis today.    If you have a reaction: Most reactions happen right away. If you have  any new symptoms after leaving the hospital (such as hives or swelling),  call your hospital at the correct number below. Or call your family doctor.  If you have breathing distress or wheezing, call 911.    Special instructions: ***    I have read and understand the above information.    Signature:______________________________________ Date:___________    Staff:__________________________________________ Date:___________     Time:__________    [facility] Departments:    ___[facility]: [phone]  ___[facility]: [phone]  ___[facility]: [phone] ___[facility]: [phone]  ___[facility]: [phone]  ___[facility]: [phone]  ___[phone]  ___[facility]: [phone]  ___[phone]   Medications      Details   albuterol 108 (90 Base) MCG/ACT inhaler   Inhale 2 puffs into the lungs every 4 hours as needed for Shortness of Breath or Wheezing. ALPRAZolam 0.5 MG tablet  Commonly known as: XANAX   Take 1 tablet by mouth 2 times daily as needed for Anxiety. DRY EYES OP   Apply 1 drop to eye 3 times daily. Unknown brand for dry eye     fluticasone-umeclidin-vilanterol 100-62.5-25 MCG/INH inhaler  Commonly known as: TRELEGY ELLIPTA   Inhale 1 puff into the lungs daily. gabapentin 300 MG capsule  Commonly known as: NEURONTIN   Take 300 mg by mouth 2 times daily. levothyroxine 100 MCG tablet   Take 1 tablet by mouth daily (before breakfast). Do not start before April 28, 2022. methylPREDNISolone 4 MG tablet  Commonly known as: MEDROL DOSEPAK   Take 1 tablet by mouth as directed. follow package directions     metoPROLOL tartrate 25 MG tablet  Commonly known as: LOPRESSOR   Take 0.25 tablets by mouth every 12 hours. midostaurin 25 MG capsule  Commonly known as: RYDAPT   Take 1 capsule by mouth 2 times daily. Started 5/13/22 for 14 days. DO NOT START UNTIL INSTRUCTED BY PHYSICIAN     mirtazapine 15 MG tablet  Commonly known as: REMERON   Take 1 tablet by mouth nightly. morphine SR 15 MG 12 hr tablet  Commonly known as: MS CONTIN   Take 1 tablet by mouth every 12 hours. naLOXone 4 MG/0.1ML nasal spray  Commonly known as: NARCAN   Spray the content of 1 device into 1 nostril. Call 911. May repeat with 2nd device in alternate nostril if no response in 2-3 minutes. nicotine polacrilex 4 MG lozenge  Commonly known as: COMMIT      oxygen gas  Commonly known as: O2   Inhale 2 L/min into the lungs continuous. pantoprazole 40 MG tablet  Commonly known as: PROTONIX   Take 1 tablet by mouth 2 times daily. polyethylene glycol 17 g packet  Commonly known as: MIRALAX   Take 17 g by mouth daily as needed.  Indications: Constipation, prevent constipation from morphine Stir and dissolve powder in any 4 to 8 ounces of beverage, then drink. potassium CHLORIDE 20 MEQ elvin ER tablet  Commonly known as: KLOR-CON M   Take 2 tablets by mouth daily (with breakfast). Do not start before April 28, 2022. prochlorperazine 10 MG tablet  Commonly known as: COMPAZINE   Take 1 tablet by mouth every 6 hours as needed for Nausea or Vomiting. valACYclovir 500 MG tablet  Commonly known as: VALTREX   Take 1 tablet by mouth every 12 hours. voriconazole 200 MG tablet  Commonly known as: VFEND   Take 1 tablet by mouth 2 times daily.           Code Status:  Code Status Information     Code Status    Prior        Vanna Shea, CLAIRP-BC

## 2022-06-02 NOTE — NURSING NOTE
"Oncology Rooming Note    June 2, 2022 4:43 PM   Diego Buchanan is a 27 year old male who presents for:    Chief Complaint   Patient presents with     Oncology Clinic Visit     Desmoplastic small round cell tumor     Initial Vitals: /84   Pulse 108   Temp 98.6  F (37  C) (Oral)   Wt 132 kg (291 lb 1.6 oz)   SpO2 97%   BMI 44.12 kg/m   Estimated body mass index is 44.12 kg/m  as calculated from the following:    Height as of 4/22/22: 1.73 m (5' 8.11\").    Weight as of this encounter: 132 kg (291 lb 1.6 oz). Body surface area is 2.52 meters squared.  No Pain (0) Comment: Data Unavailable   No LMP for male patient.  Allergies reviewed: Yes  Medications reviewed: Yes    Medications: Medication refills not needed today.  Pharmacy name entered into [employer] (formerly [employer]):    [id number] - [place], [place] - [address] [place] [address] & [facility] [place], [place] - [address] [place] [address] [phone]    Clinical concerns: none       Sulma Lopes            "

## 2022-06-02 NOTE — LETTER
6/2/2022         RE: Diego Buchanan  [address]  [place] [address]        Dear Colleague,    Thank you for referring your patient, Diego Buchanan, to the [facility]. Please see a copy of my visit note below.    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 He presented initially with abdominal pain, diarrhea, and progressive abdominal distention for 3 months duration. 2. Initial CT scan in February 2020 showed severe peritoneal carcinomatosis with large peritoneal tumor implants throughout the entire abdomen and pelvis, but mostly prominently in the pelvis.   2. PETCT scan showed interval increase in the amount of peritoneal carcinomatosis.  3. On 3/12/2020, he had ultrasound-guided core needle biopsy of a peritoneal implant (Case: [id number]), which showed a completely undifferentiated appearance, but stains with pancytokeratin, indicating an epithelial origin. 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 Tumor markers for CA-19-9, CEA, beta-hCG, alpha-fetoprotein were unremarkable. Cancer type ID molecular testing by [employer] sent to determine the exact diagnosis and to assist in further treatment planning. 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 Relative probability of less than 5% of other subtype of sarcoma. EWSR1-WT1 fusion detected.  4. 5/1/2020, MRI brain negative for brain metastasis, and baseline CT-CAP obtained showing extensive mixed solid and cystic masses throughout the abdomen and pelvis consistent with peritoneal carcinomatosis. Largest mass measures approximately 20 cm in the pelvis. Metastatic mixed solid and cystic masses seen in hepatic segments 5 and 6 and hepatic segment 7. The masses appear to be contiguous  with the retrohepatic peritoneal carcinomatosis. Small volume fluid about the spleen favored to represent malignant ascites, stable mild-to-moderate right hydronephrosis related to mass effect upon the distal ureter in the pelvis, the IVC and iliac veins are compressed due to the extensive peritoneal carcinomatosis without findings to suggest deep venous thrombosis.  5. 5/4/2020, he begins CAV/IMV alternating chemotherapy. He receives 6 cycles of treatment. He symptomatically improves.  6. 9/11/2020, CT-CAP shows stable to mildly improved extensive peritoneal carcinomatosis. He would like to omit Ifosfamide/etoposide cycles and continue only with CAV.  7. 10/9/2020, he starts CAV every 21 days.  8. 1/6/2021, CT CAP showed a mixed response in the mixed solid and cystic masses throughout the peritoneum. Some of the tumors are stable to mildly worse, with some of the peritoneal masses a bit larger, a few are smaller, one cystic tumor in the left abdomen is smaller, while tumors lower in the pelvis appear slightly larger.  9. 3/24/2021, CT CAP showed continued slight decrease in overall burden of peritoneal carcinomatosis with persistent encasement of bowel without evidence of bowel obstruction.  10. 6/25/2021, he receives cycle 19 CAV, then takes a chemotherapy holiday.  11. 4/22/2022, he resumes CAV/IMV chemotherapy.    History of Present Illness:  Robert presents with his mother and sister today. He is doing well on chemotherapy. He has had a little bit of delayed nausea with ifosfamide, but otherwise states he tolerates quite well. He still takes a while in the bathroom to evacuate, but he states all is relatively stable with no worsening. He denies any fevers or chills. He is trying to be active, but his mother and sister would like for him to go out more and be more active. He is not terribly keen on catching covid and worries about monkey pox in the news.    Review of Systems:  12-point ROS is negative except as in  HPI    Current Outpatient Medications   Medication Sig Dispense Refill     allopurinol (ZYLOPRIM) 300 MG tablet  (Patient not taking: No sig reported)       CATHFLO ACTIVASE 2 MG injection  (Patient not taking: No sig reported)       etoposide (VEPESID) 50 MG capsule Take 4 capsules (200 mg) by mouth daily for 6 days Days 1 through 6. 24 capsule 0     folic acid (FOLVITE) 1 MG tablet  (Patient not taking: No sig reported)       lidocaine-prilocaine (EMLA) 2.5-2.5 % external cream Apply topically as needed for moderate pain 30 g 1     metoprolol tartrate (LOPRESSOR) 50 MG tablet Take 1 tablet (50 mg) by mouth daily (Patient taking differently: Take 50 mg by mouth daily Hold for systolic (top number of blood pressure) less than 105 and or HR less than 65.) 30 tablet 1     NEULASTA ONPRO 6 MG/0.6ML injection  (Patient not taking: No sig reported)       polyethylene glycol (MIRALAX) 17 g packet Take 17 g by mouth (Patient not taking: No sig reported)       prochlorperazine (COMPAZINE) 10 MG tablet Take 1 tablet (10 mg) by mouth every 6 hours as needed (Nausea/Vomiting) (Patient not taking: Reported on 5/13/2022) 30 tablet 5     senna-docusate (SENNA S) 8.6-50 MG tablet Take 1 tablet by mouth 2 times daily (Patient not taking: No sig reported) 60 tablet 0     Objective   /84   Pulse 108   Temp 98.6  F (37  C) (Oral)   Wt 132 kg (291 lb 1.6 oz)   SpO2 97%   BMI 44.12 kg/m    General: Well-appearing male in no acute distress.  Eyes: EOMI, PERRL. No scleral icterus.  ENT: Oral mucosa is moist without lesions or thrush.   Lymphatic: Neck is supple without cervical or supraclavicular lymphadenopathy.   Cardiovascular: Regular, tachycardic, No murmurs, gallops, or rubs. No peripheral edema.  Respiratory: CTA bilaterally. No wheezes or crackles.  Gastrointestinal: BS +. Abdomen soft, non-tender. Firmness int he lower abdomen.  Skin: No rashes, petechiae, or bruising noted on exposed skin.    Labs:   No visits with  results within 1 Week(s) from this visit.   Latest known visit with results is:   Lab Requisition on 05/19/2022   Component Date Value Ref Range Status     Sodium 05/19/2022 139  136 - 145 mmol/L Final     Potassium 05/19/2022 3.4 (A) 3.5 - 5.0 mmol/L Final     Chloride 05/19/2022 108 (A) 98 - 107 mmol/L Final     Carbon Dioxide (CO2) 05/19/2022 22  22 - 31 mmol/L Final     Anion Gap 05/19/2022 9  5 - 18 mmol/L Final     Urea Nitrogen 05/19/2022 10  8 - 22 mg/dL Final     Creatinine 05/19/2022 0.80  0.70 - 1.30 mg/dL Final     Calcium 05/19/2022 8.6  8.5 - 10.5 mg/dL Final     Glucose 05/19/2022 125  70 - 125 mg/dL Final     Alkaline Phosphatase 05/19/2022 91  45 - 120 U/L Final     AST 05/19/2022 15  0 - 40 U/L Final     ALT 05/19/2022 18  0 - 45 U/L Final     Protein Total 05/19/2022 7.3  6.0 - 8.0 g/dL Final     Albumin 05/19/2022 3.5  3.5 - 5.0 g/dL Final     Bilirubin Total 05/19/2022 0.6  0.0 - 1.0 mg/dL Final     GFR Estimate 05/19/2022 >90  >60 mL/min/1.73m2 Final     Magnesium 05/19/2022 2.0  1.8 - 2.6 mg/dL Final     Phosphorus 05/19/2022 2.5  2.5 - 4.5 mg/dL Final     WBC Count 05/19/2022 8.6  4.0 - 11.0 10e3/uL Final     RBC Count 05/19/2022 4.02 (A) 4.40 - 5.90 10e6/uL Final     Hemoglobin 05/19/2022 11.1 (A) 13.3 - 17.7 g/dL Final     Hematocrit 05/19/2022 34.9 (A) 40.0 - 53.0 % Final     MCV 05/19/2022 87  78 - 100 fL Final     MCH 05/19/2022 27.6  26.5 - 33.0 pg Final     MCHC 05/19/2022 31.8  31.5 - 36.5 g/dL Final     RDW 05/19/2022 17.1 (A) 10.0 - 15.0 % Final     Platelet Count 05/19/2022 394  150 - 450 10e3/uL Final     % Neutrophils 05/19/2022 94  % Final     % Lymphocytes 05/19/2022 2  % Final     % Monocytes 05/19/2022 0  % Final     % Eosinophils 05/19/2022 4  % Final     % Basophils 05/19/2022 0  % Final     Absolute Neutrophils 05/19/2022 8.1  1.6 - 8.3 10e3/uL Final     Absolute Lymphocytes 05/19/2022 0.2 (A) 0.8 - 5.3 10e3/uL Final     Absolute Monocytes 05/19/2022 0.0  0.0 - 1.3 10e3/uL  Final     Absolute Eosinophils 05/19/2022 0.3  0.0 - 0.7 10e3/uL Final     Absolute Basophils 05/19/2022 0.0  0.0 - 0.2 10e3/uL Final     RBC Morphology 05/19/2022 Confirmed RBC Indices   Final     Platelet Assessment 05/19/2022 Automated Count Confirmed. Platelet morphology is normal.  Automated Count Confirmed. Platelet morphology is normal. Final     Elliptocytes 05/19/2022 Slight (A) None Seen Final     Teardrop Cells 05/19/2022 Slight (A) None Seen Final   I reviewed the above labs today.    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 Buchanan is 27 year old male with advanced intraabdominal DSRCT with extensive peritoneal disease, lymph node metastasis, and liver and bone involvement. He tolerated CAV/IMV for 19 cycles and then was on chemotherapy break from June 2021-April 2022. Given his symptoms he desired to resume chemotherapy with further CAV/IMV as this worked well previously. He resumed treatment with CAV on 4/22/22. Alternative treatment options include topotecan/cyclophosphamide or irinotecan/temozolomide.   -Proceed with cycle 3 (CAV) today with neulasta support  -RTC in 3 weeks with LISY for cycle 4 (IMV)    #HTN  #Tachycardia  Previously on metoprolol 75 mg for his BP with good relief, stopped due to improvement. Now with recurrent tachycardia and intermittent higher pressure. Resume at 50 mg daily and adjust based on response. Hold if systolic < 105 or HR <65          Again, thank you for allowing me to participate in the care of your patient.      Sincerely,    Farzaneh Sims MD

## 2022-06-02 NOTE — PROGRESS NOTES
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 He presented initially with abdominal pain, diarrhea, and progressive abdominal distention for 3 months duration. 2. Initial CT scan in February 2020 showed severe peritoneal carcinomatosis with large peritoneal tumor implants throughout the entire abdomen and pelvis, but mostly prominently in the pelvis.   2. PETCT scan showed interval increase in the amount of peritoneal carcinomatosis.  3. On 3/12/2020, he had ultrasound-guided core needle biopsy of a peritoneal implant (Case: [id number]), which showed a completely undifferentiated appearance, but stains with pancytokeratin, indicating an epithelial origin. 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 Tumor markers for CA-19-9, CEA, beta-hCG, alpha-fetoprotein were unremarkable. Cancer type ID molecular testing by [facility] sent to determine the exact diagnosis and to assist in further treatment planning. 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 Relative probability of less than 5% of other subtype of sarcoma. EWSR1-WT1 fusion detected.  4. 5/1/2020, MRI brain negative for brain metastasis, and baseline CT-CAP obtained showing extensive mixed solid and cystic masses throughout the abdomen and pelvis consistent with peritoneal carcinomatosis. Largest mass measures approximately 20 cm in the pelvis. Metastatic mixed solid and cystic masses seen in hepatic segments 5 and 6 and hepatic segment 7. The masses appear to be contiguous with the retrohepatic peritoneal carcinomatosis. Small volume fluid about the spleen favored to represent malignant ascites, stable mild-to-moderate right hydronephrosis related to mass effect upon the distal ureter in the pelvis, the IVC and iliac veins  are compressed due to the extensive peritoneal carcinomatosis without findings to suggest deep venous thrombosis.  5. 5/4/2020, he begins CAV/IMV alternating chemotherapy. He receives 6 cycles of treatment. He symptomatically improves.  6. 9/11/2020, CT-CAP shows stable to mildly improved extensive peritoneal carcinomatosis. He would like to omit Ifosfamide/etoposide cycles and continue only with CAV.  7. 10/9/2020, he starts CAV every 21 days.  8. 1/6/2021, CT CAP showed a mixed response in the mixed solid and cystic masses throughout the peritoneum. Some of the tumors are stable to mildly worse, with some of the peritoneal masses a bit larger, a few are smaller, one cystic tumor in the left abdomen is smaller, while tumors lower in the pelvis appear slightly larger.  9. 3/24/2021, CT CAP showed continued slight decrease in overall burden of peritoneal carcinomatosis with persistent encasement of bowel without evidence of bowel obstruction.  10. 6/25/2021, he receives cycle 19 CAV, then takes a chemotherapy holiday.  11. 4/22/2022, he resumes CAV/IMV chemotherapy.    History of Present Illness:  Robert presents with his mother and sister today. He is doing well on chemotherapy. He has had a little bit of delayed nausea with ifosfamide, but otherwise states he tolerates quite well. He still takes a while in the bathroom to evacuate, but he states all is relatively stable with no worsening. He denies any fevers or chills. He is trying to be active, but his mother and sister would like for him to go out more and be more active. He is not terribly keen on catching covid and worries about monkey pox in the news.    Review of Systems:  12-point ROS is negative except as in HPI    Current Outpatient Medications   Medication Sig Dispense Refill     allopurinol (ZYLOPRIM) 300 MG tablet  (Patient not taking: No sig reported)       CATHFLO ACTIVASE 2 MG injection  (Patient not taking: No sig reported)       etoposide (VEPESID)  50 MG capsule Take 4 capsules (200 mg) by mouth daily for 6 days Days 1 through 6. 24 capsule 0     folic acid (FOLVITE) 1 MG tablet  (Patient not taking: No sig reported)       lidocaine-prilocaine (EMLA) 2.5-2.5 % external cream Apply topically as needed for moderate pain 30 g 1     metoprolol tartrate (LOPRESSOR) 50 MG tablet Take 1 tablet (50 mg) by mouth daily (Patient taking differently: Take 50 mg by mouth daily Hold for systolic (top number of blood pressure) less than 105 and or HR less than 65.) 30 tablet 1     NEULASTA ONPRO 6 MG/0.6ML injection  (Patient not taking: No sig reported)       polyethylene glycol (MIRALAX) 17 g packet Take 17 g by mouth (Patient not taking: No sig reported)       prochlorperazine (COMPAZINE) 10 MG tablet Take 1 tablet (10 mg) by mouth every 6 hours as needed (Nausea/Vomiting) (Patient not taking: Reported on 5/13/2022) 30 tablet 5     senna-docusate (SENNA S) 8.6-50 MG tablet Take 1 tablet by mouth 2 times daily (Patient not taking: No sig reported) 60 tablet 0     Objective   /84   Pulse 108   Temp 98.6  F (37  C) (Oral)   Wt 132 kg (291 lb 1.6 oz)   SpO2 97%   BMI 44.12 kg/m    General: Well-appearing male in no acute distress.  Eyes: EOMI, PERRL. No scleral icterus.  ENT: Oral mucosa is moist without lesions or thrush.   Lymphatic: Neck is supple without cervical or supraclavicular lymphadenopathy.   Cardiovascular: Regular, tachycardic, No murmurs, gallops, or rubs. No peripheral edema.  Respiratory: CTA bilaterally. No wheezes or crackles.  Gastrointestinal: BS +. Abdomen soft, non-tender. Firmness int he lower abdomen.  Skin: No rashes, petechiae, or bruising noted on exposed skin.    Labs:   No visits with results within 1 Week(s) from this visit.   Latest known visit with results is:   Lab Requisition on 05/19/2022   Component Date Value Ref Range Status     Sodium 05/19/2022 139  136 - 145 mmol/L Final     Potassium 05/19/2022 3.4 (A) 3.5 - 5.0 mmol/L  Final     Chloride 05/19/2022 108 (A) 98 - 107 mmol/L Final     Carbon Dioxide (CO2) 05/19/2022 22  22 - 31 mmol/L Final     Anion Gap 05/19/2022 9  5 - 18 mmol/L Final     Urea Nitrogen 05/19/2022 10  8 - 22 mg/dL Final     Creatinine 05/19/2022 0.80  0.70 - 1.30 mg/dL Final     Calcium 05/19/2022 8.6  8.5 - 10.5 mg/dL Final     Glucose 05/19/2022 125  70 - 125 mg/dL Final     Alkaline Phosphatase 05/19/2022 91  45 - 120 U/L Final     AST 05/19/2022 15  0 - 40 U/L Final     ALT 05/19/2022 18  0 - 45 U/L Final     Protein Total 05/19/2022 7.3  6.0 - 8.0 g/dL Final     Albumin 05/19/2022 3.5  3.5 - 5.0 g/dL Final     Bilirubin Total 05/19/2022 0.6  0.0 - 1.0 mg/dL Final     GFR Estimate 05/19/2022 >90  >60 mL/min/1.73m2 Final     Magnesium 05/19/2022 2.0  1.8 - 2.6 mg/dL Final     Phosphorus 05/19/2022 2.5  2.5 - 4.5 mg/dL Final     WBC Count 05/19/2022 8.6  4.0 - 11.0 10e3/uL Final     RBC Count 05/19/2022 4.02 (A) 4.40 - 5.90 10e6/uL Final     Hemoglobin 05/19/2022 11.1 (A) 13.3 - 17.7 g/dL Final     Hematocrit 05/19/2022 34.9 (A) 40.0 - 53.0 % Final     MCV 05/19/2022 87  78 - 100 fL Final     MCH 05/19/2022 27.6  26.5 - 33.0 pg Final     MCHC 05/19/2022 31.8  31.5 - 36.5 g/dL Final     RDW 05/19/2022 17.1 (A) 10.0 - 15.0 % Final     Platelet Count 05/19/2022 394  150 - 450 10e3/uL Final     % Neutrophils 05/19/2022 94  % Final     % Lymphocytes 05/19/2022 2  % Final     % Monocytes 05/19/2022 0  % Final     % Eosinophils 05/19/2022 4  % Final     % Basophils 05/19/2022 0  % Final     Absolute Neutrophils 05/19/2022 8.1  1.6 - 8.3 10e3/uL Final     Absolute Lymphocytes 05/19/2022 0.2 (A) 0.8 - 5.3 10e3/uL Final     Absolute Monocytes 05/19/2022 0.0  0.0 - 1.3 10e3/uL Final     Absolute Eosinophils 05/19/2022 0.3  0.0 - 0.7 10e3/uL Final     Absolute Basophils 05/19/2022 0.0  0.0 - 0.2 10e3/uL Final     RBC Morphology 05/19/2022 Confirmed RBC Indices   Final     Platelet Assessment 05/19/2022 Automated Count  Confirmed. Platelet morphology is normal.  Automated Count Confirmed. Platelet morphology is normal. Final     Elliptocytes 05/19/2022 Slight (A) None Seen Final     Teardrop Cells 05/19/2022 Slight (A) None Seen Final   I reviewed the above labs today.    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 Buchanan is 27 year old male with advanced intraabdominal DSRCT with extensive peritoneal disease, lymph node metastasis, and liver and bone involvement. He tolerated CAV/IMV for 19 cycles and then was on chemotherapy break from June 2021-April 2022. Given his symptoms he desired to resume chemotherapy with further CAV/IMV as this worked well previously. He resumed treatment with CAV on 4/22/22. Alternative treatment options include topotecan/cyclophosphamide or irinotecan/temozolomide.   -Proceed with cycle 3 (CAV) today with neulasta support  -RTC in 3 weeks with LISY for cycle 4 (IMV)    #HTN  #Tachycardia  Previously on metoprolol 75 mg for his BP with good relief, stopped due to improvement. Now with recurrent tachycardia and intermittent higher pressure. Resume at 50 mg daily and adjust based on response. Hold if systolic < 105 or HR <65    Farzaneh Burciaga M.D.   of Medicine  Hematology, Oncology and Transplantation  Pager: [phone]

## 2022-06-03 NOTE — NURSING NOTE
"Chief Complaint   Patient presents with     Chemotherapy     Cycle 3 Day 1 Vincristine, Cytoxan, and Dactinomycin     Port Draw     Labs drawn via port by RN. Vitals taken.     Port accessed with 20G 3/4\" flat needle by RN, labs collected, line flushed with saline and heparin.  Vitals taken. Pt checked in for appointment(s).    Jennifer Cortez RN    "

## 2022-06-03 NOTE — PATIENT INSTRUCTIONS
Please take Mesna at 3:40pm and 7:40pm today    Neulasta injection will start tomorrow 6/1 at 1450, approximately 27 hours after application applied today.  When the dose delivery starts, it will take about 45 minutes to complete.  Neulasta Onpro On-Body should have green flashing light.  Call triage or on-call MD if injector flashes red or appears to be leaking.  Keep Onpro On-Body Neulasta 4 inches away from electrical equipment and avoid showering 4 hours prior to injection.         [facility] Triage and after hours / weekends / holidays:  [phone]    Please call the triage or after hours line if you experience a temperature greater than or equal to 100.4, shaking chills, have uncontrolled nausea, vomiting and/or diarrhea, dizziness, shortness of breath, chest pain, bleeding, unexplained bruising, or if you have any other new/concerning symptoms, questions or concerns.      If you are having any concerning symptoms or wish to speak to a provider before your next infusion visit, please call your care coordinator or triage to notify them so we can adequately serve you.     If you need a refill on a narcotic prescription or other medication, please call before your infusion appointment.

## 2022-06-03 NOTE — PROGRESS NOTES
Infusion Nursing Note:  Diego Buchanan presents today for Cycle 3 Day 1 Vincristine, Cytoxan and Dactinomycin   Patient seen and examined by Dr Morales Sims in clinic yesterday.    Note: Diego and his sisiter, Mariela, note no questions or concerns since their visit with Dr Sims yesterday.    Neulasta Onpro On-Body injector applied to R upper arm at 1151.  Writer discussed Neulasta injection would start tomorrow 6/4/22 at 1451, approximately 27 hours after application applied today.  Written and Verbal instruction reviewed with patient.  Pt instructed when the dose delivery starts, it will take about 45 minutes to complete.  Pt aware Neulasta Onpro On-Body should have green flashing light and to call triage or on-call MD if injector flashes red or appears to be leaking. Pt aware to keep Onpro On-Body Neulasta 4 inches away from electrical equipment and to avoid showering 4 hours prior to injection.         Intravenous Access:  Implanted Port accessed in lab  Vincristine infused into free flowing NS.  Positive blood return pre, mid, and post infuison.  Port site without redness or swelling.  Dactinomycin infused into free flowing NS.  Positive blood return q2cc.  Port site without redness or swelling.  Positive blood return pre and post cytoxan.    Treatment Conditions:    Component      Latest Ref Rng & Units 6/3/2022   WBC      4.0 - 11.0 10e3/uL 10.8   RBC Count      4.40 - 5.90 10e6/uL 4.01 (L)   Hemoglobin      13.3 - 17.7 g/dL 11.1 (L)   Hematocrit      40.0 - 53.0 % 35.3 (L)   MCV      78 - 100 fL 88   MCH      26.5 - 33.0 pg 27.7   MCHC      31.5 - 36.5 g/dL 31.4 (L)   RDW      10.0 - 15.0 % 19.2 (H)   Platelet Count      150 - 450 10e3/uL 237   % Neutrophils      % 82   % Lymphocytes      % 6   % Monocytes      % 8   % Eosinophils      % 2   % Basophils      % 1   % Immature Granulocytes      % 1   NRBCs per 100 WBC      <1 /100 0   Absolute Neutrophils      1.6 - 8.3 10e3/uL 9.0 (H)   Absolute  Lymphocytes      0.8 - 5.3 10e3/uL 0.6 (L)   Absolute Monocytes      0.0 - 1.3 10e3/uL 0.8   Absolute Eosinophils      0.0 - 0.7 10e3/uL 0.2   Absolute Basophils      0.0 - 0.2 10e3/uL 0.1   Absolute Immature Granulocytes      <=0.4 10e3/uL 0.1   Absolute NRBCs      10e3/uL 0.0   Sodium      133 - 144 mmol/L 140   Potassium      3.4 - 5.3 mmol/L 3.6   Chloride      94 - 109 mmol/L 108   Carbon Dioxide      20 - 32 mmol/L 23   Anion Gap      3 - 14 mmol/L 9   Urea Nitrogen      7 - 30 mg/dL 5 (L)   Creatinine      0.66 - 1.25 mg/dL 0.70   Calcium      8.5 - 10.1 mg/dL 8.7   Glucose      70 - 99 mg/dL 148 (H)   Alkaline Phosphatase      40 - 150 U/L 112   AST      0 - 45 U/L 16   ALT      0 - 70 U/L 30   Protein Total      6.8 - 8.8 g/dL 7.5   Albumin      3.4 - 5.0 g/dL 3.5   Bilirubin Total      0.2 - 1.3 mg/dL 0.4   GFR Estimate      >60 mL/min/1.73m2 >90       Results reviewed, labs MET treatment parameters, ok to proceed with treatment.      Post Infusion Assessment:  Patient tolerated infusion without incident.       Discharge Plan:   Prescription refills given for Mesna.  Pt and sister, Mariela is aware to take his first dose of Mesna at 1540 and his second dose at 1940.    AVS to patient via [employer].  Patient will return 6/24/22 for cycle 4 day 1       Parris Holman RN

## 2022-06-10 NOTE — PROGRESS NOTES
This is a recent snapshot of the patient's [facility] medical record.  For current drug dose and complete information and questions, call [phone] or In Basket pool, fv home infusion ([id number])  CSN Number:  [id number]

## 2022-06-12 NOTE — TELEPHONE ENCOUNTER
PA Initiation    Medication: FHI- Ifos renewal  Insurance Company: [employer] - Phone [phone] Fax [phone]  Pharmacy Filling the Rx: [facility]  Filling Pharmacy Phone:    Filling Pharmacy Fax:    Start Date: 6/12/2022    Manually Faxed prior authorization form to [employer] at fax# [phone]

## 2022-06-24 NOTE — PATIENT INSTRUCTIONS
[facility] Triage and after hours / weekends / holidays:  [phone]    Please call the triage or after hours line if you experience a temperature greater than or equal to 100.4, shaking chills, have uncontrolled nausea, vomiting and/or diarrhea, dizziness, shortness of breath, chest pain, bleeding, unexplained bruising, or if you have any other new/concerning symptoms, questions or concerns.      If you are having any concerning symptoms or wish to speak to a provider before your next infusion visit, please call your care coordinator or triage to notify them so we can adequately serve you.     If you need a refill on a narcotic prescription or other medication, please call before your infusion appointment.                June 2022 Sunday Monday Tuesday Wednesday Thursday Friday Saturday                  1     2    RETURN   4:30 PM   (30 min.)   Farzaneh Young MD   [facility] 3    LAB PERIPHERAL   7:45 AM   (15 min.)   [facility] LAB DRAW   [facility]    ONC INFUSION 4 HR (240 MIN)   8:00 AM   (240 min.)    ONC INFUSION NURSE   [facility] 4       5     6     7     8     9     10     11       12     13     14     15     16     17     18       19     20     21     22     23     24    LAB PERIPHERAL   8:15 AM   (15 min.)   [facility] LAB DRAW   [facility]    ONC INFUSION 3 HR (180 MIN)   9:00 AM   (180 min.)    ONC INFUSION NURSE   [facility]    RETURN   9:00 AM   (45 min.)   Yasmine Mckeon PA-C   [facility] 25       26     27     28     29     30                             July 2022 Sunday Monday Tuesday Wednesday Thursday Friday Saturday                            1     2       3     4     5     6     7     8     9       10     11     12     13    CT CHEST/ABDOMEN/PELVIS W  12:10 PM   (20 min.)   [address]  [facility] 14     15    LAB CENTRAL   8:30 AM   (15 min.)   [facility] LAB DRAW   [facility]    RETURN   8:30 AM   (45 min.)   Yasmine Perez CNP   [facility]    ONC INFUSION 4 HR (240 MIN)  10:00 AM   (240 min.)    ONC INFUSION NURSE   [facility] 16       17     18     19     20     21     22     23       24     25     26     27     28     29     30       31                                                     Lab Results:  Recent Results (from the past 12 hour(s))   Comprehensive metabolic panel    Collection Time: 06/24/22  8:33 AM   Result Value Ref Range    Sodium 140 133 - 144 mmol/L    Potassium 3.4 3.4 - 5.3 mmol/L    Chloride 110 (H) 94 - 109 mmol/L    Carbon Dioxide (CO2) 22 20 - 32 mmol/L    Anion Gap 8 3 - 14 mmol/L    Urea Nitrogen 7 7 - 30 mg/dL    Creatinine 0.82 0.66 - 1.25 mg/dL    Calcium 9.3 8.5 - 10.1 mg/dL    Glucose 110 (H) 70 - 99 mg/dL    Alkaline Phosphatase 90 40 - 150 U/L    AST 20 0 - 45 U/L    ALT 31 0 - 70 U/L    Protein Total 8.0 6.8 - 8.8 g/dL    Albumin 3.9 3.4 - 5.0 g/dL    Bilirubin Total 0.8 0.2 - 1.3 mg/dL    GFR Estimate >90 >60 mL/min/1.73m2   CBC with platelets and differential    Collection Time: 06/24/22  8:33 AM   Result Value Ref Range    WBC Count 11.3 (H) 4.0 - 11.0 10e3/uL    RBC Count 3.94 (L) 4.40 - 5.90 10e6/uL    Hemoglobin 11.1 (L) 13.3 - 17.7 g/dL    Hematocrit 34.4 (L) 40.0 - 53.0 %    MCV 87 78 - 100 fL    MCH 28.2 26.5 - 33.0 pg    MCHC 32.3 31.5 - 36.5 g/dL    RDW 20.2 (H) 10.0 - 15.0 %    Platelet Count 315 150 - 450 10e3/uL    % Neutrophils 83 %    % Lymphocytes 5 %    % Monocytes 10 %    % Eosinophils 1 %    % Basophils 1 %    % Immature Granulocytes 0 %    NRBCs per 100 WBC 0 <1 /100    Absolute Neutrophils 9.3 (H) 1.6 - 8.3 10e3/uL    Absolute Lymphocytes 0.6 (L) 0.8 - 5.3 10e3/uL    Absolute Monocytes 1.1 0.0 - 1.3 10e3/uL     Absolute Eosinophils 0.1 0.0 - 0.7 10e3/uL    Absolute Basophils 0.2 0.0 - 0.2 10e3/uL    Absolute Immature Granulocytes 0.1 <=0.4 10e3/uL    Absolute NRBCs 0.0 10e3/uL

## 2022-06-24 NOTE — NURSING NOTE
"Chief Complaint   Patient presents with     Port Draw     Labs drawn via  by RN. Vitals taken.     Port accessed with 20G 3/4\" gripper needle. Scant blood return noted. Flushed with NS and heparin. Labs drawn via  by RN. Pt tolerated well. Vitals taken. Pt checked in for next appointment.    Almaz Cross RN  "

## 2022-06-24 NOTE — LETTER
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 He presented initially with abdominal pain, diarrhea, and progressive abdominal distention for 3 months duration. 2. Initial CT scan in February 2020 showed severe peritoneal carcinomatosis with large peritoneal tumor implants throughout the entire abdomen and pelvis, but mostly prominently in the pelvis.   2. PETCT scan showed interval increase in the amount of peritoneal carcinomatosis.  3. On 3/12/2020, he had ultrasound-guided core needle biopsy of a peritoneal implant (Case: [id number]), which showed a completely undifferentiated appearance, but stains with pancytokeratin, indicating an epithelial origin. 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 Tumor markers for CA-19-9, CEA, beta-hCG, alpha-fetoprotein were unremarkable. Cancer type ID molecular testing by [employer] sent to determine the exact diagnosis and to assist in further treatment planning. 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 Relative probability of less than 5% of other subtype of sarcoma. EWSR1-WT1 fusion detected.  4. 5/1/2020, MRI brain negative for brain metastasis, and baseline CT-CAP obtained showing extensive mixed solid and cystic masses throughout the abdomen and pelvis consistent with peritoneal carcinomatosis. Largest mass measures approximately 20 cm in the pelvis. Metastatic mixed solid and cystic masses seen in hepatic segments 5 and 6 and hepatic segment 7. The masses appear to be contiguous with the retrohepatic peritoneal carcinomatosis. Small volume fluid about the spleen favored to represent malignant ascites, stable mild-to-moderate right  hydronephrosis related to mass effect upon the distal ureter in the pelvis, the IVC and iliac veins are compressed due to the extensive peritoneal carcinomatosis without findings to suggest deep venous thrombosis.  5. 5/4/2020, he begins CAV/IMV alternating chemotherapy. He receives 6 cycles of treatment. He symptomatically improves.  6. 9/11/2020, CT-CAP shows stable to mildly improved extensive peritoneal carcinomatosis. He would like to omit Ifosfamide/etoposide cycles and continue only with CAV.  7. 10/9/2020, he starts CAV every 21 days.  8. 1/6/2021, CT CAP showed a mixed response in the mixed solid and cystic masses throughout the peritoneum. Some of the tumors are stable to mildly worse, with some of the peritoneal masses a bit larger, a few are smaller, one cystic tumor in the left abdomen is smaller, while tumors lower in the pelvis appear slightly larger.  9. 3/24/2021, CT CAP showed continued slight decrease in overall burden of peritoneal carcinomatosis with persistent encasement of bowel without evidence of bowel obstruction.  10. 6/25/2021, he receives cycle 19 CAV, then takes a chemotherapy holiday.  11. 4/22/2022, he resumes CAV/IMV chemotherapy.    History of Present Illness:  Patient reports that overall he has been doing pretty well.  He did have some nausea with chemotherapy, but no vomiting.  He was able to manage this with Compazine.  He is having normal bowel movements every other day.  He denies any blood in his urine.  He reports his energy is okay.  He is not getting regular exercise.  He enjoys playing video games and has been playing a teenage mutant ninja turtle game recently.  He denies other concerns.    Current Outpatient Medications   Medication Sig Dispense Refill     allopurinol (ZYLOPRIM) 300 MG tablet  (Patient not taking: No sig reported)       CATHFLO ACTIVASE 2 MG injection  (Patient not taking: No sig reported)       etoposide (VEPESID) 50 MG capsule Take 4 capsules (200 mg)  by mouth daily for 6 days Days 1 through 6. 24 capsule 0     etoposide (VEPESID) 50 MG capsule Take 4 capsules (200 mg) by mouth daily for 6 days Days 1 through 6. 24 capsule 0     folic acid (FOLVITE) 1 MG tablet  (Patient not taking: No sig reported)       lidocaine-prilocaine (EMLA) 2.5-2.5 % external cream Apply topically as needed for moderate pain 30 g 1     mesna (MESNEX) 400 MG TABS tablet Take 2 tablets (800 mg) by mouth every 4 hours for 2 doses Take two tablets 4 hours and 8 hours after end of cyclophosphamide infusion. 4 tablet 0     metoprolol tartrate (LOPRESSOR) 50 MG tablet Take 1 tablet (50 mg) by mouth daily (Patient taking differently: Take 50 mg by mouth daily Hold for systolic (top number of blood pressure) less than 105 and or HR less than 65.) 30 tablet 1     NEULASTA ONPRO 6 MG/0.6ML injection  (Patient not taking: No sig reported)       polyethylene glycol (MIRALAX) 17 g packet Take 17 g by mouth       prochlorperazine (COMPAZINE) 10 MG tablet Take 1 tablet (10 mg) by mouth every 6 hours as needed (Nausea/Vomiting) 30 tablet 5     senna-docusate (SENNA S) 8.6-50 MG tablet Take 1 tablet by mouth 2 times daily (Patient not taking: No sig reported) 60 tablet 0     Physical Exam:  General: The patient is a pleasant male in no acute distress.  /87 (BP Location: Right arm, Patient Position: Sitting, Cuff Size: Adult Large)   Pulse 98   Temp 98  F (36.7  C) (Oral)   Resp 16   Wt 128.5 kg (283 lb 4.8 oz)   SpO2 98%   BMI 42.94 kg/m    Wt Readings from Last 10 Encounters:   06/24/22 128.5 kg (283 lb 4.8 oz)   06/03/22 132.7 kg (292 lb 9.6 oz)   06/02/22 132 kg (291 lb 1.6 oz)   05/13/22 129.5 kg (285 lb 9.6 oz)   04/22/22 133.1 kg (293 lb 8 oz)   04/21/22 132.5 kg (292 lb)   04/12/22 132.7 kg (292 lb 9.6 oz)   02/01/22 139.3 kg (307 lb 1.6 oz)   11/02/21 134.9 kg (297 lb 4.8 oz)   08/18/21 121.7 kg (268 lb 4.8 oz)   HEENT: EOMI. Sclerae are anicteric.   Lymph: Neck is supple with no  lymphadenopathy in the cervical or supraclavicular areas.   Heart: Regular rate and rhythm.   Lungs: Clear to auscultation bilaterally.   Abdomen: Bowel sounds present, soft, nontender with no palpable masses in a seated position.   Extremities: No lower extremity edema noted bilaterally.   Neuro: Cranial nerves II through XII are grossly intact.  Skin: No rashes, petechiae, or bruising noted on exposed skin.    Labs:   Most Recent 3 CBC's:Recent Labs   Lab Test 06/24/22  0833 06/03/22  0815 05/19/22  1600 05/17/22  1110 05/16/22  1015   WBC 11.3* 10.8 8.6   < > 9.7   HGB 11.1* 11.1* 11.1*   < > 11.8*   MCV 87 88 87   < > 85    237 394   < > 358   ANEUTAUTO 9.3* 9.0*  --   --  8.1    < > = values in this interval not displayed.     Most Recent 3 BMP's:  Recent Labs   Lab Test 06/24/22  0833 06/03/22  0815 05/19/22  1600    140 139   POTASSIUM 3.4 3.6 3.4*   CHLORIDE 110* 108 108*   CO2 22 23 22   BUN 7 5* 10   CR 0.82 0.70 0.80   ANIONGAP 8 9 9   FAISAL 9.3 8.7 8.6   * 148* 125   PROTTOTAL 8.0 7.5 7.3   ALBUMIN 3.9 3.5 3.5    Most Recent 3 LFT's:  Recent Labs   Lab Test 06/24/22  0833 06/03/22  0815 05/19/22  1600   AST 20 16 15   ALT 31 30 18   ALKPHOS 90 112 91   BILITOTAL 0.8 0.4 0.6   I reviewed the above labs today.    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 Buchanan is 27 year old male with advanced intraabdominal DSRCT with extensive peritoneal disease, lymph node metastasis, and liver and bone involvement. He tolerated CAV/IMV for 19 cycles and then was on chemotherapy break from June 2021-April 2022. Given his symptoms he resumed chemotherapy with further CAV/IMV in April 2022. Alternative treatment options include topotecan/cyclophosphamide or irinotecan/temozolomide.   -Proceed with cycle 4 (IMV) today with neulasta support  -RTC in 3 weeks with LISY for cycle 4 (IMV)  -He will follow-up with  Yasmine Perez CNP in 3 weeks with repeat imaging.     HTN  Tachycardia  Managed with metoprolol 50 mg daily.     Nausea   Secondary to chemotherapy. Managed with Compazine prn.     Deconditioning  Recommend daily exercise.    Yasmine Mckeon PA-C  [facility] [place] [id number] [place], [place] [address] [phone]    25 minutes spent on the date of the encounter doing chart review, review of test results, interpretation of tests, patient visit and documentation

## 2022-06-24 NOTE — PROGRESS NOTES
"Infusion Nursing Note:  Diego Buchanan presents today for ifosfamide/mesna pump connection.    Patient seen by provider today: Yes: KATHLEEN Peña   present during visit today: Not Applicable.    Note: Patient arrives to infusion with his sister. Ok to proceed per KATHLEEN Peña today.    Intravenous Access:  Implanted Port.    Treatment Conditions:  Lab Results   Component Value Date    HGB 11.1 (L) 06/24/2022    WBC 11.3 (H) 06/24/2022    ANEU 8.1 05/19/2022    ANEUTAUTO 9.3 (H) 06/24/2022     06/24/2022      Lab Results   Component Value Date     06/24/2022    POTASSIUM 3.4 06/24/2022    MAG 2.0 05/19/2022    CR 0.82 06/24/2022    FAISAL 9.3 06/24/2022    BILITOTAL 0.8 06/24/2022    ALBUMIN 3.9 06/24/2022    ALT 31 06/24/2022    AST 20 06/24/2022     Results reviewed, labs MET treatment parameters, ok to proceed with treatment.    Post Infusion Assessment:  Patient tolerated infusion without incident.  Blood return noted pre and post infusion.  Site patent and intact, free from redness, edema or discomfort.  No evidence of extravasations.  Prior to discharge: Port secured in place with tegaderm. Flushed with 10cc NS, positive blood return noted. Continuous home infusion connected, all connectors secured with tape. All clamps secured open with tape. Pump started, noted \"running\" status on display. Pt instructed to call our clinic or [facility] if there are any questions or concerns at home. Pt verbalized understanding. Pt set up for pump disconnect with I on Thursdasy 6/30 for mesna bag change and neulasta on body and 7/1 for mesna pump disconnect at 11 am. Pt aware.  .     Discharge Plan:   Prescription refills given for etoposide.  Discharge instructions reviewed with: Patient and Family.  Patient and/or family verbalized understanding of discharge instructions and all questions answered.  Copy of AVS reviewed with patient and/or family.  Patient will return 7/15/22 for " next appointment.  Patient discharged in stable condition accompanied by: sister.  Departure Mode: Ambulatory.      Precious Cosme RN

## 2022-06-24 NOTE — PROGRESS NOTES
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 He presented initially with abdominal pain, diarrhea, and progressive abdominal distention for 3 months duration. 2. Initial CT scan in February 2020 showed severe peritoneal carcinomatosis with large peritoneal tumor implants throughout the entire abdomen and pelvis, but mostly prominently in the pelvis.   2. PETCT scan showed interval increase in the amount of peritoneal carcinomatosis.  3. On 3/12/2020, he had ultrasound-guided core needle biopsy of a peritoneal implant (Case: [id number]), which showed a completely undifferentiated appearance, but stains with pancytokeratin, indicating an epithelial origin. 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 Tumor markers for CA-19-9, CEA, beta-hCG, alpha-fetoprotein were unremarkable. Cancer type ID molecular testing by [employer] sent to determine the exact diagnosis and to assist in further treatment planning. 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 Relative probability of less than 5% of other subtype of sarcoma. EWSR1-WT1 fusion detected.  4. 5/1/2020, MRI brain negative for brain metastasis, and baseline CT-CAP obtained showing extensive mixed solid and cystic masses throughout the abdomen and pelvis consistent with peritoneal carcinomatosis. Largest mass measures approximately 20 cm in the pelvis. Metastatic mixed solid and cystic masses seen in hepatic segments 5 and 6 and hepatic segment 7. The masses appear to be contiguous with the retrohepatic peritoneal carcinomatosis. Small volume fluid about the spleen favored to represent malignant ascites, stable mild-to-moderate right hydronephrosis related to mass effect upon the distal ureter in the pelvis, the IVC and iliac  veins are compressed due to the extensive peritoneal carcinomatosis without findings to suggest deep venous thrombosis.  5. 5/4/2020, he begins CAV/IMV alternating chemotherapy. He receives 6 cycles of treatment. He symptomatically improves.  6. 9/11/2020, CT-CAP shows stable to mildly improved extensive peritoneal carcinomatosis. He would like to omit Ifosfamide/etoposide cycles and continue only with CAV.  7. 10/9/2020, he starts CAV every 21 days.  8. 1/6/2021, CT CAP showed a mixed response in the mixed solid and cystic masses throughout the peritoneum. Some of the tumors are stable to mildly worse, with some of the peritoneal masses a bit larger, a few are smaller, one cystic tumor in the left abdomen is smaller, while tumors lower in the pelvis appear slightly larger.  9. 3/24/2021, CT CAP showed continued slight decrease in overall burden of peritoneal carcinomatosis with persistent encasement of bowel without evidence of bowel obstruction.  10. 6/25/2021, he receives cycle 19 CAV, then takes a chemotherapy holiday.  11. 4/22/2022, he resumes CAV/IMV chemotherapy.    History of Present Illness:  Patient reports that overall he has been doing pretty well.  He did have some nausea with chemotherapy, but no vomiting.  He was able to manage this with Compazine.  He is having normal bowel movements every other day.  He denies any blood in his urine.  He reports his energy is okay.  He is not getting regular exercise.  He enjoys playing video games and has been playing a teenage mutant ninja turtle game recently.  He denies other concerns.    Current Outpatient Medications   Medication Sig Dispense Refill     allopurinol (ZYLOPRIM) 300 MG tablet  (Patient not taking: No sig reported)       CATHFLO ACTIVASE 2 MG injection  (Patient not taking: No sig reported)       etoposide (VEPESID) 50 MG capsule Take 4 capsules (200 mg) by mouth daily for 6 days Days 1 through 6. 24 capsule 0     etoposide (VEPESID) 50 MG  capsule Take 4 capsules (200 mg) by mouth daily for 6 days Days 1 through 6. 24 capsule 0     folic acid (FOLVITE) 1 MG tablet  (Patient not taking: No sig reported)       lidocaine-prilocaine (EMLA) 2.5-2.5 % external cream Apply topically as needed for moderate pain 30 g 1     mesna (MESNEX) 400 MG TABS tablet Take 2 tablets (800 mg) by mouth every 4 hours for 2 doses Take two tablets 4 hours and 8 hours after end of cyclophosphamide infusion. 4 tablet 0     metoprolol tartrate (LOPRESSOR) 50 MG tablet Take 1 tablet (50 mg) by mouth daily (Patient taking differently: Take 50 mg by mouth daily Hold for systolic (top number of blood pressure) less than 105 and or HR less than 65.) 30 tablet 1     NEULASTA ONPRO 6 MG/0.6ML injection  (Patient not taking: No sig reported)       polyethylene glycol (MIRALAX) 17 g packet Take 17 g by mouth       prochlorperazine (COMPAZINE) 10 MG tablet Take 1 tablet (10 mg) by mouth every 6 hours as needed (Nausea/Vomiting) 30 tablet 5     senna-docusate (SENNA S) 8.6-50 MG tablet Take 1 tablet by mouth 2 times daily (Patient not taking: No sig reported) 60 tablet 0     Physical Exam:  General: The patient is a pleasant male in no acute distress.  /87 (BP Location: Right arm, Patient Position: Sitting, Cuff Size: Adult Large)   Pulse 98   Temp 98  F (36.7  C) (Oral)   Resp 16   Wt 128.5 kg (283 lb 4.8 oz)   SpO2 98%   BMI 42.94 kg/m    Wt Readings from Last 10 Encounters:   06/24/22 128.5 kg (283 lb 4.8 oz)   06/03/22 132.7 kg (292 lb 9.6 oz)   06/02/22 132 kg (291 lb 1.6 oz)   05/13/22 129.5 kg (285 lb 9.6 oz)   04/22/22 133.1 kg (293 lb 8 oz)   04/21/22 132.5 kg (292 lb)   04/12/22 132.7 kg (292 lb 9.6 oz)   02/01/22 139.3 kg (307 lb 1.6 oz)   11/02/21 134.9 kg (297 lb 4.8 oz)   08/18/21 121.7 kg (268 lb 4.8 oz)   HEENT: EOMI. Sclerae are anicteric.   Lymph: Neck is supple with no lymphadenopathy in the cervical or supraclavicular areas.   Heart: Regular rate and rhythm.    Lungs: Clear to auscultation bilaterally.   Abdomen: Bowel sounds present, soft, nontender with no palpable masses in a seated position.   Extremities: No lower extremity edema noted bilaterally.   Neuro: Cranial nerves II through XII are grossly intact.  Skin: No rashes, petechiae, or bruising noted on exposed skin.    Labs:   Most Recent 3 CBC's:Recent Labs   Lab Test 06/24/22  0833 06/03/22  0815 05/19/22  1600 05/17/22  1110 05/16/22  1015   WBC 11.3* 10.8 8.6   < > 9.7   HGB 11.1* 11.1* 11.1*   < > 11.8*   MCV 87 88 87   < > 85    237 394   < > 358   ANEUTAUTO 9.3* 9.0*  --   --  8.1    < > = values in this interval not displayed.     Most Recent 3 BMP's:  Recent Labs   Lab Test 06/24/22  0833 06/03/22  0815 05/19/22  1600    140 139   POTASSIUM 3.4 3.6 3.4*   CHLORIDE 110* 108 108*   CO2 22 23 22   BUN 7 5* 10   CR 0.82 0.70 0.80   ANIONGAP 8 9 9   FAISAL 9.3 8.7 8.6   * 148* 125   PROTTOTAL 8.0 7.5 7.3   ALBUMIN 3.9 3.5 3.5    Most Recent 3 LFT's:  Recent Labs   Lab Test 06/24/22  0833 06/03/22  0815 05/19/22  1600   AST 20 16 15   ALT 31 30 18   ALKPHOS 90 112 91   BILITOTAL 0.8 0.4 0.6   I reviewed the above labs today.    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 Buchanan is 27 year old male with advanced intraabdominal DSRCT with extensive peritoneal disease, lymph node metastasis, and liver and bone involvement. He tolerated CAV/IMV for 19 cycles and then was on chemotherapy break from June 2021-April 2022. Given his symptoms he resumed chemotherapy with further CAV/IMV in April 2022. Alternative treatment options include topotecan/cyclophosphamide or irinotecan/temozolomide.   -Proceed with cycle 4 (IMV) today with neulasta support  -RTC in 3 weeks with LISY for cycle 4 (IMV)  -He will follow-up with Yasmine Perez CNP in 3 weeks with repeat imaging.     HTN  Tachycardia  Managed with  metoprolol 50 mg daily.     Nausea   Secondary to chemotherapy. Managed with Compazine prn.     Deconditioning  Recommend daily exercise.    Yasmine Mckeon PA-C  [facility]  [address], [place] [address] [phone]    25 minutes spent on the date of the encounter doing chart review, review of test results, interpretation of tests, patient visit and documentation

## 2022-06-27 NOTE — PROGRESS NOTES
This is a recent snapshot of the patient's [facility] medical record.  For current drug dose and complete information and questions, call [phone] or In Basket pool, fv home infusion ([id number])  CSN Number:  [id number]

## 2022-06-29 NOTE — PROGRESS NOTES
This is a recent snapshot of the patient's [facility] medical record.  For current drug dose and complete information and questions, call [phone] or In Basket pool, fv home infusion ([id number])  CSN Number:  [id number]

## 2022-06-29 NOTE — PROGRESS NOTES
This is a recent snapshot of the patient's [facility] medical record.  For current drug dose and complete information and questions, call [phone] or In Basket pool, fv home infusion ([id number])  CSN Number:  [id number]

## 2022-06-30 NOTE — PROGRESS NOTES
This is a recent snapshot of the patient's [facility] medical record.  For current drug dose and complete information and questions, call [phone] or In Basket pool, fv home infusion ([id number])  CSN Number:  [id number]

## 2022-07-01 NOTE — PROGRESS NOTES
This is a recent snapshot of the patient's [facility] medical record.  For current drug dose and complete information and questions, call [phone] or In Basket pool, fv home infusion ([id number])  CSN Number:  [id number]

## 2022-07-06 NOTE — PROGRESS NOTES
This is a recent snapshot of the patient's [facility] medical record.  For current drug dose and complete information and questions, call [phone] or In Basket pool, fv home infusion ([id number])  CSN Number:  [id number]

## 2022-07-14 NOTE — PROGRESS NOTES
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 He presented initially with abdominal pain, diarrhea, and progressive abdominal distention for 3 months duration. 2. Initial CT scan in February 2020 showed severe peritoneal carcinomatosis with large peritoneal tumor implants throughout the entire abdomen and pelvis, but mostly prominently in the pelvis.   2. PETCT scan showed interval increase in the amount of peritoneal carcinomatosis.  3. On 3/12/2020, he had ultrasound-guided core needle biopsy of a peritoneal implant (Case: [id number]), which showed a completely undifferentiated appearance, but stains with pancytokeratin, indicating an epithelial origin. 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 Tumor markers for CA-19-9, CEA, beta-hCG, alpha-fetoprotein were unremarkable. Cancer type ID molecular testing by [facility] sent to determine the exact diagnosis and to assist in further treatment planning. 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 Relative probability of less than 5% of other subtype of sarcoma. EWSR1-WT1 fusion detected.  4. 5/1/2020, MRI brain negative for brain metastasis, and baseline CT-CAP obtained showing extensive mixed solid and cystic masses throughout the abdomen and pelvis consistent with peritoneal carcinomatosis. Largest mass measures approximately 20 cm in the pelvis. Metastatic mixed solid and cystic masses seen in hepatic segments 5 and 6 and hepatic segment 7. The masses appear to be contiguous with the retrohepatic peritoneal carcinomatosis. Small volume fluid about the spleen favored to represent malignant ascites, stable mild-to-moderate right hydronephrosis related to mass effect upon the distal ureter in the pelvis, the IVC and iliac  veins are compressed due to the extensive peritoneal carcinomatosis without findings to suggest deep venous thrombosis.  5. 5/4/2020, he begins CAV/IMV alternating chemotherapy. He receives 6 cycles of treatment. He symptomatically improves.  6. 9/11/2020, CT-CAP shows stable to mildly improved extensive peritoneal carcinomatosis. He would like to omit Ifosfamide/etoposide cycles and continue only with CAV.  7. 10/9/2020, he starts CAV every 21 days.  8. 1/6/2021, CT CAP showed a mixed response in the mixed solid and cystic masses throughout the peritoneum. Some of the tumors are stable to mildly worse, with some of the peritoneal masses a bit larger, a few are smaller, one cystic tumor in the left abdomen is smaller, while tumors lower in the pelvis appear slightly larger.  9. 3/24/2021, CT CAP showed continued slight decrease in overall burden of peritoneal carcinomatosis with persistent encasement of bowel without evidence of bowel obstruction.  10. 6/25/2021, he receives cycle 19 CAV, then takes a chemotherapy holiday.  11. 4/22/2022, he resumes CAV/IMV chemotherapy.    History of Present Illness:  Robert reports he is feeling well overall. He denies significant nausea following his last chemotherapy cycle. His appetite is good. Bowel movements are normal without concern for constipation. His energy level is good although he often feels bored. He and his sister note that he often experiences diaphoresis while eating but otherwise denies any episodes of fever or chills. He denies pain with urination, shortness of breath, chest pain, abdominal pain or cramping, diarrhea or dizziness.    Current Outpatient Medications   Medication Sig Dispense Refill     allopurinol (ZYLOPRIM) 300 MG tablet  (Patient not taking: No sig reported)       CATHFLO ACTIVASE 2 MG injection  (Patient not taking: No sig reported)       etoposide (VEPESID) 50 MG capsule Take 4 capsules (200 mg) by mouth daily for 6 days Days 1 through 6. 24  capsule 0     etoposide (VEPESID) 50 MG capsule Take 4 capsules (200 mg) by mouth daily for 6 days Days 1 through 6. 24 capsule 0     folic acid (FOLVITE) 1 MG tablet  (Patient not taking: No sig reported)       lidocaine-prilocaine (EMLA) 2.5-2.5 % external cream Apply topically as needed for moderate pain 30 g 1     mesna (MESNEX) 400 MG TABS tablet Take 2 tablets (800 mg) by mouth every 4 hours for 2 doses Take two tablets 4 hours and 8 hours after end of cyclophosphamide infusion. 4 tablet 0     metoprolol tartrate (LOPRESSOR) 50 MG tablet Take 1 tablet (50 mg) by mouth daily (Patient taking differently: Take 50 mg by mouth daily Hold for systolic (top number of blood pressure) less than 105 and or HR less than 65.) 30 tablet 1     NEULASTA ONPRO 6 MG/0.6ML injection  (Patient not taking: No sig reported)       polyethylene glycol (MIRALAX) 17 g packet Take 17 g by mouth       prochlorperazine (COMPAZINE) 10 MG tablet Take 1 tablet (10 mg) by mouth every 6 hours as needed (Nausea/Vomiting) 30 tablet 5     senna-docusate (SENNA S) 8.6-50 MG tablet Take 1 tablet by mouth 2 times daily (Patient not taking: No sig reported) 60 tablet 0     Physical Exam:  General: The patient is a pleasant male in no acute distress. Accompanied by his sister.  /77   Pulse 103   Temp 98.2  F (36.8  C) (Oral)   Resp 18   Wt 130 kg (286 lb 9.6 oz)   SpO2 99%   BMI 43.44 kg/m    Wt Readings from Last 10 Encounters:   07/15/22 130 kg (286 lb 9.6 oz)   06/24/22 128.5 kg (283 lb 4.8 oz)   06/03/22 132.7 kg (292 lb 9.6 oz)   06/02/22 132 kg (291 lb 1.6 oz)   05/13/22 129.5 kg (285 lb 9.6 oz)   04/22/22 133.1 kg (293 lb 8 oz)   04/21/22 132.5 kg (292 lb)   04/12/22 132.7 kg (292 lb 9.6 oz)   02/01/22 139.3 kg (307 lb 1.6 oz)   11/02/21 134.9 kg (297 lb 4.8 oz)   HEENT: EOMI. Sclerae are anicteric.   Lymph: Neck is supple with no lymphadenopathy in the cervical or supraclavicular areas.   Heart: Regular rate and rhythm. No  murmur.  Lungs: Clear to auscultation bilaterally.   Abdomen: Bowel sounds present, soft, nontender with no palpable masses in a seated position.   Extremities: No lower extremity edema noted bilaterally.   Neuro: Cranial nerves II through XII are grossly intact.  Skin: No rashes, petechiae, or bruising noted on exposed skin.    Labs:   Most Recent 3 CBC's:  Recent Labs   Lab Test 07/15/22  0854 06/24/22  0833 06/03/22  0815   WBC 11.8* 11.3* 10.8   HGB 10.6* 11.1* 11.1*   MCV 94 87 88    315 237   ANEUTAUTO 10.0* 9.3* 9.0*     Most Recent 3 BMP's:  Recent Labs   Lab Test 06/30/22  1200 06/29/22  1230 06/28/22  1025 06/27/22  1045 06/24/22  0833 06/03/22  0815 05/19/22  1600    139 140   < > 140 140 139   POTASSIUM 3.4 3.6 4.0   < > 3.4 3.6 3.4*   CHLORIDE 109 110* 111*   < > 110* 108 108*   CO2 19* 18* 23   < > 22 23 22   BUN 14 12 11   < > 7 5* 10   CR 0.67 0.62* 0.70   < > 0.82 0.70 0.80   ANIONGAP 9 11 6   < > 8 9 9   FAISAL 9.0 9.1 8.6   < > 9.3 8.7 8.6   * 117* 97   < > 110* 148* 125   PROTTOTAL  --   --   --   --  8.0 7.5 7.3   ALBUMIN  --   --   --   --  3.9 3.5 3.5    < > = values in this interval not displayed.    Most Recent 3 LFT's:  Recent Labs   Lab Test 06/24/22  0833 06/03/22  0815 05/19/22  1600   AST 20 16 15   ALT 31 30 18   ALKPHOS 90 112 91   BILITOTAL 0.8 0.4 0.6     Narrative & Impression   EXAM: CT CHEST/ABDOMEN/PELVIS W CONTRAST  LOCATION: [facility]  DATE/TIME: 7/13/2022 12:06 PM     INDICATION: Desmoplastic small round cell tumor (H).  COMPARISON: 04/12/2022  TECHNIQUE: CT scan of the chest, abdomen, and pelvis was performed following injection of IV contrast. Multiplanar reformats were obtained. Dose reduction techniques were used.   CONTRAST: Isovue 370 130cc     FINDINGS:   LUNGS AND PLEURA: The lungs are clear. No pulmonary nodules.     MEDIASTINUM/AXILLAE: Right IJ port is new. The tip is in the low right internal jugular  vein. Mildly enlarged bilateral internal mammary and cardiophrenic lymph nodes are similar to previous. Enlarged bilateral supraclavicular lymph nodes also similar.     CORONARY ARTERY CALCIFICATION: None.     HEPATOBILIARY: Numerous hepatic metastases. An example metastasis in segment 4 on image 227 measures 5.5 cm, previously 4.2 cm. A lesion in the posterior right hepatic lobe on image 216 measures 3.9 cm, previously 3.4 cm.     PANCREAS: Normal.     SPLEEN: Low dense lesions near the splenic hilum have increased, measuring 2.4 cm in image 305 compared to previous 1.5 cm.     ADRENAL GLANDS: Normal.     KIDNEYS/BLADDER: Normal.     BOWEL: No obstruction. There is extensive peritoneal tumor. A confluent peritoneal tumor mass in the pelvis measures 23 x 15 cm on image 504, not appreciably changed. Numerous peritoneal nodules throughout the abdomen and pelvis are likewise similar to   previous. An example mass midline anteriorly on image 364 measures 4.9 cm, previously 4.5 cm.     LYMPH NODES: Mildly enlarged portal caval, retroperitoneal, and retrocrural lymph nodes are similar to previous. Mildly enlarged right inguinal lymph nodes also stable.     VASCULATURE: Unremarkable.     PELVIC ORGANS: Normal.     MUSCULOSKELETAL: Normal.                                                                      IMPRESSION:  1.  Mild interval enlargement of hepatic and splenic metastases.  2.  Extensive peritoneal tumor and lymphadenopathy are similar to previous.  3.  There is a new right internal jugular umberto catheter with tip in the low internal jugular vein.       I reviewed the above labs and imaging today.    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 Buchanan is 27 year old male with advanced intraabdominal DSRCT with extensive peritoneal disease, lymph node metastasis, and liver and bone involvement. He tolerated CAV/IMV for  19 cycles and then was on chemotherapy break from June 2021-April 2022. Given his symptoms he resumed chemotherapy with further CAV/IMV in April 2022.     I reviewed the CT CAP performed 7/13/22 extensively with Dr. Sims. Interval enlargement of hepatic and splenic metastases are noted, with one particular lesion (segment 4, image 227) measuring 5.5 cm, previously 4.2 cm. Overall his disease is otherwise fairly stable. We discussed options which include continuing with CAV/IMV treatment for 4 additional cycles prior to reimaging or changing therapy completely to a regimen such as topotecan/cyclophosphamide or irinotecan/temozolomide. I described some of the logistic difference, such as Ulises/Cy regimen requiring daily clinic infusions x 5 days verus introduction of an oral medication with temozolomide. Potential side effects of these treatments were also discussed including diarrhea, cytopenia, nausea and vomiting.     Ultimately Robert and his sister would like prefer to continue his current treatment for 4 additional cycles as he is tolerating this well and otherwise is asymptomatic. Will proceed with cycle 5 (CAV) today with neulasta support.  -RTC in 3 weeks for cycle 6 (IMV)  -Restage after 4 additional cycles (12 weeks) with CT-CAP    HTN  Tachycardia  Managed with metoprolol 50 mg daily.     Nausea   Secondary to chemotherapy. Managed with Compazine prn.     Deconditioning  Recommend daily exercise    44 minutes spent on the date of the encounter doing chart review, review of test results, interpretation of tests, patient visit, documentation and discussion with family     Yasmine Perez CNP  [facility] [address] [address] [phone]

## 2022-07-15 NOTE — PROGRESS NOTES
Neulasta Onpro On-Body injector applied to left arm at 1333.  Writer discussed Neulasta injection would start tomorrow 7/16 at 1633, approximately 27 hours after application applied today.  Written and Verbal instruction reviewed with patient.  Pt instructed when the dose delivery starts, it will take about 45 minutes to complete.  Pt aware Neulasta Onpro On-Body should have green flashing light and to call triage or on-call MD if injector flashes red or appears to be leaking. Pt aware to keep Onpro On-Body Neulasta 4 inches away from electrical equipment and to avoid showering 4 hours prior to injection.   Neulasta Onpro Lot number: see MAR

## 2022-07-15 NOTE — NURSING NOTE
Chief Complaint   Patient presents with     Blood Draw     Port blood draw with heparin flush by lab RN. Vitals taken and appointment arrived     Yasmine Loza RN

## 2022-07-15 NOTE — PATIENT INSTRUCTIONS
-please take 4 hour MESNA at 5:16 pm  -please take 8 hour MESNA at 9:16pm      [facility] Triage and after hours / weekends / holidays:  [phone]    Please call the triage or after hours line if you experience a temperature greater than or equal to 100.4, shaking chills, have uncontrolled nausea, vomiting and/or diarrhea, dizziness, shortness of breath, chest pain, bleeding, unexplained bruising, or if you have any other new/concerning symptoms, questions or concerns.      If you are having any concerning symptoms or wish to speak to a provider before your next infusion visit, please call your care coordinator or triage to notify them so we can adequately serve you.     If you need a refill on a narcotic prescription or other medication, please call before your infusion appointment.                 July 2022 Sunday Monday Tuesday Wednesday Thursday Friday Saturday                            1     2       3     4     5     6     7     8     9       10     11     12     13    /[facility] OUTPATIENT  11:55 AM   (15 min.)   Alexis Renner   [facility]  Services    CT CHEST/ABDOMEN/PELVIS W  12:10 PM   (20 min.)   [facility]   [facility] [place] 14     15    LAB CENTRAL   8:30 AM   (15 min.)   [facility] DRAW   [facility]    RETURN   8:30 AM   (45 min.)   Yasmine Perez CNP   [facility]    ONC INFUSION 4 HR (240 MIN)  10:00 AM   (240 min.)    ONC INFUSION NURSE   [facility] 16       17     18     19     20     21     22     23       24     25     26     27     28     29     30       31                                                 August 2022 Sunday Monday Tuesday Wednesday Thursday Friday Saturday        1     2     3     4     5     6       7     8     9     10     11     12     13       14     15     16     17     18     19     20       21     22     23     24      25     26     27       28     29     30     31                                      Lab Results:  Recent Results (from the past 12 hour(s))   Comprehensive metabolic panel    Collection Time: 07/15/22  8:54 AM   Result Value Ref Range    Sodium 142 133 - 144 mmol/L    Potassium 3.8 3.4 - 5.3 mmol/L    Chloride 108 94 - 109 mmol/L    Carbon Dioxide (CO2) 24 20 - 32 mmol/L    Anion Gap 10 3 - 14 mmol/L    Urea Nitrogen 11 7 - 30 mg/dL    Creatinine 0.79 0.66 - 1.25 mg/dL    Calcium 9.0 8.5 - 10.1 mg/dL    Glucose 103 (H) 70 - 99 mg/dL    Alkaline Phosphatase 131 40 - 150 U/L    AST 20 0 - 45 U/L    ALT 38 0 - 70 U/L    Protein Total 7.7 6.8 - 8.8 g/dL    Albumin 3.8 3.4 - 5.0 g/dL    Bilirubin Total 0.6 0.2 - 1.3 mg/dL    GFR Estimate >90 >60 mL/min/1.73m2   CBC with platelets and differential    Collection Time: 07/15/22  8:54 AM   Result Value Ref Range    WBC Count 11.8 (H) 4.0 - 11.0 10e3/uL    RBC Count 3.58 (L) 4.40 - 5.90 10e6/uL    Hemoglobin 10.6 (L) 13.3 - 17.7 g/dL    Hematocrit 33.5 (L) 40.0 - 53.0 %    MCV 94 78 - 100 fL    MCH 29.6 26.5 - 33.0 pg    MCHC 31.6 31.5 - 36.5 g/dL    RDW 20.3 (H) 10.0 - 15.0 %    Platelet Count 236 150 - 450 10e3/uL    % Neutrophils 85 %    % Lymphocytes 4 %    % Monocytes 7 %    % Eosinophils 2 %    % Basophils 1 %    % Immature Granulocytes 1 %    NRBCs per 100 WBC 0 <1 /100    Absolute Neutrophils 10.0 (H) 1.6 - 8.3 10e3/uL    Absolute Lymphocytes 0.5 (L) 0.8 - 5.3 10e3/uL    Absolute Monocytes 0.8 0.0 - 1.3 10e3/uL    Absolute Eosinophils 0.3 0.0 - 0.7 10e3/uL    Absolute Basophils 0.1 0.0 - 0.2 10e3/uL    Absolute Immature Granulocytes 0.1 <=0.4 10e3/uL    Absolute NRBCs 0.0 10e3/uL

## 2022-07-15 NOTE — PROGRESS NOTES
Infusion Nursing Note:  Diego Buchanan presents today for  Day 1 Cycle 5 Vincristine, Cytoxan, Dactinomycin. Neulasta On-pro application   Patient seen by provider today: Yes: Yasmine Perez CNP   present during visit today: Not Applicable.    Note: Patient presents to infusion feeling well. Patient denies acute discomfort and states no acute complaints or concerns not addressed during visit with Yasmine Perez CNP today. Cytoxan with MESNA stopped at 1316, therefore patient and sister Mariela educated to take PO 4 hour MESNA at 5:16 pm and 8 hour MESNA at 9:16pm.    Intravenous Access:  Implanted Port.    Treatment Conditions:  Lab Results   Component Value Date    HGB 10.6 (L) 07/15/2022    WBC 11.8 (H) 07/15/2022    ANEU 8.1 05/19/2022    ANEUTAUTO 10.0 (H) 07/15/2022     07/15/2022      Lab Results   Component Value Date     07/15/2022    POTASSIUM 3.8 07/15/2022    MAG 2.2 06/30/2022    CR 0.79 07/15/2022    FAISAL 9.0 07/15/2022    BILITOTAL 0.6 07/15/2022    ALBUMIN 3.8 07/15/2022    ALT 38 07/15/2022    AST 20 07/15/2022     Results reviewed, labs MET treatment parameters, ok to proceed with treatment.    Post Infusion Assessment:  Patient tolerated infusion without incident.  Blood return noted pre and post infusion.  Blood return noted during administration every 2 ml for Dactinomycin and every 2 seconds during Vincristine.  Site patent and intact, free from redness, edema or discomfort.  No evidence of extravasations.  Access discontinued per protocol.   *Neulasta applied via Janel Franco RN. Lot Number [id number]    Discharge Plan:   Prescription refills given for MESNA .  Discharge instructions reviewed with: Patient.  Patient and/or family verbalized understanding of discharge instructions and all questions answered.  AVS to patient via [employer].  Patient will return in 3 weeks for next appointment\ per check out orders  Patient discharged in stable condition accompanied by:  sister.  Departure Mode: Ambulatory.  Face to Face time: 0 minutes.      Nitin Duron RN

## 2022-07-15 NOTE — NURSING NOTE
"Oncology Rooming Note    July 15, 2022 9:08 AM   Diego Buchanan is a 27 year old male who presents for:    Chief Complaint   Patient presents with     Blood Draw     Port blood draw with heparin flush by lab RN. Vitals taken and appointment arrived     Oncology Clinic Visit     [facility] Sarcoma     Initial Vitals: /77   Pulse 103   Temp 98.2  F (36.8  C) (Oral)   Resp 18   Wt 130 kg (286 lb 9.6 oz)   SpO2 99%   BMI 43.44 kg/m   Estimated body mass index is 43.44 kg/m  as calculated from the following:    Height as of 4/22/22: 1.73 m (5' 8.11\").    Weight as of this encounter: 130 kg (286 lb 9.6 oz). Body surface area is 2.5 meters squared.  No Pain (0) Comment: Data Unavailable   No LMP for male patient.  Allergies reviewed: Yes  Medications reviewed: Yes    Medications: Medication refills not needed today.  Pharmacy name entered into [facility]:    [id number] - [place], [place] - [address] [place] [address] AVE AT [facility] - [place], [place] - [phone]  [facility] - [place], [place] - [address] [place] [address]    Clinical concerns: none.       Bruce Velásquez"

## 2022-07-21 NOTE — PROGRESS NOTES
CLINICAL NUTRITION SERVICES- ONCOLOGY DISTRESS SCREENING     Identified Concern and Score From Distress Screening: This patient has scored >= 6 on Nutrition question 1 and/or 2 on the Oncology Distress Screening questionaire. Nutritionist Referral recommended. See Onc Distress Screening Flowsheet     Date of Distress Screenin/15     Findings: Robert was sleeping at time of call, spoke with pt' sister. She reports that Robert has had a good appetite recently.      Follow-up Required: PRN, provided RD oncology phone number.     Beatriz Canchola RD, LD  [phone]

## 2022-07-22 NOTE — TELEPHONE ENCOUNTER
Oral Chemotherapy Monitoring Program    Placed call to patient in follow up of etoposide therapy. Placed call to Diego Sierra's mother, yesterday 7/21 and was instructed to call back this morning.     Left message to please call back in follow up of therapy. No patient or drug names were mentioned.    Americo Perez  Pharmacy Intern  Oral Chemotherapy Monitoring Program  [facility]  [phone]

## 2022-08-03 NOTE — PROGRESS NOTES
Diego is a 27 year old who is being evaluated via a billable video visit.      How would you like to obtain your AVS? [email or web address]  If the video visit is dropped, the invitation should be resent by: Text to cell phone: [phone]   Will anyone else be joining your video visit? Yes: Mariela - . How would they like to receive their invitation? Text to cell phone: in person        Video-Visit Details    Video Start Time: 10:01 AM    Type of service:  Video Visit    Video End Time:10:05 AM    Originating Location (pt. 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 He presented initially with abdominal pain, diarrhea, and progressive abdominal distention for 3 months duration. 2. Initial CT scan in February 2020 showed severe peritoneal carcinomatosis with large peritoneal tumor implants throughout the entire abdomen and pelvis, but mostly prominently in the pelvis.   2. PETCT scan showed interval increase in the amount of peritoneal carcinomatosis.  3. On 3/12/2020, he had ultrasound-guided core needle biopsy of a peritoneal implant (Case: [id number]), which showed a completely undifferentiated appearance, but stains with pancytokeratin, indicating an epithelial origin. 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 Tumor markers for CA-19-9, CEA, beta-hCG, alpha-fetoprotein were unremarkable. Cancer type ID molecular testing by [facility] sent to determine the exact diagnosis and to assist in further treatment planning. 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 Relative probability of less than 5% of other subtype of  sarcoma. EWSR1-WT1 fusion detected.  4. 5/1/2020, MRI brain negative for brain metastasis, and baseline CT-CAP obtained showing extensive mixed solid and cystic masses throughout the abdomen and pelvis consistent with peritoneal carcinomatosis. Largest mass measures approximately 20 cm in the pelvis. Metastatic mixed solid and cystic masses seen in hepatic segments 5 and 6 and hepatic segment 7. The masses appear to be contiguous with the retrohepatic peritoneal carcinomatosis. Small volume fluid about the spleen favored to represent malignant ascites, stable mild-to-moderate right hydronephrosis related to mass effect upon the distal ureter in the pelvis, the IVC and iliac veins are compressed due to the extensive peritoneal carcinomatosis without findings to suggest deep venous thrombosis.  5. 5/4/2020, he begins CAV/IMV alternating chemotherapy. He receives 6 cycles of treatment. He symptomatically improves.  6. 9/11/2020, CT-CAP shows stable to mildly improved extensive peritoneal carcinomatosis. He would like to omit Ifosfamide/etoposide cycles and continue only with CAV.  7. 10/9/2020, he starts CAV every 21 days.  8. 1/6/2021, CT CAP showed a mixed response in the mixed solid and cystic masses throughout the peritoneum. Some of the tumors are stable to mildly worse, with some of the peritoneal masses a bit larger, a few are smaller, one cystic tumor in the left abdomen is smaller, while tumors lower in the pelvis appear slightly larger.  9. 3/24/2021, CT CAP showed continued slight decrease in overall burden of peritoneal carcinomatosis with persistent encasement of bowel without evidence of bowel obstruction.  10. 6/25/2021, he receives cycle 19 CAV, then takes a chemotherapy holiday.  11. 4/22/2022, he resumes CAV/IMV chemotherapy.    History of Present Illness:  Robert is seen for routine follow-up and consideration of cycle 6 chemotherapy (IMV) scheduled 8/5/22. He is accompanied by his sister via virtual  visit. He reports feeling well. He is somewhat bored throughout the day. He has been trying to get outside for walks although the weather has been quite hot this week. His appetite is good. Denies peripheral neuropathy, constipation, nausea or fatigue.    Current Outpatient Medications   Medication Sig Dispense Refill     allopurinol (ZYLOPRIM) 300 MG tablet  (Patient not taking: No sig reported)       CATHFLO ACTIVASE 2 MG injection  (Patient not taking: No sig reported)       etoposide (VEPESID) 50 MG capsule Take 4 capsules (200 mg) by mouth daily for 6 days Days 1 through 6. 24 capsule 0     etoposide (VEPESID) 50 MG capsule Take 4 capsules (200 mg) by mouth daily for 6 days Days 1 through 6. 24 capsule 0     folic acid (FOLVITE) 1 MG tablet  (Patient not taking: No sig reported)       lidocaine-prilocaine (EMLA) 2.5-2.5 % external cream Apply topically as needed for moderate pain 30 g 1     mesna (MESNEX) 400 MG TABS tablet Take 2 tablets (800 mg) by mouth every 4 hours for 2 doses Take two tablets 4 hours and 8 hours after end of cyclophosphamide infusion. 4 tablet 0     mesna (MESNEX) 400 MG TABS tablet Take 2 tablets (800 mg) by mouth every 4 hours for 2 doses Take two tablets 4 hours and 8 hours after end of cyclophosphamide infusion. 4 tablet 0     metoprolol tartrate (LOPRESSOR) 50 MG tablet Take 1 tablet (50 mg) by mouth daily (Patient taking differently: Take 50 mg by mouth daily Hold for systolic (top number of blood pressure) less than 105 and or HR less than 65.) 30 tablet 1     NEULASTA ONPRO 6 MG/0.6ML injection  (Patient not taking: No sig reported)       polyethylene glycol (MIRALAX) 17 g packet Take 17 g by mouth       prochlorperazine (COMPAZINE) 10 MG tablet Take 1 tablet (10 mg) by mouth every 6 hours as needed (Nausea/Vomiting) 30 tablet 5     senna-docusate (SENNA S) 8.6-50 MG tablet Take 1 tablet by mouth 2 times daily (Patient not taking: No sig reported) 60 tablet 0     Physical  Exam:  There were no vitals taken for this visit.  Wt Readings from Last 10 Encounters:   07/15/22 130 kg (286 lb 9.6 oz)   06/24/22 128.5 kg (283 lb 4.8 oz)   06/03/22 132.7 kg (292 lb 9.6 oz)   06/02/22 132 kg (291 lb 1.6 oz)   05/13/22 129.5 kg (285 lb 9.6 oz)   04/22/22 133.1 kg (293 lb 8 oz)   04/21/22 132.5 kg (292 lb)   04/12/22 132.7 kg (292 lb 9.6 oz)   02/01/22 139.3 kg (307 lb 1.6 oz)   11/02/21 134.9 kg (297 lb 4.8 oz)   Video physical exam  General: Patient appears well in no acute distress.   Skin: No visualized rash or lesions on visualized skin  Eyes: EOMI, no erythema, sclera icterus or discharge noted  Resp: Appears to be breathing comfortably without accessory muscle usage, speaking in full sentences, no cough  MSK: Appears to have normal range of motion based on visualized movements  Neurologic: No apparent tremors, facial movements symmetric  Psych: affect pleasant, alert and oriented    Labs:   Most Recent 3 CBC's:  Recent Labs   Lab Test 07/15/22  0854 06/24/22  0833 06/03/22  0815   WBC 11.8* 11.3* 10.8   HGB 10.6* 11.1* 11.1*   MCV 94 87 88    315 237   ANEUTAUTO 10.0* 9.3* 9.0*     Most Recent 3 BMP's:  Recent Labs   Lab Test 07/15/22  0854 06/30/22  1200 06/29/22  1230 06/27/22  1045 06/24/22  0833 06/03/22  0815    137 139   < > 140 140   POTASSIUM 3.8 3.4 3.6   < > 3.4 3.6   CHLORIDE 108 109 110*   < > 110* 108   CO2 24 19* 18*   < > 22 23   BUN 11 14 12   < > 7 5*   CR 0.79 0.67 0.62*   < > 0.82 0.70   ANIONGAP 10 9 11   < > 8 9   FAISAL 9.0 9.0 9.1   < > 9.3 8.7   * 195* 117*   < > 110* 148*   PROTTOTAL 7.7  --   --   --  8.0 7.5   ALBUMIN 3.8  --   --   --  3.9 3.5    < > = values in this interval not displayed.    Most Recent 3 LFT's:  Recent Labs   Lab Test 07/15/22  0854 06/24/22  0833 06/03/22  0815   AST 20 20 16   ALT 38 31 30   ALKPHOS 131 90 112   BILITOTAL 0.6 0.8 0.4         I reviewed the above labs and imaging today.    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 Buchanan is 27 year old male with advanced intraabdominal DSRCT with extensive peritoneal disease, lymph node metastasis, and liver and bone involvement. He tolerated CAV/IMV for 19 cycles and then was on chemotherapy break from June 2021-April 2022. Given his symptoms he resumed chemotherapy with further CAV/IMV in April 2022.  CT CAP performed 7/13/22 extensively with Dr. Sims. Interval enlargement of hepatic and splenic metastases are noted, with one particular lesion (segment 4, image 227) measuring 5.5 cm, previously 4.2 cm. Overall his disease is otherwise fairly stable. We discussed options which include continuing with CAV/IMV treatment for 4 additional cycles prior to reimaging or changing therapy completely to a regimen such as topotecan/cyclophosphamide or irinotecan/temozolomide. Patient and family elected to continue CAV/IMV at this time. He continues to tolerate treatment well with essentially no side effects.  -Proceed with cycle 6 (IMV) 8/5 pending labs  -Restage after 4 additional cycles (12 weeks) with CT-CAP    HTN  Tachycardia  Managed with metoprolol 50 mg daily.     Nausea   Secondary to chemotherapy. Managed with Compazine prn.     Deconditioning  Recommend daily exercise    20 minutes spent on the date of the encounter doing chart review, patient visit and documentation     Yasmine Perez CNP  [facility] [address] [address]  [phone]

## 2022-08-04 NOTE — PROGRESS NOTES
This is a recent snapshot of the patient's [facility] medical record.  For current drug dose and complete information and questions, call [phone] or In Basket pool, fv home infusion ([id number])  CSN Number:  [id number]

## 2022-08-05 NOTE — NURSING NOTE
Chief Complaint   Patient presents with     Port Draw     Power needle, heparin locked,vitals checked       Katie Harding RN on 8/5/2022 at 1:36 PM

## 2022-08-05 NOTE — PROGRESS NOTES
"Infusion Nursing Note:  Diego Buchanan presents today for Cycle 6 Day 1 Ifosfamide and Mesna Pump Connect.   Patient seen by provider today: No   present during visit today: Not Applicable.    Note: Patient presents to infusion today doing well accompanied by his sister. Denies any new changes or concerns since visit with Yasmine Perez NP yesterday 8/4.    Yasmine Perez NP notified to clarify when labs should be drawn during this cycle.    TORB @ [address]/ Liliam Clemente RN:  - Obtain labs (CBC, CMP, Mg, Phos) on Days 6 and 8 during cycle     Intravenous Access:  Implanted Port.    Treatment Conditions:  Lab Results   Component Value Date    HGB 9.6 (L) 08/05/2022    WBC 12.2 (H) 08/05/2022    ANEU 8.1 05/19/2022    ANEUTAUTO 10.3 (H) 08/05/2022     08/05/2022      Lab Results   Component Value Date     08/05/2022    POTASSIUM 3.6 08/05/2022    MAG 2.2 06/30/2022    CR 0.76 08/05/2022    FAISAL 8.5 08/05/2022    BILITOTAL 0.6 08/05/2022    ALBUMIN 3.4 08/05/2022    ALT 42 08/05/2022    AST 30 08/05/2022     Results reviewed, labs MET treatment parameters, ok to proceed with treatment.    Post Infusion Assessment:  Patient tolerated infusion without incident.  Blood return noted pre and post infusion.  Site patent and intact, free from redness, edema or discomfort.  No evidence of extravasations.     Prior to discharge: Port is secured in place with tegaderm and flushed with 10cc NS with positive blood return noted.  Continuous home infusion CADD pump connected.    All connectors secured in place and clamps taped open.    Pump started, \"running\" noted on display (CADD): YES.  Pump Connection double checked with Margaret Oneal RN.  Patient instructed to call our clinic or [facility] with any questions or concerns at home.  Patient verbalized understanding.    Patient set up for pump disconnect at home with [facility] on Thursday 8/11 for mesna bag change and " Neulasta OnPro and Friday 8/12 for mesna pump disconnect at 4pm.      The following set up with [facility]. Patient and sister aware.  8/10 Day 6: Labs  8/11 Day 7: Mesna Bag Change and Neulasta OnPro  8/12 Day 8: Labs, Pump Disconnect    Discharge Plan:   Prescription refills given for Etoposide.  Discharge instructions reviewed with: Family.  Patient and/or family verbalized understanding of discharge instructions and all questions answered.  AVS to patient via [employer].  Patient will return 8/25 for next appointment with Yasmine Perez NP and 8/26 for next infusion.   Patient discharged in stable condition accompanied by: sister.  Departure Mode: Ambulatory.      Liliam Clemente RN

## 2022-08-05 NOTE — PATIENT INSTRUCTIONS
Contact Numbers  [facility]: [phone] (for symptom and scheduling needs)    Please call the [facility] Triage line if you experience a temperature greater than or equal to 100.4, shaking chills, have uncontrolled nausea, vomiting and/or diarrhea, dizziness, shortness of breath, chest pain, bleeding, unexplained bruising, or if you have any other new/concerning symptoms, questions or concerns.     If you are having any concerning symptoms or wish to speak to a provider before your next infusion visit, please call your care coordinator or triage to notify them so we can adequately serve you.     If you need a refill on a narcotic prescription or other medication, please call triage before your infusion appointment.           August 2022 Sunday Monday Tuesday Wednesday Thursday Friday Saturday        1     2     3     4    VIDEO VISIT RETURN   9:45 AM   (45 min.)   Yasmine Perez CNP   [facility] 5    LAB CENTRAL   1:30 PM   (15 min.)   [facility] LAB DRAW   [facility]    ONC INFUSION 3 HR (180 MIN)   2:00 PM   (180 min.)    ONC INFUSION NURSE   [facility] 6       7     8     9     10     11     12     13       14     15     16     17     18     19     20       21     22     23     24     25    VIDEO VISIT RETURN   8:45 AM   (45 min.)   Yasmine Perez CNP   [facility] 26    LAB CENTRAL   9:15 AM   (15 min.)   [facility] LAB DRAW   [facility]    ONC INFUSION 4 HR (240 MIN)  10:00 AM   (240 min.)    ONC INFUSION NURSE   [facility] 27       28     29     30     31 September 2022 Sunday Monday Tuesday Wednesday Thursday Friday Saturday                       1     2     3       4     5     6     7     8     9     10       11     12     13     14     15     16    LAB CENTRAL   9:15 AM   (15 min.)   [facility]OS  [facility] LAB DRAW   [facility]    RETURN   9:30 AM   (45 min.)   Yasmine Perez CNP   [facility]    ONC INFUSION 3 HR (180 MIN)  12:30 PM   (180 min.)    ONC INFUSION NURSE   [facility] 17       18     19     20     21     22     23     24       25     26     27     28     29     30                            Lab Results:  Recent Results (from the past 12 hour(s))   Comprehensive metabolic panel    Collection Time: 08/05/22  1:32 PM   Result Value Ref Range    Sodium 144 133 - 144 mmol/L    Potassium 3.6 3.4 - 5.3 mmol/L    Chloride 110 (H) 94 - 109 mmol/L    Carbon Dioxide (CO2) 23 20 - 32 mmol/L    Anion Gap 11 3 - 14 mmol/L    Urea Nitrogen 6 (L) 7 - 30 mg/dL    Creatinine 0.76 0.66 - 1.25 mg/dL    Calcium 8.5 8.5 - 10.1 mg/dL    Glucose 122 (H) 70 - 99 mg/dL    Alkaline Phosphatase 118 40 - 150 U/L    AST 30 0 - 45 U/L    ALT 42 0 - 70 U/L    Protein Total 7.5 6.8 - 8.8 g/dL    Albumin 3.4 3.4 - 5.0 g/dL    Bilirubin Total 0.6 0.2 - 1.3 mg/dL    GFR Estimate >90 >60 mL/min/1.73m2   CBC with platelets and differential    Collection Time: 08/05/22  1:32 PM   Result Value Ref Range    WBC Count 12.2 (H) 4.0 - 11.0 10e3/uL    RBC Count 3.15 (L) 4.40 - 5.90 10e6/uL    Hemoglobin 9.6 (L) 13.3 - 17.7 g/dL    Hematocrit 30.2 (L) 40.0 - 53.0 %    MCV 96 78 - 100 fL    MCH 30.5 26.5 - 33.0 pg    MCHC 31.8 31.5 - 36.5 g/dL    RDW 17.7 (H) 10.0 - 15.0 %    Platelet Count 302 150 - 450 10e3/uL    % Neutrophils 83 %    % Lymphocytes 5 %    % Monocytes 9 %    % Eosinophils 1 %    % Basophils 1 %    % Immature Granulocytes 1 %    NRBCs per 100 WBC 0 <1 /100    Absolute Neutrophils 10.3 (H) 1.6 - 8.3 10e3/uL    Absolute Lymphocytes 0.6 (L) 0.8 - 5.3 10e3/uL    Absolute Monocytes 1.1 0.0 - 1.3 10e3/uL    Absolute Eosinophils 0.1 0.0 - 0.7 10e3/uL    Absolute Basophils 0.2 0.0 - 0.2 10e3/uL    Absolute Immature Granulocytes 0.1 <=0.4 10e3/uL     Absolute NRBCs 0.0 10e3/uL

## 2022-08-09 NOTE — PROGRESS NOTES
This is a recent snapshot of the patient's [facility] medical record.  For current drug dose and complete information and questions, call [phone] or In Basket pool, fv home infusion ([id number])  CSN Number:  [id number]

## 2022-08-12 NOTE — PROGRESS NOTES
This is a recent snapshot of the patient's [facility] medical record.  For current drug dose and complete information and questions, call [phone] or In Basket pool, fv home infusion ([id number])  CSN Number:  [id number]

## 2022-08-15 NOTE — PROGRESS NOTES
This is a recent snapshot of the patient's [facility] medical record.  For current drug dose and complete information and questions, call [phone] or In Basket pool, fv home infusion ([id number])  CSN Number:  [id number]

## 2022-08-20 NOTE — TELEPHONE ENCOUNTER
Oral Chemotherapy Monitoring Program    Subjective/Objective:  Diego Buchanan is a 27 year old male contacted by phone for a follow-up visit for oral chemotherapy. Spoke with patient's sister Mariela for this follow-up call. I had first spoken with patient's mother via [employer] , but mother said she was not familiar with patient's medications so she asked me to call her daughter instead.    Mariela confirmed Diego took 4 capsules (200mg) of etoposide daily for the first 6 days of his cycle, which started on 8/5 (the same day he got infusion). She states he might have missed 1 capsule this past cycle -- She instructed Diego to take 4 capsules daily, but on the last day she noticed that there were only 3 capsules remaining for the last dose. She was unsure if the pharmacy had filled 1 capsule short, or if Diego might have taken one extra capsule on one of the previous days.     Mariela states Diego did not seem to have any side effects and has been doing well with etoposide. He has no nausea, vomiting, diarrhea, or any other noticeable side effects. He has good appetite and good energy. He has not started any new OTC or prescription medications recently.    ORAL CHEMOTHERAPY 4/3/2021 5/1/2021 6/6/2021 7/14/2021 5/11/2022 5/19/2022 6/17/2022   Assessment Type Chart Review Chart Review Chart Review Chart Review Initial Work up Initial Follow up Refill   Diagnosis Code (No Data) (No Data) (No Data) (No Data) (No Data) - -   Providers Dr. Morales Sims   [facility]   Drug Name Etoposide Etoposide Etoposide Etoposide Etoposide Etoposide Etoposide   Dose 200 mg 200 mg 200 mg 200 mg 200 mg 200 mg 200 mg   Current Schedule Daily Daily Daily Daily Daily Daily Daily   Cycle Details Other Other Other Drug on Hold - - -   Start Date of Last Cycle - - - - -  "5/13/2022 -   Planned next cycle start date - - - - 5/13/2022 - -   Doses missed in last 2 weeks - - - - - 0 -   Adherence Assessment - - - - - Adherent -   Adverse Effects - - - - - Nausea -   Nausea - - - - - Grade 1 -   Pharmacist Intervention(nausea) - - - - - No -   Any new drug interactions? - - - - No No -   Is the dose as ordered appropriate for the patient? - - - - - Yes -   Is the patient currently in pain? - - - - - - -   Has the patient been assessed within the past 6 months for depression? - - - - - - -   Has the patient missed any days of school, work, or other routine activity? - - - - - - -   Since the last time we talked, have you been hospitalized or used the emergency room? - - - - - No -       Last PHQ-2 Score on record:   PHQ-2 ( 1999 [employer]) 9/22/2020   Q1: Little interest or pleasure in doing things 0   Q2: Feeling down, depressed or hopeless 0   PHQ-2 Score 0   PHQ-2 Total Score (12-17 Years)- Positive if 3 or more points; Administer PHQ-A if positive 0       Vitals:  BP:   BP Readings from Last 1 Encounters:   08/05/22 124/81     Wt Readings from Last 1 Encounters:   08/05/22 130.2 kg (287 lb)     Estimated body surface area is 2.5 meters squared as calculated from the following:    Height as of 4/22/22: 1.73 m (5' 8.11\").    Weight as of 8/5/22: 130.2 kg (287 lb).    Labs:  _  Result Component Current Result Ref Range   Sodium 138 (8/12/2022) 136 - 145 mmol/L     _  Result Component Current Result Ref Range   Potassium 3.8 (8/12/2022) 3.4 - 5.3 mmol/L     _  Result Component Current Result Ref Range   Calcium 9.5 (8/12/2022) 8.6 - 10.0 mg/dL     _  Result Component Current Result Ref Range   Magnesium 2.2 (8/12/2022) 1.7 - 2.3 mg/dL     _  Result Component Current Result Ref Range   Phosphorus 2.9 (8/12/2022) 2.5 - 4.5 mg/dL     _  Result Component Current Result Ref Range   Albumin 4.3 (8/12/2022) 3.5 - 5.2 g/dL     _  Result Component Current Result Ref Range   Urea Nitrogen 15.3 " (8/12/2022) 6.0 - 20.0 mg/dL     _  Result Component Current Result Ref Range   Creatinine 0.69 (8/12/2022) 0.67 - 1.17 mg/dL     _  Result Component Current Result Ref Range   AST 24 (8/12/2022) 10 - 50 U/L     _  Result Component Current Result Ref Range   ALT 21 (8/12/2022) 10 - 50 U/L     _  Result Component Current Result Ref Range   Bilirubin Total 0.4 (8/12/2022) <=1.2 mg/dL     _  Result Component Current Result Ref Range   WBC Count 12.9 (H) (8/12/2022) 4.0 - 11.0 10e3/uL     _  Result Component Current Result Ref Range   Hemoglobin 8.8 (L) (8/12/2022) 13.3 - 17.7 g/dL     _  Result Component Current Result Ref Range   Platelet Count 311 (8/12/2022) 150 - 450 10e3/uL     No results found for ANC within last 30 days.     _  Result Component Current Result Ref Range   Absolute Neutrophils 10.3 (H) (8/5/2022) 1.6 - 8.3 10e3/uL          Assessment/Plan:  Diego is tolerating therapy with no reported side effects. It's unknown if he had missed 1 capsule due to pharmacy missing 1 capsule or if he had just taken an extra capsule on another day. I told Mariela to double check if there are 24 capsules next time they  the medication. He is otherwise adherent to treatment as far as Mariela is aware.    Continue etoposide therapy as planned.    Follow-Up:  8/25 Yasmine Perez appt  8/26 labs and infusion appt    Swetha Sanz  Pharmacy Intern  Oral Chemotherapy Monitoring Program   [facility]  [phone]

## 2022-08-24 NOTE — PROGRESS NOTES
Diego is a 27 year old who is being evaluated via a billable video visit.      How would you like to obtain your AVS? [employer]  If the video visit is dropped, the invitation should be resent by: Text to cell phone: [phone]  Will anyone else be joining your video visit? No        Video-Visit Details    Video Start Time: 9:09 AM    Type of service:  Video Visit    Video End Time:9:15 AM    Originating Location (pt. 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 He presented initially with abdominal pain, diarrhea, and progressive abdominal distention for 3 months duration. 2. Initial CT scan in February 2020 showed severe peritoneal carcinomatosis with large peritoneal tumor implants throughout the entire abdomen and pelvis, but mostly prominently in the pelvis.   2. PETCT scan showed interval increase in the amount of peritoneal carcinomatosis.  3. On 3/12/2020, he had ultrasound-guided core needle biopsy of a peritoneal implant (Case: [id number]), which showed a completely undifferentiated appearance, but stains with pancytokeratin, indicating an epithelial origin. 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 Tumor markers for CA-19-9, CEA, beta-hCG, alpha-fetoprotein were unremarkable. Cancer type ID molecular testing by [employer] sent to determine the exact diagnosis and to assist in further treatment planning. 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 Relative probability of less than 5% of other subtype of sarcoma. EWSR1-WT1 fusion detected.  4. 5/1/2020, MRI brain negative for brain metastasis, and baseline  CT-CAP obtained showing extensive mixed solid and cystic masses throughout the abdomen and pelvis consistent with peritoneal carcinomatosis. Largest mass measures approximately 20 cm in the pelvis. Metastatic mixed solid and cystic masses seen in hepatic segments 5 and 6 and hepatic segment 7. The masses appear to be contiguous with the retrohepatic peritoneal carcinomatosis. Small volume fluid about the spleen favored to represent malignant ascites, stable mild-to-moderate right hydronephrosis related to mass effect upon the distal ureter in the pelvis, the IVC and iliac veins are compressed due to the extensive peritoneal carcinomatosis without findings to suggest deep venous thrombosis.  5. 5/4/2020, he begins CAV/IMV alternating chemotherapy. He receives 6 cycles of treatment. He symptomatically improves.  6. 9/11/2020, CT-CAP shows stable to mildly improved extensive peritoneal carcinomatosis. He would like to omit Ifosfamide/etoposide cycles and continue only with CAV.  7. 10/9/2020, he starts CAV every 21 days.  8. 1/6/2021, CT CAP showed a mixed response in the mixed solid and cystic masses throughout the peritoneum. Some of the tumors are stable to mildly worse, with some of the peritoneal masses a bit larger, a few are smaller, one cystic tumor in the left abdomen is smaller, while tumors lower in the pelvis appear slightly larger.  9. 3/24/2021, CT CAP showed continued slight decrease in overall burden of peritoneal carcinomatosis with persistent encasement of bowel without evidence of bowel obstruction.  10. 6/25/2021, he receives cycle 19 CAV, then takes a chemotherapy holiday.  11. 4/22/2022, he resumes CAV/IMV chemotherapy.  12. 7/13/22, CT CAP showed interval enlargement of hepatic and splenic metastases with other overall disease stable. CAV/IMV continued.    History of Present Illness:  Robert is seen today for a routine virtual visit for consideration of cycle 7 CAV. He continues to feel well with  minimal complaints. He reports feeling bored and is not very active. States he occasionally goes for walks but does not care for the heat. He denies any nausea or appetite changes with his last IMV cycle. He is not significantly fatigued. He inquires about getting a COVID booster which he has not received.    ROS  10 point ROS neg other than the symptoms noted above in the HPI.      Current Outpatient Medications   Medication Sig Dispense Refill     allopurinol (ZYLOPRIM) 300 MG tablet  (Patient not taking: No sig reported)       CATHFLO ACTIVASE 2 MG injection  (Patient not taking: No sig reported)       etoposide (VEPESID) 50 MG capsule Take 4 capsules (200 mg) by mouth daily for 6 days Days 1 through 6. 24 capsule 0     etoposide (VEPESID) 50 MG capsule Take 4 capsules (200 mg) by mouth daily for 6 days Days 1 through 6. 24 capsule 0     etoposide (VEPESID) 50 MG capsule Take 4 capsules (200 mg) by mouth daily for 6 days Days 1 through 6. 24 capsule 0     folic acid (FOLVITE) 1 MG tablet  (Patient not taking: No sig reported)       lidocaine-prilocaine (EMLA) 2.5-2.5 % external cream Apply topically as needed for moderate pain 30 g 1     mesna (MESNEX) 400 MG TABS tablet Take 2 tablets (800 mg) by mouth every 4 hours for 2 doses Take two tablets 4 hours and 8 hours after end of cyclophosphamide infusion. 4 tablet 0     mesna (MESNEX) 400 MG TABS tablet Take 2 tablets (800 mg) by mouth every 4 hours for 2 doses Take two tablets 4 hours and 8 hours after end of cyclophosphamide infusion. 4 tablet 0     metoprolol tartrate (LOPRESSOR) 50 MG tablet Take 1 tablet (50 mg) by mouth daily (Patient taking differently: Take 50 mg by mouth daily Hold for systolic (top number of blood pressure) less than 105 and or HR less than 65.) 30 tablet 1     NEULASTA ONPRO 6 MG/0.6ML injection  (Patient not taking: No sig reported)       polyethylene glycol (MIRALAX) 17 g packet Take 17 g by mouth       prochlorperazine (COMPAZINE) 10  MG tablet Take 1 tablet (10 mg) by mouth every 6 hours as needed (Nausea/Vomiting) 30 tablet 5     senna-docusate (SENNA S) 8.6-50 MG tablet Take 1 tablet by mouth 2 times daily (Patient not taking: No sig reported) 60 tablet 0     Physical Exam:  There were no vitals taken for this visit.  Wt Readings from Last 10 Encounters:   08/05/22 130.2 kg (287 lb)   07/15/22 130 kg (286 lb 9.6 oz)   06/24/22 128.5 kg (283 lb 4.8 oz)   06/03/22 132.7 kg (292 lb 9.6 oz)   06/02/22 132 kg (291 lb 1.6 oz)   05/13/22 129.5 kg (285 lb 9.6 oz)   04/22/22 133.1 kg (293 lb 8 oz)   04/21/22 132.5 kg (292 lb)   04/12/22 132.7 kg (292 lb 9.6 oz)   02/01/22 139.3 kg (307 lb 1.6 oz)     Video physical exam  General: Patient appears well in no acute distress.   Skin: No visualized rash or lesions on visualized skin  Eyes: EOMI, no erythema, sclera icterus or discharge noted  Resp: Appears to be breathing comfortably without accessory muscle usage, speaking in full sentences, no cough  MSK: Appears to have normal range of motion based on visualized movements  Neurologic: No apparent tremors, facial movements symmetric  Psych: affect pleasant, alert and oriented    Labs:   Most Recent 3 CBC's:  Recent Labs   Lab Test 08/12/22  1630 08/10/22  0925 08/05/22  1332 07/15/22  0854 06/24/22  0833   WBC 12.9* 8.6 12.2* 11.8* 11.3*   HGB 8.8* 9.1* 9.6* 10.6* 11.1*   MCV 96 95 96 94 87    353 302 236 315   ANEUTAUTO  --   --  10.3* 10.0* 9.3*     Most Recent 3 BMP's:  Recent Labs   Lab Test 08/12/22  1630 08/10/22  0925 08/05/22  1332    141 144   POTASSIUM 3.8 3.6 3.6   CHLORIDE 106 110* 110*   CO2 20* 23 23   BUN 15.3 11 6*   CR 0.69 0.63* 0.76   ANIONGAP 12 8 11   FAISAL 9.5 8.9 8.5   GLC 84 95 122*   PROTTOTAL 7.5 7.1 7.5   ALBUMIN 4.3 3.3* 3.4    Most Recent 3 LFT's:  Recent Labs   Lab Test 08/12/22  1630 08/10/22  0925 08/05/22  1332   AST 24 14 30   ALT 21 29 42   ALKPHOS 98 92 118   BILITOTAL 0.4 0.9 0.6     I reviewed the above  labs and imaging today.    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 Buchanan is 27 year old male with advanced intraabdominal DSRCT with extensive peritoneal disease, lymph node metastasis, and liver and bone involvement. He tolerated CAV/IMV for 19 cycles and then was on chemotherapy break from June 2021-April 2022. Given his symptoms he resumed chemotherapy with further CAV/IMV in April 2022.  CT CAP performed 7/13/22 extensively with Dr. Sims. Interval enlargement of hepatic and splenic metastases are noted, with one particular lesion (segment 4, image 227) measuring 5.5 cm, previously 4.2 cm. Overall his disease is otherwise fairly stable. We discussed options which include continuing with CAV/IMV treatment for 4 additional cycles prior to reimaging or changing therapy completely to a regimen such as topotecan/cyclophosphamide or irinotecan/temozolomide. Patient and family elected to continue CAV/IMV. He continues to tolerate treatment well with minimal side effects  -Proceed with cycle 7 (CAV) 8/26 pending labs  -RTC in 3 weeks for LISY visit and cycle 8 (IMV)  -Restage CT-CAP 10/4, f/u with Dr. Sims 10/6     Anemia  Hemoglobin trending down, last 8.8. Check prior to chemo tomorrow. No obvious bleeding concern, likely 2/2 chemotherapy, but will continue to monitor serially.    HTN  Tachycardia  Managed with metoprolol 50 mg daily    Nausea   Secondary to chemotherapy. Managed with Compazine prn.     Deconditioning  Continue to recommend daily exercise with short walks    20 minutes spent on the date of the encounter doing chart review, review of test results, patient visit, documentation and discussion with family     Yasmine Perez CNP  [facility]  [address] [place] [id number] [phone]

## 2022-08-25 NOTE — Clinical Note
8/25/2022         RE: Diego Buchanan  [address]  [place] [address]        Dear Colleague,    Thank you for referring your patient, Diego Buchanan, to the [facility]. Please see a copy of my visit note below.    Diego is a 27 year old who is being evaluated via a billable video visit.      How would you like to obtain your AVS? MyChart  If the video visit is dropped, the invitation should be resent by: Text to cell phone: [phone]  Will anyone else be joining your video visit? No  {If patient encounters technical issues they should call [phone] :[id number]}      Video-Visit Details    Video Start Time: 9:09 AM    Type of service:  Video Visit    Video End Time:9:15 AM    Originating Location (pt. 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 He presented initially with abdominal pain, diarrhea, and progressive abdominal distention for 3 months duration. 2. Initial CT scan in February 2020 showed severe peritoneal carcinomatosis with large peritoneal tumor implants throughout the entire abdomen and pelvis, but mostly prominently in the pelvis.   2. PETCT scan showed interval increase in the amount of peritoneal carcinomatosis.  3. On 3/12/2020, he had ultrasound-guided core needle biopsy of a peritoneal implant (Case: [id number]), which showed a completely undifferentiated appearance, but stains with pancytokeratin, indicating an epithelial origin. 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 Tumor markers for CA-19-9, CEA, beta-hCG, alpha-fetoprotein were unremarkable. Cancer type ID molecular testing by [employer] sent to determine the  exact diagnosis and to assist in further treatment planning. 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 Relative probability of less than 5% of other subtype of sarcoma. EWSR1-WT1 fusion detected.  4. 5/1/2020, MRI brain negative for brain metastasis, and baseline CT-CAP obtained showing extensive mixed solid and cystic masses throughout the abdomen and pelvis consistent with peritoneal carcinomatosis. Largest mass measures approximately 20 cm in the pelvis. Metastatic mixed solid and cystic masses seen in hepatic segments 5 and 6 and hepatic segment 7. The masses appear to be contiguous with the retrohepatic peritoneal carcinomatosis. Small volume fluid about the spleen favored to represent malignant ascites, stable mild-to-moderate right hydronephrosis related to mass effect upon the distal ureter in the pelvis, the IVC and iliac veins are compressed due to the extensive peritoneal carcinomatosis without findings to suggest deep venous thrombosis.  5. 5/4/2020, he begins CAV/IMV alternating chemotherapy. He receives 6 cycles of treatment. He symptomatically improves.  6. 9/11/2020, CT-CAP shows stable to mildly improved extensive peritoneal carcinomatosis. He would like to omit Ifosfamide/etoposide cycles and continue only with CAV.  7. 10/9/2020, he starts CAV every 21 days.  8. 1/6/2021, CT CAP showed a mixed response in the mixed solid and cystic masses throughout the peritoneum. Some of the tumors are stable to mildly worse, with some of the peritoneal masses a bit larger, a few are smaller, one cystic tumor in the left abdomen is smaller, while tumors lower in the pelvis appear slightly larger.  9. 3/24/2021, CT CAP showed continued slight decrease in overall burden of peritoneal carcinomatosis with persistent encasement of bowel without evidence of bowel obstruction.  10. 6/25/2021, he receives cycle 19 CAV, then takes a chemotherapy  holiday.  11. 4/22/2022, he resumes CAV/IMV chemotherapy.  12. 7/13/22, CT AP extensively with Dr. Sims. Interval enlargement of hepatic and splenic metastases are noted, with one particular lesion (segment 4, image 227) measuring 5.5 cm, previously 4.2 cm.     History of Present Illness:  ***    Current Outpatient Medications   Medication Sig Dispense Refill     allopurinol (ZYLOPRIM) 300 MG tablet  (Patient not taking: No sig reported)       CATHFLO ACTIVASE 2 MG injection  (Patient not taking: No sig reported)       etoposide (VEPESID) 50 MG capsule Take 4 capsules (200 mg) by mouth daily for 6 days Days 1 through 6. 24 capsule 0     etoposide (VEPESID) 50 MG capsule Take 4 capsules (200 mg) by mouth daily for 6 days Days 1 through 6. 24 capsule 0     etoposide (VEPESID) 50 MG capsule Take 4 capsules (200 mg) by mouth daily for 6 days Days 1 through 6. 24 capsule 0     folic acid (FOLVITE) 1 MG tablet  (Patient not taking: No sig reported)       lidocaine-prilocaine (EMLA) 2.5-2.5 % external cream Apply topically as needed for moderate pain 30 g 1     mesna (MESNEX) 400 MG TABS tablet Take 2 tablets (800 mg) by mouth every 4 hours for 2 doses Take two tablets 4 hours and 8 hours after end of cyclophosphamide infusion. 4 tablet 0     mesna (MESNEX) 400 MG TABS tablet Take 2 tablets (800 mg) by mouth every 4 hours for 2 doses Take two tablets 4 hours and 8 hours after end of cyclophosphamide infusion. 4 tablet 0     metoprolol tartrate (LOPRESSOR) 50 MG tablet Take 1 tablet (50 mg) by mouth daily (Patient taking differently: Take 50 mg by mouth daily Hold for systolic (top number of blood pressure) less than 105 and or HR less than 65.) 30 tablet 1     NEULASTA ONPRO 6 MG/0.6ML injection  (Patient not taking: No sig reported)       polyethylene glycol (MIRALAX) 17 g packet Take 17 g by mouth       prochlorperazine (COMPAZINE) 10 MG tablet Take 1 tablet (10 mg) by mouth every 6 hours as needed (Nausea/Vomiting)  30 tablet 5     senna-docusate (SENNA S) 8.6-50 MG tablet Take 1 tablet by mouth 2 times daily (Patient not taking: No sig reported) 60 tablet 0     Physical Exam:  There were no vitals taken for this visit.  Wt Readings from Last 10 Encounters:   08/05/22 130.2 kg (287 lb)   07/15/22 130 kg (286 lb 9.6 oz)   06/24/22 128.5 kg (283 lb 4.8 oz)   06/03/22 132.7 kg (292 lb 9.6 oz)   06/02/22 132 kg (291 lb 1.6 oz)   05/13/22 129.5 kg (285 lb 9.6 oz)   04/22/22 133.1 kg (293 lb 8 oz)   04/21/22 132.5 kg (292 lb)   04/12/22 132.7 kg (292 lb 9.6 oz)   02/01/22 139.3 kg (307 lb 1.6 oz)     Video physical exam  General: Patient appears well in no acute distress.   Skin: No visualized rash or lesions on visualized skin  Eyes: EOMI, no erythema, sclera icterus or discharge noted  Resp: Appears to be breathing comfortably without accessory muscle usage, speaking in full sentences, no cough  MSK: Appears to have normal range of motion based on visualized movements  Neurologic: No apparent tremors, facial movements symmetric  Psych: affect pleasant, alert and oriented    Labs:   Most Recent 3 CBC's:  Recent Labs   Lab Test 08/12/22  1630 08/10/22  0925 08/05/22  1332 07/15/22  0854 06/24/22  0833   WBC 12.9* 8.6 12.2* 11.8* 11.3*   HGB 8.8* 9.1* 9.6* 10.6* 11.1*   MCV 96 95 96 94 87    353 302 236 315   ANEUTAUTO  --   --  10.3* 10.0* 9.3*     Most Recent 3 BMP's:  Recent Labs   Lab Test 08/12/22  1630 08/10/22  0925 08/05/22  1332    141 144   POTASSIUM 3.8 3.6 3.6   CHLORIDE 106 110* 110*   CO2 20* 23 23   BUN 15.3 11 6*   CR 0.69 0.63* 0.76   ANIONGAP 12 8 11   FAISAL 9.5 8.9 8.5   GLC 84 95 122*   PROTTOTAL 7.5 7.1 7.5   ALBUMIN 4.3 3.3* 3.4    Most Recent 3 LFT's:  Recent Labs   Lab Test 08/12/22  1630 08/10/22  0925 08/05/22  1332   AST 24 14 30   ALT 21 29 42   ALKPHOS 98 92 118   BILITOTAL 0.4 0.9 0.6         I reviewed the above labs and imaging today.    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 Buchanan is 27 year old male with advanced intraabdominal DSRCT with extensive peritoneal disease, lymph node metastasis, and liver and bone involvement. He tolerated CAV/IMV for 19 cycles and then was on chemotherapy break from June 2021-April 2022. Given his symptoms he resumed chemotherapy with further CAV/IMV in April 2022.  CT CAP performed 7/13/22 extensively with Dr. Sims. Interval enlargement of hepatic and splenic metastases are noted, with one particular lesion (segment 4, image 227) measuring 5.5 cm, previously 4.2 cm. Overall his disease is otherwise fairly stable. We discussed options which include continuing with CAV/IMV treatment for 4 additional cycles prior to reimaging or changing therapy completely to a regimen such as topotecan/cyclophosphamide or irinotecan/temozolomide. Patient and family elected to continue CAV/IMV at this time. He continues to tolerate treatment well with essentially no side effects.  -Proceed with cycle 6 (IMV) 8/5 pending labs  -Restage after 4 additional cycles (12 weeks) with CT-CAP    HTN  Tachycardia  Managed with metoprolol 50 mg daily.     Nausea   Secondary to chemotherapy. Managed with Compazine prn.     Deconditioning  Recommend daily exercise    *** minutes spent on the date of the encounter doing {2021 E&M time in:593235}     Yasmine Perez CNP  [facility] [address] [address]  [phone]      Diego is a 27 year old who is being evaluated via a billable video visit.      How would you like to obtain your AVS? MyChart  If the video visit is dropped, the invitation should be resent by: Text to cell phone: [phone]  Will anyone else be joining your video visit? No  {If patient encounters technical issues they should call [phone] :[id number]}      Video-Visit Details    Video Start Time: 9:09 AM    Type of service:  Video Visit    Video  End Time:9:15 AM    Originating Location (pt. 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 He presented initially with abdominal pain, diarrhea, and progressive abdominal distention for 3 months duration. 2. Initial CT scan in February 2020 showed severe peritoneal carcinomatosis with large peritoneal tumor implants throughout the entire abdomen and pelvis, but mostly prominently in the pelvis.   2. PETCT scan showed interval increase in the amount of peritoneal carcinomatosis.  3. On 3/12/2020, he had ultrasound-guided core needle biopsy of a peritoneal implant (Case: [id number]), which showed a completely undifferentiated appearance, but stains with pancytokeratin, indicating an epithelial origin. 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 Tumor markers for CA-19-9, CEA, beta-hCG, alpha-fetoprotein were unremarkable. Cancer type ID molecular testing by [employer] sent to determine the exact diagnosis and to assist in further treatment planning. 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 Relative probability of less than 5% of other subtype of sarcoma. EWSR1-WT1 fusion detected.  4. 5/1/2020, MRI brain negative for brain metastasis, and baseline CT-CAP obtained showing extensive mixed solid and cystic masses throughout the abdomen and pelvis consistent with peritoneal carcinomatosis. Largest mass measures approximately 20 cm in the pelvis. Metastatic mixed solid and cystic masses seen in hepatic segments 5 and 6 and hepatic segment 7. The masses appear to be contiguous with the retrohepatic peritoneal carcinomatosis.  Small volume fluid about the spleen favored to represent malignant ascites, stable mild-to-moderate right hydronephrosis related to mass effect upon the distal ureter in the pelvis, the IVC and iliac veins are compressed due to the extensive peritoneal carcinomatosis without findings to suggest deep venous thrombosis.  5. 5/4/2020, he begins CAV/IMV alternating chemotherapy. He receives 6 cycles of treatment. He symptomatically improves.  6. 9/11/2020, CT-CAP shows stable to mildly improved extensive peritoneal carcinomatosis. He would like to omit Ifosfamide/etoposide cycles and continue only with CAV.  7. 10/9/2020, he starts CAV every 21 days.  8. 1/6/2021, CT CAP showed a mixed response in the mixed solid and cystic masses throughout the peritoneum. Some of the tumors are stable to mildly worse, with some of the peritoneal masses a bit larger, a few are smaller, one cystic tumor in the left abdomen is smaller, while tumors lower in the pelvis appear slightly larger.  9. 3/24/2021, CT CAP showed continued slight decrease in overall burden of peritoneal carcinomatosis with persistent encasement of bowel without evidence of bowel obstruction.  10. 6/25/2021, he receives cycle 19 CAV, then takes a chemotherapy holiday.  11. 4/22/2022, he resumes CAV/IMV chemotherapy.  12. 7/13/22, CT CAP showed interval enlargement of hepatic and splenic metastases with other overall disease stable. CAV/IMV continued.    History of Present Illness:  Robert is seen today for a routine virtual visit for consideration of cycle 7 CAV. He continues to feel well with minimal complaints. He reports feeling bored and is not very active. States he occasionally goes for walks but does not care for the heat. He denies any nausea or appetite changes with his last IMV cycle. He is not significantly fatigued. He inquires about getting a COVID booster which he has not received.    ROS  10 point ROS neg other than the symptoms noted above in the  HPI.      Current Outpatient Medications   Medication Sig Dispense Refill     allopurinol (ZYLOPRIM) 300 MG tablet  (Patient not taking: No sig reported)       CATHFLO ACTIVASE 2 MG injection  (Patient not taking: No sig reported)       etoposide (VEPESID) 50 MG capsule Take 4 capsules (200 mg) by mouth daily for 6 days Days 1 through 6. 24 capsule 0     etoposide (VEPESID) 50 MG capsule Take 4 capsules (200 mg) by mouth daily for 6 days Days 1 through 6. 24 capsule 0     etoposide (VEPESID) 50 MG capsule Take 4 capsules (200 mg) by mouth daily for 6 days Days 1 through 6. 24 capsule 0     folic acid (FOLVITE) 1 MG tablet  (Patient not taking: No sig reported)       lidocaine-prilocaine (EMLA) 2.5-2.5 % external cream Apply topically as needed for moderate pain 30 g 1     mesna (MESNEX) 400 MG TABS tablet Take 2 tablets (800 mg) by mouth every 4 hours for 2 doses Take two tablets 4 hours and 8 hours after end of cyclophosphamide infusion. 4 tablet 0     mesna (MESNEX) 400 MG TABS tablet Take 2 tablets (800 mg) by mouth every 4 hours for 2 doses Take two tablets 4 hours and 8 hours after end of cyclophosphamide infusion. 4 tablet 0     metoprolol tartrate (LOPRESSOR) 50 MG tablet Take 1 tablet (50 mg) by mouth daily (Patient taking differently: Take 50 mg by mouth daily Hold for systolic (top number of blood pressure) less than 105 and or HR less than 65.) 30 tablet 1     NEULASTA ONPRO 6 MG/0.6ML injection  (Patient not taking: No sig reported)       polyethylene glycol (MIRALAX) 17 g packet Take 17 g by mouth       prochlorperazine (COMPAZINE) 10 MG tablet Take 1 tablet (10 mg) by mouth every 6 hours as needed (Nausea/Vomiting) 30 tablet 5     senna-docusate (SENNA S) 8.6-50 MG tablet Take 1 tablet by mouth 2 times daily (Patient not taking: No sig reported) 60 tablet 0     Physical Exam:  There were no vitals taken for this visit.  Wt Readings from Last 10 Encounters:   08/05/22 130.2 kg (287 lb)   07/15/22 130 kg  (286 lb 9.6 oz)   06/24/22 128.5 kg (283 lb 4.8 oz)   06/03/22 132.7 kg (292 lb 9.6 oz)   06/02/22 132 kg (291 lb 1.6 oz)   05/13/22 129.5 kg (285 lb 9.6 oz)   04/22/22 133.1 kg (293 lb 8 oz)   04/21/22 132.5 kg (292 lb)   04/12/22 132.7 kg (292 lb 9.6 oz)   02/01/22 139.3 kg (307 lb 1.6 oz)     Video physical exam  General: Patient appears well in no acute distress.   Skin: No visualized rash or lesions on visualized skin  Eyes: EOMI, no erythema, sclera icterus or discharge noted  Resp: Appears to be breathing comfortably without accessory muscle usage, speaking in full sentences, no cough  MSK: Appears to have normal range of motion based on visualized movements  Neurologic: No apparent tremors, facial movements symmetric  Psych: affect pleasant, alert and oriented    Labs:   Most Recent 3 CBC's:  Recent Labs   Lab Test 08/12/22  1630 08/10/22  0925 08/05/22  1332 07/15/22  0854 06/24/22  0833   WBC 12.9* 8.6 12.2* 11.8* 11.3*   HGB 8.8* 9.1* 9.6* 10.6* 11.1*   MCV 96 95 96 94 87    353 302 236 315   ANEUTAUTO  --   --  10.3* 10.0* 9.3*     Most Recent 3 BMP's:  Recent Labs   Lab Test 08/12/22  1630 08/10/22  0925 08/05/22  1332    141 144   POTASSIUM 3.8 3.6 3.6   CHLORIDE 106 110* 110*   CO2 20* 23 23   BUN 15.3 11 6*   CR 0.69 0.63* 0.76   ANIONGAP 12 8 11   FAISAL 9.5 8.9 8.5   GLC 84 95 122*   PROTTOTAL 7.5 7.1 7.5   ALBUMIN 4.3 3.3* 3.4    Most Recent 3 LFT's:  Recent Labs   Lab Test 08/12/22  1630 08/10/22  0925 08/05/22  1332   AST 24 14 30   ALT 21 29 42   ALKPHOS 98 92 118   BILITOTAL 0.4 0.9 0.6     I reviewed the above labs and imaging today.    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 Buchanan is 27 year old male with advanced intraabdominal DSRCT with extensive peritoneal disease, lymph node metastasis, and liver and bone involvement. He tolerated CAV/IMV for 19 cycles and then was on  chemotherapy break from June 2021-April 2022. Given his symptoms he resumed chemotherapy with further CAV/IMV in April 2022.  CT CAP performed 7/13/22 extensively with Dr. Sims. Interval enlargement of hepatic and splenic metastases are noted, with one particular lesion (segment 4, image 227) measuring 5.5 cm, previously 4.2 cm. Overall his disease is otherwise fairly stable. We discussed options which include continuing with CAV/IMV treatment for 4 additional cycles prior to reimaging or changing therapy completely to a regimen such as topotecan/cyclophosphamide or irinotecan/temozolomide. Patient and family elected to continue CAV/IMV. He continues to tolerate treatment well with minimal side effects  -Proceed with cycle 7 (CAV) 8/26 pending labs  -RTC in 3 weeks for LISY visit and cycle 8 (IMV)  -Restage CT-CAP 10/4, f/u with Dr. Sims 10/6     Anemia  Hemoglobin trending down, last 8.8. Check prior to chemo tomorrow. No obvious bleeding concern, likely 2/2 chemotherapy, but will continue to monitor serially.    HTN  Tachycardia  Managed with metoprolol 50 mg daily    Nausea   Secondary to chemotherapy. Managed with Compazine prn.     Deconditioning  Continue to recommend daily exercise with short walks    20 minutes spent on the date of the encounter doing chart review, review of test results, patient visit, documentation and discussion with family     Yasmine Perez CNP  [facility]  [address] [place] [address] [id number] [phone]        Again, thank you for allowing me to participate in the care of your patient.        Sincerely,        Yasmine Perez CNP

## 2022-08-26 NOTE — PROGRESS NOTES
Infusion Nursing Note:  Diego Buchanan presents today for Cycle 7 Day 1 Vincristine, Cytoxan, Dactinomycin, Neulasta On Pro and Pfizer Covid booster.    Patient seen by provider today: No, saw Yasmine Perez CNP 8/25/22   present during visit today: Patient refused  services and a waiver form has been signed. Patient prefers his sister, Mariela, to interpret for him if needed. Patient speaks and can understand English.     Note: Patient presents to the infusion center today denying any new symptoms or concerns since his provider appt yesterday.    TPA was instilled to port and dwelled for 45 minutes with brisk blood return afterwards.    Intravenous Access:  Implanted Port.    Treatment Conditions:   Latest Reference Range & Units 08/26/22 10:19   Sodium 133 - 144 mmol/L 139   Potassium 3.4 - 5.3 mmol/L 3.6   Chloride 94 - 109 mmol/L 112 (H)   Carbon Dioxide 20 - 32 mmol/L 23   Urea Nitrogen 7 - 30 mg/dL 4 (L)   Creatinine 0.66 - 1.25 mg/dL 0.74   GFR Estimate >60 mL/min/1.73m2 >90   Calcium 8.5 - 10.1 mg/dL 8.9   Anion Gap 3 - 14 mmol/L 4   Albumin 3.4 - 5.0 g/dL 3.7   Protein Total 6.8 - 8.8 g/dL 7.4   Alkaline Phosphatase 40 - 150 U/L 93   ALT 0 - 70 U/L 25   AST 0 - 45 U/L 19   Bilirubin Total 0.2 - 1.3 mg/dL 0.5   Glucose 70 - 99 mg/dL 101 (H)   WBC 4.0 - 11.0 10e3/uL 9.1   Hemoglobin 13.3 - 17.7 g/dL 10.2 (L)   Hematocrit 40.0 - 53.0 % 32.8 (L)   Platelet Count 150 - 450 10e3/uL 258   RBC Count 4.40 - 5.90 10e6/uL 3.39 (L)   MCV 78 - 100 fL 97   MCH 26.5 - 33.0 pg 30.1   MCHC 31.5 - 36.5 g/dL 31.1 (L)   RDW 10.0 - 15.0 % 15.8 (H)   % Neutrophils % 81   % Lymphocytes % 6   % Monocytes % 9   % Eosinophils % 3   % Basophils % 1   Absolute Basophils 0.0 - 0.2 10e3/uL 0.1   Absolute Eosinophils 0.0 - 0.7 10e3/uL 0.3   Absolute Immature Granulocytes <=0.4 10e3/uL 0.0   Absolute Lymphocytes 0.8 - 5.3 10e3/uL 0.5 (L)   Absolute Monocytes 0.0 - 1.3 10e3/uL 0.8   % Immature Granulocytes % 0   Absolute  Neutrophils 1.6 - 8.3 10e3/uL 7.3   Absolute NRBCs 10e3/uL 0.0   NRBCs per 100 WBC <1 /100 0     Results reviewed, labs MET treatment parameters, ok to proceed with treatment.    Post Infusion Assessment:  Patient tolerated infusion without incident.  Patient tolerated Pfizer covid booster injection in arm without incident.  Patient was observed for 15 minutes after injection per protocol.  Blood return noted every 2-3 ml during vincristine gravity and daptomycin IVP per protocol.  Blood return noted pre and post infusion.  No evidence of extravasations.  Access discontinued per protocol.     Neulasta Onpro On-Body injector applied to right arm at 1510.  Writer discussed Neulasta injection would start tomorrow 8/27 at 1810, approximately 27 hours after application applied today.  Written and Verbal instruction reviewed with patient.  Pt instructed when the dose delivery starts, it will take about 45 minutes to complete.  Pt aware Neulasta Onpro On-Body should have green flashing light and to call triage or on-call MD if injector flashes red or appears to be leaking. Pt aware to keep Onpro On-Body Neulasta 4 inches away from electrical equipment and to avoid showering 4 hours prior to injection.   Neulasta Onpro Lot number: see MAR    Discharge Plan:   Prescription refills given for [place].  Discharge instructions reviewed with: Patient.  Patient and/or family verbalized understanding of discharge instructions and all questions answered.  AVS to patient via [employer].  Patient will return 9/16 for next appointment.   Patient discharged in stable condition accompanied by: sister.  Departure Mode: Ambulatory.      Dilma Ayala RN

## 2022-08-26 NOTE — PROGRESS NOTES
Oral Chemotherapy Monitoring Program  Lab Follow Up    Reviewed lab results from 8/26.    Assessment & Plan:  Results show no concerning abnormalities.  Plan to continue chemo as prescribed.     Follow-Up:  9/16 labs + LISY appt    Maria E OrnelasD, BCACP  Oral Chemotherapy Monitoring Program  [facility]  [phone]  August 26, 2022    ORAL CHEMOTHERAPY 6/6/2021 7/14/2021 5/11/2022 5/19/2022 6/17/2022 8/20/2022 8/26/2022   Assessment Type Chart Review Chart Review Initial Work up Initial Follow up Refill Monthly Follow up Lab Monitoring   Diagnosis Code (No Data) (No Data) (No Data) - - - (No Data)   Providers Dr. Morales Sims   [facility]   Drug Name Etoposide Etoposide Etoposide Etoposide Etoposide Etoposide Etoposide   Dose 200 mg 200 mg 200 mg 200 mg 200 mg 200 mg 200 mg   Current Schedule Daily Daily Daily Daily Daily Daily -   Cycle Details Other Drug on Hold - - - - -   Start Date of Last Cycle - - - 5/13/2022 - 8/5/2022 -   Planned next cycle start date - - 5/13/2022 - - - -   Doses missed in last 2 weeks - - - 0 - 0 -   Adherence Assessment - - - Adherent - Adherent -   Adverse Effects - - - Nausea - No AE identified during assessment -   Nausea - - - Grade 1 - - -   Pharmacist Intervention(nausea) - - - No - - -   Any new drug interactions? - - No No - - -   Is the dose as ordered appropriate for the patient? - - - Yes - - -   Is the patient currently in pain? - - - - - - -   Has the patient been assessed within the past 6 months for depression? - - - - - - -   Has the patient missed any days of school, work, or other routine activity? - - - - - - -   Since the last time we talked, have you been hospitalized or used the emergency room? - - - No - - -       Labs:  _  Result Component Current Result Ref Range   Sodium 139  (8/26/2022) 133 - 144 mmol/L     _  Result Component Current Result Ref Range   Potassium 3.6 (8/26/2022) 3.4 - 5.3 mmol/L     _  Result Component Current Result Ref Range   Calcium 8.9 (8/26/2022) 8.5 - 10.1 mg/dL     _  Result Component Current Result Ref Range   Magnesium 2.2 (8/12/2022) 1.7 - 2.3 mg/dL     _  Result Component Current Result Ref Range   Phosphorus 2.9 (8/12/2022) 2.5 - 4.5 mg/dL     _  Result Component Current Result Ref Range   Albumin 3.7 (8/26/2022) 3.4 - 5.0 g/dL     _  Result Component Current Result Ref Range   Urea Nitrogen 4 (L) (8/26/2022) 7 - 30 mg/dL     _  Result Component Current Result Ref Range   Creatinine 0.74 (8/26/2022) 0.66 - 1.25 mg/dL     _  Result Component Current Result Ref Range   AST 19 (8/26/2022) 0 - 45 U/L     _  Result Component Current Result Ref Range   ALT 25 (8/26/2022) 0 - 70 U/L     _  Result Component Current Result Ref Range   Bilirubin Total 0.5 (8/26/2022) 0.2 - 1.3 mg/dL     _  Result Component Current Result Ref Range   WBC Count 9.1 (8/26/2022) 4.0 - 11.0 10e3/uL     _  Result Component Current Result Ref Range   Hemoglobin 10.2 (L) (8/26/2022) 13.3 - 17.7 g/dL     _  Result Component Current Result Ref Range   Platelet Count 258 (8/26/2022) 150 - 450 10e3/uL     No results found for ANC within last 30 days.

## 2022-08-26 NOTE — PATIENT INSTRUCTIONS
[facility] Triage and after hours / weekends / holidays:  [phone]    Please call the triage or after hours line if you experience a temperature greater than or equal to 100.4, shaking chills, have uncontrolled nausea, vomiting and/or diarrhea, dizziness, shortness of breath, chest pain, bleeding, unexplained bruising, or if you have any other new/concerning symptoms, questions or concerns.      If you are having any concerning symptoms or wish to speak to a provider before your next infusion visit, please call your care coordinator or triage to notify them so we can adequately serve you.     If you need a refill on a narcotic prescription or other medication, please call before your infusion appointment.                August 2022 Sunday Monday Tuesday Wednesday Thursday Friday Saturday        1     2     3     4    VIDEO VISIT RETURN   9:45 AM   (45 min.)   Yasmine Perez CNP   [facility] 5    LAB CENTRAL   1:30 PM   (15 min.)   [facility]   [facility]    ONC INFUSION 3 HR (180 MIN)   2:00 PM   (180 min.)    ONC INFUSION NURSE   [facility] 6       7     8     9     10     11     12     13       14     15     16     17     18     19     20       21     22     23     24     25    VIDEO VISIT RETURN   8:45 AM   (45 min.)   Yasmine Perez CNP   [facility] 26    LAB CENTRAL   9:15 AM   (15 min.)   [facility] DRAW   [facility]    ONC INFUSION 4 HR (240 MIN)  10:00 AM   (240 min.)    ONC INFUSION NURSE   [facility] 27       28     29     30     31                                  September 2022 Sunday Monday Tuesday Wednesday Thursday Friday Saturday                       1     2     3       4     5     6     7     8     9     10       11     12     13     14     15     16    LAB CENTRAL   9:15 AM   (15 min.)   LUIS CARLOS  [facility] LAB DRAW   [facility]    RETURN   9:30 AM   (45 min.)   Yasmine Perez CNP   [facility]    ONC INFUSION 3 HR (180 MIN)  12:30 PM   (180 min.)    ONC INFUSION NURSE   [facility] 17       18     19     20     21     22     23     24       25     26     27     28     29     30                             Recent Results (from the past 24 hour(s))   Comprehensive metabolic panel    Collection Time: 08/26/22 10:19 AM   Result Value Ref Range    Sodium 139 133 - 144 mmol/L    Potassium 3.6 3.4 - 5.3 mmol/L    Chloride 112 (H) 94 - 109 mmol/L    Carbon Dioxide (CO2) 23 20 - 32 mmol/L    Anion Gap 4 3 - 14 mmol/L    Urea Nitrogen 4 (L) 7 - 30 mg/dL    Creatinine 0.74 0.66 - 1.25 mg/dL    Calcium 8.9 8.5 - 10.1 mg/dL    Glucose 101 (H) 70 - 99 mg/dL    Alkaline Phosphatase 93 40 - 150 U/L    AST 19 0 - 45 U/L    ALT 25 0 - 70 U/L    Protein Total 7.4 6.8 - 8.8 g/dL    Albumin 3.7 3.4 - 5.0 g/dL    Bilirubin Total 0.5 0.2 - 1.3 mg/dL    GFR Estimate >90 >60 mL/min/1.73m2   CBC with platelets and differential    Collection Time: 08/26/22 10:19 AM   Result Value Ref Range    WBC Count 9.1 4.0 - 11.0 10e3/uL    RBC Count 3.39 (L) 4.40 - 5.90 10e6/uL    Hemoglobin 10.2 (L) 13.3 - 17.7 g/dL    Hematocrit 32.8 (L) 40.0 - 53.0 %    MCV 97 78 - 100 fL    MCH 30.1 26.5 - 33.0 pg    MCHC 31.1 (L) 31.5 - 36.5 g/dL    RDW 15.8 (H) 10.0 - 15.0 %    Platelet Count 258 150 - 450 10e3/uL    % Neutrophils 81 %    % Lymphocytes 6 %    % Monocytes 9 %    % Eosinophils 3 %    % Basophils 1 %    % Immature Granulocytes 0 %    NRBCs per 100 WBC 0 <1 /100    Absolute Neutrophils 7.3 1.6 - 8.3 10e3/uL    Absolute Lymphocytes 0.5 (L) 0.8 - 5.3 10e3/uL    Absolute Monocytes 0.8 0.0 - 1.3 10e3/uL    Absolute Eosinophils 0.3 0.0 - 0.7 10e3/uL    Absolute Basophils 0.1 0.0 - 0.2 10e3/uL    Absolute Immature Granulocytes 0.0 <=0.4 10e3/uL    Absolute NRBCs 0.0  10e3/uL

## 2022-08-26 NOTE — NURSING NOTE
Chief Complaint   Patient presents with     Blood Draw     Labs drawn via  by RN in lab. VS taken.      Port accessed with1 inch flat needle with no blood return. Flushed with saline and heparin and labs collected from venipuncture by RN. Vitals taken. Checked in for appointment(s).    Daniela Riddle RN

## 2022-08-27 NOTE — PROGRESS NOTES
This is a recent snapshot of the patient's [facility] medical record.  For current drug dose and complete information and questions, call [phone] or In Basket pool, fv home infusion ([id number])  CSN Number:  [id number]

## 2022-09-04 NOTE — PROGRESS NOTES
This is a recent snapshot of the patient's [facility] medical record.  For current drug dose and complete information and questions, call [phone] or In Basket pool, fv home infusion ([id number])  CSN Number:  [id number]

## 2022-09-15 NOTE — PROGRESS NOTES
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 He presented initially with abdominal pain, diarrhea, and progressive abdominal distention for 3 months duration. 2. Initial CT scan in February 2020 showed severe peritoneal carcinomatosis with large peritoneal tumor implants throughout the entire abdomen and pelvis, but mostly prominently in the pelvis.   2. PETCT scan showed interval increase in the amount of peritoneal carcinomatosis.  3. On 3/12/2020, he had ultrasound-guided core needle biopsy of a peritoneal implant (Case: [id number]), which showed a completely undifferentiated appearance, but stains with pancytokeratin, indicating an epithelial origin. 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 Tumor markers for CA-19-9, CEA, beta-hCG, alpha-fetoprotein were unremarkable. Cancer type ID molecular testing by [facility] sent to determine the exact diagnosis and to assist in further treatment planning. 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 Relative probability of less than 5% of other subtype of sarcoma. EWSR1-WT1 fusion detected.  4. 5/1/2020, MRI brain negative for brain metastasis, and baseline CT-CAP obtained showing extensive mixed solid and cystic masses throughout the abdomen and pelvis consistent with peritoneal carcinomatosis. Largest mass measures approximately 20 cm in the pelvis. Metastatic mixed solid and cystic masses seen in hepatic segments 5 and 6 and hepatic segment 7. The masses appear to be contiguous with the retrohepatic peritoneal carcinomatosis. Small volume fluid about the spleen favored to represent malignant ascites, stable mild-to-moderate right hydronephrosis related to mass effect upon the distal ureter in the pelvis, the IVC and iliac  veins are compressed due to the extensive peritoneal carcinomatosis without findings to suggest deep venous thrombosis.  5. 5/4/2020, he begins CAV/IMV alternating chemotherapy. He receives 6 cycles of treatment. He symptomatically improves.  6. 9/11/2020, CT-CAP shows stable to mildly improved extensive peritoneal carcinomatosis. He would like to omit Ifosfamide/etoposide cycles and continue only with CAV.  7. 10/9/2020, he starts CAV every 21 days.  8. 1/6/2021, CT CAP showed a mixed response in the mixed solid and cystic masses throughout the peritoneum. Some of the tumors are stable to mildly worse, with some of the peritoneal masses a bit larger, a few are smaller, one cystic tumor in the left abdomen is smaller, while tumors lower in the pelvis appear slightly larger.  9. 3/24/2021, CT CAP showed continued slight decrease in overall burden of peritoneal carcinomatosis with persistent encasement of bowel without evidence of bowel obstruction.  10. 6/25/2021, he receives cycle 19 CAV, then takes a chemotherapy holiday.  11. 4/22/2022, he resumes CAV/IMV chemotherapy.  12. 7/13/22, CT CAP showed interval enlargement of hepatic and splenic metastases with other overall disease stable. CAV/IMV continued.    History of Present Illness:  Robert is seen today for a routine virtual visit for consideration of cycle 8 IMV. About a week after his last infusion of CAV, he experienced bleeding when using the bathroom. He believes this occurred with bowel movements. His sister notes she forgot to give Robert his Mesna following the cytoxan infusion but did give it to Robert when she realized it has been missed, about 1 week following the infusion. Robert does not believe he had bleeding with urination. He denies any associated abdominal pain, cramping, nausea, vomiting or lightheadedness. The bleeding has not recurred for a few weeks now. He is otherwise feeling well. Energy level and appetite are stable. He often feels bored and is  rather inactive during the day. Was considering going [place] [employer] but decided not to go due to the bleeding episode.     ROS  10 point ROS neg other than the symptoms noted above in the HPI.      Current Outpatient Medications   Medication Sig Dispense Refill     allopurinol (ZYLOPRIM) 300 MG tablet        CATHFLO ACTIVASE 2 MG injection        folic acid (FOLVITE) 1 MG tablet        lidocaine-prilocaine (EMLA) 2.5-2.5 % external cream Apply topically as needed for moderate pain 30 g 1     metoprolol tartrate (LOPRESSOR) 50 MG tablet Take 1 tablet (50 mg) by mouth daily (Patient taking differently: Take 50 mg by mouth daily Hold for systolic (top number of blood pressure) less than 105 and or HR less than 65.) 30 tablet 1     NEULASTA ONPRO 6 MG/0.6ML injection        polyethylene glycol (MIRALAX) 17 g packet Take 17 g by mouth       prochlorperazine (COMPAZINE) 10 MG tablet Take 1 tablet (10 mg) by mouth every 6 hours as needed (Nausea/Vomiting) 30 tablet 5     senna-docusate (SENNA S) 8.6-50 MG tablet Take 1 tablet by mouth 2 times daily 60 tablet 0     etoposide (VEPESID) 50 MG capsule Take 4 capsules (200 mg) by mouth daily for 6 days Days 1 through 6. 24 capsule 0     etoposide (VEPESID) 50 MG capsule Take 4 capsules (200 mg) by mouth daily for 6 days Days 1 through 6. 24 capsule 0     etoposide (VEPESID) 50 MG capsule Take 4 capsules (200 mg) by mouth daily for 6 days Days 1 through 6. 24 capsule 0     mesna (MESNEX) 400 MG TABS tablet Take 2 tablets (800 mg) by mouth every 4 hours for 2 doses Take two tablets 4 hours and 8 hours after end of cyclophosphamide infusion. 4 tablet 0     mesna (MESNEX) 400 MG TABS tablet Take 2 tablets (800 mg) by mouth every 4 hours for 2 doses Take two tablets 4 hours and 8 hours after end of cyclophosphamide infusion. 4 tablet 0     mesna (MESNEX) 400 MG TABS tablet Take 2 tablets (800 mg) by mouth every 4 hours for 2 doses Take two tablets 4 hours and 8 hours  after end of cyclophosphamide infusion. 4 tablet 0     Physical Exam:  /83 (BP Location: Right arm)   Pulse 118   Temp 98.9  F (37.2  C) (Oral)   Resp 16   Wt 128.3 kg (282 lb 12.8 oz)   SpO2 100%   BMI 42.86 kg/m    Wt Readings from Last 10 Encounters:   09/16/22 128.3 kg (282 lb 12.8 oz)   08/26/22 127.1 kg (280 lb 4.8 oz)   08/05/22 130.2 kg (287 lb)   07/15/22 130 kg (286 lb 9.6 oz)   06/24/22 128.5 kg (283 lb 4.8 oz)   06/03/22 132.7 kg (292 lb 9.6 oz)   06/02/22 132 kg (291 lb 1.6 oz)   05/13/22 129.5 kg (285 lb 9.6 oz)   04/22/22 133.1 kg (293 lb 8 oz)   04/21/22 132.5 kg (292 lb)     General: Well-appearing male in no acute distress.  Eyes: EOMI, PERRL. No scleral icterus.  Cardiovascular: RRR No murmurs, gallops, or rubs. No peripheral edema.  Respiratory: CTA bilaterally. No wheezes or crackles.  Gastrointestinal: BS +. Abdomen soft, non-tender. No palpable hepatosplenomegaly or masses in the seated position.  Neurologic: Cranial nerves II through XII are grossly intact.  Skin: No rashes, petechiae, or bruising noted on exposed skin.    Labs:   Most Recent 3 CBC's:  Recent Labs   Lab Test 09/16/22  0919 08/26/22  1019 08/12/22  1630 08/10/22  0925 08/05/22  1332   WBC 11.3* 9.1 12.9*   < > 12.2*   HGB 8.6* 10.2* 8.8*   < > 9.6*   MCV 97 97 96   < > 96    258 311   < > 302   ANEUTAUTO 9.6* 7.3  --   --  10.3*    < > = values in this interval not displayed.     Most Recent 3 BMP's:  Recent Labs   Lab Test 09/16/22  0919 08/26/22  1019 08/12/22  1630    139 138   POTASSIUM 3.6 3.6 3.8   CHLORIDE 105 112* 106   CO2 22 23 20*   BUN 6.1 4* 15.3   CR 0.82 0.74 0.69   ANIONGAP 13 4 12   FAISAL 9.7 8.9 9.5   * 101* 84   PROTTOTAL 7.2 7.4 7.5   ALBUMIN 4.2 3.7 4.3    Most Recent 3 LFT's:  Recent Labs   Lab Test 09/16/22  0919 08/26/22  1019 08/12/22  1630   AST 28 19 24   ALT 33 25 21   ALKPHOS 107 93 98   BILITOTAL 0.5 0.5 0.4     I reviewed the above labs and imaging  today.    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 Buchanan is 27 year old male with advanced intraabdominal DSRCT with extensive peritoneal disease, lymph node metastasis, and liver and bone involvement. He tolerated CAV/IMV for 19 cycles and then was on chemotherapy break from June 2021-April 2022. Given his symptoms he resumed chemotherapy with further CAV/IMV in April 2022.  CT CAP performed 7/13/22 extensively with Dr. Sims. Interval enlargement of hepatic and splenic metastases are noted, with one particular lesion (segment 4, image 227) measuring 5.5 cm, previously 4.2 cm. Overall his disease is otherwise fairly stable. We discussed options which include continuing with CAV/IMV treatment for 4 additional cycles prior to reimaging or changing therapy completely to a regimen such as topotecan/cyclophosphamide or irinotecan/temozolomide. Patient and family elected to continue CAV/IMV. He continues to tolerate treatment well with minimal side effects aside from possible rectal bleeding versus hematuria following cycle 7 CAV.  -Labs today reviewed showing declining hemoglobin of 8.6, mildly elevated WBC/neutrophil likely neulasta effect. Proceed with cycle 8 IMV today.   -Restage CT-CAP 10/4, f/u with Dr. Sims 10/6, tentative cycle 9 CAV  -Will request future CAV/IMV infusions, may adjust depending on CT results or changes in treatment plan    Rectal bleeding versus hematuria  Unclear etiology, no associated symptoms and now resolved. Possibly urinary in nature due to delayed Mesna dosing. Advised Robert and his sister to notify our office with any concerns for recurrent bleeding, abdominal pain, fever, chills or worsening symptoms of anemia such as shortness of breath, dizziness or increased fatigue.    Anemia  Hemoglobin again downtrending to 8.6 today. With recent bleeding episode, will monitor days 5, 7 and 12  following cycle 8 treatment. Transfuse pRBC for hb < 7.    HTN  Tachycardia  Managed with metoprolol 50 mg daily    Nausea   Secondary to chemotherapy. Managed with Compazine prn.     Deconditioning  Continue to recommend daily exercise with short walks    31 minutes spent on the date of the encounter doing chart review, review of test results, interpretation of tests, patient visit, documentation and discussion with family     Yasmine Perez CNP  [facility] [address] [address] [phone]

## 2022-09-16 NOTE — NURSING NOTE
"Oncology Rooming Note    September 16, 2022 9:37 AM   Diego Buchanan is a 27 year old male who presents for:    Chief Complaint   Patient presents with     Port Draw     Labs drawn via port by RN. Vitals taken.     Oncology Clinic Visit     Sarcoma, [place]      Initial Vitals: /83 (BP Location: Right arm)   Pulse 118   Temp 98.9  F (37.2  C) (Oral)   Resp 16   Wt 128.3 kg (282 lb 12.8 oz)   SpO2 100%   BMI 42.86 kg/m   Estimated body mass index is 42.86 kg/m  as calculated from the following:    Height as of 4/22/22: 1.73 m (5' 8.11\").    Weight as of this encounter: 128.3 kg (282 lb 12.8 oz). Body surface area is 2.48 meters squared.  No Pain (0) Comment: Data Unavailable   No LMP for male patient.  Allergies reviewed: Yes  Medications reviewed: Yes    Medications: Medication refills not needed today.  Pharmacy name entered into [facility]:    [id number] - [place], [place] - [address] AT [facility] - [place], [place] - [id number]  [facility] - [place], [place] - [address]    Clinical concerns:        Loretta Rankin CMA              "

## 2022-09-16 NOTE — PROGRESS NOTES
"Infusion Nursing Note:  Diego Buchanan presents today for C8D1 Ifosfamide/Mesna.    Patient seen by provider today: Yes: Yasmine Perez NP   present during visit today: Not Applicable.    Note: Instruction given to patient as per provider. Verbalized understanding.     Intravenous Access:  Implanted Port.    Treatment Conditions:  Lab Results   Component Value Date    HGB 8.6 (L) 09/16/2022    WBC 11.3 (H) 09/16/2022    ANEU 8.1 05/19/2022    ANEUTAUTO 9.6 (H) 09/16/2022     09/16/2022      Lab Results   Component Value Date     09/16/2022    POTASSIUM 3.6 09/16/2022    MAG 2.2 08/12/2022    CR 0.82 09/16/2022    FAISAL 9.7 09/16/2022    BILITOTAL 0.5 09/16/2022    ALBUMIN 4.2 09/16/2022    ALT 33 09/16/2022    AST 28 09/16/2022     Results reviewed, labs MET treatment parameters, ok to proceed with treatment.    TORB: 9/15/22/Yasmine Perez NP/Yadira Rousseau RN/Please schedule labs on days 5, 7 and 12.    Infusion:  Continous Infusion of Fluorouracil at 7 ml/hour for 144 hours, double checked with Criss Song RN.    Post Infusion Assessment:    Results reviewed, copy given to patient.  Proceed with treatment.    Prior to discharge: Port is secured in place with tegaderm and flushed with 10cc NS with positive blood return noted.  Continuous home infusion CADD pump connected.    All connectors secured in place and clamps taped open.    Pump started, \"running\" noted on display (CADD): *YES.double checked with Criss Song RN.  Patient instructed to call our clinic or [facility] with any questions or concerns at home.  Patient verbalized understanding.    Patient set up for Mesna change pump and [employer] Neulasta on 9/22 at  pump disconnect at [facility] on 09/22@1300.  Pump disconnect 9/23.    Lab appointment on 9/20, 09/22 and 09/27    Post Infusion Assessment:  Patient tolerated infusion without incident.  Blood return noted pre and post infusion.  Site patent and intact, free from " redness, edema or discomfort.  No evidence of extravasations.  Access kept as per protocol.     Discharge Plan:   Prescription refills given for Etoposide.  Discharge instructions reviewed with: Patient.  Patient and/or family verbalized understanding of discharge instructions and all questions answered.  AVS to patient via PostedInHART.  Patient will return 10/7/22 for next appointment.   Patient discharged in stable condition accompanied by: self.  Departure Mode: Ambulatory.      AUBREY SÁNCHEZ RN

## 2022-09-16 NOTE — PATIENT INSTRUCTIONS
Contact Numbers  [facility]: [phone]    After Hours:  [phone]  Triage: [phone]    Please call the [facility] Triage line if you experience a temperature greater than or equal to 100.5, shaking chills, have uncontrolled nausea, vomiting and/or diarrhea, dizziness, shortness of breath, chest pain, bleeding, unexplained bruising, or if you have any other new/concerning symptoms, questions or concerns.     If it is after hours, weekends, or holidays, please call the main hospital  at  [phone] and ask to speak to the Oncology doctor on call.     If you are having any concerning symptoms or wish to speak to a provider before your next infusion visit, please call your care coordinator or triage to notify them so we can adequately serve you.     If you need a refill on a narcotic prescription or other medication, please call triage before your infusion appointment.       September 2022 Sunday Monday Tuesday Wednesday Thursday Friday Saturday                       1     2     3       4     5     6     7     8     9     10       11     12     13     14     15     16    LAB CENTRAL   9:15 AM   (15 min.)   [facility] LAB DRAW   [facility]    RETURN   9:30 AM   (45 min.)   Yasmine Perez CNP   [facility]    ONC INFUSION 3 HR (180 MIN)  12:30 PM   (180 min.)    ONC INFUSION NURSE   [facility] 17       18     19     20     21     22     23     24       25     26     27     28     29     30                        October 2022 Sunday Monday Tuesday Wednesday Thursday Friday Saturday                                 1       2     3     4    CT CHEST/ABDOMEN/PELVIS W   9:50 AM   (20 min.)   [facility]   [facility] [place] 5     6    VIDEO VISIT RETURN  10:00 AM   (30 min.)   Farzaneh Young MD   [facility] 7    LAB CENTRAL   9:15  AM   (15 min.)   [facility] LAB DRAW   [facility]    ONC INFUSION 4 HR (240 MIN)  10:00 AM   (240 min.)    ONC INFUSION NURSE   [facility] 8       9     10     11     12     13     14     15       16     17     18     19     20     21     22       23     24     25     26     27     28     29       30     31                                                Lab Results:  Recent Results (from the past 12 hour(s))   Comprehensive metabolic panel    Collection Time: 09/16/22  9:19 AM   Result Value Ref Range    Sodium 140 136 - 145 mmol/L    Potassium 3.6 3.4 - 5.3 mmol/L    Chloride 105 98 - 107 mmol/L    Carbon Dioxide (CO2) 22 22 - 29 mmol/L    Anion Gap 13 7 - 15 mmol/L    Urea Nitrogen 6.1 6.0 - 20.0 mg/dL    Creatinine 0.82 0.67 - 1.17 mg/dL    Calcium 9.7 8.6 - 10.0 mg/dL    Glucose 135 (H) 70 - 99 mg/dL    Alkaline Phosphatase 107 40 - 129 U/L    AST 28 10 - 50 U/L    ALT 33 10 - 50 U/L    Protein Total 7.2 6.4 - 8.3 g/dL    Albumin 4.2 3.5 - 5.2 g/dL    Bilirubin Total 0.5 <=1.2 mg/dL    GFR Estimate >90 >60 mL/min/1.73m2   CBC with platelets and differential    Collection Time: 09/16/22  9:19 AM   Result Value Ref Range    WBC Count 11.3 (H) 4.0 - 11.0 10e3/uL    RBC Count 2.85 (L) 4.40 - 5.90 10e6/uL    Hemoglobin 8.6 (L) 13.3 - 17.7 g/dL    Hematocrit 27.6 (L) 40.0 - 53.0 %    MCV 97 78 - 100 fL    MCH 30.2 26.5 - 33.0 pg    MCHC 31.2 (L) 31.5 - 36.5 g/dL    RDW 16.4 (H) 10.0 - 15.0 %    Platelet Count 289 150 - 450 10e3/uL    % Neutrophils 86 %    % Lymphocytes 4 %    % Monocytes 8 %    % Eosinophils 0 %    % Basophils 1 %    % Immature Granulocytes 1 %    NRBCs per 100 WBC 0 <1 /100    Absolute Neutrophils 9.6 (H) 1.6 - 8.3 10e3/uL    Absolute Lymphocytes 0.5 (L) 0.8 - 5.3 10e3/uL    Absolute Monocytes 0.9 0.0 - 1.3 10e3/uL    Absolute Eosinophils 0.0 0.0 - 0.7 10e3/uL    Absolute Basophils 0.1 0.0 - 0.2 10e3/uL    Absolute Immature Granulocytes 0.1  <=0.4 10e3/uL    Absolute NRBCs 0.0 10e3/uL   Magnesium    Collection Time: 09/16/22  9:19 AM   Result Value Ref Range    Magnesium 1.7 1.7 - 2.3 mg/dL   Phosphorus    Collection Time: 09/16/22  9:19 AM   Result Value Ref Range    Phosphorus 4.3 2.5 - 4.5 mg/dL

## 2022-09-16 NOTE — NURSING NOTE
"Chief Complaint   Patient presents with     Port Draw     Labs drawn via port by RN. Vitals taken.     Port accessed with 20G 3/4\" flat needle by RN, labs collected, line flushed with saline and heparin.  Vitals taken. Pt checked in for appointment(s).    Jennifer Cortez RN    "

## 2022-09-21 NOTE — PROGRESS NOTES
This is a recent snapshot of the patient's [facility] medical record.  For current drug dose and complete information and questions, call [phone] or In Basket pool, fv home infusion ([id number])  CSN Number:  [id number]     Birth Control Pills Pregnancy And Lactation Text: This medication should be avoided if pregnant and for the first 30 days post-partum.

## 2022-09-22 NOTE — PROGRESS NOTES
This is a recent snapshot of the patient's [facility] medical record.  For current drug dose and complete information and questions, call [phone] or In Basket pool, fv home infusion ([id number])  CSN Number:  [id number]

## 2022-09-23 NOTE — PROGRESS NOTES
This is a recent snapshot of the patient's [facility] medical record.  For current drug dose and complete information and questions, call [phone] or In Basket pool, fv home infusion ([id number])  CSN Number:  [id number]

## 2022-09-27 NOTE — PROGRESS NOTES
This is a recent snapshot of the patient's [facility] medical record.  For current drug dose and complete information and questions, call [phone] or In Basket pool, fv home infusion ([id number])  CSN Number:  [id number]

## 2022-09-27 NOTE — PROGRESS NOTES
This is a recent snapshot of the patient's [facility] medical record.  For current drug dose and complete information and questions, call [phone] or In Basket pool, fv home infusion ([id number])  CSN Number:  [id number]

## 2022-09-29 NOTE — PROGRESS NOTES
This is a recent snapshot of the patient's [facility] medical record.  For current drug dose and complete information and questions, call [phone] or In Basket pool, fv home infusion ([id number])  CSN Number:  [id number]

## 2022-10-06 NOTE — LETTER
10/6/2022         RE: Diego Buchanan  [address]  [place] [address]        Dear Colleague,    Thank you for referring your patient, Diego Buchanan, to the [facility]. Please see a copy of my visit note below.      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 He presented initially with abdominal pain, diarrhea, and progressive abdominal distention for 3 months duration. 2. Initial CT scan in February 2020 showed severe peritoneal carcinomatosis with large peritoneal tumor implants throughout the entire abdomen and pelvis, but mostly prominently in the pelvis.   2. PETCT scan showed interval increase in the amount of peritoneal carcinomatosis.  3. On 3/12/2020, he had ultrasound-guided core needle biopsy of a peritoneal implant (Case: [id number]), which showed a completely undifferentiated appearance, but stains with pancytokeratin, indicating an epithelial origin. 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 Tumor markers for CA-19-9, CEA, beta-hCG, alpha-fetoprotein were unremarkable. Cancer type ID molecular testing by [employer] sent to determine the exact diagnosis and to assist in further treatment planning. 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 Relative probability of less than 5% of other subtype of sarcoma. EWSR1-WT1 fusion detected.  4. 5/1/2020, MRI brain negative for brain metastasis, and baseline CT-CAP obtained showing extensive mixed solid and cystic masses throughout the abdomen and pelvis consistent with peritoneal carcinomatosis. Largest mass measures approximately 20 cm in the pelvis. Metastatic mixed solid and cystic masses seen in hepatic segments 5 and 6 and hepatic segment 7. The masses appear to be contiguous  with the retrohepatic peritoneal carcinomatosis. Small volume fluid about the spleen favored to represent malignant ascites, stable mild-to-moderate right hydronephrosis related to mass effect upon the distal ureter in the pelvis, the IVC and iliac veins are compressed due to the extensive peritoneal carcinomatosis without findings to suggest deep venous thrombosis.  5. 5/4/2020, he begins CAV/IMV alternating chemotherapy. He receives 6 cycles of treatment. He symptomatically improves.  6. 9/11/2020, CT-CAP shows stable to mildly improved extensive peritoneal carcinomatosis. He would like to omit Ifosfamide/etoposide cycles and continue only with CAV.  7. 10/9/2020, he starts CAV every 21 days.  8. 1/6/2021, CT CAP showed a mixed response in the mixed solid and cystic masses throughout the peritoneum. Some of the tumors are stable to mildly worse, with some of the peritoneal masses a bit larger, a few are smaller, one cystic tumor in the left abdomen is smaller, while tumors lower in the pelvis appear slightly larger.  9. 3/24/2021, CT CAP showed continued slight decrease in overall burden of peritoneal carcinomatosis with persistent encasement of bowel without evidence of bowel obstruction.  10. 6/25/2021, he receives cycle 19 CAV, then takes a chemotherapy holiday.  11. 4/22/2022, he resumes CAV/IMV chemotherapy.  12. 7/13/22, CT CAP showed interval enlargement of hepatic and splenic metastases with other overall disease stable. CAV/IMV continued.    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 He presents today for review of restaging scans. He has been receiving CAV/IMV and has received a total of 8 additional cycles of treatment.     CT-CAP shows evidence of progressive disease, predominantly in liver. There are small sub-centimeter lung nodules, which are suspicious for metastasis.    He feels ok, but bored at home playing a lot of video games. He  had an episode of bleeding with a bowel movement about a week after his last cycle of CAV. Otherwise, he denies any hematuria or hematochezia. No nausea or vomiting. He has not had any significant weight loss.      ROS  10 point ROS neg other than the symptoms noted above in the HPI.      Current Outpatient Medications   Medication Sig Dispense Refill     allopurinol (ZYLOPRIM) 300 MG tablet        CATHFLO ACTIVASE 2 MG injection        folic acid (FOLVITE) 1 MG tablet        lidocaine-prilocaine (EMLA) 2.5-2.5 % external cream Apply topically as needed for moderate pain 30 g 1     metoprolol tartrate (LOPRESSOR) 50 MG tablet Take 1 tablet (50 mg) by mouth daily (Patient taking differently: Take 50 mg by mouth daily Hold for systolic (top number of blood pressure) less than 105 and or HR less than 65.) 30 tablet 1     NEULASTA ONPRO 6 MG/0.6ML injection        polyethylene glycol (MIRALAX) 17 g packet Take 17 g by mouth       prochlorperazine (COMPAZINE) 10 MG tablet Take 1 tablet (10 mg) by mouth every 6 hours as needed (Nausea/Vomiting) 30 tablet 5     senna-docusate (SENNA S) 8.6-50 MG tablet Take 1 tablet by mouth 2 times daily 60 tablet 0     etoposide (VEPESID) 50 MG capsule Take 4 capsules (200 mg) by mouth daily for 6 days Days 1 through 6. 24 capsule 0     etoposide (VEPESID) 50 MG capsule Take 4 capsules (200 mg) by mouth daily for 6 days Days 1 through 6. 24 capsule 0     etoposide (VEPESID) 50 MG capsule Take 4 capsules (200 mg) by mouth daily for 6 days Days 1 through 6. 24 capsule 0     etoposide (VEPESID) 50 MG capsule Take 4 capsules (200 mg) by mouth daily for 6 days Days 1 through 6. 24 capsule 0     mesna (MESNEX) 400 MG TABS tablet Take 2 tablets (800 mg) by mouth every 4 hours for 2 doses Take two tablets 4 hours and 8 hours after end of cyclophosphamide infusion. 4 tablet 0     mesna (MESNEX) 400 MG TABS tablet Take 2 tablets (800 mg) by mouth every 4 hours for 2 doses Take two tablets 4 hours  and 8 hours after end of cyclophosphamide infusion. 4 tablet 0     mesna (MESNEX) 400 MG TABS tablet Take 2 tablets (800 mg) by mouth every 4 hours for 2 doses Take two tablets 4 hours and 8 hours after end of cyclophosphamide infusion. 4 tablet 0     Physical Exam:  There were no vitals taken for this visit.  Wt Readings from Last 10 Encounters:   09/16/22 128.3 kg (282 lb 12.8 oz)   08/26/22 127.1 kg (280 lb 4.8 oz)   08/05/22 130.2 kg (287 lb)   07/15/22 130 kg (286 lb 9.6 oz)   06/24/22 128.5 kg (283 lb 4.8 oz)   06/03/22 132.7 kg (292 lb 9.6 oz)   06/02/22 132 kg (291 lb 1.6 oz)   05/13/22 129.5 kg (285 lb 9.6 oz)   04/22/22 133.1 kg (293 lb 8 oz)   04/21/22 132.5 kg (292 lb)   General: Well-appearing male in no acute distress.  Eyes: EOMI, PERRL. No scleral icterus.  Cardiovascular: RRR No murmurs, gallops, or rubs. No peripheral edema.  Respiratory: CTA bilaterally. No wheezes or crackles.  Gastrointestinal: BS +. Abdomen soft, non-tender. No palpable hepatosplenomegaly or masses in the seated position.  Neurologic: Cranial nerves II through XII are grossly intact.  Skin: No rashes, petechiae, or bruising noted on exposed skin.    Labs:   Most Recent 3 CBC's:  Recent Labs   Lab Test 09/27/22  1009 09/22/22  1355 09/20/22  1045 09/16/22  0919   WBC 11.7* 8.2 9.7 11.3*   HGB 8.2* 9.1* 8.5* 8.6*   MCV 96 93 97 97    357 319 289   ANEUTAUTO  --  7.6 8.9* 9.6*     Most Recent 3 BMP's:  Recent Labs   Lab Test 09/27/22  1009 09/22/22  1355 09/20/22  1045    137 139   POTASSIUM 3.3* 3.6 3.8   CHLORIDE 110* 108 109   CO2 23 21 22   BUN 7 12 12   CR 0.87 0.66 0.68   ANIONGAP 8 8 8   FAISAL 9.0 9.4 8.9   GLC 83 116* 76   PROTTOTAL 7.4 8.0 7.5   ALBUMIN 3.5 3.8 3.3*    Most Recent 3 LFT's:  Recent Labs   Lab Test 09/27/22  1009 09/22/22  1355 09/20/22  1045   AST 15 21 14   ALT 26 28 31   ALKPHOS 138 93 91   BILITOTAL 0.4 0.8 0.7     Imaging:  CT Chest/Abdomen/Pelvis w Contrast  Narrative: EXAM: CT  CHEST/ABDOMEN/PELVIS W CONTRAST  LOCATION: [facility]  DATE/TIME: 10/4/2022 10:27 AM    INDICATION: Desmoplastic small round cell tumor.    COMPARISON: 7/13/2022.    TECHNIQUE: CT scan of the chest, abdomen and pelvis was performed following injection of IV contrast. Multiplanar reformats were obtained. Dose reduction techniques were used.     CONTRAST: 125 mL Isovue 370.    FINDINGS:   LUNGS AND PLEURA: Scattered calcified granulomas. There are several new small noncalcified pulmonary nodules, for example in the medial right apex on image 67 measuring 3 mm. Left upper lobe anteriorly measuring 3 mm on image 91. Left lower lobe image   197 measuring 4 mm.    MEDIASTINUM/AXILLAE: Right IJ port tip is in the right innominate vein. There are enlarged bilateral supraclavicular, internal mammary, and cardiophrenic lymph nodes which are similar to previous. An enlarged subcarinal node is new, measuring 1 cm on   image 107. A borderline-sized right paratracheal lymph node has increased in size measuring 8 mm on image 64. New left hilar lymphadenopathy.    CORONARY ARTERY CALCIFICATION: None.    HEPATOBILIARY: Numerous hepatic metastases have increased in size from previous. For example a mass in segment IV measures 6.5 cm, previously 5.5 cm. A mass in the posterior right hepatic lobe on image 213 measures 4.8 cm, previously 3.9 cm. Other   lesions have similarly increased.    PANCREAS: Normal.    SPLEEN: There are numerous peritoneal implants along the surface of the spleen. Metastases in the inferior spleen have increased, for example on image 313 measuring 2.8 cm compared to previous 2.4 cm.    ADRENAL GLANDS: Normal.    KIDNEYS/BLADDER: Normal.    BOWEL: No obstruction. There is extensive peritoneal tumor. Confluent masses in the pelvis measure up to 22 x 15 cm, similar to previous. An additional example mass just left of midline on image 372 measures 4.5 cm, previously 4.9  cm. An adjacent mass on   the left side measures 11.3 cm, also similar to previous.    LYMPH NODES: Mildly enlarged right inguinal, portacaval retroperitoneal and retrocrural lymph nodes similar to previous.    VASCULATURE: Unremarkable.    PELVIC ORGANS: Normal.    MUSCULOSKELETAL: Normal.  Impression: IMPRESSION:  1.  There are new small bilateral pulmonary nodules suspicious for pulmonary metastases.  2.  Increased left hilar and mediastinal lymphadenopathy.  3.  Hepatic and splenic metastases have increased in size.  4.  Extensive peritoneal tumor and lymphadenopathy are similar to previous.  I reviewed the above labs and imaging today.      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 He tolerated CAV/IMV for 19 cycles and then was on chemotherapy break from June 2021-April 2022. Given return in symptoms he resumed chemotherapy with further CAV/IMV in April 2022.  However, he has had evidence of progression on the two most recent scans. It is time to stop the current regimen, which is also lengthy and complex to receive.    Patient and family discussed and mother would like some time to try traditional medicine and herbs. Robert is not keen on this, but willing to discuss further with his family. He will let us know how he wishes to proceed. If we do not hear back by next week we will reach out. Next regimen could include irinotecan/temozolomide vs. Topotecan/cytoxan or gemcitabine/docetaxel.     #Anemia  Hemoglobin again downtrending to 8.2 today. Continue to monitor. Transfuse pRBC for hb < 7.    #HTN  #Tachycardia  Managed with metoprolol 50 mg daily    #Nausea   Secondary to chemotherapy. Managed with Compazine prn.     #Deconditioning  Continue to  recommend daily exercise with short walks      Farzaneh Burcigaa M.D.   of Medicine  Hematology, Oncology and Transplantation  Pager: [phone]

## 2022-10-06 NOTE — PROGRESS NOTES
Diego is a 27 year old who is being evaluated via a billable video visit.      How would you like to obtain your AVS? [id number]  If the video visit is dropped, the invitation should be resent by: Text to cell phone: [phone]  Will anyone else be joining your video visit? No        Video-Visit Details    Video Start Time: 10:30 AM    Type of service:  Video Visit    Video End Time:11:00 AM    Originating Location (pt. Location): Home    Distant Location (provider location):  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 He presented initially with abdominal pain, diarrhea, and progressive abdominal distention for 3 months duration. 2. Initial CT scan in February 2020 showed severe peritoneal carcinomatosis with large peritoneal tumor implants throughout the entire abdomen and pelvis, but mostly prominently in the pelvis.   2. PETCT scan showed interval increase in the amount of peritoneal carcinomatosis.  3. On 3/12/2020, he had ultrasound-guided core needle biopsy of a peritoneal implant (Case: [id number]), which showed a completely undifferentiated appearance, but stains with pancytokeratin, indicating an epithelial origin. 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 Tumor markers for CA-19-9, CEA, beta-hCG, alpha-fetoprotein were unremarkable. Cancer type ID molecular testing by [employer] sent to determine the exact diagnosis and to assist in further treatment planning. 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 Relative probability of less than 5% of other subtype of sarcoma. EWSR1-WT1 fusion detected.  4. 5/1/2020, MRI brain negative for brain metastasis, and  baseline CT-CAP obtained showing extensive mixed solid and cystic masses throughout the abdomen and pelvis consistent with peritoneal carcinomatosis. Largest mass measures approximately 20 cm in the pelvis. Metastatic mixed solid and cystic masses seen in hepatic segments 5 and 6 and hepatic segment 7. The masses appear to be contiguous with the retrohepatic peritoneal carcinomatosis. Small volume fluid about the spleen favored to represent malignant ascites, stable mild-to-moderate right hydronephrosis related to mass effect upon the distal ureter in the pelvis, the IVC and iliac veins are compressed due to the extensive peritoneal carcinomatosis without findings to suggest deep venous thrombosis.  5. 5/4/2020, he begins CAV/IMV alternating chemotherapy. He receives 6 cycles of treatment. He symptomatically improves.  6. 9/11/2020, CT-CAP shows stable to mildly improved extensive peritoneal carcinomatosis. He would like to omit Ifosfamide/etoposide cycles and continue only with CAV.  7. 10/9/2020, he starts CAV every 21 days.  8. 1/6/2021, CT CAP showed a mixed response in the mixed solid and cystic masses throughout the peritoneum. Some of the tumors are stable to mildly worse, with some of the peritoneal masses a bit larger, a few are smaller, one cystic tumor in the left abdomen is smaller, while tumors lower in the pelvis appear slightly larger.  9. 3/24/2021, CT CAP showed continued slight decrease in overall burden of peritoneal carcinomatosis with persistent encasement of bowel without evidence of bowel obstruction.  10. 6/25/2021, he receives cycle 19 CAV, then takes a chemotherapy holiday.  11. 4/22/2022, he resumes CAV/IMV chemotherapy.  12. 7/13/22, CT CAP showed interval enlargement of hepatic and splenic metastases with other overall disease stable. CAV/IMV continued.    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 He presents today for  review of restaging scans. He has been receiving CAV/IMV and has received a total of 8 additional cycles of treatment.     CT-CAP shows evidence of progressive disease, predominantly in liver. There are small sub-centimeter lung nodules, which are suspicious for metastasis.    He feels ok, but bored at home playing a lot of video games. He had an episode of bleeding with a bowel movement about a week after his last cycle of CAV. Otherwise, he denies any hematuria or hematochezia. No nausea or vomiting. He has not had any significant weight loss.      ROS  10 point ROS neg other than the symptoms noted above in the HPI.      Current Outpatient Medications   Medication Sig Dispense Refill     allopurinol (ZYLOPRIM) 300 MG tablet        CATHFLO ACTIVASE 2 MG injection        folic acid (FOLVITE) 1 MG tablet        lidocaine-prilocaine (EMLA) 2.5-2.5 % external cream Apply topically as needed for moderate pain 30 g 1     metoprolol tartrate (LOPRESSOR) 50 MG tablet Take 1 tablet (50 mg) by mouth daily (Patient taking differently: Take 50 mg by mouth daily Hold for systolic (top number of blood pressure) less than 105 and or HR less than 65.) 30 tablet 1     NEULASTA ONPRO 6 MG/0.6ML injection        polyethylene glycol (MIRALAX) 17 g packet Take 17 g by mouth       prochlorperazine (COMPAZINE) 10 MG tablet Take 1 tablet (10 mg) by mouth every 6 hours as needed (Nausea/Vomiting) 30 tablet 5     senna-docusate (SENNA S) 8.6-50 MG tablet Take 1 tablet by mouth 2 times daily 60 tablet 0     etoposide (VEPESID) 50 MG capsule Take 4 capsules (200 mg) by mouth daily for 6 days Days 1 through 6. 24 capsule 0     etoposide (VEPESID) 50 MG capsule Take 4 capsules (200 mg) by mouth daily for 6 days Days 1 through 6. 24 capsule 0     etoposide (VEPESID) 50 MG capsule Take 4 capsules (200 mg) by mouth daily for 6 days Days 1 through 6. 24 capsule 0     etoposide (VEPESID) 50 MG capsule Take 4 capsules (200 mg) by mouth daily for 6  days Days 1 through 6. 24 capsule 0     mesna (MESNEX) 400 MG TABS tablet Take 2 tablets (800 mg) by mouth every 4 hours for 2 doses Take two tablets 4 hours and 8 hours after end of cyclophosphamide infusion. 4 tablet 0     mesna (MESNEX) 400 MG TABS tablet Take 2 tablets (800 mg) by mouth every 4 hours for 2 doses Take two tablets 4 hours and 8 hours after end of cyclophosphamide infusion. 4 tablet 0     mesna (MESNEX) 400 MG TABS tablet Take 2 tablets (800 mg) by mouth every 4 hours for 2 doses Take two tablets 4 hours and 8 hours after end of cyclophosphamide infusion. 4 tablet 0     Physical Exam:  There were no vitals taken for this visit.  Wt Readings from Last 10 Encounters:   09/16/22 128.3 kg (282 lb 12.8 oz)   08/26/22 127.1 kg (280 lb 4.8 oz)   08/05/22 130.2 kg (287 lb)   07/15/22 130 kg (286 lb 9.6 oz)   06/24/22 128.5 kg (283 lb 4.8 oz)   06/03/22 132.7 kg (292 lb 9.6 oz)   06/02/22 132 kg (291 lb 1.6 oz)   05/13/22 129.5 kg (285 lb 9.6 oz)   04/22/22 133.1 kg (293 lb 8 oz)   04/21/22 132.5 kg (292 lb)   General: Well-appearing male in no acute distress.  Eyes: EOMI, PERRL. No scleral icterus.  Cardiovascular: RRR No murmurs, gallops, or rubs. No peripheral edema.  Respiratory: CTA bilaterally. No wheezes or crackles.  Gastrointestinal: BS +. Abdomen soft, non-tender. No palpable hepatosplenomegaly or masses in the seated position.  Neurologic: Cranial nerves II through XII are grossly intact.  Skin: No rashes, petechiae, or bruising noted on exposed skin.    Labs:   Most Recent 3 CBC's:  Recent Labs   Lab Test 09/27/22  1009 09/22/22  1355 09/20/22  1045 09/16/22  0919   WBC 11.7* 8.2 9.7 11.3*   HGB 8.2* 9.1* 8.5* 8.6*   MCV 96 93 97 97    357 319 289   ANEUTAUTO  --  7.6 8.9* 9.6*     Most Recent 3 BMP's:  Recent Labs   Lab Test 09/27/22  1009 09/22/22  1355 09/20/22  1045    137 139   POTASSIUM 3.3* 3.6 3.8   CHLORIDE 110* 108 109   CO2 23 21 22   BUN 7 12 12   CR 0.87 0.66 0.68    ANIONGAP 8 8 8   FAISAL 9.0 9.4 8.9   GLC 83 116* 76   PROTTOTAL 7.4 8.0 7.5   ALBUMIN 3.5 3.8 3.3*    Most Recent 3 LFT's:  Recent Labs   Lab Test 09/27/22  1009 09/22/22  1355 09/20/22  1045   AST 15 21 14   ALT 26 28 31   ALKPHOS 138 93 91   BILITOTAL 0.4 0.8 0.7     Imaging:  CT Chest/Abdomen/Pelvis w Contrast  Narrative: EXAM: CT CHEST/ABDOMEN/PELVIS W CONTRAST  LOCATION: [facility]  DATE/TIME: 10/4/2022 10:27 AM    INDICATION: Desmoplastic small round cell tumor.    COMPARISON: 7/13/2022.    TECHNIQUE: CT scan of the chest, abdomen and pelvis was performed following injection of IV contrast. Multiplanar reformats were obtained. Dose reduction techniques were used.     CONTRAST: 125 mL Isovue 370.    FINDINGS:   LUNGS AND PLEURA: Scattered calcified granulomas. There are several new small noncalcified pulmonary nodules, for example in the medial right apex on image 67 measuring 3 mm. Left upper lobe anteriorly measuring 3 mm on image 91. Left lower lobe image   197 measuring 4 mm.    MEDIASTINUM/AXILLAE: Right IJ port tip is in the right innominate vein. There are enlarged bilateral supraclavicular, internal mammary, and cardiophrenic lymph nodes which are similar to previous. An enlarged subcarinal node is new, measuring 1 cm on   image 107. A borderline-sized right paratracheal lymph node has increased in size measuring 8 mm on image 64. New left hilar lymphadenopathy.    CORONARY ARTERY CALCIFICATION: None.    HEPATOBILIARY: Numerous hepatic metastases have increased in size from previous. For example a mass in segment IV measures 6.5 cm, previously 5.5 cm. A mass in the posterior right hepatic lobe on image 213 measures 4.8 cm, previously 3.9 cm. Other   lesions have similarly increased.    PANCREAS: Normal.    SPLEEN: There are numerous peritoneal implants along the surface of the spleen. Metastases in the inferior spleen have increased, for example on image 313  measuring 2.8 cm compared to previous 2.4 cm.    ADRENAL GLANDS: Normal.    KIDNEYS/BLADDER: Normal.    BOWEL: No obstruction. There is extensive peritoneal tumor. Confluent masses in the pelvis measure up to 22 x 15 cm, similar to previous. An additional example mass just left of midline on image 372 measures 4.5 cm, previously 4.9 cm. An adjacent mass on   the left side measures 11.3 cm, also similar to previous.    LYMPH NODES: Mildly enlarged right inguinal, portacaval retroperitoneal and retrocrural lymph nodes similar to previous.    VASCULATURE: Unremarkable.    PELVIC ORGANS: Normal.    MUSCULOSKELETAL: Normal.  Impression: IMPRESSION:  1.  There are new small bilateral pulmonary nodules suspicious for pulmonary metastases.  2.  Increased left hilar and mediastinal lymphadenopathy.  3.  Hepatic and splenic metastases have increased in size.  4.  Extensive peritoneal tumor and lymphadenopathy are similar to previous.  I reviewed the above labs and imaging today.      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 He tolerated CAV/IMV for 19 cycles and then was on chemotherapy break from June 2021-April 2022. Given return in symptoms he resumed chemotherapy with further CAV/IMV in April 2022.  However, he has had evidence of progression on the two most recent scans. It is time to stop the current regimen, which is also lengthy and complex to receive.    Patient and family discussed and mother would like some time to try traditional medicine and herbs. Robert is not keen on this, but willing to discuss further with his family. He will let us know how he wishes to proceed. If we do not hear back by next week we will reach out. Next regimen could include  irinotecan/temozolomide vs. Topotecan/cytoxan or gemcitabine/docetaxel.     #Anemia  Hemoglobin again downtrending to 8.2 today. Continue to monitor. Transfuse pRBC for hb < 7.    #HTN  #Tachycardia  Managed with metoprolol 50 mg daily    #Nausea   Secondary to chemotherapy. Managed with Compazine prn.     #Deconditioning  Continue to recommend daily exercise with short walks    Farzaneh Burciaga M.D.   of Medicine  Hematology, Oncology and Transplantation  Pager: [phone]

## 2022-10-06 NOTE — NURSING NOTE
Pt has declined  for today's visit. Patients sister has been ok'd to interpret for the visit.    Aditya Warren

## 2022-10-19 NOTE — PROGRESS NOTES
[facility] Oral Chemotherapy Monitoring Program    Thank you for the opportunity to be a part in the care of this patient's oral chemotherapy. The oncology pharmacy will no longer be following this patient for oral chemotherapy. If there are any questions or the plan changes, feel free to contact us.    ORAL CHEMOTHERAPY 7/14/2021 5/11/2022 5/19/2022 6/17/2022 8/20/2022 8/26/2022 10/19/2022   Assessment Type Chart Review Initial Work up Initial Follow up Refill Monthly Follow up Lab Monitoring Discontinuation   Stop Date - - - - - - 10/18/2022   Reason for Discontinuation - - - - - - Therapy complete   Diagnosis Code (No Data) (No Data) - - - (No Data) (No Data)   Providers Dr. Morales Sims   [facility]   Drug Name Etoposide Etoposide Etoposide Etoposide Etoposide Etoposide Etoposide   Dose 200 mg 200 mg 200 mg 200 mg 200 mg 200 mg -   Current Schedule Daily Daily Daily Daily Daily - -   Cycle Details Drug on Hold - - - - - -   Start Date of Last Cycle - - 5/13/2022 - 8/5/2022 - -   Planned next cycle start date - 5/13/2022 - - - - -   Doses missed in last 2 weeks - - 0 - 0 - -   Adherence Assessment - - Adherent - Adherent - -   Adverse Effects - - Nausea - No AE identified during assessment - -   Nausea - - Grade 1 - - - -   Pharmacist Intervention(nausea) - - No - - - -   Any new drug interactions? - No No - - - -   Is the dose as ordered appropriate for the patient? - - Yes - - - -   Is the patient currently in pain? - - - - - - -   Has the patient been assessed within the past 6 months for depression? - - - - - - -   Has the patient missed any days of school, work, or other routine activity? - - - - - - -   Since the last time we talked, have you been hospitalized or used the emergency room? - - No - - - -

## 2022-11-15 PROBLEM — C49.9: Status: ACTIVE | Noted: 2022-01-01

## 2022-11-16 NOTE — PROGRESS NOTES
[facility]    Medicine Progress Note - Hospitalist Service, GOLD TEAM 5    Date of Admission:  11/15/2022    Assessment & Plan      Diego Buchanan is a 27 year old male with a past medical history of metastatic desmoplastic small round cell tumor (S/P CAV/IMV). Initially took a break to pursue traditional medicine, however, presented to [place] ED on 11/15/22 for constipation, worsening bloating. C/f disease progression and he was transferred to [facility] for escalated care, further monitoring and management.    Desmoplastic Small Round Cell Tumor c/b Peritoneal Carcinomatosis and Mets to Lymph Nodes, Bone, Liver  Initially diagnosed in 03/2020, has followed with Oncology since. Underwent multiple cycles of chemo- and immunotherapies. Most recently, had finished x19 cycles of CAV in 06/2021, then took a chemotherapy holiday, and (unclear when), but opted to pursue traditional medicine at some point. However, worsened clinically and was found to have disease progression in 04/2022, so restarted on CAV/IMV therapies. Presented to [place] ED as above and transferred to [facility] for higher level of care. Has followed with Oncology as an OP. On admission, WBC 16.9, Hgb stable (9.8, 8.2), and largely HDS (with exception of tachycardia, see below). CXR checked on admission showing streaky, bilateral opacities favoring atypical infxn vs atelectasis (but pt afebrile, no s/sx of overt infxn, no coughing, no dyspnea, less likely infxn at this point). UA unremarkable.   - Oncology consulted, pending    Constipation  - Continue scheduled Senna and Miralax    Chronic Normocytic Hypochromic Anemia  Hgb 9.8 today, appear that this is his new baseline since earlier this year. All this In the setting of metastatic disease as above w/ infiltration of bone, and on chemo- and immunotherapies. No s/sx of active bleeding here, no melena or hematochezia. HDS aside from tachycardia.  - Continue to monitor  for s/sx bleeding  - Transfuse to keep Hgb >7    HTN  Tachycardia  Tachycardia could be multifactorial in the setting of metastatic disease burden as above, potentially mild hypovolemia (as supported by hypokalemia, diaphoresis). BP's otherwise normal, slightly elevated. PTA on Metoprolol 50mg daily.  - Continue Metoprolol    Diaphoresis  Leukocytosis   Thrombophilia  Patient notably diaphoretic on exam today, but afebrile, no dyspnea, no CP, no other infectious sx to suggest this as an etiology, though does have elevated WBC at 16.9 and CXR above showing signs of potentially atypical infection. However, he and family feel that his sweating is related to his pain (but at the moment does not have significant pain). Could be r/t cancer tx as above vs infection vs hemoconcentration (higher Hgb than recently, mild thrombophilia at 463, and higher WBC than usual in the absence of other infectious s/sx).  - Continue to monitor  - Low threshold to initiate broad-spectrum abx in the event of suspected infxn    Of note, patient's sister is his PCA due underlying developmental delays.       Diet: Regular Diet Adult    DVT Prophylaxis: Enoxaparin (Lovenox) SQ  Busby Catheter: Not present  Central Lines: PRESENT     Cardiac Monitoring: None  Code Status: Full Code      Disposition Plan      Expected Discharge Date: 11/18/2022                The patient's care was discussed with the Attending Physician, Dr. Shabbir Turpin.    [place] PA-C  Hospitalist Service, [address]  Securely message with the Vocera Web Console (learn more here)  Text page via [facility] Paging/Directory   Please see signed in provider for up to date coverage information      Clinically Significant Risk Factors Present on Admission        # Hypokalemia: Lowest K = 3.2 mmol/L (Ref range: 3.4-5.3) in last 2 days, will replace as needed                    "    ______________________________________________________________________    Interval History   Today, the patient is seen in his room, sitting upright in bed, with family present (sister [Mariela] and mother). Patient notes today that he is feeling \"okay\". Elaborates that he has mild abdominal bloating and distension, but no patricia pain. Denies hematochezia, melena, nausea, vomiting, but has had constipation. Feels that he is able to eat, just not as much as he used to (and sister also corroborates that he often has significant bloating after eating). The patient denies chest pain, shortness of breath, diarrhea, fevers, lower extremity swelling, easy bruising/bleeding.    Sister inquires about interval changes in recent imaging.     Data reviewed today: I reviewed all medications, new labs and imaging results over the last 24 hours. I personally reviewed the CXR, CT CAP ([facility]) image(s) showing see below..    Physical Exam   Vital Signs: Temp: 97.6  F (36.4  C) Temp src: Oral BP: (!) 131/91 Pulse: 118   Resp: 20 SpO2: 96 % O2 Device: None (Room air)    Weight: 0 lbs 0 oz  Constitutional: awake, alert, cooperative, no apparent distress, and appears stated age. Sitting upright in bed.  Eyes: lids and lashes normal, sclera clear and conjunctiva normal  ENT: normocepalic, without obvious abnormality  Respiratory: No increased work of breathing, good air exchange, clear to auscultation bilaterally, no crackles or wheezing  Cardiovascular: tachycardia, but regular rhythm, normal S1 and S2, no S3, no S4 and no murmur noted  GI: normal bowel sounds, firm, distended and non-tender. Hepatomegaly noted.  Skin: no bruising or bleeding, no rashes, no lesions on visualized skin and no jaundice  Musculoskeletal: no lower extremity pitting edema present and no deformities  Neurologic: Moving all extremities equally and spontaneously. No obvious focal neuro deficits.  Neuropsychiatric: General: normal, calm and normal eye contact  Level " of consciousness: alert / normal  Affect: normal and pleasant    Data   Recent Labs   Lab 11/15/22  2355   WBC 16.9*   HGB 9.8*   MCV 87   *      POTASSIUM 3.2*   CHLORIDE 102   CO2 21*   BUN 5.2*   CR 0.80   ANIONGAP 16*   FAISAL 9.2   *   ALBUMIN 3.8   PROTTOTAL 7.2   BILITOTAL 0.8   ALKPHOS 94   ALT 13   AST 22     Recent Results (from the past 24 hour(s))   XR Chest Port 1 View    Narrative    EXAM: XR CHEST PORT 1 VIEW  11/15/2022 11:02 PM      HISTORY: eval for pna    COMPARISON: CT 10/4/2022    FINDINGS: Portable semiupright AP radiograph of the chest. Right chest  wall Port-A-Cath tip projects over the right innominate vein, similar  to prior CT. The trachea is midline. The cardiac silhouette is not  enlarged. No pleural effusion or pneumothorax. Mildly low lung  volumes. Streaky perihilar and basilar opacities. The visualized upper  abdomen is unremarkable.       Impression    IMPRESSION:   1. Mild streaky perihilar and basilar opacities, likely atypical  infection versus atelectasis.  2. Low lung volumes.    I have personally reviewed the examination and initial interpretation  and I agree with the findings.    SHUBHAM ALVAREZ MD         SYSTEM ID:  [id number]     CT Abdomen Pelvis W contrast (at [facility] on 11/15/22):  1.  Large soft tissue mass filling the pelvis with extensive peritoneal carcinomatosis and ascites. Hepatic metastasis. Findings indicate widespread malignancy probably an adenocarcinoma arising in the pelvis.     2.  No bowel obstruction is seen.      Medications       enoxaparin ANTICOAGULANT  40 mg Subcutaneous Q24H     lidocaine-prilocaine   Topical Once     metoprolol tartrate  50 mg Oral Daily

## 2022-11-16 NOTE — CONSULTS
.    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 Morales Sims  Current Treatment Plan: TBD ( Formerly on CAV/IMV last received 9/27/22)    Reason for Consult: DSRCT previously on chemotherapy but discontinued. Now interested in chemo.    Assessment & Plan:   Diego Buchanan is a 27 year old male with advanced intraabdominal DSRCT with extensive peritoneal disease, lymph node metastasis, and liver and bone involvement. He tolerated CAV/IMV for 19 cycles and then was on chemotherapy break from June 2021-April 2022. Resumed chemotherapy with further CAV/IMV in April 2022 - 9/27/2022. At his last visit with oncology on 10/06/22 Chemotherapy regiment was discontinued due to evidence of disease progression on recent CT scans with other treatment options offered however patient and family wanted to try traditional medicine and herbs. Patient is now hospitalized for constipation and will like to proceed with chemotherapy.    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 His last dose of  CAV/IMV was on  9/27/22. Plan was to start another line of treatment and considerations were for  irinotecan/temozolomide vs. Topotecan/cytoxan or gemcitabine/docetaxel. Patient and family took sometime to try traditional medicine and herbs and have now expressed their desire to resume chemotherapy. Most recent CT Abd/pelvis done yesterday shows no intestinal occlusion, however it continues to show disease  progression with wide spread disease which could be contributing to his symptoms. Some treatment options remain open to patient, however this can be done in the outpatient setting. IP Oncology team discussed with primary oncologist. Patient has an appointment with his primary oncologist this Friday 11/18/22 at 11:30 am for further management. - Hemoglobin 9.8 actually improved from last Hb 8.2 on 09/27/22. Cont. To monitor Hb.       Recommendations:   - Patient has a close follow up appointment with his primary oncologist this Friday 11/18/22 at 11:30 am for further management.       Thank you for this consult. Please do not hesitate to page with any questions or concerns.     Patient was seen and plan of care was discussed with attending physician Dr. Urias.    Sindy Linares MD  Hematology/Oncology Fellow  Pager: [phone]    History of Present Illness:    Diego Buchanan is a 27 year old male with advanced intraabdominal DSRCT with extensive peritoneal disease, lymph node metastasis, and liver and bone involvement. He tolerated CAV/IMV for 19 cycles and then was on chemotherapy break from June 2021-April 2022. Resumed chemotherapy with further CAV/IMV in April 2022 - 9/27/2022. At his last visit with oncology on 10/06/22 Chemotherapy regiment was discontinued due to evidence of disease progression on recent CT scans with other treatment options offered however patient and family wanted to try traditional medicine and herbs. Patient presented to [facility] ED on 11/15/2022 with loss of appetite, emesis, and progressive back pain with the desire to resume chemotherapy, hence was transferred to [facility]. Oncology has been consulted for treatment options.    While in the ED on 11/15/2022. CT Abd/Pelvis with contrast showed  No bowel obstruction . There was a large soft tissue mass filling the pelvis with extensive peritoneal carcinomatosis and ascites. Hepatic metastasis. Findings indicate widespread malignancy  probably an adenocarcinoma arising in the pelvis.     Patient seen with mother at bedside. He states that he had a bowel movement yesterday though it was hard. Feels better today.     Oncologic History:  1. He presented initially with abdominal pain, diarrhea, and progressive abdominal distention for 3 months duration. 2. Initial CT scan in February 2020 showed severe peritoneal carcinomatosis with large peritoneal tumor implants throughout the entire abdomen and pelvis, but mostly prominently in the pelvis.   2. PETCT scan showed interval increase in the amount of peritoneal carcinomatosis.  3. On 3/12/2020, he had ultrasound-guided core needle biopsy of a peritoneal implant (Case: [id number]), which showed a completely undifferentiated appearance, but stains with pancytokeratin, indicating an epithelial origin. 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 Tumor markers for CA-19-9, CEA, beta-hCG, alpha-fetoprotein were unremarkable. Cancer type ID molecular testing by [employer] sent to determine the exact diagnosis and to assist in further treatment planning. 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 Relative probability of less than 5% of other subtype of sarcoma. EWSR1-WT1 fusion detected.  4. 5/1/2020, MRI brain negative for brain metastasis, and baseline CT-CAP obtained showing extensive mixed solid and cystic masses throughout the abdomen and pelvis consistent with peritoneal carcinomatosis. Largest mass measures approximately 20 cm in the pelvis. Metastatic mixed solid and cystic masses seen in hepatic segments 5 and 6 and hepatic segment 7. The masses appear to be contiguous with the retrohepatic peritoneal carcinomatosis. Small volume fluid about the spleen favored to represent malignant ascites, stable mild-to-moderate right hydronephrosis related to mass  effect upon the distal ureter in the pelvis, the IVC and iliac veins are compressed due to the extensive peritoneal carcinomatosis without findings to suggest deep venous thrombosis.  5. 5/4/2020, he begins CAV/IMV alternating chemotherapy. He receives 6 cycles of treatment. He symptomatically improves.  6. 9/11/2020, CT-CAP shows stable to mildly improved extensive peritoneal carcinomatosis. He would like to omit Ifosfamide/etoposide cycles and continue only with CAV.  7. 10/9/2020, he starts CAV every 21 days.  8. 1/6/2021, CT CAP showed a mixed response in the mixed solid and cystic masses throughout the peritoneum. Some of the tumors are stable to mildly worse, with some of the peritoneal masses a bit larger, a few are smaller, one cystic tumor in the left abdomen is smaller, while tumors lower in the pelvis appear slightly larger.  9. 3/24/2021, CT CAP showed continued slight decrease in overall burden of peritoneal carcinomatosis with persistent encasement of bowel without evidence of bowel obstruction.  10. 6/25/2021, he receives cycle 19 CAV, then takes a chemotherapy holiday.  11. 4/22/2022, he resumes CAV/IMV chemotherapy.  12. 7/13/22, CT CAP showed interval enlargement of hepatic and splenic metastases with other overall disease stable. CAV/IMV continued.  13. 10/04/22, CT CAP showed new small bilateral pulmonary nodules suspicious for pulmonary metastases. Increased left hilar and mediastinal lymphadenopathy. Hepatic and splenic metastases have increased in size. Extensive peritoneal tumor and lymphadenopathy are similar to previous. CAV/IMV continued.    I spent >45 minutes face-to-face and/or coordinating or discussing care plan. Over 50% of our time on the unit was spent counseling the patient and/or coordinating care      Review of Systems:  A comprehensive ROS was performed and found to be negative or non-contributory with the exception of that noted in the HPI above.    Past Medical History:  Past  Medical History:   Diagnosis Date     Hypertension      Learning disability     but pt is his own gaurdian     Peritoneal carcinomatosis (H)        Past Surgical History:  Past Surgical History:   Procedure Laterality Date     BIOPSY      liver     INSERT PORT VASCULAR ACCESS Right 4/21/2022    Procedure: INSERTION, VASCULAR ACCESS PORT;  Surgeon: Daniel Sarmiento MD;  Location: [facility] OR     IR CHEST PORT PLACEMENT > 5 YRS OF AGE  4/21/2022     IR CVC TUNNEL PLACEMENT > 5 YRS OF AGE  4/29/2020     IR CVC TUNNEL REMOVAL RIGHT  11/16/2021     US MUSCLE BIOPSY  3/12/2020       Social History:  Social History     Socioeconomic History     Marital status: Single   Tobacco Use     Smoking status: Never     Smokeless tobacco: Never   Substance and Sexual Activity     Alcohol use: Never     Drug use: Never        Family History  Family History   Problem Relation Age of Onset     Hypertension Mother        Outpatient Medications:  sodium chloride (PF) 0.9% PF flush 30 mL    allopurinol (ZYLOPRIM) 300 MG tablet,   CATHFLO ACTIVASE 2 MG injection,   etoposide (VEPESID) 50 MG capsule, Take 4 capsules (200 mg) by mouth daily for 6 days Days 1 through 6.  etoposide (VEPESID) 50 MG capsule, Take 4 capsules (200 mg) by mouth daily for 6 days Days 1 through 6.  etoposide (VEPESID) 50 MG capsule, Take 4 capsules (200 mg) by mouth daily for 6 days Days 1 through 6.  etoposide (VEPESID) 50 MG capsule, Take 4 capsules (200 mg) by mouth daily for 6 days Days 1 through 6.  folic acid (FOLVITE) 1 MG tablet,   lidocaine-prilocaine (EMLA) 2.5-2.5 % external cream, Apply topically as needed for moderate pain  mesna (MESNEX) 400 MG TABS tablet, Take 2 tablets (800 mg) by mouth every 4 hours for 2 doses Take two tablets 4 hours and 8 hours after end of cyclophosphamide infusion.  mesna (MESNEX) 400 MG TABS tablet, Take 2 tablets (800 mg) by mouth every 4 hours for 2 doses Take two tablets 4 hours and 8 hours after end of cyclophosphamide  infusion.  mesna (MESNEX) 400 MG TABS tablet, Take 2 tablets (800 mg) by mouth every 4 hours for 2 doses Take two tablets 4 hours and 8 hours after end of cyclophosphamide infusion.  metoprolol tartrate (LOPRESSOR) 50 MG tablet, Take 1 tablet (50 mg) by mouth daily (Patient taking differently: Take 50 mg by mouth daily Hold for systolic (top number of blood pressure) less than 105 and or HR less than 65.)  NEULASTA ONPRO 6 MG/0.6ML injection,   polyethylene glycol (MIRALAX) 17 g packet, Take 17 g by mouth  prochlorperazine (COMPAZINE) 10 MG tablet, Take 1 tablet (10 mg) by mouth every 6 hours as needed (Nausea/Vomiting)  senna-docusate (SENNA S) 8.6-50 MG tablet, Take 1 tablet by mouth 2 times daily         Physical Exam:    Blood pressure (!) 153/88, pulse (!) 133, temperature 99.7  F (37.6  C), temperature source Oral, resp. rate 17, SpO2 98 %.    Constitutional: Awake and conversational. Non- toxic appearing. No acute distress.   HEENT: Normocephalic, atraumatic without tenderness or palpable masses/depressions.   Lymph: Neck supple. No significant adenopathy noted.   Respiratory: Breathing comfortable with no increased work on room air. Good air exchange. No signs of respiratory distress or accessory muscle use. Lungs clear to auscultation bilaterally, no crackles or wheezing noted.  Cardiovascular: Regular rate and rhythm. Normal S1 and S2. No murmurs, rubs, or gallops. No peripheral edema.    GI: Abdomen is soft, non-distended, mildly tender. Bowel sounds present and normoactive. No masses or hepatosplenomegaly appreciated.  Skin: Skin is clean, dry, intact.   Musculoskeletal/ Extremities: No redness, warmth, or swelling of the joints appreciated.   Neurologic: Alert and oriented. Speech normal. Grossly nonfocal.      Labs & Studies: I personally reviewed the following studies:  ROUTINE LABS (Last four results):  CMP  Recent Labs   Lab 11/16/22  0544 11/15/22  2355   NA  --  139   POTASSIUM  --  3.2*    CHLORIDE  --  102   CO2  --  21*   ANIONGAP  --  16*   GLC  --  132*   BUN  --  5.2*   CR  --  0.80   GFRESTIMATED  --  >90   FAISAL  --  9.2   MAG 1.9  --    PHOS 3.5  --    PROTTOTAL  --  7.2   ALBUMIN  --  3.8   BILITOTAL  --  0.8   ALKPHOS  --  94   AST  --  22   ALT  --  13     CBC  Recent Labs   Lab 11/15/22  2355   WBC 16.9*   RBC 3.77*   HGB 9.8*   HCT 32.7*   MCV 87   MCH 26.0*   MCHC 30.0*   RDW 17.3*   *     INRNo lab results found in last 7 days.    CT abdomen/Pelvis with contrast 10/15/22    IMPRESSION:   1.  Large soft tissue mass filling the pelvis with extensive peritoneal carcinomatosis and ascites. Hepatic metastasis. Findings indicate widespread malignancy probably an adenocarcinoma arising in the pelvis.     2.  No bowel obstruction is seen.            ------------------------------------------------------------------------------------  ATTENDING PHYSICIAN ATTESTATION     I, Deonte Urias, saw and evaluated Diego Buchanan as part of a shared visit.  I have reviewed and discussed with the fellow/ advanced practice provider their history, physical and plan.     I personally reviewed the vital signs, medications, labs and imaging.  I have ascertained key findings from the history, and I have performed key aspects of the physical examination.      Key management decisions made by me:  27-year-old man with metastatic desmoplastic small round cell tumor, with peritoneal and liver involvement.  Has received two prior lines of systemic chemotherapy.  Admitted with constipation.  He wanted to discuss next line of systemic therapy.  We explained that next systemic therapy would begin in the outpatient setting, and we will coordinate that with Dr Sims. We will plan to discharge him today, with outpatient follow-up on 11/18/22.    I spent a total of 120 minutes on this patient on the day of the encounter, of which more than 50% of this time was used for counselling and coordination of care.      Deonte Urias M.D.  Date of Service (when I saw the patient): 11/16/2022

## 2022-11-16 NOTE — PHARMACY-ADMISSION MEDICATION HISTORY
Admission Medication History Completed by Pharmacy    See [facility] Admission Navigator for allergy information, preferred outpatient pharmacy, prior to admission medications and immunization status.     Medication History Sources:     PCA (Mariela)    SureScripts    Changes made to PTA medication list (reason):    Added: None    Deleted:   o Allopurinol  o Metoprolol  o neulasta  o Senna  o miralax  o Etoposide  o Folic acid  o mesna    Changed: None    Additional Information:    Patient reports that he hasn't taken any medications for over a month    Prior to Admission medications    Medication Sig Last Dose Taking? Auth Provider Long Term End Date   etoposide (VEPESID) 50 MG capsule Take 4 capsules (200 mg) by mouth daily for 6 days Days 1 through 6.   Yasmine Perez CNP Yes 9/22/22   etoposide (VEPESID) 50 MG capsule Take 4 capsules (200 mg) by mouth daily for 6 days Days 1 through 6.   Yasmine Perez CNP Yes 8/11/22   etoposide (VEPESID) 50 MG capsule Take 4 capsules (200 mg) by mouth daily for 6 days Days 1 through 6.   Farzaneh Young MD Yes 6/30/22   etoposide (VEPESID) 50 MG capsule Take 4 capsules (200 mg) by mouth daily for 6 days Days 1 through 6.   Farzaneh Young MD Yes 5/19/22   folic acid (FOLVITE) 1 MG tablet    Reported, Patient     lidocaine-prilocaine (EMLA) 2.5-2.5 % external cream Apply topically as needed for moderate pain   Farzaneh Young MD Yes    mesna (MESNEX) 400 MG TABS tablet Take 2 tablets (800 mg) by mouth every 4 hours for 2 doses Take two tablets 4 hours and 8 hours after end of cyclophosphamide infusion.   Yasmine Perez CNP Yes 8/27/22   mesna (MESNEX) 400 MG TABS tablet Take 2 tablets (800 mg) by mouth every 4 hours for 2 doses Take two tablets 4 hours and 8 hours after end of cyclophosphamide infusion.   Yasmine Perez CNP Yes 7/16/22   mesna (MESNEX) 400 MG TABS tablet Take 2 tablets (800 mg) by mouth every 4 hours for 2 doses Take two tablets 4 hours and 8  hours after end of cyclophosphamide infusion.   Farzaneh Young MD Yes 6/4/22   metoprolol tartrate (LOPRESSOR) 50 MG tablet Take 1 tablet (50 mg) by mouth daily  Patient taking differently: Take 50 mg by mouth daily Hold for systolic (top number of blood pressure) less than 105 and or HR less than 65.   Yasmine Perez, CNP Yes    NEULASTA ONPRO 6 MG/0.6ML injection    Reported, Patient Yes    polyethylene glycol (MIRALAX) 17 g packet Take 17 g by mouth   Reported, Patient     prochlorperazine (COMPAZINE) 10 MG tablet Take 1 tablet (10 mg) by mouth every 6 hours as needed (Nausea/Vomiting)   Farzaneh Young MD     senna-docusate (SENNA S) 8.6-50 MG tablet Take 1 tablet by mouth 2 times daily   Farzaneh Young MD         Date completed: 11/16/22    Medication history completed by: Mic Hickey [facility]

## 2022-11-16 NOTE — PROGRESS NOTES
Arrived from [facility] via EMS transport about 2205. No complaints of pain or nausea. Sister, Mariela is PCA and needs to stay overnight due to delayed cognitive abilities. He has no guardian and makes his own decisions. Dr. Akins called to bedside to admit. Tolerating clear liquids and a granola bar. Ambulating independently in room. Tachycardic, otherwise VSS. /84   Pulse (!) 139   Temp 96.8  F (36  C) (Temporal)   Resp 18   SpO2 97%

## 2022-11-16 NOTE — H&P
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  Took a break for traditional medicine and herbs now presented to [facility] ED with constipation CT scan showing disease progression was peritoneal carcinomatosis.    #. 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  Took a break for traditional medicine and herbs now presented to [facility] ED with constipation CT scan showing disease progression was peritoneal carcinomatosis.  Patient was last seen in the hematology clinic on 10/6/2022 plan was to switch to a different regimen likely  irinotecan/temozolomide vs. Topotecan/cytoxan or gemcitabine/docetaxel.  Plan  -Oncology consult in the morning  -check cbc, CMP  -Cxr and UA    Constipation  Scheduled senna and MiraLAX    Anemia   Transfuse pRBC for hb < 7.     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  Took a break for traditional medicine and herbs now presented to [facility] ED with constipation.  Patient was last seen hematology clinic on 10/6/2022 plan was to switch chemotherapy but  "family decided to pursue traditional/herbal medicine.  Patient reports he has received some herbal medication at home from his mom.  Over the last week patient progressively bloated with constipation which prompted the ED visit to [facility], CT scan showed Large soft tissue mass filling the pelvis with extensive peritoneal carcinomatosis and ascites. Hepatic metastasis Large soft tissue mass filling the pelvis with extensive peritoneal carcinomatosis and ascites. Hepatic metastasis.  Patient denies respiratory symptoms no shortness of breath no cough no fever, has some early satiety is otherwise no nausea or vomiting.          Cancer hx.  Oncologic History:  Per last oncology note dated 10/6/22:  \"Oncologic History:  1. He presented initially with abdominal pain, diarrhea, and progressive abdominal distention for 3 months duration. 2. Initial CT scan in February 2020 showed severe peritoneal carcinomatosis with large peritoneal tumor implants throughout the entire abdomen and pelvis, but mostly prominently in the pelvis.   2. PETCT scan showed interval increase in the amount of peritoneal carcinomatosis.  3. On 3/12/2020, he had ultrasound-guided core needle biopsy of a peritoneal implant (Case: [id number]), which showed a completely undifferentiated appearance, but stains with pancytokeratin, indicating an epithelial origin. 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 Tumor markers for CA-19-9, CEA, beta-hCG, alpha-fetoprotein were unremarkable. Cancer type ID molecular testing by [facility] sent to determine the exact diagnosis and to assist in further treatment planning. 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 Relative probability of less than 5% of other subtype of sarcoma. EWSR1-WT1 fusion detected.  4. 5/1/2020, MRI brain negative for brain metastasis, and " "baseline CT-CAP obtained showing extensive mixed solid and cystic masses throughout the abdomen and pelvis consistent with peritoneal carcinomatosis. Largest mass measures approximately 20 cm in the pelvis. Metastatic mixed solid and cystic masses seen in hepatic segments 5 and 6 and hepatic segment 7. The masses appear to be contiguous with the retrohepatic peritoneal carcinomatosis. Small volume fluid about the spleen favored to represent malignant ascites, stable mild-to-moderate right hydronephrosis related to mass effect upon the distal ureter in the pelvis, the IVC and iliac veins are compressed due to the extensive peritoneal carcinomatosis without findings to suggest deep venous thrombosis.  5. 5/4/2020, he begins CAV/IMV alternating chemotherapy. He receives 6 cycles of treatment. He symptomatically improves.  6. 9/11/2020, CT-CAP shows stable to mildly improved extensive peritoneal carcinomatosis. He would like to omit Ifosfamide/etoposide cycles and continue only with CAV.  7. 10/9/2020, he starts CAV every 21 days.  8. 1/6/2021, CT CAP showed a mixed response in the mixed solid and cystic masses throughout the peritoneum. Some of the tumors are stable to mildly worse, with some of the peritoneal masses a bit larger, a few are smaller, one cystic tumor in the left abdomen is smaller, while tumors lower in the pelvis appear slightly larger.  9. 3/24/2021, CT CAP showed continued slight decrease in overall burden of peritoneal carcinomatosis with persistent encasement of bowel without evidence of bowel obstruction.  10. 6/25/2021, he receives cycle 19 CAV, then takes a chemotherapy holiday.  11. 4/22/2022, he resumes CAV/IMV chemotherapy.  12. 7/13/22, CT CAP showed interval enlargement of hepatic and splenic metastases with other overall disease stable. CAV/IMV continued.\"         Past Medical History:     Past Medical History:   Diagnosis Date     Hypertension      Learning disability     but pt is his " own gaurdian     Peritoneal carcinomatosis (H)               Past Surgical History:     Past Surgical History:   Procedure Laterality Date     BIOPSY      liver     INSERT PORT VASCULAR ACCESS Right 4/21/2022    Procedure: INSERTION, VASCULAR ACCESS PORT;  Surgeon: Daniel Sarmiento MD;  Location: [facility] OR     IR CHEST PORT PLACEMENT > 5 YRS OF AGE  4/21/2022     IR CVC TUNNEL PLACEMENT > 5 YRS OF AGE  4/29/2020     IR CVC TUNNEL REMOVAL RIGHT  11/16/2021     US MUSCLE BIOPSY  3/12/2020              Social History:     Social History     Socioeconomic History     Marital status: Single     Spouse name: Not on file     Number of children: Not on file     Years of education: Not on file     Highest education level: Not on file   Occupational History     Not on file   Tobacco Use     Smoking status: Never     Smokeless tobacco: Never   Substance and Sexual Activity     Alcohol use: Never     Drug use: Never     Sexual activity: Not on file   Other Topics Concern     Parent/sibling w/ CABG, MI or angioplasty before 65F 55M? Not Asked   Social History Narrative     Not on file     Social Determinants of Health     Financial Resource Strain: Not on file   Food Insecurity: Not on file   Transportation Needs: Not on file   Physical Activity: Not on file   Stress: Not on file   Social Connections: Not on file   Intimate Partner Violence: Not on file   Housing Stability: Not on file            Family History:     Family History   Problem Relation Age of Onset     Hypertension Mother               Immunizations:     Immunization History   Administered Date(s) Administered     COVID-19,PF,Pfizer (12+ Yrs) 05/11/2021, 06/01/2021     COVID-19,PF,Pfizer 12+ Yrs (2022 and After) 08/26/2022     DTAP (<7y) 1995, 01/04/1996, 03/06/1996, 05/13/1997, 05/11/2000     DTAP-IPV, <7Y (QUADRACEL/KINRIX) 05/11/2000     DTaP / Hep B / IPV 1995, 01/04/1996, 03/06/1996     DTaP, Unspecified 05/13/1997     Flu, Unspecified 10/17/2007,  10/22/2010, 12/07/2011     HPV9 10/12/2015     Hib, Unspecified 1995, 01/04/1996, 03/06/1996     Influenza (IIV3) PF 10/17/2007, 12/07/2011, 01/21/2013, 09/26/2013, 10/21/2014     Influenza Intranasal Vaccine 10/22/2010     Influenza Vaccine IM > 6 months Valent IIV4 (Alfuria,Fluzone) 10/12/2015, 12/13/2016, 09/30/2019     Influenza Vaccine, 6+MO IM (QUADRIVALENT W/PRESERVATIVES) 01/21/2013, 09/26/2013, 10/21/2014     MMR 05/13/1997, 05/11/2000     Pedvax-hib 1995, 01/04/1996, 03/06/1996     TDAP Vaccine (Adacel) 10/17/2007     Varicella 10/17/2007, 12/20/2013              Allergies:      Allergies   Allergen Reactions     Tegaderm Chg Dressing [Chlorhexidine]      Tegaderm Transparent Dressing (Informational Only) Itching     Tegaderm dressing; Okay for short periods of time            Medications:     sodium chloride (PF) 0.9% PF flush 30 mL    allopurinol (ZYLOPRIM) 300 MG tablet,   CATHFLO ACTIVASE 2 MG injection,   etoposide (VEPESID) 50 MG capsule, Take 4 capsules (200 mg) by mouth daily for 6 days Days 1 through 6.  etoposide (VEPESID) 50 MG capsule, Take 4 capsules (200 mg) by mouth daily for 6 days Days 1 through 6.  etoposide (VEPESID) 50 MG capsule, Take 4 capsules (200 mg) by mouth daily for 6 days Days 1 through 6.  etoposide (VEPESID) 50 MG capsule, Take 4 capsules (200 mg) by mouth daily for 6 days Days 1 through 6.  folic acid (FOLVITE) 1 MG tablet,   lidocaine-prilocaine (EMLA) 2.5-2.5 % external cream, Apply topically as needed for moderate pain  mesna (MESNEX) 400 MG TABS tablet, Take 2 tablets (800 mg) by mouth every 4 hours for 2 doses Take two tablets 4 hours and 8 hours after end of cyclophosphamide infusion.  mesna (MESNEX) 400 MG TABS tablet, Take 2 tablets (800 mg) by mouth every 4 hours for 2 doses Take two tablets 4 hours and 8 hours after end of cyclophosphamide infusion.  mesna (MESNEX) 400 MG TABS tablet, Take 2 tablets (800 mg) by mouth every 4 hours for 2 doses Take two  tablets 4 hours and 8 hours after end of cyclophosphamide infusion.  metoprolol tartrate (LOPRESSOR) 50 MG tablet, Take 1 tablet (50 mg) by mouth daily (Patient taking differently: Take 50 mg by mouth daily Hold for systolic (top number of blood pressure) less than 105 and or HR less than 65.)  NEULASTA ONPRO 6 MG/0.6ML injection,   polyethylene glycol (MIRALAX) 17 g packet, Take 17 g by mouth  prochlorperazine (COMPAZINE) 10 MG tablet, Take 1 tablet (10 mg) by mouth every 6 hours as needed (Nausea/Vomiting)  senna-docusate (SENNA S) 8.6-50 MG tablet, Take 1 tablet by mouth 2 times daily             Review of Systems:     A complete ROS was performed and is negative other than what is stated in the HPI.          Physical Exam:   Blood pressure 139/84, pulse (!) 139, temperature 96.8  F (36  C), temperature source Temporal, resp. rate 18, SpO2 97 %.  General Appearance: Pleasant not in distress   HEENT: No jaundice, moist BM   Respiratory: CTAB  Cardiovascular: RRR, s1, s2 no m/g   GI: Distended nontender, hypoactive bowel sounds  Genitourinary: No CVAT   Extremities : No Edema   Neurologic: A&Ox3, moves all extremities equally               Data:   Reviewed in Epic

## 2022-11-16 NOTE — PLAN OF CARE
Tachycardic this shift, OVSS on RA. Denies pain and nausea. Regular diet. Sister Mariela is PCA and here overnight due to delayed cognitive abilities. Pot 3.2, md notified. UA sent. Last BM 11/14. Up ad beverly. Port to be accessed today. PIV saline locked. Continue w POC

## 2022-11-16 NOTE — PROGRESS NOTES
[facility]  Transfer Triage Note    Date of call: 11/15/22  Time of call: 6:25 PM    Current Patient Location: [facility]  Current Level of Care: ED    Vitals: BP:125/88 HR: 119 (145) O2 Sats: 97% on RA.  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 No  (pending)  Reason for requested transfer: Patient has established care here   Isolation Needs: None    Outside Records: Not available  Additional records may be faxed to [phone].    Transfer accepted: Yes  Stability of Patient: Patient is vitally stable, with no critical labs, and will likely remain stable throughout the transfer process  Level of Care Needed: Med Surg  Telemetry Needed:  None  Expected Time of Arrival for Transfer: 0-8 hours  Arrival Location:  [facility]    Recommendations for Management and Stabilization: Given    Additional Comments:  27 year old male with Desmoplastic small round cell tumor (DSRCT) with peritoneal carcinomatosis and lymph node, bone and liver metastases.  Subsequent to last oncology visit on 10/6/2022 patient placed conventional chemotherapy on hold due to disease progression on previous chemo regimen.  Opted to try home/herbal remedies.  Presented now to [facility] ED with loss of appetite, emesis and progressive back pain with desire to resume chemotherapy.  Initially found to be tachycardic with heart rate 145 improved after IV fluids and I believe pain medication.  CT scan of the abdomen demonstrated disease progression.  No evidence for perforation, abscess, etc.  White blood count 17,000 hemoglobin 10.4 potassium 3.2 with normal renal function.  Dr. Pollard from oncology was on the call.  Indication that other chemotherapy options may be available with impression that transfer to [facility] would be appropriate.  Plan MedSur bed.   Follow-up vital signs as above.  ED to clarify CODE STATUS.  COVID test pending.  Bed available on 7C.  ADD:  COVID neg.    Moe Mena MD

## 2022-11-16 NOTE — PLAN OF CARE
Goal Outcome Evaluation:Improving  Tolerating regular diet, denies nausea, Senna and Miralax given for constipation, has passed small amount of stool in the past two weeks per patient.Pulse 118-133 MD notified, patient has tachycardia at baseline. Denies nausea or pain, up ad beverly in room. Oncology consult today.Mother at bedside.

## 2022-11-16 NOTE — UTILIZATION REVIEW
Admission Status; Secondary Review Determination       Under the authority of the Utilization Management Committee, the utilization review process indicated a secondary review on the above patient. The review outcome is based on review of the medical records, discussions with staff, and applying clinical experience noted on the date of the review.     (x) Inpatient Status Appropriate - This patient's medical care is consistent with medical management for inpatient care and reasonable inpatient medical practice.     RATIONALE FOR DETERMINATION     Diego Buchanan is a 27 year old male with history of hypertension, learning disability, and metastatic desmoplastic small round cell tumor (S/P CAV/IMV) with peritoneal carcinomatosis.  This was initially diagnosed in 03/2020 and he has followed with [facility] since. He underwent multiple cycles of chemo- and immunotherapies. Most recently, he had finished 19 cycles of CAV in 06/2021, then took a chemotherapy holiday. He opted to pursue traditional medicine at some point. However, he worsened clinically and was found to have disease progression in 04/2022 so was restarted on CAV/IMV therapies. He presented to [facility] ED on 11/15 with symptoms of constipation and bloating.  He was transferred to [facility] for higher level of care.  On admission he was tachycardic with heart rate in the 130s. He was diaphoretic and without obvious pain.  Laboratory evaluation showed potassium 3.2, anion gap 16, white blood cell 16.9, hemoglobin 9.8, platelets 463, negative COVID-19 testing, and unremarkable urinalysis.  Chest x-ray suggested streaky perihilar infiltrates consistent with atelectasis or atypical infection.  He was admitted for further evaluation and cares.  Prior to admission metoprolol was restarted with persistence of tachycardia.  Evaluation for infection is underway given tachycardia and leukocytosis.  Blood cultures are pending.  Empiric antibiotics have not  been started at this time.  He is receiving MiraLAX and Senokot for constipation.  Significant tachycardia persists.  He is receiving lactated ringer bolus.  He has been seen by oncology.  Evidently, there are several potential treatment options for him.  It sounds like at this time the plan is for him to follow-up as an outpatient to make further decisions regarding this.  Inpatient admission is appropriate for ongoing evaluation and monitoring of persistent and significant tachycardia (heart rate remains in the 120s) in the setting of unexplained leukocytosis.  Also, patient has symptoms of bloating and constipation and the setting of known peritoneal carcinomatosis.  Bowel regimen has been started.     At the time of admission with the information available to the attending physician more than 2 nights Hospital complex care was anticipated, based on patient risk of adverse outcome if treated as outpatient and complex care required. Inpatient admission is appropriate based on the Medicare guidelines.     This document was produced using voice recognition software       The information on this document is developed by the utilization review team in order for the business office to ensure compliance. This only denotes the appropriateness of proper admission status and does not reflect the quality of care rendered.   The definitions of Inpatient Status and Observation Status used in making the determination above are those provided in the CMS Coverage Manual, Chapter 1 and Chapter 6, section 70.4.   Sincerely,     Mykel Salcedo MD    Utilization Review  Physician Advisor  [facility].

## 2022-11-17 NOTE — PROGRESS NOTES
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 Burciaga  Current Treatment Plan: TBD; most recently on CAV/IMV last received 9/27/22    Assessment & Plan:   Diego Buchanan is a 27 year old male with advanced intra-abdominal DSRCT with extensive peritoneal disease, lymph node metastasis, and liver and bone involvement. He tolerated CAV/IMV for 19 cycles and then was on chemotherapy break from June 2021-April 2022. He resumed chemotherapy with further CAV/IMV in April 2022 - 9/27/2022 and at his last visit with oncology (10/06/22) he was recommended to proceed with new regimen secondary to disease progression. At this time, patient/family decided to proceed with traditional medicine and herbs, however have now decided to re-consider chemotherapy. Patient was transferred from [facility] ED and admitted for consideration of chemo and constipation.     Recommendations:   - Patient planning to follow up in oncology clinic for further consideration of chemotherapy options tomorrow, 11/18. Patient and sister agreeable to plan of care. Will send message to LISY and Dr. Sims to update on discharge.   - Patient's current hyperhidrosis and tachycardia raises question re: possible carcinoid reaction, although his particular malignancy (DSRCT) is not associated with this. Patient's sister states this has been going on for \"weeks\" and has \"happened in the past\" as well. BCx reassuringly NGTD and additional infectious workup unremarkable. Continue to monitor and they are aware to call if any new or worsening symptoms.  - Appreciate IR assistance re: non-functional port and need for removal (NOT to be used at this time). Fortunately, his likely " "chemotherapy plan (cytoxan/topotecan) per entered orders by Dr. Sims are able to be given peripherally.      Patient was seen and plan of care was discussed with attending physician Dr. Urias.    I spent 50 minutes in the care of this patient today, which included time necessary for review of interval events, obtaining history and physical exam, ordering medications/tests/procedures as medically indicated, review of pertinent medical literature, counseling of the patient, coordination of care, and documentation time. Over 50% of time was spent counseling the patient and/or coordinating care.    Thank you for the opportunity to partake in this patients plan of care. Please do not hesitate to page with questions. We will continue to follow.     Cherelle Logan PA-C ([place])  Hematology/Oncology  #[id number]  ___________________________________________________________________    Subjective & Interval History:    No acute events noted overnight. Patient states he is feeling well this morning. Sister at bedside. She and Robert are both hopeful to restart chemotherapy as soon as able. Discussed outpatient visit tomorrow and communication with primary oncologist. No new or worsening symptoms. His sweating has gone on for \"weeks\" and happened in the past. He is eating a full breakfast.    Physical Exam:    Blood pressure (!) 157/86, pulse (!) 121, temperature (!) 96.6  F (35.9  C), resp. rate 16, SpO2 97 %.    General: sitting up in bed, no acute distress, eating breakfast  HEENT: sclera anicteric  Neck: supple, normal ROM  Resp: normal respiratory effort on ambient air  GI: appears non-distended  MSK: warm and well-perfused, normal tone  Skin: no rashes on limited exam, no jaundice  Neuro: Alert and interactive, moves all extremities equally, no focal deficits, able to answer questions appropriately    Labs & Studies: I personally reviewed the following studies:  ROUTINE LABS (Last four results):  CMP  Recent Labs   Lab " 11/17/22  0556 11/16/22  0544 11/15/22  2355     --  139   POTASSIUM 3.7  --  3.2*   CHLORIDE 105  --  102   CO2 19*  --  21*   ANIONGAP 15  --  16*   *  --  132*   BUN 6.9  --  5.2*   CR 0.81  --  0.80   GFRESTIMATED >90  --  >90   FAISAL 8.9  --  9.2   MAG  --  1.9  --    PHOS  --  3.5  --    PROTTOTAL 6.6  --  7.2   ALBUMIN 3.4*  --  3.8   BILITOTAL 0.5  --  0.8   ALKPHOS 88  --  94   AST 25  --  22   ALT 12  --  13     CBC  Recent Labs   Lab 11/17/22  0556 11/15/22  2355   WBC 13.0* 16.9*   RBC 3.38* 3.77*   HGB 8.9* 9.8*   HCT 30.3* 32.7*   MCV 90 87   MCH 26.3* 26.0*   MCHC 29.4* 30.0*   RDW 17.7* 17.3*    463*     INRNo lab results found in last 7 days.    Medications list for reference:  Current Facility-Administered Medications   Medication    acetaminophen (TYLENOL) tablet 650 mg    alteplase (CATHFLO ACTIVASE) injection 2 mg    enoxaparin ANTICOAGULANT (LOVENOX) injection 40 mg    heparin 100 UNIT/ML injection 5-10 mL    heparin lock flush 10 UNIT/ML injection 5-10 mL    heparin lock flush 10 UNIT/ML injection 5-10 mL    metoprolol tartrate (LOPRESSOR) tablet 25 mg    ondansetron (ZOFRAN) injection 4 mg    polyethylene glycol (MIRALAX) Packet 17 g    prochlorperazine (COMPAZINE) tablet 10 mg    senna-docusate (SENOKOT-S/PERICOLACE) 8.6-50 MG per tablet 1 tablet    sodium chloride (PF) 0.9% PF flush 10-20 mL    sodium chloride (PF) 0.9% PF flush 10-20 mL    sodium chloride (PF) 0.9% PF flush 10-20 mL     Current Outpatient Medications   Medication    acetaminophen (TYLENOL) 325 MG tablet    metoprolol tartrate (LOPRESSOR) 25 MG tablet    polyethylene glycol (MIRALAX) 17 GM/Dose powder    prochlorperazine (COMPAZINE) 10 MG tablet    senna-docusate (SENNA S) 8.6-50 MG tablet     Facility-Administered Medications Ordered in Other Encounters   Medication    sodium chloride (PF) 0.9% PF flush 30 mL            ------------------------------------------------------------------------------------  ATTENDING PHYSICIAN ATTESTATION     I, Deonte Urias, saw and evaluated Diego Buchanan as part of a shared visit.  I have reviewed and discussed with the fellow/ advanced practice provider their history, physical and plan.     I personally reviewed the vital signs, medications, labs and imaging.  I have ascertained key findings from the history, and I have performed key aspects of the physical examination.      Key management decisions made by me:  27-year-old man with metastatic desmoplastic small round cell tumor.  Admitted with constipation, which has been addressed.  Continues to suffer from sweats and tachycardia, neither of which is a new occurrence.  We will plan for home discharge later today.  Will coordinate follow-up in Dr Sims's clinic tomorrow, to discuss next line of systemic therapy.  Thank you for allowing us to participate in his care.    I spent a total of 60 minutes on this patient on the day of the encounter, of which more than 50% of this time was used for counselling and coordination of care.     Deonte Urias M.D.  Date of Service (when I saw the patient): 11/17/2022

## 2022-11-17 NOTE — PROGRESS NOTES
This is a recent snapshot of the patient's [facility] medical record.  For current drug dose and complete information and questions, call [phone] or In Basket pool, [facility] home infusion ([id number])  CSN Number:  [id number]

## 2022-11-17 NOTE — DISCHARGE SUMMARY
[facility]  Hospitalist Discharge Summary      Date of Admission:  11/15/2022  Date of Discharge:  11/17/2022  Discharging Provider: Vega Mar PA-C, Shabbir Turpin DO  Discharge Service: Hospitalist Service, GOLD TEAM 5     Discharge Diagnoses   Desmoplastic Small Round Cell Tumor c/b Peritoneal Carcinomatosis and Mets to Lymph Nodes, Bone, Liver  Constipation  Chronic Normocytic Hypochromic Anemia  HTN  Tachycardia  Diaphoresis  Leukocytosis   Thrombophilia    Follow-ups Needed After Discharge   Oncology on 11/18/22 at 12:00PM with labs to be drawn prior to appt at 11:30AM.    Unresulted Labs Ordered in the Past 30 Days of this Admission     Date and Time Order Name Status Description    11/17/2022  3:08 AM Blood Culture Hand, Right In process     11/17/2022  3:08 AM Blood Culture Hand, Left In process     11/15/2022 10:11 PM Blood Culture Arm, Right Preliminary     11/15/2022 10:11 PM Blood Culture Hand, Right Preliminary       Influenza and RSV viral panels pending.    These results will be followed up by PCP/Oncology (at appt tomorrow).    Discharge Disposition   Discharged to home  Condition at discharge: Stable    Hospital Course   Diego Buchanan is a 27 year old male with a past medical history of metastatic desmoplastic small round cell tumor (S/P CAV/IMV). Initially took a break to pursue traditional medicine, however, presented to [facility] ED on 11/15/22 for constipation, worsening bloating. C/f disease progression and he was transferred to [facility] for escalated care, further monitoring and management.    Desmoplastic Small Round Cell Tumor c/b Peritoneal Carcinomatosis and Mets to Lymph Nodes, Bone, Liver  Initially diagnosed in 03/2020, has followed with Oncology since. Underwent multiple cycles of chemo- and immunotherapies. Most recently, had finished x19 cycles of CAV in 06/2021, then took a chemotherapy holiday, and (unclear when), but opted to pursue  traditional medicine at some point. However, worsened clinically and was found to have disease progression in 04/2022, so restarted on CAV/IMV therapies. Presented to [facility] ED as above and transferred to [facility] for higher level of care. Has followed with Oncology as an OP. On admission, WBC 16.9, Hgb stable (9.8, 8.2), and largely HDS (with exception of tachycardia, see below). CXR checked on admission showing streaky, bilateral opacities favoring atypical infxn vs atelectasis (but pt afebrile, no s/sx of overt infxn, no coughing, no dyspnea, less likely infxn at this point). UA unremarkable. Oncology consulted and felt that patient was stable enough to follow up on 11/18/22 as an outpatient to discuss pursuing chemotherapy. BCx's will have been NGTD for just short of 48hrs, and we discussed very close follow-up with Oncology the day after discharge to monitor these cultures, and if + to return to ED immediately. He and family expressed understanding.    HTN  Tachycardia  Tachycardia could be multifactorial in the setting of metastatic disease burden as above, potentially mild hypovolemia (as supported by hypokalemia, diaphoresis). Chart Review, however, shows that this is not necessarily a new issue, which raises suspicion for this being related to underlying malignancy. BP's otherwise normal, slightly elevated. PTA on Metoprolol 50mg daily, but this was changed to 25mg BID on 11/16/22 to help maintain more of a steady-state to combat tachycardia and keep BP's stable and improved.     Diaphoresis  Leukocytosis   Thrombophilia  Patient notably diaphoretic on exam on admission, but afebrile, no dyspnea, no CP, no other infectious sx to suggest this as an etiology, though does have elevated WBC at 16.9 and CXR above showing signs of potentially atypical infection. However, he and family feel that his sweating is related to his pain (but at the moment does not have significant pain). Could be r/t cancer tx as above vs  infection vs hemoconcentration (higher Hgb than recently, mild thrombophilia at 463, and higher WBC than usual in the absence of other infectious s/sx). On discharge, pt continues to be diaphoretic, but less so than yesterday, and otherwise has no new complaints today.     Of note, patient's sister is his PCA due underlying developmental delays.    Consultations This Hospital Stay   ONCOLOGY ADULT IP CONSULT    Code Status   Full Code    Time Spent on this Encounter   I, Vega Mar PA-C, personally saw the patient today and spent greater than 30 minutes discharging this patient.       Vega Mar PA-C  [facility] [address]  [address] [facility] [address]  Phone: [phone]  ______________________________________________________________________    Physical Exam   Vital Signs: Temp: (!) 96.6  F (35.9  C) Temp src: Temporal BP: (!) 157/86 Pulse: (!) 121   Resp: 16 SpO2: 97 % O2 Device: None (Room air)    Weight: 0 lbs 0 oz  Constitutional: awake, alert, cooperative, no apparent distress, and appears stated age. Diaphoretic, improved compared to yesterday however. Sitting upright at edge of bed.  Eyes: lids and lashes normal, sclera clear and conjunctiva normal  ENT: normocepalic, without obvious abnormality  Respiratory: No increased work of breathing, good air exchange, clear to auscultation bilaterally, no crackles or wheezing  Cardiovascular: tachycardia, but regular rhythm, normal S1 and S2, no S3, no S4 and no murmur noted  GI: normal bowel sounds, firm, distended and non-tender. Hepatomegaly noted.  Skin: no bruising or bleeding, no rashes, no lesions on visualized skin and no jaundice  Musculoskeletal: no lower extremity pitting edema present and no deformities  Neurologic: Moving all extremities equally and spontaneously. No obvious focal neuro deficits.  Neuropsychiatric: General: normal, calm and normal eye contact  Level of consciousness: alert / normal  Affect: normal and  dilan       Primary Care Physician   Jc Glass    Discharge Orders      IR Port Removal Right     IR Chest Port Placement > 5 Yrs of Age    Pt was admitted 11/15-11/17 and port was not functional and noted to be malpositioned on xray       Significant Results and Procedures   Most Recent 3 CBC's:  Recent Labs   Lab Test 11/17/22  0556 11/15/22  2355 09/27/22  1009   WBC 13.0* 16.9* 11.7*   HGB 8.9* 9.8* 8.2*   MCV 90 87 96    463* 194     Most Recent 3 BMP's:  Recent Labs   Lab Test 11/17/22  0556 11/15/22  2355 09/27/22  1009    139 141   POTASSIUM 3.7 3.2* 3.3*   CHLORIDE 105 102 110*   CO2 19* 21* 23   BUN 6.9 5.2* 7   CR 0.81 0.80 0.87   ANIONGAP 15 16* 8   FAISAL 8.9 9.2 9.0   * 132* 83     Most Recent 2 LFT's:  Recent Labs   Lab Test 11/17/22  0556 11/15/22  2355   AST 25 22   ALT 12 13   ALKPHOS 88 94   BILITOTAL 0.5 0.8   ,   Results for orders placed or performed during the hospital encounter of 11/15/22   XR Chest Port 1 View    Narrative    EXAM: XR CHEST PORT 1 VIEW  11/15/2022 11:02 PM      HISTORY: eval for pna    COMPARISON: CT 10/4/2022    FINDINGS: Portable semiupright AP radiograph of the chest. Right chest  wall Port-A-Cath tip projects over the right innominate vein, similar  to prior CT. The trachea is midline. The cardiac silhouette is not  enlarged. No pleural effusion or pneumothorax. Mildly low lung  volumes. Streaky perihilar and basilar opacities. The visualized upper  abdomen is unremarkable.       Impression    IMPRESSION:   1. Mild streaky perihilar and basilar opacities, likely atypical  infection versus atelectasis.  2. Low lung volumes.    I have personally reviewed the examination and initial interpretation  and I agree with the findings.    SHUBHAM ALVAREZ MD         SYSTEM ID:  [id number]       Discharge Medications   Current Discharge Medication List      STOP taking these medications       metoprolol tartrate (LOPRESSOR) 50 MG tablet Comments:   Reason for  Stopping:         polyethylene glycol (MIRALAX) 17 g packet Comments:   Reason for Stopping:         senna-docusate (SENNA S) 8.6-50 MG tablet Comments:   Reason for Stopping:             Allergies   Allergies   Allergen Reactions     Tegaderm Chg Dressing [Chlorhexidine]      Tegaderm Transparent Dressing (Informational Only) Itching     Tegaderm dressing; Okay for short periods of time

## 2022-11-17 NOTE — PLAN OF CARE
Tachycardic this shift, baseline for pt. OVSS on RA. Denies pain and nausea. Port occluded, TPA used twice both failed attempts, md aware. PIV saline locked. Sister Mariela at bedside. UAL. Possible discharge today. Continue w POC

## 2022-11-17 NOTE — PLAN OF CARE
Goal Outcome Evaluation:      Plan of Care Reviewed With: patient    Overall Patient Progress: improvingOverall Patient Progress: improving     No complaints of pain or nausea. Reports small BM this evening. Tolerating regular diet. Independent in room. 1 liter LR bolus given for tachycardia. Port accessed, occluded, see orders for CXR to confirm placement, then add TPA (orders done by Judith WANG). Awaiting CXR results. Febrile at 2230. /86 (BP Location: Right arm)   Pulse 120   Temp (!) 100.5  F (38.1  C) (Oral)   Resp 20   SpO2 98%

## 2022-11-17 NOTE — PLAN OF CARE
Goal Outcome Evaluation: Progressing    Tachycardic this shift, baseline for pt. OVSS on RA. Denies pain and nausea. Port de-accessed due to malpositioning. PIV saline locked. Family at bedside. UAL.   Possible discharge today. Continue w POC    Update: Swabs sent to rule our respiratory virus and Covid. Results pending, isolation precautions in place.

## 2022-11-17 NOTE — PROGRESS NOTES
Brief med note    Port-a-cath not flushing.  Pt's sister reports to bedside RN that it was last flushed 1 month ago and had a similar experience when port was clotted .     CXR now to assess port needle posistion  Can give TPA if port in correct position     ADELE Villegas [facility]  Contact information available via [facility] Paging/Directory

## 2022-11-17 NOTE — PLAN OF CARE
Goal Outcome Evaluation: met  AVS discussed with pt sister via telephone and verbal consent received for AVS education being completed. No further questions from pt, pt's mother or sister. Patient meets criteria for discharge.

## 2022-11-17 NOTE — PROGRESS NOTES
IR note.    Call from Vega Mar PA-C regarding right chest port malfunction. Nurses are reportedly unable to aspirate/flush port. Attempted unsuccessfully to instill TPA into port x2. Chest xray completed 11/15 and 11/16 shows port is malpositioned with tip in the low cervical IJ. Last time port was flushed was once month ago per Mother. IR is asked to make recommendations regarding management.    Port should not be used at this time. It needs to be removed and completely replaced. Discharge is planned for today from the hospital with plans for outpatient follow-up with oncology. POC for cancer treatment unclear at this time. Orders for port removal and placement have been entered. IR schedulers will be asked to coordinate this procedure with the patient in the coming weeks.    If recommendations change after oncology visit, we would ask that oncology update IR scheduling at [phone] with these changes.    Nohemy Slade DNP, APRN  Interventional Radiology   IR on-call pager: [phone]

## 2022-11-18 NOTE — PROGRESS NOTES
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 He presented initially with abdominal pain, diarrhea, and progressive abdominal distention for 3 months duration. 2. Initial CT scan in February 2020 showed severe peritoneal carcinomatosis with large peritoneal tumor implants throughout the entire abdomen and pelvis, but mostly prominently in the pelvis.   2. PETCT scan showed interval increase in the amount of peritoneal carcinomatosis.  3. On 3/12/2020, he had ultrasound-guided core needle biopsy of a peritoneal implant (Case: [id number]), which showed a completely undifferentiated appearance, but stains with pancytokeratin, indicating an epithelial origin. 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 Tumor markers for CA-19-9, CEA, beta-hCG, alpha-fetoprotein were unremarkable. Cancer type ID molecular testing by Immediately sent to determine the exact diagnosis and to assist in further treatment planning. 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 Relative probability of less than 5% of other subtype of sarcoma. EWSR1-WT1 fusion detected.  4. 5/1/2020, MRI brain negative for brain metastasis, and baseline CT-CAP obtained showing extensive mixed solid and cystic masses throughout the abdomen and pelvis consistent with peritoneal carcinomatosis. Largest mass measures approximately 20 cm in the pelvis. Metastatic mixed solid and cystic masses seen in hepatic segments 5 and 6 and hepatic segment 7. The masses appear to be contiguous with the retrohepatic peritoneal carcinomatosis. Small volume fluid about the spleen favored to represent malignant ascites, stable mild-to-moderate right hydronephrosis related to mass effect upon the distal ureter in the pelvis, the IVC and iliac  veins are compressed due to the extensive peritoneal carcinomatosis without findings to suggest deep venous thrombosis.  5. 5/4/2020, he begins CAV/IMV alternating chemotherapy. He receives 6 cycles of treatment. He symptomatically improves.  6. 9/11/2020, CT-CAP shows stable to mildly improved extensive peritoneal carcinomatosis. He would like to omit Ifosfamide/etoposide cycles and continue only with CAV.  7. 10/9/2020, he starts CAV every 21 days.  8. 1/6/2021, CT CAP showed a mixed response in the mixed solid and cystic masses throughout the peritoneum. Some of the tumors are stable to mildly worse, with some of the peritoneal masses a bit larger, a few are smaller, one cystic tumor in the left abdomen is smaller, while tumors lower in the pelvis appear slightly larger.  9. 3/24/2021, CT CAP showed continued slight decrease in overall burden of peritoneal carcinomatosis with persistent encasement of bowel without evidence of bowel obstruction.  10. 6/25/2021, he receives cycle 19 CAV, then takes a chemotherapy holiday.  11. 4/22/2022, he resumes CAV/IMV chemotherapy.  12. 7/13/22, CT CAP showed interval enlargement of hepatic and splenic metastases with other overall disease stable. CAV/IMV continued.    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 He presents today for review of restaging scans. He has been receiving CAV/IMV and has received a total of 8 additional cycles of treatment. Last CT-CAP 10/4/22 showed evidence of progressive disease, predominantly in liver. There are small sub-centimeter lung nodules, which are suspicious for metastasis.    Robert presents to clinic today accompanied by his sister to discuss resumption of chemotherapy. His last chemotherapy treatment with IMV was 9/16/22. Following his last scan he and his family elected to pursue holistic treatments and monitor disease. He was recently admitted 11/15-11/17 due to abdominal  bloating, constipation and leukocytosis.     He notes continued bloating and abdominal fullness. He received multiple laxatives in the hospital which were effective in relieving his constipation. He has been experiencing intermittent periods of significant diaphoresis which is not necessarily new for him. His heart rate today is elevated in the 140 range. He did not take his metoprolol this morning.     He denies nausea. Appetite is stable although it is difficult to eat large amounts of food due to his abdominal bloating. There has been no notable blood in his urine or stool.     ROS  10 point ROS neg other than the symptoms noted above in the HPI.      Current Outpatient Medications   Medication Sig Dispense Refill     acetaminophen (TYLENOL) 325 MG tablet Take 2 tablets (650 mg) by mouth every 4 hours as needed for mild pain or fever 30 tablet 0     metoprolol tartrate (LOPRESSOR) 25 MG tablet Take 1 tablet (25 mg) by mouth 2 times daily 60 tablet 0     polyethylene glycol (MIRALAX) 17 GM/Dose powder Take 17 g by mouth daily 510 g 0     prochlorperazine (COMPAZINE) 10 MG tablet Take 1 tablet (10 mg) by mouth every 6 hours as needed (Nausea/Vomiting) 30 tablet 0     senna-docusate (SENNA S) 8.6-50 MG tablet Take 1 tablet by mouth 2 times daily as needed for constipation 60 tablet 0     Physical Exam:  There were no vitals taken for this visit.  Wt Readings from Last 10 Encounters:   09/16/22 128.3 kg (282 lb 12.8 oz)   08/26/22 127.1 kg (280 lb 4.8 oz)   08/05/22 130.2 kg (287 lb)   07/15/22 130 kg (286 lb 9.6 oz)   06/24/22 128.5 kg (283 lb 4.8 oz)   06/03/22 132.7 kg (292 lb 9.6 oz)   06/02/22 132 kg (291 lb 1.6 oz)   05/13/22 129.5 kg (285 lb 9.6 oz)   04/22/22 133.1 kg (293 lb 8 oz)   04/21/22 132.5 kg (292 lb)   General: Pleasant male, NAD.   Eyes: EOMI, PERRL. No scleral icterus.  Cardiovascular: Tachycardic. No murmurs, gallops, or rubs. No peripheral edema.  Respiratory: CTA bilaterally. No wheezes or  crackles.  Gastrointestinal: BS +. Abdomen firm, nontender. No palpable hepatosplenomegaly or masses in the seated position.  Neurologic: Grossly nonfocal.  Skin: No rashes, petechiae, or bruising noted on exposed skin. Diaphoretic.     Labs:   Most Recent 3 CBC's:  Recent Labs   Lab Test 11/17/22  0556 11/15/22  2355 09/27/22  1009 09/22/22  1355   WBC 13.0* 16.9* 11.7* 8.2   HGB 8.9* 9.8* 8.2* 9.1*   MCV 90 87 96 93    463* 194 357   ANEUTAUTO 11.0* 14.7*  --  7.6     Most Recent 3 BMP's:  Recent Labs   Lab Test 11/17/22  0556 11/15/22  2355 09/27/22  1009    139 141   POTASSIUM 3.7 3.2* 3.3*   CHLORIDE 105 102 110*   CO2 19* 21* 23   BUN 6.9 5.2* 7   CR 0.81 0.80 0.87   ANIONGAP 15 16* 8   FAISAL 8.9 9.2 9.0   * 132* 83   PROTTOTAL 6.6 7.2 7.4   ALBUMIN 3.4* 3.8 3.5    Most Recent 3 LFT's:  Recent Labs   Lab Test 11/17/22  0556 11/15/22  2355 09/27/22  1009   AST 25 22 15   ALT 12 13 26   ALKPHOS 88 94 138   BILITOTAL 0.5 0.8 0.4     Imaging:  XR Chest Port 1 View  Narrative: Exam: XR CHEST PORT 1 VIEW, 11/16/2022 10:26 PM    Indication: port assessment    Comparison: Radiograph 11/15/2022    Findings:   Single portable AP supine view of the chest. Right chest wall  Port-A-Cath with the tip likely in the lower cervical internal jugular  vein. Trachea is midline. No pneumothorax or pleural effusion. No  focal pulmonary consolidations. Stable cardiac silhouette.  Impression: Impression: Right chest wall Port-A-Cath with the tip in the low  cervical internal jugular vein. Improvement of pulmonary opacities.    I have personally reviewed the examination and initial interpretation  and I agree with the findings.    SHUBHAM ALVAREZ MD         SYSTEM ID:  [id number]    I reviewed the above labs and imaging today.    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 Diego Buchanan is 27 year old male with  advanced intraabdominal DSRCT with extensive peritoneal disease, lymph node metastasis, and liver and bone involvement. He tolerated CAV/IMV for 19 cycles and then was on chemotherapy break from June 2021-April 2022. Given return in symptoms he resumed chemotherapy with further CAV/IMV in April 2022.  However, he has had evidence of progression on the two most recent scans. Robert and his family elected to take time to try traditional medicine treatments. Next regimen could include irinotecan/temozolomide vs. topotecan/cytoxan or gemcitabine/docetaxel. Will request CT-AP imaging from [facility]. Dr. Sism will review imaging to determine next best regimen. Will tentatively plan for mayito/cy regimen to begin 11/28 but may change course based on MD recommendations.    #Abdominal bloating  Moderate ascites noted on CT A/P performed 11/15 at [facility].   -Proceed with US paracentesis     #Leukocytosis  #Diaphoresis  Presented to [facility] ED 11/15/22 for constipation and worsening bloating and was transferred to [facility]. WBC elevated at 16.9. CXR showed streaky, bilateral opacities favoring atypical infx vs atelectasis. BC are NGTD. Pulmonary opacities improving on repeat CXR 11/16.    #Anemia  Hgb 7.9 today. No current concern for  or GI bleeding. Continue to monitor with resumption of chemotherapy Transfuse pRBC for hb < 7.    #Constipation  Continue Miralax and senna    #HTN  #Tachycardia  Longstanding issue, likely multifactorial in setting of metastatic disease. Managed with metoprolol, recently changed from 50 mg daily to 25 mg BID in an effort to maintain more of a steady-state.     #Port malpositioning  CXR 11/16/22 showing right port with tip in the low cervical internal jugular. Port replacement requested.    50 minutes spent on the date of the encounter doing chart review, review of test results, interpretation of tests, patient visit, documentation, discussion with other provider(s) and discussion with family     Yasmine  ARIEL Perez  [facility] [address] [address]  [phone]     Addendum:  Dr. Sims recommends treatment with irinotecan 20 mg/m2 days 1-5/temozolomide 250 mg daily days 1-5 every 21 days rather than topotecan/cyclophosphamide. Will plan for tentative treatment start on 11/28 and request chemo teach by [facility].

## 2022-11-18 NOTE — NURSING NOTE
"Oncology Rooming Note    November 18, 2022 1:02 PM   Diego Buchanan is a 27 year old male who presents for:    Chief Complaint   Patient presents with     Port Draw     Labs drawn via port by RN in lab. VS taken.      Oncology Clinic Visit     P RETURN - [facility]      Initial Vitals: /84   Pulse (!) 145   Temp 98.6  F (37  C) (Oral)   Resp 20   Ht 1.73 m (5' 8.11\")   Wt 129.6 kg (285 lb 11.2 oz)   SpO2 99%   BMI 43.30 kg/m   Estimated body mass index is 43.3 kg/m  as calculated from the following:    Height as of this encounter: 1.73 m (5' 8.11\").    Weight as of this encounter: 129.6 kg (285 lb 11.2 oz). Body surface area is 2.5 meters squared.  No Pain (0) Comment: Data Unavailable   No LMP for male patient.  Allergies reviewed: Yes  Medications reviewed: Yes    Medications: Medication refills not needed today.  Pharmacy name entered into [employer]:    [id number] - [place], [place] - [address] AT [facility] [place], [place] - [address] [facility] [phone]  [facility] - [place], [place] - [address] [facility]    Clinical concerns: Patient was just discharged yesterday from the hospital. He is excessively sweating having to use a towel to dry his head. His heart rate is 145 bpm, respirations per minute are 20. Yasmine Perez was notified.      Esequiel Tong, DEDRICK            "

## 2022-11-18 NOTE — NURSING NOTE
Chief Complaint   Patient presents with     Port Draw     Labs drawn via port by RN in lab. VS taken.      Labs drawn via Port accessed using 20g flat needle. Line flushed and Heparin locked. Vital signs taken. Provider messaged re: . Patient has not taken AM metoprolol dose.  Checked into next appointment.     Hiral Walker RN

## 2022-11-28 NOTE — ORAL ONC MGMT
Oral Chemotherapy Monitoring Program    Lab Monitoring Plan  Labs with infusion visits  Subjective/Objective:  Diego Buchanan is a 27 year old male seen in clinic for an initial visit for oral chemotherapy education.  Today I spoke with Diego and Mariela.    ORAL CHEMOTHERAPY 5/19/2022 6/17/2022 8/20/2022 8/26/2022 10/19/2022 11/21/2022 11/28/2022   Assessment Type Initial Follow up Refill Monthly Follow up Lab Monitoring Discontinuation Initial Work up New Teach   Stop Date - - - - 10/18/2022 - -   Reason for Discontinuation - - - - Therapy complete - -   Diagnosis Code - - - (No Data) (No Data) (No Data) (No Data)   Providers Morales Sims   [facility]   Drug Name Etoposide Etoposide Etoposide Etoposide Etoposide Temodar (temozolomide) Temodar (temozolomide)   Dose 200 mg 200 mg 200 mg 200 mg - 250 mg 250 mg   Current Schedule Daily Daily Daily - - Daily Daily   Cycle Details - - - - - (No Data) 5 days on, 23 days off   Start Date of Last Cycle 5/13/2022 - 8/5/2022 - - - -   Planned next cycle start date - - - - - 11/30/2022 11/28/2022   Doses missed in last 2 weeks 0 - 0 - - - -   Adherence Assessment Adherent - Adherent - - - -   Adverse Effects Nausea - No AE identified during assessment - - - -   Nausea Grade 1 - - - - - -   Pharmacist Intervention(nausea) No - - - - - -   Any new drug interactions? No - - - - - -   Is the dose as ordered appropriate for the patient? Yes - - - - - -   Is the patient currently in pain? - - - - - - -   Has the patient been assessed within the past 6 months for depression? - - - - - - -   Has the patient missed any days of school, work, or other routine activity? - - - - - - -   Since the last time we talked, have you been hospitalized or used the emergency room? No - - - - - -       Last PHQ-2 Score on  "record:   PHQ-2 ( 1999 [employer]) 9/22/2020   Q1: Little interest or pleasure in doing things 0   Q2: Feeling down, depressed or hopeless 0   PHQ-2 Score 0   PHQ-2 Total Score (12-17 Years)- Positive if 3 or more points; Administer PHQ-A if positive 0       Vitals:  BP:   BP Readings from Last 1 Encounters:   11/28/22 132/84     Wt Readings from Last 1 Encounters:   11/18/22 129.6 kg (285 lb 11.2 oz)     Estimated body surface area is 2.5 meters squared as calculated from the following:    Height as of an earlier encounter on 11/28/22: 1.73 m (5' 8.11\").    Weight as of 11/18/22: 129.6 kg (285 lb 11.2 oz).    Labs:  _  Result Component Current Result Ref Range   Sodium 139 (11/28/2022) 136 - 145 mmol/L     _  Result Component Current Result Ref Range   Potassium 3.0 (L) (11/28/2022) 3.4 - 5.3 mmol/L     _  Result Component Current Result Ref Range   Calcium 9.1 (11/28/2022) 8.6 - 10.0 mg/dL     _  Result Component Current Result Ref Range   Magnesium 1.8 (11/18/2022) 1.7 - 2.3 mg/dL     _  Result Component Current Result Ref Range   Phosphorus 2.8 (11/18/2022) 2.5 - 4.5 mg/dL     _  Result Component Current Result Ref Range   Albumin 3.6 (11/28/2022) 3.5 - 5.2 g/dL     _  Result Component Current Result Ref Range   Urea Nitrogen 5.0 (L) (11/28/2022) 6.0 - 20.0 mg/dL     _  Result Component Current Result Ref Range   Creatinine 0.74 (11/28/2022) 0.67 - 1.17 mg/dL     _  Result Component Current Result Ref Range   AST 20 (11/28/2022) 10 - 50 U/L     _  Result Component Current Result Ref Range   ALT 9 (L) (11/28/2022) 10 - 50 U/L     _  Result Component Current Result Ref Range   Bilirubin Total 0.6 (11/28/2022) <=1.2 mg/dL     _  Result Component Current Result Ref Range   WBC Count 17.4 (H) (11/28/2022) 4.0 - 11.0 10e3/uL     _  Result Component Current Result Ref Range   Hemoglobin 9.3 (L) (11/28/2022) 13.3 - 17.7 g/dL     _  Result Component Current Result Ref Range   Platelet Count 459 (H) (11/28/2022) 150 - 450 " 10e3/uL     No results found for ANC within last 30 days.     _  Result Component Current Result Ref Range   Absolute Neutrophils 15.2 (H) (11/28/2022) 1.6 - 8.3 10e3/uL        Assessment:  Patient is appropriate to start therapy.    Plan:  Basic chemotherapy teaching was reviewed with the patient and the patient's family including indication, start date of therapy, dose, administration, adverse effects, missed doses, food and drug interactions, monitoring, side effect management, office contact information, and safe handling. Written materials were provided and all questions answered to Steph's stated satisfaction.    Follow-Up:  About one week after start of Temodar.     Mykel Trejo PharmD  [facility]  [phone]  November 28, 2022

## 2022-11-28 NOTE — PROGRESS NOTES
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 He presented initially with abdominal pain, diarrhea, and progressive abdominal distention for 3 months duration. 2. Initial CT scan in February 2020 showed severe peritoneal carcinomatosis with large peritoneal tumor implants throughout the entire abdomen and pelvis, but mostly prominently in the pelvis.   2. PETCT scan showed interval increase in the amount of peritoneal carcinomatosis.  3. On 3/12/2020, he had ultrasound-guided core needle biopsy of a peritoneal implant (Case: [id number]), which showed a completely undifferentiated appearance, but stains with pancytokeratin, indicating an epithelial origin. 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 Tumor markers for CA-19-9, CEA, beta-hCG, alpha-fetoprotein were unremarkable. Cancer type ID molecular testing by [facility] sent to determine the exact diagnosis and to assist in further treatment planning. 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 Relative probability of less than 5% of other subtype of sarcoma. EWSR1-WT1 fusion detected.  4. 5/1/2020, MRI brain negative for brain metastasis, and baseline CT-CAP obtained showing extensive mixed solid and cystic masses throughout the abdomen and pelvis consistent with peritoneal carcinomatosis. Largest mass measures approximately 20 cm in the pelvis. Metastatic mixed solid and cystic masses seen in hepatic segments 5 and 6 and hepatic segment 7. The masses appear to be contiguous with the retrohepatic peritoneal carcinomatosis. Small volume fluid about the spleen favored to represent malignant ascites, stable mild-to-moderate right hydronephrosis related to mass effect upon the distal ureter in the pelvis, the IVC and iliac  veins are compressed due to the extensive peritoneal carcinomatosis without findings to suggest deep venous thrombosis.  5. 5/4/2020, he begins CAV/IMV alternating chemotherapy. He receives 6 cycles of treatment. He symptomatically improves.  6. 9/11/2020, CT-CAP shows stable to mildly improved extensive peritoneal carcinomatosis. He would like to omit Ifosfamide/etoposide cycles and continue only with CAV.  7. 10/9/2020, he starts CAV every 21 days.  8. 1/6/2021, CT CAP showed a mixed response in the mixed solid and cystic masses throughout the peritoneum. Some of the tumors are stable to mildly worse, with some of the peritoneal masses a bit larger, a few are smaller, one cystic tumor in the left abdomen is smaller, while tumors lower in the pelvis appear slightly larger.  9. 3/24/2021, CT CAP showed continued slight decrease in overall burden of peritoneal carcinomatosis with persistent encasement of bowel without evidence of bowel obstruction.  10. 6/25/2021, he receives cycle 19 CAV, then takes a chemotherapy holiday.  11. 4/22/2022, he resumes CAV/IMV chemotherapy.  12. 7/13/22, CT CAP showed interval enlargement of hepatic and splenic metastases with other overall disease stable. CAV/IMV continued.  13.  10/04/22, CT CAP  showed evidence of progressive disease, predominantly in liver. There are small sub-centimeter lung nodules, which are suspicious for metastasis. CAV/IMV discontinued. Start irinotecan/temozolomide on 11/28/22.     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 He presents today for consideration of cycle 1 irinotecan/temozolomide. Robert continues to complain of abdominal fullness and discomfort. He has decreased his home activity secondary to discomfort. Endorses constipation, had a small BM yesterday. Taking 2 capfuls of miralax daily and 1-2 tablet of Senna. Endorses early satiety secondary to abdominal fullness. Feels his  "breathing has changed over the weekend. He feels more short of breath. No chest pain or cough. No fevers or chills. No new rashes, lumps, or bumps. No urinary changes. No diarrhea.      Appetite is stable although it is difficult to eat large amounts of food due to his abdominal bloating.     ROS  10 point ROS neg other than the symptoms noted above in the HPI.      Current Outpatient Medications   Medication Sig Dispense Refill     acetaminophen (TYLENOL) 325 MG tablet Take 2 tablets (650 mg) by mouth every 4 hours as needed for mild pain or fever 30 tablet 0     metoprolol tartrate (LOPRESSOR) 25 MG tablet Take 1 tablet (25 mg) by mouth 2 times daily 60 tablet 0     ondansetron (ZOFRAN) 4 MG tablet Take 1 tablet (4 mg) by mouth daily 45 minutes before taking Temodar on chemotherapy days 30 tablet 1     polyethylene glycol (MIRALAX) 17 GM/Dose powder Take 17 g by mouth daily 510 g 0     prochlorperazine (COMPAZINE) 10 MG tablet Take 1 tablet (10 mg) by mouth every 6 hours as needed (Nausea/Vomiting) 30 tablet 0     senna-docusate (SENNA S) 8.6-50 MG tablet Take 1 tablet by mouth 2 times daily as needed for constipation 60 tablet 0     Physical Exam:  /84 (BP Location: Right arm, Patient Position: Standing, Cuff Size: Adult Large)   Pulse 117   Temp 98.7  F (37.1  C) (Oral)   Resp 16   Ht 1.73 m (5' 8.11\")   SpO2 96%   BMI 43.30 kg/m    Wt Readings from Last 10 Encounters:   11/18/22 129.6 kg (285 lb 11.2 oz)   09/16/22 128.3 kg (282 lb 12.8 oz)   08/26/22 127.1 kg (280 lb 4.8 oz)   08/05/22 130.2 kg (287 lb)   07/15/22 130 kg (286 lb 9.6 oz)   06/24/22 128.5 kg (283 lb 4.8 oz)   06/03/22 132.7 kg (292 lb 9.6 oz)   06/02/22 132 kg (291 lb 1.6 oz)   05/13/22 129.5 kg (285 lb 9.6 oz)   04/22/22 133.1 kg (293 lb 8 oz)   General: Pleasant male, NAD.   Eyes: EOMI, PERRL. No scleral icterus.  Cardiovascular: Tachycardic. No murmurs, gallops, or rubs. No peripheral edema.  Respiratory: CTA bilaterally. No " wheezes or crackles.  Gastrointestinal: BS +. Abdomen firm, nontender. No palpable hepatosplenomegaly or masses in the seated position.  Neurologic: Grossly nonfocal.  Skin: No rashes, petechiae, or bruising noted on exposed skin. Non-diaphoretic.     Labs:   Most Recent 3 CBC's:  Recent Labs   Lab Test 11/28/22  0929 11/18/22  1202 11/17/22  0556   WBC 17.4* 12.7* 13.0*   HGB 9.3* 7.9* 8.9*   MCV 84 87 90   * 333 396   ANEUTAUTO 15.2* 10.9* 11.0*     Most Recent 3 BMP's:  Recent Labs   Lab Test 11/28/22  0929 11/18/22  1202 11/17/22  0556    139 139   POTASSIUM 3.0* 3.3* 3.7   CHLORIDE 101 104 105   CO2 23 20* 19*   BUN 5.0* 5.7* 6.9   CR 0.74 0.78 0.81   ANIONGAP 15 15 15   FAISAL 9.1 8.9 8.9   * 129* 102*   PROTTOTAL 7.3 7.1 6.6   ALBUMIN 3.6 3.4* 3.4*    Most Recent 3 LFT's:  Recent Labs   Lab Test 11/28/22  0929 11/18/22  1202 11/17/22  0556   AST 20 26 25   ALT 9* 10 12   ALKPHOS 83 92 88   BILITOTAL 0.6 0.6 0.5     Imaging:  XR Chest Port 1 View  Narrative: Exam: XR CHEST PORT 1 VIEW, 11/16/2022 10:26 PM    Indication: port assessment    Comparison: Radiograph 11/15/2022    Findings:   Single portable AP supine view of the chest. Right chest wall  Port-A-Cath with the tip likely in the lower cervical internal jugular  vein. Trachea is midline. No pneumothorax or pleural effusion. No  focal pulmonary consolidations. Stable cardiac silhouette.  Impression: Impression: Right chest wall Port-A-Cath with the tip in the low  cervical internal jugular vein. Improvement of pulmonary opacities.    I have personally reviewed the examination and initial interpretation  and I agree with the findings.    SHUBHAM ALVAREZ MD         SYSTEM ID:  [id number]    I reviewed the above labs and imaging today.    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 Diego Buchanan is 27 year old male with advanced  intraabdominal DSRCT with extensive peritoneal disease, lymph node metastasis, and liver and bone involvement. He tolerated CAV/IMV for 19 cycles and then was on chemotherapy break from June 2021-April 2022. Given return in symptoms he resumed chemotherapy with further CAV/IMV in April 2022.  However, he has had evidence of progression on the two most recent scans. Robert and his family elected to take time to try traditional medicine treatments.Last CT-CAP 10/4/22 showed evidence of progressive disease, predominantly in liver. There are small sub-centimeter lung nodules, which are suspicious for metastasis. Plan to start irinotecan 20 mg/m2 days 1-5/temozolomide 250 mg daily days 1-5 every 21 days rather than topotecan/cyclophosphamide.    - Cycle 1 irinotecan/temozolomide today. Oral chemo pharmacy will plan to do temozolomide chemo teach today while he is in infusion.     #Abdominal bloating  Moderate ascites noted on CT A/P performed 11/15 at [facility].   -Proceed with US paracentesis. Not yet scheduled, will request to be scheduled urgently this week.     #Shortness of breath   - Will obtain a CT PE Study today. Addendum: patient unfortunately could not sit still for the CT PE study secondary to discomfort. Will ask RNCC to call and follow up with patient.     #Leukocytosis  #Diaphoresis  Presented to [facility] ED 11/15/22 for constipation and worsening bloating and was transferred to [facility]. WBC elevated at 16.9. CXR showed streaky, bilateral opacities favoring atypical infx vs atelectasis. BC are NGTD. Pulmonary opacities improving on repeat CXR 11/16. Continues to have leukocytosis today, likely secondary to metastatic disease.     #Anemia  Hgb 9.3 today. No current concern for  or GI bleeding. Continue to monitor with resumption of chemotherapy Transfuse pRBC for hb < 7.    #Constipation  Continue Miralax and senna. Increase senna to 2 tablets daily.     #HTN  #Tachycardia  Longstanding issue, likely multifactorial  in setting of metastatic disease. Managed with metoprolol, recently changed from 50 mg daily to 25 mg BID in an effort to maintain more of a steady-state.     #Port malpositioning  CXR 11/16/22 showing right port with tip in the low cervical internal jugular. Port replacement requested. Scheduled for 12/02/22.     #Hypokalemia  - K+ 3.0., will replace per protocol.     45 minutes spent on the date of the encounter doing chart review, review of test results, interpretation of tests, patient visit, documentation, discussion with other provider(s) and discussion with family     Whit Fish PA-C    Addendum   Received a message from infusion regarding a change in patients breathing increased wheezing, shortness of breath, and discomfort in breathing. CT PE study negative for acute PE. However, it does demonstrate scattered ground glass opacities suspicious for pneumonia. The bronchi is patent but there is concern for possible post obstructive process. There is a trace right pleural effusion and a moderate left pleural effusion with left lingular atelectasis.     I called and discussed these results with Robert's sister, Mariela. She expresses understanding and has been keeping a close eye on him. She reiterates the above changes in breathing. He is otherwise less distended and more comfortable after his paracentesis for his ascites.    Start Augmentin 875 mg BID x 5 days plus Azithromycin 500 mg on day 1, 250 mg from days 2-5. I have sent a message to the thoracic team regarding the left pleural effusion and need for thoracentesis. I will ask the RNCC to follow up with Robert and Mariela tomorrow regarding his breathing. Reviewed red flags and when to follow up sooner.

## 2022-11-28 NOTE — NURSING NOTE
"Oncology Rooming Note    November 28, 2022 9:42 AM   Diego Buchanan is a 27 year old male who presents for:    Chief Complaint   Patient presents with     Port Draw     Inadequate blood return from port; labs drawn with  by rn.  VS taken.     Oncology Clinic Visit     [facility] RETURN - [facility]     Initial Vitals: /84 (BP Location: Right arm, Patient Position: Standing, Cuff Size: Adult Large)   Pulse 117   Temp 98.7  F (37.1  C) (Oral)   Resp 16   Ht 1.73 m (5' 8.11\")   SpO2 96%   BMI 43.30 kg/m   Estimated body mass index is 43.3 kg/m  as calculated from the following:    Height as of this encounter: 1.73 m (5' 8.11\").    Weight as of 11/18/22: 129.6 kg (285 lb 11.2 oz). Body surface area is 2.5 meters squared.  No Pain (0) Comment: Data Unavailable   No LMP for male patient.  Allergies reviewed: Yes  Medications reviewed: Yes    Medications: Medication refills not needed today.  Pharmacy name entered into [facility]:    [id number] - [place], [place] - [address] AT [facility] - [place], [place] - [phone]  [facility] - [place], [place] - [address] [place] [address] YU     Esequiel Tong LPN              "

## 2022-11-28 NOTE — PATIENT INSTRUCTIONS
[facility] Triage and after hours / weekends / holidays:  [phone]    Please call the triage or after hours line if you experience a temperature greater than or equal to 100.4, shaking chills, have uncontrolled nausea, vomiting and/or diarrhea, dizziness, shortness of breath, chest pain, bleeding, unexplained bruising, or if you have any other new/concerning symptoms, questions or concerns.      If you are having any concerning symptoms or wish to speak to a provider before your next infusion visit, please call your care coordinator or triage to notify them so we can adequately serve you.     If you need a refill on a narcotic prescription or other medication, please call before your infusion appointment.

## 2022-11-28 NOTE — NURSING NOTE
"Chief Complaint   Patient presents with     Port Draw     Inadequate blood return from port; labs drawn with  by rn.  VS taken.     Port accessed with 20 gauge 3/4\" gripper needle but inadequate blood return to obtain labs.  Port flushed with NS and heparin.      Labs drawn with  by rn.  Pt tolerated well.  VS taken.  Pt checked in for next appt.        "

## 2022-11-28 NOTE — PROGRESS NOTES
Infusion Nursing Note:  Diego Buchanan presents today for Cycle 1 Day 1 irinotecan.    Patient seen by provider today: Yes: Whit Fish PA-C   present during visit today: Not Applicable.    Note: Robert presents today feeling okay. Still uncomfortable in the abdomen, with bloating and fullness. Otherwise denies pain and declines intervention at this visit. Pillow support provided for comfort. Offers no concerns since visit with Whit Fish prior to infusion.    GRETCHEN Fish PA-C/Alesha Chávez RN [phone]  Temodar and zofran ready for patient to begin today (oral chemo pharmacist to complete teaching)  Ok to replace potassium per protocol, will recheck K on Thurs/Fri this week (no other labs needed)    Per Roula Newman RNCC, she will call Robert and/or his sister today to review teaching on the new regimen. Oral chemo pharmacist completed temodar teaching during infusion and supplied medication. Writer completed irinotecan education during infusion.    Intravenous Access:  Implanted Port.  Port remains accessed for tomorrow's infusion. Dressing changed, Robert elected to keep the non-power needle in place overnight.    Treatment Conditions:     Latest Reference Range & Units 11/28/22 09:29   Sodium 136 - 145 mmol/L 139   Potassium 3.4 - 5.3 mmol/L 3.0 (L)   Chloride 98 - 107 mmol/L 101   Carbon Dioxide (CO2) 22 - 29 mmol/L 23   Urea Nitrogen 6.0 - 20.0 mg/dL 5.0 (L)   Creatinine 0.67 - 1.17 mg/dL 0.74   GFR Estimate >60 mL/min/1.73m2 >90   Calcium 8.6 - 10.0 mg/dL 9.1   Anion Gap 7 - 15 mmol/L 15   Albumin 3.5 - 5.2 g/dL 3.6   Protein Total 6.4 - 8.3 g/dL 7.3   Alkaline Phosphatase 40 - 129 U/L 83   ALT 10 - 50 U/L 9 (L)   AST 10 - 50 U/L 20   Bilirubin Total <=1.2 mg/dL 0.6   Glucose 70 - 99 mg/dL 141 (H)   WBC 4.0 - 11.0 10e3/uL 17.4 (H)   Hemoglobin 13.3 - 17.7 g/dL 9.3 (L)   Hematocrit 40.0 - 53.0 % 30.8 (L)   Platelet Count 150 - 450 10e3/uL 459 (H)   RBC Count 4.40 - 5.90 10e6/uL 3.68 (L)   MCV  78 - 100 fL 84   MCH 26.5 - 33.0 pg 25.3 (L)   MCHC 31.5 - 36.5 g/dL 30.2 (L)   RDW 10.0 - 15.0 % 18.5 (H)   % Neutrophils % 87   % Lymphocytes % 3   % Monocytes % 7   % Eosinophils % 2   % Basophils % 0   Absolute Basophils 0.0 - 0.2 10e3/uL 0.1   Absolute Eosinophils 0.0 - 0.7 10e3/uL 0.3   Absolute Immature Granulocytes <=0.4 10e3/uL 0.1   Absolute Lymphocytes 0.8 - 5.3 10e3/uL 0.5 (L)   Absolute Monocytes 0.0 - 1.3 10e3/uL 1.2   % Immature Granulocytes % 1   Absolute Neutrophils 1.6 - 8.3 10e3/uL 15.2 (H)   Absolute NRBCs 10e3/uL 0.0   NRBCs per 100 WBC <1 /100 0     Results reviewed, labs MET treatment parameters, ok to proceed with treatment.  40 mEq potassium given per electrolyte replacement protocol.    Post Infusion Assessment:  Patient tolerated infusion without incident.  Blood return noted pre and post infusion.  Site patent and intact, free from redness, edema or discomfort.  No evidence of extravasations.  Access discontinued per protocol.     Discharge Plan:   Prescription refills given for temodar, zofran.  Discharge instructions reviewed with: Patient and Family.  Patient and/or family verbalized understanding of discharge instructions and all questions answered.  AVS to patient via [employer].  Patient will return tomorrow for next infusion appointment.   Patient discharged in stable condition accompanied by: sister.  Departure Mode: Wheelchair.      Alesha Chávez RN

## 2022-11-28 NOTE — LETTER
11/28/2022         RE: Diego Buchanan  [address]  [place] [address]        Dear Colleague,    Thank you for referring your patient, Diego Buchanan, to the [facility]. Please see a copy of my visit note below.    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 He presented initially with abdominal pain, diarrhea, and progressive abdominal distention for 3 months duration. 2. Initial CT scan in February 2020 showed severe peritoneal carcinomatosis with large peritoneal tumor implants throughout the entire abdomen and pelvis, but mostly prominently in the pelvis.   2. PETCT scan showed interval increase in the amount of peritoneal carcinomatosis.  3. On 3/12/2020, he had ultrasound-guided core needle biopsy of a peritoneal implant (Case: [id number]), which showed a completely undifferentiated appearance, but stains with pancytokeratin, indicating an epithelial origin. 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 Tumor markers for CA-19-9, CEA, beta-hCG, alpha-fetoprotein were unremarkable. Cancer type ID molecular testing by [employer] sent to determine the exact diagnosis and to assist in further treatment planning. 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 Relative probability of less than 5% of other subtype of sarcoma. EWSR1-WT1 fusion detected.  4. 5/1/2020, MRI brain negative for brain metastasis, and baseline CT-CAP obtained showing extensive mixed solid and cystic masses throughout the abdomen and pelvis consistent with peritoneal carcinomatosis. Largest mass measures approximately 20 cm in the pelvis. Metastatic mixed solid and cystic masses seen in hepatic segments 5 and 6 and hepatic segment 7. The masses appear to be contiguous  with the retrohepatic peritoneal carcinomatosis. Small volume fluid about the spleen favored to represent malignant ascites, stable mild-to-moderate right hydronephrosis related to mass effect upon the distal ureter in the pelvis, the IVC and iliac veins are compressed due to the extensive peritoneal carcinomatosis without findings to suggest deep venous thrombosis.  5. 5/4/2020, he begins CAV/IMV alternating chemotherapy. He receives 6 cycles of treatment. He symptomatically improves.  6. 9/11/2020, CT-CAP shows stable to mildly improved extensive peritoneal carcinomatosis. He would like to omit Ifosfamide/etoposide cycles and continue only with CAV.  7. 10/9/2020, he starts CAV every 21 days.  8. 1/6/2021, CT CAP showed a mixed response in the mixed solid and cystic masses throughout the peritoneum. Some of the tumors are stable to mildly worse, with some of the peritoneal masses a bit larger, a few are smaller, one cystic tumor in the left abdomen is smaller, while tumors lower in the pelvis appear slightly larger.  9. 3/24/2021, CT CAP showed continued slight decrease in overall burden of peritoneal carcinomatosis with persistent encasement of bowel without evidence of bowel obstruction.  10. 6/25/2021, he receives cycle 19 CAV, then takes a chemotherapy holiday.  11. 4/22/2022, he resumes CAV/IMV chemotherapy.  12. 7/13/22, CT CAP showed interval enlargement of hepatic and splenic metastases with other overall disease stable. CAV/IMV continued.  13.  10/04/22, CT CAP  showed evidence of progressive disease, predominantly in liver. There are small sub-centimeter lung nodules, which are suspicious for metastasis. CAV/IMV discontinued. Start irinotecan/temozolomide on 11/28/22.     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 He presents today for consideration of cycle 1 irinotecan/temozolomide. Robert continues to complain of abdominal fullness and  "discomfort. He has decreased his home activity secondary to discomfort. Endorses constipation, had a small BM yesterday. Taking 2 capfuls of miralax daily and 1-2 tablet of Senna. Endorses early satiety secondary to abdominal fullness. Feels his breathing has changed over the weekend. He feels more short of breath. No chest pain or cough. No fevers or chills. No new rashes, lumps, or bumps. No urinary changes. No diarrhea.      Appetite is stable although it is difficult to eat large amounts of food due to his abdominal bloating.     ROS  10 point ROS neg other than the symptoms noted above in the HPI.      Current Outpatient Medications   Medication Sig Dispense Refill     acetaminophen (TYLENOL) 325 MG tablet Take 2 tablets (650 mg) by mouth every 4 hours as needed for mild pain or fever 30 tablet 0     metoprolol tartrate (LOPRESSOR) 25 MG tablet Take 1 tablet (25 mg) by mouth 2 times daily 60 tablet 0     ondansetron (ZOFRAN) 4 MG tablet Take 1 tablet (4 mg) by mouth daily 45 minutes before taking Temodar on chemotherapy days 30 tablet 1     polyethylene glycol (MIRALAX) 17 GM/Dose powder Take 17 g by mouth daily 510 g 0     prochlorperazine (COMPAZINE) 10 MG tablet Take 1 tablet (10 mg) by mouth every 6 hours as needed (Nausea/Vomiting) 30 tablet 0     senna-docusate (SENNA S) 8.6-50 MG tablet Take 1 tablet by mouth 2 times daily as needed for constipation 60 tablet 0     Physical Exam:  /84 (BP Location: Right arm, Patient Position: Standing, Cuff Size: Adult Large)   Pulse 117   Temp 98.7  F (37.1  C) (Oral)   Resp 16   Ht 1.73 m (5' 8.11\")   SpO2 96%   BMI 43.30 kg/m    Wt Readings from Last 10 Encounters:   11/18/22 129.6 kg (285 lb 11.2 oz)   09/16/22 128.3 kg (282 lb 12.8 oz)   08/26/22 127.1 kg (280 lb 4.8 oz)   08/05/22 130.2 kg (287 lb)   07/15/22 130 kg (286 lb 9.6 oz)   06/24/22 128.5 kg (283 lb 4.8 oz)   06/03/22 132.7 kg (292 lb 9.6 oz)   06/02/22 132 kg (291 lb 1.6 oz)   05/13/22 129.5 " kg (285 lb 9.6 oz)   04/22/22 133.1 kg (293 lb 8 oz)   General: Pleasant male, NAD.   Eyes: EOMI, PERRL. No scleral icterus.  Cardiovascular: Tachycardic. No murmurs, gallops, or rubs. No peripheral edema.  Respiratory: CTA bilaterally. No wheezes or crackles.  Gastrointestinal: BS +. Abdomen firm, nontender. No palpable hepatosplenomegaly or masses in the seated position.  Neurologic: Grossly nonfocal.  Skin: No rashes, petechiae, or bruising noted on exposed skin. Non-diaphoretic.     Labs:   Most Recent 3 CBC's:  Recent Labs   Lab Test 11/28/22  0929 11/18/22  1202 11/17/22  0556   WBC 17.4* 12.7* 13.0*   HGB 9.3* 7.9* 8.9*   MCV 84 87 90   * 333 396   ANEUTAUTO 15.2* 10.9* 11.0*     Most Recent 3 BMP's:  Recent Labs   Lab Test 11/28/22  0929 11/18/22  1202 11/17/22  0556    139 139   POTASSIUM 3.0* 3.3* 3.7   CHLORIDE 101 104 105   CO2 23 20* 19*   BUN 5.0* 5.7* 6.9   CR 0.74 0.78 0.81   ANIONGAP 15 15 15   FAISAL 9.1 8.9 8.9   * 129* 102*   PROTTOTAL 7.3 7.1 6.6   ALBUMIN 3.6 3.4* 3.4*    Most Recent 3 LFT's:  Recent Labs   Lab Test 11/28/22  0929 11/18/22  1202 11/17/22  0556   AST 20 26 25   ALT 9* 10 12   ALKPHOS 83 92 88   BILITOTAL 0.6 0.6 0.5     Imaging:  XR Chest Port 1 View  Narrative: Exam: XR CHEST PORT 1 VIEW, 11/16/2022 10:26 PM    Indication: port assessment    Comparison: Radiograph 11/15/2022    Findings:   Single portable AP supine view of the chest. Right chest wall  Port-A-Cath with the tip likely in the lower cervical internal jugular  vein. Trachea is midline. No pneumothorax or pleural effusion. No  focal pulmonary consolidations. Stable cardiac silhouette.  Impression: Impression: Right chest wall Port-A-Cath with the tip in the low  cervical internal jugular vein. Improvement of pulmonary opacities.    I have personally reviewed the examination and initial interpretation  and I agree with the findings.    SHUBHAM ALVAREZ MD         SYSTEM ID:  [id number]    I reviewed the  above labs and imaging today.    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 He tolerated CAV/IMV for 19 cycles and then was on chemotherapy break from June 2021-April 2022. Given return in symptoms he resumed chemotherapy with further CAV/IMV in April 2022.  However, he has had evidence of progression on the two most recent scans. Robert and his family elected to take time to try traditional medicine treatments.Last CT-CAP 10/4/22 showed evidence of progressive disease, predominantly in liver. There are small sub-centimeter lung nodules, which are suspicious for metastasis. Plan to start irinotecan 20 mg/m2 days 1-5/temozolomide 250 mg daily days 1-5 every 21 days rather than topotecan/cyclophosphamide.    - Cycle 1 irinotecan/temozolomide today. Oral chemo pharmacy will plan to do temozolomide chemo teach today while he is in infusion.     #Abdominal bloating  Moderate ascites noted on CT A/P performed 11/15 at [facility].   -Proceed with US paracentesis. Not yet scheduled, will request to be scheduled urgently this week.     #Shortness of breath   - Will obtain a CT PE Study today.      #Leukocytosis  #Diaphoresis  Presented to [facility] ED 11/15/22 for constipation and worsening bloating and was transferred to [facility]. WBC elevated at 16.9. CXR showed streaky, bilateral opacities favoring atypical infx vs atelectasis. BC are NGTD. Pulmonary opacities improving on repeat CXR 11/16. Continues to have leukocytosis today, likely secondary to metastatic disease.     #Anemia  Hgb 9.3 today. No current concern for  or GI bleeding. Continue to monitor with resumption of chemotherapy Transfuse pRBC for hb < 7.    #Constipation  Continue Miralax and senna. Increase senna to  2 tablets daily.     #HTN  #Tachycardia  Longstanding issue, likely multifactorial in setting of metastatic disease. Managed with metoprolol, recently changed from 50 mg daily to 25 mg BID in an effort to maintain more of a steady-state.     #Port malpositioning  CXR 11/16/22 showing right port with tip in the low cervical internal jugular. Port replacement requested. Scheduled for 12/02/22.     #Hypokalemia  - K+ 3.0., will replace per protocol.     45 minutes spent on the date of the encounter doing chart review, review of test results, interpretation of tests, patient visit, documentation, discussion with other provider(s) and discussion with family     Whit Fish PA-C

## 2022-11-29 NOTE — PROGRESS NOTES
[facility]: Cancer Care Plan of Care Education Note                                    Discussion with Patient:                                                      Chemoteach done today.  Patient is starting new regimen of irinotecan.  Patient reviewed educational materials sent via [employer].  Reviewed side effects, when to call [facility] and discussed neutropenia/infection prevention in detail.     Infection prevention.  Neutropenic fever.     Goals        General    Healthy Coping     Healthy Coping           Assessment:                                                           Current living arrangement       Plan of Care Education        Evaluation of Learning       No assessment indicated    Intervention/Education provided during outreach:                                                           Patient to follow up as scheduled at next appt    Signature:  Roula Newman RN

## 2022-11-29 NOTE — PROGRESS NOTES
"Infusion Nursing Note:  Diego Buchanan presents today for Cycle 1 Day 2 Irinotecan.    Patient seen by provider today: No   present during visit today: No, patient speaks English. He and his sister state he only needs an  when his mom is with him due to her not speaking any English. (states this in his permanent comments as well).    Note: Patient presents to the infusion center today feeling fatigued and having difficulty breathing when he lays down. Patient is scheduled for a paracentesis tomorrow. Encouraged patient to try and sit up or recline but avoid laying flat until after his paracentesis. He and his sister know to call or go to the ED if symptoms worsen. Patient reports two episodes of soft stool since his infusion yesterday. Encouraged him and his sister to stop taking his Mirlax and Senna and watch his bowels closely. Encouraged them to call if he is having more than 4 loose stools per day or start taking his stool softeners if no BMs. Ongoing asymptomatic tachycardia and diaphoresis. Patient denies any fevers, chills or chest pain or abd cramping.    GRETCHEN Fish PA-C/Dilma Ayala RN 11/29/22 1045  -ok to leave port accessed for Days 1-5 for Irinotecan infusions  -only check a K+ level on 12/1    Patient reports he had three \"muddy\" stools today since being back in infusion.    GRETCHEN Fish PA-C/Dilma Ayala RN 11/29/22 1245  -only give Atropine x1 today and moving forward  -hold senna and mirlax but resume if diarrhea stops   -low threshold for ED if SOB worsens, abd pain or constipation   -no antidiarrheal RX; pt to call triage if more than 4 loose stools after today's infusion; due to patient's risk for blockage and constipation hx    *Please give 1 dose of atropine prior to infusion going forward.    Intravenous Access:  Implanted Port.    Treatment Conditions:  Results reviewed 11/28, labs MET treatment parameters, ok to proceed with treatment.    Post Infusion " Assessment:  Patient tolerated infusion without incident.  Blood return noted pre and post infusion.  No evidence of extravasations.  Access heparin locked per protocol.     Discharge Plan:   Patient declined prescription refills.  Discharge instructions reviewed with: Patient and Family.  Patient and/or family verbalized understanding of discharge instructions and all questions answered.  AVS to patient via [email or web address].  Patient will return 11/30 for next appointment.   Patient discharged in stable condition accompanied by: sister.  Departure Mode: Wheelchair.      Dilma Ayala RN

## 2022-11-29 NOTE — PROGRESS NOTES
This is a recent snapshot of the patient's [facility] medical record.  For current drug dose and complete information and questions, call [phone] or In Basket pool, fv home infusion ([id number])  CSN Number:  [id number]

## 2022-11-29 NOTE — PATIENT INSTRUCTIONS
[facility] Triage and after hours / weekends / holidays:  [phone]    Please call the triage or after hours line if you experience a temperature greater than or equal to 100.4, shaking chills, have uncontrolled nausea, vomiting and/or diarrhea, dizziness, shortness of breath, chest pain, bleeding, unexplained bruising, or if you have any other new/concerning symptoms, questions or concerns.      If you are having any concerning symptoms or wish to speak to a provider before your next infusion visit, please call your care coordinator or triage to notify them so we can adequately serve you.     If you need a refill on a narcotic prescription or other medication, please call before your infusion appointment.

## 2022-11-29 NOTE — PROGRESS NOTES
[facility]: Cancer Care                                                                                        Called and spoke to sister regarding change in appt. Schedule tomorrow.  Patient is getting a paracentesis tomorrow at [facility] and then he will go to infusion.  Patient indicated understanding.        Signature:  Roula Newman RN

## 2022-11-30 NOTE — PROGRESS NOTES
This is a recent snapshot of the patient's [facility] medical record.  For current drug dose and complete information and questions, call [phone] or In Basket pool, fv home infusion ([id number])  CSN Number:  [id number]

## 2022-11-30 NOTE — PROGRESS NOTES
Paracentesis Nursing Note  Diego Buchanan [facility].    During today's appointment orders from Yasmine Perez CNP were completed.    Progress Note:  Patient identification verified by name and date of birth.  Assessment completed.  Vitals monitored throughout appointment and recorded in Doc Flowsheets.  See proceduralist note in ultrasound.    Pts HR sustained in the high 100s throughout visit/procedure (see VS flowsheet). Yasmine Perez CNP paged to notify - no new orders.    Vascular Access: n/a  Labs: were not ordered for this appointment.    Date of consent or authorization: 11/30/2022  Invasive Procedure Safety Checklist was completed and sent for scanning.     Paracentesis performed by Dr. Orozco.    The following labs were communicated to provider performing paracentesis:  Lab Results   Component Value Date     11/28/2022     06/23/2021       Total amount of ascites fluid drained: 2.1 liters.  Color of ascites fluid: yellow.  Total amount of albumin given: 0 grams.    Patient tolerated procedure well.    Post procedure,denies pain or discomfort post paracentesis.      Discharge Plan:  Discharge instructions were reviewed with patient.  Patient/Representative verbalized understanding and all questions were answered.   [facility] in stable condition.    Administrations This Visit     lidocaine (PF) (XYLOCAINE) 1 % injection 20 mL     Admin Date  11/30/2022 Action  Given Dose  10 mL Route  Subcutaneous Administered By  Desire Lo RN              /79   Pulse (!) 139   Temp 97.6  F (36.4  C) (Oral)   Resp 16   Wt 123.4 kg (272 lb)   SpO2 97%   BMI 41.22 kg/m      DESIRE LO, JASON

## 2022-11-30 NOTE — PATIENT INSTRUCTIONS
Dear Diego,    Thank you for choosing [facility] ([facility]) for your paracentesis.  The following information is a summary of our appointment as well as important reminders.      We look forward in seeing you on your next appointment here at [facility] ([facility]).  Please don t hesitate to call us at [phone] to reschedule any of your appointments or to speak with one of the [facility] registered nurses.  It was a pleasure taking care of you today.    Sincerely,    [facility]  [address] [place] [address]  [id number]  Phone:  [phone]

## 2022-11-30 NOTE — LETTER
11/30/2022         RE: Diego Buchanan  [address]  [place] [address]        Dear Colleague,    Thank you for referring your patient, Diego Buchanan, to the [facility] ADVANCED TREATMENT [facility]. Please see a copy of my visit note below.    Paracentesis Nursing Note  Diego Buchanan presents [facility].    During today's appointment orders from Yasmine Perez CNP were completed.    Progress Note:  Patient identification verified by name and date of birth.  Assessment completed.  Vitals monitored throughout appointment and recorded in Doc Flowsheets.  See proceduralist note in ultrasound.    Pts HR sustained in the high 100s throughout visit/procedure (see VS flowsheet). Yasmine Perez CNP paged to notify - no new orders.    Vascular Access: n/a  Labs: were not ordered for this appointment.    Date of consent or authorization: 11/30/2022  Invasive Procedure Safety Checklist was completed and sent for scanning.     Paracentesis performed by Dr. Orozco.    The following labs were communicated to provider performing paracentesis:  Lab Results   Component Value Date     11/28/2022     06/23/2021       Total amount of ascites fluid drained: 2.1 liters.  Color of ascites fluid: yellow.  Total amount of albumin given: 0 grams.    Patient tolerated procedure well.    Post procedure,denies pain or discomfort post paracentesis.      Discharge Plan:  Discharge instructions were reviewed with patient.  Patient/Representative verbalized understanding and all questions were answered.   [facility] in stable condition.    Administrations This Visit     lidocaine (PF) (XYLOCAINE) 1 % injection 20 mL     Admin Date  11/30/2022 Action  Given Dose  10 mL Route  Subcutaneous Administered By  Desire Thomson RN              /79   Pulse (!) 139   Temp 97.6  F (36.4  C) (Oral)   Resp  16   Wt 123.4 kg (272 lb)   SpO2 97%   BMI 41.22 kg/m      KENDAL LO RN          Again, thank you for allowing me to participate in the care of your patient.      Sincerely,    [facility] Paracentesis Provider

## 2022-11-30 NOTE — PROGRESS NOTES
Infusion Nursing Note:  Diego Buchanan presents today for Cycle 1 Day 3 Irinotecan.    Patient seen by provider today: No   present during visit today: Not Applicable; patient speaks English.  is for when his mother comes to infusion with him.    Note: Diego comes into clinic today doing well overall and has no concerns to relay at this visit. He has no pain and denies s/s of infection. He reports no changes to his health overnight and has no had any loose stools since yesterday.    Alteplase instilled with no blood return noted by discharge. IB sent to care team to address issues with port.    Atropine given prior to Irinotecan infusion.     Written communication 11/30/22 @ [address]ings/Dilma Ayala RN:   - No need for lab check today, please make sure we still recheck his K+ is rechecked tomorrow or friday    Intravenous Access:  Implanted Port - did not have successful blood return  PIV placed by vascular access.    Treatment Conditions:  Results reviewed from 11/28, labs MET treatment parameters, ok to proceed with treatment.    Post Infusion Assessment:  Patient tolerated infusion without incident.  Blood return noted pre and post infusion.  Site patent and intact, free from redness, edema or discomfort.  No evidence of extravasations.  Access discontinued per protocol.     Discharge Plan:   Patient declined prescription refills.  Discharge instructions reviewed with: Patient and Family.  Patient and/or family verbalized understanding of discharge instructions and all questions answered.  AVS to patient via [employer].  Patient will return 12/1 for next appointment.   Patient discharged in stable condition accompanied by: sister.  Departure Mode: Wheelchair.      Cheryle Contreras RN

## 2022-11-30 NOTE — PATIENT INSTRUCTIONS
[facility] Triage and after hours / weekends / holidays:  [phone]    Please call the triage or after hours line if you experience a temperature greater than or equal to 100.4, shaking chills, have uncontrolled nausea, vomiting and/or diarrhea, dizziness, shortness of breath, chest pain, bleeding, unexplained bruising, or if you have any other new/concerning symptoms, questions or concerns.      If you are having any concerning symptoms or wish to speak to a provider before your next infusion visit, please call your care coordinator or triage to notify them so we can adequately serve you.     If you need a refill on a narcotic prescription or other medication, please call before your infusion appointment.

## 2022-12-01 NOTE — NURSING NOTE
"Chief Complaint   Patient presents with     Port Draw     Labs drawn via port by RN in lab.  VS taken        Port accessed with 20 gauge 3/4\" gripper needle previously by RN, labs collected, line flushed with saline and heparin.  Vitals taken. Pt checked in for appointment(s).    Mariola Clark RN    "

## 2022-12-01 NOTE — PATIENT INSTRUCTIONS
Contact Numbers  [facility]: [phone] (for symptom and scheduling needs)    Please call the [facility] Triage line if you experience a temperature greater than or equal to 100.4, shaking chills, have uncontrolled nausea, vomiting and/or diarrhea, dizziness, shortness of breath, chest pain, bleeding, unexplained bruising, or if you have any other new/concerning symptoms, questions or concerns.     If you are having any concerning symptoms or wish to speak to a provider before your next infusion visit, please call your care coordinator or triage to notify them so we can adequately serve you.     If you need a refill on a narcotic prescription or other medication, please call triage before your infusion appointment.           December 2022 Sunday Monday Tuesday Wednesday Thursday Friday Saturday 1    LAB CENTRAL  10:30 AM   (15 min.)   [facility] LAB DRAW   [facility]    ONC INFUSION 3 HR (180 MIN)  11:00 AM   (180 min.)    ONC INFUSION NURSE   [facility]    CT CHEST PULMONARY EMBOLISM W   1:05 PM   (20 min.)   [email or web address]   [facility] [place] 2    IR CHEST PORT PLACEMENT >5 YRS   6:30 AM   (90 min.)   [email or web address]   [facility] ASC [facility]    /CSC OUTPATIENT   7:00 AM   (240 min.)   WILLIAM AUGUSTIN   [facility]  Services    INSERTION, VASCULAR ACCESS PORT   8:00 AM   Mazin Benitez MD   [facility] OR    Outpatient Visit   8:00 AM   [facility]    ONC INFUSION 3 HR (180 MIN)  10:30 AM   (180 min.)    ONC INFUSION NURSE   [facility] 3       4     5     6     7     8     9     10       11     12     13     14     15     16     17       18     19    LAB CENTRAL   8:15 AM   (15 min.)   [facility] LAB DRAW   [facility]    RETURN   8:30 AM   (45 min.)   Scheierl, Amber J,  APRN ARIEL   [facility]    ONC INFUSION 3 HR (180 MIN)  10:00 AM   (180 min.)    ONC INFUSION NURSE   [facility] 20    ONC INFUSION 3 HR (180 MIN)   1:00 PM   (180 min.)    ONC INFUSION NURSE   [facility] 21    ONC INFUSION 3 HR (180 MIN)   1:00 PM   (180 min.)    ONC INFUSION NURSE   [facility] 22    ONC INFUSION 3 HR (180 MIN)   1:00 PM   (180 min.)    ONC INFUSION NURSE   [facility] 23    ONC INFUSION 3 HR (180 MIN)   1:00 PM   (180 min.)    ONC INFUSION NURSE   [facility] 24       25     26     27     28     29     30     31                   January 2023 Sunday Monday Tuesday Wednesday Thursday Friday Saturday   1     2     3     4     5     6     7       8     9     10     11     12     13     14       15     16     17     18     19     20     21       22     23     24     25     26     27     28       29     30     31                                           Lab Results:  Recent Results (from the past 12 hour(s))   Potassium    Collection Time: 12/01/22 11:03 AM   Result Value Ref Range    Potassium 3.3 (L) 3.4 - 5.3 mmol/L

## 2022-12-01 NOTE — PROGRESS NOTES
"Infusion Nursing Note:  Diego Buchanan presents today for Cycle 1 Day 4 Irinotecan.    Patient seen by provider today: No   present during visit today: Not Applicable.    Note: Patient presents to infusion today doing okay. Reports ongoing SOB but states it is better after his paracentesis yesterday. His sister states that he \"looks better today than the past few day\". Does state that he is now experiencing some wheezing while laying flat that makes him cough. Has to sleep on his side to be able to breath well. Thinks the wheezing just started yesterday. Two episodes of diarrhea yesterday. Eating and drinking well. No fevers, chills or chest pains. KATHLEEN Cummings paged to notify of symptoms.     TORB @ 8788 KATHLEEN Wolf/ Liliam Clemente, RN:  - OK to proceed with chemo  - Will get a CT scan today, give 0.5mg IV ativan to help relax patient prior to CT  - Continue to monitor loose stools  - No interventions needed for HR, patient runs higher    Potassium resulted low today at 3.3. Whit notified.    TORB @ 0396 KATHLEEN Wolf/ Liliam Clemente, RN:  - Replace potassium per electrolyte replacement protocol    Atropine given prior to Irinotecan.    Patient and sister refusing to try IV ativan prior to the CT scan. Educated on the benefits of helping to relax patient while laying flat. Had patient lay flat in infusion for several minutes and O2 remained > 98%. Patient declining IV ativan at this time. Feels comfortable proceeding with CT scan without it. KATHLEEN Cummings notified.     Intravenous Access:  Implanted Port.    Treatment Conditions:  Lab Results   Component Value Date     11/28/2022    POTASSIUM 3.3 (L) 12/01/2022    MAG 1.8 11/18/2022    CR 0.74 11/28/2022    FAISAL 9.1 11/28/2022    BILITOTAL 0.6 11/28/2022    ALBUMIN 3.6 11/28/2022    ALT 9 (L) 11/28/2022    AST 20 11/28/2022     Results reviewed from 11/28, labs MET treatment parameters, ok to proceed with treatment.    Post " Infusion Assessment:  Patient tolerated infusion without incident.  Blood return noted pre and post infusion.  Site patent and intact, free from redness, edema or discomfort.  No evidence of extravasations.  Access remains intact for infusion tomorrow.     Discharge Plan:   Patient declined prescription refills.  Discharge instructions reviewed with: Family.  Patient and/or family verbalized understanding of discharge instructions and all questions answered.  AVS to patient via [employer].  Patient will return tomorrow 12/2 for next appointment.   Patient discharged in stable condition accompanied by: sister.  Departure Mode: Wheelchair.      Liliam Clemente RN

## 2022-12-02 NOTE — PROGRESS NOTES
Infusion Nursing Note:  Diego Buchanan presents today for Cycle 1 Day 5 Irrinotecan.    Patient seen by provider today: No   present during visit today: Not Applicable. Patient's sister Mariela is with the patient. Patient only needs an  when his mom is with him.    Note: Patient presents to the infusion center today feeling fatigued due to not being able to sleep well last night due to his shortness of breath. Per patient and his sister it is not getting worse, Robert just doesn't like sleeping propped up. Patient denies any SOB at rest, only with exertion. Ongoing asymptomatic tachycardia. Ongoing diarrhea, patient doesn't know how many times he went yesterday. Confirmed with his sister he is not taking any of his stool softeners. Providers want to avoid antidiarrheal medications due to patient's history of constipation. This is the patient's last day of Irrinotecan and hopefully the diarrhea will start to subside. Encouraged his sister to call the nurse triage line if in 48hrs+ the diarrhea has not slowed. Encouraged her also to call or present to the ED if Robert's SOB gets worse or any new symptoms or concerns develop. Patient denies any fevers, chills or chest pain today. Patient states he does feel up to getting his chemotherapy today and wants to proceed.     LINSEY Fish PA-C/Dilma Ayala RN 12/2/22 1035  -proceed with D5 Irrinotecan today despite pneumonia   -RTC mid next week for labs and provider appt  -no interventions needed for asymptomatic     Intravenous Access:  Implanted Port.    Treatment Conditions:  Results reviewed from 11/28, labs MET treatment parameters, ok to proceed with treatment.    Post Infusion Assessment:  Patient tolerated infusion without incident.  Blood return noted pre and post infusion.  No evidence of extravasations.  Access discontinued per protocol.     Discharge Plan:   Patient declined prescription refills.  Discharge instructions reviewed with:  Patient and Family.  Patient and/or family verbalized understanding of discharge instructions and all questions answered.  AVS to patient via [employer].  Patient will return next week for next appointment. Patient will watch my chart for this confirmed date and time. Patient will return 12/19 for his next chemo appt.  Patient discharged in stable condition accompanied by: sister.  Departure Mode: Wheelchair.      Dilma Ayala RN

## 2022-12-02 NOTE — PROGRESS NOTES
Port removal and replacement canceled for today, as patient cannot lay flat, had a cough this morning, was eating a cough drop, and needs to start antibiotics from another encounter. Patient dressed and left unit accompanied by his sister.

## 2022-12-06 NOTE — NURSING NOTE
"Oncology Rooming Note    December 6, 2022 10:04 AM   Diego Buchanan is a 27 year old male who presents for:    Chief Complaint   Patient presents with     Blood Draw     Labs drawn via  by RN. Vitals taken.     Oncology Clinic Visit     [facility]     Initial Vitals: /79 (BP Location: Right arm, Patient Position: Sitting, Cuff Size: Adult Large)   Pulse (!) 133   Temp 98.1  F (36.7  C) (Oral)   Resp 16   Ht 1.73 m (5' 8.11\")   SpO2 98%   BMI 27.13 kg/m   Estimated body mass index is 27.13 kg/m  as calculated from the following:    Height as of this encounter: 1.73 m (5' 8.11\").    Weight as of 12/2/22: 81.2 kg (179 lb). Body surface area is 1.98 meters squared.  No Pain (0) Comment: Data Unavailable   No LMP for male patient.  Allergies reviewed: Yes  Medications reviewed: Yes    Medications: Medication refills not needed today.  Pharmacy name entered into [facility]:    [id number] - [place], [place] - [address] AT [facility] [place], [place] [phone]  [facility] - [place] [address]     Esequiel Tong LPN            "

## 2022-12-06 NOTE — LETTER
12/6/2022         RE: Diego Buchanan  [address]  [place] [address]        Dear Colleague,    Thank you for referring your patient, Diego Buchanan, to the [facility]. Please see a copy of my visit note below.    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 He presented initially with abdominal pain, diarrhea, and progressive abdominal distention for 3 months duration. 2. Initial CT scan in February 2020 showed severe peritoneal carcinomatosis with large peritoneal tumor implants throughout the entire abdomen and pelvis, but mostly prominently in the pelvis.   2. PETCT scan showed interval increase in the amount of peritoneal carcinomatosis.  3. On 3/12/2020, he had ultrasound-guided core needle biopsy of a peritoneal implant (Case: [id number]), which showed a completely undifferentiated appearance, but stains with pancytokeratin, indicating an epithelial origin. 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 Tumor markers for CA-19-9, CEA, beta-hCG, alpha-fetoprotein were unremarkable. Cancer type ID molecular testing by [facility] sent to determine the exact diagnosis and to assist in further treatment planning. 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 Relative probability of less than 5% of other subtype of sarcoma. EWSR1-WT1 fusion detected.  4. 5/1/2020, MRI brain negative for brain metastasis, and baseline CT-CAP obtained showing extensive mixed solid and cystic masses throughout the abdomen and pelvis consistent with peritoneal carcinomatosis. Largest mass measures approximately 20 cm in the pelvis. Metastatic mixed solid and cystic masses seen in hepatic segments 5 and 6 and hepatic segment 7. The masses appear to be  contiguous with the retrohepatic peritoneal carcinomatosis. Small volume fluid about the spleen favored to represent malignant ascites, stable mild-to-moderate right hydronephrosis related to mass effect upon the distal ureter in the pelvis, the IVC and iliac veins are compressed due to the extensive peritoneal carcinomatosis without findings to suggest deep venous thrombosis.  5. 5/4/2020, he begins CAV/IMV alternating chemotherapy. He receives 6 cycles of treatment. He symptomatically improves.  6. 9/11/2020, CT-CAP shows stable to mildly improved extensive peritoneal carcinomatosis. He would like to omit Ifosfamide/etoposide cycles and continue only with CAV.  7. 10/9/2020, he starts CAV every 21 days.  8. 1/6/2021, CT CAP showed a mixed response in the mixed solid and cystic masses throughout the peritoneum. Some of the tumors are stable to mildly worse, with some of the peritoneal masses a bit larger, a few are smaller, one cystic tumor in the left abdomen is smaller, while tumors lower in the pelvis appear slightly larger.  9. 3/24/2021, CT CAP showed continued slight decrease in overall burden of peritoneal carcinomatosis with persistent encasement of bowel without evidence of bowel obstruction.  10. 6/25/2021, he receives cycle 19 CAV, then takes a chemotherapy holiday.  11. 4/22/2022, he resumes CAV/IMV chemotherapy.  12. 7/13/22, CT CAP showed interval enlargement of hepatic and splenic metastases with other overall disease stable. CAV/IMV continued.  13.  10/04/22, CT CAP  showed evidence of progressive disease, predominantly in liver. There are small sub-centimeter lung nodules, which are suspicious for metastasis. CAV/IMV discontinued. Start irinotecan/temozolomide on 11/28/22.       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 Diego presents today for follow up/toxicity check following cycle 1 of irinotecan/temozolomide. He is  "accompanied by his sister, Mariela, and his mom. Mariela helps provide history in conjunction with Robert.     -He was recently diagnosed with pneumonia based on CT on 12/1 and started 5 days of antibiotics. Today is the last dose.   -Robert is doing ok today. He is feeling more fatigued and weak. He reports having diarrhea. About 7 stools per day. The stools are liquid but no blood present. He denies any abdominal pain or fever. Has not been taking miralax or senna. Has not taken any antidiarrheals.  -Endorses being able to eat and drink but maybe drinking slightly less than normal. No nausea. No change to abdominal fullness.  -Shortness and cough improving some. Now able to lie on his back and breathe without difficulty.  -No chest pain.     Review of Systems:  Patient denies any of the following except if noted above: fevers, chills, difficulty with energy, vision or hearing changes, chest pain, dyspnea, abdominal pain, nausea, vomiting, diarrhea, constipation, urinary concerns, headaches, numbness, tingling, issues with sleep or mood. He also denies lumps, bumps, rashes or skin lesions, bleeding or bruising issues.  Physical Exam:  /79 (BP Location: Right arm, Patient Position: Sitting, Cuff Size: Adult Large)   Pulse (!) 133   Temp 98.1  F (36.7  C) (Oral)   Resp 16   Ht 1.73 m (5' 8.11\")   Wt 115.1 kg (253 lb 12.8 oz)   SpO2 98%   BMI 38.47 kg/m    Wt Readings from Last 10 Encounters:   12/06/22 115.1 kg (253 lb 12.8 oz)   12/02/22 81.2 kg (179 lb)   11/30/22 123.4 kg (272 lb)   11/18/22 129.6 kg (285 lb 11.2 oz)   09/16/22 128.3 kg (282 lb 12.8 oz)   08/26/22 127.1 kg (280 lb 4.8 oz)   08/05/22 130.2 kg (287 lb)   07/15/22 130 kg (286 lb 9.6 oz)   06/24/22 128.5 kg (283 lb 4.8 oz)   06/03/22 132.7 kg (292 lb 9.6 oz)     General: Mildly fatigued-appearing male in no acute distress. Presents in a wheelchair.   Eyes: EOMI, PERRL. No scleral icterus.  ENT: Oral mucosa is moist without lesions or thrush. "   Cardiovascular: Tachycardic. Regular rhythm. No murmurs, gallops, or rubs. No peripheral edema.  Respiratory: CTA bilaterally. No wheezes or crackles.  Gastrointestinal: BS +. Abdomen distended, irregular contour. Mildly tender to palpation.   Neurologic: Cranial nerves II through XII are grossly intact.  Skin: No rashes, petechiae, or bruising noted on exposed skin.  Psych: Affect appropriate; pleasant talkative.     Labs:   Most Recent 3 CBC's:  Recent Labs   Lab Test 12/06/22  0932 11/28/22  0929 11/18/22  1202 11/17/22  0556   WBC 0.6* 17.4* 12.7* 13.0*   HGB 8.7* 9.3* 7.9* 8.9*   MCV 81 84 87 90   PLT 85* 459* 333 396   ANEUTAUTO  --  15.2* 10.9* 11.0*     Most Recent 3 BMP's:  Recent Labs   Lab Test 12/01/22  1103 11/28/22  0929 11/18/22  1202 11/17/22  0556   NA  --  139 139 139   POTASSIUM 3.3* 3.0* 3.3* 3.7   CHLORIDE  --  101 104 105   CO2  --  23 20* 19*   BUN  --  5.0* 5.7* 6.9   CR  --  0.74 0.78 0.81   ANIONGAP  --  15 15 15   FAISAL  --  9.1 8.9 8.9   GLC  --  141* 129* 102*   PROTTOTAL  --  7.3 7.1 6.6   ALBUMIN  --  3.6 3.4* 3.4*    Most Recent 3 LFT's:  Recent Labs   Lab Test 11/28/22  0929 11/18/22  1202 11/17/22  0556   AST 20 26 25   ALT 9* 10 12   ALKPHOS 83 92 88   BILITOTAL 0.6 0.6 0.5     CMP, Magnesium and Phosphorus are pending.    Imaging:  CT Chest Pulmonary Embolism w Contrast  Narrative: EXAMINATION: CTA pulmonary angiogram, 12/1/2022 1:57 PM     COMPARISON: CT 10/4/2022    HISTORY: Desmoplastic small round cell tumor    TECHNIQUE: Volumetric helical acquisition of CT images of the chest  from the lung apices to the kidneys were acquired after the  administration of 76 mL of Isovue-370 IV contrast. Post-processed  multiplanar and/or MIP reformations were obtained, archived to PACS  and used in interpretation of this study.     FINDINGS:  Contrast bolus is: suboptimal. Exam is negative for acute  PE in the central or lobar pulmonary arteries.    Mediastinum: No cardiomegaly or  pericardial effusion. Right chest port  catheter remains in the low right IJV. Enlarging right paratracheal,  right hilar, and subcarinal lymphadenopathy. Enlarging  pericardiophrenic/supradiaphragmatic lymphadenopathy.    Lungs:     Enlarging left hilar based pulmonary nodules, adjacent to the lingular  arteries on series 5 image 124 and left lower lobe arteries and series  5 image 133. These appear to abut but do not appreciably cause mass  effect on the adjacent bronchi and vasculature.    Trace right pleural effusion. New moderate left pleural effusion and  left basilar atelectasis. Scattered peripheral groundglass opacities  in the left lung with linear groundglass opacity in the superior right  lower lobe, all of which are new from the prior exam. Slightly  increased left upper lobe nodule measuring 4 mm (series 7 image 110),  previously 3 mm.    Upper abdomen: Partially imaged numerous hypodense presumed metastatic  lesions in the liver, slightly increased since prior. Peritoneal  nodularity anteriorly appears new when comparing at a similar level on  10/4/2022. Increased lobulated peritoneal nodularity and ascites  around the spleen. Increased upper abdominal lymphadenopathy.    Bones and soft tissue no destructive bony lesions. Soft tissues are  unremarkable.  Impression: IMPRESSION:   1. Exam is negative for central or lobar pulmonary pulmonary embolism.    2. Progression of disease with worsening mediastinal and hilar  lymphadenopathy including left hilar masses/lymph nodes. Slightly  increased 4 mm left upper lobe nodule as well, suspicious for  metastasis.    3. Scattered groundglass opacities greatest in the left upper lobe  suspicious for associated infection.    4. Trace right and moderate left pleural effusion and left  basilar/lingular atelectasis.    5. Partially imaged worsening hepatic metastases, peritoneal disease,  and lymphadenopathy in the upper abdomen.    6. Right chest port catheter  terminates in the low right IJ.    I have personally reviewed the examination and initial interpretation  and I agree with the findings.    YARITZA ORTIZ,          SYSTEM ID:  [id number]    I reviewed the above labs and imaging today.    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 He tolerated CAV/IMV for 19 cycles and then was on chemotherapy break from June 2021-April 2022. Given return in symptoms he resumed chemotherapy with further CAV/IMV in April 2022.  However, he has had evidence of progression on the two most recent scans. Robert and his family elected to take time to try traditional medicine treatments.Last CT-CAP 10/4/22 showed evidence of progressive disease, predominantly in liver. There are small sub-centimeter lung nodules, which are suspicious for metastasis. He started irinotecan 20 mg/m2 days 1-5/temozolomide 250 mg daily days 1-5 every 21 days with his first cycle on 11/28/22.      On  12/1 he had a chest CT for SOB which was negative for acute PE. However, it showed scattered ground glass opacities suspicious for pneumonia. The bronchi is patent but there is concern for possible post obstructive process. There is a trace right pleural effusion and a moderate left pleural effusion with left lingular atelectasis. He was started on Augmentin 875 mg BID x 5 days plus Azithromycin 500 mg on day 1, 250 mg from days 2-5. Thoracic was also notified to set up thoracentesis for left pleural effusion and to time with port replacement.     Today Robert is doing ok. He is feeling fatigued and weak. He has been having diarrhea with about 7 loose stools per day but with no fever, blood or associated abdominal pain. He confirms he is off all laxatives and  softeners at this time. He feels his abdominal fullness is about the same. His cough and shortness of breath are somewhat improved; he takes his last dose of antibiotics today.     Labs today with WBC at 0.6 and ANC of 0.4. Plt at 85 and no bleeding concerns. CMP, Phos and Mg are pending. Anticipate possible repletion needed. Will await results and follow up as indicated. Otherwise, plan is to repeat labs on Friday to trend platelets. Will request infusion appt for possible fluids/lytes as well.    #Diarrhea  -In absence of infectious symptoms and recent irinotecan administration, will start imodium. Can take 2 tablets with first loose stool and titrate to keep stool count less than 4 per day given history of constipation, want to be conservative. Have messaged [facility] to check in on patient in 2 days for update. If no improvement consider trying lomotil or doing infectious work up if indicated based on symptoms.  -Given 500cc NS in clinic today. (cautious fluid replacement given history of ascites and pleural effusion)  -Possible IVF Friday pending progress.   -Weight down to 253.8lbs today. Continue to monitor. May need nutrition consult.     #Abdominal bloating  Paracentesis done on 11/30 with 2.1L removed.   -Will need to evaluate the need for these prn.    #Shortness of breath   - CT on 12/1 showing possible pneumonia. He is completing 5 days of antibiotics today. Shortness of breath and cough is improving. IB message sent to [facility] to check on status of scheduling thoracentesis for left pleural effusion.     #Anemia  Hgb relatively stable at 8.7 today. No current concern for  or GI bleeding. Continue to monitor with resumption of chemotherapy Transfuse pRBC for hb < 7.    #HTN  #Tachycardia  Longstanding issue, likely multifactorial in setting of metastatic disease. EKG on 11/15 showed sinus tachycardia. Managed with metoprolol, recently changed from 50 mg daily to 25 mg BID in an effort to maintain more of a  steady-state.     #Port malpositioning  CXR 11/16/22 showing right port with tip in the low cervical internal jugular. Port replacement requested. Message sent to [facility] to inquire about status of scheduling.    #Infection Prevention  Educated on need to monitor for fever. Need to notify us or seek care right away of temp of 100.4. Continue to practice good infection prevention with handwashing before eating, avoiding ill contacts, and wearing mask in crowds.     Amber Scheierl, CNP    120 minutes spent on the date of the encounter doing chart review, review of test results, interpretation of tests, patient visit and documentation

## 2022-12-06 NOTE — NURSING NOTE
Port accessed with 20g 3/4 inch gripper needle by RN, NS 500mL bolus given via port. See MAR/Flowsheet for more details.    Isaiah Peters, RN

## 2022-12-06 NOTE — NURSING NOTE
"Weight recorded in clinic. Updated vitals below.    /79 (BP Location: Right arm, Patient Position: Sitting, Cuff Size: Adult Large)   Pulse (!) 133   Temp 98.1  F (36.7  C) (Oral)   Resp 16   Ht 1.73 m (5' 8.11\")   Wt 115.1 kg (253 lb 12.8 oz)   SpO2 98%   BMI 38.47 kg/m      "

## 2022-12-06 NOTE — NURSING NOTE
Patient's port de-accessed without complications. Patient tolerated well and was discharged. See flowsheet for details.    Karly Cha CMA on 12/6/2022 at 11:58 AM

## 2022-12-06 NOTE — PROGRESS NOTES
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 He presented initially with abdominal pain, diarrhea, and progressive abdominal distention for 3 months duration. 2. Initial CT scan in February 2020 showed severe peritoneal carcinomatosis with large peritoneal tumor implants throughout the entire abdomen and pelvis, but mostly prominently in the pelvis.   2. PETCT scan showed interval increase in the amount of peritoneal carcinomatosis.  3. On 3/12/2020, he had ultrasound-guided core needle biopsy of a peritoneal implant (Case: [id number]), which showed a completely undifferentiated appearance, but stains with pancytokeratin, indicating an epithelial origin. 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 Tumor markers for CA-19-9, CEA, beta-hCG, alpha-fetoprotein were unremarkable. Cancer type ID molecular testing by [employer] sent to determine the exact diagnosis and to assist in further treatment planning. 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 Relative probability of less than 5% of other subtype of sarcoma. EWSR1-WT1 fusion detected.  4. 5/1/2020, MRI brain negative for brain metastasis, and baseline CT-CAP obtained showing extensive mixed solid and cystic masses throughout the abdomen and pelvis consistent with peritoneal carcinomatosis. Largest mass measures approximately 20 cm in the pelvis. Metastatic mixed solid and cystic masses seen in hepatic segments 5 and 6 and hepatic segment 7. The masses appear to be contiguous with the retrohepatic peritoneal carcinomatosis. Small volume fluid about the spleen favored to represent malignant ascites, stable mild-to-moderate right hydronephrosis related to mass effect upon the distal ureter in the pelvis, the IVC and iliac  veins are compressed due to the extensive peritoneal carcinomatosis without findings to suggest deep venous thrombosis.  5. 5/4/2020, he begins CAV/IMV alternating chemotherapy. He receives 6 cycles of treatment. He symptomatically improves.  6. 9/11/2020, CT-CAP shows stable to mildly improved extensive peritoneal carcinomatosis. He would like to omit Ifosfamide/etoposide cycles and continue only with CAV.  7. 10/9/2020, he starts CAV every 21 days.  8. 1/6/2021, CT CAP showed a mixed response in the mixed solid and cystic masses throughout the peritoneum. Some of the tumors are stable to mildly worse, with some of the peritoneal masses a bit larger, a few are smaller, one cystic tumor in the left abdomen is smaller, while tumors lower in the pelvis appear slightly larger.  9. 3/24/2021, CT CAP showed continued slight decrease in overall burden of peritoneal carcinomatosis with persistent encasement of bowel without evidence of bowel obstruction.  10. 6/25/2021, he receives cycle 19 CAV, then takes a chemotherapy holiday.  11. 4/22/2022, he resumes CAV/IMV chemotherapy.  12. 7/13/22, CT CAP showed interval enlargement of hepatic and splenic metastases with other overall disease stable. CAV/IMV continued.  13.  10/04/22, CT CAP  showed evidence of progressive disease, predominantly in liver. There are small sub-centimeter lung nodules, which are suspicious for metastasis. CAV/IMV discontinued. Start irinotecan/temozolomide on 11/28/22.       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 Diego presents today for follow up/toxicity check following cycle 1 of irinotecan/temozolomide. He is accompanied by his sister, Mariela, and his mom. Mariela helps provide history in conjunction with Robert.     -He was recently diagnosed with pneumonia based on CT on 12/1 and started 5 days of antibiotics. Today is the last dose.   -Robert is doing ok today. He is feeling more  "fatigued and weak. He reports having diarrhea. About 7 stools per day. The stools are liquid but no blood present. He denies any abdominal pain or fever. Has not been taking miralax or senna. Has not taken any antidiarrheals.  -Endorses being able to eat and drink but maybe drinking slightly less than normal. No nausea. No change to abdominal fullness.  -Shortness and cough improving some. Now able to lie on his back and breathe without difficulty.  -No chest pain.     Review of Systems:  Patient denies any of the following except if noted above: fevers, chills, difficulty with energy, vision or hearing changes, chest pain, dyspnea, abdominal pain, nausea, vomiting, diarrhea, constipation, urinary concerns, headaches, numbness, tingling, issues with sleep or mood. He also denies lumps, bumps, rashes or skin lesions, bleeding or bruising issues.  Physical Exam:  /79 (BP Location: Right arm, Patient Position: Sitting, Cuff Size: Adult Large)   Pulse (!) 133   Temp 98.1  F (36.7  C) (Oral)   Resp 16   Ht 1.73 m (5' 8.11\")   Wt 115.1 kg (253 lb 12.8 oz)   SpO2 98%   BMI 38.47 kg/m    Wt Readings from Last 10 Encounters:   12/06/22 115.1 kg (253 lb 12.8 oz)   12/02/22 81.2 kg (179 lb)   11/30/22 123.4 kg (272 lb)   11/18/22 129.6 kg (285 lb 11.2 oz)   09/16/22 128.3 kg (282 lb 12.8 oz)   08/26/22 127.1 kg (280 lb 4.8 oz)   08/05/22 130.2 kg (287 lb)   07/15/22 130 kg (286 lb 9.6 oz)   06/24/22 128.5 kg (283 lb 4.8 oz)   06/03/22 132.7 kg (292 lb 9.6 oz)     General: Mildly fatigued-appearing male in no acute distress. Presents in a wheelchair.   Eyes: EOMI, PERRL. No scleral icterus.  ENT: Oral mucosa is moist without lesions or thrush.   Cardiovascular: Tachycardic. Regular rhythm. No murmurs, gallops, or rubs. No peripheral edema.  Respiratory: CTA bilaterally. No wheezes or crackles.  Gastrointestinal: BS +. Abdomen distended, irregular contour. Mildly tender to palpation.   Neurologic: Cranial nerves II " through XII are grossly intact.  Skin: No rashes, petechiae, or bruising noted on exposed skin.  Psych: Affect appropriate; pleasant talkative.     Labs:   Most Recent 3 CBC's:  Recent Labs   Lab Test 12/06/22  0932 11/28/22  0929 11/18/22  1202 11/17/22  0556   WBC 0.6* 17.4* 12.7* 13.0*   HGB 8.7* 9.3* 7.9* 8.9*   MCV 81 84 87 90   PLT 85* 459* 333 396   ANEUTAUTO  --  15.2* 10.9* 11.0*     Most Recent 3 BMP's:  Recent Labs   Lab Test 12/01/22  1103 11/28/22  0929 11/18/22  1202 11/17/22  0556   NA  --  139 139 139   POTASSIUM 3.3* 3.0* 3.3* 3.7   CHLORIDE  --  101 104 105   CO2  --  23 20* 19*   BUN  --  5.0* 5.7* 6.9   CR  --  0.74 0.78 0.81   ANIONGAP  --  15 15 15   FAISAL  --  9.1 8.9 8.9   GLC  --  141* 129* 102*   PROTTOTAL  --  7.3 7.1 6.6   ALBUMIN  --  3.6 3.4* 3.4*    Most Recent 3 LFT's:  Recent Labs   Lab Test 11/28/22  0929 11/18/22  1202 11/17/22  0556   AST 20 26 25   ALT 9* 10 12   ALKPHOS 83 92 88   BILITOTAL 0.6 0.6 0.5     CMP, Magnesium and Phosphorus are pending.    Imaging:  CT Chest Pulmonary Embolism w Contrast  Narrative: EXAMINATION: CTA pulmonary angiogram, 12/1/2022 1:57 PM     COMPARISON: CT 10/4/2022    HISTORY: Desmoplastic small round cell tumor    TECHNIQUE: Volumetric helical acquisition of CT images of the chest  from the lung apices to the kidneys were acquired after the  administration of 76 mL of Isovue-370 IV contrast. Post-processed  multiplanar and/or MIP reformations were obtained, archived to PACS  and used in interpretation of this study.     FINDINGS:  Contrast bolus is: suboptimal. Exam is negative for acute  PE in the central or lobar pulmonary arteries.    Mediastinum: No cardiomegaly or pericardial effusion. Right chest port  catheter remains in the low right IJV. Enlarging right paratracheal,  right hilar, and subcarinal lymphadenopathy. Enlarging  pericardiophrenic/supradiaphragmatic lymphadenopathy.    Lungs:     Enlarging left hilar based pulmonary nodules,  adjacent to the lingular  arteries on series 5 image 124 and left lower lobe arteries and series  5 image 133. These appear to abut but do not appreciably cause mass  effect on the adjacent bronchi and vasculature.    Trace right pleural effusion. New moderate left pleural effusion and  left basilar atelectasis. Scattered peripheral groundglass opacities  in the left lung with linear groundglass opacity in the superior right  lower lobe, all of which are new from the prior exam. Slightly  increased left upper lobe nodule measuring 4 mm (series 7 image 110),  previously 3 mm.    Upper abdomen: Partially imaged numerous hypodense presumed metastatic  lesions in the liver, slightly increased since prior. Peritoneal  nodularity anteriorly appears new when comparing at a similar level on  10/4/2022. Increased lobulated peritoneal nodularity and ascites  around the spleen. Increased upper abdominal lymphadenopathy.    Bones and soft tissue no destructive bony lesions. Soft tissues are  unremarkable.  Impression: IMPRESSION:   1. Exam is negative for central or lobar pulmonary pulmonary embolism.    2. Progression of disease with worsening mediastinal and hilar  lymphadenopathy including left hilar masses/lymph nodes. Slightly  increased 4 mm left upper lobe nodule as well, suspicious for  metastasis.    3. Scattered groundglass opacities greatest in the left upper lobe  suspicious for associated infection.    4. Trace right and moderate left pleural effusion and left  basilar/lingular atelectasis.    5. Partially imaged worsening hepatic metastases, peritoneal disease,  and lymphadenopathy in the upper abdomen.    6. Right chest port catheter terminates in the low right IJ.    I have personally reviewed the examination and initial interpretation  and I agree with the findings.    YARITZA ORTIZ DO         SYSTEM ID:  [id number]    I reviewed the above labs and imaging today.    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 He tolerated CAV/IMV for 19 cycles and then was on chemotherapy break from June 2021-April 2022. Given return in symptoms he resumed chemotherapy with further CAV/IMV in April 2022.  However, he has had evidence of progression on the two most recent scans. Robert and his family elected to take time to try traditional medicine treatments.Last CT-CAP 10/4/22 showed evidence of progressive disease, predominantly in liver. There are small sub-centimeter lung nodules, which are suspicious for metastasis. He started irinotecan 20 mg/m2 days 1-5/temozolomide 250 mg daily days 1-5 every 21 days with his first cycle on 11/28/22.      On  12/1 he had a chest CT for SOB which was negative for acute PE. However, it showed scattered ground glass opacities suspicious for pneumonia. The bronchi is patent but there is concern for possible post obstructive process. There is a trace right pleural effusion and a moderate left pleural effusion with left lingular atelectasis. He was started on Augmentin 875 mg BID x 5 days plus Azithromycin 500 mg on day 1, 250 mg from days 2-5. Thoracic was also notified to set up thoracentesis for left pleural effusion and to time with port replacement.     Today Robert is doing ok. He is feeling fatigued and weak. He has been having diarrhea with about 7 loose stools per day but with no fever, blood or associated abdominal pain. He confirms he is off all laxatives and softeners at this time. He feels his abdominal fullness is about the same. His cough and shortness of breath are somewhat improved; he takes his last dose of antibiotics today.     Labs today with WBC at 0.6 and ANC of 0.4. Plt at 85 and no bleeding concerns. CMP, Phos and Mg are  pending. Anticipate possible repletion needed. Will await results and follow up as indicated. Otherwise, plan is to repeat labs on Friday to trend platelets. Will request infusion appt for possible fluids/lytes as well.    #Diarrhea  -In absence of infectious symptoms and recent irinotecan administration, will start imodium. Can take 2 tablets with first loose stool and titrate to keep stool count less than 4 per day given history of constipation, want to be conservative. Have messaged RNCC to check in on patient in 2 days for update. If no improvement consider trying lomotil or doing infectious work up if indicated based on symptoms.  -Given 500cc NS in clinic today. (cautious fluid replacement given history of ascites and pleural effusion)  -Possible IVF Friday pending progress.   -Weight down to 253.8lbs today. Continue to monitor. May need nutrition consult.     #Abdominal bloating  Paracentesis done on 11/30 with 2.1L removed.   -Will need to evaluate the need for these prn.    #Shortness of breath   - CT on 12/1 showing possible pneumonia. He is completing 5 days of antibiotics today. Shortness of breath and cough is improving. IB message sent to RNCC to check on status of scheduling thoracentesis for left pleural effusion.     #Anemia  Hgb relatively stable at 8.7 today. No current concern for  or GI bleeding. Continue to monitor with resumption of chemotherapy Transfuse pRBC for hb < 7.    #HTN  #Tachycardia  Longstanding issue, likely multifactorial in setting of metastatic disease. EKG on 11/15 showed sinus tachycardia. Managed with metoprolol, recently changed from 50 mg daily to 25 mg BID in an effort to maintain more of a steady-state.     #Port malpositioning  CXR 11/16/22 showing right port with tip in the low cervical internal jugular. Port replacement requested. Message sent to RNCC to inquire about status of scheduling.    #Infection Prevention  Educated on need to monitor for fever. Need to  notify us or seek care right away of temp of 100.4. Continue to practice good infection prevention with handwashing before eating, avoiding ill contacts, and wearing mask in crowds.     ADDENDUM:  CMP came back. LFT elevations at Grade 1, likely s/t ifos will monitor. Carbon dioxide low at 16, patient did not appear tachypneic or dsypneic on visit and reports subjective improvement in breathing. O2 sats normal. Advised sister to call us if any changes in breathing status. Otherwise, recheck with labs Friday. Potassium low at 3.2. ordered 40mEq x2 days and recheck labs on Friday. Called patient's sister Mariela to update her on this and that Rx was sent to local pharmacy. She verbalizes understanding of plan.     Amber Scheierl, CNP    120 minutes spent on the date of the encounter doing chart review, review of test results, interpretation of tests, patient visit and documentation

## 2022-12-06 NOTE — NURSING NOTE
"Chief Complaint   Patient presents with     Blood Draw     Labs drawn via  by RN. Vitals taken.     Port accessed with 20G 3/4\" power needle. Minimal blood retur noted. Flushed with NS and heparin. De-accessed. Labs drawn via  by RN. Pt tolerated well. Vitals taken. Pt checked in for next appointment.    Almaz Cross RN  "

## 2022-12-07 PROBLEM — D70.9 NEUTROPENIC FEVER (H): Status: ACTIVE | Noted: 2022-01-01

## 2022-12-07 PROBLEM — R50.81 NEUTROPENIC FEVER (H): Status: ACTIVE | Noted: 2022-01-01

## 2022-12-07 NOTE — LETTER
Transition Communication Hand-off for Care Transitions to Next Level of Care Provider    Name: Diego Buchanan  : 1995  MRN #: [id number]  Primary Care Provider: Jc Glass     Primary Clinic: [address] [place] [address]     Reason for Hospitalization:  Neutropenic fever (H) [D70.9, R50.81]  Admit Date/Time: 2022  2:00 PM  Discharge Date: 2022  Payor Source: Payor: [facility] / Plan: [employer] PMAP / Product Type: HMO /     Readmission Assessment Measure (DURAN) Risk Score/category: 26%         Reason for Communication Hand-off Referral: Avoidable readmission within 30 days    Discharge Plan: home with family       Concern for non-adherence with plan of care:   Y/N No  Discharge Needs Assessment:  Needs    Flowsheet Row Most Recent Value   Equipment Currently Used at Home none   # of Referrals Placed by CTS External Care Coordination          Already enrolled in Tele-monitoring program and name of program:  NA  Follow-up specialty is recommended: No    Follow-up plan:    Future Appointments   Date Time Provider Department Center   2023  6:45 AM  [facility] DRAW [facility]   2023  7:45 AM Scheierl, Amber J, APRN CNP [facility]   2023  8:30 AM [facility] ON[facility] INFUSION NURSE [facility]   1/3/2023  1:00 PM [facility] ONC INFUSION NURSE [facility]   2023 11:30 AM  ONC INFUSION NURSE [facility]   2023 11:30 AM  ONC INFUSION NURSE [facility]   2023  1:00 PM  ONC INFUSION NURSE [facility]       Any outstanding tests or procedures:              Key Recommendations:      Elaine Ivey RN    AVS/Discharge Summary is the source of truth; this is a helpful guide for improved communication of patient story

## 2022-12-07 NOTE — TELEPHONE ENCOUNTER
Nurse Triage SBAR    Situation: Pt's sister calling about fever, worsening cough, immodium use    Background:    DX: metastatic desmoplastic small round cell tumor  Provider: Dr. Sims, Amber Scheierl, ARIEL, KATHLEEN Cummings  Most recent treatment: 12/2:  Cycle 1 Day 5 Irrinotecan  Most recent appointment 12/6/22 with Kelley: Per Kelley's note: He was recently diagnosed with pneumonia based on CT on 12/1 and started 5 days of antibiotics. Today is the last dose. Labs today with WBC at 0.6 and ANC of 0.4. Plt at 85 and no bleeding concerns. CMP, Phos and Mg are pending. Anticipate possible repletion needed. Will await results and follow up as indicated. Otherwise, plan is to repeat labs on Friday to trend platelets. Will request infusion appt for possible fluids/lytes as well. Labs: WBC at 0.6, ANC 0.4.     Upcoming appointments: 12/19/22 with Kelley    Assessment:   Pt's sister Mariela calls with two concerns:  1) Pt took 9 imodium capsules yesterday; Mariela's understanding was that pt should have only taken 4 capsules.  Pt took one capsule after every diarrhea stool yesterday. Pt's diarrhea has resolved.  Informed Mariela that when pt has diarrhea, okay to take two tabs after initial diarrhea stool, then one capsule after every subsequent episode of diarrhea up to eight capsules per day. Going forward, maximum should be 8 capsules per day.  2) Water in lungs. Pt is coughing constantly, but it is worse today. Cough is nonproductive. Was given an antibiotic for his pneumonia. No SOB, chest pain. Is not taking cough medicine. Has fever of 100.6 that started today. Has not taken tylenol. Doing well with fluid and food intake. No N/V. Home covid test negative. Does not have home covid test in home presently.    Recommendation:   Message sent to Whit DOWELL to advise on plan of care.   0037  Pt should go to ED d/t neutropenia and fever.    Called pt's sister Mariela and advised ED visit. She verbalized understanding and is in agreement  with plan of care.    Called report to Dr. Lopez in ED at [facility].    Message routed to Care Team.

## 2022-12-07 NOTE — ED TRIAGE NOTES
Pt presents with sister with a fever first noted at 10am today.  Per advice from oncology patient to present with fever over 100.4.  Was 100.6 at home.  99.6 in triage but tachy at 141.       Triage Assessment     Row Name 12/07/22 [id number]       Triage Assessment (Adult)    Airway WDL WDL       Respiratory WDL    Respiratory WDL WDL       Skin Circulation/Temperature WDL    Skin Circulation/Temperature WDL WDL       Cardiac WDL    Cardiac WDL WDL       Peripheral/Neurovascular WDL    Peripheral Neurovascular WDL WDL       Cognitive/Neuro/Behavioral WDL    Cognitive/Neuro/Behavioral WDL WDL

## 2022-12-07 NOTE — ED PROVIDER NOTES
[facility] EMERGENCY DEPARTMENT ([facility])  12/07/22  History     Chief Complaint   Patient presents with     Fever     HPI  Diego Buchanan is a 27 year old male with a history notable for desmoplastic peritoneal carcinomatosis with lymph node, bone and liver metastases s/p chemotherapy treatments and HTN who presents to the ED for evaluation of a fever. Sister reports temp this morning was 100.6F. Sister reports patient has also had a cough today, however this is not new. Patient finished a course of antibiotics yesterday for pneumonia which was seen on CT. He has had ongoing diarrhea since Friday (last chemo). Patient reports he woke up today with fever and chills. Denies body aches, shortness of breath, chest pain, runny nose, sore throat, abdominal pain, nausea, vomiting, dysuria.      Per review of Care Everywhere, the patient had previously underwent 19 cycles of CAV/IMV prior to a break from 06/21-04/22.  In April, the patient began experiencing symptoms and opted for traditional measures and treatments.  A CT- C/A/P on 10/4/2022 showed evidence of progressive disease, predominantly in the liver.  Patient began on irinotecan 20 mg/m2 days 1-5/temozolomide 250 mg daily days 1-5 every 21 days with the first cycle on 11/28/22.  Patient developed shortness of breath on 12/01 prompting a CT chest which showed scattered ground glass opacities suspicious for pneumonia.  Bronchi was patent but there was concern for possible postobstructive process.  Trace right pleural effusion and moderate left pleural effusion with left lingular atelectasis.  Patient began on Augmentin 875 mg twice daily for 5 days as well as azithromycin 500 mg on day 1 and 250 mg from days 2-5.  Patient was referred to  Thoracic to undergo thoracentesis for the left pleural effusion.    I have reviewed the Medications, Allergies, Past Medical and Surgical History, and Social History in the [employer] system.  PAST MEDICAL HISTORY:   Past  Medical History:   Diagnosis Date     Hypertension      Learning disability     but pt is his own gaurdian     Peritoneal carcinomatosis (H)        PAST SURGICAL HISTORY:   Past Surgical History:   Procedure Laterality Date     BIOPSY      liver     INSERT PORT VASCULAR ACCESS Right 4/21/2022    Procedure: INSERTION, VASCULAR ACCESS PORT;  Surgeon: Daniel Sarmiento MD;  Location: [facility] OR     IR CHEST PORT PLACEMENT > 5 YRS OF AGE  4/21/2022     IR CVC TUNNEL PLACEMENT > 5 YRS OF AGE  4/29/2020     IR CVC TUNNEL REMOVAL RIGHT  11/16/2021     US MUSCLE BIOPSY  3/12/2020       Past medical history, past surgical history, medications, and allergies were reviewed with the patient. Additional pertinent items: None    FAMILY HISTORY:   Family History   Problem Relation Age of Onset     Hypertension Mother        SOCIAL HISTORY:   Social History     Tobacco Use     Smoking status: Never     Smokeless tobacco: Never   Substance Use Topics     Alcohol use: Never     Social history was reviewed with the patient. Additional pertinent items: None      Patient's Medications   New Prescriptions    No medications on file   Previous Medications    ACETAMINOPHEN (TYLENOL) 325 MG TABLET    Take 2 tablets (650 mg) by mouth every 4 hours as needed for mild pain or fever    AMOXICILLIN-CLAVULANATE (AUGMENTIN) 875-125 MG TABLET    Take 1 tablet by mouth 2 times daily    METOPROLOL TARTRATE (LOPRESSOR) 25 MG TABLET    Take 1 tablet (25 mg) by mouth 2 times daily    ONDANSETRON (ZOFRAN) 4 MG TABLET    Take 1 tablet (4 mg) by mouth daily 45 minutes before taking Temodar on chemotherapy days    POLYETHYLENE GLYCOL (MIRALAX) 17 GM/DOSE POWDER    Take 17 g by mouth daily    POTASSIUM CHLORIDE ER (K-TAB) 20 MEQ CR TABLET    Take 2 tablets (40 mEq) by mouth daily for 2 days    PROCHLORPERAZINE (COMPAZINE) 10 MG TABLET    Take 1 tablet (10 mg) by mouth every 6 hours as needed (Nausea/Vomiting)    SENNA-DOCUSATE (SENNA S) 8.6-50 MG TABLET     "Take 1 tablet by mouth 2 times daily as needed for constipation    TEMOZOLOMIDE (TEMODAR) 250 MG CAPSULE    Take 1 capsule (250 mg) by mouth daily for 5 days , about 45 minutes after taking Zofran on chemotherapy days.   Modified Medications    No medications on file   Discontinued Medications    No medications on file          Allergies   Allergen Reactions     Tegaderm Chg Dressing [Chlorhexidine]      Tegaderm Transparent Dressing (Informational Only) Itching     Tegaderm dressing; Okay for short periods of time        Review of Systems   Constitutional: Positive for chills, fatigue and fever.   HENT: Negative for congestion, rhinorrhea, sinus pressure, sinus pain, sneezing and sore throat.    Eyes: Negative.    Respiratory: Positive for cough. Negative for shortness of breath.    Cardiovascular: Negative.    Gastrointestinal: Positive for abdominal distention and diarrhea. Negative for abdominal pain, blood in stool, nausea and vomiting.   Endocrine: Negative.    Genitourinary: Negative.    Musculoskeletal: Negative.    Skin: Negative.    Allergic/Immunologic: Negative.    Neurological: Negative.    Hematological: Negative.    Psychiatric/Behavioral: Negative.      A complete review of systems was performed with pertinent positives and negatives noted in the HPI, and all other systems negative.    Physical Exam   BP: 137/74  Pulse: (!) 140  Temp: 99.6  F (37.6  C)  Resp: 18  Height: 172.7 cm (5' 8\")  Weight: 115.7 kg (255 lb)  SpO2: 98 %      Physical Exam  Vitals and nursing note reviewed.   Constitutional:       General: He is not in acute distress.     Appearance: Normal appearance. He is ill-appearing.   HENT:      Head: Normocephalic and atraumatic.      Nose: No congestion or rhinorrhea.   Cardiovascular:      Rate and Rhythm: Regular rhythm. Tachycardia present.      Heart sounds: Normal heart sounds.   Pulmonary:      Effort: Pulmonary effort is normal. No respiratory distress.      Breath sounds: Normal " breath sounds. No wheezing.   Abdominal:      General: There is distension.      Tenderness: There is no abdominal tenderness. There is no guarding or rebound.   Musculoskeletal:         General: Normal range of motion.      Cervical back: Normal range of motion and neck supple.   Skin:     General: Skin is warm and dry.      Capillary Refill: Capillary refill takes less than 2 seconds.   Neurological:      General: No focal deficit present.      Mental Status: He is alert and oriented to person, place, and time.   Psychiatric:         Mood and Affect: Mood normal.         Behavior: Behavior normal.         ED Course        Procedures         Results for orders placed or performed during the hospital encounter of 12/07/22 (from the past 24 hour(s))   Comprehensive metabolic panel   Result Value Ref Range    Sodium 134 (L) 136 - 145 mmol/L    Potassium 3.3 (L) 3.4 - 5.3 mmol/L    Chloride 101 98 - 107 mmol/L    Carbon Dioxide (CO2) 19 (L) 22 - 29 mmol/L    Anion Gap 14 7 - 15 mmol/L    Urea Nitrogen 10.6 6.0 - 20.0 mg/dL    Creatinine 1.08 0.67 - 1.17 mg/dL    Calcium 8.1 (L) 8.6 - 10.0 mg/dL    Glucose 130 (H) 70 - 99 mg/dL    Alkaline Phosphatase 131 (H) 40 - 129 U/L    AST 40 10 - 50 U/L    ALT 82 (H) 10 - 50 U/L    Protein Total 7.0 6.4 - 8.3 g/dL    Albumin 3.7 3.5 - 5.2 g/dL    Bilirubin Total 1.0 <=1.2 mg/dL    GFR Estimate >90 >60 mL/min/1.73m2   Lactic acid whole blood   Result Value Ref Range    Lactic Acid 0.9 0.7 - 2.0 mmol/L   Symptomatic Influenza A/B & SARS-CoV2 (COVID-19) Virus PCR Multiplex Nasopharyngeal    Specimen: Nasopharyngeal; Swab   Result Value Ref Range    Influenza A PCR Negative Negative    Influenza B PCR Negative Negative    RSV PCR Positive (A) Negative    SARS CoV2 PCR Negative Negative    Narrative    Testing was performed using the Xpert Xpress CoV2/Flu/RSV Assay on the Cepheid GeneXpert Instrument. This test should be ordered for the detection of SARS-CoV-2 and influenza viruses in  individuals who meet clinical and/or epidemiological criteria. Test performance is unknown in asymptomatic patients. This test is for in vitro diagnostic use under the FDA EUA for laboratories certified under CLIA to perform high or moderate complexity testing. This test has not been FDA cleared or approved. A negative result does not rule out the presence of PCR inhibitors in the specimen or target RNA in concentration below the limit of detection for the assay. If only one viral target is positive but coinfection with multiple targets is suspected, the sample should be re-tested with another FDA cleared, approved, or authorized test, if coinfection would change clinical management. This test was validated by the [facility]. These laboratories are certified under the Clinical Laboratory Improvement Amendments of 1988 (CLIA-88) as qualified to perform high complexity laboratory testing.   CBC with platelets and differential   Result Value Ref Range    WBC Count 0.2 (LL) 4.0 - 11.0 10e3/uL    RBC Count 3.14 (L) 4.40 - 5.90 10e6/uL    Hemoglobin 7.8 (L) 13.3 - 17.7 g/dL    Hematocrit 25.5 (L) 40.0 - 53.0 %    MCV 81 78 - 100 fL    MCH 24.8 (L) 26.5 - 33.0 pg    MCHC 30.6 (L) 31.5 - 36.5 g/dL    RDW 17.8 (H) 10.0 - 15.0 %    Platelet Count 39 (LL) 150 - 450 10e3/uL     Medications   azithromycin (ZITHROMAX) 500 mg in sodium chloride 0.9 % 250 mL intermittent infusion (500 mg Intravenous New Bag 12/7/22 1742)   0.9% sodium chloride BOLUS (500 mLs Intravenous New Bag 12/7/22 1607)   cefTRIAXone (ROCEPHIN) 1 g vial to attach to  mL bag for ADULTS or NS 50 mL bag for PEDS (1 g Intravenous New Bag 12/7/22 1608)   potassium chloride (KAYCIEL) solution 20 mEq (20 mEq Oral Given 12/7/22 1638)   acetaminophen (TYLENOL) tablet 1,000 mg (1,000 mg Oral Given 12/7/22 1742)             Assessments & Plan (with Medical Decision Making)   Diego Buchanan is a 27 year old male with a history notable for  desmoplastic peritoneal carcinomatosis with lymph node, bone and liver metastases s/p chemotherapy treatments and HTN who presents to the ED for evaluation of a fever. Patient is currently undergoing chemotherapy (last infusion on 12/2/22) neutropenic per labs collected yesterday in clinic. Comprehensive lab and infectious workup completed, IV fluids and IV antibiotics initiated in emergency department. Patient febrile up to 102.2 in emergency department. Treated with IVF and tylenol.     Labs remarkable for WBC 0.2, hemoglobin 7.8, platelet 39.  Hypokalemia with potassium 3.3. replaced. Slight hyponatremia sodium 134, CO2 19, calcium 8.1, transaminitis ALT 82, Alk phos 131.     Positive for RSV, negative for influenza and covid.     Blood cultures pending at time of admission.    Plan will be to admit patient to the hospital on medicine service. This was discussed with medicine admitting physician who accepted patient onto their service.     I have reviewed the nursing notes.        New Prescriptions    No medications on file       Final diagnoses:   Neutropenic fever (H)     --    ED Attending Physician Attestation    I Junior Churchill MD, cared for this patient with the Advanced Practice Provider (LISY). I have performed a history and physical examination of the patient independent of the LISY. I reviewed the LISY's documentation above and agree with the documented findings and plan of care. I personally provided a substantive portion of the care for this patient.    I personally performed the substantive portion of the medical decision making for this visit - please see the LISY's documentation for full details.    Key management decisions made by me and carried out under my direction: Patient with neutropenic fever with recent treatment for possible postobstructive pneumonia with oral antibiotics.  Patient was pancultured and placed on broad-spectrum antibiotics.    Physical exam is remarkable for fever and  tachycardia as well as right-sided rhonchi.    Summary of HPI, PE, ED Course   Patient is a 27 year old male evaluated in the emergency department for fever. Exam notable for tachycardia, fever, and rhonchi. ED course notable for pancytopenia including a WBC of 0.2.  Lactate was normal.  RSV test was positive.  COVID and influenza swabs were negative.  Cultures were obtained and patient was placed on IV antibiotics. After the completion of care in the emergency department, the patient was admitted to inpatient.    Critical Care & Procedures  Not applicable.    Medical Decision Making  The medical record was reviewed and interpreted.  Current labs reviewed and interpreted.  Previous labs reviewed and interpreted.  Current images reviewed and interpreted: right lung opacity and small left pleural effusion..      Junior Churchill MD  Emergency Medicine           STEVEN Londono CNP  12/7/2022   [facility] EMERGENCY DEPARTMENT     Phuong Green APRN CNP  12/07/22 1755       Junior Churchill MD  12/08/22 [id number]

## 2022-12-08 NOTE — PROGRESS NOTES
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 He was admitted on 12/7/22 with neutropenic fever, RSV +. Also with diarrhea that has since resolved though per notes is still passing loose stools. CT A/P with findings of large bowel obstruction with dilated transverse colon with transition at sigmoid colon which appears to be filled with solid tumor, in the setting of extensive peritoneal carcinomatosis, pelvic masses, omental caking, malignant ascites. GI contacted by CRS to review imaging for possible stenting in the setting of LBO.     CT A/P on 12/8/2022 without contrast   IMPRESSION:  1.  New concern for large bowel obstruction with transition point at  the level of innumerable solid pelvic masses. Correlation with GI  dysfunction.  2.  Progression of metastatic disease with increased peritoneal  carcinomatosis, omental caking and malignant ascites, new mediastinal  lymphadenopathy, increased hepatic metastases, and increased splenic  metastasis compared to 10/4/2022 CT scan.    3.  Increased diffuse groundglass tree-in-bud nodules and  consolidations throughout the lungs in a pattern concerning for  bronchial pneumonia with atypical viral and fungal etiologies a strong  consideration in an immune compromised patient. Metastatic disease not  excluded.  4.  Decreased small left pleural effusion.    Recommendations:  - Reviewed imaging with Dr. Fajardo, GI staff. Unfortunately given the length of solid tumor in the sigmoid colon extending to the rectum, stenting would likely not be possible and would need to be transanal. In addition, the risks of attempting to stent would outweigh the  benefits in this patient with neutropenia and thrombocytopenia especially given that he is not clinically obstructed (no nausea, having bowel movements, passing flatus).     Patient discussed with on call GI staff Dr. Scotty Courtney PA-C  GI Service  [facility]

## 2022-12-08 NOTE — PLAN OF CARE
Goal Outcome Evaluation:      Plan of Care Reviewed With: patient    Overall Patient Progress: no change    Time: Pt arrived from ED around 2144.   Status: admitted for neutropenic fever, RSV positive.   Neuros: Alert and oriented x4, slow to respond, mother at bedside.   Cardiac: BP WNL, Tachy, denies chest pain.   Respiratory: Sats 97-98% on RA. LS diminished, infrequent nonproductive cough, denies SOB.   Pain/Nausea: Denies pain and nausea.   Diet: Regular.   GI/: Voiding spontaneously. +BS, +flatus, loose stools.   Lines: R Chest port infusing TKO, IV Cefepime administered.   Labs: Urine sample collected. Lactic 1.1. WBC 0.2. Hgb 7.8. Platelet 39. RSV positive. C.diff negative.   Activity: Up ambulated to BR with SBA.   New Changes this Shift: ED admit.   Plan: IV ABX. Monitor labs. Continue POC.     Admitted/transferred from: ED  2 RN skin assessment completed by Boone Hudson RN and Emilie CISNEROS  Skin assessment finding: Mild rash and scattered scabs on abdomen.   Interventions/actions: Skin lotion, repositioning, and adequate hydration encouraged.   Bedside Emergency Equipment Present:  Suction Regulator: YES  Suction Canister: YES  Tubing between Regulator and Canister: YES  O2 Regulator with Tree:YES  Ambu Bag: YES

## 2022-12-08 NOTE — ED NOTES
Bed: ED20  Expected date: 12/7/22  Expected time:   Means of arrival:   Comments:  T2  
Bed: ED22  Expected date: 12/7/22  Expected time:   Means of arrival:   Comments:  Please clean and move 20 to 22  
pt received to intake #8 with c/o positive wound culture. 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 2/2 infection s/p bunion surgery. pt aox4. ambulatory. +pedal pulses. denies fever/chills, cp, sob, n/v/d. IV placed, labs drawn and sent. pt seen by MD. pt to go to RW.

## 2022-12-08 NOTE — PLAN OF CARE
"Goal Outcome Evaluation:    /74 (BP Location: Left arm)   Pulse 112   Temp 98.3  F (36.8  C) (Oral)   Resp 20   Ht 1.727 m (5' 8\")   Wt 115.7 kg (255 lb)   SpO2 98%   BMI 38.77 kg/m      [id number]    Alert and oriented x 4, slow to respond. Him mom at bedside, his guardian. Denied pain. SBA for transfer to wheelchair or bedside chair. Fever decreased to 98.3 orally. C-Diff stool specimen sent. UA needed. Port cath infusing NS bolus of 1000 ml. Cefepime administered. Nursing report given to 7B. Being transferred to 7B room 30.   "

## 2022-12-08 NOTE — H&P
[facility]    History and Physical - Medicine Service, REGAN TEAM        Date of Admission:  12/7/2022    Assessment & Plan      Diego Buchanan is a 27 year old male admitted on 12/7/2022. 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 LA negative. Patient recently treated for PNA based on CT findings on 12/1, s/p course of CTX and Azithromycin. Notes fevers yesterday and today with continued cough. No other symptoms, no shortness of breath, not requiring O2. RSV positive and likely underlying infectious source however will complete infectious workup. Received CTX and Azithromycin in ED, however in the setting of neutropenic fever and continued symptoms despite prior abx, will broaden to Cefepime overnight.   - 1L IVF bolus  - Start Cefepime  - BCX pending  - UA with reflex UCX  - CXR  - Procal pending  - Consider HIV or TB testing in am or per oncology recommendations  - Oncology following, recs appreciated    # Diarrhea  Distended stomach however this is chronic, recent CT A/P 10/04 with worsening disease burden as likely cause of distension. Diarrhea also chronic and unchanged. 3 loose stools daily. Unlikely to be infectious however no recent testing so will r/o as cause of fevers.  - Enteric panel and c.diff per oncology    #Hypokalemia  - Replete 20 meq    #Tachycardia  Chronic, 105-120. Likely increased 2/2 fever.  - IVF as above  - PTA Metoprolol 25 BID    #L pleural effusion   Moderate pleural effusion on 12/1 CT. Patient plan to see thoracic surgery as outpatient for thoracentesis. No current indication for inpatient drainage, unlikely source of infection nd patient already received abx so unlikely to diagnostically yield anything. Will  "plan to treat as outpatient.     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 No signs of bleeding or bruising, VSS.  - CTM     Diet:   Regular  DVT Prophylaxis: Pneumatic Compression Devices  Busby Catheter: Not present  Fluids: 1l IVF  Central Lines: PRESENT     Cardiac Monitoring: None  Code Status:   FULL    Clinically Significant Risk Factors Present on Admission        # Hypokalemia: Lowest K = 3.2 mmol/L (Ref range: 3.4-5.3) in last 2 days, will replace as needed  # Hyponatremia: Lowest Na = 134 mmol/L (Ref range: 136-145) in last 2 days, will monitor as appropriate  # Hypocalcemia: Lowest Ca = 8.1 mg/dL (Ref range: 8.5 - 10.1 mg/dL) in last 2 days, will monitor and replace as appropriate       # Thrombocytopenia: Lowest platelets = 39 (Ref range: 150-450) in last 2 days, will monitor for bleeding       # Obesity: Estimated body mass index is 38.77 kg/m  as calculated from the following:    Height as of this encounter: 1.727 m (5' 8\").    Weight as of this encounter: 115.7 kg (255 lb).           Disposition Plan      Expected Discharge Date: 12/09/2022                The patient's care was discussed with the Attending Physician, Dr. Lutz.    Zeina Martinez MD  Medicine Service, [facility]  Securely message with the [employer] Web Console (learn more here)  Text page via [facility] Paging/Directory   Please see signed in provider for up to date coverage information    ______________________________________________________________________    Chief Complaint   Cough, fever    History is obtained from the patient and family    History of Present Illness   Diego Buchanan is a 27 year old male who presents with cough and fevers. Majority of history is provided by patient's sister in the " room. States he completed a course of abx for pneumonia yesterday, has been a little bit better in terms of the cough but also having intermittent fevers for past 2 days, temp this am at 100.6F. Came in today as they were concerned about the fever. Patient endorses cough that is unchanged from last week, not productive. No body aches, runny nose, sore throat, chest pain, shortness of breath, abdominal pain, nausea, vomiting, or urinary symptoms. Has had diarrhea with chemo and this has not changed, now having ~3 BM daily (note from oncology yesterday said up to 7 BM).     Per review of Care Everywhere, the patient had previously underwent 19 cycles of CAV/IMV prior to a break from 06/21-04/22.  In April, the patient began experiencing symptoms and opted for traditional measures and treatments.  A CT- C/A/P on 10/4/2022 showed evidence of progressive disease, predominantly in the liver.  Patient began on irinotecan 20 mg/m2 days 1-5/temozolomide 250 mg daily days 1-5 every 21 days with the first cycle on 11/28/22.  Patient developed shortness of breath on 12/01 prompting a CT chest which showed scattered ground glass opacities suspicious for pneumonia.  Bronchi was patent but there was concern for possible postobstructive process.  Trace right pleural effusion and moderate left pleural effusion with left lingular atelectasis.  Patient began on Augmentin 875 mg twice daily for 5 days as well as azithromycin 500 mg on day 1 and 250 mg from days 2-5.  Patient was referred to  Thoracic to undergo thoracentesis for the left pleural effusion.    In the ED, the patient was found to have neutropenia to 0.2, plt 39 without bleeding, RSV positive, LA negative. BCX drawn, given CTX and Azithromycin and admitted to medicine for workup of neutropenic fever.     Review of Systems    The 10 point Review of Systems is negative other than noted in the HPI or here.     Past Medical History    I have reviewed this patient's medical  history and updated it with pertinent information if needed.   Past Medical History:   Diagnosis Date     Hypertension      Learning disability     but pt is his own gaurdian     Peritoneal carcinomatosis (H)         Past Surgical History   I have reviewed this patient's surgical history and updated it with pertinent information if needed.  Past Surgical History:   Procedure Laterality Date     BIOPSY      liver     INSERT PORT VASCULAR ACCESS Right 4/21/2022    Procedure: INSERTION, VASCULAR ACCESS PORT;  Surgeon: Daniel Sarmiento MD;  Location: [facility] OR     IR CHEST PORT PLACEMENT > 5 YRS OF AGE  4/21/2022     IR CVC TUNNEL PLACEMENT > 5 YRS OF AGE  4/29/2020     IR CVC TUNNEL REMOVAL RIGHT  11/16/2021     US MUSCLE BIOPSY  3/12/2020        Social History   I have reviewed this patient's social history and updated it with pertinent information if needed. Diego Buchanan  reports that he has never smoked. He has never used smokeless tobacco. He reports that he does not drink alcohol and does not use drugs.    Family History   I have reviewed this patient's family history and updated it with pertinent information if needed.  Family History   Problem Relation Age of Onset     Hypertension Mother        Prior to Admission Medications   Prior to Admission Medications   Prescriptions Last Dose Informant Patient Reported? Taking?   acetaminophen (TYLENOL) 325 MG tablet Unknown at unknown Self No Yes   Sig: Take 2 tablets (650 mg) by mouth every 4 hours as needed for mild pain or fever   amoxicillin-clavulanate (AUGMENTIN) 875-125 MG tablet 12/7/2022 at am Self No Yes   Sig: Take 1 tablet by mouth 2 times daily   azithromycin (ZITHROMAX) 250 MG tablet Not Taking  No No   Sig: Take 2 tablets (500 mg) by mouth daily for 1 day, THEN 1 tablet (250 mg) daily for 4 days.   Patient not taking: Reported on 12/7/2022   metoprolol tartrate (LOPRESSOR) 25 MG tablet Not Taking Self No No   Sig: Take 1 tablet (25 mg) by mouth 2 times  daily   Patient not taking: Reported on 12/7/2022   ondansetron (ZOFRAN) 4 MG tablet Not Taking Self No No   Sig: Take 1 tablet (4 mg) by mouth daily 45 minutes before taking Temodar on chemotherapy days   Patient not taking: Reported on 12/7/2022   polyethylene glycol (MIRALAX) 17 GM/Dose powder Not Taking Self No No   Sig: Take 17 g by mouth daily   Patient not taking: Reported on 12/7/2022   potassium chloride ER (K-TAB) 20 MEQ CR tablet Not Taking Self No No   Sig: Take 2 tablets (40 mEq) by mouth daily for 2 days   Patient not taking: Reported on 12/7/2022   prochlorperazine (COMPAZINE) 10 MG tablet Not Taking Self No No   Sig: Take 1 tablet (10 mg) by mouth every 6 hours as needed (Nausea/Vomiting)   Patient not taking: Reported on 12/7/2022   senna-docusate (SENNA S) 8.6-50 MG tablet Not Taking Self No No   Sig: Take 1 tablet by mouth 2 times daily as needed for constipation   Patient not taking: Reported on 12/7/2022   temozolomide (TEMODAR) 250 MG capsule Not Taking  No No   Sig: Take 1 capsule (250 mg) by mouth daily for 5 days , about 45 minutes after taking Zofran on chemotherapy days.   Patient not taking: Reported on 12/7/2022      Facility-Administered Medications: None     Allergies   Allergies   Allergen Reactions     Tegaderm Chg Dressing [Chlorhexidine]      Tegaderm Transparent Dressing (Informational Only) Itching     Tegaderm dressing; Okay for short periods of time       Physical Exam   Vital Signs: Temp: (!) 102  F (38.9  C) Temp src: Oral BP: (!) 143/97 Pulse: (!) 140   Resp: 18 SpO2: 97 % O2 Device: None (Room air)    Weight: 255 lbs 0 oz    Constitutional: awake, alert, cooperative, no apparent distress, and appears stated age  Respiratory: No increased work of breathing, good air exchange, clear to auscultation bilaterally, no crackles or wheezing  Cardiovascular: tachycardic with regular rhythm, normal S1 and S2, no murmur noted and no edema  GI: distended, nontender, no rebound or  guarding, areas of induration  Skin: no bruising or bleeding and normal skin color, texture, turgor  Musculoskeletal: no lower extremity pitting edema present  there is no redness, warmth, or swelling of the joints  Neurologic: Awake, alert, oriented to name, place and time.  Cranial nerves II-XII are grossly intact. Grossly nonfocal, moving all 4 extremities    Data   Data reviewed today: I reviewed all medications, new labs and imaging results over the last 24 hours. I personally reviewed no images or EKG's today.    Recent Labs   Lab 12/07/22  1604 12/07/22  1428 12/06/22  0932 12/01/22  1103   WBC 0.2*  --  0.6*  --    HGB 7.8*  --  8.7*  --    MCV 81  --  81  --    PLT 39*  --  85*  --    NA  --  134* 136  --    POTASSIUM  --  3.3* 3.2* 3.3*   CHLORIDE  --  101 103  --    CO2  --  19* 16*  --    BUN  --  10.6 12.9  --    CR  --  1.08 1.03  --    ANIONGAP  --  14 17*  --    FAISAL  --  8.1* 8.9  --    GLC  --  130* 120*  --    ALBUMIN  --  3.7 3.8  --    PROTTOTAL  --  7.0 7.3  --    BILITOTAL  --  1.0 0.8  --    ALKPHOS  --  131* 145*  --    ALT  --  82* 104*  --    AST  --  40 51*  --

## 2022-12-08 NOTE — PROGRESS NOTES
CLINICAL NUTRITION SERVICES - ASSESSMENT NOTE     Nutrition Prescription    RECOMMENDATIONS FOR MDs/PROVIDERS TO ORDER:  Hypophosphatemia - monitor lytes and replenish as indicated.     Malnutrition Status:    Unable to determine due to unable to complete all parameters at this time d/t pt busy with procedure    Recommendations already ordered by Registered Dietitian (RD):  Supplements: PRN    Future/Additional Recommendations:  -Obtain nutrition hx and NFPE as able  -Monitor PO and wt trends     REASON FOR ASSESSMENT  Diego Buchanan is a/an 27 year old male assessed by the dietitian for Admission Nutrition Risk Screen for positive (wt loss)    NUTRITION HISTORY  Pt has been followed outpatient oncology RD - per last contact on 7/21/2022 pt's sister reported he had a good appetite recently.    No food allergies noted.    Unable to meet with pt d/t busy with other provider, receiving cares.    CURRENT NUTRITION ORDERS  Diet: Regular  Intake/Tolerance: No intake charted yet this admit    LABS  Labs reviewed  -Trace ketones on admit, could be indicative of recent poor PO  -Phos 1.7 (L)  Electrolytes  Potassium (mmol/L)   Date Value   12/08/2022 3.7   12/07/2022 3.3 (L)   12/06/2022 3.2 (L)   09/27/2022 3.3 (L)   09/22/2022 3.6   09/20/2022 3.8   06/23/2021 3.7   06/16/2021 3.6   06/10/2021 3.8     Phosphorus (mg/dL)   Date Value   12/08/2022 1.7 (L)   12/08/2022 2.1 (L)   12/06/2022 2.8   11/18/2022 2.8   11/16/2022 3.5   06/10/2021 3.4   06/06/2021 3.8   04/29/2021 3.7   04/27/2021 4.2   04/26/2021 3.9    Blood Glucose  Glucose (mg/dL)   Date Value   12/08/2022 107 (H)   12/07/2022 130 (H)   12/06/2022 120 (H)   11/28/2022 141 (H)   11/18/2022 129 (H)   09/27/2022 83   09/22/2022 116 (H)   09/20/2022 76   08/26/2022 101 (H)   08/10/2022 95   06/23/2021 109 (H)   06/16/2021 93   06/10/2021 76   06/06/2021 70   06/02/2021 100 (H)     Hemoglobin A1C (%)   Date Value   12/13/2016 5.8   10/12/2015 5.3    Inflammatory  "Markers  CRP Inflammation (mg/L)   Date Value   11/17/2022 79.40 (H)     CRP (mg/dL)   Date Value   02/18/2020 4.5 (H)     WBC (10e9/L)   Date Value   06/23/2021 13.2 (H)   06/16/2021 13.3 (H)   06/10/2021 4.4     WBC Count (10e3/uL)   Date Value   12/08/2022 0.2 (LL)   12/07/2022 0.2 (LL)   12/06/2022 0.6 (LL)     Albumin (g/dL)   Date Value   12/08/2022 3.4 (L)   12/07/2022 3.7   12/06/2022 3.8   09/27/2022 3.5   09/22/2022 3.8   09/20/2022 3.3 (L)   06/23/2021 3.7   06/16/2021 3.5   06/10/2021 3.4      Magnesium (mg/dL)   Date Value   12/08/2022 1.8   12/08/2022 1.9   12/06/2022 2.0   06/10/2021 2.2   06/06/2021 2.0   04/29/2021 2.3     Sodium (mmol/L)   Date Value   12/08/2022 136   12/07/2022 134 (L)   12/06/2022 136   06/23/2021 140   06/16/2021 137   06/10/2021 141    Renal  Urea Nitrogen (mg/dL)   Date Value   12/08/2022 12.3   12/07/2022 10.6   12/06/2022 12.9   09/27/2022 7   09/22/2022 12   09/20/2022 12   06/23/2021 11   06/16/2021 10   06/10/2021 10     Creatinine (mg/dL)   Date Value   12/08/2022 1.16   12/07/2022 1.08   12/06/2022 1.03   06/23/2021 0.82   06/16/2021 0.70   06/10/2021 0.62 (L)     Additional  Triglycerides (mg/dL)   Date Value   12/13/2016 86   10/12/2015 76     Ketones Urine (mg/dL)   Date Value   12/08/2022 Trace (A)   06/02/2021 Negative        MEDICATIONS  Medications reviewed    ANTHROPOMETRICS  Height: 172.7 cm (5' 8\")  Most Recent Weight: 113.2 kg (249 lb 8 oz)    IBW: 70 kg   BMI: 37.94 kg/m2; Obesity Grade II BMI 35-39.9  Weight History: 14.7% wt loss over past ~6 months.  Wt Readings from Last 20 Encounters:   12/07/22 113.2 kg (249 lb 8 oz)   12/06/22 115.1 kg (253 lb 12.8 oz)   12/02/22 81.2 kg (179 lb)   11/30/22 123.4 kg (272 lb)   11/18/22 129.6 kg (285 lb 11.2 oz)   09/16/22 128.3 kg (282 lb 12.8 oz)   08/26/22 127.1 kg (280 lb 4.8 oz)   08/05/22 130.2 kg (287 lb)   07/15/22 130 kg (286 lb 9.6 oz)   06/24/22 128.5 kg (283 lb 4.8 oz)   06/03/22 132.7 kg (292 lb 9.6 oz) "   06/02/22 132 kg (291 lb 1.6 oz)   05/13/22 129.5 kg (285 lb 9.6 oz)   04/22/22 133.1 kg (293 lb 8 oz)   04/21/22 132.5 kg (292 lb)   04/12/22 132.7 kg (292 lb 9.6 oz)   02/01/22 139.3 kg (307 lb 1.6 oz)   11/02/21 134.9 kg (297 lb 4.8 oz)   08/18/21 121.7 kg (268 lb 4.8 oz)   06/25/21 121.2 kg (267 lb 4.8 oz)   Dosing Weight: 81 kg (adjusted, based on lowest wt this admit of 113.2 kg on 12/7 and IBW of 70 kg)    ASSESSED NUTRITION NEEDS  Estimated Energy Needs: [id number] kcals/day (20 - 25 - 30+ kcals/kg)  Justification: Maintenance to increased needs d/t metastatic disease   Estimated Protein Needs: 122-162 grams protein/day (1.5 - 2 grams of pro/kg)  Justification: Increased needs and Obesity guidelines  Estimated Fluid Needs: 1 mL/kcal   Justification: Maintenance or Per provider pending fluid status    PHYSICAL FINDINGS  See malnutrition section below.  -Malignant ascites    MALNUTRITION  % Intake: Unable to assess  % Weight Loss: > 10% in 6 months (severe)  Subcutaneous Fat Loss: Unable to assess - pt busy undergoing paracentesis at time of visit attempt  Muscle Loss: Unable to assess  Fluid Accumulation/Edema: None noted per chart  Malnutrition Diagnosis: Unable to determine due to unable to complete all parameters at this time d/t pt busy with procedure    NUTRITION DIAGNOSIS  Unintended weight loss related to suspect variable appetite and hypermetabolism with metastatic disease as evidenced by 14.7% wt loss over past ~6 months and positive MST score for unintended wt loss.      INTERVENTIONS  Implementation  Nutrition Education: Unable to complete due to pt busy.   Medical food supplement therapy     Goals  Patient to consume % of nutritionally adequate meal trays TID, or the equivalent with supplements/snacks.     Monitoring/Evaluation  Progress toward goals will be monitored and evaluated per protocol.    Charity Seaman RD, LD  Pager: [phone]

## 2022-12-08 NOTE — PROCEDURES
[facility]    Paracentesis    Date/Time: 12/8/2022 3:28 PM  Performed by: Eldon Mckinney DO  Authorized by: Eldon Mckinney DO     PRE-PROCEDURE DETAILS  Initial or subsequent exam: subsequent  Procedure purpose: diagnostic  Indications: suspected peritonitis        UNIVERSAL PROTOCOL   Site Marked: Yes  Prior Images Obtained and Reviewed:  Yes  Required items: Required blood products, implants, devices and special equipment available    Patient identity confirmed:  Verbally with patient and anonymous protocol, patient vented/unresponsive  NA - No sedation, light sedation, or local anesthesia  Confirmation Checklist:  Patient's identity using two indicators  Time out: Immediately prior to the procedure a time out was called    Universal Protocol: the Joint Commission Universal Protocol was followed    Preparation: Patient was prepped and draped in usual sterile fashion    ESBL (mL):  0     ANESTHESIA    Anesthesia: Local infiltration  Local Anesthetic:  Lidocaine 1% without epinephrine  Anesthetic Total (mL):  4      SEDATION    Patient Sedated: No    POST PROCEDURE DETAILS  Needle gauge: 18  Ultrasound guidance: yes  Puncture site: midline infraumbilical  Fluid removed: 50(ml)  Fluid appearance: cloudy, serous and bilious  Dressing: 4x4 sterile gauze        PROCEDURE    Patient Tolerance:  Patient tolerated the procedure well with no immediate complications  Length of time physician/provider present for 1:1 monitoring during sedation: 0

## 2022-12-08 NOTE — CONSULTS
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    States noticed small volume of stool approx 1w ago. Is small in caliber. Denies melena or hematochezia. No tenderness or abdominal pain to palpation. No rebound. Pt was up and walking without issue prior to physical exam. Abdomen is firm in RUQ and LUQ.     12/8 CT as follows:  Mildly dilated transverse colon filled with air and fluid measuring up  to 6.3 cm with level of transition at the sigmoid colon which is  completely overtaken by solid masses and not well evaluated.              Past Medical History:     Past Medical History:   Diagnosis Date     Hypertension      Learning disability     but pt is his own gaurdian     Peritoneal carcinomatosis (H)              Past Surgical History:     Past Surgical History:   Procedure Laterality Date     BIOPSY      liver      "INSERT PORT VASCULAR ACCESS Right 4/21/2022    Procedure: INSERTION, VASCULAR ACCESS PORT;  Surgeon: Daniel Sarmiento MD;  Location: [facility] OR     IR CHEST PORT PLACEMENT > 5 YRS OF AGE  4/21/2022     IR CVC TUNNEL PLACEMENT > 5 YRS OF AGE  4/29/2020     IR CVC TUNNEL REMOVAL RIGHT  11/16/2021     US MUSCLE BIOPSY  3/12/2020               Social History:     Social History     Tobacco Use     Smoking status: Never     Smokeless tobacco: Never   Substance Use Topics     Alcohol use: Never             Family History:     Family History   Problem Relation Age of Onset     Hypertension Mother              Allergies:     Allergies   Allergen Reactions     Tegaderm Chg Dressing [Chlorhexidine]      Tegaderm Transparent Dressing (Informational Only) Itching     Tegaderm dressing; Okay for short periods of time             Medications:       ceFEPIme  2 g Intravenous Q8H     cyanocobalamin  100 mcg Oral Daily     metoprolol tartrate  25 mg Oral BID     sodium phosphate  15 mmol Intravenous Q2H     sodium chloride (PF)  3 mL Intracatheter Q8H             Review of Systems:   A comprehensive greater than 10 system review of systems was carried out.  Pertinent positives and negatives are noted above.  Otherwise negative for contributory info.            Physical Exam:   Blood pressure (P) 109/41, pulse (!) (P) 123, temperature (P) 98.3  F (36.8  C), temperature source (P) Oral, resp. rate (P) 19, height 1.727 m (5' 8\"), weight 113.2 kg (249 lb 8 oz), SpO2 (P) 98 %.    Intake/Output Summary (Last 24 hours) at 12/8/2022 1555  Last data filed at 12/8/2022 0600  Gross per 24 hour   Intake 220 ml   Output --   Net 220 ml     Exam:  GENERAL: Awake alert and oriented  LUNGS: Non labored breathing  ABD: mildly distended; firm, RUQ and LUQ > lower; NO tenderness to palpation.  EXTREMITIES: warm without edema         Data:     Recent Results (from the past 48 hour(s))   XR Chest Port 1 View    Narrative    Exam: XR CHEST PORT 1 VIEW, " 12/7/2022 8:35 PM    Indication: cough, c/f infiltrate    Comparison: 12/1/2022    Findings:   Normal course of the trachea. Cardiomediastinal silhouette within  normal limits.  Prominent left hilum consistent with known mass and  lymphadenopathy. Please see CT of 12/1/2022. Small left pleural  effusion. No right pleural effusion. No appreciable pneumothorax.  Mixed opacities in the left lung are unchanged from CT of 12/1/2022.  Stable right midlung opacity.      Impression    Impression:   1. Stable mixed opacities in the left lung. Stable focal opacity in  the right midlung. These were present on CT dated 12/1/2022.  2. Stable small left pleural effusion.  3. Unchanged left hilar mass and adenopathy.    I have personally reviewed the examination and initial interpretation  and I agree with the findings.    SHUBHAM ALVAREZ MD         SYSTEM ID:  [id number]   CT Abdomen Pelvis w/o Contrast   Result Value    Radiologist flags (Urgent)     Concern for large bowel obstruction with transition    Narrative    EXAMINATION: CT ABDOMEN PELVIS W/O CONTRAST, 12/8/2022 11:51 AM    TECHNIQUE:  Helical CT images from the lung bases through the  symphysis pubis were obtained without IV contrast.     COMPARISON: 10/4/2022, 12/1/2022 chest CT    HISTORY: c/f neutropenic colitis    FINDINGS:  CHEST:  LUNGS:   New, diffuse groundglass, tree-in-bud nodules and scattered small  consolidations throughout the visualized lungs.  New small left pleural effusion.    MEDIASTINUM:   New pericardial and paraesophageal lymphadenopathy for example  measuring 0.8 cm (series 2, image 27).  CT criteria for anemia.  Nonenlarged heart. No pericardial effusion.    ABDOMEN/PELVIS:  LIVER:  Increased hepatic metastases, greater than 20 in number for example  measuring 3.5 x 2.5 cm on the left previously 2.7 x 2.2 cm metastasis  series 2, image 52) and 2.7 x 2.1 cm, previously 1.8 x 1.6 cm on the  right (series 2, image 58).    STOMACH:   Decompressed which  limits evaluation    BILIARY:   No biliary ductal dilation. Gallstones.    PANCREAS:   Normal.    SPLEEN:  Increased hypodense splenic mass measuring 2.9 x 2.7 cm previously 2.8  x 2.4 cm presence of series 2, image 83).    ADRENAL GLANDS:   Normal.    :  Normal kidneys.  Non-thickened urinary bladder.  Moderate mass effect upon the urinary bladder.    BOWEL/PERITONEUM:   Progression of peritoneal carcinomatosis with 100's of diffuse solid  nodular masses throughout the peritoneum and mesentery, encasing small  bowel and colon in the pelvis with scattered pockets of fluid that are  slightly increased in size for example in the left abdomen (series 2,  image 156). There is marked omental caking for example measuring 26 x  8.6 cm previously 24 x 6.4 cm (series 2, image 136).  Mildly dilated transverse colon filled with air and fluid measuring up  to 6.3 cm with level of transition at the sigmoid colon which is  completely overtaken by solid masses and not well evaluated. Small  volume of fluid in the remaining colon and the rectum.     Unchanged fluid collection in the right abdomen measuring 8.8 x 6.8 cm  (series 2, image 160).  Nonvisualization of the appendix.   No pneumoperitoneum. No free fluid.    RETROPERITONEUM/:  Stable borderline retroperitoneal lymphadenopathy measuring 0.9 cm  (series 2, image 124). Stable left common iliac lymphadenopathy  measuring 1.1 cm (series 2, image 155).    VESSELS:   Limited evaluation on non-contrast exam.    LYMPH NODES:   No lymphadenopathy.    BONES/SOFT TISSUES:   Unchanged spiculated, sclerotic focus is in the left sacrum consistent  with a bone island.  No acute osseous abnormality.      Impression    IMPRESSION:  1.  New concern for large bowel obstruction with transition point at  the level of innumerable solid pelvic masses. Correlation with GI  dysfunction.  2.  Progression of metastatic disease with increased peritoneal  carcinomatosis, omental caking and malignant  ascites, new mediastinal  lymphadenopathy, increased hepatic metastases, and increased splenic  metastasis compared to 10/4/2022 CT scan.    3.  Increased diffuse groundglass tree-in-bud nodules and  consolidations throughout the lungs in a pattern concerning for  bronchial pneumonia with atypical viral and fungal etiologies a strong  consideration in an immune compromised patient. Metastatic disease not  excluded.  4.  Decreased small left pleural effusion.      [Access Center: Concern for large bowel obstruction with transition  point in the pelvis at the level level of solid pelvic masses]    This report will be copied to the [facility] to ensure a  provider acknowledges the finding. [facility] is available Monday  through Friday 8am-3:30 pm.     I have personally reviewed the examination and initial interpretation  and I agree with the findings.    SHE PARADA MD         SYSTEM ID:  [id number]   POC US ABDOMEN LIMITED    Impression    Small pocket of ascites in the left umbilical area       Recent Labs   Lab 12/08/22  0600 12/07/22  1604 12/06/22  0932   WBC 0.2* 0.2* 0.6*   HGB 7.1* 7.8* 8.7*   HCT 23.2* 25.5* 28.0*   MCV 81 81 81   PLT 28* 39* 85*     Recent Labs   Lab 12/08/22  0600 12/07/22  1428 12/06/22  0932    134* 136   POTASSIUM 3.7 3.3* 3.2*   CHLORIDE 104 101 103   CO2 19* 19* 16*   ANIONGAP 13 14 17*   * 130* 120*   BUN 12.3 10.6 12.9   CR 1.16 1.08 1.03   GFRESTIMATED 89 >90 >90   FAISAL 8.3* 8.1* 8.9

## 2022-12-08 NOTE — PROGRESS NOTES
[facility]    Progress Note - Medicine Service, [facility] TEAM 4       Date of Admission:  12/7/2022    Assessment & Plan   Diego Buchanan is a 27 year old male admitted on 12/7/2022. 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 Found to have possible large bowel obstruction on CT 12/8. Colorectal surgery consulted.    Changes Today:  - CAPS consulted for diagnostic paracentesis given fever and abdominal pain  - CT abdomen pelvis ordered showing c/f large bowel obstruction. Also, worsening of cancer burden  - Colorectal surgery consulted  - Continue cefepime  - LR bolus    # Sepsis with neutropenic fever in setting of RSV infection  # Recent PNA s/p treatment  Febrile, tachycardic with neutropenia. LA negative. Patient recently treated for PNA based on CT findings on 12/1, s/p course of CTX and Azithromycin. No other symptoms, no shortness of breath, not requiring O2. RSV positive and likely underlying infectious source however undergoing complete infectious workup. UA negative. CXR stable. Procal 0.39  - Cefepime (12/7-)  - BCX with NGTD  - CT abdomen pelvis given concern for neutropenic colitis in setting of ongoing diarrhea  - CAPS consulted for diagnostic paracentesis given history of ascites  - Oncology following, recs appreciated   - Added on tessalon perles, mucinex, and APAP   - No need for neupogen at this time    # Possible large bowel obstruction  CT AP showing worsening cancer burden and c/f large bowel obstruction.  - Colorectal surgery consulted; appreciate recs    # Acute Kidney Injury, unspecified: based on a >150% or 0.3 mg/dL increase in last creatinine compared to past 90 day average, will monitor renal function     - LR bolus     # Diarrhea  Distended stomach however this is chronic, recent CT A/P 10/04 with worsening disease burden as  likely cause of distension. Diarrhea also chronic and unchanged. 3 loose stools daily. Unlikely to be infectious however no recent testing so will r/o as cause of fevers. Enteric panel and c.diff negative  - CT AP ordered     # Hypokalemia  - RN replacement protocol     # Tachycardia  Chronic, 105-120. Likely increased 2/2 fever.  - PTA Metoprolol 25 BID     # L pleural effusion   Moderate pleural effusion on 12/1 CT. Patient plan to see thoracic surgery as outpatient for thoracentesis. No current indication for inpatient drainage, unlikely source of infection nd patient already received abx so unlikely to diagnostically yield anything. Will plan to treat as outpatient.      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, Bone, Liver  - Onc consulted, recs appreciated     # Neutropenia  # Anemia  # Thrombocytopenia   Likely expected 2/2 recent chemo (last 11/28). No signs of bleeding or bruising, VSS.  - Anemia work up showing low B12; started on supplement  - No need for neupogen at this time per oncology     # Hypocalcemia: Lowest Ca = 8.1 mg/dL in last 2 days, will monitor and replace as appropriate     - Likely 2/2 low albumin    # Hypoalbuminemia: Lowest albumin = 3.4 g/dL at 12/8/2022  6:00 AM, will monitor as appropriate      Diet: Combination Diet Regular Diet Adult  Snacks/Supplements Adult: Other; PRN; Between Meals    DVT Prophylaxis: Pneumatic Compression Devices  Busby Catheter: Not present  Fluids: None  Central Lines: PRESENT     Cardiac Monitoring: None  Code Status: Full Code      Disposition Plan      Expected Discharge Date: 12/10/2022                The patient's care was discussed with Dr. Fletcher.    Miles Mar Jr., MD  Medicine Service, [facility] TEAM [address] [place] [address]  Securely message with the Vocera Web Console (learn more here)  Text page via [facility] Paging/Directory   Please see signed in provider  for up to date coverage information  ___________________________________________________________________    Interval History   NAEO. Febrile this morning and received acetaminophen which broke the fever. Ongoing diarrhea and abdominal bloating. Cough ongoing for more than a week. Denies chest pain or shortness of breath. No other issues. Mother at bedside.    4 point ROS negative otherwise.    Data reviewed today: I reviewed all medications, new labs and imaging results over the last 24 hours.    Physical Exam   Vital Signs: Temp: (P) 98.3  F (36.8  C) Temp src: (P) Oral BP: (P) 109/41 Pulse: (!) (P) 123   Resp: (P) 19 SpO2: (P) 98 % O2 Device: None (Room air)    Weight: 249 lbs 8 oz  General Appearance: Appears in no acute distress  Respiratory: non-labored breathing on room air. Clear to auscultation  Cardiovascular: RRR  GI: distended mildly, mild tenderness to palpation  Skin: no bruising. Rash on abdomen c/w eczema  Other: Motor and sensation intact    Data   Recent Labs   Lab 12/08/22  0600 12/07/22  1604 12/07/22  1428 12/06/22  0932   WBC 0.2* 0.2*  --  0.6*   HGB 7.1* 7.8*  --  8.7*   MCV 81 81  --  81   PLT 28* 39*  --  85*   INR 1.15  --   --   --      --  134* 136   POTASSIUM 3.7  --  3.3* 3.2*   CHLORIDE 104  --  101 103   CO2 19*  --  19* 16*   BUN 12.3  --  10.6 12.9   CR 1.16  --  1.08 1.03   ANIONGAP 13  --  14 17*   FAISAL 8.3*  --  8.1* 8.9   *  --  130* 120*   ALBUMIN 3.4*  --  3.7 3.8   PROTTOTAL 6.6  --  7.0 7.3   BILITOTAL 0.7  --  1.0 0.8   ALKPHOS 109  --  131* 145*   ALT 63*  --  82* 104*   AST 25  --  40 51*     Recent Results (from the past 24 hour(s))   XR Chest Port 1 View    Narrative    Exam: XR CHEST PORT 1 VIEW, 12/7/2022 8:35 PM    Indication: cough, c/f infiltrate    Comparison: 12/1/2022    Findings:   Normal course of the trachea. Cardiomediastinal silhouette within  normal limits.  Prominent left hilum consistent with known mass and  lymphadenopathy. Please see CT of  12/1/2022. Small left pleural  effusion. No right pleural effusion. No appreciable pneumothorax.  Mixed opacities in the left lung are unchanged from CT of 12/1/2022.  Stable right midlung opacity.      Impression    Impression:   1. Stable mixed opacities in the left lung. Stable focal opacity in  the right midlung. These were present on CT dated 12/1/2022.  2. Stable small left pleural effusion.  3. Unchanged left hilar mass and adenopathy.    I have personally reviewed the examination and initial interpretation  and I agree with the findings.    SHUBHAM ALVAREZ MD         SYSTEM ID:  [id number]   CT Abdomen Pelvis w/o Contrast   Result Value    Radiologist flags (Urgent)     Concern for large bowel obstruction with transition    Narrative    EXAMINATION: CT ABDOMEN PELVIS W/O CONTRAST, 12/8/2022 11:51 AM    TECHNIQUE:  Helical CT images from the lung bases through the  symphysis pubis were obtained without IV contrast.     COMPARISON: 10/4/2022, 12/1/2022 chest CT    HISTORY: c/f neutropenic colitis    FINDINGS:  CHEST:  LUNGS:   New, diffuse groundglass, tree-in-bud nodules and scattered small  consolidations throughout the visualized lungs.  New small left pleural effusion.    MEDIASTINUM:   New pericardial and paraesophageal lymphadenopathy for example  measuring 0.8 cm (series 2, image 27).  CT criteria for anemia.  Nonenlarged heart. No pericardial effusion.    ABDOMEN/PELVIS:  LIVER:  Increased hepatic metastases, greater than 20 in number for example  measuring 3.5 x 2.5 cm on the left previously 2.7 x 2.2 cm metastasis  series 2, image 52) and 2.7 x 2.1 cm, previously 1.8 x 1.6 cm on the  right (series 2, image 58).    STOMACH:   Decompressed which limits evaluation    BILIARY:   No biliary ductal dilation. Gallstones.    PANCREAS:   Normal.    SPLEEN:  Increased hypodense splenic mass measuring 2.9 x 2.7 cm previously 2.8  x 2.4 cm presence of series 2, image 83).    ADRENAL GLANDS:   Normal.    :  Normal  kidneys.  Non-thickened urinary bladder.  Moderate mass effect upon the urinary bladder.    BOWEL/PERITONEUM:   Progression of peritoneal carcinomatosis with 100's of diffuse solid  nodular masses throughout the peritoneum and mesentery, encasing small  bowel and colon in the pelvis with scattered pockets of fluid that are  slightly increased in size for example in the left abdomen (series 2,  image 156). There is marked omental caking for example measuring 26 x  8.6 cm previously 24 x 6.4 cm (series 2, image 136).  Mildly dilated transverse colon filled with air and fluid measuring up  to 6.3 cm with level of transition at the sigmoid colon which is  completely overtaken by solid masses and not well evaluated. Small  volume of fluid in the remaining colon and the rectum.     Unchanged fluid collection in the right abdomen measuring 8.8 x 6.8 cm  (series 2, image 160).  Nonvisualization of the appendix.   No pneumoperitoneum. No free fluid.    RETROPERITONEUM/:  Stable borderline retroperitoneal lymphadenopathy measuring 0.9 cm  (series 2, image 124). Stable left common iliac lymphadenopathy  measuring 1.1 cm (series 2, image 155).    VESSELS:   Limited evaluation on non-contrast exam.    LYMPH NODES:   No lymphadenopathy.    BONES/SOFT TISSUES:   Unchanged spiculated, sclerotic focus is in the left sacrum consistent  with a bone island.  No acute osseous abnormality.      Impression    IMPRESSION:  1.  New concern for large bowel obstruction with transition point at  the level of innumerable solid pelvic masses. Correlation with GI  dysfunction.  2.  Progression of metastatic disease with increased peritoneal  carcinomatosis, omental caking and malignant ascites, new mediastinal  lymphadenopathy, increased hepatic metastases, and increased splenic  metastasis compared to 10/4/2022 CT scan.    3.  Increased diffuse groundglass tree-in-bud nodules and  consolidations throughout the lungs in a pattern concerning  for  bronchial pneumonia with atypical viral and fungal etiologies a strong  consideration in an immune compromised patient. Metastatic disease not  excluded.  4.  Decreased small left pleural effusion.      [Access Center: Concern for large bowel obstruction with transition  point in the pelvis at the level level of solid pelvic masses]    This report will be copied to the [facility] to ensure a  provider acknowledges the finding. Access Center is available Monday  through Friday 8am-3:30 pm.     I have personally reviewed the examination and initial interpretation  and I agree with the findings.    SHE PARADA MD         SYSTEM ID:  [id number]   POC US ABDOMEN LIMITED    Impression    Small pocket of ascites in the left umbilical area

## 2022-12-08 NOTE — CONSULTS
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 Sims  Current Treatment Plan: Irinotecan/Temodar     Reason for Consult: Appreciate recs for workup or abx given history of metastatic desmoplastic small round cell s/p PNA tx last week, admitted for neutropenic fever and +RSV.      Assessment & Plan:   Diego Buchanan is a 27 year old male admitted on 12/7/2022 with a neutropenic fever. He has a history of desmoplastic small round cell tumor c/b peritoneal carcinomatosis and mets to lymph nodes, bone, liver.     Recommendations:   - RSV+ and agree with supportive measures - added tessalon perles, mucinex, APAP. Agree with continuing cefepime and monitoring BCx x 24-48hrs. No need for neupogen at this time.   - Primary team obtaining CT A/P to evaluate for intra-abdominal infection - would consider addition of flagyl vs switch to zosyn if evidence.  - Recommend pushing PO hydration and continue to monitor electrolytes - could consider gentle hydration if needed, although hopeful to limit in the setting of known pleural effusions and ascites.   - Diarrhea significantly improved per patient and sister - continue to monitor but avoiding antimotility agents and stool softeners as well.     Addend: Reviewed CT A/P of abdomen with concern for large bowel obstruction in the setting of extensive peritoneal carcinomatosis, pelvic masses and omental caking. Reviewed outside imaging (11/11) from OSH and appears slightly progressed from prior. Difficult to assess in the setting of recent chemotherapy and associated bowel inflammation, although reassuringly diarrhea resolving. Will continue to monitor.      # Neutropenic fevers  # RSV+  # Diarrhea, resolved  Patient most  recently received C1D1 irinotecan/temodar on 11/28/22. Did not receive neulasta support. Was recently treated for pneumonia after evidence on CT PE with scattered ground glass opacities - the bronchi are patent but there is concern for possible post obstructive process as well as trace right pleural effusion and a moderate left pleural effusion with left lingular atelectasis. He was started on Augmentin 875 mg BID x 5 days (12/1) plus Azithromycin 500 mg on day 1, 250 mg from days 2-5. He had noted diarrhea for the past 7 days outpatient, likely secondary to irinotecan, which is now significantly improved. He notes a max of ~9 episodes daily last week, which improved after antimotility agents. He has now stopped taking these, as his diarrhea has improved, and yesterday had 3 episodes of loose bowel movements. No abdominal pain. He presented to ED on 12/7 after tnax 100.4 outpatient with associated cough and shortness of breath. He was started on cefepime. RSV+. UA bland. CXR with stable mixed opacities in left lung, stable focal opacity in right midlung.   - Continue supportive measures with tessalon perles, mucinex, APAP.   - Continue cefepime (12/7 - X).  - BCx NGTD  - Continue to monitor for loose stools.  - No need for neupogen at this time.    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 Sims. See below for full oncologic history. In summary from outpatient notes, he tolerated CAV/IMV for 19 cycles and then was on chemotherapy break from June 2021-April 2022. Given return in symptoms he resumed chemotherapy with further CAV/IMV in April 2022.  However, he has had evidence of progression on the two most recent scans. Robert and his family elected to take time to try traditional medicine treatments. Last CT-CAP (10/4/22) showed evidence of progressive disease, predominantly in liver. There are small sub-centimeter lung nodules,  which are suspicious for metastasis. He started irinotecan 20 mg/m2 days 1-5/temozolomide 250 mg daily days 1-5 every 21 days with his first cycle on 11/28/22. On 12/1, he had a chest CT for SOB which was negative for acute PE. However, it showed scattered ground glass opacities suspicious for pneumonia. The bronchi is patent but there is concern for possible post obstructive process. There is a trace right pleural effusion and a moderate left pleural effusion with left lingular atelectasis. He was started on Augmentin 875 mg BID x 5 days plus Azithromycin 500 mg on day 1, 250 mg from days 2-5. Thoracic was also notified to set up thoracentesis for left pleural effusion and to time with port replacement. most recently was transitioned to irinotecan/temodar (C1D1 = 11/28/22) due to progression after treatment break.     We will continue to follow this patient. Please do not hesitate to page with any questions or concerns.    I spent 100 minutes in the care of this patient today, which included time necessary for review of interval events, obtaining history and physical exam, ordering medications/tests/procedures as medically indicated, review of pertinent medical literature, counseling of the patient, coordination of care, and documentation time. Over 50% of time was spent counseling the patient and/or coordinating care.    Patient was seen and plan of care was discussed with attending physician, Dr. Castrejon.    Note prepared by HEMA Blackburn and edited/formalized by Cherelle Arango PA-C.    Cherelle Arango PA-C   Hematology/Oncology   Pager: [phone]     History of Present Illness:    Diego Buchanan is a 27 year old male admitted on 12/7/2022. He has a history of desmoplastic small round cell tumor c/b peritoneal carcinomatosis and mets to lymph nodes, bone, liver.  He was admitted with a neutropenic fever and subsequently found to be RSV+ on infectious studies.     His sister assisted with HPI. He has been  dealing with diarrhea since receiving chemo on 11/28 but these have decreased in frequency, with none occurring today as of the time patient was seen by the team. His stool studies including C. diff were negative, UA negative, blood cultures currently have no growth (12 hours), and his chest XR had stable findings compared to his last chest CT on 12/1/22, some of which could overlap with current RSV infection. In the meantime patient is on cefepime 2g IV q8hrs. He reports dry cough, abdominal distention, and rash of the right abdomen. Denies headache, oral lesions, chest pain, SOB, abdominal pain, urinary symptoms, hematochezia, nausea, and dizziness.     Oncologic History:  1. He presented initially with abdominal pain, diarrhea, and progressive abdominal distention for 3 months duration. 2. Initial CT scan in February 2020 showed severe peritoneal carcinomatosis with large peritoneal tumor implants throughout the entire abdomen and pelvis, but mostly prominently in the pelvis.   2. PETCT scan showed interval increase in the amount of peritoneal carcinomatosis.  3. On 3/12/2020, he had ultrasound-guided core needle biopsy of a peritoneal implant (Case: [id number]), which showed a completely undifferentiated appearance, but stains with pancytokeratin, indicating an epithelial origin. 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 Tumor markers for CA-19-9, CEA, beta-hCG, alpha-fetoprotein were unremarkable. Cancer type ID molecular testing by [facility] sent to determine the exact diagnosis and to assist in further treatment planning. 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 Relative probability of less than 5% of other subtype of sarcoma. EWSR1-WT1 fusion detected.  4. 5/1/2020, MRI brain negative for brain metastasis, and baseline CT-CAP obtained showing extensive mixed  solid and cystic masses throughout the abdomen and pelvis consistent with peritoneal carcinomatosis. Largest mass measures approximately 20 cm in the pelvis. Metastatic mixed solid and cystic masses seen in hepatic segments 5 and 6 and hepatic segment 7. The masses appear to be contiguous with the retrohepatic peritoneal carcinomatosis. Small volume fluid about the spleen favored to represent malignant ascites, stable mild-to-moderate right hydronephrosis related to mass effect upon the distal ureter in the pelvis, the IVC and iliac veins are compressed due to the extensive peritoneal carcinomatosis without findings to suggest deep venous thrombosis.  5. 5/4/2020, he begins CAV/IMV alternating chemotherapy. He receives 6 cycles of treatment. He symptomatically improves.  6. 9/11/2020, CT-CAP shows stable to mildly improved extensive peritoneal carcinomatosis. He would like to omit Ifosfamide/etoposide cycles and continue only with CAV.  7. 10/9/2020, he starts CAV every 21 days.  8. 1/6/2021, CT CAP showed a mixed response in the mixed solid and cystic masses throughout the peritoneum. Some of the tumors are stable to mildly worse, with some of the peritoneal masses a bit larger, a few are smaller, one cystic tumor in the left abdomen is smaller, while tumors lower in the pelvis appear slightly larger.  9. 3/24/2021, CT CAP showed continued slight decrease in overall burden of peritoneal carcinomatosis with persistent encasement of bowel without evidence of bowel obstruction.  10. 6/25/2021, he receives cycle 19 CAV, then takes a chemotherapy holiday.  11. 4/22/2022, he resumes CAV/IMV chemotherapy.  12. 7/13/22, CT CAP showed interval enlargement of hepatic and splenic metastases with other overall disease stable. CAV/IMV continued.  13. 10/04/22, CT CAP showed evidence of progressive disease, predominantly in liver. There are small sub-centimeter lung nodules, which are suspicious for metastasis. CAV/IMV  discontinued.   14. Started irinotecan/temozolomide on 11/28/22.      Review of Systems:  A comprehensive ROS was performed and found to be negative or non-contributory with the exception of that noted in the HPI above.    Past Medical History:  Past Medical History:   Diagnosis Date     Hypertension      Learning disability     but pt is his own gaurdian     Peritoneal carcinomatosis (H)        Past Surgical History:  Past Surgical History:   Procedure Laterality Date     BIOPSY      liver     INSERT PORT VASCULAR ACCESS Right 4/21/2022    Procedure: INSERTION, VASCULAR ACCESS PORT;  Surgeon: Daniel Sarmiento MD;  Location: [facility] OR     IR CHEST PORT PLACEMENT > 5 YRS OF AGE  4/21/2022     IR CVC TUNNEL PLACEMENT > 5 YRS OF AGE  4/29/2020     IR CVC TUNNEL REMOVAL RIGHT  11/16/2021     US MUSCLE BIOPSY  3/12/2020       Social History:  Social History     Socioeconomic History     Marital status: Single     Spouse name: None     Number of children: None     Years of education: None     Highest education level: None   Tobacco Use     Smoking status: Never     Smokeless tobacco: Never   Substance and Sexual Activity     Alcohol use: Never     Drug use: Never        Family History  Family History   Problem Relation Age of Onset     Hypertension Mother        Outpatient Medications:  sodium chloride (PF) 0.9% PF flush 30 mL    acetaminophen (TYLENOL) 325 MG tablet, Take 2 tablets (650 mg) by mouth every 4 hours as needed for mild pain or fever  amoxicillin-clavulanate (AUGMENTIN) 875-125 MG tablet, Take 1 tablet by mouth 2 times daily  metoprolol tartrate (LOPRESSOR) 25 MG tablet, Take 1 tablet (25 mg) by mouth 2 times daily (Patient not taking: Reported on 12/7/2022)  ondansetron (ZOFRAN) 4 MG tablet, Take 1 tablet (4 mg) by mouth daily 45 minutes before taking Temodar on chemotherapy days (Patient not taking: Reported on 12/7/2022)  polyethylene glycol (MIRALAX) 17 GM/Dose powder, Take 17 g by mouth daily (Patient  "not taking: Reported on 12/7/2022)  potassium chloride ER (K-TAB) 20 MEQ CR tablet, Take 2 tablets (40 mEq) by mouth daily for 2 days (Patient not taking: Reported on 12/7/2022)  prochlorperazine (COMPAZINE) 10 MG tablet, Take 1 tablet (10 mg) by mouth every 6 hours as needed (Nausea/Vomiting) (Patient not taking: Reported on 12/7/2022)  senna-docusate (SENNA S) 8.6-50 MG tablet, Take 1 tablet by mouth 2 times daily as needed for constipation (Patient not taking: Reported on 12/7/2022)  temozolomide (TEMODAR) 250 MG capsule, Take 1 capsule (250 mg) by mouth daily for 5 days , about 45 minutes after taking Zofran on chemotherapy days. (Patient not taking: Reported on 12/7/2022)       Physical Exam:    Blood pressure 109/60, pulse (!) 130, temperature 98.6  F (37  C), temperature source Oral, resp. rate 21, height 1.727 m (5' 8\"), weight 113.2 kg (249 lb 8 oz), SpO2 96 %.  General: alert and cooperative, lying in bed, no acute distress  HEENT: sclera anicteric, EOMI  CV: Regular rate, tachycardic, no murmurs  Resp: decreased breath sounds bilaterally  GI: firm, distended, bowel sounds present and hypoactive  MSK: warm and well-perfused, normal tone  Skin: no jaundice on limited exam. There is an erythematous macular rash of the right-sided abdomen with some diffuse scattered < 1 mm satellites.   Neuro: Alert and interactive, moves all extremities equally, no focal deficits    Labs & Studies: I personally reviewed the following studies:  ROUTINE LABS (Last four results):  CMP  Recent Labs   Lab 12/08/22  0600 12/07/22  1428 12/06/22  0932    134* 136   POTASSIUM 3.7 3.3* 3.2*   CHLORIDE 104 101 103   CO2 19* 19* 16*   ANIONGAP 13 14 17*   * 130* 120*   BUN 12.3 10.6 12.9   CR 1.16 1.08 1.03   GFRESTIMATED 89 >90 >90   FAISAL 8.3* 8.1* 8.9   MAG  --   --  2.0   PHOS  --   --  2.8   PROTTOTAL 6.6 7.0 7.3   ALBUMIN 3.4* 3.7 3.8   BILITOTAL 0.7 1.0 0.8   ALKPHOS 109 131* 145*   AST 25 40 51*   ALT 63* 82* 104* "     CBC  Recent Labs   Lab 12/08/22  0600 12/07/22  1604 12/06/22  0932   WBC 0.2* 0.2* 0.6*   RBC 2.85* 3.14* 3.48*   HGB 7.1* 7.8* 8.7*   HCT 23.2* 25.5* 28.0*   MCV 81 81 81   MCH 24.9* 24.8* 25.0*   MCHC 30.6* 30.6* 31.1*   RDW 17.7* 17.8* 17.9*   PLT 28* 39* 85*     INR  Recent Labs   Lab 12/08/22  0600   INR 1.15

## 2022-12-09 NOTE — PROVIDER NOTIFICATION
"REGAN 4 PAGED \"1y, 4723, Chanel.   Critical Hgb = 6.8 this AM. Platelet = 26, WBC = 0.2.   Thanks, Bree Gregory\"      "

## 2022-12-09 NOTE — PROGRESS NOTES
Paged for abdominal pain.  Patient seen and assessed at the bedside.    On exam patient has no acute signs of peritonitis.  No tenderness to palpation over any quadrants. Does endorse left and right sided abdominal pain in superior quadrants but worse on left. Patient does endorse an increase in abdominal pain this evening relative to previous, however he notes that he is still having ongoing bowel movements.  He mentioned specifically concern of having several large bowel movements that have been watery.  On exam he does appear mild to moderately uncomfortable at baseline, however he also states that the pain although increased earlier in the evening is slightly better at the moment of examination.  As patient is continuing to take p.o. and endorses ongoing bowel movements, feel he will be able to tolerate p.o. medications which is consistent with the remainder of his med list.  Will treat pain with Dilaudid p.o.    Mykel Fuentes MD  PGY-1, Internal Medicine

## 2022-12-09 NOTE — PLAN OF CARE
"Vital signs:  Temp: (!) 96.6  F (35.9  C) Temp src: Oral BP: 108/50 Pulse: 116   Resp: 18 SpO2: 96 % O2 Device: None (Room air)   Height: 172.7 cm (5' 8\") Weight: 113.2 kg (249 lb 8 oz)    [id number]:    Activity: Up to bathroom with SBA.   Neuros: A & O x4. Neuro intact. Patient's sister stated that patient was not a good  and stated patient was in pain.   Cardiac: Tachy in the 110s-130s. BPs 100s/50s. Asymptomatic.   Respiratory: LS coarse and diminished. Denies SOB. Unlabored. Patient has infrequent nonproductive cough. O2 sats high 90s on RA  GI/: Abdomen rounded/distended, passing flatus, reported having 7 loose brown stools. Voiding, not saving.   Diet: Regular diet. Encouraged oral fluid intake.  Skin: Rash abdomen, asymptomatic.  Lines: Left PIV TKO. Cefepime 2g and Phosphorus replaced. LR bolus 500 mL infused per orders.   Incisions/Drains: None.   Labs: Phosphorus level 2.8.   Pain/nausea: PRN oral Tylenol 325mg x1 for temp 99.9 and feeling feverish effective, recheck temp 96.6. Patient c/o left abdominal cramping sharp pain, notified provider who ordered PO Dilaudid 1mg every three hours PRN, patient slept. Denies nausea.   New changes this shift: PRN oral Dilaudid.   Plan: Continue POC.         "

## 2022-12-09 NOTE — PLAN OF CARE
"Goal Outcome Evaluation:    Plan of Care Reviewed With: patient  Overall Patient Progress: no changeOverall Patient Progress: no change  BP (P) 109/41 (BP Location: Left arm, Cuff Size: Adult Regular)   Pulse (!) (P) 123   Temp (P) 98.3  F (36.8  C) (Oral)   Resp (P) 19   Ht 1.727 m (5' 8\")   Wt 113.2 kg (249 lb 8 oz)   SpO2 (P) 98%   BMI 37.94 kg/m      NEURO: A&O x4. Slow to respond. Calls appropriately.   RESPIRATORY: WDL, sating adequately on RA, denies SOB.   CARDIAC: Tachy at baseline, denies chest pain.   GI/: Voiding adequately without difficulty. +BS, passing flatus, pt reports loose stools.   DIET: Regular, tolerating.   PAIN: Denies.   IV ACCESS: L PIV infusing tko and abx.   INCISION/DRAINS/SKIN: no new skin deficits noted. R chest port accessed, but no longer able to be used due to being misplaced.   ACTIVITY: SBA.   PLAN: Continue POC.     "

## 2022-12-09 NOTE — PROGRESS NOTES
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 Burciaga  Current Treatment Plan: Irinotecan/Temodar (C1D1 = 11/28/22)     Reason for Consult: Appreciate recs for workup or abx given history of metastatic desmoplastic small round cell s/p PNA tx last week, admitted for neutropenic fever and +RSV.       Assessment & Plan:   Diego Buchanan is a 27 year old male with a history of desmoplastic small round cell tumor c/b peritoneal carcinomatosis and mets to lymph nodes, bone, liver who was admitted on 12/7/2022 for neutropenic fevers.     Recommendations:   - Appreciate ongoing supportive management for partial large bowel obstruction. Agree that surgical intervention/stenting not appropriate in the setting of extensive tumor burden and pancytopenia. Patient has most recently been starting on a new regimen for his malignancy with known GI toxicity, so query some component on inflammation secondary to irinotecan as well.   - Agree with pRBC transfusion today. Would plan to transfuse for hgb <7, plts <10k. Anticipate count recovery in coming days, currently within time frame for chadd from recent chemotherapy. Monitor CBC w/ diff daily. No need for neupogen at this time.  - Fever curve improving - continue supportive management for RSV. Okay to continue cefepime at this time. BCx NGTD.      # Neutropenic fevers  # RSV+  # Diarrhea, resolved  Patient most recently received C1D1 irinotecan/temodar on 11/28/22. Did not receive neulasta support. Was recently treated for pneumonia after evidence on CT PE with scattered ground glass opacities - the bronchi are patent but there is concern for possible post obstructive process as well as trace  right pleural effusion and a moderate left pleural effusion with left lingular atelectasis. He was started on Augmentin 875 mg BID x 5 days (12/1) plus Azithromycin 500 mg on day 1, 250 mg from days 2-5. He had noted diarrhea for the past 7 days outpatient, likely secondary to irinotecan, which is now significantly improved. He notes a max of ~9 episodes daily last week, which improved after antimotility agents. He has now stopped taking these, as his diarrhea has improved, and yesterday had 3 episodes of loose bowel movements. No abdominal pain. He presented to ED on 12/7 after tnax 100.4 outpatient with associated cough and shortness of breath. He was started on cefepime. RSV+. UA bland. CXR with stable mixed opacities in left lung, stable focal opacity in right midlung.   - Continue supportive measures with tessalon perles, mucinex, APAP.   - Continue cefepime (12/7 - X).  - BCx NGTD  - Continue to monitor for loose stools.  - No need for neupogen at this time.     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 Sims. See below for full oncologic history. In summary from outpatient notes, he tolerated CAV/IMV for 19 cycles and then was on chemotherapy break from June 2021-April 2022. Given return in symptoms he resumed chemotherapy with further CAV/IMV in April 2022.  However, he has had evidence of progression on the two most recent scans. Robert and his family elected to take time to try traditional medicine treatments. Last CT-CAP (10/4/22) showed evidence of progressive disease, predominantly in liver. There are small sub-centimeter lung nodules, which are suspicious for metastasis. He started irinotecan 20 mg/m2 days 1-5/temozolomide 250 mg daily days 1-5 every 21 days with his first cycle on 11/28/22. On 12/1, he had a chest CT for SOB which was negative for acute PE. However, it showed scattered ground glass opacities  suspicious for pneumonia. The bronchi is patent but there is concern for possible post obstructive process. There is a trace right pleural effusion and a moderate left pleural effusion with left lingular atelectasis. He was started on Augmentin 875 mg BID x 5 days plus Azithromycin 500 mg on day 1, 250 mg from days 2-5. Thoracic was also notified to set up thoracentesis for left pleural effusion and to time with port replacement. most recently was transitioned to irinotecan/temodar (C1D1 = 11/28/22) due to progression after treatment break.      We will continue to follow this patient. Please do not hesitate to page with any questions or concerns.     I spent 60 minutes in the care of this patient today, which included time necessary for review of interval events, obtaining history and physical exam, ordering medications/tests/procedures as medically indicated, review of pertinent medical literature, counseling of the patient, coordination of care, and documentation time. Over 50% of time was spent counseling the patient and/or coordinating care.     Patient was seen and plan of care was discussed with attending physician, Dr. Castrejon.     Cherelle Arango PA-C   Hematology/Oncology   Pager: [phone]     ___________________________________________________________________    Subjective & Interval History:    Overnight, patient noted to have worsening abdominal pain on the left side of his abdomen. He states his pain is still present this morning. He is interacting appropriately and sitting up on edge of bed. He was afebrile overnight and denies other symptoms this morning. States he had multiple loose bowel movements overnight as well. GI and CRS teams consulted. Discussed with patient's mom (in person) and sister (interpreting over the phone) our ongoing supportive recommendations. Agreeable to blood transfusion support. Questions answered at bedside.    Physical Exam:    Blood pressure 134/74, pulse (!) 124,  "temperature 97.1  F (36.2  C), temperature source Oral, resp. rate 18, height 1.727 m (5' 8\"), weight 113.2 kg (249 lb 8 oz), SpO2 96 %.  General: alert and cooperative, sitting up in bed, no acute distress  HEENT: sclera anicteric, EOMI  CV: appears well-perfused  Resp: breathing comfortably on room air  GI: distended abdomen, firm and mildly tender diffusely on 12/8; complete abdominal exam deferred to GI on 12/9 given patient pain  MSK: warm and well-perfused, normal tone  Skin: no jaundice on limited exam. There is an erythematous macular rash of the right-sided abdomen with some diffuse scattered < 1 mm satellites.   Neuro: Alert and interactive, moves all extremities equally, no focal deficits    Labs & Studies: I personally reviewed the following studies:  ROUTINE LABS (Last four results):  CMP  Recent Labs   Lab 12/09/22  0731 12/09/22  0007 12/08/22  1344 12/08/22  0600 12/07/22  1428 12/06/22  0932     --   --  136 134* 136   POTASSIUM 3.1*  --   --  3.7 3.3* 3.2*   CHLORIDE 106  --   --  104 101 103   CO2 17*  --   --  19* 19* 16*   ANIONGAP 14  --   --  13 14 17*   *  --   --  107* 130* 120*   BUN 12.7  --   --  12.3 10.6 12.9   CR 0.98  --   --  1.16 1.08 1.03   GFRESTIMATED >90  --   --  89 >90 >90   FAISAL 8.3*  --   --  8.3* 8.1* 8.9   MAG  --   --  1.8 1.9  --  2.0   PHOS  --  2.8 1.7* 2.1*  --  2.8   PROTTOTAL 6.4  --   --  6.6 7.0 7.3   ALBUMIN 3.2*  --   --  3.4* 3.7 3.8   BILITOTAL 0.5  --   --  0.7 1.0 0.8   ALKPHOS 118  --   --  109 131* 145*   AST 19  --   --  25 40 51*   ALT 40  --   --  63* 82* 104*     CBC  Recent Labs   Lab 12/09/22  0731 12/08/22  0600 12/07/22  1604 12/06/22  0932   WBC 0.2* 0.2* 0.2* 0.6*   RBC 2.79* 2.85* 3.14* 3.48*   HGB 6.8* 7.1* 7.8* 8.7*   HCT 22.6* 23.2* 25.5* 28.0*   MCV 81 81 81 81   MCH 24.4* 24.9* 24.8* 25.0*   MCHC 30.1* 30.6* 30.6* 31.1*   RDW 17.5* 17.7* 17.8* 17.9*   PLT 26* 28* 39* 85*     INR  Recent Labs   Lab 12/08/22  0600   INR 1.15 "       Medications list for reference:  Current Facility-Administered Medications   Medication     acetaminophen (TYLENOL) tablet 325 mg     benzonatate (TESSALON) capsule 100 mg     ceFEPIme (MAXIPIME) 2 g vial to attach to  ml bag for ADULTS or 50 ml bag for PEDS     cyanocobalamin (VITAMIN B-12) tablet 100 mcg     guaiFENesin (MUCINEX) 12 hr tablet 600 mg     HYDROmorphone (DILAUDID) half-tab 1 mg     lidocaine (LMX4) cream     lidocaine 1 % 0.1-1 mL     melatonin tablet 1 mg     metoprolol tartrate (LOPRESSOR) tablet 25 mg     naloxone (NARCAN) injection 0.2 mg    Or     naloxone (NARCAN) injection 0.4 mg    Or     naloxone (NARCAN) injection 0.2 mg    Or     naloxone (NARCAN) injection 0.4 mg     ondansetron (ZOFRAN ODT) ODT tab 4 mg    Or     ondansetron (ZOFRAN) injection 4 mg     prochlorperazine (COMPAZINE) injection 10 mg    Or     prochlorperazine (COMPAZINE) tablet 10 mg    Or     prochlorperazine (COMPAZINE) suppository 25 mg     sodium chloride (PF) 0.9% PF flush 3 mL     sodium chloride (PF) 0.9% PF flush 3 mL     Facility-Administered Medications Ordered in Other Encounters   Medication     sodium chloride (PF) 0.9% PF flush 30 mL

## 2022-12-09 NOTE — CONSULTS
GASTROENTEROLOGY CONSULTATION      Date of Admission:  12/7/2022          Chief Complaint:   We were asked by Silvio Shah of CRS team to evaluate this patient with findings of large bowel obstruction with dilated transverse colon with transition at sigmoid colon which appears to be filled with solid tumor, in the setting of extensive peritoneal carcinomatosis, pelvic masses, omental caking, malignant ascites for possible stenting .       ASSESSMENT AND RECOMMENDATIONS:   Assessment:  Diego Buchanan is a 27 year old male with a history of desmoplastic small round cell tumor complicated by peritoneal carcinomatosis and mets to lymph nodes, liver, and bone admitted in view of neutropenia and RSV+ with LBO 2/2 innumerable peritoneal mets who GI is consulted on to evaluate colon stenting for suspected large bowel obstruction.     # Suspected large bowel obstruction with peritoneal carcinomatosis   # Desmoplastic small round cell tumor   - The patient has ongoing bowel movements. Left sided abdominal pain.  - CT A/P 12/8/22 with findings of large bowel obstruction with dilated transverse colon with transition at sigmoid colon which appears to be filled with solid tumor, in the setting of extensive peritoneal carcinomatosis, pelvic masses, omental caking, malignant ascites.     Recommendations  - No recommendations from GI for colon stenting given the large tumour burden that extends from sigmoid colon till the rectum. In addition, stenting would need to be transanal. The risks of attempting to stent would outweigh the benefits in this patient with neutropenia and thrombocytopenia.   - No further recommendations from GI. GI will sign off.     Gastroenterology follow up recommendations: No OP follow up         Patient care plan discussed with Dr. Fajardo, GI staff physician. Thank you for involving us in this patient's care. Please do not hesitate to contact the GI service with any questions or concerns.      Blade Foster M.D  GI fellow  [id number]  Department of Gastroenterology   -------------------------------------------------------------------------------------------------------------------   History is obtained from the patient and the medical record.          History of Present Illness:   Diego Buchanan is a 27 year old male with a history of  desmoplastic small round cell tumor complicated by peritoneal carcinomatosis and mets to lymph nodes, liver, and bone admitted in view of neutropenia and RSV+ with LBO 2/2 innumerable peritoneal mets who GI is consulted on to evaluate colon stenting for suspected large bowel obstruction.   He was admitted on 12/7/22 with neutropenic fever, RSV +.He has had a chronic diarrhea with 3-4 stools every day . Yesterday he had 6-7 BM , which were watery , non bloody . Enteric panel and C Diff negative.   The patient was seen b GI today. He describes multiple small bowel movements over the last few days, and multiple over the last 24 hours.. left sided abdomina pain. No other GI complaints. CT A/P founds to have solid tumor extending from transverse colon till the rectum.   The patient was evaluated by colorectal surgery who did not recommend surgical interventions as the patient was a poor surgical candidate.         Past Medical History:   Reviewed and edited as appropriate  Past Medical History:   Diagnosis Date     Hypertension      Learning disability     but pt is his own gaurdian     Peritoneal carcinomatosis (H)             Past Surgical History:   Reviewed and edited as appropriate   Past Surgical History:   Procedure Laterality Date     BIOPSY      liver     INSERT PORT VASCULAR ACCESS Right 4/21/2022    Procedure: INSERTION, VASCULAR ACCESS PORT;  Surgeon: Daniel Sarmiento MD;  Location: [facility] OR     IR CHEST PORT PLACEMENT > 5 YRS OF AGE  4/21/2022     IR CVC TUNNEL PLACEMENT > 5 YRS OF AGE  4/29/2020     IR CVC TUNNEL REMOVAL RIGHT  11/16/2021       MUSCLE BIOPSY  3/12/2020            Previous Endoscopy:   No results found for this or any previous visit.         Social History:   Reviewed and edited as appropriate  Social History     Socioeconomic History     Marital status: Single     Spouse name: Not on file     Number of children: Not on file     Years of education: Not on file     Highest education level: Not on file   Occupational History     Not on file   Tobacco Use     Smoking status: Never     Smokeless tobacco: Never   Substance and Sexual Activity     Alcohol use: Never     Drug use: Never     Sexual activity: Not on file   Other Topics Concern     Parent/sibling w/ CABG, MI or angioplasty before 65F 55M? Not Asked   Social History Narrative     Not on file     Social Determinants of Health     Financial Resource Strain: Not on file   Food Insecurity: Not on file   Transportation Needs: Not on file   Physical Activity: Not on file   Stress: Not on file   Social Connections: Not on file   Intimate Partner Violence: Not on file   Housing Stability: Not on file            Family History:   Reviewed and edited as appropriate  Family History   Problem Relation Age of Onset     Hypertension Mother            Allergies:   Reviewed and edited as appropriate     Allergies   Allergen Reactions     Tegaderm Chg Dressing [Chlorhexidine]      Tegaderm Transparent Dressing (Informational Only) Itching     Tegaderm dressing; Okay for short periods of time            Medications:     Medications Prior to Admission   Medication Sig Dispense Refill Last Dose     acetaminophen (TYLENOL) 325 MG tablet Take 2 tablets (650 mg) by mouth every 4 hours as needed for mild pain or fever 30 tablet 0 Unknown at unknown     amoxicillin-clavulanate (AUGMENTIN) 875-125 MG tablet Take 1 tablet by mouth 2 times daily 10 tablet 0 2022 at am     [] azithromycin (ZITHROMAX) 250 MG tablet Take 2 tablets (500 mg) by mouth daily for 1 day, THEN 1 tablet (250 mg) daily for 4  "days. (Patient not taking: Reported on 2022) 6 tablet 0 Not Taking     metoprolol tartrate (LOPRESSOR) 25 MG tablet Take 1 tablet (25 mg) by mouth 2 times daily (Patient not taking: Reported on 2022) 60 tablet 0 Not Taking     ondansetron (ZOFRAN) 4 MG tablet Take 1 tablet (4 mg) by mouth daily 45 minutes before taking Temodar on chemotherapy days (Patient not taking: Reported on 2022) 30 tablet 1 Not Taking     polyethylene glycol (MIRALAX) 17 GM/Dose powder Take 17 g by mouth daily (Patient not taking: Reported on 2022) 510 g 0 Not Taking     [] potassium chloride ER (K-TAB) 20 MEQ CR tablet Take 2 tablets (40 mEq) by mouth daily for 2 days (Patient not taking: Reported on 2022) 4 tablet 0 Not Taking     prochlorperazine (COMPAZINE) 10 MG tablet Take 1 tablet (10 mg) by mouth every 6 hours as needed (Nausea/Vomiting) (Patient not taking: Reported on 2022) 30 tablet 0 Not Taking     senna-docusate (SENNA S) 8.6-50 MG tablet Take 1 tablet by mouth 2 times daily as needed for constipation (Patient not taking: Reported on 2022) 60 tablet 0 Not Taking     temozolomide (TEMODAR) 250 MG capsule Take 1 capsule (250 mg) by mouth daily for 5 days , about 45 minutes after taking Zofran on chemotherapy days. (Patient not taking: Reported on 2022) 5 capsule 0 Not Taking             Review of Systems:     A complete review of systems was performed and is negative except as noted in the HPI           Physical Exam:   /70 (BP Location: Left arm)   Pulse (!) 133   Temp 98.1  F (36.7  C) (Oral)   Resp 20   Ht 1.727 m (5' 8\")   Wt 113.2 kg (249 lb 8 oz)   SpO2 97%   BMI 37.94 kg/m    Wt:   Wt Readings from Last 2 Encounters:   22 113.2 kg (249 lb 8 oz)   22 115.1 kg (253 lb 12.8 oz)      Constitutional: cooperative, pleasant, not dyspneic/diaphoretic, no acute distress  Eyes: Sclera anicteric/injected  Ears/nose/mouth/throat: Normal oropharynx without ulcers or " exudate, mucus membranes moist, hearing intact  Neck: supple, thyroid normal size  CV: No edema  Respiratory: Unlabored breathing  Lymph: No axillary, submandibular, supraclavicular or inguinal lymphadenopathy  Abd: distended, +bs,  Hepatosplenomegaly +,tenderness + in left upper and lower quadrant, no peritoneal signs  Skin: warm, perfused, no jaundice  Neuro: AAO x 3, No asterixis  Psych: Normal affect  MSK: Normal gait         Data:   Labs and imaging below were independently reviewed and interpreted    BMP  Recent Labs   Lab 12/09/22  0731 12/08/22  0600 12/07/22  1428 12/06/22  0932    136 134* 136   POTASSIUM 3.1* 3.7 3.3* 3.2*   CHLORIDE 106 104 101 103   FAISAL 8.3* 8.3* 8.1* 8.9   CO2 17* 19* 19* 16*   BUN 12.7 12.3 10.6 12.9   CR 0.98 1.16 1.08 1.03   * 107* 130* 120*     CBC  Recent Labs   Lab 12/09/22  0731 12/08/22  0600 12/07/22  1604 12/06/22  0932   WBC 0.2* 0.2* 0.2* 0.6*   RBC 2.79* 2.85* 3.14* 3.48*   HGB 6.8* 7.1* 7.8* 8.7*   HCT 22.6* 23.2* 25.5* 28.0*   MCV 81 81 81 81   MCH 24.4* 24.9* 24.8* 25.0*   MCHC 30.1* 30.6* 30.6* 31.1*   RDW 17.5* 17.7* 17.8* 17.9*   PLT 26* 28* 39* 85*     INR  Recent Labs   Lab 12/08/22  0600   INR 1.15     LFTs  Recent Labs   Lab 12/09/22  0731 12/08/22  0600 12/07/22  1428 12/06/22  0932   ALKPHOS 118 109 131* 145*   AST 19 25 40 51*   ALT 40 63* 82* 104*   BILITOTAL 0.5 0.7 1.0 0.8   PROTTOTAL 6.4 6.6 7.0 7.3   ALBUMIN 3.2* 3.4* 3.7 3.8      PANCNo lab results found in last 7 days.    Imaging:    CT A/P on 12/8/2022 without contrast   IMPRESSION:  1.  New concern for large bowel obstruction with transition point at  the level of innumerable solid pelvic masses. Correlation with GI  dysfunction.  2.  Progression of metastatic disease with increased peritoneal  carcinomatosis, omental caking and malignant ascites, new mediastinal  lymphadenopathy, increased hepatic metastases, and increased splenic  metastasis compared to 10/4/2022 CT scan.    3.   Increased diffuse groundglass tree-in-bud nodules and  consolidations throughout the lungs in a pattern concerning for  bronchial pneumonia with atypical viral and fungal etiologies a strong  consideration in an immune compromised patient. Metastatic disease not  excluded.  4.  Decreased small left pleural effusion

## 2022-12-09 NOTE — PROGRESS NOTES
[facility]    Medicine Progress Note - Medicine Service, REGAN TEAM 4    Date of Admission:  12/7/2022    Assessment & Plan   Diego Buchanan is a 27 year old male admitted on 12/7/2022. 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 Found to have possible large bowel obstruction on CT 12/8. Colorectal surgery consulted.    # Sepsis with neutropenic fever in setting of RSV infection  # Recent PNA s/p treatment  Febrile, tachycardic with neutropenia. LA negative. Patient recently treated for PNA based on CT findings on 12/1, s/p course of CTX and Azithromycin. No other symptoms, no shortness of breath, not requiring O2. RSV positive and likely underlying infectious source however undergoing complete infectious workup. UA negative. CXR stable. Procal 0.39  - Cefepime (12/7-)  - BCX with NGTD  - CT abdomen pelvis given concern for neutropenic colitis in setting of ongoing diarrhea   - no evidence of neutropenic colitis   - did show multiple fluid pockets, would be difficult to sample or drain them all  - CAPS consulted for diagnostic paracentesis given history of ascites   - not indicative of SBP  - Oncology following, recs appreciated   - Added on tessalon perles, mucinex, and APAP   - No need for neupogen at this time    # Possible large bowel obstruction  CT AP showing worsening cancer burden and c/f large bowel obstruction.  - Colorectal surgery consulted; appreciate recs   - conservative management at the moment  - GI consulted   - conservative management, not a candidate for colonic stent given neutropenia    # Acute Kidney Injury, unspecified: based on a >150% or 0.3 mg/dL increase in last creatinine compared to past 90 day average, will monitor renal function     - continue to monitor     # Diarrhea  Likely due to overflow around harder stool at  transition point of obstruction. Cramping abdominal pain is likely peristaltic     # Hypokalemia  - RN replacement protocol     # Tachycardia  Chronic, 105-120. Likely increased 2/2 fever.  - PTA Metoprolol 25 BID     # L pleural effusion   Moderate pleural effusion on 12/1 CT. Patient plan to see thoracic surgery as outpatient for thoracentesis. No current indication for inpatient drainage, unlikely source of infection nd patient already received abx so unlikely to diagnostically yield anything. Will plan to treat as outpatient.      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 No signs of bleeding or bruising, VSS.  - Anemia work up showing low B12; started on supplement  - blood consent completed, transfuse 1u PRBCs 12/9     # Hypocalcemia: Lowest Ca = 8.1 mg/dL in last 2 days, will monitor and replace as appropriate     - Likely 2/2 low albumin    # Hypoalbuminemia: Lowest albumin = 3.4 g/dL at 12/8/2022  6:00 AM, will monitor as appropriate      Diet: Combination Diet Regular Diet Adult  Snacks/Supplements Adult: Other; PRN; Between Meals    DVT Prophylaxis: Pneumatic Compression Devices  Busby Catheter: Not present  Fluids: None  Central Lines: PRESENT     Cardiac Monitoring: None  Code Status: Full Code           Diet: Combination Diet Regular Diet Adult  Snacks/Supplements Adult: Other; PRN; Between Meals    DVT Prophylaxis: Pneumatic Compression Devices  Busby Catheter: Not present  Central Lines: PRESENT     Cardiac Monitoring: None  Code Status: Full Code      Disposition Plan      Expected Discharge Date: 12/12/2022                The patient's care was discussed with the Bedside Nurse, Care Coordinator/, Patient and Patient's Family.    Ba Fletcher,  "MD  Medicine Service, MARNICHOLAS TEAM 4  [facility]  Securely message with the [employer] Web Console (learn more here)  Text page via [facility] Paging/Directory   Please see signed in provider for up to date coverage information      Clinically Significant Risk Factors        # Hypokalemia: Lowest K = 3.1 mmol/L in last 2 days, will replace as needed   # Hypocalcemia: Lowest Ca = 8.3 mg/dL in last 2 days, will monitor and replace as appropriate     # Hypoalbuminemia: Lowest albumin = 3.2 g/dL at 12/9/2022  7:31 AM, will monitor as appropriate   # Thrombocytopenia: Lowest platelets = 26 in last 2 days, will monitor for bleeding         # Obesity: Estimated body mass index is 37.94 kg/m  as calculated from the following:    Height as of this encounter: 1.727 m (5' 8\").    Weight as of this encounter: 113.2 kg (249 lb 8 oz)., PRESENT ON ADMISSION         ______________________________________________________________________    Interval History   Overnight patient had significant abdominal pain and more episodes of diarrhea. This morning still with some abdominal pain in lower left that is not related to food or bowel movements. Pain medications are helping. I spoke with sister    Data reviewed today: I reviewed all medications, new labs and imaging results over the last 24 hours. I personally reviewed the abdominal CT image(s) showing transition point for intestinal blockage in the large intestine in the left lower quadrant per my read.    Physical Exam   Vital Signs: Temp: 98.7  F (37.1  C) Temp src: Oral BP: 102/50 Pulse: (!) 124   Resp: 20 SpO2: 98 % O2 Device: None (Room air)    Weight: 249 lbs 8 oz  Gen: NAD, sitting in bed  Eyes: EOMI, conjuctiva clear  Mouth: OP clear, no lesions  CV: tachycardia, no murmurs, 2+ radial pulses  RESP: CTA bilaterally, no w/r/c  Abd: soft, nontender, nondistended  Ext: no edema bilaterally     Data   Recent Labs   Lab 12/09/22  1500 12/09/22  0731 " 12/08/22  0600 12/07/22  1604 12/07/22  1428   WBC  --  0.2* 0.2* 0.2*  --    HGB 7.6* 6.8* 7.1* 7.8*  --    MCV  --  81 81 81  --    PLT  --  26* 28* 39*  --    INR  --   --  1.15  --   --    NA  --  137 136  --  134*   POTASSIUM 3.4 3.1* 3.7  --  3.3*   CHLORIDE  --  106 104  --  101   CO2  --  17* 19*  --  19*   BUN  --  12.7 12.3  --  10.6   CR  --  0.98 1.16  --  1.08   ANIONGAP  --  14 13  --  14   FAISAL  --  8.3* 8.3*  --  8.1*   GLC  --  129* 107*  --  130*   ALBUMIN  --  3.2* 3.4*  --  3.7   PROTTOTAL  --  6.4 6.6  --  7.0   BILITOTAL  --  0.5 0.7  --  1.0   ALKPHOS  --  118 109  --  131*   ALT  --  40 63*  --  82*   AST  --  19 25  --  40     No results found for this or any previous visit (from the past 24 hour(s)).

## 2022-12-09 NOTE — PLAN OF CARE
Vitals: Temp: 98.7  F (37.1  C) Temp src: Oral BP: 102/50 Pulse: (!) 124   Resp: 20 SpO2: 98 % O2 Device: None (Room air)        Time: 3865-1913  Reason for admission: neutropenic fever.  Activity:  up ad beverly in room.   Pain:  Abdominal pain controlled w/ Tylenol x1, PO dilaudid x1.   Neuro: A&O x4. CMS intact. Cognitive delayed, family at bedside. Able to make needs known.   Cardiac: Tachycardic 100-120's. Denies chest pain.   Respiratory:  LS clear, diminished in lower lobes. Sating >95% on RA. Denies SOB.   GI/:  Voiding spontaneously, not saving. Loose BMs continued, denies any blood in stool.   Diet: Regular, tolerating poorly due to abdominal bloating.   Lines:  L PIV infusing TKO + abx. R Port not in use, scheduled to get it replaced in one week.   Incisions/Drains/Skin:  Abdominal rash, lotion applied. Abdominal distention.   Lab:  Hgb = 6.8, one unit PRBC given, re-check = 7.6. K = 3.4. Platelets = 26. WBC = 0.2.   Electrolyte Replacements: K+ replaced x2, re-check at 2200 tonight.      New changes this shift:  GI consulted for LBO. Continue POC.

## 2022-12-10 NOTE — CONSULTS
Please see initial GI note from 12/9/22 for consultation. This note is to resolve the consult order.     Seen and examined with GI fellow, agree with findings and recommendations.    Juan M Fajardo MD GI Staff

## 2022-12-10 NOTE — PROGRESS NOTES
[facility]    Medicine Progress Note - Medicine Service, REGAN TEAM 4    Date of Admission:  12/7/2022    Assessment & Plan   Diego Buchanan is a 27 year old male admitted on 12/7/2022. 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 Found to have possible large bowel obstruction on CT 12/8. Colorectal surgery consulted.    # Sepsis with neutropenic fever in setting of RSV infection  # Recent PNA s/p treatment  Febrile, tachycardic with neutropenia. LA negative. Patient recently treated for PNA based on CT findings on 12/1, s/p course of CTX and Azithromycin. No other symptoms, no shortness of breath, not requiring O2. RSV positive and likely underlying infectious source however undergoing complete infectious workup. UA negative. CXR stable. Procal 0.39  - Cefepime (12/7-)  - BCX with NGTD  - CT abdomen pelvis given concern for neutropenic colitis in setting of ongoing diarrhea   - no evidence of neutropenic colitis   - did show multiple fluid pockets, would be difficult to sample or drain them all  - CAPS consulted for diagnostic paracentesis given history of ascites   - not indicative of SBP  - Oncology following, recs appreciated   - Added on tessalon perles, mucinex, and APAP   - No need for neupogen at this time    # Possible large bowel obstruction  CT AP showing worsening cancer burden and c/f large bowel obstruction.  - Colorectal surgery consulted; appreciate recs   - conservative management at the moment  - GI consulted   - conservative management, not a candidate for colonic stent given neutropenia  - Abdominal pain   - tylenol for mild pain, tramadol for moderate, hydromorphone for severe (all PO options)    # Acute Kidney Injury, unspecified: based on a >150% or 0.3 mg/dL increase in last creatinine compared to past 90 day average, will monitor  renal function     - continue to monitor     # Diarrhea  Likely due to overflow around harder stool at transition point of obstruction. Cramping abdominal pain is likely peristaltic     # Hypokalemia  - RN replacement protocol     # Tachycardia  Chronic, 105-120. Likely increased 2/2 fever.  - PTA Metoprolol 25 BID     # L pleural effusion   Moderate pleural effusion on 12/1 CT. Patient plan to see thoracic surgery as outpatient for thoracentesis. No current indication for inpatient drainage, unlikely source of infection nd patient already received abx so unlikely to diagnostically yield anything. Will plan to treat as outpatient.      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 No signs of bleeding or bruising, VSS.  - Anemia work up showing low B12; started on supplement  - blood consent completed, transfuse 1u PRBCs 12/9     # Hypocalcemia: Lowest Ca = 8.1 mg/dL in last 2 days, will monitor and replace as appropriate     - Likely 2/2 low albumin    # Hypoalbuminemia: Lowest albumin = 3.4 g/dL at 12/8/2022  6:00 AM, will monitor as appropriate      Diet: Combination Diet Regular Diet Adult  Snacks/Supplements Adult: Other; PRN; Between Meals    DVT Prophylaxis: Pneumatic Compression Devices  Busby Catheter: Not present  Fluids: None  Central Lines: PRESENT     Cardiac Monitoring: None  Code Status: Full Code             Diet: Combination Diet Regular Diet Adult  Snacks/Supplements Adult: Other; PRN; Between Meals    DVT Prophylaxis: Pneumatic Compression Devices  Busby Catheter: Not present  Central Lines: PRESENT     Cardiac Monitoring: None  Code Status: Full Code      Disposition Plan     Expected Discharge Date: 12/12/2022                The patient's  "care was discussed with the Bedside Nurse, Care Coordinator/, Patient and Patient's Family.    Ba Fletcher MD  [facility], [facility] [address] [place] [address]  Securely message with the [employer] Web Console (learn more here)  Text page via [facility] Paging/Directory   Please see signed in provider for up to date coverage information      Clinically Significant Risk Factors        # Hypokalemia: Lowest K = 3.1 mmol/L in last 2 days, will replace as needed       # Hypoalbuminemia: Lowest albumin = 3 g/dL at 12/10/2022  6:58 AM, will monitor as appropriate   # Thrombocytopenia: Lowest platelets = 26 in last 2 days, will monitor for bleeding         # Obesity: Estimated body mass index is 37.94 kg/m  as calculated from the following:    Height as of this encounter: 1.727 m (5' 8\").    Weight as of this encounter: 113.2 kg (249 lb 8 oz)., PRESENT ON ADMISSION         ______________________________________________________________________    Interval History   Overnight patient had 5 bowel movements, continues to have diarrhea with slight LLQ pain.     Today patient continues to have LLQ pain. No other issues today.     Otherwise 4pt ROS was reviewed and is negative     Data reviewed today: I reviewed all medications, new labs and imaging results over the last 24 hours. I personally reviewed no images or EKG's today.    Physical Exam   Vital Signs: Temp: (!) 95.7  F (35.4  C) Temp src: Oral BP: 137/83 Pulse: (!) 121   Resp: 18 SpO2: 98 % O2 Device: None (Room air)    Weight: 249 lbs 8 oz  Gen: NAD, sitting comfortably in bed  Eyes: conjuctiva clear  Mouth: OP clear, no lesions  CV: RRR, no murmurs, 2+ radial pulses  RESP: CTA bilaterally, no w/r/c  Abd: soft, nontender, nondistended  Ext: trace edema bilaterally     Data   Recent Labs   Lab 12/10/22  0813 12/10/22  0658 12/10/22  0318 12/09/22  1500 12/09/22  0731 12/08/22  0600   WBC 0.2*  --   --   --  0.2* 0.2*   HGB " 7.5*  --   --  7.6* 6.8* 7.1*   MCV 78  --   --   --  81 81   PLT 30*  --   --   --  26* 28*   INR  --   --   --   --   --  1.15   NA  --  136  --   --  137 136   POTASSIUM  --  3.8 4.3 3.4 3.1* 3.7   CHLORIDE  --  108*  --   --  106 104   CO2  --  17*  --   --  17* 19*   BUN  --  10.0  --   --  12.7 12.3   CR  --  0.88  --   --  0.98 1.16   ANIONGAP  --  11  --   --  14 13   FAISAL  --  8.2*  --   --  8.3* 8.3*   GLC  --  95  --   --  129* 107*   ALBUMIN  --  3.0*  --   --  3.2* 3.4*   PROTTOTAL  --  6.3*  --   --  6.4 6.6   BILITOTAL  --  1.0  --   --  0.5 0.7   ALKPHOS  --  157*  --   --  118 109   ALT  --  37  --   --  40 63*   AST  --  20  --   --  19 25     No results found for this or any previous visit (from the past 24 hour(s)).

## 2022-12-10 NOTE — PLAN OF CARE
Vitals: Temp: (!) 95.7  F (35.4  C) Temp src: Oral BP: 137/83 Pulse: (!) 121   Resp: 18 SpO2: 98 % O2 Device: None (Room air)        Time: 9629-5834  Reason for admission: neutropenic fever.  Activity:  up ad beverly in room.   Pain:  Abdominal pain controlled w/ scheduled Tylenol, PO dilaudid x1, PO Tramadol x2, Lidocaine patch in place.   Neuro: A&O x4. CMS intact. Cognitive delayed, family at bedside. Able to make needs known.   Cardiac: Tachycardic 100-120's. Denies chest pain.   Respiratory:  LS clear, diminished in lower lobes. Sating >95% on RA. Denies SOB.   GI/:  Voiding spontaneously, not saving. Loose BMs continued, denies any blood in stool.   Diet: Regular, tolerating poorly due to abdominal discomfort.   Lines:  L PIV infusing TKO + abx. R Port not in use, scheduled to get it replaced next week.   Incisions/Drains/Skin:  Abdominal rash, spread to back, lotion and benadryl applied. Abdominal distention.   Lab:  Hgb = 7.5. Platelets = 30. WBC = 0.2. K = 3.8. Phos = 1.7.   Electrolyte Replacements: K+ replaced, re-check in AM. Phos replaced IV, re-check at 1930.      New changes this shift:  Continue POC.

## 2022-12-10 NOTE — PROVIDER NOTIFICATION
"Writer pagehakeem SCANLON \"ALEJA Buchanan [id number] Pt has a raised rash all over back and abd. No other symptoms besides itchiness. Not sure when it developed to back but yestd there was a small patch on abd-thanks! Bree Gregory [id number] [facility]\"  "

## 2022-12-10 NOTE — PROGRESS NOTES
"/54 (BP Location: Left arm)   Pulse (!) 121   Temp 99.1  F (37.3  C) (Oral)   Resp 20   Ht 1.727 m (5' 8\")   Wt 113.2 kg (249 lb 8 oz)   SpO2 95%   BMI 37.94 kg/m      Time: 6057-2763.  Reason for Admission: Neutropenic fever. LBO.   Activity: Independent in room.   Neuro: A&O x4. Cognitively delayed, family member at bedside. Able to make needs known.   Cardiac: WDL ex tachycardic (100-120s). Denies chest pain.   Respiratory: WDL on RA. LS clear, diminished in lower lobes. Denies SOB.   GI/: Voiding spontaneously, not saving. +BS, + flatus. Abd distended. Loose BMs overnight per pt.   Diet: Regular.    Skin/Incisions/Drains: Intact ex abd rash, lotion applied.   Lines: L PIV infusing tko + abx. R port - not using d/t placement.   Labs: Reviewed, K+ replaced - recheck 4.3.   Pain/Nausea: c/o abd pain, managed w/ scheduled Tylenol & PRN PO dilaudid. Denies nausea.   New changes this shift: X  Plan: Continue POC.       "

## 2022-12-11 NOTE — PROGRESS NOTES
"/70 (BP Location: Left arm)   Pulse 118   Temp 98.6  F (37  C) (Axillary)   Resp 16   Ht 1.727 m (5' 8\")   Wt 113.2 kg (249 lb 8 oz)   SpO2 98%   BMI 37.94 kg/m      Time: 7252-3231.  Reason for Admission: Neutropenic fever. LBO.   Activity: Up ad beverly.   Neuro: A&O x4. Cognitively delayed, family member at bedside. Able to make needs known.   Cardiac: WDL ex tachycardic (100-130s). Denies chest pain.   Respiratory: WDL on RA. LS clear, diminished in lower lobes. Denies SOB.   GI/: Voiding spontaneously, not saving. +BS, + flatus. Abd distended. Loose BMs overnight per pt.   Diet: Regular.   Skin/Incisions/Drains: Intact ex rash on abdomen, back & back of upper arms - PRN benadryl cream applied.   Lines: L PIV infusing tko + abx. R port - not using d/t placement.   Labs: Reviewed, Phos 2.8 - recheck in AM.   Pain/Nausea: c/o abd pain, managed w/ PRN tramadol, PRN PO dilaudid, lidocaine patch, & PRN Tylenol. Denies nausea.   New changes this shift: X  Plan: Continue POC.       "

## 2022-12-11 NOTE — PLAN OF CARE
Vitals: Temp: 97.7  F (36.5  C) Temp src: Oral BP: 136/76 Pulse: (!) 134   Resp: 18 SpO2: 100 % O2 Device: None (Room air)     Time: 3876-7497  Reason for admission: neutropenic fever.  Activity:  up ad beverly in room.   Pain:  Abdominal pain controlled w/ prn Tylenol x2, PO dilaudid x1, PO Tramadol x2, Lidocaine patch in place.   Neuro: A&O x4. CMS intact. Cognitive delayed, family at bedside. Able to make needs known.   Cardiac: Tachycardic 100-120's. Denies chest pain.   Respiratory:  LS clear, diminished in lower lobes. Sating >95% on RA. Denies SOB.   GI/:  Voiding spontaneously, not saving. Loose BMs continued, denies any blood in stool.   Diet: Regular, tolerating poorly due to abdominal discomfort.   Lines:  L PIV infusing TKO + abx. R Port not in use, scheduled to get it replaced next week.   Incisions/Drains/Skin:  Abdominal rash, spread to back and LLE and upper arms, lotion and benadryl applied. Abdominal distention.   Lab:  Hgb = 8.0. Platelets = 27. WBC = 0.2. K = 3.5. Phos = 2.0.   Electrolyte Replacements: K+ replaced, re-check in AM. Phos replaced IV, re-check in AM.     New changes this shift:  Continue POC.

## 2022-12-11 NOTE — PROVIDER NOTIFICATION
"REGAN 4 \"7b, [id number], Chanel.   FYI rash is now spread to left lower leg and posterior thigh.   Thanks, Bree Gregory ([facility])\"   "

## 2022-12-11 NOTE — PROGRESS NOTES
[facility]    Medicine Progress Note - Medicine Service, REGAN TEAM 4    Date of Admission:  12/7/2022    Assessment & Plan   Diego Buchanan is a 27 year old male admitted on 12/7/2022. 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 Found to have possible large bowel obstruction on CT 12/8. Colorectal surgery consulted.    # Sepsis with neutropenic fever in setting of RSV infection  # Recent PNA s/p treatment  Febrile, tachycardic with neutropenia. LA negative. Patient recently treated for PNA based on CT findings on 12/1, s/p course of CTX and Azithromycin. No other symptoms, no shortness of breath, not requiring O2. RSV positive and likely underlying infectious source however undergoing complete infectious workup. UA negative. CXR stable. Procal 0.39  - Cefepime (12/7-)  - BCX with NGTD  - CT abdomen pelvis given concern for neutropenic colitis in setting of ongoing diarrhea   - no evidence of neutropenic colitis   - did show multiple fluid pockets, would be difficult to sample or drain them all  - CAPS consulted for diagnostic paracentesis given history of ascites   - not indicative of SBP  - Oncology following, recs appreciated   - Added on tessalon perles, mucinex, and APAP   - No need for neupogen at this time    # Possible large bowel obstruction  CT AP showing worsening cancer burden and c/f large bowel obstruction.  - Colorectal surgery consulted; appreciate recs   - conservative management at the moment  - GI consulted   - conservative management, not a candidate for colonic stent given neutropenia  - Abdominal pain   - tylenol for mild pain, tramadol for moderate, hydromorphone for severe (all PO options)    # Acute Kidney Injury, resolved: based on a >150% or 0.3 mg/dL increase in last creatinine compared to past 90 day average, will monitor  renal function     - continue to monitor     # Diarrhea  Likely due to overflow around harder stool at transition point of obstruction. Cramping abdominal pain is likely peristaltic  - Consider colon clean out with golytely     # Hypokalemia  - RN replacement protocol     # Tachycardia  Chronic, 105-120. Likely increased 2/2 fever.  - PTA Metoprolol 25 BID     # L pleural effusion   Moderate pleural effusion on 12/1 CT. Patient plan to see thoracic surgery as outpatient for thoracentesis. No current indication for inpatient drainage, unlikely source of infection nd patient already received abx so unlikely to diagnostically yield anything. Will plan to treat as outpatient.      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 No signs of bleeding or bruising, VSS.  - Anemia work up showing low B12; started on supplement  - blood consent completed, transfuse 1u PRBCs 12/9     # Hypocalcemia: Lowest Ca = 8.1 mg/dL in last 2 days, will monitor and replace as appropriate     - Likely 2/2 low albumin    # Hypoalbuminemia: Lowest albumin = 3.4 g/dL at 12/8/2022  6:00 AM, will monitor as appropriate      Diet: Combination Diet Regular Diet Adult  Snacks/Supplements Adult: Other; PRN; Between Meals    DVT Prophylaxis: Pneumatic Compression Devices  Busby Catheter: Not present  Fluids: None  Central Lines: PRESENT     Cardiac Monitoring: None  Code Status: Full Code      Diet: Combination Diet Regular Diet Adult  Snacks/Supplements Adult: Other; PRN; Between Meals    DVT Prophylaxis: Pneumatic Compression Devices  Busby Catheter: Not present  Central Lines: PRESENT       Cardiac Monitoring: None  Code Status: Full Code      Disposition Plan      Expected Discharge Date:  12/13/2022                The patient's care was discussed with the Bedside Nurse, Care Coordinator/, Patient and Patient's Family.    Miles Mar Jr., MD  Medicine Service, [facility] TEAM [address] [place] [address]  Securely message with the Vocera Web Console (learn more here)  Text page via [facility] Paging/Directory   Please see signed in provider for up to date coverage information  ________________________________________________________________    Interval History   Continues to have diarrhea with slight LLQ pain. No nausea and vomiting. Has not had a fever overnight.    Today patient continues to have LLQ pain. No other issues today.     Otherwise 4pt ROS was reviewed and is negative     Data reviewed today: I reviewed all medications, new labs and imaging results over the last 24 hours. I personally reviewed no images or EKG's today.    Physical Exam   Vital Signs: Temp: 97.7  F (36.5  C) Temp src: Oral BP: 136/76 Pulse: (!) 134   Resp: 18 SpO2: 100 % O2 Device: None (Room air)    Weight: 249 lbs 8 oz  Gen: NAD, sitting comfortably in bed  Eyes: conjuctiva clear  Mouth: OP clear, no lesions  CV: RRR, no murmurs, 2+ radial pulses  RESP: CTA bilaterally, no w/r/c  Abd: soft, nontender, nondistended  Ext: trace edema bilaterally   Skin: rash on abdomen and leg    Data   Recent Labs   Lab 12/11/22  0803 12/10/22  0813 12/10/22  0658 12/10/22  0318 12/09/22  1500 12/09/22  0731 12/08/22  0600   WBC 0.2* 0.2*  --   --   --  0.2* 0.2*   HGB 8.0* 7.5*  --   --  7.6* 6.8* 7.1*   MCV 80 78  --   --   --  81 81   PLT 27* 30*  --   --   --  26* 28*   INR  --   --   --   --   --   --  1.15     --  136  --   --  137 136   POTASSIUM 3.5  --  3.8 4.3 3.4 3.1* 3.7   CHLORIDE 105  --  108*  --   --  106 104   CO2 17*  --  17*  --   --  17* 19*   BUN 9.8  --  10.0  --   --  12.7 12.3   CR 0.88  --  0.88  --   --  0.98 1.16   ANIONGAP 15  --  11  --   --  14 13   FAISAL 9.0   --  8.2*  --   --  8.3* 8.3*   *  --  95  --   --  129* 107*   ALBUMIN 3.3*  --  3.0*  --   --  3.2* 3.4*   PROTTOTAL 7.0  --  6.3*  --   --  6.4 6.6   BILITOTAL 1.1  --  1.0  --   --  0.5 0.7   ALKPHOS 180*  --  157*  --   --  118 109   ALT 38  --  37  --   --  40 63*   AST 16  --  20  --   --  19 25     No results found for this or any previous visit (from the past 24 hour(s)).

## 2022-12-12 NOTE — CONSULTS
[facility] Inpatient Consult Dermatology Note    Impression/Plan:  1. Pink papules and plaques distributed on trunk and extremities  Favor morbiliform drug reaction v. less likely allergic contact dermatitis with ID reaction. Clinically the eruption is more prominently seen in dependent areas when supine and in sock distribution. Morphology of lesions appears consistent with morbiliform drug rash but allergic contact dermatitis with possible ID reaction would also be on the differential diagnosis given the distribution of lesions. Discussed this differential diagnosis with patient. Low suspicion for more severe drug eruption like DIHS or SJS/TEN. No evidence of new lymphadenopathy, swelling, transaminitis, C elevation to suggest DIHS (differential not done due to leukopenia). No mucosal involvement or desquamation to suggest severe drug reaction like SJS.    We discussed possibility of doing a biopsy and the risks and benefits associated with skin biopsy. Risks include small bleeding and infection risk as well as a risk of a scar. Patient and his mother are apprehensive about proceeding with biopsy given anemia and thrombocytopenia associated with increased bleeding risk and neutropenia associated with increased infection risk. They are suspicious that the cleansing wipes may be causing this rash and would prefer to avoid use of those with topical treatment to see if there is improvement. If no improvement is seen, they would be amenable to reconsidering skin biopsy at that time for a more definitive diagnosis.    Given the low suspicion for DIHS, SJS/TEN and high risk associated with neutropenic fever, it would be reasonable to continue to treat with cefipime per primary team.    Recommendations:  - Start triamcinolone 0.1% ointment BID to rash areas  - Avoid using cleansing wipes    Thank you for the dermatology consultation. Please do not hesitate to contact the dermatology resident/faculty on  call for any additional questions or concerns. We will continue to follow.     Patient seen and evaluated with attending physician, Dr. Candy Rodriguez MD MPH  PGY4 Medicine/Dermatology Resident  I, Dea Gupta MD, saw this patient with the resident and agree with the resident s findings and plan of care as documented in the resident s note.      Dermatology Problem List:  1. Pink papules and plaques distributed on trunk and extremities: TAC 0.1% ointment    Date of Admission: Dec 7, 2022   Encounter Date: 12/12/2022    Reason for Consultation:   rash    History of Present Illness:  Mr. Diego Buchanan is a 27 year old male with history of desmoplastic small round cell tumor c/b peritoneal carcinomatosis and mets to lymph nodes, bone and liver who was admitted 12/7 for neutropenic fever. At that time cefipime was started to treat neutropenic fever. Patient developed rash on 12/9 on the abdomen which then subsequently spread to back and extremities on 12/10. Dermatology was consulted for assessment of rash and assistance with management. Patient reports rash is extremely itchy. Denies mucosal erosions, ulcerations or swelling. Denies arthralgias, myalgias. He and his mom report that there were a few different kinds of wipes that were used over the past few days to bathe him and they suspect this may be irritating his skin. Denies personal history of allergies but endorses history of asthma in childhood. No new medications started prior to admission.    Past Medical History:   Patient Active Problem List   Diagnosis     Peritoneal carcinomatosis (H)     Desmoplastic small round cell tumor (H)     Sarcoma, Ewings (H)     Learning disabilities     Morbid obesity (H)     Hyperlipidemia     DSRCT (desmoplastic small round cell tumor) (H)     Neutropenic fever (H)     Past Medical History:   Diagnosis Date     Hypertension      Learning disability     but pt is his own gaurdian     Peritoneal carcinomatosis  (H)      Past Surgical History:   Procedure Laterality Date     BIOPSY      liver     INSERT PORT VASCULAR ACCESS Right 4/21/2022    Procedure: INSERTION, VASCULAR ACCESS PORT;  Surgeon: Daniel Sarmiento MD;  Location: [facility] OR     IR CHEST PORT PLACEMENT > 5 YRS OF AGE  4/21/2022     IR CVC TUNNEL PLACEMENT > 5 YRS OF AGE  4/29/2020     IR CVC TUNNEL REMOVAL RIGHT  11/16/2021     US MUSCLE BIOPSY  3/12/2020         Social History:  Patient reports that he has never smoked. He has never used smokeless tobacco. He reports that he does not drink alcohol and does not use drugs.    Family History:  Family History   Problem Relation Age of Onset     Hypertension Mother        Medications:  Current Facility-Administered Medications   Medication     acetaminophen (TYLENOL) tablet 650 mg     benzonatate (TESSALON) capsule 100 mg     ceFEPIme (MAXIPIME) 2 g vial to attach to  ml bag for ADULTS or 50 ml bag for PEDS     cyanocobalamin (VITAMIN B-12) tablet 100 mcg     dextrose 5 % flush PRE/POST medication    And     filgrastim (NEUPOGEN) 480 mcg in D5W 25 mL infusion    And     dextrose 5 % flush PRE/POST medication     diphenhydrAMINE-zinc acetate (BENADRYL) 1-0.1 % cream     [Held by provider] guaiFENesin (MUCINEX) 12 hr tablet 600 mg     [Held by provider] HYDROmorphone (DILAUDID) half-tab 1 mg     lactated ringers infusion     Lidocaine (LIDOCARE) 4 % Patch 1 patch     lidocaine (LMX4) cream     lidocaine 1 % 0.1-1 mL     lidocaine patch in PLACE     melatonin tablet 1 mg     metoprolol tartrate (LOPRESSOR) tablet 25 mg     naloxone (NARCAN) injection 0.2 mg    Or     naloxone (NARCAN) injection 0.4 mg    Or     naloxone (NARCAN) injection 0.2 mg    Or     naloxone (NARCAN) injection 0.4 mg     ondansetron (ZOFRAN ODT) ODT tab 4 mg    Or     ondansetron (ZOFRAN) injection 4 mg     oxyCODONE (ROXICODONE) tablet 5 mg     polyethylene glycol (GoLYTELY) suspension 1,000 mL     potassium phosphate 15 mmol in D5W 250  "mL intermittent infusion     prochlorperazine (COMPAZINE) injection 10 mg    Or     prochlorperazine (COMPAZINE) tablet 10 mg    Or     prochlorperazine (COMPAZINE) suppository 25 mg     sodium chloride (PF) 0.9% PF flush 3 mL     sodium chloride (PF) 0.9% PF flush 3 mL     traMADol (ULTRAM) tablet 50 mg     Facility-Administered Medications Ordered in Other Encounters   Medication     sodium chloride (PF) 0.9% PF flush 30 mL          Allergies   Allergen Reactions     Tegaderm Chg Dressing [Chlorhexidine]      Tegaderm Transparent Dressing (Informational Only) Itching     Tegaderm dressing; Okay for short periods of time         Review of Systems:  -As per HPI      Physical exam:  Vitals: /57 (BP Location: Left arm)   Pulse (!) 131   Temp (!) 95.6  F (35.3  C) (Oral)   Resp 18   Ht 1.727 m (5' 8\")   Wt 113.2 kg (249 lb 8 oz)   SpO2 99%   BMI 37.94 kg/m    GEN: This is a well developed, well-nourished male in no acute distress, in a pleasant mood.    SKIN: Total skin excluding the undergarment areas was performed. The exam included the head/face, neck, both arms, chest, back, abdomen, both legs, digits and/or nails.   - Tobias skin type: III  - Pink papules and plaques on trunk and extremities, more prominent in dependent areas when laying supine and in sock distribution, sparing of acral surfaces  - A few scattered petechiae and purpura seen on dependent areas on the back  - No evidence of mucosal involvement  - No other lesions of concern on areas examined.                                   Laboratory:  Results for orders placed or performed during the hospital encounter of 12/07/22 (from the past 24 hour(s))   Blood Culture Hand, Left    Specimen: Hand, Left; Blood   Result Value Ref Range    Culture No growth after 12 hours    Blood Culture Arm, Right    Specimen: Arm, Right; Blood   Result Value Ref Range    Culture No growth after 12 hours    UA reflex to Microscopic and Culture    Specimen: " Urine, Midstream   Result Value Ref Range    Color Urine Orange (A) Colorless, Straw, Light Yellow, Yellow    Appearance Urine Slightly Cloudy (A) Clear    Glucose Urine 50 (A) Negative mg/dL    Bilirubin Urine Negative Negative    Ketones Urine 10 (A) Negative mg/dL    Specific Gravity Urine 1.025 1.003 - 1.035    Blood Urine Large (A) Negative    pH Urine 6.0 5.0 - 7.0    Protein Albumin Urine 300 (A) Negative mg/dL    Urobilinogen Urine Normal Normal, 2.0 mg/dL    Nitrite Urine Negative Negative    Leukocyte Esterase Urine Negative Negative    Mucus Urine Present (A) None Seen /LPF    Amorphous Crystals Urine Few (A) None Seen /HPF    RBC Urine 36 (H) <=2 /HPF    WBC Urine 3 <=5 /HPF    Transitional Epithelials Urine <1 <=1 /HPF    Hyaline Casts Urine 1 <=2 /LPF    Narrative    Urine Culture not indicated   CBC with Platelets & Differential    Narrative    The following orders were created for panel order CBC with Platelets & Differential.  Procedure                               Abnormality         Status                     ---------                               -----------         ------                     CBC with platelets and d...[[id number]]  Abnormal            Final result               RBC and Platelet Morphology[[id number]]  Abnormal            Final result                 Please view results for these tests on the individual orders.   Comprehensive metabolic panel   Result Value Ref Range    Sodium 137 136 - 145 mmol/L    Potassium 3.2 (L) 3.4 - 5.3 mmol/L    Chloride 105 98 - 107 mmol/L    Carbon Dioxide (CO2) 17 (L) 22 - 29 mmol/L    Anion Gap 15 7 - 15 mmol/L    Urea Nitrogen 11.5 6.0 - 20.0 mg/dL    Creatinine 0.95 0.67 - 1.17 mg/dL    Calcium 8.8 8.6 - 10.0 mg/dL    Glucose 103 (H) 70 - 99 mg/dL    Alkaline Phosphatase 166 (H) 40 - 129 U/L    AST 10 10 - 50 U/L    ALT 26 10 - 50 U/L    Protein Total 6.7 6.4 - 8.3 g/dL    Albumin 3.1 (L) 3.5 - 5.2 g/dL    Bilirubin Total 1.1 <=1.2 mg/dL    GFR  Estimate >90 >60 mL/min/1.73m2   Magnesium   Result Value Ref Range    Magnesium 1.5 (L) 1.7 - 2.3 mg/dL   Phosphorus   Result Value Ref Range    Phosphorus 2.4 (L) 2.5 - 4.5 mg/dL   CBC with platelets and differential   Result Value Ref Range    WBC Count 0.1 (LL) 4.0 - 11.0 10e3/uL    RBC Count 2.99 (L) 4.40 - 5.90 10e6/uL    Hemoglobin 7.3 (L) 13.3 - 17.7 g/dL    Hematocrit 24.4 (L) 40.0 - 53.0 %    MCV 82 78 - 100 fL    MCH 24.4 (L) 26.5 - 33.0 pg    MCHC 29.9 (L) 31.5 - 36.5 g/dL    RDW 17.0 (H) 10.0 - 15.0 %    Platelet Count 21 (LL) 150 - 450 10e3/uL   RBC and Platelet Morphology   Result Value Ref Range    Platelet Assessment  Automated Count Confirmed. Platelet morphology is normal.     Automated Count Confirmed. Platelet morphology is normal.    Elliptocytes Slight (A) None Seen    Reactive Lymphocytes Present (A) None Seen    RBC Morphology Confirmed RBC Indices    Lactic Acid STAT   Result Value Ref Range    Lactic Acid 1.1 0.7 - 2.0 mmol/L        staffed the patient.    Staff Involved:  Resident/Staff

## 2022-12-12 NOTE — PROGRESS NOTES
"/55 (BP Location: Left arm)   Pulse (!) 135   Temp 98.5  F (36.9  C) (Axillary)   Resp 16   Ht 1.727 m (5' 8\")   Wt 113.2 kg (249 lb 8 oz)   SpO2 96%   BMI 37.94 kg/m      Time: 2823-5925.  Reason for Admission: Neutropenic fever.   Activity: Up ad beverly.   Neuro: A&O x4. Cognitively delayed, family member at bedside. Able to make needs known.   Cardiac: WDL ex tachycardic (100-120s). Denies chest pain.   Respiratory: WDL on RA, satting >94%. LS clear, diminished in lower lobes. Denies SOB.   GI/: Voiding spontaneously, not saving - some blood in pt's urine. +BS, + flatus. Abd distended. Loose BMs continued, denies blood in stool.   Diet: Regular, poor d/t abd discomfort.   Skin/Incisions/Drains: Intact ex rash on abdomen, back, posterior neck, feet to ankle, & upper arms - PRN benadryl cream applied & PRN PO benadryl given. Dermatology consulted.   Lines: L PIV infusing tko + abx. R port - not using d/t placement.    Labs: Reviewed, Hgb 8.0, WBC 0.2, platelets 27. UA collected & sent to lab.   Pain/Nausea: C/o abd pain, managed w/ PRN tramadol, PRN PO dilaudid, & PRN Tylenol. Denies nausea.   New changes this shift: X  Plan: Continue POC.       "

## 2022-12-12 NOTE — PROVIDER NOTIFICATION
"Writer paged,     \"[phone] Robert Buchanan pt's rash has now spread to right foot. Is there anything else for the pt for itching? Thanks, Savana [phone]\"  "

## 2022-12-12 NOTE — PROGRESS NOTES
Paged for rash spreading.     Patient seen and evaluated at bedside. Patient expresses concern of worsening itching over his rash but denies associated pain. On exam patient noted to have maculopapular rash diffusely over back where it is the most significant as well as less pronounced but present rash on his abdomen R>L. There are two separate areas of rashes that are reportedly new per discussion with RN team and review of the progress note from 12/11/2022. Given distribution over back and separate areas affected over feet and stopping near sock line bilaterally process seems most consistent with a contact dermatitis. Additionally, would not favor broadening antibiotics at this time as multiple separate lesions seems less consistent with infectious etiology. As topical diphenhydramine has been helpful will also trial two doses of oral overnight and place dermatology consult for AM. On a separate note patient expressed concern of increased redness in his urine, will order UA. Of note, although patient expresses this concern he does not report increased pain with urination. At this time changes in urine color have been unable to be verified by bedside RN team per our discussion in person. Will add additional UA.     Mykel Fuentes MD  PGY-1, Internal Medicine

## 2022-12-12 NOTE — PROVIDER NOTIFICATION
"/51 (BP Location: Right arm)   Pulse (!) 134   Temp 97  F (36.1  C) (Temporal)   Resp 17   Ht 1.727 m (5' 8\")   Wt 113.2 kg (249 lb 8 oz)   SpO2 99%   BMI 37.94 kg/m    Reason for admission: neutropenia  Neuro: AOx4, cognitive delay, his own decision maker  Pain: c/o 10/10 pain in abdomen treated with prn oral meds  CMS: intact  Cardiac: x tachy 120s-130s  Resp: O2>90 on RA  GI/: AUOP, orange urine, diarrhea, LBO   Skin/Wound: rash behind joints  Access: PIVs infusing   Labs: low WBC and Plt, phos mag and K+ replaced  Activity: up ab beverly  Nutrition: Reg, poor apetite  Changes this shift: continuous LR  Plan: Continue to monitor rash and abd pain    "

## 2022-12-12 NOTE — PROGRESS NOTES
[facility]    Medicine Progress Note - Medicine Service, REGAN TEAM 4    Date of Admission:  12/7/2022    Assessment & Plan   Diego Buchanan is a 27 year old male admitted on 12/7/2022. 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 Found to have possible large bowel obstruction on CT 12/8. Colorectal surgery consulted.    Changes today:  - will defer port exchange given neutropenia  - start filgrastim daily per heme onc  - trial 1L of golytely after discussion with GI  - CRS no further recommendation other than palliation  - continue abx for 7 day course ending 12/14  - for worsening rash, dermatology consulted    # Sepsis with neutropenic fever in setting of RSV infection  # Recent PNA s/p treatment  Febrile, tachycardic with neutropenia. LA negative. Patient recently treated for PNA based on CT findings on 12/1, s/p course of CTX and Azithromycin. No other symptoms, no shortness of breath, not requiring O2. RSV positive and likely underlying infectious source however undergoing complete infectious workup. UA negative. CXR stable. Procal 0.39  - Cefepime (12/7-) plan to end on 12/14 for 7 day course  - BCX with NGTD  - CT abdomen pelvis given concern for neutropenic colitis in setting of ongoing diarrhea   - no evidence of neutropenic colitis   - did show multiple fluid pockets, would be difficult to sample or drain them all  - CAPS consulted for diagnostic paracentesis given history of ascites   - not indicative of SBP  - Oncology following, recs appreciated   - Added on tessalon perles, mucinex, and APAP   - start G-CSF daily until ANC>500x2 consecutive days    # Possible large bowel obstruction  CT AP showing worsening cancer burden and c/f large bowel obstruction.  - Colorectal surgery consulted; appreciate recs   - conservative management at the  moment   - recommend palliation  - GI consulted, signed off   - conservative management, not a candidate for colonic stent given neutropenia  - Abdominal pain   - tylenol for mild pain, tramadol for moderate, hydromorphone for severe (all PO options)  - will try bowel clean out with golytely on 12/12, discussed with GI    # Worsening rash  Maculopapular rash worsening for the past few days, has been on cefepime since 12/7, has tolerated CTX in the past. Unclear etiology. DDx include drug rash related to cefepime vs contact dermatitis vs allergic dermatitis.   - dermatology consulted, appreciate recs  - discussed with PharmD and reviewed meds, only med that is potential culprit is cefepime.     # Acute Kidney Injury, resolved: based on a >150% or 0.3 mg/dL increase in last creatinine compared to past 90 day average, will monitor renal function     - continue to monitor     # Diarrhea  Likely due to overflow around harder stool at transition point of obstruction. Cramping abdominal pain is likely peristaltic  - colon clean out with golytely on 12/12     # Hypokalemia  - RN replacement protocol     # Tachycardia  Chronic, 105-120. Likely increased 2/2 fever.  - PTA Metoprolol 25 BID     # L pleural effusion   Moderate pleural effusion on 12/1 CT. Patient plan to see thoracic surgery as outpatient for thoracentesis. No current indication for inpatient drainage, unlikely source of infection nd patient already received abx so unlikely to diagnostically yield anything. Will plan to treat as outpatient.      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 No signs of bleeding or bruising, VSS.  - Anemia work up showing low B12; started on supplement  - blood consent  completed, transfuse 1u PRBCs 12/9     # Hypocalcemia: Lowest Ca = 8.1 mg/dL in last 2 days, will monitor and replace as appropriate     - Likely 2/2 low albumin    # Hypoalbuminemia: Lowest albumin = 3.4 g/dL at 12/8/2022  6:00 AM, will monitor as appropriate     Diet: Combination Diet Regular Diet Adult  Snacks/Supplements Adult: Other; PRN; Between Meals    DVT Prophylaxis: Pneumatic Compression Devices  Busby Catheter: Not present  Central Lines: PRESENT       Cardiac Monitoring: None  Code Status: Full Code      Disposition Plan      Expected Discharge Date: 12/14/2022        Discharge Comments: likle home        The patient's care was discussed with the Bedside Nurse, Care Coordinator/, Patient and Patient's Family.    Phuong Rodriguez MD  Medicine Service, [facility] TEAM [address] [place] [address]  Securely message with the Vocera Web Console (learn more here)  Text page via "[employer]" Paging/Directory   Please see signed in provider for up to date coverage information  ________________________________________________________________    Interval History   Patient was noted to have worsening itchy rash. Cross cover intern assessed the patient, dermatology consulted. Abdominal pain continues, states he is having diarrhea.     Otherwise 4pt ROS was reviewed and is negative     Data reviewed today: I reviewed all medications, new labs and imaging results over the last 24 hours. I personally reviewed no images or EKG's today.    Physical Exam   Vital Signs: Temp: (!) 95.6  F (35.3  C) Temp src: Oral BP: 100/57 Pulse: (!) 131   Resp: 18 SpO2: 99 % O2 Device: None (Room air)    Weight: 249 lbs 8 oz  Gen: young appearing male in NAD  HEENT: NCAT, EOMI, somewhat dry mucosa, conjuctiva clear  CV: RRR, no m/r/g  RESP: CTAB, no wheezes or crackles  Abd: soft, left lower quadrant somewhat tender to deep palpation, active BS  Ext: trace edema bilaterally   Skin: maculopapular rash on  back, back of arms, abdomen, lower legs    Data   Recent Labs   Lab 12/12/22  0948 12/11/22  0803 12/10/22  0813 12/10/22  0658 12/09/22  0731 12/08/22  0600   WBC 0.1* 0.2* 0.2*  --    < > 0.2*   HGB 7.3* 8.0* 7.5*  --    < > 7.1*   MCV 82 80 78  --    < > 81   PLT 21* 27* 30*  --    < > 28*   INR  --   --   --   --   --  1.15    137  --  136   < > 136   POTASSIUM 3.2* 3.5  --  3.8   < > 3.7   CHLORIDE 105 105  --  108*   < > 104   CO2 17* 17*  --  17*   < > 19*   BUN 11.5 9.8  --  10.0   < > 12.3   CR 0.95 0.88  --  0.88   < > 1.16   ANIONGAP 15 15  --  11   < > 13   FAISAL 8.8 9.0  --  8.2*   < > 8.3*   * 115*  --  95   < > 107*   ALBUMIN 3.1* 3.3*  --  3.0*   < > 3.4*   PROTTOTAL 6.7 7.0  --  6.3*   < > 6.6   BILITOTAL 1.1 1.1  --  1.0   < > 0.7   ALKPHOS 166* 180*  --  157*   < > 109   ALT 26 38  --  37   < > 63*   AST 10 16  --  20   < > 25    < > = values in this interval not displayed.     No results found for this or any previous visit (from the past 24 hour(s)).

## 2022-12-12 NOTE — PROVIDER NOTIFICATION
Purpose of Notification: Abnormal labs  Notified Person: Kenia Gonsaelz resident  Notification Time: 1045  Notification Interaction: page     MD made aware od low WBC and PLT    Purpose of Notification: hydration  Notified Person: kenia Gonsalez  Notification Time: 1339  Notification Interaction: page  [id number] Chanel  Assuming go lytely is for large bowel obstruction, concerned about dehydration can he have cont fluids not taking much PO  Antelmo Herrera

## 2022-12-12 NOTE — PROGRESS NOTES
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 Burciaga  Current Treatment Plan: Irinotecan/Temodar (C1D1 = 11/28/22)     Reason for Consult: Appreciate recs for workup or abx given history of metastatic desmoplastic small round cell s/p PNA tx last week, admitted for neutropenic fever and +RSV.     Assessment & Plan:   Diego Buchanan is a 27 year old male with a history of cognitive delay and desmoplastic small round cell tumor c/b peritoneal carcinomatosis and mets to lymph nodes, bone, liver who was admitted on 12/7/2022 for neutropenic fevers and found to be RSV+. Complicated by partial large bowel obstruction 12/8 which is being managed conservatively. Course has been complicated by persistent fevers and interval development of an erythematous maculopapular rash to the flexural surfaces of all four extremities as well as the abdomen.    Recommendations:   - WBC 0.1, please start Neupogen 5 mcg/kg/day and continue until ANC >500 x 2 consecutive days. Monitor CBC w/ differential daily.   - Agree with dermatology consultation for further evaluation of new and progressive rash.  - Afebrile since 12/8. BCx and ascites fluid culture NGTD. Continue supportive management for RSV. Reasonable to continue broad-spectrum antibiotics at this time; would tentatively recommend a 7-day course (through 12/14). If concern for a drug eruption, would consider change to alternate agent (i.e. levofloxacin).   - Patient was scheduled for port exchange on 12/13; however, would defer for now in the setting of ongoing severe neutropenia. Request sent to reschedule the following week to allow for count recovery.  - Remainder of cares per primary  team.    # Neutropenic fevers  # RSV+  # Diarrhea, resolved  Patient most recently received Cycle 1, Day 1 irinotecan/temodar on 11/28/22. Did not receive neulasta support. Was recently treated for pneumonia after evidence on CT PE with scattered ground glass opacities - the bronchi are patent but there is concern for possible post obstructive process as well as trace right pleural effusion and a moderate left pleural effusion with left lingular atelectasis. He was started on Augmentin 875 mg BID x 5 days (12/1) plus Azithromycin 500 mg on day 1, 250 mg from days 2-5. He had noted diarrhea for the past 7 days outpatient, likely secondary to irinotecan, which is now significantly improved. He notes a max of ~9 episodes daily last week, which improved after antimotility agents. He has now stopped taking these, as his diarrhea has improved, and yesterday had 3 episodes of loose bowel movements. No abdominal pain. He presented to ED on 12/7 after tmax 100.4 outpatient with associated cough and shortness of breath. He was started on cefepime. RSV+. UA bland. CXR with stable mixed opacities in left lung, stable focal opacity in right midlung.   - Continue supportive measures with tessalon perles, mucinex, APAP.   - Continue cefepime (12/7 PM- X). Consider 7-day total course for neutropenic fever (through 12/14) - if discharging sooner, or concern for drug eruption, could switch to PO antibiotic such as a respiratory fluoroquinolone.  - BCx NGTD  - Diagnostic paracentesis 12/8 - no concern for SBP, cultures NGTD.   - Enteric panel and C. Diff negative. Diarrhea reportedly resolved.     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 Sims. See below for full oncologic history. In summary from outpatient notes, he tolerated CAV/IMV for 19 cycles and then was on chemotherapy break from June 2021-April 2022. Given return in symptoms he resumed  chemotherapy with further CAV/IMV in April 2022.  However, he has had evidence of progression on the two most recent scans. Robert and his family elected to take time to try traditional medicine treatments. Last CT-CAP (10/4/22) showed evidence of progressive disease, predominantly in liver. There are small sub-centimeter lung nodules, which are suspicious for metastasis. He started irinotecan 20 mg/m2 days 1-5/temozolomide 250 mg daily days 1-5 every 21 days with his first cycle on 11/28/22. On 12/1, he had a chest CT for SOB which was negative for acute PE. However, it showed scattered ground glass opacities suspicious for pneumonia. The bronchi is patent but there is concern for possible post obstructive process. There is a trace right pleural effusion and a moderate left pleural effusion with left lingular atelectasis. He was started on Augmentin 875 mg BID x 5 days plus Azithromycin 500 mg on day 1, 250 mg from days 2-5. Thoracic was also notified to set up thoracentesis for left pleural effusion and to time with port replacement. most recently was transitioned to irinotecan/temodar (C1D1 = 11/28/22) due to progression after treatment break.   - CT 12/8 with progression of extensive peritoneal carcinomatosis, pelvic masses, omental caking, malignant ascites when compared to CT 10/4/22. This does not represent failure of irinotecan/temodar as it was just started on 11/28.   - Labs, LISY, and Cycle 2 Day 1 irinotecan scheduled 12/19 - will most likely be able to proceed as long as he is out of the hospital and counts are improved.   - Patient was scheduled for port exchange on 12/13, will rescheduled to allow count recovery    # Pancytopenia  Secondary to chemotherapy  - Transfuse to keep Hgb >7 and plt >10k  - Start Neupogen 5 mcg/kg/day until ANC >500 x2 consecutive days (12/12-x)  - Monitor CBC w/ diff daily    # Diffuse maculopapular rash, pruritic  Per chart review, patient began to develop a maculopapular rash  "on his abdomen and leg, which subsequently spread to his back, abdomen, and extremities with associated pruritis. No mucosal involvement or palmar-plantar involvement. Differential for rash could include contact dermatitis, vs drug-induced. Low suspicion for SJS/TEN or DIHS. Both irinotecan and temozolomide do have a ~8-13% incidence of rash, but would rule out other etiologies initially.   - Dermatology consulted, appreciate recommendations    # Suspected partial large bowel obstruction   CT AP 12/8 with concern for cancer progression and possible large bowel obstruction.  - CRS consulted- recommended conservative mgmt  - GI consulted - \"No recommendations for colon stenting given the large tumour burden that extends from sigmoid colon till the rectum. In addition, stenting would need to be transanal. The risks of attempting to stent would outweigh the benefits in this patient with neutropenia and thrombocytopenia.\"  - Agree that surgical intervention/stenting not appropriate in the setting of extensive tumor burden and pancytopenia. Patient has most recently been starting on a new regimen for his malignancy with known GI toxicity, so query some component on inflammation secondary to irinotecan as well.      We will continue to follow this patient. Please do not hesitate to page with any questions or concerns.     I spent 60 minutes in the care of this patient today, which included time necessary for review of interval events, obtaining history and physical exam, ordering medications/tests/procedures as medically indicated, review of pertinent medical literature, counseling of the patient, coordination of care, and documentation time. Over 50% of time was spent counseling the patient and/or coordinating care.     Staffed with Dr. Nova.     Hawa Meredith PA-C  Hematology/Oncology  Pager: [phone]  ___________________________________________________________________    Subjective & Interval History:  " "  Afebrile overnight. Rash noted to legs, arms, abdomen. No mouth sores or eye irritation. Endorsed abdominal pain, managed with PRN tramadol, PO dilaudid, tylenol. No nausea. Reports BMs are \"all over the place.\" Overall stated he was feeling worse today, declined to elaborate. Patient's mother at bedside; patient translates for her.    Physical Exam:    Blood pressure 100/57, pulse (!) 131, temperature (!) 95.6  F (35.3  C), temperature source Oral, resp. rate 18, height 1.727 m (5' 8\"), weight 113.2 kg (249 lb 8 oz), SpO2 99 %.     General: lying in bed, no acute distress  HEENT: sclera anicteric, EOMI, MMM  CV: extremities warm and well-perfused  Resp: no increased work of breathing, normal respiratory effort on ambient air  GI: soft, non-distended  MSK: thin extremities, no gross deformities  Skin: blanching erythematous maculopapular rash to the flexural areas of the bilateral upper and lower extremities, posterior thigh/knees, volar forearms, and extending up into the axilla and onto the anterior ankles/dorsal feet, and abdomen; no intraoral lesions; evidence of excoriation to the ankles with few scattered petechiae  Neuro: alert and interactive, moves all extremities spontaneously, normal speech without dysarthria  Psych: normal mood and affect    Labs & Studies: I personally reviewed the following studies:  ROUTINE LABS (Last four results):  CMP  Recent Labs   Lab 12/12/22  0948 12/11/22  0803 12/10/22  1759 12/10/22  0658 12/10/22  0318 12/09/22  1500 12/09/22  0731 12/09/22  0007 12/08/22  1344    137  --  136  --   --  137  --   --    POTASSIUM 3.2* 3.5  --  3.8 4.3   < > 3.1*  --   --    CHLORIDE 105 105  --  108*  --   --  106  --   --    CO2 17* 17*  --  17*  --   --  17*  --   --    ANIONGAP 15 15  --  11  --   --  14  --   --    * 115*  --  95  --   --  129*  --   --    BUN 11.5 9.8  --  10.0  --   --  12.7  --   --    CR 0.95 0.88  --  0.88  --   --  0.98  --   --    GFRESTIMATED >90 " >90  --  >90  --   --  >90  --   --    FAISAL 8.8 9.0  --  8.2*  --   --  8.3*  --   --    MAG 1.5* 1.7  --  1.8  --   --   --   --  1.8   PHOS 2.4* 2.0* 2.8 1.7*  --   --   --    < > 1.7*   PROTTOTAL 6.7 7.0  --  6.3*  --   --  6.4  --   --    ALBUMIN 3.1* 3.3*  --  3.0*  --   --  3.2*  --   --    BILITOTAL 1.1 1.1  --  1.0  --   --  0.5  --   --    ALKPHOS 166* 180*  --  157*  --   --  118  --   --    AST 10 16  --  20  --   --  19  --   --    ALT 26 38  --  37  --   --  40  --   --     < > = values in this interval not displayed.     CBC  Recent Labs   Lab 12/12/22  0948 12/11/22  0803 12/10/22  0813 12/09/22  1500 12/09/22  0731   WBC 0.1* 0.2* 0.2*  --  0.2*   RBC 2.99* 3.18* 3.06*  --  2.79*   HGB 7.3* 8.0* 7.5* 7.6* 6.8*   HCT 24.4* 25.3* 23.9*  --  22.6*   MCV 82 80 78  --  81   MCH 24.4* 25.2* 24.5*  --  24.4*   MCHC 29.9* 31.6 31.4*  --  30.1*   RDW 17.0* 17.0* 17.0*  --  17.5*   PLT 21* 27* 30*  --  26*     INR  Recent Labs   Lab 12/08/22  0600   INR 1.15       Medications list for reference:  Current Facility-Administered Medications   Medication     acetaminophen (TYLENOL) tablet 650 mg     benzonatate (TESSALON) capsule 100 mg     ceFEPIme (MAXIPIME) 2 g vial to attach to  ml bag for ADULTS or 50 ml bag for PEDS     cyanocobalamin (VITAMIN B-12) tablet 100 mcg     dextrose 5 % flush PRE/POST medication    And     filgrastim (NEUPOGEN) 480 mcg in D5W 25 mL infusion    And     dextrose 5 % flush PRE/POST medication     diphenhydrAMINE-zinc acetate (BENADRYL) 1-0.1 % cream     [Held by provider] guaiFENesin (MUCINEX) 12 hr tablet 600 mg     [Held by provider] HYDROmorphone (DILAUDID) half-tab 1 mg     Lidocaine (LIDOCARE) 4 % Patch 1 patch     lidocaine (LMX4) cream     lidocaine 1 % 0.1-1 mL     lidocaine patch in PLACE     magnesium sulfate 4 g in 100 mL sterile water (premade)     melatonin tablet 1 mg     metoprolol tartrate (LOPRESSOR) tablet 25 mg     naloxone (NARCAN) injection 0.2 mg    Or      naloxone (NARCAN) injection 0.4 mg    Or     naloxone (NARCAN) injection 0.2 mg    Or     naloxone (NARCAN) injection 0.4 mg     ondansetron (ZOFRAN ODT) ODT tab 4 mg    Or     ondansetron (ZOFRAN) injection 4 mg     oxyCODONE (ROXICODONE) tablet 5 mg     polyethylene glycol (GoLYTELY) suspension 1,000 mL     potassium phosphate 15 mmol in D5W 250 mL intermittent infusion     prochlorperazine (COMPAZINE) injection 10 mg    Or     prochlorperazine (COMPAZINE) tablet 10 mg    Or     prochlorperazine (COMPAZINE) suppository 25 mg     sodium chloride (PF) 0.9% PF flush 3 mL     sodium chloride (PF) 0.9% PF flush 3 mL     traMADol (ULTRAM) tablet 50 mg     Facility-Administered Medications Ordered in Other Encounters   Medication     sodium chloride (PF) 0.9% PF flush 30 mL

## 2022-12-13 NOTE — PROGRESS NOTES
Vitals: Temp: 97  F (36.1  C) Temp src: Temporal BP: 90/42 Pulse: (!) 145   Resp: 17 SpO2: 98 % O2 Device: None (Room air)    Reason for admission: neutropenic fever.  Activity:  up ad beverly in room.   Pain:  Denies  Neuro: A&O x4. CMS intact. Cognitive delayed, family at bedside. Able to make needs known.   Cardiac: Hypotensive,  Tachycardic 100-145's. Denies chest pain.   Respiratory: Sating >95% on RA. Denies SOB.   GI/:  Voiding spontaneously, not saving. Loose BMs continued  Diet: Regular  Lines:  L PIV infusing SL. L PIV LR infusing.   Incisions/Drains/Skin:  Generalized rash on abdomen, legs, and arms. Applied triamcinolone.   Lab:  Hgb =6.9. Platelets = 18. WBC = 0.2. K = 4. Phos = 2.4. mg 2.4. Recheck due AM.     New changes this shift:  hypotensive BP 90/42. LR bolus 500/ml hr 1L given. Hgb dropped to 6.9.

## 2022-12-13 NOTE — PROGRESS NOTES
Paged for hypotension and tachycardia.     Patient seen and assessed at bedside. No acute changes in mental status, alert, appropriately responsive throughout interview consistent with previous bedside exams. Denies chest pain, shortness of breath, light headedness, and dizziness. Patient reports abdominal pain is stable and no different than previous. Does have mild diffuse tenderness on exam to moderate palpation but no pain with light touch palpation or rebound tenderness concerning for peritonitis. Regarding hypotension, will trial fluid bolus 1 liter, check lactate, check hemoglobin. After liter bolus if BP improves will continue previous maintenance rate 100 ml/hour. Anterior and lateral lung fields CTAB, heart tachycardic but regular. Did not appreciate any murmurs on exam.   - Lactate  - Hemoglobin check  - liter bolus LR

## 2022-12-13 NOTE — PLAN OF CARE
Vitals: Temp: 97  F (36.1  C) Temp src: Oral BP: 104/40 Pulse: (!) 137   Resp: 20 SpO2: 100 % O2 Device: None (Room air)       Time: 3818-7833  Reason for admission: neutropenic fever.  Activity:  up ad beverly in room.   Pain:  Abdominal pain controlled w/ prn Tylenol x2, PO Tramadol x2, Lidocaine patch in place.   Neuro: A&O x4. CMS intact. Family at bedside. Able to make needs known.   Cardiac: Tachycardic 130-140's. Denies chest pain. Team paged per order parameters.   Respiratory:  LS clear, diminished in lower lobes. Sating >95% on RA. Denies SOB.   GI/:  Voiding spontaneously, not saving. Loose BMs continued x2.   Diet: Regular, tolerating poorly due to abdominal discomfort.   Lines:  L PIV infusing TKO + abx. L PIV infusing LR TKO. R Port not in use, scheduled to get it replaced next week.   Incisions/Drains/Skin: Rash, red + purple petichaie throughout abdomen, back, neck, arms, posterior thighs, feet. Abdominal distention.   Lab:  Hgb = 8.3. Platelets = 16. WBC = 0.2. Phos = 1.9. K = 3.4. Positive UA. Derm biopsy sent.   Electrolyte Replacements: Phos replaced IV, re-check at 1600.K replaced, re-check at 1800.      New changes this shift:  Continue POC.

## 2022-12-13 NOTE — PLAN OF CARE
"Vital signs:  Temp: 97.8  F (36.6  C) Temp src: Oral BP: 110/54 Pulse: (!) 139   Resp: 22 SpO2: 97 % O2 Device: None (Room air)   Height: 172.7 cm (5' 8\") Weight: 113.2 kg (249 lb 8 oz)    1719-4349: Temp 100.3 at beginning of shift, PRN Tylenol effective, provider aware. Recheck temp currently 97.8.  Activity: Up to bathroom independently. Mother present at patient's bedside.   Neuros: A & O x4. Neuro intact.   Cardiac: Tachy in the 130s-140s. BPs 90s-110s/40s-50s. Asymptomatic.   Respiratory: LS clear. O2 sats high 90s on RA. Denies SOB. Unlabored.   GI/: BS+, passing flatus, had BM yesterday.   Diet: Regular diet.   Skin: Generalized rash bilateral arms, abdomen, and bilateral arms, and back.   Lines: LR infusing at 100 mL/hr via left lower PIV. Hgb 6.9, 1 unit RBC infused per orders via upper left PIV, no adverse effect. Cefepime 2g infused per orders.    Incisions/Drains: None.   Labs: Reviewed, recheck labs this AM.   Pain/nausea: Denies pain. Denies nausea.   New changes this shift: None.   Plan: Continue POC.     "

## 2022-12-13 NOTE — PROVIDER NOTIFICATION
"REGAN 4 paged \"[phone], [phone], Chanel.   FYI pt's HR = 144, I know he is baseline tachy but orders to page for HR > 130. Other VSS. Asymptomatic.   Thanks, Bree Gregory (vocaurora)\"      "

## 2022-12-13 NOTE — PROGRESS NOTES
[facility]    Medicine Progress Note - Medicine Service, REGAN TEAM 4    Date of Admission:  12/7/2022    Assessment & Plan   Diego Buchanan is a 27 year old male admitted on 12/7/2022. 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 Found to have possible large bowel obstruction on CT 12/8. Colorectal surgery consulted.    Changes today:  - continue filgrastim daily  - with worsening rash, stop cefepime  - start zosyn for neutropenic fever  - dermatology to see patient again, will perform biopsy    # Sepsis with neutropenic fever in setting of RSV infection  # Recent PNA s/p treatment  # Neutropenia  # Tachycardia  Febrile, tachycardic with neutropenia. LA negative. Patient recently treated for PNA based on CT findings on 12/1, s/p course of CTX and Azithromycin. No other symptoms, no shortness of breath, not requiring O2. RSV positive and likely underlying infectious source however undergoing complete infectious workup. UA negative. CXR stable. Procal 0.39  - Cefepime (12/7-12/12) STOP due to worsening rash on 12/13  - START zosyn for intra-abdominal coverage on 12/14  - BCX with NGTD  - CT abdomen pelvis given concern for neutropenic colitis in setting of ongoing diarrhea   - no evidence of neutropenic colitis   - did show multiple fluid pockets, would be difficult to sample or drain them all  - CAPS consulted for diagnostic paracentesis given history of ascites   - not indicative of SBP  - Oncology following, recs appreciated   - Added on tessalon perles, mucinex, and APAP  - continue G-CSF daily until ANC>500x2 consecutive days  - CIS    # Rash, worsening  Maculopapular rash worsening for the past few days, has been on cefepime since 12/7, has tolerated CTX in the past. Unclear etiology. DDx include drug rash related to cefepime vs contact dermatitis vs  allergic dermatitis.   Worsening, spreading to more body surface on 12/13.  - dermatology consulted, appreciate recs  - worsening mp rash on 12/14, derm to reevaluate patient  - STOP cefepime 12/13  - biopsy with derm today    # Possible large bowel obstruction  CT AP showing worsening cancer burden and c/f large bowel obstruction.  - Colorectal surgery consulted; appreciate recs   - conservative management at the moment   - recommend palliation  - GI consulted, signed off   - conservative management, not a candidate for colonic stent given neutropenia  - Abdominal pain   - tylenol for mild pain, tramadol for moderate, hydromorphone for severe (all PO options)  -  bowel clean out with golytely on 12/12, discussed with GI    # Acute Kidney Injury, resolved: based on a >150% or 0.3 mg/dL increase in last creatinine compared to past 90 day average, will monitor renal function     - continue to monitor     # Diarrhea  Likely due to overflow around harder stool at transition point of obstruction. Cramping abdominal pain is likely peristaltic  - colon clean out with golytely on 12/12     # Hypokalemia  - RN replacement protocol     # Tachycardia  Chronic, 105-120. Likely increased 2/2 fever.  - PTA Metoprolol 25 BID     # L pleural effusion   Moderate pleural effusion on 12/1 CT. Patient plan to see thoracic surgery as outpatient for thoracentesis. No current indication for inpatient drainage, unlikely source of infection nd patient already received abx so unlikely to diagnostically yield anything. Will plan to treat as outpatient.      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 No signs of bleeding or bruising, VSS.  - Anemia work up showing low B12; started on  supplement  - blood consent completed, transfuse 1u PRBCs 12/9     # Hypocalcemia: Lowest Ca = 8.1 mg/dL in last 2 days, will monitor and replace as appropriate     - Likely 2/2 low albumin    # Hypoalbuminemia: Lowest albumin = 3.4 g/dL at 12/8/2022  6:00 AM, will monitor as appropriate     Diet: Combination Diet Regular Diet Adult  Snacks/Supplements Adult: Other; PRN; Between Meals    DVT Prophylaxis: Pneumatic Compression Devices  Busby Catheter: Not present  Central Lines: PRESENT       Cardiac Monitoring: None  Code Status: Full Code      Disposition Plan      Expected Discharge Date: 12/16/2022        Discharge Comments: pending resolution of neutropenic fever, G-CSF response        The patient's care was discussed with the Bedside Nurse, Care Coordinator/, Patient and Patient's Family.    Phuong Rodriguez MD  Medicine Service, [address]  Securely message with the Vocera Web Console (learn more here)  Text page via [employer] Paging/Directory   Please see signed in provider for up to date coverage information  ________________________________________________________________    Interval History   Patient continues to have abdominal pain, rash worsening. Now interested in biopsy.     Otherwise 4pt ROS was reviewed and is negative     Data reviewed today: I reviewed all medications, new labs and imaging results over the last 24 hours. I personally reviewed no images or EKG's today.    Physical Exam   Vital Signs: Temp: 98  F (36.7  C) Temp src: Oral BP: 112/61 Pulse: (!) 138   Resp: 20 SpO2: 100 % O2 Device: None (Room air)    Weight: 249 lbs 8 oz  Gen: young appearing male in NAD  HEENT: NCAT, EOMI, somewhat dry mucosa, conjuctiva clear  CV: RRR, no m/r/g  RESP: CTAB, no wheezes or crackles  Abd: soft, nontender, nondistended active BS  Ext: trace edema bilaterally   Skin: maculopapular rash worsening compared to yesterday, noted on back,  bilateral thighs, lower legs, abdomen    Data   Recent Labs   Lab 12/13/22  0834 12/12/22  2205 12/12/22  1732 12/12/22  0948 12/11/22  0803 12/09/22  0731 12/08/22  0600   WBC 0.2* 0.2*  --  0.1* 0.2*   < > 0.2*   HGB 8.3* 6.9*  --  7.3* 8.0*   < > 7.1*   MCV 79 80  --  82 80   < > 81   PLT 16* 18*  --  21* 27*   < > 28*   INR  --   --   --   --   --   --  1.15   *  --   --  137 137   < > 136   POTASSIUM 3.4  --  4.0 3.2* 3.5   < > 3.7   CHLORIDE 103  --   --  105 105   < > 104   CO2 18*  --   --  17* 17*   < > 19*   BUN 10.8  --   --  11.5 9.8   < > 12.3   CR 0.86  --   --  0.95 0.88   < > 1.16   ANIONGAP 13  --   --  15 15   < > 13   FAISAL 8.3*  --   --  8.8 9.0   < > 8.3*   *  --   --  103* 115*   < > 107*   ALBUMIN 3.0*  --   --  3.1* 3.3*   < > 3.4*   PROTTOTAL 6.7  --   --  6.7 7.0   < > 6.6   BILITOTAL 2.1*  --   --  1.1 1.1   < > 0.7   ALKPHOS 162*  --   --  166* 180*   < > 109   ALT 17  --   --  26 38   < > 63*   AST 10  --   --  10 16   < > 25    < > = values in this interval not displayed.     No results found for this or any previous visit (from the past 24 hour(s)).

## 2022-12-13 NOTE — PROVIDER NOTIFICATION
"DERM PAGED \"[phone], [phone], Chanel.   Pt is agreeable to biopsy, I just spoke with him and his sister. We just need a new order in so I can send it to lab if you're able to attempt the biopsy again soon.   Thanks, Bree Gregory (maverick)\"      "

## 2022-12-13 NOTE — PROGRESS NOTES
[facility] Dermatology Progress Note    Assessment and Plan:  1. Pink papules, plaques, petechiae and purpura distributed on trunk and extremities (favoring dependent areas)  DDx includes small vessel vasculitis (LCV, IgA), morbiliform drug reaction with RBC extravasation. Given history of blood in urine in conjunction with worsening rash with addition of more petechial/purpuric lesions today, would recommend biopsy. Patient was amenable to this and we proceeded with biopsy.    - PROCEDURE: Punch Biopsy  The risks and benefits of the procedure were described to the patient. These include but are not limited to bleeding, infection, scar, incomplete removal, and non-diagnostic biopsy. Written informed consent was obtained. The area was cleansed with an alcohol pad and injected with lidocaine with epinephrine (<3mL used). Once anesthesia was obtained, two 4 mm punch biopsies were performed. The tissue was placed in a labeled container with formalin and Geronimo media and sent to pathology. The sites were closed using 4-0 Prolene sutures. Vaseline and a bandage were applied to the wound. The patient tolerated the procedure well and was given post biopsy care instructions.  - Biopsy site wound care: Leave initial bandage in place for 24 hours, then wash gently with soap and water and cover with vaseline and clean bandage. Repeat daily until suture removal in 14 days (on 12/27). No sutures were placed due to skin fragility. Wound was packed with gel foam, which will naturally dissolve over the next few days.    - Continue triamcinolone 0.1% ointment BID to rash areas    We will continue to follow. Please do not hesitate to contact the dermatology resident/faculty on call for any additional questions or concerns.     Patient seen and evaluated with attending physician, Dr. Jeffry Rodriguez MD MPH  PGY4 Medicine/Dermatology Resident    I have seen and examined this patient and agree with the  assessment and plan as documented in the resident's note, and was present for the key elements of all procedures.    Mykel Teran MD  Dermatology Attending        Dermatology Problem List:  1. Pink papules, plaques, petechiae and purpura distributed on trunk and extremities (favoring dependent areas)  - punch biopsy (H&E, DIF) pending  - triamcinolone 0.1% ointment BID    Date of Admission: Dec 7, 2022   Encounter Date: 12/13/2022      Interval history:  Rash progressively worsening today. Patient endorses itch and persistent blood in his urine. Given the worsening rash, family requested biopsy today (they were hesitant to proceed yesterday because of risks of bleeding and infection).     Medications:  Current Facility-Administered Medications   Medication     acetaminophen (TYLENOL) tablet 650 mg     benzonatate (TESSALON) capsule 100 mg     cyanocobalamin (VITAMIN B-12) tablet 100 mcg     dextrose 5 % flush PRE/POST medication    And     filgrastim (NEUPOGEN) 480 mcg in D5W 25 mL infusion    And     dextrose 5 % flush PRE/POST medication     diphenhydrAMINE-zinc acetate (BENADRYL) 1-0.1 % cream     [Held by provider] guaiFENesin (MUCINEX) 12 hr tablet 600 mg     [Held by provider] HYDROmorphone (DILAUDID) half-tab 1 mg     Lidocaine (LIDOCARE) 4 % Patch 1 patch     lidocaine (LMX4) cream     lidocaine 1 % 0.1-1 mL     lidocaine patch in PLACE     melatonin tablet 1 mg     metoprolol tartrate (LOPRESSOR) tablet 25 mg     naloxone (NARCAN) injection 0.2 mg    Or     naloxone (NARCAN) injection 0.4 mg    Or     naloxone (NARCAN) injection 0.2 mg    Or     naloxone (NARCAN) injection 0.4 mg     ondansetron (ZOFRAN ODT) ODT tab 4 mg    Or     ondansetron (ZOFRAN) injection 4 mg     oxyCODONE (ROXICODONE) tablet 5 mg     piperacillin-tazobactam (ZOSYN) 3.375 g vial to attach to  mL bag     prochlorperazine (COMPAZINE) injection 10 mg    Or     prochlorperazine (COMPAZINE) tablet 10 mg    Or     prochlorperazine  "(COMPAZINE) suppository 25 mg     sodium chloride (PF) 0.9% PF flush 3 mL     sodium chloride (PF) 0.9% PF flush 3 mL     traMADol (ULTRAM) tablet 50 mg     triamcinolone (KENALOG) 0.1 % ointment     Facility-Administered Medications Ordered in Other Encounters   Medication     sodium chloride (PF) 0.9% PF flush 30 mL        Physical exam:  /63 (BP Location: Right arm)   Pulse (!) 144   Temp 97.8  F (36.6  C) (Oral)   Resp 20   Ht 1.727 m (5' 8\")   Wt 113.2 kg (249 lb 8 oz)   SpO2 99%   BMI 37.94 kg/m    SKIN: Focused examination of the legs, trunk was performed.  -Tobias skin type:III  - Dark pink to red papules, plaques, petechiae, purpura on trunk and extremities, more prominent in dependent areas when laying supine          Laboratory:  Results for orders placed or performed during the hospital encounter of 12/07/22 (from the past 24 hour(s))   Potassium   Result Value Ref Range    Potassium 4.0 3.4 - 5.3 mmol/L   Magnesium   Result Value Ref Range    Magnesium 2.4 (H) 1.7 - 2.3 mg/dL   CBC with platelets   Result Value Ref Range    WBC Count 0.2 (LL) 4.0 - 11.0 10e3/uL    RBC Count 2.80 (L) 4.40 - 5.90 10e6/uL    Hemoglobin 6.9 (LL) 13.3 - 17.7 g/dL    Hematocrit 22.4 (L) 40.0 - 53.0 %    MCV 80 78 - 100 fL    MCH 24.6 (L) 26.5 - 33.0 pg    MCHC 30.8 (L) 31.5 - 36.5 g/dL    RDW 17.1 (H) 10.0 - 15.0 %    Platelet Count 18 (LL) 150 - 450 10e3/uL   Lactic acid whole blood   Result Value Ref Range    Lactic Acid 1.0 0.7 - 2.0 mmol/L   ABO/Rh type and screen    Narrative    The following orders were created for panel order ABO/Rh type and screen.  Procedure                               Abnormality         Status                     ---------                               -----------         ------                     Adult Type and Screen[[id number]]                            Final result                 Please view results for these tests on the individual orders.   Adult Type and Screen   Result " Value Ref Range    ABO/RH(D) B POS     Antibody Screen Negative Negative    SPECIMEN EXPIRATION DATE [id number]    Prepare red blood cells (unit)   Result Value Ref Range    Blood Component Type Red Blood Cells     Product Code C6966Z27     Unit Status Transfused     Unit Number [id number]     CROSSMATCH Compatible     CODING SYSTEM FFXK925     ISSUE DATE AND TIME 20221213002600     UNIT ABO/RH B+     UNIT TYPE ISBT 7300    CBC with Platelets & Differential    Narrative    The following orders were created for panel order CBC with Platelets & Differential.  Procedure                               Abnormality         Status                     ---------                               -----------         ------                     CBC with platelets and d...[[id number]]  Abnormal            Final result               RBC and Platelet Morphology[[id number]]                      Final result                 Please view results for these tests on the individual orders.   Comprehensive metabolic panel   Result Value Ref Range    Sodium 134 (L) 136 - 145 mmol/L    Potassium 3.4 3.4 - 5.3 mmol/L    Chloride 103 98 - 107 mmol/L    Carbon Dioxide (CO2) 18 (L) 22 - 29 mmol/L    Anion Gap 13 7 - 15 mmol/L    Urea Nitrogen 10.8 6.0 - 20.0 mg/dL    Creatinine 0.86 0.67 - 1.17 mg/dL    Calcium 8.3 (L) 8.6 - 10.0 mg/dL    Glucose 118 (H) 70 - 99 mg/dL    Alkaline Phosphatase 162 (H) 40 - 129 U/L    AST 10 10 - 50 U/L    ALT 17 10 - 50 U/L    Protein Total 6.7 6.4 - 8.3 g/dL    Albumin 3.0 (L) 3.5 - 5.2 g/dL    Bilirubin Total 2.1 (H) <=1.2 mg/dL    GFR Estimate >90 >60 mL/min/1.73m2   Phosphorus   Result Value Ref Range    Phosphorus 1.9 (L) 2.5 - 4.5 mg/dL   Magnesium   Result Value Ref Range    Magnesium 1.9 1.7 - 2.3 mg/dL   CBC with platelets and differential   Result Value Ref Range    WBC Count 0.2 (LL) 4.0 - 11.0 10e3/uL    RBC Count 3.29 (L) 4.40 - 5.90 10e6/uL    Hemoglobin 8.3 (L) 13.3 - 17.7 g/dL    Hematocrit 26.1  (L) 40.0 - 53.0 %    MCV 79 78 - 100 fL    MCH 25.2 (L) 26.5 - 33.0 pg    MCHC 31.8 31.5 - 36.5 g/dL    RDW 16.4 (H) 10.0 - 15.0 %    Platelet Count 16 (LL) 150 - 450 10e3/uL   RBC and Platelet Morphology   Result Value Ref Range    Platelet Assessment  Automated Count Confirmed. Platelet morphology is normal.     Automated Count Confirmed. Platelet morphology is normal.    RBC Morphology Confirmed RBC Indices    UA with Microscopic reflex to Culture    Specimen: Urine, Midstream   Result Value Ref Range    Color Urine Orange (A) Colorless, Straw, Light Yellow, Yellow    Appearance Urine Slightly Cloudy (A) Clear    Glucose Urine 70 (A) Negative mg/dL    Bilirubin Urine Negative Negative    Ketones Urine 10 (A) Negative mg/dL    Specific Gravity Urine 1.019 1.003 - 1.035    Blood Urine Large (A) Negative    pH Urine 6.0 5.0 - 7.0    Protein Albumin Urine 200 (A) Negative mg/dL    Urobilinogen Urine Normal Normal, 2.0 mg/dL    Nitrite Urine Negative Negative    Leukocyte Esterase Urine Negative Negative    Mucus Urine Present (A) None Seen /LPF    Calcium Oxalate Crystals Urine Few (A) None Seen /HPF    RBC Urine >182 (H) <=2 /HPF    WBC Urine 17 (H) <=5 /HPF    Transitional Epithelials Urine <1 <=1 /HPF    Narrative    Urine Culture ordered based on laboratory criteria   Protein  random urine   Result Value Ref Range    Total Protein Urine mg/dL 182.0 mg/dL    Total Protein UR MG/MG CR 2.06 (H) 0.00 - 0.20 mg/mg Cr    Creatinine Urine mg/dL 88.4 mg/dL   Creatinine random urine   Result Value Ref Range    Creatinine Urine mg/dL 88.4 mg/dL       Staff Involved:  Resident/Staff

## 2022-12-13 NOTE — PROGRESS NOTES
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 Burciaga  Current Treatment Plan: Irinotecan/Temodar (C1D1 = 11/28/22)     Reason for Consult: Appreciate recs for workup or abx given history of metastatic desmoplastic small round cell s/p PNA tx last week, admitted for neutropenic fever and +RSV.     Assessment & Plan:   Diego Buchanan is a 27 year old male with a history of cognitive delay and desmoplastic small round cell tumor c/b peritoneal carcinomatosis and mets to lymph nodes, bone, liver who was admitted on 12/7/2022 for neutropenic fevers and found to be RSV+. Complicated by partial large bowel obstruction 12/8 which is being managed conservatively. Course has been complicated by persistent fevers and interval development of an erythematous maculopapular rash to the flexural surfaces of all four extremities as well as the abdomen.      Recommendations:   - Continue Neupogen 5 mcg/kg daily (12/12-x) until ANC >500 x 2 consecutive days. Monitor CBC w/ differential daily.   - Progressive rash today, recommend derm biopsy and discontinue Cefepime as possible culprit. This was discussed with primary team in person.       # Neutropenic fevers  # RSV+  # Diarrhea, resolved  Patient most recently received Cycle 1, Day 1 irinotecan/temodar on 11/28/22. Did not receive neulasta support. Was recently treated for pneumonia after evidence on CT PE with scattered ground glass opacities - the bronchi are patent but there is concern for possible post obstructive process as well as trace right pleural effusion and a moderate left pleural effusion with left lingular atelectasis. He was started on Augmentin 875 mg BID x 5 days (12/1) plus  Azithromycin 500 mg on day 1, 250 mg from days 2-5. He had noted diarrhea for the past 7 days outpatient, likely secondary to irinotecan, which is now significantly improved. He notes a max of ~9 episodes daily last week, which improved after antimotility agents. He has now stopped taking these, as his diarrhea has improved, and yesterday had 3 episodes of loose bowel movements. No abdominal pain. He presented to ED on 12/7 after tmax 100.4 outpatient with associated cough and shortness of breath. He was started on cefepime. RSV+. UA bland. CXR with stable mixed opacities in left lung, stable focal opacity in right midlung.   - Continue supportive measures with tessalon perles, mucinex, APAP.   - Continue cefepime (12/7 PM- X). Consider 7-day total course for neutropenic fever (through 12/14) - if discharging sooner, or concern for drug eruption, could switch to PO antibiotic such as a respiratory fluoroquinolone.  - BCx NGTD  - Diagnostic paracentesis 12/8 - no concern for SBP, cultures NGTD.   - Enteric panel and C. Diff negative. Diarrhea reportedly resolved.     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 Sims. See below for full oncologic history. In summary from outpatient notes, he tolerated CAV/IMV for 19 cycles and then was on chemotherapy break from June 2021-April 2022. Given return in symptoms he resumed chemotherapy with further CAV/IMV in April 2022.  However, he has had evidence of progression on the two most recent scans. Robert and his family elected to take time to try traditional medicine treatments. Last CT-CAP (10/4/22) showed evidence of progressive disease, predominantly in liver. There are small sub-centimeter lung nodules, which are suspicious for metastasis. He started irinotecan 20 mg/m2 days 1-5/temozolomide 250 mg daily days 1-5 every 21 days with his first cycle on 11/28/22. On 12/1, he had a chest CT  for SOB which was negative for acute PE. However, it showed scattered ground glass opacities suspicious for pneumonia. The bronchi is patent but there is concern for possible post obstructive process. There is a trace right pleural effusion and a moderate left pleural effusion with left lingular atelectasis. He was started on Augmentin 875 mg BID x 5 days plus Azithromycin 500 mg on day 1, 250 mg from days 2-5. Thoracic was also notified to set up thoracentesis for left pleural effusion and to time with port replacement. most recently was transitioned to irinotecan/temodar (C1D1 = 11/28/22) due to progression after treatment break.   - CT 12/8 with progression of extensive peritoneal carcinomatosis, pelvic masses, omental caking, malignant ascites when compared to CT 10/4/22. This does not represent failure of irinotecan/temodar as it was just started on 11/28.   - Labs, LISY, and Cycle 2 Day 1 irinotecan scheduled 12/19 - will most likely be able to proceed as long as he is out of the hospital and counts are improved.   - Patient was scheduled for port exchange on 12/13, will rescheduled to allow count recovery    # Pancytopenia  Secondary to chemotherapy  - Transfuse to keep Hgb >7 and plt >10k  - Start Neupogen 5 mcg/kg/day until ANC >500 x2 consecutive days (12/12-x)  - Monitor CBC w/ diff daily    # Diffuse maculopapular rash, pruritic  Per chart review, patient began to develop a maculopapular rash on his abdomen and leg, which subsequently spread to his back, abdomen, and extremities with associated pruritis. No mucosal involvement or palmar-plantar involvement. Differential for rash could include contact dermatitis, vs drug-induced. Low suspicion for SJS/TEN or DIHS. Both irinotecan and temozolomide do have a ~8-13% incidence of rash, but would rule out other etiologies initially.   - Dermatology consulted, appreciate recommendations. Started on Triamcinalone cream x12/12.  - Recommend derm skin bx and  "discontinuation of Cefepime and switch to alternative such as Zosyn given progressive Rash x12/13    # Suspected partial large bowel obstruction   CT AP 12/8 with concern for cancer progression and possible large bowel obstruction.  - CRS consulted- recommended conservative mgmt  - GI consulted - \"No recommendations for colon stenting given the large tumour burden that extends from sigmoid colon till the rectum. In addition, stenting would need to be transanal. The risks of attempting to stent would outweigh the benefits in this patient with neutropenia and thrombocytopenia.\"  - Agree that surgical intervention/stenting not appropriate in the setting of extensive tumor burden and pancytopenia. Patient has most recently been starting on a new regimen for his malignancy with known GI toxicity, so query some component on inflammation secondary to irinotecan as well.      We will continue to follow this patient. Please do not hesitate to page with any questions or concerns.     I spent 30 minutes in the care of this patient today, which included time necessary for review of interval events, obtaining history and physical exam, ordering medications/tests/procedures as medically indicated, review of pertinent medical literature, counseling of the patient, coordination of care, and documentation time. Over 50% of time was spent counseling the patient and/or coordinating care.     Staffed with Dr. Nova.     Delma Muller ([facility])ADELE    Hematology/Oncology   Pager: [phone]   ___________________________________________________________________    Subjective & Interval History:    Afebrile overnight although Tmax 100.3. Intermittently tachycardia 140s and hypotension 90/40s s/p 1L bolus LR. LA normal 1.0, remains on Cefepime. Worsening rash, itchy but non painful. Multiple loose stool last night and this AM, non bloody. Reports bloody urine overnight. No bony pain with GCSF. Father supportive at bedside and " "sister on phone.     Physical Exam:    Blood pressure 104/40, pulse (!) 137, temperature 97  F (36.1  C), temperature source Oral, resp. rate 20, height 1.727 m (5' 8\"), weight 113.2 kg (249 lb 8 oz), SpO2 100 %.     General: lying in bed, no acute distress  HEENT: sclera anicteric, EOMI, MMM  CV: extremities warm and well-perfused  Resp: no increased work of breathing, normal respiratory effort on ambient air  GI: soft, non-distended   MSK: thin extremities, no gross deformities  Skin: blanching erythematous maculopapular rash to the flexural areas of the bilateral upper and lower extremities, extending on post/ant thigh/knees, volar forearms, and extending up into the axilla and onto the anterior ankles/dorsal feet, and abdomen; more confluent on back today.   Neuro: alert and interactive, moves all extremities spontaneously, normal speech without dysarthria  Psych: normal mood and affect    Labs & Studies: I personally reviewed the following studies:  ROUTINE LABS (Last four results):  CMP  Recent Labs   Lab 12/13/22  0834 12/12/22  1732 12/12/22  0948 12/11/22  0803 12/10/22  1759 12/10/22  0658 12/09/22  1500 12/09/22  0731   NA  --   --  137 137  --  136  --  137   POTASSIUM  --  4.0 3.2* 3.5  --  3.8   < > 3.1*   CHLORIDE  --   --  105 105  --  108*  --  106   CO2  --   --  17* 17*  --  17*  --  17*   ANIONGAP  --   --  15 15  --  11  --  14   GLC  --   --  103* 115*  --  95  --  129*   BUN  --   --  11.5 9.8  --  10.0  --  12.7   CR  --   --  0.95 0.88  --  0.88  --  0.98   GFRESTIMATED  --   --  >90 >90  --  >90  --  >90   FAISAL  --   --  8.8 9.0  --  8.2*  --  8.3*   MAG 1.9 2.4* 1.5* 1.7  --  1.8  --   --    PHOS 1.9*  --  2.4* 2.0* 2.8 1.7*  --   --    PROTTOTAL  --   --  6.7 7.0  --  6.3*  --  6.4   ALBUMIN  --   --  3.1* 3.3*  --  3.0*  --  3.2*   BILITOTAL  --   --  1.1 1.1  --  1.0  --  0.5   ALKPHOS  --   --  166* 180*  --  157*  --  118   AST  --   --  10 16  --  20  --  19   ALT  --   --  26 38  --  " 37  --  40    < > = values in this interval not displayed.     CBC  Recent Labs   Lab 12/13/22  0834 12/12/22  2205 12/12/22  0948 12/11/22  0803   WBC 0.2* 0.2* 0.1* 0.2*   RBC 3.29* 2.80* 2.99* 3.18*   HGB 8.3* 6.9* 7.3* 8.0*   HCT 26.1* 22.4* 24.4* 25.3*   MCV 79 80 82 80   MCH 25.2* 24.6* 24.4* 25.2*   MCHC 31.8 30.8* 29.9* 31.6   RDW 16.4* 17.1* 17.0* 17.0*   PLT 16* 18* 21* 27*     INR  Recent Labs   Lab 12/08/22  0600   INR 1.15       Medications list for reference:  Current Facility-Administered Medications   Medication     acetaminophen (TYLENOL) tablet 650 mg     benzonatate (TESSALON) capsule 100 mg     ceFEPIme (MAXIPIME) 2 g vial to attach to  ml bag for ADULTS or 50 ml bag for PEDS     cyanocobalamin (VITAMIN B-12) tablet 100 mcg     dextrose 5 % flush PRE/POST medication    And     filgrastim (NEUPOGEN) 480 mcg in D5W 25 mL infusion    And     dextrose 5 % flush PRE/POST medication     diphenhydrAMINE-zinc acetate (BENADRYL) 1-0.1 % cream     [Held by provider] guaiFENesin (MUCINEX) 12 hr tablet 600 mg     [Held by provider] HYDROmorphone (DILAUDID) half-tab 1 mg     lactated ringers infusion     Lidocaine (LIDOCARE) 4 % Patch 1 patch     lidocaine (LMX4) cream     lidocaine 1 % 0.1-1 mL     lidocaine patch in PLACE     melatonin tablet 1 mg     metoprolol tartrate (LOPRESSOR) tablet 25 mg     naloxone (NARCAN) injection 0.2 mg    Or     naloxone (NARCAN) injection 0.4 mg    Or     naloxone (NARCAN) injection 0.2 mg    Or     naloxone (NARCAN) injection 0.4 mg     ondansetron (ZOFRAN ODT) ODT tab 4 mg    Or     ondansetron (ZOFRAN) injection 4 mg     oxyCODONE (ROXICODONE) tablet 5 mg     potassium phosphate 15 mmol in D5W 250 mL intermittent infusion     prochlorperazine (COMPAZINE) injection 10 mg    Or     prochlorperazine (COMPAZINE) tablet 10 mg    Or     prochlorperazine (COMPAZINE) suppository 25 mg     sodium chloride (PF) 0.9% PF flush 3 mL     sodium chloride (PF) 0.9% PF flush 3 mL      traMADol (ULTRAM) tablet 50 mg     triamcinolone (KENALOG) 0.1 % ointment     Facility-Administered Medications Ordered in Other Encounters   Medication     sodium chloride (PF) 0.9% PF flush 30 mL

## 2022-12-14 NOTE — PROGRESS NOTES
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 Burciaga  Current Treatment Plan: Irinotecan/Temodar (C1D1 = 11/28/22)     Assessment & Plan:   Diego Buchanan is a 27 year old male with a history of cognitive delay and desmoplastic small round cell tumor c/b peritoneal carcinomatosis and mets to lymph nodes, bone, liver who was admitted on 12/7/2022 for neutropenic fevers and found to be RSV+. Complicated by partial large bowel obstruction 12/8 which is being managed conservatively. Course has been complicated by persistent fevers and interval development of an erythematous maculopapular rash to the flexural surfaces of all four extremities as well as the abdomen.    Summary & Recommendations:   - Continue Neupogen 5 mcg/kg daily (12/12-x) until ANC >500 x 2 consecutive days. Monitor CBC w/ differential daily.   - Further interval progression/coalescence of previously noted rash. Note concomitant presence of abdominal pain, proteinuria and hematuria; agree with having IgA vasculitis/HSP on the differential. Biopsy done x12/13 - await results. Appreciate derm input.   - Consider further evaluation (protein quantitation, renal U/S, etc.) of proteinuria/hematuria.   - Would transfuse to keep platelets >10K - recommend transfusing 1 unit of platelets today.  - Remainder of cares per primary team.     # Neutropenic fever, fever resolved  # RSV+  # Diarrhea, resolved  Patient most recently received Cycle 1, Day 1 irinotecan/temodar on 11/28/22. Did not receive neulasta support. Was recently treated for pneumonia after evidence on CT PE with scattered ground glass opacities - the bronchi are patent but there is concern for possible post  obstructive process as well as trace right pleural effusion and a moderate left pleural effusion with left lingular atelectasis. He was started on Augmentin 875 mg BID x 5 days (12/1) plus Azithromycin 500 mg on day 1, 250 mg from days 2-5. He had noted diarrhea for the past 7 days outpatient, likely secondary to irinotecan, which is now significantly improved. He notes a max of ~9 episodes daily last week, which improved after antimotility agents. He stopped taking these PTA, as his diarrhea has improved. No abdominal pain initially, though reported interval development of diffuse abdominal pain during admission. He presented to ED on 12/7 after Tmax 100.4 outpatient with associated cough and shortness of breath. He was started on cefepime. RSV+. UA bland. CXR with stable mixed opacities in left lung, stable focal opacity in right midlung.   - Continue supportive measures with tessalon perles, mucinex, APAP.   - Changed from cefepime to Zosyn x12/13 given concern for possible drug eruption; see below for details.  - BCx NGTD  - Diagnostic paracentesis 12/8 - no concern for SBP, cultures NGTD.   - Enteric panel and C. Diff negative.     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 Sims. See below for full oncologic history. In summary from outpatient notes, he tolerated CAV/IMV for 19 cycles and then was on chemotherapy break from June 2021-April 2022. Given return in symptoms he resumed chemotherapy with further CAV/IMV in April 2022.  However, he has had evidence of progression on the two most recent scans. Robert and his family elected to take time to try traditional medicine treatments. Last CT-CAP (10/4/22) showed evidence of progressive disease, predominantly in liver. There are small sub-centimeter lung nodules, which are suspicious for metastasis. He started irinotecan 20 mg/m2 days 1-5/temozolomide 250 mg daily days 1-5 every  21 days with his first cycle on 11/28/22. On 12/1, he had a chest CT for SOB which was negative for acute PE. However, it showed scattered ground glass opacities suspicious for pneumonia. The bronchi is patent but there is concern for possible post obstructive process. There is a trace right pleural effusion and a moderate left pleural effusion with left lingular atelectasis. He was started on Augmentin 875 mg BID x 5 days plus Azithromycin 500 mg on day 1, 250 mg from days 2-5. Thoracic was also notified to set up thoracentesis for left pleural effusion and to time with port replacement. most recently was transitioned to irinotecan/temodar (C1D1 = 11/28/22) due to progression after treatment break.   - CT 12/8 with progression of extensive peritoneal carcinomatosis, pelvic masses, omental caking, malignant ascites when compared to CT 10/4/22. This does not represent failure of irinotecan/temodar as it was just started on 11/28.   - Labs, LISY, and Cycle 2 Day 1 irinotecan scheduled 12/19 - will likely need to be rescheduled, pending count recovery and overall clinical course. Will make a determination later in the week.  - Patient was scheduled for port exchange on 12/13, will reschedule to allow for count recovery    # Pancytopenia  Secondary to chemotherapy.  - Transfuse to keep Hgb >7 and plt >10k  - Continue Neupogen 5 mcg/kg/day until ANC >500 x2 consecutive days (12/12-x)  - Monitor CBC w/ diff daily    # Diffuse pruritic maculopapular rash  # Proteinuria and hematuria  Per chart review, patient began to develop a maculopapular rash on his abdomen and leg, which subsequently spread to his back, abdomen, and extremities with associated pruritis. No mucosal involvement or palmar-plantar involvement. Differential for rash could include contact dermatitis, vasculitis, or drug-induced. No oral mucosal/conjunctival involvement immediately suggestive of SJS/TEN. Less likely DRESS in the absence of sustained fevers or  "renal dysfunction.. Both irinotecan and temozolomide do have a ~8-13% incidence of rash, but would rule out other etiologies initially. Note that around the same time that the rash occurred, patient developed gross hematuria. UA 12/13 notable for 182 RBCs, 17 WBCs, +protein, +calcium oxalate crystals. Urine ptn/Cr ratio >2.0. UCx pending.  - Dermatology consulted, appreciate recommendations. Started on Triamcinalone cream x12/12. S/p skin biopsy on 12/13; await results.  - Clinically, concurrent onset of hematuria, proteinuria, and rash following a viral syndrome (RSV) is somewhat reminiscent of HSP. Patient does have abdominal pain, but no significant renal dysfunction noted. DDx also includes drug eruption (prompting change from cefepime to Zosyn x12/13), viral exanthem, among other etiologies.  - Will defer further work-up of hematuria/proteinuria to primary team - could consider renal U/S, quantitation of proteinuria, etc.    # Suspected partial large bowel obstruction   CT AP 12/8 with concern for cancer progression and possible large bowel obstruction.  - CRS consulted- recommended conservative mgmt  - GI consulted - \"No recommendations for colon stenting given the large tumour burden that extends from sigmoid colon till the rectum. In addition, stenting would need to be transanal. The risks of attempting to stent would outweigh the benefits in this patient with neutropenia and thrombocytopenia.\"  - Agree that surgical intervention/stenting not appropriate in the setting of extensive tumor burden and pancytopenia. Patient has most recently been starting on a new regimen for his malignancy with known GI toxicity, so query some component on inflammation secondary to irinotecan as well.      We will continue to follow this patient. Please do not hesitate to page with any questions or concerns.     Staffed with Dr. Bradley.    Hawa Meredith PA-C  Hematology/Oncology  Pager: #[phone]    I spent 60 minutes in the " "care of this patient today, which included time necessary for review of interval events, obtaining history and physical exam, ordering medications/tests/procedures as medically indicated, review of pertinent medical literature, counseling of the patient, coordination of care, and documentation time. Over 50% of time was spent counseling the patient and/or coordinating care.  ___________________________________________________________________    Subjective & Interval History:    Afebrile overnight. No acute overnight events. Remains tachycardic, with soft BPs. Patient/sister report interval worsening of rash. Still very pruritic. Ongoing hematuria, per patient, plus some dysuria. Reports diffuse abdominal pain. Ongoing watery stools in the setting of recent Golytely clean-out on 12/12. No reported bloody/dark tarry tools. No vomiting. No eye pain, mouth sores, or other complaints. Discussed DDx for rash with patient/family. All questions answered.    Physical Exam:    Blood pressure 98/44, pulse (!) 129, temperature 99.2  F (37.3  C), temperature source Oral, resp. rate 20, height 1.727 m (5' 8\"), weight 113.2 kg (249 lb 8 oz), SpO2 98 %.     General: lying in bed, no acute distress  HEENT: sclera anicteric, EOMI, MMM  CV: extremities warm and well-perfused  Resp: no increased work of breathing, normal respiratory effort on ambient air  GI: soft, non-distended   MSK: thin extremities, no gross deformities  Skin: Blanching coalescing erythematous maculopapular rash to the flexural areas of the bilateral upper and lower extremities. Rash involves the anterior/posterior LEs somewhat diffusely and involves the volar forearms, AC fossae, biceps, and axilla. Prominent coalescing rash in a band-like distribution to the lower abdomen, with scattered isolated lesions noted superiorly. Evidence of petechial/purpuric conversion, primarily to the ankles/feet and lower abdomen.   Neuro: alert and interactive, moves all " extremities spontaneously, normal speech without dysarthria  Psych: normal mood and affect    Labs & Studies: I personally reviewed the following studies:  ROUTINE LABS (Last four results):  CMP  Recent Labs   Lab 12/14/22  1011 12/13/22  1931 12/13/22  1651 12/13/22  0834 12/12/22  1732 12/12/22  0948     --  136 134*  --  137   POTASSIUM 3.1* 3.6 3.7 3.4 4.0 3.2*   CHLORIDE 104  --  103 103  --  105   CO2 19*  --  16* 18*  --  17*   ANIONGAP 13  --  17* 13  --  15   *  --  108* 118*  --  103*   BUN 10.3  --  9.3 10.8  --  11.5   CR 0.87  --  0.81 0.86  --  0.95   GFRESTIMATED >90  --  >90 >90  --  >90   FAISAL 8.6  --  8.2* 8.3*  --  8.8   MAG 1.6*  --   --  1.9 2.4* 1.5*   PHOS 2.0*  --  2.5 1.9*  --  2.4*   PROTTOTAL 6.4  --  5.7* 6.7  --  6.7   ALBUMIN 2.8*  --  3.0* 3.0*  --  3.1*   BILITOTAL 1.6*  --  1.8* 2.1*  --  1.1   ALKPHOS 152*  --  156* 162*  --  166*   AST 12  --  15 10  --  10   ALT 15  --  22 17  --  26     CBC  Recent Labs   Lab 12/14/22  1011 12/13/22  0834 12/12/22  2205 12/12/22  0948   WBC 0.2* 0.2* 0.2* 0.1*   RBC 2.93* 3.29* 2.80* 2.99*   HGB 7.3* 8.3* 6.9* 7.3*   HCT 23.4* 26.1* 22.4* 24.4*   MCV 80 79 80 82   MCH 24.9* 25.2* 24.6* 24.4*   MCHC 31.2* 31.8 30.8* 29.9*   RDW 16.5* 16.4* 17.1* 17.0*   PLT 10* 16* 18* 21*     INR  Recent Labs   Lab 12/08/22  0600   INR 1.15       Medications list for reference:  Current Facility-Administered Medications   Medication     acetaminophen (TYLENOL) tablet 650 mg     benzonatate (TESSALON) capsule 100 mg     cyanocobalamin (VITAMIN B-12) tablet 100 mcg     dextrose 5 % flush PRE/POST medication    And     filgrastim (NEUPOGEN) 480 mcg in D5W 25 mL infusion    And     dextrose 5 % flush PRE/POST medication     diphenhydrAMINE-zinc acetate (BENADRYL) 1-0.1 % cream     [Held by provider] guaiFENesin (MUCINEX) 12 hr tablet 600 mg     [Held by provider] HYDROmorphone (DILAUDID) half-tab 1 mg     Lidocaine (LIDOCARE) 4 % Patch 1 patch      lidocaine (LMX4) cream     lidocaine 1 % 0.1-1 mL     lidocaine patch in PLACE     melatonin tablet 1 mg     metoprolol tartrate (LOPRESSOR) tablet 25 mg     naloxone (NARCAN) injection 0.2 mg    Or     naloxone (NARCAN) injection 0.4 mg    Or     naloxone (NARCAN) injection 0.2 mg    Or     naloxone (NARCAN) injection 0.4 mg     ondansetron (ZOFRAN ODT) ODT tab 4 mg    Or     ondansetron (ZOFRAN) injection 4 mg     oxyCODONE (ROXICODONE) tablet 5 mg     piperacillin-tazobactam (ZOSYN) 3.375 g vial to attach to  mL bag     potassium chloride ER (KLOR-CON M) CR tablet 40 mEq     potassium phosphate 15 mmol in D5W 250 mL intermittent infusion     prochlorperazine (COMPAZINE) injection 10 mg    Or     prochlorperazine (COMPAZINE) tablet 10 mg    Or     prochlorperazine (COMPAZINE) suppository 25 mg     sodium chloride (PF) 0.9% PF flush 3 mL     sodium chloride (PF) 0.9% PF flush 3 mL     traMADol (ULTRAM) tablet 50 mg     triamcinolone (KENALOG) 0.1 % ointment     Facility-Administered Medications Ordered in Other Encounters   Medication     sodium chloride (PF) 0.9% PF flush 30 mL

## 2022-12-14 NOTE — PROGRESS NOTES
[facility]    Medicine Progress Note - Medicine Service, REGAN TEAM 4    Date of Admission:  12/7/2022    Assessment & Plan   Diego Buchanan is a 27 year old male admitted on 12/7/2022. 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 Found to have possible large bowel obstruction on CT 12/8. Colorectal surgery consulted.    Changes today:  - continue to monitor rash  - continue filgrastim daily  - f/u on derm path  - transfuse 1u of PLT for PLT 10K  - bladder ultrasound for hematuria  - vasculitis w/u    # Sepsis with neutropenic fever associated with RSV infection, ongoing  # Recent PNA s/p treatment  # Neutropenia, ongoing  # Tachycardia, ongoing  Febrile, tachycardic with neutropenia. LA negative. Patient recently treated for PNA based on CT findings on 12/1, s/p course of CTX and Azithromycin. No other symptoms, no shortness of breath, not requiring O2. RSV positive and likely underlying infectious source however undergoing complete infectious workup. UA negative. CXR stable. Procal 0.39  - Cefepime (12/7-12/13) STOPPED due to worsening rash  - continue zosyn (12/14-)  - BCX with NGTD  - CT abdomen pelvis given concern for neutropenic colitis in setting of ongoing diarrhea   - no evidence of neutropenic colitis   - did show multiple fluid pockets, would be difficult to sample or drain them all  - CAPS consulted for diagnostic paracentesis given history of ascites   - not indicative of SBP  - Oncology following, recs appreciated  - continue G-CSF daily until ANC>500x2 consecutive days  - CIS  - PTA metoprolol 25 BID for tachycardia    # Rash, stable/worse  # Hematuria  # Proteinuria  Maculopapular rash worsening for the past few days, has been on cefepime since 12/7, has tolerated CTX in the past. Unclear etiology. DDx include drug rash related to  cefepime vs contact dermatitis vs allergic dermatitis. Also concern for vasculitis given rash, hematuria, proteinuria. Urine protein is not at a nephrotic range.  Worsening, spreading to more body surface on 12/13.  Stable, mildly worsened on back on 12/14. If cefepime is culprit, expect rash to get better/not worsen in the coming days  - dermatology consulted, appreciate recs  - biopsy performed on 12/13, follow up on path  - last dose of cefepime on 12/13 AM  - pending on path result, consider renal consult  - U/A with large blood, >182 RBC, 200 protein albumin; urine protein 182 mg/dL  - bladder ultrasound to assess for bladder mass given widespread malignancy  - vasculitis w/u: ADRIAN, ANCA, C3, C4, cryo, SPEP    # Possible large bowel obstruction  CT AP showing worsening cancer burden and c/f large bowel obstruction.  - Colorectal surgery consulted; appreciate recs   - conservative management at the moment   - recommend palliation  - GI consulted, signed off   - conservative management, not a candidate for colonic stent given neutropenia  - Abdominal pain   - tylenol for mild pain, tramadol for moderate, hydromorphone for severe (all PO options)  -  bowel clean out with golytely on 12/12, discussed with GI    # Acute Kidney Injury, resolved: based on a >150% or 0.3 mg/dL increase in last creatinine compared to past 90 day average, will monitor renal function     - continue to monitor     # Hypokalemia  - RN replacement protocol    # Hypophosphatemia  - monitor K  - Phos replacement protocol    # Hypomagnesemia  - monitor Mg  - Mg replacement protocol     # L pleural effusion   Moderate pleural effusion on 12/1 CT. Patient plan to see thoracic surgery as outpatient for thoracentesis. No current indication for inpatient drainage, unlikely source of infection nd patient already received abx so unlikely to diagnostically yield anything. Will plan to treat as outpatient.      # Desmoplastic Small Round Cell Tumor c/b  Peritoneal Carcinomatosis and Mets to Lymph Nodes, Bone, Liver  Metastatic cancer  - Onc consulted, recs appreciated     # Neutropenia  # Anemia  # Thrombocytopenia   Pancytopenia present on admission due to chemo  Likely expected 2/2 recent chemo (last 11/28). No signs of bleeding or bruising, VSS.  - Anemia work up showing low B12; started on supplement  - blood consent completed, transfuse 1u PRBCs 12/9  - transfuse 1u PLT on 12/14     # Hypocalcemia: Lowest Ca = 8.1 mg/dL in last 2 days, will monitor and replace as appropriate     - Likely 2/2 low albumin    # Hypoalbuminemia: Lowest albumin = 3.4 g/dL at 12/8/2022  6:00 AM, will monitor as appropriate     Diet: Combination Diet Regular Diet Adult  Snacks/Supplements Adult: Other; PRN; Between Meals    DVT Prophylaxis: Pneumatic Compression Devices  Busby Catheter: Not present  Central Lines: PRESENT       Cardiac Monitoring: None  Code Status: Full Code      Disposition Plan      Expected Discharge Date: 12/19/2022        Discharge Comments: pending resolution of neutropenic fever, G-CSF response        The patient's care was discussed with the Bedside Nurse, Care Coordinator/, Patient and Patient's Family.    Phuong Rodriguez MD  Medicine Service, [facility] TEAM [address] [place] [address]  Securely message with the Vocera Web Console (learn more here)  Text page via [facility] Paging/Directory   Please see signed in provider for up to date coverage information  ________________________________________________________________    Interval History   NAEO.    Patient was seen with her mother. Mother concerned about how bad rash looks. Biopsy was completed. Pt continues to have abdominal pain and bloody urine. Denies chest pain. Denies fever, chills.     Otherwise 4pt ROS was reviewed and is negative     Data reviewed today: I reviewed all medications, new labs and imaging results over the last 24 hours. I personally reviewed  no images or EKG's today.    Physical Exam   Vital Signs: Temp: 99.2  F (37.3  C) Temp src: Oral BP: 98/44 Pulse: (!) 129   Resp: 20 SpO2: 98 % O2 Device: None (Room air)    Weight: 249 lbs 8 oz  Gen: young appearing male in NAD  HEENT: NCAT, EOMI, conjuctiva clear  CV: RRR, no m/r/g  RESP: CTAB, no wheezes or crackles  Abd: soft, nontender, nondistended active BS  Ext: trace edema bilaterally   Skin: maculopapular rash stable on bilateral thighs, lower legs, abdomen, face; mildly worse on back, biopsy site marked    Data   Recent Labs   Lab 12/14/22  1011 12/13/22  1931 12/13/22  1651 12/13/22  0834 12/12/22  2205 12/09/22  0731 12/08/22  0600   WBC 0.2*  --   --  0.2* 0.2*   < > 0.2*   HGB 7.3*  --   --  8.3* 6.9*   < > 7.1*   MCV 80  --   --  79 80   < > 81   PLT 10*  --   --  16* 18*   < > 28*   INR  --   --   --   --   --   --  1.15     --  136 134*  --    < > 136   POTASSIUM 3.1* 3.6 3.7 3.4  --    < > 3.7   CHLORIDE 104  --  103 103  --    < > 104   CO2 19*  --  16* 18*  --    < > 19*   BUN 10.3  --  9.3 10.8  --    < > 12.3   CR 0.87  --  0.81 0.86  --    < > 1.16   ANIONGAP 13  --  17* 13  --    < > 13   FAISAL 8.6  --  8.2* 8.3*  --    < > 8.3*   *  --  108* 118*  --    < > 107*   ALBUMIN 2.8*  --  3.0* 3.0*  --    < > 3.4*   PROTTOTAL 6.4  --  5.7* 6.7  --    < > 6.6   BILITOTAL 1.6*  --  1.8* 2.1*  --    < > 0.7   ALKPHOS 152*  --  156* 162*  --    < > 109   ALT 15  --  22 17  --    < > 63*   AST 12  --  15 10  --    < > 25    < > = values in this interval not displayed.     No results found for this or any previous visit (from the past 24 hour(s)).

## 2022-12-14 NOTE — PROVIDER NOTIFICATION
"REGAN 4 paged \"[phone], [phone], Chanel.   Platelets critical at 10, drop from 16 yesterday.   Thanks, Bree Gregory ([facility])\"    "

## 2022-12-14 NOTE — PROVIDER NOTIFICATION
"Page to MD Cotter @ 1068     \"Pts HR persistently >130. W/ activity up ~140s-150. Pt stating that heart racing w/ mild lightheaded but no chest pain. Please advise. Thanks\"    Real Souzakyle [phone]    Update: Writer Spoke w/ MD and they stated that they were okay w/ pt being on current unit for time being unless pt becomes more symptomatic. Will continue to monitor closely   "

## 2022-12-14 NOTE — PLAN OF CARE
Vitals: Temp: 99.2  F (37.3  C) Temp src: Oral BP: 98/44 Pulse: (!) 129   Resp: 20 SpO2: 98 % O2 Device: None (Room air)        Time: 2472-6463  Reason for admission: neutropenic fever.  Activity:  up ad beverly in room.   Pain:  Abdominal pain controlled w/ prn Tylenol x2, PO Tramadol x2, oxycodone x1, Lidocaine patch in place.   Neuro: A&O x4. CMS intact. Family at bedside. Able to make needs known.   Cardiac: Tachycardic 120-130s. Denies chest pain.   Respiratory:  LS clear, diminished in lower lobes. Sating >95% on RA. Denies SOB.   GI/:  Voiding spontaneously, not saving. Loose BMs continued x2.   Diet: Regular, tolerating poorly due to abdominal discomfort.   Lines:  L PIV infusing TKO + abx. L PIV infusing LR TKO. R Port not in use, scheduled to get it replaced next week.   Incisions/Drains/Skin: Rash, red + purple petichaie throughout abdomen, back, neck, arms, posterior thighs, feet. Abdominal distention.   Lab:  Hgb = 7.3. Platelets = 10. WBC = 0.2 . Phos = 2.0. K = 3.1.   Electrolyte Replacements: Phos replaced IV, re-check in AM. K+ replaced, re-check at 1600.      New changes this shift:  Bladder US complete. Platelets =10, one unit platelets tranfused. Continue POC.

## 2022-12-14 NOTE — PROVIDER NOTIFICATION
FYI: Pt's mother stated he bled minimal amount right thigh, not bleeding currently. Rash throughout body. Pt denied scratching at site.

## 2022-12-14 NOTE — PLAN OF CARE
"Vital signs:  Temp: 97.9  F (36.6  C) Temp src: Oral BP: 107/46 Pulse: (!) 147   Resp: 20 SpO2: 97 % O2 Device: None (Room air)   Height: 172.7 cm (5' 8\") Weight: 113.2 kg (249 lb 8 oz)    3717-7776: Patient's mother declined VS check at 0400.    Activity: Up to bathroom independently. Mother present at patient's bedside.   Neuros: A & O x4. Neuro intact.   Cardiac: Tachy in the 120s-130s. BPs 100s-110s/40s-60s. Asymptomatic.   Respiratory: LS clear. O2 sats high 90s on RA. Denies SOB. Unlabored.   GI/: BS+, passing flatus, had 3 loose BMs.   Diet: Regular diet.   Skin: Generalized rash bilateral arms, abdomen, back, BLE to feet. Left calf purple in color. Right thigh biopsy site covered with c/d/I bandaid.  Lines: 2 Left PIVs currently TKO, Neupogen administered per orders, Zosyn administered per orders.   Incisions/Drains: None.   Labs: Reviewed, recheck labs this AM. K+ replaced, recheck this AM.   Pain/nausea: PRN Tramadol x1 and PRN Tylenol x1 effective for left abdominal 10/10 pain, slept in between cares. Denies nausea.   New changes this shift: None.   Plan: Continue POC.         "

## 2022-12-14 NOTE — CONSULTS
Care Management Initial Consult    General Information  Assessment completed with: Patient,    Type of CM/SW Visit: Initial Assessment    Primary Care Provider verified and updated as needed: Yes   Readmission within the last 30 days:        Reason for Consult: discharge planning  Advance Care Planning: Advance Care Planning Reviewed: no concerns identified          Communication Assessment  Patient's communication style: spoken language (English or Bilingual)    Hearing Difficulty or Deaf: no   Wear Glasses or Blind: no    Cognitive  Cognitive/Neuro/Behavioral: WDL                      Living Environment:   People in home: parent(s), sibling(s)     Current living Arrangements: house      Able to return to prior arrangements: yes       Family/Social Support:  Care provided by: parent(s), child(kathy)  Provides care for: no one  Marital Status: Single             Description of Support System: Supportive, Involved    Support Assessment: Adequate family and caregiver support, Adequate social supports    Current Resources:   Patient receiving home care services: No     Community Resources: County Worker  Equipment currently used at home: none  Supplies currently used at home: None    Employment/Financial:  Employment Status: disabled        Financial Concerns: No concerns identified           Lifestyle & Psychosocial Needs:  Social Determinants of Health     Tobacco Use: Low Risk      Smoking Tobacco Use: Never     Smokeless Tobacco Use: Never     Passive Exposure: Not on file   Alcohol Use: Not on file   Financial Resource Strain: Not on file   Food Insecurity: Not on file   Transportation Needs: Not on file   Physical Activity: Not on file   Stress: Not on file   Social Connections: Not on file   Intimate Partner Violence: Not on file   Depression: Not at risk     PHQ-2 Score: 0   Housing Stability: Not on file       Functional Status:  Prior to admission patient needed assistance:   Dependent ADLs::  Independent  Dependent IADLs:: Cleaning, Cooking, Laundry  Assesssment of Functional Status: At functional baseline    Mental Health Status:  Mental Health Status: No Current Concerns       Chemical Dependency Status:  Chemical Dependency Status: No Current Concerns             Values/Beliefs:  Spiritual, Cultural Beliefs, Yazidi Practices, Values that affect care: no               Additional Information:  Patient is a 27 year old male with 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      Care management assessment completed for high risk for readmission.  Met with pt and pt's Mom at bedside to introduce RNCC role and discuss discharge planning.  Pt lives with his Mom, Dad, and 3 siblings in a home in [place].  His sister is his (DSP) and assists with IADLs such as cleaning, laundry, meal preparation, and  transportation.  Pt is independent with ADLs.  Pt and pt's Mom had no concerns about discharge planning.  RNCC will remain available if further needs arise.  Family will provide a ride to home at discharge.        BOY Mario  Phone: [phone]  Pager: [phone]    SE[facility]ABLE in [facility] - search CARE COORDINATOR     [place] [address] [place] ([id number]) Saturday & Sunday; ([id number]) [facility] Recognized Holidays     Units: 4A, 4C, 4E, 5A & 5B   Pager: [phone]    Units: 6A & 6B    Pager: [phone]    Units: 6C & 6D   Pager: [phone]    Units: 7A, 7B, 7C, 7D & 5C    Pager: [phone]    Units: [facility] [place] [place] ED, 5 Ortho, 5 Med/Surg, 6 Med/Surg, 8A, 10 ICU, & [facility]    Pager: [phone]

## 2022-12-15 NOTE — PROGRESS NOTES
[facility]    Medicine Progress Note - Medicine Service, REGAN TEAM 4    Date of Admission:  12/7/2022    Assessment & Plan   Diego Buchanan is a 27 year old male admitted on 12/7/2022. 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 Found to have possible large bowel obstruction on CT 12/8. Colorectal surgery consulted.    Changes today:  - derm path c/w morbiliform drug rash vs viral exanthem, unlikely vasculitis  - s/p 1 unit pRBC for Hgb 6.7  - PLT goal >20K  - continue filgrastim  - continue zosyn  - loperamide for diarrhea PRN    # Sepsis with neutropenic fever associated with RSV infection, ongoing  # Possible viral exanthem  # Recent PNA s/p treatment  # Pancytopenia 2/2 chemotherapy, ongoing  # Neutropenia, ongoing  # Tachycardia, ongoing  Febrile, tachycardic with neutropenia. LA negative. Patient recently treated for PNA based on CT findings on 12/1, s/p course of CTX and Azithromycin. No other symptoms, no shortness of breath, not requiring O2. RSV positive and likely underlying infectious source however undergoing complete infectious workup. UA negative. CXR stable. Procal 0.39  100.3F on 12/13. >101F on 12/8.   Continues to be neutropenic. Overall picture of sepsis likely 2/2 viral illness in the setting of recent chemotherapy. With count recovery, would expect sepsis to improve. Pancytopenia persisting longer than expected in the setting of chemo. Likely delayed due to ongoing viral infection.   - Cefepime (12/7-12/13) STOPPED due to worsening rash, added as a drug allergy on EPIC  - continue zosyn (12/14-): goal of stopping 7 days post last fever or count recovery  - BCX with NGTD  - CT abdomen pelvis given concern for neutropenic colitis in setting of ongoing diarrhea   - no evidence of neutropenic colitis   - did show multiple fluid  pockets, would be difficult to sample or drain them all  - CAPS consulted for diagnostic paracentesis given history of ascites   - not indicative of SBP  - Oncology following, recs appreciated  - continue G-CSF daily until ANC>500x2 consecutive days (started 12/12-)  - CBC with diff daily  - peripheral smear to eval pancytopenia    # Rash, stable  # Hematuria, resolved  # Proteinuria  Maculopapular rash worsening for the past few days, has been on cefepime since 12/7, has tolerated CTX in the past. Unclear etiology. DDx include drug rash related to cefepime vs contact dermatitis vs allergic dermatitis. Also concern for vasculitis given rash, hematuria, proteinuria. Urine protein is not at a nephrotic range.  Worsening, spreading to more body surface on 12/13.  Stable, mildly worsened on back on 12/14. If cefepime is culprit, expect rash to get better/not worsen in the coming days  Mostly stable on 12/15. If worsening, can consider switching to a different abx given potential cross reactivity between pip tazo and cefepime.   - dermatology consulted, appreciate recs  - biopsy performed on 12/13, path not consistent with vasculitis, likely morbiliform drug rash vs viral exanthem  - repeat U/A ordered to assess hematuria/proteinuria  - bladder ultrasound: largely unremarkable  - vasculitis w/u: continue to follow up on ADRIAN, ANCA, cryo   > so far C3/C4 high/normal, SPEP unremarkable    # Possible large bowel obstruction  # Diarrhea  CT AP showing worsening cancer burden and c/f large bowel obstruction.  Enteric panel, C diff negative on 12/7  - Colorectal surgery consulted; appreciate recs   - conservative management at the moment   - recommend palliation  - GI consulted, signed off   - conservative management, not a candidate for colonic stent given neutropenia  -  bowel clean out with golytely on 12/12  - will add loperamide PRN for diarrhea    # Acute Kidney Injury, resolved: based on a >150% or 0.3 mg/dL increase in  last creatinine compared to past 90 day average, will monitor renal function     - continue to monitor     # Hypokalemia  - RN replacement protocol    # Hypophosphatemia  - monitor K  - Phos replacement protocol    # Hypomagnesemia  - monitor Mg  - Mg replacement protocol     # L pleural effusion   Moderate pleural effusion on 12/1 CT. Patient plan to see thoracic surgery as outpatient for thoracentesis. No current indication for inpatient drainage, unlikely source of infection nd patient already received abx so unlikely to diagnostically yield anything. Will plan to treat as outpatient.      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, Bone, Liver  Metastatic cancer  - Onc consulted, recs appreciated    # Pancytopenia present on admission due to chemo  # Neutropenia  # Anemia  # Thrombocytopenia   Likely expected 2/2 recent chemo (last 11/28). No signs of bleeding or bruising, VSS.  - Anemia work up showing low B12; started on supplement  - blood consent completed, transfuse 1u PRBCs 12/9  - transfuse 1u PLT on 12/14  - transfuse 1u pRBC on 12/15  - transfusion goals: Hgb >7, PLT >20K     # Hypocalcemia: Lowest Ca = 8.1 mg/dL in last 2 days, will monitor and replace as appropriate     - Likely 2/2 low albumin    # Hypoalbuminemia: Lowest albumin = 3.4 g/dL at 12/8/2022  6:00 AM, will monitor as appropriate     Diet: Combination Diet Regular Diet Adult  Snacks/Supplements Adult: Other; PRN; Between Meals    DVT Prophylaxis: Pneumatic Compression Devices  Busby Catheter: Not present  Central Lines: PRESENT       Cardiac Monitoring: ACTIVE order. Indication: Tachyarrhythmias, acute (48 hours)  Code Status: Full Code      Disposition Plan      Expected Discharge Date: 12/19/2022      Destination: home with family  Discharge Comments: home once resolution of neutropenic fever, rash improvement        The patient's care was discussed with Dr. Gómez, Bedside Nurse, Care  Coordinator/, Patient and Patient's Family.    Phuong Rodriguez MD  Medicine Service, MAROON TEAM 4  [facility]  Securely message with the Vocera Web Console (learn more here)  Text page via [facility] Paging/Directory   Please see signed in provider for up to date coverage information  ________________________________________________________________    Interval History   NAEO. Patient was seen along with his sister. Continues to have abdominal pain and watery diarrhea. Urine is no longer red per patient. Denies chest pain, difficulty breathing.     Otherwise 4pt ROS was reviewed and is negative     Data reviewed today: I reviewed all medications, new labs and imaging results over the last 24 hours. I personally reviewed no images or EKG's today.    Physical Exam   Vital Signs: Temp: 97.8  F (36.6  C) Temp src: Oral BP: 103/60 Pulse: (!) 124   Resp: 16 SpO2: 98 % O2 Device: None (Room air)    Weight: 257 lbs 15.01 oz  Gen: young appearing male in NAD  HEENT: NCAT, EOMI, conjuctiva clear  CV: RRR, no m/r/g  RESP: CTAB, no wheezes or crackles  Abd: soft, nontender, nondistended active BS  Ext: trace edema bilaterally   Skin: maculopapular rash stable on bilateral thighs, lower legs, abdomen, face; mildly worse on abdomen and feet, biopsy site marked    Data   Recent Labs   Lab 12/14/22  2152 12/14/22  1011 12/13/22  1931 12/13/22  1651 12/13/22  0834   WBC 0.2* 0.2*  --   --  0.2*   HGB 6.7* 7.3*  --   --  8.3*   MCV 81 80  --   --  79   PLT 24* 10*  --   --  16*   NA  --  136  --  136 134*   POTASSIUM 3.4 3.1* 3.6 3.7 3.4   CHLORIDE  --  104  --  103 103   CO2  --  19*  --  16* 18*   BUN  --  10.3  --  9.3 10.8   CR  --  0.87  --  0.81 0.86   ANIONGAP  --  13  --  17* 13   FAISAL  --  8.6  --  8.2* 8.3*   GLC  --  104*  --  108* 118*   ALBUMIN  --  2.8*  --  3.0* 3.0*   PROTTOTAL  --  6.4  --  5.7* 6.7   BILITOTAL  --  1.6*  --  1.8* 2.1*   ALKPHOS  --  152*  --   156* 162*   ALT  --  15  --  22 17   AST  --  12  --  15 10     Recent Results (from the past 24 hour(s))   US Bladder Only Portable    Narrative    Exam: US BLADDER ONLY PORTABLE, 12/14/2022 2:11 PM    Indication: hematuria in cancer pt      Technique: The bladder was scanned in the standard fashion with  specialized ultrasound transducer using both grayscale and limited  color Doppler techniques.     Comparison: CT AP 12/8/2022    Findings:   There is a large mixed cystic solid mass with shadowing echogenic foci  resulting in significant mass effect on the bladder. The effaced  bladder is within normal limits.      Impression    Impression:   1. Effaced bladder appears within normal limits.   2. Large mixed cystic solid mass with calcifications better  characterized on 12/8/2022 CT.    I have personally reviewed the examination and initial interpretation  and I agree with the findings.    SHE PARADA MD         SYSTEM ID:  [id number]

## 2022-12-15 NOTE — PLAN OF CARE
Goal Outcome Evaluation:      Plan of Care Reviewed With: patient, parent    Overall Patient Progress: no changeOverall Patient Progress: no change    Outcome Evaluation: Moved to 5b for monitoring of tachy heart rate.  denies pain.  alert.    A:  hempglobin 6.7 and one unit PRBC's given .  Phosphorus 1.9 and being replaced and potassium 3.4 and being replaced.  Recheck due around 0900.  Patient alert and oriented.  Up to bathroom independently after IV pole unplugged.  States had BM this morning.  Not withness.  Mom at bedside.  Patient denies pain.    R:  Continue to monitor and treat per plan of care.  Need urine collected for lab.

## 2022-12-15 NOTE — SUMMARY OF CARE
Pt's belongings on admission: No cell phone, I-pad with , no glasses, shoes, pants, shirt, small backpack, no wallet

## 2022-12-15 NOTE — PROGRESS NOTES
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 Burciaga  Current Treatment Plan: Irinotecan/Temodar (C1D1 = 11/28/22)     Assessment & Plan:   Diego Buchanan is a 27 year old male with a history of cognitive delay and desmoplastic small round cell tumor c/b peritoneal carcinomatosis and mets to lymph nodes, bone, liver who was admitted on 12/7/2022 for neutropenic fevers and found to be RSV+. Complicated by partial large bowel obstruction 12/8 which is being managed conservatively. Course has been complicated by persistent fevers and interval development of an erythematous maculopapular rash to the flexural surfaces of all four extremities as well as the abdomen, biopsied by dermatology on 12/13/22 and pathologically most consistent with a drug eruption versus viral exanthem, as well as persistent pancytopenia.    Summary & Recommendations:   - Currently D18 from C1 of irinotecan + temodar.   - Continue Neupogen 5 mcg/kg daily (12/12-x) until ANC >500 x 2 consecutive days. Monitor CBC w/ differential daily.   - Would increase platelet transfusion parameter to >20K given recent hematuria.  - Will send peripheral smear (ordered for you) to further evaluate persistent pancytopenia; may need to consider further work-up in the coming days if counts do not begin to recover as expected.  - Derm biopsy most consistent with drug eruption versus viral exanthem; no evidence of vasculitis. Appreciate dermatology input.  - Will defer final antibiotic choice/duration to primary team; we agree with tentative plan to continue IV antibiotics for 7-days post last fever, or count recovery, whichever is sooner.  - Follow-up pending glomerulonephritis  work-up.   - Remainder of cares per primary team.     # Neutropenic fever, fever resolved  # RSV+  # Diarrhea  Patient most recently received Cycle 1, Day 1 irinotecan/temodar on 11/28/22. Did not receive neulasta support. Was recently treated for pneumonia after evidence on CT PE with scattered ground glass opacities - the bronchi are patent but there is concern for possible post obstructive process as well as trace right pleural effusion and a moderate left pleural effusion with left lingular atelectasis. He was started on Augmentin 875 mg BID x 5 days (12/1) plus Azithromycin 500 mg on day 1, 250 mg from days 2-5. He had noted diarrhea for the past 7 days outpatient, likely secondary to irinotecan, which is now significantly improved. He notes a max of ~9 episodes daily last week, which improved after antimotility agents. He stopped taking these PTA, as his diarrhea had improved; however, more recently, he is experiencing recurrent diarrhea. No abdominal pain initially, though reported interval development of diffuse abdominal pain during admission. He presented to ED on 12/7 after Tmax 100.4 outpatient with associated cough and shortness of breath. He was started on cefepime. RSV+. UA bland. CXR with stable mixed opacities in left lung, stable focal opacity in right midlung.   - Continue supportive measures with tessalon perles, mucinex, APAP.   - Changed from cefepime to Zosyn x12/13 given concern for possible drug eruption; see below for details. Per discussion with primary team on 12/14, they plan to continue for 7-days post last fever, or until count recovery, whichever is sooner, which is reasonable in our opinion.  - BCx NGTD  - Diagnostic paracentesis 12/8 - no concern for SBP, cultures NGTD.   - Enteric panel and C. diff negative.  - Would start imodium 2 mg Q6H PRN for persistent/recurrent diarrhea, presumed likely chemo (irinotecan) induced     # Desmoplastic small round cell tumor with peritoneal  carcinomatosis and mets to liver, lymph nodes and bone  # Diffuse abdominal pain  Follows with Dr. Sims. See below for full oncologic history. In summary from outpatient notes, he tolerated CAV/IMV for 19 cycles and then was on chemotherapy break from June 2021-April 2022. Given return in symptoms he resumed chemotherapy with further CAV/IMV in April 2022.  However, he has had evidence of progression on the two most recent scans. Robert and his family elected to take time to try traditional medicine treatments. Last CT-CAP (10/4/22) showed evidence of progressive disease, predominantly in liver. There are small sub-centimeter lung nodules, which are suspicious for metastasis. He started irinotecan 20 mg/m2 days 1-5/temozolomide 250 mg daily days 1-5 every 21 days with his first cycle on 11/28/22. On 12/1, he had a chest CT for SOB which was negative for acute PE. However, it showed scattered ground glass opacities suspicious for pneumonia. The bronchi is patent but there is concern for possible post obstructive process. There is a trace right pleural effusion and a moderate left pleural effusion with left lingular atelectasis. He was started on Augmentin 875 mg BID x 5 days plus Azithromycin 500 mg on day 1, 250 mg from days 2-5. Thoracic was also notified to set up thoracentesis for left pleural effusion and to time with port replacement. most recently was transitioned to irinotecan/temodar (C1D1 = 11/28/22) due to progression after treatment break.   - CT 12/8 with progression of extensive peritoneal carcinomatosis, pelvic masses, omental caking, malignant ascites when compared to CT 10/4/22. This does not represent failure of irinotecan/temodar as it was just started on 11/28/22.  - Labs, LISY, and Cycle 2 Day 1 irinotecan scheduled 12/19 - will likely need to be rescheduled, pending count recovery and overall clinical course. Will send message to scheduling.  - Patient was scheduled for port exchange on 12/13,  will reschedule to allow for count recovery    # Pancytopenia  Initially presumed secondary to chemotherapy; however, patient remains persistently pancytopenic now ~18 days out from chemo, which is a bit longer than expected. DDx includes secondary insult from sepsis/RSV or bone marrow infiltration by malignancy, among others. Less likely drug-induced cytopenia, though antibiotics could be contributing to a degree.  - Transfuse to keep Hgb >7 and plt >20K (higher in the setting of recent hematuria)  - Continue Neupogen 5 mcg/kg/day until ANC >500 x2 consecutive days (12/12-x)  - Monitor CBC w/ diff daily    # Diffuse pruritic maculopapular rash  # Proteinuria and hematuria  Per chart review, patient began to develop a maculopapular rash on his abdomen and leg, which subsequently spread to his back, abdomen, and extremities with associated pruritis. No mucosal involvement or palmar-plantar involvement. Differential for rash could include contact dermatitis, vasculitis, or drug-induced. No oral mucosal/conjunctival involvement immediately suggestive of SJS/TEN. Less likely DRESS in the absence of sustained fevers or renal dysfunction.. Both irinotecan and temozolomide do have a ~8-13% incidence of rash, but would rule out other etiologies initially. Note that around the same time that the rash occurred, patient developed gross hematuria. UA 12/13 notable for 182 RBCs, 17 WBCs, +protein, +calcium oxalate crystals. Urine ptn/Cr ratio >2.0. UCx pending.  - Dermatology consulted, appreciate recommendations. Started on Triamcinalone cream x12/12.   - S/p skin biopsy on 12/13 which demonstrated superficial perivascular mononuclear infiltrate with rare eosinophils and erythrocyte extravasation, likely drug eruption vs viral exanthem. No evidence of vasculitis.  - Initially, HSP in the differential given hematuria and rash following a viral syndrome. Proteinuria noted concomitantly, but somewhat more chronic, dating back to  "at least 11/16. Patient does have abdominal pain, but no significant renal dysfunction noted. DDx also includes drug eruption (prompting change from cefepime to Zosyn x12/13) and viral exanthem, among other etiologies.  - Will defer further work-up of hematuria/proteinuria to primary team - agree with sending glomerulonephritis work-up    # Suspected partial large bowel obstruction   CT AP 12/8 with concern for cancer progression and possible large bowel obstruction.  - CRS consulted- recommended conservative mgmt  - GI consulted - \"No recommendations for colon stenting given the large tumour burden that extends from sigmoid colon till the rectum. In addition, stenting would need to be transanal. The risks of attempting to stent would outweigh the benefits in this patient with neutropenia and thrombocytopenia.\"  - Agree that surgical intervention/stenting not appropriate in the setting of extensive tumor burden and pancytopenia. Patient has most recently been starting on a new regimen for his malignancy with known GI toxicity, so query some component on inflammation secondary to irinotecan as well.      We will continue to follow this patient. Please do not hesitate to page with any questions or concerns.     Staffed with Dr. Bradley.    Hawa Meredith PA-C  Hematology/Oncology  Pager: #[phone]    I spent 60 minutes in the care of this patient today, which included time necessary for review of interval events, obtaining history and physical exam, ordering medications/tests/procedures as medically indicated, review of pertinent medical literature, counseling of the patient, coordination of care, and documentation time. Over 50% of time was spent counseling the patient and/or coordinating care.  ___________________________________________________________________    Subjective & Interval History:    Afebrile overnight. Nursing notes reviewed. Tachycardic to the 150s, transferred to 5B for telemetry. No fevers. " "Reporting ongoing abdominal pain, rather diffuse. Still having looser stools. Appetite is poor. No N/V. No interval improvement in rash - rash appears darker and more coalescent in areas and has spread to a few new sites, perhaps more so on the dorsal feet and to a lesser degree, the dorsal hands and neck. Rash is very itchy. No improvement with steroid ointment. Discussed case and possible differential dx for patient's rash with family at length. All questions and concerns addressed at bedside.    Physical Exam:    Blood pressure 103/60, pulse (!) 124, temperature 97.8  F (36.6  C), temperature source Oral, resp. rate 16, height 1.727 m (5' 8\"), weight 117 kg (257 lb 15 oz), SpO2 98 %.     General: lying in bed, no acute distress  HEENT: sclera anicteric, no conjunctival injection, EOMI, MMM, no discrete intraoral lesions  CV: extremities warm and well-perfused  Resp: no increased work of breathing, normal respiratory effort on ambient air  GI: soft, non-distended   MSK: thin extremities, no gross deformities  Skin: blanching coalescing violaceous maculopapular rash to all four extremities somewhat diffusely. Prominent coalescing rash in a band-like distribution to the lower abdomen, with scattered isolated lesions noted superiorly. Few scattered lesions to dorsal feet and hands and possibly anterior neck, possibly new since yesterday.  Neuro: alert and interactive, moves all extremities spontaneously, normal speech without dysarthria  Psych: normal mood and affect    Labs & Studies: I personally reviewed the following studies:  ROUTINE LABS (Last four results):  CMP  Recent Labs   Lab 12/14/22  2152 12/14/22  1011 12/13/22  1931 12/13/22  1651 12/13/22  0834 12/12/22  1732 12/12/22  0948   NA  --  136  --  136 134*  --  137   POTASSIUM 3.4 3.1* 3.6 3.7 3.4 4.0 3.2*   CHLORIDE  --  104  --  103 103  --  105   CO2  --  19*  --  16* 18*  --  17*   ANIONGAP  --  13  --  17* 13  --  15   GLC  --  104*  --  108* 118*  " --  103*   BUN  --  10.3  --  9.3 10.8  --  11.5   CR  --  0.87  --  0.81 0.86  --  0.95   GFRESTIMATED  --  >90  --  >90 >90  --  >90   FAISAL  --  8.6  --  8.2* 8.3*  --  8.8   MAG  --  1.6*  --   --  1.9 2.4* 1.5*   PHOS 1.9* 2.0*  --  2.5 1.9*  --  2.4*   PROTTOTAL  --  6.4  --  5.7* 6.7  --  6.7   ALBUMIN  --  2.8*  --  3.0* 3.0*  --  3.1*   BILITOTAL  --  1.6*  --  1.8* 2.1*  --  1.1   ALKPHOS  --  152*  --  156* 162*  --  166*   AST  --  12  --  15 10  --  10   ALT  --  15  --  22 17  --  26     CBC  Recent Labs   Lab 12/14/22  2152 12/14/22  1011 12/13/22  0834 12/12/22  2205   WBC 0.2* 0.2* 0.2* 0.2*   RBC 2.68* 2.93* 3.29* 2.80*   HGB 6.7* 7.3* 8.3* 6.9*   HCT 21.7* 23.4* 26.1* 22.4*   MCV 81 80 79 80   MCH 25.0* 24.9* 25.2* 24.6*   MCHC 30.9* 31.2* 31.8 30.8*   RDW 16.6* 16.5* 16.4* 17.1*   PLT 24* 10* 16* 18*     INR  No lab results found in last 7 days.    Medications list for reference:  Current Facility-Administered Medications   Medication     0.9% sodium chloride BOLUS     acetaminophen (TYLENOL) tablet 650 mg     benzonatate (TESSALON) capsule 100 mg     cyanocobalamin (VITAMIN B-12) tablet 100 mcg     dextrose 5 % flush PRE/POST medication    And     filgrastim (NEUPOGEN) 480 mcg in D5W 25 mL infusion    And     dextrose 5 % flush PRE/POST medication     diphenhydrAMINE-zinc acetate (BENADRYL) 1-0.1 % cream     [Held by provider] guaiFENesin (MUCINEX) 12 hr tablet 600 mg     [Held by provider] HYDROmorphone (DILAUDID) half-tab 1 mg     Lidocaine (LIDOCARE) 4 % Patch 1 patch     lidocaine (LMX4) cream     lidocaine 1 % 0.1-1 mL     lidocaine patch in PLACE     melatonin tablet 1 mg     metoprolol tartrate (LOPRESSOR) tablet 25 mg     naloxone (NARCAN) injection 0.2 mg    Or     naloxone (NARCAN) injection 0.4 mg    Or     naloxone (NARCAN) injection 0.2 mg    Or     naloxone (NARCAN) injection 0.4 mg     ondansetron (ZOFRAN ODT) ODT tab 4 mg    Or     ondansetron (ZOFRAN) injection 4 mg     oxyCODONE  (ROXICODONE) tablet 5 mg     piperacillin-tazobactam (ZOSYN) 3.375 g vial to attach to  mL bag     prochlorperazine (COMPAZINE) injection 10 mg    Or     prochlorperazine (COMPAZINE) tablet 10 mg    Or     prochlorperazine (COMPAZINE) suppository 25 mg     sodium chloride (PF) 0.9% PF flush 3 mL     sodium chloride (PF) 0.9% PF flush 3 mL     traMADol (ULTRAM) tablet 50 mg     triamcinolone (KENALOG) 0.1 % ointment     Facility-Administered Medications Ordered in Other Encounters   Medication     sodium chloride (PF) 0.9% PF flush 30 mL

## 2022-12-15 NOTE — PLAN OF CARE
Goal Outcome Evaluation:      Plan of Care Reviewed With: patient, parent    Overall Patient Progress: no changeOverall Patient Progress: no change    Outcome Evaluation: Moved to 5b for monitoring of tachy heart rate.  denies pain.  alert.    Reason for transfer: monitoring of rapid heart rate  Transferred from:   Report received from: Real RN  2 RN skin assessment completed by:Margarita RN and Phuong RN      - Findings: red raised petechiae on trunk, legs from ankle to groin bilaterally, back, arms from wrists to shoulders.  Right thigh 1 cm biopsy site stitched with bandaid scant bloody drainage. Buttocks and sacrum dark red rash.  Bed algorithm reevaluated: no  Was Pulsate ordered?:no   Flu shot ordered? (October-April only):   Detailed Belongings:   I-pad,  clothing,  bag with food and water.       RN Decompensation Assessment (Repeat at 12 hours)    (Additional guidance for RRT)      Mentation:  Exam is notable for normal (baseline) mental status    Respiratory mechanics and oxygenation:  Exam is notable for stable oxygen requirements    Cardiovascular/perfusion:   Exam is notable for concerning change in heart rate (e.g. symptomatic tachy/bradycardia or HR change >24bpm)    Overall estimation of the patient s condition/stability (1 = stable patient, 7 = appears very sick)? 4 - Patient is labile, but does not appear very sick OR has significant medical problems but is stable

## 2022-12-15 NOTE — PROGRESS NOTES
CLINICAL NUTRITION SERVICES - REASSESSMENT NOTE     Nutrition Prescription    RECOMMENDATIONS FOR MDs/PROVIDERS TO ORDER:  - FYI- pt is having decreased po intake due to GI motility causing increased left sided abdominal pain and loose stools that persist for several hours.  Will encourage nursing to check on pain after meals.    Malnutrition Status:    Severe malnutrition in the context of acute on chronic illness.    Recommendations already ordered by Registered Dietitian (RD):  - State Ensure Enlive TID w/ meals.   - Room service with auto-generated trays with some of his food preferences incorporated (per discussion with sister).   - Start thiamine 100 mg x 4 days to support carb metabolism with ongoing electrolyte issues.   - Collaborate with other providers--bedside RN re: standing wt (done) and checking pain after meals, use of IV Mag for replacement protocol due to diarrhea.    Future/Additional Recommendations:  - Follow po intake, GI function, labs, POC.    - If poor intake persists may need to consider alternatives to po intake. Anticipate with neutropenia, very low platelets, infection, etc a nasal FT would not be appropriate and noted port needs replacing and deferred for now. Presume that a PICC line placement would be deferred for the same reason.       EVALUATION OF THE PROGRESS TOWARD GOALS   Diet: Regular + supplements/snacks between meals prn ordered 1/8 (pt/mom not able to do this).  Intake: poor noted in nursing progress notes since at least 12/9 related to abdominal pain along left abd after eating.  No meal intake documented except ate 100% on one day earlier in stay.      Noted per discussion with sister that mom may have been discouraging some intake of foods she thought were worsening his rash.  Update from 12/16--pt is eating well this AM.  MD came to explain to pt/mom that he could eat whatever he wanted and rash was not related to food such as sugar, chocolate, etc as mom thought.      "  NEW FINDINGS   -General: Pt transferred to  late 12/14 for tele monitoring with continued tachycardia.  Surgery recommends conservative management of large bowel obstruction.  Per GI, not candidate for colonic stent due to neutropenia.  Tumor burden extends from sigmoid to rectum and would need to be transanal.  Noted replacement of port postponed until neutropenia improves.     -Wt: Standing scale weights stable despite poor intake.  Down 15.3% in about the past 6 months.   12/15/22 1256 112.4 kg (247 lb 14.4 oz) Standing scale   12/14/22 2229 117 kg (257 lb 15 oz) Bed scale   12/07/22 2144 113.2 kg (249 lb 8 oz) Standing scale   12/07/22 1356 115.7 kg (255 lb) --     06/03/22 132.7 kg (292 lb 9.6 oz)     Will continue use dosing wt of 81 kg as minimal wt change.     -Labs: K+ 3.1 Mg 1.6, Phos 2.1.  T Bili 1.6.n  Received platelets for very low labs    -GI: loose stools.  \"bowel clean out\" with golytely on 12/12.  Pt states he's having loose stools 3-4x per day that are loose.  He states he has a lot of LMQ pain around the time that lasts for a several hours.  This pain is making him afraid to eat.    CT 12/8 with progression of extensive peritoneal carcinomatosis, pelvic masses, omental caking, malignant ascites when compared to CT 10/4/22.      -Skin: Rash, red + purple petichaie throughout abdomen, back, neck, arms, posterior thighs, feet per RN.     -Meds: Vit B12 100 mcg, IV zosyn,   PRNs: imodium, antinausea, pain Rx.    MALNUTRITION  % Intake: </= 50% for >/= 5 days (severe)  % Weight Loss: > 10% in 6 months (severe)  Subcutaneous Fat Loss: unable to assess (see below)  Muscle Loss: Unable to assess--did not want to palpate due to rash, low platelets. Difficult to  with significant adipose tissue.  Fluid Accumulation/Edema: None noted-per RN charting  Malnutrition Diagnosis: Severe malnutrition in the context of acute on chronic illness.    Previous Goals   Patient to consume % of " nutritionally adequate meal trays TID, or the equivalent with supplements/snacks.  Evaluation: Not met    Previous Nutrition Diagnosis  Unintended weight loss related to suspect variable appetite and hypermetabolism with metastatic disease as evidenced by 14.7% wt loss over past ~6 months and positive MST score for unintended wt loss.    Evaluation: Declining--worsened po intake    CURRENT NUTRITION DIAGNOSIS  Unintended weight loss related to decreased appetite and now L sided abdominal pain associated with po intake/urge to pass stool and hypermetabolism with metastatic disease as evidenced by 15.3% wt loss over past ~6 months and positive MST score for unintended wt loss.      INTERVENTIONS  Implementation  Collaboration with other providers  Medical food supplement therapy- order     Goals  Patient to consume % of nutritionally adequate meal trays TID, or the equivalent with supplements/snacks.    Monitoring/Evaluation  Progress toward goals will be monitored and evaluated per protocol.    Josiane Pruitt RD, LD   5A (236-)/5B RD pager: [phone]  RD Weekend/Holiday Pager: [phone]

## 2022-12-15 NOTE — PROGRESS NOTES
"Blood pressure (!) 108/39, pulse (!) 152, temperature 98.9  F (37.2  C), temperature source Oral, resp. rate 22, height 1.727 m (5' 8\"), weight 113.2 kg (249 lb 8 oz), SpO2 99 %.    Goal Outcome Evaluation:     Plan of Care Reviewed With: patient   Overall Patient Progress: Declining     1500-Transferred to  for Telemetry ~2200   Status: Hx Desmoplastic Small Round Cell Tumor c/b Peritoneal Carcinomatosis and Mets to Lymph Nodes, Bone, Liver and is admitted for neutropenic fever. Found to have possible large bowel obstruction on CT 12/8  VS: GRADUALLY INCREASING TACHYCARDIA, resting in 140s. Intermittently tachycardic 150s. BP stable in >100 systolic. Afebrile. Satting >94% on RA   Neuros: A&O x4, CMS inact   Cardiac: BP > 100. TACHYCARDIC in >140s. C/o \"heart racing\" + mild lightheadedness   Respiratory: Denies shortness of breath, Satting >94% on RA   GI/: Voiding spontaneously, no reports of Hematuria since AM. -BM during shift   Diet/Nausea: Regular diet, no nausea    Skin: Generalized rash (worsening), Kenalog cream applied   LDA: L PIV x2  Labs: Reviewed   Pain: Denies pain   Activity: Up in SBA w/ Mom in room   New changes this shift: Uptrending tachycardia. Awaiting Labs   Plan: Transfer for Telemetry monitoring       "

## 2022-12-16 NOTE — PLAN OF CARE
Goal Outcome Evaluation:      Plan of Care Reviewed With: patient, family (English speaking sister)    Overall Patient Progress: decliningOverall Patient Progress: declining    Outcome Evaluation: Decreased po for past 5 days due to pain. Not meeting goal of adequate intake.

## 2022-12-16 NOTE — PLAN OF CARE
Vital signs stable except for tachycardia. Independent in room. Telemetry showing heart rate 120-130's. PRN oxycodone and tylenol given for pain. Rash throughout body. Mother present at bedside. Will continue to monitor.         Goal Outcome Evaluation:      Plan of Care Reviewed With: patient, family    Overall Patient Progress: no changeOverall Patient Progress: no change    Outcome Evaluation: pain control, encourage room mobility, family present

## 2022-12-16 NOTE — PROGRESS NOTES
[facility]    Medicine Progress Note - Medicine Service, [facility] TEAM 4    Date of Admission:  12/7/2022    Assessment & Plan   Diego Buchanan is a 27 year old male admitted on 12/7/2022. 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 Started on G-CSF for pancytopenia. Hemodynamically stable.     Changes today:  - schedule imodium BID with additional PRN  - continue filgrastim  - continue zosyn  - PO benadryl for itching    # Sepsis with neutropenic fever associated with RSV infection, ongoing  # Possible viral exanthem  # Recent PNA s/p treatment  # Pancytopenia 2/2 chemotherapy, ongoing  # Neutropenia, ongoing  # Tachycardia, ongoing  Febrile, tachycardic with neutropenia. LA negative. Patient recently treated for PNA based on CT findings on 12/1, s/p course of CTX and Azithromycin. No other symptoms, no shortness of breath, not requiring O2. RSV positive and likely underlying infectious source however undergoing complete infectious workup. UA negative. CXR stable. Procal 0.39  100.3 F on 12/13. >101F on 12/8.   Continues to be neutropenic. Overall picture of sepsis likely 2/2 viral illness in the setting of recent chemotherapy. With count recovery, would expect sepsis to improve. Pancytopenia persisting longer than expected in the setting of chemo. Likely delayed due to ongoing viral infection.   - Cefepime (12/7-12/13) STOPPED due to worsening rash, added as a drug allergy on EPIC  - continue zosyn (12/14-): goal of stopping 7 days post last fever or count recovery  - BCX NGTD  - CT abdomen pelvis given concern for neutropenic colitis in setting of ongoing diarrhea   - no evidence of neutropenic colitis   - did show multiple fluid pockets, would be difficult to sample or drain them all  -  CAPS consulted for diagnostic paracentesis given history of ascites   - not indicative of SBP  - Oncology following, recs appreciated  - continue G-CSF daily until ANC>500x2 consecutive days (started 12/12-)  - CBC with diff daily  - peripheral smear to eval pancytopenia: in process    # Rash, improving  # Hematuria, resolved  # Proteinuria  Maculopapular rash worsening for the past few days, has been on cefepime since 12/7, has tolerated CTX in the past. Unclear etiology. DDx include drug rash related to cefepime vs contact dermatitis vs allergic dermatitis. Also concern for vasculitis given rash, hematuria, proteinuria. Urine protein is not at a nephrotic range.  Worsening, spreading to more body surface on 12/13.  Stable, mildly worsened on back on 12/14. If cefepime is culprit, expect rash to get better/not worsen in the coming days  Mostly stable on 12/15. If worsening, can consider switching to a different abx given potential cross reactivity between pip tazo and cefepime.   12/16: appears improved, suspect this was viral exantham  - dermatology consulted, appreciate recs  - biopsy performed on 12/13, path not consistent with vasculitis, likely morbiliform drug rash vs viral exanthem  - repeat U/A discontinued, hematuria resolved  - bladder ultrasound: Large mixed cystic solid mass also seen on CT  - vasculitis w/u: continue to follow up on cryo   > so far C3/C4 high/normal, ANCA neg, ADRIAN neg, SPEP   > SPEP with small monoclonal protein in gamma- UPEP ordered  - triamcinolone ointment BID, Saran wrap to augment  - PO benadryl PRN    # Possible large bowel obstruction  # Diarrhea  CT AP showing worsening cancer burden and c/f large bowel obstruction.  Enteric panel, C diff negative on 12/7  - Colorectal surgery consulted; appreciate recs   - conservative management at the moment   - recommend palliation  - GI consulted, signed off   - conservative management, not a candidate for colonic stent given  neutropenia  -  bowel clean out with golytely on 12/12  - schedule loperamide BID with prn BID    # Pancytopenia present on admission due to chemo  # Neutropenia  # Anemia  # Thrombocytopenia   Likely expected 2/2 recent chemo (last 11/28). No signs of bleeding or bruising, VSS.  - Anemia work up showing low B12; started on supplement  - blood consent completed, transfuse 1u PRBCs 12/9  - transfuse 1u PLT on 12/14, 1U plt ON 12/15  - transfuse 1u pRBC on 12/15  - transfusion goals: Hgb >7, PLT >20K    # Acute Kidney Injury, resolved: based on a >150% or 0.3 mg/dL increase in last creatinine compared to past 90 day average, will monitor renal function     - continue to monitor     # Hypokalemia  - RN replacement protocol    # Hypophosphatemia  - monitor K  - Phos replacement protocol    # Hypomagnesemia  - monitor Mg  - Mg replacement protocol     # L pleural effusion   Moderate pleural effusion on 12/1 CT. Patient plan to see thoracic surgery as outpatient for thoracentesis. No current indication for inpatient drainage, unlikely source of infection nd patient already received abx so unlikely to diagnostically yield anything. Will plan to treat as outpatient.      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 Indication: Tachyarrhythmias, acute (48 hours)  Code Status: Full Code      Disposition Plan      Expected Discharge Date:  12/19/2022      Destination: home with family  Discharge Comments: home once resolution of neutropenia and sepsis; no new needs anticipated        The patient's care was discussed with Dr. Gómez, Bedside Nurse, Care Coordinator/, Patient and Patient's Family.    Phuong Rodriguez MD  Medicine Service, [facility] TEAM [address] [place] [address]  Securely message with the Vocera Web Console (learn more here)  Text page via [facility] Paging/Directory   Please see signed in provider for up to date coverage information  ________________________________________________________________    Interval History   NAEO. Patient was seen along with his sister. Feels OK. Still have diarrhea that has not improved. Received 1 dose of PRN imodium. Denies chest pain, SOB, cough, fever, chills.     Otherwise 4pt ROS was reviewed and is negative     Data reviewed today: I reviewed all medications, new labs and imaging results over the last 24 hours. I personally reviewed no images or EKG's today.    Physical Exam   Vital Signs: Temp: 98.9  F (37.2  C) Temp src: Oral BP: 106/62 Pulse: (!) 132   Resp: 18 SpO2: 94 % O2 Device: None (Room air)    Weight: 247 lbs 14.4 oz  Gen: young appearing male in NAD  HEENT: NCAT, EOMI, conjuctiva clear  CV: RRR, no m/r/g  RESP: CTAB, no wheezes or crackles  Abd: soft, nontender, nondistended active BS  Ext: trace edema bilaterally   Skin: maculopapular rash improved on back; stable on bilateral thighs, lower legs, abdomen, face  Data   Recent Labs   Lab 12/16/22  0636 12/15/22  1213 12/15/22  1010 12/14/22  2152 12/14/22  1011   WBC 0.3* 0.2* 0.3* 0.2* 0.2*   HGB 8.1* 8.2* 8.6* 6.7* 7.3*   MCV 80 80 79 81 80   PLT 28* 20* 23* 24* 10*     --  137  --  136   POTASSIUM 3.1*  --  3.6  3.6 3.4 3.1*   CHLORIDE 107  --  103  --  104   CO2 20*  --  19*  --  19*   BUN 11.1  --  10.2  --  10.3   CR 0.88  --  0.88  --  0.87   ANIONGAP 13  --  15  --  13   FAISAL 8.4*  --   8.8  --  8.6   *  --  118*  --  104*   ALBUMIN 2.8*  --  3.2*  --  2.8*   PROTTOTAL 6.6  --  7.3  --  6.4   BILITOTAL 1.6*  --  2.1*  --  1.6*   ALKPHOS 170*  --  178*  --  152*   ALT 14  --  21  --  15   AST 21  --  15  --  12     No results found for this or any previous visit (from the past 24 hour(s)).

## 2022-12-16 NOTE — PROGRESS NOTES
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 Burciaga  Current Treatment Plan: Irinotecan/Temodar (C1D1 = 11/28/22)     Assessment & Plan:   Diego Buchanan is a 27 year old male with a history of cognitive delay and desmoplastic small round cell tumor c/b peritoneal carcinomatosis and mets to lymph nodes, bone, liver who was admitted on 12/7/2022 for neutropenic fevers and found to be RSV+. Complicated by partial large bowel obstruction 12/8 which is being managed conservatively. Course has been complicated by persistent fevers and interval development of an erythematous maculopapular rash to the flexural surfaces of all four extremities as well as the abdomen, biopsied by dermatology on 12/13/22 and pathologically most consistent with a drug eruption versus viral exanthem, as well as persistent pancytopenia.    Summary & Recommendations:   - Currently D19 from C1 of irinotecan + temodar.   - Continue Neupogen 5 mcg/kg daily (12/12-x) until ANC >500 x 2 consecutive days. Monitor CBC w/ differential daily. Slight up-trend in WBC (to 0.3) today is somewhat encouraging.  - Follow-up results of peripheral smear (sent 12/15).  - Rash appears stable-slightly improved today. Continue current cares. Appreciate dermatology input.  - Continue imodium for diarrhea. Can give 2 mg following 1st diarrheal stool, with additional 2 mg given as needed with each subsequent diarrhea stool (up to max of 16 mg/day).   - Remainder of cares per primary team.     # Neutropenic fever, fever resolved  # RSV+  # Diarrhea  Patient most recently received Cycle 1, Day 1 irinotecan/temodar on 11/28/22. Did not receive neulasta support. Was recently treated for  pneumonia after evidence on CT PE with scattered ground glass opacities - the bronchi are patent but there is concern for possible post obstructive process as well as trace right pleural effusion and a moderate left pleural effusion with left lingular atelectasis. He was started on Augmentin 875 mg BID x 5 days (12/1) plus Azithromycin 500 mg on day 1, 250 mg from days 2-5. He had noted diarrhea for the past 7 days outpatient, likely secondary to irinotecan, which is now significantly improved. He notes a max of ~9 episodes daily last week, which improved after antimotility agents. He stopped taking these PTA, as his diarrhea had improved; however, more recently, he is experiencing recurrent diarrhea. No abdominal pain initially, though reported interval development of diffuse abdominal pain during admission. He presented to ED on 12/7 after Tmax 100.4 outpatient with associated cough and shortness of breath. He was started on cefepime. RSV+. UA bland. CXR with stable mixed opacities in left lung, stable focal opacity in right midlung.   - Continue supportive measures with tessalon perles, mucinex, APAP.   - Changed from cefepime to Zosyn x12/13 given concern for possible drug eruption; see below for details. Per discussion with primary team on 12/14, they plan to continue for 7-days post last fever, or until count recovery, whichever is sooner, which is reasonable in our opinion.  - BCx NGTD  - Diagnostic paracentesis 12/8 - no concern for SBP, cultures NGTD.   - Enteric panel and C. diff negative.  - Continue imodium PRN for diarrhea. Would give 2 mg x1 dose with 1st diarrhea stool of the day, followed by an additional 2 mg with each subsequent stool (up to 16 mg/day maximum dose).     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 Sims. See below for full oncologic history. In summary from outpatient  notes, he tolerated CAV/IMV for 19 cycles and then was on chemotherapy break from June 2021-April 2022. Given return in symptoms he resumed chemotherapy with further CAV/IMV in April 2022.  However, he has had evidence of progression on the two most recent scans. Robert and his family elected to take time to try traditional medicine treatments. Last CT-CAP (10/4/22) showed evidence of progressive disease, predominantly in liver. There are small sub-centimeter lung nodules, which are suspicious for metastasis. He started irinotecan 20 mg/m2 days 1-5/temozolomide 250 mg daily days 1-5 every 21 days with his first cycle on 11/28/22. On 12/1, he had a chest CT for SOB which was negative for acute PE. However, it showed scattered ground glass opacities suspicious for pneumonia. The bronchi is patent but there is concern for possible post obstructive process. There is a trace right pleural effusion and a moderate left pleural effusion with left lingular atelectasis. He was started on Augmentin 875 mg BID x 5 days plus Azithromycin 500 mg on day 1, 250 mg from days 2-5. Thoracic was also notified to set up thoracentesis for left pleural effusion and to time with port replacement. most recently was transitioned to irinotecan/temodar (C1D1 = 11/28/22) due to progression after treatment break.   - CT 12/8 with progression of extensive peritoneal carcinomatosis, pelvic masses, omental caking, malignant ascites when compared to CT 10/4/22. This does not represent failure of irinotecan/temodar as it was just started on 11/28/22.  - Requested rescheduling of C2 temodar + irinotecan to first week of January (1/2-1/6) to allow time for clinical + count recovery; can adjust further as needed.  - Patient was scheduled for port exchange on 12/13, will reschedule to allow for count recovery    # Pancytopenia  Initially presumed secondary to chemotherapy; however, patient remains persistently pancytopenic now ~18 days out from chemo,  which is a bit longer than expected. DDx includes secondary insult from sepsis/RSV or bone marrow infiltration by malignancy, among others. Less likely drug-induced cytopenia, though antibiotics could be contributing to a degree.  - Transfuse to keep Hgb >7 and plt >20K (higher in the setting of recent hematuria)  - Continue Neupogen 5 mcg/kg/day until ANC >500 x2 consecutive days (12/12-x)  - Monitor CBC w/ diff daily    # Diffuse pruritic maculopapular rash, likely drug eruption versus viral exanthem  # Proteinuria and hematuria, improved  Per chart review, patient began to develop a maculopapular rash on his abdomen and leg, which subsequently spread to his back, abdomen, and extremities with associated pruritis. No mucosal involvement or palmar-plantar involvement. Differential for rash could include contact dermatitis, vasculitis, or drug-induced. No oral mucosal/conjunctival involvement immediately suggestive of SJS/TEN. Less likely DRESS in the absence of sustained fevers or renal dysfunction.. Both irinotecan and temozolomide do have a ~8-13% incidence of rash, but would rule out other etiologies initially. Note that around the same time that the rash occurred, patient developed gross hematuria. UA 12/13 notable for 182 RBCs, 17 WBCs, +protein, +calcium oxalate crystals. Urine ptn/Cr ratio >2.0. UCx pending.  - Dermatology consulted, appreciate recommendations. Started on Triamcinalone cream x12/12.   - S/p skin biopsy on 12/13 which demonstrated superficial perivascular mononuclear infiltrate with rare eosinophils and erythrocyte extravasation, likely drug eruption vs viral exanthem. No evidence of vasculitis.  - Initially, HSP in the differential given hematuria and rash following a viral syndrome. Proteinuria noted concomitantly, but somewhat more chronic, dating back to at least 11/16. Patient does have abdominal pain, but no significant renal dysfunction noted. DDx also includes drug eruption (prompting  "change from cefepime to Zosyn x12/13) and viral exanthem, among other etiologies. Now stable-improving as of 12/16.  - Will defer further work-up of hematuria/proteinuria to primary team    # Suspected partial large bowel obstruction   CT AP 12/8 with concern for cancer progression and possible large bowel obstruction.  - CRS consulted- recommended conservative mgmt  - GI consulted - \"No recommendations for colon stenting given the large tumour burden that extends from sigmoid colon till the rectum. In addition, stenting would need to be transanal. The risks of attempting to stent would outweigh the benefits in this patient with neutropenia and thrombocytopenia.\"  - Agree that surgical intervention/stenting not appropriate in the setting of extensive tumor burden and pancytopenia. Patient has most recently been starting on a new regimen for his malignancy with known GI toxicity, so query some component on inflammation secondary to irinotecan as well.      We will continue to follow this patient. Please do not hesitate to page with any questions or concerns.     Staffed with Dr. Bradley.    Hawa Meredith PA-C  Hematology/Oncology  Pager: #[phone]    I spent 50 minutes in the care of this patient today, which included time necessary for review of interval events, obtaining history and physical exam, ordering medications/tests/procedures as medically indicated, review of pertinent medical literature, counseling of the patient, coordination of care, and documentation time. Over 50% of time was spent counseling the patient and/or coordinating care.  ___________________________________________________________________    Subjective & Interval History:    Afebrile overnight. Nursing notes reviewed. No acute overnight events. Patient reports ongoing diarrhea and very itchy rash. No complaint of abdominal pain today. No mouth sores or eye pain. Discussed interval lab results and care plan with patient and family. " "    Physical Exam:    Blood pressure 110/60, pulse (!) 128, temperature 98.2  F (36.8  C), temperature source Oral, resp. rate 18, height 1.727 m (5' 8\"), weight 112.4 kg (247 lb 14.4 oz), SpO2 95 %.     General: lying in bed, no acute distress, appropriately interactive and conversational  HEENT: sclera anicteric, no conjunctival injection, EOMI, MMM, no discrete intraoral lesions  CV: extremities warm and well-perfused  Resp: no increased work of breathing, normal respiratory effort on ambient air  GI: soft, non-distended   MSK: thin extremities, no gross deformities  Skin: blanching erythematous maculopapular rash to all four extremities somewhat diffusely with areas of petechial/purpuric conversion. Prominent coalescing rash in a band-like distribution to the lower abdomen, with scattered isolated lesions noted superiorly. Few scattered lesions to dorsal feet and hands. Rash overall stable-slightly improved from previous exams.  Neuro: alert and interactive, moves all extremities spontaneously, normal speech without dysarthria  Psych: normal mood and affect    Labs & Studies: I personally reviewed the following studies:  ROUTINE LABS (Last four results):  CMP  Recent Labs   Lab 12/16/22  0636 12/15/22  1010 12/14/22  2152 12/14/22  1011 12/13/22  1931 12/13/22  1651 12/13/22  0834    137  --  136  --  136 134*   POTASSIUM 3.1* 3.6  3.6 3.4 3.1*   < > 3.7 3.4   CHLORIDE 107 103  --  104  --  103 103   CO2 20* 19*  --  19*  --  16* 18*   ANIONGAP 13 15  --  13  --  17* 13   * 118*  --  104*  --  108* 118*   BUN 11.1 10.2  --  10.3  --  9.3 10.8   CR 0.88 0.88  --  0.87  --  0.81 0.86   GFRESTIMATED >90 >90  --  >90  --  >90 >90   FAISAL 8.4* 8.8  --  8.6  --  8.2* 8.3*   MAG 1.6* 1.7  --  1.6*  --   --  1.9   PHOS 2.1* 2.5 1.9* 2.0*  --  2.5 1.9*   PROTTOTAL 6.6 7.3  --  6.4  --  5.7* 6.7   ALBUMIN 2.8* 3.2*  --  2.8*  --  3.0* 3.0*   BILITOTAL 1.6* 2.1*  --  1.6*  --  1.8* 2.1*   ALKPHOS 170* 178*  --  " 152*  --  156* 162*   AST 21 15  --  12  --  15 10   ALT 14 21  --  15  --  22 17    < > = values in this interval not displayed.     CBC  Recent Labs   Lab 12/16/22  0636 12/15/22  1213 12/15/22  1010 12/14/22  2152   WBC 0.3* 0.2* 0.3* 0.2*   RBC 3.18* 3.25* 3.49* 2.68*   HGB 8.1* 8.2* 8.6* 6.7*   HCT 25.5* 26.0* 27.7* 21.7*   MCV 80 80 79 81   MCH 25.5* 25.2* 24.6* 25.0*   MCHC 31.8 31.5 31.0* 30.9*   RDW 16.2* 16.0* 16.1* 16.6*   PLT 28* 20* 23* 24*     INR  No lab results found in last 7 days.    Medications list for reference:  Current Facility-Administered Medications   Medication     0.9% sodium chloride BOLUS     acetaminophen (TYLENOL) tablet 650 mg     benzonatate (TESSALON) capsule 100 mg     cyanocobalamin (VITAMIN B-12) tablet 100 mcg     dextrose 5 % flush PRE/POST medication    And     filgrastim (NEUPOGEN) 480 mcg in D5W 25 mL infusion    And     dextrose 5 % flush PRE/POST medication     diphenhydrAMINE (BENADRYL) capsule 25 mg     diphenhydrAMINE-zinc acetate (BENADRYL) 1-0.1 % cream     [Held by provider] guaiFENesin (MUCINEX) 12 hr tablet 600 mg     [Held by provider] HYDROmorphone (DILAUDID) half-tab 1 mg     Lidocaine (LIDOCARE) 4 % Patch 1 patch     lidocaine (LMX4) cream     lidocaine 1 % 0.1-1 mL     lidocaine patch in PLACE     loperamide (IMODIUM) capsule 2 mg     loperamide (IMODIUM) capsule 2 mg     melatonin tablet 1 mg     metoprolol tartrate (LOPRESSOR) tablet 25 mg     naloxone (NARCAN) injection 0.2 mg    Or     naloxone (NARCAN) injection 0.4 mg    Or     naloxone (NARCAN) injection 0.2 mg    Or     naloxone (NARCAN) injection 0.4 mg     ondansetron (ZOFRAN ODT) ODT tab 4 mg    Or     ondansetron (ZOFRAN) injection 4 mg     oxyCODONE (ROXICODONE) tablet 5 mg     piperacillin-tazobactam (ZOSYN) 3.375 g vial to attach to  mL bag     prochlorperazine (COMPAZINE) injection 10 mg    Or     prochlorperazine (COMPAZINE) tablet 10 mg    Or     prochlorperazine (COMPAZINE)  suppository 25 mg     sodium chloride (PF) 0.9% PF flush 3 mL     sodium chloride (PF) 0.9% PF flush 3 mL     sodium phosphate 15 mmol in D5W 250 mL intermittent infusion     thiamine (B-1) injection 100 mg     traMADol (ULTRAM) tablet 50 mg     triamcinolone (KENALOG) 0.1 % ointment     Facility-Administered Medications Ordered in Other Encounters   Medication     sodium chloride (PF) 0.9% PF flush 30 mL

## 2022-12-16 NOTE — PLAN OF CARE
"Cares 0700 to 1900    /54   Pulse (!) 135   Temp 98.2  F (36.8  C) (Oral)   Resp 20   Ht 1.727 m (5' 8\")   Wt 112.4 kg (247 lb 14.4 oz)   SpO2 98%   BMI 37.69 kg/m      Goal Outcome Evaluation:    Plan of Care Reviewed With: patient    Overall Patient Progress: no changeOverall Patient Progress: no change    Outcome Evaluation: Hemoglobin wnl, platelets transfused this shift, no reaction reported. Potassium replaced. Pain managed well w/oxycodone. Up ad beverly in room. Monitor skin rash.      "

## 2022-12-16 NOTE — PLAN OF CARE
Goal Outcome Evaluation:    RN assumed cares [id number]    Pt A&Ox4, VS stable on RA. L upper PIV infusing abx, L lower PIV SL. Pt denies pain. Pt's mother at bedside overnight. Pt resting between cares. Will continue to monitor and follow the plan of care.

## 2022-12-17 NOTE — PLAN OF CARE
Goal Outcome Evaluation:       Patient remains tachy. Rash throughout body. Refused the creams did however let this rn apply to left leg and wrap with seran wrap. Left on for 4 hours then wanted it off and stated did not make a difference. Had one diarrhea and was given imodium per doctor orders. Benadryl po given and is scheduled. K was replaced and phos is currently running.mg was 1.6 and does not need to be replaced unless under 1.5 awaiting for k to be drawn and phos will be in am  Port was de accessed due to not using it since he has 2 piv and per sister it is not in the right place to draw labs but is sarah to run iv fluids. Does have an out patient appt to get it in right place once he leaves. Lidoderm patch on the lower left abd . Per patient after breakfast did have some pain however did eat 100 percent of breakfast. Lunch he was not hungry since it was an hour after he had breakfast. Patient is working on dinner.

## 2022-12-17 NOTE — PLAN OF CARE
Goal Outcome Evaluation:  RN assumed cares [id number]    Pt A&Ox4, VS stable on RA. Telemetry showing -140s during shift. Pt reported diarrhea at 2000. Pt received imodium as ordered. Pt denies pain, except pt reports pain when having BM. L lower PIV not flushing and removed. L upper PIV infusing. Rash throughout body, refused creams. K was replaced and will be rechecked in the morning. Sister at bedside during shift. Plan to discharge home once neutropenia and sepsis resolve.

## 2022-12-17 NOTE — PROGRESS NOTES
[facility]    Medicine Progress Note - Medicine Service, REGAN TEAM 4    Date of Admission:  12/7/2022    Assessment & Plan   Diego Buchanan is a 27 year old male admitted on 12/7/2022. 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 Started on G-CSF for pancytopenia. Hemodynamically stable.     Changes today:  - increased scheduled loperamide to TID with prn daily available  - 1L LR   - continue filgrastim daily until count recovery  - continue zosyn     # Sepsis with neutropenic fever associated with RSV infection, ongoing  # Possible viral exanthem  # Recent PNA s/p treatment  # Pancytopenia 2/2 chemotherapy, ongoing  # Neutropenia, ongoing  # Tachycardia, ongoing  Febrile, tachycardic with neutropenia. LA negative. Patient recently treated for PNA based on CT findings on 12/1, s/p course of CTX and Azithromycin. No other symptoms, no shortness of breath, not requiring O2. RSV positive and likely underlying infectious source however undergoing complete infectious workup. UA negative. CXR stable. Procal 0.39  100.3 F on 12/13. >101F on 12/8.   Continues to be neutropenic. Overall picture of sepsis likely 2/2 viral illness in the setting of recent chemotherapy. With count recovery, would expect sepsis to improve. Pancytopenia persisting longer than expected in the setting of chemo. Likely delayed due to ongoing viral infection.   - Cefepime (12/7-12/13) STOPPED due to worsening rash, added as a drug allergy on EPIC  - continue zosyn (12/14-): goal of stopping 7 days post last fever or count recovery  - BCX NGTD  - CT abdomen pelvis given concern for neutropenic colitis in setting of ongoing diarrhea   - no evidence of neutropenic colitis   - did show multiple fluid pockets, would be difficult  to sample or drain them all  - CAPS consulted for diagnostic paracentesis given history of ascites   - not indicative of SBP  - Oncology following, recs appreciated  - continue G-CSF daily until ANC>500x2 consecutive days (started 12/12-)  - CBC with diff daily  - peripheral smear to eval pancytopenia: in process  - 1L LR for persistent tachycardia, soft BP, likely 2/2 diarrhea    # Rash, improving  # Hematuria, resolved  # Proteinuria  Maculopapular rash worsening for the past few days, has been on cefepime since 12/7, has tolerated CTX in the past. Unclear etiology. DDx include drug rash related to cefepime vs contact dermatitis vs allergic dermatitis. Also concern for vasculitis given rash, hematuria, proteinuria. Urine protein is not at a nephrotic range.  Worsening, spreading to more body surface on 12/13.  Stable, mildly worsened on back on 12/14. If cefepime is culprit, expect rash to get better/not worsen in the coming days  Mostly stable on 12/15. If worsening, can consider switching to a different abx given potential cross reactivity between pip tazo and cefepime.   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  Enteric panel, C diff negative on 12/7  - Colorectal surgery consulted; appreciate recs   - conservative management at the  moment   - recommend palliation  - GI consulted, signed off   - conservative management, not a candidate for colonic stent given neutropenia  -  bowel clean out with golytely on 12/12  - increase scheduled loperamide to TID with prn daily    # Pancytopenia present on admission due to chemo, persistent  # Neutropenia  # Anemia  # Thrombocytopenia   Likely expected 2/2 recent chemo (last 11/28). No signs of bleeding or bruising, VSS.  - Anemia work up showing low B12; started on supplement  - blood consent completed, transfuse 1u PRBCs 12/9  - transfuse 1u PLT on 12/14, 1U plt ON 12/15  - transfuse 1u pRBC on 12/15  - transfusion goals: Hgb >7, PLT >20K  - daily CBC with diff    # Acute Kidney Injury, resolved: based on a >150% or 0.3 mg/dL increase in last creatinine compared to past 90 day average, will monitor renal function     - continue to monitor     # Hypokalemia  - RN replacement protocol    # Hypophosphatemia  - monitor K  - Phos replacement protocol    # Hypomagnesemia  - monitor Mg  - Mg replacement protocol     # L pleural effusion   Moderate pleural effusion on 12/1 CT. Patient plan to see thoracic surgery as outpatient for thoracentesis. No current indication for inpatient drainage, unlikely source of infection nd patient already received abx so unlikely to diagnostically yield anything. Will plan to treat as outpatient.      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 Indication: Electrolyte Imbalance (24 hours)- Magnesium <1.3 mg/ml; Potassium < =2.8 or > 5.5 mg/ml  Code Status: Full Code      Disposition Plan     Expected Discharge Date: 12/19/2022      Destination: home with family  Discharge Comments: home once resolution of neutropenia and sepsis; no new needs anticipated        The patient's care was discussed with Dr. Gómez, Bedside Nurse, Care Coordinator/, Patient and Patient's Family.    Phuong Rodriguez MD  Medicine Service, [facility] TEAM [address] [place] [address]  Securely message with the Vocera Web Console (learn more here)  Text page via [facility] Paging/Directory   Please see signed in provider for up to date coverage information  ________________________________________________________________    Interval History   NAEO. Patient was seen along with his sister bedside. Continues to have diarrhea. Loperamide was helpful. Eating somewhat but not hydrating much. Abdominal pain is mostly with defecation. Denies chest pain, dyspnea, LE edema, weakness. Rash is improving, lighter in color.     Otherwise 4pt ROS was reviewed and is negative     Data reviewed today: I reviewed all medications, new labs and imaging results over the last 24 hours. I personally reviewed no images or EKG's today.    Physical Exam   Vital Signs: Temp: 98.2  F (36.8  C) Temp src: Oral BP: 98/57 Pulse: (!) 134   Resp: 18 SpO2: 98 % O2 Device: None (Room air)    Weight: 252 lbs 6.83 oz  Gen: young appearing male in NAD  HEENT: NCAT, EOMI, conjuctiva clear  CV: RRR, no m/r/g  RESP: CTAB, no wheezes or crackles  Abd: soft, nontender, nondistended active BS  Ext: trace edema bilaterally   Skin: maculopapular rash overall improving, lighter in color on back, face, upper chest, abdomen, thighs, upper arms, lower legs, feet  Data   Recent Labs   Lab 12/17/22  0610 12/16/22  1930 12/16/22  0636  12/15/22  1213 12/15/22  1010   WBC 0.3*  --  0.3* 0.2* 0.3*   HGB 7.4*  --  8.1* 8.2* 8.6*   MCV 83  --  80 80 79   PLT 21*  --  28* 20* 23*     --  140  --  137   POTASSIUM 3.0*  3.0* 2.8* 3.1*  --  3.6  3.6   CHLORIDE 107  --  107  --  103   CO2 21*  --  20*  --  19*   BUN 11.1  --  11.1  --  10.2   CR 0.94  --  0.88  --  0.88   ANIONGAP 12  --  13  --  15   FAISAL 8.8  --  8.4*  --  8.8   *  --  109*  --  118*   ALBUMIN 2.9*  --  2.8*  --  3.2*   PROTTOTAL 6.8  --  6.6  --  7.3   BILITOTAL 1.3*  --  1.6*  --  2.1*   ALKPHOS 182*  --  170*  --  178*   ALT 21  --  14  --  21   AST 19  --  21  --  15     No results found for this or any previous visit (from the past 24 hour(s)).

## 2022-12-17 NOTE — PLAN OF CARE
[id number]: Patient denies pain and itching. Continues with loose stools. Rash continues throughout whole body but sister reports that it looks better. Patient refused to use saran wrap on body but did allow sister to apply Triamcinolone cream on rash. Potassium 3.0 this morning, replaced and re-check came back at 3.4. Will be replacing again.  Phosphorus 2.2, replaced and to be re-checked in the AM. Magnesium 1.5, replaced and came back at 2.9. Continues with tachycardia with HR sustaining in the 130's-160's. MDs notified and LR bolus ordered. Will continue with plan of care.    Goal Outcome Evaluation:      Plan of Care Reviewed With: patient    Overall Patient Progress: no changeOverall Patient Progress: no change    Outcome Evaluation: Patient denies pain, rash appears to look better per sister, good appetite with meals, continues with loose stools.

## 2022-12-18 NOTE — PLAN OF CARE
[id number]:     Vitals: Soft BPs this morning, Metoprolol held due to SBP but then given around 1730 due to HR sustaining in the 150's. MD notified.  Pain: Reporting LUQ abd pain, some relief with Lidocaine patch  Neuro: A&O x4. Functional D.D.  Cardiac: Continues with tachycardia with HR in the 120's-160's (with activity). See provider notification re: HR sustaining in the 150's. EKG to be done.  Respiratory: Lungs clear, satting 100% on RA  GI/: Pt reported having 3 loose BMs today. Voiding in good amounts.  IV/Drains: New IV placed on R arm. L PIV intact.   Activity: Up independently/SBA  Skin: Rash on back almost gone, rest of body improving  Labs: K+ 3.1, replaced and re-checked at 3.7, replaced again and to be re-checked in the morning. Phos 2.2, next re-check in the morning as well. Magnesium 1.9. No replacement needed. Hgb 6.8. One unit of PRBCs given in which pt tolerated well.     Plan of Care: Continue to monitor VS and electrolytes. Assist as needed and notify MD of changes.      Goal Outcome Evaluation:      Plan of Care Reviewed With: patient    Overall Patient Progress: improvingOverall Patient Progress: improving    Outcome Evaluation: Patient continues to c/o LUQ abd pain, lidocaine patch in place, rash improving, 1 unit of PRBCs given for Hgb 6.8

## 2022-12-18 NOTE — PROGRESS NOTES
[facility]    Medicine Progress Note - Medicine Service, REG[facility] TEAM 4    Date of Admission:  12/7/2022    Assessment & Plan   Diego Buchanan is a 27 year old male admitted on 12/7/2022. 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 Started on G-CSF for pancytopenia. Hemodynamically stable.     Changes today:  -ANC beinging to increase  -Tomorrow last day of antibiotics if remains afebrile  -Bolus 1L LR  -increase immodium to QID  -Electrolyte replacement (potassium, mag, phos)  -1 unit pRBC    # Sepsis with neutropenic fever associated with RSV infection, ongoing  # Possible viral exanthem  # Recent PNA s/p treatment  # Pancytopenia 2/2 chemotherapy, ongoing  # Neutropenia, ongoing  # Tachycardia, ongoing  Febrile, tachycardic with neutropenia. LA negative. Patient recently treated for PNA based on CT findings on 12/1, s/p course of CTX and Azithromycin. No other symptoms, no shortness of breath, not requiring O2. RSV positive and likely underlying infectious source however undergoing complete infectious workup. UA negative. CXR stable. Procal 0.39  100.3 F on 12/13. >101F on 12/8.   Continues to be neutropenic. Overall picture of sepsis likely 2/2 viral illness in the setting of recent chemotherapy. With count recovery, would expect sepsis to improve. Pancytopenia persisting longer than expected in the setting of chemo. Likely delayed due to ongoing viral infection.   - Cefepime (12/7-12/13) STOPPED due to worsening rash, added as a drug allergy on EPIC  - continue zosyn (12/14-): goal of stopping 7 days post last fever or count recovery  - BCX NGTD  - CT abdomen pelvis given concern for neutropenic colitis in setting of ongoing diarrhea   - no evidence of neutropenic colitis   - did show  multiple fluid pockets, would be difficult to sample or drain them all  - CAPS consulted for diagnostic paracentesis given history of ascites   - not indicative of SBP  - Oncology following, recs appreciated  - continue G-CSF daily until ANC>500x2 consecutive days (started 12/12-)  - CBC with diff daily  - peripheral smear to eval pancytopenia: in process  - 1L LR for persistent tachycardia, soft BP, likely 2/2 diarrhea    # Rash probable viral exanthem, improving  # Hematuria, resolved  # Proteinuria  Maculopapular rash worsening for the past few days, has been on cefepime since 12/7, has tolerated CTX in the past. Unclear etiology. DDx include drug rash related to cefepime vs contact dermatitis vs allergic dermatitis. Also concern for vasculitis given rash, hematuria, proteinuria. Urine protein is not at a nephrotic range.  Worsening, spreading to more body surface on 12/13.  Stable, mildly worsened on back on 12/14. If cefepime is culprit, expect rash to get better/not worsen in the coming days  Mostly stable on 12/15. If worsening, can consider switching to a different abx given potential cross reactivity between pip tazo and cefepime.   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  Enteric panel, C diff negative on 12/7  - Colorectal surgery  consulted; appreciate recs   - conservative management at the moment   - recommend palliation  - GI consulted, signed off   - conservative management, not a candidate for colonic stent given neutropenia  -  bowel clean out with golytely on 12/12  - increase scheduled loperamide to QID with prn daily    # Pancytopenia present on admission due to chemo, persistent  # Neutropenia  # Anemia  # Thrombocytopenia   Likely expected 2/2 recent chemo (last 11/28). No signs of bleeding or bruising, VSS.  - Anemia work up showing low B12; started on supplement  - blood consent completed, transfuse 1u PRBCs 12/9  - transfuse 1u PLT on 12/14, 1U plt ON 12/15  - transfuse 1u pRBC on 12/15 & 12/18  - transfusion goals: Hgb >7, PLT >20K  - daily CBC with diff    # Acute Kidney Injury, resolved: based on a >150% or 0.3 mg/dL increase in last creatinine compared to past 90 day average, will monitor renal function     - continue to monitor     # Hypokalemia  - RN replacement protocol    # Hypophosphatemia  - monitor K  - Phos replacement protocol    # Hypomagnesemia  - monitor Mg  - Mg replacement protocol     # L pleural effusion   Moderate pleural effusion on 12/1 CT. Patient plan to see thoracic surgery as outpatient for thoracentesis. No current indication for inpatient drainage, unlikely source of infection nd patient already received abx so unlikely to diagnostically yield anything. Will plan to treat as outpatient.      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 Indication: Electrolyte Imbalance (24 hours)- Magnesium <1.3 mg/ml; Potassium < =2.8 or > 5.5 mg/ml  Code Status: Full Code      Disposition Plan     Expected Discharge Date: 12/19/2022      Destination: home with family  Discharge Comments: home once resolution of neutropenia and sepsis; no new needs anticipated        The patient's care was discussed with the Bedside Nurse, Care Coordinator/ and Patient.    Tyson Gómez MD  Medicine Service, [address]  Securely message with the Vocera Web Console (learn more here)  Text page via [facility] Paging/Directory   Please see signed in provider for up to date coverage information      Clinically Significant Risk Factors        # Hypokalemia: Lowest K = 2.8 mmol/L in last 2 days, will replace as needed     # Hypomagnesemia: Lowest Mg = 1.5 mg/dL in last 2 days, will replace as needed   # Hypoalbuminemia: Lowest albumin = 2.8 g/dL at 12/16/2022  6:36 AM, will monitor as appropriate   # Thrombocytopenia: Lowest platelets = 21 in last 2 days, will monitor for bleeding         # Obesity: Estimated body mass index is 37.81 kg/m  as calculated from the following:    Height as of this encounter: 1.727 m (5' 8\").    Weight as of this encounter: 112.8 kg (248 lb 11.2 oz).   # Severe Malnutrition: based on nutrition assessment        ______________________________________________________________________    Interval History   No acute events overnight. No fever or chills. No chest pain. He reports incontinence. He denies any nausea or vomiting. Rash is improving on legs and abdomen. It is also improved     Data reviewed today: I reviewed all medications, new labs and imaging results over the last 24 hours. I personally reviewed no images or EKG's " today.    Physical Exam   Vital Signs: Temp: 98.6  F (37  C) Temp src: Oral BP: 114/59 Pulse: 117   Resp: 18 SpO2: 99 % O2 Device: None (Room air)    Weight: 248 lbs 11.2 oz  Constitutional: holding eye contact, lying in bed, in no acute distress  Eyes: no icterus  ENT: supple  Respiratory: CTAB, no wheezing  Cardiovascular: tachycardic, regular rhythm, no murmur  GI: soft, non-tender, non-distended  Skin: nearly resolved rash with hypopigmentation on back, abdomen with fading violaceous rash, areas of hypopigmentation on upper legs and thighs improved from 12/18 exam, rash over ankles bilaterally with violaceous apperance  Musculoskeletal: no lower extremity edema    Data   Recent Labs   Lab 12/18/22  0619 12/17/22  2353 12/17/22  1743 12/17/22  0610 12/16/22  1930 12/16/22  0636   WBC 0.7*  --   --  0.3*  --  0.3*   HGB 6.8*  --   --  7.4*  --  8.1*   MCV 82  --   --  83  --  80   PLT 22*  --   --  21*  --  28*   *  --   --  140  --  140   POTASSIUM 3.1*  3.1* 3.2* 3.4 3.0*  3.0*   < > 3.1*   CHLORIDE 102  --   --  107  --  107   CO2 20*  --   --  21*  --  20*   BUN 9.8  --   --  11.1  --  11.1   CR 0.94  --   --  0.94  --  0.88   ANIONGAP 10  --   --  12  --  13   FAISAL 8.4*  --   --  8.8  --  8.4*   *  --   --  109*  --  109*   ALBUMIN 2.9*  --   --  2.9*  --  2.8*   PROTTOTAL 6.6  --   --  6.8  --  6.6   BILITOTAL 1.1  --   --  1.3*  --  1.6*   ALKPHOS 196*  --   --  182*  --  170*   ALT 27  --   --  21  --  14   AST 26  --   --  19  --  21    < > = values in this interval not displayed.

## 2022-12-18 NOTE — PROVIDER NOTIFICATION
Critical Test Results/Notification    Critical lab result (name and value): Absolute Neutrophil 0.4  What time did lab notify you? 1000  What provider did you notify? Dr. Gómez  Was the provider notified within 30 min? yes  Why was provider not notified?     Response from provider:

## 2022-12-18 NOTE — PLAN OF CARE
Goal Outcome Evaluation:    Goal Outcome Evaluation:    RN assumed cares [id number]     Pt A&Ox4, VS stable on RA. Pt tachycardic, with Telemetry showing -160s during shift. Pt denies pain. L PIV infusing. Rash throughout body appears to be improving, pt reports decreased itchiness in rash. K was replaced and will be rechecked in the morning. Pt's mother reported that pt had diarrhea during the night. Mother at bedside during shift. Plan to discharge home once neutropenia and sepsis resolve.             Overall Patient Progress: no changeOverall Patient Progress: no change

## 2022-12-19 NOTE — PLAN OF CARE
"/57 (BP Location: Left arm, Cuff Size: Adult Regular)   Pulse 112   Temp 99  F (37.2  C) (Axillary)   Resp 18   Ht 1.727 m (5' 8\")   Wt 112.8 kg (248 lb 11.2 oz)   SpO2 96%   BMI 37.81 kg/m      Goal Outcome Evaluation:      Plan of Care Reviewed With: patient, sibling    Overall Patient Progress: no changeOverall Patient Progress: no change     Neuro: A&Ox4.   Cardiac: temp max 101.2ax- tylenol given. MD notified. New orders rec'd. Temp reduction to 99.0 ax post admin of tylenol.  Tachycardia- telemetry maintained: ST. BP wdl.    Respiratory: RA . RR and O2 sats wdl  GI/: Voiding spontaneously. No BM this shift. Still need UA/UC sample collected.  Diet/appetite: Tolerating diet. Denies nausea   Activity: Up with stand by assist. Family present in room, assisting with cares   Pain: . Denies   Skin: micropapular rash present over most body surface. Dark pink/red. Mild pruritis present through night- declined intervention.  Lines: piv x2: one tko  Replacement: awaiting am lab results.  Rested btwn cares. Able to make needs known. Brother bedside. Continue to monitor. Notify MD of changes/concerns.    Problem: Plan of Care - These are the overarching goals to be used throughout the patient stay.    Goal: Optimal Comfort and Wellbeing  Outcome: Not Progressing     Problem: Plan of Care - These are the overarching goals to be used throughout the patient stay.    Goal: Readiness for Transition of Care  Outcome: Not Progressing     Problem: Fever with Neutropenia  Goal: Baseline Body Temperature  Outcome: Not Progressing     Problem: Fever with Neutropenia  Goal: Absence of Infection  Outcome: Not Progressing           "

## 2022-12-19 NOTE — PLAN OF CARE
"[facility] cares [id number]     /83 (BP Location: Right arm)   Pulse 117   Temp 97.7  F (36.5  C) (Oral)   Resp 20   Ht 1.727 m (5' 8\")   Wt 112.8 kg (248 lb 11.2 oz)   SpO2 100%   BMI 37.81 kg/m       Pain: Patient had some abdominal pain. PRN oxy given once.   Neuro: A&Ox4.    Respiratory: Lungs clear and equal, diminished in lower lobes. On RA.   Cardiac/Neurovascular: HR and pulse baseline tachycardic. No numbness or tingling reported.   GI/: Adequate urine output. 1 BM this morning. Needs another to collect stool sample.   Nutrition: Regular.   Activity: Independent to SBA.   Skin: Generalized rash. Cream applied.   Lines: PIV x2. Infusing TKO in between antibiotics.   Events this shift: IV zofran given once for nausea. Patient had 1 unit of blood transfused. Potassium was replaced recheck is in the AM. ID consulted. UA was sent down to lab.      Plan: Continue with plan of care.     Goal Outcome Evaluation:      Plan of Care Reviewed With: patient    Overall Patient Progress: no changeOverall Patient Progress: no change    Outcome Evaluation: COntinue with plan of care.      "

## 2022-12-19 NOTE — PROGRESS NOTES
[facility]    Medicine Progress Note - Medicine Service, REG[facility] TEAM 4    Date of Admission:  12/7/2022    Assessment & Plan   Diego Buchanan is a 27 year old male admitted on 12/7/2022. 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 Started on G-CSF for pancytopenia. Hemodynamically stable.     Changes today:  - transfuse 1 unit pRBC for Hgb 7.0  - follow up on BCx x2  - C diff for ongoing diarrhea  - continue filgrastim daily until count recovery  - continue zosyn for now  - ID consulted for recurrent fever on abx    # Sepsis with neutropenic fever associated with RSV infection, ongoing  # Possible viral exanthem  # Recent PNA s/p treatment  # Pancytopenia 2/2 chemotherapy, improving  # Neutropenia, ongoing  # Tachycardia, ongoing  Febrile, tachycardic with neutropenia. LA negative. Patient recently treated for PNA based on CT findings on 12/1, s/p course of CTX and Azithromycin. No other symptoms, no shortness of breath, not requiring O2. RSV positive and likely underlying infectious source however undergoing complete infectious workup. UA negative. CXR stable. Procal 0.39  100.3 F on 12/13. >101F on 12/8.   Continues to be neutropenic. Overall picture of sepsis likely 2/2 viral illness in the setting of recent chemotherapy. With count recovery, would expect sepsis to improve. Pancytopenia persisting longer than expected in the setting of chemo. Likely delayed due to ongoing viral infection.   - Cefepime (12/7-12/13) STOPPED due to worsening rash, added as a drug allergy on EPIC  - continue zosyn (12/14-)  - BCX NGTD  - CT abdomen pelvis given concern for neutropenic colitis in setting of ongoing diarrhea   - no evidence of neutropenic colitis   - did show multiple fluid pockets, would be  difficult to sample or drain them all  - CAPS consulted for diagnostic paracentesis given history of ascites   - not indicative of SBP  - Oncology following, recs appreciated  - continue G-CSF daily until ANC>500x2 consecutive days (started 12/12-)  - CBC with diff daily  - peripheral smear to eval pancytopenia: in process  - soft BP, persistent tachycardia s/p 1L LR on 12/17, 1L LR on 12/18  - ID consult for fever on broad spec abx, placed on 12/19  - Blood cultures x2 on 12/19: in process    # Rash, improving  # Hematuria, resolved  # Proteinuria  Maculopapular rash worsening for the past few days, has been on cefepime since 12/7, has tolerated CTX in the past. Unclear etiology. DDx include drug rash related to cefepime vs contact dermatitis vs allergic dermatitis. Also concern for vasculitis given rash, hematuria, proteinuria. Urine protein is not at a nephrotic range.  Worsening, spreading to more body surface on 12/13.  Stable, mildly worsened on back on 12/14. If cefepime is culprit, expect rash to get better/not worsen in the coming days  Mostly stable on 12/15. If worsening, can consider switching to a different abx given potential cross reactivity between pip tazo and cefepime.   12/16: appears improved, suspect this was viral [place]ham  12/17 continues to improve  - dermatology consulted, appreciate recs  - biopsy performed on 12/13, path not consistent with vasculitis, likely morbiliform drug rash vs viral exanthem  - repeat U/A discontinued, hematuria resolved  - bladder ultrasound: Large mixed cystic solid mass also seen on CT  - vasculitis w/u: continue to follow up on cryo in process   > so far C3/C4 high/normal, ANCA neg, ADRIAN neg, SPEP   > SPEP with small monoclonal protein in gamma-   - triamcinolone ointment BID, Saran wrap to augment  - PO benadryl PRN for itching    # Possible large bowel obstruction  # Diarrhea, ongoing  CT AP showing worsening cancer burden and c/f large bowel  obstruction.  Enteric panel, C diff negative on 12/7  - Colorectal surgery consulted; appreciate recs   - conservative management at the moment   - recommend palliation  - GI consulted, signed off   - conservative management, not a candidate for colonic stent given neutropenia  -  bowel clean out with golytely on 12/12  - C diff ordered, holding loperamide while collecting stool sample    # Pancytopenia present on admission due to chemo, persistent  # Neutropenia  # Anemia  # Thrombocytopenia   Likely expected 2/2 recent chemo (last 11/28). No signs of bleeding or bruising, VSS.  - Anemia work up showing low B12; started on supplement  - blood consent completed, transfuse 1u PRBCs 12/9  - transfuse 1u PLT on 12/14, 1U plt on 12/15  - transfuse 1u pRBC on 12/15, 1u on 12/18  - transfuse 1u pRBC on 12/19  - transfusion goals: Hgb >7, PLT >20K  - daily CBC with diff    # Acute Kidney Injury, resolved: based on a >150% or 0.3 mg/dL increase in last creatinine compared to past 90 day average, will monitor renal function     - continue to monitor     # Hypokalemia  - RN replacement protocol    # Hypophosphatemia  - monitor K  - Phos replacement protocol    # Hypomagnesemia  - monitor Mg  - Mg replacement protocol     # L pleural effusion   Moderate pleural effusion on 12/1 CT. Patient plan to see thoracic surgery as outpatient for thoracentesis. No current indication for inpatient drainage, unlikely source of infection nd patient already received abx so unlikely to diagnostically yield anything. Will plan to treat as outpatient.      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 Indication: Electrolyte Imbalance (24 hours)- Magnesium <1.3 mg/ml; Potassium < =2.8 or > 5.5 mg/ml  Code Status: Full Code      Disposition Plan      Expected Discharge Date: 12/22/2022      Destination: home with family  Discharge Comments: resolution of neutropenia and stopping antibiotics. no needs on discharge        The patient's care was discussed with Dr. Gómez, Bedside Nurse, Care Coordinator/, Patient and Patient's Family.    Phuong Rodriguez MD  Medicine Service, [facility] TEAM [address] [place] [address]  Securely message with the Vocera Web Console (learn more here)  Text page via [facility] Paging/Directory   Please see signed in provider for up to date coverage information  ________________________________________________________________    Interval History   NAEO. Rash is much improved. Continues to feel itchy. Has not asked for PRN benadryl and thinks the triamcinolone cream helps for only a little but. Endorses ongoing diarrhea and abdominal pain that comes on after eating. Denies chest pain, palpitation, dyspnea, dizziness.     Otherwise 4pt ROS was reviewed and is negative     Data reviewed today: I reviewed all medications, new labs and imaging results over the last 24 hours. I personally reviewed no images or EKG's today.    Physical Exam   Vital Signs: Temp: 97.7  F (36.5  C) Temp src: Oral BP: 104/67 Pulse: 92   Resp: 20 SpO2: 98 % O2 Device: None (Room air)    Weight: 248 lbs 11.2 oz  Gen: young appearing male in NAD  HEENT: NCAT, EOMI, conjuctiva clear  CV: RRR, no m/r/g  RESP: CTAB, no wheezes or crackles  Abd: soft, nontender, nondistended active BS  Ext: warm and well perfused, no peripheral edema  Skin:  maculopapular rash overall improving on back, face, upper chest, abdomen, thighs, upper arms, lower legs, feet-lighter in color  Data   Recent Labs   Lab 12/19/22  0456 12/18/22  1837 12/18/22  1234 12/18/22  0619   WBC 1.1* 0.9*  --  0.7*   HGB 7.0* 7.5*  --  6.8*   MCV 83 82  --  82   PLT 25* 23*  --  22*    132*  --  132*   POTASSIUM 3.5 3.6 3.7 3.1*  3.1*   CHLORIDE 109* 103  --  102   CO2 20* 21*  --  20*   BUN 9.4 9.2  --  9.8   CR 0.96 0.93  --  0.94   ANIONGAP 9 8  --  10   FAISAL 8.6 8.6  --  8.4*   * 118*  --  117*   ALBUMIN 2.9*  --   --  2.9*   PROTTOTAL 6.6  --   --  6.6   BILITOTAL 0.9  --   --  1.1   ALKPHOS 177*  --   --  196*   ALT 26  --   --  27   AST 21  --   --  26     Recent Results (from the past 24 hour(s))   XR Chest Port 1 View    Narrative    EXAM: XR CHEST PORT 1 VIEW  12/19/2022 2:40 AM     HISTORY:  r/o infection       COMPARISON:  12/7/2022    FINDINGS:   The cardiac silhouette is within normal limits. Low lung volumes.  Unchanged right chest wall Port-A-Cath. Trace left pleural effusion.  Increased streaky bilateral perihilar and left basilar opacities. No  focal airspace consolidation.      Impression    IMPRESSION:   1. No acute airspace disease.  2. Low lung volumes with increased streaky bilateral perihilar and  left basilar opacities, likely atelectasis.  3. Unchanged trace left pleural effusion.    I have personally reviewed the examination and initial interpretation  and I agree with the findings.    OLU RIOJAS MD         SYSTEM ID:  [id number]

## 2022-12-19 NOTE — PROVIDER NOTIFICATION
Provider notified (name): Dr. Mullins ([facility])  Reason for notification: Pt's HR has been in the 150's for the past 10 min. Patient denies pain or heat palpitations. Is sitting up on edge of bed eating dinner.   Recommendation/request given to provider: PANTERA  Response from provider: Will order EKG

## 2022-12-19 NOTE — PLAN OF CARE
Goal Outcome Evaluation:         BP 97/41 and heart rate 130.  MD notified of heart rate.  Other vital stable.  Metoprolol held per parameter orders.  Patient denies pain.  He reports having continued diarrhea. Scheduled Imodium given. Patient's brother at bedside and helpful to patient.  Magnesium 1.7,  MD ordered 2gm magnesium replacement.    Continue to monitor closely.

## 2022-12-19 NOTE — CONSULTS
General Infectious Disease Service Consultation - Green Team     Patient:  Diego Buchanan   Date of birth 1995, Medical record number [id number]  Date of Visit:  12/19/2022  Date of Admission: 12/7/2022  Consult Requester:Tyson Gómez MD            Assessment and Recommendations:   ASSESSMENT:  1. Neutropenic fever, immunocompromised   2. Diarrhea- chronic, initial C diff and enteric pathogens negative. Born [place] raised in [place] without international travel, does not require ova/parasites at this time. Repeat Cdiff pending. Concern for large bowel obstruction, no surgical or stenting options, possibly related to overflow.   3. 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 RSV positive- positive on 12/7  5. Morbiliform rash- itchy, non-painful, improving per primary team notes. Dermatology evaluated, biopsy on 12/13 most consistent with morbilliform medication reaction vs viral exanthem- no evidence of vasculitis.     Patient currently hemodynamically stable, afebrile since fever recurrence on 12/19, would continue to monitor on current antibiotic- piperacillin-tazobactam. Unclear at this time if this fever is related to infection. If no recurrent fevers, can discontinue antibiotics 72 hours after last fever. Patient with diarrhea possibly related to chemotherapy vs a degree of large bowel obstruction secondary to tumor burden. RSV without significant respiratory symptoms, positive test 12 days ago but could contribute to fevers with immunocompromised state. CT abd/pelv on 12/8 lower lungs with tree-in-bud and diffuse GGO findings which could be consistent with viral infection, no respiratory distress and mild dry cough unchanged from prior to admission. If worsening respiratory status, or recurrent fevers consider CT chest and if signs of infection consider bronchoscopy. If fevers continue to recur, would likely consider additional workup including-  1,3-Beta-D-glucan, aspergillus galactomannan, fungal antibodies, cryptococcus/histo/blasto antigens, CMV.    RECOMMENDATION:  1. Continue piperacillin-tazobactam, can discontinue 72 hours after last fever  2. Continue to monitor, would consider additional testing if patient continues to fever  3. Follow cultures, Cdiff    Patient seen and discussed with Dr. Edwards, who agrees with the above assessment and plan. ID will continue to follow.    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 There is also concern for large intestine obstruction related to abdominal tumor burden which is not amenable to stenting or surgical procedure. He is having diarrhea 4-5 times per day without hematochezia or melena which has been happening chronically and prior to admission. He also has a cough that has been present for an unknown duration, but present prior to admission which is not productive of any sputum. He has abdominal pain, and his whole body rash is itchy. He denies nausea/vomiting, headache, neck stiffness, back pain, skin wounds, fevers/chills, vision changes, focal neurologic deficits.     Born in [place], [place], moved to [facility] in 1997  Travel: has never been outside of the [place], no travel inside [place]  Hobbies: no outdoor hobbies- no hiking or camping   Pets: none  ID History: No significant ID history or resistant isolates      Recent Inflammatory Markers  Recent Labs   Lab Test 12/19/22  0456 12/18/22  1837 12/18/22  0619  12/17/22  0610 12/16/22  0636 12/15/22  1213 12/15/22  1010 11/18/22  1202 11/17/22  0556 02/18/20  1726 02/18/20  1408   CRP  --   --   --   --   --   --   --   --  79.40*  --  4.5*   WBC 1.1* 0.9* 0.7* 0.3* 0.3* 0.2* 0.3*   < > 13.0*   < > 14.6*    < > = values in this interval not displayed.     Recent culture results include:  Culture   Date Value Ref Range Status   12/13/2022 No Growth  Final   12/11/2022 No Growth  Final   12/11/2022 No Growth  Final   12/08/2022 No Growth  Final   12/08/2022 No Growth  Final   12/08/2022 No Growth  Final   12/07/2022 No Growth  Final   12/07/2022 No Growth  Final   11/17/2022 No Growth  Final   11/17/2022 No Growth  Final            Review of Systems:     CONSTITUTIONAL:  No fevers or chills  EYES: negative for icterus  ENT:  negative for hearing loss, tinnitus and sore throat  RESPIRATORY:  Positive for cough, negative for sputum and dyspnea  CARDIOVASCULAR:  negative for chest pain, dyspnea  GASTROINTESTINAL:  Positive for abdominal pain, diarrhea, negative for nausea, vomiting, and constipation  GENITOURINARY:  negative for dysuria  HEME:  No easy bruising  INTEGUMENT: rash and pruritis   NEURO:  Negative for headache           Past Medical History:     Past Medical History:   Diagnosis Date     Hypertension      Learning disability     but pt is his own gaurdian     Peritoneal carcinomatosis (H)             Past Surgical History:     Past Surgical History:   Procedure Laterality Date     BIOPSY      liver     INSERT PORT VASCULAR ACCESS Right 4/21/2022    Procedure: INSERTION, VASCULAR ACCESS PORT;  Surgeon: Daniel Sarmiento MD;  Location: [facility] OR     IR CHEST PORT PLACEMENT > 5 YRS OF AGE  4/21/2022     IR CVC TUNNEL PLACEMENT > 5 YRS OF AGE  4/29/2020     IR CVC TUNNEL REMOVAL RIGHT  11/16/2021     US MUSCLE BIOPSY  3/12/2020            Family History:     Family History   Problem Relation Age of Onset     Hypertension Mother      IMMUNE:  No history of  immunodeficiency within the family.         Social History:     Social History     Tobacco Use     Smoking status: Never     Smokeless tobacco: Never   Substance Use Topics     Alcohol use: Never     History   Sexual Activity     Sexual activity: Not on file       No results found for: HIV         Current Medications :       cyanocobalamin  100 mcg Oral Daily     dextrose 5% water  10-20 mL Intravenous Daily at 8 pm    And     filgrastim  5 mcg/kg Intravenous Daily at 8 pm    And     dextrose 5% water  10-20 mL Intravenous Daily at 8 pm     lidocaine  1 patch Transdermal Q24H     lidocaine   Transdermal Q8H [facility]     [Held by provider] loperamide  2 mg Oral 4x Daily     metoprolol tartrate  25 mg Oral BID     piperacillin-tazobactam  3.375 g Intravenous Q6H     sodium chloride (PF)  3 mL Intracatheter Q8H     triamcinolone   Topical BID            Allergies:     Allergies   Allergen Reactions     Cefepime Rash     Morbiliform rash     Tegaderm Chg Dressing [Chlorhexidine]      Tegaderm Transparent Dressing (Informational Only) Itching     Tegaderm dressing; Okay for short periods of time            Physical Exam:   Vitals were reviewed  For the past 24 hours, the ranges for their vital signs:  Temp:  [97.4  F (36.3  C)-101.2  F (38.4  C)] 97.7  F (36.5  C)  Pulse:  [] 117  Resp:  [18-20] 20  BP: ()/(41-83) 131/83  SpO2:  [96 %-100 %] 100 %    Physical Examination:  GENERAL:  well-developed, well-nourished, in bed in no acute distress.   HEENT:  Head is normocephalic, atraumatic, MMM, anicteric sclerae  ENT:  Oropharynx is moist without exudates or ulcers.  NECK:  Supple. No  Cervical lymphadenopathy  LUNGS: Bibasilar rales bilaterally without wheezing    CARDIOVASCULAR:  Tachycardic, regular rhythm, no murmurs, gallops or rubs.  ABDOMEN:  Normal bowel sounds, distended, central abdominal mass palpated, no rigidity or guarding   SKIN:  Non-blanching maculopapular rash diffusely on legs, abdomen, chest.    NEUROLOGIC:  Grossly nonfocal. Active x4 extremities         Laboratory Data:     Hematology Studies    Recent Labs   Lab Test 12/19/22  0456 12/18/22  1837 12/18/22  0619 12/17/22  0610 12/16/22  0636 12/15/22  1213 12/07/22  1604 12/06/22  0932 11/15/22  2355 09/27/22  1009 06/03/22  0815 05/19/22  1600 05/17/22  1110   WBC 1.1* 0.9* 0.7* 0.3* 0.3* 0.2*   < > 0.6*   < > 11.7*   < > 8.6 9.2   ANEU 0.6*  --  0.4*  --   --   --   --  0.4*  --  10.5*  --  8.1 8.7*   AEOS 0.0  --  0.0  --   --   --   --  0.0  --  0.4  --  0.3 0.1   HGB 7.0* 7.5* 6.8* 7.4* 8.1* 8.2*   < > 8.7*   < > 8.2*   < > 11.1* 11.8*   MCV 83 82 82 83 80 80   < > 81   < > 96   < > 87 85   PLT 25* 23* 22* 21* 28* 20*   < > 85*   < > 194   < > 394 379    < > = values in this interval not displayed.       Immune Globulin Studies  No lab results found.    Metabolic Studies     Recent Labs   Lab Test 12/19/22 0456 12/18/22  1837 12/18/22  1234 12/18/22  0619 12/17/22  2353 12/17/22  1743 12/17/22  0610 12/16/22  1930 12/16/22  0636    132*  --  132*  --   --  140  --  140   POTASSIUM 3.5 3.6 3.7 3.1*  3.1* 3.2*   < > 3.0*  3.0*   < > 3.1*   CHLORIDE 109* 103  --  102  --   --  107  --  107   CO2 20* 21*  --  20*  --   --  21*  --  20*   BUN 9.4 9.2  --  9.8  --   --  11.1  --  11.1   CR 0.96 0.93  --  0.94  --   --  0.94  --  0.88   GFRESTIMATED >90 >90  --  >90  --   --  >90  --  >90    < > = values in this interval not displayed.       Hepatic Studies    Recent Labs   Lab Test 12/19/22  0456 12/18/22  0619 12/17/22  0610 12/16/22  0636 12/15/22  1010 12/14/22  1011   BILITOTAL 0.9 1.1 1.3* 1.6* 2.1* 1.6*   ALKPHOS 177* 196* 182* 170* 178* 152*   ALBUMIN 2.9* 2.9* 2.9* 2.8* 3.2* 2.8*   AST 21 26 19 21 15 12   ALT 26 27 21 14 21 15       Thyroid Studies  No lab results found.    Invalid input(s): FT2    Microbiology:  Culture   Date Value Ref Range Status   12/13/2022 No Growth  Final   12/11/2022 No Growth  Final   12/11/2022 No Growth   Final   12/08/2022 No Growth  Final   12/08/2022 No Growth  Final   12/08/2022 No Growth  Final   12/07/2022 No Growth  Final   12/07/2022 No Growth  Final   11/17/2022 No Growth  Final   11/17/2022 No Growth  Final   11/15/2022 No Growth  Final   11/15/2022 No Growth  Final   03/11/2020 No gram negative daija isolated  Final       Urine Studies    Recent Labs   Lab Test 12/19/22  1132 12/13/22  1239 12/12/22  0315 12/08/22  0433 11/16/22  0604   LEUKEST Negative Negative Negative Negative Negative   WBCU 4 17* 3 4 0       Virology:  CMV viral loads  No lab results found.  No results found for: CMQNT, CMVQ  EBV viral loads   No lab results found.  No results found for: EBVDN, EBRES, EBVDN, EBVSP, EBVPC, EBVPCR  HSV testing  No lab results found.  Hepatitis B Testing     Recent Labs   Lab Test 03/25/21  1550   HBCAB Nonreactive   HEPBANG Nonreactive     Hepatitis C Testing     Hepatitis C Antibody   Date Value Ref Range Status   03/25/2021 Nonreactive NR^Nonreactive Final     Comment:     Assay performance characteristics have not been established for newborns,   infants, and children       Toxoplasma Testing    No lab results found.    Invalid input(s): TOXPL, TOXABM, TOXPCR  Respiratory Virus Testing    No results found for: RS, FLUAG  Serostatus:  No results found for: CMVG, CMIGG, CMIG, CMIM, CMVIGM, CMVM, CMLTX, HSVG1, HSVG2, HSVTP1, IP5099, HS12M, HS12GR, HS1GR, HS2GR, HERPES, HSIM, HSIG, HSIGR, HSVIGMAB, HSVG1, VARICZOSAB, EBVIGG, EBIGG, EBVAGN, EB8760  No results found for: EBIG2, EBIGM, TOXG    Imaging:  CT Abd/pelv 12/8/2022  IMPRESSION:  1.  New concern for large bowel obstruction with transition point at  the level of innumerable solid pelvic masses. Correlation with GI  dysfunction.  2.  Progression of metastatic disease with increased peritoneal  carcinomatosis, omental caking and malignant ascites, new mediastinal  lymphadenopathy, increased hepatic metastases, and increased splenic  metastasis  compared to 10/4/2022 CT scan.    3.  Increased diffuse groundglass tree-in-bud nodules and  consolidations throughout the lungs in a pattern concerning for  bronchial pneumonia with atypical viral and fungal etiologies a strong  consideration in an immune compromised patient. Metastatic disease not  excluded.  4.  Decreased small left pleural effusion.    CXR 12/19/2022  IMPRESSION:   1. No acute airspace disease.   2. Low lung volumes with increased streaky bilateral perihilar and   left basilar opacities, likely atelectasis.   3. Unchanged trace left pleural effusion.     Renal US 12/14/2022  Impression:   1. Effaced bladder appears within normal limits.   2. Large mixed cystic solid mass with calcifications better   characterized on 12/8/2022 CT.

## 2022-12-20 NOTE — PLAN OF CARE
"Time: [id number]    Afebrile with HR in 120s, OVSS on RA. A/Ox4 and able to make needs known. Endorses significant abdominal pain, tramadol and oxy given with relief. Pt reports 6 episodes of \"watery\" diarrhea today. Continues with diarrhea and scheduled imodium. PO benadryl given for itching, mostly on lower body. Reminded to not scratch to prevent infection due to low blood counts, pt receptive. Good appetite, had a large lunch and didn't eat much of dinner. Does not like ensures. SBA. K+ and phos rechecks tomorrow AM. Hmong-speaking parents at bedside. Pt stated both Hmong and English were okay to use for him. Droplet precautions for positive RSV test on 12/7, mild infrequent cough, otherwise asymptomatic.     Enteric and contact precautions removed this shift, C.diff PCR resulted negative on 12/19. No current tele orders, monitor removed.   "

## 2022-12-20 NOTE — PLAN OF CARE
Goal Outcome Evaluation:    RN assumed cares [id number]    Pt A&Ox4, VS stable on RA. Telemetry showing -117. R and L PIV SL. Pt denies pain. K level 3.4, replaced and K recheck at 3.6. Phos at 2.3, replaced and plan to recheck tomorrow. Mother at bedside during shift. Will continue to monitor and follow the plan of care.

## 2022-12-20 NOTE — PROGRESS NOTES
"[facility] Dermatology Progress Note    Assessment and Plan:  1. Morbiliform drug rash  Potential offending medication may have been cefipime, which was started for neutropenic fever. Currently on Zosyn without any worsening of the rash. No evidence of vasculitis on biopsy. Vasculitis labs reviewed. Hematuria resolved.  - Suture removal on 12/27  - Continue triamcinolone 0.1% ointment BID to rash areas PRN    We will sign off at this time. Please page on call resident if further concerns arise.    This note is for care coordination purposes only. This is not a billable note.     Martha Rodriguez MD MPH  PGY4 Medicine/Dermatology Resident    Plan of care discussed with me.    Mykel Teran MD  Dermatology Attending      Dermatology Problem List:  1. Morbiliform drug rash  - punch biopsy (H&E, DIF) 12/13  - triamcinolone 0.1% ointment BID    Date of Admission: Dec 7, 2022   Encounter Date: 12/20/2022      Interval history:  Rash improved today. Pruritus and irritation has also improved significantly since he was initially seen and biopsied. Per primary team the patient is doing well from a skin standpoint and no longer feels like he needs the triamcinolone    Medications:    Physical exam:  /63 (BP Location: Right arm)   Pulse 106   Temp 97.8  F (36.6  C) (Oral)   Resp 16   Ht 1.727 m (5' 8\")   Wt 113.7 kg (250 lb 11.2 oz)   SpO2 99%   BMI 38.12 kg/m    Did not assess    Staff Involved:  Resident/Staff  "

## 2022-12-20 NOTE — PLAN OF CARE
Patient awake, oriented x4. hmong speaker but able to understand and speak english. With sitter.Standby assist, walks independently to the bathroom. Tachycardic. With C. Diff. Enteric droplet. Generalized rash. On regular diet,  Had 1 unit of blood. On room air and vitally stable. With bowel movement x3, loose brown stool. Sleep in between cares. For continuity of care.          Goal Outcome Evaluation:      Plan of Care Reviewed With: patient

## 2022-12-20 NOTE — PROGRESS NOTES
[facility] Progress Note     Patient:  Diego Buchanan   YOB: 1995, MRN: [id number]  Date of Visit: 12/20/2022  Date of Admission: 12/7/2022  Consult Requester: Eldon Mckinney DO       ASSESSMENT:  1. Neutropenic fever, immunocompromised - defervesced   2. Diarrhea- chronic, C diff negative x2, enteric pathogens negative. Born and raised in [place] without international travel, does not require ova/parasites at this time. Concern for large bowel obstruction, no surgical or stenting options, possibly related to overflow. Unlikely infectious diarrhea, possibly related to irinotecan, further workup per primary team.  3. 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 RSV positive- positive on 12/7  5. Morbiliform rash- itchy, non-painful, improving. Dermatology evaluated, biopsy on 12/13 most consistent with morbilliform medication reaction vs viral exanthem- no evidence of vasculitis.      Patient currently hemodynamically stable, afebrile since fever recurrence on 12/19, would monitor off antibiotics (ANC count recovered). RSV positive on admission, no other clear infectious source. If recurrent fevers, recommend considering additional workup including 1,3-Beta-D-glucan, aspergillus galactomannan, fungal antibodies, cryptococcus/histo/blasto antigens, CMV, CT chest if respiratory symptoms or oxygen requirement.      RECOMMENDATION:  1. Discontinue piperacillin-tazobactam  2. Continue to monitor, would consider additional testing if patient continues to fever     Patient seen and discussed with Dr. Edwards, who agrees with the above assessment and plan. ID will sign off. Please call if any additional questions/concerns arise.      Anita Starkey MD  Internal Medicine PGY2           Interval History and Events:   Ongoing diarrhea, abdominal pain unchanged, no fevers or chills, no cough today.          Antimicrobial Treatment:     Piperacillin-tazobactam  (12/13-12/20)  Cefepime IV (12/7-12/13)         Review of Systems:   Targeted 4 point ROS was completed with pertinent positives and negatives are detailed above.         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 There is also concern for large intestine obstruction related to abdominal tumor burden which is not amenable to stenting or surgical procedure. He is having diarrhea 4-5 times per day without hematochezia or melena which has been happening chronically and prior to admission. He also has a cough that has been present for an unknown duration, but present prior to admission which is not productive of any sputum. He has abdominal pain, and his whole body rash is itchy. He denies nausea/vomiting, headache, neck stiffness, back pain, skin wounds, fevers/chills, vision changes, focal neurologic deficits.      Born in [place], [place], moved to [facility] in 1997  Travel: has never been outside of the , no travel inside   Hobbies: no outdoor hobbies- no hiking or camping   Pets: none  ID History: No significant ID history or resistant isolates         Physical Examination:   Temp:  [97.7  F (36.5  C)-99.1  F (37.3  C)] 97.8  F (36.6  C)  Pulse:  [101-130] 106  Resp:  [16-20] 16  BP: (106-131)/(56-83) 109/63  SpO2:  [97 %-100 %] 99 %    I/O last 3 completed shifts:  In: 1200 [P.O.:800; I.V.:100]  Out: -     Vitals:    12/14/22 2229 12/15/22 1256 12/16/22 1812 12/17/22 1806   Weight: 117 kg (257 lb 15 oz) 112.4 kg (247 lb 14.4 oz) 114.5 kg (252 lb 6.8 oz) 112.8 kg (248 lb 11.2 oz)    12/19/22 1809   Weight: 113.7 kg (250 lb 11.2 oz)     Physical Examination:  GENERAL:  well-developed, well-nourished, in  bed in no acute distress.   HEENT:  Head is normocephalic, atraumatic, MMM, anicteric sclerae  ENT:  Oropharynx is moist without exudates or ulcers.  NECK:  Supple. No  Cervical lymphadenopathy  LUNGS: Bibasilar rales bilaterally without wheezing    CARDIOVASCULAR:  Tachycardic, regular rhythm, no murmurs, gallops or rubs.  ABDOMEN:  Normal bowel sounds, distended, central and left abdominal mass palpated, no rigidity or guarding   SKIN:  Non-blanching maculopapular rash diffusely on legs, abdomen, chest- improved from yesterday  NEUROLOGIC:  Grossly nonfocal. Active x4 extremities         Medications:       cyanocobalamin  100 mcg Oral Daily     dextrose 5% water  10-20 mL Intravenous Daily at 8 pm    And     filgrastim  5 mcg/kg Intravenous Daily at 8 pm    And     dextrose 5% water  10-20 mL Intravenous Daily at 8 pm     lidocaine  1 patch Transdermal Q24H     lidocaine   Transdermal Q8H ALLEN     loperamide  2 mg Oral 4x Daily     metoprolol tartrate  25 mg Oral BID     potassium & sodium phosphates  1 packet Oral or Feeding Tube Q4H     sodium chloride (PF)  3 mL Intracatheter Q8H     triamcinolone   Topical BID            Laboratory Data:   No results found for: ACD4    Microbiology:  7-Day Micro Results     Collected Updated Procedure Result Status      12/19/2022 1854 12/19/2022 2118 C. difficile Toxin B PCR with reflex to C. difficile Antigen and Toxins A/B EIA [[id number]]    Stool from Per Rectum    Final result Component Value   C Difficile Toxin B by PCR Negative   A negative result does not exclude actual disease due to C. difficile and may be due to improper collection, handling and storage of the specimen or the number of organisms in the specimen is below the detection limit of the assay.            12/19/2022 0152 12/20/2022 0303 Blood Culture Peripheral Blood [[id number]]   Peripheral Blood    Preliminary result Component Value   Culture No growth after 1 day  [P]                12/19/2022 0152  12/20/2022 0303 Blood Culture Peripheral Blood [[id number]]   Peripheral Blood    Preliminary result Component Value   Culture No growth after 1 day  [P]                12/13/2022 1239 12/15/2022 0919 Urine Culture [[id number]]   Urine, Midstream    Final result Component Value   Culture No Growth                   Inflammatory Markers    Recent Labs   Lab Test 11/17/22  0556 02/18/20  1408   CRP 79.40* 4.5*     Metabolic Studies     Recent Labs   Lab Test 12/20/22  0543 12/19/22  0456 12/18/22  1837 12/13/22  0834 12/12/22  2205 02/18/20  1408 12/13/16  1127    138 132*   < >  --    < >  --    POTASSIUM 3.4 3.5 3.6   < >  --    < >  --    CHLORIDE 110* 109* 103   < >  --    < >  --    CO2 18* 20* 21*   < >  --    < >  --    ANIONGAP 12 9 8   < >  --    < >  --    BUN 10.0 9.4 9.2   < >  --    < >  --    CR 0.96 0.96 0.93   < >  --    < >  --    GFRESTIMATED >90 >90 >90   < >  --    < >  --    * 106* 118*   < >  --    < >  --    A1C  --   --   --   --   --   --  5.8   FAISAL 8.5* 8.6 8.6   < >  --    < >  --    PHOS 2.3* 2.4*  --    < >  --    < >  --    MAG 1.7 2.2 1.7   < >  --    < >  --    LACT  --   --   --   --  1.0   < >  --     < > = values in this interval not displayed.     Hepatic Studies    Recent Labs   Lab Test 12/20/22  0543 12/19/22  0456 12/18/22  0619 03/31/21  1553 03/25/21  1550 05/01/20  1002 03/13/20  2031   BILITOTAL 0.8 0.9 1.1   < > 1.0   < >  --    ALKPHOS 173* 177* 196*   < > 436*   < >  --    ALBUMIN 2.8* 2.9* 2.9*   < > 3.5   < >  --    AST 22 21 26   < > 112*   < >  --    ALT 20 26 27   < > 425*   < >  --    LDH  --   --   --   --   --   --  458*   GGT  --   --   --   --  698*  --   --     < > = values in this interval not displayed.     Hematology Studies      Recent Labs   Lab Test 12/20/22  0543 12/19/22  0456 12/18/22  1837   WBC 1.9* 1.1* 0.9*   ANEU 1.3* 0.6*  --    ALYM 0.4* 0.4*  --    LORENA 0.1 0.1  --    AEOS 0.0 0.0  --    HGB 7.6* 7.0* 7.5*   HCT 24.6* 23.1*  23.8*   PLT 34* 25* 23*     Urine Studies     Recent Labs   Lab Test 12/19/22  1132 12/13/22  1239 12/12/22  0315   URINEPH 6.0 6.0 6.0   NITRITE Negative Negative Negative   LEUKEST Negative Negative Negative   WBCU 4 17* 3     Last check of C difficile  C Difficile Toxin B by PCR   Date Value Ref Range Status   12/19/2022 Negative Negative Final     Comment:     A negative result does not exclude actual disease due to C. difficile and may be due to improper collection, handling and storage of the specimen or the number of organisms in the specimen is below the detection limit of the assay.            Imaging:     Recent Results (from the past 48 hour(s))   XR Chest Port 1 View    Narrative    EXAM: XR CHEST PORT 1 VIEW  12/19/2022 2:40 AM     HISTORY:  r/o infection       COMPARISON:  12/7/2022    FINDINGS:   The cardiac silhouette is within normal limits. Low lung volumes.  Unchanged right chest wall Port-A-Cath. Trace left pleural effusion.  Increased streaky bilateral perihilar and left basilar opacities. No  focal airspace consolidation.      Impression    IMPRESSION:   1. No acute airspace disease.  2. Low lung volumes with increased streaky bilateral perihilar and  left basilar opacities, likely atelectasis.  3. Unchanged trace left pleural effusion.    I have personally reviewed the examination and initial interpretation  and I agree with the findings.    OLU RIOJAS MD         SYSTEM ID:  [id number]

## 2022-12-20 NOTE — PROGRESS NOTES
[facility]    Medicine Progress Note - Medicine Service, [facility] TEAM 4    Date of Admission:  12/7/2022    Assessment & Plan   Diego Buchanan is a 27 year old male admitted on 12/7/2022. 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 Started on G-CSF for pancytopenia. Hemodynamically stable.     Changes today:  - resumed loperamide given C diff is negative  - last dose of filgrastim today, ANC recovered x2 days  - monitor fever curve off antibiotics    # Fever  # Sepsis with neutropenic fever associated with RSV infection, resolving  # Possible viral exanthem, improved  # Recent PNA s/p treatment  # Pancytopenia 2/2 chemotherapy, improving  # Neutropenia, resolved  # Tachycardia, improving  Febrile, tachycardic with neutropenia. LA negative. Patient recently treated for PNA based on CT findings on 12/1, s/p course of CTX and Azithromycin. No other symptoms, no shortness of breath, not requiring O2. RSV positive and likely underlying infectious source however undergoing complete infectious workup. UA negative. CXR stable. Procal 0.39  100.3 F on 12/13. >101F on 12/8.   Continues to be neutropenic. Overall picture of sepsis likely 2/2 viral illness in the setting of recent chemotherapy. With count recovery, would expect sepsis to improve. Pancytopenia persisting longer than expected in the setting of chemo. Likely delayed due to ongoing viral infection.   - Cefepime (12/7-12/13) STOPPED due to worsening rash, added as a drug allergy on EPIC  - discontinue zosyn (12/14-12/20)  - CT abdomen pelvis given concern for neutropenic colitis in setting of ongoing diarrhea   - no evidence of neutropenic colitis   - did show multiple fluid pockets, would be difficult to sample or drain them all  - CAPS  consulted for diagnostic paracentesis given history of ascites   - not indicative of SBP  - Oncology following, recs appreciated  - finish G-CSF today: ANC>500x2 consecutive days (12/12-12/20)  - CBC with diff daily  - peripheral smear to eval pancytopenia: in process  - soft BP, persistent tachycardia s/p 1L LR on 12/17, 1L LR on 12/18  - ID consult for fever on broad spec abx, placed on 12/19   > ok to discontinue abx now that counts have recovered   > monitor off abx   > if recurrent fever, diarrhea, consider further diarrhea w/u  - Blood cultures x2 on 12/19: NGTD    # Rash, improving  # Hematuria, resolved  # Proteinuria  Maculopapular rash worsening for the past few days, has been on cefepime since 12/7, has tolerated CTX in the past. Unclear etiology. DDx include drug rash related to cefepime vs contact dermatitis vs allergic dermatitis. Also concern for vasculitis given rash, hematuria, proteinuria. Urine protein is not at a nephrotic range.  Worsening, spreading to more body surface on 12/13.  Stable, mildly worsened on back on 12/14. If cefepime is culprit, expect rash to get better/not worsen in the coming days  Mostly stable on 12/15. If worsening, can consider switching to a different abx given potential cross reactivity between pip tazo and cefepime.   12/16: appears improved, suspect this was viral [place]  12/17 continues to improve  - dermatology consulted, appreciate recs  - biopsy performed on 12/13, path not consistent with vasculitis, likely morbiliform drug rash vs viral exanthem  - repeat U/A discontinued, hematuria resolved  - bladder ultrasound: Large mixed cystic solid mass also seen on CT  - vasculitis w/u: continue to follow up on cryo in process   > so far C3/C4 high/normal, ANCA neg, ADRIAN neg, SPEP   > SPEP with small monoclonal protein in gamma-   - triamcinolone ointment BID, Saran wrap to augment  - PO benadryl PRN for itching    # Possible large bowel obstruction  # Diarrhea,  ongoing  CT AP showing worsening cancer burden and c/f large bowel obstruction.  Enteric panel, C diff negative on 12/7  - Colorectal surgery consulted; appreciate recs   - conservative management at the moment   - recommend palliation  - GI consulted, signed off   - conservative management, not a candidate for colonic stent given neutropenia  -  bowel clean out with golytely on 12/12  - C diff negative  - resumed QID loperamide 12/20    # Pancytopenia present on admission due to chemo, improving  # Neutropenia, resolved  # Anemia  # Thrombocytopenia   Likely expected 2/2 recent chemo (last 11/28). No signs of bleeding or bruising, VSS.  - Anemia work up showing low B12; started on supplement  - blood consent completed, transfuse 1u PRBCs 12/9  - transfuse 1u PLT on 12/14, 1U plt on 12/15  - transfuse 1u pRBC on 12/15, 1u on 12/18, 1u on 12/19  - transfusion goals: Hgb >7, PLT >20K  - daily CBC with diff    # Acute Kidney Injury, resolved: based on a >150% or 0.3 mg/dL increase in last creatinine compared to past 90 day average, will monitor renal function     - continue to monitor     # Hypokalemia  - RN replacement protocol    # Hypophosphatemia  - monitor K  - Phos replacement protocol    # Hypomagnesemia  - monitor Mg  - Mg replacement protocol     # L pleural effusion   Moderate pleural effusion on 12/1 CT. Patient plan to see thoracic surgery as outpatient for thoracentesis. No current indication for inpatient drainage, unlikely source of infection nd patient already received abx so unlikely to diagnostically yield anything. Will plan to treat as outpatient.      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, Bone, Liver  Metastatic cancer  - Onc consulted, recs appreciated     # Hypocalcemia: Lowest Ca = 8.1 mg/dL in last 2 days, will monitor and replace as appropriate     - Likely 2/2 low albumin    # Hypoalbuminemia: Lowest albumin = 3.4 g/dL at 12/8/2022  6:00  AM, will monitor as appropriate     Diet: Combination Diet Regular Diet Adult  Snacks/Supplements Adult: Ensure Enlive; With Meals  Room Service    DVT Prophylaxis: Pneumatic Compression Devices  Busby Catheter: Not present  Central Lines: None    Cardiac Monitoring: None  Code Status: Full Code      Disposition Plan      Expected Discharge Date: 12/22/2022      Destination: home with family  Discharge Comments: Dispo: Home with family; no new needs on discharge  Delay: resolution of neutropenia and stopping Abx.   Plan: tentative med ready by mid/end of week,   Progress: ruled out: c-diff        The patient's care was discussed with Dr. Mckinney, Bedside Nurse, Care Coordinator/, Patient and Patient's Family.    Phuong Rodriguez MD  Medicine Service, [facility] TEAM [address] [place] [address]  Securely message with the Vocera Web Console (learn more here)  Text page via [facility] Paging/Directory   Please see signed in provider for up to date coverage information  ________________________________________________________________    Interval History   NAEO. Continues to have diarrhea, loperamide was held for C diff testing which returned negative. Denies fever, chills, chest pain, dyspnea, cough. Endorses ongoing abdominal pain with BMs. Pt's mother at bedside, sister on the phone. Asks if he can come off of antibiotics now.  Rash appears much improved, less itchy.     Otherwise 4pt ROS was reviewed and is negative     Data reviewed today: I reviewed all medications, new labs and imaging results over the last 24 hours. I personally reviewed no images or EKG's today.    Physical Exam   Vital Signs: Temp: 98.4  F (36.9  C) Temp src: Oral BP: 116/61 Pulse: 117   Resp: 16 SpO2: 97 % O2 Device: None (Room air)    Weight: 250 lbs 11.2 oz  Gen: young appearing male in NAD  HEENT: NCAT, EOMI, conjuctiva clear  CV: RRR, no m/r/g  RESP: CTAB, no wheezes or crackles  Abd: soft, nontender,  nondistended, active BS  Ext: warm and well perfused, no peripheral edema  Skin: maculopapular rash markedly improved on back, face, upper chest, abdomen, thighs, upper arms, lower legs, feet  Data   Recent Labs   Lab 12/20/22  0543 12/19/22  0456 12/18/22  1837   WBC 1.9* 1.1* 0.9*   HGB 7.6* 7.0* 7.5*   MCV 84 83 82   PLT 34* 25* 23*    138 132*   POTASSIUM 3.4 3.5 3.6   CHLORIDE 110* 109* 103   CO2 18* 20* 21*   BUN 10.0 9.4 9.2   CR 0.96 0.96 0.93   ANIONGAP 12 9 8   FAISAL 8.5* 8.6 8.6   * 106* 118*   ALBUMIN 2.8* 2.9*  --    PROTTOTAL 6.6 6.6  --    BILITOTAL 0.8 0.9  --    ALKPHOS 173* 177*  --    ALT 20 26  --    AST 22 21  --      No results found for this or any previous visit (from the past 24 hour(s)).

## 2022-12-21 NOTE — DISCHARGE SUMMARY
[facility]  Discharge Summary - Medicine & Pediatrics       Date of Admission:  12/7/2022  Date of Discharge:  12/21/2022  Discharging Provider: Phuong Rodriguez  Discharge Service: Medicine Service, REGAN TEAM 4    Discharge Diagnoses   Sepsis 2/2 neutropenic fever  RSV infection  Metastatic desmoplastic small cell tumor  Pancytopenia  Rash, drug induced vs viral exantham  Diarrhea 2/2 chemotherapy  Hypokalemia  Hypophosphatemia  Hypomagnesemia  Hypocalcemia  Left pleural effusion      Follow-ups Needed After Discharge   Follow-up Appointments     Adult [facility] Follow-up and recommended labs and tests      Please follow up with your oncologist for further cancer treatment.  Please establish with primary care and get your CBC and BMP checked in 1-2   weeks.   Please work with oncology for your port exchange.    Appointments on [place] and/or [facility] (with [facility] or [facility]   provider or service). Call [phone] if you haven't heard regarding   these appointments within 7 days of discharge.         Recommend CBC and BMP, Mg, and Phos check in 1-2 weeks to ensure counts are stable, and electrolyte abnormalities are resolved.     Unresulted Labs Ordered in the Past 30 Days of this Admission     Date and Time Order Name Status Description    12/19/2022  1:26 AM Blood Culture Peripheral Blood Preliminary     12/19/2022  1:26 AM Blood Culture Peripheral Blood Preliminary       These results will be followed up by PCP    Discharge Disposition   Discharged home  Condition at discharge:  Stable    Hospital Course   Diego Buchanan is a 27 year old male admitted on 12/7/2022. 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 Pancytopenia improved after administration of G-CSF. Fever was resolved off  antibiotics.    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 LA negative. Patient recently treated for PNA based on CT findings on 12/1, s/p course of CTX and Azithromycin. No other symptoms, no shortness of breath, not requiring O2. RSV positive and likely underlying infectious source however undergoing complete infectious workup. UA negative. CXR stable. Procal 0.39  Patient was febrile during hospitalization. He was treated with cefepime which was discontinued due to concern for drug induced rash. With hematuria and proteinuria, there was a concern that this could be vasculitis, but pathology was consistent with drug induced morbiliform rash vs viral exanthem in the setting of RSV infection. Cefepime was discontinued and patient was started on pip-tazo. For pancytopenia and neutropenic fever, heme/onc recommended G-CSF. Patient's ANC recovered and patient finished last G-CSF on 12/20. Patient was taken off of pip-tazo on the AM of 12/20 and remained afebrile for >24 hours. After discussion with ID (consulted) and heme/onc, patient was deemed safe to discharge home with follow up with oncology. Overall, it appears that patient developed sepsis in the setting of RSV infection and recent chemotherapy. Patient also received PLT and pRBC transfusions for goal Hgb >7 and PLT >20K (given hematuria)  - follow up with heme/onc for further chemotherapy  - oncology to schedule for port exchange (had to canceled due to neutropenia)  - follow up CBC with diff in 1-2 weeks to ensure cell counts remain stable  - triamcinolone cream BID PRN    # Diarrhea  # Malnutrition in the setting of cancer  #  Hypokalemia  # Hypophosphatemia  # Hypomagnesemia  CT AP showing worsening cancer burden and c/f large bowel obstruction. Enteric panel, C diff negative on 12/7 and 12/20. CRS and GI recommended conservative management. Heme/onc thought diarrhea is likely due to irinotecan.  - loperamide QID scheduled, recommended discontinuing if diarrhea resolves  - recommended K Phos neutra BID until follow up BMP in 1-2 weeks, to determine whether to continue pending level, resolution of diarrhea      # L pleural effusion   Moderate pleural effusion on 12/1 CT. Patient plan to see thoracic surgery as outpatient for thoracentesis. No current indication for inpatient drainage, unlikely source of infection nd patient already received abx so unlikely to diagnostically yield anything.   - follow up CXR in outpatient setting to ensure resolution     Consultations This Hospital Stay   ONCOLOGY ADULT IP CONSULT  NURSING TO CONSULT FOR VASCULAR ACCESS CARE IP CONSULT  INTERNAL MEDICINE PROCEDURE TEAM ADULT IP CONSULT [place] - PARACENTESIS  GI LUMINAL ADULT IP CONSULT  COLORECTAL SURGERY ADULT IP CONSULT  GI LUMINAL ADULT IP CONSULT  NURSING TO CONSULT FOR VASCULAR ACCESS CARE IP CONSULT  NURSING TO CONSULT FOR VASCULAR ACCESS CARE IP CONSULT  DERMATOLOGY IP CONSULT  NURSING TO CONSULT FOR VASCULAR ACCESS CARE IP CONSULT  CARE MANAGEMENT / SOCIAL WORK IP CONSULT  NURSING TO CONSULT FOR VASCULAR ACCESS CARE IP CONSULT  INFECTIOUS DISEASE GENERAL ADULT IP CONSULT  NURSING TO CONSULT FOR VASCULAR ACCESS CARE IP CONSULT    Code Status   Full Code       The patient was discussed with Dr. Mckinney.    Phuong Rodriguez MD  [address] [address]  Phone: [phone]  ______________________________________________________________________    Physical Exam   Vital Signs: Temp: 97.5  F (36.4  C) Temp src: Oral BP: 104/44 Pulse: 109   Resp: 20 SpO2: 98 % O2 Device: None (Room air)     Weight: 250 lbs 3.55 oz  General Appearance: Young obese male resting comfortably in bed in NAD  Respiratory: CTAB, no wheezes or crackles  Cardiovascular: RRR, no m/r/g  GI: soft, NT, obese  Skin: maculopapular rash on lower abdomen thighs, lower legs, feet, upper arms, back all appear improved, lighter in color  Neuro: Alert, oriented, pleasant       Primary Care Physician   Jc Glass    Discharge Orders      Basic metabolic panel     Magnesium     Phosphorus     Reason for your hospital stay    You were hospitalized for abdominal pain and diarrhea. You were found to have RSV and neutropenic fever. You received neupogen to improve your cell count. Your cell count has improved with the treatment. You were placed on antibiotics for your fever. You developed rash which we think could be related to a drug called cefepime or from your viral infection. You were fever free without antibiotics on board. Please follow up with your oncologist for further treatment of your cancer. You should get your CBC and BMP checked in 1-2 weeks from discharge which has been ordered for you.     Activity    Your activity upon discharge: activity as tolerated     Adult [facility] Follow-up and recommended labs and tests    Please follow up with your oncologist for further cancer treatment.  Please establish with primary care and get your CBC and BMP checked in 1-2 weeks.   Please work with oncology for your port exchange.    Appointments on [place] and/or [facility] (with [facility] or [facility] provider or service). Call [phone] if you haven't heard regarding these appointments within 7 days of discharge.     Diet    Follow this diet upon discharge: Regular     Check Out Appointment Request    Fior Robert is currently scheduled for port exchange on 12/13, please cancel and reschedule for the week of 12/19 around his infusion times.     MODIFIED Check Out Appointment Request    [facility], please reschedule next cycle of irinotecan +  temodar (5 consecutive days of infusion, M-F). Currently scheduled for 12/9-12/23, but patient remains inpatient. Please reschedule to the week of 1/2-1/6 with LISY visit on Day 1 (1/2).     CBC with Platelets & Differential       Significant Results and Procedures   Most Recent 3 CBC's:Recent Labs   Lab Test 12/21/22  0537 12/20/22  0543 12/19/22  0456   WBC 2.7* 1.9* 1.1*   HGB 7.5* 7.6* 7.0*   MCV 84 84 83   PLT 49* 34* 25*     Most Recent 3 BMP's:Recent Labs   Lab Test 12/21/22  0537 12/20/22  1213 12/20/22  0543 12/19/22  0456     --  140 138   POTASSIUM 3.2* 3.6 3.4 3.5   CHLORIDE 109*  --  110* 109*   CO2 20*  --  18* 20*   BUN 6.9  --  10.0 9.4   CR 0.81  --  0.96 0.96   ANIONGAP 12  --  12 9   FAISAL 8.3*  --  8.5* 8.6   GLC 91  --  113* 106*     Most Recent 2 LFT's:Recent Labs   Lab Test 12/21/22  0537 12/20/22  0543   AST 14 22   ALT 14 20   ALKPHOS 153* 173*   BILITOTAL 0.6 0.8     Most Recent 3 INR's:Recent Labs   Lab Test 12/08/22  0600 04/21/22  0742 04/29/20  0900   INR 1.15 0.98 0.97   ,   Results for orders placed or performed during the hospital encounter of 12/07/22   XR Chest Port 1 View    Narrative    Exam: XR CHEST PORT 1 VIEW, 12/7/2022 8:35 PM    Indication: cough, c/f infiltrate    Comparison: 12/1/2022    Findings:   Normal course of the trachea. Cardiomediastinal silhouette within  normal limits.  Prominent left hilum consistent with known mass and  lymphadenopathy. Please see CT of 12/1/2022. Small left pleural  effusion. No right pleural effusion. No appreciable pneumothorax.  Mixed opacities in the left lung are unchanged from CT of 12/1/2022.  Stable right midlung opacity.      Impression    Impression:   1. Stable mixed opacities in the left lung. Stable focal opacity in  the right midlung. These were present on CT dated 12/1/2022.  2. Stable small left pleural effusion.  3. Unchanged left hilar mass and adenopathy.    I have personally reviewed the examination and initial  interpretation  and I agree with the findings.    SHUBHAM ALVAREZ MD         SYSTEM ID:  [id number]   CT Abdomen Pelvis w/o Contrast     Value    Radiologist flags (Urgent)     Concern for large bowel obstruction with transition    Narrative    EXAMINATION: CT ABDOMEN PELVIS W/O CONTRAST, 12/8/2022 11:51 AM    TECHNIQUE:  Helical CT images from the lung bases through the  symphysis pubis were obtained without IV contrast.     COMPARISON: 10/4/2022, 12/1/2022 chest CT    HISTORY: c/f neutropenic colitis    FINDINGS:  CHEST:  LUNGS:   New, diffuse groundglass, tree-in-bud nodules and scattered small  consolidations throughout the visualized lungs.  New small left pleural effusion.    MEDIASTINUM:   New pericardial and paraesophageal lymphadenopathy for example  measuring 0.8 cm (series 2, image 27).  CT criteria for anemia.  Nonenlarged heart. No pericardial effusion.    ABDOMEN/PELVIS:  LIVER:  Increased hepatic metastases, greater than 20 in number for example  measuring 3.5 x 2.5 cm on the left previously 2.7 x 2.2 cm metastasis  series 2, image 52) and 2.7 x 2.1 cm, previously 1.8 x 1.6 cm on the  right (series 2, image 58).    STOMACH:   Decompressed which limits evaluation    BILIARY:   No biliary ductal dilation. Gallstones.    PANCREAS:   Normal.    SPLEEN:  Increased hypodense splenic mass measuring 2.9 x 2.7 cm previously 2.8  x 2.4 cm presence of series 2, image 83).    ADRENAL GLANDS:   Normal.    :  Normal kidneys.  Non-thickened urinary bladder.  Moderate mass effect upon the urinary bladder.    BOWEL/PERITONEUM:   Progression of peritoneal carcinomatosis with 100's of diffuse solid  nodular masses throughout the peritoneum and mesentery, encasing small  bowel and colon in the pelvis with scattered pockets of fluid that are  slightly increased in size for example in the left abdomen (series 2,  image 156). There is marked omental caking for example measuring 26 x  8.6 cm previously 24 x 6.4 cm (series 2,  image 136).  Mildly dilated transverse colon filled with air and fluid measuring up  to 6.3 cm with level of transition at the sigmoid colon which is  completely overtaken by solid masses and not well evaluated. Small  volume of fluid in the remaining colon and the rectum.     Unchanged fluid collection in the right abdomen measuring 8.8 x 6.8 cm  (series 2, image 160).  Nonvisualization of the appendix.   No pneumoperitoneum. No free fluid.    RETROPERITONEUM/:  Stable borderline retroperitoneal lymphadenopathy measuring 0.9 cm  (series 2, image 124). Stable left common iliac lymphadenopathy  measuring 1.1 cm (series 2, image 155).    VESSELS:   Limited evaluation on non-contrast exam.    LYMPH NODES:   No lymphadenopathy.    BONES/SOFT TISSUES:   Unchanged spiculated, sclerotic focus is in the left sacrum consistent  with a bone island.  No acute osseous abnormality.      Impression    IMPRESSION:  1.  New concern for large bowel obstruction with transition point at  the level of innumerable solid pelvic masses. Correlation with GI  dysfunction.  2.  Progression of metastatic disease with increased peritoneal  carcinomatosis, omental caking and malignant ascites, new mediastinal  lymphadenopathy, increased hepatic metastases, and increased splenic  metastasis compared to 10/4/2022 CT scan.    3.  Increased diffuse groundglass tree-in-bud nodules and  consolidations throughout the lungs in a pattern concerning for  bronchial pneumonia with atypical viral and fungal etiologies a strong  consideration in an immune compromised patient. Metastatic disease not  excluded.  4.  Decreased small left pleural effusion.      [Access Center: Concern for large bowel obstruction with transition  point in the pelvis at the level level of solid pelvic masses]    This report will be copied to the [facility] to ensure a  provider acknowledges the finding. Access Center is available Monday  through Friday 8am-3:30 pm.      I have personally reviewed the examination and initial interpretation  and I agree with the findings.    SHE PARADA MD         SYSTEM ID:  [id number]   POC US ABDOMEN LIMITED    Impression    Small pocket of ascites in the left umbilical area   US Bladder Only Portable    Narrative    Exam: US BLADDER ONLY PORTABLE, 12/14/2022 2:11 PM    Indication: hematuria in cancer pt      Technique: The bladder was scanned in the standard fashion with  specialized ultrasound transducer using both grayscale and limited  color Doppler techniques.     Comparison: CT AP 12/8/2022    Findings:   There is a large mixed cystic solid mass with shadowing echogenic foci  resulting in significant mass effect on the bladder. The effaced  bladder is within normal limits.      Impression    Impression:   1. Effaced bladder appears within normal limits.   2. Large mixed cystic solid mass with calcifications better  characterized on 12/8/2022 CT.    I have personally reviewed the examination and initial interpretation  and I agree with the findings.    SHE PARADA MD         SYSTEM ID:  [id number]   XR Chest Port 1 View    Narrative    EXAM: XR CHEST PORT 1 VIEW  12/19/2022 2:40 AM     HISTORY:  r/o infection       COMPARISON:  12/7/2022    FINDINGS:   The cardiac silhouette is within normal limits. Low lung volumes.  Unchanged right chest wall Port-A-Cath. Trace left pleural effusion.  Increased streaky bilateral perihilar and left basilar opacities. No  focal airspace consolidation.      Impression    IMPRESSION:   1. No acute airspace disease.  2. Low lung volumes with increased streaky bilateral perihilar and  left basilar opacities, likely atelectasis.  3. Unchanged trace left pleural effusion.    I have personally reviewed the examination and initial interpretation  and I agree with the findings.    OLU RIOJAS MD         SYSTEM ID:  [id number]       Discharge Medications   Current Discharge Medication List      START  taking these medications    Details   cyanocobalamin (CYANOCOBALAMIN) 100 MCG tablet Take 1 tablet (100 mcg) by mouth daily  Qty: 60 tablet, Refills: 0    Associated Diagnoses: DSRCT (desmoplastic small round cell tumor) (H)      diphenhydrAMINE (BENADRYL) 25 MG capsule Take 1 capsule (25 mg) by mouth every 6 hours as needed for itching  Qty: 30 capsule, Refills: 0    Associated Diagnoses: Rash      loperamide (IMODIUM) 2 MG capsule Take 1 capsule (2 mg) by mouth 4 times daily  Qty: 60 capsule, Refills: 0    Comments: Please stop taking once diarrhea is resolved.  Associated Diagnoses: Diarrhea, unspecified type      phosphorus tablet 250 mg (PHOSPHA 250 NEUTRAL) 250 MG per tablet Take 1 tablet (250 mg) by mouth 2 times daily  Qty: 30 tablet, Refills: 0    Associated Diagnoses: Hypophosphatemia      triamcinolone (KENALOG) 0.1 % external ointment Apply topically 2 times daily as needed for irritation  Qty: 15 g, Refills: 0    Associated Diagnoses: Rash         CONTINUE these medications which have NOT CHANGED    Details   acetaminophen (TYLENOL) 325 MG tablet Take 2 tablets (650 mg) by mouth every 4 hours as needed for mild pain or fever  Qty: 30 tablet, Refills: 0    Associated Diagnoses: Cancer associated pain      metoprolol tartrate (LOPRESSOR) 25 MG tablet Take 1 tablet (25 mg) by mouth 2 times daily  Qty: 60 tablet, Refills: 0    Associated Diagnoses: Hypertension, unspecified type; Tachycardia         STOP taking these medications       amoxicillin-clavulanate (AUGMENTIN) 875-125 MG tablet Comments:   Reason for Stopping:         azithromycin (ZITHROMAX) 250 MG tablet Comments:   Reason for Stopping:         ondansetron (ZOFRAN) 4 MG tablet Comments:   Reason for Stopping:         polyethylene glycol (MIRALAX) 17 GM/Dose powder Comments:   Reason for Stopping:         potassium chloride ER (K-TAB) 20 MEQ CR tablet Comments:   Reason for Stopping:         prochlorperazine (COMPAZINE) 10 MG tablet Comments:    Reason for Stopping:         senna-docusate (SENNA S) 8.6-50 MG tablet Comments:   Reason for Stopping:         temozolomide (TEMODAR) 250 MG capsule Comments:   Reason for Stopping:             Allergies   Allergies   Allergen Reactions     Cefepime Rash     Morbiliform rash     Tegaderm Chg Dressing [Chlorhexidine]      Tegaderm Transparent Dressing (Informational Only) Itching     Tegaderm dressing; Okay for short periods of time

## 2022-12-21 NOTE — PROGRESS NOTES
Care Management Discharge Note    Discharge Date: 12/21/2022       Discharge Disposition: Home    Discharge Services: None    Discharge DME: None    Discharge Transportation: family or friend will provide    Private pay costs discussed: Not applicable    PAS Confirmation Code:  NA  Patient/family educated on Medicare website which has current facility and service quality ratings:  NA    Education Provided on the Discharge Plan:  Yes  Persons Notified of Discharge Plans: Pt notify family  Patient/Family in Agreement with the Plan: yes     Handoff Referral Completed: Yes, EHO    Additional Information:  Pt chart reviewed. Pt is medically ready for discharge home today. Family will provide transportation. No discharge needs.    Elaine Ivey RN  5A RN Care Coordinator  Phone: [phone]  Pager: [phone]     For Weekend & Holiday on call RN Care Coordinator:  (Tasks: Home care, home infusion, medical equipment/oxygen, transportation, IMM & MOON forms, etc.)     Text Paging in [employer] Smart Web is the preferred method of contact for these teams     [place] ([id number]) Saturday & Sunday; (2022-3824) [facility] Recognized Holidays  Pager: [phone] Units: 4A, 4C, 4E, 5A & 5B      For Weekend & Holiday on call Social Work:  (Tasks: TCU, transportation, Hospice, adjustment to illness counseling, Health Care Directives, Child Protection and Domestic Violence concerns, Vulnerable Adult, IMM forms, etc.)     Text Paging in [employer] Smart Web is the preferred method of contact for these teams    [place] (0800 - 1630) Saturday and Sunday  Pager: [phone] Units: 4A, 4C, 4E  Pager: [phone] Units: 5A & 5B  _____________________________________________     After hours ([phone]) for all units everyday- (only the  is available after hours until midnight)  Pager [phone]

## 2022-12-22 NOTE — PROGRESS NOTES
"Clinic Care Coordination Contact  [facility]: Post-Discharge Note  SITUATION                                                      Admission:    Admission Date: 12/07/22   Reason for Admission: fever  Discharge:   Discharge Date: 12/21/22  Discharge Diagnosis: Sepsis 2/2 neutropenic fever  RSV infection  Metastatic desmoplastic small cell tumor  Pancytopenia  Rash, drug induced vs viral ex[place]  Diarrhea 2/2 chemotherapy  Hypokalemia  Hypophosphatemia  Hypomagnesemia  Hypocalcemia  Left pleural effusion    BACKGROUND                                                      Per hospital discharge summary and inpatient provider notes:    Diego Buchanan is a 27 year old male admitted on 12/7/2022. 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 Pancytopenia improved after administration of G-CSF. Fever was resolved off antibiotics    ASSESSMENT           Discharge Assessment  How are you doing now that you are home?: \"He is doing OK\" (per mother)  How are your symptoms? (Red Flag symptoms escalate to triage hotline per guidelines): Improved  Do you feel your condition is stable enough to be safe at home until your provider visit?: Yes  Does the patient have their discharge instructions? : Yes  Does the patient have questions regarding their discharge instructions? : No  Were you started on any new medications or were there changes to any of your previous medications? : Yes  Does the patient have all of their medications?: Yes  Do you have questions regarding any of your medications? : No  Discharge follow-up appointment scheduled within 14 calendar days? : Yes  Discharge Follow Up Appointment Date: 01/02/22  Discharge Follow Up Appointment Scheduled with?: Specialty Care Provider (oncologist)         Post-op (Clinicians Only)  Did the " patient have surgery or a procedure: No        PLAN                                                      Outpatient Plan:  Please  follow up with your oncologist for further treatment of your  cancer. You should get your CBC and BMP checked in 1-2  weeks from discharge which has been ordered for you.    Future Appointments   Date Time Provider Department Center   1/2/2023  6:45 AM  [facility] DRAW [facility]   1/2/2023  7:45 AM Scheierl, Amber J, APRN CNP [facility]   1/2/2023  8:30 AM  ONC INFUSION NURSE [facility]   1/3/2023  1:00 PM  ONC INFUSION NURSE [facility]   1/4/2023 11:30 AM  ONC INFUSION NURSE [facility]   1/5/2023 11:30 AM  ONC INFUSION NURSE [facility]   1/6/2023  1:00 PM  ONC INFUSION NURSE [facility]         For any urgent concerns, please contact our 24 hour nurse triage line: [phone] ([phone])         Dea Lopez RN

## 2022-12-22 NOTE — TELEPHONE ENCOUNTER
Oral Chemotherapy Monitoring Program    Subjective/Objective:  Diego Buchanan is a 27 year old male contacted by phone for a follow-up visit for oral chemotherapy.  Spoke to Mariela (sister) who confirmed Diego is taking the appropriate dose of temozolomide 250mg daily for 5 days on and 23 days off of 28-day cycle. Denies new or worsening side effects. He did not miss any temozolomide doses. He was discharged from hospital 12/21. Patient was discharged with new medications: phosphorus, diphenhydramine, loperamide, and triamcinolone cream. No new drug-drug interactions with temozolomide. Aware of next appointments on 1/2/2023 for next cycle.     ORAL CHEMOTHERAPY 6/17/2022 8/20/2022 8/26/2022 10/19/2022 11/21/2022 11/28/2022 12/22/2022   Assessment Type Refill Monthly Follow up Lab Monitoring Discontinuation Initial Work up New Teach Initial Follow up   Stop Date - - - 10/18/2022 - - -   Reason for Discontinuation - - - Therapy complete - - -   Diagnosis Code - - (No Data) (No Data) (No Data) (No Data) (No Data)   Providers Dr. Morales Sism   [facility]   Drug Name Etoposide Etoposide Etoposide Etoposide Temodar (temozolomide) Temodar (temozolomide) Temodar (temozolomide)   Dose 200 mg 200 mg 200 mg - 250 mg 250 mg 250 mg   Current Schedule Daily Daily - - Daily Daily Daily   Cycle Details - - - - (No Data) 5 days on, 23 days off 5 days on, 23 days off   Start Date of Last Cycle - 8/5/2022 - - - - 11/28/2022   Planned next cycle start date - - - - 11/30/2022 11/28/2022 1/2/2022   Doses missed in last 2 weeks - 0 - - - - 0   Adherence Assessment - Adherent - - - - Adherent   Adverse Effects - No AE identified during assessment - - - - No AE identified during assessment   Nausea - - - - - - -   Pharmacist Intervention(nausea) - - - -  "- - -   Any new drug interactions? - - - - - - No   Is the dose as ordered appropriate for the patient? - - - - - - Yes   Is the patient currently in pain? - - - - - - -   Has the patient been assessed within the past 6 months for depression? - - - - - - -   Has the patient missed any days of school, work, or other routine activity? - - - - - - -   Since the last time we talked, have you been hospitalized or used the emergency room? - - - - - - Yes   What medical service did you use? - - - - - - Hospitalization   How many hospitalizations? - - - - - - 1   How many hospitalizations were related to the cancer medication? - - - - - - 0       Last PHQ-2 Score on record:   PHQ-2 ( 1999 [employer]) 9/22/2020   Q1: Little interest or pleasure in doing things 0   Q2: Feeling down, depressed or hopeless 0   PHQ-2 Score 0   PHQ-2 Total Score (12-17 Years)- Positive if 3 or more points; Administer PHQ-A if positive 0       Vitals:  BP:   BP Readings from Last 1 Encounters:   12/21/22 104/44     Wt Readings from Last 1 Encounters:   12/20/22 113.5 kg (250 lb 3.6 oz)     Estimated body surface area is 2.33 meters squared as calculated from the following:    Height as of 12/7/22: 1.727 m (5' 8\").    Weight as of 12/20/22: 113.5 kg (250 lb 3.6 oz).    Labs:  _  Result Component Current Result Ref Range   Sodium 141 (12/21/2022) 136 - 145 mmol/L   _  Result Component Current Result Ref Range   Potassium 3.2 (L) (12/21/2022) 3.4 - 5.3 mmol/L   _  Result Component Current Result Ref Range   Calcium 8.3 (L) (12/21/2022) 8.6 - 10.0 mg/dL   _  Result Component Current Result Ref Range   Magnesium 1.5 (L) (12/21/2022) 1.7 - 2.3 mg/dL   _  Result Component Current Result Ref Range   Phosphorus 2.9 (12/21/2022) 2.5 - 4.5 mg/dL   _  Result Component Current Result Ref Range   Albumin 3.2 (L) (12/21/2022) 3.5 - 5.2 g/dL   _  Result Component Current Result Ref Range   Urea Nitrogen 6.9 (12/21/2022) 6.0 - 20.0 mg/dL   _  Result Component Current " Result Ref Range   Creatinine 0.81 (12/21/2022) 0.67 - 1.17 mg/dL   _  Result Component Current Result Ref Range   AST 14 (12/21/2022) 10 - 50 U/L   _  Result Component Current Result Ref Range   ALT 14 (12/21/2022) 10 - 50 U/L   _  Result Component Current Result Ref Range   Bilirubin Total 0.6 (12/21/2022) <=1.2 mg/dL   _  Result Component Current Result Ref Range   WBC Count 2.7 (L) (12/21/2022) 4.0 - 11.0 10e3/uL   _  Result Component Current Result Ref Range   Hemoglobin 7.5 (L) (12/21/2022) 13.3 - 17.7 g/dL   _  Result Component Current Result Ref Range   Platelet Count 49 (LL) (12/21/2022) 150 - 450 10e3/uL   _  Result Component Current Result Ref Range   Absolute Neutrophils 2.0 (12/21/2022) 1.6 - 8.3 10e3/uL   _  Result Component Current Result Ref Range   Absolute Neutrophils 15.2 (H) (11/28/2022) 1.6 - 8.3 10e3/uL      Assessment/Plan:  Denies new or worse AE's. Continue plan of care. Plan for next cycle 1/2/2023.     Follow-Up:  1/2/2023: appointment and labs     Refill Due:  1/2/2023    Ji Ramirez, PharmD  Hematology/Oncology Clinical Pharmacist  [facility]  [facility] [place]

## 2022-12-28 NOTE — TELEPHONE ENCOUNTER
Spoke with sister Mariela to schedule procedure with Dr. Priya Romano and Dr. Kam Giron  Procedure was scheduled on 01/10 at [facility] OR  Patient will have H&P with PAC    Patient is aware a COVID-19 test is needed before their procedure.   Patient will have a home test due to outpatient status  (Patient is aware IP/Thoracic are still requiring COVID-19 test)     Patient is aware a / is needed day of surgery.   Surgery letter was sent via [employer], patient has my direct contact information for any further questions.        Jan 06, 2023  8:45 AM  (Arrive by 8:30 AM)  PAC EVALUATION with Vidhi Matthews PA-C  [facility] [place] ([facility] ) [phone]

## 2022-12-28 NOTE — TELEPHONE ENCOUNTER
FUTURE VISIT INFORMATION      SURGERY INFORMATION:    Date: 1/10/23    Location: uu or    Surgeon:  Kam Giron MD Diaz Gutierrez, Ilitch, MD    Anesthesia Type:  MAC with local    Procedure: THORACENTESIS INSERTION, VASCULAR ACCESS PORT    RECORDS REQUESTED FROM:       Primary Care Provider: Jc Glass MD- [facility]    Pertinent Medical History:    Most recent EKG+ Tracin22    Most recent ECHO: 20

## 2023-01-01 ENCOUNTER — ANCILLARY PROCEDURE (OUTPATIENT)
Dept: GENERAL RADIOLOGY | Facility: CLINIC | Age: 28
End: 2023-01-01
Attending: STUDENT IN AN ORGANIZED HEALTH CARE EDUCATION/TRAINING PROGRAM
Payer: COMMERCIAL

## 2023-01-01 ENCOUNTER — PATIENT OUTREACH (OUTPATIENT)
Dept: CARE COORDINATION | Facility: CLINIC | Age: 28
End: 2023-01-01
Payer: COMMERCIAL

## 2023-01-01 ENCOUNTER — HOSPITAL ENCOUNTER (INPATIENT)
Facility: CLINIC | Age: 28
LOS: 3 days | Discharge: HOSPICE | End: 2023-05-07
Attending: EMERGENCY MEDICINE | Admitting: INTERNAL MEDICINE
Payer: COMMERCIAL

## 2023-01-01 ENCOUNTER — INFUSION THERAPY VISIT (OUTPATIENT)
Dept: ONCOLOGY | Facility: CLINIC | Age: 28
End: 2023-01-01
Attending: INTERNAL MEDICINE
Payer: COMMERCIAL

## 2023-01-01 ENCOUNTER — TELEPHONE (OUTPATIENT)
Dept: ONCOLOGY | Facility: CLINIC | Age: 28
End: 2023-01-01
Payer: COMMERCIAL

## 2023-01-01 ENCOUNTER — DOCUMENTATION ONLY (OUTPATIENT)
Dept: OTHER | Facility: CLINIC | Age: 28
End: 2023-01-01

## 2023-01-01 ENCOUNTER — PATIENT OUTREACH (OUTPATIENT)
Dept: ONCOLOGY | Facility: CLINIC | Age: 28
End: 2023-01-01
Payer: COMMERCIAL

## 2023-01-01 ENCOUNTER — HOSPITAL ENCOUNTER (INPATIENT)
Facility: CLINIC | Age: 28
LOS: 10 days | Discharge: HOME | End: 2023-02-20
Attending: EMERGENCY MEDICINE | Admitting: STUDENT IN AN ORGANIZED HEALTH CARE EDUCATION/TRAINING PROGRAM
Payer: COMMERCIAL

## 2023-01-01 ENCOUNTER — INFUSION THERAPY VISIT (OUTPATIENT)
Dept: ONCOLOGY | Facility: CLINIC | Age: 28
End: 2023-01-01
Attending: PHYSICIAN ASSISTANT
Payer: COMMERCIAL

## 2023-01-01 ENCOUNTER — HEALTH MAINTENANCE LETTER (OUTPATIENT)
Age: 28
End: 2023-01-01

## 2023-01-01 ENCOUNTER — HOSPITAL ENCOUNTER (INPATIENT)
Facility: CLINIC | Age: 28
LOS: 7 days | Discharge: HOME | End: 2023-03-09
Attending: EMERGENCY MEDICINE | Admitting: STUDENT IN AN ORGANIZED HEALTH CARE EDUCATION/TRAINING PROGRAM
Payer: COMMERCIAL

## 2023-01-01 ENCOUNTER — APPOINTMENT (OUTPATIENT)
Dept: GENERAL RADIOLOGY | Facility: CLINIC | Age: 28
End: 2023-01-01
Attending: THORACIC SURGERY (CARDIOTHORACIC VASCULAR SURGERY)
Payer: COMMERCIAL

## 2023-01-01 ENCOUNTER — TELEPHONE (OUTPATIENT)
Dept: FAMILY MEDICINE | Facility: CLINIC | Age: 28
End: 2023-01-01
Payer: COMMERCIAL

## 2023-01-01 ENCOUNTER — ANCILLARY PROCEDURE (OUTPATIENT)
Dept: ULTRASOUND IMAGING | Facility: CLINIC | Age: 28
End: 2023-01-01
Attending: STUDENT IN AN ORGANIZED HEALTH CARE EDUCATION/TRAINING PROGRAM
Payer: COMMERCIAL

## 2023-01-01 ENCOUNTER — APPOINTMENT (OUTPATIENT)
Dept: PHYSICAL THERAPY | Facility: CLINIC | Age: 28
End: 2023-01-01
Payer: COMMERCIAL

## 2023-01-01 ENCOUNTER — OFFICE VISIT (OUTPATIENT)
Dept: INFUSION THERAPY | Facility: CLINIC | Age: 28
End: 2023-01-01
Attending: REGISTERED NURSE
Payer: COMMERCIAL

## 2023-01-01 ENCOUNTER — ANESTHESIA EVENT (OUTPATIENT)
Dept: SURGERY | Facility: CLINIC | Age: 28
End: 2023-01-01
Payer: COMMERCIAL

## 2023-01-01 ENCOUNTER — APPOINTMENT (OUTPATIENT)
Dept: CT IMAGING | Facility: CLINIC | Age: 28
End: 2023-01-01
Attending: STUDENT IN AN ORGANIZED HEALTH CARE EDUCATION/TRAINING PROGRAM
Payer: COMMERCIAL

## 2023-01-01 ENCOUNTER — PATIENT OUTREACH (OUTPATIENT)
Dept: NURSING | Facility: CLINIC | Age: 28
End: 2023-01-01
Payer: COMMERCIAL

## 2023-01-01 ENCOUNTER — DOCUMENTATION ONLY (OUTPATIENT)
Dept: ONCOLOGY | Facility: CLINIC | Age: 28
End: 2023-01-01
Payer: COMMERCIAL

## 2023-01-01 ENCOUNTER — PRE VISIT (OUTPATIENT)
Dept: SURGERY | Facility: CLINIC | Age: 28
End: 2023-01-01

## 2023-01-01 ENCOUNTER — TELEPHONE (OUTPATIENT)
Dept: SURGERY | Facility: CLINIC | Age: 28
End: 2023-01-01
Payer: COMMERCIAL

## 2023-01-01 ENCOUNTER — APPOINTMENT (OUTPATIENT)
Dept: GENERAL RADIOLOGY | Facility: CLINIC | Age: 28
End: 2023-01-01
Attending: INTERNAL MEDICINE
Payer: COMMERCIAL

## 2023-01-01 ENCOUNTER — APPOINTMENT (OUTPATIENT)
Dept: INTERPRETER SERVICES | Facility: CLINIC | Age: 28
End: 2023-01-01
Payer: COMMERCIAL

## 2023-01-01 ENCOUNTER — APPOINTMENT (OUTPATIENT)
Dept: CT IMAGING | Facility: CLINIC | Age: 28
End: 2023-01-01
Attending: EMERGENCY MEDICINE
Payer: COMMERCIAL

## 2023-01-01 ENCOUNTER — DOCUMENTATION ONLY (OUTPATIENT)
Dept: ONCOLOGY | Facility: CLINIC | Age: 28
End: 2023-01-01

## 2023-01-01 ENCOUNTER — APPOINTMENT (OUTPATIENT)
Dept: GENERAL RADIOLOGY | Facility: CLINIC | Age: 28
End: 2023-01-01
Payer: COMMERCIAL

## 2023-01-01 ENCOUNTER — APPOINTMENT (OUTPATIENT)
Dept: LAB | Facility: CLINIC | Age: 28
End: 2023-01-01
Attending: STUDENT IN AN ORGANIZED HEALTH CARE EDUCATION/TRAINING PROGRAM
Payer: COMMERCIAL

## 2023-01-01 ENCOUNTER — ONCOLOGY VISIT (OUTPATIENT)
Dept: ONCOLOGY | Facility: CLINIC | Age: 28
End: 2023-01-01
Attending: STUDENT IN AN ORGANIZED HEALTH CARE EDUCATION/TRAINING PROGRAM
Payer: COMMERCIAL

## 2023-01-01 ENCOUNTER — ANCILLARY PROCEDURE (OUTPATIENT)
Dept: ULTRASOUND IMAGING | Facility: CLINIC | Age: 28
End: 2023-01-01
Attending: REGISTERED NURSE
Payer: COMMERCIAL

## 2023-01-01 ENCOUNTER — APPOINTMENT (OUTPATIENT)
Dept: OCCUPATIONAL THERAPY | Facility: CLINIC | Age: 28
End: 2023-01-01
Payer: COMMERCIAL

## 2023-01-01 ENCOUNTER — OFFICE VISIT (OUTPATIENT)
Dept: SURGERY | Facility: CLINIC | Age: 28
End: 2023-01-01
Payer: COMMERCIAL

## 2023-01-01 ENCOUNTER — APPOINTMENT (OUTPATIENT)
Dept: LAB | Facility: CLINIC | Age: 28
End: 2023-01-01
Attending: INTERNAL MEDICINE
Payer: COMMERCIAL

## 2023-01-01 ENCOUNTER — LAB (OUTPATIENT)
Dept: LAB | Facility: CLINIC | Age: 28
End: 2023-01-01
Payer: COMMERCIAL

## 2023-01-01 ENCOUNTER — ANESTHESIA (OUTPATIENT)
Dept: SURGERY | Facility: CLINIC | Age: 28
End: 2023-01-01
Payer: COMMERCIAL

## 2023-01-01 ENCOUNTER — APPOINTMENT (OUTPATIENT)
Dept: LAB | Facility: CLINIC | Age: 28
End: 2023-01-01
Attending: PHYSICIAN ASSISTANT
Payer: COMMERCIAL

## 2023-01-01 ENCOUNTER — PREP FOR PROCEDURE (OUTPATIENT)
Dept: SURGERY | Facility: CLINIC | Age: 28
End: 2023-01-01

## 2023-01-01 ENCOUNTER — HOSPITAL ENCOUNTER (OUTPATIENT)
Facility: CLINIC | Age: 28
Discharge: HOME | End: 2023-01-20
Attending: STUDENT IN AN ORGANIZED HEALTH CARE EDUCATION/TRAINING PROGRAM | Admitting: STUDENT IN AN ORGANIZED HEALTH CARE EDUCATION/TRAINING PROGRAM
Payer: COMMERCIAL

## 2023-01-01 ENCOUNTER — ANCILLARY PROCEDURE (OUTPATIENT)
Dept: CT IMAGING | Facility: CLINIC | Age: 28
End: 2023-01-01
Attending: PHYSICIAN ASSISTANT
Payer: COMMERCIAL

## 2023-01-01 ENCOUNTER — PREP FOR PROCEDURE (OUTPATIENT)
Dept: PULMONOLOGY | Facility: CLINIC | Age: 28
End: 2023-01-01

## 2023-01-01 ENCOUNTER — TELEPHONE (OUTPATIENT)
Dept: ONCOLOGY | Facility: CLINIC | Age: 28
End: 2023-01-01

## 2023-01-01 VITALS
DIASTOLIC BLOOD PRESSURE: 75 MMHG | SYSTOLIC BLOOD PRESSURE: 125 MMHG | RESPIRATION RATE: 18 BRPM | HEART RATE: 115 BPM | BODY MASS INDEX: 36.28 KG/M2 | WEIGHT: 238.6 LBS | TEMPERATURE: 97.8 F | OXYGEN SATURATION: 98 %

## 2023-01-01 VITALS
OXYGEN SATURATION: 98 % | RESPIRATION RATE: 16 BRPM | HEART RATE: 147 BPM | HEIGHT: 72 IN | DIASTOLIC BLOOD PRESSURE: 82 MMHG | SYSTOLIC BLOOD PRESSURE: 139 MMHG | BODY MASS INDEX: 32.64 KG/M2 | WEIGHT: 240.96 LBS | TEMPERATURE: 97.7 F

## 2023-01-01 VITALS
HEART RATE: 132 BPM | BODY MASS INDEX: 38.01 KG/M2 | RESPIRATION RATE: 16 BRPM | DIASTOLIC BLOOD PRESSURE: 68 MMHG | WEIGHT: 250.8 LBS | HEIGHT: 68 IN | OXYGEN SATURATION: 99 % | TEMPERATURE: 97.9 F | SYSTOLIC BLOOD PRESSURE: 105 MMHG

## 2023-01-01 VITALS
WEIGHT: 250 LBS | DIASTOLIC BLOOD PRESSURE: 73 MMHG | BODY MASS INDEX: 38.01 KG/M2 | OXYGEN SATURATION: 98 % | SYSTOLIC BLOOD PRESSURE: 110 MMHG | RESPIRATION RATE: 16 BRPM | TEMPERATURE: 98.1 F | HEART RATE: 129 BPM

## 2023-01-01 VITALS
WEIGHT: 244.3 LBS | DIASTOLIC BLOOD PRESSURE: 60 MMHG | HEART RATE: 150 BPM | OXYGEN SATURATION: 98 % | SYSTOLIC BLOOD PRESSURE: 113 MMHG | BODY MASS INDEX: 37.15 KG/M2 | RESPIRATION RATE: 16 BRPM

## 2023-01-01 VITALS
TEMPERATURE: 97.9 F | HEIGHT: 72 IN | DIASTOLIC BLOOD PRESSURE: 74 MMHG | RESPIRATION RATE: 16 BRPM | HEART RATE: 147 BPM | BODY MASS INDEX: 33.21 KG/M2 | SYSTOLIC BLOOD PRESSURE: 106 MMHG | OXYGEN SATURATION: 98 %

## 2023-01-01 VITALS — BODY MASS INDEX: 36.28 KG/M2 | HEIGHT: 68 IN

## 2023-01-01 VITALS
WEIGHT: 245.3 LBS | TEMPERATURE: 97.6 F | HEART RATE: 140 BPM | OXYGEN SATURATION: 98 % | SYSTOLIC BLOOD PRESSURE: 128 MMHG | DIASTOLIC BLOOD PRESSURE: 82 MMHG | BODY MASS INDEX: 37.3 KG/M2 | RESPIRATION RATE: 16 BRPM

## 2023-01-01 VITALS
OXYGEN SATURATION: 95 % | WEIGHT: 222.7 LBS | DIASTOLIC BLOOD PRESSURE: 60 MMHG | RESPIRATION RATE: 16 BRPM | HEART RATE: 117 BPM | TEMPERATURE: 98 F | BODY MASS INDEX: 33.75 KG/M2 | HEIGHT: 68 IN | SYSTOLIC BLOOD PRESSURE: 104 MMHG

## 2023-01-01 VITALS
TEMPERATURE: 97.6 F | SYSTOLIC BLOOD PRESSURE: 98 MMHG | OXYGEN SATURATION: 95 % | HEIGHT: 68 IN | WEIGHT: 230.6 LBS | RESPIRATION RATE: 30 BRPM | BODY MASS INDEX: 34.95 KG/M2 | HEART RATE: 131 BPM | DIASTOLIC BLOOD PRESSURE: 50 MMHG

## 2023-01-01 VITALS
DIASTOLIC BLOOD PRESSURE: 68 MMHG | RESPIRATION RATE: 30 BRPM | OXYGEN SATURATION: 98 % | HEART RATE: 122 BPM | TEMPERATURE: 98.2 F | SYSTOLIC BLOOD PRESSURE: 114 MMHG

## 2023-01-01 VITALS
OXYGEN SATURATION: 100 % | DIASTOLIC BLOOD PRESSURE: 82 MMHG | RESPIRATION RATE: 16 BRPM | TEMPERATURE: 97.7 F | SYSTOLIC BLOOD PRESSURE: 118 MMHG | HEART RATE: 114 BPM

## 2023-01-01 VITALS
DIASTOLIC BLOOD PRESSURE: 76 MMHG | WEIGHT: 245.59 LBS | BODY MASS INDEX: 33.26 KG/M2 | TEMPERATURE: 99.3 F | OXYGEN SATURATION: 96 % | SYSTOLIC BLOOD PRESSURE: 132 MMHG | RESPIRATION RATE: 20 BRPM | HEIGHT: 72 IN | HEART RATE: 122 BPM

## 2023-01-01 VITALS
OXYGEN SATURATION: 98 % | WEIGHT: 241.7 LBS | SYSTOLIC BLOOD PRESSURE: 116 MMHG | RESPIRATION RATE: 16 BRPM | BODY MASS INDEX: 32.77 KG/M2 | HEART RATE: 128 BPM | DIASTOLIC BLOOD PRESSURE: 73 MMHG | TEMPERATURE: 97.5 F

## 2023-01-01 VITALS
HEART RATE: 134 BPM | DIASTOLIC BLOOD PRESSURE: 74 MMHG | RESPIRATION RATE: 16 BRPM | SYSTOLIC BLOOD PRESSURE: 112 MMHG | TEMPERATURE: 98.3 F

## 2023-01-01 VITALS
SYSTOLIC BLOOD PRESSURE: 115 MMHG | RESPIRATION RATE: 20 BRPM | OXYGEN SATURATION: 99 % | WEIGHT: 240.9 LBS | DIASTOLIC BLOOD PRESSURE: 72 MMHG | HEART RATE: 121 BPM | BODY MASS INDEX: 32.67 KG/M2 | TEMPERATURE: 98.6 F

## 2023-01-01 VITALS
OXYGEN SATURATION: 97 % | WEIGHT: 244.9 LBS | RESPIRATION RATE: 16 BRPM | BODY MASS INDEX: 33.21 KG/M2 | SYSTOLIC BLOOD PRESSURE: 122 MMHG | TEMPERATURE: 98.2 F | DIASTOLIC BLOOD PRESSURE: 70 MMHG | HEART RATE: 132 BPM

## 2023-01-01 DIAGNOSIS — C49.9 DESMOPLASTIC SMALL ROUND CELL TUMOR (H): ICD-10-CM

## 2023-01-01 DIAGNOSIS — C49.9 DESMOPLASTIC SMALL ROUND CELL TUMOR (H): Primary | ICD-10-CM

## 2023-01-01 DIAGNOSIS — C41.9 SARCOMA, EWINGS (H): Primary | ICD-10-CM

## 2023-01-01 DIAGNOSIS — Z01.818 PRE-OP EVALUATION: ICD-10-CM

## 2023-01-01 DIAGNOSIS — C78.6 PERITONEAL CARCINOMATOSIS (H): ICD-10-CM

## 2023-01-01 DIAGNOSIS — Z20.822 LAB TEST NEGATIVE FOR COVID-19 VIRUS: ICD-10-CM

## 2023-01-01 DIAGNOSIS — C49.9 DSRCT (DESMOPLASTIC SMALL ROUND CELL TUMOR) (H): ICD-10-CM

## 2023-01-01 DIAGNOSIS — A04.72 C. DIFFICILE COLITIS: Primary | ICD-10-CM

## 2023-01-01 DIAGNOSIS — Z45.2 ENCOUNTER FOR CARE RELATED TO VASCULAR ACCESS PORT: ICD-10-CM

## 2023-01-01 DIAGNOSIS — R19.7 DIARRHEA, UNSPECIFIED TYPE: ICD-10-CM

## 2023-01-01 DIAGNOSIS — L29.9 ITCHING: ICD-10-CM

## 2023-01-01 DIAGNOSIS — F41.9 ANXIETY: ICD-10-CM

## 2023-01-01 DIAGNOSIS — E83.42 HYPOMAGNESEMIA: ICD-10-CM

## 2023-01-01 DIAGNOSIS — A04.72 C. DIFFICILE COLITIS: ICD-10-CM

## 2023-01-01 DIAGNOSIS — E87.6 LOW BLOOD POTASSIUM: ICD-10-CM

## 2023-01-01 DIAGNOSIS — C78.6 PERITONEAL CARCINOMATOSIS (H): Primary | ICD-10-CM

## 2023-01-01 DIAGNOSIS — F81.9 LEARNING DISABILITIES: ICD-10-CM

## 2023-01-01 DIAGNOSIS — D64.9 ANEMIA, UNSPECIFIED TYPE: ICD-10-CM

## 2023-01-01 DIAGNOSIS — D61.818 PANCYTOPENIA (H): ICD-10-CM

## 2023-01-01 DIAGNOSIS — Z01.818 PRE-OP EVALUATION: Primary | ICD-10-CM

## 2023-01-01 DIAGNOSIS — R10.84 ABDOMINAL PAIN, GENERALIZED: ICD-10-CM

## 2023-01-01 DIAGNOSIS — D64.9 ANEMIA, UNSPECIFIED TYPE: Primary | ICD-10-CM

## 2023-01-01 DIAGNOSIS — R10.32 LLQ ABDOMINAL PAIN: ICD-10-CM

## 2023-01-01 DIAGNOSIS — I10 HYPERTENSION, UNSPECIFIED TYPE: ICD-10-CM

## 2023-01-01 DIAGNOSIS — C41.9 SARCOMA, EWINGS (H): ICD-10-CM

## 2023-01-01 DIAGNOSIS — E83.39 HYPOPHOSPHATEMIA: ICD-10-CM

## 2023-01-01 DIAGNOSIS — G89.3 CANCER ASSOCIATED PAIN: ICD-10-CM

## 2023-01-01 DIAGNOSIS — R79.0 LOW BLOOD MAGNESIUM: ICD-10-CM

## 2023-01-01 DIAGNOSIS — R19.8 ABDOMINAL FULLNESS: ICD-10-CM

## 2023-01-01 DIAGNOSIS — R06.82 TACHYPNEA: ICD-10-CM

## 2023-01-01 DIAGNOSIS — R00.0 TACHYCARDIA: ICD-10-CM

## 2023-01-01 DIAGNOSIS — K56.609 SBO (SMALL BOWEL OBSTRUCTION) (H): ICD-10-CM

## 2023-01-01 DIAGNOSIS — D72.829 LEUKOCYTOSIS, UNSPECIFIED TYPE: ICD-10-CM

## 2023-01-01 DIAGNOSIS — J90 PLEURAL EFFUSION: Primary | ICD-10-CM

## 2023-01-01 DIAGNOSIS — E87.6 HYPOKALEMIA: ICD-10-CM

## 2023-01-01 DIAGNOSIS — R00.0 SINUS TACHYCARDIA: ICD-10-CM

## 2023-01-01 DIAGNOSIS — R19.8 ABDOMINAL FULLNESS: Primary | ICD-10-CM

## 2023-01-01 DIAGNOSIS — R18.0 MALIGNANT ASCITES (H): Primary | ICD-10-CM

## 2023-01-01 DIAGNOSIS — E66.01 MORBID OBESITY (H): ICD-10-CM

## 2023-01-01 DIAGNOSIS — J02.9 SORE THROAT: Primary | ICD-10-CM

## 2023-01-01 DIAGNOSIS — E16.2 HYPOGLYCEMIA: ICD-10-CM

## 2023-01-01 LAB
ABO/RH(D): NORMAL
ACANTHOCYTES BLD QL SMEAR: SLIGHT
ALBUMIN SERPL BCG-MCNC: 2.2 G/DL (ref 3.5–5.2)
ALBUMIN SERPL BCG-MCNC: 2.5 G/DL (ref 3.5–5.2)
ALBUMIN SERPL BCG-MCNC: 2.6 G/DL (ref 3.5–5.2)
ALBUMIN SERPL BCG-MCNC: 2.9 G/DL (ref 3.5–5.2)
ALBUMIN SERPL BCG-MCNC: 3.3 G/DL (ref 3.5–5.2)
ALBUMIN SERPL BCG-MCNC: 3.4 G/DL (ref 3.5–5.2)
ALBUMIN SERPL BCG-MCNC: 3.4 G/DL (ref 3.5–5.2)
ALBUMIN SERPL BCG-MCNC: 3.5 G/DL (ref 3.5–5.2)
ALBUMIN SERPL BCG-MCNC: 3.6 G/DL (ref 3.5–5.2)
ALBUMIN SERPL BCG-MCNC: 4 G/DL (ref 3.5–5.2)
ALBUMIN UR-MCNC: 200 MG/DL
ALBUMIN UR-MCNC: 70 MG/DL
ALP SERPL-CCNC: 100 U/L (ref 40–129)
ALP SERPL-CCNC: 114 U/L (ref 40–129)
ALP SERPL-CCNC: 117 U/L (ref 40–129)
ALP SERPL-CCNC: 73 U/L (ref 40–129)
ALP SERPL-CCNC: 73 U/L (ref 40–129)
ALP SERPL-CCNC: 88 U/L (ref 40–129)
ALP SERPL-CCNC: 88 U/L (ref 40–129)
ALP SERPL-CCNC: 94 U/L (ref 40–129)
ALP SERPL-CCNC: 98 U/L (ref 40–129)
ALT SERPL W P-5'-P-CCNC: 18 U/L (ref 10–50)
ALT SERPL W P-5'-P-CCNC: 22 U/L (ref 10–50)
ALT SERPL W P-5'-P-CCNC: 32 U/L (ref 10–50)
ALT SERPL W P-5'-P-CCNC: 5 U/L (ref 10–50)
ALT SERPL W P-5'-P-CCNC: 6 U/L (ref 10–50)
ALT SERPL W P-5'-P-CCNC: 6 U/L (ref 10–50)
ALT SERPL W P-5'-P-CCNC: <5 U/L (ref 10–50)
ANION GAP SERPL CALCULATED.3IONS-SCNC: 10 MMOL/L (ref 7–15)
ANION GAP SERPL CALCULATED.3IONS-SCNC: 11 MMOL/L (ref 7–15)
ANION GAP SERPL CALCULATED.3IONS-SCNC: 11 MMOL/L (ref 7–15)
ANION GAP SERPL CALCULATED.3IONS-SCNC: 12 MMOL/L (ref 7–15)
ANION GAP SERPL CALCULATED.3IONS-SCNC: 13 MMOL/L (ref 7–15)
ANION GAP SERPL CALCULATED.3IONS-SCNC: 14 MMOL/L (ref 7–15)
ANION GAP SERPL CALCULATED.3IONS-SCNC: 15 MMOL/L (ref 7–15)
ANION GAP SERPL CALCULATED.3IONS-SCNC: 15 MMOL/L (ref 7–15)
ANION GAP SERPL CALCULATED.3IONS-SCNC: 16 MMOL/L (ref 7–15)
ANION GAP SERPL CALCULATED.3IONS-SCNC: 16 MMOL/L (ref 7–15)
ANION GAP SERPL CALCULATED.3IONS-SCNC: 17 MMOL/L (ref 7–15)
ANION GAP SERPL CALCULATED.3IONS-SCNC: 19 MMOL/L (ref 7–15)
ANTIBODY SCREEN: NEGATIVE
APPEARANCE UR: CLEAR
APPEARANCE UR: CLEAR
AST SERPL W P-5'-P-CCNC: 10 U/L (ref 10–50)
AST SERPL W P-5'-P-CCNC: 120 U/L (ref 10–50)
AST SERPL W P-5'-P-CCNC: 13 U/L (ref 10–50)
AST SERPL W P-5'-P-CCNC: 14 U/L (ref 10–50)
AST SERPL W P-5'-P-CCNC: 15 U/L (ref 10–50)
AST SERPL W P-5'-P-CCNC: 18 U/L (ref 10–50)
AST SERPL W P-5'-P-CCNC: 19 U/L (ref 10–50)
AST SERPL W P-5'-P-CCNC: 19 U/L (ref 10–50)
AST SERPL W P-5'-P-CCNC: 63 U/L (ref 10–50)
ATRIAL RATE - MUSE: 143 BPM
ATRIAL RATE - MUSE: 152 BPM
ATRIAL RATE - MUSE: 394 BPM
B-OH-BUTYR SERPL-MCNC: 2.8 MMOL/L
BACTERIA BLD CULT: NO GROWTH
BASOPHILS # BLD AUTO: 0 10E3/UL (ref 0–0.2)
BASOPHILS # BLD AUTO: 0.1 10E3/UL (ref 0–0.2)
BASOPHILS # BLD AUTO: 0.1 10E3/UL (ref 0–0.2)
BASOPHILS # BLD AUTO: 0.2 10E3/UL (ref 0–0.2)
BASOPHILS # BLD AUTO: 0.3 10E3/UL (ref 0–0.2)
BASOPHILS # BLD MANUAL: 0 10E3/UL (ref 0–0.2)
BASOPHILS # BLD MANUAL: 0.2 10E3/UL (ref 0–0.2)
BASOPHILS # BLD MANUAL: 0.2 10E3/UL (ref 0–0.2)
BASOPHILS NFR BLD AUTO: 0 %
BASOPHILS NFR BLD AUTO: 0 %
BASOPHILS NFR BLD AUTO: 1 %
BASOPHILS NFR BLD AUTO: 1 %
BASOPHILS NFR BLD AUTO: 2 %
BASOPHILS NFR BLD MANUAL: 0 %
BASOPHILS NFR BLD MANUAL: 1 %
BASOPHILS NFR BLD MANUAL: 1 %
BILIRUB SERPL-MCNC: 0.3 MG/DL
BILIRUB SERPL-MCNC: 0.4 MG/DL
BILIRUB SERPL-MCNC: 0.5 MG/DL
BILIRUB SERPL-MCNC: 0.6 MG/DL
BILIRUB SERPL-MCNC: 0.7 MG/DL
BILIRUB SERPL-MCNC: 0.8 MG/DL
BILIRUB SERPL-MCNC: 1.3 MG/DL
BILIRUB UR QL STRIP: NEGATIVE
BILIRUB UR QL STRIP: NEGATIVE
BLD PROD TYP BPU: NORMAL
BLOOD COMPONENT TYPE: NORMAL
BUN SERPL-MCNC: 1.5 MG/DL (ref 6–20)
BUN SERPL-MCNC: 1.8 MG/DL (ref 6–20)
BUN SERPL-MCNC: 1.9 MG/DL (ref 6–20)
BUN SERPL-MCNC: 10.3 MG/DL (ref 6–20)
BUN SERPL-MCNC: 13 MG/DL (ref 6–20)
BUN SERPL-MCNC: 14.9 MG/DL (ref 6–20)
BUN SERPL-MCNC: 15.2 MG/DL (ref 6–20)
BUN SERPL-MCNC: 15.8 MG/DL (ref 6–20)
BUN SERPL-MCNC: 16 MG/DL (ref 6–20)
BUN SERPL-MCNC: 16 MG/DL (ref 6–20)
BUN SERPL-MCNC: 16.5 MG/DL (ref 6–20)
BUN SERPL-MCNC: 18.4 MG/DL (ref 6–20)
BUN SERPL-MCNC: 2.6 MG/DL (ref 6–20)
BUN SERPL-MCNC: 2.6 MG/DL (ref 6–20)
BUN SERPL-MCNC: 2.9 MG/DL (ref 6–20)
BUN SERPL-MCNC: 2.9 MG/DL (ref 6–20)
BUN SERPL-MCNC: 22.8 MG/DL (ref 6–20)
BUN SERPL-MCNC: 23.2 MG/DL (ref 6–20)
BUN SERPL-MCNC: 23.9 MG/DL (ref 6–20)
BUN SERPL-MCNC: 3.9 MG/DL (ref 6–20)
BUN SERPL-MCNC: 5.7 MG/DL (ref 6–20)
BUN SERPL-MCNC: 6 MG/DL (ref 6–20)
BUN SERPL-MCNC: 6.8 MG/DL (ref 6–20)
BUN SERPL-MCNC: 6.9 MG/DL (ref 6–20)
BUN SERPL-MCNC: 7.8 MG/DL (ref 6–20)
BUN SERPL-MCNC: 8 MG/DL (ref 6–20)
BUN SERPL-MCNC: 8 MG/DL (ref 6–20)
BUN SERPL-MCNC: 8.4 MG/DL (ref 6–20)
BUN SERPL-MCNC: 8.6 MG/DL (ref 6–20)
BUN SERPL-MCNC: 8.8 MG/DL (ref 6–20)
BUN SERPL-MCNC: 9.1 MG/DL (ref 6–20)
BURR CELLS BLD QL SMEAR: SLIGHT
BURR CELLS BLD QL SMEAR: SLIGHT
C COLI+JEJUNI+LARI FUSA STL QL NAA+PROBE: NOT DETECTED
C COLI+JEJUNI+LARI FUSA STL QL NAA+PROBE: NOT DETECTED
C DIFF GDH STL QL IA: POSITIVE
C DIFF TOX A+B STL QL IA: NEGATIVE
C DIFF TOX B STL QL: NEGATIVE
C DIFF TOX B STL QL: POSITIVE
CA-I BLD-MCNC: 2.9 MG/DL (ref 4.4–5.2)
CA-I BLD-MCNC: 3.6 MG/DL (ref 4.4–5.2)
CA-I BLD-MCNC: 3.9 MG/DL (ref 4.4–5.2)
CALCIUM SERPL-MCNC: 6.5 MG/DL (ref 8.6–10)
CALCIUM SERPL-MCNC: 6.7 MG/DL (ref 8.6–10)
CALCIUM SERPL-MCNC: 6.8 MG/DL (ref 8.6–10)
CALCIUM SERPL-MCNC: 6.9 MG/DL (ref 8.6–10)
CALCIUM SERPL-MCNC: 7.1 MG/DL (ref 8.6–10)
CALCIUM SERPL-MCNC: 7.1 MG/DL (ref 8.6–10)
CALCIUM SERPL-MCNC: 7.2 MG/DL (ref 8.6–10)
CALCIUM SERPL-MCNC: 7.4 MG/DL (ref 8.6–10)
CALCIUM SERPL-MCNC: 7.7 MG/DL (ref 8.6–10)
CALCIUM SERPL-MCNC: 7.9 MG/DL (ref 8.6–10)
CALCIUM SERPL-MCNC: 8 MG/DL (ref 8.6–10)
CALCIUM SERPL-MCNC: 8 MG/DL (ref 8.6–10)
CALCIUM SERPL-MCNC: 8.2 MG/DL (ref 8.6–10)
CALCIUM SERPL-MCNC: 8.2 MG/DL (ref 8.6–10)
CALCIUM SERPL-MCNC: 8.3 MG/DL (ref 8.6–10)
CALCIUM SERPL-MCNC: 8.5 MG/DL (ref 8.6–10)
CALCIUM SERPL-MCNC: 8.5 MG/DL (ref 8.6–10)
CALCIUM SERPL-MCNC: 8.6 MG/DL (ref 8.6–10)
CALCIUM SERPL-MCNC: 8.8 MG/DL (ref 8.6–10)
CALCIUM SERPL-MCNC: 9.1 MG/DL (ref 8.6–10)
CALCIUM SERPL-MCNC: 9.2 MG/DL (ref 8.6–10)
CALCIUM SERPL-MCNC: 9.5 MG/DL (ref 8.6–10)
CALCIUM SERPL-MCNC: 9.5 MG/DL (ref 8.6–10)
CHLORIDE SERPL-SCNC: 101 MMOL/L (ref 98–107)
CHLORIDE SERPL-SCNC: 101 MMOL/L (ref 98–107)
CHLORIDE SERPL-SCNC: 102 MMOL/L (ref 98–107)
CHLORIDE SERPL-SCNC: 102 MMOL/L (ref 98–107)
CHLORIDE SERPL-SCNC: 103 MMOL/L (ref 98–107)
CHLORIDE SERPL-SCNC: 104 MMOL/L (ref 98–107)
CHLORIDE SERPL-SCNC: 105 MMOL/L (ref 98–107)
CHLORIDE SERPL-SCNC: 106 MMOL/L (ref 98–107)
CHLORIDE SERPL-SCNC: 107 MMOL/L (ref 98–107)
CHLORIDE SERPL-SCNC: 96 MMOL/L (ref 98–107)
CHLORIDE SERPL-SCNC: 98 MMOL/L (ref 98–107)
CHLORIDE SERPL-SCNC: 99 MMOL/L (ref 98–107)
CODING SYSTEM: NORMAL
COLOR UR AUTO: YELLOW
COLOR UR AUTO: YELLOW
CORTIS SERPL-MCNC: 26.7 UG/DL
CPB POCT: NO
CPB POCT: NO
CREAT BLD-MCNC: 1.3 MG/DL (ref 0.7–1.3)
CREAT SERPL-MCNC: 0.47 MG/DL (ref 0.67–1.17)
CREAT SERPL-MCNC: 0.51 MG/DL (ref 0.67–1.17)
CREAT SERPL-MCNC: 0.52 MG/DL (ref 0.67–1.17)
CREAT SERPL-MCNC: 0.53 MG/DL (ref 0.67–1.17)
CREAT SERPL-MCNC: 0.55 MG/DL (ref 0.67–1.17)
CREAT SERPL-MCNC: 0.56 MG/DL (ref 0.67–1.17)
CREAT SERPL-MCNC: 0.56 MG/DL (ref 0.67–1.17)
CREAT SERPL-MCNC: 0.58 MG/DL (ref 0.67–1.17)
CREAT SERPL-MCNC: 0.6 MG/DL (ref 0.67–1.17)
CREAT SERPL-MCNC: 0.66 MG/DL (ref 0.67–1.17)
CREAT SERPL-MCNC: 0.68 MG/DL (ref 0.67–1.17)
CREAT SERPL-MCNC: 0.68 MG/DL (ref 0.67–1.17)
CREAT SERPL-MCNC: 0.85 MG/DL (ref 0.67–1.17)
CREAT SERPL-MCNC: 0.89 MG/DL (ref 0.67–1.17)
CREAT SERPL-MCNC: 0.92 MG/DL (ref 0.67–1.17)
CREAT SERPL-MCNC: 0.96 MG/DL (ref 0.67–1.17)
CREAT SERPL-MCNC: 0.96 MG/DL (ref 0.67–1.17)
CREAT SERPL-MCNC: 0.98 MG/DL (ref 0.67–1.17)
CREAT SERPL-MCNC: 0.99 MG/DL (ref 0.67–1.17)
CREAT SERPL-MCNC: 1.01 MG/DL (ref 0.67–1.17)
CREAT SERPL-MCNC: 1.04 MG/DL (ref 0.67–1.17)
CREAT SERPL-MCNC: 1.11 MG/DL (ref 0.67–1.17)
CREAT SERPL-MCNC: 1.12 MG/DL (ref 0.67–1.17)
CREAT SERPL-MCNC: 1.2 MG/DL (ref 0.67–1.17)
CREAT SERPL-MCNC: 1.23 MG/DL (ref 0.67–1.17)
CREAT SERPL-MCNC: 1.36 MG/DL (ref 0.67–1.17)
CREAT SERPL-MCNC: 1.54 MG/DL (ref 0.67–1.17)
CREAT SERPL-MCNC: 1.56 MG/DL (ref 0.67–1.17)
CROSSMATCH: NORMAL
DACRYOCYTES BLD QL SMEAR: ABNORMAL
DACRYOCYTES BLD QL SMEAR: ABNORMAL
DACRYOCYTES BLD QL SMEAR: SLIGHT
DEPRECATED HCO3 PLAS-SCNC: 12 MMOL/L (ref 22–29)
DEPRECATED HCO3 PLAS-SCNC: 16 MMOL/L (ref 22–29)
DEPRECATED HCO3 PLAS-SCNC: 17 MMOL/L (ref 22–29)
DEPRECATED HCO3 PLAS-SCNC: 17 MMOL/L (ref 22–29)
DEPRECATED HCO3 PLAS-SCNC: 18 MMOL/L (ref 22–29)
DEPRECATED HCO3 PLAS-SCNC: 19 MMOL/L (ref 22–29)
DEPRECATED HCO3 PLAS-SCNC: 21 MMOL/L (ref 22–29)
DEPRECATED HCO3 PLAS-SCNC: 21 MMOL/L (ref 22–29)
DEPRECATED HCO3 PLAS-SCNC: 22 MMOL/L (ref 22–29)
DEPRECATED HCO3 PLAS-SCNC: 23 MMOL/L (ref 22–29)
DEPRECATED HCO3 PLAS-SCNC: 24 MMOL/L (ref 22–29)
DEPRECATED HCO3 PLAS-SCNC: 24 MMOL/L (ref 22–29)
DEPRECATED HCO3 PLAS-SCNC: 25 MMOL/L (ref 22–29)
DEPRECATED HCO3 PLAS-SCNC: 27 MMOL/L (ref 22–29)
DIASTOLIC BLOOD PRESSURE - MUSE: NORMAL MMHG
EC STX1 GENE STL QL NAA+PROBE: NOT DETECTED
EC STX1 GENE STL QL NAA+PROBE: NOT DETECTED
EC STX2 GENE STL QL NAA+PROBE: NOT DETECTED
EC STX2 GENE STL QL NAA+PROBE: NOT DETECTED
ELLIPTOCYTES BLD QL SMEAR: SLIGHT
EOSINOPHIL # BLD AUTO: 0 10E3/UL (ref 0–0.7)
EOSINOPHIL # BLD AUTO: 0 10E3/UL (ref 0–0.7)
EOSINOPHIL # BLD AUTO: 0.1 10E3/UL (ref 0–0.7)
EOSINOPHIL # BLD AUTO: 0.4 10E3/UL (ref 0–0.7)
EOSINOPHIL # BLD AUTO: 0.9 10E3/UL (ref 0–0.7)
EOSINOPHIL # BLD MANUAL: 0 10E3/UL (ref 0–0.7)
EOSINOPHIL # BLD MANUAL: 0.1 10E3/UL (ref 0–0.7)
EOSINOPHIL # BLD MANUAL: 0.2 10E3/UL (ref 0–0.7)
EOSINOPHIL # BLD MANUAL: 0.4 10E3/UL (ref 0–0.7)
EOSINOPHIL # BLD MANUAL: 0.8 10E3/UL (ref 0–0.7)
EOSINOPHIL NFR BLD AUTO: 0 %
EOSINOPHIL NFR BLD AUTO: 0 %
EOSINOPHIL NFR BLD AUTO: 1 %
EOSINOPHIL NFR BLD AUTO: 2 %
EOSINOPHIL NFR BLD AUTO: 4 %
EOSINOPHIL NFR BLD MANUAL: 0 %
EOSINOPHIL NFR BLD MANUAL: 1 %
EOSINOPHIL NFR BLD MANUAL: 1 %
EOSINOPHIL NFR BLD MANUAL: 13 %
EOSINOPHIL NFR BLD MANUAL: 2 %
EOSINOPHIL NFR BLD MANUAL: 2 %
EOSINOPHIL NFR BLD MANUAL: 4 %
ERYTHROCYTE [DISTWIDTH] IN BLOOD BY AUTOMATED COUNT: 16.2 % (ref 10–15)
ERYTHROCYTE [DISTWIDTH] IN BLOOD BY AUTOMATED COUNT: 16.2 % (ref 10–15)
ERYTHROCYTE [DISTWIDTH] IN BLOOD BY AUTOMATED COUNT: 16.3 % (ref 10–15)
ERYTHROCYTE [DISTWIDTH] IN BLOOD BY AUTOMATED COUNT: 16.3 % (ref 10–15)
ERYTHROCYTE [DISTWIDTH] IN BLOOD BY AUTOMATED COUNT: 16.5 % (ref 10–15)
ERYTHROCYTE [DISTWIDTH] IN BLOOD BY AUTOMATED COUNT: 16.6 % (ref 10–15)
ERYTHROCYTE [DISTWIDTH] IN BLOOD BY AUTOMATED COUNT: 16.7 % (ref 10–15)
ERYTHROCYTE [DISTWIDTH] IN BLOOD BY AUTOMATED COUNT: 16.8 % (ref 10–15)
ERYTHROCYTE [DISTWIDTH] IN BLOOD BY AUTOMATED COUNT: 16.9 % (ref 10–15)
ERYTHROCYTE [DISTWIDTH] IN BLOOD BY AUTOMATED COUNT: 17.1 % (ref 10–15)
ERYTHROCYTE [DISTWIDTH] IN BLOOD BY AUTOMATED COUNT: 17.3 % (ref 10–15)
ERYTHROCYTE [DISTWIDTH] IN BLOOD BY AUTOMATED COUNT: 17.3 % (ref 10–15)
ERYTHROCYTE [DISTWIDTH] IN BLOOD BY AUTOMATED COUNT: 17.5 % (ref 10–15)
ERYTHROCYTE [DISTWIDTH] IN BLOOD BY AUTOMATED COUNT: 17.6 % (ref 10–15)
ERYTHROCYTE [DISTWIDTH] IN BLOOD BY AUTOMATED COUNT: 17.6 % (ref 10–15)
ERYTHROCYTE [DISTWIDTH] IN BLOOD BY AUTOMATED COUNT: 17.8 % (ref 10–15)
ERYTHROCYTE [DISTWIDTH] IN BLOOD BY AUTOMATED COUNT: 18.9 % (ref 10–15)
ERYTHROCYTE [DISTWIDTH] IN BLOOD BY AUTOMATED COUNT: 19.3 % (ref 10–15)
ERYTHROCYTE [DISTWIDTH] IN BLOOD BY AUTOMATED COUNT: 19.5 % (ref 10–15)
ERYTHROCYTE [DISTWIDTH] IN BLOOD BY AUTOMATED COUNT: 19.7 % (ref 10–15)
ERYTHROCYTE [DISTWIDTH] IN BLOOD BY AUTOMATED COUNT: 19.7 % (ref 10–15)
ERYTHROCYTE [DISTWIDTH] IN BLOOD BY AUTOMATED COUNT: 19.9 % (ref 10–15)
ERYTHROCYTE [DISTWIDTH] IN BLOOD BY AUTOMATED COUNT: 20.2 % (ref 10–15)
ERYTHROCYTE [DISTWIDTH] IN BLOOD BY AUTOMATED COUNT: 20.3 % (ref 10–15)
ERYTHROCYTE [DISTWIDTH] IN BLOOD BY AUTOMATED COUNT: 20.5 % (ref 10–15)
ERYTHROCYTE [DISTWIDTH] IN BLOOD BY AUTOMATED COUNT: 20.6 % (ref 10–15)
ERYTHROCYTE [DISTWIDTH] IN BLOOD BY AUTOMATED COUNT: 20.7 % (ref 10–15)
ERYTHROCYTE [DISTWIDTH] IN BLOOD BY AUTOMATED COUNT: 21.3 % (ref 10–15)
ERYTHROCYTE [DISTWIDTH] IN BLOOD BY AUTOMATED COUNT: 27 % (ref 10–15)
ERYTHROCYTE [DISTWIDTH] IN BLOOD BY AUTOMATED COUNT: 28.3 % (ref 10–15)
ERYTHROCYTE [DISTWIDTH] IN BLOOD BY AUTOMATED COUNT: 30.2 % (ref 10–15)
FERRITIN SERPL-MCNC: 967 NG/ML (ref 31–409)
FRAGMENTS BLD QL SMEAR: SLIGHT
FRAGMENTS BLD QL SMEAR: SLIGHT
GFR SERPL CREATININE-BSD FRML MDRD: 62 ML/MIN/1.73M2
GFR SERPL CREATININE-BSD FRML MDRD: 63 ML/MIN/1.73M2
GFR SERPL CREATININE-BSD FRML MDRD: 73 ML/MIN/1.73M2
GFR SERPL CREATININE-BSD FRML MDRD: 83 ML/MIN/1.73M2
GFR SERPL CREATININE-BSD FRML MDRD: 85 ML/MIN/1.73M2
GFR SERPL CREATININE-BSD FRML MDRD: >60 ML/MIN/1.73M2
GFR SERPL CREATININE-BSD FRML MDRD: >90 ML/MIN/1.73M2
GLUCOSE BLD-MCNC: 102 MG/DL (ref 70–99)
GLUCOSE BLD-MCNC: 125 MG/DL (ref 70–99)
GLUCOSE BLDC GLUCOMTR-MCNC: 103 MG/DL (ref 70–99)
GLUCOSE BLDC GLUCOMTR-MCNC: 104 MG/DL (ref 70–99)
GLUCOSE BLDC GLUCOMTR-MCNC: 105 MG/DL (ref 70–99)
GLUCOSE BLDC GLUCOMTR-MCNC: 106 MG/DL (ref 70–99)
GLUCOSE BLDC GLUCOMTR-MCNC: 107 MG/DL (ref 70–99)
GLUCOSE BLDC GLUCOMTR-MCNC: 107 MG/DL (ref 70–99)
GLUCOSE BLDC GLUCOMTR-MCNC: 108 MG/DL (ref 70–99)
GLUCOSE BLDC GLUCOMTR-MCNC: 108 MG/DL (ref 70–99)
GLUCOSE BLDC GLUCOMTR-MCNC: 109 MG/DL (ref 70–99)
GLUCOSE BLDC GLUCOMTR-MCNC: 110 MG/DL (ref 70–99)
GLUCOSE BLDC GLUCOMTR-MCNC: 111 MG/DL (ref 70–99)
GLUCOSE BLDC GLUCOMTR-MCNC: 112 MG/DL (ref 70–99)
GLUCOSE BLDC GLUCOMTR-MCNC: 113 MG/DL (ref 70–99)
GLUCOSE BLDC GLUCOMTR-MCNC: 114 MG/DL (ref 70–99)
GLUCOSE BLDC GLUCOMTR-MCNC: 115 MG/DL (ref 70–99)
GLUCOSE BLDC GLUCOMTR-MCNC: 119 MG/DL (ref 70–99)
GLUCOSE BLDC GLUCOMTR-MCNC: 120 MG/DL (ref 70–99)
GLUCOSE BLDC GLUCOMTR-MCNC: 122 MG/DL (ref 70–99)
GLUCOSE BLDC GLUCOMTR-MCNC: 125 MG/DL (ref 70–99)
GLUCOSE BLDC GLUCOMTR-MCNC: 125 MG/DL (ref 70–99)
GLUCOSE BLDC GLUCOMTR-MCNC: 127 MG/DL (ref 70–99)
GLUCOSE BLDC GLUCOMTR-MCNC: 127 MG/DL (ref 70–99)
GLUCOSE BLDC GLUCOMTR-MCNC: 129 MG/DL (ref 70–99)
GLUCOSE BLDC GLUCOMTR-MCNC: 129 MG/DL (ref 70–99)
GLUCOSE BLDC GLUCOMTR-MCNC: 132 MG/DL (ref 70–99)
GLUCOSE BLDC GLUCOMTR-MCNC: 135 MG/DL (ref 70–99)
GLUCOSE BLDC GLUCOMTR-MCNC: 141 MG/DL (ref 70–99)
GLUCOSE BLDC GLUCOMTR-MCNC: 144 MG/DL (ref 70–99)
GLUCOSE BLDC GLUCOMTR-MCNC: 31 MG/DL (ref 70–99)
GLUCOSE BLDC GLUCOMTR-MCNC: 55 MG/DL (ref 70–99)
GLUCOSE BLDC GLUCOMTR-MCNC: 68 MG/DL (ref 70–99)
GLUCOSE BLDC GLUCOMTR-MCNC: 71 MG/DL (ref 70–99)
GLUCOSE BLDC GLUCOMTR-MCNC: 72 MG/DL (ref 70–99)
GLUCOSE BLDC GLUCOMTR-MCNC: 78 MG/DL (ref 70–99)
GLUCOSE BLDC GLUCOMTR-MCNC: 86 MG/DL (ref 70–99)
GLUCOSE BLDC GLUCOMTR-MCNC: 88 MG/DL (ref 70–99)
GLUCOSE BLDC GLUCOMTR-MCNC: 89 MG/DL (ref 70–99)
GLUCOSE BLDC GLUCOMTR-MCNC: 90 MG/DL (ref 70–99)
GLUCOSE BLDC GLUCOMTR-MCNC: 91 MG/DL (ref 70–99)
GLUCOSE BLDC GLUCOMTR-MCNC: 95 MG/DL (ref 70–99)
GLUCOSE SERPL-MCNC: 100 MG/DL (ref 70–99)
GLUCOSE SERPL-MCNC: 101 MG/DL (ref 70–99)
GLUCOSE SERPL-MCNC: 102 MG/DL (ref 70–99)
GLUCOSE SERPL-MCNC: 103 MG/DL (ref 70–99)
GLUCOSE SERPL-MCNC: 103 MG/DL (ref 70–99)
GLUCOSE SERPL-MCNC: 105 MG/DL (ref 70–99)
GLUCOSE SERPL-MCNC: 106 MG/DL (ref 70–99)
GLUCOSE SERPL-MCNC: 107 MG/DL (ref 70–99)
GLUCOSE SERPL-MCNC: 108 MG/DL (ref 70–99)
GLUCOSE SERPL-MCNC: 114 MG/DL (ref 70–99)
GLUCOSE SERPL-MCNC: 116 MG/DL (ref 70–99)
GLUCOSE SERPL-MCNC: 116 MG/DL (ref 70–99)
GLUCOSE SERPL-MCNC: 117 MG/DL (ref 70–99)
GLUCOSE SERPL-MCNC: 126 MG/DL (ref 70–99)
GLUCOSE SERPL-MCNC: 128 MG/DL (ref 70–99)
GLUCOSE SERPL-MCNC: 131 MG/DL (ref 70–99)
GLUCOSE SERPL-MCNC: 137 MG/DL (ref 70–99)
GLUCOSE SERPL-MCNC: 137 MG/DL (ref 70–99)
GLUCOSE SERPL-MCNC: 143 MG/DL (ref 70–99)
GLUCOSE SERPL-MCNC: 157 MG/DL (ref 70–99)
GLUCOSE SERPL-MCNC: 37 MG/DL (ref 70–99)
GLUCOSE SERPL-MCNC: 74 MG/DL (ref 70–99)
GLUCOSE SERPL-MCNC: 78 MG/DL (ref 70–99)
GLUCOSE SERPL-MCNC: 80 MG/DL (ref 70–99)
GLUCOSE SERPL-MCNC: 83 MG/DL (ref 70–99)
GLUCOSE SERPL-MCNC: 83 MG/DL (ref 70–99)
GLUCOSE SERPL-MCNC: 84 MG/DL (ref 70–99)
GLUCOSE SERPL-MCNC: 89 MG/DL (ref 70–99)
GLUCOSE SERPL-MCNC: 90 MG/DL (ref 70–99)
GLUCOSE SERPL-MCNC: 95 MG/DL (ref 70–99)
GLUCOSE SERPL-MCNC: 98 MG/DL (ref 70–99)
GLUCOSE UR STRIP-MCNC: 50 MG/DL
GLUCOSE UR STRIP-MCNC: NEGATIVE MG/DL
HCO3 BLDV-SCNC: 26 MMOL/L (ref 21–28)
HCO3 BLDV-SCNC: 26 MMOL/L (ref 21–28)
HCO3 BLDV-SCNC: 27 MMOL/L (ref 21–28)
HCT VFR BLD AUTO: 15.6 % (ref 40–53)
HCT VFR BLD AUTO: 20.6 % (ref 40–53)
HCT VFR BLD AUTO: 21.4 % (ref 40–53)
HCT VFR BLD AUTO: 21.8 % (ref 40–53)
HCT VFR BLD AUTO: 21.9 % (ref 40–53)
HCT VFR BLD AUTO: 22.2 % (ref 40–53)
HCT VFR BLD AUTO: 23.4 % (ref 40–53)
HCT VFR BLD AUTO: 23.7 % (ref 40–53)
HCT VFR BLD AUTO: 23.9 % (ref 40–53)
HCT VFR BLD AUTO: 24.4 % (ref 40–53)
HCT VFR BLD AUTO: 24.6 % (ref 40–53)
HCT VFR BLD AUTO: 24.7 % (ref 40–53)
HCT VFR BLD AUTO: 25.1 % (ref 40–53)
HCT VFR BLD AUTO: 25.2 % (ref 40–53)
HCT VFR BLD AUTO: 25.3 % (ref 40–53)
HCT VFR BLD AUTO: 25.7 % (ref 40–53)
HCT VFR BLD AUTO: 25.7 % (ref 40–53)
HCT VFR BLD AUTO: 26.1 % (ref 40–53)
HCT VFR BLD AUTO: 26.2 % (ref 40–53)
HCT VFR BLD AUTO: 26.4 % (ref 40–53)
HCT VFR BLD AUTO: 26.4 % (ref 40–53)
HCT VFR BLD AUTO: 27.2 % (ref 40–53)
HCT VFR BLD AUTO: 27.3 % (ref 40–53)
HCT VFR BLD AUTO: 27.6 % (ref 40–53)
HCT VFR BLD AUTO: 29.5 % (ref 40–53)
HCT VFR BLD AUTO: 29.6 % (ref 40–53)
HCT VFR BLD CALC: 20 % (ref 40–53)
HCT VFR BLD CALC: 29 % (ref 40–53)
HGB BLD-MCNC: 4.7 G/DL (ref 13.3–17.7)
HGB BLD-MCNC: 6.2 G/DL (ref 13.3–17.7)
HGB BLD-MCNC: 6.5 G/DL (ref 13.3–17.7)
HGB BLD-MCNC: 6.5 G/DL (ref 13.3–17.7)
HGB BLD-MCNC: 6.8 G/DL (ref 13.3–17.7)
HGB BLD-MCNC: 6.8 G/DL (ref 13.3–17.7)
HGB BLD-MCNC: 6.9 G/DL (ref 13.3–17.7)
HGB BLD-MCNC: 7.1 G/DL (ref 13.3–17.7)
HGB BLD-MCNC: 7.1 G/DL (ref 13.3–17.7)
HGB BLD-MCNC: 7.2 G/DL (ref 13.3–17.7)
HGB BLD-MCNC: 7.3 G/DL (ref 13.3–17.7)
HGB BLD-MCNC: 7.4 G/DL (ref 13.3–17.7)
HGB BLD-MCNC: 7.5 G/DL (ref 13.3–17.7)
HGB BLD-MCNC: 7.6 G/DL (ref 13.3–17.7)
HGB BLD-MCNC: 7.7 G/DL (ref 13.3–17.7)
HGB BLD-MCNC: 7.7 G/DL (ref 13.3–17.7)
HGB BLD-MCNC: 7.8 G/DL (ref 13.3–17.7)
HGB BLD-MCNC: 7.8 G/DL (ref 13.3–17.7)
HGB BLD-MCNC: 8 G/DL (ref 13.3–17.7)
HGB BLD-MCNC: 8.2 G/DL (ref 13.3–17.7)
HGB BLD-MCNC: 8.3 G/DL (ref 13.3–17.7)
HGB BLD-MCNC: 8.3 G/DL (ref 13.3–17.7)
HGB BLD-MCNC: 8.4 G/DL (ref 13.3–17.7)
HGB BLD-MCNC: 8.9 G/DL (ref 13.3–17.7)
HGB BLD-MCNC: 9 G/DL (ref 13.3–17.7)
HGB BLD-MCNC: 9.9 G/DL (ref 13.3–17.7)
HGB UR QL STRIP: ABNORMAL
HGB UR QL STRIP: ABNORMAL
HOLD SPECIMEN: NORMAL
IMM GRANULOCYTES # BLD: 0.1 10E3/UL
IMM GRANULOCYTES # BLD: 0.3 10E3/UL
IMM GRANULOCYTES # BLD: 0.7 10E3/UL
IMM GRANULOCYTES # BLD: 0.7 10E3/UL
IMM GRANULOCYTES # BLD: 0.9 10E3/UL
IMM GRANULOCYTES NFR BLD: 1 %
IMM GRANULOCYTES NFR BLD: 1 %
IMM GRANULOCYTES NFR BLD: 3 %
IMM GRANULOCYTES NFR BLD: 4 %
IMM GRANULOCYTES NFR BLD: 4 %
INR PPP: 1.34 (ref 0.85–1.15)
INTERPRETATION ECG - MUSE: NORMAL
IRON BINDING CAPACITY (ROCHE): 217 UG/DL (ref 240–430)
IRON SATN MFR SERPL: 15 % (ref 15–46)
IRON SERPL-MCNC: 32 UG/DL (ref 61–157)
ISSUE DATE AND TIME: NORMAL
KETONES UR STRIP-MCNC: ABNORMAL MG/DL
KETONES UR STRIP-MCNC: NEGATIVE MG/DL
LACTATE BLD-SCNC: 0.9 MMOL/L
LACTATE SERPL-SCNC: 1 MMOL/L (ref 0.7–2)
LACTATE SERPL-SCNC: 1.1 MMOL/L (ref 0.7–2)
LACTATE SERPL-SCNC: 1.6 MMOL/L (ref 0.7–2)
LACTATE SERPL-SCNC: 2.9 MMOL/L (ref 0.7–2)
LEUKOCYTE ESTERASE UR QL STRIP: NEGATIVE
LEUKOCYTE ESTERASE UR QL STRIP: NEGATIVE
LIPASE SERPL-CCNC: 18 U/L (ref 13–60)
LIPASE SERPL-CCNC: 8 U/L (ref 13–60)
LYMPHOCYTES # BLD AUTO: 0.5 10E3/UL (ref 0.8–5.3)
LYMPHOCYTES # BLD AUTO: 0.8 10E3/UL (ref 0.8–5.3)
LYMPHOCYTES # BLD AUTO: 0.9 10E3/UL (ref 0.8–5.3)
LYMPHOCYTES # BLD AUTO: 1.1 10E3/UL (ref 0.8–5.3)
LYMPHOCYTES # BLD AUTO: 1.3 10E3/UL (ref 0.8–5.3)
LYMPHOCYTES # BLD MANUAL: 0 10E3/UL (ref 0.8–5.3)
LYMPHOCYTES # BLD MANUAL: 0.2 10E3/UL (ref 0.8–5.3)
LYMPHOCYTES # BLD MANUAL: 0.3 10E3/UL (ref 0.8–5.3)
LYMPHOCYTES # BLD MANUAL: 0.3 10E3/UL (ref 0.8–5.3)
LYMPHOCYTES # BLD MANUAL: 0.4 10E3/UL (ref 0.8–5.3)
LYMPHOCYTES # BLD MANUAL: 0.7 10E3/UL (ref 0.8–5.3)
LYMPHOCYTES # BLD MANUAL: 0.8 10E3/UL (ref 0.8–5.3)
LYMPHOCYTES # BLD MANUAL: 0.8 10E3/UL (ref 0.8–5.3)
LYMPHOCYTES # BLD MANUAL: 1 10E3/UL (ref 0.8–5.3)
LYMPHOCYTES # BLD MANUAL: 1 10E3/UL (ref 0.8–5.3)
LYMPHOCYTES # BLD MANUAL: 1.2 10E3/UL (ref 0.8–5.3)
LYMPHOCYTES # BLD MANUAL: 1.3 10E3/UL (ref 0.8–5.3)
LYMPHOCYTES # BLD MANUAL: 1.8 10E3/UL (ref 0.8–5.3)
LYMPHOCYTES NFR BLD AUTO: 11 %
LYMPHOCYTES NFR BLD AUTO: 2 %
LYMPHOCYTES NFR BLD AUTO: 4 %
LYMPHOCYTES NFR BLD AUTO: 5 %
LYMPHOCYTES NFR BLD AUTO: 6 %
LYMPHOCYTES NFR BLD MANUAL: 0 %
LYMPHOCYTES NFR BLD MANUAL: 17 %
LYMPHOCYTES NFR BLD MANUAL: 2 %
LYMPHOCYTES NFR BLD MANUAL: 26 %
LYMPHOCYTES NFR BLD MANUAL: 3 %
LYMPHOCYTES NFR BLD MANUAL: 4 %
LYMPHOCYTES NFR BLD MANUAL: 49 %
LYMPHOCYTES NFR BLD MANUAL: 5 %
LYMPHOCYTES NFR BLD MANUAL: 5 %
LYMPHOCYTES NFR BLD MANUAL: 9 %
LYMPHOCYTES NFR BLD MANUAL: 9 %
MAGNESIUM SERPL-MCNC: 1.1 MG/DL (ref 1.7–2.3)
MAGNESIUM SERPL-MCNC: 1.2 MG/DL (ref 1.7–2.3)
MAGNESIUM SERPL-MCNC: 1.4 MG/DL (ref 1.7–2.3)
MAGNESIUM SERPL-MCNC: 1.5 MG/DL (ref 1.7–2.3)
MAGNESIUM SERPL-MCNC: 1.6 MG/DL (ref 1.7–2.3)
MAGNESIUM SERPL-MCNC: 1.7 MG/DL (ref 1.7–2.3)
MAGNESIUM SERPL-MCNC: 1.7 MG/DL (ref 1.7–2.3)
MAGNESIUM SERPL-MCNC: 1.8 MG/DL (ref 1.7–2.3)
MAGNESIUM SERPL-MCNC: 1.9 MG/DL (ref 1.7–2.3)
MAGNESIUM SERPL-MCNC: 2 MG/DL (ref 1.7–2.3)
MAGNESIUM SERPL-MCNC: 2.1 MG/DL (ref 1.7–2.3)
MAGNESIUM SERPL-MCNC: 2.2 MG/DL (ref 1.7–2.3)
MAGNESIUM SERPL-MCNC: 2.3 MG/DL (ref 1.7–2.3)
MAGNESIUM SERPL-MCNC: 2.4 MG/DL (ref 1.7–2.3)
MAGNESIUM SERPL-MCNC: 2.5 MG/DL (ref 1.7–2.3)
MAGNESIUM SERPL-MCNC: 2.8 MG/DL (ref 1.7–2.3)
MAGNESIUM SERPL-MCNC: 2.9 MG/DL (ref 1.7–2.3)
MCH RBC QN AUTO: 26.1 PG (ref 26.5–33)
MCH RBC QN AUTO: 26.2 PG (ref 26.5–33)
MCH RBC QN AUTO: 26.3 PG (ref 26.5–33)
MCH RBC QN AUTO: 26.3 PG (ref 26.5–33)
MCH RBC QN AUTO: 26.4 PG (ref 26.5–33)
MCH RBC QN AUTO: 26.5 PG (ref 26.5–33)
MCH RBC QN AUTO: 26.6 PG (ref 26.5–33)
MCH RBC QN AUTO: 26.7 PG (ref 26.5–33)
MCH RBC QN AUTO: 26.9 PG (ref 26.5–33)
MCH RBC QN AUTO: 26.9 PG (ref 26.5–33)
MCH RBC QN AUTO: 27 PG (ref 26.5–33)
MCH RBC QN AUTO: 27.2 PG (ref 26.5–33)
MCH RBC QN AUTO: 27.2 PG (ref 26.5–33)
MCH RBC QN AUTO: 27.3 PG (ref 26.5–33)
MCH RBC QN AUTO: 27.3 PG (ref 26.5–33)
MCH RBC QN AUTO: 27.4 PG (ref 26.5–33)
MCH RBC QN AUTO: 27.4 PG (ref 26.5–33)
MCH RBC QN AUTO: 27.5 PG (ref 26.5–33)
MCH RBC QN AUTO: 27.6 PG (ref 26.5–33)
MCH RBC QN AUTO: 27.6 PG (ref 26.5–33)
MCH RBC QN AUTO: 27.7 PG (ref 26.5–33)
MCH RBC QN AUTO: 27.7 PG (ref 26.5–33)
MCH RBC QN AUTO: 27.8 PG (ref 26.5–33)
MCH RBC QN AUTO: 32 PG (ref 26.5–33)
MCH RBC QN AUTO: 32.4 PG (ref 26.5–33)
MCH RBC QN AUTO: 33 PG (ref 26.5–33)
MCHC RBC AUTO-ENTMCNC: 28.8 G/DL (ref 31.5–36.5)
MCHC RBC AUTO-ENTMCNC: 29.3 G/DL (ref 31.5–36.5)
MCHC RBC AUTO-ENTMCNC: 29.4 G/DL (ref 31.5–36.5)
MCHC RBC AUTO-ENTMCNC: 29.5 G/DL (ref 31.5–36.5)
MCHC RBC AUTO-ENTMCNC: 29.6 G/DL (ref 31.5–36.5)
MCHC RBC AUTO-ENTMCNC: 29.6 G/DL (ref 31.5–36.5)
MCHC RBC AUTO-ENTMCNC: 29.7 G/DL (ref 31.5–36.5)
MCHC RBC AUTO-ENTMCNC: 29.7 G/DL (ref 31.5–36.5)
MCHC RBC AUTO-ENTMCNC: 29.9 G/DL (ref 31.5–36.5)
MCHC RBC AUTO-ENTMCNC: 30 G/DL (ref 31.5–36.5)
MCHC RBC AUTO-ENTMCNC: 30 G/DL (ref 31.5–36.5)
MCHC RBC AUTO-ENTMCNC: 30.1 G/DL (ref 31.5–36.5)
MCHC RBC AUTO-ENTMCNC: 30.2 G/DL (ref 31.5–36.5)
MCHC RBC AUTO-ENTMCNC: 30.3 G/DL (ref 31.5–36.5)
MCHC RBC AUTO-ENTMCNC: 30.4 G/DL (ref 31.5–36.5)
MCHC RBC AUTO-ENTMCNC: 30.5 G/DL (ref 31.5–36.5)
MCHC RBC AUTO-ENTMCNC: 30.5 G/DL (ref 31.5–36.5)
MCHC RBC AUTO-ENTMCNC: 30.9 G/DL (ref 31.5–36.5)
MCHC RBC AUTO-ENTMCNC: 31.2 G/DL (ref 31.5–36.5)
MCHC RBC AUTO-ENTMCNC: 31.2 G/DL (ref 31.5–36.5)
MCHC RBC AUTO-ENTMCNC: 31.6 G/DL (ref 31.5–36.5)
MCHC RBC AUTO-ENTMCNC: 31.6 G/DL (ref 31.5–36.5)
MCHC RBC AUTO-ENTMCNC: 31.7 G/DL (ref 31.5–36.5)
MCHC RBC AUTO-ENTMCNC: 31.8 G/DL (ref 31.5–36.5)
MCV RBC AUTO: 108 FL (ref 78–100)
MCV RBC AUTO: 109 FL (ref 78–100)
MCV RBC AUTO: 111 FL (ref 78–100)
MCV RBC AUTO: 83 FL (ref 78–100)
MCV RBC AUTO: 86 FL (ref 78–100)
MCV RBC AUTO: 86 FL (ref 78–100)
MCV RBC AUTO: 87 FL (ref 78–100)
MCV RBC AUTO: 88 FL (ref 78–100)
MCV RBC AUTO: 88 FL (ref 78–100)
MCV RBC AUTO: 89 FL (ref 78–100)
MCV RBC AUTO: 90 FL (ref 78–100)
MCV RBC AUTO: 91 FL (ref 78–100)
MCV RBC AUTO: 91 FL (ref 78–100)
MCV RBC AUTO: 92 FL (ref 78–100)
MCV RBC AUTO: 93 FL (ref 78–100)
MCV RBC AUTO: 94 FL (ref 78–100)
METAMYELOCYTES # BLD MANUAL: 0 10E3/UL
METAMYELOCYTES # BLD MANUAL: 0 10E3/UL
METAMYELOCYTES # BLD MANUAL: 0.2 10E3/UL
METAMYELOCYTES # BLD MANUAL: 0.2 10E3/UL
METAMYELOCYTES # BLD MANUAL: 0.6 10E3/UL
METAMYELOCYTES # BLD MANUAL: 0.8 10E3/UL
METAMYELOCYTES NFR BLD MANUAL: 1 %
METAMYELOCYTES NFR BLD MANUAL: 2 %
METAMYELOCYTES NFR BLD MANUAL: 3 %
METAMYELOCYTES NFR BLD MANUAL: 6 %
MONOCYTES # BLD AUTO: 0.8 10E3/UL (ref 0–1.3)
MONOCYTES # BLD AUTO: 1.1 10E3/UL (ref 0–1.3)
MONOCYTES # BLD AUTO: 1.2 10E3/UL (ref 0–1.3)
MONOCYTES # BLD AUTO: 1.8 10E3/UL (ref 0–1.3)
MONOCYTES # BLD AUTO: 1.8 10E3/UL (ref 0–1.3)
MONOCYTES # BLD MANUAL: 0 10E3/UL (ref 0–1.3)
MONOCYTES # BLD MANUAL: 0 10E3/UL (ref 0–1.3)
MONOCYTES # BLD MANUAL: 0.1 10E3/UL (ref 0–1.3)
MONOCYTES # BLD MANUAL: 0.1 10E3/UL (ref 0–1.3)
MONOCYTES # BLD MANUAL: 0.2 10E3/UL (ref 0–1.3)
MONOCYTES # BLD MANUAL: 0.6 10E3/UL (ref 0–1.3)
MONOCYTES # BLD MANUAL: 0.8 10E3/UL (ref 0–1.3)
MONOCYTES # BLD MANUAL: 0.8 10E3/UL (ref 0–1.3)
MONOCYTES # BLD MANUAL: 0.9 10E3/UL (ref 0–1.3)
MONOCYTES # BLD MANUAL: 0.9 10E3/UL (ref 0–1.3)
MONOCYTES # BLD MANUAL: 1 10E3/UL (ref 0–1.3)
MONOCYTES # BLD MANUAL: 1 10E3/UL (ref 0–1.3)
MONOCYTES # BLD MANUAL: 2.6 10E3/UL (ref 0–1.3)
MONOCYTES NFR BLD AUTO: 11 %
MONOCYTES NFR BLD AUTO: 4 %
MONOCYTES NFR BLD AUTO: 8 %
MONOCYTES NFR BLD AUTO: 8 %
MONOCYTES NFR BLD AUTO: 9 %
MONOCYTES NFR BLD MANUAL: 0 %
MONOCYTES NFR BLD MANUAL: 0 %
MONOCYTES NFR BLD MANUAL: 1 %
MONOCYTES NFR BLD MANUAL: 12 %
MONOCYTES NFR BLD MANUAL: 17 %
MONOCYTES NFR BLD MANUAL: 3 %
MONOCYTES NFR BLD MANUAL: 38 %
MONOCYTES NFR BLD MANUAL: 4 %
MONOCYTES NFR BLD MANUAL: 4 %
MONOCYTES NFR BLD MANUAL: 5 %
MUCOUS THREADS #/AREA URNS LPF: PRESENT /LPF
MUCOUS THREADS #/AREA URNS LPF: PRESENT /LPF
MYELOCYTES # BLD MANUAL: 0.2 10E3/UL
MYELOCYTES # BLD MANUAL: 0.2 10E3/UL
MYELOCYTES # BLD MANUAL: 0.4 10E3/UL
MYELOCYTES # BLD MANUAL: 0.6 10E3/UL
MYELOCYTES # BLD MANUAL: 0.8 10E3/UL
MYELOCYTES # BLD MANUAL: 0.9 10E3/UL
MYELOCYTES # BLD MANUAL: 1.2 10E3/UL
MYELOCYTES # BLD MANUAL: 1.3 10E3/UL
MYELOCYTES NFR BLD MANUAL: 1 %
MYELOCYTES NFR BLD MANUAL: 17 %
MYELOCYTES NFR BLD MANUAL: 2 %
MYELOCYTES NFR BLD MANUAL: 3 %
MYELOCYTES NFR BLD MANUAL: 4 %
MYELOCYTES NFR BLD MANUAL: 5 %
MYELOCYTES NFR BLD MANUAL: 9 %
MYELOCYTES NFR BLD MANUAL: 9 %
NEUTROPHILS # BLD AUTO: 13.2 10E3/UL (ref 1.6–8.3)
NEUTROPHILS # BLD AUTO: 15.8 10E3/UL (ref 1.6–8.3)
NEUTROPHILS # BLD AUTO: 17.8 10E3/UL (ref 1.6–8.3)
NEUTROPHILS # BLD AUTO: 21.1 10E3/UL (ref 1.6–8.3)
NEUTROPHILS # BLD AUTO: 8 10E3/UL (ref 1.6–8.3)
NEUTROPHILS # BLD MANUAL: 0.1 10E3/UL (ref 1.6–8.3)
NEUTROPHILS # BLD MANUAL: 0.8 10E3/UL (ref 1.6–8.3)
NEUTROPHILS # BLD MANUAL: 1.9 10E3/UL (ref 1.6–8.3)
NEUTROPHILS # BLD MANUAL: 11 10E3/UL (ref 1.6–8.3)
NEUTROPHILS # BLD MANUAL: 11.1 10E3/UL (ref 1.6–8.3)
NEUTROPHILS # BLD MANUAL: 15.7 10E3/UL (ref 1.6–8.3)
NEUTROPHILS # BLD MANUAL: 15.9 10E3/UL (ref 1.6–8.3)
NEUTROPHILS # BLD MANUAL: 16.2 10E3/UL (ref 1.6–8.3)
NEUTROPHILS # BLD MANUAL: 16.7 10E3/UL (ref 1.6–8.3)
NEUTROPHILS # BLD MANUAL: 17.2 10E3/UL (ref 1.6–8.3)
NEUTROPHILS # BLD MANUAL: 18.8 10E3/UL (ref 1.6–8.3)
NEUTROPHILS # BLD MANUAL: 19.7 10E3/UL (ref 1.6–8.3)
NEUTROPHILS # BLD MANUAL: 9.9 10E3/UL (ref 1.6–8.3)
NEUTROPHILS NFR BLD AUTO: 74 %
NEUTROPHILS NFR BLD AUTO: 77 %
NEUTROPHILS NFR BLD AUTO: 83 %
NEUTROPHILS NFR BLD AUTO: 83 %
NEUTROPHILS NFR BLD AUTO: 91 %
NEUTROPHILS NFR BLD MANUAL: 20 %
NEUTROPHILS NFR BLD MANUAL: 28 %
NEUTROPHILS NFR BLD MANUAL: 49 %
NEUTROPHILS NFR BLD MANUAL: 71 %
NEUTROPHILS NFR BLD MANUAL: 83 %
NEUTROPHILS NFR BLD MANUAL: 83 %
NEUTROPHILS NFR BLD MANUAL: 84 %
NEUTROPHILS NFR BLD MANUAL: 84 %
NEUTROPHILS NFR BLD MANUAL: 89 %
NEUTROPHILS NFR BLD MANUAL: 90 %
NEUTROPHILS NFR BLD MANUAL: 95 %
NEUTROPHILS NFR BLD MANUAL: 95 %
NEUTROPHILS NFR BLD MANUAL: 97 %
NITRATE UR QL: NEGATIVE
NITRATE UR QL: NEGATIVE
NOROV GI+II ORF1-ORF2 JNC STL QL NAA+PR: NOT DETECTED
NOROV GI+II ORF1-ORF2 JNC STL QL NAA+PR: NOT DETECTED
NRBC # BLD AUTO: 0 10E3/UL
NRBC # BLD AUTO: 0.1 10E3/UL
NRBC # BLD AUTO: 0.2 10E3/UL
NRBC # BLD AUTO: 0.2 10E3/UL
NRBC # BLD AUTO: 0.3 10E3/UL
NRBC # BLD AUTO: 0.4 10E3/UL
NRBC # BLD AUTO: 0.6 10E3/UL
NRBC # BLD AUTO: 0.6 10E3/UL
NRBC BLD AUTO-RTO: 0 /100
NRBC BLD AUTO-RTO: 1 /100
NRBC BLD AUTO-RTO: 1 /100
NRBC BLD AUTO-RTO: 2 /100
NRBC BLD MANUAL-RTO: 1 %
NRBC BLD MANUAL-RTO: 2 %
NRBC BLD MANUAL-RTO: 3 %
NRBC BLD MANUAL-RTO: 6 %
P AXIS - MUSE: 29 DEGREES
P AXIS - MUSE: 32 DEGREES
P AXIS - MUSE: NORMAL DEGREES
PCO2 BLDV: 38 MM HG (ref 40–50)
PH BLDV: 7.45 [PH] (ref 7.32–7.43)
PH BLDV: 7.45 [PH] (ref 7.32–7.43)
PH BLDV: 7.46 [PH] (ref 7.32–7.43)
PH UR STRIP: 6 [PH] (ref 5–7)
PH UR STRIP: 6 [PH] (ref 5–7)
PHOSPHATE SERPL-MCNC: 1.6 MG/DL (ref 2.5–4.5)
PHOSPHATE SERPL-MCNC: 1.8 MG/DL (ref 2.5–4.5)
PHOSPHATE SERPL-MCNC: 2 MG/DL (ref 2.5–4.5)
PHOSPHATE SERPL-MCNC: 2.2 MG/DL (ref 2.5–4.5)
PHOSPHATE SERPL-MCNC: 2.2 MG/DL (ref 2.5–4.5)
PHOSPHATE SERPL-MCNC: 2.3 MG/DL (ref 2.5–4.5)
PHOSPHATE SERPL-MCNC: 2.4 MG/DL (ref 2.5–4.5)
PHOSPHATE SERPL-MCNC: 2.6 MG/DL (ref 2.5–4.5)
PHOSPHATE SERPL-MCNC: 2.7 MG/DL (ref 2.5–4.5)
PHOSPHATE SERPL-MCNC: 3 MG/DL (ref 2.5–4.5)
PHOSPHATE SERPL-MCNC: 3.1 MG/DL (ref 2.5–4.5)
PHOSPHATE SERPL-MCNC: 3.1 MG/DL (ref 2.5–4.5)
PHOSPHATE SERPL-MCNC: 3.2 MG/DL (ref 2.5–4.5)
PHOSPHATE SERPL-MCNC: 3.5 MG/DL (ref 2.5–4.5)
PHOSPHATE SERPL-MCNC: 3.5 MG/DL (ref 2.5–4.5)
PHOSPHATE SERPL-MCNC: 4 MG/DL (ref 2.5–4.5)
PHOSPHATE SERPL-MCNC: 4 MG/DL (ref 2.5–4.5)
PLAT MORPH BLD: ABNORMAL
PLATELET # BLD AUTO: 14 10E3/UL (ref 150–450)
PLATELET # BLD AUTO: 15 10E3/UL (ref 150–450)
PLATELET # BLD AUTO: 177 10E3/UL (ref 150–450)
PLATELET # BLD AUTO: 183 10E3/UL (ref 150–450)
PLATELET # BLD AUTO: 207 10E3/UL (ref 150–450)
PLATELET # BLD AUTO: 21 10E3/UL (ref 150–450)
PLATELET # BLD AUTO: 24 10E3/UL (ref 150–450)
PLATELET # BLD AUTO: 242 10E3/UL (ref 150–450)
PLATELET # BLD AUTO: 267 10E3/UL (ref 150–450)
PLATELET # BLD AUTO: 27 10E3/UL (ref 150–450)
PLATELET # BLD AUTO: 28 10E3/UL (ref 150–450)
PLATELET # BLD AUTO: 297 10E3/UL (ref 150–450)
PLATELET # BLD AUTO: 30 10E3/UL (ref 150–450)
PLATELET # BLD AUTO: 303 10E3/UL (ref 150–450)
PLATELET # BLD AUTO: 311 10E3/UL (ref 150–450)
PLATELET # BLD AUTO: 316 10E3/UL (ref 150–450)
PLATELET # BLD AUTO: 323 10E3/UL (ref 150–450)
PLATELET # BLD AUTO: 336 10E3/UL (ref 150–450)
PLATELET # BLD AUTO: 350 10E3/UL (ref 150–450)
PLATELET # BLD AUTO: 370 10E3/UL (ref 150–450)
PLATELET # BLD AUTO: 39 10E3/UL (ref 150–450)
PLATELET # BLD AUTO: 438 10E3/UL (ref 150–450)
PLATELET # BLD AUTO: 476 10E3/UL (ref 150–450)
PLATELET # BLD AUTO: 500 10E3/UL (ref 150–450)
PLATELET # BLD AUTO: 521 10E3/UL (ref 150–450)
PLATELET # BLD AUTO: ABNORMAL 10*3/UL
PLATELET # BLD AUTO: ABNORMAL 10*3/UL
PO2 BLDV: 28 MM HG (ref 25–47)
PO2 BLDV: 60 MM HG (ref 25–47)
PO2 BLDV: 63 MM HG (ref 25–47)
POLYCHROMASIA BLD QL SMEAR: SLIGHT
POTASSIUM BLD-SCNC: 1.4 MMOL/L (ref 3.4–5.3)
POTASSIUM BLD-SCNC: 2.3 MMOL/L (ref 3.4–5.3)
POTASSIUM BLD-SCNC: 2.7 MMOL/L (ref 3.4–5.3)
POTASSIUM SERPL-SCNC: 2.2 MMOL/L (ref 3.4–5.3)
POTASSIUM SERPL-SCNC: 2.3 MMOL/L (ref 3.4–5.3)
POTASSIUM SERPL-SCNC: 2.5 MMOL/L (ref 3.4–5.3)
POTASSIUM SERPL-SCNC: 2.6 MMOL/L (ref 3.4–5.3)
POTASSIUM SERPL-SCNC: 2.7 MMOL/L (ref 3.4–5.3)
POTASSIUM SERPL-SCNC: 2.8 MMOL/L (ref 3.4–5.3)
POTASSIUM SERPL-SCNC: 2.9 MMOL/L (ref 3.4–5.3)
POTASSIUM SERPL-SCNC: 3 MMOL/L (ref 3.4–5.3)
POTASSIUM SERPL-SCNC: 3.1 MMOL/L (ref 3.4–5.3)
POTASSIUM SERPL-SCNC: 3.2 MMOL/L (ref 3.4–5.3)
POTASSIUM SERPL-SCNC: 3.3 MMOL/L (ref 3.4–5.3)
POTASSIUM SERPL-SCNC: 3.3 MMOL/L (ref 3.4–5.3)
POTASSIUM SERPL-SCNC: 3.4 MMOL/L (ref 3.4–5.3)
POTASSIUM SERPL-SCNC: 3.5 MMOL/L (ref 3.4–5.3)
POTASSIUM SERPL-SCNC: 3.6 MMOL/L (ref 3.4–5.3)
POTASSIUM SERPL-SCNC: 3.6 MMOL/L (ref 3.4–5.3)
POTASSIUM SERPL-SCNC: 3.7 MMOL/L (ref 3.4–5.3)
POTASSIUM SERPL-SCNC: 3.7 MMOL/L (ref 3.4–5.3)
POTASSIUM SERPL-SCNC: 3.8 MMOL/L (ref 3.4–5.3)
POTASSIUM SERPL-SCNC: 3.9 MMOL/L (ref 3.4–5.3)
POTASSIUM SERPL-SCNC: 3.9 MMOL/L (ref 3.4–5.3)
POTASSIUM SERPL-SCNC: 4 MMOL/L (ref 3.4–5.3)
POTASSIUM SERPL-SCNC: 4 MMOL/L (ref 3.4–5.3)
POTASSIUM SERPL-SCNC: 4.1 MMOL/L (ref 3.4–5.3)
POTASSIUM SERPL-SCNC: 4.2 MMOL/L (ref 3.4–5.3)
POTASSIUM SERPL-SCNC: 4.3 MMOL/L (ref 3.4–5.3)
POTASSIUM SERPL-SCNC: 4.3 MMOL/L (ref 3.4–5.3)
POTASSIUM SERPL-SCNC: 4.4 MMOL/L (ref 3.4–5.3)
POTASSIUM SERPL-SCNC: 4.5 MMOL/L (ref 3.4–5.3)
PR INTERVAL - MUSE: 116 MS
PR INTERVAL - MUSE: 144 MS
PR INTERVAL - MUSE: NORMAL MS
PROMYELOCYTES # BLD MANUAL: 0 10E3/UL
PROMYELOCYTES # BLD MANUAL: 0.7 10E3/UL
PROMYELOCYTES NFR BLD MANUAL: 2 %
PROMYELOCYTES NFR BLD MANUAL: 5 %
PROT SERPL-MCNC: 6 G/DL (ref 6.4–8.3)
PROT SERPL-MCNC: 6.1 G/DL (ref 6.4–8.3)
PROT SERPL-MCNC: 6.9 G/DL (ref 6.4–8.3)
PROT SERPL-MCNC: 7.3 G/DL (ref 6.4–8.3)
PROT SERPL-MCNC: 7.6 G/DL (ref 6.4–8.3)
PROT SERPL-MCNC: 7.8 G/DL (ref 6.4–8.3)
PROT SERPL-MCNC: 7.9 G/DL (ref 6.4–8.3)
PROT SERPL-MCNC: 8 G/DL (ref 6.4–8.3)
PROT SERPL-MCNC: 8.3 G/DL (ref 6.4–8.3)
QRS DURATION - MUSE: 74 MS
QRS DURATION - MUSE: 74 MS
QRS DURATION - MUSE: 86 MS
QT - MUSE: 266 MS
QT - MUSE: 286 MS
QT - MUSE: 292 MS
QTC - MUSE: 422 MS
QTC - MUSE: 450 MS
QTC - MUSE: 466 MS
R AXIS - MUSE: 15 DEGREES
R AXIS - MUSE: 2 DEGREES
R AXIS - MUSE: 21 DEGREES
RBC # BLD AUTO: 1.69 10E6/UL (ref 4.4–5.9)
RBC # BLD AUTO: 1.97 10E6/UL (ref 4.4–5.9)
RBC # BLD AUTO: 2.03 10E6/UL (ref 4.4–5.9)
RBC # BLD AUTO: 2.22 10E6/UL (ref 4.4–5.9)
RBC # BLD AUTO: 2.24 10E6/UL (ref 4.4–5.9)
RBC # BLD AUTO: 2.54 10E6/UL (ref 4.4–5.9)
RBC # BLD AUTO: 2.58 10E6/UL (ref 4.4–5.9)
RBC # BLD AUTO: 2.61 10E6/UL (ref 4.4–5.9)
RBC # BLD AUTO: 2.63 10E6/UL (ref 4.4–5.9)
RBC # BLD AUTO: 2.66 10E6/UL (ref 4.4–5.9)
RBC # BLD AUTO: 2.67 10E6/UL (ref 4.4–5.9)
RBC # BLD AUTO: 2.71 10E6/UL (ref 4.4–5.9)
RBC # BLD AUTO: 2.72 10E6/UL (ref 4.4–5.9)
RBC # BLD AUTO: 2.74 10E6/UL (ref 4.4–5.9)
RBC # BLD AUTO: 2.74 10E6/UL (ref 4.4–5.9)
RBC # BLD AUTO: 2.75 10E6/UL (ref 4.4–5.9)
RBC # BLD AUTO: 2.76 10E6/UL (ref 4.4–5.9)
RBC # BLD AUTO: 2.82 10E6/UL (ref 4.4–5.9)
RBC # BLD AUTO: 2.85 10E6/UL (ref 4.4–5.9)
RBC # BLD AUTO: 2.86 10E6/UL (ref 4.4–5.9)
RBC # BLD AUTO: 2.91 10E6/UL (ref 4.4–5.9)
RBC # BLD AUTO: 2.91 10E6/UL (ref 4.4–5.9)
RBC # BLD AUTO: 2.96 10E6/UL (ref 4.4–5.9)
RBC # BLD AUTO: 2.98 10E6/UL (ref 4.4–5.9)
RBC # BLD AUTO: 3.12 10E6/UL (ref 4.4–5.9)
RBC # BLD AUTO: 3.14 10E6/UL (ref 4.4–5.9)
RBC # BLD AUTO: 3.16 10E6/UL (ref 4.4–5.9)
RBC # BLD AUTO: 3.39 10E6/UL (ref 4.4–5.9)
RBC # BLD AUTO: 3.4 10E6/UL (ref 4.4–5.9)
RBC MORPH BLD: ABNORMAL
RBC URINE: 1 /HPF
RBC URINE: 4 /HPF
RVA NSP5 STL QL NAA+PROBE: NOT DETECTED
RVA NSP5 STL QL NAA+PROBE: NOT DETECTED
SALMONELLA SP RPOD STL QL NAA+PROBE: NOT DETECTED
SALMONELLA SP RPOD STL QL NAA+PROBE: NOT DETECTED
SAO2 % BLDV: 56 % (ref 94–100)
SAO2 % BLDV: 92 % (ref 94–100)
SAO2 % BLDV: 93 % (ref 94–100)
SARS-COV-2 RNA RESP QL NAA+PROBE: NEGATIVE
SARS-COV-2 RNA RESP QL NAA+PROBE: NEGATIVE
SHIGELLA SP+EIEC IPAH STL QL NAA+PROBE: NOT DETECTED
SHIGELLA SP+EIEC IPAH STL QL NAA+PROBE: NOT DETECTED
SODIUM BLD-SCNC: 136 MMOL/L (ref 133–144)
SODIUM BLD-SCNC: 139 MMOL/L (ref 133–144)
SODIUM SERPL-SCNC: 131 MMOL/L (ref 136–145)
SODIUM SERPL-SCNC: 132 MMOL/L (ref 136–145)
SODIUM SERPL-SCNC: 134 MMOL/L (ref 136–145)
SODIUM SERPL-SCNC: 134 MMOL/L (ref 136–145)
SODIUM SERPL-SCNC: 135 MMOL/L (ref 136–145)
SODIUM SERPL-SCNC: 135 MMOL/L (ref 136–145)
SODIUM SERPL-SCNC: 136 MMOL/L (ref 136–145)
SODIUM SERPL-SCNC: 137 MMOL/L (ref 136–145)
SODIUM SERPL-SCNC: 138 MMOL/L (ref 136–145)
SODIUM SERPL-SCNC: 138 MMOL/L (ref 136–145)
SODIUM SERPL-SCNC: 139 MMOL/L (ref 136–145)
SODIUM SERPL-SCNC: 140 MMOL/L (ref 136–145)
SODIUM SERPL-SCNC: 142 MMOL/L (ref 136–145)
SODIUM SERPL-SCNC: 142 MMOL/L (ref 136–145)
SP GR UR STRIP: 1.01 (ref 1–1.03)
SP GR UR STRIP: 1.02 (ref 1–1.03)
SPECIMEN EXPIRATION DATE: NORMAL
SQUAMOUS EPITHELIAL: 1 /HPF
SQUAMOUS EPITHELIAL: <1 /HPF
STFR SERPL-MCNC: 4.9 MG/L
SYSTOLIC BLOOD PRESSURE - MUSE: NORMAL MMHG
T AXIS - MUSE: -14 DEGREES
T AXIS - MUSE: 19 DEGREES
T AXIS - MUSE: 35 DEGREES
T4 FREE SERPL-MCNC: 0.88 NG/DL (ref 0.9–1.7)
TOXIC GRANULES BLD QL SMEAR: PRESENT
TOXIC GRANULES BLD QL SMEAR: PRESENT
TROPONIN T SERPL HS-MCNC: 43 NG/L
TSH SERPL DL<=0.005 MIU/L-ACNC: 6.28 UIU/ML (ref 0.3–4.2)
UNIT ABO/RH: NORMAL
UNIT NUMBER: NORMAL
UNIT STATUS: NORMAL
UNIT TYPE ISBT: 7300
UROBILINOGEN UR STRIP-MCNC: NORMAL MG/DL
UROBILINOGEN UR STRIP-MCNC: NORMAL MG/DL
V CHOL+PARA RFBL+TRKH+TNAA STL QL NAA+PR: NOT DETECTED
V CHOL+PARA RFBL+TRKH+TNAA STL QL NAA+PR: NOT DETECTED
VANCOMYCIN SERPL-MCNC: 32.2 UG/ML
VARIANT LYMPHS BLD QL SMEAR: PRESENT
VENTRICULAR RATE- MUSE: 143 BPM
VENTRICULAR RATE- MUSE: 152 BPM
VENTRICULAR RATE- MUSE: 160 BPM
WBC # BLD AUTO: 0.2 10E3/UL (ref 4–11)
WBC # BLD AUTO: 0.3 10E3/UL (ref 4–11)
WBC # BLD AUTO: 0.3 10E3/UL (ref 4–11)
WBC # BLD AUTO: 0.5 10E3/UL (ref 4–11)
WBC # BLD AUTO: 0.6 10E3/UL (ref 4–11)
WBC # BLD AUTO: 1.7 10E3/UL (ref 4–11)
WBC # BLD AUTO: 10.7 10E3/UL (ref 4–11)
WBC # BLD AUTO: 11.3 10E3/UL (ref 4–11)
WBC # BLD AUTO: 11.6 10E3/UL (ref 4–11)
WBC # BLD AUTO: 11.7 10E3/UL (ref 4–11)
WBC # BLD AUTO: 14 10E3/UL (ref 4–11)
WBC # BLD AUTO: 16 10E3/UL (ref 4–11)
WBC # BLD AUTO: 18.7 10E3/UL (ref 4–11)
WBC # BLD AUTO: 18.8 10E3/UL (ref 4–11)
WBC # BLD AUTO: 19.2 10E3/UL (ref 4–11)
WBC # BLD AUTO: 19.5 10E3/UL (ref 4–11)
WBC # BLD AUTO: 20.5 10E3/UL (ref 4–11)
WBC # BLD AUTO: 20.6 10E3/UL (ref 4–11)
WBC # BLD AUTO: 20.7 10E3/UL (ref 4–11)
WBC # BLD AUTO: 20.9 10E3/UL (ref 4–11)
WBC # BLD AUTO: 21.1 10E3/UL (ref 4–11)
WBC # BLD AUTO: 21.3 10E3/UL (ref 4–11)
WBC # BLD AUTO: 21.5 10E3/UL (ref 4–11)
WBC # BLD AUTO: 22.3 10E3/UL (ref 4–11)
WBC # BLD AUTO: 23.1 10E3/UL (ref 4–11)
WBC # BLD AUTO: 24.9 10E3/UL (ref 4–11)
WBC # BLD AUTO: 6.9 10E3/UL (ref 4–11)
WBC URINE: 1 /HPF
WBC URINE: 4 /HPF
Y ENTERO RECN STL QL NAA+PROBE: NOT DETECTED
Y ENTERO RECN STL QL NAA+PROBE: NOT DETECTED

## 2023-01-01 PROCEDURE — 36591 DRAW BLOOD OFF VENOUS DEVICE: CPT | Performed by: PHYSICIAN ASSISTANT

## 2023-01-01 PROCEDURE — 80051 ELECTROLYTE PANEL: CPT

## 2023-01-01 PROCEDURE — 250N000011 HC RX IP 250 OP 636

## 2023-01-01 PROCEDURE — 250N000011 HC RX IP 250 OP 636: Performed by: PHYSICIAN ASSISTANT

## 2023-01-01 PROCEDURE — 99417 PROLNG OP E/M EACH 15 MIN: CPT | Performed by: PHYSICIAN ASSISTANT

## 2023-01-01 PROCEDURE — 83735 ASSAY OF MAGNESIUM: CPT

## 2023-01-01 PROCEDURE — 250N000011 HC RX IP 250 OP 636: Performed by: STUDENT IN AN ORGANIZED HEALTH CARE EDUCATION/TRAINING PROGRAM

## 2023-01-01 PROCEDURE — 86923 COMPATIBILITY TEST ELECTRIC: CPT | Performed by: STUDENT IN AN ORGANIZED HEALTH CARE EDUCATION/TRAINING PROGRAM

## 2023-01-01 PROCEDURE — 250N000011 HC RX IP 250 OP 636: Performed by: INTERNAL MEDICINE

## 2023-01-01 PROCEDURE — 250N000013 HC RX MED GY IP 250 OP 250 PS 637: Performed by: STUDENT IN AN ORGANIZED HEALTH CARE EDUCATION/TRAINING PROGRAM

## 2023-01-01 PROCEDURE — 84132 ASSAY OF SERUM POTASSIUM: CPT | Performed by: PEDIATRICS

## 2023-01-01 PROCEDURE — 80053 COMPREHEN METABOLIC PANEL: CPT

## 2023-01-01 PROCEDURE — 250N000011 HC RX IP 250 OP 636: Performed by: EMERGENCY MEDICINE

## 2023-01-01 PROCEDURE — 80048 BASIC METABOLIC PNL TOTAL CA: CPT

## 2023-01-01 PROCEDURE — 97110 THERAPEUTIC EXERCISES: CPT | Mod: GP

## 2023-01-01 PROCEDURE — 99215 OFFICE O/P EST HI 40 MIN: CPT | Performed by: STUDENT IN AN ORGANIZED HEALTH CARE EDUCATION/TRAINING PROGRAM

## 2023-01-01 PROCEDURE — 96367 TX/PROPH/DG ADDL SEQ IV INF: CPT

## 2023-01-01 PROCEDURE — 84100 ASSAY OF PHOSPHORUS: CPT

## 2023-01-01 PROCEDURE — 36592 COLLECT BLOOD FROM PICC: CPT | Performed by: INTERNAL MEDICINE

## 2023-01-01 PROCEDURE — 86850 RBC ANTIBODY SCREEN: CPT | Performed by: EMERGENCY MEDICINE

## 2023-01-01 PROCEDURE — 82947 ASSAY GLUCOSE BLOOD QUANT: CPT | Performed by: STUDENT IN AN ORGANIZED HEALTH CARE EDUCATION/TRAINING PROGRAM

## 2023-01-01 PROCEDURE — 99417 PROLNG OP E/M EACH 15 MIN: CPT | Performed by: STUDENT IN AN ORGANIZED HEALTH CARE EDUCATION/TRAINING PROGRAM

## 2023-01-01 PROCEDURE — 86850 RBC ANTIBODY SCREEN: CPT | Performed by: STUDENT IN AN ORGANIZED HEALTH CARE EDUCATION/TRAINING PROGRAM

## 2023-01-01 PROCEDURE — 99418 PROLNG IP/OBS E/M EA 15 MIN: CPT | Performed by: PEDIATRICS

## 2023-01-01 PROCEDURE — 82962 GLUCOSE BLOOD TEST: CPT | Performed by: STUDENT IN AN ORGANIZED HEALTH CARE EDUCATION/TRAINING PROGRAM

## 2023-01-01 PROCEDURE — 85007 BL SMEAR W/DIFF WBC COUNT: CPT | Performed by: STUDENT IN AN ORGANIZED HEALTH CARE EDUCATION/TRAINING PROGRAM

## 2023-01-01 PROCEDURE — P9016 RBC LEUKOCYTES REDUCED: HCPCS | Performed by: EMERGENCY MEDICINE

## 2023-01-01 PROCEDURE — 85018 HEMOGLOBIN: CPT

## 2023-01-01 PROCEDURE — 36591 DRAW BLOOD OFF VENOUS DEVICE: CPT

## 2023-01-01 PROCEDURE — G0463 HOSPITAL OUTPT CLINIC VISIT: HCPCS | Mod: 25 | Performed by: STUDENT IN AN ORGANIZED HEALTH CARE EDUCATION/TRAINING PROGRAM

## 2023-01-01 PROCEDURE — 85007 BL SMEAR W/DIFF WBC COUNT: CPT

## 2023-01-01 PROCEDURE — 99233 SBSQ HOSP IP/OBS HIGH 50: CPT | Performed by: HOSPITALIST

## 2023-01-01 PROCEDURE — 83735 ASSAY OF MAGNESIUM: CPT | Performed by: STUDENT IN AN ORGANIZED HEALTH CARE EDUCATION/TRAINING PROGRAM

## 2023-01-01 PROCEDURE — 97116 GAIT TRAINING THERAPY: CPT | Mod: GP

## 2023-01-01 PROCEDURE — 99207 PR APP CREDIT; MD BILLING SHARED VISIT: CPT

## 2023-01-01 PROCEDURE — 84132 ASSAY OF SERUM POTASSIUM: CPT | Performed by: HOSPITALIST

## 2023-01-01 PROCEDURE — 99232 SBSQ HOSP IP/OBS MODERATE 35: CPT | Mod: FS | Performed by: PEDIATRICS

## 2023-01-01 PROCEDURE — 82947 ASSAY GLUCOSE BLOOD QUANT: CPT

## 2023-01-01 PROCEDURE — 120N000002 HC R&B MED SURG/OB UMMC

## 2023-01-01 PROCEDURE — 99291 CRITICAL CARE FIRST HOUR: CPT | Mod: 25 | Performed by: EMERGENCY MEDICINE

## 2023-01-01 PROCEDURE — 80069 RENAL FUNCTION PANEL: CPT | Performed by: STUDENT IN AN ORGANIZED HEALTH CARE EDUCATION/TRAINING PROGRAM

## 2023-01-01 PROCEDURE — 85027 COMPLETE CBC AUTOMATED: CPT | Performed by: STUDENT IN AN ORGANIZED HEALTH CARE EDUCATION/TRAINING PROGRAM

## 2023-01-01 PROCEDURE — 84132 ASSAY OF SERUM POTASSIUM: CPT | Performed by: STUDENT IN AN ORGANIZED HEALTH CARE EDUCATION/TRAINING PROGRAM

## 2023-01-01 PROCEDURE — 999N000285 HC STATISTIC VASC ACCESS LAB DRAW WITH PIV START

## 2023-01-01 PROCEDURE — 83735 ASSAY OF MAGNESIUM: CPT | Performed by: EMERGENCY MEDICINE

## 2023-01-01 PROCEDURE — 84100 ASSAY OF PHOSPHORUS: CPT | Performed by: STUDENT IN AN ORGANIZED HEALTH CARE EDUCATION/TRAINING PROGRAM

## 2023-01-01 PROCEDURE — 258N000003 HC RX IP 258 OP 636: Performed by: INTERNAL MEDICINE

## 2023-01-01 PROCEDURE — 71045 X-RAY EXAM CHEST 1 VIEW: CPT

## 2023-01-01 PROCEDURE — 71046 X-RAY EXAM CHEST 2 VIEWS: CPT | Mod: GC | Performed by: RADIOLOGY

## 2023-01-01 PROCEDURE — 99223 1ST HOSP IP/OBS HIGH 75: CPT | Performed by: STUDENT IN AN ORGANIZED HEALTH CARE EDUCATION/TRAINING PROGRAM

## 2023-01-01 PROCEDURE — 80048 BASIC METABOLIC PNL TOTAL CA: CPT | Performed by: INTERNAL MEDICINE

## 2023-01-01 PROCEDURE — 85027 COMPLETE CBC AUTOMATED: CPT

## 2023-01-01 PROCEDURE — 99232 SBSQ HOSP IP/OBS MODERATE 35: CPT | Performed by: STUDENT IN AN ORGANIZED HEALTH CARE EDUCATION/TRAINING PROGRAM

## 2023-01-01 PROCEDURE — 83605 ASSAY OF LACTIC ACID: CPT | Performed by: STUDENT IN AN ORGANIZED HEALTH CARE EDUCATION/TRAINING PROGRAM

## 2023-01-01 PROCEDURE — 80048 BASIC METABOLIC PNL TOTAL CA: CPT | Performed by: STUDENT IN AN ORGANIZED HEALTH CARE EDUCATION/TRAINING PROGRAM

## 2023-01-01 PROCEDURE — 258N000001 HC RX 258: Performed by: PHYSICIAN ASSISTANT

## 2023-01-01 PROCEDURE — 258N000003 HC RX IP 258 OP 636: Performed by: PHYSICIAN ASSISTANT

## 2023-01-01 PROCEDURE — 36415 COLL VENOUS BLD VENIPUNCTURE: CPT

## 2023-01-01 PROCEDURE — 120N000003 HC R&B IMCU UMMC

## 2023-01-01 PROCEDURE — 36592 COLLECT BLOOD FROM PICC: CPT | Performed by: PEDIATRICS

## 2023-01-01 PROCEDURE — 250N000013 HC RX MED GY IP 250 OP 250 PS 637

## 2023-01-01 PROCEDURE — 82962 GLUCOSE BLOOD TEST: CPT

## 2023-01-01 PROCEDURE — 99239 HOSP IP/OBS DSCHRG MGMT >30: CPT | Performed by: HOSPITALIST

## 2023-01-01 PROCEDURE — 96361 HYDRATE IV INFUSION ADD-ON: CPT | Performed by: EMERGENCY MEDICINE

## 2023-01-01 PROCEDURE — 86901 BLOOD TYPING SEROLOGIC RH(D): CPT | Performed by: EMERGENCY MEDICINE

## 2023-01-01 PROCEDURE — 71045 X-RAY EXAM CHEST 1 VIEW: CPT | Mod: 26 | Performed by: RADIOLOGY

## 2023-01-01 PROCEDURE — 82565 ASSAY OF CREATININE: CPT

## 2023-01-01 PROCEDURE — 71260 CT THORAX DX C+: CPT | Mod: GC | Performed by: RADIOLOGY

## 2023-01-01 PROCEDURE — 74177 CT ABD & PELVIS W/CONTRAST: CPT | Mod: 26 | Performed by: RADIOLOGY

## 2023-01-01 PROCEDURE — 258N000003 HC RX IP 258 OP 636

## 2023-01-01 PROCEDURE — 96376 TX/PRO/DX INJ SAME DRUG ADON: CPT | Performed by: EMERGENCY MEDICINE

## 2023-01-01 PROCEDURE — G0463 HOSPITAL OUTPT CLINIC VISIT: HCPCS

## 2023-01-01 PROCEDURE — 99232 SBSQ HOSP IP/OBS MODERATE 35: CPT | Performed by: INTERNAL MEDICINE

## 2023-01-01 PROCEDURE — 370N000017 HC ANESTHESIA TECHNICAL FEE, PER MIN: Performed by: THORACIC SURGERY (CARDIOTHORACIC VASCULAR SURGERY)

## 2023-01-01 PROCEDURE — 99232 SBSQ HOSP IP/OBS MODERATE 35: CPT | Mod: GC | Performed by: INTERNAL MEDICINE

## 2023-01-01 PROCEDURE — 250N000012 HC RX MED GY IP 250 OP 636 PS 637: Performed by: STUDENT IN AN ORGANIZED HEALTH CARE EDUCATION/TRAINING PROGRAM

## 2023-01-01 PROCEDURE — 80053 COMPREHEN METABOLIC PANEL: CPT | Performed by: STUDENT IN AN ORGANIZED HEALTH CARE EDUCATION/TRAINING PROGRAM

## 2023-01-01 PROCEDURE — 96375 TX/PRO/DX INJ NEW DRUG ADDON: CPT | Performed by: EMERGENCY MEDICINE

## 2023-01-01 PROCEDURE — 83690 ASSAY OF LIPASE: CPT | Performed by: STUDENT IN AN ORGANIZED HEALTH CARE EDUCATION/TRAINING PROGRAM

## 2023-01-01 PROCEDURE — 84132 ASSAY OF SERUM POTASSIUM: CPT

## 2023-01-01 PROCEDURE — 85041 AUTOMATED RBC COUNT: CPT | Performed by: STUDENT IN AN ORGANIZED HEALTH CARE EDUCATION/TRAINING PROGRAM

## 2023-01-01 PROCEDURE — 85007 BL SMEAR W/DIFF WBC COUNT: CPT | Performed by: INTERNAL MEDICINE

## 2023-01-01 PROCEDURE — 96366 THER/PROPH/DIAG IV INF ADDON: CPT

## 2023-01-01 PROCEDURE — G0463 HOSPITAL OUTPT CLINIC VISIT: HCPCS | Mod: 27

## 2023-01-01 PROCEDURE — 258N000003 HC RX IP 258 OP 636: Performed by: STUDENT IN AN ORGANIZED HEALTH CARE EDUCATION/TRAINING PROGRAM

## 2023-01-01 PROCEDURE — 258N000001 HC RX 258: Performed by: INTERNAL MEDICINE

## 2023-01-01 PROCEDURE — 96413 CHEMO IV INFUSION 1 HR: CPT

## 2023-01-01 PROCEDURE — 96372 THER/PROPH/DIAG INJ SC/IM: CPT | Performed by: STUDENT IN AN ORGANIZED HEALTH CARE EDUCATION/TRAINING PROGRAM

## 2023-01-01 PROCEDURE — 99223 1ST HOSP IP/OBS HIGH 75: CPT | Mod: GC | Performed by: STUDENT IN AN ORGANIZED HEALTH CARE EDUCATION/TRAINING PROGRAM

## 2023-01-01 PROCEDURE — 82803 BLOOD GASES ANY COMBINATION: CPT

## 2023-01-01 PROCEDURE — 80053 COMPREHEN METABOLIC PANEL: CPT | Performed by: PHYSICIAN ASSISTANT

## 2023-01-01 PROCEDURE — 250N000011 HC RX IP 250 OP 636: Performed by: HOSPITALIST

## 2023-01-01 PROCEDURE — 360N000082 HC SURGERY LEVEL 2 W/ FLUORO, PER MIN: Performed by: THORACIC SURGERY (CARDIOTHORACIC VASCULAR SURGERY)

## 2023-01-01 PROCEDURE — 258N000001 HC RX 258: Performed by: PEDIATRICS

## 2023-01-01 PROCEDURE — 36415 COLL VENOUS BLD VENIPUNCTURE: CPT | Performed by: STUDENT IN AN ORGANIZED HEALTH CARE EDUCATION/TRAINING PROGRAM

## 2023-01-01 PROCEDURE — 84443 ASSAY THYROID STIM HORMONE: CPT | Performed by: EMERGENCY MEDICINE

## 2023-01-01 PROCEDURE — 84132 ASSAY OF SERUM POTASSIUM: CPT | Performed by: INTERNAL MEDICINE

## 2023-01-01 PROCEDURE — 36591 DRAW BLOOD OFF VENOUS DEVICE: CPT | Performed by: STUDENT IN AN ORGANIZED HEALTH CARE EDUCATION/TRAINING PROGRAM

## 2023-01-01 PROCEDURE — 83605 ASSAY OF LACTIC ACID: CPT | Performed by: EMERGENCY MEDICINE

## 2023-01-01 PROCEDURE — 93010 ELECTROCARDIOGRAM REPORT: CPT | Performed by: EMERGENCY MEDICINE

## 2023-01-01 PROCEDURE — 99418 PROLNG IP/OBS E/M EA 15 MIN: CPT | Performed by: PHYSICIAN ASSISTANT

## 2023-01-01 PROCEDURE — 99222 1ST HOSP IP/OBS MODERATE 55: CPT | Mod: GC | Performed by: STUDENT IN AN ORGANIZED HEALTH CARE EDUCATION/TRAINING PROGRAM

## 2023-01-01 PROCEDURE — P9016 RBC LEUKOCYTES REDUCED: HCPCS | Performed by: HOSPITALIST

## 2023-01-01 PROCEDURE — 76705 ECHO EXAM OF ABDOMEN: CPT | Mod: 26 | Performed by: STUDENT IN AN ORGANIZED HEALTH CARE EDUCATION/TRAINING PROGRAM

## 2023-01-01 PROCEDURE — G0463 HOSPITAL OUTPT CLINIC VISIT: HCPCS | Mod: 25

## 2023-01-01 PROCEDURE — 710N000012 HC RECOVERY PHASE 2, PER MINUTE: Performed by: THORACIC SURGERY (CARDIOTHORACIC VASCULAR SURGERY)

## 2023-01-01 PROCEDURE — 82310 ASSAY OF CALCIUM: CPT | Performed by: STUDENT IN AN ORGANIZED HEALTH CARE EDUCATION/TRAINING PROGRAM

## 2023-01-01 PROCEDURE — 76705 ECHO EXAM OF ABDOMEN: CPT

## 2023-01-01 PROCEDURE — 99285 EMERGENCY DEPT VISIT HI MDM: CPT | Performed by: EMERGENCY MEDICINE

## 2023-01-01 PROCEDURE — 85004 AUTOMATED DIFF WBC COUNT: CPT | Performed by: STUDENT IN AN ORGANIZED HEALTH CARE EDUCATION/TRAINING PROGRAM

## 2023-01-01 PROCEDURE — 258N000001 HC RX 258: Performed by: EMERGENCY MEDICINE

## 2023-01-01 PROCEDURE — 250N000009 HC RX 250

## 2023-01-01 PROCEDURE — 99233 SBSQ HOSP IP/OBS HIGH 50: CPT | Mod: GC | Performed by: STUDENT IN AN ORGANIZED HEALTH CARE EDUCATION/TRAINING PROGRAM

## 2023-01-01 PROCEDURE — 250N000013 HC RX MED GY IP 250 OP 250 PS 637: Performed by: PHYSICIAN ASSISTANT

## 2023-01-01 PROCEDURE — 36415 COLL VENOUS BLD VENIPUNCTURE: CPT | Performed by: EMERGENCY MEDICINE

## 2023-01-01 PROCEDURE — 250N000011 HC RX IP 250 OP 636: Performed by: THORACIC SURGERY (CARDIOTHORACIC VASCULAR SURGERY)

## 2023-01-01 PROCEDURE — 99233 SBSQ HOSP IP/OBS HIGH 50: CPT | Performed by: PHYSICIAN ASSISTANT

## 2023-01-01 PROCEDURE — 99232 SBSQ HOSP IP/OBS MODERATE 35: CPT | Performed by: PHYSICIAN ASSISTANT

## 2023-01-01 PROCEDURE — 36591 DRAW BLOOD OFF VENOUS DEVICE: CPT | Performed by: PEDIATRICS

## 2023-01-01 PROCEDURE — 97165 OT EVAL LOW COMPLEX 30 MIN: CPT | Mod: GO

## 2023-01-01 PROCEDURE — 93010 ELECTROCARDIOGRAM REPORT: CPT | Performed by: INTERNAL MEDICINE

## 2023-01-01 PROCEDURE — 999N000248 HC STATISTIC IV INSERT WITH US BY RN

## 2023-01-01 PROCEDURE — 99232 SBSQ HOSP IP/OBS MODERATE 35: CPT | Mod: GC | Performed by: STUDENT IN AN ORGANIZED HEALTH CARE EDUCATION/TRAINING PROGRAM

## 2023-01-01 PROCEDURE — 97161 PT EVAL LOW COMPLEX 20 MIN: CPT | Mod: GP

## 2023-01-01 PROCEDURE — 84132 ASSAY OF SERUM POTASSIUM: CPT | Performed by: ANESTHESIOLOGY

## 2023-01-01 PROCEDURE — 70450 CT HEAD/BRAIN W/O DYE: CPT | Mod: 26 | Performed by: RADIOLOGY

## 2023-01-01 PROCEDURE — 85025 COMPLETE CBC W/AUTO DIFF WBC: CPT

## 2023-01-01 PROCEDURE — 85025 COMPLETE CBC W/AUTO DIFF WBC: CPT | Performed by: EMERGENCY MEDICINE

## 2023-01-01 PROCEDURE — 99223 1ST HOSP IP/OBS HIGH 75: CPT | Performed by: INTERNAL MEDICINE

## 2023-01-01 PROCEDURE — 87324 CLOSTRIDIUM AG IA: CPT | Performed by: STUDENT IN AN ORGANIZED HEALTH CARE EDUCATION/TRAINING PROGRAM

## 2023-01-01 PROCEDURE — G0463 HOSPITAL OUTPT CLINIC VISIT: HCPCS | Mod: PN,GT | Performed by: INTERNAL MEDICINE

## 2023-01-01 PROCEDURE — 250N000009 HC RX 250: Performed by: STUDENT IN AN ORGANIZED HEALTH CARE EDUCATION/TRAINING PROGRAM

## 2023-01-01 PROCEDURE — G0463 HOSPITAL OUTPT CLINIC VISIT: HCPCS | Performed by: STUDENT IN AN ORGANIZED HEALTH CARE EDUCATION/TRAINING PROGRAM

## 2023-01-01 PROCEDURE — 86923 COMPATIBILITY TEST ELECTRIC: CPT

## 2023-01-01 PROCEDURE — 258N000001 HC RX 258: Performed by: STUDENT IN AN ORGANIZED HEALTH CARE EDUCATION/TRAINING PROGRAM

## 2023-01-01 PROCEDURE — 250N000013 HC RX MED GY IP 250 OP 250 PS 637: Performed by: PEDIATRICS

## 2023-01-01 PROCEDURE — 81001 URINALYSIS AUTO W/SCOPE: CPT

## 2023-01-01 PROCEDURE — 93005 ELECTROCARDIOGRAM TRACING: CPT | Performed by: EMERGENCY MEDICINE

## 2023-01-01 PROCEDURE — 99231 SBSQ HOSP IP/OBS SF/LOW 25: CPT | Performed by: INTERNAL MEDICINE

## 2023-01-01 PROCEDURE — 85014 HEMATOCRIT: CPT

## 2023-01-01 PROCEDURE — 82565 ASSAY OF CREATININE: CPT | Performed by: STUDENT IN AN ORGANIZED HEALTH CARE EDUCATION/TRAINING PROGRAM

## 2023-01-01 PROCEDURE — 85025 COMPLETE CBC W/AUTO DIFF WBC: CPT | Performed by: STUDENT IN AN ORGANIZED HEALTH CARE EDUCATION/TRAINING PROGRAM

## 2023-01-01 PROCEDURE — 96375 TX/PRO/DX INJ NEW DRUG ADDON: CPT

## 2023-01-01 PROCEDURE — 250N000013 HC RX MED GY IP 250 OP 250 PS 637: Performed by: INTERNAL MEDICINE

## 2023-01-01 PROCEDURE — 83735 ASSAY OF MAGNESIUM: CPT | Performed by: REGISTERED NURSE

## 2023-01-01 PROCEDURE — 86901 BLOOD TYPING SEROLOGIC RH(D): CPT

## 2023-01-01 PROCEDURE — 83550 IRON BINDING TEST: CPT | Performed by: PATHOLOGY

## 2023-01-01 PROCEDURE — 93005 ELECTROCARDIOGRAM TRACING: CPT | Mod: RTG

## 2023-01-01 PROCEDURE — 77001 FLUOROGUIDE FOR VEIN DEVICE: CPT | Mod: 26 | Performed by: THORACIC SURGERY (CARDIOTHORACIC VASCULAR SURGERY)

## 2023-01-01 PROCEDURE — 74177 CT ABD & PELVIS W/CONTRAST: CPT | Mod: GC | Performed by: RADIOLOGY

## 2023-01-01 PROCEDURE — 87506 IADNA-DNA/RNA PROBE TQ 6-11: CPT

## 2023-01-01 PROCEDURE — 71260 CT THORAX DX C+: CPT | Mod: 26 | Performed by: RADIOLOGY

## 2023-01-01 PROCEDURE — 84132 ASSAY OF SERUM POTASSIUM: CPT | Performed by: PATHOLOGY

## 2023-01-01 PROCEDURE — 96360 HYDRATION IV INFUSION INIT: CPT

## 2023-01-01 PROCEDURE — 250N000009 HC RX 250: Performed by: PEDIATRICS

## 2023-01-01 PROCEDURE — P9016 RBC LEUKOCYTES REDUCED: HCPCS

## 2023-01-01 PROCEDURE — 85027 COMPLETE CBC AUTOMATED: CPT | Performed by: INTERNAL MEDICINE

## 2023-01-01 PROCEDURE — 250N000009 HC RX 250: Performed by: HOSPITALIST

## 2023-01-01 PROCEDURE — 258N000003 HC RX IP 258 OP 636: Performed by: ANESTHESIOLOGY

## 2023-01-01 PROCEDURE — 97530 THERAPEUTIC ACTIVITIES: CPT | Mod: GP

## 2023-01-01 PROCEDURE — 82310 ASSAY OF CALCIUM: CPT

## 2023-01-01 PROCEDURE — 99223 1ST HOSP IP/OBS HIGH 75: CPT | Mod: GC | Performed by: INTERNAL MEDICINE

## 2023-01-01 PROCEDURE — 85610 PROTHROMBIN TIME: CPT | Performed by: EMERGENCY MEDICINE

## 2023-01-01 PROCEDURE — 99233 SBSQ HOSP IP/OBS HIGH 50: CPT | Mod: GC | Performed by: INTERNAL MEDICINE

## 2023-01-01 PROCEDURE — 999N000141 HC STATISTIC PRE-PROCEDURE NURSING ASSESSMENT: Performed by: THORACIC SURGERY (CARDIOTHORACIC VASCULAR SURGERY)

## 2023-01-01 PROCEDURE — C1788 PORT, INDWELLING, IMP: HCPCS | Performed by: THORACIC SURGERY (CARDIOTHORACIC VASCULAR SURGERY)

## 2023-01-01 PROCEDURE — 258N000003 HC RX IP 258 OP 636: Performed by: EMERGENCY MEDICINE

## 2023-01-01 PROCEDURE — 85018 HEMOGLOBIN: CPT | Performed by: STUDENT IN AN ORGANIZED HEALTH CARE EDUCATION/TRAINING PROGRAM

## 2023-01-01 PROCEDURE — 86923 COMPATIBILITY TEST ELECTRIC: CPT | Performed by: EMERGENCY MEDICINE

## 2023-01-01 PROCEDURE — 84100 ASSAY OF PHOSPHORUS: CPT | Performed by: PEDIATRICS

## 2023-01-01 PROCEDURE — 87040 BLOOD CULTURE FOR BACTERIA: CPT

## 2023-01-01 PROCEDURE — 85041 AUTOMATED RBC COUNT: CPT

## 2023-01-01 PROCEDURE — 76705 ECHO EXAM OF ABDOMEN: CPT | Mod: GC | Performed by: RADIOLOGY

## 2023-01-01 PROCEDURE — 99231 SBSQ HOSP IP/OBS SF/LOW 25: CPT | Mod: FS | Performed by: PHYSICIAN ASSISTANT

## 2023-01-01 PROCEDURE — 272N000001 HC OR GENERAL SUPPLY STERILE: Performed by: THORACIC SURGERY (CARDIOTHORACIC VASCULAR SURGERY)

## 2023-01-01 PROCEDURE — 99223 1ST HOSP IP/OBS HIGH 75: CPT | Performed by: PHYSICIAN ASSISTANT

## 2023-01-01 PROCEDURE — 36591 DRAW BLOOD OFF VENOUS DEVICE: CPT | Performed by: HOSPITALIST

## 2023-01-01 PROCEDURE — 99232 SBSQ HOSP IP/OBS MODERATE 35: CPT | Mod: FS | Performed by: PHYSICIAN ASSISTANT

## 2023-01-01 PROCEDURE — 82728 ASSAY OF FERRITIN: CPT | Performed by: PHYSICIAN ASSISTANT

## 2023-01-01 PROCEDURE — U0003 INFECTIOUS AGENT DETECTION BY NUCLEIC ACID (DNA OR RNA); SEVERE ACUTE RESPIRATORY SYNDROME CORONAVIRUS 2 (SARS-COV-2) (CORONAVIRUS DISEASE [COVID-19]), AMPLIFIED PROBE TECHNIQUE, MAKING USE OF HIGH THROUGHPUT TECHNOLOGIES AS DESCRIBED BY CMS-2020-01-R: HCPCS | Performed by: EMERGENCY MEDICINE

## 2023-01-01 PROCEDURE — C9803 HOPD COVID-19 SPEC COLLECT: HCPCS | Performed by: EMERGENCY MEDICINE

## 2023-01-01 PROCEDURE — 999N000157 HC STATISTIC RCP TIME EA 10 MIN

## 2023-01-01 PROCEDURE — 99285 EMERGENCY DEPT VISIT HI MDM: CPT | Mod: 25 | Performed by: EMERGENCY MEDICINE

## 2023-01-01 PROCEDURE — 80053 COMPREHEN METABOLIC PANEL: CPT | Performed by: INTERNAL MEDICINE

## 2023-01-01 PROCEDURE — 96365 THER/PROPH/DIAG IV INF INIT: CPT

## 2023-01-01 PROCEDURE — 250N000013 HC RX MED GY IP 250 OP 250 PS 637: Performed by: EMERGENCY MEDICINE

## 2023-01-01 PROCEDURE — 36415 COLL VENOUS BLD VENIPUNCTURE: CPT | Performed by: INTERNAL MEDICINE

## 2023-01-01 PROCEDURE — 99233 SBSQ HOSP IP/OBS HIGH 50: CPT | Mod: FS | Performed by: PEDIATRICS

## 2023-01-01 PROCEDURE — 84100 ASSAY OF PHOSPHORUS: CPT | Performed by: PHYSICIAN ASSISTANT

## 2023-01-01 PROCEDURE — G0463 HOSPITAL OUTPT CLINIC VISIT: HCPCS | Mod: 25 | Performed by: PHYSICIAN ASSISTANT

## 2023-01-01 PROCEDURE — 86901 BLOOD TYPING SEROLOGIC RH(D): CPT | Performed by: STUDENT IN AN ORGANIZED HEALTH CARE EDUCATION/TRAINING PROGRAM

## 2023-01-01 PROCEDURE — 82533 TOTAL CORTISOL: CPT | Performed by: INTERNAL MEDICINE

## 2023-01-01 PROCEDURE — 83735 ASSAY OF MAGNESIUM: CPT | Performed by: INTERNAL MEDICINE

## 2023-01-01 PROCEDURE — 99215 OFFICE O/P EST HI 40 MIN: CPT | Performed by: PHYSICIAN ASSISTANT

## 2023-01-01 PROCEDURE — 258N000003 HC RX IP 258 OP 636: Performed by: HOSPITALIST

## 2023-01-01 PROCEDURE — 87506 IADNA-DNA/RNA PROBE TQ 6-11: CPT | Performed by: STUDENT IN AN ORGANIZED HEALTH CARE EDUCATION/TRAINING PROGRAM

## 2023-01-01 PROCEDURE — 96365 THER/PROPH/DIAG IV INF INIT: CPT | Performed by: EMERGENCY MEDICINE

## 2023-01-01 PROCEDURE — 99418 PROLNG IP/OBS E/M EA 15 MIN: CPT | Performed by: INTERNAL MEDICINE

## 2023-01-01 PROCEDURE — 99204 OFFICE O/P NEW MOD 45 MIN: CPT | Performed by: PHYSICIAN ASSISTANT

## 2023-01-01 PROCEDURE — 85025 COMPLETE CBC W/AUTO DIFF WBC: CPT | Performed by: PHYSICIAN ASSISTANT

## 2023-01-01 PROCEDURE — 83735 ASSAY OF MAGNESIUM: CPT | Performed by: PEDIATRICS

## 2023-01-01 PROCEDURE — 83690 ASSAY OF LIPASE: CPT | Performed by: EMERGENCY MEDICINE

## 2023-01-01 PROCEDURE — 80048 BASIC METABOLIC PNL TOTAL CA: CPT | Performed by: HOSPITALIST

## 2023-01-01 PROCEDURE — 71275 CT ANGIOGRAPHY CHEST: CPT | Mod: 26 | Performed by: RADIOLOGY

## 2023-01-01 PROCEDURE — 84238 ASSAY NONENDOCRINE RECEPTOR: CPT | Performed by: STUDENT IN AN ORGANIZED HEALTH CARE EDUCATION/TRAINING PROGRAM

## 2023-01-01 PROCEDURE — 36593 DECLOT VASCULAR DEVICE: CPT

## 2023-01-01 PROCEDURE — 87493 C DIFF AMPLIFIED PROBE: CPT | Performed by: STUDENT IN AN ORGANIZED HEALTH CARE EDUCATION/TRAINING PROGRAM

## 2023-01-01 PROCEDURE — 74177 CT ABD & PELVIS W/CONTRAST: CPT

## 2023-01-01 PROCEDURE — 84100 ASSAY OF PHOSPHORUS: CPT | Performed by: REGISTERED NURSE

## 2023-01-01 PROCEDURE — 85027 COMPLETE CBC AUTOMATED: CPT | Performed by: PATHOLOGY

## 2023-01-01 PROCEDURE — 70450 CT HEAD/BRAIN W/O DYE: CPT

## 2023-01-01 PROCEDURE — 99239 HOSP IP/OBS DSCHRG MGMT >30: CPT | Mod: FS | Performed by: PHYSICIAN ASSISTANT

## 2023-01-01 PROCEDURE — 99233 SBSQ HOSP IP/OBS HIGH 50: CPT | Performed by: STUDENT IN AN ORGANIZED HEALTH CARE EDUCATION/TRAINING PROGRAM

## 2023-01-01 PROCEDURE — 86923 COMPATIBILITY TEST ELECTRIC: CPT | Performed by: HOSPITALIST

## 2023-01-01 PROCEDURE — 84439 ASSAY OF FREE THYROXINE: CPT | Performed by: EMERGENCY MEDICINE

## 2023-01-01 PROCEDURE — 82010 KETONE BODYS QUAN: CPT | Performed by: STUDENT IN AN ORGANIZED HEALTH CARE EDUCATION/TRAINING PROGRAM

## 2023-01-01 PROCEDURE — 36561 INSERT TUNNELED CV CATH: CPT | Mod: GC | Performed by: THORACIC SURGERY (CARDIOTHORACIC VASCULAR SURGERY)

## 2023-01-01 PROCEDURE — 97535 SELF CARE MNGMENT TRAINING: CPT | Mod: GO

## 2023-01-01 PROCEDURE — 87040 BLOOD CULTURE FOR BACTERIA: CPT | Performed by: EMERGENCY MEDICINE

## 2023-01-01 PROCEDURE — 96377 APPLICATON ON-BODY INJECTOR: CPT | Mod: 59 | Performed by: STUDENT IN AN ORGANIZED HEALTH CARE EDUCATION/TRAINING PROGRAM

## 2023-01-01 PROCEDURE — 85027 COMPLETE CBC AUTOMATED: CPT | Performed by: EMERGENCY MEDICINE

## 2023-01-01 PROCEDURE — 99207 PR NO BILLABLE SERVICE THIS VISIT: CPT | Mod: FS | Performed by: PEDIATRICS

## 2023-01-01 PROCEDURE — 99214 OFFICE O/P EST MOD 30 MIN: CPT | Mod: VID | Performed by: INTERNAL MEDICINE

## 2023-01-01 PROCEDURE — 85018 HEMOGLOBIN: CPT | Performed by: ANESTHESIOLOGY

## 2023-01-01 PROCEDURE — 250N000011 HC RX IP 250 OP 636: Performed by: PEDIATRICS

## 2023-01-01 PROCEDURE — 710N000010 HC RECOVERY PHASE 1, LEVEL 2, PER MIN: Performed by: THORACIC SURGERY (CARDIOTHORACIC VASCULAR SURGERY)

## 2023-01-01 PROCEDURE — 250N000009 HC RX 250: Performed by: EMERGENCY MEDICINE

## 2023-01-01 PROCEDURE — 250N000009 HC RX 250: Performed by: THORACIC SURGERY (CARDIOTHORACIC VASCULAR SURGERY)

## 2023-01-01 PROCEDURE — 99239 HOSP IP/OBS DSCHRG MGMT >30: CPT | Mod: FS | Performed by: PEDIATRICS

## 2023-01-01 PROCEDURE — 83540 ASSAY OF IRON: CPT | Performed by: PATHOLOGY

## 2023-01-01 PROCEDURE — 36415 COLL VENOUS BLD VENIPUNCTURE: CPT | Performed by: PATHOLOGY

## 2023-01-01 PROCEDURE — 80202 ASSAY OF VANCOMYCIN: CPT | Performed by: PEDIATRICS

## 2023-01-01 PROCEDURE — 999N000128 HC STATISTIC PERIPHERAL IV START W/O US GUIDANCE

## 2023-01-01 PROCEDURE — U0005 INFEC AGEN DETEC AMPLI PROBE: HCPCS | Performed by: STUDENT IN AN ORGANIZED HEALTH CARE EDUCATION/TRAINING PROGRAM

## 2023-01-01 PROCEDURE — 999N000179 XR SURGERY CARM FLUORO LESS THAN 5 MIN W STILLS: Mod: TC

## 2023-01-01 PROCEDURE — 93005 ELECTROCARDIOGRAM TRACING: CPT

## 2023-01-01 PROCEDURE — 85014 HEMATOCRIT: CPT | Performed by: HOSPITALIST

## 2023-01-01 PROCEDURE — 84484 ASSAY OF TROPONIN QUANT: CPT | Performed by: EMERGENCY MEDICINE

## 2023-01-01 PROCEDURE — 74018 RADEX ABDOMEN 1 VIEW: CPT

## 2023-01-01 PROCEDURE — 99418 PROLNG IP/OBS E/M EA 15 MIN: CPT | Performed by: STUDENT IN AN ORGANIZED HEALTH CARE EDUCATION/TRAINING PROGRAM

## 2023-01-01 PROCEDURE — 82947 ASSAY GLUCOSE BLOOD QUANT: CPT | Performed by: EMERGENCY MEDICINE

## 2023-01-01 PROCEDURE — 74018 RADEX ABDOMEN 1 VIEW: CPT | Mod: 26 | Performed by: RADIOLOGY

## 2023-01-01 PROCEDURE — 99238 HOSP IP/OBS DSCHRG MGMT 30/<: CPT | Mod: GC | Performed by: STUDENT IN AN ORGANIZED HEALTH CARE EDUCATION/TRAINING PROGRAM

## 2023-01-01 DEVICE — CATH PORT MRI POWERPORT W/MICRO INTRO 8FR SL 1808070: Type: IMPLANTABLE DEVICE | Site: CHEST  WALL | Status: FUNCTIONAL

## 2023-01-01 RX ORDER — POTASSIUM CHLORIDE 29.8 MG/ML
20 INJECTION INTRAVENOUS ONCE
Status: DISCONTINUED | OUTPATIENT
Start: 2023-01-01 | End: 2023-01-01

## 2023-01-01 RX ORDER — POTASSIUM CHLORIDE 20MEQ/15ML
20 LIQUID (ML) ORAL ONCE
Status: COMPLETED | OUTPATIENT
Start: 2023-01-01 | End: 2023-01-01

## 2023-01-01 RX ORDER — POTASSIUM CHLORIDE 29.8 MG/ML
20 INJECTION INTRAVENOUS
Status: DISCONTINUED | OUTPATIENT
Start: 2023-01-01 | End: 2023-01-01 | Stop reason: HOSPADM

## 2023-01-01 RX ORDER — NALOXONE HYDROCHLORIDE 0.4 MG/ML
0.2 INJECTION, SOLUTION INTRAMUSCULAR; INTRAVENOUS; SUBCUTANEOUS
Status: DISCONTINUED | OUTPATIENT
Start: 2023-01-01 | End: 2023-01-01 | Stop reason: HOSPADM

## 2023-01-01 RX ORDER — PROCHLORPERAZINE MALEATE 5 MG
5 TABLET ORAL EVERY 6 HOURS PRN
Qty: 20 TABLET | Refills: 0 | Status: ON HOLD | OUTPATIENT
Start: 2023-01-01 | End: 2023-01-01

## 2023-01-01 RX ORDER — AMOXICILLIN 250 MG
2 CAPSULE ORAL 2 TIMES DAILY PRN
Status: DISCONTINUED | OUTPATIENT
Start: 2023-01-01 | End: 2023-01-01

## 2023-01-01 RX ORDER — ATROPINE SULFATE 0.4 MG/ML
0.4 AMPUL (ML) INJECTION
Status: DISCONTINUED | OUTPATIENT
Start: 2023-01-01 | End: 2023-01-01 | Stop reason: HOSPADM

## 2023-01-01 RX ORDER — DIPHENHYDRAMINE HYDROCHLORIDE 50 MG/ML
50 INJECTION INTRAMUSCULAR; INTRAVENOUS
Status: CANCELLED
Start: 2023-01-01

## 2023-01-01 RX ORDER — ALBUTEROL SULFATE 0.83 MG/ML
2.5 SOLUTION RESPIRATORY (INHALATION)
Status: CANCELLED | OUTPATIENT
Start: 2023-01-01

## 2023-01-01 RX ORDER — HEPARIN SODIUM (PORCINE) LOCK FLUSH IV SOLN 100 UNIT/ML 100 UNIT/ML
5 SOLUTION INTRAVENOUS
Status: CANCELLED | OUTPATIENT
Start: 2023-01-01

## 2023-01-01 RX ORDER — ONDANSETRON 4 MG/1
4 TABLET, ORALLY DISINTEGRATING ORAL EVERY 6 HOURS PRN
Status: DISCONTINUED | OUTPATIENT
Start: 2023-01-01 | End: 2023-01-01 | Stop reason: HOSPADM

## 2023-01-01 RX ORDER — POTASSIUM CHLORIDE 750 MG/1
40 TABLET, EXTENDED RELEASE ORAL ONCE
Status: COMPLETED | OUTPATIENT
Start: 2023-01-01 | End: 2023-01-01

## 2023-01-01 RX ORDER — ENOXAPARIN SODIUM 100 MG/ML
40 INJECTION SUBCUTANEOUS EVERY 24 HOURS
Status: DISCONTINUED | OUTPATIENT
Start: 2023-01-01 | End: 2023-01-01 | Stop reason: HOSPADM

## 2023-01-01 RX ORDER — DEXTROSE MONOHYDRATE, SODIUM CHLORIDE, SODIUM LACTATE, POTASSIUM CHLORIDE, CALCIUM CHLORIDE 5; 600; 310; 179; 20 G/100ML; MG/100ML; MG/100ML; MG/100ML; MG/100ML
INJECTION, SOLUTION INTRAVENOUS CONTINUOUS
Status: DISCONTINUED | OUTPATIENT
Start: 2023-01-01 | End: 2023-01-01

## 2023-01-01 RX ORDER — MAGNESIUM SULFATE HEPTAHYDRATE 40 MG/ML
4 INJECTION, SOLUTION INTRAVENOUS ONCE
Status: COMPLETED | OUTPATIENT
Start: 2023-01-01 | End: 2023-01-01

## 2023-01-01 RX ORDER — OXYCODONE HYDROCHLORIDE 5 MG/1
5 TABLET ORAL EVERY 6 HOURS PRN
Status: CANCELLED | OUTPATIENT
Start: 2023-01-01

## 2023-01-01 RX ORDER — HEPARIN SODIUM,PORCINE 10 UNIT/ML
5 VIAL (ML) INTRAVENOUS
Status: CANCELLED | OUTPATIENT
Start: 2023-01-01

## 2023-01-01 RX ORDER — SENNOSIDES 8.6 MG
2-4 TABLET ORAL 2 TIMES DAILY PRN
Status: DISCONTINUED | OUTPATIENT
Start: 2023-01-01 | End: 2023-01-01 | Stop reason: HOSPADM

## 2023-01-01 RX ORDER — MAGNESIUM SULFATE HEPTAHYDRATE 40 MG/ML
2 INJECTION, SOLUTION INTRAVENOUS ONCE
Status: COMPLETED | OUTPATIENT
Start: 2023-01-01 | End: 2023-01-01

## 2023-01-01 RX ORDER — LOPERAMIDE HCL 2 MG
4 CAPSULE ORAL 4 TIMES DAILY
Status: DISCONTINUED | OUTPATIENT
Start: 2023-01-01 | End: 2023-01-01 | Stop reason: HOSPADM

## 2023-01-01 RX ORDER — OXYCODONE HCL 20 MG/ML
5 CONCENTRATE, ORAL ORAL
Status: DISCONTINUED | OUTPATIENT
Start: 2023-01-01 | End: 2023-01-01

## 2023-01-01 RX ORDER — LOPERAMIDE HCL 2 MG
2 CAPSULE ORAL 4 TIMES DAILY
Status: DISCONTINUED | OUTPATIENT
Start: 2023-01-01 | End: 2023-01-01

## 2023-01-01 RX ORDER — POTASSIUM CHLORIDE 20MEQ/15ML
20 LIQUID (ML) ORAL ONCE
Status: DISCONTINUED | OUTPATIENT
Start: 2023-01-01 | End: 2023-01-01

## 2023-01-01 RX ORDER — TRAMADOL HYDROCHLORIDE 50 MG/1
50 TABLET ORAL EVERY 6 HOURS PRN
Qty: 21 TABLET | Refills: 0 | Status: ON HOLD | OUTPATIENT
Start: 2023-01-01 | End: 2023-01-01

## 2023-01-01 RX ORDER — HYDROMORPHONE HYDROCHLORIDE 1 MG/ML
0.5 INJECTION, SOLUTION INTRAMUSCULAR; INTRAVENOUS; SUBCUTANEOUS
Status: COMPLETED | OUTPATIENT
Start: 2023-01-01 | End: 2023-01-01

## 2023-01-01 RX ORDER — ACETAMINOPHEN 650 MG/1
650 SUPPOSITORY RECTAL EVERY 6 HOURS
Status: DISCONTINUED | OUTPATIENT
Start: 2023-01-01 | End: 2023-01-01

## 2023-01-01 RX ORDER — POTASSIUM CHLORIDE 29.8 MG/ML
20 INJECTION INTRAVENOUS ONCE
Status: COMPLETED | OUTPATIENT
Start: 2023-01-01 | End: 2023-01-01

## 2023-01-01 RX ORDER — LIDOCAINE HYDROCHLORIDE 20 MG/ML
INJECTION, SOLUTION INFILTRATION; PERINEURAL PRN
Status: DISCONTINUED | OUTPATIENT
Start: 2023-01-01 | End: 2023-01-01

## 2023-01-01 RX ORDER — LORAZEPAM 2 MG/ML
0.5 CONCENTRATE ORAL
Status: DISCONTINUED | OUTPATIENT
Start: 2023-01-01 | End: 2023-01-01 | Stop reason: HOSPADM

## 2023-01-01 RX ORDER — CALCIUM GLUCONATE 20 MG/ML
1 INJECTION, SOLUTION INTRAVENOUS ONCE
Status: COMPLETED | OUTPATIENT
Start: 2023-01-01 | End: 2023-01-01

## 2023-01-01 RX ORDER — POTASSIUM CHLORIDE 750 MG/1
20 TABLET, EXTENDED RELEASE ORAL ONCE
Status: COMPLETED | OUTPATIENT
Start: 2023-01-01 | End: 2023-01-01

## 2023-01-01 RX ORDER — DEXTROSE MONOHYDRATE 50 MG/ML
10-20 INJECTION, SOLUTION INTRAVENOUS
Status: DISCONTINUED | OUTPATIENT
Start: 2023-01-01 | End: 2023-01-01

## 2023-01-01 RX ORDER — ALBUTEROL SULFATE 90 UG/1
1-2 AEROSOL, METERED RESPIRATORY (INHALATION)
Status: CANCELLED
Start: 2023-01-01

## 2023-01-01 RX ORDER — VANCOMYCIN HYDROCHLORIDE 125 MG/1
125 CAPSULE ORAL 4 TIMES DAILY
Status: DISCONTINUED | OUTPATIENT
Start: 2023-01-01 | End: 2023-01-01 | Stop reason: HOSPADM

## 2023-01-01 RX ORDER — DEXAMETHASONE 2 MG/1
4 TABLET ORAL EVERY 12 HOURS SCHEDULED
Status: DISCONTINUED | OUTPATIENT
Start: 2023-01-01 | End: 2023-01-01 | Stop reason: HOSPADM

## 2023-01-01 RX ORDER — MORPHINE SULFATE 2 MG/ML
1 INJECTION, SOLUTION INTRAMUSCULAR; INTRAVENOUS
Status: DISCONTINUED | OUTPATIENT
Start: 2023-01-01 | End: 2023-01-01

## 2023-01-01 RX ORDER — HEPARIN SODIUM,PORCINE 10 UNIT/ML
5-10 VIAL (ML) INTRAVENOUS EVERY 24 HOURS
Status: DISCONTINUED | OUTPATIENT
Start: 2023-01-01 | End: 2023-01-01 | Stop reason: HOSPADM

## 2023-01-01 RX ORDER — POTASSIUM CHLORIDE 750 MG/1
20 TABLET, EXTENDED RELEASE ORAL 2 TIMES DAILY
Status: DISCONTINUED | OUTPATIENT
Start: 2023-01-01 | End: 2023-01-01

## 2023-01-01 RX ORDER — LIDOCAINE HYDROCHLORIDE 10 MG/ML
20 INJECTION, SOLUTION EPIDURAL; INFILTRATION; INTRACAUDAL; PERINEURAL ONCE
Status: CANCELLED | OUTPATIENT
Start: 2023-01-01 | End: 2023-01-01

## 2023-01-01 RX ORDER — HEPARIN SODIUM,PORCINE 10 UNIT/ML
5 VIAL (ML) INTRAVENOUS
Status: DISCONTINUED | OUTPATIENT
Start: 2023-01-01 | End: 2023-01-01 | Stop reason: HOSPADM

## 2023-01-01 RX ORDER — HALOPERIDOL 2 MG/ML
1-2 SOLUTION ORAL EVERY 6 HOURS PRN
Qty: 15 ML | Refills: 0 | Status: SHIPPED | OUTPATIENT
Start: 2023-01-01

## 2023-01-01 RX ORDER — HALOPERIDOL 2 MG/ML
1-2 SOLUTION ORAL EVERY 6 HOURS PRN
Status: DISCONTINUED | OUTPATIENT
Start: 2023-01-01 | End: 2023-01-01 | Stop reason: HOSPADM

## 2023-01-01 RX ORDER — HEPARIN SODIUM (PORCINE) LOCK FLUSH IV SOLN 100 UNIT/ML 100 UNIT/ML
500 SOLUTION INTRAVENOUS ONCE
Status: COMPLETED | OUTPATIENT
Start: 2023-01-01 | End: 2023-01-01

## 2023-01-01 RX ORDER — LORAZEPAM 2 MG/ML
0.5 INJECTION INTRAMUSCULAR
Status: DISCONTINUED | OUTPATIENT
Start: 2023-01-01 | End: 2023-01-01 | Stop reason: HOSPADM

## 2023-01-01 RX ORDER — MORPHINE SULFATE 10 MG/5ML
5 SOLUTION ORAL EVERY 4 HOURS PRN
Qty: 100 ML | Refills: 0 | Status: ON HOLD | OUTPATIENT
Start: 2023-01-01 | End: 2023-01-01

## 2023-01-01 RX ORDER — MORPHINE SULFATE 10 MG/5ML
5 SOLUTION ORAL EVERY 4 HOURS PRN
Status: DISCONTINUED | OUTPATIENT
Start: 2023-01-01 | End: 2023-01-01

## 2023-01-01 RX ORDER — LORAZEPAM 2 MG/ML
0.5 INJECTION INTRAMUSCULAR EVERY 4 HOURS PRN
Status: CANCELLED | OUTPATIENT
Start: 2023-01-01

## 2023-01-01 RX ORDER — ONDANSETRON 4 MG/1
4 TABLET, ORALLY DISINTEGRATING ORAL EVERY 30 MIN PRN
Status: DISCONTINUED | OUTPATIENT
Start: 2023-01-01 | End: 2023-01-01 | Stop reason: HOSPADM

## 2023-01-01 RX ORDER — NALOXONE HYDROCHLORIDE 0.4 MG/ML
0.4 INJECTION, SOLUTION INTRAMUSCULAR; INTRAVENOUS; SUBCUTANEOUS
Status: DISCONTINUED | OUTPATIENT
Start: 2023-01-01 | End: 2023-01-01 | Stop reason: HOSPADM

## 2023-01-01 RX ORDER — LORAZEPAM 0.5 MG/1
0.5 TABLET ORAL EVERY 6 HOURS PRN
Qty: 30 TABLET | Refills: 0 | Status: ON HOLD | OUTPATIENT
Start: 2023-01-01 | End: 2023-01-01

## 2023-01-01 RX ORDER — ACETAMINOPHEN 325 MG/1
650 TABLET ORAL EVERY 6 HOURS
Status: DISCONTINUED | OUTPATIENT
Start: 2023-01-01 | End: 2023-01-01

## 2023-01-01 RX ORDER — EPINEPHRINE 1 MG/ML
0.3 INJECTION, SOLUTION INTRAMUSCULAR; SUBCUTANEOUS EVERY 5 MIN PRN
Status: CANCELLED | OUTPATIENT
Start: 2023-01-01

## 2023-01-01 RX ORDER — POTASSIUM CHLORIDE 1500 MG/1
40 TABLET, EXTENDED RELEASE ORAL ONCE
Status: DISCONTINUED | OUTPATIENT
Start: 2023-01-01 | End: 2023-01-01 | Stop reason: HOSPADM

## 2023-01-01 RX ORDER — BISACODYL 10 MG
10 SUPPOSITORY, RECTAL RECTAL DAILY PRN
Qty: 4 SUPPOSITORY | Refills: 0 | Status: SHIPPED | OUTPATIENT
Start: 2023-01-01

## 2023-01-01 RX ORDER — ONDANSETRON 2 MG/ML
4 INJECTION INTRAMUSCULAR; INTRAVENOUS EVERY 6 HOURS PRN
Status: DISCONTINUED | OUTPATIENT
Start: 2023-01-01 | End: 2023-01-01 | Stop reason: HOSPADM

## 2023-01-01 RX ORDER — MEPERIDINE HYDROCHLORIDE 25 MG/ML
25 INJECTION INTRAMUSCULAR; INTRAVENOUS; SUBCUTANEOUS EVERY 30 MIN PRN
Status: CANCELLED | OUTPATIENT
Start: 2023-01-01

## 2023-01-01 RX ORDER — POTASSIUM CHLORIDE 750 MG/1
20 TABLET, EXTENDED RELEASE ORAL ONCE
Status: DISCONTINUED | OUTPATIENT
Start: 2023-01-01 | End: 2023-01-01

## 2023-01-01 RX ORDER — METOPROLOL TARTRATE 25 MG/1
25 TABLET, FILM COATED ORAL 2 TIMES DAILY
Status: DISCONTINUED | OUTPATIENT
Start: 2023-01-01 | End: 2023-01-01

## 2023-01-01 RX ORDER — ATROPINE SULFATE 10 MG/ML
2 SOLUTION/ DROPS OPHTHALMIC EVERY 4 HOURS PRN
Status: DISCONTINUED | OUTPATIENT
Start: 2023-01-01 | End: 2023-01-01 | Stop reason: HOSPADM

## 2023-01-01 RX ORDER — DEXTROSE MONOHYDRATE 25 G/50ML
50 INJECTION, SOLUTION INTRAVENOUS ONCE
Status: COMPLETED | OUTPATIENT
Start: 2023-01-01 | End: 2023-01-01

## 2023-01-01 RX ORDER — IOPAMIDOL 755 MG/ML
123 INJECTION, SOLUTION INTRAVASCULAR ONCE
Status: COMPLETED | OUTPATIENT
Start: 2023-01-01 | End: 2023-01-01

## 2023-01-01 RX ORDER — POTASSIUM CHLORIDE 750 MG/1
30 TABLET, EXTENDED RELEASE ORAL ONCE
Status: COMPLETED | OUTPATIENT
Start: 2023-01-01 | End: 2023-01-01

## 2023-01-01 RX ORDER — ACETAMINOPHEN 325 MG/1
650 TABLET ORAL EVERY 6 HOURS
Qty: 30 TABLET | Refills: 0 | Status: ON HOLD | OUTPATIENT
Start: 2023-01-01 | End: 2023-01-01

## 2023-01-01 RX ORDER — POTASSIUM CHLORIDE 1500 MG/1
20 TABLET, EXTENDED RELEASE ORAL DAILY
Qty: 7 TABLET | Refills: 1 | Status: ON HOLD | OUTPATIENT
Start: 2023-01-01 | End: 2023-01-01

## 2023-01-01 RX ORDER — LIDOCAINE 40 MG/G
CREAM TOPICAL
Status: DISCONTINUED | OUTPATIENT
Start: 2023-01-01 | End: 2023-01-01 | Stop reason: HOSPADM

## 2023-01-01 RX ORDER — SODIUM CHLORIDE AND POTASSIUM CHLORIDE 150; 900 MG/100ML; MG/100ML
INJECTION, SOLUTION INTRAVENOUS CONTINUOUS
Status: DISCONTINUED | OUTPATIENT
Start: 2023-01-01 | End: 2023-01-01

## 2023-01-01 RX ORDER — POTASSIUM CHLORIDE 7.45 MG/ML
10 INJECTION INTRAVENOUS
Status: DISCONTINUED | OUTPATIENT
Start: 2023-01-01 | End: 2023-01-01

## 2023-01-01 RX ORDER — OXYCODONE HCL 20 MG/ML
5 CONCENTRATE, ORAL ORAL EVERY 6 HOURS
Status: DISCONTINUED | OUTPATIENT
Start: 2023-01-01 | End: 2023-01-01

## 2023-01-01 RX ORDER — POTASSIUM CHLORIDE 1500 MG/1
60 TABLET, EXTENDED RELEASE ORAL ONCE
Status: COMPLETED | OUTPATIENT
Start: 2023-01-01 | End: 2023-01-01

## 2023-01-01 RX ORDER — OXYCODONE HYDROCHLORIDE 5 MG/1
5 TABLET ORAL EVERY 6 HOURS
Status: COMPLETED | OUTPATIENT
Start: 2023-01-01 | End: 2023-01-01

## 2023-01-01 RX ORDER — MORPHINE SULFATE 20 MG/ML
10 SOLUTION ORAL
Status: DISCONTINUED | OUTPATIENT
Start: 2023-01-01 | End: 2023-01-01 | Stop reason: HOSPADM

## 2023-01-01 RX ORDER — OXYCODONE HYDROCHLORIDE 5 MG/1
5 TABLET ORAL EVERY 6 HOURS PRN
Qty: 10 TABLET | Refills: 0 | Status: SHIPPED | OUTPATIENT
Start: 2023-01-01 | End: 2023-01-01

## 2023-01-01 RX ORDER — DEXTROSE MONOHYDRATE 25 G/50ML
50 INJECTION, SOLUTION INTRAVENOUS
Status: COMPLETED | OUTPATIENT
Start: 2023-01-01 | End: 2023-01-01

## 2023-01-01 RX ORDER — POTASSIUM CHLORIDE 29.8 MG/ML
20 INJECTION INTRAVENOUS
Status: DISCONTINUED | OUTPATIENT
Start: 2023-01-01 | End: 2023-01-01

## 2023-01-01 RX ORDER — HYDROMORPHONE HYDROCHLORIDE 2 MG/1
2 TABLET ORAL
Status: DISCONTINUED | OUTPATIENT
Start: 2023-01-01 | End: 2023-01-01

## 2023-01-01 RX ORDER — HYDROMORPHONE HYDROCHLORIDE 2 MG/1
2 TABLET ORAL EVERY 6 HOURS PRN
Status: DISCONTINUED | OUTPATIENT
Start: 2023-01-01 | End: 2023-01-01

## 2023-01-01 RX ORDER — PIPERACILLIN SODIUM, TAZOBACTAM SODIUM 4; .5 G/20ML; G/20ML
4.5 INJECTION, POWDER, LYOPHILIZED, FOR SOLUTION INTRAVENOUS EVERY 6 HOURS
Status: DISCONTINUED | OUTPATIENT
Start: 2023-01-01 | End: 2023-01-01

## 2023-01-01 RX ORDER — HEPARIN SODIUM,PORCINE 10 UNIT/ML
5-10 VIAL (ML) INTRAVENOUS
Status: DISCONTINUED | OUTPATIENT
Start: 2023-01-01 | End: 2023-01-01 | Stop reason: HOSPADM

## 2023-01-01 RX ORDER — MAGNESIUM OXIDE 400 MG/1
400 TABLET ORAL 2 TIMES DAILY
Status: DISCONTINUED | OUTPATIENT
Start: 2023-01-01 | End: 2023-01-01 | Stop reason: HOSPADM

## 2023-01-01 RX ORDER — POLYETHYLENE GLYCOL 3350 17 G/17G
1 POWDER, FOR SOLUTION ORAL PRN
COMMUNITY
End: 2023-01-01

## 2023-01-01 RX ORDER — HYDROMORPHONE HCL/0.9% NACL/PF 0.2MG/0.2
0.4 SYRINGE (ML) INTRAVENOUS ONCE
Status: CANCELLED
Start: 2023-01-01 | End: 2023-01-01

## 2023-01-01 RX ORDER — HYDROMORPHONE HYDROCHLORIDE 2 MG/1
2 TABLET ORAL EVERY 4 HOURS PRN
Status: DISCONTINUED | OUTPATIENT
Start: 2023-01-01 | End: 2023-01-01

## 2023-01-01 RX ORDER — OXYCODONE HCL 20 MG/ML
10 CONCENTRATE, ORAL ORAL
Status: DISCONTINUED | OUTPATIENT
Start: 2023-01-01 | End: 2023-01-01

## 2023-01-01 RX ORDER — ACETAMINOPHEN 325 MG/1
650 TABLET ORAL EVERY 6 HOURS PRN
Status: DISCONTINUED | OUTPATIENT
Start: 2023-01-01 | End: 2023-01-01 | Stop reason: HOSPADM

## 2023-01-01 RX ORDER — SALIVA STIMULANT COMB. NO.3
1 SPRAY, NON-AEROSOL (ML) MUCOUS MEMBRANE
Status: DISCONTINUED | OUTPATIENT
Start: 2023-01-01 | End: 2023-01-01

## 2023-01-01 RX ORDER — HEPARIN SODIUM (PORCINE) LOCK FLUSH IV SOLN 100 UNIT/ML 100 UNIT/ML
5-10 SOLUTION INTRAVENOUS
Status: DISCONTINUED | OUTPATIENT
Start: 2023-01-01 | End: 2023-01-01 | Stop reason: HOSPADM

## 2023-01-01 RX ORDER — MORPHINE SULFATE 20 MG/ML
5 SOLUTION ORAL
Status: DISCONTINUED | OUTPATIENT
Start: 2023-01-01 | End: 2023-01-01 | Stop reason: HOSPADM

## 2023-01-01 RX ORDER — HYDROMORPHONE HCL IN WATER/PF 6 MG/30 ML
0.4 PATIENT CONTROLLED ANALGESIA SYRINGE INTRAVENOUS
Status: DISCONTINUED | OUTPATIENT
Start: 2023-01-01 | End: 2023-01-01 | Stop reason: HOSPADM

## 2023-01-01 RX ORDER — LOPERAMIDE HCL 2 MG
2 CAPSULE ORAL 4 TIMES DAILY PRN
Status: DISCONTINUED | OUTPATIENT
Start: 2023-01-01 | End: 2023-01-01 | Stop reason: HOSPADM

## 2023-01-01 RX ORDER — MAGNESIUM SULFATE HEPTAHYDRATE 40 MG/ML
2 INJECTION, SOLUTION INTRAVENOUS ONCE
Status: DISCONTINUED | OUTPATIENT
Start: 2023-01-01 | End: 2023-01-01

## 2023-01-01 RX ORDER — POTASSIUM CHLORIDE 7.45 MG/ML
10 INJECTION INTRAVENOUS
Status: COMPLETED | OUTPATIENT
Start: 2023-01-01 | End: 2023-01-01

## 2023-01-01 RX ORDER — ATROPINE SULFATE 0.4 MG/ML
0.4 AMPUL (ML) INJECTION
Status: CANCELLED | OUTPATIENT
Start: 2023-01-01

## 2023-01-01 RX ORDER — LORAZEPAM 2 MG/ML
.5-1 INJECTION INTRAMUSCULAR
Qty: 10 ML | Refills: 0 | Status: SHIPPED | OUTPATIENT
Start: 2023-01-01

## 2023-01-01 RX ORDER — VANCOMYCIN HYDROCHLORIDE 125 MG/1
125 CAPSULE ORAL 4 TIMES DAILY
Qty: 14 CAPSULE | Refills: 0 | Status: SHIPPED | OUTPATIENT
Start: 2023-01-01 | End: 2023-01-01

## 2023-01-01 RX ORDER — METOPROLOL TARTRATE 25 MG/1
25 TABLET, FILM COATED ORAL 2 TIMES DAILY
Qty: 60 TABLET | Refills: 1 | Status: ON HOLD | OUTPATIENT
Start: 2023-01-01 | End: 2023-01-01

## 2023-01-01 RX ORDER — POTASSIUM CHLORIDE 29.8 MG/ML
20 INJECTION INTRAVENOUS
Status: COMPLETED | OUTPATIENT
Start: 2023-01-01 | End: 2023-01-01

## 2023-01-01 RX ORDER — HEPARIN SODIUM (PORCINE) LOCK FLUSH IV SOLN 100 UNIT/ML 100 UNIT/ML
5 SOLUTION INTRAVENOUS
Status: DISCONTINUED | OUTPATIENT
Start: 2023-01-01 | End: 2023-01-01 | Stop reason: HOSPADM

## 2023-01-01 RX ORDER — SODIUM CHLORIDE 9 MG/ML
INJECTION, SOLUTION INTRAVENOUS CONTINUOUS
Status: DISCONTINUED | OUTPATIENT
Start: 2023-01-01 | End: 2023-01-01

## 2023-01-01 RX ORDER — PROPOFOL 10 MG/ML
INJECTION, EMULSION INTRAVENOUS PRN
Status: DISCONTINUED | OUTPATIENT
Start: 2023-01-01 | End: 2023-01-01

## 2023-01-01 RX ORDER — NICOTINE POLACRILEX 4 MG
15-30 LOZENGE BUCCAL
Status: DISCONTINUED | OUTPATIENT
Start: 2023-01-01 | End: 2023-01-01 | Stop reason: HOSPADM

## 2023-01-01 RX ORDER — ATROPINE SULFATE 10 MG/ML
2 SOLUTION/ DROPS OPHTHALMIC EVERY 4 HOURS PRN
Qty: 2 ML | Refills: 0 | Status: SHIPPED | OUTPATIENT
Start: 2023-01-01

## 2023-01-01 RX ORDER — SALIVA STIMULANT COMB. NO.3
1 SPRAY, NON-AEROSOL (ML) MUCOUS MEMBRANE
Status: DISCONTINUED | OUTPATIENT
Start: 2023-01-01 | End: 2023-01-01 | Stop reason: HOSPADM

## 2023-01-01 RX ORDER — TRIAMCINOLONE ACETONIDE 1 MG/G
OINTMENT TOPICAL 2 TIMES DAILY PRN
Status: DISCONTINUED | OUTPATIENT
Start: 2023-01-01 | End: 2023-01-01 | Stop reason: HOSPADM

## 2023-01-01 RX ORDER — DEXAMETHASONE 4 MG/1
4 TABLET ORAL DAILY
Status: DISCONTINUED | OUTPATIENT
Start: 2023-01-01 | End: 2023-01-01 | Stop reason: HOSPADM

## 2023-01-01 RX ORDER — DEXAMETHASONE SODIUM PHOSPHATE 4 MG/ML
INJECTION, SOLUTION INTRA-ARTICULAR; INTRALESIONAL; INTRAMUSCULAR; INTRAVENOUS; SOFT TISSUE PRN
Status: DISCONTINUED | OUTPATIENT
Start: 2023-01-01 | End: 2023-01-01

## 2023-01-01 RX ORDER — GINSENG 100 MG
CAPSULE ORAL 4 TIMES DAILY PRN
Status: DISCONTINUED | OUTPATIENT
Start: 2023-01-01 | End: 2023-01-01 | Stop reason: HOSPADM

## 2023-01-01 RX ORDER — BUPRENORPHINE 10 UG/H
1 PATCH TRANSDERMAL WEEKLY
Status: DISCONTINUED | OUTPATIENT
Start: 2023-01-01 | End: 2023-01-01 | Stop reason: HOSPADM

## 2023-01-01 RX ORDER — TEMOZOLOMIDE 250 MG/1
100 CAPSULE ORAL DAILY
Qty: 5 CAPSULE | Refills: 0 | Status: ON HOLD | OUTPATIENT
Start: 2023-01-01 | End: 2023-01-01

## 2023-01-01 RX ORDER — PROCHLORPERAZINE 25 MG
25 SUPPOSITORY, RECTAL RECTAL EVERY 12 HOURS PRN
Status: DISCONTINUED | OUTPATIENT
Start: 2023-01-01 | End: 2023-01-01 | Stop reason: HOSPADM

## 2023-01-01 RX ORDER — ONDANSETRON 2 MG/ML
4 INJECTION INTRAMUSCULAR; INTRAVENOUS EVERY 30 MIN PRN
Status: DISCONTINUED | OUTPATIENT
Start: 2023-01-01 | End: 2023-01-01 | Stop reason: HOSPADM

## 2023-01-01 RX ORDER — OXYCODONE HYDROCHLORIDE 5 MG/1
5 TABLET ORAL EVERY 6 HOURS PRN
Status: DISCONTINUED | OUTPATIENT
Start: 2023-01-01 | End: 2023-01-01 | Stop reason: HOSPADM

## 2023-01-01 RX ORDER — MORPHINE SULFATE 20 MG/ML
5 SOLUTION ORAL
Qty: 30 ML | Refills: 0 | Status: SHIPPED | OUTPATIENT
Start: 2023-01-01

## 2023-01-01 RX ORDER — FENTANYL CITRATE 50 UG/ML
50 INJECTION, SOLUTION INTRAMUSCULAR; INTRAVENOUS EVERY 5 MIN PRN
Status: DISCONTINUED | OUTPATIENT
Start: 2023-01-01 | End: 2023-01-01 | Stop reason: HOSPADM

## 2023-01-01 RX ORDER — POTASSIUM CHLORIDE 1500 MG/1
40 TABLET, EXTENDED RELEASE ORAL ONCE
Status: COMPLETED | OUTPATIENT
Start: 2023-01-01 | End: 2023-01-01

## 2023-01-01 RX ORDER — IOPAMIDOL 755 MG/ML
135 INJECTION, SOLUTION INTRAVASCULAR ONCE
Status: COMPLETED | OUTPATIENT
Start: 2023-01-01 | End: 2023-01-01

## 2023-01-01 RX ORDER — LOPERAMIDE HCL 2 MG
2 CAPSULE ORAL 4 TIMES DAILY PRN
Qty: 10 CAPSULE | Refills: 1 | Status: SHIPPED | OUTPATIENT
Start: 2023-01-01

## 2023-01-01 RX ORDER — ACETAMINOPHEN 325 MG/1
650 TABLET ORAL EVERY 6 HOURS
Status: DISCONTINUED | OUTPATIENT
Start: 2023-01-01 | End: 2023-01-01 | Stop reason: HOSPADM

## 2023-01-01 RX ORDER — LOPERAMIDE HCL 2 MG
4 CAPSULE ORAL 4 TIMES DAILY PRN
Qty: 30 CAPSULE | Refills: 0 | Status: ON HOLD | OUTPATIENT
Start: 2023-01-01 | End: 2023-01-01

## 2023-01-01 RX ORDER — LOPERAMIDE HCL 2 MG
4 CAPSULE ORAL 4 TIMES DAILY PRN
Status: DISCONTINUED | OUTPATIENT
Start: 2023-01-01 | End: 2023-01-01 | Stop reason: HOSPADM

## 2023-01-01 RX ORDER — ONDANSETRON 2 MG/ML
4 INJECTION INTRAMUSCULAR; INTRAVENOUS EVERY 30 MIN PRN
Status: DISCONTINUED | OUTPATIENT
Start: 2023-01-01 | End: 2023-01-01

## 2023-01-01 RX ORDER — PROCHLORPERAZINE MALEATE 5 MG
10 TABLET ORAL EVERY 6 HOURS PRN
Status: DISCONTINUED | OUTPATIENT
Start: 2023-01-01 | End: 2023-01-01 | Stop reason: HOSPADM

## 2023-01-01 RX ORDER — BUPRENORPHINE 10 UG/H
1 PATCH TRANSDERMAL WEEKLY
Status: DISCONTINUED | OUTPATIENT
Start: 2023-01-01 | End: 2023-01-01

## 2023-01-01 RX ORDER — POTASSIUM CHLORIDE 750 MG/1
20 CAPSULE, EXTENDED RELEASE ORAL DAILY
Status: DISCONTINUED | OUTPATIENT
Start: 2023-01-01 | End: 2023-01-01 | Stop reason: HOSPADM

## 2023-01-01 RX ORDER — SENNOSIDES 8.6 MG
2-4 TABLET ORAL 2 TIMES DAILY PRN
Status: DISCONTINUED | OUTPATIENT
Start: 2023-01-01 | End: 2023-01-01

## 2023-01-01 RX ORDER — POTASSIUM CHLORIDE 7.45 MG/ML
INJECTION INTRAVENOUS PRN
Status: DISCONTINUED | OUTPATIENT
Start: 2023-01-01 | End: 2023-01-01

## 2023-01-01 RX ORDER — METOPROLOL TARTRATE 25 MG/1
25 TABLET, FILM COATED ORAL ONCE
Status: CANCELLED
Start: 2023-01-01 | End: 2023-01-01

## 2023-01-01 RX ORDER — CLINDAMYCIN PHOSPHATE 900 MG/50ML
INJECTION, SOLUTION INTRAVENOUS PRN
Status: DISCONTINUED | OUTPATIENT
Start: 2023-01-01 | End: 2023-01-01

## 2023-01-01 RX ORDER — MAGNESIUM OXIDE 400 MG/1
400 TABLET ORAL 2 TIMES DAILY
Qty: 28 TABLET | Refills: 0 | Status: ON HOLD | OUTPATIENT
Start: 2023-01-01 | End: 2023-01-01

## 2023-01-01 RX ORDER — POTASSIUM CHLORIDE 7.45 MG/ML
10 INJECTION INTRAVENOUS ONCE
Status: COMPLETED | OUTPATIENT
Start: 2023-01-01 | End: 2023-01-01

## 2023-01-01 RX ORDER — OXYCODONE HYDROCHLORIDE 5 MG/1
5 TABLET ORAL EVERY 6 HOURS PRN
Qty: 15 TABLET | Refills: 0 | Status: ON HOLD | OUTPATIENT
Start: 2023-01-01 | End: 2023-01-01

## 2023-01-01 RX ORDER — AMOXICILLIN 250 MG
1 CAPSULE ORAL 2 TIMES DAILY PRN
Status: DISCONTINUED | OUTPATIENT
Start: 2023-01-01 | End: 2023-01-01

## 2023-01-01 RX ORDER — SODIUM CHLORIDE, SODIUM LACTATE, POTASSIUM CHLORIDE, CALCIUM CHLORIDE 600; 310; 30; 20 MG/100ML; MG/100ML; MG/100ML; MG/100ML
INJECTION, SOLUTION INTRAVENOUS CONTINUOUS
Status: DISCONTINUED | OUTPATIENT
Start: 2023-01-01 | End: 2023-01-01 | Stop reason: HOSPADM

## 2023-01-01 RX ORDER — HEPARIN SODIUM (PORCINE) LOCK FLUSH IV SOLN 100 UNIT/ML 100 UNIT/ML
5 SOLUTION INTRAVENOUS ONCE
Status: COMPLETED | OUTPATIENT
Start: 2023-01-01 | End: 2023-01-01

## 2023-01-01 RX ORDER — POTASSIUM CHLORIDE 1.5 G/1.58G
40 POWDER, FOR SOLUTION ORAL ONCE
Status: COMPLETED | OUTPATIENT
Start: 2023-01-01 | End: 2023-01-01

## 2023-01-01 RX ORDER — EPINEPHRINE 1 MG/ML
0.3 INJECTION, SOLUTION, CONCENTRATE INTRAVENOUS EVERY 5 MIN PRN
Status: CANCELLED | OUTPATIENT
Start: 2023-01-01

## 2023-01-01 RX ORDER — DEXTROSE MONOHYDRATE 100 MG/ML
120 INJECTION, SOLUTION INTRAVENOUS CONTINUOUS
Qty: 6000 ML | Refills: 0 | Status: SHIPPED | OUTPATIENT
Start: 2023-01-01

## 2023-01-01 RX ORDER — DEXAMETHASONE 4 MG/1
4 TABLET ORAL DAILY
Qty: 10 TABLET | Refills: 0 | Status: ON HOLD | OUTPATIENT
Start: 2023-01-01 | End: 2023-01-01

## 2023-01-01 RX ORDER — PROPOFOL 10 MG/ML
INJECTION, EMULSION INTRAVENOUS CONTINUOUS PRN
Status: DISCONTINUED | OUTPATIENT
Start: 2023-01-01 | End: 2023-01-01

## 2023-01-01 RX ORDER — POTASSIUM CHLORIDE 1500 MG/1
20 TABLET, EXTENDED RELEASE ORAL 2 TIMES DAILY
Qty: 28 TABLET | Refills: 0 | Status: ON HOLD | OUTPATIENT
Start: 2023-01-01 | End: 2023-01-01

## 2023-01-01 RX ORDER — POTASSIUM CHLORIDE 1500 MG/1
20 TABLET, EXTENDED RELEASE ORAL ONCE
Status: COMPLETED | OUTPATIENT
Start: 2023-01-01 | End: 2023-01-01

## 2023-01-01 RX ORDER — MULTIVIT-MIN/IRON/FOLIC ACID/K 18-600-40
1 CAPSULE ORAL DAILY
Status: ON HOLD | COMMUNITY
End: 2023-01-01

## 2023-01-01 RX ORDER — PIPERACILLIN SODIUM, TAZOBACTAM SODIUM 4; .5 G/20ML; G/20ML
4.5 INJECTION, POWDER, LYOPHILIZED, FOR SOLUTION INTRAVENOUS ONCE
Status: COMPLETED | OUTPATIENT
Start: 2023-01-01 | End: 2023-01-01

## 2023-01-01 RX ORDER — POTASSIUM CHLORIDE 1500 MG/1
20 TABLET, EXTENDED RELEASE ORAL 2 TIMES DAILY
Qty: 14 TABLET | Refills: 0 | Status: SHIPPED | OUTPATIENT
Start: 2023-01-01 | End: 2023-01-01

## 2023-01-01 RX ORDER — METHYLPREDNISOLONE SODIUM SUCCINATE 125 MG/2ML
125 INJECTION, POWDER, LYOPHILIZED, FOR SOLUTION INTRAMUSCULAR; INTRAVENOUS
Status: CANCELLED
Start: 2023-01-01

## 2023-01-01 RX ORDER — MORPHINE SULFATE 10 MG/5ML
10 SOLUTION ORAL
Status: DISCONTINUED | OUTPATIENT
Start: 2023-01-01 | End: 2023-01-01 | Stop reason: HOSPADM

## 2023-01-01 RX ORDER — TRAMADOL HYDROCHLORIDE 50 MG/1
50 TABLET ORAL EVERY 6 HOURS PRN
Status: DISCONTINUED | OUTPATIENT
Start: 2023-01-01 | End: 2023-01-01

## 2023-01-01 RX ORDER — METOPROLOL TARTRATE 25 MG/1
25 TABLET, FILM COATED ORAL ONCE
Qty: 1 TABLET | Refills: 0 | Status: ON HOLD | OUTPATIENT
Start: 2023-01-01 | End: 2023-01-01

## 2023-01-01 RX ORDER — VANCOMYCIN HYDROCHLORIDE 125 MG/1
125 CAPSULE ORAL 4 TIMES DAILY
Qty: 56 CAPSULE | Refills: 0 | Status: SHIPPED | OUTPATIENT
Start: 2023-01-01 | End: 2023-01-01

## 2023-01-01 RX ORDER — FENTANYL CITRATE 50 UG/ML
25 INJECTION, SOLUTION INTRAMUSCULAR; INTRAVENOUS EVERY 5 MIN PRN
Status: DISCONTINUED | OUTPATIENT
Start: 2023-01-01 | End: 2023-01-01 | Stop reason: HOSPADM

## 2023-01-01 RX ORDER — LOPERAMIDE HCL 2 MG
2 CAPSULE ORAL 3 TIMES DAILY PRN
Status: DISCONTINUED | OUTPATIENT
Start: 2023-01-01 | End: 2023-01-01

## 2023-01-01 RX ORDER — OXYCODONE HYDROCHLORIDE 5 MG/1
5 TABLET ORAL EVERY 6 HOURS
Status: DISCONTINUED | OUTPATIENT
Start: 2023-01-01 | End: 2023-01-01

## 2023-01-01 RX ORDER — DEXTROSE MONOHYDRATE 25 G/50ML
25 INJECTION, SOLUTION INTRAVENOUS ONCE
Status: COMPLETED | OUTPATIENT
Start: 2023-01-01 | End: 2023-01-01

## 2023-01-01 RX ORDER — LOPERAMIDE HCL 2 MG
4 CAPSULE ORAL 4 TIMES DAILY PRN
Status: DISCONTINUED | OUTPATIENT
Start: 2023-01-01 | End: 2023-01-01

## 2023-01-01 RX ORDER — PROCHLORPERAZINE MALEATE 5 MG
5 TABLET ORAL EVERY 6 HOURS PRN
Status: DISCONTINUED | OUTPATIENT
Start: 2023-01-01 | End: 2023-01-01 | Stop reason: HOSPADM

## 2023-01-01 RX ORDER — METOPROLOL TARTRATE 25 MG/1
25 TABLET, FILM COATED ORAL 2 TIMES DAILY
Status: DISCONTINUED | OUTPATIENT
Start: 2023-01-01 | End: 2023-01-01 | Stop reason: HOSPADM

## 2023-01-01 RX ORDER — ENOXAPARIN SODIUM 100 MG/ML
40 INJECTION SUBCUTANEOUS EVERY 24 HOURS
Status: DISCONTINUED | OUTPATIENT
Start: 2023-01-01 | End: 2023-01-01

## 2023-01-01 RX ORDER — ACETAMINOPHEN 325 MG/1
650 TABLET ORAL EVERY 4 HOURS PRN
Status: DISCONTINUED | OUTPATIENT
Start: 2023-01-01 | End: 2023-01-01

## 2023-01-01 RX ORDER — POLYETHYLENE GLYCOL 3350 17 G/17G
17 POWDER, FOR SOLUTION ORAL DAILY
Status: DISCONTINUED | OUTPATIENT
Start: 2023-01-01 | End: 2023-01-01

## 2023-01-01 RX ORDER — MORPHINE SULFATE 10 MG/5ML
5 SOLUTION ORAL
Status: DISCONTINUED | OUTPATIENT
Start: 2023-01-01 | End: 2023-01-01 | Stop reason: HOSPADM

## 2023-01-01 RX ORDER — SENNOSIDES 8.6 MG
2-4 TABLET ORAL 2 TIMES DAILY PRN
Qty: 30 TABLET | Refills: 0 | Status: ON HOLD | OUTPATIENT
Start: 2023-01-01 | End: 2023-01-01

## 2023-01-01 RX ORDER — ONDANSETRON 2 MG/ML
INJECTION INTRAMUSCULAR; INTRAVENOUS PRN
Status: DISCONTINUED | OUTPATIENT
Start: 2023-01-01 | End: 2023-01-01

## 2023-01-01 RX ORDER — LORAZEPAM 2 MG/ML
0.5 CONCENTRATE ORAL
Qty: 30 ML | Refills: 1 | Status: SHIPPED | OUTPATIENT
Start: 2023-01-01

## 2023-01-01 RX ORDER — UBIDECARENONE 75 MG
100 CAPSULE ORAL DAILY
Status: ON HOLD | COMMUNITY
End: 2023-01-01

## 2023-01-01 RX ORDER — CARBOXYMETHYLCELLULOSE SODIUM 5 MG/ML
1-2 SOLUTION/ DROPS OPHTHALMIC
Status: DISCONTINUED | OUTPATIENT
Start: 2023-01-01 | End: 2023-01-01 | Stop reason: HOSPADM

## 2023-01-01 RX ORDER — LANOLIN ALCOHOL/MO/W.PET/CERES
100 CREAM (GRAM) TOPICAL DAILY
Qty: 30 TABLET | Refills: 0 | Status: ON HOLD | OUTPATIENT
Start: 2023-01-01 | End: 2023-01-01

## 2023-01-01 RX ORDER — LIDOCAINE HYDROCHLORIDE 10 MG/ML
20 INJECTION, SOLUTION EPIDURAL; INFILTRATION; INTRACAUDAL; PERINEURAL ONCE
Status: DISCONTINUED | OUTPATIENT
Start: 2023-01-01 | End: 2023-01-01 | Stop reason: HOSPADM

## 2023-01-01 RX ORDER — LORAZEPAM 0.5 MG/1
0.5 TABLET ORAL EVERY 6 HOURS PRN
Status: DISCONTINUED | OUTPATIENT
Start: 2023-01-01 | End: 2023-01-01

## 2023-01-01 RX ORDER — ACETAMINOPHEN 650 MG/1
650 SUPPOSITORY RECTAL EVERY 4 HOURS PRN
Qty: 4 SUPPOSITORY | Refills: 0 | Status: SHIPPED | OUTPATIENT
Start: 2023-01-01

## 2023-01-01 RX ORDER — HYDROMORPHONE HCL IN WATER/PF 6 MG/30 ML
0.2 PATIENT CONTROLLED ANALGESIA SYRINGE INTRAVENOUS
Status: DISCONTINUED | OUTPATIENT
Start: 2023-01-01 | End: 2023-01-01

## 2023-01-01 RX ORDER — HYDROMORPHONE HYDROCHLORIDE 1 MG/ML
0.4 INJECTION, SOLUTION INTRAMUSCULAR; INTRAVENOUS; SUBCUTANEOUS ONCE
Status: COMPLETED | OUTPATIENT
Start: 2023-01-01 | End: 2023-01-01

## 2023-01-01 RX ORDER — HEPARIN SODIUM (PORCINE) LOCK FLUSH IV SOLN 100 UNIT/ML 100 UNIT/ML
SOLUTION INTRAVENOUS PRN
Status: DISCONTINUED | OUTPATIENT
Start: 2023-01-01 | End: 2023-01-01 | Stop reason: HOSPADM

## 2023-01-01 RX ORDER — DEXTROSE MONOHYDRATE 25 G/50ML
25-50 INJECTION, SOLUTION INTRAVENOUS
Status: DISCONTINUED | OUTPATIENT
Start: 2023-01-01 | End: 2023-01-01 | Stop reason: HOSPADM

## 2023-01-01 RX ORDER — SODIUM CHLORIDE, SODIUM LACTATE, POTASSIUM CHLORIDE, CALCIUM CHLORIDE 600; 310; 30; 20 MG/100ML; MG/100ML; MG/100ML; MG/100ML
INJECTION, SOLUTION INTRAVENOUS CONTINUOUS PRN
Status: DISCONTINUED | OUTPATIENT
Start: 2023-01-01 | End: 2023-01-01

## 2023-01-01 RX ORDER — UBIDECARENONE 75 MG
100 CAPSULE ORAL DAILY
Status: DISCONTINUED | OUTPATIENT
Start: 2023-01-01 | End: 2023-01-01 | Stop reason: HOSPADM

## 2023-01-01 RX ORDER — ONDANSETRON 4 MG/1
4 TABLET, ORALLY DISINTEGRATING ORAL EVERY 6 HOURS PRN
Status: DISCONTINUED | OUTPATIENT
Start: 2023-01-01 | End: 2023-01-01

## 2023-01-01 RX ORDER — IOPAMIDOL 755 MG/ML
125 INJECTION, SOLUTION INTRAVASCULAR ONCE
Status: COMPLETED | OUTPATIENT
Start: 2023-01-01 | End: 2023-01-01

## 2023-01-01 RX ORDER — POTASSIUM CHLORIDE 750 MG/1
40 TABLET, EXTENDED RELEASE ORAL ONCE
Status: DISCONTINUED | OUTPATIENT
Start: 2023-01-01 | End: 2023-01-01

## 2023-01-01 RX ORDER — ACETAMINOPHEN 650 MG/1
650 SUPPOSITORY RECTAL EVERY 6 HOURS PRN
Status: DISCONTINUED | OUTPATIENT
Start: 2023-01-01 | End: 2023-01-01 | Stop reason: HOSPADM

## 2023-01-01 RX ORDER — MAGNESIUM OXIDE 400 MG/1
400 TABLET ORAL EVERY 4 HOURS
Status: ACTIVE | OUTPATIENT
Start: 2023-01-01 | End: 2023-01-01

## 2023-01-01 RX ORDER — DEXTROSE 20 G/100ML
INJECTION, SOLUTION INTRAVENOUS CONTINUOUS
Status: DISCONTINUED | OUTPATIENT
Start: 2023-01-01 | End: 2023-01-01 | Stop reason: HOSPADM

## 2023-01-01 RX ORDER — BISACODYL 10 MG
10 SUPPOSITORY, RECTAL RECTAL DAILY PRN
Status: DISCONTINUED | OUTPATIENT
Start: 2023-01-01 | End: 2023-01-01 | Stop reason: HOSPADM

## 2023-01-01 RX ORDER — BUPRENORPHINE 10 UG/H
1 PATCH TRANSDERMAL WEEKLY
Qty: 10 PATCH | Refills: 0 | Status: ON HOLD | OUTPATIENT
Start: 2023-01-01 | End: 2023-01-01

## 2023-01-01 RX ORDER — ONDANSETRON 2 MG/ML
4 INJECTION INTRAMUSCULAR; INTRAVENOUS EVERY 6 HOURS PRN
Status: DISCONTINUED | OUTPATIENT
Start: 2023-01-01 | End: 2023-01-01

## 2023-01-01 RX ORDER — METOPROLOL TARTRATE 25 MG/1
25 TABLET, FILM COATED ORAL ONCE
Status: COMPLETED | OUTPATIENT
Start: 2023-01-01 | End: 2023-01-01

## 2023-01-01 RX ORDER — MORPHINE SULFATE 10 MG/5ML
5 SOLUTION ORAL
Qty: 100 ML | Refills: 0 | Status: SHIPPED | OUTPATIENT
Start: 2023-01-01 | End: 2023-01-01

## 2023-01-01 RX ORDER — MAGNESIUM OXIDE 400 MG/1
400 TABLET ORAL 2 TIMES DAILY
Qty: 14 TABLET | Refills: 0 | Status: SHIPPED | OUTPATIENT
Start: 2023-01-01 | End: 2023-01-01

## 2023-01-01 RX ADMIN — DEXTROSE MONOHYDRATE 480 MCG: 50 INJECTION, SOLUTION INTRAVENOUS at 20:11

## 2023-01-01 RX ADMIN — POTASSIUM CHLORIDE 40 MEQ: 1500 TABLET, EXTENDED RELEASE ORAL at 15:05

## 2023-01-01 RX ADMIN — ACETAMINOPHEN 650 MG: 325 TABLET ORAL at 04:15

## 2023-01-01 RX ADMIN — DEXAMETHASONE 4 MG: 4 TABLET ORAL at 08:52

## 2023-01-01 RX ADMIN — PIPERACILLIN SODIUM AND TAZOBACTAM SODIUM 4.5 G: 4; .5 INJECTION, POWDER, LYOPHILIZED, FOR SOLUTION INTRAVENOUS at 16:12

## 2023-01-01 RX ADMIN — POTASSIUM CHLORIDE 10 MEQ: 7.46 INJECTION, SOLUTION INTRAVENOUS at 09:52

## 2023-01-01 RX ADMIN — Medication 5 ML: at 13:26

## 2023-01-01 RX ADMIN — DEXAMETHASONE SODIUM PHOSPHATE: 10 INJECTION, SOLUTION INTRAMUSCULAR; INTRAVENOUS at 14:45

## 2023-01-01 RX ADMIN — VANCOMYCIN HYDROCHLORIDE 125 MG: 125 CAPSULE ORAL at 08:28

## 2023-01-01 RX ADMIN — MAGNESIUM SULFATE HEPTAHYDRATE 2 G: 40 INJECTION, SOLUTION INTRAVENOUS at 02:45

## 2023-01-01 RX ADMIN — IOPAMIDOL 123 ML: 755 INJECTION, SOLUTION INTRAVENOUS at 23:45

## 2023-01-01 RX ADMIN — Medication 2 PACKET: at 14:15

## 2023-01-01 RX ADMIN — Medication 2 PACKET: at 06:40

## 2023-01-01 RX ADMIN — Medication: at 10:03

## 2023-01-01 RX ADMIN — DEXTROSE MONOHYDRATE 20 ML: 50 INJECTION, SOLUTION INTRAVENOUS at 21:54

## 2023-01-01 RX ADMIN — ACETAMINOPHEN 650 MG: 325 TABLET ORAL at 13:22

## 2023-01-01 RX ADMIN — ACETAMINOPHEN 650 MG: 325 TABLET ORAL at 02:22

## 2023-01-01 RX ADMIN — OXYCODONE HYDROCHLORIDE 5 MG: 5 TABLET ORAL at 11:00

## 2023-01-01 RX ADMIN — HYDROMORPHONE HYDROCHLORIDE 0.4 MG: 0.2 INJECTION, SOLUTION INTRAMUSCULAR; INTRAVENOUS; SUBCUTANEOUS at 16:08

## 2023-01-01 RX ADMIN — Medication 1 PACKET: at 08:28

## 2023-01-01 RX ADMIN — METOPROLOL TARTRATE 25 MG: 25 TABLET, FILM COATED ORAL at 08:49

## 2023-01-01 RX ADMIN — Medication: at 11:59

## 2023-01-01 RX ADMIN — IRINOTECAN HYDROCHLORIDE 90 MG: 20 INJECTION, SOLUTION INTRAVENOUS at 15:34

## 2023-01-01 RX ADMIN — POTASSIUM CHLORIDE 20 MEQ: 750 TABLET, EXTENDED RELEASE ORAL at 05:07

## 2023-01-01 RX ADMIN — METOPROLOL TARTRATE 25 MG: 25 TABLET, FILM COATED ORAL at 08:23

## 2023-01-01 RX ADMIN — VANCOMYCIN HYDROCHLORIDE 125 MG: 125 CAPSULE ORAL at 08:16

## 2023-01-01 RX ADMIN — PHENYLEPHRINE HYDROCHLORIDE 100 MCG: 10 INJECTION INTRAVENOUS at 16:51

## 2023-01-01 RX ADMIN — METOPROLOL TARTRATE 25 MG: 25 TABLET, FILM COATED ORAL at 08:13

## 2023-01-01 RX ADMIN — SODIUM CHLORIDE: 9 INJECTION, SOLUTION INTRAVENOUS at 21:07

## 2023-01-01 RX ADMIN — DEXAMETHASONE 4 MG: 2 TABLET ORAL at 09:21

## 2023-01-01 RX ADMIN — SODIUM CHLORIDE: 9 INJECTION, SOLUTION INTRAVENOUS at 04:12

## 2023-01-01 RX ADMIN — DEXAMETHASONE SODIUM PHOSPHATE: 10 INJECTION, SOLUTION INTRAMUSCULAR; INTRAVENOUS at 14:47

## 2023-01-01 RX ADMIN — SODIUM CHLORIDE: 9 INJECTION, SOLUTION INTRAVENOUS at 17:15

## 2023-01-01 RX ADMIN — ACETAMINOPHEN 650 MG: 325 TABLET ORAL at 12:00

## 2023-01-01 RX ADMIN — MAGNESIUM SULFATE IN WATER 2 G: 40 INJECTION, SOLUTION INTRAVENOUS at 09:10

## 2023-01-01 RX ADMIN — THIAMINE HCL TAB 100 MG 100 MG: 100 TAB at 08:09

## 2023-01-01 RX ADMIN — VANCOMYCIN HYDROCHLORIDE 125 MG: 125 CAPSULE ORAL at 08:51

## 2023-01-01 RX ADMIN — ACETAMINOPHEN 650 MG: 325 TABLET ORAL at 11:28

## 2023-01-01 RX ADMIN — LOPERAMIDE HYDROCHLORIDE 4 MG: 2 CAPSULE ORAL at 08:50

## 2023-01-01 RX ADMIN — POTASSIUM CHLORIDE 20 MEQ: 1500 TABLET, EXTENDED RELEASE ORAL at 13:46

## 2023-01-01 RX ADMIN — SODIUM CHLORIDE 1000 ML: 9 INJECTION, SOLUTION INTRAVENOUS at 12:37

## 2023-01-01 RX ADMIN — OXYCODONE HYDROCHLORIDE 5 MG: 5 TABLET ORAL at 17:35

## 2023-01-01 RX ADMIN — ACETAMINOPHEN 650 MG: 325 TABLET ORAL at 08:02

## 2023-01-01 RX ADMIN — ACETAMINOPHEN 650 MG: 325 TABLET ORAL at 17:29

## 2023-01-01 RX ADMIN — HYDROMORPHONE HYDROCHLORIDE 2 MG: 2 TABLET ORAL at 13:12

## 2023-01-01 RX ADMIN — ACETAMINOPHEN 650 MG: 325 TABLET ORAL at 01:03

## 2023-01-01 RX ADMIN — Medication 500 UNITS: at 11:00

## 2023-01-01 RX ADMIN — DEXTROSE MONOHYDRATE 480 MCG: 50 INJECTION, SOLUTION INTRAVENOUS at 20:52

## 2023-01-01 RX ADMIN — POTASSIUM PHOSPHATE, MONOBASIC AND POTASSIUM PHOSPHATE, DIBASIC 15 MMOL: 224; 236 INJECTION, SOLUTION, CONCENTRATE INTRAVENOUS at 12:24

## 2023-01-01 RX ADMIN — DEXAMETHASONE 4 MG: 2 TABLET ORAL at 20:48

## 2023-01-01 RX ADMIN — LOPERAMIDE HYDROCHLORIDE 4 MG: 2 CAPSULE ORAL at 12:33

## 2023-01-01 RX ADMIN — HYDROMORPHONE HYDROCHLORIDE 2 MG: 2 TABLET ORAL at 19:57

## 2023-01-01 RX ADMIN — DEXTROSE 50 % IN WATER (D50W) INTRAVENOUS SYRINGE 50 ML: at 23:11

## 2023-01-01 RX ADMIN — DEXTROSE MONOHYDRATE 480 MCG: 50 INJECTION, SOLUTION INTRAVENOUS at 19:41

## 2023-01-01 RX ADMIN — ENOXAPARIN SODIUM 40 MG: 40 INJECTION SUBCUTANEOUS at 19:50

## 2023-01-01 RX ADMIN — THIAMINE HCL TAB 100 MG 100 MG: 100 TAB at 08:03

## 2023-01-01 RX ADMIN — Medication 1 PACKET: at 21:10

## 2023-01-01 RX ADMIN — HYDROMORPHONE HYDROCHLORIDE 2 MG: 2 TABLET ORAL at 06:46

## 2023-01-01 RX ADMIN — DEXTROSE MONOHYDRATE 20 ML: 50 INJECTION, SOLUTION INTRAVENOUS at 20:40

## 2023-01-01 RX ADMIN — DEXAMETHASONE 4 MG: 2 TABLET ORAL at 20:10

## 2023-01-01 RX ADMIN — METOPROLOL TARTRATE 25 MG: 25 TABLET, FILM COATED ORAL at 08:02

## 2023-01-01 RX ADMIN — DEXTROSE MONOHYDRATE, SODIUM CHLORIDE, SODIUM LACTATE, POTASSIUM CHLORIDE, CALCIUM CHLORIDE: 5; 600; 310; 179; 20 INJECTION, SOLUTION INTRAVENOUS at 09:16

## 2023-01-01 RX ADMIN — METOPROLOL TARTRATE 25 MG: 25 TABLET, FILM COATED ORAL at 20:21

## 2023-01-01 RX ADMIN — Medication 100 MCG: at 08:52

## 2023-01-01 RX ADMIN — DEXTROSE MONOHYDRATE 50 ML: 25 INJECTION, SOLUTION INTRAVENOUS at 12:26

## 2023-01-01 RX ADMIN — PHENYLEPHRINE HYDROCHLORIDE 200 MCG: 10 INJECTION INTRAVENOUS at 17:10

## 2023-01-01 RX ADMIN — SODIUM CHLORIDE: 9 INJECTION, SOLUTION INTRAVENOUS at 08:50

## 2023-01-01 RX ADMIN — DEXTROSE MONOHYDRATE, SODIUM CHLORIDE, SODIUM LACTATE, POTASSIUM CHLORIDE, CALCIUM CHLORIDE: 5; 600; 310; 179; 20 INJECTION, SOLUTION INTRAVENOUS at 14:27

## 2023-01-01 RX ADMIN — VANCOMYCIN HYDROCHLORIDE 125 MG: 125 CAPSULE ORAL at 15:36

## 2023-01-01 RX ADMIN — LOPERAMIDE HYDROCHLORIDE 4 MG: 2 CAPSULE ORAL at 09:20

## 2023-01-01 RX ADMIN — DEXAMETHASONE 4 MG: 2 TABLET ORAL at 08:09

## 2023-01-01 RX ADMIN — DEXTROSE 50 % IN WATER (D50W) INTRAVENOUS SYRINGE 50 ML: at 23:35

## 2023-01-01 RX ADMIN — MAGNESIUM SULFATE IN WATER 4 G: 40 INJECTION, SOLUTION INTRAVENOUS at 08:27

## 2023-01-01 RX ADMIN — OXYCODONE HYDROCHLORIDE 5 MG: 5 TABLET ORAL at 08:08

## 2023-01-01 RX ADMIN — TRAMADOL HYDROCHLORIDE 50 MG: 50 TABLET, COATED ORAL at 15:57

## 2023-01-01 RX ADMIN — ACETAMINOPHEN 650 MG: 325 TABLET ORAL at 12:01

## 2023-01-01 RX ADMIN — POTASSIUM CHLORIDE 10 MEQ: 7.46 INJECTION, SOLUTION INTRAVENOUS at 08:01

## 2023-01-01 RX ADMIN — MAGNESIUM SULFATE IN WATER 2 G: 40 INJECTION, SOLUTION INTRAVENOUS at 10:21

## 2023-01-01 RX ADMIN — POTASSIUM CHLORIDE 10 MEQ: 7.46 INJECTION, SOLUTION INTRAVENOUS at 13:27

## 2023-01-01 RX ADMIN — SODIUM CHLORIDE: 9 INJECTION, SOLUTION INTRAVENOUS at 23:42

## 2023-01-01 RX ADMIN — POTASSIUM CHLORIDE 20 MEQ: 750 CAPSULE, EXTENDED RELEASE ORAL at 07:58

## 2023-01-01 RX ADMIN — LOPERAMIDE HYDROCHLORIDE 2 MG: 2 CAPSULE ORAL at 19:55

## 2023-01-01 RX ADMIN — OXYCODONE HYDROCHLORIDE 5 MG: 5 TABLET ORAL at 00:04

## 2023-01-01 RX ADMIN — METOPROLOL TARTRATE 25 MG: 25 TABLET, FILM COATED ORAL at 08:45

## 2023-01-01 RX ADMIN — ACETAMINOPHEN 650 MG: 325 TABLET ORAL at 04:30

## 2023-01-01 RX ADMIN — SODIUM CHLORIDE, PRESERVATIVE FREE 90 ML: 5 INJECTION INTRAVENOUS at 23:45

## 2023-01-01 RX ADMIN — Medication 100 MCG: at 10:00

## 2023-01-01 RX ADMIN — DEXAMETHASONE 4 MG: 4 TABLET ORAL at 08:43

## 2023-01-01 RX ADMIN — ACETAMINOPHEN 650 MG: 325 TABLET ORAL at 19:27

## 2023-01-01 RX ADMIN — POTASSIUM CHLORIDE 20 MEQ: 29.8 INJECTION, SOLUTION INTRAVENOUS at 23:28

## 2023-01-01 RX ADMIN — DEXTROSE MONOHYDRATE 15 ML: 50 INJECTION, SOLUTION INTRAVENOUS at 21:58

## 2023-01-01 RX ADMIN — ACETAMINOPHEN 650 MG: 325 TABLET ORAL at 07:59

## 2023-01-01 RX ADMIN — POTASSIUM CHLORIDE 40 MEQ: 750 TABLET, EXTENDED RELEASE ORAL at 08:54

## 2023-01-01 RX ADMIN — LOPERAMIDE HYDROCHLORIDE 2 MG: 2 CAPSULE ORAL at 10:16

## 2023-01-01 RX ADMIN — OXYCODONE HYDROCHLORIDE 5 MG: 5 TABLET ORAL at 18:15

## 2023-01-01 RX ADMIN — OXYCODONE HYDROCHLORIDE 5 MG: 5 TABLET ORAL at 13:29

## 2023-01-01 RX ADMIN — POTASSIUM CHLORIDE 20 MEQ: 750 TABLET, EXTENDED RELEASE ORAL at 20:01

## 2023-01-01 RX ADMIN — DEXTROSE MONOHYDRATE 480 MCG: 50 INJECTION, SOLUTION INTRAVENOUS at 19:50

## 2023-01-01 RX ADMIN — ACETAMINOPHEN 650 MG: 325 TABLET ORAL at 17:27

## 2023-01-01 RX ADMIN — METOPROLOL TARTRATE 25 MG: 25 TABLET, FILM COATED ORAL at 19:43

## 2023-01-01 RX ADMIN — HYDROMORPHONE HYDROCHLORIDE 0.4 MG: 0.2 INJECTION, SOLUTION INTRAMUSCULAR; INTRAVENOUS; SUBCUTANEOUS at 04:27

## 2023-01-01 RX ADMIN — HYDROMORPHONE HYDROCHLORIDE 2 MG: 2 TABLET ORAL at 11:07

## 2023-01-01 RX ADMIN — METOPROLOL TARTRATE 25 MG: 25 TABLET, FILM COATED ORAL at 20:11

## 2023-01-01 RX ADMIN — ENOXAPARIN SODIUM 40 MG: 40 INJECTION SUBCUTANEOUS at 19:43

## 2023-01-01 RX ADMIN — POTASSIUM CHLORIDE 20 MEQ: 29.8 INJECTION, SOLUTION INTRAVENOUS at 08:47

## 2023-01-01 RX ADMIN — DEXAMETHASONE 4 MG: 4 TABLET ORAL at 08:28

## 2023-01-01 RX ADMIN — DEXTROSE MONOHYDRATE, SODIUM CHLORIDE, SODIUM LACTATE, POTASSIUM CHLORIDE, CALCIUM CHLORIDE: 5; 600; 310; 179; 20 INJECTION, SOLUTION INTRAVENOUS at 23:01

## 2023-01-01 RX ADMIN — THIAMINE HCL TAB 100 MG 100 MG: 100 TAB at 08:28

## 2023-01-01 RX ADMIN — CALCIUM GLUCONATE 1 G: 20 INJECTION, SOLUTION INTRAVENOUS at 13:26

## 2023-01-01 RX ADMIN — DEXTROSE MONOHYDRATE 15 ML: 50 INJECTION, SOLUTION INTRAVENOUS at 19:58

## 2023-01-01 RX ADMIN — ACETAMINOPHEN 650 MG: 325 TABLET ORAL at 20:11

## 2023-01-01 RX ADMIN — Medication: at 02:45

## 2023-01-01 RX ADMIN — POTASSIUM CHLORIDE 20 MEQ: 750 CAPSULE, EXTENDED RELEASE ORAL at 08:04

## 2023-01-01 RX ADMIN — MAGNESIUM OXIDE TAB 400 MG (240 MG ELEMENTAL MG) 400 MG: 400 (240 MG) TAB at 22:07

## 2023-01-01 RX ADMIN — ACETAMINOPHEN 650 MG: 325 TABLET ORAL at 22:11

## 2023-01-01 RX ADMIN — DEXAMETHASONE 4 MG: 4 TABLET ORAL at 08:24

## 2023-01-01 RX ADMIN — DEXAMETHASONE 4 MG: 2 TABLET ORAL at 08:02

## 2023-01-01 RX ADMIN — METOPROLOL TARTRATE 25 MG: 25 TABLET, FILM COATED ORAL at 20:06

## 2023-01-01 RX ADMIN — DEXAMETHASONE 4 MG: 2 TABLET ORAL at 20:34

## 2023-01-01 RX ADMIN — SODIUM CHLORIDE: 9 INJECTION, SOLUTION INTRAVENOUS at 06:42

## 2023-01-01 RX ADMIN — POTASSIUM CHLORIDE 10 MEQ: 7.46 INJECTION, SOLUTION INTRAVENOUS at 16:21

## 2023-01-01 RX ADMIN — ENOXAPARIN SODIUM 40 MG: 40 INJECTION SUBCUTANEOUS at 20:37

## 2023-01-01 RX ADMIN — POTASSIUM CHLORIDE 10 MEQ: 7.46 INJECTION, SOLUTION INTRAVENOUS at 12:20

## 2023-01-01 RX ADMIN — POTASSIUM CHLORIDE 10 MEQ: 7.46 INJECTION, SOLUTION INTRAVENOUS at 10:27

## 2023-01-01 RX ADMIN — ACETAMINOPHEN 650 MG: 325 TABLET ORAL at 23:38

## 2023-01-01 RX ADMIN — ACETAMINOPHEN 650 MG: 325 TABLET ORAL at 01:42

## 2023-01-01 RX ADMIN — IRINOTECAN HYDROCHLORIDE 90 MG: 20 INJECTION, SOLUTION INTRAVENOUS at 15:11

## 2023-01-01 RX ADMIN — METOPROLOL TARTRATE 25 MG: 25 TABLET, FILM COATED ORAL at 08:28

## 2023-01-01 RX ADMIN — CALCIUM GLUCONATE 1 G: 20 INJECTION, SOLUTION INTRAVENOUS at 01:19

## 2023-01-01 RX ADMIN — LOPERAMIDE HYDROCHLORIDE 2 MG: 2 CAPSULE ORAL at 17:12

## 2023-01-01 RX ADMIN — IRINOTECAN HYDROCHLORIDE 90 MG: 20 INJECTION, SOLUTION INTRAVENOUS at 13:22

## 2023-01-01 RX ADMIN — TRAMADOL HYDROCHLORIDE 50 MG: 50 TABLET, COATED ORAL at 18:31

## 2023-01-01 RX ADMIN — POTASSIUM & SODIUM PHOSPHATES POWDER PACK 280-160-250 MG 1 PACKET: 280-160-250 PACK at 06:54

## 2023-01-01 RX ADMIN — MAGNESIUM SULFATE IN WATER 2 G: 40 INJECTION, SOLUTION INTRAVENOUS at 16:53

## 2023-01-01 RX ADMIN — DEXTROSE MONOHYDRATE 15 ML: 50 INJECTION, SOLUTION INTRAVENOUS at 20:59

## 2023-01-01 RX ADMIN — LOPERAMIDE HYDROCHLORIDE 4 MG: 2 CAPSULE ORAL at 20:48

## 2023-01-01 RX ADMIN — OXYCODONE HYDROCHLORIDE 5 MG: 5 TABLET ORAL at 18:34

## 2023-01-01 RX ADMIN — VANCOMYCIN HYDROCHLORIDE 125 MG: 125 CAPSULE ORAL at 15:19

## 2023-01-01 RX ADMIN — ATROPINE SULFATE 0.4 MG: 0.4 INJECTION, SOLUTION INTRAVENOUS at 15:41

## 2023-01-01 RX ADMIN — HYDROMORPHONE HYDROCHLORIDE 0.5 MG: 1 INJECTION, SOLUTION INTRAMUSCULAR; INTRAVENOUS; SUBCUTANEOUS at 22:45

## 2023-01-01 RX ADMIN — Medication 100 MCG: at 08:23

## 2023-01-01 RX ADMIN — OXYCODONE HYDROCHLORIDE 5 MG: 5 TABLET ORAL at 20:48

## 2023-01-01 RX ADMIN — OXYCODONE HYDROCHLORIDE 5 MG: 5 TABLET ORAL at 19:10

## 2023-01-01 RX ADMIN — LOPERAMIDE HYDROCHLORIDE 2 MG: 2 CAPSULE ORAL at 08:55

## 2023-01-01 RX ADMIN — TRAMADOL HYDROCHLORIDE 50 MG: 50 TABLET, COATED ORAL at 20:19

## 2023-01-01 RX ADMIN — PROPOFOL 30 MG: 10 INJECTION, EMULSION INTRAVENOUS at 16:48

## 2023-01-01 RX ADMIN — METOPROLOL TARTRATE 25 MG: 25 TABLET, FILM COATED ORAL at 21:11

## 2023-01-01 RX ADMIN — PROPOFOL 20 MG: 10 INJECTION, EMULSION INTRAVENOUS at 16:09

## 2023-01-01 RX ADMIN — LOPERAMIDE HYDROCHLORIDE 2 MG: 2 CAPSULE ORAL at 23:32

## 2023-01-01 RX ADMIN — VANCOMYCIN HYDROCHLORIDE 125 MG: 125 CAPSULE ORAL at 10:02

## 2023-01-01 RX ADMIN — POTASSIUM CHLORIDE 20 MEQ: 29.8 INJECTION, SOLUTION INTRAVENOUS at 21:10

## 2023-01-01 RX ADMIN — LOPERAMIDE HYDROCHLORIDE 4 MG: 2 CAPSULE ORAL at 15:57

## 2023-01-01 RX ADMIN — Medication 2 PACKET: at 20:08

## 2023-01-01 RX ADMIN — POTASSIUM CHLORIDE 40 MEQ: 750 TABLET, EXTENDED RELEASE ORAL at 15:47

## 2023-01-01 RX ADMIN — LOPERAMIDE HYDROCHLORIDE 4 MG: 2 CAPSULE ORAL at 08:02

## 2023-01-01 RX ADMIN — CALCIUM GLUCONATE 1 G: 20 INJECTION, SOLUTION INTRAVENOUS at 17:54

## 2023-01-01 RX ADMIN — BUPRENORPHINE 1 PATCH: 10 PATCH, EXTENDED RELEASE TRANSDERMAL at 21:20

## 2023-01-01 RX ADMIN — ACETAMINOPHEN 650 MG: 325 TABLET ORAL at 10:54

## 2023-01-01 RX ADMIN — VANCOMYCIN HYDROCHLORIDE 125 MG: 125 CAPSULE ORAL at 20:23

## 2023-01-01 RX ADMIN — DEXTROSE MONOHYDRATE, SODIUM CHLORIDE, SODIUM LACTATE, POTASSIUM CHLORIDE, CALCIUM CHLORIDE: 5; 600; 310; 179; 20 INJECTION, SOLUTION INTRAVENOUS at 23:51

## 2023-01-01 RX ADMIN — CLINDAMYCIN PHOSPHATE 900 MG: 900 INJECTION, SOLUTION INTRAVENOUS at 16:15

## 2023-01-01 RX ADMIN — ACETAMINOPHEN 650 MG: 325 TABLET ORAL at 00:04

## 2023-01-01 RX ADMIN — Medication 100 MCG: at 08:28

## 2023-01-01 RX ADMIN — LOPERAMIDE HYDROCHLORIDE 4 MG: 2 CAPSULE ORAL at 08:24

## 2023-01-01 RX ADMIN — DEXTROSE MONOHYDRATE 10 ML: 50 INJECTION, SOLUTION INTRAVENOUS at 19:28

## 2023-01-01 RX ADMIN — ACETAMINOPHEN 650 MG: 325 TABLET ORAL at 11:42

## 2023-01-01 RX ADMIN — ACETAMINOPHEN 650 MG: 325 TABLET ORAL at 05:37

## 2023-01-01 RX ADMIN — SODIUM CHLORIDE 1000 ML: 9 INJECTION, SOLUTION INTRAVENOUS at 17:49

## 2023-01-01 RX ADMIN — ACETAMINOPHEN 650 MG: 325 TABLET ORAL at 18:38

## 2023-01-01 RX ADMIN — SODIUM CHLORIDE, POTASSIUM CHLORIDE, SODIUM LACTATE AND CALCIUM CHLORIDE: 600; 310; 30; 20 INJECTION, SOLUTION INTRAVENOUS at 15:45

## 2023-01-01 RX ADMIN — ACETAMINOPHEN 650 MG: 325 TABLET ORAL at 11:46

## 2023-01-01 RX ADMIN — ACETAMINOPHEN 650 MG: 325 TABLET ORAL at 00:09

## 2023-01-01 RX ADMIN — ACETAMINOPHEN 650 MG: 325 TABLET ORAL at 13:12

## 2023-01-01 RX ADMIN — ENOXAPARIN SODIUM 40 MG: 40 INJECTION SUBCUTANEOUS at 20:23

## 2023-01-01 RX ADMIN — LOPERAMIDE HYDROCHLORIDE 2 MG: 2 CAPSULE ORAL at 11:19

## 2023-01-01 RX ADMIN — Medication: at 04:15

## 2023-01-01 RX ADMIN — Medication 5 ML: at 07:04

## 2023-01-01 RX ADMIN — LOPERAMIDE HYDROCHLORIDE 4 MG: 2 CAPSULE ORAL at 16:39

## 2023-01-01 RX ADMIN — METOPROLOL TARTRATE 25 MG: 25 TABLET, FILM COATED ORAL at 22:06

## 2023-01-01 RX ADMIN — ACETAMINOPHEN 650 MG: 325 TABLET ORAL at 18:27

## 2023-01-01 RX ADMIN — VANCOMYCIN HYDROCHLORIDE 125 MG: 125 CAPSULE ORAL at 12:22

## 2023-01-01 RX ADMIN — LOPERAMIDE HYDROCHLORIDE 4 MG: 2 CAPSULE ORAL at 00:54

## 2023-01-01 RX ADMIN — OXYCODONE HYDROCHLORIDE 5 MG: 5 TABLET ORAL at 11:13

## 2023-01-01 RX ADMIN — POTASSIUM CHLORIDE 20 MEQ: 750 TABLET, EXTENDED RELEASE ORAL at 10:59

## 2023-01-01 RX ADMIN — SODIUM CHLORIDE 250 ML: 9 INJECTION, SOLUTION INTRAVENOUS at 13:54

## 2023-01-01 RX ADMIN — POTASSIUM CHLORIDE 10 MEQ: 7.46 INJECTION, SOLUTION INTRAVENOUS at 23:14

## 2023-01-01 RX ADMIN — POTASSIUM CHLORIDE 20 MEQ: 29.8 INJECTION, SOLUTION INTRAVENOUS at 17:54

## 2023-01-01 RX ADMIN — Medication 5 ML: at 15:54

## 2023-01-01 RX ADMIN — DEXAMETHASONE SODIUM PHOSPHATE: 10 INJECTION, SOLUTION INTRAMUSCULAR; INTRAVENOUS at 15:04

## 2023-01-01 RX ADMIN — SODIUM CHLORIDE, POTASSIUM CHLORIDE, SODIUM LACTATE AND CALCIUM CHLORIDE: 600; 310; 30; 20 INJECTION, SOLUTION INTRAVENOUS at 16:00

## 2023-01-01 RX ADMIN — POTASSIUM CHLORIDE 40 MEQ: 1.5 POWDER, FOR SOLUTION ORAL at 09:01

## 2023-01-01 RX ADMIN — HYDROMORPHONE HYDROCHLORIDE 2 MG: 2 TABLET ORAL at 11:46

## 2023-01-01 RX ADMIN — MAGNESIUM SULFATE IN WATER 2 G: 40 INJECTION, SOLUTION INTRAVENOUS at 06:50

## 2023-01-01 RX ADMIN — POTASSIUM CHLORIDE 20 MEQ: 750 TABLET, EXTENDED RELEASE ORAL at 22:51

## 2023-01-01 RX ADMIN — DEXTROSE MONOHYDRATE 90 MG: 50 INJECTION, SOLUTION INTRAVENOUS at 15:45

## 2023-01-01 RX ADMIN — MORPHINE SULFATE 1 MG: 2 INJECTION, SOLUTION INTRAMUSCULAR; INTRAVENOUS at 09:53

## 2023-01-01 RX ADMIN — PEGFILGRASTIM 6 MG: KIT SUBCUTANEOUS at 15:38

## 2023-01-01 RX ADMIN — THIAMINE HCL TAB 100 MG 100 MG: 100 TAB at 09:20

## 2023-01-01 RX ADMIN — OXYCODONE HYDROCHLORIDE 5 MG: 5 TABLET ORAL at 15:47

## 2023-01-01 RX ADMIN — MAGNESIUM OXIDE TAB 400 MG (240 MG ELEMENTAL MG) 400 MG: 400 (240 MG) TAB at 08:28

## 2023-01-01 RX ADMIN — DEXTROSE MONOHYDRATE AND SODIUM CHLORIDE: 5; .9 INJECTION, SOLUTION INTRAVENOUS at 13:54

## 2023-01-01 RX ADMIN — VANCOMYCIN HYDROCHLORIDE 125 MG: 125 CAPSULE ORAL at 11:28

## 2023-01-01 RX ADMIN — ACETAMINOPHEN 650 MG: 325 TABLET ORAL at 00:29

## 2023-01-01 RX ADMIN — VANCOMYCIN HYDROCHLORIDE 125 MG: 125 CAPSULE ORAL at 17:16

## 2023-01-01 RX ADMIN — DEXAMETHASONE SODIUM PHOSPHATE 4 MG: 4 INJECTION, SOLUTION INTRA-ARTICULAR; INTRALESIONAL; INTRAMUSCULAR; INTRAVENOUS; SOFT TISSUE at 16:30

## 2023-01-01 RX ADMIN — POTASSIUM CHLORIDE 10 MEQ: 7.46 INJECTION, SOLUTION INTRAVENOUS at 17:17

## 2023-01-01 RX ADMIN — PIPERACILLIN SODIUM AND TAZOBACTAM SODIUM 4.5 G: 4; .5 INJECTION, POWDER, LYOPHILIZED, FOR SOLUTION INTRAVENOUS at 09:58

## 2023-01-01 RX ADMIN — Medication 500 UNITS: at 13:02

## 2023-01-01 RX ADMIN — POTASSIUM CHLORIDE 20 MEQ: 750 CAPSULE, EXTENDED RELEASE ORAL at 08:56

## 2023-01-01 RX ADMIN — DEXTROSE MONOHYDRATE 10 ML: 50 INJECTION, SOLUTION INTRAVENOUS at 20:08

## 2023-01-01 RX ADMIN — METOPROLOL TARTRATE 25 MG: 25 TABLET, FILM COATED ORAL at 20:09

## 2023-01-01 RX ADMIN — ATROPINE SULFATE 0.4 MG: 0.4 INJECTION, SOLUTION INTRAVENOUS at 15:32

## 2023-01-01 RX ADMIN — MAGNESIUM OXIDE TAB 400 MG (240 MG ELEMENTAL MG) 400 MG: 400 (240 MG) TAB at 19:55

## 2023-01-01 RX ADMIN — OXYCODONE HYDROCHLORIDE 5 MG: 5 TABLET ORAL at 00:53

## 2023-01-01 RX ADMIN — ACETAMINOPHEN 650 MG: 325 TABLET ORAL at 13:31

## 2023-01-01 RX ADMIN — ACETAMINOPHEN 650 MG: 325 TABLET ORAL at 00:12

## 2023-01-01 RX ADMIN — VANCOMYCIN HYDROCHLORIDE 125 MG: 125 CAPSULE ORAL at 20:11

## 2023-01-01 RX ADMIN — DEXTROSE MONOHYDRATE 15 ML: 50 INJECTION, SOLUTION INTRAVENOUS at 21:42

## 2023-01-01 RX ADMIN — METOPROLOL TARTRATE 25 MG: 25 TABLET, FILM COATED ORAL at 19:46

## 2023-01-01 RX ADMIN — OXYCODONE HYDROCHLORIDE 5 MG: 100 SOLUTION ORAL at 10:55

## 2023-01-01 RX ADMIN — ACETAMINOPHEN 650 MG: 325 TABLET ORAL at 11:38

## 2023-01-01 RX ADMIN — POTASSIUM CHLORIDE 20 MEQ: 29.8 INJECTION, SOLUTION INTRAVENOUS at 02:16

## 2023-01-01 RX ADMIN — OXYCODONE HYDROCHLORIDE 5 MG: 5 TABLET ORAL at 08:56

## 2023-01-01 RX ADMIN — ACETAMINOPHEN 650 MG: 325 TABLET ORAL at 10:59

## 2023-01-01 RX ADMIN — PIPERACILLIN SODIUM AND TAZOBACTAM SODIUM 4.5 G: 4; .5 INJECTION, POWDER, LYOPHILIZED, FOR SOLUTION INTRAVENOUS at 03:47

## 2023-01-01 RX ADMIN — Medication 5 ML: at 11:54

## 2023-01-01 RX ADMIN — METOPROLOL TARTRATE 25 MG: 25 TABLET, FILM COATED ORAL at 08:09

## 2023-01-01 RX ADMIN — VANCOMYCIN HYDROCHLORIDE 125 MG: 125 CAPSULE ORAL at 15:48

## 2023-01-01 RX ADMIN — SODIUM CHLORIDE: 9 INJECTION, SOLUTION INTRAVENOUS at 14:26

## 2023-01-01 RX ADMIN — DEXAMETHASONE 4 MG: 2 TABLET ORAL at 11:38

## 2023-01-01 RX ADMIN — PROPOFOL 30 MG: 10 INJECTION, EMULSION INTRAVENOUS at 16:33

## 2023-01-01 RX ADMIN — Medication 5 ML: at 12:07

## 2023-01-01 RX ADMIN — Medication 1 PACKET: at 08:43

## 2023-01-01 RX ADMIN — VANCOMYCIN HYDROCHLORIDE 125 MG: 125 CAPSULE ORAL at 11:54

## 2023-01-01 RX ADMIN — METOPROLOL TARTRATE 25 MG: 25 TABLET, FILM COATED ORAL at 09:21

## 2023-01-01 RX ADMIN — ALTEPLASE 2 MG: 2.2 INJECTION, POWDER, LYOPHILIZED, FOR SOLUTION INTRAVENOUS at 14:29

## 2023-01-01 RX ADMIN — POTASSIUM CHLORIDE 60 MEQ: 1500 TABLET, EXTENDED RELEASE ORAL at 09:24

## 2023-01-01 RX ADMIN — METOPROLOL TARTRATE 25 MG: 25 TABLET, FILM COATED ORAL at 13:49

## 2023-01-01 RX ADMIN — POTASSIUM CHLORIDE 40 MEQ: 1.5 POWDER, FOR SOLUTION ORAL at 20:59

## 2023-01-01 RX ADMIN — LOPERAMIDE HYDROCHLORIDE 4 MG: 2 CAPSULE ORAL at 20:34

## 2023-01-01 RX ADMIN — HYDROMORPHONE HYDROCHLORIDE 0.5 MG: 1 INJECTION, SOLUTION INTRAMUSCULAR; INTRAVENOUS; SUBCUTANEOUS at 16:22

## 2023-01-01 RX ADMIN — VANCOMYCIN HYDROCHLORIDE 125 MG: 125 CAPSULE ORAL at 11:42

## 2023-01-01 RX ADMIN — POTASSIUM CHLORIDE 20 MEQ: 29.8 INJECTION, SOLUTION INTRAVENOUS at 06:37

## 2023-01-01 RX ADMIN — POTASSIUM CHLORIDE 20 MEQ: 750 CAPSULE, EXTENDED RELEASE ORAL at 09:21

## 2023-01-01 RX ADMIN — VANCOMYCIN HYDROCHLORIDE 125 MG: 125 CAPSULE ORAL at 12:23

## 2023-01-01 RX ADMIN — OXYCODONE HYDROCHLORIDE 5 MG: 5 TABLET ORAL at 21:34

## 2023-01-01 RX ADMIN — ACETAMINOPHEN 650 MG: 325 TABLET ORAL at 00:47

## 2023-01-01 RX ADMIN — THIAMINE HCL TAB 100 MG 100 MG: 100 TAB at 08:43

## 2023-01-01 RX ADMIN — POTASSIUM CHLORIDE 20 MEQ: 29.8 INJECTION, SOLUTION INTRAVENOUS at 20:02

## 2023-01-01 RX ADMIN — LOPERAMIDE HYDROCHLORIDE 4 MG: 2 CAPSULE ORAL at 12:18

## 2023-01-01 RX ADMIN — LOPERAMIDE HYDROCHLORIDE 4 MG: 2 CAPSULE ORAL at 08:04

## 2023-01-01 RX ADMIN — SODIUM CHLORIDE 250 ML: 9 INJECTION, SOLUTION INTRAVENOUS at 14:22

## 2023-01-01 RX ADMIN — MAGNESIUM SULFATE IN WATER 4 G: 40 INJECTION, SOLUTION INTRAVENOUS at 19:01

## 2023-01-01 RX ADMIN — DEXAMETHASONE 4 MG: 2 TABLET ORAL at 20:21

## 2023-01-01 RX ADMIN — VANCOMYCIN HYDROCHLORIDE 125 MG: 125 CAPSULE ORAL at 16:02

## 2023-01-01 RX ADMIN — ACETAMINOPHEN 650 MG: 325 TABLET ORAL at 22:45

## 2023-01-01 RX ADMIN — POTASSIUM CHLORIDE 20 MEQ: 1.5 SOLUTION ORAL at 17:48

## 2023-01-01 RX ADMIN — ALTEPLASE 2 MG: 2.2 INJECTION, POWDER, LYOPHILIZED, FOR SOLUTION INTRAVENOUS at 12:55

## 2023-01-01 RX ADMIN — ACETAMINOPHEN 650 MG: 325 TABLET ORAL at 05:33

## 2023-01-01 RX ADMIN — DEXTROSE MONOHYDRATE 20 ML: 50 INJECTION, SOLUTION INTRAVENOUS at 19:41

## 2023-01-01 RX ADMIN — PROPOFOL 150 MCG/KG/MIN: 10 INJECTION, EMULSION INTRAVENOUS at 16:09

## 2023-01-01 RX ADMIN — SODIUM CHLORIDE: 9 INJECTION, SOLUTION INTRAVENOUS at 03:30

## 2023-01-01 RX ADMIN — POTASSIUM CHLORIDE 40 MEQ: 750 TABLET, EXTENDED RELEASE ORAL at 18:04

## 2023-01-01 RX ADMIN — MAGNESIUM OXIDE TAB 400 MG (240 MG ELEMENTAL MG) 400 MG: 400 (240 MG) TAB at 20:06

## 2023-01-01 RX ADMIN — SODIUM CHLORIDE: 9 INJECTION, SOLUTION INTRAVENOUS at 05:53

## 2023-01-01 RX ADMIN — METOPROLOL TARTRATE 25 MG: 25 TABLET, FILM COATED ORAL at 19:54

## 2023-01-01 RX ADMIN — Medication: at 18:22

## 2023-01-01 RX ADMIN — POTASSIUM CHLORIDE 10 MEQ: 7.46 INJECTION, SOLUTION INTRAVENOUS at 09:29

## 2023-01-01 RX ADMIN — POTASSIUM CHLORIDE 10 MEQ: 7.46 INJECTION, SOLUTION INTRAVENOUS at 00:29

## 2023-01-01 RX ADMIN — ACETAMINOPHEN 650 MG: 325 TABLET ORAL at 06:41

## 2023-01-01 RX ADMIN — MAGNESIUM SULFATE HEPTAHYDRATE 2 G: 40 INJECTION, SOLUTION INTRAVENOUS at 01:41

## 2023-01-01 RX ADMIN — Medication 2 PACKET: at 20:48

## 2023-01-01 RX ADMIN — LOPERAMIDE HYDROCHLORIDE 4 MG: 2 CAPSULE ORAL at 15:22

## 2023-01-01 RX ADMIN — METOPROLOL TARTRATE 25 MG: 25 TABLET, FILM COATED ORAL at 07:43

## 2023-01-01 RX ADMIN — LOPERAMIDE HYDROCHLORIDE 4 MG: 2 CAPSULE ORAL at 15:05

## 2023-01-01 RX ADMIN — LIDOCAINE HYDROCHLORIDE 100 MG: 20 INJECTION, SOLUTION INFILTRATION; PERINEURAL at 16:09

## 2023-01-01 RX ADMIN — DEXTROSE MONOHYDRATE AND SODIUM CHLORIDE: 5; .9 INJECTION, SOLUTION INTRAVENOUS at 21:54

## 2023-01-01 RX ADMIN — Medication: at 11:15

## 2023-01-01 RX ADMIN — ACETAMINOPHEN 650 MG: 325 TABLET ORAL at 08:16

## 2023-01-01 RX ADMIN — THIAMINE HCL TAB 100 MG 100 MG: 100 TAB at 08:24

## 2023-01-01 RX ADMIN — DEXAMETHASONE 4 MG: 2 TABLET ORAL at 08:04

## 2023-01-01 RX ADMIN — ACETAMINOPHEN 650 MG: 325 TABLET ORAL at 12:22

## 2023-01-01 RX ADMIN — ACETAMINOPHEN 650 MG: 325 TABLET ORAL at 11:47

## 2023-01-01 RX ADMIN — DEXTROSE MONOHYDRATE 15 ML: 50 INJECTION, SOLUTION INTRAVENOUS at 22:40

## 2023-01-01 RX ADMIN — ACETAMINOPHEN 650 MG: 325 TABLET ORAL at 23:46

## 2023-01-01 RX ADMIN — POTASSIUM CHLORIDE 20 MEQ: 750 CAPSULE, EXTENDED RELEASE ORAL at 08:49

## 2023-01-01 RX ADMIN — VANCOMYCIN HYDROCHLORIDE 125 MG: 125 CAPSULE ORAL at 15:44

## 2023-01-01 RX ADMIN — OXYCODONE HYDROCHLORIDE 5 MG: 5 TABLET ORAL at 00:15

## 2023-01-01 RX ADMIN — POTASSIUM CHLORIDE 10 MEQ: 7.46 INJECTION, SOLUTION INTRAVENOUS at 01:53

## 2023-01-01 RX ADMIN — OXYCODONE HYDROCHLORIDE 5 MG: 5 TABLET ORAL at 09:20

## 2023-01-01 RX ADMIN — METOPROLOL TARTRATE 25 MG: 25 TABLET, FILM COATED ORAL at 19:49

## 2023-01-01 RX ADMIN — DEXAMETHASONE SODIUM PHOSPHATE: 10 INJECTION, SOLUTION INTRAMUSCULAR; INTRAVENOUS at 12:52

## 2023-01-01 RX ADMIN — DEXAMETHASONE 4 MG: 4 TABLET ORAL at 08:03

## 2023-01-01 RX ADMIN — MAGNESIUM OXIDE TAB 400 MG (240 MG ELEMENTAL MG) 400 MG: 400 (240 MG) TAB at 08:45

## 2023-01-01 RX ADMIN — VANCOMYCIN HYDROCHLORIDE 125 MG: 125 CAPSULE ORAL at 08:23

## 2023-01-01 RX ADMIN — POTASSIUM CHLORIDE 20 MEQ: 29.8 INJECTION, SOLUTION INTRAVENOUS at 13:54

## 2023-01-01 RX ADMIN — Medication 2 PACKET: at 11:28

## 2023-01-01 RX ADMIN — POTASSIUM CHLORIDE 20 MEQ: 750 TABLET, EXTENDED RELEASE ORAL at 08:35

## 2023-01-01 RX ADMIN — ACETAMINOPHEN 650 MG: 325 TABLET ORAL at 18:04

## 2023-01-01 RX ADMIN — OXYCODONE HYDROCHLORIDE 5 MG: 5 TABLET ORAL at 06:24

## 2023-01-01 RX ADMIN — MORPHINE SULFATE 1 MG: 2 INJECTION, SOLUTION INTRAMUSCULAR; INTRAVENOUS at 04:15

## 2023-01-01 RX ADMIN — POTASSIUM CHLORIDE 20 MEQ: 750 CAPSULE, EXTENDED RELEASE ORAL at 08:02

## 2023-01-01 RX ADMIN — HYDROMORPHONE HYDROCHLORIDE 2 MG: 2 TABLET ORAL at 04:15

## 2023-01-01 RX ADMIN — Medication 2 PACKET: at 14:09

## 2023-01-01 RX ADMIN — POTASSIUM CHLORIDE 40 MEQ: 750 TABLET, EXTENDED RELEASE ORAL at 11:38

## 2023-01-01 RX ADMIN — ACETAMINOPHEN 650 MG: 325 TABLET ORAL at 19:54

## 2023-01-01 RX ADMIN — VANCOMYCIN HYDROCHLORIDE 125 MG: 125 CAPSULE ORAL at 11:47

## 2023-01-01 RX ADMIN — Medication 2 PACKET: at 19:46

## 2023-01-01 RX ADMIN — OXYCODONE HYDROCHLORIDE 5 MG: 5 TABLET ORAL at 17:06

## 2023-01-01 RX ADMIN — DEXTROSE MONOHYDRATE, SODIUM CHLORIDE, SODIUM LACTATE, POTASSIUM CHLORIDE, CALCIUM CHLORIDE: 5; 600; 310; 179; 20 INJECTION, SOLUTION INTRAVENOUS at 09:53

## 2023-01-01 RX ADMIN — THIAMINE HCL TAB 100 MG 100 MG: 100 TAB at 08:27

## 2023-01-01 RX ADMIN — METOPROLOL TARTRATE 25 MG: 25 TABLET, FILM COATED ORAL at 19:55

## 2023-01-01 RX ADMIN — PIPERACILLIN SODIUM AND TAZOBACTAM SODIUM 4.5 G: 4; .5 INJECTION, POWDER, LYOPHILIZED, FOR SOLUTION INTRAVENOUS at 22:10

## 2023-01-01 RX ADMIN — VANCOMYCIN HYDROCHLORIDE 125 MG: 125 CAPSULE ORAL at 20:37

## 2023-01-01 RX ADMIN — THIAMINE HCL TAB 100 MG 100 MG: 100 TAB at 08:51

## 2023-01-01 RX ADMIN — POTASSIUM CHLORIDE 20 MEQ: 29.8 INJECTION, SOLUTION INTRAVENOUS at 07:44

## 2023-01-01 RX ADMIN — DEXTROSE MONOHYDRATE 480 MCG: 50 INJECTION, SOLUTION INTRAVENOUS at 21:43

## 2023-01-01 RX ADMIN — HYDROMORPHONE HYDROCHLORIDE 2 MG: 2 TABLET ORAL at 20:06

## 2023-01-01 RX ADMIN — HYDROMORPHONE HYDROCHLORIDE 2 MG: 2 TABLET ORAL at 19:41

## 2023-01-01 RX ADMIN — POTASSIUM CHLORIDE 60 MEQ: 1500 TABLET, EXTENDED RELEASE ORAL at 06:24

## 2023-01-01 RX ADMIN — DEXAMETHASONE SODIUM PHOSPHATE: 10 INJECTION, SOLUTION INTRAMUSCULAR; INTRAVENOUS at 14:59

## 2023-01-01 RX ADMIN — POTASSIUM CHLORIDE 10 MEQ: 7.46 INJECTION, SOLUTION INTRAVENOUS at 11:33

## 2023-01-01 RX ADMIN — VANCOMYCIN HYDROCHLORIDE 125 MG: 125 CAPSULE ORAL at 21:11

## 2023-01-01 RX ADMIN — POTASSIUM CHLORIDE 10 MEQ: 7.46 INJECTION, SOLUTION INTRAVENOUS at 06:56

## 2023-01-01 RX ADMIN — PIPERACILLIN SODIUM AND TAZOBACTAM SODIUM 4.5 G: 4; .5 INJECTION, POWDER, LYOPHILIZED, FOR SOLUTION INTRAVENOUS at 03:48

## 2023-01-01 RX ADMIN — OXYCODONE HYDROCHLORIDE 5 MG: 5 TABLET ORAL at 05:16

## 2023-01-01 RX ADMIN — LOPERAMIDE HYDROCHLORIDE 4 MG: 2 CAPSULE ORAL at 08:09

## 2023-01-01 RX ADMIN — ACETAMINOPHEN 650 MG: 325 TABLET ORAL at 12:18

## 2023-01-01 RX ADMIN — METOPROLOL TARTRATE 25 MG: 25 TABLET, FILM COATED ORAL at 08:43

## 2023-01-01 RX ADMIN — ACETAMINOPHEN 650 MG: 325 TABLET ORAL at 08:03

## 2023-01-01 RX ADMIN — MAGNESIUM SULFATE HEPTAHYDRATE 2 G: 40 INJECTION, SOLUTION INTRAVENOUS at 06:40

## 2023-01-01 RX ADMIN — IRINOTECAN HYDROCHLORIDE 90 MG: 20 INJECTION, SOLUTION INTRAVENOUS at 15:13

## 2023-01-01 RX ADMIN — Medication: at 01:32

## 2023-01-01 RX ADMIN — Medication 2 PACKET: at 13:29

## 2023-01-01 RX ADMIN — THIAMINE HCL TAB 100 MG 100 MG: 100 TAB at 08:04

## 2023-01-01 RX ADMIN — THIAMINE HCL TAB 100 MG 100 MG: 100 TAB at 08:02

## 2023-01-01 RX ADMIN — LOPERAMIDE HYDROCHLORIDE 2 MG: 2 CAPSULE ORAL at 17:35

## 2023-01-01 RX ADMIN — SODIUM CHLORIDE 250 ML: 9 INJECTION, SOLUTION INTRAVENOUS at 15:04

## 2023-01-01 RX ADMIN — IOPAMIDOL 125 ML: 755 INJECTION, SOLUTION INTRAVASCULAR at 11:39

## 2023-01-01 RX ADMIN — MAGNESIUM SULFATE HEPTAHYDRATE 2 G: 40 INJECTION, SOLUTION INTRAVENOUS at 09:02

## 2023-01-01 RX ADMIN — MAGNESIUM SULFATE IN WATER 2 G: 40 INJECTION, SOLUTION INTRAVENOUS at 06:53

## 2023-01-01 RX ADMIN — ATROPINE SULFATE 0.4 MG: 0.4 INJECTION, SOLUTION INTRAVENOUS at 13:17

## 2023-01-01 RX ADMIN — PIPERACILLIN SODIUM AND TAZOBACTAM SODIUM 4.5 G: 4; .5 INJECTION, POWDER, LYOPHILIZED, FOR SOLUTION INTRAVENOUS at 10:56

## 2023-01-01 RX ADMIN — DEXTROSE MONOHYDRATE, SODIUM CHLORIDE, SODIUM LACTATE, POTASSIUM CHLORIDE, CALCIUM CHLORIDE: 5; 600; 310; 179; 20 INJECTION, SOLUTION INTRAVENOUS at 22:01

## 2023-01-01 RX ADMIN — ACETAMINOPHEN 650 MG: 325 TABLET ORAL at 05:22

## 2023-01-01 RX ADMIN — METOPROLOL TARTRATE 25 MG: 25 TABLET, FILM COATED ORAL at 08:16

## 2023-01-01 RX ADMIN — OXYCODONE HYDROCHLORIDE 5 MG: 5 TABLET ORAL at 20:35

## 2023-01-01 RX ADMIN — THIAMINE HCL TAB 100 MG 100 MG: 100 TAB at 08:50

## 2023-01-01 RX ADMIN — VANCOMYCIN HYDROCHLORIDE 125 MG: 125 CAPSULE ORAL at 21:58

## 2023-01-01 RX ADMIN — POTASSIUM PHOSPHATE, MONOBASIC AND POTASSIUM PHOSPHATE, DIBASIC 15 MMOL: 224; 236 INJECTION, SOLUTION, CONCENTRATE INTRAVENOUS at 10:18

## 2023-01-01 RX ADMIN — POTASSIUM CHLORIDE 60 MEQ: 149 INJECTION, SOLUTION, CONCENTRATE INTRAVENOUS at 10:23

## 2023-01-01 RX ADMIN — Medication 5 ML: at 14:01

## 2023-01-01 RX ADMIN — SODIUM CHLORIDE 1000 ML: 9 INJECTION, SOLUTION INTRAVENOUS at 23:49

## 2023-01-01 RX ADMIN — Medication 1 PACKET: at 11:47

## 2023-01-01 RX ADMIN — ACETAMINOPHEN 650 MG: 325 TABLET ORAL at 02:28

## 2023-01-01 RX ADMIN — POTASSIUM CHLORIDE 10 MEQ: 7.46 INJECTION, SOLUTION INTRAVENOUS at 14:37

## 2023-01-01 RX ADMIN — POTASSIUM CHLORIDE 20 MEQ: 750 CAPSULE, EXTENDED RELEASE ORAL at 08:11

## 2023-01-01 RX ADMIN — METOPROLOL TARTRATE 25 MG: 25 TABLET, FILM COATED ORAL at 07:58

## 2023-01-01 RX ADMIN — SODIUM CHLORIDE: 9 INJECTION, SOLUTION INTRAVENOUS at 05:36

## 2023-01-01 RX ADMIN — POTASSIUM CHLORIDE 20 MEQ: 750 TABLET, EXTENDED RELEASE ORAL at 10:02

## 2023-01-01 RX ADMIN — MAGNESIUM SULFATE IN WATER 4 G: 40 INJECTION, SOLUTION INTRAVENOUS at 08:36

## 2023-01-01 RX ADMIN — SODIUM CHLORIDE: 9 INJECTION, SOLUTION INTRAVENOUS at 01:33

## 2023-01-01 RX ADMIN — METOPROLOL TARTRATE 25 MG: 25 TABLET, FILM COATED ORAL at 20:23

## 2023-01-01 RX ADMIN — VANCOMYCIN HYDROCHLORIDE 1250 MG: 10 INJECTION, POWDER, LYOPHILIZED, FOR SOLUTION INTRAVENOUS at 16:09

## 2023-01-01 RX ADMIN — Medication 5 ML: at 08:58

## 2023-01-01 RX ADMIN — DEXTROSE MONOHYDRATE, SODIUM CHLORIDE, SODIUM LACTATE, POTASSIUM CHLORIDE, CALCIUM CHLORIDE: 5; 600; 310; 179; 20 INJECTION, SOLUTION INTRAVENOUS at 08:56

## 2023-01-01 RX ADMIN — PROPOFOL 30 MG: 10 INJECTION, EMULSION INTRAVENOUS at 16:43

## 2023-01-01 RX ADMIN — Medication: at 09:54

## 2023-01-01 RX ADMIN — SODIUM CHLORIDE 1000 ML: 9 INJECTION, SOLUTION INTRAVENOUS at 16:08

## 2023-01-01 RX ADMIN — ATROPINE SULFATE 0.4 MG: 0.4 INJECTION, SOLUTION INTRAVENOUS at 15:10

## 2023-01-01 RX ADMIN — SODIUM CHLORIDE: 9 INJECTION, SOLUTION INTRAVENOUS at 19:35

## 2023-01-01 RX ADMIN — OXYCODONE HYDROCHLORIDE 5 MG: 5 TABLET ORAL at 05:07

## 2023-01-01 RX ADMIN — VANCOMYCIN HYDROCHLORIDE 2000 MG: 1 INJECTION, POWDER, LYOPHILIZED, FOR SOLUTION INTRAVENOUS at 03:48

## 2023-01-01 RX ADMIN — MAGNESIUM SULFATE IN WATER 4 G: 40 INJECTION, SOLUTION INTRAVENOUS at 15:06

## 2023-01-01 RX ADMIN — ACETAMINOPHEN 650 MG: 325 TABLET ORAL at 05:17

## 2023-01-01 RX ADMIN — Medication 2 PACKET: at 08:02

## 2023-01-01 RX ADMIN — ACETAMINOPHEN 650 MG: 325 TABLET ORAL at 00:10

## 2023-01-01 RX ADMIN — HYDROMORPHONE HYDROCHLORIDE 0.4 MG: 0.5 INJECTION, SOLUTION INTRAMUSCULAR; INTRAVENOUS; SUBCUTANEOUS at 12:37

## 2023-01-01 RX ADMIN — POTASSIUM CHLORIDE 20 MEQ: 750 TABLET, EXTENDED RELEASE ORAL at 17:35

## 2023-01-01 RX ADMIN — MAGNESIUM OXIDE TAB 400 MG (240 MG ELEMENTAL MG) 400 MG: 400 (240 MG) TAB at 07:43

## 2023-01-01 RX ADMIN — ACETAMINOPHEN 650 MG: 325 TABLET ORAL at 18:15

## 2023-01-01 RX ADMIN — METOPROLOL TARTRATE 25 MG: 25 TABLET, FILM COATED ORAL at 08:52

## 2023-01-01 RX ADMIN — SODIUM CHLORIDE 250 ML: 9 INJECTION, SOLUTION INTRAVENOUS at 12:48

## 2023-01-01 RX ADMIN — ACETAMINOPHEN 650 MG: 325 TABLET ORAL at 13:21

## 2023-01-01 RX ADMIN — HYDROMORPHONE HYDROCHLORIDE 0.5 MG: 1 INJECTION, SOLUTION INTRAMUSCULAR; INTRAVENOUS; SUBCUTANEOUS at 01:33

## 2023-01-01 RX ADMIN — POTASSIUM CHLORIDE 10 MEQ: 7.46 INJECTION, SOLUTION INTRAVENOUS at 08:07

## 2023-01-01 RX ADMIN — HYDROMORPHONE HYDROCHLORIDE 0.4 MG: 0.2 INJECTION, SOLUTION INTRAMUSCULAR; INTRAVENOUS; SUBCUTANEOUS at 22:11

## 2023-01-01 RX ADMIN — Medication: at 02:55

## 2023-01-01 RX ADMIN — DEXTROSE MONOHYDRATE 15 ML: 50 INJECTION, SOLUTION INTRAVENOUS at 20:10

## 2023-01-01 RX ADMIN — ONDANSETRON 4 MG: 2 INJECTION INTRAMUSCULAR; INTRAVENOUS at 17:57

## 2023-01-01 RX ADMIN — HYDROMORPHONE HYDROCHLORIDE 0.4 MG: 0.2 INJECTION, SOLUTION INTRAMUSCULAR; INTRAVENOUS; SUBCUTANEOUS at 07:54

## 2023-01-01 RX ADMIN — IOPAMIDOL 135 ML: 755 INJECTION, SOLUTION INTRAVENOUS at 09:36

## 2023-01-01 RX ADMIN — ACETAMINOPHEN 650 MG: 325 TABLET ORAL at 00:53

## 2023-01-01 RX ADMIN — LOPERAMIDE HYDROCHLORIDE 4 MG: 2 CAPSULE ORAL at 20:21

## 2023-01-01 RX ADMIN — Medication: at 18:26

## 2023-01-01 RX ADMIN — LOPERAMIDE HYDROCHLORIDE 2 MG: 2 CAPSULE ORAL at 08:15

## 2023-01-01 RX ADMIN — OXYCODONE HYDROCHLORIDE 5 MG: 5 TABLET ORAL at 13:15

## 2023-01-01 RX ADMIN — POTASSIUM CHLORIDE 20 MEQ: 29.8 INJECTION, SOLUTION INTRAVENOUS at 03:30

## 2023-01-01 RX ADMIN — Medication: at 17:55

## 2023-01-01 RX ADMIN — DEXTROSE MONOHYDRATE 480 MCG: 50 INJECTION, SOLUTION INTRAVENOUS at 19:27

## 2023-01-01 RX ADMIN — OXYCODONE HYDROCHLORIDE 5 MG: 5 TABLET ORAL at 12:34

## 2023-01-01 RX ADMIN — VANCOMYCIN HYDROCHLORIDE 125 MG: 125 CAPSULE ORAL at 19:43

## 2023-01-01 RX ADMIN — METOPROLOL TARTRATE 25 MG: 25 TABLET, FILM COATED ORAL at 19:27

## 2023-01-01 RX ADMIN — METOPROLOL TARTRATE 25 MG: 25 TABLET, FILM COATED ORAL at 20:37

## 2023-01-01 RX ADMIN — TRAMADOL HYDROCHLORIDE 50 MG: 50 TABLET, COATED ORAL at 08:46

## 2023-01-01 RX ADMIN — METOPROLOL TARTRATE 25 MG: 25 TABLET, FILM COATED ORAL at 19:41

## 2023-01-01 RX ADMIN — Medication 2 PACKET: at 08:49

## 2023-01-01 RX ADMIN — ACETAMINOPHEN 650 MG: 325 TABLET ORAL at 08:43

## 2023-01-01 RX ADMIN — PHENYLEPHRINE HYDROCHLORIDE 100 MCG: 10 INJECTION INTRAVENOUS at 16:34

## 2023-01-01 RX ADMIN — ACETAMINOPHEN 650 MG: 325 TABLET ORAL at 12:23

## 2023-01-01 RX ADMIN — LOPERAMIDE HYDROCHLORIDE 2 MG: 2 CAPSULE ORAL at 18:04

## 2023-01-01 RX ADMIN — ACETAMINOPHEN 650 MG: 325 TABLET ORAL at 19:49

## 2023-01-01 RX ADMIN — SODIUM CHLORIDE: 9 INJECTION, SOLUTION INTRAVENOUS at 01:10

## 2023-01-01 RX ADMIN — DEXAMETHASONE 4 MG: 4 TABLET ORAL at 08:16

## 2023-01-01 RX ADMIN — DEXTROSE MONOHYDRATE: 70 INJECTION, SOLUTION INTRAVENOUS at 12:26

## 2023-01-01 RX ADMIN — ACETAMINOPHEN 650 MG: 325 TABLET ORAL at 18:33

## 2023-01-01 RX ADMIN — ACETAMINOPHEN 650 MG: 325 TABLET ORAL at 06:55

## 2023-01-01 RX ADMIN — LOPERAMIDE HYDROCHLORIDE 4 MG: 2 CAPSULE ORAL at 13:15

## 2023-01-01 RX ADMIN — DEXTROSE MONOHYDRATE 20 ML: 50 INJECTION, SOLUTION INTRAVENOUS at 20:52

## 2023-01-01 RX ADMIN — POTASSIUM CHLORIDE 10 MEQ: 7.46 INJECTION, SOLUTION INTRAVENOUS at 02:42

## 2023-01-01 RX ADMIN — POTASSIUM CHLORIDE 30 MEQ: 750 TABLET, FILM COATED, EXTENDED RELEASE ORAL at 15:52

## 2023-01-01 RX ADMIN — THIAMINE HCL TAB 100 MG 100 MG: 100 TAB at 08:16

## 2023-01-01 RX ADMIN — ACETAMINOPHEN 650 MG: 325 TABLET ORAL at 05:07

## 2023-01-01 RX ADMIN — ACETAMINOPHEN 650 MG: 325 TABLET ORAL at 06:22

## 2023-01-01 RX ADMIN — ACETAMINOPHEN 650 MG: 325 TABLET ORAL at 19:41

## 2023-01-01 RX ADMIN — VANCOMYCIN HYDROCHLORIDE 125 MG: 125 CAPSULE ORAL at 11:56

## 2023-01-01 RX ADMIN — METOPROLOL TARTRATE 25 MG: 25 TABLET, FILM COATED ORAL at 08:55

## 2023-01-01 RX ADMIN — METOPROLOL TARTRATE 25 MG: 25 TABLET, FILM COATED ORAL at 20:35

## 2023-01-01 RX ADMIN — DEXTROSE MONOHYDRATE, SODIUM CHLORIDE, SODIUM LACTATE, POTASSIUM CHLORIDE, CALCIUM CHLORIDE: 5; 600; 310; 179; 20 INJECTION, SOLUTION INTRAVENOUS at 02:22

## 2023-01-01 RX ADMIN — OXYCODONE HYDROCHLORIDE 5 MG: 5 TABLET ORAL at 08:04

## 2023-01-01 RX ADMIN — ATROPINE SULFATE 0.4 MG: 0.4 INJECTION, SOLUTION INTRAVENOUS at 15:08

## 2023-01-01 RX ADMIN — METOPROLOL TARTRATE 25 MG: 25 TABLET, FILM COATED ORAL at 20:48

## 2023-01-01 RX ADMIN — Medication 5 ML: at 19:22

## 2023-01-01 RX ADMIN — Medication 2 PACKET: at 15:05

## 2023-01-01 RX ADMIN — DEXTROSE MONOHYDRATE 25 ML: 25 INJECTION, SOLUTION INTRAVENOUS at 01:10

## 2023-01-01 RX ADMIN — ACETAMINOPHEN 650 MG: 325 TABLET ORAL at 14:22

## 2023-01-01 RX ADMIN — ACETAMINOPHEN 650 MG: 325 TABLET ORAL at 21:10

## 2023-01-01 RX ADMIN — OXYCODONE HYDROCHLORIDE 5 MG: 5 TABLET ORAL at 11:38

## 2023-01-01 RX ADMIN — DEXTROSE MONOHYDRATE AND SODIUM CHLORIDE: 5; .9 INJECTION, SOLUTION INTRAVENOUS at 13:21

## 2023-01-01 RX ADMIN — OXYCODONE HYDROCHLORIDE 5 MG: 5 TABLET ORAL at 18:04

## 2023-01-01 RX ADMIN — DEXTROSE AND SODIUM CHLORIDE: 10; .45 INJECTION, SOLUTION INTRAVENOUS at 00:04

## 2023-01-01 RX ADMIN — SODIUM CHLORIDE 250 ML: 9 INJECTION, SOLUTION INTRAVENOUS at 14:57

## 2023-01-01 RX ADMIN — HYDROMORPHONE HYDROCHLORIDE 0.2 MG: 0.2 INJECTION, SOLUTION INTRAMUSCULAR; INTRAVENOUS; SUBCUTANEOUS at 13:29

## 2023-01-01 RX ADMIN — Medication 1 PACKET: at 03:19

## 2023-01-01 RX ADMIN — POTASSIUM CHLORIDE 20 MEQ: 750 TABLET, EXTENDED RELEASE ORAL at 10:16

## 2023-01-01 RX ADMIN — ACETAMINOPHEN 650 MG: 325 TABLET ORAL at 12:50

## 2023-01-01 RX ADMIN — MAGNESIUM SULFATE HEPTAHYDRATE 2 G: 40 INJECTION, SOLUTION INTRAVENOUS at 10:03

## 2023-01-01 RX ADMIN — ACETAMINOPHEN 650 MG: 325 TABLET ORAL at 13:15

## 2023-01-01 RX ADMIN — ACETAMINOPHEN 650 MG: 325 TABLET ORAL at 08:09

## 2023-01-01 RX ADMIN — Medication 5 ML: at 13:44

## 2023-01-01 RX ADMIN — BUPRENORPHINE 1 PATCH: 10 PATCH, EXTENDED RELEASE TRANSDERMAL at 14:10

## 2023-01-01 RX ADMIN — LIDOCAINE: 40 CREAM TOPICAL at 00:26

## 2023-01-01 RX ADMIN — SODIUM CHLORIDE: 9 INJECTION, SOLUTION INTRAVENOUS at 17:27

## 2023-01-01 RX ADMIN — VANCOMYCIN HYDROCHLORIDE 1250 MG: 10 INJECTION, POWDER, LYOPHILIZED, FOR SOLUTION INTRAVENOUS at 04:30

## 2023-01-01 RX ADMIN — HYDROMORPHONE HYDROCHLORIDE 2 MG: 2 TABLET ORAL at 01:03

## 2023-01-01 RX ADMIN — ACETAMINOPHEN 650 MG: 325 TABLET ORAL at 19:10

## 2023-01-01 ASSESSMENT — ACTIVITIES OF DAILY LIVING (ADL)
ADLS_ACUITY_SCORE: 35
DIFFICULTY_EATING/SWALLOWING: NO
ADLS_ACUITY_SCORE: 23
ADLS_ACUITY_SCORE: 43
ADLS_ACUITY_SCORE: 25
DRESSING/BATHING: DRESSING DIFFICULTY, ASSISTANCE 1 PERSON
ADLS_ACUITY_SCORE: 25
ADLS_ACUITY_SCORE: 23
ADLS_ACUITY_SCORE: 25
ADLS_ACUITY_SCORE: 23
ADLS_ACUITY_SCORE: 25
ADLS_ACUITY_SCORE: 49
TOILETING: 1-->ASSISTANCE (EQUIPMENT/PERSON) NEEDED (NOT DEVELOPMENTALLY APPROPRIATE)
ADLS_ACUITY_SCORE: 25
ADLS_ACUITY_SCORE: 25
CONCENTRATING,_REMEMBERING_OR_MAKING_DECISIONS_DIFFICULTY: YES
ADLS_ACUITY_SCORE: 49
ADLS_ACUITY_SCORE: 47
TOILETING: 1-->ASSISTANCE (EQUIPMENT/PERSON) NEEDED
ADLS_ACUITY_SCORE: 25
TRANSFERRING: 0-->ASSISTANCE NEEDED (DEVELOPMENTALLY APPROPRIATE)
ADLS_ACUITY_SCORE: 35
ADLS_ACUITY_SCORE: 25
TRANSFERRING: 1-->ASSISTANCE (EQUIPMENT/PERSON) NEEDED
ADLS_ACUITY_SCORE: 25
ADLS_ACUITY_SCORE: 43
FALL_HISTORY_WITHIN_LAST_SIX_MONTHS: NO
ADLS_ACUITY_SCORE: 49
ADLS_ACUITY_SCORE: 43
ADLS_ACUITY_SCORE: 43
ADLS_ACUITY_SCORE: 25
ADLS_ACUITY_SCORE: 43
ADLS_ACUITY_SCORE: 25
ADLS_ACUITY_SCORE: 25
ADLS_ACUITY_SCORE: 43
ADLS_ACUITY_SCORE: 49
ADLS_ACUITY_SCORE: 47
ADLS_ACUITY_SCORE: 43
ADLS_ACUITY_SCORE: 25
WEAR_GLASSES_OR_BLIND: NO
ADLS_ACUITY_SCORE: 43
ADLS_ACUITY_SCORE: 43
ADLS_ACUITY_SCORE: 25
ADLS_ACUITY_SCORE: 41
ADLS_ACUITY_SCORE: 25
WEAR_GLASSES_OR_BLIND: NO
ADLS_ACUITY_SCORE: 23
ADLS_ACUITY_SCORE: 25
DRESS: 0-->ASSISTANCE NEEDED (DEVELOPMENTALLY APPROPRIATE)
ADLS_ACUITY_SCORE: 49
TRANSFERRING: 1-->ASSISTANCE (EQUIPMENT/PERSON) NEEDED
ADLS_ACUITY_SCORE: 25
ADLS_ACUITY_SCORE: 43
ADLS_ACUITY_SCORE: 25
ADLS_ACUITY_SCORE: 43
ADLS_ACUITY_SCORE: 43
ADLS_ACUITY_SCORE: 47
ADLS_ACUITY_SCORE: 35
ADLS_ACUITY_SCORE: 23
ADLS_ACUITY_SCORE: 43
ADLS_ACUITY_SCORE: 25
ADLS_ACUITY_SCORE: 23
CHANGE_IN_FUNCTIONAL_STATUS_SINCE_ONSET_OF_CURRENT_ILLNESS/INJURY: NO
DIFFICULTY_EATING/SWALLOWING: NO
ADLS_ACUITY_SCORE: 43
ADLS_ACUITY_SCORE: 25
DRESS: 0-->ASSISTANCE NEEDED (DEVELOPMENTALLY APPROPRIATE)
ADLS_ACUITY_SCORE: 49
ADLS_ACUITY_SCORE: 43
ADLS_ACUITY_SCORE: 25
DRESS: 1-->ASSISTANCE (EQUIPMENT/PERSON) NEEDED
ADLS_ACUITY_SCORE: 49
ADLS_ACUITY_SCORE: 49
ADLS_ACUITY_SCORE: 25
ADLS_ACUITY_SCORE: 43
ADLS_ACUITY_SCORE: 35
ADLS_ACUITY_SCORE: 25
ADLS_ACUITY_SCORE: 35
ADLS_ACUITY_SCORE: 25
ADLS_ACUITY_SCORE: 43
DRESSING/BATHING_DIFFICULTY: NO
ADLS_ACUITY_SCORE: 43
ADLS_ACUITY_SCORE: 25
ADLS_ACUITY_SCORE: 43
ADLS_ACUITY_SCORE: 47
ADLS_ACUITY_SCORE: 35
ADLS_ACUITY_SCORE: 43
TRANSFERRING: 0-->ASSISTANCE NEEDED (DEVELOPMENTALLY APPROPRIATE)
TOILETING_ASSISTANCE: TOILETING DIFFICULTY, ASSISTANCE 1 PERSON
ADLS_ACUITY_SCORE: 43
ADLS_ACUITY_SCORE: 49
DEPENDENT_IADLS:: CLEANING;COOKING;LAUNDRY;SHOPPING;MEAL PREPARATION;MEDICATION MANAGEMENT;MONEY MANAGEMENT;TRANSPORTATION
ADLS_ACUITY_SCORE: 43
ADLS_ACUITY_SCORE: 49
TOILETING: 1-->ASSISTANCE (EQUIPMENT/PERSON) NEEDED
ADLS_ACUITY_SCORE: 43
ADLS_ACUITY_SCORE: 49
ADLS_ACUITY_SCORE: 43
ADLS_ACUITY_SCORE: 43
FALL_HISTORY_WITHIN_LAST_SIX_MONTHS: NO
ADLS_ACUITY_SCORE: 25
BATHING: 1-->ASSISTANCE NEEDED
ADLS_ACUITY_SCORE: 25
ADLS_ACUITY_SCORE: 47
ADLS_ACUITY_SCORE: 43
TOILETING_ASSISTANCE: TOILETING DIFFICULTY, ASSISTANCE 1 PERSON
ADLS_ACUITY_SCORE: 43
ADLS_ACUITY_SCORE: 25
ADLS_ACUITY_SCORE: 43
ADLS_ACUITY_SCORE: 25
ADLS_ACUITY_SCORE: 43
ADLS_ACUITY_SCORE: 25
DOING_ERRANDS_INDEPENDENTLY_DIFFICULTY: YES
ADLS_ACUITY_SCORE: 37
ADLS_ACUITY_SCORE: 25
ADLS_ACUITY_SCORE: 41
ADLS_ACUITY_SCORE: 43
ADLS_ACUITY_SCORE: 25
ADLS_ACUITY_SCORE: 43
CONCENTRATING,_REMEMBERING_OR_MAKING_DECISIONS_DIFFICULTY: NO
ADLS_ACUITY_SCORE: 35
ADLS_ACUITY_SCORE: 25
ADLS_ACUITY_SCORE: 35
CHANGE_IN_FUNCTIONAL_STATUS_SINCE_ONSET_OF_CURRENT_ILLNESS/INJURY: NO
ADLS_ACUITY_SCORE: 25
ADLS_ACUITY_SCORE: 25
ADLS_ACUITY_SCORE: 49
ADLS_ACUITY_SCORE: 43
ADLS_ACUITY_SCORE: 25
ADLS_ACUITY_SCORE: 43
TOILETING_ISSUES: YES
ADLS_ACUITY_SCORE: 25
ADLS_ACUITY_SCORE: 25
ADLS_ACUITY_SCORE: 49
ADLS_ACUITY_SCORE: 25
ADLS_ACUITY_SCORE: 43
ADLS_ACUITY_SCORE: 49
ADLS_ACUITY_SCORE: 49
ADLS_ACUITY_SCORE: 25
ADLS_ACUITY_SCORE: 25
ADLS_ACUITY_SCORE: 43
ADLS_ACUITY_SCORE: 43
ADLS_ACUITY_SCORE: 47
FALL_HISTORY_WITHIN_LAST_SIX_MONTHS: NO
ADLS_ACUITY_SCORE: 25
ADLS_ACUITY_SCORE: 25
ADLS_ACUITY_SCORE: 35
ADLS_ACUITY_SCORE: 35
ADLS_ACUITY_SCORE: 25
ADLS_ACUITY_SCORE: 49
ADLS_ACUITY_SCORE: 43
BATHING: 1-->ASSISTANCE NEEDED
DOING_ERRANDS_INDEPENDENTLY_DIFFICULTY: YES
ADLS_ACUITY_SCORE: 43
ADLS_ACUITY_SCORE: 35
CHANGE_IN_FUNCTIONAL_STATUS_SINCE_ONSET_OF_CURRENT_ILLNESS/INJURY: NO
ADLS_ACUITY_SCORE: 49
ADLS_ACUITY_SCORE: 23
ADLS_ACUITY_SCORE: 25
ADLS_ACUITY_SCORE: 25
WALKING_OR_CLIMBING_STAIRS: STAIR CLIMBING DIFFICULTY, ASSISTANCE 1 PERSON;AMBULATION DIFFICULTY, ASSISTANCE 1 PERSON;TRANSFERRING DIFFICULTY, ASSISTANCE 1 PERSON
ADLS_ACUITY_SCORE: 25
ADLS_ACUITY_SCORE: 49
ADLS_ACUITY_SCORE: 25
ADLS_ACUITY_SCORE: 43
ADLS_ACUITY_SCORE: 25
ADLS_ACUITY_SCORE: 43
ADLS_ACUITY_SCORE: 25
CONCENTRATING,_REMEMBERING_OR_MAKING_DECISIONS_DIFFICULTY: NO
ADLS_ACUITY_SCORE: 43
ADLS_ACUITY_SCORE: 23
ADLS_ACUITY_SCORE: 25
ADLS_ACUITY_SCORE: 23
ADLS_ACUITY_SCORE: 25
ADLS_ACUITY_SCORE: 41
ADLS_ACUITY_SCORE: 43
ADLS_ACUITY_SCORE: 25
ADLS_ACUITY_SCORE: 25
ADLS_ACUITY_SCORE: 43
ADLS_ACUITY_SCORE: 43
ADLS_ACUITY_SCORE: 25
ADLS_ACUITY_SCORE: 43
ADLS_ACUITY_SCORE: 49
ADLS_ACUITY_SCORE: 25
WALKING_OR_CLIMBING_STAIRS_DIFFICULTY: YES
ADLS_ACUITY_SCORE: 47
ADLS_ACUITY_SCORE: 49
ADLS_ACUITY_SCORE: 25
ADLS_ACUITY_SCORE: 25
WALKING_OR_CLIMBING_STAIRS_DIFFICULTY: YES
ADLS_ACUITY_SCORE: 43
ADLS_ACUITY_SCORE: 25
ADLS_ACUITY_SCORE: 25
ADLS_ACUITY_SCORE: 43
ADLS_ACUITY_SCORE: 49
ADLS_ACUITY_SCORE: 49
ADLS_ACUITY_SCORE: 25
DOING_ERRANDS_INDEPENDENTLY_DIFFICULTY: YES
WALKING_OR_CLIMBING_STAIRS_DIFFICULTY: YES
ADLS_ACUITY_SCORE: 47
ADLS_ACUITY_SCORE: 23
ADLS_ACUITY_SCORE: 35
ADLS_ACUITY_SCORE: 49
ADLS_ACUITY_SCORE: 25
ADLS_ACUITY_SCORE: 35
ADLS_ACUITY_SCORE: 43
ADLS_ACUITY_SCORE: 25
ADLS_ACUITY_SCORE: 43
DRESS: 1-->ASSISTANCE (EQUIPMENT/PERSON) NEEDED
ADLS_ACUITY_SCORE: 25
TOILETING: 1-->ASSISTANCE (EQUIPMENT/PERSON) NEEDED (NOT DEVELOPMENTALLY APPROPRIATE)
ADLS_ACUITY_SCORE: 25
ADLS_ACUITY_SCORE: 23
ADLS_ACUITY_SCORE: 23
ADLS_ACUITY_SCORE: 43
TRANSFERRING: 0-->ASSISTANCE NEEDED (DEVELOPMENTALLY APPROPRIATE)
ADLS_ACUITY_SCORE: 43
ADLS_ACUITY_SCORE: 43
TOILETING_ISSUES: NO
DRESSING/BATHING_DIFFICULTY: YES
ADLS_ACUITY_SCORE: 25
ADLS_ACUITY_SCORE: 43
ADLS_ACUITY_SCORE: 25
ADLS_ACUITY_SCORE: 23
ADLS_ACUITY_SCORE: 43
ADLS_ACUITY_SCORE: 25
TRANSFERRING: 1-->ASSISTANCE (EQUIPMENT/PERSON) NEEDED
ADLS_ACUITY_SCORE: 25
ADLS_ACUITY_SCORE: 43
ADLS_ACUITY_SCORE: 49
ADLS_ACUITY_SCORE: 43
ADLS_ACUITY_SCORE: 49
ADLS_ACUITY_SCORE: 23
ADLS_ACUITY_SCORE: 25
ADLS_ACUITY_SCORE: 43
ADLS_ACUITY_SCORE: 25
ADLS_ACUITY_SCORE: 25
ADLS_ACUITY_SCORE: 43
ADLS_ACUITY_SCORE: 23
ADLS_ACUITY_SCORE: 25
ADLS_ACUITY_SCORE: 25
ADLS_ACUITY_SCORE: 49
ADLS_ACUITY_SCORE: 21
ADLS_ACUITY_SCORE: 25
ADLS_ACUITY_SCORE: 49
ADLS_ACUITY_SCORE: 25
DIFFICULTY_EATING/SWALLOWING: NO
ADLS_ACUITY_SCORE: 41
ADLS_ACUITY_SCORE: 25
ADLS_ACUITY_SCORE: 25
ADLS_ACUITY_SCORE: 49
ADLS_ACUITY_SCORE: 49
DRESSING/BATHING: BATHING DIFFICULTY, ASSISTANCE 1 PERSON
WALKING_OR_CLIMBING_STAIRS: AMBULATION DIFFICULTY, ASSISTANCE 1 PERSON
ADLS_ACUITY_SCORE: 25
EQUIPMENT_CURRENTLY_USED_AT_HOME: RAISED TOILET SEAT
ADLS_ACUITY_SCORE: 49
ADLS_ACUITY_SCORE: 35
ADLS_ACUITY_SCORE: 25
DRESSING/BATHING_DIFFICULTY: YES
WALKING_OR_CLIMBING_STAIRS: AMBULATION DIFFICULTY, ASSISTANCE 1 PERSON
ADLS_ACUITY_SCORE: 49
ADLS_ACUITY_SCORE: 25
ADLS_ACUITY_SCORE: 43
ADLS_ACUITY_SCORE: 25
ADLS_ACUITY_SCORE: 25
WEAR_GLASSES_OR_BLIND: NO
ADLS_ACUITY_SCORE: 25
ADLS_ACUITY_SCORE: 43
ADLS_ACUITY_SCORE: 43
ADLS_ACUITY_SCORE: 25
ADLS_ACUITY_SCORE: 43
ADLS_ACUITY_SCORE: 25
ADLS_ACUITY_SCORE: 25
DRESSING/BATHING_MANAGEMENT: PARENTS
ADLS_ACUITY_SCORE: 49
ADLS_ACUITY_SCORE: 43
TOILETING_ISSUES: YES
ADLS_ACUITY_SCORE: 25

## 2023-01-01 ASSESSMENT — PAIN SCALES - GENERAL
PAINLEVEL: NO PAIN (0)
PAINLEVEL: EXTREME PAIN (8)
PAINLEVEL: SEVERE PAIN (7)
PAINLEVEL: NO PAIN (0)
PAINLEVEL: NO PAIN (0)
PAINLEVEL: SEVERE PAIN (7)
PAINLEVEL: NO PAIN (0)

## 2023-01-01 ASSESSMENT — LIFESTYLE VARIABLES: TOBACCO_USE: 0

## 2023-01-01 ASSESSMENT — ENCOUNTER SYMPTOMS: SEIZURES: 0

## 2023-01-02 NOTE — PROGRESS NOTES
Infusion Nursing Note:  Diego Buchanan presents today for Electrolyte replacement.    Patient seen by provider today: Yes: Kelley ROTHMAN NP   present during visit today: Not Applicable. Speaks English    Note: No chemo this week per Kelley, replaceing electroytes today only.    Intravenous Access:  Implanted Port.    Treatment Conditions:  Lab Results   Component Value Date    HGB 8.3 (L) 01/02/2023    WBC 10.7 01/02/2023    ANEU 2.0 12/21/2022    ANEUTAUTO 8.0 01/02/2023     (H) 01/02/2023      Lab Results   Component Value Date     01/02/2023    POTASSIUM 3.3 (L) 01/02/2023    MAG 1.5 (L) 01/02/2023    CR 0.60 (L) 01/02/2023    FAISAL 9.2 01/02/2023    BILITOTAL 0.7 01/02/2023    ALBUMIN 3.6 01/02/2023    ALT <5 (L) 01/02/2023    AST 13 01/02/2023       Post Infusion Assessment:  Patient tolerated infusion without incident.  Blood return noted pre and post infusion.  Access discontinued per protocol.     Discharge Plan:   Patient declined prescription refills.  AVS to patient via MYCHART.  Patient will return next week for delayed chemo.   Patient discharged in stable condition accompanied by: sister.  Departure Mode: Wheelchair.  Face to Face time: 0.  To US after infusion.     Zeny Teran RN

## 2023-01-02 NOTE — NURSING NOTE
"Chief Complaint   Patient presents with     Port Draw     Labs drawn via port by RN. VS taken.     Port accessed with 20g 3/4\" power needle and labs drawn by rn.  Port flushed with NS and heparin.  Pt tolerated well.  VS taken.  Pt checked in for next appt.    Anand Barnes RN  "

## 2023-01-02 NOTE — LETTER
1/2/2023         RE: Diego Buchanan  [address]  [place] [address]        Dear Colleague,    Thank you for referring your patient, Diego Buchanan, to the [facility]. Please see a copy of my visit note below.    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 He presented initially with abdominal pain, diarrhea, and progressive abdominal distention for 3 months duration. 2. Initial CT scan in February 2020 showed severe peritoneal carcinomatosis with large peritoneal tumor implants throughout the entire abdomen and pelvis, but mostly prominently in the pelvis.   2. PETCT scan showed interval increase in the amount of peritoneal carcinomatosis.  3. On 3/12/2020, he had ultrasound-guided core needle biopsy of a peritoneal implant (Case: [id number]), which showed a completely undifferentiated appearance, but stains with pancytokeratin, indicating an epithelial origin. 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 Tumor markers for CA-19-9, CEA, beta-hCG, alpha-fetoprotein were unremarkable. Cancer type ID molecular testing by [employer] sent to determine the exact diagnosis and to assist in further treatment planning. 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 Relative probability of less than 5% of other subtype of sarcoma. EWSR1-WT1 fusion detected.  4. 5/1/2020, MRI brain negative for brain metastasis, and baseline CT-CAP obtained showing extensive mixed solid and cystic masses throughout the abdomen and pelvis consistent with peritoneal carcinomatosis. Largest mass measures approximately 20 cm in the pelvis. Metastatic mixed solid and cystic masses seen in hepatic segments 5 and 6 and hepatic segment 7. The masses appear to be  contiguous with the retrohepatic peritoneal carcinomatosis. Small volume fluid about the spleen favored to represent malignant ascites, stable mild-to-moderate right hydronephrosis related to mass effect upon the distal ureter in the pelvis, the IVC and iliac veins are compressed due to the extensive peritoneal carcinomatosis without findings to suggest deep venous thrombosis.  5. 5/4/2020, he begins CAV/IMV alternating chemotherapy. He receives 6 cycles of treatment. He symptomatically improves.  6. 9/11/2020, CT-CAP shows stable to mildly improved extensive peritoneal carcinomatosis. He would like to omit Ifosfamide/etoposide cycles and continue only with CAV.  7. 10/9/2020, he starts CAV every 21 days.  8. 1/6/2021, CT CAP showed a mixed response in the mixed solid and cystic masses throughout the peritoneum. Some of the tumors are stable to mildly worse, with some of the peritoneal masses a bit larger, a few are smaller, one cystic tumor in the left abdomen is smaller, while tumors lower in the pelvis appear slightly larger.  9. 3/24/2021, CT CAP showed continued slight decrease in overall burden of peritoneal carcinomatosis with persistent encasement of bowel without evidence of bowel obstruction.  10. 6/25/2021, he receives cycle 19 CAV, then takes a chemotherapy holiday.  11. 4/22/2022, he resumes CAV/IMV chemotherapy.  12. 7/13/22, CT CAP showed interval enlargement of hepatic and splenic metastases with other overall disease stable. CAV/IMV continued.  13.  10/04/22, CT CAP  showed evidence of progressive disease, predominantly in liver. There are small sub-centimeter lung nodules, which are suspicious for metastasis. CAV/IMV discontinued. Start irinotecan/temozolomide on 11/28/22.   14. 12/7/22-12/21/22: Hospitalized for neutropenic fever with RSV, rash (drug vs. Viral exanthem), and pancytopenia with transfusions.     History of Present Illness:  Robert is a 27 year old man with metastatic desmoplastic  small round cell tumor. Diego presents today for follow hospital follow up and evaluation prior to planned cycle 2 of irinotecan/temozolomide. He is accompanied by his sister, Mariela. Mariela helps provide history in conjunction with Robert.     -Robert is doing ok today. He still feels tired and fatigued. Able to use restroom independently. Not currently working with PT. Not very active lately.  -Having about 4 to 5 stools per day. Unsure of amount, seems like a small amount with each stool. No blood. Still has abdominal fullness. No nausea. Appetite is good.   -Still some shortness of breath with activity. Able to lie flat. No change or worsening in breathing since hospitalization. Still a little residual cough. No fevers.  -Rash is gone.  -No chest pain.   -Thinks he is done taking the phosphorus supplement.   -Drinking about 1-2 bottles of water daily.     Review of Systems:  Patient denies any of the following except if noted above: fevers, chills, difficulty with energy, vision or hearing changes, chest pain, dyspnea, abdominal pain, nausea, vomiting, diarrhea, constipation, urinary concerns, headaches, numbness, tingling, issues with sleep or mood. He also denies lumps, bumps, rashes or skin lesions, bleeding or bruising issues.  Physical Exam:  /73   Pulse (!) 129   Temp 98.1  F (36.7  C) (Oral)   Resp 16   Wt 113.4 kg (250 lb)   SpO2 98%   BMI 38.01 kg/m    Wt Readings from Last 10 Encounters:   01/02/23 113.4 kg (250 lb)   12/20/22 113.5 kg (250 lb 3.6 oz)   12/06/22 115.1 kg (253 lb 12.8 oz)   12/02/22 81.2 kg (179 lb)   11/30/22 123.4 kg (272 lb)   11/18/22 129.6 kg (285 lb 11.2 oz)   09/16/22 128.3 kg (282 lb 12.8 oz)   08/26/22 127.1 kg (280 lb 4.8 oz)   08/05/22 130.2 kg (287 lb)   07/15/22 130 kg (286 lb 9.6 oz)     General: Moderately fatigued-appearing male in no acute distress.  Eyes: EOMI, PERRL. No scleral icterus.  ENT: Lips are dry, slightly cracked. Oral mucosa is slightly dry without  lesions or thrush.   Lymphatic: Neck is supple without cervical or supraclavicular lymphadenopathy.   Cardiovascular: Tachycardic, rhythm regular. No murmurs, gallops, or rubs. No peripheral edema.  Respiratory: LS diminished in left lower lobe. Otherwise, CTA bilaterally. No wheezes or crackles.  Gastrointestinal: BS distant, slightly hypoactive.. Abdomen rounded, some distention medially and more firm in this region but not rigid, non-tender.   Neurologic: Cranial nerves II through XII are grossly intact.  Skin: No rashes, petechiae, or bruising noted on exposed skin.  Psych: Pleasant, Affect slightly flat.    Labs:   Most Recent 3 CBC's:  Recent Labs   Lab Test 01/02/23  0701 12/21/22  0537 12/20/22  0543 12/06/22  0932 11/28/22  0929 11/18/22  1202   WBC 10.7 2.7* 1.9*   < > 17.4* 12.7*   HGB 8.3* 7.5* 7.6*   < > 9.3* 7.9*   MCV 86 84 84   < > 84 87   * 49* 34*   < > 459* 333   ANEUTAUTO 8.0  --   --   --  15.2* 10.9*    < > = values in this interval not displayed.     Most Recent 3 BMP's:  Recent Labs   Lab Test 01/02/23  0701 12/21/22  0537 12/20/22  1213 12/20/22  0543    141  --  140   POTASSIUM 3.3* 3.2* 3.6 3.4   CHLORIDE 103 109*  --  110*   CO2 23 20*  --  18*   BUN 3.9* 6.9  --  10.0   CR 0.60* 0.81  --  0.96   ANIONGAP 13 12  --  12   FAISAL 9.2 8.3*  --  8.5*   * 91  --  113*   PROTTOTAL 8.0 6.9  --  6.6   ALBUMIN 3.6 3.2*  --  2.8*    Most Recent 3 LFT's:  Recent Labs   Lab Test 01/02/23  0701 12/21/22  0537 12/20/22  0543   AST 13 14 22   ALT <5* 14 20   ALKPHOS 94 153* 173*   BILITOTAL 0.7 0.6 0.8     Magnesium: 1.5  Phosphorus: 3.2    Imaging:  Imaging:  XR Chest Port 1 View  Narrative: EXAM: XR CHEST PORT 1 VIEW  12/19/2022 2:40 AM     HISTORY:  r/o infection       COMPARISON:  12/7/2022    FINDINGS:   The cardiac silhouette is within normal limits. Low lung volumes.  Unchanged right chest wall Port-A-Cath. Trace left pleural effusion.  Increased streaky bilateral perihilar and  left basilar opacities. No  focal airspace consolidation.  Impression: IMPRESSION:   1. No acute airspace disease.  2. Low lung volumes with increased streaky bilateral perihilar and  left basilar opacities, likely atelectasis.  3. Unchanged trace left pleural effusion.    I have personally reviewed the examination and initial interpretation  and I agree with the findings.    OLU RIOJAS MD         SYSTEM ID:  [id number]    Narrative & Impression   EXAMINATION: CT ABDOMEN PELVIS W/O CONTRAST, 12/8/2022 11:51 AM     TECHNIQUE:  Helical CT images from the lung bases through the  symphysis pubis were obtained without IV contrast.      COMPARISON: 10/4/2022, 12/1/2022 chest CT     HISTORY: c/f neutropenic colitis     FINDINGS:  CHEST:  LUNGS:   New, diffuse groundglass, tree-in-bud nodules and scattered small  consolidations throughout the visualized lungs.  New small left pleural effusion.     MEDIASTINUM:   New pericardial and paraesophageal lymphadenopathy for example  measuring 0.8 cm (series 2, image 27).  CT criteria for anemia.  Nonenlarged heart. No pericardial effusion.     ABDOMEN/PELVIS:  LIVER:  Increased hepatic metastases, greater than 20 in number for example  measuring 3.5 x 2.5 cm on the left previously 2.7 x 2.2 cm metastasis  series 2, image 52) and 2.7 x 2.1 cm, previously 1.8 x 1.6 cm on the  right (series 2, image 58).     STOMACH:   Decompressed which limits evaluation     BILIARY:   No biliary ductal dilation. Gallstones.     PANCREAS:   Normal.     SPLEEN:  Increased hypodense splenic mass measuring 2.9 x 2.7 cm previously 2.8  x 2.4 cm presence of series 2, image 83).     ADRENAL GLANDS:   Normal.     :  Normal kidneys.  Non-thickened urinary bladder.  Moderate mass effect upon the urinary bladder.     BOWEL/PERITONEUM:   Progression of peritoneal carcinomatosis with 100's of diffuse solid  nodular masses throughout the peritoneum and mesentery, encasing small  bowel and colon in the pelvis  with scattered pockets of fluid that are  slightly increased in size for example in the left abdomen (series 2,  image 156). There is marked omental caking for example measuring 26 x  8.6 cm previously 24 x 6.4 cm (series 2, image 136).  Mildly dilated transverse colon filled with air and fluid measuring up  to 6.3 cm with level of transition at the sigmoid colon which is  completely overtaken by solid masses and not well evaluated. Small  volume of fluid in the remaining colon and the rectum.      Unchanged fluid collection in the right abdomen measuring 8.8 x 6.8 cm  (series 2, image 160).  Nonvisualization of the appendix.   No pneumoperitoneum. No free fluid.     RETROPERITONEUM/:  Stable borderline retroperitoneal lymphadenopathy measuring 0.9 cm  (series 2, image 124). Stable left common iliac lymphadenopathy  measuring 1.1 cm (series 2, image 155).     VESSELS:   Limited evaluation on non-contrast exam.     LYMPH NODES:   No lymphadenopathy.     BONES/SOFT TISSUES:   Unchanged spiculated, sclerotic focus is in the left sacrum consistent  with a bone island.  No acute osseous abnormality.                                                                      IMPRESSION:  1.  New concern for large bowel obstruction with transition point at  the level of innumerable solid pelvic masses. Correlation with GI  dysfunction.  2.  Progression of metastatic disease with increased peritoneal  carcinomatosis, omental caking and malignant ascites, new mediastinal  lymphadenopathy, increased hepatic metastases, and increased splenic  metastasis compared to 10/4/2022 CT scan.    3.  Increased diffuse groundglass tree-in-bud nodules and  consolidations throughout the lungs in a pattern concerning for  bronchial pneumonia with atypical viral and fungal etiologies a strong  consideration in an immune compromised patient. Metastatic disease not  excluded.  4.  Decreased small left pleural effusion.        [Access Center: Concern  for large bowel obstruction with transition  point in the pelvis at the level level of solid pelvic masses]     This report will be copied to the [facility] to ensure a  provider acknowledges the finding. Access Center is available Monday  through Friday 8am-3:30 pm.      I have personally reviewed the examination and initial interpretation  and I agree with the findings.     SHE PARADA MD         SYSTEM ID:  [id number]         I reviewed the above imaging today.    I reviewed the above labs and imaging today.    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 He tolerated CAV/IMV for 19 cycles and then was on chemotherapy break from June 2021-April 2022. Given return in symptoms he resumed chemotherapy with further CAV/IMV in April 2022.  However, he has had evidence of progression on the two most recent scans. Robert and his family elected to take time to try traditional medicine treatments.Last CT-CAP 10/4/22 showed evidence of progressive disease, predominantly in liver. There are small sub-centimeter lung nodules, which are suspicious for metastasis. He started irinotecan 20 mg/m2 days 1-5/temozolomide 250 mg daily days 1-5 every 21 days with his first cycle on 11/28/22.      On  12/1 he had a chest CT for SOB which was negative for acute PE. However, it showed scattered ground glass opacities suspicious for pneumonia. He was started on Augmentin 875 mg BID x 5 days plus Azithromycin 500 mg on day 1, 250 mg from days 2-5.     He was then hospitalized from 12/7-12/21 for neutropenic fever in association with RSV. He was started on antibiotics. He subsequently developed a rash thought to be either s/t cefepime or viral exanthem. Changed to  zosyn. He received transfusions prn for pancytopenia. He was found ot have a large bowel obstruction for which GI recommended no surgical intervention. He continued to have loose stools and was recommended to continue QID imodium.     Robert is doing ok today but remains fatigued and weak overall. He also continues to have about 4-5 stools a day. History is somewhat limited as patient does not recall amount of stool with each bowel movement. He is taking imodium about 2-3 times per day. Rash has resolved.  Breathing is the same and has thoracentesis scheduled on 1/10.    Labs today with Hgb at 8.3. Trending up but still low, may be contributing to fatigue. LFt's have improved. WBC and ANC have recovered (received GCSF last on 12/20). Mg low at 1.5 and K+ low at 3.3; suspect s/t to loose stools. Phosphorus is normal, ok to stop phosphorus replacement (If hasn't already).    Plan:    -Delay treatment for 1 week d/t persistent weakness and ongoing loose stools. While we want to be aggressive in treatment given progression of disease, we need to balance this with risk of causing further harm. Will allow another week for recovery of strength improvement in Hgb, and better control of stools.  -Work on increasing hydration; at least 2 bottles of water daily.  -Replace Magnesium IV today (to avoid further diarrhea). Replace K+ orally in clinic.  -Ultrasound of abdomen to evaluate for ascites. Consider paracentesis depending on findings.  -Add Neulasta to future chemo cycles.  -Continue imodium prn with each each loose stool (max of 16 mg daily). Goal of 1-2 stools per day.   -Labs, LISY, likely chemo in ~ 1 week  -Thoracentesis on 1/10 (IB sent to [facility] to verify timing of this with chemo and also to inquire if port exchange has been scheduled).     #Diarrhea  -Weight stable  -Continue imodium prn as above  -Delay chemo today as irinotecan would only worsen these symptoms. Consider dose reduction next week pending symptoms.      #Abdominal bloating  Paracentesis done on 11/30 with 2.1L removed.   -US today to evaluate ascites    #Shortness of breath   - CT on 12/1 showing possible pneumonia. Treated with antibiotics. Shortness of breath and cough is stable. Thoracentesis for left pleural effusion on 1/10    #Anemia  Trending up, 8.3 today.. No current concern for  or GI bleeding. Transfuse pRBC for hb < 7. Peripheral smear on 12/15 with no evidence of hemolysis.     #HTN  #Tachycardia  Longstanding issue, likely multifactorial in setting of metastatic disease. EKG on 11/15 showed sinus tachycardia. Managed with metoprolol, recently changed from 50 mg daily to 25 mg BID in an effort to maintain more of a steady-state.     #Port malpositioning  CXR 11/16/22 showing right port with tip in the low cervical internal jugular. Port replacement previously requested. Message sent to [facility] to inquire about status of scheduling.    Amber Scheierl, CNP    100 minutes spent on the date of the encounter doing chart review, review of test results, interpretation of tests, patient visit, documentation and discussion with other provider(s)

## 2023-01-02 NOTE — PROGRESS NOTES
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 He presented initially with abdominal pain, diarrhea, and progressive abdominal distention for 3 months duration. 2. Initial CT scan in February 2020 showed severe peritoneal carcinomatosis with large peritoneal tumor implants throughout the entire abdomen and pelvis, but mostly prominently in the pelvis.   2. PETCT scan showed interval increase in the amount of peritoneal carcinomatosis.  3. On 3/12/2020, he had ultrasound-guided core needle biopsy of a peritoneal implant (Case: [id number]), which showed a completely undifferentiated appearance, but stains with pancytokeratin, indicating an epithelial origin. 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 Tumor markers for CA-19-9, CEA, beta-hCG, alpha-fetoprotein were unremarkable. Cancer type ID molecular testing by GITR sent to determine the exact diagnosis and to assist in further treatment planning. 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 Relative probability of less than 5% of other subtype of sarcoma. EWSR1-WT1 fusion detected.  4. 5/1/2020, MRI brain negative for brain metastasis, and baseline CT-CAP obtained showing extensive mixed solid and cystic masses throughout the abdomen and pelvis consistent with peritoneal carcinomatosis. Largest mass measures approximately 20 cm in the pelvis. Metastatic mixed solid and cystic masses seen in hepatic segments 5 and 6 and hepatic segment 7. The masses appear to be contiguous with the retrohepatic peritoneal carcinomatosis. Small volume fluid about the spleen favored to represent malignant ascites, stable mild-to-moderate right hydronephrosis related to mass effect upon the distal ureter in the pelvis, the IVC and iliac  veins are compressed due to the extensive peritoneal carcinomatosis without findings to suggest deep venous thrombosis.  5. 5/4/2020, he begins CAV/IMV alternating chemotherapy. He receives 6 cycles of treatment. He symptomatically improves.  6. 9/11/2020, CT-CAP shows stable to mildly improved extensive peritoneal carcinomatosis. He would like to omit Ifosfamide/etoposide cycles and continue only with CAV.  7. 10/9/2020, he starts CAV every 21 days.  8. 1/6/2021, CT CAP showed a mixed response in the mixed solid and cystic masses throughout the peritoneum. Some of the tumors are stable to mildly worse, with some of the peritoneal masses a bit larger, a few are smaller, one cystic tumor in the left abdomen is smaller, while tumors lower in the pelvis appear slightly larger.  9. 3/24/2021, CT CAP showed continued slight decrease in overall burden of peritoneal carcinomatosis with persistent encasement of bowel without evidence of bowel obstruction.  10. 6/25/2021, he receives cycle 19 CAV, then takes a chemotherapy holiday.  11. 4/22/2022, he resumes CAV/IMV chemotherapy.  12. 7/13/22, CT CAP showed interval enlargement of hepatic and splenic metastases with other overall disease stable. CAV/IMV continued.  13.  10/04/22, CT CAP  showed evidence of progressive disease, predominantly in liver. There are small sub-centimeter lung nodules, which are suspicious for metastasis. CAV/IMV discontinued. Start irinotecan/temozolomide on 11/28/22.   14. 12/7/22-12/21/22: Hospitalized for neutropenic fever with RSV, rash (drug vs. Viral exanthem), and pancytopenia with transfusions.     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 Diego presents today for follow hospital follow up and evaluation prior to planned cycle 2 of irinotecan/temozolomide. He is accompanied by his sister, Mariela. Mariela helps provide history in conjunction with Robert.     -Robert is doing ok  today. He still feels tired and fatigued. Able to use restroom independently. Not currently working with PT. Not very active lately.  -Having about 4 to 5 stools per day. Unsure of amount, seems like a small amount with each stool. No blood. Still has abdominal fullness. No nausea. Appetite is good.   -Still some shortness of breath with activity. Able to lie flat. No change or worsening in breathing since hospitalization. Still a little residual cough. No fevers.  -Rash is gone.  -No chest pain.   -Thinks he is done taking the phosphorus supplement.   -Drinking about 1-2 bottles of water daily.     Review of Systems:  Patient denies any of the following except if noted above: fevers, chills, difficulty with energy, vision or hearing changes, chest pain, dyspnea, abdominal pain, nausea, vomiting, diarrhea, constipation, urinary concerns, headaches, numbness, tingling, issues with sleep or mood. He also denies lumps, bumps, rashes or skin lesions, bleeding or bruising issues.  Physical Exam:  /73   Pulse (!) 129   Temp 98.1  F (36.7  C) (Oral)   Resp 16   Wt 113.4 kg (250 lb)   SpO2 98%   BMI 38.01 kg/m    Wt Readings from Last 10 Encounters:   01/02/23 113.4 kg (250 lb)   12/20/22 113.5 kg (250 lb 3.6 oz)   12/06/22 115.1 kg (253 lb 12.8 oz)   12/02/22 81.2 kg (179 lb)   11/30/22 123.4 kg (272 lb)   11/18/22 129.6 kg (285 lb 11.2 oz)   09/16/22 128.3 kg (282 lb 12.8 oz)   08/26/22 127.1 kg (280 lb 4.8 oz)   08/05/22 130.2 kg (287 lb)   07/15/22 130 kg (286 lb 9.6 oz)     General: Moderately fatigued-appearing male in no acute distress.  Eyes: EOMI, PERRL. No scleral icterus.  ENT: Lips are dry, slightly cracked. Oral mucosa is slightly dry without lesions or thrush.   Lymphatic: Neck is supple without cervical or supraclavicular lymphadenopathy.   Cardiovascular: Tachycardic, rhythm regular. No murmurs, gallops, or rubs. No peripheral edema.  Respiratory: LS diminished in left lower lobe. Otherwise, CTA  bilaterally. No wheezes or crackles.  Gastrointestinal: BS distant, slightly hypoactive.. Abdomen rounded, some distention medially and more firm in this region but not rigid, non-tender.   Neurologic: Cranial nerves II through XII are grossly intact.  Skin: No rashes, petechiae, or bruising noted on exposed skin.  Psych: Pleasant, Affect slightly flat.    Labs:   Most Recent 3 CBC's:  Recent Labs   Lab Test 01/02/23  0701 12/21/22  0537 12/20/22  0543 12/06/22  0932 11/28/22  0929 11/18/22  1202   WBC 10.7 2.7* 1.9*   < > 17.4* 12.7*   HGB 8.3* 7.5* 7.6*   < > 9.3* 7.9*   MCV 86 84 84   < > 84 87   * 49* 34*   < > 459* 333   ANEUTAUTO 8.0  --   --   --  15.2* 10.9*    < > = values in this interval not displayed.     Most Recent 3 BMP's:  Recent Labs   Lab Test 01/02/23  0701 12/21/22  0537 12/20/22  1213 12/20/22  0543    141  --  140   POTASSIUM 3.3* 3.2* 3.6 3.4   CHLORIDE 103 109*  --  110*   CO2 23 20*  --  18*   BUN 3.9* 6.9  --  10.0   CR 0.60* 0.81  --  0.96   ANIONGAP 13 12  --  12   FAISAL 9.2 8.3*  --  8.5*   * 91  --  113*   PROTTOTAL 8.0 6.9  --  6.6   ALBUMIN 3.6 3.2*  --  2.8*    Most Recent 3 LFT's:  Recent Labs   Lab Test 01/02/23  0701 12/21/22  0537 12/20/22  0543   AST 13 14 22   ALT <5* 14 20   ALKPHOS 94 153* 173*   BILITOTAL 0.7 0.6 0.8     Magnesium: 1.5  Phosphorus: 3.2    Imaging:  Imaging:  XR Chest Port 1 View  Narrative: EXAM: XR CHEST PORT 1 VIEW  12/19/2022 2:40 AM     HISTORY:  r/o infection       COMPARISON:  12/7/2022    FINDINGS:   The cardiac silhouette is within normal limits. Low lung volumes.  Unchanged right chest wall Port-A-Cath. Trace left pleural effusion.  Increased streaky bilateral perihilar and left basilar opacities. No  focal airspace consolidation.  Impression: IMPRESSION:   1. No acute airspace disease.  2. Low lung volumes with increased streaky bilateral perihilar and  left basilar opacities, likely atelectasis.  3. Unchanged trace left pleural  effusion.    I have personally reviewed the examination and initial interpretation  and I agree with the findings.    OLU RIOJAS MD         SYSTEM ID:  [id number]    Narrative & Impression   EXAMINATION: CT ABDOMEN PELVIS W/O CONTRAST, 12/8/2022 11:51 AM     TECHNIQUE:  Helical CT images from the lung bases through the  symphysis pubis were obtained without IV contrast.      COMPARISON: 10/4/2022, 12/1/2022 chest CT     HISTORY: c/f neutropenic colitis     FINDINGS:  CHEST:  LUNGS:   New, diffuse groundglass, tree-in-bud nodules and scattered small  consolidations throughout the visualized lungs.  New small left pleural effusion.     MEDIASTINUM:   New pericardial and paraesophageal lymphadenopathy for example  measuring 0.8 cm (series 2, image 27).  CT criteria for anemia.  Nonenlarged heart. No pericardial effusion.     ABDOMEN/PELVIS:  LIVER:  Increased hepatic metastases, greater than 20 in number for example  measuring 3.5 x 2.5 cm on the left previously 2.7 x 2.2 cm metastasis  series 2, image 52) and 2.7 x 2.1 cm, previously 1.8 x 1.6 cm on the  right (series 2, image 58).     STOMACH:   Decompressed which limits evaluation     BILIARY:   No biliary ductal dilation. Gallstones.     PANCREAS:   Normal.     SPLEEN:  Increased hypodense splenic mass measuring 2.9 x 2.7 cm previously 2.8  x 2.4 cm presence of series 2, image 83).     ADRENAL GLANDS:   Normal.     :  Normal kidneys.  Non-thickened urinary bladder.  Moderate mass effect upon the urinary bladder.     BOWEL/PERITONEUM:   Progression of peritoneal carcinomatosis with 100's of diffuse solid  nodular masses throughout the peritoneum and mesentery, encasing small  bowel and colon in the pelvis with scattered pockets of fluid that are  slightly increased in size for example in the left abdomen (series 2,  image 156). There is marked omental caking for example measuring 26 x  8.6 cm previously 24 x 6.4 cm (series 2, image 136).  Mildly dilated  transverse colon filled with air and fluid measuring up  to 6.3 cm with level of transition at the sigmoid colon which is  completely overtaken by solid masses and not well evaluated. Small  volume of fluid in the remaining colon and the rectum.      Unchanged fluid collection in the right abdomen measuring 8.8 x 6.8 cm  (series 2, image 160).  Nonvisualization of the appendix.   No pneumoperitoneum. No free fluid.     RETROPERITONEUM/:  Stable borderline retroperitoneal lymphadenopathy measuring 0.9 cm  (series 2, image 124). Stable left common iliac lymphadenopathy  measuring 1.1 cm (series 2, image 155).     VESSELS:   Limited evaluation on non-contrast exam.     LYMPH NODES:   No lymphadenopathy.     BONES/SOFT TISSUES:   Unchanged spiculated, sclerotic focus is in the left sacrum consistent  with a bone island.  No acute osseous abnormality.                                                                      IMPRESSION:  1.  New concern for large bowel obstruction with transition point at  the level of innumerable solid pelvic masses. Correlation with GI  dysfunction.  2.  Progression of metastatic disease with increased peritoneal  carcinomatosis, omental caking and malignant ascites, new mediastinal  lymphadenopathy, increased hepatic metastases, and increased splenic  metastasis compared to 10/4/2022 CT scan.    3.  Increased diffuse groundglass tree-in-bud nodules and  consolidations throughout the lungs in a pattern concerning for  bronchial pneumonia with atypical viral and fungal etiologies a strong  consideration in an immune compromised patient. Metastatic disease not  excluded.  4.  Decreased small left pleural effusion.        [Access Center: Concern for large bowel obstruction with transition  point in the pelvis at the level level of solid pelvic masses]     This report will be copied to the [facility] to ensure a  provider acknowledges the finding. Access Center is available  Monday  through Friday 8am-3:30 pm.      I have personally reviewed the examination and initial interpretation  and I agree with the findings.     SHE PARADA MD         SYSTEM ID:  [id number]         I reviewed the above imaging today.    I reviewed the above labs and imaging today.    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 He tolerated CAV/IMV for 19 cycles and then was on chemotherapy break from June 2021-April 2022. Given return in symptoms he resumed chemotherapy with further CAV/IMV in April 2022.  However, he has had evidence of progression on the two most recent scans. Robert and his family elected to take time to try traditional medicine treatments.Last CT-CAP 10/4/22 showed evidence of progressive disease, predominantly in liver. There are small sub-centimeter lung nodules, which are suspicious for metastasis. He started irinotecan 20 mg/m2 days 1-5/temozolomide 250 mg daily days 1-5 every 21 days with his first cycle on 11/28/22.      On  12/1 he had a chest CT for SOB which was negative for acute PE. However, it showed scattered ground glass opacities suspicious for pneumonia. He was started on Augmentin 875 mg BID x 5 days plus Azithromycin 500 mg on day 1, 250 mg from days 2-5.     He was then hospitalized from 12/7-12/21 for neutropenic fever in association with RSV. He was started on antibiotics. He subsequently developed a rash thought to be either s/t cefepime or viral exanthem. Changed to zosyn. He received transfusions prn for pancytopenia. He was found ot have a large bowel obstruction for which GI recommended no surgical intervention. He continued to have loose stools and was recommended to continue QID imodium.     Robert is doing  ok today but remains fatigued and weak overall. He also continues to have about 4-5 stools a day. History is somewhat limited as patient does not recall amount of stool with each bowel movement. He is taking imodium about 2-3 times per day. Rash has resolved.  Breathing is the same and has thoracentesis scheduled on 1/10.    Labs today with Hgb at 8.3. Trending up but still low, may be contributing to fatigue. LFt's have improved. WBC and ANC have recovered (received GCSF last on 12/20). Mg low at 1.5 and K+ low at 3.3; suspect s/t to loose stools. Phosphorus is normal, ok to stop phosphorus replacement (If hasn't already).    Plan:    -Delay treatment for 1 week d/t persistent weakness and ongoing loose stools. While we want to be aggressive in treatment given progression of disease, we need to balance this with risk of causing further harm. Will allow another week for recovery of strength improvement in Hgb, and better control of stools.  -Work on increasing hydration; at least 2 bottles of water daily.  -Replace Magnesium IV today (to avoid further diarrhea). Replace K+ orally in clinic.  -Ultrasound of abdomen to evaluate for ascites. Consider paracentesis depending on findings.  -Add Neulasta to future chemo cycles.  -Continue imodium prn with each each loose stool (max of 16 mg daily). Goal of 1-2 stools per day.   -Labs, LISY, likely chemo in ~ 1 week  -Thoracentesis on 1/10 (IB sent to [facility] to verify timing of this with chemo and also to inquire if port exchange has been scheduled).     #Diarrhea  -Weight stable  -Continue imodium prn as above  -Delay chemo today as irinotecan would only worsen these symptoms. Consider dose reduction next week pending symptoms.     #Abdominal bloating  Paracentesis done on 11/30 with 2.1L removed.   -US today to evaluate ascites    #Shortness of breath   - CT on 12/1 showing possible pneumonia. Treated with antibiotics. Shortness of breath and cough is stable. Thoracentesis  for left pleural effusion on 1/10    #Anemia  Trending up, 8.3 today.. No current concern for  or GI bleeding. Transfuse pRBC for hb < 7. Peripheral smear on 12/15 with no evidence of hemolysis.     #HTN  #Tachycardia  Longstanding issue, likely multifactorial in setting of metastatic disease. EKG on 11/15 showed sinus tachycardia. Managed with metoprolol, recently changed from 50 mg daily to 25 mg BID in an effort to maintain more of a steady-state.     #Port malpositioning  CXR 11/16/22 showing right port with tip in the low cervical internal jugular. Port replacement previously requested. Message sent to [facility] to inquire about status of scheduling.    Amber Scheierl, CNP    100 minutes spent on the date of the encounter doing chart review, review of test results, interpretation of tests, patient visit, documentation and discussion with other provider(s)

## 2023-01-02 NOTE — NURSING NOTE
"Oncology Rooming Note    January 2, 2023 7:49 AM   Diego Buchanan is a 27 year old male who presents for:    Chief Complaint   Patient presents with     Port Draw     Labs drawn via port by RN. VS taken.     Oncology Clinic Visit     DSRCT     Initial Vitals: /73   Pulse (!) 129   Temp 98.1  F (36.7  C) (Oral)   Resp 16   Wt 113.4 kg (250 lb)   SpO2 98%   BMI 38.01 kg/m   Estimated body mass index is 38.01 kg/m  as calculated from the following:    Height as of 12/7/22: 1.727 m (5' 8\").    Weight as of this encounter: 113.4 kg (250 lb). Body surface area is 2.33 meters squared.  No Pain (0) Comment: Data Unavailable   No LMP for male patient.  Allergies reviewed: Yes  Medications reviewed: Yes    Medications: Medication refills not needed today.  Pharmacy name entered into [employer]:    [id number] - [place], [place] - [address] [facility] - [place], [place] - [phone]  [facility] - [place], [place] - [address] [place] [address]    Clinical concerns: Pt presents today for f/u.       Akua Wallace LPN  1/2/2023              "

## 2023-01-06 NOTE — H&P
Pre-Operative H & P     CC:  Preoperative exam to assess for increased cardiopulmonary risk while undergoing surgery and anesthesia.    Date of Encounter: 1/6/2023  Primary Care Physician:  Jc Galss     Reason for visit:   Encounter Diagnosis   Name Primary?     Pre-op evaluation Yes       HPI  Diego Buchanan is a 27 year old male who presents for pre-operative H & P in preparation for  Procedure Information     Case: [id number] Date/Time: 01/10/23 1135    Procedures:       THORACENTESIS (Left: Chest)      INSERTION, VASCULAR ACCESS PORT (Neck)    Anesthesia type: MAC with Local    Diagnosis: Pleural effusion [J90]    Pre-op diagnosis: Pleural effusion [J90]    Location:  OR  /  OR    Providers: Kam Giron MD; Priya Fournier MD          Patient is being evaluated for comorbid conditions of dyslipidemia, obesity, metastatic desmoplastic small round cell tumor    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 He follows with oncology. He was recently admitted 12/7/22 for sepsis with neutropenic fever associated with RSV. He was found to have moderate pleural effusion 12/1/22. He is now scheduled for the above procedure.     History is obtained from the patient and chart review. Patient's sister was present and helped with medical history    Hx of abnormal bleeding or anti-platelet use: denies      Past Medical History  Past Medical History:   Diagnosis Date     Hypertension      Learning disability     but pt is his own gaurdian     Peritoneal carcinomatosis (H)        Past Surgical History  Past Surgical History:   Procedure Laterality Date     BIOPSY      liver     INSERT PORT VASCULAR ACCESS Right 4/21/2022    Procedure: INSERTION, VASCULAR ACCESS PORT;  Surgeon: Daniel Sarmiento MD;  Location: [facility] OR     IR CHEST PORT PLACEMENT > 5 YRS OF AGE  4/21/2022     IR CVC TUNNEL PLACEMENT > 5 YRS OF AGE  4/29/2020     IR CVC TUNNEL REMOVAL RIGHT  11/16/2021       MUSCLE BIOPSY  3/12/2020       Prior to Admission Medications  Current Outpatient Medications   Medication Sig Dispense Refill     acetaminophen (TYLENOL) 325 MG tablet Take 2 tablets (650 mg) by mouth every 4 hours as needed for mild pain or fever 30 tablet 0     loperamide (IMODIUM) 2 MG capsule Take 1 capsule (2 mg) by mouth 4 times daily 60 capsule 0     metoprolol tartrate (LOPRESSOR) 25 MG tablet Take 1 tablet (25 mg) by mouth 2 times daily 60 tablet 0     temozolomide (TEMODAR) 250 MG capsule Take 1 capsule (250 mg) by mouth daily for 5 days , about 45 minutes after taking Zofran on chemotherapy days. 5 capsule 0     triamcinolone (KENALOG) 0.1 % external ointment Apply topically 2 times daily as needed for irritation 15 g 0     polyethylene glycol (MIRALAX) 17 g packet Take 1 packet by mouth as needed for constipation (Patient not taking: Reported on 1/6/2023)         Allergies  Allergies   Allergen Reactions     Cefepime Rash     Morbiliform rash     Tegaderm Chg Dressing [Chlorhexidine]      Tegaderm Transparent Dressing (Informational Only) Itching     Tegaderm dressing; Okay for short periods of time       Social History  Social History     Socioeconomic History     Marital status: Single     Spouse name: Not on file     Number of children: Not on file     Years of education: Not on file     Highest education level: Not on file   Occupational History     Not on file   Tobacco Use     Smoking status: Never     Smokeless tobacco: Never   Substance and Sexual Activity     Alcohol use: Never     Drug use: Never     Sexual activity: Not on file   Other Topics Concern     Parent/sibling w/ CABG, MI or angioplasty before 65F 55M? Not Asked   Social History Narrative     Not on file     Social Determinants of Health     Financial Resource Strain: Not on file   Food Insecurity: Not on file   Transportation Needs: Not on file   Physical Activity: Not on file   Stress: Not on file   Social Connections: Not on file    Intimate Partner Violence: Not on file   Housing Stability: Not on file       Family History  Family History   Problem Relation Age of Onset     Hypertension Mother      Anesthesia Reaction No family hx of      Deep Vein Thrombosis (DVT) No family hx of        Review of Systems  The complete review of systems is negative other than noted in the HPI or here.   Anesthesia Evaluation   Pt has had prior anesthetic.     No history of anesthetic complications       ROS/MED HX  ENT/Pulmonary: Comment: Pleural effusion  Recent RSV   (-) tobacco use   Neurologic: Comment: Learning disability   (-) no seizures and no CVA   Cardiovascular: Comment: Tachycardia using lopressor     (+) -----Previous cardiac testing   Echo: Date: 9/2020 Results:  Interpretation Summary  Global and regional left ventricular function is normal with an EF of 60-65%.  Global peak LV longitudinal strain is averaged at -19.5%. This is within  reported normal limits (normal <-18%).  Global right ventricular size and function are normal.  No significant valvular disease.  Pulmonary artery systolic pressure is normal.  This study was compared with the study from 05/01/2020. There has been no  change from the prior echocardiogram.  Stress Test: Date: Results:    ECG Reviewed: Date: 12/7/22 Results:  Sinus tachycardia  Cath: Date: Results:      METS/Exercise Tolerance:     Hematologic:     (+) anemia, history of blood transfusion, no previous transfusion reaction,  (-) history of blood clots   Musculoskeletal:  - neg musculoskeletal ROS     GI/Hepatic:    (-) GERD   Renal/Genitourinary:  - neg Renal ROS     Endo:     (+) Obesity (BMI 37),     Psychiatric/Substance Use:  - neg psychiatric ROS     Infectious Disease:  - neg infectious disease ROS     Malignancy:   (+) Malignancy, History of Other.Other CA metastatic desmoplastic small cell round tumor Active status post Chemo.    Other:            /68 (BP Location: Right arm, Patient Position:  "Sitting, Cuff Size: Adult Large)   Pulse (!) 132   Temp 97.9  F (36.6  C) (Oral)   Resp 16   Ht 1.727 m (5' 8\")   Wt 113.8 kg (250 lb 12.8 oz)   SpO2 99%   BMI 38.13 kg/m      Physical Exam   Constitutional: Awake, alert, cooperative, no apparent distress, and appears stated age. Sitting in wheelchair  Eyes: Pupils equal, round and reactive to light, extra ocular muscles intact, sclera clear, conjunctiva normal.  HENT: Normocephalic, oral pharynx with moist mucus membranes, good dentition.  Respiratory: Clear to auscultation bilaterally, no crackles or wheezing.  Cardiovascular: tachycardia and regular rhythm, normal S1 and S2, and no murmur noted.  Carotids no bruits. No edema. Palpable pulses to radial arteries.   GI: Normal bowel sounds, firm, distended  Genitourinary:  deferred  Skin: Warm and dry.    Musculoskeletal: Full ROM of neck. There is no redness, warmth, or swelling of the exposed joints. Gross motor strength is normal.    Neurologic: Awake, alert, oriented to name, place and time. Cranial nerves II-XII are grossly intact  Neuropsychiatric: Calm, cooperative. Normal affect.     Prior Labs/Diagnostic Studies   All labs and imaging personally reviewed     EKG/ stress test - if available please see in ROS above   Echo result w/o MOPS: Interpretation SummaryGlobal and regional left ventricular function is normal with an EF of 60-65%.Global peak LV longitudinal strain is averaged at -19.5%. This is withinreported normal limits (normal <-18%).Global right ventricular size and function are normal.No significant valvular disease.Pulmonary artery systolic pressure is normal.This study was compared with the study from 05/01/2020. There has been nochange from the prior echocardiogram.      No flowsheet data found.      The patient's records and results personally reviewed by this provider.     Outside records reviewed from: Care Everywhere    LAB/DIAGNOSTIC STUDIES TODAY:  none    Assessment      Diego " "Chanel is a 27 year old male seen as a PAC referral for risk assessment and optimization for anesthesia.    Plan/Recommendations  Pt will be optimized for the proposed procedure.  See below for details on the assessment, risk, and preoperative recommendations    NEUROLOGY  - No history of TIA, CVA or seizure  -Post Op delirium risk factors:  No risk identified    ENT  - No current airway concerns.  Will need to be reassessed day of surgery.  Mallampati: III  TM: > 3    CARDIAC  - No history of CAD, Hypertension and Afib   -tachycardia- longstanding. Using lopressor  -using a wheelchair, can walk short distances. Reports STAHL, denies CP.   -previous cardiac testing above  - METS (Metabolic Equivalents)  Patient CANNOT perform 4 METS exercise without symptoms            Total Score: 1    Functional Capacity: Unable to complete 4 METS      RCRI-Very low risk: Class 1 0.4% complication rate            Total Score: 0        PULMONARY  GURINDER Low Risk            Total Score: 2    GURINDER: BMI over 35 kg/m2    GURINDER: Male      - Denies asthma or inhaler use   -pleural effusion with the above procedure planned   - Tobacco History      History   Smoking Status     Never   Smokeless Tobacco     Never       GI  -distended abdomen today. Patient had recent imaging and his sister received a phone call from oncology during our visit today- plant for paracentesis. Would be ideal to have completed prior to the above procedure as patient feels pressure when laying flat   -diarrhea.  Potassium normalized on labs today  PONV Low Risk  Total Score: 1           1 AN PONV: Patient is not a current smoker        /RENAL  - Baseline Creatinine 0.6    ENDOCRINE    - BMI: Estimated body mass index is 38.13 kg/m  as calculated from the following:    Height as of this encounter: 1.727 m (5' 8\").    Weight as of this encounter: 113.8 kg (250 lb 12.8 oz).  Obesity (BMI >30)  - No history of Diabetes Mellitus    HEME  VTE Medium Risk 1.8%            Total " Score: 7    VTE: Male    VTE: Current cancer      - No history of abnormal bleeding or antiplatelet use.   -Anemia, improving after blood transfusion.  hgb 9 today. Will update iron studies and consider iron infusion if indicated prior to surgery.     ONC  -metastatic desmoplastic small cell round cancer, chemotherapy on hold due to anemia and ongoing diarrhea.     ACCESS  Port malpositioned. Above procedure planned.  Of note, patient's sister reports this procedure was delayed once previously due to patients orthopnea. He was diagnosed with RSV/sepsis and hospitalized a few days later. Overall, cough and breathing has improved. He still reprots some shortness of breath. He feels that this orthopnea comes form pressure in his abdomen. Paracentesis is now being scheduled as above.    Different anesthesia methods/types have been discussed with the patient, but they are aware that the final plan will be decided by the assigned anesthesia provider on the date of service.  Patient was discussed with Dr Cruz    The patient is optimized for their procedure. AVS with information on surgery time/arrival time, meds and NPO status given by nursing staff. No further diagnostic testing indicated.      On the day of service:     Prep time: 20 minutes  Visit time: 19 minutes  Documentation time: 9 minutes  ------------------------------------------  Total time: 49 minutes      Vidhi Matthews PA-C  Preoperative Assessment Center  [facility]  Cl[facility]  Phone: [phone]  Fax: [phone]

## 2023-01-06 NOTE — PATIENT INSTRUCTIONS
Preparing for Your Surgery      Name:  Diego Buchanan   MRN:  [id number]   :  1995   Today's Date:  2023       Arriving for surgery:  Surgery date:  1/10/23  Arrival time:  9:35 am     Surgeries and procedures: Adult patients can have 2 visitors all through the surgery process.     Visiting hours: 8 a.m. to 8:30 p.m.     Hospital: Adult patients and children under age 18 can have 4 visitor at a time     No visitors under the age of 5 are allowed for hospital patients.  Double occupancy rooms: Patients can have only two visitors at a time.     Patients with disabilities: Can have a support person with them (family member, service provider     Or someone well informed about their needs) plus the allowed number of visitors     Patients confirmed or suspected to have symptoms of COVID 19 or flu:     No visitors allowed for adult patients.   Children (under age 18) can have 1 named visitor.     People who are sick or showing symptoms of COVID 19 or flu:    Are not allowed to visit patients--we can only make exceptions in special situations.       Please follow these guidelines for your visit:   Arrive wearing a mask over your mouth and nose; we will give you a medical mask to wear    If you arrive wearing a cloth mask.   Keep it on during your entire visit, even when in patient's room.   If you don't wear a mask we'll ask you to leave.     Clean your hands with alcohol hand . Do this when you arrive at and leave the building and patient room,    And again after you touch your mask or anything in the room.     You can t visit if you have a fever, cough, shortness of breath, muscle aches, headaches, sore throat    Or diarrhea      Stay 6 feet away from others during your visit and between visits     Go directly to and from the room you are visiting.     Stay in the patient s room during your visit. Limit going to other places in the hospital as much as possible     Leave bags and jackets at home or in  the car.     For everyone s health, please don t come and go during your visit. That includes for smoking   during your visit.     Please come to:     [facility] [address]  [address] [place]  [address]    - ? parking is available in front of the hospital      -   Parking is available in the Patient Visitor Ramp on [facility].     -   When entering the hospital you will be asked COVID screening questions, you will then be directed to Registration.  Registration will direct you to the 3rd floor Surgery waiting room.     -   Please ask if you need an escort or a wheelchair to the Surgery Waiting Room.  Preop number- [phone]      -    Please proceed to [address] on the 3rd floor. [phone]?     - ?If you are in need of directions, wheelchair or escort please stop at the Information Desk in the lobby.  Inform the information person that you are here for surgery; a wheelchair and escort to [address] will be provided.?     What can I eat or drink?  -  You may eat and drink normally up to 8 hours prior to arrival time. (Until 1:35 am)  -  You may have clear liquids until 2 hours prior to arrival time. (Until 7:35 am)    Examples of clear liquids:  Water  Clear broth  Juices (apple, white grape, white cranberry  and cider) without pulp  Noncarbonated, powder based beverages  (lemonade and Edward-Aid)  Sodas (Sprite, 7-Up, ginger ale and seltzer)  Coffee or tea (without milk or cream)  Gatorade    -  No Alcohol for at least 24 hours before surgery.     Which medicines can I take?    Hold Aspirin for 7 days before surgery.   Hold Multivitamins for 7 days before surgery.  Hold Supplements for 7 days before surgery.  Hold Ibuprofen (Advil, Motrin) for 1 day before surgery--unless otherwise directed by surgeon.  Hold Naproxen (Aleve) for 4 days before surgery.      -  DO NOT take these medications the day of surgery:  See above    -  PLEASE  TAKE these medications the day of surgery:  Tylenol if needed  Loperamide if needed  Metoprolol (Lopressor)    How do I prepare myself?  - Please take 2 showers before surgery using Scrubcare or Hibiclens soap.    Use this soap only from the neck to your toes.     Leave the soap on your skin for one minute--then rinse thoroughly.      You may use your own shampoo and conditioner. No other hair products.   - Please remove all jewelry and body piercings.  - No lotions, deodorants or fragrance.  - No makeup or fingernail polish.   - Bring your ID and insurance card.    ALL PATIENTS GOING HOME THE SAME DAY OF SURGERY ARE REQUIRED TO HAVE A RESPONSIBLE ADULT TO DRIVE AND BE IN ATTENDANCE WITH THEM FOR 24 HOURS FOLLOWING SURGERY.    Covid testing policy as of 12/06/2022  Your surgeon will notify and schedule you for a COVID test if one is needed before surgery--please direct any questions or COVID symptoms to your surgeon      Questions or Concerns:    - For any questions regarding the day of surgery or your hospital stay, please contact the Pre Admission Nursing Office at [phone].     - If you have health changes between today and your surgery, please call your surgeon.     - For questions after surgery, please call your surgeons office.

## 2023-01-06 NOTE — TELEPHONE ENCOUNTER
Spoke with Mariela, Robert's sister. Robert's ultrasound did reveal a fluid collection which may be contributing to abdominal fullness/discomfort. I spoke with radiologist and they felt the size and location of the fluid collection would be amenable to a paracentesis. We had briefly discussed this during our previous visit but wanted to confirm they are ok with me trying to get this scheduled. Mariela asked that I proceed with trying to schedule and she will let me know if for some reason Robert does not want to proceed.    Amber Scheierl, CNP

## 2023-01-16 NOTE — LETTER
1/16/2023         RE: Diego Buchanan  [address]  [place] [address]        Dear Colleague,    Thank you for referring your patient, Diego Buchanan, to the [facility]. Please see a copy of my visit note below.    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 He presented initially with abdominal pain, diarrhea, and progressive abdominal distention for 3 months duration. 2. Initial CT scan in February 2020 showed severe peritoneal carcinomatosis with large peritoneal tumor implants throughout the entire abdomen and pelvis, but mostly prominently in the pelvis.   2. PETCT scan showed interval increase in the amount of peritoneal carcinomatosis.  3. On 3/12/2020, he had ultrasound-guided core needle biopsy of a peritoneal implant (Case: [id number]), which showed a completely undifferentiated appearance, but stains with pancytokeratin, indicating an epithelial origin. 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 Tumor markers for CA-19-9, CEA, beta-hCG, alpha-fetoprotein were unremarkable. Cancer type ID molecular testing by 2-Observe sent to determine the exact diagnosis and to assist in further treatment planning. 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 Relative probability of less than 5% of other subtype of sarcoma. EWSR1-WT1 fusion detected.  4. 5/1/2020, MRI brain negative for brain metastasis, and baseline CT-CAP obtained showing extensive mixed solid and cystic masses throughout the abdomen and pelvis consistent with peritoneal carcinomatosis. Largest mass measures approximately 20 cm in the pelvis. Metastatic mixed solid and cystic masses seen in hepatic segments 5 and 6 and hepatic segment 7. The masses appear to be contiguous  with the retrohepatic peritoneal carcinomatosis. Small volume fluid about the spleen favored to represent malignant ascites, stable mild-to-moderate right hydronephrosis related to mass effect upon the distal ureter in the pelvis, the IVC and iliac veins are compressed due to the extensive peritoneal carcinomatosis without findings to suggest deep venous thrombosis.  5. 5/4/2020, he begins CAV/IMV alternating chemotherapy. He receives 6 cycles of treatment. He symptomatically improves.  6. 9/11/2020, CT-CAP shows stable to mildly improved extensive peritoneal carcinomatosis. He would like to omit Ifosfamide/etoposide cycles and continue only with CAV.  7. 10/9/2020, he starts CAV every 21 days.  8. 1/6/2021, CT CAP showed a mixed response in the mixed solid and cystic masses throughout the peritoneum. Some of the tumors are stable to mildly worse, with some of the peritoneal masses a bit larger, a few are smaller, one cystic tumor in the left abdomen is smaller, while tumors lower in the pelvis appear slightly larger.  9. 3/24/2021, CT CAP showed continued slight decrease in overall burden of peritoneal carcinomatosis with persistent encasement of bowel without evidence of bowel obstruction.  10. 6/25/2021, he receives cycle 19 CAV, then takes a chemotherapy holiday.  11. 4/22/2022, he resumes CAV/IMV chemotherapy.  12. 7/13/22, CT CAP showed interval enlargement of hepatic and splenic metastases with other overall disease stable. CAV/IMV continued.  13.  10/04/22, CT CAP  showed evidence of progressive disease, predominantly in liver. There are small sub-centimeter lung nodules, which are suspicious for metastasis. CAV/IMV discontinued. Start irinotecan/temozolomide (TMZ) on 11/28/22.   14. 12/7/22-12/21/22: Hospitalized for neutropenic fever with RSV, rash (drug vs. Viral exanthem), and pancytopenia with transfusions.     Cycle 2 of TMZ/irinotecan has been delayed due to hospitalization and need for additional  recovery (ongoing fatigue and diarrhea) as well as planned port placement/thoracentesis (however, this was not completed as detailed below).    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 Diego presents today for  follow up and evaluation prior to planned cycle 2 of irinotecan/temozolomide. He is accompanied by his sister, Mariela. Mariela helps provide history in conjunction with Robert.     -Robert is doing ok today. He continues to feel fatigued and weak overall. He reports that last week he was supposed to have his port exchanged and a thoracentesis done, however, he had some right side abdominal pain that day and had some sweats so he cancelled. He took Tylenol for the abdominal pain which maybe helped some but he only took it once. Reports no fevers with the pain. Not present today. Does continue to feel full in the abdomen which limits his appetite as he feels full quickly. He eats small amounts. Reports no nausea or blood in stool.  -Reports having about 2-3 small loose stools per day. He's not currently taking the imodium. He is mostly taking a liquid diet for fear of constipation.  -Reports breathing is the same. Not worse. Cough is minimal and the same.   -Reports no fevers or dysuria.  -He does notice that his heart beats fast. No chest pain. Has not been taking his metoprolol as he ran out.   -No rashes.     Review of Systems:  Patient denies any of the following except if noted above: fevers, chills, difficulty with energy, vision or hearing changes, chest pain, dyspnea, abdominal pain, nausea, vomiting, diarrhea, constipation, urinary concerns, headaches, numbness, tingling, issues with sleep or mood. He also denies lumps, bumps, rashes or skin lesions, bleeding or bruising issues.  Physical Exam:  /82   Pulse (!) 140   Temp 97.6  F (36.4  C) (Oral)   Resp 16   Wt 111.3 kg (245 lb 4.8 oz)   SpO2 98%   BMI 37.30 kg/m    Wt Readings  from Last 10 Encounters:   01/16/23 111.3 kg (245 lb 4.8 oz)   01/06/23 113.8 kg (250 lb 12.8 oz)   01/02/23 113.4 kg (250 lb)   12/20/22 113.5 kg (250 lb 3.6 oz)   12/06/22 115.1 kg (253 lb 12.8 oz)   12/02/22 81.2 kg (179 lb)   11/30/22 123.4 kg (272 lb)   11/18/22 129.6 kg (285 lb 11.2 oz)   09/16/22 128.3 kg (282 lb 12.8 oz)   08/26/22 127.1 kg (280 lb 4.8 oz)     General: Mildly fatigued-appearing male in no acute distress.  Eyes: EOMI, PERRL. No scleral icterus.  ENT: Oral mucosa is moist without lesions or thrush.   Lymphatic: Neck is supple without cervical or supraclavicular lymphadenopathy.   Cardiovascular:Tachycardic, rhythm regular. No murmurs, gallops, or rubs. No peripheral edema.  Respiratory: CTA bilaterally. No wheezes or crackles.  Gastrointestinal: BS distant but +. Abdomen rounded, distended, minimally tender to palpation.   Neurologic: Cranial nerves II through XII are grossly intact.  Skin:  Pale. No rashes, petechiae, or bruising noted on exposed skin.  Psych: Pleasant, Affect slightly flat.    Labs:   Most Recent 3 CBC's:  Recent Labs   Lab Test 01/16/23  0856 01/06/23  0941 01/02/23  0701 12/06/22  0932 11/28/22  0929   WBC 20.6* 11.6* 10.7   < > 17.4*   HGB 8.9* 9.0* 8.3*   < > 9.3*   MCV 87 87 86   < > 84   * 500* 476*   < > 459*   ANEUTAUTO 15.8*  --  8.0  --  15.2*    < > = values in this interval not displayed.     Most Recent 3 BMP's:  Recent Labs   Lab Test 01/16/23  0856 01/06/23  0941 01/02/23  0701 12/21/22  0537     --  139 141   POTASSIUM 2.9* 3.5 3.3* 3.2*   CHLORIDE 102  --  103 109*   CO2 24  --  23 20*   BUN 2.6*  --  3.9* 6.9   CR 0.58*  --  0.60* 0.81   ANIONGAP 13  --  13 12   FAISAL 9.5  --  9.2 8.3*   *  --  107* 91   PROTTOTAL 7.9  --  8.0 6.9   ALBUMIN 3.5  --  3.6 3.2*    Most Recent 3 LFT's:  Recent Labs   Lab Test 01/16/23  0856 01/02/23  0701 12/21/22  0537   AST 15 13 14   ALT <5* <5* 14   ALKPHOS 88 94 153*   BILITOTAL 0.6 0.7 0.6     Magnesium:  1.6  Phosphorus: 3.1    Imaging:  Imaging:  XR Chest 2 Views  Narrative: Exam: XR CHEST 2 VIEWS, 1/16/2023 10:20 AM    Indication: elevated WBC, recent pna, evaluate for recurrence of  pneumonia or other infectious etiology; Leukocytosis, unspecified type    Comparison: 12/19/2022 chest x-ray    Findings:   PA and lateral views of the chest. Right Port-A-Cath tip projects over  the right innominate vein. Trachea is midline. Low lung volumes.  Cardiac silhouette is within normal limits. Streaky perihilar  opacities are minimally changed. No focal pulmonary opacities. Trace  left pleural effusion. No pneumothorax. The upper abdomen is  unremarkable.  Impression: Impression:   1. No acute airspace opacities.  2. Continued low lung volumes and streaky perihilar opacities, likely  atelectasis.  3. Continued trace left pleural effusion.    I have personally reviewed the examination and initial interpretation  and I agree with the findings.    ROBERT LACY MD         SYSTEM ID:  [id number]      I reviewed the above labs and imaging today.    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 He tolerated CAV/IMV for 19 cycles and then was on chemotherapy break from June 2021-April 2022. Given return in symptoms he resumed chemotherapy with further CAV/IMV in April 2022.  However, he has had evidence of progression on the two most recent scans. Robert and his family elected to take time to try traditional medicine treatments.Last CT-CAP 10/4/22 showed evidence of progressive disease, predominantly in liver. There are small sub-centimeter lung nodules, which are suspicious for metastasis. He started irinotecan 40 mg/m2 days 1-5/temozolomide 250 mg daily days 1-5 every 21  days with his first cycle on 11/28/22.      On  12/1 he had a chest CT for SOB which was negative for acute PE. However, it showed scattered ground glass opacities suspicious for pneumonia. He was started on Augmentin 875 mg BID x 5 days plus Azithromycin 500 mg on day 1, 250 mg from days 2-5.     He was then hospitalized from 12/7-12/21 for neutropenic fever in association with RSV. He was started on antibiotics. He subsequently developed a rash thought to be either s/t cefepime or viral exanthem. Changed to zosyn. He received transfusions prn for pancytopenia. He was found ot have a large bowel obstruction for which GI recommended no surgical intervention. He continued to have loose stools and was recommended to continue QID imodium. Cycle 2 was delayed due to continued fatigue, weakness and diarrhea. Further delay occurred secondary to planned thoracentesis and port exchange on 1/10/23, however, patient cancelled this due to feeling poorly that day.     Robert is doing ok today but continues to feel weak and fatigued. No significant changes to breathing. Diarrhea somewhat improved to 2-3 stools per day, especially since he is off imodium and taking a mostly liquid diet.     Robert's labs today show an elevated WBC at 20.6 with no clear source for the elevation. This is a bit difficult to interpret. While he reports no localizing symptoms of infection, he does have persistent fatigue and remains tachycardic. These are not new but could also be obscuring an underlying infection as they have been his baseline for a few weeks. Of note, he was recently hospitalized with fever and pancytopenia for a couple of weeks. It is feasible that this elevation in WBC is s/t disease but given the patient's recent clinical course, it seems most prudent to delay chemo today by 1 week and pursue infectious work up to rule out an infection rather than put him at greater risk for further complications. Will plan to work up as detailed  below.      Oncology treatment plan:    -Delay treatment for 1 week for infectious work up  -Add Neulasta to future chemo cycles.  -RTC with Labs, LISY, and chemo in ~ 1 week  -Para on 1/18  -IB message sent to [facility] to work on rescheduling port and thoracentesis, ideally later this week since chemo is delayed but once prelim infectious work up has returned    #Diarrhea  -Off imodium today.  -2-3 stools/day may be new baseline given known obstruction and that he is on a liquid diet. Continue to monitor stool count  --Once chemo is restarted, can continue imodium prn with each each loose stool (max of 16 mg daily).    #Elevated WBC  -As above. Delay chemo 1 week.  -Obtain UA, CXR, and blood cultures. Consider abdominal imaging (to rule out SBP) if abdominal discomfort returns or persists following paracentesis this week  -If work up negative, suspect s/t to disease burden as he had known progression on last scan    ADDENDUM: UA and CXR largely unremarkable for infection. Await blood cultures. If BC negative and abdominal pain remains controlled following para on 1/18, likely can proceed with chemo next week    #Hypokalemia  -Replace per protocol today in clinic. Then start Potassium Chloride 20mEq once daily until recheck next week.     #Hypomagnesemia  Borderline low at 1.6. Does not meet clinic parameters for replacement. Given risk of exacerbating loose stools with oral repletion which could lead to further depletion, will not replace today and will recheck with labs next week.     #Abdominal bloating/pain  -Pain likely s/t ascites and disease  -US on 1/5/23 showing ascites. Paracentesis scheduled for 1/18/23.   -Tylenol prn for pain. If no improvement, consider further abdominal imaging as part of infectious work up.  -Could consider tramadol pending severity of pain.     #Shortness of breath   - CT on 12/1 showing possible pneumonia. Treated with antibiotics. Shortness of breath and cough is stable today. Working  to reschedule Thoracentesis for left pleural effusion (IB message sent to [facility])    #Anemia  Waxes and wanes. Stable today at 8.9. No current concern for  or GI bleeding. Iron labs are a bit mixed. Checking soluble trasnferrin receptor for further work up. Transfuse pRBC for hb < 7. Peripheral smear on 12/15 with no evidence of hemolysis.     #HTN  #Tachycardia  Longstanding issue, likely multifactorial in setting of metastatic disease. EKG on 11/15 and 12/7/22 showed sinus tachycardia. Ranges from low 100's to 150's. Reports today he has not been taking his metoprolol 25 mg BID as he ran out. BP is stable. Refilled metoprolol and advised to restart today as this is very important in managing his heart rate.     #Port malpositioning  CXR 11/16/22 showing right port with tip in the low cervical internal jugular. Port replacement last week was self-cancelled. Message sent to [facility] to attempt to reschedule      Amber Scheierl, CNP    100 minutes spent on the date of the encounter doing chart review, review of test results, interpretation of tests, patient visit and documentation

## 2023-01-16 NOTE — PROGRESS NOTES
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 He presented initially with abdominal pain, diarrhea, and progressive abdominal distention for 3 months duration. 2. Initial CT scan in February 2020 showed severe peritoneal carcinomatosis with large peritoneal tumor implants throughout the entire abdomen and pelvis, but mostly prominently in the pelvis.   2. PETCT scan showed interval increase in the amount of peritoneal carcinomatosis.  3. On 3/12/2020, he had ultrasound-guided core needle biopsy of a peritoneal implant (Case: [id number]), which showed a completely undifferentiated appearance, but stains with pancytokeratin, indicating an epithelial origin. 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 Tumor markers for CA-19-9, CEA, beta-hCG, alpha-fetoprotein were unremarkable. Cancer type ID molecular testing by [facility] sent to determine the exact diagnosis and to assist in further treatment planning. 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 Relative probability of less than 5% of other subtype of sarcoma. EWSR1-WT1 fusion detected.  4. 5/1/2020, MRI brain negative for brain metastasis, and baseline CT-CAP obtained showing extensive mixed solid and cystic masses throughout the abdomen and pelvis consistent with peritoneal carcinomatosis. Largest mass measures approximately 20 cm in the pelvis. Metastatic mixed solid and cystic masses seen in hepatic segments 5 and 6 and hepatic segment 7. The masses appear to be contiguous with the retrohepatic peritoneal carcinomatosis. Small volume fluid about the spleen favored to represent malignant ascites, stable mild-to-moderate right hydronephrosis related to mass effect upon the distal ureter in the pelvis, the IVC and iliac  veins are compressed due to the extensive peritoneal carcinomatosis without findings to suggest deep venous thrombosis.  5. 5/4/2020, he begins CAV/IMV alternating chemotherapy. He receives 6 cycles of treatment. He symptomatically improves.  6. 9/11/2020, CT-CAP shows stable to mildly improved extensive peritoneal carcinomatosis. He would like to omit Ifosfamide/etoposide cycles and continue only with CAV.  7. 10/9/2020, he starts CAV every 21 days.  8. 1/6/2021, CT CAP showed a mixed response in the mixed solid and cystic masses throughout the peritoneum. Some of the tumors are stable to mildly worse, with some of the peritoneal masses a bit larger, a few are smaller, one cystic tumor in the left abdomen is smaller, while tumors lower in the pelvis appear slightly larger.  9. 3/24/2021, CT CAP showed continued slight decrease in overall burden of peritoneal carcinomatosis with persistent encasement of bowel without evidence of bowel obstruction.  10. 6/25/2021, he receives cycle 19 CAV, then takes a chemotherapy holiday.  11. 4/22/2022, he resumes CAV/IMV chemotherapy.  12. 7/13/22, CT CAP showed interval enlargement of hepatic and splenic metastases with other overall disease stable. CAV/IMV continued.  13.  10/04/22, CT CAP  showed evidence of progressive disease, predominantly in liver. There are small sub-centimeter lung nodules, which are suspicious for metastasis. CAV/IMV discontinued. Start irinotecan/temozolomide (TMZ) on 11/28/22.   14. 12/7/22-12/21/22: Hospitalized for neutropenic fever with RSV, rash (drug vs. Viral exanthem), and pancytopenia with transfusions.     Cycle 2 of TMZ/irinotecan has been delayed due to hospitalization and need for additional recovery (ongoing fatigue and diarrhea) as well as planned port placement/thoracentesis (however, this was not completed as detailed below).    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  Diego presents today for  follow up and evaluation prior to planned cycle 2 of irinotecan/temozolomide. He is accompanied by his sister, Mariela. Mariela helps provide history in conjunction with Robert.     -Robert is doing ok today. He continues to feel fatigued and weak overall. He reports that last week he was supposed to have his port exchanged and a thoracentesis done, however, he had some right side abdominal pain that day and had some sweats so he cancelled. He took Tylenol for the abdominal pain which maybe helped some but he only took it once. Reports no fevers with the pain. Not present today. Does continue to feel full in the abdomen which limits his appetite as he feels full quickly. He eats small amounts. Reports no nausea or blood in stool.  -Reports having about 2-3 small loose stools per day. He's not currently taking the imodium. He is mostly taking a liquid diet for fear of constipation.  -Reports breathing is the same. Not worse. Cough is minimal and the same.   -Reports no fevers or dysuria.  -He does notice that his heart beats fast. No chest pain. Has not been taking his metoprolol as he ran out.   -No rashes.     Review of Systems:  Patient denies any of the following except if noted above: fevers, chills, difficulty with energy, vision or hearing changes, chest pain, dyspnea, abdominal pain, nausea, vomiting, diarrhea, constipation, urinary concerns, headaches, numbness, tingling, issues with sleep or mood. He also denies lumps, bumps, rashes or skin lesions, bleeding or bruising issues.  Physical Exam:  /82   Pulse (!) 140   Temp 97.6  F (36.4  C) (Oral)   Resp 16   Wt 111.3 kg (245 lb 4.8 oz)   SpO2 98%   BMI 37.30 kg/m    Wt Readings from Last 10 Encounters:   01/16/23 111.3 kg (245 lb 4.8 oz)   01/06/23 113.8 kg (250 lb 12.8 oz)   01/02/23 113.4 kg (250 lb)   12/20/22 113.5 kg (250 lb 3.6 oz)   12/06/22 115.1 kg (253 lb 12.8 oz)   12/02/22 81.2 kg (179 lb)   11/30/22 123.4 kg (272  lb)   11/18/22 129.6 kg (285 lb 11.2 oz)   09/16/22 128.3 kg (282 lb 12.8 oz)   08/26/22 127.1 kg (280 lb 4.8 oz)     General: Mildly fatigued-appearing male in no acute distress.  Eyes: EOMI, PERRL. No scleral icterus.  ENT: Oral mucosa is moist without lesions or thrush.   Lymphatic: Neck is supple without cervical or supraclavicular lymphadenopathy.   Cardiovascular:Tachycardic, rhythm regular. No murmurs, gallops, or rubs. No peripheral edema.  Respiratory: CTA bilaterally. No wheezes or crackles.  Gastrointestinal: BS distant but +. Abdomen rounded, distended, minimally tender to palpation.   Neurologic: Cranial nerves II through XII are grossly intact.  Skin:  Pale. No rashes, petechiae, or bruising noted on exposed skin.  Psych: Pleasant, Affect slightly flat.    Labs:   Most Recent 3 CBC's:  Recent Labs   Lab Test 01/16/23  0856 01/06/23  0941 01/02/23  0701 12/06/22  0932 11/28/22  0929   WBC 20.6* 11.6* 10.7   < > 17.4*   HGB 8.9* 9.0* 8.3*   < > 9.3*   MCV 87 87 86   < > 84   * 500* 476*   < > 459*   ANEUTAUTO 15.8*  --  8.0  --  15.2*    < > = values in this interval not displayed.     Most Recent 3 BMP's:  Recent Labs   Lab Test 01/16/23  0856 01/06/23  0941 01/02/23  0701 12/21/22  0537     --  139 141   POTASSIUM 2.9* 3.5 3.3* 3.2*   CHLORIDE 102  --  103 109*   CO2 24  --  23 20*   BUN 2.6*  --  3.9* 6.9   CR 0.58*  --  0.60* 0.81   ANIONGAP 13  --  13 12   FAISAL 9.5  --  9.2 8.3*   *  --  107* 91   PROTTOTAL 7.9  --  8.0 6.9   ALBUMIN 3.5  --  3.6 3.2*    Most Recent 3 LFT's:  Recent Labs   Lab Test 01/16/23  0856 01/02/23  0701 12/21/22  0537   AST 15 13 14   ALT <5* <5* 14   ALKPHOS 88 94 153*   BILITOTAL 0.6 0.7 0.6     Magnesium: 1.6  Phosphorus: 3.1    Imaging:  Imaging:  XR Chest 2 Views  Narrative: Exam: XR CHEST 2 VIEWS, 1/16/2023 10:20 AM    Indication: elevated WBC, recent pna, evaluate for recurrence of  pneumonia or other infectious etiology; Leukocytosis, unspecified  type    Comparison: 12/19/2022 chest x-ray    Findings:   PA and lateral views of the chest. Right Port-A-Cath tip projects over  the right innominate vein. Trachea is midline. Low lung volumes.  Cardiac silhouette is within normal limits. Streaky perihilar  opacities are minimally changed. No focal pulmonary opacities. Trace  left pleural effusion. No pneumothorax. The upper abdomen is  unremarkable.  Impression: Impression:   1. No acute airspace opacities.  2. Continued low lung volumes and streaky perihilar opacities, likely  atelectasis.  3. Continued trace left pleural effusion.    I have personally reviewed the examination and initial interpretation  and I agree with the findings.    ROBERT LACY MD         SYSTEM ID:  [id number]      I reviewed the above labs and imaging today.    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 He tolerated CAV/IMV for 19 cycles and then was on chemotherapy break from June 2021-April 2022. Given return in symptoms he resumed chemotherapy with further CAV/IMV in April 2022.  However, he has had evidence of progression on the two most recent scans. Robert and his family elected to take time to try traditional medicine treatments.Last CT-CAP 10/4/22 showed evidence of progressive disease, predominantly in liver. There are small sub-centimeter lung nodules, which are suspicious for metastasis. He started irinotecan 40 mg/m2 days 1-5/temozolomide 250 mg daily days 1-5 every 21 days with his first cycle on 11/28/22.      On  12/1 he had a chest CT for SOB which was negative for acute PE. However, it showed scattered ground glass opacities suspicious for pneumonia. He was started on Augmentin 875 mg BID x 5 days plus  Azithromycin 500 mg on day 1, 250 mg from days 2-5.     He was then hospitalized from 12/7-12/21 for neutropenic fever in association with RSV. He was started on antibiotics. He subsequently developed a rash thought to be either s/t cefepime or viral exanthem. Changed to zosyn. He received transfusions prn for pancytopenia. He was found ot have a large bowel obstruction for which GI recommended no surgical intervention. He continued to have loose stools and was recommended to continue QID imodium. Cycle 2 was delayed due to continued fatigue, weakness and diarrhea. Further delay occurred secondary to planned thoracentesis and port exchange on 1/10/23, however, patient cancelled this due to feeling poorly that day.     Robert is doing ok today but continues to feel weak and fatigued. No significant changes to breathing. Diarrhea somewhat improved to 2-3 stools per day, especially since he is off imodium and taking a mostly liquid diet.     Robert's labs today show an elevated WBC at 20.6 with no clear source for the elevation. This is a bit difficult to interpret. While he reports no localizing symptoms of infection, he does have persistent fatigue and remains tachycardic. These are not new but could also be obscuring an underlying infection as they have been his baseline for a few weeks. Of note, he was recently hospitalized with fever and pancytopenia for a couple of weeks. It is feasible that this elevation in WBC is s/t disease but given the patient's recent clinical course, it seems most prudent to delay chemo today by 1 week and pursue infectious work up to rule out an infection rather than put him at greater risk for further complications. Will plan to work up as detailed below.      Oncology treatment plan:    -Delay treatment for 1 week for infectious work up  -Add Neulasta to future chemo cycles.  -RTC with Labs, LISY, and chemo in ~ 1 week  -Para on 1/18  -IB message sent to [facility] to work on rescheduling port  and thoracentesis, ideally later this week since chemo is delayed but once prelim infectious work up has returned    #Diarrhea  -Off imodium today.  -2-3 stools/day may be new baseline given known obstruction and that he is on a liquid diet. Continue to monitor stool count  --Once chemo is restarted, can continue imodium prn with each each loose stool (max of 16 mg daily).    #Elevated WBC  -As above. Delay chemo 1 week.  -Obtain UA, CXR, and blood cultures. Consider abdominal imaging (to rule out SBP) if abdominal discomfort returns or persists following paracentesis this week  -If work up negative, suspect s/t to disease burden as he had known progression on last scan    ADDENDUM: UA and CXR largely unremarkable for infection. Await blood cultures. If BC negative and abdominal pain remains controlled following para on 1/18, likely can proceed with chemo next week    #Hypokalemia  -Replace per protocol today in clinic. Then start Potassium Chloride 20mEq once daily until recheck next week.     #Hypomagnesemia  Borderline low at 1.6. Does not meet clinic parameters for replacement. Given risk of exacerbating loose stools with oral repletion which could lead to further depletion, will not replace today and will recheck with labs next week.     #Abdominal bloating/pain  -Pain likely s/t ascites and disease  -US on 1/5/23 showing ascites. Paracentesis scheduled for 1/18/23.   -Tylenol prn for pain. If no improvement, consider further abdominal imaging as part of infectious work up.  -Could consider tramadol pending severity of pain.     #Shortness of breath   - CT on 12/1 showing possible pneumonia. Treated with antibiotics. Shortness of breath and cough is stable today. Working to reschedule Thoracentesis for left pleural effusion (IB message sent to CC)    #Anemia  Waxes and wanes. Stable today at 8.9. No current concern for  or GI bleeding. Iron labs are a bit mixed. Checking soluble trasnferrin receptor for  further work up. Transfuse pRBC for hb < 7. Peripheral smear on 12/15 with no evidence of hemolysis.     #HTN  #Tachycardia  Longstanding issue, likely multifactorial in setting of metastatic disease. EKG on 11/15 and 12/7/22 showed sinus tachycardia. Ranges from low 100's to 150's. Reports today he has not been taking his metoprolol 25 mg BID as he ran out. BP is stable. Refilled metoprolol and advised to restart today as this is very important in managing his heart rate.     #Port malpositioning  CXR 11/16/22 showing right port with tip in the low cervical internal jugular. Port replacement last week was self-cancelled. Message sent to [facility] to attempt to reschedule      Amber Scheierl, CNP    100 minutes spent on the date of the encounter doing chart review, review of test results, interpretation of tests, patient visit and documentation

## 2023-01-16 NOTE — NURSING NOTE
"Chief Complaint   Patient presents with     Port Draw     Labs drawn via port by RN. VS taken.     Port accessed with 2og 3/4\" power needle and labs drawn by rn.  Port flushed with NS and heparin.  Pt tolerated well.  VS taken.  Pt checked in for next appt.    Anand Barnes RN  "

## 2023-01-16 NOTE — PROGRESS NOTES
Infusion Nursing Note:  Diego Buchanan presents today for C2D1 Irinotecan DEFERRED for 1 week-Potassium Replacement.    Patient seen by provider today: Yes: Amber Scheierl, NP   present during visit today: Not Applicable.  +++Only need  when mom is present. Robert is here with his sister Mariela today.    Note: Pt saw provider in the infusion room.    TORB 1/16/23 @0930 Amber Scheierl, NP-Adelaida Cross RN  -DEFER chemotherapy for one week  -UA/UC  -BC x 2  -Chest XRAY  -Replace electrolytes IV  -Pt has not taken metoprolol for one month HR:140 today, provider refilled Rx      Intravenous Access:  Implanted Port.    Treatment Conditions:   Latest Reference Range & Units 01/16/23 08:56   Sodium 136 - 145 mmol/L 139   Potassium 3.4 - 5.3 mmol/L 2.9 (L)   Chloride 98 - 107 mmol/L 102   Carbon Dioxide (CO2) 22 - 29 mmol/L 24   Urea Nitrogen 6.0 - 20.0 mg/dL 2.6 (L)   Creatinine 0.67 - 1.17 mg/dL 0.58 (L)   GFR Estimate >60 mL/min/1.73m2 >90   Calcium 8.6 - 10.0 mg/dL 9.5   Anion Gap 7 - 15 mmol/L 13   Magnesium 1.7 - 2.3 mg/dL 1.6 (L)   Phosphorus 2.5 - 4.5 mg/dL 3.1   Albumin 3.5 - 5.2 g/dL 3.5   Protein Total 6.4 - 8.3 g/dL 7.9   Alkaline Phosphatase 40 - 129 U/L 88   ALT 10 - 50 U/L <5 (L)   AST 10 - 50 U/L 15   Bilirubin Total <=1.2 mg/dL 0.6   Glucose 70 - 99 mg/dL 105 (H)   WBC 4.0 - 11.0 10e3/uL 20.6 (H)   Hemoglobin 13.3 - 17.7 g/dL 8.9 (L)   Hematocrit 40.0 - 53.0 % 29.5 (L)   Platelet Count 150 - 450 10e3/uL 521 (H)   RBC Count 4.40 - 5.90 10e6/uL 3.39 (L)   MCV 78 - 100 fL 87   MCH 26.5 - 33.0 pg 26.3 (L)   MCHC 31.5 - 36.5 g/dL 30.2 (L)   RDW 10.0 - 15.0 % 20.6 (H)   % Neutrophils % 77   % Lymphocytes % 6   % Monocytes % 9   % Eosinophils % 4   % Basophils % 1   Absolute Basophils 0.0 - 0.2 10e3/uL 0.2         Post Infusion Assessment:  Patient tolerated infusion without incident.  Blood return noted pre and post infusion.  Site patent and intact, free from redness, edema or discomfort.  No  evidence of extravasations.  Access discontinued per protocol.       Discharge Plan:   Prescription refills given for K+ and Metoprolol.  Discharge instructions reviewed with: Patient.  Patient and/or family verbalized understanding of discharge instructions and all questions answered.  AVS to patient via [employer].  IB to scheduling to do Whit's  from today.  Patient discharged in stable condition accompanied by: self and sister.  Departure Mode: Wheelchair.    Adelaida Cross RN

## 2023-01-18 NOTE — LETTER
Date:January 19, 2023      Provider requested that no letter be sent. Do not send.       [facility]

## 2023-01-18 NOTE — LETTER
1/18/2023         RE: Diego Buchanan  [address]  [place] [address]        Dear Colleague,    Thank you for referring your patient, Diego Buchanan, to the [facility]. Please see a copy of my visit note below.    Paracentesis Nursing Note  Diego Buchanan presents today to [facility] for a paracentesis.    During today's appointment orders from Yasmine Perez CNP were completed.    Progress Note:  Patient identification verified by name and date of birth.  Assessment completed.  Vitals monitored throughout appointment and recorded in Doc Flowsheets.  See proceduralist note in ultrasound.    - and 150 with orthostatic BP checks. Pt has history of tachycardia and reported he did not take metoprolol dose today. Denies dizziness or chest pain.    Vascular Access: no access was needed today  Labs: were not ordered for this appointment.    Date of consent or authorization: 11/30/22.    Paracentesis was not performed as there was insufficient fluid.    The following labs were communicated to provider performing paracentesis:  Lab Results   Component Value Date     01/16/2023     06/23/2021     Discharge Plan:  Discharge instructions were reviewed with patient.  Patient/Representative verbalized understanding and all questions were answered.   Discharged from [facility] in stable condition.      Holly Olivarez RN      /60 (Patient Position: Standing)   Pulse (!) 150   Resp 16   Wt 110.8 kg (244 lb 4.8 oz)   SpO2 98%   BMI 37.15 kg/m          Again, thank you for allowing me to participate in the care of your patient.        Sincerely,        Specialty Infusion Paracentesis Provider

## 2023-01-18 NOTE — PATIENT INSTRUCTIONS
Dear Diego Buchanan    Thank you for choosing [facility] ([facility]) for your paracentesis.      We look forward to seeing you at your next appointment here at [facility] ([facility]).  Please don t hesitate to call us at [phone] to reschedule any of your appointments or to speak with one of the [facility] registered nurses.  It was a pleasure taking care of you today.    Sincerely,    [facility]  [address] [place] [address]  [id number]  Phone:  [phone]

## 2023-01-19 NOTE — TELEPHONE ENCOUNTER
Called and spoke with pt's sister Mariela to confirm tomorrows surgery and she stated she just spoke with someone 20 minutes ago and is aware on the 12:40pm arrival.    Yadira Watt on 1/19/2023 at 11:44 AM

## 2023-01-20 NOTE — ANESTHESIA PREPROCEDURE EVALUATION
Anesthesia Pre-Procedure Evaluation    Patient: Diego Buchanan   MRN: [id number] : 1995        Procedure : Procedure(s):  THORACENTESIS  INSERTION, VASCULAR ACCESS PORT          Past Medical History:   Diagnosis Date     Hypertension      Learning disability     but pt is his own gaurdian     Peritoneal carcinomatosis (H)       Past Surgical History:   Procedure Laterality Date     BIOPSY      liver     INSERT PORT VASCULAR ACCESS Right 2022    Procedure: INSERTION, VASCULAR ACCESS PORT;  Surgeon: Daniel Sarmiento MD;  Location: [facility] OR     IR CHEST PORT PLACEMENT > 5 YRS OF AGE  2022     IR CVC TUNNEL PLACEMENT > 5 YRS OF AGE  2020     IR CVC TUNNEL REMOVAL RIGHT  2021     US MUSCLE BIOPSY  3/12/2020      Allergies   Allergen Reactions     Cefepime Rash     Morbiliform rash     Tegaderm Chg Dressing [Chlorhexidine]      Tegaderm Transparent Dressing (Informational Only) Itching     Tegaderm dressing; Okay for short periods of time      Social History     Tobacco Use     Smoking status: Never     Smokeless tobacco: Never   Substance Use Topics     Alcohol use: Never      Wt Readings from Last 1 Encounters:   23 110.8 kg (244 lb 4.8 oz)        Anesthesia Evaluation   Pt has had prior anesthetic.     No history of anesthetic complications       ROS/MED HX  ENT/Pulmonary: Comment: Prior pleural effusion - found to have resolved on ultrasound by pulmonology team in pre-op on 23. Will not need thoracentesis on 23. 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    Other:  - neg other ROS          Physical Exam    Airway        Mallampati: III   TM distance: > 3 FB   Neck ROM: full   Mouth opening: < 3 cm    Respiratory Devices and Support         Dental  no notable dental history         Cardiovascular   cardiovascular exam normal          Pulmonary   pulmonary exam normal                OUTSIDE LABS:  CBC:   Lab Results   Component Value Date    WBC 20.6 (H) 01/16/2023    WBC 11.6 (H) 01/06/2023    HGB 8.9 (L) 01/16/2023    HGB 9.0 (L) 01/06/2023    HCT 29.5 (L) 01/16/2023    HCT 29.6 (L) 01/06/2023     (H) 01/16/2023     (H) 01/06/2023     BMP:   Lab Results   Component Value Date     01/16/2023     01/02/2023    POTASSIUM 2.9 (L) 01/16/2023    POTASSIUM 3.5 01/06/2023    CHLORIDE 102 01/16/2023    CHLORIDE 103 01/02/2023    CO2 24 01/16/2023    CO2 23 01/02/2023    BUN 2.6 (L) 01/16/2023    BUN 3.9 (L) 01/02/2023    CR 0.58 (L) 01/16/2023    CR 0.60 (L) 01/02/2023     (H) 01/16/2023     (H) 01/02/2023     COAGS:   Lab Results   Component Value Date    PTT 33 03/11/2020    INR 1.15 12/08/2022     POC: No results found for: BGM, HCG, HCGS  HEPATIC:   Lab Results   Component Value Date    ALBUMIN 3.5 01/16/2023    PROTTOTAL 7.9 01/16/2023    ALT <5 (L) 01/16/2023    AST 15 01/16/2023     (H) 03/25/2021    ALKPHOS 88 01/16/2023    BILITOTAL 0.6 01/16/2023     OTHER:   Lab Results   Component Value Date    LACT 1.0 12/12/2022    A1C 5.8 12/13/2016    FAISAL 9.5 01/16/2023    PHOS 3.1 01/16/2023    MAG 1.6 (L) 01/16/2023    LIPASE 25 03/10/2020    CRP 79.40 (H) 11/17/2022       Anesthesia Plan    ASA Status:  3   NPO Status:  NPO Appropriate    Anesthesia Type: MAC.     - Reason for MAC: straight local not clinically  adequate   Induction: N/a.   Maintenance: TIVA.        Consents    Anesthesia Plan(s) and associated risks, benefits, and realistic alternatives discussed. Questions answered and patient/representative(s) expressed understanding.     - Discussed: Risks, Benefits and Alternatives for BOTH SEDATION and the PROCEDURE were discussed     - Discussed with:  Patient, Legal Guardian         Postoperative Care    Pain management: Multi-modal analgesia, IV analgesics.   PONV prophylaxis: Ondansetron (or other 5HT-3), Background Propofol Infusion     Comments:    Other Comments: Prior pleural effusion - found to have resolved on ultrasound by pulmonology team in pre-op on 1/20/23. Will not need thoracentesis on 1/20/23. Will plan for only port revision                Klaus Enrique MD

## 2023-01-20 NOTE — PROGRESS NOTES
We examined the patient's lung with bed side ultrasound. He does not appear to have any fluid in his pleural space to perform thoracentesis at this time. We will go ahead and cancel this procedure for him today.

## 2023-01-21 NOTE — ANESTHESIA CARE TRANSFER NOTE
Patient: Diego Buchanan    Procedure: Procedure(s):  Right Port Removal and Right Subclavian Powerport replacement and Flouroscopy.       Diagnosis: Pleural effusion [J90]  Diagnosis Additional Information: No value filed.    Anesthesia Type:   MAC     Note:    Oropharynx: oropharynx clear of all foreign objects and spontaneously breathing  Level of Consciousness: awake  Oxygen Supplementation: nasal cannula  Level of Supplemental Oxygen (L/min / FiO2): 4  Independent Airway: airway patency satisfactory and stable  Dentition: dentition unchanged  Vital Signs Stable: post-procedure vital signs reviewed and stable  Report to RN Given: handoff report given  Patient transferred to: PACU    Handoff Report: Identifed the Patient, Identified the Reponsible Provider, Reviewed the pertinent medical history, Discussed the surgical course, Reviewed Intra-OP anesthesia mangement and issues during anesthesia, Set expectations for post-procedure period and Allowed opportunity for questions and acknowledgement of understanding      Vitals:  Vitals Value Taken Time   /89    Temp     Pulse 122    Resp 24    SpO2 98        Electronically Signed By: STEVEN Rees CRNA  January 20, 2023  6:24 PM

## 2023-01-21 NOTE — OP NOTE
Procedure Date: 01/20/2023    PREOPERATIVE DIAGNOSIS:  Malfunctioning right internal jugular port.    POSTOPERATIVE DIAGNOSIS:  Malfunctioning right internal jugular port.    PROCEDURE PERFORMED:    1.  Removal of malfunctioning right subcutaneous port.  2.  Placement of right subclavian PowerPort.  3.  Fluoroscopy with interpretation of images.    SURGEON:  Caleb Brady MD (present for the entire procedure).    RESIDENT SURGEON:  Humera Oleary MD    ANESTHESIA:  Sedation.    FINDINGS:  There was no evidence of infection.  The tip of the catheter was very high and there was a high up curvature towards the internal jugular, which made it impossible to replace it over a wire.  For this reason, I removed the catheter altogether and replaced it with a new one and a new port, but we placed a port in the same pocket.    DESCRIPTION OF PROCEDURE:  With the patient in supine position under deep sedation, the area was prepared and draped in the conventional fashion.  We used a generous local anesthetic and opened the previous incision just over the clavicle and then the old pocket.  We mobilized the port completely, extracted it, and then transected the catheter and pulled it out through the subclavian incision.  I was able to thread a wire over it and then removed the previous catheter in its entirety.  We then placed another port into the previous port pocket and secured it with 3 Prolene stitches.  We tunneled the catheter and we tried to thread it over the wire, but it was not possible.  Since there was a big cephalad curvature on the wire, I decided we better do a new venous stick.  For this reason, I removed the wire and then we placed a right subclavian venous stick without difficulty.  We entered the vein right away, passed a wire without difficulty, and verified with fluoroscopy that it was in proper position.  Then, we placed a dilator over the wire and then measured the proper length of the catheter, cut  it at the proper length, and then removed the wire and placed it over the peel-away sheath.  We verified that the tip of the catheter was about at the cavoatrial junction or the distal superior vena cava and it withdrew and flushed very easily.  We flushed it with Hep-Lock.  Then, we secured the Prolene stitches, irrigated copiously, and closed with absorbable sutures in the conventional fashion.    The patient tolerated the procedure well.    Caleb Brady MD        D: 2023   T: 2023   MT: [place]    Name:     RY GOMES  MRN:      [id number]        Account:        [id number]   :      1995           Procedure Date: 2023     Document: [id number]

## 2023-01-21 NOTE — DISCHARGE INSTRUCTIONS
[facility]  Same-Day Surgery   Adult Discharge Orders & Instructions     For 24 hours after surgery    Get plenty of rest.  A responsible adult must stay with you for at least 24 hours after you leave the hospital.   Do not drive or use heavy equipment.  If you have weakness or tingling, don't drive or use heavy equipment until this feeling goes away.  Do not drink alcohol.  Avoid strenuous or risky activities.  Ask for help when climbing stairs.   You may feel lightheaded.  IF so, sit for a few minutes before standing.  Have someone help you get up.   If you have nausea (feel sick to your stomach): Drink only clear liquids such as apple juice, ginger ale, broth or 7-Up.  Rest may also help.  Be sure to drink enough fluids.  Move to a regular diet as you feel able.  You may have a slight fever. Call the doctor if your fever is over 100 F (37.7 C) (taken under the tongue) or lasts longer than 24 hours.  You may have a dry mouth, a sore throat, muscle aches or trouble sleeping.  These should go away after 24 hours.  Do not make important or legal decisions.   Call your doctor for any of the followin.  Signs of infection (fever, growing tenderness at the surgery site, a large amount of drainage or bleeding, severe pain, foul-smelling drainage, redness, swelling).    2. It has been over 8 to 10 hours since surgery and you are still not able to urinate (pass water).    3.  Headache for over 24 hours.    4.  Numbness, tingling or weakness the day after surgery (if you had spinal anesthesia).  To contact a doctor, call Dr TIARRA Brady's office @ [phone] or:    '   [phone] and ask for the resident on call for   Thoracic surgery (answered 24 hours a day)  '   Emergency Department:    [facility]: [phone]       (TTY for hearing impaired: [phone])    [facility]: [phone]       (TTY for hearing impaired: [phone])

## 2023-01-21 NOTE — BRIEF OP NOTE
[facility]    Brief Operative Note    Pre-operative diagnosis: Pleural effusion [J90]  Post-operative diagnosis Same as pre-operative diagnosis    Procedure: Procedure(s):  Right Port Removal and Right Subclavian Powerport replacement and Flouroscopy.  Surgeon: Surgeon(s) and Role:     * Caleb Brady MD - Primary     * Humera Oleary MD - Resident - Assisting  Anesthesia: MAC with Local   Estimated Blood Loss: 5 mL from 1/20/2023  4:01 PM to 1/20/2023  6:10 PM      Drains: None  Specimens: * No specimens in log *  Findings:   Previous vascular access port replaced with new port. Wire access of right subclavian vein obtained and new catheter was subsequently inserted under fluoroscopic guidance. Four sutures were used to secure the port into the subcutaneous tissue.   Complications: None.  Implants:   Implant Name Type Inv. Item Serial No.  Lot No. LRB No. Used Action   CATH PORT MRI POWERPORT W/MICRO INTRO 8FR SL [id number] - [id number] Catheter CATH PORT MRI POWERPORT W/MICRO INTRO 8FR SL [id number]  [employer] [id number] Right 1 Implanted   CATH PORT POWERPORT CLEARVUE SLIM 6FR [id number] - [id number] Port CATH PORT POWERPORT CLEARVUE SLIM 6FR [id number]  [id number] Right 1 Explanted

## 2023-01-21 NOTE — ANESTHESIA POSTPROCEDURE EVALUATION
Patient: Diego Buchanan    Procedure: Procedure(s):  Right Port Removal and Right Subclavian Powerport replacement and Flouroscopy.       Anesthesia Type:  MAC    Note:  Disposition: Outpatient   Postop Pain Control: Uneventful            Sign Out: Well controlled pain   PONV: No   Neuro/Psych: Uneventful            Sign Out: Acceptable/Baseline neuro status   Airway/Respiratory: Uneventful            Sign Out: Acceptable/Baseline resp. status   CV/Hemodynamics: Uneventful            Sign Out: Acceptable CV status; No obvious hypovolemia; No obvious fluid overload   Other NRE:    DID A NON-ROUTINE EVENT OCCUR?            Last vitals:  Vitals Value Taken Time   /77 01/20/23 1850   Temp 36.9  C (98.4  F) 01/20/23 1812   Pulse 122 01/20/23 1853   Resp 44 01/20/23 1853   SpO2 94 % 01/20/23 1853   Vitals shown include unvalidated device data.    Electronically Signed By: Pina Farrar MD  January 20, 2023  6:54 PM

## 2023-01-21 NOTE — OR NURSING
Discharge instructions and AVS given to pt's sister and mother via [employer] . Family expressed understanding of discharge teaching. Pt is stable and criteria met to discharge home.

## 2023-01-30 NOTE — PROGRESS NOTES
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 He presented initially with abdominal pain, diarrhea, and progressive abdominal distention for 3 months duration. 2. Initial CT scan in February 2020 showed severe peritoneal carcinomatosis with large peritoneal tumor implants throughout the entire abdomen and pelvis, but mostly prominently in the pelvis.   2. PETCT scan showed interval increase in the amount of peritoneal carcinomatosis.  3. On 3/12/2020, he had ultrasound-guided core needle biopsy of a peritoneal implant (Case: [id number]), which showed a completely undifferentiated appearance, but stains with pancytokeratin, indicating an epithelial origin. 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 Tumor markers for CA-19-9, CEA, beta-hCG, alpha-fetoprotein were unremarkable. Cancer type ID molecular testing by [employer] sent to determine the exact diagnosis and to assist in further treatment planning. 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 Relative probability of less than 5% of other subtype of sarcoma. EWSR1-WT1 fusion detected.  4. 5/1/2020, MRI brain negative for brain metastasis, and baseline CT-CAP obtained showing extensive mixed solid and cystic masses throughout the abdomen and pelvis consistent with peritoneal carcinomatosis. Largest mass measures approximately 20 cm in the pelvis. Metastatic mixed solid and cystic masses seen in hepatic segments 5 and 6 and hepatic segment 7. The masses appear to be contiguous with the retrohepatic peritoneal carcinomatosis. Small volume fluid about the spleen favored to represent malignant ascites, stable mild-to-moderate right hydronephrosis related to mass effect upon the distal ureter in the pelvis, the IVC and iliac  veins are compressed due to the extensive peritoneal carcinomatosis without findings to suggest deep venous thrombosis.  5. 5/4/2020, he begins CAV/IMV alternating chemotherapy. He receives 6 cycles of treatment. He symptomatically improves.  6. 9/11/2020, CT-CAP shows stable to mildly improved extensive peritoneal carcinomatosis. He would like to omit Ifosfamide/etoposide cycles and continue only with CAV.  7. 10/9/2020, he starts CAV every 21 days.  8. 1/6/2021, CT CAP showed a mixed response in the mixed solid and cystic masses throughout the peritoneum. Some of the tumors are stable to mildly worse, with some of the peritoneal masses a bit larger, a few are smaller, one cystic tumor in the left abdomen is smaller, while tumors lower in the pelvis appear slightly larger.  9. 3/24/2021, CT CAP showed continued slight decrease in overall burden of peritoneal carcinomatosis with persistent encasement of bowel without evidence of bowel obstruction.  10. 6/25/2021, he receives cycle 19 CAV, then takes a chemotherapy holiday.  11. 4/22/2022, he resumes CAV/IMV chemotherapy.  12. 7/13/22, CT CAP showed interval enlargement of hepatic and splenic metastases with other overall disease stable. CAV/IMV continued.  13.  10/04/22, CT CAP  showed evidence of progressive disease, predominantly in liver. There are small sub-centimeter lung nodules, which are suspicious for metastasis. CAV/IMV discontinued. Start irinotecan/temozolomide (TMZ) on 11/28/22.   14. 12/7/22-12/21/22: Hospitalized for neutropenic fever with RSV, rash (drug vs. Viral exanthem), and pancytopenia with transfusions.     Cycle 2 of TMZ/irinotecan has been delayed due to hospitalization and need for additional recovery (ongoing fatigue and diarrhea) as well as planned port placement. Following port placement, treatment has been delayed an additional week s/t self-cancelled appointment as a result of pain after port placement.    Robert presents today for  follow up/evaluation prior to planned cycle 2.     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 Diego presents today for  follow up and evaluation prior to planned cycle 2 of irinotecan/temozolomide. He is accompanied by his sister, Mariela. Mariela helps provide history in conjunction with Robert. History is somewhat challenging as Robert also receives care from other family members including his mom who is not present today and Robert sometimes needs help with remembering details.    -Robert is doing ok today. He continues to have some fatigue. He feels his appetite is ok but he has to eat small amounts as he gets full easily.   -He is still having intermittent abdominal pain. This is typically on the left side. At it's most severe it's 7/10. The pain is not present every day. He usually takes tylenol for pain but is wondering if there is anything more to help with this. Sometimes with the pain, he will begin to sweat. He reports that he is having stools but none yet today. He went a couple of times yesterday, small amounts. They were not loose. He reports that he is no longer on imodium and not taking miralax at present.   -He reports no fevers. Has an occasional cough but states this is the same. Breathing is also the same, no worsening of shortness of breath. Able to lie flat.  -No chest pain.  -Has not taken metoprolol this morning.  -Did not take electrolyte replacement last night or this morning but reports that he has otherwise been taking this.  -Port site still somewhat sore.    Review of Systems:  Patient denies any of the following except if noted above: fevers, chills, difficulty with energy, vision or hearing changes, chest pain, dyspnea, abdominal pain, nausea, vomiting, diarrhea, constipation, urinary concerns, headaches, numbness, tingling, issues with sleep or mood. He also denies lumps, bumps, rashes or skin lesions, bleeding or bruising  "issues.  Physical Exam:  /82   Pulse (!) 147   Temp 97.7  F (36.5  C) (Oral)   Resp 16   Ht 1.829 m (6' 0.01\")   Wt 109.3 kg (240 lb 15.4 oz)   SpO2 98%   BMI 32.67 kg/m    Wt Readings from Last 10 Encounters:   01/30/23 109.3 kg (240 lb 14.4 oz)   01/30/23 109.3 kg (240 lb 15.4 oz)   01/20/23 111.4 kg (245 lb 9.5 oz)   01/18/23 110.8 kg (244 lb 4.8 oz)   01/16/23 111.3 kg (245 lb 4.8 oz)   01/06/23 113.8 kg (250 lb 12.8 oz)   01/02/23 113.4 kg (250 lb)   12/20/22 113.5 kg (250 lb 3.6 oz)   12/06/22 115.1 kg (253 lb 12.8 oz)   12/02/22 81.2 kg (179 lb)   General: Mildly-appearing male in no acute distress.  Eyes: EOMI, PERRL. No scleral icterus.  ENT: Oral mucosa is moist without lesions or thrush.   Lymphatic: Neck is supple without cervical.  Cardiovascular: Tachycardic. Rhythm regular. No murmurs, gallops, or rubs. No peripheral edema.  Respiratory: CTA bilaterally. No wheezes or crackles. No accessory muscle use.   Gastrointestinal: Abdomen rounded, distended, reports some tenderness to palpation throughout. No guarding. BS +, most prominent in lower quadrants.   Neurologic: Cranial nerves II through XII are grossly intact.  Skin: No rashes, petechiae, or bruising noted on exposed skin.  Psych: Affect somewhat flat.     Labs:   Most Recent 3 CBC's:  Recent Labs   Lab Test 01/30/23  1104 01/20/23  1515 01/16/23  0856 01/06/23  0941 01/02/23  0701 12/06/22  0932 11/28/22  0929   WBC 20.5*  --  20.6* 11.6* 10.7   < > 17.4*   HGB 8.2* 8.4* 8.9* 9.0* 8.3*   < > 9.3*   MCV 88  --  87 87 86   < > 84     --  521* 500* 476*   < > 459*   ANEUTAUTO  --   --  15.8*  --  8.0  --  15.2*    < > = values in this interval not displayed.     Most Recent 3 BMP's:  Recent Labs   Lab Test 01/30/23  1104 01/20/23  1515 01/16/23  0856 01/06/23  0941 01/02/23  0701     --  139  --  139   POTASSIUM 2.8* 3.1* 2.9*   < > 3.3*   CHLORIDE 104  --  102  --  103   CO2 22  --  24  --  23   BUN 2.6*  --  2.6*  --  3.9* "   CR 0.55*  --  0.58*  --  0.60*   ANIONGAP 14  --  13  --  13   FAISAL 8.8  --  9.5  --  9.2   *  --  105*  --  107*   PROTTOTAL 7.8  --  7.9  --  8.0   ALBUMIN 3.4*  --  3.5  --  3.6    < > = values in this interval not displayed.    Most Recent 3 LFT's:  Recent Labs   Lab Test 01/30/23  1104 01/16/23  0856 01/02/23  0701   AST 18 15 13   ALT <5* <5* <5*   ALKPHOS 73 88 94   BILITOTAL 0.5 0.6 0.7     Magnesium: 1.6  Phosphorus: 2.7    Imaging:  No new imaging today.    I reviewed the above labs and imaging today.    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 He tolerated CAV/IMV for 19 cycles and then was on chemotherapy break from June 2021-April 2022. Given return in symptoms he resumed chemotherapy with further CAV/IMV in April 2022.  However, he has had evidence of progression on the two most recent scans. Robert and his family elected to take time to try traditional medicine treatments.Last CT-CAP 10/4/22 showed evidence of progressive disease, predominantly in liver. There are small sub-centimeter lung nodules, which are suspicious for metastasis. He started irinotecan 40 mg/m2 days 1-5/temozolomide 250 mg daily days 1-5 every 21 days with his first cycle on 11/28/22.      Following cycle 1 he was hospitalized for neutropenic fever and RSV. He was treated with antibiotics and received transfusions prn for pancytopenia. He was found to have a bowel obstruction (s/t disease burden) for which GI recommended no surgical intervention. Cycle 2 was delayed due to continued fatigue, weakness and diarrhea. Further delay occurred secondary to planned thoracentesis and port exchange on 1/10/23 which was rescheduled by the patient. Port was eventually  exchanged on 1/20/23. There was inadequate fluid for both thoracentesis and paracentesis so neither of these procedures were completed.     Robert is doing ok today but continues to have fatigue and intermittent abdominal pain, primarily on the left. He is again tachycardic today and EKG was done showing sinus tachycardia.    Labs today still with an elevated WBC but stable from 2 weeks ago. He continues to have electrolyte disturbances with low potassium and magnesium levels.    In the setting of ongoing fatigue and significant pancytopenia following cycle 1, I discussed with Dr. Sims whether a dose reduction would be indicated in an effort to limit toxicity but also in knowing that Robert's disease is progressing as evidenced on CT AP on 12/8. After discussing, we will plan to dose reduce his irinotecan today and for the remaining 4 days of the cycle by 10%. We will keep the temozolomide at the planned 250 mg. We will add neulasta as well. Future dosing can be determined based on patient tolerance.     Oncology treatment plan:    -Proceed with cycle 2 today  -Neulasta added  -RTC with Labs, LISY in 1 week for close follow up/toxicity check given medical complexity  -Other management as detailed below    #Leukocytosis   -Work up with UA, CXR and blood cultures done on 1/16/23 without any source of infection identified. WBC stable today. In the absence of fever, no increase in WBC, and no new or worsening focal symptoms, I suspect this is secondary to disease/inflammtory in nature.   -If worsening clinical or laboratory finding, could consider abdominal imaging (to rule out SBP)    #Hypokalemia  -Reportedly on 20 meq BID but did not take last nights or this mornings dose. Will replace per protocol today in clinic. Then continue Potassium Chloride 20mEq BID with recheck later this week. Educated on importance of consistently taking this medication as abnormal potassium levels can contribute to arrhythmias.      #Hypomagnesemia  Borderline low at 1.6. Will continue 400 mg BID. Recheck labs later this week. Need to closely monitor for worsening diarrhea, especially in combination with irinotecan.     #HTN  #Tachycardia  Longstanding issue, likely multifactorial in setting of metastatic disease. EKG today with preliminary review showing sinus tachycardia. Ranges from low 100's to 150's. Reports he did not take his metoprolol 25 mg this morning. Will give in clinic and then recheck 1 hour later. Reiterated the need to take this medication.    #Abdominal bloating/pain  -US on 1/5/23 showing ascites. Paracentesis scheduled for 1/18/23 but not enough fluid for removal so procedure not done.  -Suspect pain is s/t disease burden/known obstruction based on CT AP on 12/8/22  -If worsening or further elevation with worsening WBC, may need repeat imaging to r/o SBP. Pain at present is seemingly stable from last visit.  -Continue Tylenol prn  -Had used tramadol prn in the hospital. Will order tramadol 50 mg q6h prn for pain. Advised to only use this for severe pain and to monitor for sedation.   -I question as well if there is any possible constipation but this is somewhat difficult to ascertain from his history. He did have stools yesterday. Given that he is receiving irinotecan today, I advised holding on Miralax today and seeing how his bowels trend as he may end up developing diarrhea.     #Shortness of breath   - CT on 12/1 showing possible pneumonia. Treated with antibiotics. Thoracentesis scheduled for 1/20/23 for left pleural effusion not completed due to inadequate fluid. Breathing stable today and lungs with good air movement on exam.    #Anemia  Waxes and wanes. Largely stable at 8.2. Previously checked iron studies and soluble trasnferrin receptor. Labs seem to be most representative of anemia of chronic disease. Transfuse pRBC for hb < 7 or as indicated pending symptoms.. Peripheral smear on 12/15 with no evidence of  hemolysis.     #Port malpositioning  CXR 11/16/22 showing right port with tip in the low cervical internal jugular. Port exchanged on 1/20/23. No blood return today, will have nurse administer alteplase to port.    ADDENDUM: Port with no blood return post alteplase. RN's have evaluated and are questioning whether port has turned. Will obtain x-ray to verify positioning.      ADDENDUM: Discussed chest x-ray findings (kinked in the area of entry into the subclavian vein with tip  projecting over the innominate vein confluence)  regarding port placement with thoracic surgery on call, Dr. Cota. Per her recommendation, Ok to use port as long as nursing is not having significant difficulty accessing or getting blood return.       Amber Scheierl, CNP    150 minutes spent on the date of the encounter doing chart review, review of test results, interpretation of tests, patient visit, documentation and discussion with other provider(s)

## 2023-01-30 NOTE — NURSING NOTE
Chief Complaint   Patient presents with     Blood Draw     Port accessed with 20g 3/4 power needle by RN, line flushed with saline and heparin, no blood return noted. Labs collected from venipuncture by RN. Vitals taken. Checked in for appointment(s).      Priscilla Anderson RN

## 2023-01-30 NOTE — PATIENT INSTRUCTIONS
Lab appointment after your chest xray tomorrow 1/31- then your infusion.      [facility] Triage and after hours / weekends / holidays:  [phone]    Please call the triage or after hours line if you experience a temperature greater than or equal to 100.4, shaking chills, have uncontrolled nausea, vomiting and/or diarrhea, dizziness, shortness of breath, chest pain, bleeding, unexplained bruising, or if you have any other new/concerning symptoms, questions or concerns.      If you are having any concerning symptoms or wish to speak to a provider before your next infusion visit, please call your care coordinator or triage to notify them so we can adequately serve you.     If you need a refill on a narcotic prescription or other medication, please call before your infusion appointment.                January 2023 Sunday Monday Tuesday Wednesday Thursday Friday Saturday   1     2    LAB CENTRAL   6:45 AM   (15 min.)   [facility] LAB DRAW   [facility]    RETURN   7:30 AM   (45 min.)   Scheierl, Amber J, APRN CNP   [facility]    ONC INFUSION 3 HR (180 MIN)   8:30 AM   (180 min.)    ONC INFUSION NURSE   [facility] 3     4     5    US ABDOMEN LIMITED   1:45 PM   (30 min.)   [email or web address]   [facility] 6    PAC EVAL   8:30 AM   (90 min.)   Vidhi Matthews PA-C   [facility] Preoperative [facility] [place]    LAB  10:15 AM   (15 min.)    LAB   [facility] Lab [place] 7       8     9     10    [place] OUTPATIENT   9:35 AM   (30 min.)   Memo López   [facility]  Services 11     12     13     14       15     16    LAB CENTRAL   8:15 AM   (15 min.)   [facility] LAB DRAW   [facility]    RETURN   8:30 AM   (45 min.)   Scheierl, Amber J, APRN CNP   [facility]    ONC INFUSION 2 HR (120 MIN)   8:30 AM    (120 min.)   [facility] ONC INFUSION NURSE   [facility]    XR CHEST 2 VIEWS  11:25 AM   (20 min.)   [email or web address]   [facility] Imaging [place] Xray [place] 17     18    [facility] PARACENTESIS  11:15 AM   (120 min.)   Provider, Marty Spec Inf Para   [facility]    US PARACENTESIS WO ALBUMIN  11:35 AM   (60 min.)   [email or web address]   [facility] 19     20    Admission  12:50 PM   Margaret Sarmiento MD   [facility] Day [facility] [place]   (Discharge: 1/20/2023)    [place] OUTPATIENT   1:30 PM   (120 min.)   Helena Atkins   [facility]  Services    THORACENTESIS   2:40 PM   Margaret Sarmiento MD    OR    [place] OUTPATIENT   5:30 PM   (120 min.)   [facility] CONNECTION   [facility]  Services 21       22     23     24     25     26     27     28       29     30    LAB CENTRAL  10:15 AM   (15 min.)    [facility] LAB DRAW   [facility]    RETURN CCSL  10:30 AM   (45 min.)   Scheierl, Amber J, APRN CNP   [facility]    ONC INFUSION 2 HR (120 MIN)   2:00 PM   (120 min.)    ONC INFUSION NURSE   [facility] 31    XR CHEST 2 VIEWS   1:05 PM   (20 min.)   [email or web address]   [facility] Imaging [place] [facility] [place]    ONC INFUSION 2 HR (120 MIN)   2:00 PM   (120 min.)   UC ONC INFUSION NURSE   [facility]                                   February 2023 Sunday Monday Tuesday Wednesday Thursday Friday Saturday                  1    ONC INFUSION 2 HR (120 MIN)  12:00 PM   (120 min.)   [facility] ONC INFUSION NURSE   [facility] 2    LAB CENTRAL   1:15 PM   (15 min.)    [facility] LAB DRAW   [facility]    ONC INFUSION 2 HR (120 MIN)   2:00 PM   (120 min.)   UC ONC INFUSION NURSE   [facility]  Clinic 3    ONC INFUSION 2 HR (120 MIN)   2:30 PM   (120 min.)    ONC INFUSION NURSE   [facility] 4       5     6     7     8     9    LAB CENTRAL   9:15 AM   (15 min.)   [facility] LAB DRAW   [facility]    RETURN CCSL   9:45 AM   (45 min.)   Scheierl, Amber J, APRN CNP   [facility] 10     11       12     13     14     15     16     17     18       19     20    RETURN ACTIVE TREATMENT  10:15 AM   (45 min.)   Whit Fish PA-C   [facility]    LAB CENTRAL  12:00 PM   (15 min.)   [facility] LAB DRAW   [facility]    ONC INFUSION 2 HR (120 MIN)  12:30 PM   (120 min.)    ONC INFUSION NURSE   [facility] 21     22     23     24     25       26     27     28                                           Lab Results:  Recent Results (from the past 12 hour(s))   Comprehensive metabolic panel    Collection Time: 01/30/23 11:04 AM   Result Value Ref Range    Sodium 140 136 - 145 mmol/L    Potassium 2.8 (L) 3.4 - 5.3 mmol/L    Chloride 104 98 - 107 mmol/L    Carbon Dioxide (CO2) 22 22 - 29 mmol/L    Anion Gap 14 7 - 15 mmol/L    Urea Nitrogen 2.6 (L) 6.0 - 20.0 mg/dL    Creatinine 0.55 (L) 0.67 - 1.17 mg/dL    Calcium 8.8 8.6 - 10.0 mg/dL    Glucose 128 (H) 70 - 99 mg/dL    Alkaline Phosphatase 73 40 - 129 U/L    AST 18 10 - 50 U/L    ALT <5 (L) 10 - 50 U/L    Protein Total 7.8 6.4 - 8.3 g/dL    Albumin 3.4 (L) 3.5 - 5.2 g/dL    Bilirubin Total 0.5 <=1.2 mg/dL    GFR Estimate >90 >60 mL/min/1.73m2   CBC with platelets and differential    Collection Time: 01/30/23 11:04 AM   Result Value Ref Range    WBC Count 20.5 (H) 4.0 - 11.0 10e3/uL    RBC Count 3.14 (L) 4.40 - 5.90 10e6/uL    Hemoglobin 8.2 (L) 13.3 - 17.7 g/dL    Hematocrit 27.6 (L) 40.0 - 53.0 %    MCV 88 78 - 100 fL    MCH 26.1 (L) 26.5 - 33.0 pg    MCHC 29.7 (L) 31.5 - 36.5 g/dL    RDW 20.7 (H)  10.0 - 15.0 %    Platelet Count 438 150 - 450 10e3/uL   Phosphorus    Collection Time: 01/30/23 11:04 AM   Result Value Ref Range    Phosphorus 2.7 2.5 - 4.5 mg/dL   Magnesium    Collection Time: 01/30/23 11:04 AM   Result Value Ref Range    Magnesium 1.6 (L) 1.7 - 2.3 mg/dL   Manual Differential    Collection Time: 01/30/23 11:04 AM   Result Value Ref Range    % Neutrophils 84 %    % Lymphocytes 4 %    % Monocytes 4 %    % Eosinophils 4 %    % Basophils 0 %    % Metamyelocytes 1 %    % Myelocytes 3 %    Absolute Neutrophils 17.2 (H) 1.6 - 8.3 10e3/uL    Absolute Lymphocytes 0.8 0.8 - 5.3 10e3/uL    Absolute Monocytes 0.8 0.0 - 1.3 10e3/uL    Absolute Eosinophils 0.8 (H) 0.0 - 0.7 10e3/uL    Absolute Basophils 0.0 0.0 - 0.2 10e3/uL    Absolute Metamyelocytes 0.2 (H) <=0.0 10e3/uL    Absolute Myelocytes 0.6 (H) <=0.0 10e3/uL    RBC Morphology Confirmed RBC Indices     Platelet Assessment  Automated Count Confirmed. Platelet morphology is normal.     Automated Count Confirmed. Platelet morphology is normal.    RBC Fragments Slight (A) None Seen    Polychromasia Slight (A) None Seen    Teardrop Cells Slight (A) None Seen   EKG 12-lead complete w/read - Clinics    Collection Time: 01/30/23 12:51 PM   Result Value Ref Range    Systolic Blood Pressure  mmHg    Diastolic Blood Pressure  mmHg    Ventricular Rate 143 BPM    Atrial Rate 143 BPM    NM Interval 116 ms    QRS Duration 74 ms     ms    QTc 450 ms    P Axis 32 degrees    R AXIS 21 degrees    T Axis 19 degrees    Interpretation ECG       Sinus tachycardia  Otherwise normal ECG  When compared with ECG of 18-DEC-2022 18:41,  No significant change was found

## 2023-01-30 NOTE — NURSING NOTE
"Oncology Rooming Note    January 30, 2023 11:39 AM   Diego Buchanan is a 27 year old male who presents for:    Chief Complaint   Patient presents with     Oncology Clinic Visit     [facility] RETURN - [facility]     Initial Vitals: /82   Pulse (!) 147   Temp 97.7  F (36.5  C) (Oral)   Resp 16   Ht 1.829 m (6' 0.01\")   Wt 109.3 kg (240 lb 15.4 oz)   SpO2 98%   BMI 32.67 kg/m   Estimated body mass index is 32.67 kg/m  as calculated from the following:    Height as of this encounter: 1.829 m (6' 0.01\").    Weight as of this encounter: 109.3 kg (240 lb 15.4 oz). Body surface area is 2.36 meters squared.  Severe Pain (7) Comment: Data Unavailable   No LMP for male patient.  Allergies reviewed: Yes  Medications reviewed: Yes    Medications: Medication refills not needed today.  Pharmacy name entered into [facility]:    [id number] - [place], [place] - [address] AT [facility] - [place], [place] - [id number]  [facility] - [place], [place] - [address]      Esequiel Tong LPN            "

## 2023-01-30 NOTE — PROGRESS NOTES
Infusion Nursing Note:  Diego Buchanan presents today for cycle 2 day 1 Irinotecan, potassium replacement.    Patient seen by provider today: Yes: Amber Scheierl, NP   present during visit today: Not Applicable.    Note: Patient arrives with his sister today, and presents with back/abdominal pain 8/10 and is diaphoretic which per sister and patient happens when he has stomach pain.    TORB: Kelley Rodriguez NP/Precious Cosme, RN  -Irinotecan reduced by 10% today  -give metropolol 25 mg orally x 1 today in infusion and then recheck heart rate after one hour. Notify me if hear rate is greater than 140 still.  -ok to take tramadol once pharmacy brings up refill- 1 tab 50 mg during infusion  -TPA to port  -replace potassium, ok to give 40 meq IV with 20 meq potassium to save on time  -pt to return Thursday for lab recheck  -ok to leave port accessed overnight this week if patient and sister are comfortable caring for it, making sure it stays intact, clean and dry.     Pt given ice pack, cold wash cloth, extra pillow to help with back and abdominal pain. Pt will take the tramadol once it gets brought up to him. Pt denies needing any additional interventions. Per sister, patient keeps his line accessed overnight with no issue.     TPA instilled into port- no blood return noted after 90 minutes. Tried to reaccess port but instantly hit the port with resistance, needle unable to be inserted. Second RN attempted with the same result.     PIV started in left arm to save on time. Pt complained of burning with IV potassium into PIV- hot pack applied which provided some relief.     TORB: Amber Scheierl, NP/Precious Cosme, RN  -will order chest xray for tomorrow to check port placement  -ok to give remainder of potassium orally today    Heart rate 1 hour after taking metoprolol 134. Amber Scheierl, NP notified.     At discharge patient still complaining of back pain and abdominal pain. He states the pain has not  changed since being here. He continues to be diaphoretic on his forehead, denies feeling sweating anywhere else.  He denies any shortness of breath, light headedness, dizziness, trouble breathing, or chest pain, or any other new symptoms. /72. Temp 98.6.      TORB: Precious Cosme RN/Amber Scheierl, NP- ok to go home if patient feels comfortable going home. Will redraw CMP and Mg tomorrow and replace if needed.     Lab appt request sent for 130 pm tomorrow- pt aware of chest xray and lab appt. Print out given to patient and mother.    Intravenous Access:  Implanted Port.    Treatment Conditions:  Lab Results   Component Value Date    HGB 8.2 (L) 01/30/2023    WBC 20.5 (H) 01/30/2023    ANEU 17.2 (H) 01/30/2023    ANEUTAUTO 15.8 (H) 01/16/2023     01/30/2023      Lab Results   Component Value Date     01/30/2023    POTASSIUM 2.8 (L) 01/30/2023    MAG 1.6 (L) 01/30/2023    CR 0.55 (L) 01/30/2023    FAISAL 8.8 01/30/2023    BILITOTAL 0.5 01/30/2023    ALBUMIN 3.4 (L) 01/30/2023    ALT <5 (L) 01/30/2023    AST 18 01/30/2023     Results reviewed, labs MET treatment parameters, ok to proceed with treatment.    Post Infusion Assessment:  Patient tolerated infusion without incident.  Blood return noted pre and post infusion.  Site patent and intact, free from redness, edema or discomfort.  No evidence of extravasations.  Access discontinued per protocol.     Discharge Plan:   Prescription refills given for tramadol, potassium, magnesium.  Discharge instructions reviewed with: Patient.  Patient and/or family verbalized understanding of discharge instructions and all questions answered.  Copy of AVS reviewed with patient and/or family.  Patient will return 1/31/23 for next appointment.  Patient discharged in stable condition accompanied by: mother.  Departure Mode: Ambulatory.      Precious Cosme RN

## 2023-01-30 NOTE — LETTER
1/30/2023         RE: Diego Buchanan  [address]  [place] [address]        Dear Colleague,    Thank you for referring your patient, Diego Buchanan, to the [facility]. Please see a copy of my visit note below.    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 He presented initially with abdominal pain, diarrhea, and progressive abdominal distention for 3 months duration. 2. Initial CT scan in February 2020 showed severe peritoneal carcinomatosis with large peritoneal tumor implants throughout the entire abdomen and pelvis, but mostly prominently in the pelvis.   2. PETCT scan showed interval increase in the amount of peritoneal carcinomatosis.  3. On 3/12/2020, he had ultrasound-guided core needle biopsy of a peritoneal implant (Case: [id number]), which showed a completely undifferentiated appearance, but stains with pancytokeratin, indicating an epithelial origin. 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 Tumor markers for CA-19-9, CEA, beta-hCG, alpha-fetoprotein were unremarkable. Cancer type ID molecular testing by [employer] sent to determine the exact diagnosis and to assist in further treatment planning. 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 Relative probability of less than 5% of other subtype of sarcoma. EWSR1-WT1 fusion detected.  4. 5/1/2020, MRI brain negative for brain metastasis, and baseline CT-CAP obtained showing extensive mixed solid and cystic masses throughout the abdomen and pelvis consistent with peritoneal carcinomatosis. Largest mass measures approximately 20 cm in the pelvis. Metastatic mixed solid and cystic masses seen in hepatic segments 5 and 6 and hepatic segment 7. The masses appear to be contiguous  with the retrohepatic peritoneal carcinomatosis. Small volume fluid about the spleen favored to represent malignant ascites, stable mild-to-moderate right hydronephrosis related to mass effect upon the distal ureter in the pelvis, the IVC and iliac veins are compressed due to the extensive peritoneal carcinomatosis without findings to suggest deep venous thrombosis.  5. 5/4/2020, he begins CAV/IMV alternating chemotherapy. He receives 6 cycles of treatment. He symptomatically improves.  6. 9/11/2020, CT-CAP shows stable to mildly improved extensive peritoneal carcinomatosis. He would like to omit Ifosfamide/etoposide cycles and continue only with CAV.  7. 10/9/2020, he starts CAV every 21 days.  8. 1/6/2021, CT CAP showed a mixed response in the mixed solid and cystic masses throughout the peritoneum. Some of the tumors are stable to mildly worse, with some of the peritoneal masses a bit larger, a few are smaller, one cystic tumor in the left abdomen is smaller, while tumors lower in the pelvis appear slightly larger.  9. 3/24/2021, CT CAP showed continued slight decrease in overall burden of peritoneal carcinomatosis with persistent encasement of bowel without evidence of bowel obstruction.  10. 6/25/2021, he receives cycle 19 CAV, then takes a chemotherapy holiday.  11. 4/22/2022, he resumes CAV/IMV chemotherapy.  12. 7/13/22, CT CAP showed interval enlargement of hepatic and splenic metastases with other overall disease stable. CAV/IMV continued.  13.  10/04/22, CT CAP  showed evidence of progressive disease, predominantly in liver. There are small sub-centimeter lung nodules, which are suspicious for metastasis. CAV/IMV discontinued. Start irinotecan/temozolomide (TMZ) on 11/28/22.   14. 12/7/22-12/21/22: Hospitalized for neutropenic fever with RSV, rash (drug vs. Viral exanthem), and pancytopenia with transfusions.     Cycle 2 of TMZ/irinotecan has been delayed due to hospitalization and need for additional  recovery (ongoing fatigue and diarrhea) as well as planned port placement. Following port placement, treatment has been delayed an additional week s/t self-cancelled appointment as a result of pain after port placement.    Roebrt presents today for follow up/evaluation prior to planned cycle 2.     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 Diego presents today for  follow up and evaluation prior to planned cycle 2 of irinotecan/temozolomide. He is accompanied by his sister, Mariela. Mariela helps provide history in conjunction with Robert. History is somewhat challenging as Robert also receives care from other family members including his mom who is not present today and Robert sometimes needs help with remembering details.    -Robert is doing ok today. He continues to have some fatigue. He feels his appetite is ok but he has to eat small amounts as he gets full easily.   -He is still having intermittent abdominal pain. This is typically on the left side. At it's most severe it's 7/10. The pain is not present every day. He usually takes tylenol for pain but is wondering if there is anything more to help with this. Sometimes with the pain, he will begin to sweat. He reports that he is having stools but none yet today. He went a couple of times yesterday, small amounts. They were not loose. He reports that he is no longer on imodium and not taking miralax at present.   -He reports no fevers. Has an occasional cough but states this is the same. Breathing is also the same, no worsening of shortness of breath. Able to lie flat.  -No chest pain.  -Has not taken metoprolol this morning.  -Did not take electrolyte replacement last night or this morning but reports that he has otherwise been taking this.  -Port site still somewhat sore.    Review of Systems:  Patient denies any of the following except if noted above: fevers, chills, difficulty with energy, vision or hearing  "changes, chest pain, dyspnea, abdominal pain, nausea, vomiting, diarrhea, constipation, urinary concerns, headaches, numbness, tingling, issues with sleep or mood. He also denies lumps, bumps, rashes or skin lesions, bleeding or bruising issues.  Physical Exam:  /82   Pulse (!) 147   Temp 97.7  F (36.5  C) (Oral)   Resp 16   Ht 1.829 m (6' 0.01\")   Wt 109.3 kg (240 lb 15.4 oz)   SpO2 98%   BMI 32.67 kg/m    Wt Readings from Last 10 Encounters:   01/30/23 109.3 kg (240 lb 14.4 oz)   01/30/23 109.3 kg (240 lb 15.4 oz)   01/20/23 111.4 kg (245 lb 9.5 oz)   01/18/23 110.8 kg (244 lb 4.8 oz)   01/16/23 111.3 kg (245 lb 4.8 oz)   01/06/23 113.8 kg (250 lb 12.8 oz)   01/02/23 113.4 kg (250 lb)   12/20/22 113.5 kg (250 lb 3.6 oz)   12/06/22 115.1 kg (253 lb 12.8 oz)   12/02/22 81.2 kg (179 lb)   General: Mildly-appearing male in no acute distress.  Eyes: EOMI, PERRL. No scleral icterus.  ENT: Oral mucosa is moist without lesions or thrush.   Lymphatic: Neck is supple without cervical.  Cardiovascular: Tachycardic. Rhythm regular. No murmurs, gallops, or rubs. No peripheral edema.  Respiratory: CTA bilaterally. No wheezes or crackles. No accessory muscle use.   Gastrointestinal: Abdomen rounded, distended, reports some tenderness to palpation throughout. No guarding. BS +, most prominent in lower quadrants.   Neurologic: Cranial nerves II through XII are grossly intact.  Skin: No rashes, petechiae, or bruising noted on exposed skin.  Psych: Affect somewhat flat.     Labs:   Most Recent 3 CBC's:  Recent Labs   Lab Test 01/30/23  1104 01/20/23  1515 01/16/23  0856 01/06/23  0941 01/02/23  0701 12/06/22  0932 11/28/22  0929   WBC 20.5*  --  20.6* 11.6* 10.7   < > 17.4*   HGB 8.2* 8.4* 8.9* 9.0* 8.3*   < > 9.3*   MCV 88  --  87 87 86   < > 84     --  521* 500* 476*   < > 459*   ANEUTAUTO  --   --  15.8*  --  8.0  --  15.2*    < > = values in this interval not displayed.     Most Recent 3 BMP's:  Recent Labs "   Lab Test 01/30/23  1104 01/20/23  1515 01/16/23  0856 01/06/23  0941 01/02/23  0701     --  139  --  139   POTASSIUM 2.8* 3.1* 2.9*   < > 3.3*   CHLORIDE 104  --  102  --  103   CO2 22  --  24  --  23   BUN 2.6*  --  2.6*  --  3.9*   CR 0.55*  --  0.58*  --  0.60*   ANIONGAP 14  --  13  --  13   FAISAL 8.8  --  9.5  --  9.2   *  --  105*  --  107*   PROTTOTAL 7.8  --  7.9  --  8.0   ALBUMIN 3.4*  --  3.5  --  3.6    < > = values in this interval not displayed.    Most Recent 3 LFT's:  Recent Labs   Lab Test 01/30/23  1104 01/16/23  0856 01/02/23  0701   AST 18 15 13   ALT <5* <5* <5*   ALKPHOS 73 88 94   BILITOTAL 0.5 0.6 0.7     Magnesium: 1.6  Phosphorus: 2.7    Imaging:  No new imaging today.    I reviewed the above labs and imaging today.    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 He tolerated CAV/IMV for 19 cycles and then was on chemotherapy break from June 2021-April 2022. Given return in symptoms he resumed chemotherapy with further CAV/IMV in April 2022.  However, he has had evidence of progression on the two most recent scans. Robert and his family elected to take time to try traditional medicine treatments.Last CT-CAP 10/4/22 showed evidence of progressive disease, predominantly in liver. There are small sub-centimeter lung nodules, which are suspicious for metastasis. He started irinotecan 40 mg/m2 days 1-5/temozolomide 250 mg daily days 1-5 every 21 days with his first cycle on 11/28/22.      Following cycle 1 he was hospitalized for neutropenic fever and RSV. He was treated with antibiotics and received transfusions prn for pancytopenia. He was found to have a bowel obstruction (s/t disease burden) for which GI recommended no  surgical intervention. Cycle 2 was delayed due to continued fatigue, weakness and diarrhea. Further delay occurred secondary to planned thoracentesis and port exchange on 1/10/23 which was rescheduled by the patient. Port was eventually exchanged on 1/20/23. There was inadequate fluid for both thoracentesis and paracentesis so neither of these procedures were completed.     Robert is doing ok today but continues to have fatigue and intermittent abdominal pain, primarily on the left. He is again tachycardic today and EKG was done showing sinus tachycardia.    Labs today still with an elevated WBC but stable from 2 weeks ago. He continues to have electrolyte disturbances with low potassium and magnesium levels.    In the setting of ongoing fatigue and significant pancytopenia following cycle 1, I discussed with Dr. Sims whether a dose reduction would be indicated in an effort to limit toxicity but also in knowing that Robert's disease is progressing as evidenced on CT AP on 12/8. After discussing, we will plan to dose reduce his irinotecan today and for the remaining 4 days of the cycle by 10%. We will keep the temozolomide at the planned 250 mg. We will add neulasta as well. Future dosing can be determined based on patient tolerance.     Oncology treatment plan:    -Proceed with cycle 2 today  -Neulasta added  -RTC with Labs, LISY in 1 week for close follow up/toxicity check given medical complexity  -Other management as detailed below    #Leukocytosis   -Work up with UA, CXR and blood cultures done on 1/16/23 without any source of infection identified. WBC stable today. In the absence of fever, no increase in WBC, and no new or worsening focal symptoms, I suspect this is secondary to disease/inflammtory in nature.   -If worsening clinical or laboratory finding, could consider abdominal imaging (to rule out SBP)    #Hypokalemia  -Reportedly on 20 meq BID but did not take last nights or this mornings dose. Will replace  per protocol today in clinic. Then continue Potassium Chloride 20mEq BID with recheck later this week. Educated on importance of consistently taking this medication as abnormal potassium levels can contribute to arrhythmias.     #Hypomagnesemia  Borderline low at 1.6. Will continue 400 mg BID. Recheck labs later this week. Need to closely monitor for worsening diarrhea, especially in combination with irinotecan.     #HTN  #Tachycardia  Longstanding issue, likely multifactorial in setting of metastatic disease. EKG today with preliminary review showing sinus tachycardia. Ranges from low 100's to 150's. Reports he did not take his metoprolol 25 mg this morning. Will give in clinic and then recheck 1 hour later. Reiterated the need to take this medication.    #Abdominal bloating/pain  -US on 1/5/23 showing ascites. Paracentesis scheduled for 1/18/23 but not enough fluid for removal so procedure not done.  -Suspect pain is s/t disease burden/known obstruction based on CT AP on 12/8/22  -If worsening or further elevation with worsening WBC, may need repeat imaging to r/o SBP. Pain at present is seemingly stable from last visit.  -Continue Tylenol prn  -Had used tramadol prn in the hospital. Will order tramadol 50 mg q6h prn for pain. Advised to only use this for severe pain and to monitor for sedation.   -I question as well if there is any possible constipation but this is somewhat difficult to ascertain from his history. He did have stools yesterday. Given that he is receiving irinotecan today, I advised holding on Miralax today and seeing how his bowels trend as he may end up developing diarrhea.     #Shortness of breath   - CT on 12/1 showing possible pneumonia. Treated with antibiotics. Thoracentesis scheduled for 1/20/23 for left pleural effusion not completed due to inadequate fluid. Breathing stable today and lungs with good air movement on exam.    #Anemia  Waxes and wanes. Largely stable at 8.2. Previously  checked iron studies and soluble trasnferrin receptor. Labs seem to be most representative of anemia of chronic disease. Transfuse pRBC for hb < 7 or as indicated pending symptoms.. Peripheral smear on 12/15 with no evidence of hemolysis.     #Port malpositioning  CXR 11/16/22 showing right port with tip in the low cervical internal jugular. Port exchanged on 1/20/23. No blood return today, will have nurse administer alteplase to port.    ADDENDUM: Port with no blood return post alteplase. RN's have evaluated and are questioning whether port has turned. Will obtain x-ray to verify positioning.        Amber Scheierl, CNP    150 minutes spent on the date of the encounter doing chart review, review of test results, interpretation of tests, patient visit, documentation and discussion with other provider(s)

## 2023-01-30 NOTE — NURSING NOTE
EKG done in clinic, pt tolerated. Results given to provider and scanned to chart.    Harriet Rogers CMA on 1/30/2023 at 11:56 AM

## 2023-01-31 NOTE — NURSING NOTE
Chief Complaint   Patient presents with     Port Draw     Vitals taken, port accessed with 1 in needle power, labs drawn, heparin locked, and checked into next appt     /73 (BP Location: Left arm, Patient Position: Sitting, Cuff Size: Adult Large)   Pulse (!) 137   Temp 97.5  F (36.4  C) (Oral)   Resp 16   Wt 109.6 kg (241 lb 11.2 oz)   SpO2 98%   BMI 32.77 kg/m    Kadeem Guillen RN on 1/31/2023 at 1:50 PM

## 2023-01-31 NOTE — PROGRESS NOTES
Infusion Nursing Note:  Diego Buchanan presents today for Cycle 2 Day 2 irinotecan.    Patient seen by provider today: No, visit with Amber Scheierl, CNP + infusion yesterday   present during visit today: Not Applicable.    Note: Robert presents today feeling ok. Denies any sides effects or issues after infusion yesterday. Tachycardic, but this is his baseline and he confirms that he took his metoprolol this morning. He denies any chest pain or tightness, SOB/STAHL, or abnormal rhythms. Pain is much improved today, he declines intervention at today's visit.    Intravenous Access:  Implanted Port. Successful access with blood return in lab with 1-inch port needle.     Treatment Conditions:     Latest Reference Range & Units 01/31/23 13:43   Sodium 136 - 145 mmol/L 142   Potassium 3.4 - 5.3 mmol/L 3.4   Chloride 98 - 107 mmol/L 105   Carbon Dioxide (CO2) 22 - 29 mmol/L 25   Urea Nitrogen 6.0 - 20.0 mg/dL 6.0   Creatinine 0.67 - 1.17 mg/dL 0.60 (L)   GFR Estimate >60 mL/min/1.73m2 >90   Calcium 8.6 - 10.0 mg/dL 9.1   Anion Gap 7 - 15 mmol/L 12   Magnesium 1.7 - 2.3 mg/dL 1.9   Phosphorus 2.5 - 4.5 mg/dL 3.1   Albumin 3.5 - 5.2 g/dL 3.4 (L)   Protein Total 6.4 - 8.3 g/dL 7.6   Alkaline Phosphatase 40 - 129 U/L 73   ALT 10 - 50 U/L 5 (L)   AST 10 - 50 U/L 19   Bilirubin Total <=1.2 mg/dL 0.3   Glucose 70 - 99 mg/dL 117 (H)   WBC 4.0 - 11.0 10e3/uL 18.0 (H)   Hemoglobin 13.3 - 17.7 g/dL 8.1 (L)   Hematocrit 40.0 - 53.0 % 28.0 (L)   Platelet Count 150 - 450 10e3/uL 435   RBC Count 4.40 - 5.90 10e6/uL 3.13 (L)   MCV 78 - 100 fL 90   MCH 26.5 - 33.0 pg 25.9 (L)   MCHC 31.5 - 36.5 g/dL 28.9 (L)   RDW 10.0 - 15.0 % 20.9 (H)   % Neutrophils % 88   % Lymphocytes % 4   % Monocytes % 5   % Eosinophils % 1   % Basophils % 0   Absolute Basophils 0.0 - 0.2 10e3/uL 0.1   Absolute Eosinophils 0.0 - 0.7 10e3/uL 0.1   Absolute Immature Granulocytes <=0.4 10e3/uL 0.3   Absolute Lymphocytes 0.8 - 5.3 10e3/uL 0.8   Absolute Monocytes  0.0 - 1.3 10e3/uL 1.0   % Immature Granulocytes % 2   Absolute Neutrophils 1.6 - 8.3 10e3/uL 15.8 (H)   Absolute NRBCs 10e3/uL 0.0   NRBCs per 100 WBC <1 /100 0     Results reviewed, labs MET treatment parameters, ok to proceed with treatment.    Post Infusion Assessment:  Patient tolerated infusion without incident.  Blood return noted pre and post infusion.  Site patent and intact, free from redness, edema or discomfort.  No evidence of extravasations.  Access discontinued per protocol.     Discharge Plan:   Patient declined prescription refills.  Discharge instructions reviewed with: Patient.  Patient and/or family verbalized understanding of discharge instructions and all questions answered.  AVS to patient via [employer].  Patient will return tomorrow for next infusion appointment.   Patient discharged in stable condition accompanied by: mother.  Departure Mode: Wheelchair.      Alesha Chávez RN

## 2023-01-31 NOTE — PATIENT INSTRUCTIONS
[facility] Triage and after hours / weekends / holidays:  [phone]    Please call the triage or after hours line if you experience a temperature greater than or equal to 100.4, shaking chills, have uncontrolled nausea, vomiting and/or diarrhea, dizziness, shortness of breath, chest pain, bleeding, unexplained bruising, or if you have any other new/concerning symptoms, questions or concerns.      If you are having any concerning symptoms or wish to speak to a provider before your next infusion visit, please call your care coordinator or triage to notify them so we can adequately serve you.     If you need a refill on a narcotic prescription or other medication, please call before your infusion appointment.

## 2023-02-01 NOTE — PATIENT INSTRUCTIONS
[facility] Triage and after hours / weekends / holidays:  [phone]    Please call the triage or after hours line if you experience a temperature greater than or equal to 100.4, shaking chills, have uncontrolled nausea, vomiting and/or diarrhea, dizziness, shortness of breath, chest pain, bleeding, unexplained bruising, or if you have any other new/concerning symptoms, questions or concerns.      If you are having any concerning symptoms or wish to speak to a provider before your next infusion visit, please call your care coordinator or triage to notify them so we can adequately serve you.     If you need a refill on a narcotic prescription or other medication, please call before your infusion appointment.                February 2023 Sunday Monday Tuesday Wednesday Thursday Friday Saturday                  1    ONC INFUSION 2 HR (120 MIN)  12:00 PM   (120 min.)    ONC INFUSION NURSE   [facility] 2    LAB CENTRAL   1:15 PM   (15 min.)   [facility] LAB DRAW   [facility]    ONC INFUSION 2 HR (120 MIN)   2:00 PM   (120 min.)    ONC INFUSION NURSE   [facility] 3    ONC INFUSION 2 HR (120 MIN)   2:30 PM   (120 min.)    ONC INFUSION NURSE   [facility] 4       5     6     7     8     9    LAB CENTRAL   9:15 AM   (15 min.)   [facility] LAB DRAW   [facility]    RETURN CCSL   9:45 AM   (45 min.)   Scheierl, Amber J, APRN CNP   [facility] 10     11       12     13     14     15     16     17     18       19     20    RETURN ACTIVE TREATMENT  10:15 AM   (45 min.)   Whit Fish PA-C   [facility]    LAB CENTRAL  12:00 PM   (15 min.)   [facility] LAB DRAW   [facility]    ONC INFUSION 2 HR (120 MIN)  12:30 PM   (120 min.)    ONC INFUSION NURSE   [facility]  21     22     23     24     25       26     27     28                                       March 2023 Sunday Monday Tuesday Wednesday Thursday Friday Saturday                  1     2     3     4       5     6     7     8     9     10     11       12     13     14     15     16     17     18       19     20     21     22     23     24     25       26     27     28     29     30     31                            Lab Results:  No results found for this or any previous visit (from the past 12 hour(s)).

## 2023-02-01 NOTE — PROGRESS NOTES
Infusion Nursing Note:  Diego Buchanan presents today for cycle 2 day 3 irinotecan.    Patient seen by provider today: No   present during visit today: Not Applicable.    Note: Pt presents today generally feeling well. He has no new symptoms since his infusion appointment yesterday. Pt wishes to proceed with planned treatment.    Port accessed with IV 3000 dressing. Mild allergy to tegaderm per allergy list. Pt and mother request port to remain accessed after infusion today.    TORB Amber, Scheierl, ARIEL/Emily Meese, RN 02/01/23  - Okay to leave port accessed overnight for irinotecan appointment tomorrow    Intravenous Access:  Implanted Port.    Treatment Conditions:  Lab Results   Component Value Date    HGB 8.1 (L) 01/31/2023    WBC 18.0 (H) 01/31/2023    ANEU 17.2 (H) 01/30/2023    ANEUTAUTO 15.8 (H) 01/31/2023     01/31/2023      Lab Results   Component Value Date     01/31/2023    POTASSIUM 3.4 01/31/2023    MAG 1.9 01/31/2023    CR 0.60 (L) 01/31/2023    FAISAL 9.1 01/31/2023    BILITOTAL 0.3 01/31/2023    ALBUMIN 3.4 (L) 01/31/2023    ALT 5 (L) 01/31/2023    AST 19 01/31/2023     Results reviewed, labs MET treatment parameters, ok to proceed with treatment.    Post Infusion Assessment:  Patient tolerated infusion without incident.  Blood return noted pre and post infusion.  Site patent and intact, free from redness, edema or discomfort.  No evidence of extravasations.  Access discontinued per protocol.     Discharge Plan:   Patient declined prescription refills.  Discharge instructions reviewed with: Patient and mother.  Patient and/or family verbalized understanding of discharge instructions and all questions answered.  Copy of AVS reviewed with patient and/or family.  Patient will return 02/02/23 for next appointment.  Patient discharged in stable condition accompanied by: self.  Departure Mode: Ambulatory.      Emily Meese, RN

## 2023-02-02 NOTE — PATIENT INSTRUCTIONS
[facility] Main Line: [phone]    Call triage nurse with chills and/or temperature greater than or equal to 100.4, uncontrolled nausea/vomiting, diarrhea, constipation, dizziness, shortness of breath, chest pain, bleeding, unexplained bruising, or any new/concerning symptoms, questions/concerns.   If you are having any concerning symptoms or wish to speak to a provider before your next infusion visit, please call your care coordinator or triage to notify them so we can adequately serve you.   Nurse Triage line:  [phone]    If after hours, weekends, or holidays, call main hospital  and ask for Oncology doctor on call @ [phone]     February 2023 Sunday Monday Tuesday Wednesday Thursday Friday Saturday                  1    ONC INFUSION 2 HR (120 MIN)  12:00 PM   (120 min.)    ONC INFUSION NURSE   [facility] 2    LAB CENTRAL   1:15 PM   (15 min.)   [facility] LAB DRAW   [facility]    ONC INFUSION 2 HR (120 MIN)   2:00 PM   (120 min.)    ONC INFUSION NURSE   [facility] 3    ONC INFUSION 2 HR (120 MIN)   2:30 PM   (120 min.)    ONC INFUSION NURSE   [facility] 4       5     6     7     8     9    LAB CENTRAL   9:15 AM   (15 min.)   [facility] LAB DRAW   [facility]    RETURN CCSL   9:45 AM   (45 min.)   Scheierl, Amber J, APRN CNP   [facility] 10     11       12     13     14     15     16     17     18       19     20    RETURN ACTIVE TREATMENT  10:15 AM   (45 min.)   Whit Fish PA-C   [facility]    LAB CENTRAL  12:00 PM   (15 min.)   [facility] LAB DRAW   [facility]    ONC INFUSION 2 HR (120 MIN)  12:30 PM   (120 min.)    ONC INFUSION NURSE   [facility] 21     22     23     24     25       26     27     28                                        March 2023 Sunday Monday Tuesday Wednesday Thursday Friday Saturday                  1     2     3     4       5     6     7     8     9     10     11       12     13     14     15     16     17     18       19     20     21     22     23     24     25       26     27     28     29     30     31                            Lab Results:  Recent Results (from the past 12 hour(s))   Phosphorus    Collection Time: 02/02/23  1:35 PM   Result Value Ref Range    Phosphorus 3.0 2.5 - 4.5 mg/dL   Magnesium    Collection Time: 02/02/23  1:35 PM   Result Value Ref Range    Magnesium 1.9 1.7 - 2.3 mg/dL   Comprehensive metabolic panel    Collection Time: 02/02/23  1:35 PM   Result Value Ref Range    Sodium 137 136 - 145 mmol/L    Potassium 3.5 3.4 - 5.3 mmol/L    Chloride 103 98 - 107 mmol/L    Carbon Dioxide (CO2) 17 (L) 22 - 29 mmol/L    Anion Gap 17 (H) 7 - 15 mmol/L    Urea Nitrogen 10.3 6.0 - 20.0 mg/dL    Creatinine 0.55 (L) 0.67 - 1.17 mg/dL    Calcium 8.8 8.6 - 10.0 mg/dL    Glucose 157 (H) 70 - 99 mg/dL    Alkaline Phosphatase 88 40 - 129 U/L     (H) 10 - 50 U/L    ALT 32 10 - 50 U/L    Protein Total 7.3 6.4 - 8.3 g/dL    Albumin 3.3 (L) 3.5 - 5.2 g/dL    Bilirubin Total 0.4 <=1.2 mg/dL    GFR Estimate >90 >60 mL/min/1.73m2   CBC with platelets and differential    Collection Time: 02/02/23  1:35 PM   Result Value Ref Range    WBC Count 11.3 (H) 4.0 - 11.0 10e3/uL    RBC Count 2.98 (L) 4.40 - 5.90 10e6/uL    Hemoglobin 7.8 (L) 13.3 - 17.7 g/dL    Hematocrit 26.4 (L) 40.0 - 53.0 %    MCV 89 78 - 100 fL    MCH 26.2 (L) 26.5 - 33.0 pg    MCHC 29.5 (L) 31.5 - 36.5 g/dL    RDW 20.3 (H) 10.0 - 15.0 %    Platelet Count 370 150 - 450 10e3/uL   Manual Differential    Collection Time: 02/02/23  1:35 PM   Result Value Ref Range    % Neutrophils 97 %    % Lymphocytes 3 %    % Monocytes 0 %    % Eosinophils 0 %    % Basophils 0 %    Absolute Neutrophils 11.0 (H) 1.6 - 8.3 10e3/uL     Absolute Lymphocytes 0.3 (L) 0.8 - 5.3 10e3/uL    Absolute Monocytes 0.0 0.0 - 1.3 10e3/uL    Absolute Eosinophils 0.0 0.0 - 0.7 10e3/uL    Absolute Basophils 0.0 0.0 - 0.2 10e3/uL    RBC Morphology Confirmed RBC Indices     Platelet Assessment  Automated Count Confirmed. Platelet morphology is normal.     Automated Count Confirmed. Platelet morphology is normal.    RBC Fragments Slight (A) None Seen    Teardrop Cells Moderate (A) None Seen

## 2023-02-02 NOTE — NURSING NOTE
Chief Complaint   Patient presents with     Blood Draw     Labs drawn via  by RN. Vitals taken.     No blood return noted from port. Port heparin locked. Infusion notified. Labs drawn with  by RN. Vitals taken. Patient checked into next appointment.    Almaz Cross RN

## 2023-02-03 NOTE — PROGRESS NOTES
Infusion Nursing Note:  Diego Buchanan presents today for 1 unit PRBCs (not needed), Cycle 2 Day 5 Irinotecan.    Patient seen by provider today: No   present during visit today: Not Applicable.    Note: Robert presents to infusion today feeling well. He denies any increased fatigue, shortness of breath, or feeling dizzy/lightheaded. He denies any concerns, symptoms, or pain today.    Intravenous Access:  Peripheral IV placed by vascular access.  See previous notes regarding port.     Treatment Conditions:  Lab Results   Component Value Date    HGB 8.3 (L) 02/03/2023    WBC 11.7 (H) 02/03/2023    ANEU 11.1 (H) 02/03/2023    ANEUTAUTO 15.8 (H) 01/31/2023     02/03/2023      Lab Results   Component Value Date     02/02/2023    POTASSIUM 3.5 02/02/2023    MAG 1.9 02/02/2023    CR 0.55 (L) 02/02/2023    FAISAL 8.8 02/02/2023    BILITOTAL 0.4 02/02/2023    ALBUMIN 3.3 (L) 02/02/2023    ALT 32 02/02/2023     (H) 02/02/2023     Results reviewed, labs MET treatment parameters, ok to proceed with treatment.  Blood transfusion consent signed 2/3/23.    Post Infusion Assessment:  Patient tolerated infusion without incident.  Blood return noted pre and post infusion.  Site patent and intact, free from redness, edema or discomfort.  No evidence of extravasations.  Access discontinued per protocol.       Neulasta Onpro On-Body injector applied to left arm at 1540 with light facing downward.  Writer discussed Neulasta injection would start on Saturday 2/4 at 1840, approximately 27 hours after application applied today.  Written and Verbal instruction reviewed with patient.  Pt instructed when the dose delivery starts, it will take about 45 minutes to complete.  Pt aware Neulasta Onpro On-Body should have green flashing light and to call triage or on-call MD if injector flashes red or appears to be leaking. Pt aware to keep Onpro On-Body Neulasta 4 inches away from electrical equipment and to avoid  showering 4 hours prior to injection.   Neulasta Onpro Lot number: [id number]        Discharge Plan:   Patient declined prescription refills.  Discharge instructions reviewed with: Patient and mother.  Patient and/or family verbalized understanding of discharge instructions and all questions answered.  Copy of AVS reviewed with patient and/or family.  Patient will return 2/9/23 for next appointment.  Patient discharged in stable condition accompanied by: mother.  Departure Mode: Wheelchair.      Karly Patten RN

## 2023-02-03 NOTE — PATIENT INSTRUCTIONS
Neulasta injection will start on Saturday 2/4 at 6:45pm, approximately 27 hours after application applied today.    When the dose delivery starts, it will take about 45 minutes to complete. Can remove off arm at approximately 730pm or later.   Neulasta Onpro On-Body should have green flashing light.  Call triage or on-call MD if injector flashes red or appears to be leaking.   Keep Onpro On-Body Neulasta 4 inches away from electrical equipment.  Avoid showering 4 hours prior to injection.       Contact Numbers    [facility] Main Line/Triage and after hours / weekends / holidays: [phone]      Please call the triage or after hours line if you experience a temperature greater than or equal to 100.5, shaking chills, have uncontrolled nausea, vomiting and/or diarrhea, dizziness, shortness of breath, chest pain, bleeding, unexplained bruising, or if you have any other new/concerning symptoms, questions or concerns.      If you are having any concerning symptoms or wish to speak to a provider before your next infusion visit, please call your care coordinator or triage to notify them so we can adequately serve you.     If you need a refill on a narcotic prescription or other medication, please call before your infusion appointment.

## 2023-02-09 NOTE — PROGRESS NOTES
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 He presented initially with abdominal pain, diarrhea, and progressive abdominal distention for 3 months duration. 2. Initial CT scan in February 2020 showed severe peritoneal carcinomatosis with large peritoneal tumor implants throughout the entire abdomen and pelvis, but mostly prominently in the pelvis.   2. PETCT scan showed interval increase in the amount of peritoneal carcinomatosis.  3. On 3/12/2020, he had ultrasound-guided core needle biopsy of a peritoneal implant (Case: [id number]), which showed a completely undifferentiated appearance, but stains with pancytokeratin, indicating an epithelial origin. 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 Tumor markers for CA-19-9, CEA, beta-hCG, alpha-fetoprotein were unremarkable. Cancer type ID molecular testing by [employer] sent to determine the exact diagnosis and to assist in further treatment planning. 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 Relative probability of less than 5% of other subtype of sarcoma. EWSR1-WT1 fusion detected.  4. 5/1/2020, MRI brain negative for brain metastasis, and baseline CT-CAP obtained showing extensive mixed solid and cystic masses throughout the abdomen and pelvis consistent with peritoneal carcinomatosis. Largest mass measures approximately 20 cm in the pelvis. Metastatic mixed solid and cystic masses seen in hepatic segments 5 and 6 and hepatic segment 7. The masses appear to be contiguous with the retrohepatic peritoneal carcinomatosis. Small volume fluid about the spleen favored to represent malignant ascites, stable mild-to-moderate right hydronephrosis related to mass effect upon the distal ureter in the pelvis, the IVC and iliac  veins are compressed due to the extensive peritoneal carcinomatosis without findings to suggest deep venous thrombosis.  5. 5/4/2020, he begins CAV/IMV alternating chemotherapy. He receives 6 cycles of treatment. He symptomatically improves.  6. 9/11/2020, CT-CAP shows stable to mildly improved extensive peritoneal carcinomatosis. He would like to omit Ifosfamide/etoposide cycles and continue only with CAV.  7. 10/9/2020, he starts CAV every 21 days.  8. 1/6/2021, CT CAP showed a mixed response in the mixed solid and cystic masses throughout the peritoneum. Some of the tumors are stable to mildly worse, with some of the peritoneal masses a bit larger, a few are smaller, one cystic tumor in the left abdomen is smaller, while tumors lower in the pelvis appear slightly larger.  9. 3/24/2021, CT CAP showed continued slight decrease in overall burden of peritoneal carcinomatosis with persistent encasement of bowel without evidence of bowel obstruction.  10. 6/25/2021, he receives cycle 19 CAV, then takes a chemotherapy holiday.  11. 4/22/2022, he resumes CAV/IMV chemotherapy.  12. 7/13/22, CT CAP showed interval enlargement of hepatic and splenic metastases with other overall disease stable. CAV/IMV continued.  13.  10/04/22, CT CAP  showed evidence of progressive disease, predominantly in liver. There are small sub-centimeter lung nodules, which are suspicious for metastasis. CAV/IMV discontinued. Start irinotecan/temozolomide (TMZ) on 11/28/22.   14. 12/7/22-12/21/22: Hospitalized for neutropenic fever with RSV, rash (drug vs. Viral exanthem), and pancytopenia with transfusions.     Cycle 2 of TMZ/irinotecan has been delayed due to hospitalization and need for additional recovery (ongoing fatigue and diarrhea) as well as planned port placement. Following port placement, treatment has been delayed an additional week s/t self-cancelled appointment as a result of pain after port placement.    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     Patient reports that he has been having worsening left-sided abdominal pain that is sometimes in the left upper abdomen and sometimes in the left lower abdomen.  His pain has felt worse since chemotherapy and he describes it as an intermittent stabbing pain.  He notes the pain is worse when he tries to have a bowel movement.  He continues to have 4-5 liquid stools per day which he reports is no different than his baseline.  He has been taking 1-2 tramadol per day with minimal relief.  He is not eating or drinking much due to concern that he will have a bowel movement and then have more pain.  He reports feeling weak and fatigued.  He is spending a lot of time in bed.  He has not been playing his video game since he received his last cycle of chemo.  He denies fevers, nausea, or any urinary symptoms.  He denies blood in his urine.  He has been drinking about 2 bottles of water per day.    Current Outpatient Medications   Medication Sig Dispense Refill     magnesium oxide (MAG-OX) 400 MG tablet Take 1 tablet (400 mg) by mouth 2 times daily 28 tablet 0     metoprolol tartrate (LOPRESSOR) 25 MG tablet Take 1 tablet (25 mg) by mouth 2 times daily 60 tablet 1     potassium chloride ER (KLOR-CON M) 20 MEQ CR tablet Take 1 tablet (20 mEq) by mouth 2 times daily 28 tablet 0     traMADol (ULTRAM) 50 MG tablet Take 1 tablet (50 mg) by mouth every 6 hours as needed for severe pain (7-10) 21 tablet 0     acetaminophen (TYLENOL) 325 MG tablet Take 2 tablets (650 mg) by mouth every 4 hours as needed for mild pain or fever (Patient not taking: Reported on 1/30/2023) 30 tablet 0     loperamide (IMODIUM) 2 MG capsule Take 1 capsule (2 mg) by mouth 4 times daily (Patient not taking: Reported on 1/30/2023) 60 capsule 0     metoprolol tartrate (LOPRESSOR) 25 MG tablet Take 1 tablet (25 mg) by mouth once for 1 dose 1 tablet 0     potassium  "chloride ER (K-TAB) 20 MEQ CR tablet Take 1 tablet (20 mEq) by mouth daily Until follow up on week of 1/23/23 (Patient not taking: Reported on 1/30/2023) 7 tablet 1     temozolomide (TEMODAR) 250 MG capsule Take 1 capsule (250 mg) by mouth daily for 5 days , about 45 minutes after taking Zofran on chemotherapy days. (Patient not taking: Reported on 1/30/2023) 5 capsule 0     temozolomide (TEMODAR) 250 MG capsule Take 1 capsule (250 mg) by mouth daily for 5 days , about 45 minutes after taking Zofran on chemotherapy days. 5 capsule 0     triamcinolone (KENALOG) 0.1 % external ointment Apply topically 2 times daily as needed for irritation (Patient not taking: Reported on 1/30/2023) 15 g 0     Physical Exam:  /74   Pulse (!) 147   Temp 97.9  F (36.6  C) (Oral)   Resp 16   Ht 1.829 m (6' 0.01\")   SpO2 98%   BMI 33.21 kg/m    Wt Readings from Last 10 Encounters:   02/02/23 111.1 kg (244 lb 14.4 oz)   01/31/23 109.6 kg (241 lb 11.2 oz)   01/30/23 109.3 kg (240 lb 14.4 oz)   01/30/23 109.3 kg (240 lb 15.4 oz)   01/20/23 111.4 kg (245 lb 9.5 oz)   01/18/23 110.8 kg (244 lb 4.8 oz)   01/16/23 111.3 kg (245 lb 4.8 oz)   01/06/23 113.8 kg (250 lb 12.8 oz)   01/02/23 113.4 kg (250 lb)   12/20/22 113.5 kg (250 lb 3.6 oz)   General: Mildly-ill appearing male in no acute distress. Here today in a wheelchair.   Eyes: EOMI. No scleral icterus.  Cardiovascular: Tachycardic. Rhythm regular. No peripheral edema.  Respiratory: CTA bilaterally. No wheezes or crackles.   Gastrointestinal: Abdomen rounded, distended, reports mild tenderness to palpation throughout, moderate tenderness in the left upper and lower quadrants. No guarding. Bowel sounds present.   Neurologic: Cranial nerves II through XII are grossly intact.  Skin: No rashes, petechiae, or bruising noted on exposed skin.  Psych: Affect somewhat flat.     Labs:    02/10/23 10:17   Sodium 131 (L)   Potassium 4.2   Chloride 96 (L)   Carbon Dioxide (CO2) 19 (L)   Urea " Nitrogen 16.0   Creatinine 1.11   GFR Estimate >90   Calcium 9.5   Anion Gap 16 (H)   Magnesium 2.0   Phosphorus 4.0   Albumin 4.0   Protein Total 8.3   Alkaline Phosphatase 114   ALT 22   AST 10   Bilirubin Total 1.3 (H)   Glucose 131 (H)      02/10/23 12:36   WBC 0.2 (LL)   Hemoglobin 6.8 (LL)   Hematocrit 21.4 (L)   Platelet Count 30 (LL)   RBC Count 2.58 (L)   MCV 83   MCH 26.4 (L)   MCHC 31.8   RDW 16.9 (H)   RBC Morphology Confirmed RBC Indices   Platelet Morphology Automated Count Confirmed. Platelet morphology is normal.   Teardrop Cells Slight !   I reviewed the above labs today.    ASSESSMENT AND PLAN  Abdominal bloating/pain. Pain has gotten worse over the last week. I am suspicious this is related to disease progression and will proceed with a CT CAP today to further assess. Will plan to give 1L IV NS and Dilaudid 0.4 mg IV. If Dilaudid works well for him, will plan to switch po tramadol to po Dilaudid.    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 Patient is currently on treatment with irinotecan and Temodar. With cycle 2, which began on 1/30/23, irinotecan was dose reduced by 10% due to prolonged neutropenia.     History of hypokalemia and hypomagnesemia. Both are normal today. Will continue on magnesium 400 mg bid and potassium 20 mEQ bid.     HTN Tachycardia. Longstanding issue, likely multifactorial in setting of metastatic disease. Reports he did not take his metoprolol 25 mg this morning. Reiterated the need to take this medication.    Pancytopenia. Secondary to chemotherapy. Will transfuse as needed.     Yasmine Mckeon PA-C  [facility]  [address] [place] [address]  [phone]    75 minutes spent on the date of the encounter doing chart review, review of test results, interpretation of tests, patient visit and documentation     Addendum: Initial review of CT concerning for SBO. Subsequent review  shows no SBO. Sent to the ED given inability to take in sufficient po food/fluids.

## 2023-02-10 PROBLEM — K56.609 SBO (SMALL BOWEL OBSTRUCTION) (H): Status: ACTIVE | Noted: 2023-01-01

## 2023-02-10 NOTE — PROGRESS NOTES
Infusion Nursing Note:  Diego Buchanan presents today for IVF and pain meds.    Patient seen by provider today: Yes: KATHLEEN Peña   present during visit today: Not Applicable.    Note:   TORB: 2/10/23 @ 1230 KATHLEEN Peña/Lissette Oneal RN - Just saw patient in clinic after his scan. Please give 1L IVF, pain meds, draw CBC and have patient transport via [facility] to ED for possible bowel obstruction.    Pt rating abdominal pain 8/10 upon arrival to infusion suite. After IV Dilaudid, patient reported a decrease in his pain level to 6/10 and was able to rest comfortably.     Intravenous Access:  Peripheral IV placed.    Treatment Conditions:  Lab Results   Component Value Date    HGB 6.8 (LL) 02/10/2023    WBC 0.2 (LL) 02/10/2023    ANEU 11.1 (H) 02/03/2023    ANEUTAUTO 15.8 (H) 01/31/2023    PLT 30 (LL) 02/10/2023      Lab Results   Component Value Date     (L) 02/10/2023    POTASSIUM 4.2 02/10/2023    MAG 2.0 02/10/2023    CR 1.11 02/10/2023    FAISAL 9.5 02/10/2023    BILITOTAL 1.3 (H) 02/10/2023    ALBUMIN 4.0 02/10/2023    ALT 22 02/10/2023    AST 10 02/10/2023     Post Infusion Assessment:  Patient tolerated infusion without incident.  Site patent and intact, free from redness, edema or discomfort.  No evidence of extravasations.   IV access left intact for hospital use    Discharge Plan:   Patient declined prescription refills.  AVS to patient via [facility].  Patient will return 2/20/23 for next appointment.   Patient discharged in stable condition accompanied by: sister.  Departure Mode: via [facility] transport.      Margaret Oneal RN

## 2023-02-10 NOTE — Clinical Note
2/10/2023         RE: Diego Buchanan  [address]  [place] [address]        Dear Colleague,    Thank you for referring your patient, Diego Buchanan, to the [facility]. Please see a copy of my visit note below.    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 He presented initially with abdominal pain, diarrhea, and progressive abdominal distention for 3 months duration. 2. Initial CT scan in February 2020 showed severe peritoneal carcinomatosis with large peritoneal tumor implants throughout the entire abdomen and pelvis, but mostly prominently in the pelvis.   2. PETCT scan showed interval increase in the amount of peritoneal carcinomatosis.  3. On 3/12/2020, he had ultrasound-guided core needle biopsy of a peritoneal implant (Case: [id number]), which showed a completely undifferentiated appearance, but stains with pancytokeratin, indicating an epithelial origin. 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 Tumor markers for CA-19-9, CEA, beta-hCG, alpha-fetoprotein were unremarkable. Cancer type ID molecular testing by [facility] sent to determine the exact diagnosis and to assist in further treatment planning. 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 Relative probability of less than 5% of other subtype of sarcoma. EWSR1-WT1 fusion detected.  4. 5/1/2020, MRI brain negative for brain metastasis, and baseline CT-CAP obtained showing extensive mixed solid and cystic masses throughout the abdomen and pelvis consistent with peritoneal carcinomatosis. Largest mass measures approximately 20 cm in the pelvis. Metastatic mixed solid and cystic masses seen in hepatic segments 5 and 6 and hepatic segment 7. The masses appear to be contiguous  with the retrohepatic peritoneal carcinomatosis. Small volume fluid about the spleen favored to represent malignant ascites, stable mild-to-moderate right hydronephrosis related to mass effect upon the distal ureter in the pelvis, the IVC and iliac veins are compressed due to the extensive peritoneal carcinomatosis without findings to suggest deep venous thrombosis.  5. 5/4/2020, he begins CAV/IMV alternating chemotherapy. He receives 6 cycles of treatment. He symptomatically improves.  6. 9/11/2020, CT-CAP shows stable to mildly improved extensive peritoneal carcinomatosis. He would like to omit Ifosfamide/etoposide cycles and continue only with CAV.  7. 10/9/2020, he starts CAV every 21 days.  8. 1/6/2021, CT CAP showed a mixed response in the mixed solid and cystic masses throughout the peritoneum. Some of the tumors are stable to mildly worse, with some of the peritoneal masses a bit larger, a few are smaller, one cystic tumor in the left abdomen is smaller, while tumors lower in the pelvis appear slightly larger.  9. 3/24/2021, CT CAP showed continued slight decrease in overall burden of peritoneal carcinomatosis with persistent encasement of bowel without evidence of bowel obstruction.  10. 6/25/2021, he receives cycle 19 CAV, then takes a chemotherapy holiday.  11. 4/22/2022, he resumes CAV/IMV chemotherapy.  12. 7/13/22, CT CAP showed interval enlargement of hepatic and splenic metastases with other overall disease stable. CAV/IMV continued.  13.  10/04/22, CT CAP  showed evidence of progressive disease, predominantly in liver. There are small sub-centimeter lung nodules, which are suspicious for metastasis. CAV/IMV discontinued. Start irinotecan/temozolomide (TMZ) on 11/28/22.   14. 12/7/22-12/21/22: Hospitalized for neutropenic fever with RSV, rash (drug vs. Viral exanthem), and pancytopenia with transfusions.     Cycle 2 of TMZ/irinotecan has been delayed due to hospitalization and need for additional  recovery (ongoing fatigue and diarrhea) as well as planned port placement. Following port placement, treatment has been delayed an additional week s/t self-cancelled appointment as a result of pain after port placement.    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     Patient reports that he has been having worsening left-sided abdominal pain that is sometimes in the left upper abdomen and sometimes in the left lower abdomen.  His pain has felt worse since chemotherapy and he describes it as an intermittent stabbing pain.  He notes the pain is worse when he tries to have a bowel movement.  He continues to have 4-5 liquid stools per day which he reports is no different than his baseline.  He has been taking 1-2 tramadol per day with minimal relief.  He is not eating or drinking much due to concern that he will have a bowel movement and then have more pain.  He reports feeling weak and fatigued.  He is spending a lot of time in bed.  He has not been playing his video game since he received his last cycle of chemo.  He denies fevers, nausea, or any urinary symptoms.  He denies blood in his urine.  He has been drinking about 2 bottles of water per day.    Current Outpatient Medications   Medication Sig Dispense Refill     magnesium oxide (MAG-OX) 400 MG tablet Take 1 tablet (400 mg) by mouth 2 times daily 28 tablet 0     metoprolol tartrate (LOPRESSOR) 25 MG tablet Take 1 tablet (25 mg) by mouth 2 times daily 60 tablet 1     potassium chloride ER (KLOR-CON M) 20 MEQ CR tablet Take 1 tablet (20 mEq) by mouth 2 times daily 28 tablet 0     traMADol (ULTRAM) 50 MG tablet Take 1 tablet (50 mg) by mouth every 6 hours as needed for severe pain (7-10) 21 tablet 0     acetaminophen (TYLENOL) 325 MG tablet Take 2 tablets (650 mg) by mouth every 4 hours as needed for mild pain or fever (Patient not taking: Reported on 1/30/2023) 30 tablet 0     loperamide (IMODIUM)  "2 MG capsule Take 1 capsule (2 mg) by mouth 4 times daily (Patient not taking: Reported on 1/30/2023) 60 capsule 0     metoprolol tartrate (LOPRESSOR) 25 MG tablet Take 1 tablet (25 mg) by mouth once for 1 dose 1 tablet 0     potassium chloride ER (K-TAB) 20 MEQ CR tablet Take 1 tablet (20 mEq) by mouth daily Until follow up on week of 1/23/23 (Patient not taking: Reported on 1/30/2023) 7 tablet 1     temozolomide (TEMODAR) 250 MG capsule Take 1 capsule (250 mg) by mouth daily for 5 days , about 45 minutes after taking Zofran on chemotherapy days. (Patient not taking: Reported on 1/30/2023) 5 capsule 0     temozolomide (TEMODAR) 250 MG capsule Take 1 capsule (250 mg) by mouth daily for 5 days , about 45 minutes after taking Zofran on chemotherapy days. 5 capsule 0     triamcinolone (KENALOG) 0.1 % external ointment Apply topically 2 times daily as needed for irritation (Patient not taking: Reported on 1/30/2023) 15 g 0     Physical Exam:  /74   Pulse (!) 147   Temp 97.9  F (36.6  C) (Oral)   Resp 16   Ht 1.829 m (6' 0.01\")   SpO2 98%   BMI 33.21 kg/m    Wt Readings from Last 10 Encounters:   02/02/23 111.1 kg (244 lb 14.4 oz)   01/31/23 109.6 kg (241 lb 11.2 oz)   01/30/23 109.3 kg (240 lb 14.4 oz)   01/30/23 109.3 kg (240 lb 15.4 oz)   01/20/23 111.4 kg (245 lb 9.5 oz)   01/18/23 110.8 kg (244 lb 4.8 oz)   01/16/23 111.3 kg (245 lb 4.8 oz)   01/06/23 113.8 kg (250 lb 12.8 oz)   01/02/23 113.4 kg (250 lb)   12/20/22 113.5 kg (250 lb 3.6 oz)   General: Mildly-ill appearing male in no acute distress. Here today in a wheelchair.   Eyes: EOMI. No scleral icterus.  Cardiovascular: Tachycardic. Rhythm regular. No peripheral edema.  Respiratory: CTA bilaterally. No wheezes or crackles.   Gastrointestinal: Abdomen rounded, distended, reports mild tenderness to palpation throughout, moderate tenderness in the left upper and lower quadrants. No guarding. Bowel sounds present.   Neurologic: Cranial nerves II " through XII are grossly intact.  Skin: No rashes, petechiae, or bruising noted on exposed skin.  Psych: Affect somewhat flat.     Labs:   Most Recent 3 CBC's:  Recent Labs   Lab Test 02/03/23  1406 02/02/23  1335 01/31/23  1343 01/20/23  1515 01/16/23  0856 01/06/23  0941 01/02/23  0701   WBC 11.7* 11.3* 18.0*   < > 20.6*   < > 10.7   HGB 8.3* 7.8* 8.1*   < > 8.9*   < > 8.3*   MCV 88 89 90   < > 87   < > 86    370 435   < > 521*   < > 476*   ANEUTAUTO  --   --  15.8*  --  15.8*  --  8.0    < > = values in this interval not displayed.     Most Recent 3 BMP's:  Recent Labs   Lab Test 02/02/23  1335 01/31/23  1343 01/30/23  1104    142 140   POTASSIUM 3.5 3.4 2.8*   CHLORIDE 103 105 104   CO2 17* 25 22   BUN 10.3 6.0 2.6*   CR 0.55* 0.60* 0.55*   ANIONGAP 17* 12 14   FAISAL 8.8 9.1 8.8   * 117* 128*   PROTTOTAL 7.3 7.6 7.8   ALBUMIN 3.3* 3.4* 3.4*    Most Recent 3 LFT's:  Recent Labs   Lab Test 02/02/23  1335 01/31/23  1343 01/30/23  1104   * 19 18   ALT 32 5* <5*   ALKPHOS 88 73 73   BILITOTAL 0.4 0.3 0.5     Magnesium   Date Value Ref Range Status   02/10/2023 2.0 1.7 - 2.3 mg/dL Final   02/02/2023 1.9 1.7 - 2.3 mg/dL Final   01/31/2023 1.9 1.7 - 2.3 mg/dL Final   01/30/2023 1.6 (L) 1.7 - 2.3 mg/dL Final   06/10/2021 2.2 1.6 - 2.3 mg/dL Final   06/06/2021 2.0 1.6 - 2.3 mg/dL Final   04/29/2021 2.3 1.6 - 2.3 mg/dL Final   04/27/2021 2.2 1.6 - 2.3 mg/dL Final     Phosphorus   Date Value Ref Range Status   02/10/2023 4.0 2.5 - 4.5 mg/dL Final   02/02/2023 3.0 2.5 - 4.5 mg/dL Final   01/31/2023 3.1 2.5 - 4.5 mg/dL Final   01/30/2023 2.7 2.5 - 4.5 mg/dL Final   06/10/2021 3.4 2.5 - 4.5 mg/dL Final   06/06/2021 3.8 2.5 - 4.5 mg/dL Final   04/29/2021 3.7 2.5 - 4.5 mg/dL Final   04/27/2021 4.2 2.5 - 4.5 mg/dL Final   I reviewed the above labs today.    Imaging:  XR Chest 2 Views  Narrative: EXAM: XR CHEST 2 VIEWS  1/31/2023 1:23 PM      HISTORY: verify port placement and assess if migration or has  flipped,  it was on 1/20, now not getting blood return and not able to access;  Encounter for care related to vascular access port    COMPARISON: Overlying chest x-ray 1/16/2023, fluoroscopic images  1/20/2023, CT chest 12/1/22    FINDINGS: Two views of the chest. Right chest wall Port-A-Cath appears  kinked in the area of entry into the subclavian vein with tip  projecting over the innominate vein confluence. Trachea is midline.  Cardiac silhouette is stable. Stable low lung volumes. Left hilar  nodule corresponds to enlarged hilar node on 12/1/22 CT. Streaky  perihilar opacities are largely unchanged. No pleural effusion or  pneumothorax. The upper abdomen is stable.  Impression: IMPRESSION:   1. Right chest wall Port-A-Cath trip projects over the innominate vein  confluence.  2. Continued low lung volumes and streaky perihilar opacities, likely  atelectasis.    I have personally reviewed the examination and initial interpretation  and I agree with the findings.    RODOLFO MORRIS MD         SYSTEM ID:  [id number]    I reviewed the above imaging today.      ASSESSMENT AND PLAN  Abdominal bloating/pain. Pain has gotten worse over the last week. I am suspicious this is related to disease progression and will proceed with a CT CAP today to further assess. Will plan to give 1L IV NS and Dilaudid 0.4 mg IV. If Dilaudid works well for him, will plan to switch po tramadol to po Dilaudid.    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 Patient is currently on treatment with irinotecan and Temodar. With cycle 2, which began on 1/30/23, irinotecan was dose reduced by 10% due to prolonged neutropenia.     History of hypokalemia and hypomagnesemia. Both are normal today. Will continue on magnesium 400 mg bid and potassium 20 mEQ bid.     HTN Tachycardia  Longstanding issue, likely multifactorial in setting of metastatic disease. EKG today with preliminary  review showing sinus tachycardia. Ranges from low 100's to 150's. Reports he did not take his metoprolol 25 mg this morning. Will give in clinic and then recheck 1 hour later. Reiterated the need to take this medication.    #Anemia  Waxes and wanes. Largely stable at 8.2. Previously checked iron studies and soluble trasnferrin receptor. Labs seem to be most representative of anemia of chronic disease. Transfuse pRBC for hb < 7 or as indicated pending symptoms.. Peripheral smear on 12/15 with no evidence of hemolysis.     #Port malpositioning  CXR 11/16/22 showing right port with tip in the low cervical internal jugular. Port exchanged on 1/20/23. No blood return today, will have nurse administer alteplase to port.    ADDENDUM: Port with no blood return post alteplase. RN's have evaluated and are questioning whether port has turned. Will obtain x-ray to verify positioning.      ADDENDUM: Discussed chest x-ray findings (kinked in the area of entry into the subclavian vein with tip  projecting over the innominate vein confluence)  regarding port placement with thoracic surgery on call, Dr. Cota. Per her recommendation, Ok to use port as long as nursing is not having significant difficulty accessing or getting blood return.     #Leukocytosis   -Work up with UA, CXR and blood cultures done on 1/16/23 without any source of infection identified. WBC stable today. In the absence of fever, no increase in WBC, and no new or worsening focal symptoms, I suspect this is secondary to disease/inflammtory in nature.   -If worsening clinical or laboratory finding, could consider abdominal imaging (to rule out SBP)    Yasmine Mckeon PA-C  [facility]  [address] [place] [id number] [phone]    *** minutes spent on the date of the encounter doing {2021 E&M time in:[id number]}         Again, thank you for allowing me to participate in the care of your patient.        Sincerely,        Yasmine Mckeon PA-C

## 2023-02-10 NOTE — H&P
Resident/Fellow Attestation   I, David Ramon MD, was present with the medical/LISY student who participated in the service and in the documentation of the note.  I have verified the history and personally performed the physical exam and medical decision making.  I agree with the assessment and plan of care as documented in the note.      David Ramon MD  PGY1  Date of Service (when I saw the patient): 02/10/23    [facility]    History and Physical - Medicine Service, [facility] TEAM 1       Date of Admission:  2/10/2023    Assessment & Plan      Diego Buchanan is a 27 year old M with a 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 The pain has acutely worsened over the last week and is thought to be 2/2 tumor growth. Patient is currently on treatment with irinotecan and Temodar with cycle 2 beginning 1/30/23. Condition complicated by dehydration and malnutrition.      Abdominal pain and altered bowel habits 2/2 progression of malignancy  Dehydration 2/2 decreased PO intake   Hyponatremia 2/2 decreased PO intake  Malnutrition 2/2 decreased PO intake  3 months of abd pain since 1st cycle of new chemo. Second cycle of irinotecan/temozolomide chemo began 1/30/23. Pain is worse with bowel movements and thus pt has been avoiding eating and drinking as much.   -PTA PO tramadol with Dilaudid as backup  -Continue maintenance fluids 125ml/hr  -Nutrition consult  -Blood culture pending    Metastatic desmoplastic small round cell tumor  CT abd/pelvis showing extensive progression of dz. Will plan to follow heme/onc recs regarding cancer care.   -Heme/onc consult    Diarrhea  Likely secondary to chemo but would like to rule out c diff and other infectious causes.  -c diff and enteric panel pending     Anemia and thrombocytopenia 2/2 chemotherapy  Baseline thought to be  "around 8.2.Most likely anemia of chronic disease. Transfuse pRBC for hb < 7 or as indicated pending symptoms. Platelet transfusion as needed. Peripheral smear on 12/15 with no evidence of hemolysis.   -Transfuse 1 unit pRBCs today  -Hgb check in AM     Leukopenia 2/2 chemotherapy   In the absence of fever, no increase in WBC, no current concern for infection.   -Neutropenic precautions  -close monitoring of vital signs  -low threshold to start abx       ----------chronic medical problems-------------    Hypokalemia  Hypomanesmia  History of hypokalemia and hypomagnesemia. Both are normal today. Will continue on magnesium 400 mg bid and potassium 20 mEQ bid.      HTN   Tachycardia  Longstanding issue, multifactorial in setting of metastatic disease. EKG today shows sinus tachycardia. HR ranges from low 100's to 150's. Did not take his metoprolol 25 mg this morning but received in clinic.   -continue PTA metoprolol     Port malpositioning  Port exchanged on 1/20/23. Kinked in the area of entry into the subclavian vein with tip  projecting over the innominate vein confluence regarding port placement with thoracic surgery on call, Dr. Cota. Per her recommendation-- Ok to use port as long as nursing is not having significant difficulty accessing or getting blood return.      Diet:  reg adult diet as tolerated  DVT Prophylaxis:  Pneumatic compression devices  Busby Catheter: Not present  Fluids: NS 125cc/hr  Lines: PRESENT             Cardiac Monitoring: None  Code Status:  Full code, discussed with pt and family    Clinically Significant Risk Factors Present on Admission                # Thrombocytopenia: Lowest platelets = 30 in last 2 days, will monitor for bleeding        # Obesity: Estimated body mass index is 39.38 kg/m  as calculated from the following:    Height as of this encounter: 1.676 m (5' 6\").    Weight as of this encounter: 110.7 kg (244 lb).           Disposition Plan      Expected Discharge Date: " 02/14/2023                The patient's care was discussed with the Attending Physician, Dr. De La Garza.      Adrianne Burtnd  Medical Student  Medicine Service, [facility] TEAM 1  [facility]  Securely message with [employer] (more info)  Text page via [facility] Paging/Directory   See signed in provider for up to date coverage information  ______________________________________________________________________    Chief Complaint   Abdominal pain and bloating.     History of Present Illness   Diego Buchanan is a 27 year old male with a past medical history of desmoplastic small round cell tumor, peritoneal carcinomatosis, HTN, hyperlipidemia, neutropenic fever who presents to the emergency department with a 1 week of severe, 8/10 abdominal pain. He has had similar abdominal pain the past, but never this severe. Nothing seems to helps the pain and he has not been eating as having bowel movement makes the pain worse. He localizes the worst pain to the left upper quadrant. The patient was sent from clinic after a CT of the abdomen done to evaluate the patient for abdominal pain revealed a bowel obstruction, later determined to be compression of the large bowel by tumor. The patient has also been having diarrhea, 4-5 loose bowel movements daily. He denies any associated nausea. He is afebrile. Improved with Dilaudid given in clinic, but patient states his pain is starting to return.       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     Peritoneal carcinomatosis (H)        Past Surgical History   Past Surgical History:   Procedure Laterality Date     BIOPSY      liver     INSERT PORT VASCULAR ACCESS Right 4/21/2022    Procedure: INSERTION, VASCULAR ACCESS PORT;  Surgeon: Daniel Sarmiento MD;  Location: [facility] OR     INSERT PORT VASCULAR ACCESS N/A 1/20/2023    Procedure: Right Port Removal and Right  Subclavian Powerport replacement and Flouroscopy.;  Surgeon: Caleb Brady MD;  Location: [facility] OR     IR CHEST PORT PLACEMENT > 5 YRS OF AGE  4/21/2022     IR CVC TUNNEL PLACEMENT > 5 YRS OF AGE  4/29/2020     IR CVC TUNNEL REMOVAL RIGHT  11/16/2021      MUSCLE BIOPSY  3/12/2020       Prior to Admission Medications   Prior to Admission Medications   Prescriptions Last Dose Informant Patient Reported? Taking?   acetaminophen (TYLENOL) 325 MG tablet  Self No No   Sig: Take 2 tablets (650 mg) by mouth every 4 hours as needed for mild pain or fever   Patient not taking: Reported on 1/30/2023   loperamide (IMODIUM) 2 MG capsule   No No   Sig: Take 1 capsule (2 mg) by mouth 4 times daily   Patient not taking: Reported on 1/30/2023   magnesium oxide (MAG-OX) 400 MG tablet   No No   Sig: Take 1 tablet (400 mg) by mouth 2 times daily   metoprolol tartrate (LOPRESSOR) 25 MG tablet   No No   Sig: Take 1 tablet (25 mg) by mouth 2 times daily   metoprolol tartrate (LOPRESSOR) 25 MG tablet   No No   Sig: Take 1 tablet (25 mg) by mouth once for 1 dose   potassium chloride ER (K-TAB) 20 MEQ CR tablet   No No   Sig: Take 1 tablet (20 mEq) by mouth daily Until follow up on week of 1/23/23   Patient not taking: Reported on 1/30/2023   potassium chloride ER (KLOR-CON M) 20 MEQ CR tablet   No No   Sig: Take 1 tablet (20 mEq) by mouth 2 times daily   temozolomide (TEMODAR) 250 MG capsule   No No   Sig: Take 1 capsule (250 mg) by mouth daily for 5 days , about 45 minutes after taking Zofran on chemotherapy days.   temozolomide (TEMODAR) 250 MG capsule   No No   Sig: Take 1 capsule (250 mg) by mouth daily for 5 days , about 45 minutes after taking Zofran on chemotherapy days.   Patient not taking: Reported on 1/30/2023   traMADol (ULTRAM) 50 MG tablet   No No   Sig: Take 1 tablet (50 mg) by mouth every 6 hours as needed for severe pain (7-10)   triamcinolone (KENALOG) 0.1 % external ointment   No No   Sig: Apply topically 2  times daily as needed for irritation   Patient not taking: Reported on 1/30/2023      Facility-Administered Medications: None        Physical Exam   Vital Signs: Temp: 98.6  F (37  C) Temp src: Temporal BP: 117/80 Pulse: (!) 131   Resp: 16 SpO2: 100 %      Weight: 244 lbs 0 oz    General: Mildly-ill appearing male in no acute distress. Seen laying in bed, appears tired.  Eyes: No scleral icterus.  Cardiovascular: Tachycardic. Rhythm regular. No peripheral edema.  Respiratory: clear to auscultation bilaterally. No wheezes or crackles.   Gastrointestinal: Abdomen rounded, distended, reports mild tenderness to palpation throughout, moderate tenderness in the left upper and lower quadrants. No guarding. Bowel sounds present. Abdomen is firm.  Skin: No rashes, petechiae, or bruising noted on exposed skin.  Psych: Affect somewhat flat.    Data     Most Recent 3 CBC's:Recent Labs   Lab Test 02/10/23  1236 02/03/23  1406 02/02/23  1335   WBC 0.2* 11.7* 11.3*   HGB 6.8* 8.3* 7.8*   MCV 83 88 89   PLT 30* 350 370     Most Recent 3 BMP's:Recent Labs   Lab Test 02/10/23  1017 02/02/23  1335 01/31/23  1343   * 137 142   POTASSIUM 4.2 3.5 3.4   CHLORIDE 96* 103 105   CO2 19* 17* 25   BUN 16.0 10.3 6.0   CR 1.11 0.55* 0.60*   ANIONGAP 16* 17* 12   FAISAL 9.5 8.8 9.1   * 157* 117*     Most Recent 3 Hemoglobins:Recent Labs   Lab Test 02/10/23  1236 02/03/23  1406 02/02/23  1335   HGB 6.8* 8.3* 7.8*

## 2023-02-10 NOTE — LETTER
Recipient:  Jc Glass MD  [facility]  [address] [place] [address]  Ph: [phone], Fax: [phone]        Sender:    ERICKA Barker, [facility]  [address]   [email or web address]  Office: [phone]   Pager: [phone]      Date: February 20, 2023  Patient Name:  Diego Buchanan  Patient YOB: 1995  Routing Message:          The documents accompanying this notice contain confidential information belonging to the sender.  This information is intended only for the use of the individual or entity named above.  The authorized recipient of this information is prohibited from disclosing this information to any other party and is required to destroy the information after its stated need has been fulfilled, unless otherwise required by state law.    If you are not the intended recipient, you are hereby notified that any disclosure, copy, distribution or action taken in reliance on the contents of these documents is strictly prohibited.  If you have received this document in error, please return it by fax to [phone] with a note on the cover sheet explaining why you are returning it (e.g. not your patient, not your provider, etc.).  If you need further assistance, please call .  Documents may also be returned by mail to Health Information Management, , [facility] [address], [place], [place] [address].

## 2023-02-10 NOTE — LETTER
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 He presented initially with abdominal pain, diarrhea, and progressive abdominal distention for 3 months duration. 2. Initial CT scan in February 2020 showed severe peritoneal carcinomatosis with large peritoneal tumor implants throughout the entire abdomen and pelvis, but mostly prominently in the pelvis.   2. PETCT scan showed interval increase in the amount of peritoneal carcinomatosis.  3. On 3/12/2020, he had ultrasound-guided core needle biopsy of a peritoneal implant (Case: [id number]), which showed a completely undifferentiated appearance, but stains with pancytokeratin, indicating an epithelial origin. 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 Tumor markers for CA-19-9, CEA, beta-hCG, alpha-fetoprotein were unremarkable. Cancer type ID molecular testing by [facility] sent to determine the exact diagnosis and to assist in further treatment planning. 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 Relative probability of less than 5% of other subtype of sarcoma. EWSR1-WT1 fusion detected.  4. 5/1/2020, MRI brain negative for brain metastasis, and baseline CT-CAP obtained showing extensive mixed solid and cystic masses throughout the abdomen and pelvis consistent with peritoneal carcinomatosis. Largest mass measures approximately 20 cm in the pelvis. Metastatic mixed solid and cystic masses seen in hepatic segments 5 and 6 and hepatic segment 7. The masses appear to be contiguous with the retrohepatic peritoneal carcinomatosis. Small volume fluid about the spleen favored to represent malignant ascites, stable mild-to-moderate right  hydronephrosis related to mass effect upon the distal ureter in the pelvis, the IVC and iliac veins are compressed due to the extensive peritoneal carcinomatosis without findings to suggest deep venous thrombosis.  5. 5/4/2020, he begins CAV/IMV alternating chemotherapy. He receives 6 cycles of treatment. He symptomatically improves.  6. 9/11/2020, CT-CAP shows stable to mildly improved extensive peritoneal carcinomatosis. He would like to omit Ifosfamide/etoposide cycles and continue only with CAV.  7. 10/9/2020, he starts CAV every 21 days.  8. 1/6/2021, CT CAP showed a mixed response in the mixed solid and cystic masses throughout the peritoneum. Some of the tumors are stable to mildly worse, with some of the peritoneal masses a bit larger, a few are smaller, one cystic tumor in the left abdomen is smaller, while tumors lower in the pelvis appear slightly larger.  9. 3/24/2021, CT CAP showed continued slight decrease in overall burden of peritoneal carcinomatosis with persistent encasement of bowel without evidence of bowel obstruction.  10. 6/25/2021, he receives cycle 19 CAV, then takes a chemotherapy holiday.  11. 4/22/2022, he resumes CAV/IMV chemotherapy.  12. 7/13/22, CT CAP showed interval enlargement of hepatic and splenic metastases with other overall disease stable. CAV/IMV continued.  13.  10/04/22, CT CAP  showed evidence of progressive disease, predominantly in liver. There are small sub-centimeter lung nodules, which are suspicious for metastasis. CAV/IMV discontinued. Start irinotecan/temozolomide (TMZ) on 11/28/22.   14. 12/7/22-12/21/22: Hospitalized for neutropenic fever with RSV, rash (drug vs. Viral exanthem), and pancytopenia with transfusions.     Cycle 2 of TMZ/irinotecan has been delayed due to hospitalization and need for additional recovery (ongoing fatigue and diarrhea) as well as planned port placement. Following port placement, treatment has been delayed an additional week s/t  self-cancelled appointment as a result of pain after port placement.    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     Patient reports that he has been having worsening left-sided abdominal pain that is sometimes in the left upper abdomen and sometimes in the left lower abdomen.  His pain has felt worse since chemotherapy and he describes it as an intermittent stabbing pain.  He notes the pain is worse when he tries to have a bowel movement.  He continues to have 4-5 liquid stools per day which he reports is no different than his baseline.  He has been taking 1-2 tramadol per day with minimal relief.  He is not eating or drinking much due to concern that he will have a bowel movement and then have more pain.  He reports feeling weak and fatigued.  He is spending a lot of time in bed.  He has not been playing his video game since he received his last cycle of chemo.  He denies fevers, nausea, or any urinary symptoms.  He denies blood in his urine.  He has been drinking about 2 bottles of water per day.    Current Outpatient Medications   Medication Sig Dispense Refill     magnesium oxide (MAG-OX) 400 MG tablet Take 1 tablet (400 mg) by mouth 2 times daily 28 tablet 0     metoprolol tartrate (LOPRESSOR) 25 MG tablet Take 1 tablet (25 mg) by mouth 2 times daily 60 tablet 1     potassium chloride ER (KLOR-CON M) 20 MEQ CR tablet Take 1 tablet (20 mEq) by mouth 2 times daily 28 tablet 0     traMADol (ULTRAM) 50 MG tablet Take 1 tablet (50 mg) by mouth every 6 hours as needed for severe pain (7-10) 21 tablet 0     acetaminophen (TYLENOL) 325 MG tablet Take 2 tablets (650 mg) by mouth every 4 hours as needed for mild pain or fever (Patient not taking: Reported on 1/30/2023) 30 tablet 0     loperamide (IMODIUM) 2 MG capsule Take 1 capsule (2 mg) by mouth 4 times daily (Patient not taking: Reported on 1/30/2023) 60 capsule 0     metoprolol tartrate (LOPRESSOR)  "25 MG tablet Take 1 tablet (25 mg) by mouth once for 1 dose 1 tablet 0     potassium chloride ER (K-TAB) 20 MEQ CR tablet Take 1 tablet (20 mEq) by mouth daily Until follow up on week of 1/23/23 (Patient not taking: Reported on 1/30/2023) 7 tablet 1     temozolomide (TEMODAR) 250 MG capsule Take 1 capsule (250 mg) by mouth daily for 5 days , about 45 minutes after taking Zofran on chemotherapy days. (Patient not taking: Reported on 1/30/2023) 5 capsule 0     temozolomide (TEMODAR) 250 MG capsule Take 1 capsule (250 mg) by mouth daily for 5 days , about 45 minutes after taking Zofran on chemotherapy days. 5 capsule 0     triamcinolone (KENALOG) 0.1 % external ointment Apply topically 2 times daily as needed for irritation (Patient not taking: Reported on 1/30/2023) 15 g 0     Physical Exam:  /74   Pulse (!) 147   Temp 97.9  F (36.6  C) (Oral)   Resp 16   Ht 1.829 m (6' 0.01\")   SpO2 98%   BMI 33.21 kg/m    Wt Readings from Last 10 Encounters:   02/02/23 111.1 kg (244 lb 14.4 oz)   01/31/23 109.6 kg (241 lb 11.2 oz)   01/30/23 109.3 kg (240 lb 14.4 oz)   01/30/23 109.3 kg (240 lb 15.4 oz)   01/20/23 111.4 kg (245 lb 9.5 oz)   01/18/23 110.8 kg (244 lb 4.8 oz)   01/16/23 111.3 kg (245 lb 4.8 oz)   01/06/23 113.8 kg (250 lb 12.8 oz)   01/02/23 113.4 kg (250 lb)   12/20/22 113.5 kg (250 lb 3.6 oz)   General: Mildly-ill appearing male in no acute distress. Here today in a wheelchair.   Eyes: EOMI. No scleral icterus.  Cardiovascular: Tachycardic. Rhythm regular. No peripheral edema.  Respiratory: CTA bilaterally. No wheezes or crackles.   Gastrointestinal: Abdomen rounded, distended, reports mild tenderness to palpation throughout, moderate tenderness in the left upper and lower quadrants. No guarding. Bowel sounds present.   Neurologic: Cranial nerves II through XII are grossly intact.  Skin: No rashes, petechiae, or bruising noted on exposed skin.  Psych: Affect somewhat flat.     Labs:    02/10/23 10:17 "   Sodium 131 (L)   Potassium 4.2   Chloride 96 (L)   Carbon Dioxide (CO2) 19 (L)   Urea Nitrogen 16.0   Creatinine 1.11   GFR Estimate >90   Calcium 9.5   Anion Gap 16 (H)   Magnesium 2.0   Phosphorus 4.0   Albumin 4.0   Protein Total 8.3   Alkaline Phosphatase 114   ALT 22   AST 10   Bilirubin Total 1.3 (H)   Glucose 131 (H)      02/10/23 12:36   WBC 0.2 (LL)   Hemoglobin 6.8 (LL)   Hematocrit 21.4 (L)   Platelet Count 30 (LL)   RBC Count 2.58 (L)   MCV 83   MCH 26.4 (L)   MCHC 31.8   RDW 16.9 (H)   RBC Morphology Confirmed RBC Indices   Platelet Morphology Automated Count Confirmed. Platelet morphology is normal.   Teardrop Cells Slight !   I reviewed the above labs today.    ASSESSMENT AND PLAN  Abdominal bloating/pain. Pain has gotten worse over the last week. I am suspicious this is related to disease progression and will proceed with a CT CAP today to further assess. Will plan to give 1L IV NS and Dilaudid 0.4 mg IV. If Dilaudid works well for him, will plan to switch po tramadol to po Dilaudid.    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 Patient is currently on treatment with irinotecan and Temodar. With cycle 2, which began on 1/30/23, irinotecan was dose reduced by 10% due to prolonged neutropenia.     History of hypokalemia and hypomagnesemia. Both are normal today. Will continue on magnesium 400 mg bid and potassium 20 mEQ bid.     HTN Tachycardia. Longstanding issue, likely multifactorial in setting of metastatic disease. Reports he did not take his metoprolol 25 mg this morning. Reiterated the need to take this medication.    Pancytopenia. Secondary to chemotherapy. Will transfuse as needed.     Yasmine Mckeon PA-C  [facility]  [address], [place] [id number] [phone]    75 minutes spent on the date of the encounter doing chart review, review of test results, interpretation of tests, patient visit and  documentation     Addendum: Initial review of CT concerning for SBO. Subsequent review shows no SBO. Sent to the ED given inability to take in sufficient po food/fluids.

## 2023-02-10 NOTE — ED TRIAGE NOTES
"Triage Assessment & Note:    /80   Pulse (!) 131   Temp 98.6  F (37  C) (Temporal)   Resp 16   Ht 1.676 m (5' 6\")   Wt 110.7 kg (244 lb)   SpO2 100%   BMI 39.38 kg/m      Patient presents with: SBO from clinic    Home Treatments/Remedies: None    Febrile / Afebrile? Afebrile     Duration of C/o: 1 week     Dinesh Kaminski RN  February 10, 2023         Triage Assessment     Row Name 02/10/23 1404       Triage Assessment (Adult)    Airway WDL WDL       Respiratory WDL    Respiratory WDL WDL       Cardiac WDL    Cardiac WDL WDL              "

## 2023-02-10 NOTE — ED PROVIDER NOTES
History     Chief Complaint   Patient presents with     Abdominal Pain     HPI  Diego Buchanan is a 27 year old male with a past medical history of desmoplastic small round cell tumor, Garnica sarcoma, peritoneal carcinomatosis, hyperlipidemia, neutropenic fever who presents to the emergency department with a chief complaint of abdominal pain.  The patient was sent from clinic after a CT of the abdomen done to evaluate the patient for abdominal pain revealed a bowel obstruction.  Patient reported pain 8 out of 10 in severity.  Improved with Dilaudid given in clinic, but patient states his pain is starting to return.  He denies any associated nausea.  CBC earlier today showed white blood cell count 0.2, hemoglobin 6.8, platelets 30.  CMP also done prior to arrival, sodium 131, CO2 19, anion gap 16, bilirubin 1.3. The patient has also been having diarrhea. He is afebrile.     I have reviewed the Medications, Allergies, Past Medical and Surgical History, and Social History in the Epic system.    CT chest/abdomen/pelvis today  RESIDENT PRELIMINARY INTERPRETATION  IMPRESSION:   1. Worsened widespread metastatic disease.   2. Concern for closed loop large bowel obstruction secondary to bulky  peritoneal carcinomatosis.    Past Medical History:   Diagnosis Date     Hypertension      Learning disability     but pt is his own gaurdian     Peritoneal carcinomatosis (H)      Past Surgical History:   Procedure Laterality Date     BIOPSY      liver     INSERT PORT VASCULAR ACCESS Right 4/21/2022    Procedure: INSERTION, VASCULAR ACCESS PORT;  Surgeon: Daniel Sarmiento MD;  Location: [facility] OR     INSERT PORT VASCULAR ACCESS N/A 1/20/2023    Procedure: Right Port Removal and Right Subclavian Powerport replacement and Flouroscopy.;  Surgeon: Caleb Brady MD;  Location: [facility] OR     IR CHEST PORT PLACEMENT > 5 YRS OF AGE  4/21/2022     IR CVC TUNNEL PLACEMENT > 5 YRS OF AGE  4/29/2020     IR CVC TUNNEL REMOVAL RIGHT   11/16/2021      MUSCLE BIOPSY  3/12/2020     No current facility-administered medications for this encounter.     Current Outpatient Medications   Medication     acetaminophen (TYLENOL) 325 MG tablet     loperamide (IMODIUM) 2 MG capsule     magnesium oxide (MAG-OX) 400 MG tablet     metoprolol tartrate (LOPRESSOR) 25 MG tablet     metoprolol tartrate (LOPRESSOR) 25 MG tablet     potassium chloride ER (K-TAB) 20 MEQ CR tablet     potassium chloride ER (KLOR-CON M) 20 MEQ CR tablet     temozolomide (TEMODAR) 250 MG capsule     temozolomide (TEMODAR) 250 MG capsule     traMADol (ULTRAM) 50 MG tablet     triamcinolone (KENALOG) 0.1 % external ointment     Facility-Administered Medications Ordered in Other Encounters   Medication     sodium chloride (PF) 0.9% PF flush 30 mL     Allergies   Allergen Reactions     Cefepime Rash     Morbiliform rash     Tegaderm Chg Dressing [Chlorhexidine]      Tegaderm Transparent Dressing (Informational Only) Itching     Tegaderm dressing; Okay for short periods of time     Past medical history, past surgical history, medications, and allergies were reviewed with the patient. Additional pertinent items: None    Social History     Socioeconomic History     Marital status: Single     Spouse name: Not on file     Number of children: Not on file     Years of education: Not on file     Highest education level: Not on file   Occupational History     Not on file   Tobacco Use     Smoking status: Never     Smokeless tobacco: Never   Substance and Sexual Activity     Alcohol use: Never     Drug use: Never     Sexual activity: Not on file   Other Topics Concern     Parent/sibling w/ CABG, MI or angioplasty before 65F 55M? Not Asked   Social History Narrative     Not on file     Social Determinants of Health     Financial Resource Strain: Not on file   Food Insecurity: Not on file   Transportation Needs: Not on file   Physical Activity: Not on file   Stress: Not on file   Social Connections: Not  "on file   Intimate Partner Violence: Not on file   Housing Stability: Not on file     Social history was reviewed with the patient. Additional pertinent items: None    Review of Systems  A medically appropriate review of systems was performed with pertinent positives and negatives noted in the HPI, and all other systems negative.    Physical Exam   BP: 117/80  Pulse: (!) 131  Temp: 98.6  F (37  C)  Resp: 16  Height: 167.6 cm (5' 6\")  Weight: 110.7 kg (244 lb)  SpO2: 100 %      General: Well nourished, well developed, NAD  HEENT: EOMI, anicteric. NCAT, MMM  Neck: no jugular venous distension, supple, nl ROM  Cardiac: Tachycardic rate, extremities well perfused s  Pulm: Airway patent, nonlabored breathing, normal respiratory rate  Abd: distended   Skin: Warm and dry to the touch.  No rash  Extremities: No LE edema, no cyanosis, w/w/p  Neuro: A&Ox3, no gross focal deficits    ED Course        Procedures                           Labs Ordered and Resulted from Time of ED Arrival to Time of ED Departure - No data to display         Results for orders placed or performed in visit on 02/10/23 (from the past 24 hour(s))   CT Chest/Abdomen/Pelvis w Contrast    Impression    RESIDENT PRELIMINARY INTERPRETATION  IMPRESSION:   1. Worsened widespread metastatic disease.   2. Concern for closed loop large bowel obstruction secondary to bulky  peritoneal carcinomatosis.      [Urgent Result: Concern for worsening large bowel obstruction]    Finding was identified on 2/10/2023 11:54 AM.     Yasmine Mckeon was contacted by Mykel Perez at 2/10/2023 12:04 PM  and verbalized understanding of the urgent finding.       Results for orders placed or performed in visit on 02/10/23 (from the past 24 hour(s))   CT Chest/Abdomen/Pelvis w Contrast    Narrative    EXAMINATION: CT CHEST/ABDOMEN/PELVIS W CONTRAST, 2/10/2023 11:50 AM    TECHNIQUE:  Helical CT images from the thoracic inlet through the  symphysis pubis were obtained  with contrast. " Contrast dose: Isovue  370 125cc    COMPARISON: CT 12/8/2022    HISTORY: evaluate cause of worsening left sided abdominal pain,  metastatic sarcoma; Desmoplastic small round cell tumor (H)    FINDINGS:    Port-A-Cath tip is in the high SVC.  Enlarged bilateral supraclavicular and lower cervical lymph nodes.    Chest: Left perihilar pulmonary masses, the largest measures 2.7 x 2.3  cm have increased in size. Sagittally necrotic subcarinal lymph node  measuring 1.5 cm short axis. Multiple solid pulmonary nodules  bilaterally. Multiple subpleural nodules bilaterally, particularly  along the posterior diaphragmatic left lower lobe pleura. No pleural  effusion or pneumothorax. Normal heart size. No pericardial effusion.    Abdomen and pelvis: Diffuse peritoneal carcinomatosis is increased  compared to prior. Stable mesenteric fluid collections. No free air.  No pneumatosis or portal venous gas. Previously detected transverse  colon dilatation has decreased. The sigmoid colon is completely  obliterated by the peritoneal carcinomatosis but there is no upstream  dilation to suggest obstruction.    Evolution of multiple hypoattenuating liver lesions, demonstrating  increased amount of central necrosis. The dominant lesion in segment 8  appears decreased in size but demonstrates increased adjacent capsular  retraction suggesting that the decrease in sizes from necrosis and  collapse of the lesion. Similarly segment 7 lesion measuring 3.4 cm  (series 2, image 211) previously measured 5.1 cm.    Increased size of the lesion at the inferior spleen measuring 3.5 x 3  cm, previously 3 x 2.7 cm.  Enlarged para-aortic, mesenteric and left external iliac lymph nodes.  Stable appearance of the pancreas, adrenals, and kidneys. No  hydronephrosis.    Mildly distended urinary bladder undergoing mass effect from the  overlying peritoneal mass.    Bones and soft tissues: Increased sclerosis of the S1 vertebral body.  Suspicious sclerotic  lesion in the manubrium. No acute osseous  abnormality.      Impression    IMPRESSION:   1. Hepatic metastases have decreased in size suggesting treatment  response but pulmonary, splenic metastases and extensive peritoneal  carcinomatosis have increased.    2. The sigmoid colon is compressed by peritoneal carcinomatosis but  there is no upstream dilatation to suggest obstruction.    I have personally reviewed the examination and initial interpretation  and I agree with the findings.    CODIE ENNIS MD         SYSTEM ID:  [id number]       Labs, vital signs, and imaging studies were reviewed by me.      Labs, vital signs, and imaging studies were reviewed by me.    Medications   0.9% sodium chloride BOLUS (has no administration in time range)     Followed by   sodium chloride 0.9% infusion (has no administration in time range)   ondansetron (ZOFRAN) injection 4 mg (has no administration in time range)   HYDROmorphone (PF) (DILAUDID) injection 0.5 mg (has no administration in time range)       Assessments & Plan (with Medical Decision Making)   Diego Buchanan is a 27 year old male who presents to the emergency department with abdominal pain.  Patient with known large bowel obstruction seen on CT today.  Patient also had laboratory work-up prior to arrival, white blood cell count 0.2.  Patient is afebrile on arrival to the emergency department. 1 U PRBCs and IV fluids were ordered.    Pain medications ordered for symptomatic relief in the emergency department.     Medical Decision Making  The patient presented with a problem that is a chronic illness severe exacerbation, progression, or side effect of treatment and an acute health issue posing potential threat to life or bodily function.    The patient's evaluation involved:  review of external note(s) from 1 sources (oncology clinic notes)  review of 3+ test result(s) ordered prior to this encounter (see separate area of note for details)  independent interpretation  of testing performed by another health professional (CT)  discussion of management or test interpretation with another health professional (see separate area of note for details)    The patient's management involved a parenteral controlled substance and a decision regarding hospitalization.    1426 received call from oncology, radiology over read of CT shows no bowel obstruction.  However, as patient has ongoing diarrhea, pancytopenia, and increased pain, they would still recommend admission to the hospital at this time.    I have reviewed the nursing notes.    I have reviewed the findings, diagnosis, plan and need for follow up with the patient.    Patient to be admitted to internal medicine/oncology service for further management. Plan was discussed with patient who understands and agrees with plan.    New Prescriptions    No medications on file       Final diagnoses:   Diarrhea, unspecified type   Pancytopenia (H)   Abdominal pain, generalized       Roula Robin MD  2/10/2023   [facility] EMERGENCY DEPARTMENT     Roula Robin MD  02/10/23 1416       Roula Robin MD  02/10/23 1429       Roula Robin MD  02/10/23 1427       Roula Robin MD  02/10/23 4722

## 2023-02-10 NOTE — NURSING NOTE
"Oncology Rooming Note    February 10, 2023 10:29 AM   Diego Buchanan is a 27 year old male who presents for:    Chief Complaint   Patient presents with     Blood Draw     Pt declined port access.     Labs drawn from PIV placed by RN. Line flushed with saline. Vitals taken. Pt checked in for appointment(s).      Oncology Clinic Visit     [facility] RETURN - DESMOPLASTIC SARCOMA     Initial Vitals: /74   Pulse (!) 147   Temp 97.9  F (36.6  C) (Oral)   Resp 16   Ht 1.829 m (6' 0.01\")   SpO2 98%   BMI 33.21 kg/m   Estimated body mass index is 33.21 kg/m  as calculated from the following:    Height as of this encounter: 1.829 m (6' 0.01\").    Weight as of 2/2/23: 111.1 kg (244 lb 14.4 oz). Body surface area is 2.38 meters squared.  Extreme Pain (8) Comment: Data Unavailable   No LMP for male patient.  Allergies reviewed: Yes  Medications reviewed: Yes    Medications: Medication refills not needed today.  Pharmacy name entered into [employer]:    [id number] - [place], [place] - [address] AT [facility] - [place], [place] - [id number]  [facility] - [place], [place] - [address] [place] [address]    Clinical concerns: Pulse 147 bpm. Pulse was rechecked in clinic by staff and it was the same. Has been having abdominal pain and extreme fatigue since last chemo treatment. Linda was notified.      Esequiel Tong LPN            "

## 2023-02-11 NOTE — CONSULTS
Care Management Initial Consult    General Information  Assessment completed with: Family, Mariela-sister  Type of CM/SW Visit: Initial Assessment  Primary Care Provider verified and updated as needed:  (Family is in process of finding new provider.)   Readmission within the last 30 days: current reason for admission unrelated to previous admission   Return Category: New Diagnosis    Reason for Consult: discharge planning, utilization management concerns  Advance Care Planning: Advance Care Planning Reviewed: questions answered          Communication Assessment  Patient's communication style: spoken language (English or Bilingual)    Hearing Difficulty or Deaf: no   Wear Glasses or Blind: no    Cognitive  Cognitive/Neuro/Behavioral: WDL                      Living Environment:   People in home: parent(s), sibling(s)     Current living Arrangements: house      Able to return to prior arrangements: yes       Family/Social Support:  Care provided by: parent(s), other (see comments) (Parents and siblings)  Provides care for: no one, unable/limited ability to care for self  Marital Status: Single  Sibling(s), Parent(s)          Description of Support System: Supportive, Involved    Support Assessment: Adequate family and caregiver support    Current Resources:   Patient receiving home care services: No     Community Resources: County Worker, PCA  Equipment currently used at home: none  Supplies currently used at home: None    Employment/Financial:  Employment Status: unemployed, disabled        Financial Concerns: No concerns identified           Lifestyle & Psychosocial Needs:  Social Determinants of Health     Tobacco Use: Low Risk      Smoking Tobacco Use: Never     Smokeless Tobacco Use: Never     Passive Exposure: Not on file   Alcohol Use: Not on file   Financial Resource Strain: Not on file   Food Insecurity: Not on file   Transportation Needs: Not on file   Physical Activity: Not on file   Stress: Not on file    Social Connections: Not on file   Intimate Partner Violence: Not on file   Depression: Not at risk     PHQ-2 Score: 0   Housing Stability: Not on file       Functional Status:  Prior to admission patient needed assistance:   Dependent ADLs:: Bathing (Sister reports pt needing assist of 1 for some tasks like bathing.)  Dependent IADLs:: Cleaning, Cooking, Laundry, Shopping, Meal Preparation, Medication Management, Money Management, Transportation       Mental Health Status:  Mental Health Status: No Current Concerns       Chemical Dependency Status:  Chemical Dependency Status: No Current Concerns             Values/Beliefs:  Spiritual, Cultural Beliefs, Anglican Practices, Values that affect care: no               Additional Information:  Diego Buchanan is a 27 year old M with a 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 The pain has acutely worsened over the last week and is thought to be 2/2 tumor growth. Patient is currently on treatment with irinotecan and Temodar with cycle 2 beginning 1/30/23. Condition complicated by dehydration and malnutrition.     SW spoke with pt sister Mariela by phone, introduced self, and explained role in pt's care.  Pt mother is primary decision maker for pt and is [facility] speaking.  Mariela, pts sister, is primary contact to coordinate any discharge needs.  Mariela reports that she is pts PCA and DSP(Direct Support Person). The agency she is affiliated with is: [facility] ([phone]).  Mariela reports that pt lives in a home with parents and 9 siblings.  They all provide support and care for pt.  Mariela reports that Dr. Glass (listed in pts chart) is not pts current PCP and that they are in the process of finding a new PCP.  Mariela reports that family will provide transport at discharge. Mariela would like some information left at bedside regarding Health Care directives. No other  concerns/questions identified at this time.    @1504PM SW unable to leave HCD information at bedside so placed information in pts chart.     ERICKA Arroyo, [facility]    Office: [phone]   Pager: [phone]  [email or web address]     SEARCHABLE in [employer] - search SOCIAL WORK     [place] (0800 - 1630) Saturday and Sunday   Units: 4A, 4C, & 4E Pager: [phone]   Units: 5A & 5B Pager: [phone]   Units: 6A & 6B   Pager: [phone]   Units: 6C & 6D Pager: [phone]   Units: 7A &7B  Pager: [phone]   Units: 7C, 7D, & 5C Pager: [phone]   Unit: [place] ED Pager: [phone]      [facility] (8766-7812) Saturday and Sunday    Units: 5 Ortho, 5 Med/Surg & WB ED  Pager:[phone]   Units: 6 Med/Surg, 8A, & 10A ICU  Pager: [phone]     After hours (1630 - 0000) Saturday & Sunday; (0178-0615) Mon-Fri; (1731-2342) [facility] Recognized Holidays   Units: ALL  Pager: [phone]

## 2023-02-11 NOTE — PROGRESS NOTES
CLINICAL NUTRITION SERVICES - ASSESSMENT NOTE     Nutrition Prescription    RECOMMENDATIONS FOR MDs/PROVIDERS TO ORDER:  Patient with 1 week of minimal PO PTA with 2 days of inability to tolerate intake since admit. Would consider a short term TPN support until resolution of altered GI is achieved. Please send pharmacy/nutrition consult to manage TPN support.     Malnutrition Status:    Severe malnutrition in the context of chronic condition     Recommendations already ordered by Registered Dietitian (RD):  Ensure clear with lunch and dinner trays    Future/Additional Recommendations:  IF TPN becomes nutrition POC:   Enter pharmacy change order for the following TPN regimen:     Dosing wt: 73 kg adjusted wt from standing wt of 98 kg on 2/11 and IBW of 64.5 kg   Access: N/A     Goal PN: Volume: 55 ml/hr, 1320 ml/day with Dextrose: 170 grams/day, AA:110 grams/day + 250 ml IV Clinolipids x 6 days per week.      --Refeeding precaution: If Mg++/K+ /Phos normal, begin PN with dextrose of 110 gm /day (GIR:1.0). Once pt receives ~100% of initial continuous PN volume with K+/Mg++/Phos  WNL, advance PN dextrose by 30 gm daily to goal dextrose of 170 gm/day.      --Regimen provides: 1477 kcal /day (20 kcal/kg ), 110 gm protein /day (1.5 gm/kg), 170 gm carbs (GIR: 1.6 mg/kg/min) and 30% kcal from daily lipids.     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 The pain has acutely worsened over the last week and is thought to be 2/2 tumor growth. Patient is currently on treatment with irinotecan and Temodar with cycle 2 beginning 1/30/23.  "Condition complicated by dehydration and malnutrition.     Abdominal pain and altered bowel habits 2/2 progression of malignancy  Dehydration 2/2 decreased PO intake   Hyponatremia 2/2 decreased PO intake  Malnutrition 2/2 decreased PO intake  Diarrhea, c.diff negative    NUTRITION HISTORY  Visited with patient today. Family in the room. Patient reports inability to tolerate solids. Has been able to drink water and juice. Patient had ensure supplement and Gatorade provided by his mom earlier and he was not able to tolerate them. Patient is in agreement with a trial of ensure clear at this time.    Family reports a reduce in po intake over the past week. Patient is not able to eat and tolerate oral intake.       CURRENT NUTRITION ORDERS  Diet: Regular  Intake/Tolerance: able to tolerate sips of juice and water     LABS  Na+: 132 (L)  K+: 3.7, Mg++:2.0, Phos: 4.0  BUN: 13, Cr: 0.98, GFR: >90  Glucose: 116 (H)  WBC: 0.3 ( L) on chemotherapy     Alk phos: 114, ALT:22, AST:10  TGs: N/A  Total bili: 1.3 ( elevated)      MEDICATIONS  MIVF decreased to 100 cc/hr    ANTHROPOMETRICS  Height: 167.6 cm (5' 6\")  Most Recent Weight: 98 kg (216 lb) standing wt on 2/11/23  IBW: 64.5 kg ( 152% IBW)  BMI: 34.86 kg /m2 Obesity Grade I BMI 30-34.9  Weight History: 11.9% wt loss over the past month   Wt Readings from Last 10 Encounters:   02/11/23 98 kg (216 lb)   02/02/23 111.1 kg (244 lb 14.4 oz)   01/31/23 109.6 kg (241 lb 11.2 oz)   01/30/23 109.3 kg (240 lb 14.4 oz)   01/30/23 109.3 kg (240 lb 15.4 oz)   01/20/23 111.4 kg (245 lb 9.5 oz)   01/18/23 110.8 kg (244 lb 4.8 oz)   01/16/23 111.3 kg (245 lb 4.8 oz)   01/06/23 113.8 kg (250 lb 12.8 oz)   01/02/23 113.4 kg (250 lb)       Dosing Weight: 73 kg adjusted wt from standing wt of 98 kg on 2/11 and IBW of 64.5 kg     ASSESSED NUTRITION NEEDS  Estimated Energy Needs: 1460- 1825 kcals/day (20 - 25 kcals/kg)  Justification: Maintenance, lower end with TPN support   Estimated Protein " Needs: 88- 110 grams protein/day (1.2 - 1.5 grams of pro/kg)  Justification: Increased needs chemo treatments  Estimated Fluid Needs:(1 mL/kcal)   Justification: Maintenance    PHYSICAL FINDINGS  Gastrointestinal: Abdomen rounded, distended per observation     MALNUTRITION  % Intake: </= 50% for >/= 5 days (severe)  % Weight Loss: > 5% in 1 month (severe)  Subcutaneous Fat Loss: None observed  Muscle Loss: None observed  Fluid Accumulation/Edema: None noted  Malnutrition Diagnosis: Severe malnutrition in the context of chronic condition     NUTRITION DIAGNOSIS  Inadequate oral intake related to abdominal pain and altered GI 2/2 progression of malignancy as evidenced by patient with minimal po intake since 1 week PTA per family report, inability to tolerate diet due to N/V x 1-2 days since admit     INTERVENTIONS  Implementation  Nutrition Education: Role of RD in nutrition POC, reviewed oral supplement availabilities and potential for nutrition support   Parenteral Nutrition/IV Fluids - recs above      Goals  Total avg nutritional intake to meet a minimum of 20 kcal/kg and 1.2 gm PRO/kg daily (per dosing wt 73 kg adjusted wt ).     Monitoring/Evaluation  Progress toward goals will be monitored and evaluated per protocol.      Loreta Kidd RD/AMAYA  5A ([phone])/5B RD pager: [phone]  Weekend/Holiday RD pager: [phone]

## 2023-02-11 NOTE — PROVIDER NOTIFICATION
Provider notified (name):  VINCE ELLIS  Reason for notification: 5B 5231 AT: critical labs- WBC 0.3, hgb 6.9, platelet- 27  thank you  Esperanza GOMEZ [id number]  Recommendation/request given to provider:  Response from provider:

## 2023-02-11 NOTE — CONSULTS
Oncology  Consult Note   Date of Service: 02/11/2023    Patient: Diego Buchanan  MRN: [id number]  Admission Date: 2/10/2023  Hospital Day # 1  Cancer Diagnosis: Desmoplastic sarcoma   Primary Outpatient Oncologist:   Current Treatment Plan: irinotecan/temodar    Reason for Consult: Pt with desmoplastic small round cell tumor admitted with abdominal pain and diarrhea.      History of Present Illness:    Diego Buchanan is a 27 year old year old male with metastatic desmoplastic round cell tumor who is on second line chemotherapy with irinotecan and Temodar is admitted with worsening abdominal pain and diarrhea.    He completed cycle 2-day 5 of irinotecan and Temodar on 2/3/2023.  Yesterday he was reporting of increased left-sided abdominal pain and was having some diarrhea.  For pain he has been using 1-2 tramadol per day with minimal relief.  Reports of generalized weakness.  He reports of about 4-5 loose bowel movements per day.  No fevers or chills.  No nausea or vomiting.  No chest pain or shortness of breath.  No blood in the stool or blood in the urine.      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  Diagnosed as desmoplastic small cell round tumor of epithelial origin.  -Staging images including MRI brain was negative for any mets.  But CT Did show extensive mixed solid and cystic masses throughout the abdomen and pelvis consistent with peritoneal carcinomatosis.  The largest mass in the pelvis was measuring about 20 cm in size.    -5/4/2020 he begins first-line chemotherapy CAV/IMV alternating chemotherapy. He receives 6 cycles of treatment. He symptomatically improves.  -He later continued total of 19 cycles until 6/25/2021.  -Had a chemo holiday for a while for stable disease,  "later restarted CAV/IMV on 4/22/2022  -10/4/2022 CT CAP showed progression of the disease  -Second line chemotherapy with irinotecan and Temodar was started on 11/28/2022  -C1D1 Temodar/irinotecan from 11/28-12/2, complicated with neutropenic fever and hospital admission  -C2D1 Temodar irinotecan from 1/30-2/3    Review of Systems:  A comprehensive ROS was performed and found to be negative or non-contributory with the exception of that noted in the HPI above.    Past Medical History:  Past Medical History:   Diagnosis Date    Hypertension     Learning disability     but pt is his own gaurdian    Peritoneal carcinomatosis (H)        Past Surgical History:  Past Surgical History:   Procedure Laterality Date    BIOPSY      liver    INSERT PORT VASCULAR ACCESS Right 4/21/2022    Procedure: INSERTION, VASCULAR ACCESS PORT;  Surgeon: Daniel Sarmiento MD;  Location: [facility] OR    INSERT PORT VASCULAR ACCESS N/A 1/20/2023    Procedure: Right Port Removal and Right Subclavian Powerport replacement and Flouroscopy.;  Surgeon: Caleb Brady MD;  Location: [facility] OR    IR CHEST PORT PLACEMENT > 5 YRS OF AGE  4/21/2022    IR CVC TUNNEL PLACEMENT > 5 YRS OF AGE  4/29/2020    IR CVC TUNNEL REMOVAL RIGHT  11/16/2021    US MUSCLE BIOPSY  3/12/2020       Social History:  Social History     Socioeconomic History    Marital status: Single   Tobacco Use    Smoking status: Never    Smokeless tobacco: Never   Substance and Sexual Activity    Alcohol use: Never    Drug use: Never        Family History  Family History   Problem Relation Age of Onset    Hypertension Mother     Anesthesia Reaction No family hx of     Deep Vein Thrombosis (DVT) No family hx of        Outpatient Medications:  Reviewed     Physical Exam:    Blood pressure 118/65, pulse 110, temperature 97.8  F (36.6  C), temperature source Oral, resp. rate 16, height 1.676 m (5' 6\"), weight 98 kg (216 lb), SpO2 95 %.  General: alert and cooperative, lying in bed, no " acute distress  HEENT: sclera anicteric, EOMI, MMM  Neck: supple, normal ROM  CV: RRR, no murmurs  Resp: CTAB, normal respiratory effort on ambient air  GI: Distended, tenderness present in the left lower quadrant  MSK: warm and well-perfused, normal tone  Skin: no rashes on limited exam, no jaundice  Neuro: Alert and interactive, moves all extremities equally, no focal deficits    Labs & Studies: I personally reviewed the following studies:  ROUTINE LABS (Last four results):  CMP  Recent Labs   Lab 02/11/23  0545 02/10/23  1017   * 131*   POTASSIUM 3.7 4.2   CHLORIDE 102 96*   CO2 17* 19*   ANIONGAP 13 16*   * 131*   BUN 13.0 16.0   CR 0.98 1.11   GFRESTIMATED >90 >90   FAISAL 8.8 9.5   MAG  --  2.0   PHOS  --  4.0   PROTTOTAL  --  8.3   ALBUMIN  --  4.0   BILITOTAL  --  1.3*   ALKPHOS  --  114   AST  --  10   ALT  --  22     CBC  Recent Labs   Lab 02/11/23  0545 02/10/23  1236   WBC 0.3* 0.2*   RBC 2.54* 2.58*   HGB 6.9* 6.8*   HCT 21.8* 21.4*   MCV 86 83   MCH 27.2 26.4*   MCHC 31.7 31.8   RDW 16.3* 16.9*   PLT 27* 30*       Assessment & Plan:   Diego Buchanan is a 27 year old male  with metastatic desmoplastic round cell tumor who is on second line chemotherapy with irinotecan and Temodar is admitted with worsening abdominal pain and diarrhea.    1, Desmoplastic small round cell tumor (DSRCT) with peritoneal carcinomatosis and lymph node, bone and liver metastases  Initially diagnosed in February 2020, now on second line treatment with irinotecan and Temodar.  Completed 2 cycles C2 was from 1/30-2/3.   Reviewed the CT abdomen pelvis, which shows mixed response, that is just in case of the hepatic metastasis although that is increased in the splenic, pulmonary and peritoneal carcinomatosis.  Discussed about this with the patient and we will try to optimize medical management while he is in the hospital and will have him follow-up with Dr. Sims outpatient to discuss about the next steps.    2.   Pancytopenia secondary to chemotherapy  He had pancytopenia after cycle 1 of Temodar irinotecan with similar presentation with neutropenic fever.  Currently his WBCs 0.3, hemoglobin 6.9 and platelets are 27.     The neutropenia and anemia is likely secondary to irinotecan and Temodar causes thrombocytopenia.  -Transfuse PRBC if hemoglobin less than 7  -Transfuse platelets if less than 10 or less than 50 if bleeding  -Recommend Neupogen daily until ANC greater than 1000 for 2 days    3. Abdominal pain  CT abdomen pelvis did not show any evidence of obstruction although there is significant increase in the peritoneal carcinomatosis which is probably causing the abdominal pain.  Recommend aggressive pain management and bowel regimen    4.  Diarrhea  Likely secondary to chemotherapy, C. difficile negative, follow-up on antral pathogen panel.      Recommendations:   - Recommend Neupogen daily until ANC greater than thousand for 2 days  -Transfuse PRBC if Hb<7 and transfuse plts if <10 or <50 if bleeding.  - Recommend adequate pain management for the abdominal pain which is secondary to the metastasis.  - We will arrange follow-up with outpatient team/Dr. Sims on discharge    We will continue to follow this patient. Please do not hesitate to page with any questions or concerns.    Patient was seen and plan of care was discussed with attending physician .    Hernan Llanos. MD  Hematology/Oncology/Transplant Fellow   Pgr :: [phone]          ------------------------------------------------------------------------------------  ATTENDING PHYSICIAN ATTESTATION     I, Deonte Urias, saw and evaluated Robert Buchanan as part of a shared visit.  I have reviewed and discussed with the fellow/ advanced practice provider their history, physical and plan.     I personally reviewed the vital signs, medications, labs and imaging.  I have ascertained key findings from the history, and I have performed key aspects of  the physical examination.      Key management decisions made by me:  He is a 27-year-old year old man with metastatic desmoplastic round cell tumor who is on second-line chemotherapy with irinotecan and temodar, who is admitted with worsening abdominal pain and diarrhea. He also has chemo-induced myelosuppression.  We will continue to manage him symptomatically.  We recommend growth factor support (Neupogen) to help with his leukopenia.  We recommend adding appropriate antibiotic coverage if he develops any fevers; he is currently afebrile and off antibiotics.  He will not receive any cancer-directed therapy during this admission.  We will help to liaise with his outpatient medical oncologist, Dr Sims, for follow-up planning after discharge from the hospital. The patient was given the opportunity to ask several questions today, all of which were answered to his satisfaction.  Thank you for allowing us to participate in his care.    I spent a total of 120 minutes on this patient on the day of the encounter, of which more than 50% of this time was used for counselling and coordination of care.     Deonte Urias M.D.  Date of Service (when I saw the patient): 2/11/2023

## 2023-02-11 NOTE — PLAN OF CARE
Time of care: 0140- 0730    27 y.o. male admitted with small bowel obstruction with abdominal bloating and pain reported to have been occurring for the past three months. Reported dyspnea on exertion when getting up. Hx: small cell carcinoma with extensive mets, taking chemo, on neutropenic precaution Hgb 6.8 in the ED, received one unit of blood and the lab will recheck in the morning. PRN Dilaudid, toradol, and tylenol available for pain. Port on R chest and R&L PIV Left is running NS at 125ml/h. ED mentioned they still need a stool sample to rule out C diff but I was not able to get one during my shift and the pt/family refused 2 nurse skin assessment requesting that it be done in the morning. Pt is alert and oriented and able to make needs known. Pt's mother reports that her daughter will be coming tomorrow and that she will be able to translate. Pt slept well between cares. Continue to monitor for changes.     Update: 0545 critical labs: WBC: 0.3, Hgb: 6.9, Platelet: 27    Goal Outcome Evaluation:Ongoing, progressing

## 2023-02-11 NOTE — SUMMARY OF CARE
Patient came in the with the following items:    Winter Coat  IPad and   Clothing  Shoes  Toiletries    Patient does not want anything sent to security.

## 2023-02-11 NOTE — PROGRESS NOTES
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 The pain has acutely worsened over the last week and is thought to be 2/2 tumor growth. Patient is currently on treatment with irinotecan and Temodar with cycle 2 beginning 1/30/23. Condition complicated by dehydration and malnutrition.     Today:  - Continue current pain control  - Neupogen per Heme/onc   - mIVF decreased to 100cc/hr  - Oral Dilaudid for pain added 2 q6h PRN  - Scheduled Tylenol   - Another 1 unit PRBC     Abdominal pain and altered bowel habits 2/2 progression of malignancy  Dehydration 2/2 decreased PO intake   Hyponatremia 2/2 decreased PO intake  Malnutrition 2/2 decreased PO intake  Diarrhea  3 months of abd pain since 1st cycle of new chemo. Second cycle of irinotecan/temozolomide chemo began 1/30/23. Pain is worse with bowel movements and thus pt has been avoiding eating and drinking as much.   -PTA PO tramadol with   - Oral Dilaudid for pain added 2 q6h PRN  - Scheduled Tylenol   -MIVFto 100ml/hr  -Nutrition consult  -Blood culture pending  -c diff negative and enteric panel pending    Acute Kidney Injury  Likely prerenal related to decreased PO intake. Appears to be improving with IVF and improving PO intake. Cr 0.98 today improved from 1.11 on admission, baseline around 0.60  - mIVF as above  - CTM     Metastatic desmoplastic small round cell tumor  Leukopenia 2/2 chemotherapy   CT abd/pelvis showing extensive progression of dz. In the absence of fever, no increase in WBC, no current concern for infection. Will plan to follow heme/onc recs regarding cancer care.   - BCx pending  - Neutropenic precautions  - close monitoring of  "vital signs  - low threshold to start abx  - Heme/onc consult, plan to start Neupogen, awaiting formal recs     Anemia and thrombocytopenia 2/2 chemotherapy  Baseline thought to be around 8.2.Most likely anemia of chronic disease. Transfuse pRBC for hb < 7 or as indicated pending symptoms. Platelet transfusion as needed. Peripheral smear on 12/15 with no evidence of hemolysis.   -Transfuse 1 unit pRBCs yesterday, one Unit this AM  -Hgb check in PM     ----------chronic medical problems-------------     Hypokalemia  Hypomagnesmia  History of hypokalemia and hypomagnesemia. Both are normal today. Will continue on magnesium 400 mg bid and potassium 20 mEQ bid.      HTN   Tachycardia  Longstanding issue, multifactorial in setting of metastatic disease. EKG today shows sinus tachycardia. HR ranges from low 100's to 150's. Did not take his metoprolol 25 mg this morning but received in clinic.   -continue PTA metoprolol     Port malpositioning  Port exchanged on 1/20/23. Kinked in the area of entry into the subclavian vein with tip  projecting over the innominate vein confluence regarding port placement with thoracic surgery on call, Dr. Cota. Per her recommendation-- Ok to use port as long as nursing is not having significant difficulty accessing or getting blood return.         Diet:  Reg adult diet as tolerated  DVT Prophylaxis:  Pneumatic compression devices  Busby Catheter: Not present  Fluids: NS 125cc/hr  Lines: PRESENT          Cardiac Monitoring: None  Code Status: Full Code      Clinically Significant Risk Factors Present on Admission                # Thrombocytopenia: Lowest platelets = 27 in last 2 days, will monitor for bleeding        # Obesity: Estimated body mass index is 34.86 kg/m  as calculated from the following:    Height as of this encounter: 1.676 m (5' 6\").    Weight as of this encounter: 98 kg (216 lb).           Disposition Plan      Expected Discharge Date: 02/14/2023      Destination: home with " family  Discharge Comments: Neutropenia with weakness and diarrhea with ongoing work up        The patient's care was discussed with the Attending Physician, Dr. William De La Garza .    David Ramon MD  Medicine Service, [facility] TEAM 1  [facility]  Securely message with [employer] (more info)  Text page via [facility] Paging/Directory   See signed in provider for up to date coverage information  ______________________________________________________________________    Interval History   Nursing notes reviewed. HAYLEY overnight. Pt had 1U pRBC transfused overnight, this AM Hgb 6.9 and transfused one more unit. Overall, appearing more cheerful and drinking juice, reports ongoing pain though somewhat more improved. No concerns from pt or mother today.    Physical Exam   Vital Signs: Temp: 98.2  F (36.8  C) Temp src: Oral BP: 108/56 Pulse: 112   Resp: 16 SpO2: 97 % O2 Device: None (Room air)    Weight: 216 lbs 0 oz    General: Mildly-ill appearing male in no acute distress. Seen laying in bed.  Eyes: No scleral icterus.  Cardiovascular: Tachycardic. Rhythm regular. No peripheral edema.  Respiratory: clear to auscultation bilaterally. No wheezes or crackles.   Gastrointestinal: Abdomen rounded, distended, reports mild tenderness to palpation throughout, moderate tenderness in the left upper and lower quadrants. No guarding. Bowel sounds present. Abdomen is firm.  Skin: No rashes, petechiae, or bruising noted on exposed skin.  Psych: Affect somewhat flat.      Data     I have personally reviewed the following data over the past 24 hrs:    0.3 (LL)  \   7.5 (L)   / 27 (LL)     132 (L) 102 13.0 /  116 (H)   3.7 17 (L) 0.98 \       Imaging results reviewed over the past 24 hrs:   Recent Results (from the past 24 hour(s))   CT Chest/Abdomen/Pelvis w Contrast    Narrative    EXAMINATION: CT CHEST/ABDOMEN/PELVIS W CONTRAST, 2/10/2023 11:50 AM    TECHNIQUE:  Helical CT images from the thoracic  inlet through the  symphysis pubis were obtained  with contrast. Contrast dose: Isovue  370 125cc    COMPARISON: CT 12/8/2022    HISTORY: evaluate cause of worsening left sided abdominal pain,  metastatic sarcoma; Desmoplastic small round cell tumor (H)    FINDINGS:    Port-A-Cath tip is in the high SVC.  Enlarged bilateral supraclavicular and lower cervical lymph nodes.    Chest: Left perihilar pulmonary masses, the largest measures 2.7 x 2.3  cm have increased in size. Sagittally necrotic subcarinal lymph node  measuring 1.5 cm short axis. Multiple solid pulmonary nodules  bilaterally. Multiple subpleural nodules bilaterally, particularly  along the posterior diaphragmatic left lower lobe pleura. No pleural  effusion or pneumothorax. Normal heart size. No pericardial effusion.    Abdomen and pelvis: Diffuse peritoneal carcinomatosis is increased  compared to prior. Stable mesenteric fluid collections. No free air.  No pneumatosis or portal venous gas. Previously detected transverse  colon dilatation has decreased. The sigmoid colon is completely  obliterated by the peritoneal carcinomatosis but there is no upstream  dilation to suggest obstruction.    Evolution of multiple hypoattenuating liver lesions, demonstrating  increased amount of central necrosis. The dominant lesion in segment 8  appears decreased in size but demonstrates increased adjacent capsular  retraction suggesting that the decrease in sizes from necrosis and  collapse of the lesion. Similarly segment 7 lesion measuring 3.4 cm  (series 2, image 211) previously measured 5.1 cm.    Increased size of the lesion at the inferior spleen measuring 3.5 x 3  cm, previously 3 x 2.7 cm.  Enlarged para-aortic, mesenteric and left external iliac lymph nodes.  Stable appearance of the pancreas, adrenals, and kidneys. No  hydronephrosis.    Mildly distended urinary bladder undergoing mass effect from the  overlying peritoneal mass.    Bones and soft tissues:  Increased sclerosis of the S1 vertebral body.  Suspicious sclerotic lesion in the manubrium. No acute osseous  abnormality.      Impression    IMPRESSION:   1. Hepatic metastases have decreased in size suggesting treatment  response but pulmonary, splenic metastases and extensive peritoneal  carcinomatosis have increased.    2. The sigmoid colon is compressed by peritoneal carcinomatosis but  there is no upstream dilatation to suggest obstruction.    I have personally reviewed the examination and initial interpretation  and I agree with the findings.    CODIE ENNIS MD         SYSTEM ID:  [id number]

## 2023-02-12 NOTE — PLAN OF CARE
Patient condition slightly improving. Alert and oriented x4, calm, cooperative, ambulatory and SBA with ADLs.  With ongoing IVF NS at 100 ml/hr via PIV. Patient breathing comfortably in room air. Still complaining of abdominal pain, Has dilaudid, tylenol and tramadol for pain management. Was seen and rounded by primary team with orders, Hematology levels slightly improving. Participated with therapy and was able to ambulate in room but hesitant to go outside due to concerns with diarrhea. Stool sample sent to lab this afternoon. Still having poor appetite but taking adequate amount of fluids. Family at bedside attentive to cares.  P: Continue to monitor patient and follow plan of care.

## 2023-02-12 NOTE — PLAN OF CARE
Goal Outcome Evaluation:    Pt A/O x4, elevated HR of 136 and soft BP of 107/47,other VS were stable on room air. Pt reported abdominal pain of 10/10 throughout shift prn dilaudid given x2. Pt voided spontaniously during shift, loose stool x1. R. PIV removed due to occlusion, L. PIV running NS @ 100 mls/hr. Neutropenic precaution are in place. Continue to monitor for pain and admin POC.

## 2023-02-12 NOTE — PLAN OF CARE
Patient appeared calm and comfortable, ambulatory and SBA with ADls.With ongoing IVF NS at 125 ml/hr via PIV at the start of the shift. Complained of L lower quadrant abdominal pain and was given tramadol which offered partial relief. Critical Hgb, WBC and platelet level reported by lab. MD informed and Blood transfusion ordered. 1 unit of PRBC given as blood transfusion after proper screening and crossmatching. Blood transfusion completed without any immediate blood transfusion reaction and tolerated it well. IVF rate changed to 100 ml/hr per MD order. Fair appetite this shift. 2 RN skin checks done, no skin issues noted. Was seen and rounded by primary team. Continues to endorse abdominal pain and was given scheduled tylenol and PRN PO dilaudid which offered optimal relief and napped for a couple of hours. Patient had 1 BM this shift and good urine output. Resting in bed as of this time. Family at bedside attentive to cares.  P: Filgrastim infusion this evening. Continue with pain regimen and monitor hematology levels.

## 2023-02-12 NOTE — PROGRESS NOTES
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 The pain has acutely worsened over the last week and is thought to be 2/2 tumor growth. Patient is currently on treatment with irinotecan and Temodar with cycle 2 beginning 1/30/23. Condition complicated by dehydration and malnutrition.     Today:  - Neupogen per Heme/onc   - mIVF @ 100cc/hr while ~not eating  - Oral Dilaudid for pain added 2 q4 PRN  - Scheduled Tylenol   - Encourage PO intake; discuss TPN further tomorrow if pt still unable to eat  - Attempt to obtain stool cultures given ongoing loose stools  - KUB     Abdominal pain and altered bowel habits 2/2 progression of malignancy  Dehydration 2/2 decreased PO intake   Hyponatremia 2/2 decreased PO intake  Malnutrition 2/2 decreased PO intake  Diarrhea  3 months of abd pain since 1st cycle of new chemo. Second cycle of irinotecan/temozolomide chemo began 1/30/23. Pain is worse with bowel movements and thus pt has been avoiding eating and drinking.  - PTA PO tramadol with   - Oral Dilaudid for pain added 2 q4h PRN  - Scheduled Tylenol   - mIVF @ 100ml/hr  - Nutrition consult  - Blood culture pending  - c diff and enteric panel pending (re-ordered)    Acute Kidney Injury, improving  Likely prerenal related to decreased PO intake. Appears to be improving with IVF and improving PO intake. Cr 0.98 today improved from 1.11 on admission, baseline around 0.60  - mIVF as above     Metastatic desmoplastic small round cell tumor  Leukopenia 2/2 chemotherapy   CT abd/pelvis showing extensive progression of dz. In the absence of fever, no increase in WBC, no current concern for infection. Will plan to follow  heme/onc recs regarding cancer care.   - BCx pending  - Neutropenic precautions  - low threshold to start abx  - Heme/onc consult, plan to start Neupogen, awaiting formal recs     Anemia and thrombocytopenia 2/2 chemotherapy  Baseline thought to be around 8.2.Most likely anemia of chronic disease. Transfuse pRBC for hb < 7 or as indicated pending symptoms. Platelet transfusion as needed. Peripheral smear on 12/15 with no evidence of hemolysis.   -Transfuse 1 unit pRBCs (2/10, 2/11)     ----------chronic medical problems-------------     Hypokalemia  Hypomagnesmia  History of hypokalemia and hypomagnesemia. Both are normal today. Will continue on magnesium 400 mg bid and potassium 20 mEQ bid.      HTN   Tachycardia  Longstanding issue, multifactorial in setting of metastatic disease. EKG today shows sinus tachycardia. HR ranges from low 100's to 150's. Did not take his metoprolol 25 mg this morning but received in clinic.   -continue PTA metoprolol     Port malpositioning  Port exchanged on 1/20/23. Kinked in the area of entry into the subclavian vein with tip  projecting over the innominate vein confluence regarding port placement with thoracic surgery on call, Dr. Cota. Per her recommendation-- Ok to use port as long as nursing is not having significant difficulty accessing or getting blood return.         Diet:  Reg adult diet as tolerated  DVT Prophylaxis:  Pneumatic compression devices  Busby Catheter: Not present  Fluids: NS 125cc/hr  Lines: PRESENT          Cardiac Monitoring: None  Code Status: Full Code      Clinically Significant Risk Factors        # Hypokalemia: Lowest K = 3 mmol/L in last 2 days, will replace as needed   # Hypocalcemia: Lowest Ca = 8.3 mg/dL in last 2 days, will monitor and replace as appropriate       # Thrombocytopenia: Lowest platelets = 27 in last 2 days, will monitor for bleeding          # Obesity: Estimated body mass index is 34.86 kg/m  as calculated from the following:    Height  "as of this encounter: 1.676 m (5' 6\").    Weight as of this encounter: 98 kg (216 lb)., PRESENT ON ADMISSION  # Severe Malnutrition: based on nutrition assessment, PRESENT ON ADMISSION       Disposition Plan     Expected Discharge Date: 02/14/2023      Destination: home with family  Discharge Comments: Neutropenia with weakness and diarrhea with ongoing work up        The patient's care was discussed with the Attending Physician, Dr. William De La Garza .    Sri Mora MD  Medicine Service, [facility] TEAM 1  [facility]  Securely message with tracx (more info)  Text page via [facility] Paging/Directory   See signed in provider for up to date coverage information  ______________________________________________________________________    Interval History   Nursing notes reviewed. HAYLEY overnight. This AM pt resting, appeared comfortable and stated 6/10 pain, dilaudid helping some. Still afraid to eat, had 2 loose overnight, not particularly painful. Brother in room with him. Will try to eat a little today, discussed increasing pain regimen to help with pain.     Physical Exam   Vital Signs: Temp: 97.8  F (36.6  C) Temp src: Oral BP: 106/51 Pulse: (!) 125   Resp: 16 SpO2: 95 % O2 Device: None (Room air)    Weight: 216 lbs 0 oz    General: Mildly-ill appearing male in no acute distress. Seen laying in bed.  Eyes: No scleral icterus.  Cardiovascular: Tachycardic. Rhythm regular. No peripheral edema.  Respiratory: clear to auscultation bilaterally. No wheezes or crackles.   Gastrointestinal: Abdomen rounded, distended, reports mild tenderness to palpation throughout, moderate tenderness in the left upper and lower quadrants. No guarding. Bowel sounds present. Abdomen is firm.  Skin: No rashes, petechiae, or bruising noted on exposed skin.  Psych: Affect somewhat flat.      Data     I have personally reviewed the following data over the past 24 hrs:    0.2 (LL)  \   8.0 (L)   / 30 (LL) "     134 (L) 103 8.6 /  90   3.0 (L) 18 (L) 0.85 \       Imaging results reviewed over the past 24 hrs:   No results found for this or any previous visit (from the past 24 hour(s)).

## 2023-02-12 NOTE — PROGRESS NOTES
"   02/12/23 0900   Appointment Info   Signing Clinician's Name / Credentials (PT) José Miguel Dorsey PT       Present no   Living Environment   People in Home sibling(s);parent(s)   Current Living Arrangements house   Home Accessibility stairs to enter home;stairs within home   Transportation Anticipated public transportation   Living Environment Comments 11 stairs to enter home (both-sided railing); 11-12 stairs in home (both-sided railing). Shower with tub; pt can typically step into the tub by himself. No equipment used in home. Does take the city bus, but has not been going outdoors lately. Pt and Eze(?)(brother) report he does not do much of anything at home. Brother reports, since his cancer, he has not done much walking or activity at all. Brother believes he would need some type of support, like a cane, to support him.   Self-Care   Usual Activity Tolerance poor   Current Activity Tolerance poor   Regular Exercise No   Equipment Currently Used at Home none   Fall history within last six months no   Activity/Exercise/Self-Care Comment Brother reports pt is able to get up and get to the bathroom while here in hospital, but limps. Pt reports he does \"nothing\" at home, including not much walking even with the house.   General Information   Onset of Illness/Injury or Date of Surgery 02/10/23   Referring Physician David Ramon MD   Patient/Family Therapy Goals Statement (PT) To go home.   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 The pain has acutely worsened over the last week and is thought to be 2/2 tumor growth. Patient is currently on treatment with irinotecan and Temodar with cycle 2 beginning 1/30/23. Condition complicated by " dehydration and malnutrition.   Existing Precautions/Restrictions no known precautions/restrictions   General Observations Pt avoids trunk flexion, likely 2/2 pain.   Cognition   Cognitive Status Comments Pt oriented to self, place ([facility]), and time (February 2023).   Pain Assessment   Patient Currently in Pain No  (Despite recent reports of pain in previous providers' notes, pt does not report any current pain throughout session. Is mainly concerned about having a BM, or excessive fatigue.)   Integumentary/Edema   Integumentary/Edema no deficits were identifed   Posture    Posture Forward head position;Protracted shoulders   Range of Motion (ROM)   Range of Motion ROM deficits secondary to pain   ROM Comment Limited trunk flexion, difficulty with full bilateral knee extension though may be related to maintaining balance in seated position at EOB.   Strength (Manual Muscle Testing)   Strength (Manual Muscle Testing) Deficits observed during functional mobility   Bed Mobility   Bed Mobility other (see comments)   Comment, (Bed Mobility) Pt performs sidelying->sit, scooting towards EOB, and sit->supine through log roll with SBA. Pt benefits from assistance to maintain safety d/t tendency to rest near edge of bed in sidelying.   Transfers   Transfers other (see comments)   Comment, (Transfers) Pt performs sit<>stand with close SBA and B UE support on bed and/or IV pole.   Gait/Stairs (Locomotion)   Comment, (Gait/Stairs) Pt ambulates within room using 1-2 UE support on IV pole, CGA using gait belt, for approximately 20 ft x 8 (160 ft) total. Demos L Trendelenburg pattern; forward head, neck, and shoulders; slowed blanka; decreased bilateral toe clearance; and increased L stance time. Pt declines out-of-room activity, including ambulation or stairs, d/t fear of having BM and fatigue.   Balance   Balance other (describe)   Balance Comments Pt shows decreased independent sitting balance without UE support on  bed/LE support on floor. Declines any standing balance exercise d/t decreased confidence and concerns about having BM.   Sensory Examination   Sensory Perception patient reports no sensory changes   Coordination   Coordination no deficits were identified   Muscle Tone   Muscle Tone no deficits were identified   Clinical Impression   Criteria for Skilled Therapeutic Intervention Yes, treatment indicated   PT Diagnosis (PT) Impaired functional mobility.   Influenced by the following impairments Decreased activity tolerance, impaired functional strength, impaired gait pattern, impaired static and dynamic balance.   Functional limitations due to impairments Decreased ambulation distance; decreased energy for daily activities; increased risk for falls d/t balance and strength concerns.   Clinical Presentation (PT Evaluation Complexity) Stable/Uncomplicated   Clinical Presentation Rationale Pt with previous hx of small cell carcinoma diagnosis; activity limitations appear to be at least partially related to recent hx of lack of activity at home.   Clinical Decision Making (Complexity) low complexity   Planned Therapy Interventions (PT) balance training;bed mobility training;gait training;home exercise program;neuromuscular re-education;patient/family education;postural re-education;ROM (range of motion);stair training;strengthening;stretching;transfer training;progressive activity/exercise;risk factor education;home program guidelines   Risk & Benefits of therapy have been explained evaluation/treatment results reviewed;care plan/treatment goals reviewed;risks/benefits reviewed;participants voiced agreement with care plan   PT Total Evaluation Time   PT Eval, Low Complexity Minutes (70878) 5   Plan of Care Review   Plan of Care Reviewed With patient;sibling   Physical Therapy Goals   PT Frequency 5x/week   PT Predicted Duration/Target Date for Goal Attainment 03/26/23   PT Goals Transfers;Gait;Stairs   PT: Transfers  Supervision/stand-by assist;Sit to/from stand;Bed to/from chair   PT: Gait Supervision/stand-by assist;Greater than 200 feet   PT: Stairs Supervision/stand-by assist;Greater than 10 stairs;Rail on both sides   Interventions   Interventions Quick Adds Gait Training;Therapeutic Procedure   Therapeutic Procedure/Exercise   Ther. Procedure: strength, endurance, ROM, flexibillity Minutes (56272) 15   Symptoms Noted During/After Treatment none   Treatment Detail/Skilled Intervention Pt greeted sidelying in bed with brother present, agreeable to therapy. AxOx3. Following pt transition from sidelying->sitting EOB->standing, provided facilitation of standing marches x10 each LE using verbal and visual cuing; pt demonstrates understanding, able to maintain balance without UE support on IV pole but with decreased overall excursion into hip flexion likely d/t balance. Following ambulation, pt performed following exercises in sitting following VCs: unilateral knee extension (x10 each leg); marching (x10 each leg); A-P trunk leans (x10, close SBA/CGA provided). Provided education for exercise rationale; pt verbalized understanding. Pt declines any further standing activity this date despite encouragement. Pt did benefit from education on bed positioning to move more towards center of bed, with bedrail on one side raised to prevent pt fall. This therapist did write these exercises on pt's whiteboard. This therapist did also bring a high-back chair to pt's room following education on importance of upright sitting to pulmonary and core muscle strength; pt and brother verbalized understanding. RN notified. Pt left sidelying in bed with brother present, call light within reach, and all current needs met. Upon initial sitting: BP = 109/52 mmHg; immediately following activity taken in sitting: BP = 108/91 mmHg (both taken on R UE). VSS throughout.   Gait Training   Gait Training Minutes (64520) 10   Symptoms Noted During/After Treatment  "(Gait Training) fatigue   Treatment Detail/Skilled Intervention With gait belt donned, pt ambulated within room with close SBA/CGA, 1-2 UE support on gait belt. Provided encouragement and distraction to progress pt ambulation distance to a total of 8 laps back-and-forth (approximately 160 feet total), before pt requests seated rest break. Pt declines further standing activity, or any out-of-room activity, due to potential imminent need to have BM and fatigue. Pt reports he does not typically walk this far at home. Provided education on importance of walking for balance, strength, and digestion/pain management; pt verbalized understanding. Provided education on observations of pt's gait pattern to explain \"limp\" (Trendelenburg) that brother has noticed, and possible etiology; pt and brother verbalized understanding. Provided education on recommendation for pt to walk as much as possible with nursing staff as tolerated while in hospital; pt and brother verbalized understanding. Recommendation of at least 4 walks/day added to pt whiteboard. Time accounted for performing line management.   PT Discharge Planning   PT Plan Progress ambulation distance (encourage out-of-room as able); trial step-ups/stairs per pt tolerance; seated/standing single-leg balance exercise   PT Discharge Recommendation (DC Rec) home with assist;home with outpatient physical therapy   PT Rationale for DC Rec Pt demos functional strength and balance deficits that likely impact safety on stairs, of which pt has many at home (still need to assess). Pt may be near baseline with functional mobility, however significant endurance concerns noted that will likely continue to decline without home intervention.   PT Brief overview of current status Ambulate with assist of 1 and gait belt, encourage out-of-room as able (likely w/c follow in case of need to have BM).   Total Session Time   Timed Code Treatment Minutes 25   Total Session Time (sum of timed " and untimed services) 30

## 2023-02-13 NOTE — PLAN OF CARE
Patient condition slightly improving. Patient appeared calm and comfortable, ambulatory and SBA with ADls.With ongoing IVF NS at 100 ml/hr via PIV . No c/o pain or discomfort and no respiratory issues noted. Still having poor appetite and only took 2 cups of OJ so far today. Still experiencing loose BMs. Potassium and magnesium replaced today. Recheck for K+ at 1500. Resting in bed as of this time.  Family at bedside attentive to cares.

## 2023-02-13 NOTE — PLAN OF CARE
Goal Outcome Evaluation:           Pt A/O x4, Low  BP of 97/38  and elevated HR of 136 were  noted. Provider was notified of diastolic BP. Pt reported L. lower abdominal pain which was rated as 10/10, PRN dialudid was provided x2. Pt voided spotaniously and had loose stool occurance x1 during shift. Continous NS running at 100 mls/hr.  Neutropenic precautions in place. Continue to monitor for pain and admin POC.

## 2023-02-13 NOTE — PROGRESS NOTES
02/13/23 1100   Appointment Info   Signing Clinician's Name / Credentials (OT) Candy Noel, OTR/L       Present no  (patient declined need for use  today.)   Language English and Hmong spoken during session   Living Environment   People in Home sibling(s);parent(s)   Current Living Arrangements house   Home Accessibility stairs to enter home  (10 STEw/ rail)   Transportation Anticipated family or friend will provide   Living Environment Comments per patient's brother and patient, patient living with family in 2 story home, main floor bed/bath tub/shower, standard toilet   Self-Care   Usual Activity Tolerance good   Current Activity Tolerance moderate   Equipment Currently Used at Home none   Activity/Exercise/Self-Care Comment patient report independent with bathing, toileting, functional mobility, min assist with LB dressing.   Instrumental Activities of Daily Living (IADL)   Previous Responsibilities   (community mobility with use public transportation.)   IADL Comments has family assist with medication management, shopping, laundry, cleaning, completing light meal prep independent.   General Information   Onset of Illness/Injury or Date of Surgery 02/10/23   Patient/Family Therapy Goal Statement (OT) return home with family   Additional Occupational Profile Info/Pertinent History of Current Problem per chart review: PMH desmoplastic small round cell carcinoma, HTN, learning disability presenting with 3 months of abdominal bloating/pain. The pain has acutely worsened over the last week.   Existing Precautions/Restrictions abdominal   Limitations/Impairments safety/cognitive   Left Upper Extremity (Weight-bearing Status) full weight-bearing (FWB)   Right Upper Extremity (Weight-bearing Status) full weight-bearing (FWB)   Left Lower Extremity (Weight-bearing Status) full weight-bearing (FWB)   Right Lower Extremity (Weight-bearing Status) full weight-bearing (FWB)   Cognitive  Status Examination   Orientation Status orientation to person, place and time   Follows Commands follows one-step commands  (with increased time, cues with problem solving and safety.)   Safety Deficit awareness of need for assistance   Pain Assessment   Patient Currently in Pain Yes, see Vital Sign flowsheet   Range of Motion Comprehensive   General Range of Motion bilateral upper extremity ROM WNL   Bed Mobility   Bed Mobility supine-sit   Supine-Sit [place] (Bed Mobility) supervision;modified independence  (provided instruction with log roll strategy and AE to increase independence and decrease pain- completed w/ hob elevated and use bed rail w/increased time, cues for sequencing.)   Transfers   Transfers bed-chair transfer;toilet transfer;sit-stand transfer;transfer safety analysis   Transfer Skill: Bed to Chair/Chair to Bed   Bed-Chair [place] (Transfers) modified independence   Sit-Stand Transfer   Sit-Stand [place] (Transfers) modified independence   Toilet Transfer   Type (Toilet Transfer) sit-stand   [place] Level (Toilet Transfer) modified independence   Clinical Impression   Criteria for Skilled Therapeutic Interventions Met (OT) Yes, treatment indicated;Evaluation only   OT Diagnosis decreased ADL/IADL independence   OT Problem List-Impairments impacting ADL problems related to;activity tolerance impaired;mobility;cognition;pain   Anticipated Equipment Needs Upon Discharge (OT) bathing equipment;reacher   Risk & Benefits of therapy have been explained evaluation/treatment results reviewed;care plan/treatment goals reviewed;risks/benefits reviewed;current/potential barriers reviewed;participants voiced agreement with care plan;participants included;patient;mother;sibling  (brother present onset session, mother present end session)   OT Total Evaluation Time   OT Eval, Low Complexity Minutes (97636) 20   OT Goals   Therapy Frequency (OT) One time eval and treatment   OT Predicted  Duration/Target Date for Goal Attainment 02/13/23   OT Goals OT Goal 1   OT: Goal 1 Patient will complete OT evaluation to increase safety with ADL/IADL and to assist with safe treatment and discharge planning.   OT Discharge Planning   OT Discharge Recommendation (DC Rec) home with home care occupational therapy  (patient declined ongoing skilled OT, report has good family assist/support with all ADL/IADL as needed  and goal to DC to home w/family, declined AE needs.)   OT Brief overview of current status Min A with toileting, CGA with functional mobility   Total Session Time   Timed Code Treatment Minutes 15   Total Session Time (sum of timed and untimed services) 35

## 2023-02-13 NOTE — PROGRESS NOTES
Resident/Fellow Attestation   I, David Ramon MD, was present with the medical student who participated in the service and in the documentation of the note.  I have verified the history and personally performed the physical exam and medical decision making.  I agree with the assessment and plan of care as documented in the note.      David Ramon MD  PGY1  Date of Service (when I saw the patient): 02/14/23    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 The pain acutely worsened over the last week and is thought to be 2/2 tumor growth. Robert just completed irinotecan and Temodar cycle 2 1/30-2/3. Pain control is improving but complicated by dehydration, malnutrition, and pancytopenia.      Today:  - Neupogen per Heme/onc   - mIVF @ 100cc/hr while ~not eating  - Oral Dilaudid for pain   - Scheduled Tylenol   - Encourage PO intake; discuss TPN further tomorrow if pt still unable to eat--> start calorie counts  - K+ replacement, Mg+ replacement  - Loperamide       Abdominal pain and altered bowel habits 2/2 progression of malignancy  Dehydration 2/2 decreased PO intake   Hyponatremia 2/2 decreased PO intake  Malnutrition 2/2 decreased PO intake  Diarrhea  3 months of abd pain since 1st cycle of new chemo. Second cycle of irinotecan/temozolomide chemo 1/3-2/3. Pain is worse with bowel movements and continues to avoid eating and drinking. C diff and enteric panel negative.   - PTA PO tramadol PRN  - Oral Dilaudid PRN  - Scheduled Tylenol   - mIVF @ 100ml/hr  - Calorie counts  - loperamide  - Nutrition consult  - Monitor blood culture     Acute Kidney Injury, improving  Likely prerenal related to  decreased PO intake. Appears to be improving with IVF and improving PO intake. Cr 0.89 today improved from 1.11 on admission, baseline around 0.60  - mIVF as above     Metastatic desmoplastic small round cell tumor  Leukopenia 2/2 chemotherapy   CT abd/pelvis showing extensive progression of dz. In the absence of fever, no increase in WBC, no current concern for infection. Will plan to follow heme/onc recs regarding cancer care.   - Neupogen until ANC >1000 for 2 days  - BCx pending  - Neutropenic precautions  - low threshold to start abx     Anemia and thrombocytopenia 2/2 chemotherapy  Baseline thought to be around 8.2.Most likely anemia of chronic disease. Transfuse pRBC for hb < 7 or as indicated pending symptoms. Platelet transfusion as needed. Peripheral smear on 12/15 with no evidence of hemolysis.   -Transfuse 1 unit pRBCs (2/10, 2/11)     ----------chronic medical problems-------------     Hypokalemia  Hypomagnesmia  History of hypokalemia and hypomagnesemia. Both are normal today. Will continue on magnesium 400 mg bid and potassium 20 mEQ bid.   -40mg K+ and recheck  -switch from oral to IV magnesium     HTN   Tachycardia  Longstanding issue, multifactorial in setting of metastatic disease. EKG today shows sinus tachycardia. HR ranges from low 100's to 150's. Did not take his metoprolol 25 mg this morning but received in clinic.   -continue PTA metoprolol     Port malpositioning  Port exchanged on 1/20/23. Kinked in the area of entry into the subclavian vein with tip  projecting over the innominate vein confluence regarding port placement with thoracic surgery on call, Dr. Cota. Per her recommendation-- Ok to use port as long as nursing is not having significant difficulty accessing or getting blood return.         Diet: Combination Diet Regular Diet Adult  Calorie Counts    DVT Prophylaxis: none  Busby Catheter: Not present  Fluids: oral and IV   Lines: PRESENT             Cardiac Monitoring: None  Code  "Status: Full Code      Clinically Significant Risk Factors        # Hypokalemia: Lowest K = 2.6 mmol/L in last 2 days, will replace as needed         # Thrombocytopenia: Lowest platelets = 28 in last 2 days, will monitor for bleeding         # Obesity: Estimated body mass index is 35.93 kg/m  as calculated from the following:    Height as of this encounter: 1.676 m (5' 6\").    Weight as of this encounter: 101 kg (222 lb 9.6 oz)., PRESENT ON ADMISSION  # Severe Malnutrition: based on nutrition assessment, PRESENT ON ADMISSION       Disposition Plan      Expected Discharge Date: 02/14/2023      Destination: home with family  Discharge Comments: 2/13: Pending abd pain, PO intake. Diarrhea neg.   Dispo: Home with assist; home w/ PT        The patient's care was discussed with the Attending Physician, Dr. De La Garza.    Adrianne Jimenez  Medical Student  Medicine Service, [facility] TEAM [address] [place] [address]  Securely message with [employer] (more info)  Text page via [facility] Paging/Directory   See signed in provider for up to date coverage information  ______________________________________________________________________    Interval History   Doing well this morning. Just had a bowel movement that was described as watery. Pain has improved since admission and Robert does not think any changes need to be made. Brother present at the bedside. Discussed goal for the day to be oral intake.     Physical Exam   Vital Signs: Temp: 98.9  F (37.2  C) Temp src: Oral BP: 99/46 Pulse: 116   Resp: 20 SpO2: 98 % O2 Device: None (Room air)    Weight: 222 lbs 9.6 oz    General: Mildly-ill appearing male in no acute distress. Seen laying in bed.  Eyes: No scleral icterus.  Cardiovascular: Tachycardic. Rhythm regular. No peripheral edema.  Respiratory: clear to auscultation bilaterally. No wheezes or crackles.   Gastrointestinal: Abdomen rounded, distended, reports mild tenderness to palpation throughout, " moderate tenderness in the left upper and lower quadrants. No guarding. Bowel sounds present. Abdomen is firm.  Skin: No rashes, petechiae, or bruising noted on exposed skin.  Psych: Affect somewhat flat.    Medical Decision Making       Data     Most Recent 3 CBC's:Recent Labs   Lab Test 02/13/23  0855 02/12/23  0744 02/11/23  0940 02/11/23  0545   WBC 0.2* 0.2*  0.2*  --  0.3*   HGB 7.4* 8.0*  8.0* 7.5* 6.9*   MCV 89 87  87  --  86   PLT 28* 30*  30*  --  27*     Most Recent 3 BMP's:Recent Labs   Lab Test 02/13/23  0855 02/12/23  0744 02/11/23  0545    134* 132*   POTASSIUM 2.6* 3.0* 3.7   CHLORIDE 105 103 102   CO2 16* 18* 17*   BUN 6.9 8.6 13.0   CR 0.89 0.85 0.98   ANIONGAP 16* 13 13   FAISAL 8.6 8.3* 8.8   * 90 116*

## 2023-02-13 NOTE — PROGRESS NOTES
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 His last treatment for metastatic desmoplastic small cell round tumor was on 1/31 (C2D1) and his most recent CT c/a/p from 2/10 is showing mixed response.    2. He has a pancytopenia from his chemotherapy. We would recommend to cont to support his counts with filgrastim and transfusion prn. If he starts to develop fever while neutropenic, he needs to be started on appropriate pseudomonal coverage with fever w/up.     3. His CT did not show any e/o obstruction but he has e/o worsening peritoneal carcinomatosis, which can be the cause of his abd pain. Primary team to cont to work on aggressive pain and bowel mngt. His diarrhea has been difficult to control so far, not showing much of improvement.     Recommendations:   -please cont filgrastim daily until his ANC is greater than 500 for 2 days  -please consider RBC transfusion to maintain Hb > 7.0  -please consider plt transfusion to maintain > 10K, or > 50K if bleeding  -please cont adequate pain and bowel mngt   -we will arrange f/up appt with Dr. Sims on discharge.     Patient was seen and plan of care was discussed with attending physician   "Bridgett.    Thank you for the opportunity to partake in this patients plan of care. Please do not hesitate to page with questions. We will continue to follow.    Eddie Portillo MD, [facility] PGY4  Hematology/Oncology   Pager: [phone]    ___________________________________________________________________    Subjective & Interval History:    No acute events noted overnight.     This morning, pt states that he cont to have diarrhea, about 8-10 loose stool episodes since yesterday. Has some abd discomfort, but no melena/hematochezia. He states that he would get diarrhea whenever he eats which is limiting his po intake.     Physical Exam:    Blood pressure 99/46, pulse 116, temperature 98.9  F (37.2  C), temperature source Oral, resp. rate 20, height 1.676 m (5' 6\"), weight 102.2 kg (225 lb 6.4 oz), SpO2 98 %.    General: lying in bed, no acute distress  HEENT: sclera anicteric  Neck: supple, normal ROM  CV: RRR, normal S1/S2, no m/r/g  Resp: CTAB, no wheezing/crackles, normal respiratory effort on ambient air  GI: soft, tenderness noted in entire abd, no rebound, mildly distended  MSK: warm and well-perfused, normal tone  Skin: no rashes on limited exam, no jaundice  Neuro: Alert and interactive, moves all extremities equally, no focal deficits    Labs & Studies: I personally reviewed the following studies:  ROUTINE LABS (Last four results):  CMP  Recent Labs   Lab 02/13/23  0855 02/12/23  0744 02/11/23  0545 02/10/23  1017    134* 132* 131*   POTASSIUM 2.6* 3.0* 3.7 4.2   CHLORIDE 105 103 102 96*   CO2 16* 18* 17* 19*   ANIONGAP 16* 13 13 16*   * 90 116* 131*   BUN 6.9 8.6 13.0 16.0   CR 0.89 0.85 0.98 1.11   GFRESTIMATED >90 >90 >90 >90   FAISAL 8.6 8.3* 8.8 9.5   MAG  --  1.8  --  2.0   PHOS  --   --   --  4.0   PROTTOTAL  --   --   --  8.3   ALBUMIN  --   --   --  4.0   BILITOTAL  --   --   --  1.3*   ALKPHOS  --   --   --  114   AST  --   --   --  10   ALT  --   --   --  22     CBC  Recent Labs   Lab " 02/13/23  0855 02/12/23  0744 02/11/23  0940 02/11/23  0545 02/10/23  1236   WBC 0.2* 0.2*  0.2*  --  0.3* 0.2*   RBC 2.67* 2.91*  2.91*  --  2.54* 2.58*   HGB 7.4* 8.0*  8.0* 7.5* 6.9* 6.8*   HCT 23.7* 25.3*  25.3*  --  21.8* 21.4*   MCV 89 87  87  --  86 83   MCH 27.7 27.5  27.5  --  27.2 26.4*   MCHC 31.2* 31.6  31.6  --  31.7 31.8   RDW 16.3* 16.2*  16.2*  --  16.3* 16.9*   PLT 28* 30*  30*  --  27* 30*     INRNo lab results found in last 7 days.    Medications list for reference:  Current Facility-Administered Medications   Medication    acetaminophen (TYLENOL) tablet 650 mg    dextrose 5 % flush PRE/POST medication    And    filgrastim-aafi (NIVESTYM) 480 mcg in D5W 25 mL infusion    And    dextrose 5 % flush PRE/POST medication    HYDROmorphone (DILAUDID) tablet 2 mg    lidocaine (LMX4) cream    lidocaine 1 % 0.1-1 mL    magnesium oxide (MAG-OX) tablet 400 mg    melatonin tablet 1 mg    metoprolol tartrate (LOPRESSOR) tablet 25 mg    naloxone (NARCAN) injection 0.2 mg    Or    naloxone (NARCAN) injection 0.4 mg    Or    naloxone (NARCAN) injection 0.2 mg    Or    naloxone (NARCAN) injection 0.4 mg    ondansetron (ZOFRAN ODT) ODT tab 4 mg    Or    ondansetron (ZOFRAN) injection 4 mg    [Held by provider] potassium chloride ER (MICRO-K) CR capsule 20 mEq    senna-docusate (SENOKOT-S/PERICOLACE) 8.6-50 MG per tablet 1 tablet    Or    senna-docusate (SENOKOT-S/PERICOLACE) 8.6-50 MG per tablet 2 tablet    sodium chloride (PF) 0.9% PF flush 3 mL    sodium chloride (PF) 0.9% PF flush 3 mL    sodium chloride 0.9% infusion    traMADol (ULTRAM) tablet 50 mg    triamcinolone (KENALOG) 0.1 % ointment     Facility-Administered Medications Ordered in Other Encounters   Medication    sodium chloride (PF) 0.9% PF flush 30 mL            ------------------------------------------------------------------------------------  ATTENDING PHYSICIAN ATTESTATION     I, Deonte Urias, saw and evaluated Robert Buchanan as part  of a shared visit.  I have reviewed and discussed with the fellow/ advanced practice provider their history, physical and plan.     I personally reviewed the vital signs, medications, labs and imaging.  I have ascertained key findings from the history, and I have performed key aspects of the physical examination.      Key management decisions made by me:  He is a 27-year-old year old man with metastatic desmoplastic round cell tumor who is on second-line chemotherapy with irinotecan and temodar, who was admitted with worsening abdominal pain and diarrhea. He also has chemo-induced myelosuppression.  We will continue to manage him symptomatically.  We recommend daily growth factor support (Neupogen) to help with his leukopenia; until ANC rises to 500 or greater.  We recommend adding appropriate antibiotic coverage if he develops any fevers; he is currently afebrile and off antibiotics.  He will not receive any cancer-directed therapy during this admission.  We will help to liaise with his outpatient medical oncologist, Dr Sims, for follow-up planning after discharge from the hospital. The patient was given the opportunity to ask several questions today, all of which were answered to his satisfaction.  Thank you for allowing us to participate in his care.     I spent a total of 60 minutes on this patient on the day of the encounter, of which more than 50% of this time was used for counselling and coordination of care.     Deonte Urias M.D.  Date of Service (when I saw the patient): 2/13/2023

## 2023-02-14 NOTE — PROGRESS NOTES
Resident/Fellow Attestation   I, David Ramon MD, was present with the medical student who participated in the service and in the documentation of the note.  I have verified the history and personally performed the physical exam and medical decision making.  I agree with the assessment and plan of care as documented in the note.      David Ramon MD  PGY1  Date of Service (when I saw the patient): 02/14/23    [facility]    Progress Note - Medicine Service, [facility] TEAM 1       Date of Admission:  2/10/2023    Assessment & Plan      Diego Buchanan is a 27 year old M with a 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 The pain acutely worsened over the last week and is thought to be 2/2 tumor growth. Robert just completed irinotecan and Temodar cycle 2 1/30-2/3. Pain control is improving but complicated by dehydration, malnutrition, and pancytopenia.        Today:  - Neupogen per Heme/onc   - mIVF @ 100cc/hr while ~not eating  - Oral Dilaudid for pain   - Scheduled Tylenol   - calorie counts  - K+ replacement, Mg+ replacement  - Consult palliative care, appreciate recs      Neoplastic related pain  Abdominal pain and altered bowel habits 2/2 progression of malignancy  Severe malnutrition  Diarrhea  3 months of abd pain since 1st cycle of new chemo. Second cycle of irinotecan/temozolomide chemo 1/3-2/3. Pain is worse with bowel movements and continues to avoid eating and drinking. C diff and enteric panel negative.   - Palliative care consult   - PTA PO tramadol PRN  - Oral Dilaudid PRN  - Scheduled Tylenol   - mIVF @ 100ml/hr  - Calorie counts  - loperamide  - Nutrition consult     Acute Kidney Injury, improving  Likely prerenal related to decreased PO intake. Appears to be improving with IVF and improving PO intake. Cr improved from 1.11 on admission, baseline  around 0.60  - mIVF as above     Metastatic desmoplastic small round cell tumor  Pancytopenia secondary to antineoplastic therapy  CT abd/pelvis showing extensive progression of dz. In the absence of fever, no increase in WBC, no current concern for infection. Will plan to follow heme/onc recs regarding cancer care.   - Neupogen until ANC >1000 for 2 days  - BCx pending  - Neutropenic precautions  - low threshold to start abx     Anemia and thrombocytopenia 2/2 chemotherapy  Baseline thought to be around 8.2.Most likely anemia of chronic disease. Transfuse pRBC for hb < 7 or as indicated pending symptoms. Platelet transfusion as needed. Peripheral smear on 12/15 with no evidence of hemolysis.   -Transfuse 1 unit pRBCs (2/10, 2/11)    ------------Resolved medical problems----------------  Dehydration 2/2 decreased PO intake, resolved  Hyponatremia 2/2 decreased PO intake, resolved    ----------chronic medical problems-------------     Hypokalemia  Hypomagnesmia  History of hypokalemia and hypomagnesemia. Both are normal today. Will continue on magnesium 400 mg bid and potassium 20 mEQ bid.   -40mg K+ and recheck  -switch from oral to IV magnesium     HTN   Tachycardia  Longstanding issue, multifactorial in setting of metastatic disease. EKG today shows sinus tachycardia. HR ranges from low 100's to 150's. Did not take his metoprolol 25 mg this morning but received in clinic.   -continue PTA metoprolol     Port malpositioning  Port exchanged on 1/20/23. Kinked in the area of entry into the subclavian vein with tip  projecting over the innominate vein confluence regarding port placement with thoracic surgery on call, Dr. Cota. Per her recommendation-- Ok to use port as long as nursing is not having significant difficulty accessing or getting blood return.       ----------------Follow ups needed on discharge--------------------  -Heme Onc  -Palliative Care    Diet: Combination Diet Regular Diet Adult  Calorie Counts   "  DVT Prophylaxis: none  Busby Catheter: Not present  Fluids: mIVF   Lines: PRESENT             Cardiac Monitoring: None  Code Status: Full Code      Clinically Significant Risk Factors        # Hypokalemia: Lowest K = 2.5 mmol/L in last 2 days, will replace as needed         # Thrombocytopenia: Lowest platelets = 27 in last 2 days, will monitor for bleeding         # Obesity: Estimated body mass index is 36.38 kg/m  as calculated from the following:    Height as of this encounter: 1.676 m (5' 6\").    Weight as of this encounter: 102.2 kg (225 lb 6.4 oz)., PRESENT ON ADMISSION  # Severe Malnutrition: based on nutrition assessment, PRESENT ON ADMISSION       Disposition Plan      Expected Discharge Date: 02/16/2023      Destination: home with family  Discharge Comments: 2/13: Pending abd pain, PO intake. Diarrhea neg.   Dispo: Home with assist; home w/ PT  Delays: calorie count started 2/13 (3 days)  2/14: ongoing abd pain. Rec TPN but family resistant        The patient's care was discussed with the Attending Physician, Dr. Gómez .    Adrianne Jimenez  Medical Student  Medicine Service, [facility] TEAM 1  [facility]  Securely message with [employer] (more info)  Text page via [facility] Paging/Directory   See signed in provider for up to date coverage information  ______________________________________________________________________    Interval History   Robert is doing okay this morning. He is still having significant abdominal pain, he reports that overall is is unchanged from his admission. The dilaudid improves the pain but wears off quickly. Robert still has no appetite and has not eaten. He was also tachycardic this morning and could feel his heart racing but his breathing was unlabored. The family was on board with speaking with palliative care today.    Physical Exam   Vital Signs: Temp: 98.2  F (36.8  C) Temp src: Oral BP: 110/50 Pulse: (!) 126   Resp: 18 SpO2: 97 % O2 Device: " None (Room air)    Weight: 225 lbs 6.4 oz    General: Mildly-ill appearing male in no acute distress. Seen laying in bed.  Eyes: No scleral icterus.  Cardiovascular: Tachycardic. Rhythm regular. No peripheral edema.  Respiratory: clear to auscultation bilaterally. No wheezes or crackles.   Gastrointestinal: Abdomen rounded, distended, reports mild tenderness to palpation throughout, moderate tenderness in the left upper quadrant and diffusely in the lower abdomen. No guarding. Bowel sounds present. Abdomen is firm.  Skin: No rashes, petechiae, or bruising noted on exposed skin.  Psych: Affect somewhat flat.    Medical Decision Making           Data     Most Recent 3 CBC's:  Recent Labs   Lab Test 02/14/23  0915 02/13/23  0855 02/12/23  0744   WBC 0.3* 0.2* 0.2*  0.2*   HGB 7.6* 7.4* 8.0*  8.0*   MCV 90 89 87  87   PLT 27* 28* 30*  30*     Most Recent 3 BMP's:  Recent Labs   Lab Test 02/14/23  1255 02/14/23  0915 02/13/23  1539 02/13/23  0855 02/12/23  0744   NA  --  138  --  137 134*   POTASSIUM 2.8* 2.5* 2.8* 2.6* 3.0*   CHLORIDE  --  105  --  105 103   CO2  --  16*  --  16* 18*   BUN  --  6.8  --  6.9 8.6   CR  --  0.92  --  0.89 0.85   ANIONGAP  --  17*  --  16* 13   FAISAL  --  8.5*  --  8.6 8.3*   GLC  --  84  --  101* 90

## 2023-02-14 NOTE — PROVIDER NOTIFICATION
Provider notified (name): David Ramon -- mae 1 intern  Reason for notification: potassium recheck of 2.8  Recommendation/request given to provider: no K protocol in place, please order d/t low potassium level  Response from provider: none

## 2023-02-14 NOTE — PROVIDER NOTIFICATION
Critical Test Results/Notification    Critical lab result (name and value): Potassium 2.5  What time did lab notify you? 1033  What provider did you notify? Dr Ramon  Was the provider notified within 30 min? yes  Why was provider not notified? n/a    Response from provider: Awaiting repsponse      1044: Nurse driven potassium electrolyte replacement ordered.

## 2023-02-14 NOTE — CONSULTS
"[facility]  Palliative Care Consultation Note    Patient: Diego Buchanan  Date of Admission:  2/10/2023    Requesting Clinician / Team: Sri Mora MD  Reason for consult: Symptom management > \"pain management for metastatic cx- abdominal pain, poor PO intake and food aversion\"    Recommendations:  Encounter for palliative care  Psychosocial support    Psychosocial support was provided to patient and/or family.    Prognosis: Fair to Poor? (mixed results with most recent cancer therapy though very functional and able to ambulate to the restroom without issue in setting of known intellectual disability)    Palliative performance scale (PPS): 80% (due to reduced po intake, secondary to concern for pain)    CODE status: FULL    Goals of Care: Seemingly Restorative   o Visited with Robert and mother Mariela. Robert speaks English but his mother does not. Mother Mariela her daughter Mariela who I updated during the visit while addressing Robert.  o Sister Mariela states that they are waiting to hear back from the cancer team about next steps for Robert. In the meantime, I advised her that my role would be for symptom control and to help formulate a plan moving forward depending on what the cancer team says.  o Also walked through what would happen if Robert's bowel obstruction worsened. Counseled her that the hope is cancer treatment will help shrink or at least stave off progression and that in the meantime, we medically manage his symptoms with opioids and other medications to ensure he is having regular bowel movements. In the future, he may need to be evaluated for bowel stenting and/or surgery (though I doubt he would be a surgical candidate). Finally, if there are no more interventions or medications that are deemed to have more benefit than risk, we would place a venting G-tube if Robert becomes obstructed for relief and likely recommend Hospice. Also noted that in general, hospice may be " "recommended at any point during Robert's medical journey. Sister Mariela appreciated the expected overview.  o Surrogate: sister Mariela, mother Mariela also present but defers to daughter (aka sister)    Disposition: Inpatient per primary    Symptoms    Pain related to neoplasm    Refusal to eat (liekly due to anticipatory abdominal pain)  o Likely due to due to peritoneal carcinonmatosis mass effect on sigmoid bowel per CT scan  o Robert notes his pain is somewhat improved with Dilaudid but does not know for how long. Somewhat poor historian. Sister Mariela states that she feels like Robert is not eating (but has an appetite) because this would cause him bowel pain and is also afraid to defecate for this reason. Robert was not able to state this to me and when asked how high is pain was prior to opioids and after taking them, he states his pain goes from a \"10 to a 0\", 10 being the worst pain possible. He was sitting comfortably during my visit and on his iPad.  o Spoke with oncology fellow, about possible use for dexamethasone, appreciate help.  o Appreciate pharmacy liaison consult for Butrans coverage, prior auth required (would be a better opioid to decrease risk of constipation. Would look into patch first as Robert is not requiring high dose opioids and unsure if he can understand how and why to take the buccal/SL film/tablets as he was not even able to tell me details about his pain when I asked several times in different ways)  o Stop Tramadol (done for you, terrible opioid, do not use)  o Scheduled oxycodoen 5 mg q6h (done for you, a bit longer lasting compared to Dilaudid)  o Start oxycodone 2.5-5 mg q6h prn (done for you)  o Stop Dilaudid 2 mg q4h prn (done for you)    Constipation vs diarrhea  o Stopped senna-docusate prn (done for you, docusate component is ineffective for potential OIC)  o Start senna 2-4 tabs po BID prn (done for you)  o Agree with Imodium prn for diarrhea    Nausea  o Stop Zofran (done for you, worsens " constipation)  o Continue Compazine prn.   o If ineffective, can trial Reglan 5 mg TID prn (if no obstruction) or Haldol 2 mg IV q6h prn.    Palliative medicine will continue to follow.    Total time spent was 65 minutes spent on the date of the encounter attending to symptoms. These recommendations were given to the primary team via this note.    Leo Atkins DO / Palliative Medicine / Text me via [facility].     Team Consult Pager [phone] (answered 8am-430pm M-F) - ok to text page via [employer] / After-Hours Answering Service [phone] / Palliative Clinic in the [facility] [place] - [phone] (scheduling); [phone] (triage).      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 The pain acutely worsened over the last week and is thought to be 2/2 tumor growth. Robert just completed irinotecan and Temodar cycle 2 1/30-2/3. Course has been complicated by dehydration, malnutrition, and pancytopenia. Palliative was consulted for symptom control.    Today, the patient was seen for:  Pain related to neoplasm  Constipation  Poor appetite  Support  Goals of care  Encounter for palliative care    Prognosis, Goals, & Planning:      Functional Status just prior to hospitalization: 0 (Fully active, able to carry on all activities without restriction)      Prognosis, Goals, and/or Advance Care Planning were addressed today: Yes        Summary/Comments: as above      Patient's decision making preferences: shared with support from loved ones          Patient has decision-making capacity today for complex decisions: Partial (needs assistance with complex decisions)  Does not have situational awareness and seems to defer a lot to his sister Mariela                  I have concerns about the patient/family's health literacy today:  No           Patient has a completed Health Care Directive: No.       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 The pain acutely worsened over the last week and is thought to be 2/2 tumor growth. Robert just completed irinotecan and Temodar cycle 2 1/30-2/3. Course has been complicated by dehydration, malnutrition, and pancytopenia. Palliative was consulted for symptom control.    Seen and examined at bedside. Visit and conversations detailed above. Mother Mariela present and sister Mariela present over the phone.    Key Palliative Symptom Data:  # Pain severity the last 12 hours: moderate  We are not managing dyspnea in this patient  # Nausea severity the last 12 hours: low  We are not managing anxiety in this patient    Patient is on opioids: assessed and I made recommendations about bowel care as above.    ROS:  A complete 14 point ROS was negative unless stated otherwise above in HPI     Past Medical History:  Past Medical History:   Diagnosis Date     Hypertension      Learning disability     but pt is his own gaurdian     Peritoneal carcinomatosis (H)         Past Surgical History:  Past Surgical History:   Procedure Laterality Date     BIOPSY      liver     INSERT PORT VASCULAR ACCESS Right 4/21/2022    Procedure: INSERTION, VASCULAR ACCESS PORT;  Surgeon: Daniel Sarmiento MD;  Location: [facility] OR     INSERT PORT VASCULAR  ACCESS N/A 1/20/2023    Procedure: Right Port Removal and Right Subclavian Powerport replacement and Flouroscopy.;  Surgeon: Caleb Brady MD;  Location: [facility] OR     IR CHEST PORT PLACEMENT > 5 YRS OF AGE  4/21/2022     IR CVC TUNNEL PLACEMENT > 5 YRS OF AGE  4/29/2020     IR CVC TUNNEL REMOVAL RIGHT  11/16/2021     US MUSCLE BIOPSY  3/12/2020         Family History:  Family History   Problem Relation Age of Onset     Hypertension Mother      Anesthesia Reaction No family hx of      Deep Vein Thrombosis (DVT) No family hx of          Allergies:  Allergies   Allergen Reactions     Cefepime Rash     Morbiliform rash     Tegaderm Chg Dressing [Chlorhexidine]      Tegaderm Transparent Dressing (Informational Only) Itching     Tegaderm dressing; Okay for short periods of time        Medications:  I have reviewed this patient's medication profile and medications from this hospitalization.   Noted:  APAP 650 mg q6h scheduled  Oxycodone 5 mg q6h scheduled 2/14    NS at 100 ml/hr    Imodium TID prn  Melatonin 1 mg at bedtime prn  Oxycodone 2.5-5 mg q6h prn  Compazine prn  Senna 2-4 tabs po BID started 2/14    Senna-docusate stopped 2/14  Zofran prn stopped 2/14  Dilaudid 2 mg q4h prn stopped 2/14    Physical Exam:  Vital Signs: Temp: 98.6  F (37  C) Temp src: Oral BP: 110/44 Pulse: (!) 122   Resp: 18 SpO2: 96 % O2 Device: None (Room air)    Weight: 225 lbs 6.4 oz  General: Not in acute distress. Large body habitus  Head: Atraumatic. Normocephalic.   Eyes: Anicteric without injection. Eyes conjugate. Extraocular movements intact.  Ear, Nose, and Throat: Mouth pink and moist without lesions. Neck without overt masses.  Pulmonary: Unlabored. Speaking in full sentences  Cardiovascular: No overt jugular venous distension. LE edema. Perfusing.  Abdomen: Non-distended.  Skin: Warm. Dry. No bruises or rashes noted.  Musculoskeletal: Joints of hand normal. Muscle bulk and tone normal in UE and LE.  Neuro: Speech fluent.  Face symmetric. No focal neurologic deficit noted. Alert and oriented to person, place and time but questionably to situation in setting of known intellectual disability.  Psych: Normal mood and affect. Able to make needs known. Again, uncertain to what degree Robert is inntellctuallly able to comprehend his situation.    Data reviewed:  Recent imaging reviewed, my comments on pertinents:   XR Abdomen Port 1 View [[id number]] Resulted: 02/12/23 1220   IMPRESSION:   1. Single dilated featureless loop of bowel left upper quadrant.   Nonobstructed bowel gas pattern.   2. No significant colonic stool burden.     CT CHEST/ABDOMEN/PELVIS W CONTRAST, 2/10/2023 11:50 AM  IMPRESSION:   1. Hepatic metastases have decreased in size suggesting treatment  response but pulmonary, splenic metastases and extensive peritoneal  carcinomatosis have increased.    2. The sigmoid colon is compressed by peritoneal carcinomatosis but  there is no upstream dilatation to suggest obstruction.    Recent lab data reviewed, my comments on pertinents:   ROUTINE ICU LABS (Last four results)  CMP  Recent Labs   Lab 02/14/23  1255 02/14/23  0915 02/13/23  1539 02/13/23  0855 02/12/23  0744 02/11/23  0545 02/10/23  1017   NA  --  138  --  137 134* 132* 131*   POTASSIUM 2.8* 2.5* 2.8* 2.6* 3.0* 3.7 4.2   CHLORIDE  --  105  --  105 103 102 96*   CO2  --  16*  --  16* 18* 17* 19*   ANIONGAP  --  17*  --  16* 13 13 16*   GLC  --  84  --  101* 90 116* 131*   BUN  --  6.8  --  6.9 8.6 13.0 16.0   CR  --  0.92  --  0.89 0.85 0.98 1.11   GFRESTIMATED  --  >90  --  >90 >90 >90 >90   FAISAL  --  8.5*  --  8.6 8.3* 8.8 9.5   MAG  --  1.9  --   --  1.8  --  2.0   PHOS  --   --   --   --   --   --  4.0   PROTTOTAL  --   --   --   --   --   --  8.3   ALBUMIN  --   --   --   --   --   --  4.0   BILITOTAL  --   --   --   --   --   --  1.3*   ALKPHOS  --   --   --   --   --   --  114   AST  --   --   --   --   --   --  10   ALT  --   --   --   --   --   --  22      CBC  Recent Labs   Lab 02/14/23  1447 02/14/23  0915 02/13/23  0855 02/12/23  0744   WBC 0.2* 0.3* 0.2* 0.2*  0.2*   RBC 1.69* 2.75* 2.67* 2.91*  2.91*   HGB 4.7* 7.6* 7.4* 8.0*  8.0*   HCT 15.6* 24.6* 23.7* 25.3*  25.3*   MCV 92 90 89 87  87   MCH 27.8 27.6 27.7 27.5  27.5   MCHC 30.1* 30.9* 31.2* 31.6  31.6   RDW 16.5* 16.7* 16.3* 16.2*  16.2*   PLT 16* 27* 28* 30*  30*     INRNo lab results found in last 7 days.  Arterial Blood GasNo lab results found in last 7 days.

## 2023-02-14 NOTE — PROVIDER NOTIFICATION
Critical Test Results/Notification    Critical lab result (name and value): Hgb 4.7  What time did lab notify you? 1535  What provider did you notify? Dr Ramon  Was the provider notified within 30 min? yes  Why was provider not notified? n/a    Hgb this AM was 7.6. No s/s of bleeding noted. Vital signs stable except for unchanged tachycardia. Do you want to recheck Hgb?    Response from provider: Awaiting response

## 2023-02-14 NOTE — PLAN OF CARE
Assumed cares [id number]. AxOx4. VSS on RA except tachycardic. Reported abd pain -- improved with tramadol. Pt resting in between cares. Poor appetite, drank good fluids, encouraging PO intake. NS infusing at 100 via LPIV. Up SBA - family at bedside and attentive to patient. No acute events this shift otherwise.

## 2023-02-14 NOTE — PLAN OF CARE
Cares from: [id number]    V/S & pain: VSS on RA ex tachy, pain managed w/ prn dilaudid and scheduled tylenol   Neuro: A/O x4, calm and cooperative   Respiratory: stable on RA, clear/equal lung sounds bilaterally   Skin: no new skin concerns present ex pt/family refused full skin assessment,   GI/: voiding spontaneously, no BM   Nutrition: regular diet w/ poor appetite and po intake, encouraging po intake   Lines/drains: L PIV infusing NS @ 100 ml/h  Activity: up ad beverly   Labs: no RN managed labs     Events this shift: no acute events this shift. Pain managed w/ scheduled meds and prn dilaudid. A/O x4. VSS on RA. Pt continues to have a poor apptieite, encouragingly po intake. Family present at bedside, call light w/in reach and able to make needs known, will continue to monitor.    Plan: continue w/ poc

## 2023-02-14 NOTE — CONSULTS
Discharge Pharmacy Test Claim    Buprenorphine patches require prior authorization through patient's [facility] medical assistance plan. Contact pharmacy liaison to initiate a PA request.    Addendum 2/15: PA approved, $0 expected copay.    Test Claim Copay   burprenorphine patches 0.00       Yasmine Francisco  [facility] Pharmacy Liaison  Ph: [phone] Pager: [phone]   Securely message with the [employer] Web Console (learn more here)

## 2023-02-14 NOTE — PLAN OF CARE
"/43 (BP Location: Left arm)   Pulse (!) 128   Temp 98.2  F (36.8  C) (Oral)   Resp 18   Ht 1.676 m (5' 6\")   Wt 102.2 kg (225 lb 6.4 oz)   SpO2 97%   BMI 36.38 kg/m       Care from: 2300 - 0730      VS & Pain: VSS ex soft BP and tachycardic. Endorsing 10/10 abdominal pain. Given scheduled tylenol and prn dilaudid @0645.    Neuro: A&Ox4. Calm and cooperative with cares.    Respiratory: Stable on RA.    Cardiac: Tachycardic (120s)    GI/: Voiding WDL. Continues to endorse diarrhea with multiple loose BMs overnight.    Nutrition: No appetite or intake overnight.    Skin: Refused full skin assessment. No new skin concerns this shift.    Lines: L piv infusing NS @100cc/hr.     Activity: Up independently in room.    Events this shift: Patient slept between cares. Family member at bedside and attentive to patient needs. Call light within reach and able to make needs known.    Plan: Continue to monitor nutritional intake and update team with any changes.  "

## 2023-02-15 NOTE — PROGRESS NOTES
Resident/Fellow Attestation   I, David Ramon MD, was present with the medical student who participated in the service and in the documentation of the note.  I have verified the history and personally performed the physical exam and medical decision making.  I agree with the assessment and plan of care as documented in the note.      David Ramon MD  PGY1  Date of Service (when I saw the patient): 02/15/23    [facility]    Progress Note - Medicine Service, [facility] TEAM 1       Date of Admission:  2/10/2023    Assessment & Plan   Diego Buchanan is a 27 year old M with a 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 The pain acutely worsened over the last week and is thought to be 2/2 tumor growth. Robert just completed irinotecan and Temodar cycle 2 1/30-2/3. Pain control is improving but complicated by dehydration, malnutrition, and anti-neoplastic therapy induced pancytopenia.      Today:  - Neupogen per Heme/onc   - Protein shakes for nutrition  - Calorie counts  - Oxycodone 5 mg q6 scheduled and 2.5-5 mg PRN q6  - Phosphorus replacement- 2 packs TID for 3 days.     Neoplastic-related pain  Abdominal pain and altered bowel habits 2/2 progression of neoplasm  Diarrhea  3 months of abd pain since 1st cycle of new chemo. Second cycle of irinotecan/temozolomide chemo 1/3-2/3. Pain is worse with bowel movements and Robert continues to avoid eating and drinking. C diff and enteric panel negative.   - Per Palliative:   - Start Butrans 10 mcg/hr patch   - Continue senna 2-4 tabs po BID prn   - Continue Compazine prn    - Oxycodone 5 mg scheduled and 2.5-5 mg PRN   - Scheduled Tylenol    - Loperamide    Severe malnutrition 2/2 neoplasm growth  Robert has had very little oral intake for at least one week. He is fearful about eating as bowel movements worsen his abdominal  pain   - Try protein shakes   -  Calorie counts   - Nutrition recommends starting short-term TPN; will touch base with Heme/onc    Acute Kidney Injury, improving  Likely prerenal related to decreased PO intake. Appears to be improving with IVF and improving PO intake. Cr improved from 1.11 on admission, baseline around 0.60  - Encourage oral intake      Metastatic desmoplastic small round cell carinoma   Pancytopenia secondary to antineoplastic therapy  CT abd/pelvis showing extensive progression of dz. In the absence of fever, no increase in WBC, no current concern for infection. Will plan to follow heme/onc recs regarding cancer care.   - Neupogen until ANC >1000 for 2 days  - BCx pending  - Neutropenic precautions  - low threshold to start abx     Anemia and thrombocytopenia 2/2 antineoplastic therapy  Baseline thought to be around 8.2. Most likely anemia of chronic disease. Transfuse pRBC for hb < 7 or as indicated pending symptoms. Platelet transfusion as needed. Peripheral smear on 12/15 with no evidence of hemolysis.   -Transfuse 1 unit pRBCs (2/10, 2/11)    Hypophosphatemia 2/2 decreased intake:  -Potassium &sodium phosphate packets TID for three days    Hypokalemia  Hypomagnesmia  History of hypokalemia and hypomagnesemia. Both are normal today. Will continue on magnesium 400 mg bid and potassium 20 mEQ bid. Did not tolerate IV mag.   -K+ replacement protocol  -20 mEq daily   ------------Resolved medical problems----------------  Dehydration 2/2 decreased PO intake, resolved  Hyponatremia 2/2 decreased PO intake, resolved     ----------chronic medical problems-------------         HTN   Tachycardia  Longstanding issue, multifactorial in setting of metastatic disease. EKG today shows sinus tachycardia. HR ranges from low 100's to 150's. Did not take his metoprolol 25 mg this morning but received in clinic.   -continue PTA metoprolol     Port malpositioning  Port exchanged on 1/20/23. Kinked in the area of  "entry into the subclavian vein with tip  projecting over the innominate vein confluence regarding port placement with thoracic surgery on call, Dr. Cota. Per her recommendation-- Ok to use port as long as nursing is not having significant difficulty accessing or getting blood return.      ----------------Follow ups needed on discharge--------------------  -Heme Onc  -Palliative Care       Diet: Combination Diet Regular Diet Adult  Calorie Counts  Snacks/Supplements Adult: Nepro Oral Supplement; Between Meals    DVT Prophylaxis: none  Busby Catheter: Not present  Fluids: oral  Lines: PRESENT      Port A Cath Single 01/20/23 Right Chest wall-Site Assessment: WDL      Cardiac Monitoring: None  Code Status: Full Code      Clinically Significant Risk Factors        # Hypokalemia: Lowest K = 2.5 mmol/L in last 2 days, will replace as needed         # Thrombocytopenia: Lowest platelets = 27 in last 2 days, will monitor for bleeding         # Obesity: Estimated body mass index is 35.86 kg/m  as calculated from the following:    Height as of this encounter: 1.676 m (5' 6\").    Weight as of this encounter: 100.8 kg (222 lb 3.2 oz).   # Severe Malnutrition: based on nutrition assessment        Disposition Plan      Expected Discharge Date: 02/16/2023      Destination: home with family  Discharge Comments: 2/13: Pending abd pain, PO intake. Diarrhea neg.   Dispo: Home with assist; home w/ PT  Delays: calorie count started 2/13 (3 days)  2/15: Palliative consult, pain management        The patient's care was discussed with the Attending Physician, Dr. Gómez .    Adrianne Jimenez  Medical Student  Medicine Service, [facility] TEAM 1  [facility]  Securely message with [employer] (more info)  Text page via [facility] Paging/Directory   See signed in provider for up to date coverage information  ______________________________________________________________________    Interval History   Robert is doing " relatively well this morning. He reports that his pain is improved since changing medications based on palliative care's recommendations. His abdominal pain is localized to the central lower abdomen. He agreed to try to drink an ensure shake today. No fever, chills, nausea reported.     Physical Exam   Vital Signs: Temp: 98.3  F (36.8  C) Temp src: Oral BP: 110/45 Pulse: (!) 122   Resp: 18 SpO2: 97 % O2 Device: None (Room air)    Weight: 222 lbs 3.2 oz    General: Mildly-ill appearing male in no acute distress. Seen laying in bed.  Eyes: No scleral icterus.  Cardiovascular: Tachycardic. Rhythm regular. No peripheral edema.  Respiratory: clear to auscultation bilaterally. No wheezes or crackles.   Gastrointestinal: Abdomen rounded, distended, reports mild tenderness to palpation throughout, moderate tenderness diffusely in the lower abdomen. No guarding. Bowel sounds present. Abdomen is firm.  Skin: No rashes, petechiae, or bruising noted on exposed skin.  Psych: Affect somewhat flat.       Medical Decision Making           Data     Most Recent 3 CBC's:  Recent Labs   Lab Test 02/15/23  1003 02/14/23  1556 02/14/23  1447 02/14/23  0915 02/13/23  0855   WBC 0.5*  --  0.2* 0.3* 0.2*   HGB 8.0* 7.2* 4.7* 7.6* 7.4*   MCV 89  --  92 90 89   PLT 24*  --   --  27* 28*     Most Recent 3 BMP's:  Recent Labs   Lab Test 02/15/23  0628 02/14/23  2139 02/14/23  1255 02/14/23  0915 02/13/23  1539 02/13/23  0855     --   --  138  --  137   POTASSIUM 2.9*  2.9* 2.8* 2.8* 2.5*   < > 2.6*   CHLORIDE 106  --   --  105  --  105   CO2 16*  --   --  16*  --  16*   BUN 8.0  --   --  6.8  --  6.9   CR 1.01  --   --  0.92  --  0.89   ANIONGAP 15  --   --  17*  --  16*   FAISAL 8.6  --   --  8.5*  --  8.6   GLC 83  --   --  84  --  101*    < > = values in this interval not displayed.

## 2023-02-15 NOTE — PROGRESS NOTES
"CLINICAL NUTRITION SERVICES     Nutrition Prescription    RECOMMENDATIONS FOR MDs/PROVIDERS TO ORDER:  1. Per prior nutrition note, patient with 1 week of minimal PO PTA with 2 days of inability to tolerate intake since admit. Would consider a short term TPN support until resolution of altered GI is achieved. Please send \"pharmacy/nutrition to start/manage TPN\" order.   2. Possible refeeding syndrome as K+ is low (2.9 on 2/15) and Phos of 1.6 on on 2/15. Mg++ WNL, 1.7 on 2/15. Aggressively replace low labs. Urinary ketones lab is not recent. Rec checking Phos, Mg++, and K+ labs Q 12 hrs. Monitor lytes (Phos, Mg++, and K+) for refeeding syndrome. Rec order high PHOS Replacement and would also rec MAGNESIUM Replacement order sets. Avoid dextrose-containing IVFs. Rec  order 100 mg thiamine for five to seven days.     Malnutrition Status:    See prior RD notes    Recommendations already ordered by Registered Dietitian (RD):  Oral supplements    Future/Additional Recommendations:  See prior RD notes     Received page from nutrition associate, \"suplement order requesting chocolate Nepro, we do not carry that flavor.\"    INTERVENTIONS:  Implementation:  Collaboration with other providers: Paged team regarding recs for MDs/providers to order.    Medical food supplement therapy: Ordered Ensure Clear at lunch and supper and modified Nepro (as pt has low K+ and phos labs) to trial of chocolate Ensure Enlive once daily.     Follow up/Monitoring:  Will continue to follow pt.    Concepcion Magana, MS, RD, LD, [facility]     [phone] RD pager: [phone]             "

## 2023-02-15 NOTE — PLAN OF CARE
Vital signs stable on RA. Up independent in room. Poor oral intake, but pt did drink 1 ensure drink and tolerated well. Continues on calorie counts. Small BM x5 this shift. Scheduled oxycodone given for abdominal pain and started on Butrans patch. K 2.9 and replaced. Recheck pending. Ph 1.6 and replacement ordered. Will continue to monitor.         Goal Outcome Evaluation:      Plan of Care Reviewed With: patient    Overall Patient Progress: improvingOverall Patient Progress: improving    Outcome Evaluation: calorie counts, encourage oral intake, pain control

## 2023-02-15 NOTE — PROGRESS NOTES
Prior Authorization Approval    Buprenorphine 10mcg/hr weekly patches  Date Initiated: 2/14/2023  Date Completed: 2/14/2023  Prior Auth Type: Non-Formulary                Status: Approved    Effective Date: 01/15/2023 - 02/14/2024  Copay: 0.00     Filling Pharmacy: [facility] - [place], [place] - [address] [place] [address]    Insurance: [employer] - Phone [phone] Fax [phone]  ID: [id number]  Replaced by [facility]/Case Number: [id number]  / [id number]   Submitted Via: Gerri Francisco  [facility] Liaison  Ph: [phone] Pager: [phone]

## 2023-02-15 NOTE — PROGRESS NOTES
"[facility]  Palliative Care Daily Progress Note       Recommendations & Counseling     Encounter for palliative care  Psychosocial support    Psychosocial support was provided to patient and/or family.    Prognosis: Fair to Poor? (mixed results with most recent cancer therapy though very functional and able to ambulate to the restroom without issue in setting of known intellectual disability)    Palliative performance scale (PPS): 80% (due to reduced po intake, secondary to concern for pain)    CODE status: FULL    Goals of Care: Seemingly Restorative   ? Please refer to original consult note on 2/14 for more details  ? Would be interested in hearing oncology's recommendations with regards to his cancer treatment.   ? Surrogate: sister Mariela, mother and father seem to defer to daughter (aka sister Mariela) for medical decisions and care    Disposition: Inpatient per primary     Symptoms    Pain related to neoplasm    Hesitancy to eat (likely due to anticipatory abdominal pain)  ? Likely due to due to peritoneal carcinonmatosis mass effect on sigmoid bowel per CT scan  ? Robert states his pain is somewhat improved today but again. Overnight, told bedside RN it went from a 10/10 to a 7/10 but when I asked him in the AM, he could not quantitate how much better her felt. When I asked him if he felt like the pain was at a bearable level yet he said \"No\". Note that he looks comfortable in general but I agree that he has abdominal pain from his cancer.  ? Appreciate oncology input about possible use of dexamethasone. If okay'ed, would consider starting 8 mg/24 hours (Either 4 mg BID or 8 mg daily)  ? Appreciate pharmacy liaison consult for Butrans coverage with prior auth done on 2/15 and cost for Butrans being $0. Belbuca is covered as well but will defer this for now as I do not know how well Robert is able to understand and coordinate using a buccal film or even sublingual " formulations like Subutex or Suboxone.   ? Start Butrans 10 mcg/hr patch (total OME in 24 hours was ~45, this will take at least 24 hours to start having some effect and can take up to 72 hours to reach peak effect, better than full-agonist opioids though as it would not cause as much constipation or long-term side effects) (done for you)  ? Continue oxycodone 5 mg q6h scheduled and q6h prn     Constipation vs diarrhea  ? Continue senna 2-4 tabs po BID prn   ? Continue Imodium prn for diarrhea    Nausea  ? Continue Compazine prn   ? If ineffective, can trial Reglan 5 mg TID prn (if no obstruction) or Haldol 2 mg IV q6h prn.     Palliative medicine will continue to follow.     Total time spent was 35 minutes spent on the date of the encounter attending to symptoms. These recommendations were given to the primary team via this note.     Leo Atkins DO / Palliative Medicine / Text me via [facility].     Team Consult Pager [phone] (answered 8am-430pm M-F) - ok to text page via [employer] / After-Hours Answering Service [phone] / Palliative Clinic in the [facility] at the [facility] - [phone] (scheduling); [phone] (triage).      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 The pain acutely worsened over the last week and is thought to be 2/2 tumor growth. Robert just completed irinotecan and Temodar cycle 2 1/30-2/3. Course has been complicated by dehydration, malnutrition, and pancytopenia. Palliative was consulted for symptom control.     Today, the patient was seen for:  Pain related to neoplasm  Constipation  Poor appetite  Support  Goals of care  Encounter for palliative care            Interval History:     Chart review/discussion with unit or clinical team members:   Received scheduled oxycodone and  "oxycodone 5m mg x3 and Imodium 2 mg x2 from 0700 to 0700.     Per patient or family/caregivers today:  Seen and examined at bedside. Father present, said \"hi\" but he does not speak English well. Robert looked well today. States his pain is improved but maybe not to the level of his liking. Robert called his sister Mariela so I could update her. No questions from either of them. Struggling with looks/watery stools so given Imodium.    Key Palliative Symptoms:  # Pain severity the last 12 hours: low to moderate?  We are not managing dyspnea in this patient  # Nausea severity the last 12 hours: none  We are not managing anxiety in this patient    Patient is on opioids: assessed and bowels ok/no needed changes to plan of care today.           Review of Systems:     Besides above, >4 ROS was reviewed and is unremarkable          Medications:     I have reviewed this patient's medication profile and medications during this hospitalization.    Noted meds:    Noted:  APAP 650 mg q6h scheduled  Butrans 10 mcg/hr patch started 2/15  Oxycodone 5 mg q6h scheduled 2/14      Imodium TID prn  Melatonin 1 mg at bedtime prn  Oxycodone 2.5-5 mg q6h prn  Compazine prn  Senna 2-4 tabs po BID started 2/14     Senna-docusate stopped 2/14  Zofran prn stopped 2/14  Dilaudid 2 mg q4h prn stopped 2/14             Physical Exam:   Vitals were reviewed  Temp: 98.3  F (36.8  C) Temp src: Oral BP: 110/45 Pulse: (!) 122   Resp: 18 SpO2: 97 % O2 Device: None (Room air)    General: Not in acute distress. Large body habitus  Head: Atraumatic. Normocephalic.   Eyes: Anicteric without injection. Eyes conjugate. Extraocular movements intact.  Ear, Nose, and Throat: Mouth pink and moist without lesions. Neck without overt masses.  Pulmonary: Unlabored. Speaking in full sentences  Cardiovascular: No overt jugular venous distension. LE edema. Perfusing.  Abdomen: Non-distended.  Skin: Warm. Dry. No bruises or rashes noted.  Musculoskeletal: Joints of hand normal. " Muscle bulk and tone normal in UE and LE.  Neuro: Speech fluent. Face symmetric. No focal neurologic deficit noted. Alert and oriented to person, place and time but questionably to situation in setting of known intellectual disability.  Psych: Normal mood and affect. Able to make needs known. Again, uncertain to what degree Robert is intellectually able to comprehend his situation.           Data Reviewed:     Reviewed recent pertinent imaging, comments:   No new imaging    Reviewed recent labs, comments:   ROUTINE ICU LABS (Last four results)  CMP  Recent Labs   Lab 02/15/23  0628 02/14/23  2139 02/14/23  1255 02/14/23  0915 02/13/23  1539 02/13/23  0855 02/12/23  0744 02/11/23  0545 02/10/23  1017     --   --  138  --  137 134*   < > 131*   POTASSIUM 2.9*  2.9* 2.8* 2.8* 2.5*   < > 2.6* 3.0*   < > 4.2   CHLORIDE 106  --   --  105  --  105 103   < > 96*   CO2 16*  --   --  16*  --  16* 18*   < > 19*   ANIONGAP 15  --   --  17*  --  16* 13   < > 16*   GLC 83  --   --  84  --  101* 90   < > 131*   BUN 8.0  --   --  6.8  --  6.9 8.6   < > 16.0   CR 1.01  --   --  0.92  --  0.89 0.85   < > 1.11   GFRESTIMATED >90  --   --  >90  --  >90 >90   < > >90   FAISAL 8.6  --   --  8.5*  --  8.6 8.3*   < > 9.5   MAG 1.7  --   --  1.9  --   --  1.8  --  2.0   PHOS 1.6*  --   --   --   --   --   --   --  4.0   PROTTOTAL  --   --   --   --   --   --   --   --  8.3   ALBUMIN  --   --   --   --   --   --   --   --  4.0   BILITOTAL  --   --   --   --   --   --   --   --  1.3*   ALKPHOS  --   --   --   --   --   --   --   --  114   AST  --   --   --   --   --   --   --   --  10   ALT  --   --   --   --   --   --   --   --  22    < > = values in this interval not displayed.     CBC  Recent Labs   Lab 02/15/23  1003 02/14/23  1556 02/14/23  1447 02/14/23  0915 02/13/23  0855 02/12/23  0744   WBC 0.5*  --  0.2* 0.3* 0.2* 0.2*  0.2*   RBC 2.96*  --  1.69* 2.75* 2.67* 2.91*  2.91*   HGB 8.0* 7.2* 4.7* 7.6* 7.4* 8.0*  8.0*   HCT 26.2*   --  15.6* 24.6* 23.7* 25.3*  25.3*   MCV 89  --  92 90 89 87  87   MCH 27.0  --  27.8 27.6 27.7 27.5  27.5   MCHC 30.5*  --  30.1* 30.9* 31.2* 31.6  31.6   RDW 16.6*  --  16.5* 16.7* 16.3* 16.2*  16.2*   PLT 24*  --   --  27* 28* 30*  30*     INRNo lab results found in last 7 days.  Arterial Blood GasNo lab results found in last 7 days.

## 2023-02-15 NOTE — PLAN OF CARE
RN assumed cares at [id number]     Vitals: VSS on room air.  Pain: 10/10 pain in abdomen - scheduled oxy and tylenol given with relief. Pain continued to decrease throughout the night - last check was 7/10 still located in the abdomen.  Neuro: A&Ox4; calm and cooperative.   Cardiac: Tachycardia.   Respiratory: Lung sounds clear. Stable on room air. No respiratory distress noted overnight.  GI/: Continent of bowel & bladder. Voiding adequately. No reported BM overnight. Pt continues with loose BMs - imodium available 3x daily upon request.   IV/Drains: PIV saline locked.  Skin: Skin check completed without any new concerns.  Activity: up independently in room  Nutrition: Regular diet. No intake overnight. On calorie counts until 2/16.  Labs: on RN managed K+ replacement protocol.     Events/plan: Attempted to give IV K+ replacement; however, K+ was irritating to pt veins - Pt received about 150 mL of IV K+. Pt has port - no access currently but supplies to gain access are located in the patient room. Gave 1x dose of oral K+ 60 mEq in AM per MD and continue replacement protocol with AM labs.     No acute events overnight. Pt slept between cares. Q2hr rounding completed. Call light within reach and is able to make needs known.

## 2023-02-15 NOTE — PROVIDER NOTIFICATION
Provider notified (name): Ernie Swartz MD  Reason for notification: Pt has a port in but it is not accessed due to mispositioning. Pt getting IV k+ through Peripheral but is irritating veins. Pt want okay from MD before attempting to access port.    Response from provider: Ok to access port as long as we are not having trouble accessing or getting blood return

## 2023-02-15 NOTE — PROGRESS NOTES
Calorie Count  Intake recorded for: 2/13  Total Kcals: 120 Total Protein: 1g  Kcals from Hospital Food: 120  Protein: 1g  Kcals from Outside Food (average):0 Protein: 0g  # Meals Ordered from Kitchen: food from nursing unit only  # Meals Recorded: 100% 2 orange juices from nursing unit   # Supplements Recorded: 0

## 2023-02-15 NOTE — PROGRESS NOTES
Hematology / Oncology  Daily Progress Note   Date of Service: 02/15/2023  Patient: Diego Buchanan  MRN: [id number]  Admission Date: 2/10/2023  Hospital Day # 5  Cancer Diagnosis: metastatic desmoplastic small cell round tumor  Primary Outpatient Oncologist: Dr. 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 His last treatment for metastatic desmoplastic small cell round tumor was on 1/31 (C2D1) and his most recent CT c/a/p from 2/10 is showing mixed response.    2. He has a pancytopenia from his chemotherapy. We would recommend to cont to support his counts with filgrastim and transfusion prn. If he starts to develop fever while neutropenic, he needs to be started on appropriate pseudomonal coverage with fever w/up.     3. His CT did not show any e/o obstruction but he has e/o worsening peritoneal carcinomatosis, which can be the cause of his abd pain. Primary team to cont to work on aggressive pain and bowel mngt. His diarrhea has been difficult to control so far, not showing much of improvement.     Recommendations:   -please cont filgrastim daily until his ANC is greater than 500 for 2 days  -please consider RBC transfusion to maintain Hb > 7.0  -please consider plt transfusion to maintain > 10K, or > 50K if bleeding  -please cont adequate pain and bowel mngt   -we will arrange f/up appt with Dr. Sims on discharge.     - Appreciate input from primary team with hypokalemia.    - Encouraged po with Ensure/Boost.   "Will continue to follow with nutrition.      Sherry Crowder MD  ___________________________________________________________________    Subjective & Interval History:    No acute events noted overnight.     This morning, pt states that he cont to have diarrhea.  He is taking little po though tried Ensure today.  Overall stools are improving, though he continues to have loose stools.    Pain is well controlled.  He does have a flare with ambulation, in lower abdomen.    Physical Exam:    Blood pressure 92/40, pulse 115, temperature 98.2  F (36.8  C), temperature source Oral, resp. rate 19, height 1.676 m (5' 6\"), weight 100.8 kg (222 lb 3.2 oz), SpO2 97 %.    General: lying in bed, no acute distress  HEENT: sclera anicteric  Neck: supple, normal ROM  CV: RRR, normal S1/S2, no m/r/g  Resp: CTAB, no wheezing/crackles, normal respiratory effort on ambient air  GI: soft, tenderness noted in entire abd, no rebound, mildly distended  MSK: warm and well-perfused, normal tone  Skin: no rashes on limited exam, no jaundice  Neuro: Alert and interactive, moves all extremities equally, no focal deficits    Labs & Studies: I personally reviewed the following studies:  ROUTINE LABS (Last four results):  CMP  Recent Labs   Lab 02/15/23  0628 02/14/23  2139 02/14/23  1255 02/14/23  0915 02/13/23  1539 02/13/23  0855 02/12/23  0744 02/11/23  0545 02/10/23  1017     --   --  138  --  137 134*   < > 131*   POTASSIUM 2.9*  2.9* 2.8* 2.8* 2.5*   < > 2.6* 3.0*   < > 4.2   CHLORIDE 106  --   --  105  --  105 103   < > 96*   CO2 16*  --   --  16*  --  16* 18*   < > 19*   ANIONGAP 15  --   --  17*  --  16* 13   < > 16*   GLC 83  --   --  84  --  101* 90   < > 131*   BUN 8.0  --   --  6.8  --  6.9 8.6   < > 16.0   CR 1.01  --   --  0.92  --  0.89 0.85   < > 1.11   GFRESTIMATED >90  --   --  >90  --  >90 >90   < > >90   FAISAL 8.6  --   --  8.5*  --  8.6 8.3*   < > 9.5   MAG 1.7  --   --  1.9  --   --  1.8  --  2.0   PHOS 1.6*  --   --   --   " --   --   --   --  4.0   PROTTOTAL  --   --   --   --   --   --   --   --  8.3   ALBUMIN  --   --   --   --   --   --   --   --  4.0   BILITOTAL  --   --   --   --   --   --   --   --  1.3*   ALKPHOS  --   --   --   --   --   --   --   --  114   AST  --   --   --   --   --   --   --   --  10   ALT  --   --   --   --   --   --   --   --  22    < > = values in this interval not displayed.     CBC  Recent Labs   Lab 02/15/23  1003 02/14/23  1556 02/14/23  1447 02/14/23  0915 02/13/23  0855 02/12/23  0744   WBC 0.5*  --  0.2* 0.3* 0.2* 0.2*  0.2*   RBC 2.96*  --  1.69* 2.75* 2.67* 2.91*  2.91*   HGB 8.0* 7.2* 4.7* 7.6* 7.4* 8.0*  8.0*   HCT 26.2*  --  15.6* 24.6* 23.7* 25.3*  25.3*   MCV 89  --  92 90 89 87  87   MCH 27.0  --  27.8 27.6 27.7 27.5  27.5   MCHC 30.5*  --  30.1* 30.9* 31.2* 31.6  31.6   RDW 16.6*  --  16.5* 16.7* 16.3* 16.2*  16.2*   PLT 24*  --   --  27* 28* 30*  30*     INRNo lab results found in last 7 days.    Medications list for reference:  Current Facility-Administered Medications   Medication     acetaminophen (TYLENOL) tablet 650 mg     buprenorphine (BUTRANS) 10 MCG/HR WK patch 1 patch     buprenorphine (BUTRANS) Patch in Place     dextrose 5 % flush PRE/POST medication    And     filgrastim-aafi (NIVESTYM) 480 mcg in D5W 25 mL infusion    And     dextrose 5 % flush PRE/POST medication     lidocaine (LMX4) cream     lidocaine 1 % 0.1-1 mL     loperamide (IMODIUM) capsule 2 mg     [Held by provider] magnesium oxide (MAG-OX) tablet 400 mg     melatonin tablet 1 mg     metoprolol tartrate (LOPRESSOR) tablet 25 mg     naloxone (NARCAN) injection 0.2 mg    Or     naloxone (NARCAN) injection 0.4 mg    Or     naloxone (NARCAN) injection 0.2 mg    Or     naloxone (NARCAN) injection 0.4 mg     oxyCODONE (ROXICODONE) tablet 5 mg     oxyCODONE IR (ROXICODONE) half-tab 2.5-5 mg     potassium & sodium phosphates (NEUTRA-PHOS) Packet 2 packet     potassium chloride ER (MICRO-K) CR capsule 20 mEq      prochlorperazine (COMPAZINE) injection 5 mg    Or     prochlorperazine (COMPAZINE) tablet 5 mg     sennosides (SENOKOT) tablet 2-4 tablet     sodium chloride (PF) 0.9% PF flush 3 mL     sodium chloride (PF) 0.9% PF flush 3 mL     triamcinolone (KENALOG) 0.1 % ointment     Facility-Administered Medications Ordered in Other Encounters   Medication     sodium chloride (PF) 0.9% PF flush 30 mL     Date of Service (when I saw the patient): 2/15/2023

## 2023-02-15 NOTE — PROGRESS NOTES
Calorie Count  Intake recorded for: 2/14  Total Kcals: 0 Total Protein: 0g  Kcals from Hospital Food: 0   Protein: 0g  Kcals from Outside Food (average):0 Protein: 0g  # Meals Ordered from Kitchen: food from nursing unit   # Meals Recorded: 100% diet lemon lime soda - from nursing unit   # Supplements Recorded: 0

## 2023-02-15 NOTE — PROVIDER NOTIFICATION
Provider notified (name): [id number]  Reason for notification: FYI - pt has not been able to get IV K+ replacement overnight d/t PIV irritation and delays in getting Port access. Will push back unfinished doses when restarted.     Response from provider: Cancel IV. Order for 1x dose of 60 mEq. Restart RN managed protocol with AM labs.

## 2023-02-15 NOTE — PLAN OF CARE
Goal Outcome Evaluation:       VSS.  Alert and oriented x 4.  Reports diarrhea.  Imodium once for diarrhea.  PO intake very poor.  Drinking only water and small amount of ginger ale.  Complains of abdominal pain rating it at 10.  Oxycodone 5 mg every 6 hours prn and scheduled every 6 hours.  Patient states pain down to an 8 at HS.  Family at bedside overnight. Potassium replacement done for potassium of 2.8.  Potassium redraw at 2200.

## 2023-02-15 NOTE — PROGRESS NOTES
RN assumed cares at [phone]     Vitals: VSS on room air.  Pain: 10/10 pain in abdomen - scheduled and PRN oxy given.   Neuro: A&Ox4; calm and cooperative.   Cardiac: WDL. HTN.   Respiratory: Lung sounds clear. Stable on room air. No respiratory distress noted overnight.  GI/: Continent of bowel & bladder. Voiding adequately. _x BM overnight. Pt continues with loose BMs - imodium available 3x daily upon request.   IV/Drains: PIV infusing K+ at 50 ml/hr  Skin: Skin check completed without any new concerns.  Activity: up independently in room  Nutrition: Regular diet. High kcal/protein. No intake overnight.   Labs: on RN managed K+ replacement protocol.    Events/plan: Accessed port overnight for K+ IV administration - pt reported burning in peripheral veins with administration of K+ through PIV.    No acute events overnight. Pt slept between cares. Q2hr rounding completed. Call light within reach and is able to make needs known.

## 2023-02-16 NOTE — PLAN OF CARE
Goal Outcome Evaluation:  Pt A/Ox4, left lower abdominal pain, which was rated as 8/10 was reported. Scheduled pain medication was provided. Pt voided spontaniously and had one loose stool occurance during shift.  Replaced potassium per protocol, re-check with morning lab. Pt slept throughout the shift. Continue to monitor for pain and provide POC.

## 2023-02-16 NOTE — PROGRESS NOTES
[facility]  Palliative Care Daily Progress Note       Recommendations & Counseling     Encounter for palliative care  Psychosocial support    Psychosocial support was provided to patient and/or family.    Prognosis: Fair to Poor? (mixed results with most recent cancer therapy though very functional and able to ambulate to the restroom without issue in setting of known intellectual disability)    Palliative performance scale (PPS): 80% (due to reduced po intake, secondary to concern for pain)    CODE status: FULL    Goals of Care: Seemingly Restorative   ? Please refer to original consult note on 2/14 for more details  ? Oncology looking at supportive measures regarding pancytopenia. Deferring to outpatient follow-up.  ? Spoke with Robert, mother, and sister (over his iPad) about diet. Encouraged to eat more solid soft foods and escalate and to use Imodium as below.  ? Surrogate: sister Mariela, mother and father seem to defer to daughter (aka sister Mariela) for medical decisions and care    Disposition: Inpatient per primary. Would benefit from outpatient palliative follow-up. Referral placed in discharge orders tab.     Symptoms    Pain related to neoplasm    Hesitancy to eat (likely due to anticipatory abdominal pain)    Pancytopenia  ? Likely due to due to peritoneal carcinonmatosis mass effect on sigmoid bowel per CT scan  ? Robert states his pain is okay at rest but when he moves his bowels, he has lower abdominal crampy pain. This is causing him some hesitancy to eat still especially since he had many liquid bowel movements yesterday and is tired of getting out of bed frequently to use the restroom.  ? Spoke with oncology. Appreciate recs for Dex. Given current cancer treatment regimen and even in setting of pancytopenia, was advised that trying Dex would be okay. Start Dexamethasone 4 mg po q12h (done for you, hope this will help cancer pain and appetite)  ? Appreciate  pharmacy liaison consult for Butrans coverage with prior auth done on 2/15 and cost for Butrans being $0. Belbuca is covered as well but will defer this for now as I do not know how well Robert is able to understand and coordinate using a buccal film or even sublingual formulations like Subutex or Suboxone.   ? Butrans 10 mcg/hr patch (started 2/15 at ~1400)   ? Oxycodone 5 mg q6h scheduled and q6h prn     Constipation vs diarrhea  ? Encourage po intake of soft foods and protein shakes  ? Senna 2-4 tabs po BID prn for constipaton  ? Imodium increased from 2 mg TID prn to 4 mg 4 times daily prn for diarrhea (done for you)    Nausea  ? Continue Compazine prn   ? If ineffective, can trial Reglan 5 mg TID prn (if no obstruction) or Haldol 2 mg IV q6h prn.     Palliative medicine will continue to follow.     Total time spent was 55 minutes spent on the date of the encounter attending to symptoms. These recommendations were given to the primary team via this note.     Leo Atkins DO / Palliative Medicine / Text me via [facility].     Team Consult Pager [phone] (answered 8am-430pm M-F) - ok to text page via [employer] / After-Hours Answering Service [phone] / Palliative Clinic in the [facility] at the [facility] - [phone] (scheduling); [phone] (triage).      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 The pain acutely worsened over the last week and is thought to be 2/2 tumor growth. Robert just completed irinotecan and Temodar cycle 2 1/30-2/3. Course has been complicated by dehydration, malnutrition, and pancytopenia. Palliative was consulted for symptom control.     Today, the patient was seen for:  Pain related to neoplasm  Constipation vs diarrhea  Poor appetite  Support  Goals of care  Encounter for  palliative care            Interval History:     Chart review/discussion with unit or clinical team members:   Received scheduled oxycodone and oxycodone 5m mg x1 and Imodium 2 mg x2 from 0700 to 0700.     Per patient or family/caregivers today:  Seen and examined at bedside. Mother present. Robert called his sister Mariela on his iPad. Visit detailed above.     Key Palliative Symptoms:  # Pain severity the last 12 hours: low/controlled but flares to moderate to severe when he defecates (cancer pushing against colon)  We are not managing dyspnea in this patient  # Nausea severity the last 12 hours: none  We are not managing anxiety in this patient    Patient is on opioids: assessed and bowels ok/no needed changes to plan of care today.           Review of Systems:     Besides above, >4 ROS was reviewed and is unremarkable          Medications:     I have reviewed this patient's medication profile and medications during this hospitalization.    Noted meds:    Noted:  APAP 650 mg q6h scheduled  Butrans 10 mcg/hr patch started 2/15  Oxycodone 5 mg q6h scheduled 2/14  Dexamethasone 4 mg po q12h      Imodium 2 mg TID prn > increased to 4 mg 4 times daily prn on 2/16  Melatonin 1 mg at bedtime prn  Oxycodone 2.5-5 mg q6h prn  Compazine prn  Senna 2-4 tabs po BID started 2/14     Senna-docusate stopped 2/14  Zofran prn stopped 2/14  Dilaudid 2 mg q4h prn stopped 2/14             Physical Exam:   Vitals were reviewed  Temp: 98.6  F (37  C) Temp src: Oral BP: 99/45 Pulse: (!) 122   Resp: 22 SpO2: 99 % O2 Device: None (Room air)    General: Not in acute distress. Large body habitus  Head: Atraumatic. Normocephalic.   Eyes: Anicteric without injection. Eyes conjugate. Extraocular movements intact.  Ear, Nose, and Throat: Mouth pink and moist without lesions. Neck without overt masses.  Pulmonary: Unlabored. Speaking in full sentences  Cardiovascular: No overt jugular venous distension. LE edema. Perfusing.  Abdomen:  Non-distended.  Skin: Warm. Dry. No bruises or rashes noted.  Musculoskeletal: Joints of hand normal. Muscle bulk and tone normal in UE and LE.  Neuro: Speech fluent. Face symmetric. No focal neurologic deficit noted. Alert and oriented to person, place and time but questionably to situation in setting of known intellectual disability.  Psych: Normal mood and affect. Able to make needs known. Again, uncertain to what degree Robert is intellectually able to comprehend his situation.           Data Reviewed:     Reviewed recent pertinent imaging, comments:   No new imaging    Reviewed recent labs, comments:   ROUTINE ICU LABS (Last four results)  CMP  Recent Labs   Lab 02/16/23  0924 02/16/23  0032 02/15/23  1602 02/15/23  0628 02/14/23  1255 02/14/23  0915 02/13/23  1539 02/13/23  0855 02/12/23  0744 02/11/23  0545 02/10/23  1017     --   --  137  --  138  --  137 134*   < > 131*   POTASSIUM 3.2*  3.2* 3.6 3.3* 2.9*  2.9*   < > 2.5*   < > 2.6* 3.0*   < > 4.2   CHLORIDE 107  --   --  106  --  105  --  105 103   < > 96*   CO2 16*  --   --  16*  --  16*  --  16* 18*   < > 19*   ANIONGAP 13  --   --  15  --  17*  --  16* 13   < > 16*   *  --   --  83  --  84  --  101* 90   < > 131*   BUN 9.1  --   --  8.0  --  6.8  --  6.9 8.6   < > 16.0   CR 0.99  --   --  1.01  --  0.92  --  0.89 0.85   < > 1.11   GFRESTIMATED >90  --   --  >90  --  >90  --  >90 >90   < > >90   FAISAL 8.5*  --   --  8.6  --  8.5*  --  8.6 8.3*   < > 9.5   MAG 1.5*  --   --  1.7  --  1.9  --   --  1.8  --  2.0   PHOS 2.0*  --   --  1.6*  --   --   --   --   --   --  4.0   PROTTOTAL  --   --   --   --   --   --   --   --   --   --  8.3   ALBUMIN  --   --   --   --   --   --   --   --   --   --  4.0   BILITOTAL  --   --   --   --   --   --   --   --   --   --  1.3*   ALKPHOS  --   --   --   --   --   --   --   --   --   --  114   AST  --   --   --   --   --   --   --   --   --   --  10   ALT  --   --   --   --   --   --   --   --   --   --  22     < > = values in this interval not displayed.     CBC  Recent Labs   Lab 02/16/23  0924 02/15/23  1003 02/14/23  1556 02/14/23  1447 02/14/23  0915 02/13/23  0855   WBC 0.6* 0.5*  --  0.2* 0.3* 0.2*   RBC 2.63* 2.96*  --  1.69* 2.75* 2.67*   HGB 7.1* 8.0* 7.2* 4.7* 7.6* 7.4*   HCT 23.4* 26.2*  --  15.6* 24.6* 23.7*   MCV 89 89  --  92 90 89   MCH 27.0 27.0  --  27.8 27.6 27.7   MCHC 30.3* 30.5*  --  30.1* 30.9* 31.2*   RDW 16.8* 16.6*  --  16.5* 16.7* 16.3*   PLT 14* 24*  --   --  27* 28*     INRNo lab results found in last 7 days.  Arterial Blood GasNo lab results found in last 7 days.

## 2023-02-16 NOTE — PROVIDER NOTIFICATION
Critical Test Results/Notification    Critical lab result (name and value):Absolute neutrophil count 0.1  What time did lab notify you? 1106  What provider did you notify? Dr Ramon  Was the provider notified within 30 min? yes  Why was provider not notified? n/a    Response from provider: Awaiting response

## 2023-02-16 NOTE — PLAN OF CARE
Goal Outcome Evaluation:         Patient had six stools since 1500 another immodium given. Replaced potassium from 1500 draw and 40 meq given to patient and will have blood draw again this madan.

## 2023-02-16 NOTE — PROGRESS NOTES
Resident/Fellow Attestation   I, Sri Mora MD, was present with the medical/LISY student who participated in the service and in the documentation of the note.  I have verified the history and personally performed the physical exam and medical decision making.  I agree with the assessment and plan of care as documented in the note.      Sri Mora MD  PGY3  Date of Service (when I saw the patient): 02/16/23    [facility]    Progress Note - Medicine Service, [facility] TEAM 1       Date of Admission:  2/10/2023    Assessment & Plan   Diego Buchanan is a 27 year old M with a 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 The pain acutely worsened over the last week and is thought to be 2/2 tumor growth. Robert just completed irinotecan and Temodar cycle 2 1/30-2/3. Abdominal pain is improving but complicated by dehydration, malnutrition, electrolyte derangement, and anti-neoplastic therapy induced pancytopenia.      Today:  - Protein shake supplements to aid with nutrition  -Thiamine supplement  - Phosphorus replacement- 2 packs TID for 3 days.   - Start Dexamethasone     Neoplastic-related pain  Abdominal pain and altered bowel habits 2/2 progression of neoplasm  Diarrhea  3 months of abd pain since 1st cycle of new chemo. Second cycle of irinotecan/temozolomide chemo 1/3-2/3. Pain is worse with bowel movements and Robert continues to avoid eating and drinking. C diff and enteric panel negative.   - Per Palliative:              - Start Butrans 10 mcg/hr patch    - Start dexamethasone 8mg/day  - Continue senna 2-4 tabs po BID prn   - Continue Compazine prn               - Oxycodone 5 mg scheduled and 2.5-5 mg PRN              - Scheduled Tylenol               - Loperamide     Severe malnutrition 2/2 neoplasm growth  Robert has had very little oral intake for at least  one week. He is fearful about eating as bowel movements worsen his abdominal pain   - Aim for 3 protein shakes today  - Thiamine supplement   - Calorie counts  - Heme/onc recommends holding off on TPN for now     Acute Kidney Injury, improving  Likely prerenal related to decreased PO intake. Appears to be improving with IVF and improving PO intake. Cr improved from 1.11 on admission, baseline around 0.60  - Encourage oral intake      Metastatic desmoplastic small round cell carinoma   Pancytopenia secondary to antineoplastic therapy  CT abd/pelvis showing extensive progression of dz. In the absence of fever, no increase in WBC, no current concern for infection. Will plan to follow heme/onc recs regarding cancer care.   - Neupogen until ANC >500 for 2 days  - BCx pending  - Neutropenic precautions  - low threshold to start abx       ----------Stable/ chronic medical problems-------------    Anemia and thrombocytopenia 2/2 antineoplastic therapy  Baseline thought to be around 8.2. Most likely anemia of chronic disease. Transfuse pRBC for hb < 7 or as indicated pending symptoms. Platelet transfusion as needed. Peripheral smear on 12/15 with no evidence of hemolysis.   -Transfuse 1 unit pRBCs (2/10, 2/11)     Hypophosphatemia 2/2 decreased intake:  -Potassium &sodium phosphate packets TID for three days     Hypokalemia  Hypomagnesmia  History of hypokalemia and hypomagnesemia. Both are normal today. Will continue on magnesium 400 mg bid and potassium 20 mEQ bid. Did not tolerate IV mag.   -K+ replacement protocol  -20 mEq daily   -Mag replacement    Dehydration 2/2 decreased PO intake, resolved  Hyponatremia 2/2 decreased PO intake, resolved    HTN   Tachycardia   Longstanding issue, multifactorial in setting of metastatic disease. EKG today shows sinus tachycardia. HR ranges from low 100's to 150's. Did not take his metoprolol 25 mg this morning but received in clinic.   -continue PTA metoprolol     Port  "malpositioning  Port exchanged on 1/20/23. Kinked in the area of entry into the subclavian vein with tip  projecting over the innominate vein confluence regarding port placement with thoracic surgery on call, Dr. Cota. Per her recommendation-- Ok to use port as long as nursing is not having significant difficulty accessing or getting blood return.      ----------------Follow ups needed on discharge--------------------  -Heme Onc  -Palliative Care     Diet: Combination Diet Regular Diet Adult  Snacks/Supplements Adult: Other; Ensure clear with lunch and dinner trays; With Meals  Snacks/Supplements Adult: Ensure Enlive; Between Meals    DVT Prophylaxis: none  Busby Catheter: Not present  Fluids: oral  Lines: PRESENT      Port A Cath Single 01/20/23 Right Chest wall-Site Assessment: Other (Comment) (not accessed)      Cardiac Monitoring: None  Code Status: Full Code      Clinically Significant Risk Factors        # Hypokalemia: Lowest K = 2.8 mmol/L in last 2 days, will replace as needed     # Hypomagnesemia: Lowest Mg = 1.5 mg/dL in last 2 days, will replace as needed     # Thrombocytopenia: Lowest platelets = 14 in last 2 days, will monitor for bleeding         # Obesity: Estimated body mass index is 35.56 kg/m  as calculated from the following:    Height as of this encounter: 1.676 m (5' 6\").    Weight as of this encounter: 99.9 kg (220 lb 4.8 oz).   # Severe Malnutrition: based on nutrition assessment        Disposition Plan      Expected Discharge Date: 02/18/2023      Destination: home with family  Discharge Comments: 2/13: Pending abd pain, PO intake. Diarrhea neg.   Dispo: Home with assist; home w/ PT  Delays: calorie count started 2/13 (3 days)  2/16: Palliative consult, pain management and nutrition        The patient's care was discussed with the Attending Physician, Dr. Gómez .    Adrianne Jimenez  Medical Student  Medicine Service, [facility] TEAM [address] [place] [address] Medical " Center  Securely message with [employer] (more info)  Text page via [facility] Paging/Directory   See signed in provider for up to date coverage information  ______________________________________________________________________    Interval History   Robert is doing well this morning. His abdominal pain is stable to improved and he seemed comfortable in bed on his iPad. He said the pain is still present but that he is doing alright. He is not having any fevers, chills, nausea, or shortness of breath this morning.     Physical Exam   Vital Signs: Temp: 98.6  F (37  C) Temp src: Oral BP: 99/45 Pulse: (!) 122   Resp: 22 SpO2: 99 % O2 Device: None (Room air)    Weight: 220 lbs 4.8 oz    General: Mildly-ill appearing male in no acute distress. Seen laying in bed playing on ipad.  Eyes: No scleral icterus.  Cardiovascular: Tachycardic. Rhythm regular. No peripheral edema.  Respiratory: clear to auscultation bilaterally. No wheezes or crackles.   Gastrointestinal: Abdomen rounded, distended, reports mild tenderness to palpation throughout, moderate tenderness diffusely in the lower abdomen. No guarding. Bowel sounds present. Abdomen is firm.  Skin: No rashes, petechiae, or bruising noted on exposed skin.  Psych: Affect appropriate.    Medical Decision Making         Data     Most Recent 3 CBC's:  Recent Labs   Lab Test 02/16/23  0924 02/15/23  1003 02/14/23  1556 02/14/23  1447 02/14/23  0915   WBC 0.6* 0.5*  --  0.2* 0.3*   HGB 7.1* 8.0* 7.2* 4.7* 7.6*   MCV 89 89  --  92 90   PLT 14* 24*  --   --  27*     Most Recent 3 BMP's:  Recent Labs   Lab Test 02/16/23  0924 02/16/23  0032 02/15/23  1602 02/15/23  0628 02/14/23  1255 02/14/23  0915     --   --  137  --  138   POTASSIUM 3.2*  3.2* 3.6 3.3* 2.9*  2.9*   < > 2.5*   CHLORIDE 107  --   --  106  --  105   CO2 16*  --   --  16*  --  16*   BUN 9.1  --   --  8.0  --  6.8   CR 0.99  --   --  1.01  --  0.92   ANIONGAP 13  --   --  15  --  17*   FAISAL 8.5*  --   --  8.6  --  8.5*    *  --   --  83  --  84    < > = values in this interval not displayed.

## 2023-02-16 NOTE — PROGRESS NOTES
Hematology / Oncology  Daily Progress Note   Date of Service: 02/16/2023  Patient: Diego Buchanan  MRN: [id number]  Admission Date: 2/10/2023  Hospital Day # 6  Cancer Diagnosis: metastatic desmoplastic small cell round tumor  Primary Outpatient Oncologist: Dr. 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 His last treatment for metastatic desmoplastic small cell round tumor was on 1/31 (C2D1) and his most recent CT c/a/p from 2/10 is showing mixed response. He cont to have pancytopenia from her chemotherapy, would cont to support with filgrastim and prn transfusion.     2. If he starts to develop fever while neutropenic, he needs to be started on appropriate pseudomonal coverage with fever w/up.      3. His CT did not show any e/o obstruction but he has e/o worsening peritoneal carcinomatosis, which can be the cause of his abd pain. Primary team to cont to work on aggressive pain and bowel mngt. He cont to have diarrhea, awaiting improvement with conservative mngt.     Recommendations:   -agree with continuing dexamethasone 4mg q12 hrs  -please cont filgrastim daily until his ANC is greater than 500 for 2 days  -if pt starts to have fever, please do fever w/ups and start on pseudomonal coverage (like cefepime)  -please consider RBC transfusion to maintain Hb > 7.0  -please consider plt transfusion to maintain > 10K, or > 50K if bleeding  -please cont adequate pain and bowel mngt   -we will arrange f/up appt with   "Musibay on discharge.     Patient was seen and plan of care was discussed with attending physician Dr. Crowder.    Thank you for the opportunity to partake in this patients plan of care. Please do not hesitate to page with questions. We will continue to follow.     Eddie Portillo MD, [facility] PGY4  Hematology/Oncology   Pager: [phone]  ___________________________________________________________________    Subjective & Interval History:    No acute events noted overnight.      Pt was sleeping at the time of my encounter. His primary RN told me that he had 5 loose stools as of later afternoon. Took x1 loperamide this morning. No n/v or abd pain. He was able to drink one bottle of protein shake.     Physical Exam:    Blood pressure 103/59, pulse 103, temperature 98.3  F (36.8  C), temperature source Oral, resp. rate 19, height 1.676 m (5' 6\"), weight 99.2 kg (218 lb 11.2 oz), SpO2 98 %.    General: sleeping in bed, no acute distress  Neck: supple  Resp: breathing comfortably on room air  Skin: no rashes on limited exam, no jaundice  Neuro: Alert and interactive, moves all extremities equally, no focal deficits    Labs & Studies: I personally reviewed the following studies:  ROUTINE LABS (Last four results):  CMP  Recent Labs   Lab 02/16/23  1710 02/16/23  0924 02/16/23  0032 02/15/23  1602 02/15/23  0628 02/14/23  1255 02/14/23  0915 02/13/23  1539 02/13/23  0855 02/12/23  0744 02/11/23  0545 02/10/23  1017   NA  --  136  --   --  137  --  138  --  137 134*   < > 131*   POTASSIUM 3.8 3.2*  3.2* 3.6 3.3* 2.9*  2.9*   < > 2.5*   < > 2.6* 3.0*   < > 4.2   CHLORIDE  --  107  --   --  106  --  105  --  105 103   < > 96*   CO2  --  16*  --   --  16*  --  16*  --  16* 18*   < > 19*   ANIONGAP  --  13  --   --  15  --  17*  --  16* 13   < > 16*   GLC  --  103*  --   --  83  --  84  --  101* 90   < > 131*   BUN  --  9.1  --   --  8.0  --  6.8  --  6.9 8.6   < > 16.0   CR  --  0.99  --   --  1.01  --  0.92  --  0.89 0.85   < > " 1.11   GFRESTIMATED  --  >90  --   --  >90  --  >90  --  >90 >90   < > >90   FAISAL  --  8.5*  --   --  8.6  --  8.5*  --  8.6 8.3*   < > 9.5   MAG  --  1.5*  --   --  1.7  --  1.9  --   --  1.8  --  2.0   PHOS  --  2.0*  --   --  1.6*  --   --   --   --   --   --  4.0   PROTTOTAL  --   --   --   --   --   --   --   --   --   --   --  8.3   ALBUMIN  --   --   --   --   --   --   --   --   --   --   --  4.0   BILITOTAL  --   --   --   --   --   --   --   --   --   --   --  1.3*   ALKPHOS  --   --   --   --   --   --   --   --   --   --   --  114   AST  --   --   --   --   --   --   --   --   --   --   --  10   ALT  --   --   --   --   --   --   --   --   --   --   --  22    < > = values in this interval not displayed.     CBC  Recent Labs   Lab 02/16/23  0924 02/15/23  1003 02/14/23  1556 02/14/23  1447 02/14/23  0915 02/13/23  0855   WBC 0.6* 0.5*  --  0.2* 0.3* 0.2*   RBC 2.63* 2.96*  --  1.69* 2.75* 2.67*   HGB 7.1* 8.0* 7.2* 4.7* 7.6* 7.4*   HCT 23.4* 26.2*  --  15.6* 24.6* 23.7*   MCV 89 89  --  92 90 89   MCH 27.0 27.0  --  27.8 27.6 27.7   MCHC 30.3* 30.5*  --  30.1* 30.9* 31.2*   RDW 16.8* 16.6*  --  16.5* 16.7* 16.3*   PLT 14* 24*  --   --  27* 28*     INRNo lab results found in last 7 days.    Medications list for reference:  Current Facility-Administered Medications   Medication     acetaminophen (TYLENOL) tablet 650 mg     buprenorphine (BUTRANS) 10 MCG/HR WK patch 1 patch     buprenorphine (BUTRANS) Patch in Place     dexamethasone (DECADRON) tablet 4 mg     dextrose 5 % flush PRE/POST medication    And     filgrastim-aafi (NIVESTYM) 480 mcg in D5W 25 mL infusion    And     dextrose 5 % flush PRE/POST medication     lidocaine (LMX4) cream     lidocaine 1 % 0.1-1 mL     loperamide (IMODIUM) capsule 4 mg     [Held by provider] magnesium oxide (MAG-OX) tablet 400 mg     melatonin tablet 1 mg     metoprolol tartrate (LOPRESSOR) tablet 25 mg     naloxone (NARCAN) injection 0.2 mg    Or     naloxone (NARCAN)  injection 0.4 mg    Or     naloxone (NARCAN) injection 0.2 mg    Or     naloxone (NARCAN) injection 0.4 mg     oxyCODONE (ROXICODONE) tablet 5 mg     oxyCODONE IR (ROXICODONE) half-tab 2.5-5 mg     potassium & sodium phosphates (NEUTRA-PHOS) Packet 2 packet     potassium chloride ER (MICRO-K) CR capsule 20 mEq     prochlorperazine (COMPAZINE) injection 5 mg    Or     prochlorperazine (COMPAZINE) tablet 5 mg     sennosides (SENOKOT) tablet 2-4 tablet     sodium chloride (PF) 0.9% PF flush 3 mL     sodium chloride (PF) 0.9% PF flush 3 mL     thiamine (B-1) tablet 100 mg     triamcinolone (KENALOG) 0.1 % ointment     Facility-Administered Medications Ordered in Other Encounters   Medication     sodium chloride (PF) 0.9% PF flush 30 mL

## 2023-02-16 NOTE — PLAN OF CARE
"[id number]: A&O x4. Able to call and make needs known. Up independently. Loose bowel movements, patient stated he is worried about eating as it \"goes right through him.\" 3 BM's this shift. Family at bedside. L PIV saline locked, flushes but is sluggish. Chest port not accessed. Continued with calorie count.      Plan: will continue to monitor and follow with plan of care.       "

## 2023-02-16 NOTE — PROGRESS NOTES
Calorie Count  Intake recorded for: 2/15  Total Kcals: 420 Total Protein: 19g  Kcals from Hospital Food: 420  Protein: 19g  Kcals from Outside Food (average):0 Protein: 0g  # Meals Ordered from Kitchen: no meals ordered.   # Meals Recorded: no food intake recorded.   # Supplements Recorded: 100% 1 Nepro Oral Supplement

## 2023-02-17 NOTE — PLAN OF CARE
Goal Outcome Evaluation:         Patient had mg and potassium replaced today. Port is accessed. Patient had immodium 4mg twice today. Stool output is slowing down not so much at the same time. Patient scared to eat due to having diarrhea.  Patient has a family member with him. States his pain is a 4.

## 2023-02-17 NOTE — PROGRESS NOTES
"CLINICAL NUTRITION SERVICES - REASSESSMENT NOTE     Nutrition Prescription    RECOMMENDATIONS FOR MDs/PROVIDERS TO ORDER:  Patient with 1 week of minimal PO PTA with ongoing poor po since admit.   - Would consider a short term TPN support until resolution of altered GI is achieved.   - Please send pharmacy/nutrition consult to manage TPN support.   - Patient is at high risk for refeeding syndrome with start and advancement of TPN support.     Malnutrition Status:     Severe malnutrition in the context of chronic condition     Recommendations already ordered by Registered Dietitian (RD):  Ordered ensure plus with every meal trays to optimize oral intake      Future/Additional Recommendations:  IF TPN becomes nutrition POC:   Enter pharmacy change order for the following TPN regimen:     Dosing wt: 73 kg adjusted wt from standing wt of 98 kg on 2/11 and IBW of 64.5 kg   Access: N/A     Goal PN: Volume: 55 ml/hr, 1320 ml/day with Dextrose: 170 grams/day, AA:110 grams/day + 250 ml IV Clinolipids x 6 days per week.      --Refeeding precaution: If Mg++/K+ /Phos normal, begin PN with dextrose of 110 gm /day (GIR:1.0). Once pt receives ~100% of initial continuous PN volume with K+/Mg++/Phos  WNL, advance PN dextrose by 30 gm daily to goal dextrose of 170 gm/day.      --Regimen provides: 1477 kcal /day (20 kcal/kg ), 110 gm protein /day (1.5 gm/kg), 170 gm carbs (GIR: 1.6 mg/kg/min) and 30% kcal from daily lipids.     --Vitamin /minerals:10 ml Infuvite, 1 ml MTE-4 + 100 mg thiamine with TPN start and advancement          EVALUATION OF THE PROGRESS TOWARD GOALS   Diet:   Regular    Intake:   Unable to see patient today. He was busy with multiple deciplines today. Per chart review, \"Pt is not eating well because when he eats, he has diarrhea and abdominal cramping\". Started on high dose anti motility regimen. Per MD note, patient does not want to eat food \"goes right through him,\" causing uncomfortable symptoms.     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     He was admitted on 2/9/23 due to abdominal pain/diarrhea, weakness, also found to have pancytopenia 2/2 chemo.     CT did show worsening peritoneal carcinomatosis  Negative cdiff and enteric panel    Labs:  Ketone serum: 2.80 (H), anion gap: 19 (H)  Mg++: 2.3, Phos: 4.0, K+: 3.9  BUN:18.4, Cr: 1.12  WBC: 1.7 (L)    GI:   Ongoing diarrhea -> imodium increased to 4 mg QID today ( per MD, likely related to chemo med)    WT trend:  Admit wt 2/16 -> 98 kg (216 lb) -> 99.2 kg (218 lb 11.2 oz) ( standing wt on 2/16)      MALNUTRITION  % Intake: </= 50% for >/= 5 days (severe)  % Weight Loss: > 5% in 1 month (severe)  Subcutaneous Fat Loss: None observed  Muscle Loss: None observed  Fluid Accumulation/Edema: None noted  Malnutrition Diagnosis: Severe malnutrition in the context of chronic condition     Previous Goals   Total avg nutritional intake to meet a minimum of 20 kcal/kg and 1.2 gm PRO/kg daily (per dosing wt 73 kg adjusted wt ).  Evaluation: Not met    Previous Nutrition Diagnosis  Inadequate oral intake related to abdominal pain and altered GI 2/2 progression of malignancy as evidenced by patient with minimal po intake since 1 week PTA per family report, inability to tolerate diet due to N/V x 1-2 days since admit   Evaluation: Declining      CURRENT NUTRITION DIAGNOSIS  Inadequate oral intake related to altered GI related to cancer therapy as evidenced by patient with minimal po since 1 week PTA and 1 week since admit, inability to tolerate solids with ongoing diarrhea.        INTERVENTIONS  Implementation  Medical food supplement therapy    Goals  Total avg nutritional intake to meet a minimum of 20 kcal/kg  and 1.2 gm PRO/kg daily (per dosing wt 73 kg adjusted wt ).    Monitoring/Evaluation  Progress toward goals will be monitored and evaluated per protocol.    Loreta Kidd RD/AMAYA  Pager [phone]

## 2023-02-17 NOTE — PLAN OF CARE
Goal Outcome Evaluation:         Pt A/Ox4, with soft BP ( 90/41 HR 98) otherwise stable VS on room air. Pt reported left lower abdominal pain, which was rated as 4/10 was. Scheduled pain medication was provided. Pt voided spontaniously and had one loose stool occurance during shift. PRN imodium was given x1.  Replaced potassium per protocol, re-checks with morning lab. Pt slept throughout the shift. Pt's call light is within reach. Continue to monitor for pain and provide POC.

## 2023-02-17 NOTE — PROGRESS NOTES
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 The pain acutely worsened over the last week and is thought to be 2/2 tumor growth. Robert just completed irinotecan and Temodar cycle 2 1/30-2/3. Abdominal pain is improving but complicated by dehydration, malnutrition, electrolyte derangement, and anti-neoplastic therapy induced pancytopenia.      Today:  - New starvation ketosis and ketoacidosis due to decrease oral intake   -Start d5 -NS  -Due to pain with defecation and instant diarrhea with oral intake plan to scheduled immodium QID. If no bowel movements after 24 hours will need to hold this  -Increase oxycodone to q4h prn    Starvation Ketoacidosis  Anion gap metabolic acidosis   -Occurred on 2/17 AM due to decrease PO intake  Plan:  -D5 - NS started on 2/17     Neoplastic-related pain  Abdominal pain and altered bowel habits 2/2 progression of neoplasm  Diarrhea  3 months of abd pain since 1st cycle of new chemo. Second cycle of irinotecan/temozolomide chemo 1/3-2/3. Pain is worse with bowel movements and Robert continues to avoid eating and drinking. C diff and enteric panel negative.   - Per Palliative:              - Start Butrans 10 mcg/hr patch    - Start dexamethasone 8mg/day  - Continue senna 2-4 tabs po BID prn   - Continue Compazine prn               - Oxycodone 5 mg  q6h  scheduled and 2.5-5 mg q4h PRN              - Scheduled Tylenol               - Loperamide scheduled - started on 2/17   -Palliative care consulted, planning to follow up outpatient     Severe malnutrition 2/2 neoplasm growth  Robert has had very little oral intake for at least one week. He is fearful about eating as bowel movements  worsen his abdominal pain   - Aim for 3 protein shakes today  - Thiamine supplement   - Calorie counts  - Heme/onc recommends holding off on TPN for now     Acute Kidney Injury, improving  Likely prerenal related to decreased PO intake. Appears to be improving with IVF and improving PO intake. Cr improved from 1.11 on admission, baseline around 0.60  - Encourage oral intake      Metastatic desmoplastic small round cell carinoma   Pancytopenia secondary to antineoplastic therapy - improving  CT abd/pelvis showing extensive progression of dz. In the absence of fever, no increase in WBC, no current concern for infection. Will plan to follow heme/onc recs regarding cancer care.   - Neupogen until ANC >500 for 2 days - occurred on 2/17       ----------Stable/ chronic medical problems-------------    Anemia and thrombocytopenia 2/2 antineoplastic therapy  Baseline thought to be around 8.2. Most likely anemia of chronic disease. Transfuse pRBC for hb < 7 or as indicated pending symptoms. Platelet transfusion as needed. Peripheral smear on 12/15 with no evidence of hemolysis.   -Transfuse 1 unit pRBCs (2/10, 2/11)     Hypophosphatemia 2/2 decreased intake:  -Potassium &sodium phosphate packets TID for three days- completed on 2/17     Hypokalemia  Hypomagnesmia  History of hypokalemia and hypomagnesemia. Both are normal today. Will continue on magnesium 400 mg bid and potassium 20 mEQ bid. Did not tolerate IV mag.   -K+ replacement protocol  -20 mEq daily   -Mag replacement    Dehydration 2/2 decreased PO intake, resolved  Hyponatremia 2/2 decreased PO intake, resolved    HTN   Tachycardia   Longstanding issue, multifactorial in setting of metastatic disease. EKG today shows sinus tachycardia. HR ranges from low 100's to 150's. Did not take his metoprolol 25 mg this morning but received in clinic.   -continue PTA metoprolol     Port malpositioning  Port exchanged on 1/20/23. Kinked in the area of entry into the subclavian  "vein with tip projecting over the innominate vein confluence regarding port placement with thoracic surgery on call, Dr. Cota. Per her recommendation-- Ok to use port as long as nursing is not having significant difficulty accessing or getting blood return.      ----------------Follow ups needed on discharge--------------------  -Heme Onc  -Palliative Care     Diet: Combination Diet Regular Diet Adult  Snacks/Supplements Adult: Other; Ensure clear with lunch and dinner trays; With Meals  Snacks/Supplements Adult: Ensure Enlive; Between Meals    DVT Prophylaxis: none  Busby Catheter: Not present  Fluids: oral  Lines: PRESENT      Port A Cath Single 01/20/23 Right Chest wall-Site Assessment: WDL      Cardiac Monitoring: None  Code Status: Full Code      Clinically Significant Risk Factors        # Hypokalemia: Lowest K = 3.2 mmol/L in last 2 days, will replace as needed     # Hypomagnesemia: Lowest Mg = 1.5 mg/dL in last 2 days, will replace as needed  # Anion Gap Metabolic Acidosis: Highest Anion Gap = 19 mmol/L in last 2 days, will monitor and treat as appropriate    # Thrombocytopenia: Lowest platelets = 14 in last 2 days, will monitor for bleeding         # Obesity: Estimated body mass index is 35.3 kg/m  as calculated from the following:    Height as of this encounter: 1.676 m (5' 6\").    Weight as of this encounter: 99.2 kg (218 lb 11.2 oz).   # Severe Malnutrition: based on nutrition assessment      Disposition Plan      Expected Discharge Date: 02/19/2023      Destination: home with family  Discharge Comments: Dispo: Home with assist; home w/ PT  Delays: pain management and nutrition  Progress: Establish PCP; OP palliative f/u; HCD        Tyson Gómez MD  Medicine Service, [facility] TEAM [address] [place] [address]  Securely message with [employer] (more info)  Text page via [employer] Paging/Directory   See signed in provider for up to date coverage " information  ______________________________________________________________________    Interval History   No acute events overnight. Patient with minimal PO intake overnight. He denies fever or chills. Reports pain with bowel movements. He does not have pain while lying in bed but it develops shortly after eating. HE had some of the ensure but has not completed multiple of these. He denies chest pain or shortness of breath.     Physical Exam   Vital Signs: Temp: 97.8  F (36.6  C) Temp src: Oral BP: 102/59 Pulse: 99   Resp: 17 SpO2: 97 % O2 Device: None (Room air)    Weight: 218 lbs 11.2 oz    General: ill appearing male in no acute distress. Seen laying in bed playing on ipad.  Head: alopecia  Eyes: No scleral icterus.  Cardiovascular: Tachycardic. Rhythm regular.   Chest: port in place on chest with no erythema or purulence  Respiratory: clear to auscultation bilaterally. No wheezes or crackles.   Gastrointestinal: Abdomen rounded, distended, reports mild tenderness to palpation throughout, moderate tenderness diffusely in the lower abdomen. No guarding. Bowel sounds present. Abdomen is firm.  Skin: No rashes, petechiae, or bruising noted on exposed skin.  Psych: Affect appropriate.    Medical Decision Making         Data     Most Recent 3 CBC's:  Recent Labs   Lab Test 02/17/23  0649 02/16/23  0924 02/15/23  1003   WBC 1.7* 0.6* 0.5*   HGB 7.3* 7.1* 8.0*   MCV 90 89 89   PLT 15* 14* 24*     Most Recent 3 BMP's:  Recent Labs   Lab Test 02/17/23  0649 02/16/23  1710 02/16/23  0924 02/15/23  1602 02/15/23  0628   *  --  136  --  137   POTASSIUM 3.9 3.8 3.2*  3.2*   < > 2.9*  2.9*   CHLORIDE 104  --  107  --  106   CO2 12*  --  16*  --  16*   BUN 18.4  --  9.1  --  8.0   CR 1.12  --  0.99  --  1.01   ANIONGAP 19*  --  13  --  15   FAISAL 8.6  --  8.5*  --  8.6   *  --  103*  --  83    < > = values in this interval not displayed.

## 2023-02-17 NOTE — PROGRESS NOTES
Care Management Follow Up    Length of Stay (days): 7  Expected Discharge Date: 02/19/2023  Concerns to be Addressed: discharge planning, utilization management     Patient plan of care discussed at interdisciplinary rounds: Yes  Anticipated Discharge Disposition: Home  Anticipated Discharge Services: PCA, County Worker    ong [facility] ([phone])  PCA & Direct Support Person; pt's sister Mariela    Anticipated Discharge DME: None  Patient/family educated on Medicare website which has current facility and service quality ratings: yes   Education Provided on the Discharge Plan: yes   Patient/Family in Agreement with the Plan: yes   Referrals Placed by CM/SW:    Private pay costs discussed: Not applicable  Additional Information: SW paged provider to determine pt's decisional capacity to complete a health care directive. M1 provider advised that the patient IS decisional and can complete a health care directive. SW provided the paperwork to the patient and his family at bedside. SW can return on Monday AM to have it notarized.    MIKE called & LVM for the patient's sister/point of contact Mariela to ask if she needs help establishing a Hmong speaking PCP for the patient prior to discharge. She was agreeable to an internal PCP being established. MIKE requested a [facility] task to assist with this.  __________________________     ERICKA Bocanegra, MIRYAM  /Care Coordinator  [facility]   [address] Medicine Beds 3733-0757 & 5C Medicine Overflow Beds 6475-6141  [email or web address]  Phone: [phone]  Pager: [phone]  Fax: [phone]  Message me on [employer] alissa.    Weekend 5A & 5B  Pager: [phone]   Weekend 5A & 5B RNCC Pager: [phone]  Weekdays after hours (1630 - 0000), Saturday & Manjinder after hours ([phone]), & [facility] Recognized Holidays Coverage  Pager: [phone]

## 2023-02-17 NOTE — PROGRESS NOTES
[facility]  Palliative Care Daily Progress Note       Recommendations & Counseling     Encounter for palliative care  Psychosocial support    Psychosocial support was provided to patient and/or family.    Prognosis: Fair to Poor? (mixed results with most recent cancer therapy though very functional and able to ambulate to the restroom without issue in setting of known intellectual disability)    Palliative performance scale (PPS): 80% (due to reduced po intake, secondary to concern for pain)    CODE status: FULL    Goals of Care: Seemingly Restorative   ? Please refer to original consult note on 2/14 for more details  ? Oncology looking at supportive measures regarding pancytopenia. Deferring to outpatient follow-up.   ? Spoke with Robert, mother, and sister (over his iPad) about diet. Stools still loose. Again, encouraged to eat more solid soft foods and escalate and to use Imodium as below. Also spoke to beside RN about this issue. Appreciate help.  ? Surrogate: sister Mariela, mother and father seem to defer to daughter (aka sister Mariela) for medical decisions and care    Disposition: Inpatient per primary. Would benefit from outpatient palliative follow-up. Referral placed in discharge orders tab.     Symptoms    Pain related to neoplasm    Ketoacidosis 2/2 Hesitancy to eat (likely due to anticipatory abdominal pain)     Pancytopenia  ? Likely due to due to peritoneal carcinonmatosis mass effect on sigmoid bowel per CT scan  ? Robert states his pain is okay at rest but when he moves his bowels, he has lower abdominal crampy pain. This is causing him some hesitancy to eat still especially since he had many liquid bowel movements yesterday and is tired of getting out of bed frequently to use the restroom.  ? Spoke with oncology. Appreciate recs for Dex. Given current cancer treatment regimen and even in setting of pancytopenia, was advised that trying Dex would be okay.    Continue Dexamethasone 4 mg po q12h (hope this will help cancer pain and appetite)  ? Appreciate pharmacy liaison consult for Butrans coverage with prior auth done on 2/15 and cost for Butrans being $0. Belbuca is covered as well but will defer this for now as I do not know how well Robert is able to understand and coordinate using a buccal film or even sublingual formulations like Subutex or Suboxone.   ? Butrans 10 mcg/hr patch (started 2/15 at ~1400)   ? Oxycodone 5 mg q6h scheduled to time out after 0600 dose on 2/18 as Butrans should have reached therapeutic effect by then (done for you)  ? Increased oxycodone 2.5-5 mg frequency from q6h prn to q4h prn (done for you)    Constipation vs diarrhea  ? Encourage po intake of soft foods and protein shakes  ? Senna 2-4 tabs po BID prn for constipaton  ? Imodium 4 mg 4 times daily prn for diarrhea     Nausea  ? Continue Compazine prn   ? If ineffective, can trial Reglan 5 mg TID prn (if no obstruction) or Haldol 2 mg IV q6h prn.     Palliative medicine will plan to follow-up again on Monday 2/20 if still admitted. Please page sooner if needed.     Total time spent was 35 minutes spent on the date of the encounter attending to symptoms. These recommendations were given to the primary team via this note.     Leo Atkins DO / Palliative Medicine / Text me via [employer].     Team Consult Pager [phone] (answered 8am-430pm M-F) - ok to text page via [facility] / After-Hours Answering Service [phone] / Palliative Clinic in the [facility] [place] [facility] [place] [phone] (scheduling); [phone] (triage).      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 The pain acutely worsened over the last week and is thought to be 2/2 tumor growth. Robert just completed irinotecan and  Temodar cycle 2 1/30-2/3. Course has been complicated by dehydration, malnutrition, and pancytopenia. Palliative was consulted for symptom control.     Today, the patient was seen for:  Pain related to neoplasm  Constipation vs diarrhea  Poor appetite  Support  Goals of care  Encounter for palliative care            Interval History:     Chart review/discussion with unit or clinical team members:   Received scheduled oxycodone and oxycodone 5 mg prn x0 and Imodium 4 mg x3 from 0700 to 0700.     Per patient or family/caregivers today:  Seen and examined at bedside. Mother present. Robert called his sister Mariela on his iPad. Visit detailed above.     Key Palliative Symptoms:  # Pain severity the last 12 hours: low/controlled but flares to moderate to severe when he defecates (cancer pushing against colon)  We are not managing dyspnea in this patient  # Nausea severity the last 12 hours: none  We are not managing anxiety in this patient    Patient is on opioids: assessed and bowels ok/no needed changes to plan of care today.           Review of Systems:     Besides above, >4 ROS was reviewed and is unremarkable          Medications:     I have reviewed this patient's medication profile and medications during this hospitalization.    Noted meds:    Noted:  APAP 650 mg q6h scheduled  Butrans 10 mcg/hr patch started 2/15  Oxycodone 5 mg q6h scheduled 2/14 > to time out on 2/18  Dexamethasone 4 mg po q12h      Imodium 2 mg TID prn > increased to 4 mg 4 times daily prn on 2/16  Melatonin 1 mg at bedtime prn  Oxycodone 2.5-5 mg q6h prn > changed to q4h prn on 2/18  Compazine prn  Senna 2-4 tabs po BID started 2/14     Senna-docusate stopped 2/14  Zofran prn stopped 2/14  Dilaudid 2 mg q4h prn stopped 2/14             Physical Exam:   Vitals were reviewed  Temp: 98.2  F (36.8  C) Temp src: Oral BP: 104/59 Pulse: 99   Resp: 16 SpO2: 97 % O2 Device: None (Room air)    General: Not in acute distress. Large body habitus  Head:  Atraumatic. Normocephalic.   Eyes: Anicteric without injection. Eyes conjugate. Extraocular movements intact.  Ear, Nose, and Throat: Mouth pink and moist without lesions. Neck without overt masses.  Pulmonary: Unlabored. Speaking in full sentences  Cardiovascular: No overt jugular venous distension. LE edema. Perfusing.  Abdomen: Non-distended.  Skin: Warm. Dry. No bruises or rashes noted.  Musculoskeletal: Joints of hand normal. Muscle bulk and tone normal in UE and LE.  Neuro: Speech fluent. Face symmetric. No focal neurologic deficit noted. Alert and oriented to person, place and time but questionably to situation in setting of known intellectual disability.  Psych: Normal mood and affect. Able to make needs known. Again, uncertain to what degree Robert is intellectually able to comprehend his situation.           Data Reviewed:     Reviewed recent pertinent imaging, comments:   No new imaging    Reviewed recent labs, comments:   ROUTINE ICU LABS (Last four results)  CMP  Recent Labs   Lab 02/17/23  0649 02/16/23  2004 02/16/23  1710 02/16/23  0924 02/16/23  0032 02/15/23  1602 02/15/23  0628 02/14/23  1255 02/14/23  0915   *  --   --  136  --   --  137  --  138   POTASSIUM 3.9  --  3.8 3.2*  3.2* 3.6   < > 2.9*  2.9*   < > 2.5*   CHLORIDE 104  --   --  107  --   --  106  --  105   CO2 12*  --   --  16*  --   --  16*  --  16*   ANIONGAP 19*  --   --  13  --   --  15  --  17*   *  --   --  103*  --   --  83  --  84   BUN 18.4  --   --  9.1  --   --  8.0  --  6.8   CR 1.12  --   --  0.99  --   --  1.01  --  0.92   GFRESTIMATED >90  --   --  >90  --   --  >90  --  >90   FAISAL 8.6  --   --  8.5*  --   --  8.6  --  8.5*   MAG 2.3 2.8*  --  1.5*  --   --  1.7  --  1.9   PHOS 4.0  --   --  2.0*  --   --  1.6*  --   --     < > = values in this interval not displayed.     CBC  Recent Labs   Lab 02/17/23  0649 02/16/23  0924 02/15/23  1003 02/14/23  1556 02/14/23  1447 02/14/23  0915   WBC 1.7* 0.6* 0.5*  --   0.2* 0.3*   RBC 2.71* 2.63* 2.96*  --  1.69* 2.75*   HGB 7.3* 7.1* 8.0* 7.2* 4.7* 7.6*   HCT 24.4* 23.4* 26.2*  --  15.6* 24.6*   MCV 90 89 89  --  92 90   MCH 26.9 27.0 27.0  --  27.8 27.6   MCHC 29.9* 30.3* 30.5*  --  30.1* 30.9*   RDW 17.1* 16.8* 16.6*  --  16.5* 16.7*   PLT 15* 14* 24*  --   --  27*     INRNo lab results found in last 7 days.  Arterial Blood GasNo lab results found in last 7 days.

## 2023-02-17 NOTE — PROGRESS NOTES
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 Sims  Current Treatment Plan: 2nd line irinotecan/Temodar (C2D1=1/31)    Recommendations:   - Pt reports ongoing diarrhea despite PRN imodium. This inhibits PO intake. Consider scheduling imodium 4 mg QID, and if this is not sufficient, consider adding PRN lomotil.   - Today, patient reported lapses in pain between doses of oxycodone, could consider q4hr frequency. Will ultimately defer pain management to palliative team since they have been managing this.   - Improving ANC. Continue G-CSF for now; will likely discontinue this in the coming days.   - Patient is currently scheduled for his next cycle of irinotecan to start Monday 2/20. Anticipate this will be delayed given ongoing issues. We do not plan to given chemotherapy inpatient at this time, but would pursue this in an outpatient setting.     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 He was admitted on 2/9/23 due to abdominal pain/diarrhea, weakness, also found to have pancytopenia 2/2 chemo.      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 There was no e/o obstruction but CT did show worsening peritoneal carcinomatosis.  - With negative cdiff and enteric panel, we believe that diarrhea may be from irinotecan. Please provide aggressive antidiarrheal support with imodium and lomotil PRN. With last chemo  "ending 2/3, diarrhea should be self-limited and improving soon.   - Appreciate palliative support with pain management. Current pain plan:   - Dexamethasone 4 mg BID  - Butrans patch  - Oxycodone 5 mg q6hr scheduled and PRN  - Patient is currently scheduled for his next cycle of irinotecan to start Monday 2/20. Anticipate this will be delayed given ongoing issues. We do not plan to given chemotherapy inpatient at this time, but would pursue this in an outpatient setting.   - Message sent to Dr. Sims on 2/17 with update on admission and imaging findings. We are uncertain if he will wish to continue current treatment plan or make a change.     # Pancytopenia   # Neutropenia  Secondary to chemotherapy.   - Transfuse if Hgb <7, plt <10k  -  today. Continue G-CSF.   - If febrile, pan-culture, obtain CXR, and start cefepime 2 g q8hr.     # Severe malnutrition  Pt is not eating well because when he eats, he has diarrhea and abdominal cramping.   - Thus, good diarrhea control is important in improving nutrition status  - Agree with Ensure shakes    Patient was seen and plan of care was discussed with attending physician Dr. Crowder.    Thank you for the opportunity to partake in this patients plan of care. Please do not hesitate to page with questions. We will continue to follow.     Maribel Fleming PA-C  Hematology/Oncology   Pager: [phone]  ___________________________________________________________________    Subjective & Interval History:    No acute events noted overnight. Pain is overall improved, but pt reports pain still waxes and wanes, rated at 7/10 during our visit. Requested that RN give PRN dose of oxycodone. He reports that diarrhea is still an issue, despite two dose of Imodium so far today. He reports very bother abdominal cramping with BMs. Pain is focused in LLQ. He does not want to eat food \"goes right through him,\" causing uncomfortable symptoms.     Pt was sleeping at the time of my " "encounter. His primary RN told me that he had 5 loose stools as of later afternoon. Took x1 loperamide this morning. No n/v or abd pain. He was able to drink one bottle of protein shake.     Physical Exam:    Blood pressure 104/59, pulse 99, temperature 98.2  F (36.8  C), temperature source Oral, resp. rate 16, height 1.676 m (5' 6\"), weight 99.2 kg (218 lb 11.2 oz), SpO2 97 %.    General: sitting up in bed, no acute distress  Resp: breathing comfortably on room air  GI: Mild TTP, points to LLQ as site of pain  Skin: no rashes on limited exam, no jaundice  Neuro: Alert and interactive, moves all extremities equally, no focal deficits    Labs & Studies: I personally reviewed the following studies:  ROUTINE LABS (Last four results):  CMP  Recent Labs   Lab 02/17/23  0649 02/16/23 2004 02/16/23  1710 02/16/23  0924 02/16/23  0032 02/15/23  1602 02/15/23  0628 02/14/23  1255 02/14/23  0915   *  --   --  136  --   --  137  --  138   POTASSIUM 3.9  --  3.8 3.2*  3.2* 3.6   < > 2.9*  2.9*   < > 2.5*   CHLORIDE 104  --   --  107  --   --  106  --  105   CO2 12*  --   --  16*  --   --  16*  --  16*   ANIONGAP 19*  --   --  13  --   --  15  --  17*   *  --   --  103*  --   --  83  --  84   BUN 18.4  --   --  9.1  --   --  8.0  --  6.8   CR 1.12  --   --  0.99  --   --  1.01  --  0.92   GFRESTIMATED >90  --   --  >90  --   --  >90  --  >90   FAISAL 8.6  --   --  8.5*  --   --  8.6  --  8.5*   MAG 2.3 2.8*  --  1.5*  --   --  1.7  --  1.9   PHOS 4.0  --   --  2.0*  --   --  1.6*  --   --     < > = values in this interval not displayed.     CBC  Recent Labs   Lab 02/17/23  0649 02/16/23  0924 02/15/23  1003 02/14/23  1556 02/14/23  1447 02/14/23  0915   WBC 1.7* 0.6* 0.5*  --  0.2* 0.3*   RBC 2.71* 2.63* 2.96*  --  1.69* 2.75*   HGB 7.3* 7.1* 8.0* 7.2* 4.7* 7.6*   HCT 24.4* 23.4* 26.2*  --  15.6* 24.6*   MCV 90 89 89  --  92 90   MCH 26.9 27.0 27.0  --  27.8 27.6   MCHC 29.9* 30.3* 30.5*  --  30.1* 30.9*   RDW 17.1* " 16.8* 16.6*  --  16.5* 16.7*   PLT 15* 14* 24*  --   --  27*     INRNo lab results found in last 7 days.    Medications list for reference:  Current Facility-Administered Medications   Medication     acetaminophen (TYLENOL) tablet 650 mg     buprenorphine (BUTRANS) 10 MCG/HR WK patch 1 patch     buprenorphine (BUTRANS) Patch in Place     dexamethasone (DECADRON) tablet 4 mg     dextrose 5 % flush PRE/POST medication    And     filgrastim-aafi (NIVESTYM) 480 mcg in D5W 25 mL infusion    And     dextrose 5 % flush PRE/POST medication     lidocaine (LMX4) cream     lidocaine 1 % 0.1-1 mL     loperamide (IMODIUM) capsule 4 mg     [Held by provider] magnesium oxide (MAG-OX) tablet 400 mg     melatonin tablet 1 mg     metoprolol tartrate (LOPRESSOR) tablet 25 mg     naloxone (NARCAN) injection 0.2 mg    Or     naloxone (NARCAN) injection 0.4 mg    Or     naloxone (NARCAN) injection 0.2 mg    Or     naloxone (NARCAN) injection 0.4 mg     oxyCODONE (ROXICODONE) tablet 5 mg     oxyCODONE IR (ROXICODONE) half-tab 2.5-5 mg     potassium & sodium phosphates (NEUTRA-PHOS) Packet 2 packet     potassium chloride ER (MICRO-K) CR capsule 20 mEq     prochlorperazine (COMPAZINE) injection 5 mg    Or     prochlorperazine (COMPAZINE) tablet 5 mg     sennosides (SENOKOT) tablet 2-4 tablet     sodium chloride (PF) 0.9% PF flush 3 mL     sodium chloride (PF) 0.9% PF flush 3 mL     thiamine (B-1) tablet 100 mg     triamcinolone (KENALOG) 0.1 % ointment     Facility-Administered Medications Ordered in Other Encounters   Medication     sodium chloride (PF) 0.9% PF flush 30 mL

## 2023-02-17 NOTE — PROVIDER NOTIFICATION
Critical Test Results/Notification    Critical lab result (name and value): Quantitative Ketone 2.80  What time did lab notify you? 1105  What provider did you notify? Dr. Ramon ([phone])  Was the provider notified within 30 min? yes  Why was provider not notified? NA    Response from provider:

## 2023-02-18 NOTE — PROGRESS NOTES
[facility]    Progress Note - Medicine Service, [facility] TEAM 1       Date of Admission:  2/10/2023    Assessment & Plan   Diego Buchanan is a 27 year old M with a 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 The pain acutely worsened over the last week and is thought to be 2/2 tumor growth. Robert just completed irinotecan and Temodar cycle 2 1/30-2/3. Abdominal pain is improving but complicated by dehydration, malnutrition, electrolyte derangement, and anti-neoplastic therapy induced pancytopenia.      Today:  - Completed d5NS 1L  - Continue immodium QID  - Discontinue Filgastrim today    Starvation Ketoacidosis  Anion gap metabolic acidosis   Occurred on 2/17 AM due to decrease PO intake  - D5NS on 2/17  - CTM     Neoplastic-related pain  Abdominal pain and altered bowel habits 2/2 progression of neoplasm  Diarrhea  3 months of abd pain since 1st cycle of new chemo. Second cycle of irinotecan/temozolomide chemo 1/3-2/3. Pain is worse with bowel movements and Robert continues to avoid eating and drinking. C diff and enteric panel negative.   - Per Palliative:              - Start Butrans 10 mcg/hr patch    - Start dexamethasone 8mg/day  - Continue senna 2-4 tabs po BID prn   - Continue Compazine prn               - Oxycodone 5 mg  q4h  scheduled and 2.5-5 mg q4h PRN              - Scheduled Tylenol               - Loperamide scheduled QID - started on 2/17   - Palliative care consulted, planning to follow up outpatient     Severe malnutrition 2/2 neoplasm growth  Robert has had very little oral intake for at least one week. He is fearful about eating as bowel movements worsen his abdominal pain   - Aim for 3 protein shakes today  - Thiamine supplement   - Calorie counts  - Heme/onc recommends holding off on TPN for now     Acute Kidney Injury, improving  Likely prerenal  related to decreased PO intake. Appears to be improving with IVF and improving PO intake. Cr improved from 1.11 on admission, baseline around 0.60  - Encourage oral intake      Metastatic desmoplastic small round cell carinoma   Pancytopenia secondary to antineoplastic therapy - improving  CT abd/pelvis showing extensive progression of dz. In the absence of fever, no increase in WBC, no current concern for infection. Will plan to follow heme/onc recs regarding cancer care.   - Discontinued Neupogen on 2/18       ----------Stable/ chronic medical problems-------------    Anemia and thrombocytopenia 2/2 antineoplastic therapy  Baseline thought to be around 8.2. Most likely anemia of chronic disease. Transfuse pRBC for hb < 7 or as indicated pending symptoms. Platelet transfusion as needed. Peripheral smear on 12/15 with no evidence of hemolysis.   -Transfused 1 unit pRBCs (2/10, 2/11)     Hypophosphatemia 2/2 decreased intake:  -Potassium &sodium phosphate packets TID for three days- completed on 2/17     Hypokalemia  Hypomagnesmia  History of hypokalemia and hypomagnesemia. Both are normal today. Will continue on magnesium 400 mg bid and potassium 20 mEQ bid. Did not tolerate IV mag.   - K+ replacement protocol  - 20 mEq daily   - Mag replacement    Dehydration 2/2 decreased PO intake, resolved  Hyponatremia 2/2 decreased PO intake, resolved    HTN   Tachycardia   Longstanding issue, multifactorial in setting of metastatic disease. EKG today shows sinus tachycardia. HR ranges from low 100's to 150's. Did not take his metoprolol 25 mg this morning but received in clinic.   - Continue PTA metoprolol     Port malpositioning  Port exchanged on 1/20/23. Kinked in the area of entry into the subclavian vein with tip projecting over the innominate vein confluence regarding port placement with thoracic surgery on call, Dr. Cota. Per her recommendation-- Ok to use port as long as nursing is not having significant difficulty  "accessing or getting blood return.      ----------------Follow ups needed on discharge--------------------  -Heme Onc  -Palliative Care     Diet: Combination Diet Regular Diet Adult  Snacks/Supplements Adult: Other; please send ensure plus with every meals; With Meals  Snacks/Supplements Adult: Other; Ensure clear with lunch and dinner trays; Between Meals    DVT Prophylaxis: none  Busby Catheter: Not present  Fluids: oral  Lines: PRESENT      Port A Cath Single 01/20/23 Right Chest wall-Site Assessment: WDL      Cardiac Monitoring: None  Code Status: Full Code      Clinically Significant Risk Factors             # Anion Gap Metabolic Acidosis: Highest Anion Gap = 19 mmol/L in last 2 days, will monitor and treat as appropriate    # Thrombocytopenia: Lowest platelets = 15 in last 2 days, will monitor for bleeding         # Obesity: Estimated body mass index is 35.8 kg/m  as calculated from the following:    Height as of this encounter: 1.676 m (5' 6\").    Weight as of this encounter: 100.6 kg (221 lb 12.8 oz).   # Severe Malnutrition: based on nutrition assessment      Disposition Plan      Expected Discharge Date: 02/20/2023      Destination: home with family  Discharge Comments: Dispo: Home with assist; home w/ PT  Delays: pain management and nutrition  Progress: Establish PCP; OP palliative f/u; HCD, onc follow up        David Ramon MD  Medicine Service, [facility] TEAM [address] [place] [address]  Securely message with [employer] (more info)  Text page via [facility] Paging/Directory   See signed in provider for up to date coverage information  ______________________________________________________________________    Interval History   No acute events overnight. Patient with continued minimal PO intake overnight. He denies fever or chills. Reports pain with bowel movements, appears slightly improved compared to yesterday. Able to tolerate Ensures and baby food.     Physical Exam "   Vital Signs: Temp: 97.9  F (36.6  C) Temp src: Oral BP: 97/59 Pulse: 99   Resp: 16 SpO2: 98 % O2 Device: None (Room air)    Weight: 221 lbs 12.8 oz    General: Ill appearing male in no acute distress. Seen laying in bed playing on ipad.  Head: alopecia  Eyes: No scleral icterus.  Cardiovascular: Tachycardic. Rhythm regular.   Chest: Port in place on chest with no erythema or purulence  Respiratory: Clear to auscultation bilaterally. No wheezes or crackles.   Gastrointestinal: Abdomen rounded, distended, reports mild tenderness to palpation throughout, moderate tenderness diffusely in the lower abdomen. No guarding. Bowel sounds present. Abdomen is firm.  Skin: No rashes, petechiae, or bruising noted on exposed skin.  Psych: Affect appropriate.    Medical Decision Making         Data     Most Recent 3 CBC's:  Recent Labs   Lab Test 02/18/23  0751 02/17/23  0649 02/16/23  0924   WBC 6.9 1.7* 0.6*   HGB 7.4* 7.3* 7.1*   MCV 90 90 89   PLT 21* 15* 14*     Most Recent 3 BMP's:  Recent Labs   Lab Test 02/18/23  0751 02/17/23  0649 02/16/23  1710 02/16/23  0924    135*  --  136   POTASSIUM 4.2 3.9 3.8 3.2*  3.2*   CHLORIDE 106 104  --  107   CO2 18* 12*  --  16*   BUN 23.2* 18.4  --  9.1   CR 1.04 1.12  --  0.99   ANIONGAP 12 19*  --  13   FAISAL 8.2* 8.6  --  8.5*   * 108*  --  103*

## 2023-02-18 NOTE — PLAN OF CARE
ASSUMED CARES: [id number].   STATUS: Pt admitted 2/10 for SBO. Metastatic Desmoplastic small cell round tumor with METS to lung, spleen and Peritoneal Carcinomatosis. Malnutrition. Diarrhea.    NEURO: A/o x 4. DD. Slow to respond at times.   VS: VSS on RA- Tachy at times.   ACTIVITY: Up IND in room.   PAIN: C/o Abd pain- PRN Oxy given x 1. Pt writer and mother spoke prior to pt going to sleep and made a plan- If pt sleeping, than don't wake for scheduled pain meds. Pt stated he will call if wakes up in pain. Offered AM scheduled pain meds this AM and pt declined- Stating his pain was tolerable at 4/10.  CARDIAC: Tachy at times. No C/o CP.   RESP: No C/o SOB.   GI/: Voiding spontaneously. Continues with Loose stools.   DIET: Regular. Encourage PO intake. Pt very reluctant to eat most solid foods d/t fear of increased diarrhea and pain that is associated with that per pt.   SKIN: No adjustments made.   LDA'S: R port SL.   LABS: High K+ replacement protocol and Standard Mg replacement protocol.   CHANGES THIS SHIFT: Pt able to sleep in between cares. Mother in room with pt overnight.   POC: Cont with POC. Next Chemo scheduled for 2/20. Plan for discharge home with Home care- pending pain management and adequate nutrition plan. Mother in room with pt overnight. Call light within reach.

## 2023-02-18 NOTE — PROGRESS NOTES
Hematology / Oncology  Daily Progress Note   Date of Service: 02/18/2023  Patient: Diego Buchanan  MRN: [id number]  Admission Date: 2/10/2023  Hospital Day # 8  Cancer Diagnosis: metastatic desmoplastic small cell round tumor  Primary Outpatient Oncologist: Dr. 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 His last treatment for metastatic desmoplastic small cell round tumor was on 1/31 (C2D1) and his most recent CT c/a/p from 2/10 is showing mixed response. His blood counts are gradually improving.  He no longer needs gcsf.    Overall, he has had a difficult time tolerating this chemotherapy regimen with ongoing diarrhea, associated anorexia and abd pain.     2. His CT did not show any obstruction but he has e/o worsening peritoneal carcinomatosis, which can be the cause of his abd pain. Primary team to cont to work on aggressive pain and bowel mngt. He cont to have diarrhea, awaiting improvement with conservative mngt.     Recommendations:   -agree with continuing dexamethasone 4mg q12 hrs  -stop filgrastim   -please consider RBC transfusion to maintain Hb > 7.0  -please consider plt transfusion to maintain > 10K, or > 50K if bleeding  -please cont adequate pain and bowel mngt   - continue scheduled dex and imodium.  - Encouraged po intake  - Will need ongoing discussions regarding goals of his chemotherapy; this will be done in conjunction with his primary oncologist.        Sherry Crowder MD  "___________________________________________________________________    Subjective & Interval History:    No acute events noted overnight.      He was started on dex two days ago.  He initiated imodium scheduled yesterday, and only had 1 bm overnight.      He is anxious about eating.  He thinks he instantly has stools with taking Ensure.  Family brought in baby food, and he has been trying these purees.    Physical Exam:    Blood pressure 97/59, pulse 99, temperature 97.9  F (36.6  C), temperature source Oral, resp. rate 16, height 1.676 m (5' 6\"), weight 100.6 kg (221 lb 12.8 oz), SpO2 98 %.    General: sleeping in bed, no acute distress  Neck: supple  Resp: breathing comfortably on room air  Abd: soft, distended, minimal tenderness with palpation  Skin: no rashes on limited exam, no jaundice  Neuro: Alert and interactive, moves all extremities equally, no focal deficits    Labs & Studies: I personally reviewed the following studies:  ROUTINE LABS (Last four results):  CMP  Recent Labs   Lab 02/18/23  0751 02/17/23  0649 02/16/23 2004 02/16/23  1710 02/16/23  0924 02/15/23  1602 02/15/23  0628    135*  --   --  136  --  137   POTASSIUM 4.2 3.9  --  3.8 3.2*  3.2*   < > 2.9*  2.9*   CHLORIDE 106 104  --   --  107  --  106   CO2 18* 12*  --   --  16*  --  16*   ANIONGAP 12 19*  --   --  13  --  15   * 108*  --   --  103*  --  83   BUN 23.2* 18.4  --   --  9.1  --  8.0   CR 1.04 1.12  --   --  0.99  --  1.01   GFRESTIMATED >90 >90  --   --  >90  --  >90   FAISAL 8.2* 8.6  --   --  8.5*  --  8.6   MAG 2.1 2.3 2.8*  --  1.5*  --  1.7   PHOS 3.5 4.0  --   --  2.0*  --  1.6*    < > = values in this interval not displayed.     CBC  Recent Labs   Lab 02/18/23  0751 02/17/23  0649 02/16/23  0924 02/15/23  1003   WBC 6.9 1.7* 0.6* 0.5*   RBC 2.74* 2.71* 2.63* 2.96*   HGB 7.4* 7.3* 7.1* 8.0*   HCT 24.7* 24.4* 23.4* 26.2*   MCV 90 90 89 89   MCH 27.0 26.9 27.0 27.0   MCHC 30.0* 29.9* 30.3* 30.5*   RDW 17.5* 17.1* " 16.8* 16.6*   PLT 21* 15* 14* 24*     INRNo lab results found in last 7 days.    Medications list for reference:  Current Facility-Administered Medications   Medication     acetaminophen (TYLENOL) tablet 650 mg     buprenorphine (BUTRANS) 10 MCG/HR WK patch 1 patch     buprenorphine (BUTRANS) Patch in Place     dexamethasone (DECADRON) tablet 4 mg     dextrose 5 % flush PRE/POST medication    And     filgrastim-aafi (NIVESTYM) 480 mcg in D5W 25 mL infusion    And     dextrose 5 % flush PRE/POST medication     lidocaine (LMX4) cream     lidocaine 1 % 0.1-1 mL     loperamide (IMODIUM) capsule 4 mg     [Held by provider] magnesium oxide (MAG-OX) tablet 400 mg     melatonin tablet 1 mg     metoprolol tartrate (LOPRESSOR) tablet 25 mg     naloxone (NARCAN) injection 0.2 mg    Or     naloxone (NARCAN) injection 0.4 mg    Or     naloxone (NARCAN) injection 0.2 mg    Or     naloxone (NARCAN) injection 0.4 mg     oxyCODONE IR (ROXICODONE) half-tab 2.5-5 mg     potassium chloride ER (MICRO-K) CR capsule 20 mEq     prochlorperazine (COMPAZINE) injection 5 mg    Or     prochlorperazine (COMPAZINE) tablet 5 mg     sennosides (SENOKOT) tablet 2-4 tablet     sodium chloride (PF) 0.9% PF flush 3 mL     sodium chloride (PF) 0.9% PF flush 3 mL     thiamine (B-1) tablet 100 mg     triamcinolone (KENALOG) 0.1 % ointment     Facility-Administered Medications Ordered in Other Encounters   Medication     sodium chloride (PF) 0.9% PF flush 30 mL

## 2023-02-19 NOTE — PROGRESS NOTES
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 His last treatment for metastatic desmoplastic small cell round tumor was on 1/31 (C2D1) and his most recent CT c/a/p from 2/10 is showing mixed response. His blood counts are gradually improving.  He no longer needs gcsf.    Overall, he has had a difficult time tolerating this chemotherapy regimen with ongoing diarrhea, associated anorexia and abd pain.     2. His CT did not show any obstruction but he has e/o worsening peritoneal carcinomatosis, which can be the cause of his abd pain. Overall his diarrhea is improving.    Recommendations:   -agree with continuing dexamethasone 4mg q12 hrs  -RBC transfusion to maintain Hb > 7.0  -plt transfusion to maintain > 10K, or > 50K if bleeding  -please cont adequate pain and bowel mngt   - continue scheduled dex and imodium.  - Encouraged po intake  - Will need ongoing discussions regarding goals of his chemotherapy; this will be done in conjunction with his primary oncologist.    We discussed with his improvements, anticipate discharge in the next 1-2 days.          Sherry Crowder MD     ___________________________________________________________________    Subjective &  "Interval History:    No acute events noted overnight.      He was started on dex three days ago.  He initiated imodium and has had no bm this am.    Taking small amounts of po.    Pain is overall better.    He thinks he would be able to go home tomorrow.       Physical Exam:    Blood pressure 103/60, pulse 103, temperature 97.1  F (36.2  C), temperature source Oral, resp. rate 18, height 1.676 m (5' 6\"), weight 102.4 kg (225 lb 11.2 oz), SpO2 96 %.    General: sleeping in bed, no acute distress  Neck: supple  Resp: breathing comfortably on room air  Abd: soft, distended, minimal tenderness with palpation  Skin: no rashes on limited exam, no jaundice  Neuro: Alert and interactive, moves all extremities equally, no focal deficits    Labs & Studies: I personally reviewed the following studies:  ROUTINE LABS (Last four results):  CMP  Recent Labs   Lab 02/19/23  0601 02/18/23  0751 02/17/23  0649 02/16/23 2004 02/16/23  1710 02/16/23  0924    136 135*  --   --  136   POTASSIUM 4.3 4.2 3.9  --  3.8 3.2*  3.2*   CHLORIDE 104 106 104  --   --  107   CO2 19* 18* 12*  --   --  16*   ANIONGAP 13 12 19*  --   --  13   * 114* 108*  --   --  103*   BUN 23.9* 23.2* 18.4  --   --  9.1   CR 0.96 1.04 1.12  --   --  0.99   GFRESTIMATED >90 >90 >90  --   --  >90   FAISAL 8.3* 8.2* 8.6  --   --  8.5*   MAG 2.0 2.1 2.3 2.8*  --  1.5*   PHOS 3.5 3.5 4.0  --   --  2.0*     CBC  Recent Labs   Lab 02/19/23  0601 02/18/23  0751 02/17/23  0649 02/16/23  0924   WBC 14.0* 6.9 1.7* 0.6*   RBC 2.66* 2.74* 2.71* 2.63*   HGB 7.1* 7.4* 7.3* 7.1*   HCT 23.7* 24.7* 24.4* 23.4*   MCV 89 90 90 89   MCH 26.7 27.0 26.9 27.0   MCHC 30.0* 30.0* 29.9* 30.3*   RDW 17.3* 17.5* 17.1* 16.8*   PLT 27* 21* 15* 14*     INRNo lab results found in last 7 days.    Medications list for reference:  Current Facility-Administered Medications   Medication     acetaminophen (TYLENOL) tablet 650 mg     buprenorphine (BUTRANS) 10 MCG/HR WK patch 1 patch     " buprenorphine (BUTRANS) Patch in Place     dexamethasone (DECADRON) tablet 4 mg     lidocaine (LMX4) cream     lidocaine 1 % 0.1-1 mL     loperamide (IMODIUM) capsule 4 mg     [Held by provider] magnesium oxide (MAG-OX) tablet 400 mg     melatonin tablet 1 mg     metoprolol tartrate (LOPRESSOR) tablet 25 mg     naloxone (NARCAN) injection 0.2 mg    Or     naloxone (NARCAN) injection 0.4 mg    Or     naloxone (NARCAN) injection 0.2 mg    Or     naloxone (NARCAN) injection 0.4 mg     oxyCODONE IR (ROXICODONE) half-tab 2.5-5 mg     potassium chloride ER (MICRO-K) CR capsule 20 mEq     prochlorperazine (COMPAZINE) injection 5 mg    Or     prochlorperazine (COMPAZINE) tablet 5 mg     sennosides (SENOKOT) tablet 2-4 tablet     sodium chloride (PF) 0.9% PF flush 3 mL     sodium chloride (PF) 0.9% PF flush 3 mL     thiamine (B-1) tablet 100 mg     triamcinolone (KENALOG) 0.1 % ointment     Facility-Administered Medications Ordered in Other Encounters   Medication     sodium chloride (PF) 0.9% PF flush 30 mL

## 2023-02-19 NOTE — PLAN OF CARE
ASSUMED CARES: [id number].   STATUS: Pt admitted 2/10 for SBO. Metastatic Desmoplastic small cell round tumor with METS to lung, spleen and Peritoneal Carcinomatosis. Malnutrition. Diarrhea.    NEURO: A/o x 4. DD. Slow to respond at times.   VS: VSS on RA- Tachy at times. Soft BP.   ACTIVITY: Up IND in room.   PAIN: C/o Abd pain- Pt states tolerable currently. Pt stated he would notify staff if pain meds are needed. Scheduled APAP given x 1.  CARDIAC: Tachy at times. No C/o CP.   RESP: No C/o SOB.   GI/: Voiding spontaneously. Continues with Loose stools- improved from yesterday per pt.    DIET: Regular. Encourage PO intake. Pt very reluctant to eat most solid foods d/t fear of increased diarrhea and pain that is associated with that per pt.   SKIN: No adjustments made.   LDA'S: R port SL.   LABS: High K+ replacement protocol and Standard Mg replacement protocol.   CHANGES THIS SHIFT: Pt able to sleep in between cares. Mother in room with pt overnight.   POC: Cont with POC. Next Chemo scheduled for 2/20. Plan for discharge home with Home care- pending pain management and adequate nutrition plan. Mother in room with pt overnight. Call light within reach

## 2023-02-19 NOTE — PROGRESS NOTES
[facility]    Progress Note - Medicine Service, [facility] TEAM 1       Date of Admission:  2/10/2023    Assessment & Plan   Diego Buchanan is a 27 year old M with a 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 The pain acutely worsened over the last week and is thought to be 2/2 tumor growth. Robert just completed irinotecan and Temodar cycle 2 1/30-2/3. Abdominal pain is improving but complicated by dehydration, malnutrition, electrolyte derangement, and anti-neoplastic therapy induced pancytopenia.      Today:  - Encourage diet and PO intake w/ goal of >1,000 calories  - Pain control, diarrhea control    Starvation Ketoacidosis  Anion gap metabolic acidosis   Occurred on 2/17 AM due to decrease PO intake  - D5NS on 2/17  - Encourage diet and PO intake  - CTM     Neoplastic-related pain  Abdominal pain and altered bowel habits 2/2 progression of neoplasm  Diarrhea  3 months of abd pain since 1st cycle of new chemo. Second cycle of irinotecan/temozolomide chemo 1/3-2/3. Pain is worse with bowel movements and Robert continues to avoid eating and drinking. C diff and enteric panel negative.   - Per Palliative:              - Start Butrans 10 mcg/hr patch    - Start dexamethasone 8mg/day- may need to start PPI prior to discharge  - Continue senna 2-4 tabs po BID prn   - Continue Compazine prn               - Oxycodone 5 mg  q4h  scheduled and 2.5-5 mg q4h PRN              - Scheduled Tylenol               - Loperamide scheduled QID - started on 2/17   - Palliative care consulted, planning to follow up outpatient     Severe malnutrition 2/2 neoplasm growth  Robert has had very little oral intake for at least one week. He is fearful about eating as bowel movements worsen his abdominal pain   - Aim for 3 protein shakes today  - Thiamine supplement   - Calorie counts  - Heme/onc  recommends holding off on TPN for now     Acute Kidney Injury, improving  Likely prerenal related to decreased PO intake. Appears to be improving with IVF and improving PO intake. Cr improved from 1.11 on admission, baseline around 0.60  - Encourage oral intake      Metastatic desmoplastic small round cell carinoma   Pancytopenia secondary to antineoplastic therapy - improving  CT abd/pelvis showing extensive progression of dz. In the absence of fever, no increase in WBC, no current concern for infection. Will plan to follow heme/onc recs regarding cancer care.   - Discontinued Neupogen on 2/18       ----------Stable/ chronic medical problems-------------    Anemia and thrombocytopenia 2/2 antineoplastic therapy  Baseline thought to be around 8.2. Most likely anemia of chronic disease. Transfuse pRBC for hb < 7 or as indicated pending symptoms. Platelet transfusion as needed. Peripheral smear on 12/15 with no evidence of hemolysis.   -Transfused 1 unit pRBCs (2/10, 2/11)     Hypophosphatemia 2/2 decreased intake:  -Potassium &sodium phosphate packets TID for three days- completed on 2/17     Hypokalemia  Hypomagnesmia  History of hypokalemia and hypomagnesemia. Both are normal today. Will continue on magnesium 400 mg bid and potassium 20 mEQ bid. Did not tolerate IV mag.   - K+ replacement protocol  - 20 mEq daily   - Mag replacement    Dehydration 2/2 decreased PO intake, resolved  Hyponatremia 2/2 decreased PO intake, resolved    HTN   Tachycardia   Longstanding issue, multifactorial in setting of metastatic disease. EKG today shows sinus tachycardia. HR ranges from low 100's to 150's. Did not take his metoprolol 25 mg this morning but received in clinic.   - Continue PTA metoprolol     Port malpositioning  Port exchanged on 1/20/23. Kinked in the area of entry into the subclavian vein with tip projecting over the innominate vein confluence regarding port placement with thoracic surgery on call, Dr. Cota. Per her  "recommendation-- Ok to use port as long as nursing is not having significant difficulty accessing or getting blood return.      ----------------Follow ups needed on discharge--------------------  -Heme Onc  -Palliative Care     Diet: Combination Diet Regular Diet Adult  Snacks/Supplements Adult: Other; please send ensure plus with every meals; With Meals  Snacks/Supplements Adult: Other; Ensure clear with lunch and dinner trays; Between Meals  Calorie Counts    DVT Prophylaxis: none  Busby Catheter: Not present  Fluids: oral  Lines: PRESENT      Port A Cath Single 01/20/23 Right Chest wall-Site Assessment: WDL      Cardiac Monitoring: None  Code Status: Full Code      Clinically Significant Risk Factors                # Thrombocytopenia: Lowest platelets = 21 in last 2 days, will monitor for bleeding         # Obesity: Estimated body mass index is 36.43 kg/m  as calculated from the following:    Height as of this encounter: 1.676 m (5' 6\").    Weight as of this encounter: 102.4 kg (225 lb 11.2 oz).   # Severe Malnutrition: based on nutrition assessment      Disposition Plan      Expected Discharge Date: 02/20/2023      Destination: home with family  Discharge Comments: Dispo: Home with assist; home w/ PT  Delays: pain management and nutrition  Progress: Establish PCP; OP palliative f/u; notarize HCD on Monday, onc follow up.        David Ramon MD  Medicine Service, [facility] TEAM [address] [place] [address]  Securely message with [employer] (more info)  Text page via [facility] Paging/Directory   See signed in provider for up to date coverage information  ______________________________________________________________________    Interval History   No acute events overnight. Patient with continued minimal PO intake overnight. He denies fever or chills. Reports pain with bowel movements, appears greatly improved compared to yesterday. Able to tolerate Ensures and baby food per nursing had a " banana yesterday.    Physical Exam   Vital Signs: Temp: 97.1  F (36.2  C) Temp src: Oral BP: 103/60 Pulse: 103   Resp: 18 SpO2: 96 % O2 Device: None (Room air)    Weight: 225 lbs 11.2 oz    General: Ill appearing male in no acute distress. Seen laying in bed playing on ipad.  Head: alopecia  Eyes: No scleral icterus.  Cardiovascular: Tachycardic. Rhythm regular.   Chest: Port in place on chest with no erythema or purulence  Respiratory: Clear to auscultation bilaterally. No wheezes or crackles.   Gastrointestinal: Abdomen rounded, distended, reports mild tenderness to palpation throughout, moderate tenderness diffusely in the lower abdomen. No guarding. Bowel sounds present. Abdomen is firm.  Skin: No rashes, petechiae, or bruising noted on exposed skin.  Psych: Affect appropriate.    Medical Decision Making         Data     Most Recent 3 CBC's:  Recent Labs   Lab Test 02/19/23  0601 02/18/23  0751 02/17/23  0649   WBC 14.0* 6.9 1.7*   HGB 7.1* 7.4* 7.3*   MCV 89 90 90   PLT 27* 21* 15*     Most Recent 3 BMP's:  Recent Labs   Lab Test 02/19/23  0601 02/18/23  0751 02/17/23  0649    136 135*   POTASSIUM 4.3 4.2 3.9   CHLORIDE 104 106 104   CO2 19* 18* 12*   BUN 23.9* 23.2* 18.4   CR 0.96 1.04 1.12   ANIONGAP 13 12 19*   FASIAL 8.3* 8.2* 8.6   * 114* 108*

## 2023-02-19 NOTE — PLAN OF CARE
[id number]: Patient continues to have abd pain rating 4-6/10 with some relief from PRN oxy x1 and ice packs. Has only had 1 BM earlier this morning but patient also has not eaten much today compared to yesterday despite much encouragement. Had ice serving of ice cream and has a jar of baby food brought in by family at bedside along with Ensures. Continues with soft BPs and intermittent tachycardia. K+ 4.2 and Magnesium 2.1 this morning. No intervention needed and both to be re-check in the morning per protocol Port-a-cath intact and SL'd. Brother at bedside and attentive to patient. Will continue to encourage solid food intake and assist as needed.

## 2023-02-20 NOTE — PROGRESS NOTES
Calorie Count  Intake recorded for: 2/19  Total Kcals: 1133 Total Protein: 25g  Kcals from Hospital Food: 617   Protein: 10g  Kcals from Outside Food (average):516 Protein: 15g  # Meals Ordered from Kitchen: 1 meal  # Meals Recorded: 3 meals (100% from nursing - 2 popsicle, gingerale, diet lemon lime soda, ice cream)     (100% from room service - large mac & cheese, 8oz vitality water)     (100% from outside - [facility] fried chicken breast, [facility] mashed potatoes)  # Supplements Recorded: 0

## 2023-02-20 NOTE — PLAN OF CARE
ASSUMED CARES: [id number].   STATUS: Pt admitted 2/10 for SBO. Metastatic Desmoplastic small cell round tumor with METS to lung, spleen and Peritoneal Carcinomatosis. Malnutrition. Diarrhea.    NEURO: A/o x 4. DD. Slow to respond at times.   VS: VSS on RA- Tachy at times. Soft BP.   ACTIVITY: Up IND in room.   PAIN: C/o Abd pain- PRN Oxy given x 1. Butrans patch on LUE.   CARDIAC: Tachy at times. No C/o CP.   RESP: No C/o SOB.   GI/: Voiding spontaneously. Loose stools- Improved per pt.   DIET: Regular. Encourage PO intake. Pt ate one Vanilla ice cream this shift. +Aurelio counts.   SKIN: No adjustments made.   LDA'S: R port SL.   LABS: High K+ replacement protocol and Standard Mg replacement protocol.   CHANGES THIS SHIFT: Pt able to sleep in between cares. Sister in room with pt overnight.   POC: Cont with POC. Next Chemo scheduled for today. Plan for discharge home with Home care- pending pain management and adequate nutrition plan. Mother in room with pt overnight. Call light within reach

## 2023-02-20 NOTE — PROGRESS NOTES
Hematology / Oncology  Daily Progress Note   Date of Service: 02/20/2023  Patient: Diego Buchanan  MRN: [id number]  Admission Date: 2/10/2023  Hospital Day # 10  Cancer Diagnosis: Metastatic desmoplastic small cell round tumor  Primary Outpatient Oncologist: Dr. Sims  Current Treatment Plan: 2nd line irinotecan/Temodar (C2D1=1/31)    Recommendations:   - Oncology team is in agreement with discharge today.   - Discharge with dexamethasone 4 mg daily for pain. We will defer to outpatient oncology team for further steroid management.   - Discharge with PRN imodium instead of scheduled. Discussed this with patient's family member.   - Consider 1 unit pRBCs prior to discharge for Hgb bordering transfusion parameters.   - Requested oncology follow up in 1 week. At this visit, they will decide if any changes to current chemotherapies or treatment plan need to be made given recent complications and mixed response on CT.     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 He was admitted on 2/9/23 due to abdominal pain/diarrhea, weakness, also found to have pancytopenia 2/2 chemo.      # Metastatic desmoplastic small cell round tumor with mets to lung, spleen, peritoneal carcinomatosis  # Abdominal pain (presumed to be 2/2 peritoneal disease)  # Diarrhea (presumed to be 2/2 irinotecan)  S/p second line irinotecan + Temodar on 1/31 (C2D1) and his most recent CT CAP from 2/10 is showing mixed response. There was no e/o obstruction but CT did show worsening peritoneal carcinomatosis.  - With negative cdiff and enteric panel, we believe that diarrhea may be from irinotecan. Please provide aggressive antidiarrheal support with imodium and lomotil PRN. With last chemo ending 2/3, diarrhea should be self-limited and improving soon.   -  Appreciate palliative support with pain management. Current pain plan:   - Dexamethasone 4 mg BID ? daily at discharge  - Butrans patch  - Oxycodone 5 mg q6hr scheduled and PRN  - Patient was due for cycle 3 on 2/20. Delay as pt is still recovering from events of admission.   - Message sent to Dr. Sims on 2/17 with update on admission and imaging findings. We are uncertain if he will wish to continue current treatment plan or make a change. With admission after cycle 1 and cycle 2, dose adjustment or treatment plan change would be reasonable.   - Pt will follow up in oncology clinic ~1 week after discharge (requested)    # Anemia  # Thrombocytopenia  Secondary to chemotherapy.   - Transfuse if Hgb <7, plt <10k  - S/p G-CSF for neutropenia    # Leukocytosis  2/2 G-CSF and dexamethasone.    # Severe malnutrition, improving  Pt is not eating well because when he eats, he has diarrhea and abdominal cramping. Improving PO intake with better diarrhea control.  - Educated family on encouraging PO intake    **See primary team note for comprehensive A/P**  # WILLIAM  # Hyponatremia  # HTN  # Tachycardia    Patient was seen and plan of care was discussed with attending physician Dr. Crowder.    Thank you for the opportunity to partake in this patients plan of care. Please do not hesitate to page with questions. We will continue to follow.     Maribel Fleming PA-C  Hematology/Oncology   Pager: [phone]  ___________________________________________________________________    Subjective & Interval History:    No acute events overnight. Patient is feeling better today. Per chart notes, he was able to eat mac n cheese, mashed potatoes, and chicken with encouragement from family. He had a BM this morning that was painful, but with a more formed consistency. He has had an overall decrease in pain, but residual pain is in lower/left abdomen. Discussed discharge recs with patient and family member at bedside.     Physical Exam:   "  Blood pressure 90/52, pulse 117, temperature 98.1  F (36.7  C), temperature source Oral, resp. rate 17, height 1.727 m (5' 8\"), weight 101 kg (222 lb 11.2 oz), SpO2 96 %.    General: sitting up in bed, no acute distress  Resp: breathing comfortably on room air  GI: Mild TTP, points to LL abdomen and L flank as site of pain  Skin: no rashes on limited exam, no jaundice  Neuro: Alert and interactive, moves all extremities equally, no focal deficits    Labs & Studies: I personally reviewed the following studies:  ROUTINE LABS (Last four results):  CMP  Recent Labs   Lab 02/20/23  0725 02/19/23  0601 02/18/23  0751 02/17/23  0649 02/16/23  1710 02/16/23  0924    136 136 135*  --  136   POTASSIUM 4.1 4.3 4.2 3.9   < > 3.2*  3.2*   CHLORIDE 106 104 106 104  --  107   CO2 22 19* 18* 12*  --  16*   ANIONGAP 11 13 12 19*  --  13   * 116* 114* 108*  --  103*   BUN 22.8* 23.9* 23.2* 18.4  --  9.1   CR 0.96 0.96 1.04 1.12  --  0.99   GFRESTIMATED >90 >90 >90 >90  --  >90   FAISAL 8.2* 8.3* 8.2* 8.6  --  8.5*   MAG 1.9 2.0 2.1 2.3   < > 1.5*   PHOS  --  3.5 3.5 4.0  --  2.0*    < > = values in this interval not displayed.     CBC  Recent Labs   Lab 02/20/23  0725 02/19/23  0601 02/18/23  0751 02/17/23  0649   WBC 21.3* 14.0* 6.9 1.7*   RBC 2.61* 2.66* 2.74* 2.71*   HGB 7.2* 7.1* 7.4* 7.3*   HCT 23.7* 23.7* 24.7* 24.4*   MCV 91 89 90 90   MCH 27.6 26.7 27.0 26.9   MCHC 30.4* 30.0* 30.0* 29.9*   RDW 17.3* 17.3* 17.5* 17.1*   PLT 39* 27* 21* 15*     INRNo lab results found in last 7 days.    Medications list for reference:  Current Facility-Administered Medications   Medication     acetaminophen (TYLENOL) tablet 650 mg     buprenorphine (BUTRANS) 10 MCG/HR WK patch 1 patch     buprenorphine (BUTRANS) Patch in Place     dexamethasone (DECADRON) tablet 4 mg     lidocaine (LMX4) cream     lidocaine 1 % 0.1-1 mL     loperamide (IMODIUM) capsule 4 mg     [Held by provider] magnesium oxide (MAG-OX) tablet 400 mg     " melatonin tablet 1 mg     metoprolol tartrate (LOPRESSOR) tablet 25 mg     naloxone (NARCAN) injection 0.2 mg    Or     naloxone (NARCAN) injection 0.4 mg    Or     naloxone (NARCAN) injection 0.2 mg    Or     naloxone (NARCAN) injection 0.4 mg     oxyCODONE IR (ROXICODONE) half-tab 2.5-5 mg     potassium chloride ER (MICRO-K) CR capsule 20 mEq     prochlorperazine (COMPAZINE) injection 5 mg    Or     prochlorperazine (COMPAZINE) tablet 5 mg     sennosides (SENOKOT) tablet 2-4 tablet     sodium chloride (PF) 0.9% PF flush 3 mL     sodium chloride (PF) 0.9% PF flush 3 mL     thiamine (B-1) tablet 100 mg     triamcinolone (KENALOG) 0.1 % ointment     Facility-Administered Medications Ordered in Other Encounters   Medication     sodium chloride (PF) 0.9% PF flush 30 mL

## 2023-02-20 NOTE — PLAN OF CARE
[id number]: Patient continues to report abd pain with slight relief from PRN Oxy x2 along with scheduled Tylenol. Continues to be afraid to eat solid food but was able to eat some mac and cheese from the kitchen along with a friend chicken breast and mashed potatoes from [facility] that sister brought in. Patient marked down 2 BMs today. Port-a-cath intact and SL'd. Hopeful to discharge home tomorrow. Will continue to assist as needed and follow plan of care.

## 2023-02-20 NOTE — PROGRESS NOTES
Care Management Discharge Note    Discharge Date: 02/21/2023    Discharge Disposition: Home    Discharge Services: PCA, County Worker    [facility] ([phone])  PCA & Direct Support Person; pt's sister Mariela    Discharge DME: None    Discharge Transportation: family or friend will provide    Private pay costs discussed: Not applicable    PAS Confirmation Code:  NA  Patient/family educated on Medicare website which has current facility and service quality ratings:  NA    Education Provided on the Discharge Plan:  yes  Persons Notified of Discharge Plans: patient, patient's family  Patient/Family in Agreement with the Plan:  yes    Handoff Referral Completed: Yes    Additional Information:   met with patient at bedside along with sister, Mariela to complete Notarization of Health Care Directive with [employer].   faxed back completed and signed Health Care Directive to Ms. Pelaez.     communicated with patient's [facility] provider who stated patient would be ready to discharge today after receiving a unit of blood.   communicated with patient and his sister, Mariela, at bedside if there were any further needs.  Mariela reported that she would provide the discharge ride home for patient.  Patient and Mariela denied any further needs.      ERICKA Barker, [facility]  [address]   [email or web address]  Office: [phone]   Pager: [phone]

## 2023-02-20 NOTE — PROGRESS NOTES
[facility]  Palliative Care Daily Progress Note       Recommendations & Counseling     Encounter for palliative care  Psychosocial support    Psychosocial support was provided to patient and/or family.    Prognosis: Fair to Poor? (mixed results with most recent cancer therapy though very functional and able to ambulate to the restroom without issue in setting of known intellectual disability)    Palliative performance scale (PPS): 80% (due to reduced po intake, secondary to concern for pain)    CODE status: FULL    Goals of Care: Restorative   ? Please refer to original consult note on 2/14 for more details  ? Oncology looking at supportive measures regarding pancytopenia. Deferring to outpatient follow-up.   ? Spoke with Robert and sister Mariela and primary team. Robert tolerated more po intake yesterday and his pain seems to be better controlled with his current regimen.  ? Surrogate: sister Mariela, mother and father seem to defer to daughter (aka sister Mariela) for medical decisions and care  ? Appreciate unit SW helping fill out HCD today 2/20    Disposition: Inpatient per primary. Discharge home today 2/20 with onc and [facility] Follow-up. Referral placed for outpatient palliative follow-up discharge orders tab.     Symptoms    Pain related to neoplasm - improved    Ketoacidosis 2/2 Hesitancy to eat (likely due to anticipatory abdominal pain) - improved    Pancytopenia - improved  ? Likely due to due to peritoneal carcinonmatosis mass effect on sigmoid bowel per CT scan  ? Robert states his pain is okay at rest but when he moves his bowels, he has lower abdominal crampy pain. This is causing him some hesitancy to eat still especially since he had many liquid bowel movements yesterday and is tired of getting out of bed frequently to use the restroom.  ? Continue Dexamethasone 4 mg po q12h (hope this will help cancer pain and appetite) > can given enough for 7 day total treatment  course and decrease to 4 mg daily on afterwards until follow-up with palliative and/or oncology.   ? Appreciate pharmacy liaison consult for Butrans coverage with prior auth done on 2/15 and cost for Butrans being $0. Belbuca is covered as well but will defer this for now as I do not know how well Robert is able to understand and coordinate using a buccal film or even sublingual formulations like Subutex or Suboxone.   ? Butrans 10 mcg/hr patch (started 2/15 at ~1400)   ? Oxycodone 2.5-5 mg q4h prn    Constipation vs diarrhea  ? Encourage po intake of soft foods and protein shakes  ? Senna 2-4 tabs po BID prn for constipation  ? Miralax prn  ? Imodium 4 mg 4 times daily prn for diarrhea     Nausea  ? Continue Compazine prn   ? If ineffective, can trial Reglan 5 mg TID prn (if no obstruction) or Haldol 2 mg IV q6h prn.     Palliative medicine will follow up as outpatient.      Total time spent was 35 minutes spent on the date of the encounter attending to symptoms. These recommendations were given to the primary team via this note and in-person.     Leo Atkins DO / Palliative Medicine / Text me via [employer].     Team Consult Pager [phone] (answered 8am-430pm M-F) - ok to text page via [employer] / After-Hours Answering Service [phone] / Palliative Clinic in the [facility] at the [facility] - [phone] (scheduling); [phone] (triage).      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 The pain acutely worsened over the last week and is thought to be 2/2 tumor growth. Robert just completed irinotecan and Temodar cycle 2 1/30-2/3. Course has been complicated by dehydration, malnutrition, and pancytopenia. Palliative was consulted for symptom control.      Today, the patient was seen for:  Pain related to  neoplasm  Constipation vs diarrhea  Poor appetite  Support  Goals of care  Encounter for palliative care            Interval History:     Chart review/discussion with unit or clinical team members:   Received oxycodone 5 mg prn x3 and scheduled Butrans patch from 0700 to 0700.     Per patient or family/caregivers today:  Seen and examined at bedside. Sister Mariela present. Primary also present. Tolerating po intake more. Counseled on prn bowel regimen Miralax vs Imodium. Visit detailed above.     Key Palliative Symptoms:  # Pain severity the last 12 hours: low/controlled but flares to moderate pain when he defecates (cancer pushing against colon)  We are not managing dyspnea in this patient  # Nausea severity the last 12 hours: none  We are not managing anxiety in this patient    Patient is on opioids: assessed and bowels ok/no needed changes to plan of care today.           Review of Systems:     Besides above, >4 ROS was reviewed and is unremarkable          Medications:     I have reviewed this patient's medication profile and medications during this hospitalization.    Noted meds:    Noted:  APAP 650 mg q6h scheduled  Butrans 10 mcg/hr patch started 2/15  Oxycodone 5 mg q6h scheduled 2/14 > to time out on 2/18  Dexamethasone 4 mg po q12h      Imodium 2 mg TID prn > increased to 4 mg 4 times daily prn on 2/16  Melatonin 1 mg at bedtime prn  Oxycodone 2.5-5 mg q6h prn > changed to q4h prn on 2/18  Compazine prn  Senna 2-4 tabs po BID started 2/14     Senna-docusate stopped 2/14  Zofran prn stopped 2/14  Dilaudid 2 mg q4h prn stopped 2/14             Physical Exam:   Vitals were reviewed  Temp: 98.1  F (36.7  C) Temp src: Oral BP: 90/52 Pulse: 117   Resp: 17 SpO2: 96 % O2 Device: None (Room air)    General: Not in acute distress. Large body habitus  Head: Atraumatic. Normocephalic.   Eyes: Anicteric without injection. Eyes conjugate. Extraocular movements intact.  Ear, Nose, and Throat: Mouth pink and moist without  lesions. Neck without overt masses.  Pulmonary: Unlabored. Speaking in full sentences  Cardiovascular: No overt jugular venous distension. LE edema. Perfusing.  Abdomen: Non-distended.  Skin: Warm. Dry. No bruises or rashes noted.  Musculoskeletal: Joints of hand normal. Muscle bulk and tone normal in UE and LE.  Neuro: Speech fluent. Face symmetric. No focal neurologic deficit noted. Alert and oriented to person, place and time but questionably to situation in setting of known intellectual disability.  Psych: Normal mood and affect. Able to make needs known. Again, uncertain to what degree Robert is intellectually able to comprehend his situation.           Data Reviewed:     Reviewed recent pertinent imaging, comments:   No new imaging    Reviewed recent labs, comments:   ROUTINE ICU LABS (Last four results)  CMP  Recent Labs   Lab 02/20/23  0725 02/19/23  0601 02/18/23  0751 02/17/23  0649 02/16/23  1710 02/16/23  0924    136 136 135*  --  136   POTASSIUM 4.1 4.3 4.2 3.9   < > 3.2*  3.2*   CHLORIDE 106 104 106 104  --  107   CO2 22 19* 18* 12*  --  16*   ANIONGAP 11 13 12 19*  --  13   * 116* 114* 108*  --  103*   BUN 22.8* 23.9* 23.2* 18.4  --  9.1   CR 0.96 0.96 1.04 1.12  --  0.99   GFRESTIMATED >90 >90 >90 >90  --  >90   FAISAL 8.2* 8.3* 8.2* 8.6  --  8.5*   MAG 1.9 2.0 2.1 2.3   < > 1.5*   PHOS  --  3.5 3.5 4.0  --  2.0*    < > = values in this interval not displayed.     CBC  Recent Labs   Lab 02/20/23  0725 02/19/23  0601 02/18/23  0751 02/17/23  0649   WBC 21.3* 14.0* 6.9 1.7*   RBC 2.61* 2.66* 2.74* 2.71*   HGB 7.2* 7.1* 7.4* 7.3*   HCT 23.7* 23.7* 24.7* 24.4*   MCV 91 89 90 90   MCH 27.6 26.7 27.0 26.9   MCHC 30.4* 30.0* 30.0* 29.9*   RDW 17.3* 17.3* 17.5* 17.1*   PLT 39* 27* 21* 15*     INRNo lab results found in last 7 days.  Arterial Blood GasNo lab results found in last 7 days.

## 2023-02-20 NOTE — PROGRESS NOTES
STATUS: Pt admitted 2/10 for SBO. Metastatic Desmoplastic small cell round tumor with METS to lung, spleen and Peritoneal Carcinomatosis. Malnutrition. Diarrhea.    NEURO: A/o x 4. DD. Slow to respond at times but pleasant and sister is in the room too.  VS: VSS on RA- Tachy at times. Soft BP.   ACTIVITY: Up IND in room.   PAIN: C/o Abd pain- PRN Oxy given x 2. Scheduled tylenol. Butrans patch on LUE.  CARDIAC: Tachy at times. No C/o CP.   RESP: No C/o SOB.   GI/: Voiding spontaneously. Loose stools- Improved per pt. Had BM  DIET: Regular. Encourage PO intake. Pt ate one Vanilla ice cream this shift. +Aurelio counts.   SKIN: No adjustment.   LDA'S: R port SL.   LABS: High K+ replacement protocol and Standard Mg replacement protocol.   CHANGES THIS SHIFT: Pt able to sleep in between cares. Sister in room with pt overnight.   POC: Cont with POC. Hgb today 7.2 and MD just ordered blood to be transfused then to discharge patient to home. Patient is on calorie counts too and appetite is fair. Continue with POC.

## 2023-02-20 NOTE — DISCHARGE SUMMARY
[facility]  Discharge Summary - Medicine & Pediatrics       Date of Admission:  2/10/2023  Date of Discharge:  2/20/2023  Discharging Provider: Dr. Tyson Gómez  Discharge Service: Medicine Service, [facility] TEAM 1    Discharge Diagnoses   Neoplastic-related pain  Metastatic desmoplastic small round cell carinoma   Pancytopenia secondary to antineoplastic therapy  Abdominal pain and altered bowel habits 2/2 progression of neoplasm  Severe malnutrition 2/2 neoplasm growth  Starvation Ketoacidosis  Anion gap metabolic acidosis   Acute Kidney Injury  Hypophosphatemia  Hypokalemia  Hypomagnesmia  Hyponatremia  HTN   Tachycardia   Port malpositioning    Follow-ups Needed After Discharge   Follow up in 1-2 weeks with the Oncology team  Follow up in 1-2 weeks with Palliative Care team    Unresulted Labs Ordered in the Past 30 Days of this Admission     Date and Time Order Name Status Description    2/2/2023  4:55 PM PREPARE RED BLOOD CELLS (UNIT) Preliminary           Discharge Disposition   Discharged to home    Hospital Course   Diego Buchanan is a 27 year old M with a PMH of desmoplastic small round cell carcinoma with extensive mets and a learning disability presenting with 3 months of progressive abdominal bloating/pain, acutely worsened after recent chemo and ongoing tumor growth; s/p Irinotecan and Temodar cycle 2 1/30-2/3.     Abdominal Pain 2/2 peritoneal disease  loose stools presumed to be 2/2 irinotecan  Pt's CC was abdominal pain and loose stools, both made significantly worse with PO intake, so pt had significant aversion to food throughout hospitalization, with further complications of electrolyte derangements and starvation ketosis. Oncology consulted; recent CT showed no e/o obstruction but worsening peritoneal carcinomatosis; negative C diff and enteric panel.  Palliative was consulted, pt started on Butrans patch, PO oxycodone, and APAP with slow improvement  in sx. Imodium for loose stools and improvement in sx; pt gradually coaxed to resume PO and was encouraged to eat whatever he thought he could tolerate (note pt's mother had been advising him to avoid solid foods due to fear of exacerbating pain). Electrolyte derangements, ketosis, and WILLIAM improved with IVF, D5 infusions, and electrolyte supplementation.    - Discharge on Butrans patch and PRN oxycodone   - Compazine PRN   - continue thiamine and KCl daily supplementation   - PRN imodium for sx control; expect that loose stools should be self-limiting further out from chemo    - Encourage adequate PO intake   - Cont daily Dexamethasone; revisit on follow-up appt with Oncology in approx 1 week    Chemo induced Pancytopenia with subsequent leukocytosis  G-CSF was administered during stay and counts responded reasonably well. Received several pRBC transfusions but no active complications during stay.     Consultations This Hospital Stay   NUTRITION SERVICES ADULT IP CONSULT  ONCOLOGY ADULT IP CONSULT  ONCOLOGY ADULT IP CONSULT  CARE MANAGEMENT / SOCIAL WORK IP CONSULT  PHYSICAL THERAPY ADULT IP CONSULT  OCCUPATIONAL THERAPY ADULT IP CONSULT  PALLIATIVE CARE ADULT IP CONSULT  PHARMACY LIAISON FOR MEDICATION COVERAGE CONSULT    Code Status   Full Code       The patient was discussed with Dr. Rico Ramon MD  Medicine Service, [address] [address]  Phone: [phone]  ______________________________________________________________________    Physical Exam   Vital Signs: Temp: 98.1  F (36.7  C) Temp src: Oral BP: 90/52 Pulse: 117   Resp: 17 SpO2: 96 % O2 Device: None (Room air)    Weight: 222 lbs 11.2 oz     General: In no acute distress. Seen laying in bed playing on ipad.  Head: alopecia  Eyes: No scleral icterus.  Cardiovascular: Tachycardic. Rhythm regular.   Chest: Port in place on chest with no erythema or purulence  Respiratory: Clear to  auscultation bilaterally. No wheezes or crackles.   Gastrointestinal: Abdomen rounded, distended, reports mild tenderness to palpation throughout, moderate tenderness diffusely in the lower abdomen. No guarding. Bowel sounds present. Abdomen is firm on palpation.  Skin: No rashes, petechiae, or bruising noted on exposed skin.  Psych: Affect appropriate.      Primary Care Physician   Jc Glass    Discharge Orders      Palliative Care Referral      Medication Therapy Management Referral      Adult Oncology/Hematology  Referral      Primary Care Referral      Reason for your hospital stay    Diego Buchanan is a 27 year old M with a 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 The pain acutely worsened 2 weeks ago and is thought to be 2/2 tumor growth as well as recent chemotherapy with Irinotecan. Robert just completed irinotecan and Temodar cycle 2 1/30-2/3. Abdominal pain is improving but complicated by dehydration, malnutrition, electrolyte derangement, and anti-neoplastic therapy induced pancytopenia. Today, tolerating regular diet and improved abdominal pain and diarrhea, plan to discharge home with close follow up.     Activity    Your activity upon discharge: activity as tolerated     Resume Home Care Services     Discharge Instructions    Do not take imodium (loperamide) if having constipation, make sure to take senna as well and achieve a good balance for 1-2 bowel movements a day.     Diet    Follow this diet upon discharge: Orders Placed This Encounter      Calorie Counts      Snacks/Supplements Adult: Other; please send ensure plus with every meals; With Meals      Snacks/Supplements Adult: Other; Ensure clear with lunch and dinner trays; Between Meals      Calorie Counts      Combination Diet Regular Diet Adult     Check Out Appointment Request    Please schedule for LISY visit ~1/27-1/28  for hospital follow up       Significant Results and Procedures     IMPRESSION:   1. Hepatic metastases have decreased in size suggesting treatment  response but pulmonary, splenic metastases and extensive peritoneal  carcinomatosis have increased.    2. The sigmoid colon is compressed by peritoneal carcinomatosis but  there is no upstream dilatation to suggest obstruction.      Discharge Medications   Current Discharge Medication List      START taking these medications    Details   dexamethasone (DECADRON) 4 MG tablet Take 1 tablet (4 mg) by mouth daily  Qty: 10 tablet, Refills: 0    Associated Diagnoses: Desmoplastic small round cell tumor (H); Peritoneal carcinomatosis (H)      melatonin 1 MG TABS tablet Take 1 tablet (1 mg) by mouth nightly as needed for sleep  Qty: 30 tablet, Refills: 0    Associated Diagnoses: Learning disabilities      naloxone (NARCAN) 4 MG/0.1ML nasal spray Spray 1 spray (4 mg) into one nostril alternating nostrils as needed for opioid reversal every 2-3 minutes until assistance arrives  Qty: 0.2 mL, Refills: 0    Associated Diagnoses: Desmoplastic small round cell tumor (H); Peritoneal carcinomatosis (H)      prochlorperazine (COMPAZINE) 5 MG tablet Take 1 tablet (5 mg) by mouth every 6 hours as needed for nausea or vomiting  Qty: 20 tablet, Refills: 0    Associated Diagnoses: Desmoplastic small round cell tumor (H); Peritoneal carcinomatosis (H)      sennosides (SENOKOT) 8.6 MG tablet Take 2-4 tablets by mouth 2 times daily as needed for constipation  Qty: 30 tablet, Refills: 0    Associated Diagnoses: Desmoplastic small round cell tumor (H); Peritoneal carcinomatosis (H)      thiamine (B-1) 100 MG tablet Take 1 tablet (100 mg) by mouth daily  Qty: 30 tablet, Refills: 0    Associated Diagnoses: Diarrhea, unspecified type; Desmoplastic small round cell tumor (H); Peritoneal carcinomatosis (H)      buprenorphine (BUTRANS) 10 MCG/HR WK patch Place 1 patch onto the skin once a week  Qty: 10  patch, Refills: 0    Associated Diagnoses: Desmoplastic small round cell tumor (H); Peritoneal carcinomatosis (H)      oxyCODONE (ROXICODONE) 5 MG tablet Take 1 tablet (5 mg) by mouth every 6 hours as needed for severe pain (7-10) or moderate pain (4-6)  Qty: 15 tablet, Refills: 0    Associated Diagnoses: Desmoplastic small round cell tumor (H); Peritoneal carcinomatosis (H)         CONTINUE these medications which have CHANGED    Details   acetaminophen (TYLENOL) 325 MG tablet Take 2 tablets (650 mg) by mouth every 6 hours  Qty: 30 tablet, Refills: 0    Associated Diagnoses: Desmoplastic small round cell tumor (H); Peritoneal carcinomatosis (H)      loperamide (IMODIUM) 2 MG capsule Take 2 capsules (4 mg) by mouth 4 times daily as needed for diarrhea (Use if pt having having diarrhea, do not use if pt is constipated)  Qty: 30 capsule, Refills: 0    Associated Diagnoses: Diarrhea, unspecified type         CONTINUE these medications which have NOT CHANGED    Details   metoprolol tartrate (LOPRESSOR) 25 MG tablet Take 1 tablet (25 mg) by mouth 2 times daily  Qty: 60 tablet, Refills: 1    Associated Diagnoses: Hypertension, unspecified type; Tachycardia      potassium chloride ER (K-TAB) 20 MEQ CR tablet Take 1 tablet (20 mEq) by mouth daily Until follow up on week of 1/23/23  Qty: 7 tablet, Refills: 1    Associated Diagnoses: Hypokalemia      potassium chloride ER (KLOR-CON M) 20 MEQ CR tablet Take 1 tablet (20 mEq) by mouth 2 times daily  Qty: 28 tablet, Refills: 0    Associated Diagnoses: Sarcoma, Ewings (H)      triamcinolone (KENALOG) 0.1 % external ointment Apply topically 2 times daily as needed for irritation  Qty: 15 g, Refills: 0    Associated Diagnoses: Rash         STOP taking these medications       magnesium oxide (MAG-OX) 400 MG tablet Comments:   Reason for Stopping:         temozolomide (TEMODAR) 250 MG capsule Comments:   Reason for Stopping:         temozolomide (TEMODAR) 250 MG capsule Comments:    Reason for Stopping:         traMADol (ULTRAM) 50 MG tablet Comments:   Reason for Stopping:             Allergies   Allergies   Allergen Reactions     Cefepime Rash     Morbiliform rash     Tegaderm Chg Dressing [Chlorhexidine]      Tegaderm Transparent Dressing (Informational Only) Itching     Tegaderm dressing; Okay for short periods of time

## 2023-02-21 NOTE — PLAN OF CARE
Physical Therapy Discharge Summary    Reason for therapy discharge:    Discharged to home with home therapy.    Progress towards therapy goal(s). See goals on Care Plan in [facility] electronic health record for goal details.  Goals partially met.  Barriers to achieving goals:   limited tolerance for therapy and discharge from facility.    Therapy recommendation(s):    Continued therapy is recommended.  Rationale/Recommendations:  to improve functional activity tolerance.

## 2023-02-21 NOTE — PLAN OF CARE
Goal Outcome Evaluation:         Pt and sister, Mariela, educated on discharge plan and follow up. Discharge pharm unable to obtain buprenorphine patches today, family ok coming back in next 1-2 days to pick yup patch since current patch on pts OL shoulder is good until 2/22/23. Pt has scheduled apointment for infusion clinic tomorrow, but sister Mariela discussed calling to reschedule for next week so pt can hopefully F/U with oncology beforehand.

## 2023-02-22 NOTE — TELEPHONE ENCOUNTER
MTM referral from: Transitions of Care (recent hospital discharge or ED visit)    MTM referral outreach attempt #2 on February 22, 2023 at 10:44 AM      Outcome: Patient not reachable after several attempts, will route to [facility] Pharmacist/Provider as an FYI.  [facility] scheduling number is [phone].  Thank you for the referral.    Edson Chase [facility]

## 2023-03-02 PROBLEM — E87.6 HYPOKALEMIA: Status: ACTIVE | Noted: 2023-01-01

## 2023-03-02 NOTE — PROGRESS NOTES
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 He presented initially with abdominal pain, diarrhea, and progressive abdominal distention for 3 months duration. 2. Initial CT scan in February 2020 showed severe peritoneal carcinomatosis with large peritoneal tumor implants throughout the entire abdomen and pelvis, but mostly prominently in the pelvis.   2. PETCT scan showed interval increase in the amount of peritoneal carcinomatosis.  3. On 3/12/2020, he had ultrasound-guided core needle biopsy of a peritoneal implant (Case: [id number]), which showed a completely undifferentiated appearance, but stains with pancytokeratin, indicating an epithelial origin. 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 Tumor markers for CA-19-9, CEA, beta-hCG, alpha-fetoprotein were unremarkable. Cancer type ID molecular testing by [employer] sent to determine the exact diagnosis and to assist in further treatment planning. 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 Relative probability of less than 5% of other subtype of sarcoma. [id number] fusion detected.  4. 5/1/2020, MRI brain negative for brain metastasis, and baseline CT-CAP obtained showing extensive mixed solid and cystic masses throughout the abdomen and pelvis consistent with peritoneal carcinomatosis. Largest mass measures approximately 20 cm in the pelvis. Metastatic mixed solid and cystic masses seen in hepatic segments 5 and 6 and hepatic segment 7. The masses appear to be contiguous with the retrohepatic peritoneal carcinomatosis. Small volume fluid about the spleen favored to represent malignant ascites, stable mild-to-moderate right hydronephrosis related to mass effect upon the distal ureter in the pelvis, the IVC and iliac  veins are compressed due to the extensive peritoneal carcinomatosis without findings to suggest deep venous thrombosis.  5. 5/4/2020, he begins CAV/IMV alternating chemotherapy. He receives 6 cycles of treatment. He symptomatically improves.  6. 9/11/2020, CT-CAP shows stable to mildly improved extensive peritoneal carcinomatosis. He would like to omit Ifosfamide/etoposide cycles and continue only with CAV.  7. 10/9/2020, he starts CAV every 21 days.  8. 1/6/2021, CT CAP showed a mixed response in the mixed solid and cystic masses throughout the peritoneum. Some of the tumors are stable to mildly worse, with some of the peritoneal masses a bit larger, a few are smaller, one cystic tumor in the left abdomen is smaller, while tumors lower in the pelvis appear slightly larger.  9. 3/24/2021, CT CAP showed continued slight decrease in overall burden of peritoneal carcinomatosis with persistent encasement of bowel without evidence of bowel obstruction.  10. 6/25/2021, he receives cycle 19 CAV, then takes a chemotherapy holiday.  11. 4/22/2022, he resumes CAV/IMV chemotherapy.  12. 7/13/22, CT CAP showed interval enlargement of hepatic and splenic metastases with other overall disease stable. CAV/IMV continued.  13.  10/04/22, CT CAP  showed evidence of progressive disease, predominantly in liver. There are small sub-centimeter lung nodules, which are suspicious for metastasis. CAV/IMV discontinued. Start irinotecan/temozolomide (TMZ) on 11/28/22.   14. 12/7/22-12/21/22: Hospitalized for neutropenic fever with RSV, rash (drug vs. Viral exanthem), and pancytopenia with transfusions.     Cycle 2 of TMZ/irinotecan was delayed due to hospitalization and need for additional recovery (ongoing fatigue and diarrhea) as well as planned port placement. He received cycle 2 on 1/30/23 with 10% dose reduction of irinotecan.     Today he is accompanied by his mother in person and by his sister over the phone.     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     -He was hospitalized from 2/10/23-2/20/23 for impaired oral intake and subsequent electrolyte disturbances s/t an increase in peritoneal carcinomatosis. Stool studies given diarrhea were negative. Imodium was used for diarrhea management. He required blood transfusions for anemia. Was seen by palliative and started on Butrans, oxycodone and tylenol for pain.   -Robert presents today for hospital follow up. He reports continuing to feel fatigued since hospitalization. Continues to be weak overall. Reports improvements to abdominal pain since adjustment of pain regimen by palliative. This has also improved diaphoretic episodes.   -Has not taken metoprolol yet today. Reports no chest pain or palpitations.   -Reports breathing faster today. His sister thinks this is exertional. No fevers or cough. No dysuria.   -Still having about 3 stools per day. Not on imodium at present.  -Appetite still impaired due to early satiety.    Review of Systems:  Patient denies any of the following except if noted above: fevers, chills, difficulty with energy, vision changes, chest pain, dyspnea, abdominal pain, nausea, vomiting, diarrhea, constipation, urinary concerns, headaches, numbness, tingling. He also denies lumps, bumps, or bleeding issues.     Current Outpatient Medications   Medication Sig Dispense Refill     acetaminophen (TYLENOL) 325 MG tablet Take 2 tablets (650 mg) by mouth every 6 hours 30 tablet 0     buprenorphine (BUTRANS) 10 MCG/HR WK patch Place 1 patch onto the skin once a week 10 patch 0     dexamethasone (DECADRON) 4 MG tablet Take 1 tablet (4 mg) by mouth daily 10 tablet 0     loperamide (IMODIUM) 2 MG capsule Take 2 capsules (4 mg) by mouth 4 times daily as needed for diarrhea (Use if pt having having diarrhea, do not use if pt is constipated) 30 capsule 0     melatonin 1 MG TABS tablet Take 1 tablet (1 mg) by mouth  nightly as needed for sleep 30 tablet 0     metoprolol tartrate (LOPRESSOR) 25 MG tablet Take 1 tablet (25 mg) by mouth 2 times daily 60 tablet 1     naloxone (NARCAN) 4 MG/0.1ML nasal spray Spray 1 spray (4 mg) into one nostril alternating nostrils as needed for opioid reversal every 2-3 minutes until assistance arrives 0.2 mL 0     oxyCODONE (ROXICODONE) 5 MG tablet Take 1 tablet (5 mg) by mouth every 6 hours as needed for severe pain (7-10) or moderate pain (4-6) 15 tablet 0     potassium chloride ER (K-TAB) 20 MEQ CR tablet Take 1 tablet (20 mEq) by mouth daily Until follow up on week of 1/23/23 7 tablet 1     potassium chloride ER (KLOR-CON M) 20 MEQ CR tablet Take 1 tablet (20 mEq) by mouth 2 times daily 28 tablet 0     prochlorperazine (COMPAZINE) 5 MG tablet Take 1 tablet (5 mg) by mouth every 6 hours as needed for nausea or vomiting 20 tablet 0     sennosides (SENOKOT) 8.6 MG tablet Take 2-4 tablets by mouth 2 times daily as needed for constipation 30 tablet 0     thiamine (B-1) 100 MG tablet Take 1 tablet (100 mg) by mouth daily 30 tablet 0     triamcinolone (KENALOG) 0.1 % external ointment Apply topically 2 times daily as needed for irritation 15 g 0     Physical Exam:  /68   Pulse (!) 122   Temp 98.2  F (36.8  C) (Oral)   Resp 30   SpO2 98%   Wt Readings from Last 10 Encounters:   02/19/23 101 kg (222 lb 11.2 oz)   02/02/23 111.1 kg (244 lb 14.4 oz)   01/31/23 109.6 kg (241 lb 11.2 oz)   01/30/23 109.3 kg (240 lb 14.4 oz)   01/30/23 109.3 kg (240 lb 15.4 oz)   01/20/23 111.4 kg (245 lb 9.5 oz)   01/18/23 110.8 kg (244 lb 4.8 oz)   01/16/23 111.3 kg (245 lb 4.8 oz)   01/06/23 113.8 kg (250 lb 12.8 oz)   01/02/23 113.4 kg (250 lb)   General: Mildly fatigued appearing male in no acute distress.  Eyes: EOMI, PERRL. No scleral icterus.  ENT: Oral mucosa is moist without lesions or thrush.   Lymphatic: Neck is supple without cervical or supraclavicular lymphadenopathy.   Cardiovascular: Tachycardic.  Rhythm regular. No murmurs.. No peripheral edema.  Respiratory: Tachypneic. Shallow breaths. No accessory muscle use. STAHL. Able to speak in phrases (baseline). CTA bilaterally. No wheezes or crackles.  Gastrointestinal: Abdomen rounded, distended, firm. No tenderness to palpation. Hypo BS and distant.  Neurologic: Cranial nerves II through XII are grossly intact.  Skin: No rashes, petechiae, or bruising noted on exposed skin.  Psych: Pleasant, talkative.     Labs:   Most Recent 3 CBC's:  Recent Labs   Lab Test 03/02/23  1258 02/20/23  0725 02/19/23  0601 02/02/23  1335 01/31/23  1343 01/20/23  1515 01/16/23  0856   WBC 21.5* 21.3* 14.0*   < > 18.0*   < > 20.6*   HGB 7.4* 7.2* 7.1*   < > 8.1*   < > 8.9*   MCV 87 91 89   < > 90   < > 87    39* 27*   < > 435   < > 521*   ANEUTAUTO 17.8*  --   --   --  15.8*  --  15.8*    < > = values in this interval not displayed.     Most Recent 3 BMP's:  Recent Labs   Lab Test 03/02/23  1258 02/20/23  0725 02/19/23  0601 02/11/23  0545 02/10/23  1017 02/02/23  1335    139 136   < > 131* 137   POTASSIUM 2.2* 4.1 4.3   < > 4.2 3.5   CHLORIDE 99 106 104   < > 96* 103   CO2 27 22 19*   < > 19* 17*   BUN 1.5* 22.8* 23.9*   < > 16.0 10.3   CR 0.56* 0.96 0.96   < > 1.11 0.55*   ANIONGAP 14 11 13   < > 16* 17*   FAISAL 6.9* 8.2* 8.3*   < > 9.5 8.8   GLC 83 137* 116*   < > 131* 157*   PROTTOTAL 6.9  --   --   --  8.3 7.3   ALBUMIN 2.9*  --   --   --  4.0 3.3*    < > = values in this interval not displayed.    Most Recent 3 LFT's:  Recent Labs   Lab Test 03/02/23  1258 02/10/23  1017 02/02/23  1335   AST 19 10 120*   ALT 6* 22 32   ALKPHOS 117 114 88   BILITOTAL 0.8 1.3* 0.4    Most Recent 2 TSH and T4:No lab results found.  I reviewed the above labs today.    IMAGING  Reviewed CT CAP from 2/10/23.     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   -Patient is currently on treatment with  irinotecan and Temodar. With cycle 2, which began on 1/30/23, irinotecan was dose reduced by 10% due to prolonged neutropenia. He was the hospitalized from 2/10/23-2/20/23 due to inability to maintain adequate oral intake along with pancytopenia, loose stools (stool studies native for infection) and poor pain control.     He continues to feel fatigued following hospitalization and is tachypneic beyond baseline during our visit.     -I have been in communication with Dr. Sims prior to my visit with Robert today. He has reviewed Robert's CT CAP on 2/10/23 which shows some liver response in the largest lesion around 30%, but peritoneal disease is worsening. Our options at present include continuing palliative Temodar/irinotecan, since he's only had 2 cycles but it is unclear how much benefit will derive from this and also poses risk of further toxicity. We discussed this situation today. If we pursued palliative chemo our plan would be to give another 2 cycles and reassess. We would also decrease irinotecan to 20 mg/m2, with option of prn atropine with infusions. He would need to continue loperamide (4 mg, then 2 mg q4h) for the late diarrhea, and likely would require IV fluids prn. Alternatively, Robert could consider stopping treatment and focusing on quality of life with involvement of palliative and hospice services. After our discussion today, Robert would likely want to continue treatment but he and his family plan to take some time over the next week to think about this options.    After our above discussion, Robert's labs came back with K+ 2.2. See below for details.     ADDENDUM: Will need to revisit goals of care again at next visit pending course of likely hospitalization. Will keep appt with Dr. Sims next week as scheduled.    Tachypnea, Leukocytosis  -RR 30's-40's. Sats stable but breaths shallow. LS clear on exam. WBC likely elevated s/t G-CSF but could also represent infection. Obtained CXR without clear  evidence of focal pneumonia. Can't rule out atypical infection. In absence of fevers, seems somewhat less likely but will require further monitoring in ED/hospital.     Hypokalemia/Hypomagnesemia  Tachycardia  -Tachycardia is a longstanding issue, likely multifactorial in setting of metastatic disease. HR usually 120's-130's. Has not taken metoprolol 25 mg today.   -He reports being off magnesium and potassium at home as he was advised this at discharge per his report.  -With K+ being 2.2, needs immediate repletion. Unable to accommodate for IV in clinic. Will need IV repletion in ED to minimize risk of arrhythmia given medical complexity of patient. Also, not reliably able to replete outpatient orally.   -ED was called and report given to ED provider. EMS called to arrange ride. Given 40mEq of K+ orally. During that time EKG obtained. HR jumped to 200's, appeared regular in rhythm, on auscultation, rhythm too fast to count. Rapid response called. 911 called. After about 1 minute, HR went down to 160's. BP stable in 110's. Patient conversive and asymptomatic throughout.   -Plan to transfer to ED for cardiac monitoring, electrolyte repletion and monitoring of respiratory status.   -Magnesium 1.2, needs replacement in ED.     Abdominal bloating/pain.   -Following with palliative  -Pain somewhat improved today but early satiety remains  -On Butrans, prn oxycodone and tylenol    Anemia. Secondary to chemotherapy. Hgb 7.4 today. Fatigued today, agreeable to 1 unit of RBC's but unable to accommodate in transfusion. Consider blood transfusion for symptomatic relief with Hgb<8.    120 minutes spent on the date of the encounter doing chart review, review of test results, interpretation of tests, patient visit and documentation     Amber Scheierl, CNP  [facility] [address] [address] [phone]

## 2023-03-02 NOTE — NURSING NOTE
EKG done in clinic, results provided to RRT, provider and EMS prior to patient leaving for ED. Results also scanned to chart.    Harriet Rogers CMA on 3/2/2023 at 4:04 PM

## 2023-03-02 NOTE — NURSING NOTE
"Chief Complaint   Patient presents with     Port Draw     Labs drawn via port by RN in lab.  VS taken        Port accessed with 20 gauge 3/4\" gripper needle by RN, labs collected, line flushed with saline and heparin.  Vitals taken. Pt checked in for appointment(s).    Robert says he feels pretty \"awful\".  Feels he needs IV fluids.  Tachycardic and tachypneic- see flow sheet.  Provider notified.    Mariola Clark RN    "

## 2023-03-02 NOTE — LETTER
3/2/2023         RE: Diego Buchanan  [address]  [place] [address]        Dear Colleague,    Thank you for referring your patient, Diego Buchanan, to the [facility]. Please see a copy of my visit note below.    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 He presented initially with abdominal pain, diarrhea, and progressive abdominal distention for 3 months duration. 2. Initial CT scan in February 2020 showed severe peritoneal carcinomatosis with large peritoneal tumor implants throughout the entire abdomen and pelvis, but mostly prominently in the pelvis.   2. PETCT scan showed interval increase in the amount of peritoneal carcinomatosis.  3. On 3/12/2020, he had ultrasound-guided core needle biopsy of a peritoneal implant (Case: [id number]), which showed a completely undifferentiated appearance, but stains with pancytokeratin, indicating an epithelial origin. 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 Tumor markers for CA-19-9, CEA, beta-hCG, alpha-fetoprotein were unremarkable. Cancer type ID molecular testing by [employer] sent to determine the exact diagnosis and to assist in further treatment planning. 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 Relative probability of less than 5% of other subtype of sarcoma. EWSR1-WT1 fusion detected.  4. 5/1/2020, MRI brain negative for brain metastasis, and baseline CT-CAP obtained showing extensive mixed solid and cystic masses throughout the abdomen and pelvis consistent with peritoneal carcinomatosis. Largest mass measures approximately 20 cm in the pelvis. Metastatic mixed solid and cystic masses seen in hepatic segments 5 and 6 and hepatic segment 7. The masses appear to be  contiguous with the retrohepatic peritoneal carcinomatosis. Small volume fluid about the spleen favored to represent malignant ascites, stable mild-to-moderate right hydronephrosis related to mass effect upon the distal ureter in the pelvis, the IVC and iliac veins are compressed due to the extensive peritoneal carcinomatosis without findings to suggest deep venous thrombosis.  5. 5/4/2020, he begins CAV/IMV alternating chemotherapy. He receives 6 cycles of treatment. He symptomatically improves.  6. 9/11/2020, CT-CAP shows stable to mildly improved extensive peritoneal carcinomatosis. He would like to omit Ifosfamide/etoposide cycles and continue only with CAV.  7. 10/9/2020, he starts CAV every 21 days.  8. 1/6/2021, CT CAP showed a mixed response in the mixed solid and cystic masses throughout the peritoneum. Some of the tumors are stable to mildly worse, with some of the peritoneal masses a bit larger, a few are smaller, one cystic tumor in the left abdomen is smaller, while tumors lower in the pelvis appear slightly larger.  9. 3/24/2021, CT CAP showed continued slight decrease in overall burden of peritoneal carcinomatosis with persistent encasement of bowel without evidence of bowel obstruction.  10. 6/25/2021, he receives cycle 19 CAV, then takes a chemotherapy holiday.  11. 4/22/2022, he resumes CAV/IMV chemotherapy.  12. 7/13/22, CT CAP showed interval enlargement of hepatic and splenic metastases with other overall disease stable. CAV/IMV continued.  13.  10/04/22, CT CAP  showed evidence of progressive disease, predominantly in liver. There are small sub-centimeter lung nodules, which are suspicious for metastasis. CAV/IMV discontinued. Start irinotecan/temozolomide (TMZ) on 11/28/22.   14. 12/7/22-12/21/22: Hospitalized for neutropenic fever with RSV, rash (drug vs. Viral exanthem), and pancytopenia with transfusions.     Cycle 2 of TMZ/irinotecan was delayed due to hospitalization and need for  additional recovery (ongoing fatigue and diarrhea) as well as planned port placement. He received cycle 2 on 1/30/23 with 10% dose reduction of irinotecan.     Today he is accompanied by his mother in person and by his sister over the phone.     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     -He was hospitalized from 2/10/23-2/20/23 for impaired oral intake and subsequent electrolyte disturbances s/t an increase in peritoneal carcinomatosis. Stool studies given diarrhea were negative. Imodium was used for diarrhea management. He required blood transfusions for anemia. Was seen by palliative and started on Butrans, oxycodone and tylenol for pain.   -Robert presents today for hospital follow up. He reports continuing to feel fatigued since hospitalization. Continues to be weak overall. Reports improvements to abdominal pain since adjustment of pain regimen by palliative. This has also improved diaphoretic episodes.   -Has not taken metoprolol yet today. Reports no chest pain or palpitations.   -Reports breathing faster today. His sister thinks this is exertional. No fevers or cough. No dysuria.   -Still having about 3 stools per day. Not on imodium at present.  -Appetite still impaired due to early satiety.    Review of Systems:  Patient denies any of the following except if noted above: fevers, chills, difficulty with energy, vision changes, chest pain, dyspnea, abdominal pain, nausea, vomiting, diarrhea, constipation, urinary concerns, headaches, numbness, tingling. He also denies lumps, bumps, or bleeding issues.     Current Outpatient Medications   Medication Sig Dispense Refill     acetaminophen (TYLENOL) 325 MG tablet Take 2 tablets (650 mg) by mouth every 6 hours 30 tablet 0     buprenorphine (BUTRANS) 10 MCG/HR WK patch Place 1 patch onto the skin once a week 10 patch 0     dexamethasone (DECADRON) 4 MG tablet Take 1 tablet (4 mg) by mouth daily 10  tablet 0     loperamide (IMODIUM) 2 MG capsule Take 2 capsules (4 mg) by mouth 4 times daily as needed for diarrhea (Use if pt having having diarrhea, do not use if pt is constipated) 30 capsule 0     melatonin 1 MG TABS tablet Take 1 tablet (1 mg) by mouth nightly as needed for sleep 30 tablet 0     metoprolol tartrate (LOPRESSOR) 25 MG tablet Take 1 tablet (25 mg) by mouth 2 times daily 60 tablet 1     naloxone (NARCAN) 4 MG/0.1ML nasal spray Spray 1 spray (4 mg) into one nostril alternating nostrils as needed for opioid reversal every 2-3 minutes until assistance arrives 0.2 mL 0     oxyCODONE (ROXICODONE) 5 MG tablet Take 1 tablet (5 mg) by mouth every 6 hours as needed for severe pain (7-10) or moderate pain (4-6) 15 tablet 0     potassium chloride ER (K-TAB) 20 MEQ CR tablet Take 1 tablet (20 mEq) by mouth daily Until follow up on week of 1/23/23 7 tablet 1     potassium chloride ER (KLOR-CON M) 20 MEQ CR tablet Take 1 tablet (20 mEq) by mouth 2 times daily 28 tablet 0     prochlorperazine (COMPAZINE) 5 MG tablet Take 1 tablet (5 mg) by mouth every 6 hours as needed for nausea or vomiting 20 tablet 0     sennosides (SENOKOT) 8.6 MG tablet Take 2-4 tablets by mouth 2 times daily as needed for constipation 30 tablet 0     thiamine (B-1) 100 MG tablet Take 1 tablet (100 mg) by mouth daily 30 tablet 0     triamcinolone (KENALOG) 0.1 % external ointment Apply topically 2 times daily as needed for irritation 15 g 0     Physical Exam:  /68   Pulse (!) 122   Temp 98.2  F (36.8  C) (Oral)   Resp 30   SpO2 98%   Wt Readings from Last 10 Encounters:   02/19/23 101 kg (222 lb 11.2 oz)   02/02/23 111.1 kg (244 lb 14.4 oz)   01/31/23 109.6 kg (241 lb 11.2 oz)   01/30/23 109.3 kg (240 lb 14.4 oz)   01/30/23 109.3 kg (240 lb 15.4 oz)   01/20/23 111.4 kg (245 lb 9.5 oz)   01/18/23 110.8 kg (244 lb 4.8 oz)   01/16/23 111.3 kg (245 lb 4.8 oz)   01/06/23 113.8 kg (250 lb 12.8 oz)   01/02/23 113.4 kg (250 lb)   General:  Mildly fatigued appearing male in no acute distress.  Eyes: EOMI, PERRL. No scleral icterus.  ENT: Oral mucosa is moist without lesions or thrush.   Lymphatic: Neck is supple without cervical or supraclavicular lymphadenopathy.   Cardiovascular: Tachycardic. Rhythm regular. No murmurs.. No peripheral edema.  Respiratory: Tachypneic. Shallow breaths. No accessory muscle use. STAHL. Able to speak in phrases (baseline). CTA bilaterally. No wheezes or crackles.  Gastrointestinal: Abdomen rounded, distended, firm. No tenderness to palpation. Hypo BS and distant.  Neurologic: Cranial nerves II through XII are grossly intact.  Skin: No rashes, petechiae, or bruising noted on exposed skin.  Psych: Pleasant, talkative.     Labs:   Most Recent 3 CBC's:  Recent Labs   Lab Test 03/02/23  1258 02/20/23  0725 02/19/23  0601 02/02/23  1335 01/31/23  1343 01/20/23  1515 01/16/23  0856   WBC 21.5* 21.3* 14.0*   < > 18.0*   < > 20.6*   HGB 7.4* 7.2* 7.1*   < > 8.1*   < > 8.9*   MCV 87 91 89   < > 90   < > 87    39* 27*   < > 435   < > 521*   ANEUTAUTO 17.8*  --   --   --  15.8*  --  15.8*    < > = values in this interval not displayed.     Most Recent 3 BMP's:  Recent Labs   Lab Test 03/02/23  1258 02/20/23  0725 02/19/23  0601 02/11/23  0545 02/10/23  1017 02/02/23  1335    139 136   < > 131* 137   POTASSIUM 2.2* 4.1 4.3   < > 4.2 3.5   CHLORIDE 99 106 104   < > 96* 103   CO2 27 22 19*   < > 19* 17*   BUN 1.5* 22.8* 23.9*   < > 16.0 10.3   CR 0.56* 0.96 0.96   < > 1.11 0.55*   ANIONGAP 14 11 13   < > 16* 17*   FAISAL 6.9* 8.2* 8.3*   < > 9.5 8.8   GLC 83 137* 116*   < > 131* 157*   PROTTOTAL 6.9  --   --   --  8.3 7.3   ALBUMIN 2.9*  --   --   --  4.0 3.3*    < > = values in this interval not displayed.    Most Recent 3 LFT's:  Recent Labs   Lab Test 03/02/23  1258 02/10/23  1017 02/02/23  1335   AST 19 10 120*   ALT 6* 22 32   ALKPHOS 117 114 88   BILITOTAL 0.8 1.3* 0.4    Most Recent 2 TSH and T4:No lab results found.  I  reviewed the above labs today.    IMAGING  Reviewed CT CAP from 2/10/23.     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   -Patient is currently on treatment with irinotecan and Temodar. With cycle 2, which began on 1/30/23, irinotecan was dose reduced by 10% due to prolonged neutropenia. He was the hospitalized from 2/10/23-2/20/23 due to inability to maintain adequate oral intake along with pancytopenia, loose stools (stool studies native for infection) and poor pain control.     He continues to feel fatigued following hospitalization and is tachypneic beyond baseline during our visit.     -I have been in communication with Dr. Sims prior to my visit with Robert today. He has reviewed Robert's CT CAP on 2/10/23 which shows some liver response in the largest lesion around 30%, but peritoneal disease is worsening. Our options at present include continuing palliative Temodar/irinotecan, since he's only had 2 cycles but it is unclear how much benefit will derive from this and also poses risk of further toxicity. We discussed this situation today. If we pursued palliative chemo our plan would be to give another 2 cycles and reassess. We would also decrease irinotecan to 20 mg/m2, with option of prn atropine with infusions. He would need to continue loperamide (4 mg, then 2 mg q4h) for the late diarrhea, and likely would require IV fluids prn. Alternatively, Robert could consider stopping treatment and focusing on quality of life with involvement of palliative and hospice services. After our discussion today, Robert would likely want to continue treatment but he and his family plan to take some time over the next week to think about this options.    After our above discussion, Robert's labs came back with K+ 2.2. See below for details.     ADDENDUM: Will need to revisit goals of care again at next visit pending course of likely  hospitalization. Will keep appt with Dr. Sims next week as scheduled.    Tachypnea, Leukocytosis  -RR 30's-40's. Sats stable but breaths shallow. LS clear on exam. WBC likely elevated s/t G-CSF but could also represent infection. Obtained CXR without clear evidence of focal pneumonia. Can't rule out atypical infection. In absence of fevers, seems somewhat less likely but will require further monitoring in ED/hospital.     Hypokalemia/Hypomagnesemia  Tachycardia  -Tachycardia is a longstanding issue, likely multifactorial in setting of metastatic disease. HR usually 120's-130's. Has not taken metoprolol 25 mg today.   -He reports being off magnesium and potassium at home as he was advised this at discharge per his report.  -With K+ being 2.2, needs immediate repletion. Unable to accommodate for IV in clinic. Will need IV repletion in ED to minimize risk of arrhythmia given medical complexity of patient. Also, not reliably able to replete outpatient orally.   -ED was called and report given to ED provider. EMS called to arrange ride. Given 40mEq of K+ orally. During that time EKG obtained. HR jumped to 200's, appeared regular in rhythm, on auscultation, rhythm too fast to count. Rapid response called. 911 called. After about 1 minute, HR went down to 160's. BP stable in 110's. Patient conversive and asymptomatic throughout.   -Plan to transfer to ED for cardiac monitoring, electrolyte repletion and monitoring of respiratory status.   -Magnesium 1.2, needs replacement in ED.     Abdominal bloating/pain.   -Following with palliative  -Pain somewhat improved today but early satiety remains  -On Butrans, prn oxycodone and tylenol    Anemia. Secondary to chemotherapy. Hgb 7.4 today. Fatigued today, agreeable to 1 unit of RBC's but unable to accommodate in transfusion. Consider blood transfusion for symptomatic relief with Hgb<8.    120 minutes spent on the date of the encounter doing chart review, review of test  results, interpretation of tests, patient visit and documentation     Amber Scheierl, CNP  [facility] [address] [id number] [phone]

## 2023-03-02 NOTE — ED PROVIDER NOTES
[place] EMERGENCY DEPARTMENT ([facility])    3/02/23        History     Chief Complaint   Patient presents with     Abnormal Labs     The history is provided by the patient, medical records and a relative.     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  Patient was seen in clinic and noted to be more tachycardic than baseline and was also noted to be tachypneic.  Potassium was tested and found to be 2.2.  He notes poor p.o. intake as well as diarrhea. He currently feels weak. He also endorses of abdominal pain.  No other symptoms noted.  Per epic review:   Potassium   Date Value Ref Range Status   03/02/2023 2.2 (LL) 3.4 - 5.3 mmol/L Final   09/27/2022 3.3 (L) 3.4 - 5.3 mmol/L Final   06/23/2021 3.7 3.4 - 5.3 mmol/L Final     Potassium POCT   Date Value Ref Range Status   03/02/2023 2.3 (LL) 3.4 - 5.3 mmol/L Final     On 2/10/23 at [facility]   CT Chest/Abdomen/Pelvis w/ contrast shows:   IMPRESSION:   1. Hepatic metastases have decreased in size suggesting treatment  response but pulmonary, splenic metastases and extensive peritoneal  carcinomatosis have increased.    2. The sigmoid colon is compressed by peritoneal carcinomatosis but  there is no upstream dilatation to suggest obstruction.     Past Medical History  Past Medical History:   Diagnosis Date     Hypertension      Learning disability     but pt is his own gaurdian     Peritoneal carcinomatosis (H)      Past Surgical History:   Procedure Laterality Date     BIOPSY      liver     INSERT PORT VASCULAR ACCESS Right 4/21/2022    Procedure: INSERTION, VASCULAR ACCESS PORT;  Surgeon: Daniel Sarmiento MD;  Location: [facility] OR     INSERT PORT VASCULAR ACCESS N/A 1/20/2023    Procedure: Right Port Removal and Right Subclavian Powerport  replacement and Flouroscopy.;  Surgeon: Caleb Brady MD;  Location: [facility] OR     IR CHEST PORT PLACEMENT > 5 YRS OF AGE  4/21/2022     IR CVC TUNNEL PLACEMENT > 5 YRS OF AGE  4/29/2020     IR CVC TUNNEL REMOVAL RIGHT  11/16/2021     US MUSCLE BIOPSY  3/12/2020     acetaminophen (TYLENOL) 325 MG tablet  buprenorphine (BUTRANS) 10 MCG/HR WK patch  dexamethasone (DECADRON) 4 MG tablet  loperamide (IMODIUM) 2 MG capsule  melatonin 1 MG TABS tablet  metoprolol tartrate (LOPRESSOR) 25 MG tablet  naloxone (NARCAN) 4 MG/0.1ML nasal spray  oxyCODONE (ROXICODONE) 5 MG tablet  potassium chloride ER (K-TAB) 20 MEQ CR tablet  potassium chloride ER (KLOR-CON M) 20 MEQ CR tablet  prochlorperazine (COMPAZINE) 5 MG tablet  sennosides (SENOKOT) 8.6 MG tablet  thiamine (B-1) 100 MG tablet  triamcinolone (KENALOG) 0.1 % external ointment      Allergies   Allergen Reactions     Cefepime Rash     Morbiliform rash     Tegaderm Chg Dressing [Chlorhexidine]      Tegaderm Transparent Dressing (Informational Only) Itching     Tegaderm dressing; Okay for short periods of time     Family History  Family History   Problem Relation Age of Onset     Hypertension Mother      Anesthesia Reaction No family hx of      Deep Vein Thrombosis (DVT) No family hx of      Social History   Social History     Tobacco Use     Smoking status: Never     Smokeless tobacco: Never   Substance Use Topics     Alcohol use: Never     Drug use: Never      Past medical history, past surgical history, medications, allergies, family history, and social history were reviewed with the patient. No additional pertinent items.      A complete review of systems was performed with pertinent positives and negatives noted in the HPI, and all other systems negative.    Physical Exam   BP: 97/58  Pulse: (!) 159  Temp: 97.6  F (36.4  C)  SpO2: 100 %  Physical Exam  Constitutional:       General: He is not in acute distress.     Appearance: He is well-developed. He is  ill-appearing. He is not diaphoretic.   HENT:      Head: Normocephalic and atraumatic.      Mouth/Throat:      Pharynx: No oropharyngeal exudate.   Eyes:      General: No scleral icterus.        Right eye: No discharge.         Left eye: No discharge.      Pupils: Pupils are equal, round, and reactive to light.   Cardiovascular:      Rate and Rhythm: Regular rhythm. Tachycardia present.      Heart sounds: Normal heart sounds. No murmur heard.    No friction rub. No gallop.      Comments: Port in place in right upper chest.  Pulmonary:      Effort: Pulmonary effort is normal. No respiratory distress.      Breath sounds: Normal breath sounds. No wheezing.   Chest:      Chest wall: No tenderness.   Abdominal:      General: Bowel sounds are normal. There is distension.      Palpations: Abdomen is soft.      Tenderness: There is abdominal tenderness.   Musculoskeletal:         General: No tenderness or deformity. Normal range of motion.      Cervical back: Normal range of motion and neck supple.   Skin:     General: Skin is warm and dry.      Coloration: Skin is not pale.      Findings: No erythema or rash.   Neurological:      Mental Status: He is alert and oriented to person, place, and time.      Cranial Nerves: No cranial nerve deficit.           ED Course, Procedures, & Data     4:58 PM  The patient was seen and examined by Tremayne Vasquez MD  in Room ED 27.   Procedures               EKG Interpretation:      Interpreted by Tremayne Vasquez DO  Time reviewed:1651   Symptoms at time of EKG: Generalized weakness  Rhythm: Normal sinus  and Sinus tachycardia  Rate: 152  Axis: Normal  Ectopy: None  Conduction: Normal  ST Segments/ T Waves: No ST-T wave changes and No acute ischemic changes  Q Waves: None  Comparison to prior: Unchanged from 12/18/2022    Clinical Impression: sinus tachycardia           Critical Care time was 30 minutes for this patient excluding procedures.     Results for orders placed or performed  during the hospital encounter of 03/02/23   [facility] Draw     Status: None (In process)    Narrative    The following orders were created for panel order [facility] Draw.  Procedure                               Abnormality         Status                     ---------                               -----------         ------                     Extra Blue Top Tube[[id number]]                              In process                 Extra Red Top Tube[[id number]]                               In process                 Extra Green Top (Lithium...[[id number]]                      In process                 Extra Purple Top Tube[[id number]]                            In process                   Please view results for these tests on the individual orders.   Lactic acid whole blood     Status: Abnormal   Result Value Ref Range    Lactic Acid 2.9 (H) 0.7 - 2.0 mmol/L   EKG 12 lead     Status: None (Preliminary result)   Result Value Ref Range    Systolic Blood Pressure  mmHg    Diastolic Blood Pressure  mmHg    Ventricular Rate 152 BPM    Atrial Rate 152 BPM    AZ Interval 144 ms    QRS Duration 74 ms     ms    QTc 422 ms    P Axis 29 degrees    R AXIS 15 degrees    T Axis 35 degrees    Interpretation ECG       Sinus tachycardia  Nonspecific ST abnormality  Abnormal ECG     Results for orders placed or performed in visit on 03/02/23   XR Chest 2 Views     Status: None    Narrative    EXAM: XR CHEST 2 VIEWS  3/2/2023 2:32 PM      HISTORY: tachypnea, r/o pneumonia; has metastatic desmoplastic small  round cell tumor with lung nodules; Tachypnea    COMPARISON: Chest x-ray 1/31/2023, CT 2/10/2023    FINDINGS: Upright AP and lateral radiographs of the chest. Right chest  wall Port-A-Cath tip projects over the mid SVC. Low lung volumes. The  trachea is midline. The cardiac silhouette is not enlarged. No pleural  effusion or pneumothorax. Redemonstration of left perihilar opacity  measuring 3.7 cm, corresponding to metastatic  lesion demonstrated on  CT from 2/10/2023. Metastatic lesions are better evaluated on prior  CT. No pneumothorax. Mild streaky perihilar opacities are slightly  increased compared to chest x-ray from 1/31/2023. The visualized upper  abdomen is unremarkable.       Impression    IMPRESSION:   1. Left perihilar opacity corresponds to lung metastasis, which is  better evaluated on CT from 2/10/2023.  2. Low lung volumes with slightly increased perihilar streaky  opacities, likely atelectasis versus atypical infection. No focal  pneumonia.    I have personally reviewed the examination and initial interpretation  and I agree with the findings.    SHERINE LACY MD         SYSTEM ID:  [id number]   Results for orders placed or performed in visit on 03/02/23   Phosphorus     Status: Abnormal   Result Value Ref Range    Phosphorus 2.2 (L) 2.5 - 4.5 mg/dL   Magnesium     Status: Abnormal   Result Value Ref Range    Magnesium 1.2 (L) 1.7 - 2.3 mg/dL   CBC with platelets and differential     Status: Abnormal   Result Value Ref Range    WBC Count 21.5 (H) 4.0 - 11.0 10e3/uL    RBC Count 2.72 (L) 4.40 - 5.90 10e6/uL    Hemoglobin 7.4 (L) 13.3 - 17.7 g/dL    Hematocrit 23.7 (L) 40.0 - 53.0 %    MCV 87 78 - 100 fL    MCH 27.2 26.5 - 33.0 pg    MCHC 31.2 (L) 31.5 - 36.5 g/dL    RDW 17.6 (H) 10.0 - 15.0 %    Platelet Count 267 150 - 450 10e3/uL    % Neutrophils 83 %    % Lymphocytes 4 %    % Monocytes 8 %    % Eosinophils 0 %    % Basophils 1 %    % Immature Granulocytes 4 %    NRBCs per 100 WBC 1 (H) <1 /100    Absolute Neutrophils 17.8 (H) 1.6 - 8.3 10e3/uL    Absolute Lymphocytes 0.9 0.8 - 5.3 10e3/uL    Absolute Monocytes 1.8 (H) 0.0 - 1.3 10e3/uL    Absolute Eosinophils 0.0 0.0 - 0.7 10e3/uL    Absolute Basophils 0.1 0.0 - 0.2 10e3/uL    Absolute Immature Granulocytes 0.9 (H) <=0.4 10e3/uL    Absolute NRBCs 0.1 10e3/uL   Comprehensive metabolic panel     Status: Abnormal   Result Value Ref Range    Sodium 140 136 - 145 mmol/L     Potassium 2.2 (LL) 3.4 - 5.3 mmol/L    Chloride 99 98 - 107 mmol/L    Carbon Dioxide (CO2) 27 22 - 29 mmol/L    Anion Gap 14 7 - 15 mmol/L    Urea Nitrogen 1.5 (L) 6.0 - 20.0 mg/dL    Creatinine 0.56 (L) 0.67 - 1.17 mg/dL    Calcium 6.9 (L) 8.6 - 10.0 mg/dL    Glucose 83 70 - 99 mg/dL    Alkaline Phosphatase 117 40 - 129 U/L    AST 19 10 - 50 U/L    ALT 6 (L) 10 - 50 U/L    Protein Total 6.9 6.4 - 8.3 g/dL    Albumin 2.9 (L) 3.5 - 5.2 g/dL    Bilirubin Total 0.8 <=1.2 mg/dL    GFR Estimate >90 >60 mL/min/1.73m2   EKG 12-lead complete w/read - Clinics     Status: None (Preliminary result)   Result Value Ref Range    Systolic Blood Pressure  mmHg    Diastolic Blood Pressure  mmHg    Ventricular Rate 160 BPM    Atrial Rate 394 BPM    ID Interval  ms    QRS Duration 86 ms     ms    QTc 466 ms    P Axis  degrees    R AXIS 2 degrees    T Axis -14 degrees    Interpretation ECG       Supraventricular tachycardia with occasional Premature ventricular complexes  Minimal voltage criteria for LVH, may be normal variant  Possible Inferior infarct , age undetermined  ST & T wave abnormality, consider lateral ischemia  Abnormal ECG  When compared with ECG of 30-JAN-2023 12:51,  Premature ventricular complexes are now Present  ST now depressed in Inferior leads  ST more depressed Anterior leads     CBC with platelets differential     Status: Abnormal    Narrative    The following orders were created for panel order CBC with platelets differential.  Procedure                               Abnormality         Status                     ---------                               -----------         ------                     CBC with platelets and d...[[id number]]  Abnormal            Final result                 Please view results for these tests on the individual orders.   ABO/Rh type and screen     Status: None (In process)    Narrative    The following orders were created for panel order ABO/Rh type and screen.  Procedure                                Abnormality         Status                     ---------                               -----------         ------                     Adult Type and Screen[[id number]]                            In process                   Please view results for these tests on the individual orders.   Results for orders placed or performed in visit on 03/02/23   iStat Gases Electrolytes ICA Glucose Venous, POCT     Status: Abnormal   Result Value Ref Range    CPB Applied No     Hematocrit POCT 29 (L) 40 - 53 %    Calcium, Ionized Whole Blood POCT 3.6 (L) 4.4 - 5.2 mg/dL    Glucose Whole Blood POCT 102 (H) 70 - 99 mg/dL    Bicarbonate Venous POCT 27 21 - 28 mmol/L    Hemoglobin POCT 9.9 (L) 13.3 - 17.7 g/dL    Potassium POCT 2.3 (LL) 3.4 - 5.3 mmol/L    Sodium POCT 139 133 - 144 mmol/L    pCO2 Venous POCT 38 (L) 40 - 50 mm Hg    pO2 Venous POCT 28 25 - 47 mm Hg    pH Venous POCT 7.46 (H) 7.32 - 7.43    O2 Sat, Venous POCT 56 (L) 94 - 100 %     Medications - No data to display  Labs Ordered and Resulted from Time of ED Arrival to Time of ED Departure   LACTIC ACID WHOLE BLOOD - Abnormal       Result Value    Lactic Acid 2.9 (*)    BASIC METABOLIC PANEL   CBC WITH PLATELETS     No orders to display              Assessment & Plan    Is a 27-year-old male with peritoneal carcinomatosis who presents with hypokalemia.  This was found in clinic.  Patient is a poor p.o. intake and has been having diarrhea.  ECG shows sinus tachycardia.  Potassium prior to arrival was 2.2.  Lactic acid was elevated at 2.6.  Patient was given IV fluids and potassium. We will admit for further monitoring work-up and treatment.    I have reviewed the nursing notes. I have reviewed the findings, diagnosis, plan and need for follow up with the patient.    New Prescriptions    No medications on file       Final diagnoses:   None   I, LIOR GIL, am serving as a trained medical scribe to document services personally performed by  Tremayne Vasquez MD, based on the provider's statements to me.     I, Tremayne Vasquez MD, was physically present and have reviewed and verified the accuracy of this note documented by LIOR GIL.    Tremayne Vasquez MD.     [facility] EMERGENCY DEPARTMENT  3/2/2023     Tremayne Vasquez,   03/02/23 [id number]

## 2023-03-02 NOTE — ED TRIAGE NOTES
Pt biba from [facility] w/ c/o low potassium.  40mEq given pta as indicated for K+ 2.2. Pt was at clinic for routine f/u from most recent hospital discharge. Per EMS, pt hr 220bpm at clinic, vagal maneuvers performed w/ subsequent hr 150's.  Hx of bowel ca. Pt further endorses diarrhea.

## 2023-03-02 NOTE — NURSING NOTE
"Oncology Rooming Note    March 2, 2023 1:21 PM   Diego Buchanan is a 27 year old male who presents for:    Chief Complaint   Patient presents with     Port Draw     Labs drawn via port by RN in lab.  VS taken      Oncology Clinic Visit     Desmoplastic small round cell tumor      Initial Vitals: /68   Pulse (!) 122   Temp 98.2  F (36.8  C) (Oral)   Resp (!) 40   SpO2 98%  Estimated body mass index is 33.86 kg/m  as calculated from the following:    Height as of 2/19/23: 1.727 m (5' 8\").    Weight as of 2/19/23: 101 kg (222 lb 11.2 oz). There is no height or weight on file to calculate BSA.  No Pain (0) Comment: Data Unavailable   No LMP for male patient.  Allergies reviewed: Yes  Medications reviewed: Yes    Medications: Medication refills not needed today.  Pharmacy name entered into [employer]:    [id number] - [place], [place] - [address] AT [facility] - [place], [place] - [phone]  [facility] - [place], [place] - [address]    Clinical concerns: NONE.      Bruce Velásquez"

## 2023-03-03 NOTE — CONSULTS
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   Primary Outpatient Oncologist: Dr. Nova  Current Treatment Plan: irinotecan/temozolomide (TMZ) on 11/28/22.      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   10/04/22, CT CAP  showed evidence of progressive disease, predominantly in liver. There are small sub-centimeter lung nodules, which are suspicious for metastasis. CAV/IMV discontinued. Start irinotecan/temozolomide (TMZ) on 11/28/22.   irinotecan and Temodar. With cycle 2, which began on 1/30/23, irinotecan was dose reduced by 10% due to prolonged neutropenia.  Additional cycles has been delayed due to hospitalization and need for additional recovery (ongoing fatigue and diarrhea).    Repeat CT on 2/10 showed evidence of disease progression. Unfortunately, patient was unable to receive subsequent cycles of chemotherapy due to malnutrition and electrolyte disturbance that required admission. Unfortunately treatment of these types of cancer has been very challenging with unfavorable overall survival. I think anemia is most likely related to bone marrow suppression secondary to Temodar. Diarrhea is a known side effect of Irinotecan but C Diff  needs to be R/O. Since patient has been off treatment for more than 1 month and has abdominal tenderness and distension, repeating CTCAP might give us an idea about possible further progression of disease.    Patient was seen and plan of care was discussed with attending physician Dr. Post.    Thank you for the opportunity to partake in this patient's plan of care. Please do not hesitate to page with questions. We will continue to follow .       Tequila Marquez MD  Hem-Onc Fellow  Pager: [phone]  Plan was discussed with attending  Please feel free to page the on call fellow if any questions   ___________________________________________________________________  Oncologic History:  1. He presented initially with abdominal pain, diarrhea, and progressive abdominal distention for 3 months duration. 2. Initial CT scan in February 2020 showed severe peritoneal carcinomatosis with large peritoneal tumor implants throughout the entire abdomen and pelvis, but mostly prominently in the pelvis.   2. PETCT scan showed interval increase in the amount of peritoneal carcinomatosis.  3. On 3/12/2020, he had ultrasound-guided core needle biopsy of a peritoneal implant (Case: [id number]), which showed a completely undifferentiated appearance, but stains with pancytokeratin, indicating an epithelial origin. 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 Tumor markers for CA-19-9, CEA, beta-hCG, alpha-fetoprotein were unremarkable. Cancer type ID molecular testing by [facility] sent to determine the exact diagnosis and to assist in further treatment planning. 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 Relative probability of less than 5% of other subtype of sarcoma. EWSR1-WT1 fusion detected.  4. 5/1/2020, MRI brain negative for brain metastasis, and baseline CT-CAP obtained showing extensive  mixed solid and cystic masses throughout the abdomen and pelvis consistent with peritoneal carcinomatosis. Largest mass measures approximately 20 cm in the pelvis. Metastatic mixed solid and cystic masses seen in hepatic segments 5 and 6 and hepatic segment 7. The masses appear to be contiguous with the retrohepatic peritoneal carcinomatosis. Small volume fluid about the spleen favored to represent malignant ascites, stable mild-to-moderate right hydronephrosis related to mass effect upon the distal ureter in the pelvis, the IVC and iliac veins are compressed due to the extensive peritoneal carcinomatosis without findings to suggest deep venous thrombosis.  5. 5/4/2020, he begins CAV/IMV alternating chemotherapy. He receives 6 cycles of treatment. He symptomatically improves.  6. 9/11/2020, CT-CAP shows stable to mildly improved extensive peritoneal carcinomatosis. He would like to omit Ifosfamide/etoposide cycles and continue only with CAV.  7. 10/9/2020, he starts CAV every 21 days.  8. 1/6/2021, CT CAP showed a mixed response in the mixed solid and cystic masses throughout the peritoneum. Some of the tumors are stable to mildly worse, with some of the peritoneal masses a bit larger, a few are smaller, one cystic tumor in the left abdomen is smaller, while tumors lower in the pelvis appear slightly larger.  9. 3/24/2021, CT CAP showed continued slight decrease in overall burden of peritoneal carcinomatosis with persistent encasement of bowel without evidence of bowel obstruction.  10. 6/25/2021, he receives cycle 19 CAV, then takes a chemotherapy holiday.  11. 4/22/2022, he resumes CAV/IMV chemotherapy.  12. 7/13/22, CT CAP showed interval enlargement of hepatic and splenic metastases with other overall disease stable. CAV/IMV continued.  13.  10/04/22, CT CAP  showed evidence of progressive disease, predominantly in liver. There are small sub-centimeter lung nodules, which are suspicious for metastasis. CAV/IMV  discontinued. Start irinotecan/temozolomide (TMZ) on 11/28/22.   14. 12/7/22-12/21/22: Hospitalized for neutropenic fever with RSV, rash (drug vs. Viral exanthem), and pancytopenia with transfusions.      Cycle 2 of TMZ/irinotecan has been delayed due to hospitalization and need for additional recovery (ongoing fatigue and diarrhea) as well as planned port placement. Following port placement, treatment has been delayed an additional week s/t self-cancelled appointment as a result of pain after port placement.    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  Was recently admitted from 2/10-2/20 for electrolyte abnormalities Abdominal pain and altered bowel habits 2/2 progression of neoplasm  Severe malnutrition 2/2 neoplasm growth      Interval History:  Patient is alert oriented.Still has watery diarrhea as well as right sided abdominal pain. He denies fever, sick contact , shortness of breath, chest pain , NVD or any other associated symptoms.      Prior to Admission Medications   Prior to Admission Medications   Prescriptions Last Dose Informant Patient Reported? Taking?   acetaminophen (TYLENOL) 325 MG tablet   No No   Sig: Take 2 tablets (650 mg) by mouth every 6 hours   buprenorphine (BUTRANS) 10 MCG/HR WK patch   No No   Sig: Place 1 patch onto the skin once a week   dexamethasone (DECADRON) 4 MG tablet   No No   Sig: Take 1 tablet (4 mg) by mouth daily   loperamide (IMODIUM) 2 MG capsule   No No   Sig: Take 2 capsules (4 mg) by mouth 4 times daily as needed for diarrhea (Use if pt having having diarrhea, do not use if pt is constipated)   melatonin 1 MG TABS tablet   No No   Sig: Take 1 tablet (1 mg) by mouth nightly as needed for sleep   metoprolol tartrate (LOPRESSOR) 25 MG tablet   No No   Sig:  Take 1 tablet (25 mg) by mouth 2 times daily   naloxone (NARCAN) 4 MG/0.1ML nasal spray   No No   Sig: Spray 1 spray (4 mg) into one nostril alternating nostrils as needed for opioid reversal every 2-3 minutes until assistance arrives   oxyCODONE (ROXICODONE) 5 MG tablet   No No   Sig: Take 1 tablet (5 mg) by mouth every 6 hours as needed for severe pain (7-10) or moderate pain (4-6)   potassium chloride ER (K-TAB) 20 MEQ CR tablet   No No   Sig: Take 1 tablet (20 mEq) by mouth daily Until follow up on week of 1/23/23   potassium chloride ER (KLOR-CON M) 20 MEQ CR tablet   No No   Sig: Take 1 tablet (20 mEq) by mouth 2 times daily   prochlorperazine (COMPAZINE) 5 MG tablet   No No   Sig: Take 1 tablet (5 mg) by mouth every 6 hours as needed for nausea or vomiting   sennosides (SENOKOT) 8.6 MG tablet   No No   Sig: Take 2-4 tablets by mouth 2 times daily as needed for constipation   thiamine (B-1) 100 MG tablet   No No   Sig: Take 1 tablet (100 mg) by mouth daily   triamcinolone (KENALOG) 0.1 % external ointment   No No   Sig: Apply topically 2 times daily as needed for irritation      Facility-Administered Medications: None     Allergies   Allergies   Allergen Reactions     Cefepime Rash     Morbiliform rash     Tegaderm Chg Dressing [Chlorhexidine]      Tegaderm Transparent Dressing (Informational Only) Itching     Tegaderm dressing; Okay for short periods of time           Physical Exam:    Blood pressure 120/65, pulse 113, temperature 97.4  F (36.3  C), resp. rate 22, weight 100.2 kg (220 lb 12.8 oz), SpO2 98 %.    General Appearance:  In bed no distress  Respiratory: CTAB  Cardiovascular: tachycardic, regular rhythm   GI: Distended abdomen that is firm and diffusely tender but not surgical without rebound or guarding   Skin: no rashes or lesions   Other:  Non focal neuro exam      Labs & Studies: I personally reviewed the following studies:  ROUTINE LABS (Last four results):  CMP  Recent Labs   Lab  03/03/23  0955 03/03/23  0521 03/03/23  0046 03/02/23  2151 03/02/23  1645 03/02/23  1637 03/02/23  1258   NA  --  142  --   --  139 137 140   POTASSIUM  --  2.8*  2.8* 2.7* 2.7* 2.3* 2.3* 2.2*   CHLORIDE  --  106  --   --   --  98 99   CO2  --  24  --   --   --  22 27   ANIONGAP  --  12  --   --   --  17* 14   GLC  --  74  --   --  102* 106* 83   BUN  --  1.9*  --   --   --  1.8* 1.5*   CR  --  0.66*  --   --   --  0.68  0.68 0.56*   GFRESTIMATED  --  >90  --   --   --  >90  >90 >90   FAISAL  --  6.5*  --   --   --  7.1* 6.9*   MAG 2.9* 2.0 2.4* 2.1  --  1.1* 1.2*   PHOS  --   --   --   --   --   --  2.2*   PROTTOTAL  --  6.0*  --   --   --   --  6.9   ALBUMIN  --  2.5*  --   --   --   --  2.9*   BILITOTAL  --  0.6  --   --   --   --  0.8   ALKPHOS  --  98  --   --   --   --  117   AST  --  14  --   --   --   --  19   ALT  --  6*  --   --   --   --  6*     CBC  Recent Labs   Lab 03/03/23  1257 03/03/23  0521 03/02/23 1645 03/02/23  1637 03/02/23  1258   WBC  --  16.0*  --  24.9* 21.5*   RBC  --  2.24*  --  2.74* 2.72*   HGB 7.7* 6.2* 9.9* 7.5* 7.4*   HCT  --  20.6* 29* 25.3* 23.7*   MCV  --  92  --  92 87   MCH  --  27.7  --  27.4 27.2   MCHC  --  30.1*  --  29.6* 31.2*   RDW  --  17.6*  --  17.8* 17.6*   PLT  --  242  --  303 267     INRNo lab results found in last 7 days.  Medications list for reference:  Current Facility-Administered Medications   Medication     acetaminophen (TYLENOL) tablet 650 mg     buprenorphine (BUTRANS) 10 MCG/HR WK patch 1 patch    And     buprenorphine (BUTRANS) Patch in Place     dexamethasone (DECADRON) tablet 4 mg     dextrose 5% in lactated ringers + KCl 20 mEq/L infusion     [Held by provider] enoxaparin ANTICOAGULANT (LOVENOX) injection 40 mg     lidocaine (LMX4) cream     lidocaine 1 % 0.1-1 mL     loperamide (IMODIUM) capsule 4 mg     melatonin tablet 1 mg     metoprolol tartrate (LOPRESSOR) tablet 25 mg     naloxone (NARCAN) injection 0.2 mg    Or     naloxone (NARCAN)  injection 0.4 mg    Or     naloxone (NARCAN) injection 0.2 mg    Or     naloxone (NARCAN) injection 0.4 mg     ondansetron (ZOFRAN ODT) ODT tab 4 mg    Or     ondansetron (ZOFRAN) injection 4 mg     oxyCODONE (ROXICODONE) tablet 5 mg     potassium chloride 10 mEq in 100 mL sterile water infusion     prochlorperazine (COMPAZINE) tablet 5 mg     sennosides (SENOKOT) tablet 2-4 tablet     sodium chloride (PF) 0.9% PF flush 3 mL     sodium chloride (PF) 0.9% PF flush 3 mL     thiamine (B-1) tablet 100 mg     vancomycin (VANCOCIN) capsule 125 mg     Facility-Administered Medications Ordered in Other Encounters   Medication     sodium chloride (PF) 0.9% PF flush 30 mL

## 2023-03-03 NOTE — H&P
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    Hypokalemia  Hypomagnesemia   Chronic Diarrhea   Elevated lactic acid   Patient sent into the hospital from oncology clinic after labs showed a potassium of 2.2. EKG was done with sinus tach. He was given 40 meQ of potassium then sent to ED where K recheck was 2.3 and more aggressive replacement was begun. Potassium and magnesium derangements are very likely due to diarrhea and poor PO intake. It sounds like he was supposed to be taking potassium after discharge from his last hospitalization but thought he was supposed to stop it along with magnesium so that is also playing a roll. His diarrhea is chronic and he had fairly recent negative c. Diff but given his white count elevation (althouhg likely due to gCSF) will check again with stool panel.   - continue high potassium and magnesium replacement   - Q4H K and Mag ordered in case we need to be more aggressive than high replacement protocol   - Admit to [facility] with tele given tachycardia and hypokalemia   - follow up c. 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   Chronic pain   Patient is currently on treatment with irinotecan and Temodar. With cycle 2, which began on 1/30/23, irinotecan was dose reduced by 10% due to prolonged neutropenia. They recently discussed further treatment in clinic which christi wants to pursue at this time over comfort focused care. His pain has improved since last stay and I have ordered his pain meds.  -Heme/onc consult in the AM (this has not been  "placed)  - continue butrans patch (new patch placed today 3/2) and oxycodone  - Consider palliative consult if pain management not adequate    Anemia 2/2 chemotherapy  Baseline thought to be around 8.2.Most likely anemia of chronic disease.   - Consider blood transfusion for symptomatic relief with Hgb<8 per recent onc note   -Hgb check in AM     Leukocytosis   Likely due to GCSF but with concurrent refractory diarrhea will check c. Diff again and enteric panel.  - follow up c. Diff and enteric panel     HTN   Tachycardia  Longstanding issue, multifactorial in setting of metastatic disease. EKG today shows sinus tachycardia. HR ranges from low 100's to 150's. Had not taken metoprolol today prior to clinic.  -continue PTA metoprolol       Diet:   Regular   DVT Prophylaxis: Enoxaparin (Lovenox) SQ  Busby Catheter: Not present  Lines: PRESENT             Cardiac Monitoring: None  Code Status:   Full code     Clinically Significant Risk Factors Present on Admission        # Hypokalemia: Lowest K = 2.2 mmol/L in last 2 days, will replace as needed   # Hypocalcemia: Lowest iCa = 3.6 mg/dL in last 2 days, will monitor and replace as appropriate   # Hypomagnesemia: Lowest Mg = 1.2 mg/dL in last 2 days, will replace as needed   # Hypoalbuminemia: Lowest albumin = 2.9 g/dL at 3/2/2023 12:58 PM, will monitor as appropriate          # Obesity: Estimated body mass index is 33.86 kg/m  as calculated from the following:    Height as of 2/19/23: 1.727 m (5' 8\").    Weight as of 2/19/23: 101 kg (222 lb 11.2 oz).           Disposition Plan      Expected Discharge Date: 03/06/2023                  Felipe Victor DO  Hospitalist Service  [facility]  Securely message with [employer] (more info)  Text page via [facility] Paging/Directory     ______________________________________________________________________    Chief Complaint   Hypokalemia     History is obtained from the patient    History of " Present Illness   Diego Buchanan is a 27 year old male who was sent in from oncology clinic due to hypokalemia. Per patient he has been having a fair amount of diarrhea although this is not unusual for him. He says he has been weak and when he eats he has diarrhea, he has tried to drink fluids as well but nothing stays down that well or passes quickly through him. He endorses chronic abdominal pain that is stable. Said pain is improved since his last hospital admission. He has no fevers or chills, no cough, no chest pain, no pain with urination.       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     Peritoneal carcinomatosis (H)        Past Surgical History   Past Surgical History:   Procedure Laterality Date     BIOPSY      liver     INSERT PORT VASCULAR ACCESS Right 4/21/2022    Procedure: INSERTION, VASCULAR ACCESS PORT;  Surgeon: Daniel Sarmiento MD;  Location: [facility] OR     INSERT PORT VASCULAR ACCESS N/A 1/20/2023    Procedure: Right Port Removal and Right Subclavian Powerport replacement and Flouroscopy.;  Surgeon: Caleb Brady MD;  Location: [facility] OR     IR CHEST PORT PLACEMENT > 5 YRS OF AGE  4/21/2022     IR CVC TUNNEL PLACEMENT > 5 YRS OF AGE  4/29/2020     IR CVC TUNNEL REMOVAL RIGHT  11/16/2021     US MUSCLE BIOPSY  3/12/2020       Prior to Admission Medications   Prior to Admission Medications   Prescriptions Last Dose Informant Patient Reported? Taking?   acetaminophen (TYLENOL) 325 MG tablet   No No   Sig: Take 2 tablets (650 mg) by mouth every 6 hours   buprenorphine (BUTRANS) 10 MCG/HR WK patch   No No   Sig: Place 1 patch onto the skin once a week   dexamethasone (DECADRON) 4 MG tablet   No No   Sig: Take 1 tablet (4 mg) by mouth daily   loperamide (IMODIUM) 2 MG capsule   No No   Sig: Take 2 capsules (4 mg) by mouth 4 times daily as needed for diarrhea (Use if pt having having diarrhea, do not  use if pt is constipated)   melatonin 1 MG TABS tablet   No No   Sig: Take 1 tablet (1 mg) by mouth nightly as needed for sleep   metoprolol tartrate (LOPRESSOR) 25 MG tablet   No No   Sig: Take 1 tablet (25 mg) by mouth 2 times daily   naloxone (NARCAN) 4 MG/0.1ML nasal spray   No No   Sig: Spray 1 spray (4 mg) into one nostril alternating nostrils as needed for opioid reversal every 2-3 minutes until assistance arrives   oxyCODONE (ROXICODONE) 5 MG tablet   No No   Sig: Take 1 tablet (5 mg) by mouth every 6 hours as needed for severe pain (7-10) or moderate pain (4-6)   potassium chloride ER (K-TAB) 20 MEQ CR tablet   No No   Sig: Take 1 tablet (20 mEq) by mouth daily Until follow up on week of 1/23/23   potassium chloride ER (KLOR-CON M) 20 MEQ CR tablet   No No   Sig: Take 1 tablet (20 mEq) by mouth 2 times daily   prochlorperazine (COMPAZINE) 5 MG tablet   No No   Sig: Take 1 tablet (5 mg) by mouth every 6 hours as needed for nausea or vomiting   sennosides (SENOKOT) 8.6 MG tablet   No No   Sig: Take 2-4 tablets by mouth 2 times daily as needed for constipation   thiamine (B-1) 100 MG tablet   No No   Sig: Take 1 tablet (100 mg) by mouth daily   triamcinolone (KENALOG) 0.1 % external ointment   No No   Sig: Apply topically 2 times daily as needed for irritation      Facility-Administered Medications: None        Review of Systems    The 10 point Review of Systems is negative other than noted in the HPI or here.     Social History   I have reviewed this patient's social history and updated it with pertinent information if needed.  Social History     Tobacco Use     Smoking status: Never     Smokeless tobacco: Never   Substance Use Topics     Alcohol use: Never     Drug use: Never       Family History   I have reviewed this patient's family history and updated it with pertinent information if needed.  Family History   Problem Relation Age of Onset     Hypertension Mother      Anesthesia Reaction No family hx of       Deep Vein Thrombosis (DVT) No family hx of        Allergies   Allergies   Allergen Reactions     Cefepime Rash     Morbiliform rash     Tegaderm Chg Dressing [Chlorhexidine]      Tegaderm Transparent Dressing (Informational Only) Itching     Tegaderm dressing; Okay for short periods of time        Physical Exam   Vital Signs: Temp: 97.6  F (36.4  C) Temp src: Oral BP: 97/58 Pulse: (!) 159     SpO2: 100 %      Weight: 0 lbs 0 oz    General Appearance: In bed no distress  Respiratory: CTAB  Cardiovascular: tachycardic, regular rhythm   GI: Distended abdomen that is firm and diffusely tender but not surgical without rebound or guarding   Skin: no rashes or lesions   Other: Non focal neuro exam      Medical Decision Making       >75 MINUTES SPENT BY ME on the date of service doing chart review, history, exam, documentation & further activities per the note.      Data     I have personally reviewed the following data over the past 24 hrs:    24.9 (H)  \   9.9 (L)   / 303     139 98 1.8 (L) /  102 (H)   2.3 (LL) 22 0.68; 0.68 \       ALT: 6 (L) AST: 19 AP: 117 TBILI: 0.8   ALB: 2.9 (L) TOT PROTEIN: 6.9 LIPASE: N/A       Procal: N/A CRP: N/A Lactic Acid: 2.9 (H)         Imaging results reviewed over the past 24 hrs:   Recent Results (from the past 24 hour(s))   XR Chest 2 Views    Narrative    EXAM: XR CHEST 2 VIEWS  3/2/2023 2:32 PM      HISTORY: tachypnea, r/o pneumonia; has metastatic desmoplastic small  round cell tumor with lung nodules; Tachypnea    COMPARISON: Chest x-ray 1/31/2023, CT 2/10/2023    FINDINGS: Upright AP and lateral radiographs of the chest. Right chest  wall Port-A-Cath tip projects over the mid SVC. Low lung volumes. The  trachea is midline. The cardiac silhouette is not enlarged. No pleural  effusion or pneumothorax. Redemonstration of left perihilar opacity  measuring 3.7 cm, corresponding to metastatic lesion demonstrated on  CT from 2/10/2023. Metastatic lesions are better evaluated on  prior  CT. No pneumothorax. Mild streaky perihilar opacities are slightly  increased compared to chest x-ray from 1/31/2023. The visualized upper  abdomen is unremarkable.       Impression    IMPRESSION:   1. Left perihilar opacity corresponds to lung metastasis, which is  better evaluated on CT from 2/10/2023.  2. Low lung volumes with slightly increased perihilar streaky  opacities, likely atelectasis versus atypical infection. No focal  pneumonia.    I have personally reviewed the examination and initial interpretation  and I agree with the findings.    SHERINE LACY MD         SYSTEM ID:  [id number]

## 2023-03-03 NOTE — PLAN OF CARE
Goal Outcome Evaluation:      Plan of Care Reviewed With: patient    Overall Patient Progress: no changeOverall Patient Progress: no change    Outcome Evaluation: Pt consuming % of meals. Will continue to monitor PO intake. See KIMBERLEY richardson note for details.    Paloma Alvarado RD, AMAYA  6D RD pager: [phone]  Weekend/Holiday RD pager: [phone]

## 2023-03-03 NOTE — PLAN OF CARE
Major Shift Events:  Pt alert but disoriented to time, cognitive delay. Oxy given x1 for abdominal pain.  -120, BP stable. RA. Pt reports x2 loose stools. Abdomen firm/tender - CT to be done.  Voids spontaneously. x1 unit of pRBC given - Hgb 7.7. Potassium replaced, now 3.9. SBA to the bathroom. Fluids at 100ml/hr.  Family at bedise   Plan: Continue with plan of care.   For vital signs and complete assessments, please see documentation flowsheets.

## 2023-03-03 NOTE — PROVIDER NOTIFICATION
Nancy Montgomery (#[id number]) [phone] christi - Critical lab hgb 6.2. Want type and screen and transfusion consent?    Trang [id number]    Trang Cruz RN on 3/3/2023 at 6:41 AM

## 2023-03-03 NOTE — PLAN OF CARE
Goal: replace Potassium. Adequate sleep. C diff elimination management     Neuro: A&O x2-3. Cognitive delay. Not reliable historian  Cardiac: ST in the 110-120s. Trace edema  Resp: RA. Tachypnea with exertion. Appeared to be some GURINDER overnight with quick recovery.   GI: C Diff positive. Multiple loose stools. Stool incontinence.  : voids without difficulty   Skin: R port-a-cath  Pain: 7/10 abd pain  Vitals: ST     Plan: Replenish K and Mg.    K 2.7 on arrival. One bag 20mEq given. Recheck 2.7 MD notified and ordered 2 - 20mEq K+ bags. 5 AM K recheck was 2.8. RN Potassium replacement protocol initiated. Will start the 6 bags of 10mEq K+ per protocol. Patient Hgb critical at 6.2 this AM Provider paged.      Trang Cruz RN on 3/3/2023 at 6:35 AM

## 2023-03-03 NOTE — PROGRESS NOTES
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    Hypokalemia  Hypomagnesemia   Acute on Chronic Diarrhea   C.diff Toxin PCR positive  Elevated lactic acid   Leukocytosis   Patient sent into the hospital from oncology clinic after labs showed a potassium of 2.2. EKG was done with sinus tach. He was given 40 meQ of potassium then sent to ED where K recheck was 2.3 and more aggressive replacement was begun. Potassium and magnesium derangements are very likely due to diarrhea and poor PO intake. He has previously been on K and Mg supplements, but stopped them after last hospitalization. He is positive for C. Diff toxin PCR but negative for C.diff antigen or toxin immunoassay. Given acute worsening of chronic diarrhea and new abdominal pain and leukocytosis, as well as relative immune suppression, will treat for C.diff infection.   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   Chronic pain   Patient is currently on treatment with irinotecan and Temodar. With cycle 2, which began on 1/30/23, irinotecan was dose reduced by 10% due to prolonged neutropenia. They recently discussed further treatment in clinic which Robert wants to pursue at this time over comfort focused care.  -Heme/onc consult, appreciate recommendations  - Continue butrans patch (new patch placed today 3/2) and oxycodone  - Consider  "palliative consult if pain management not adequate    Anemia 2/2 chemotherapy  Baseline thought to be around 8.2 .Most likely anemia of chronic disease and chemotherapy.  - Hemoglobin 6.2 this AM; transfused 1 unit pRBC, repeat 7.7  - Daily CBC     HTN   Tachycardia  Longstanding issue, multifactorial in setting of metastatic disease. EKG today shows sinus tachycardia. HR ranges from low 100's to 150's. Had not taken metoprolol today prior to clinic.  -continue PTA metoprolol         Diet: Combination Diet Regular Diet Adult    DVT Prophylaxis: Pneumatic Compression Devices  Busby Catheter: Not present  Lines: PRESENT      Port A Cath Single 01/20/23 Right Chest wall-Site Assessment: WDL      Cardiac Monitoring: None  Code Status: Full Code      Clinically Significant Risk Factors Present on Admission        # Hypokalemia: Lowest K = 2.2 mmol/L in last 2 days, will replace as needed   # Hypocalcemia: Lowest iCa = 3.6 mg/dL in last 2 days, will monitor and replace as appropriate   # Hypomagnesemia: Lowest Mg = 1.1 mg/dL in last 2 days, will replace as needed   # Hypoalbuminemia: Lowest albumin = 2.5 g/dL at 3/3/2023  5:21 AM, will monitor as appropriate          # Obesity: Estimated body mass index is 33.57 kg/m  as calculated from the following:    Height as of 2/19/23: 1.727 m (5' 8\").    Weight as of this encounter: 100.2 kg (220 lb 12.8 oz).           Disposition Plan     Expected Discharge Date: 03/06/2023                  Margo Morales MD  Hospitalist Service, [address]  Securely message with [employer] (more info)  Text page via [facility] Paging/Directory   See signed in provider for up to date coverage information  ______________________________________________________________________    Interval History   This morning says he's doing \"ok\" Having several episodes diarrhea, and does have some abdominal pain and cramping which is new for him. Appetite has " been ok. Denies fevers or chills. Ok with receiving blood transfusion. No nausea or vomiting.    Physical Exam   Vital Signs: Temp: 97.4  F (36.3  C) Temp src: Axillary BP: 120/65 Pulse: 113   Resp: 22 SpO2: 98 % O2 Device: None (Room air)    Weight: 220 lbs 12.8 oz    General Appearance: Young male with alopecia, sitting up in bed, appears chronically ill, pleasant and conversant  Respiratory: Breathing comfortably in room air. No wheezes or crackles on auscultation.   Cardiovascular: Tachycardic but regular. No murmurs.   GI: Abdomen is distended but soft. +BS. Minimal tenderness to palpation diffusely.  Skin: Warm, dry no rashes  Other: No lower extremity edema    Medical Decision Making       55 MINUTES SPENT BY ME on the date of service doing chart review, history, exam, documentation & further activities per the note.      Data     I have personally reviewed the following data over the past 24 hrs:    16.0 (H)  \   7.7 (L)   / 242     142 106 1.9 (L) /  74   2.8 (L); 2.8 (L) 24 0.66 (L) \       ALT: 6 (L) AST: 14 AP: 98 TBILI: 0.6   ALB: 2.5 (L) TOT PROTEIN: 6.0 (L) LIPASE: N/A       Procal: N/A CRP: N/A Lactic Acid: 1.6         Imaging results reviewed over the past 24 hrs:   No results found for this or any previous visit (from the past 24 hour(s)).

## 2023-03-03 NOTE — PROGRESS NOTES
"CLINICAL NUTRITION SERVICES - ASSESSMENT NOTE     Nutrition Prescription    RECOMMENDATIONS FOR MDs/PROVIDERS TO ORDER:  None at this time    Malnutrition Status:    Patient does not meet two of the established criteria necessary for diagnosing malnutrition but is at risk for malnutrition    Recommendations already ordered by Registered Dietitian (RD):  None today    Future/Additional Recommendations:  Monitor PO intake, wt trends, labs  Monitor nutrition-related findings and follow pt per protocol     REASON FOR ASSESSMENT  Diego Buchanan is a/an 27 year old male assessed by the dietitian for Admission Nutrition Risk Screen for unintentional wt loss between 14-23 lbs and eating poorly.    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    NUTRITION HISTORY  Pt reports adequate PO intake the past week. Able to eat soft foods but this is pt's baseline. Pt reports no changes in hunger, eating ~2 meals per day. Pt declined need for nutrition supplement as it causes diarrhea.    Seen by RD on admission from 2/11-2/20. Pt was struggling with PO intake and tolerance d/t abdominal pain and diarrhea. Pt was only able to tolerate liquids, was drinking Ensure Plus 1-2/daily.    CURRENT NUTRITION ORDERS  Diet: Regular  Intake/Tolerance: % of most meals, tolerates soft foods and liquids at baseline    LABS  Labs reviewed - K 2.8, BUN 1.9, Cr 0.66, Mg 2.9, BG 74    MEDICATIONS  Medications reviewed - KCl, thiamine, vancomycin  IVF - D5 LR + KCl @ 100 ml/hr    ANTHROPOMETRICS  Height: 172.7 cm (5' 8\")  Most Recent Weight: 100.2 kg (220 lb 12.8 oz)  IBW: 70 kg   143% IBW   Obesity Grade I BMI 30-34.9    Weight History: 12.9% x 3 months  Wt Readings from Last 15 Encounters:   03/02/23 100.2 kg (220 lb 12.8 oz)   02/19/23 101 kg (222 lb 11.2 oz)   02/02/23 111.1 kg (244 " lb 14.4 oz)   01/31/23 109.6 kg (241 lb 11.2 oz)   01/30/23 109.3 kg (240 lb 14.4 oz)   01/30/23 109.3 kg (240 lb 15.4 oz)   01/20/23 111.4 kg (245 lb 9.5 oz)   01/18/23 110.8 kg (244 lb 4.8 oz)   01/16/23 111.3 kg (245 lb 4.8 oz)   01/06/23 113.8 kg (250 lb 12.8 oz)   01/02/23 113.4 kg (250 lb)   12/20/22 113.5 kg (250 lb 3.6 oz)   12/06/22 115.1 kg (253 lb 12.8 oz)   12/02/22 81.2 kg (179 lb)                       (suspect inaccurate)   11/30/22 123.4 kg (272 lb)     Dosing Weight: 77.5 kg (adj IBW using IBW 70 kg and .2 kg)    ASSESSED NUTRITION NEEDS  Estimated Energy Needs: 1500 - 1900 kcals/day (20-25 kcal/kg adj IBW)  Justification: Maintenance  Estimated Protein Needs:  grams protein/day (1.2 - 1.5 grams of pro/kg)  Justification: Increased needs  Estimated Fluid Needs: 1.5-1.9 L/day (1 mL/kcal)   Justification: Maintenance    PHYSICAL FINDINGS/OTHER FINDINGS  GI: LBM today x 2. Pt with diarrhea at baseline likely d/t chemo treatment. Now C. Diff+  Skin: no pressure injuries documented    See malnutrition section below.    MALNUTRITION  % Intake: No decreased intake noted  % Weight Loss: > 7.5% in 3 months (severe)  Subcutaneous Fat Loss: None observed  Muscle Loss: None observed  Fluid Accumulation/Edema: None noted  Malnutrition Diagnosis: Patient does not meet two of the established criteria necessary for diagnosing malnutrition but is at risk for malnutrition    NUTRITION DIAGNOSIS  Inadequate oral intake related to altered GI as evidenced by patient consuming <100% of meals and ongoing diarrhea.    INTERVENTIONS  Implementation  Nutrition Education: Will be provided if education needs arise     Goals  Patient to consume % of nutritionally adequate meal trays TID, or the equivalent with supplements/snacks.     Monitoring/Evaluation  Progress toward goals will be monitored and evaluated per protocol.    Paloma Alvarado RD, AMAYA  6D RD pager: [phone]  Weekend/Holiday RD pager: [phone]

## 2023-03-03 NOTE — PROVIDER NOTIFICATION
Nancy Montgomery (#[id number]) [phone] Robert- I was told this pt needs 3- 20mEq K+ infusions. The first was given but the other two are ordered for 3/2 0000 and 0100 not 3/3. K+ lab currently pending. Want to change order to give 2 more K+ bags?    Trang [id number]    Response:    Give two more bags. K+ recheck was 2.7    Trang Cruz RN on 3/3/2023 at 2:00 AM

## 2023-03-04 NOTE — PLAN OF CARE
Neuro: A&Ox4. Able to verbalize needs.   Cardiac: Sinus tach. (110s-120s)  Respiratory: Sating on RA.   GI/: Adequate urine output. Had one episode of greenish vomitus at 9AM, patient denies dizziness, he said he does that sometimes. Zofran offered, patient refused. Provided emesis bag. BMx1  Diet/appetite: Regular, poor appetite.   Activity:  SBA with transfers. Refused to have bed bath and oral care.   Pain: At acceptable level on current regimen.   Skin: No new deficits noted.  LDA's: Has umberto cath CDI.     0910 Sent down for for CT.     Plan: Continue with POC. Notify primary team with changes.

## 2023-03-04 NOTE — PROGRESS NOTES
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   Primary Outpatient Oncologist: Dr. Nova  Current Treatment Plan: irinotecan/temozolomide (TMZ) on 11/28/22.      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   10/04/22, CT CAP  showed evidence of progressive disease, predominantly in liver. There are small sub-centimeter lung nodules, which are suspicious for metastasis. CAV/IMV discontinued. Start irinotecan/temozolomide (TMZ) on 11/28/22.   irinotecan and Temodar. With cycle 2, which began on 1/30/23, irinotecan was dose reduced by 10% due to prolonged neutropenia.  Additional cycles has been delayed due to hospitalization and need for additional recovery (ongoing fatigue and diarrhea).    Repeat CT on 2/10 showed evidence of disease progression. Unfortunately, patient was unable to receive subsequent cycles of chemotherapy due to electrolyte disturbance that required admission. Unfortunately treatment of these types of cancer has been very challenging with unfavorable overall survival. I think anemia is most likely related to bone marrow suppression secondary to Temodar.Diarrhea is due to C Diff. Since patient has been off  treatment for more than 1 month and has abdominal tenderness and distension, we recommended CTCAP might give us an idea about possible further progression of disease.    We recommend a GOC meeting with patient and his family early next week.     Patient was seen and plan of care was discussed with attending physician Dr. Post.    Thank you for the opportunity to partake in this patient's plan of care. Please do not hesitate to page with questions. We will continue to follow .       Tequila Marquez MD  Hem-Onc Fellow  Pager: [phone]  Plan was discussed with attending  Please feel free to page the on call fellow if any questions   ___________________________________________________________________  Oncologic History:  1. He presented initially with abdominal pain, diarrhea, and progressive abdominal distention for 3 months duration. 2. Initial CT scan in February 2020 showed severe peritoneal carcinomatosis with large peritoneal tumor implants throughout the entire abdomen and pelvis, but mostly prominently in the pelvis.   2. PETCT scan showed interval increase in the amount of peritoneal carcinomatosis.  3. On 3/12/2020, he had ultrasound-guided core needle biopsy of a peritoneal implant (Case: [id number]), which showed a completely undifferentiated appearance, but stains with pancytokeratin, indicating an epithelial origin. 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 Tumor markers for CA-19-9, CEA, beta-hCG, alpha-fetoprotein were unremarkable. Cancer type ID molecular testing by [facility] sent to determine the exact diagnosis and to assist in further treatment planning. 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 Relative probability of less than 5% of other subtype of sarcoma. EWSR1-WT1 fusion detected.  4. 5/1/2020, MRI brain negative for brain metastasis, and  baseline CT-CAP obtained showing extensive mixed solid and cystic masses throughout the abdomen and pelvis consistent with peritoneal carcinomatosis. Largest mass measures approximately 20 cm in the pelvis. Metastatic mixed solid and cystic masses seen in hepatic segments 5 and 6 and hepatic segment 7. The masses appear to be contiguous with the retrohepatic peritoneal carcinomatosis. Small volume fluid about the spleen favored to represent malignant ascites, stable mild-to-moderate right hydronephrosis related to mass effect upon the distal ureter in the pelvis, the IVC and iliac veins are compressed due to the extensive peritoneal carcinomatosis without findings to suggest deep venous thrombosis.  5. 5/4/2020, he begins CAV/IMV alternating chemotherapy. He receives 6 cycles of treatment. He symptomatically improves.  6. 9/11/2020, CT-CAP shows stable to mildly improved extensive peritoneal carcinomatosis. He would like to omit Ifosfamide/etoposide cycles and continue only with CAV.  7. 10/9/2020, he starts CAV every 21 days.  8. 1/6/2021, CT CAP showed a mixed response in the mixed solid and cystic masses throughout the peritoneum. Some of the tumors are stable to mildly worse, with some of the peritoneal masses a bit larger, a few are smaller, one cystic tumor in the left abdomen is smaller, while tumors lower in the pelvis appear slightly larger.  9. 3/24/2021, CT CAP showed continued slight decrease in overall burden of peritoneal carcinomatosis with persistent encasement of bowel without evidence of bowel obstruction.  10. 6/25/2021, he receives cycle 19 CAV, then takes a chemotherapy holiday.  11. 4/22/2022, he resumes CAV/IMV chemotherapy.  12. 7/13/22, CT CAP showed interval enlargement of hepatic and splenic metastases with other overall disease stable. CAV/IMV continued.  13.  10/04/22, CT CAP  showed evidence of progressive disease, predominantly in liver. There are small sub-centimeter lung nodules,  which are suspicious for metastasis. CAV/IMV discontinued. Start irinotecan/temozolomide (TMZ) on 11/28/22.   14. 12/7/22-12/21/22: Hospitalized for neutropenic fever with RSV, rash (drug vs. Viral exanthem), and pancytopenia with transfusions.      Cycle 2 of TMZ/irinotecan has been delayed due to hospitalization and need for additional recovery (ongoing fatigue and diarrhea) as well as planned port placement. Following port placement, treatment has been delayed an additional week s/t self-cancelled appointment as a result of pain after port placement.    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  Was recently admitted from 2/10-2/20 for electrolyte abnormalities Abdominal pain and altered bowel habits 2/2 progression of neoplasm      His abdominal pain has slightly improved today as well as his diarrhea. He does not endorse any shortness of breath.    Prior to Admission Medications   Prior to Admission Medications   Prescriptions Last Dose Informant Patient Reported? Taking?   acetaminophen (TYLENOL) 325 MG tablet   No No   Sig: Take 2 tablets (650 mg) by mouth every 6 hours   buprenorphine (BUTRANS) 10 MCG/HR WK patch   No No   Sig: Place 1 patch onto the skin once a week   dexamethasone (DECADRON) 4 MG tablet   No No   Sig: Take 1 tablet (4 mg) by mouth daily   loperamide (IMODIUM) 2 MG capsule   No No   Sig: Take 2 capsules (4 mg) by mouth 4 times daily as needed for diarrhea (Use if pt having having diarrhea, do not use if pt is constipated)   melatonin 1 MG TABS tablet   No No   Sig: Take 1 tablet (1 mg) by mouth nightly as needed for sleep   metoprolol tartrate (LOPRESSOR) 25 MG tablet   No No   Sig: Take 1 tablet (25 mg) by mouth 2 times daily   naloxone (NARCAN) 4 MG/0.1ML nasal spray   No No    Sig: Spray 1 spray (4 mg) into one nostril alternating nostrils as needed for opioid reversal every 2-3 minutes until assistance arrives   oxyCODONE (ROXICODONE) 5 MG tablet   No No   Sig: Take 1 tablet (5 mg) by mouth every 6 hours as needed for severe pain (7-10) or moderate pain (4-6)   potassium chloride ER (K-TAB) 20 MEQ CR tablet   No No   Sig: Take 1 tablet (20 mEq) by mouth daily Until follow up on week of 1/23/23   potassium chloride ER (KLOR-CON M) 20 MEQ CR tablet   No No   Sig: Take 1 tablet (20 mEq) by mouth 2 times daily   prochlorperazine (COMPAZINE) 5 MG tablet   No No   Sig: Take 1 tablet (5 mg) by mouth every 6 hours as needed for nausea or vomiting   sennosides (SENOKOT) 8.6 MG tablet   No No   Sig: Take 2-4 tablets by mouth 2 times daily as needed for constipation   thiamine (B-1) 100 MG tablet   No No   Sig: Take 1 tablet (100 mg) by mouth daily   triamcinolone (KENALOG) 0.1 % external ointment   No No   Sig: Apply topically 2 times daily as needed for irritation      Facility-Administered Medications: None     Allergies   Allergies   Allergen Reactions     Cefepime Rash     Morbiliform rash     Tegaderm Chg Dressing [Chlorhexidine]      Tegaderm Transparent Dressing (Informational Only) Itching     Tegaderm dressing; Okay for short periods of time           Physical Exam:    Blood pressure 113/89, pulse 120, temperature 97.9  F (36.6  C), temperature source Oral, resp. rate 17, weight 100.2 kg (220 lb 12.8 oz), SpO2 99 %.    General Appearance:  In bed no distress  Skin: no rashes or lesions   Other:  Non focal neuro exam      Labs & Studies: I personally reviewed the following studies:  ROUTINE LABS (Last four results):  CMP  Recent Labs   Lab 03/04/23  0619 03/03/23  1548 03/03/23  0955 03/03/23  0521 03/03/23  0046 03/02/23  2151 03/02/23  1645 03/02/23  1637 03/02/23  1258     --   --  142  --   --  139 137 140   POTASSIUM 3.6 3.9  --  2.8*  2.8* 2.7*   < > 2.3* 2.3* 2.2*    CHLORIDE 107  --   --  106  --   --   --  98 99   CO2 23  --   --  24  --   --   --  22 27   ANIONGAP 10  --   --  12  --   --   --  17* 14   GLC 95  --   --  74  --   --  102* 106* 83   BUN 2.9*  --   --  1.9*  --   --   --  1.8* 1.5*   CR 0.60*  --   --  0.66*  --   --   --  0.68  0.68 0.56*   GFRESTIMATED >90  --   --  >90  --   --   --  >90  >90 >90   FAISAL 6.7*  --   --  6.5*  --   --   --  7.1* 6.9*   MAG 2.0  --  2.9* 2.0 2.4*   < >  --  1.1* 1.2*   PHOS 2.3*  --   --   --   --   --   --   --  2.2*   PROTTOTAL  --   --   --  6.0*  --   --   --   --  6.9   ALBUMIN  --   --   --  2.5*  --   --   --   --  2.9*   BILITOTAL  --   --   --  0.6  --   --   --   --  0.8   ALKPHOS  --   --   --  98  --   --   --   --  117   AST  --   --   --  14  --   --   --   --  19   ALT  --   --   --  6*  --   --   --   --  6*    < > = values in this interval not displayed.     CBC  Recent Labs   Lab 03/04/23  0619 03/03/23  1257 03/03/23  0521 03/02/23  1645 03/02/23  1637 03/02/23  1258   WBC 20.9*  --  16.0*  --  24.9* 21.5*   RBC 2.85*  --  2.24*  --  2.74* 2.72*   HGB 7.8* 7.7* 6.2* 9.9* 7.5* 7.4*   HCT 25.7*  --  20.6* 29* 25.3* 23.7*   MCV 90  --  92  --  92 87   MCH 27.4  --  27.7  --  27.4 27.2   MCHC 30.4*  --  30.1*  --  29.6* 31.2*   RDW 18.9*  --  17.6*  --  17.8* 17.6*     --  242  --  303 267     INRNo lab results found in last 7 days.  Medications list for reference:  Current Facility-Administered Medications   Medication     acetaminophen (TYLENOL) tablet 650 mg     buprenorphine (BUTRANS) 10 MCG/HR WK patch 1 patch    And     buprenorphine (BUTRANS) Patch in Place     dexamethasone (DECADRON) tablet 4 mg     dextrose 5% in lactated ringers + KCl 20 mEq/L infusion     [Held by provider] enoxaparin ANTICOAGULANT (LOVENOX) injection 40 mg     lidocaine (LMX4) cream     lidocaine 1 % 0.1-1 mL     loperamide (IMODIUM) capsule 4 mg     magnesium oxide (MAG-OX) tablet 400 mg     melatonin tablet 1 mg      metoprolol tartrate (LOPRESSOR) tablet 25 mg     naloxone (NARCAN) injection 0.2 mg    Or     naloxone (NARCAN) injection 0.4 mg    Or     naloxone (NARCAN) injection 0.2 mg    Or     naloxone (NARCAN) injection 0.4 mg     ondansetron (ZOFRAN ODT) ODT tab 4 mg    Or     ondansetron (ZOFRAN) injection 4 mg     oxyCODONE (ROXICODONE) tablet 5 mg     potassium & sodium phosphates (NEUTRA-PHOS) Packet 1 packet     prochlorperazine (COMPAZINE) tablet 5 mg     sennosides (SENOKOT) tablet 2-4 tablet     sodium chloride (PF) 0.9% PF flush 3 mL     sodium chloride (PF) 0.9% PF flush 3 mL     thiamine (B-1) tablet 100 mg     vancomycin (VANCOCIN) capsule 125 mg     Facility-Administered Medications Ordered in Other Encounters   Medication     sodium chloride (PF) 0.9% PF flush 30 mL

## 2023-03-04 NOTE — PHARMACY-ADMISSION MEDICATION HISTORY
Admission Medication History Completed by Pharmacy    See [facility] Admission Navigator for allergy information, preferred outpatient pharmacy, prior to admission medications and immunization status.     Medication History Sources:     Prescription adjudication database    Chart review    [facility]    Patient    Patient's sister (Mariela, [phone])    Changes made to PTA medication list (reason):    Added:  o Vitamin B12 (per fill history patient's sister report)  o Vitamin D (per patient's sister report)    Deleted:  o Dexamethasone (per patient's sister report)  o Duplicate entry of potassium chloride    Changed: None    Additional Information:    Verified allergies    Confirmed patient's preferred pharmacy is [facility] in [place], [place]    Prior to Admission medications    Medication Sig Last Dose Taking? Auth Provider Long Term End Date   acetaminophen (TYLENOL) 325 MG tablet Take 2 tablets (650 mg) by mouth every 6 hours  Yes Tyson Gómez MD No    buprenorphine (BUTRANS) 10 MCG/HR WK patch Place 1 patch onto the skin once a week Past Week Yes Tyson Gómez MD     cyanocobalamin (VITAMIN B-12) 100 MCG tablet Take 100 mcg by mouth daily  Yes Unknown, Entered By History     loperamide (IMODIUM) 2 MG capsule Take 2 capsules (4 mg) by mouth 4 times daily as needed for diarrhea (Use if pt having having diarrhea, do not use if pt is constipated)  Yes Tyson Gómez MD No    melatonin 1 MG TABS tablet Take 1 tablet (1 mg) by mouth nightly as needed for sleep  Yes Tyson Gómez MD     metoprolol tartrate (LOPRESSOR) 25 MG tablet Take 1 tablet (25 mg) by mouth 2 times daily Past Week Yes Scheierl, Amber J, APRN CNP Yes    naloxone (NARCAN) 4 MG/0.1ML nasal spray Spray 1 spray (4 mg) into one nostril alternating nostrils as needed for opioid reversal every 2-3 minutes until assistance arrives  Yes Tyson Gómez MD Yes    oxyCODONE (ROXICODONE) 5 MG tablet Take 1  tablet (5 mg) by mouth every 6 hours as needed for severe pain (7-10) or moderate pain (4-6)  Yes Tyson Gómez MD     potassium chloride ER (KLOR-CON M) 20 MEQ CR tablet Take 1 tablet (20 mEq) by mouth 2 times daily  Yes Scheierl, Amber J, APRN CNP     prochlorperazine (COMPAZINE) 5 MG tablet Take 1 tablet (5 mg) by mouth every 6 hours as needed for nausea or vomiting  Yes Tyson Gómez MD No    sennosides (SENOKOT) 8.6 MG tablet Take 2-4 tablets by mouth 2 times daily as needed for constipation  Yes Tyson Gómez MD     thiamine (B-1) 100 MG tablet Take 1 tablet (100 mg) by mouth daily  Yes Tyson Gómez MD     triamcinolone (KENALOG) 0.1 % external ointment Apply topically 2 times daily as needed for irritation  Yes Phuong Rodriguez MD     Vitamin D, Cholecalciferol, 25 MCG (1000 UT) TABS Take 1 tablet by mouth daily  Yes Unknown, Entered By History         Date completed: 03/04/23    Medication history completed by:  Ji Escalera, PharmD  Pager: [phone]

## 2023-03-04 NOTE — PROGRESS NOTES
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    Hypokalemia  Hypomagnesemia   Acute on Chronic Diarrhea   C.diff Toxin PCR positive  Elevated lactic acid   Leukocytosis   Patient sent into the hospital from oncology clinic after labs showed a potassium of 2.2. EKG was done with sinus tach. He was given 40 meQ of potassium then sent to ED where K recheck was 2.3 and more aggressive replacement was begun. Potassium and magnesium derangements are very likely due to diarrhea and poor PO intake. He has previously been on K and Mg supplements, but stopped them after last hospitalization. He is positive for C. Diff toxin PCR but negative for C.diff antigen or toxin immunoassay. Given acute worsening of chronic diarrhea and new abdominal pain and leukocytosis, as well as relative immune suppression, will treat for C.diff infection.   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   Chronic pain   Patient is currently on treatment with irinotecan and Temodar. With cycle 2, which began on 1/30/23, irinotecan was dose reduced by 10% due to prolonged neutropenia. They recently discussed further treatment in clinic which Robert wants to pursue at this time over comfort focused care.  -Heme/onc consult, appreciate recommendations  -  "Continue butrans patch (new patch placed today 3/2) and oxycodone  - Consider palliative consult if pain management not adequate  - CT A/P obtained 3/4/23 shows progression of metastases in chest; stable Abdomen/pelvis     Anemia 2/2 chemotherapy  Baseline thought to be around 8.2 .Most likely anemia of chronic disease and chemotherapy.  - Hemoglobin 6.2 3/3/23; transfused 1 unit pRBC, repeat 7.7  - Daily CBC     HTN   Tachycardia  Longstanding issue, multifactorial in setting of metastatic disease. EKG today shows sinus tachycardia. HR ranges from low 100's to 150's. Had not taken metoprolol today prior to clinic.  -continue PTA metoprolol         Diet: Combination Diet Regular Diet Adult    DVT Prophylaxis: Pneumatic Compression Devices  Busby Catheter: Not present  Lines: PRESENT      Port A Cath Single 01/20/23 Right Chest wall-Site Assessment: WDL      Cardiac Monitoring: None  Code Status: Full Code      Clinically Significant Risk Factors        # Hypokalemia: Lowest K = 2.7 mmol/L in last 2 days, will replace as needed       # Hypoalbuminemia: Lowest albumin = 2.5 g/dL at 3/3/2023  5:21 AM, will monitor as appropriate            # Obesity: Estimated body mass index is 33.57 kg/m  as calculated from the following:    Height as of 2/19/23: 1.727 m (5' 8\").    Weight as of this encounter: 100.2 kg (220 lb 12.8 oz)., PRESENT ON ADMISSION         Disposition Plan     Expected Discharge Date: 03/06/2023                  Margo Morales MD  Hospitalist Service, GOLD TEAM [address] [place] [address]  Securely message with [employer] (more info)  Text page via [facility] Paging/Directory   See signed in provider for up to date coverage information  ______________________________________________________________________    Interval History   Diarrhea is still present, although seems to be slowing down. Abdominal pain is a bit better. 1 episode of vomiting this morning. Not much appetite at all. "     Physical Exam   Vital Signs: Temp: 97.6  F (36.4  C) Temp src: Oral BP: 113/72 Pulse: (!) 121   Resp: 18 SpO2: 99 % O2 Device: None (Room air)    Weight: 220 lbs 12.8 oz    General Appearance: Young male with alopecia, sitting up in bed, appears chronically ill, pleasant and conversant  Respiratory: Breathing comfortably in room air. No wheezes or crackles on auscultation.   Cardiovascular: Tachycardic but regular. No murmurs.   GI: Abdomen is distended but soft. +BS. Minimal tenderness to palpation diffusely.  Skin: Warm, dry no rashes  Other: No lower extremity edema    Medical Decision Making       45 MINUTES SPENT BY ME on the date of service doing chart review, history, exam, documentation & further activities per the note.      Data     I have personally reviewed the following data over the past 24 hrs:    20.9 (H)  \   7.8 (L)   / 323     140 107 2.9 (L) /  95   3.6 23 0.60 (L) \       Imaging results reviewed over the past 24 hrs:   Recent Results (from the past 24 hour(s))   CT Chest/Abdomen/Pelvis w Contrast    Narrative    EXAMINATION: CT CHEST/ABDOMEN/PELVIS W CONTRAST, 3/4/2023 9:37 AM    TECHNIQUE:  Helical CT images from the thoracic inlet through the  symphysis pubis were obtained  with contrast.     Contrast dose: iopamidol (ISOVUE-370) solution 135 mL       COMPARISON: 2/10/2023 CT CAP    HISTORY: Increased abdominal pain and diarrhea; evaluate for interval  change or malignancy progression from previous CT 1 month ago.  Desmoplastic small round cell tumor.    FINDINGS:  Lower neck: Multiple enlarged cervical lymph nodes without significant  change compared to prior study.    Chest: The central tracheal bronchial tree is patent. Heart size is  normal. Innumerable centrally necrotic mediastinal and hilar lymph  nodes, increased in size compared to prior study, for example left  hilar node measuring 3.2 cm previously measured 2.7 cm. Nodular  interstitial thickening in the right and left lower  lobes. Increased  size of bilateral pulmonary nodules, for example right upper lobe  nodule (series 5, image 58) measuring 11 mm previously measured 8 mm.  Multiple left lower lobe nodules cannot be measured due to surrounding  atelectasis. Small left pleural effusion. No pneumothorax.    Abdomen and pelvis: Stable metastatic burden throughout the abdomen  and pelvis including but not limited to metastatic lesions throughout  the liver, spleen, extensive/diffuse/profound peritoneal  carcinomatosis that exerts mass effect on the bladder, large and small  bowel, stomach. No free fluid in the abdomen or pelvis.    Bones and soft tissues: Stable appearing sclerotic metastases in the  S1 vertebral body and manubrium.      Impression    IMPRESSION:   1.  Increasing metastatic lesions in the chest with new left pleural  effusion and nodular interstitial thickening which may be due to  atelectasis or lymphangitic spread.   2.  Stable metastatic burden in abdomen and pelvis as described in the  report.   3.  No new bowel abnormality.    I have personally reviewed the examination and initial interpretation  and I agree with the findings.    CODIE ENNIS MD         SYSTEM ID:  [id number]

## 2023-03-05 NOTE — CONSULTS
Care Management Initial Consult    General Information  Assessment completed with: Patient, Family, Mariela-sister       Primary Care Provider verified and updated as needed: Yes (establishing care appt rescheduled)   Readmission within the last 30 days: current reason for admission unrelated to previous admission      Reason for Consult: discharge planning  Advance Care Planning: Advance Care Planning Reviewed: present on chart          Communication Assessment  Patient's communication style: spoken language (English or Bilingual)    Hearing Difficulty or Deaf: no   Wear Glasses or Blind: no    Cognitive  Cognitive/Neuro/Behavioral: WDL  Level of Consciousness: alert  Arousal Level: opens eyes spontaneously  Orientation: oriented x 4  Mood/Behavior: calm, cooperative  Best Language: 0 - No aphasia  Speech: spontaneous, clear    Living Environment:   People in home: parent(s), sibling(s)     Current living Arrangements: house      Able to return to prior arrangements: yes       Family/Social Support:  Care provided by: parent(s), other (see comments) (siblings)  Provides care for: no one, unable/limited ability to care for self  Marital Status: Single  Parent(s), Sibling(s)          Description of Support System: Supportive, Involved    Support Assessment: Adequate family and caregiver support, Adequate social supports    Current Resources:   Patient receiving home care services: No     Community Resources: [employer], [employer] Worker, PCA  Equipment currently used at home: raised toilet seat  Supplies currently used at home: None    Employment/Financial:  Employment Status: unemployed, disabled        Financial Concerns: No concerns identified           Lifestyle & Psychosocial Needs:  Social Determinants of Health     Tobacco Use: Low Risk      Smoking Tobacco Use: Never     Smokeless Tobacco Use: Never     Passive Exposure: Not on file   Alcohol Use: Not on file   Financial Resource Strain: Not on file   Food  Insecurity: Not on file   Transportation Needs: Not on file   Physical Activity: Not on file   Stress: Not on file   Social Connections: Not on file   Intimate Partner Violence: Not on file   Depression: Not at risk     PHQ-2 Score: 0   Housing Stability: Not on file       Functional Status:  Prior to admission patient needed assistance:   Dependent ADLs:: Bathing  Dependent IADLs:: Cleaning, Laundry, Cooking, Shopping, Medication Management, Transportation, Meal Preparation, Money Management  Assesssment of Functional Status: At functional baseline    Mental Health Status:  Mental Health Status: No Current Concerns       Chemical Dependency Status:  Chemical Dependency Status: No Current Concerns             Values/Beliefs:  Spiritual, Cultural Beliefs, Christian Practices, Values that affect care: no               Additional Information:  Patient is a 27 year old male with history of 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  Met with patient at bedside to complete care management assessment due to elevated risk of readmission.  Pt lives with his family, parents and siblings, in a home in [place]. Pt mother is pt's health care agent for pt and is [employer] speaking.  Mariela, pts sister, is primary contact to coordinate any discharge needs.  Pt asks that this writer to reach out to his sister, Mariela, to discuss discharge planning.  Mariela reports that she is pts PCA and DSP(Direct Support Person). The agency she is affiliated with is: [facility] ([phone]).  Mariela reports that pt lives in a home with parents and 9 siblings.  They all provide support and care for pt.  Pt had an appointment to establish care with Dr. Sima Nelson 3/6 at 10am.  Mariela asked for assistance in rescheduling the appt, this was rescheduled to 3/21 at 10am.  Family will provide transportation to home  at time of discharge.  Mariela denied further questions or concerns at this time.  RNCC will remain available if further needs arise.                Liya Brown, RNOZZY  Phone: [phone]  Pager: [phone]    SEARCHABLE in [facility] - search CARE COORDINATOR     [place] ([id number]) Saturday & Sunday; ([id number]) [facility] Recognized Holidays     Units: 4A, 4C, 4E, 5A & 5B   Pager: [phone]    Units: 6A & 6B    Pager: [phone]    Units: 6C & 6D   Pager: [phone]    Units: 7A, 7B, 7C, 7D & 5C    Pager: [phone]    Units: [facility] ED, 5 Ortho, 5 Med/Surg, 6 Med/Surg, 8A, 10 ICU, & [facility]    Pager: [phone]

## 2023-03-05 NOTE — PROVIDER NOTIFICATION
Paged Janine Bello #[phone] at [phone] Robert- Pt had 12 bts VTach asymptomatic. Want to check K and Mg?    Trang [phone]    Response:     K and Mg labs ordered and 1g Calcium gluconate ordered    Trang Cruz RN on 3/5/2023 at 12:49 AM

## 2023-03-05 NOTE — PROGRESS NOTES
Medicine crosscover    Called about non sustained VT 12 beats. Asymptomatic.  Labs from earlier 3/4 K 3.6 (down from 3.9), Mg 2 and Ca 6.7.   - check K and Mg, replace as needed  - ordered 1g calcium gluconate infusion

## 2023-03-05 NOTE — PLAN OF CARE
[id number]:  Neuro: A&Ox4. Able to verbalize needs.   Cardiac: Sinus tach, 120s during this time period.  Respiratory: O2 sats mid-upper 90s on RA.  GI/: Adequate urine output today. No BM during this four hours.  No N/V.  Diet/appetite: Regular, poor appetite. No food eated during this period.  Activity:  SBA w walker.  Pain: At acceptable level on current regimen.   Skin: No new deficits noted.  LDA's: Port with D5LR + 20KCl @ 125mL/hr.  Plan: Continue plan of care. Notify team with changes/concerns..

## 2023-03-05 NOTE — PROGRESS NOTES
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    Changes today:  - Decrease IV fluids, encourage PO intake and monitor UOP  - Continue with telemetry today, if no further events can d/ c tomorrow  - Continue Phos, Mg, K electrolyte replacement protocol  - Oncology plans to revisit [facility] in coming days     Hypokalemia  Hypomagnesemia   Acute on Chronic Diarrhea   C.diff Toxin PCR positive  Elevated lactic acid   Leukocytosis   Patient sent into the hospital from oncology clinic after labs showed a potassium of 2.2. EKG was done with sinus tach. He was given 40 meQ of potassium then sent to ED where K recheck was 2.3 and more aggressive replacement was begun. Potassium and magnesium derangements are very likely due to diarrhea and poor PO intake. He has previously been on K and Mg supplements, but stopped them after last hospitalization. He is positive for C. Diff toxin PCR but negative for C.diff antigen or toxin immunoassay. Given acute worsening of chronic diarrhea and new abdominal pain and leukocytosis, as well as relative immune suppression, will treat for C.diff infection.   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   Chronic pain   Patient is currently on treatment with irinotecan and Temodar. With cycle 2,  "which began on 1/30/23, irinotecan was dose reduced by 10% due to prolonged neutropenia. They recently discussed further treatment in clinic which Robert wants to pursue at this time over comfort focused care.  -Heme/onc consult, appreciate recommendations  - Continue butrans patch (new patch placed today 3/2) and oxycodone  - Consider palliative consult if pain management not adequate  - CT A/P obtained 3/4/23 shows progression of metastases in chest; stable Abdomen/pelvis     Anemia 2/2 chemotherapy  Baseline thought to be around 8.2 .Most likely anemia of chronic disease and chemotherapy.  - Hemoglobin 6.2 3/3/23; transfused 1 unit pRBC, repeat 7.7  - Daily CBC     HTN   Tachycardia  Longstanding issue, multifactorial in setting of metastatic disease. EKG today shows sinus tachycardia. HR ranges from low 100's to 150's. Had not taken metoprolol today prior to clinic.  -continue PTA metoprolol         Diet: Combination Diet Regular Diet Adult    DVT Prophylaxis: Pneumatic Compression Devices  Busby Catheter: Not present  Lines: PRESENT      Port A Cath Single 01/20/23 Right Chest wall-Site Assessment: WDL      Cardiac Monitoring: None  Code Status: Full Code      Clinically Significant Risk Factors              # Hypoalbuminemia: Lowest albumin = 2.5 g/dL at 3/3/2023  5:21 AM, will monitor as appropriate            # Obesity: Estimated body mass index is 33.57 kg/m  as calculated from the following:    Height as of 2/19/23: 1.727 m (5' 8\").    Weight as of this encounter: 100.2 kg (220 lb 12.8 oz)., PRESENT ON ADMISSION         Disposition Plan     Expected Discharge Date: 03/06/2023      Destination: home with family            Margo Morales MD  Hospitalist Service, GOLD TEAM 12  [facility]  Securely message with [employer] (more info)  Text page via [facility] Paging/Directory   See signed in provider for up to date coverage " information  ______________________________________________________________________    Interval History   Diarrhea is getting better, only 1 stool yesterday. Has been drinking but not eating much. Denies any SOB or hand/foot swelling, but is concerned about the amount of IV fluids. Denies abdominal pain, just concerned about the distension and firmness. Discussed with patient's sister Mariela on iPad. All questions answered.    Physical Exam   Vital Signs: Temp: 97.5  F (36.4  C) Temp src: Oral BP: 114/73 Pulse: 113   Resp: 20 SpO2: 97 % O2 Device: None (Room air)    Weight: 220 lbs 12.8 oz    General Appearance: Young male with alopecia, sitting up in bed, appears chronically ill, pleasant and conversant  Respiratory: Breathing comfortably in room air. No wheezes or crackles on auscultation.   Cardiovascular: Tachycardic but regular. No murmurs.   GI: Abdomen is distended and firm, about the same as yesterday. +BS. Minimal tenderness to palpation diffusely.  Skin: Warm, dry no rashes  Other: No lower extremity edema    Medical Decision Making       45 MINUTES SPENT BY ME on the date of service doing chart review, history, exam, documentation & further activities per the note.      Data     I have personally reviewed the following data over the past 24 hrs:    20.7 (H)  \   7.7 (L)   / 316     140 107 2.9 (L) /  103 (H)   4.0 23 0.52 (L) \       Imaging results reviewed over the past 24 hrs:   No results found for this or any previous visit (from the past 24 hour(s)).

## 2023-03-06 NOTE — PROGRESS NOTES
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    Changes today:  - Stop IV fluids and encourage PO intake; developing some swelling in ankles and lower legs   - Can discontinue telemetry   - Continue Phos, Mg, K electrolyte replacement protocol  - Oncology plans to revisit [facility] in coming days; appreciate them following along     Hypokalemia  Hypomagnesemia   Acute on Chronic Diarrhea   C.diff Toxin PCR positive  Elevated lactic acid   Leukocytosis   Patient sent into the hospital from oncology clinic after labs showed a potassium of 2.2. EKG was done with sinus tach. He was given 40 meQ of potassium then sent to ED where K recheck was 2.3 and more aggressive replacement was begun. Potassium and magnesium derangements are very likely due to diarrhea and poor PO intake. He has previously been on K and Mg supplements, but stopped them after last hospitalization. He is positive for C. Diff toxin and antigen and being treated with oral vancomycin.   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   Chronic pain   Patient is currently on treatment with irinotecan and Temodar. With cycle 2, which began on 1/30/23, irinotecan was dose reduced by 10% due to prolonged neutropenia. They recently discussed further treatment in clinic  "which Robert wants to pursue at this time over comfort focused care.  - Oncology consulted, appreciate recommendations  - Continue butrans patch (new patch placed 3/2) and oxycodone  - Consider palliative consult if pain management not adequate  - CT A/P obtained 3/4/23 shows progression of metastases in chest; stable Abdomen/pelvis     Anemia 2/2 chemotherapy  Baseline thought to be around 8.2 .Most likely anemia of chronic disease and chemotherapy.  - Hemoglobin 6.2 3/3/23; transfused 1 unit pRBC, repeat 7.7  - Daily CBC  - Transfuse for < 7      HTN   Tachycardia  Longstanding issue, multifactorial in setting of metastatic disease. EKG today shows sinus tachycardia. HR ranges from low 100's to 150's. Had not taken metoprolol today prior to clinic.  -continue PTA metoprolol         Diet: Combination Diet Regular Diet Adult    DVT Prophylaxis: Lovenox  Busby Catheter: Not present  Lines: PRESENT      Port A Cath Single 01/20/23 Right Chest wall-Site Assessment: WDL      Cardiac Monitoring: None  Code Status: Full Code      Clinically Significant Risk Factors            # Hypomagnesemia: Lowest Mg = 1.6 mg/dL in last 2 days, will replace as needed   # Hypoalbuminemia: Lowest albumin = 2.5 g/dL at 3/3/2023  5:21 AM, will monitor as appropriate            # Obesity: Estimated body mass index is 33.57 kg/m  as calculated from the following:    Height as of 2/19/23: 1.727 m (5' 8\").    Weight as of this encounter: 100.2 kg (220 lb 12.8 oz)., PRESENT ON ADMISSION         Disposition Plan      Expected Discharge Date: 03/08/2023      Destination: home with family  Discharge Comments: Once adequate PO intake, not needing electrolyte repletion          Margo Morales MD  Hospitalist Service, GOLD TEAM 12  [facility]  Securely message with [employer] (more info)  Text page via [employer] Paging/Directory   See signed in provider for up to date coverage " information  ______________________________________________________________________    Interval History   Feels a little bit better; diarrhea still present but slowing down. Most bothered by abdominal distension.     Physical Exam   Vital Signs: Temp: 97.7  F (36.5  C) Temp src: Oral BP: 99/64 Pulse: 117   Resp: 16 SpO2: 97 % O2 Device: None (Room air)    Weight: 220 lbs 12.8 oz    General Appearance: Young male with alopecia, sitting up in bed, appears chronically ill, pleasant and conversant  Respiratory: Breathing comfortably in room air. No wheezes or crackles on auscultation.   Cardiovascular: Tachycardic but regular. No murmurs.   GI: Abdomen is distended and firm, about the same as yesterday. +BS. Minimal tenderness to palpation diffusely.  Skin: Warm, dry no rashes  Other: No lower extremity edema    Medical Decision Making       45 MINUTES SPENT BY ME on the date of service doing chart review, history, exam, documentation & further activities per the note.      Data     I have personally reviewed the following data over the past 24 hrs:    18.8 (H)  \   7.4 (L)   / 311     140 106 5.7 (L) /  100 (H)   4.2 22 0.55 (L) \       Imaging results reviewed over the past 24 hrs:   No results found for this or any previous visit (from the past 24 hour(s)).

## 2023-03-06 NOTE — PLAN OF CARE
Goal: Maintain labs. No cardiac events on tele.    Neuro: A&O x4 stable   Cardiac: No tele events overnight. Edema BLE +1  Resp: RA stable   GI: No Bms overnight   : 200ml and 1 unmeasured urine output   Skin: NA  Pain: 4-6/10 abd pain   Vitals: Stable     Plan: Monitor electrolytes and telemetry.      Trang Cruz RN on 3/6/2023 at 6:24 AM

## 2023-03-06 NOTE — PLAN OF CARE
Neuro: A&Ox4. Denies pain.   Resp: RA; lung sounds clear and equal bilaterally.   Cardio: Sinus tach 110s-120s. Normotensive.   GI: Increased oral intake this shift.   : Reported using bathroom twice.   Integ: L hip bruise  Gtt: D5LR w/ 20meq potassium at 75ml/Hr  Plan: monitor electrolytes. Encourage good oral intake. Pain management.   Goal Outcome Evaluation:      Plan of Care Reviewed With: patient, parent    Overall Patient Progress: no changeOverall Patient Progress: no change    Outcome Evaluation: RA. Sinus tach into 120s. Denies Pain. Increased oral intake this shift.

## 2023-03-07 NOTE — PROGRESS NOTES
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    Changes today:  - mag low in AM, normalized after repletion    Hypokalemia  Hypomagnesemia   Acute on Chronic Diarrhea   C.diff Toxin PCR positive  Elevated lactic acid   Leukocytosis   Patient sent into the hospital from oncology clinic after labs showed a potassium of 2.2. EKG was done with sinus tach. He was given 40 meQ of potassium then sent to ED where K recheck was 2.3 and more aggressive replacement was begun. Potassium and magnesium derangements are very likely due to diarrhea and poor PO intake. He has previously been on K and Mg supplements, but stopped them after last hospitalization. He is positive for C. Diff toxin and antigen and being treated with oral vancomycin.   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   Chronic pain   Patient is currently on treatment with irinotecan and Temodar. With cycle 2, which began on 1/30/23, irinotecan was dose reduced by 10% due to prolonged neutropenia. They recently discussed further treatment in clinic which Robert wants to pursue at this time over comfort focused care.  - Oncology consulted, appreciate recommendations  - Continue butrans patch (new patch placed 3/2) and oxycodone  - Consider palliative consult if pain management not  "adequate  - CT A/P obtained 3/4/23 shows progression of metastases in chest; stable Abdomen/pelvis     Anemia 2/2 chemotherapy  Baseline thought to be around 8.2 .Most likely anemia of chronic disease and chemotherapy.  - Hemoglobin 6.2 3/3/23; transfused 1 unit pRBC, repeat 7.7  - Daily CBC  - Transfuse for < 7      HTN   Tachycardia  Longstanding issue, multifactorial in setting of metastatic disease. EKG today shows sinus tachycardia. HR ranges from low 100's to 150's. Had not taken metoprolol today prior to clinic.  -continue PTA metoprolol         Diet: Combination Diet Regular Diet Adult    DVT Prophylaxis: Lovenox  Busby Catheter: Not present  Lines: PRESENT      Port A Cath Single 01/20/23 Right Chest wall-Site Assessment: WDL      Cardiac Monitoring: None  Code Status: Full Code      Clinically Significant Risk Factors            # Hypomagnesemia: Lowest Mg = 1.5 mg/dL in last 2 days, will replace as needed   # Hypoalbuminemia: Lowest albumin = 2.5 g/dL at 3/3/2023  5:21 AM, will monitor as appropriate            # Obesity: Estimated body mass index is 33.57 kg/m  as calculated from the following:    Height as of 2/19/23: 1.727 m (5' 8\").    Weight as of this encounter: 100.2 kg (220 lb 12.8 oz).          Disposition Plan      Expected Discharge Date: 03/10/2023      Destination: home with family  Discharge Comments: Once adequate PO intake, not needing electrolyte repletion          Rene Clemens MD  Hospitalist Service, GOLD TEAM 12  [facility]  Securely message with [employer] (more info)  Text page via [facility] Paging/Directory   See signed in provider for up to date coverage information  ______________________________________________________________________    Interval History   Several bowel movement early in morning but then many hours without at time of exam. Some mild abdominal pain, he and family note belly firm but unchanged for the past week and suspect " tumor. Tolerating PO adequatley    4 point ROS otherwise negative.     Physical Exam   Vital Signs: Temp: 97.5  F (36.4  C) Temp src: Oral BP: 125/75 Pulse: 118   Resp: 18 SpO2: 100 % O2 Device: Nasal cannula Oxygen Delivery: 2 LPM  Weight: 220 lbs 12.8 oz    Physical Exam   Constitutional:  NAD  HEENT: EOMI  Neck: Symmetric  Cardiovascular: regular without murmurs or gallops  Pulmonary/Chest: CTAB. No respiratory distress.  GI: Soft, mildly midline-tender , moderately-distended and firm  Musculoskeletal:  No lower extremity edema   Skin: Skin is warm and dry  Neurological: Alert and oriented   Psychiatric:  euthymic      Medical Decision Making       55 MINUTES SPENT BY ME on the date of service doing chart review, history, exam, documentation & further activities per the note.      Data     I have personally reviewed the following data over the past 24 hrs:    18.7 (H)  \   7.4 (L)   / 336     140 106 8.4 /  89   4.4 23 0.56 (L) \       Imaging results reviewed over the past 24 hrs:   No results found for this or any previous visit (from the past 24 hour(s)).

## 2023-03-07 NOTE — PLAN OF CARE
Patients mag was replaced today and came up to 2.1 no further replacement needed.  Patients phosphorus was replaced today but the recheck will be in the AM.    Patients pain was controlled with the scheduled tylenol.    No changes to assessment please see flow sheets.    Will continue to monitor.

## 2023-03-07 NOTE — PROGRESS NOTES
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 Sims   Current Treatment Plan: irinotecan/temozolomide (TMZ) on 1/30/2023      Reason for Consult:27 year old male w/ hx of  desmoplastic small round cell carcinoma with extensive mets, here for anemia, electrolyte derangement    Assessment & Plan:   Diego Buchanan is a 27 year old male with medical history as above, currently admitted for acute on chronic diarrhea, FTH C. Diff infection been treated with PO Vancomycin and electrolyte replacement.     #Desmoplastic small round cell tumor ( DSRCT)   with peritoneal carcinomatosis and lymph node, bone and liver metastases.   Complicated course with multiple treatments lines and progressive disease. Most recently, started irinotecan/temozolomide (TMZ) on 11/28/202. Cycle 2 began 1/30/23. Irinotecan was dose reduced by 10% due to prolonged neutropenia.     Results of  CT 2/10 with evidence of disease progression with some liver response but worsening peritoneal disease. He has been unable to receive subsequent cycles of chemotherapy due to electrolyte disturbances and diarrhea requiring admission. He was then seen on hospital follow up 3/2 at the time, team discussed options of continuing non curative Temodar/irinotecan however unclear how much benefit it was going to provide, with also taking into consideration they would probably  have to decrease even further   irinotecan and the alternative of stopping treatment and focusing on quality of life was offered.    He is currently admitted once again for electrolyte imbalance and diarrhea, however this time was found to have C. Diff as mentioned above, he seems to subjectively be feeling better, his  PO continues to improve and has been hemodynamically stable, however we worry about disease progression and reintroduced the idea of having a GOC. We spoke with Robert and his sister on the phone, she mentioned they are planning to have a family meeting as they have not had the change to discuss Robert's current status given recurrent admissions and continue further discussions with her primary oncologist, during their appointment with Dr. Sims on 3/10. Of note, patient has already seen palliative care, however seems more for symptomatic management, last note 2/20     Recommendations:   - Will continue to have GOC discussions with family and patient through the week if still admitted.   - Continue pain regimen as your have with Butrans and oxycodone   - Will benefit from outpatient follow up with palliative care for more support on GOC   - Appreciate primary team's symptomatic management and electrolyte replacement     Thank you for this interesting consult. We will continue to follow this patient. Please do not hesitate to page with any questions or concerns.    Patient was seen and plan of care was discussed with attending physician Dr. Pollard. Attestation to follow.     Belkis Britt MD      Review of Systems:  A comprehensive ROS was performed and found to be negative or non-contributory with the exception of that noted in the HPI above.    Past Medical History:  Past Medical History:   Diagnosis Date     Hypertension      Learning disability     but pt is his own gaurdian     Peritoneal carcinomatosis (H)        Past Surgical History:  Past Surgical History:   Procedure Laterality Date     BIOPSY      liver     INSERT PORT VASCULAR ACCESS Right 4/21/2022    Procedure: INSERTION, VASCULAR ACCESS PORT;  Surgeon: Daniel Sarmiento MD;  Location: [facility] OR     INSERT PORT VASCULAR ACCESS N/A 1/20/2023    Procedure: Right Port Removal and Right Subclavian Powerport replacement and Flouroscopy.;  Surgeon:  Caleb Brady MD;  Location: [facility] OR     IR CHEST PORT PLACEMENT > 5 YRS OF AGE  4/21/2022     IR CVC TUNNEL PLACEMENT > 5 YRS OF AGE  4/29/2020     IR CVC TUNNEL REMOVAL RIGHT  11/16/2021     US MUSCLE BIOPSY  3/12/2020       Social History:  Social History     Socioeconomic History     Marital status: Single     Spouse name: None     Number of children: None     Years of education: None     Highest education level: None   Tobacco Use     Smoking status: Never     Smokeless tobacco: Never   Substance and Sexual Activity     Alcohol use: Never     Drug use: Never        Family History  Family History   Problem Relation Age of Onset     Hypertension Mother      Anesthesia Reaction No family hx of      Deep Vein Thrombosis (DVT) No family hx of        Outpatient Medications:  sodium chloride (PF) 0.9% PF flush 30 mL    acetaminophen (TYLENOL) 325 MG tablet, Take 2 tablets (650 mg) by mouth every 6 hours  buprenorphine (BUTRANS) 10 MCG/HR WK patch, Place 1 patch onto the skin once a week  cyanocobalamin (VITAMIN B-12) 100 MCG tablet, Take 100 mcg by mouth daily  loperamide (IMODIUM) 2 MG capsule, Take 2 capsules (4 mg) by mouth 4 times daily as needed for diarrhea (Use if pt having having diarrhea, do not use if pt is constipated)  melatonin 1 MG TABS tablet, Take 1 tablet (1 mg) by mouth nightly as needed for sleep  metoprolol tartrate (LOPRESSOR) 25 MG tablet, Take 1 tablet (25 mg) by mouth 2 times daily  naloxone (NARCAN) 4 MG/0.1ML nasal spray, Spray 1 spray (4 mg) into one nostril alternating nostrils as needed for opioid reversal every 2-3 minutes until assistance arrives  oxyCODONE (ROXICODONE) 5 MG tablet, Take 1 tablet (5 mg) by mouth every 6 hours as needed for severe pain (7-10) or moderate pain (4-6)  potassium chloride ER (KLOR-CON M) 20 MEQ CR tablet, Take 1 tablet (20 mEq) by mouth 2 times daily  prochlorperazine (COMPAZINE) 5 MG tablet, Take 1 tablet (5 mg) by mouth every 6 hours as needed for  nausea or vomiting  sennosides (SENOKOT) 8.6 MG tablet, Take 2-4 tablets by mouth 2 times daily as needed for constipation  thiamine (B-1) 100 MG tablet, Take 1 tablet (100 mg) by mouth daily  triamcinolone (KENALOG) 0.1 % external ointment, Apply topically 2 times daily as needed for irritation  Vitamin D, Cholecalciferol, 25 MCG (1000 UT) TABS, Take 1 tablet by mouth daily         Physical Exam:    Blood pressure 125/75, pulse 118, temperature 97.5  F (36.4  C), temperature source Oral, resp. rate 18, weight 100.2 kg (220 lb 12.8 oz), SpO2 100 %.  General: alert and cooperative, sitting in bed, no acute distress  Resp: Breathing comfortable on room air, speaking in full sentences   GI: Appears distended. tone  Skin: no rashes on limited exam, no jaundice  Neuro: Alert and interactive, moves all extremities equally, no focal deficits    Labs & Studies: I personally reviewed the following studies:  ROUTINE LABS (Last four results):  CMP  Recent Labs   Lab 03/07/23  0532 03/06/23  1954 03/06/23  1803 03/06/23  0436 03/05/23  0550 03/05/23  0101 03/04/23  0619 03/03/23  0955 03/03/23  0521 03/02/23  1637 03/02/23  1258     --   --  140 140  --  140  --  142   < > 140   POTASSIUM 4.4  --   --  4.2 4.0 4.0 3.6   < > 2.8*  2.8*   < > 2.2*   CHLORIDE 106  --   --  106 107  --  107  --  106   < > 99   CO2 23  --   --  22 23  --  23  --  24   < > 27   ANIONGAP 11  --   --  12 10  --  10  --  12   < > 14   GLC 89  --  113* 100* 103*  --  95  --  74   < > 83   BUN 8.4  --   --  5.7* 2.9*  --  2.9*  --  1.9*   < > 1.5*   CR 0.56*  --   --  0.55* 0.52*  --  0.60*  --  0.66*   < > 0.56*   GFRESTIMATED >90  --   --  >90 >90  --  >90  --  >90   < > >90   FAISAL 8.0*  --   --  7.9* 7.7*  --  6.7*  --  6.5*   < > 6.9*   MAG 1.5*  --   --  1.6* 2.1 1.7 2.0   < > 2.0   < > 1.2*   PHOS 2.6 2.2*  --  1.8* 2.3*  --  2.3*  --   --   --  2.2*   PROTTOTAL  --   --   --   --   --   --   --   --  6.0*  --  6.9   ALBUMIN  --   --   --   --    --   --   --   --  2.5*  --  2.9*   BILITOTAL  --   --   --   --   --   --   --   --  0.6  --  0.8   ALKPHOS  --   --   --   --   --   --   --   --  98  --  117   AST  --   --   --   --   --   --   --   --  14  --  19   ALT  --   --   --   --   --   --   --   --  6*  --  6*    < > = values in this interval not displayed.     CBC  Recent Labs   Lab 03/07/23  0532 03/06/23  0436 03/05/23  0550 03/04/23  0619   WBC 18.7* 18.8* 20.7* 20.9*   RBC 2.71* 2.71* 2.86* 2.85*   HGB 7.4* 7.4* 7.7* 7.8*   HCT 25.2* 25.1* 26.1* 25.7*   MCV 93 93 91 90   MCH 27.3 27.3 26.9 27.4   MCHC 29.4* 29.5* 29.5* 30.4*   RDW 19.7* 19.5* 19.3* 18.9*    311 316 323     INRNo lab results found in last 7 days.      History of Present Illness:    Diego Buchanan is a 27 year old male w/ hx of  desmoplastic small round cell carcinoma with extensive mets, HTN, and a learning disability admitted on 3/2/2023 with hypokalemia and hypomagnesia along with weakness and diarrhea, found to have C. Diff infection, currently been treated with Vancomycin and working on PO intake alongside electrolyte replacement     Robert mentions he is feeling better today and is eating ok. He reports his abdominal pain seems to be under better control, although continues to have regular loose stools.     Oncologic History:  1. He presented initially with abdominal pain, diarrhea, and progressive abdominal distention for 3 months duration. 2. Initial CT scan in February 2020 showed severe peritoneal carcinomatosis with large peritoneal tumor implants throughout the entire abdomen and pelvis, but mostly prominently in the pelvis.   2. PETCT scan showed interval increase in the amount of peritoneal carcinomatosis.  3. On 3/12/2020, he had ultrasound-guided core needle biopsy of a peritoneal implant (Case: [id number]), which showed a completely undifferentiated appearance, but stains with pancytokeratin, indicating an epithelial origin. 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 Tumor markers for CA-19-9, CEA, beta-hCG, alpha-fetoprotein were unremarkable. Cancer type ID molecular testing by [email or web address] sent to determine the exact diagnosis and to assist in further treatment planning. 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 Relative probability of less than 5% of other subtype of sarcoma. EWSR1-WT1 fusion detected.  4. 5/1/2020, MRI brain negative for brain metastasis, and baseline CT-CAP obtained showing extensive mixed solid and cystic masses throughout the abdomen and pelvis consistent with peritoneal carcinomatosis. Largest mass measures approximately 20 cm in the pelvis. Metastatic mixed solid and cystic masses seen in hepatic segments 5 and 6 and hepatic segment 7. The masses appear to be contiguous with the retrohepatic peritoneal carcinomatosis. Small volume fluid about the spleen favored to represent malignant ascites, stable mild-to-moderate right hydronephrosis related to mass effect upon the distal ureter in the pelvis, the IVC and iliac veins are compressed due to the extensive peritoneal carcinomatosis without findings to suggest deep venous thrombosis.  5. 5/4/2020, he begins CAV/IMV alternating chemotherapy. He receives 6 cycles of treatment. He symptomatically improves.  6. 9/11/2020, CT-CAP shows stable to mildly improved extensive peritoneal carcinomatosis. He would like to omit Ifosfamide/etoposide cycles and continue only with CAV.  7. 10/9/2020, he starts CAV every 21 days.  8. 1/6/2021, CT CAP showed a mixed response in the mixed solid and cystic masses throughout the peritoneum. Some of the tumors are stable to mildly worse, with some of the peritoneal masses a bit larger, a few are smaller, one cystic tumor in the left abdomen is smaller, while tumors lower in the pelvis appear slightly larger.  9.  3/24/2021, CT CAP showed continued slight decrease in overall burden of peritoneal carcinomatosis with persistent encasement of bowel without evidence of bowel obstruction.  10. 6/25/2021, he receives cycle 19 CAV, then takes a chemotherapy holiday.  11. 4/22/2022, he resumes CAV/IMV chemotherapy.  12. 7/13/22, CT CAP showed interval enlargement of hepatic and splenic metastases with other overall disease stable. CAV/IMV continued.  13.  10/04/22, CT CAP  showed evidence of progressive disease, predominantly in liver. There are small sub-centimeter lung nodules, which are suspicious for metastasis. CAV/IMV discontinued. Start irinotecan/temozolomide (TMZ) on 11/28/22.   14. 12/7/22-12/21/22: Hospitalized for neutropenic fever with RSV, rash (drug vs. Viral exanthem), and pancytopenia with transfusions.   15. Cycle 2 of TMZ/irinotecan has been delayed due to hospitalization and need for additional recovery (ongoing fatigue and diarrhea) as well as planned port placement. Following port placement, treatment has been delayed an additional week s/t self-cancelled appointment as a result of pain after port placement.  16. 1/30/23 Cycle 2 of TMZ/irinotecan at lower dose

## 2023-03-08 NOTE — PROGRESS NOTES
[facility]    Medicine Progress Note - Hospitalist Service, GOLD TEAM 12    Date of Admission:  3/2/2023    Assessment & Plan      Diego Buchanan is a 27 year old male w/ hx of  desmoplastic small round cell carcinoma with extensive mets, HTN, and a learning disability admitted on 3/2/2023 with hypokalemia and hypomagnesia along with weakness and diarrhea.    Changes today:  - IV mag and phos repletion in anticipation of discharge home 3/9 morning assuming continued GI improvmenet    Hypokalemia  Hypomagnesemia   Acute on Chronic Diarrhea   C.diff Toxin PCR positive  Elevated lactic acid   Leukocytosis   Patient sent into the hospital from oncology clinic after labs showed a potassium of 2.2. EKG was done with sinus tach. He was given 40 meQ of potassium then sent to ED where K recheck was 2.3 and more aggressive replacement was begun. Potassium and magnesium derangements are very likely due to diarrhea and poor PO intake. He has previously been on K and Mg supplements, but stopped them after last hospitalization. He is positive for C. Diff toxin and antigen and being treated with oral vancomycin.   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   Chronic pain   Patient is currently on treatment with irinotecan and Temodar. With cycle 2, which began on 1/30/23, irinotecan was dose reduced by 10% due to prolonged neutropenia. They recently discussed further treatment in clinic which Robert wants to pursue at this time over comfort focused care.  - Oncology consulted, appreciate recommendations  - Continue butrans patch (new patch placed 3/2) and  "oxycodone  - Consider palliative consult if pain management not adequate  - CT A/P obtained 3/4/23 shows progression of metastases in chest; stable Abdomen/pelvis     Anemia 2/2 chemotherapy  Baseline thought to be around 8.2 .Most likely anemia of chronic disease and chemotherapy.  - Hemoglobin 6.2 3/3/23; transfused 1 unit pRBC, repeat 7.7  - Daily CBC  - Transfuse for < 7      HTN   Tachycardia  Longstanding issue, multifactorial in setting of metastatic disease. EKG today shows sinus tachycardia. HR ranges from low 100's to 150's. Had not taken metoprolol today prior to clinic.  -continue PTA metoprolol         Diet: Combination Diet Regular Diet Adult    DVT Prophylaxis: Lovenox  Busby Catheter: Not present  Lines: PRESENT      Port A Cath Single 01/20/23 Right Chest wall-Site Assessment: WDL      Cardiac Monitoring: None  Code Status: Full Code      Clinically Significant Risk Factors            # Hypomagnesemia: Lowest Mg = 1.5 mg/dL in last 2 days, will replace as needed   # Hypoalbuminemia: Lowest albumin = 2.5 g/dL at 3/3/2023  5:21 AM, will monitor as appropriate            # Obesity: Estimated body mass index is 36.16 kg/m  as calculated from the following:    Height as of 2/19/23: 1.727 m (5' 8\").    Weight as of this encounter: 107.9 kg (237 lb 12.8 oz).          Disposition Plan      Expected Discharge Date: 03/09/2023      Destination: home with family  Discharge Comments: Pending electrolyte stability and minimal diarrhea possible 3/8 dishcarg          Rene Clemens MD  Hospitalist Service, GOLD TEAM 12  [facility]  Securely message with [employer] (more info)  Text page via [facility] Paging/Directory   See signed in provider for up to date coverage information  ______________________________________________________________________    Interval History   Low level chronic abdominal pain unchanged. About 4 bowel movement last 24 hours per pt and RN assessment, " eating well.     4 point ROS otherwise negative.     Physical Exam   Vital Signs: Temp: 97  F (36.1  C) Temp src: Oral BP: 127/74 Pulse: 112   Resp: 16 SpO2: 94 % O2 Device: None (Room air)    Weight: 237 lbs 12.8 oz    Physical Exam   Constitutional:  NAD sister on ipad  HEENT: EOMI  Neck: Symmetric  Cardiovascular: regular without murmurs or gallops  Pulmonary/Chest: CTAB. No respiratory distress.  GI: Soft, mildly midline-tender , moderately-distended and firm  Musculoskeletal:  No lower extremity edema   Skin: Skin is warm and dry  Neurological: Alert and oriented   Psychiatric:  euthymic      Medical Decision Making       50 MINUTES SPENT BY ME on the date of service doing chart review, history, exam, documentation & further activities per the note.      Data     I have personally reviewed the following data over the past 24 hrs:    19.2 (H)  \   7.6 (L)   / N/A     136 103 8.0 /  78   4.2 21 (L) 0.53 (L) \       ALT: N/A AST: N/A AP: N/A TBILI: N/A   ALB: 2.6 (L) TOT PROTEIN: N/A LIPASE: N/A       Imaging results reviewed over the past 24 hrs:   No results found for this or any previous visit (from the past 24 hour(s)).

## 2023-03-08 NOTE — PROVIDER NOTIFICATION
Paged Albino Lutz #[id number] at [phone] Robert- pSVT  for 26beats. Felt his heart race otherwise asymptomatic. I am giving his scheduled metoprolol. Want labs drawn?    Trang [id number]    Response:    -MD ordered a renal panel

## 2023-03-09 NOTE — PROGRESS NOTES
Care Management Follow Up    Length of Stay (days): 7    Expected Discharge Date: 03/09/2023     Concerns to be Addressed: discharge planning     Patient plan of care discussed at interdisciplinary rounds: Yes    Anticipated Discharge Disposition: Home     Anticipated Discharge Services: None  Anticipated Discharge DME: None    Patient/family educated on Medicare website which has current facility and service quality ratings:    Education Provided on the Discharge Plan:    Patient/Family in Agreement with the Plan:      Referrals Placed by CM/SW: Internal Clinic Care Coordination  Private pay costs discussed: Not applicable    Additional Information:  In 6D Discharge Rounds it was reported pt receiving electrolyte replacement. May be ready for discharge soon.  This afternoon pt's nurse said pt is discharging; there is a DME order for a shower chair. Printed off the Misc DME order for the shower chart from Epic. Nurse will give to pt. The handout included resources for [facility] locations; also suggested [facility], [facility] or [facility]. May not be covered by insurance. Nurse will give the DME order to pt with resources.         Katherine Drummond, RN Care Coordinator  [facility]  On 03- RNCC coverage for Unit 6D & Observation Unit. Call [phone] or Page: [phone].

## 2023-03-09 NOTE — PLAN OF CARE
Major overnight events: 2 BMs overnight. Magnesium 1.6 this AM requiring 2g replacement.    Neuro: a&o x4; stable  Cardiac: no cardiac events tonight; increased BLE edema   Resp: RA; some tachypnea with exertion   GI: firm abdomen; normal bowel sounds; 2 BMs overnight; denies nausea  : >30ml/hour UO overnight  Skin: no concerns  Pain: 2-4/10 abd tylenol given   Vitals: stable     Plan: Electrolyte balance then transition to oral      Trang Cruz RN on 3/9/2023 at 6:37 AM

## 2023-03-09 NOTE — PROGRESS NOTES
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 Sims   Current Treatment Plan: Irinotecan/temozolomide (TMZ) C2D1=1/30/2023      Summary & Recommendations:  - Pt will discharge today. He has follow up with his primary oncologist, Dr. Sims, tomorrow, with plan to discuss goals of care given multiple recent admissions. Sent message to Dr. Sims with update.     Assessment & Plan:   Diego Buchanan is a 27 year old male with medical history as above, currently admitted for acute on chronic diarrhea,and weakness, and was found to have cdiff. GI symptoms improved on oral vancomycin.    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 Most recently, started irinotecan/temozolomide (TMZ) on 11/28/202. Cycle 2 began 1/30/23. Irinotecan was dose reduced by 10% due to prolonged neutropenia.     Results of CT 2/10 with evidence of disease progression with some liver response but worsening peritoneal disease. He has been unable to receive subsequent cycles of chemotherapy due to electrolyte disturbances and diarrhea requiring admission. He was then seen on hospital follow up 3/2 at the time, team discussed options of continuing palliative intent Temodar/irinotecan; however unclear how much benefit it was going to provide, with also taking into consideration they would probably have to decrease irinotecan dose even further, and the alternative of stopping treatment and focusing on quality of life was offered.    He is currently admitted once again for electrolyte imbalance and diarrhea; however this time  was found to have cdiff as mentioned above. He is subjectively be feeling better, his PO continues to improve and has been hemodynamically stable, however we worry about disease progression and re-introduced the idea of having a GOC. We spoke with Robert and his sister on the phone, she mentioned they are planning to have a family meeting as they have not had the change to discuss Robert's current status given recurrent admissions and continue further discussions with her primary oncologist, during their appointment with Dr. Sims on 3/10. Of note, patient has already seen palliative care, however seems more for symptomatic management, last note 2/20.    Thank you for this consult. We will continue to follow this patient. Please do not hesitate to page with any questions or concerns.    Patient was seen and plan of care was discussed with attending physician Dr. Stringer.     Maribel Fleming PA-C  #[id number]    Review of Systems:  A comprehensive ROS was performed and found to be negative or non-contributory with the exception of that noted in the HPI above.    Past Medical History:  Past Medical History:   Diagnosis Date     Hypertension      Learning disability     but pt is his own gaurdian     Peritoneal carcinomatosis (H)      Past Surgical History:  Past Surgical History:   Procedure Laterality Date     BIOPSY      liver     INSERT PORT VASCULAR ACCESS Right 4/21/2022    Procedure: INSERTION, VASCULAR ACCESS PORT;  Surgeon: Daniel Sarmiento MD;  Location: [facility] OR     INSERT PORT VASCULAR ACCESS N/A 1/20/2023    Procedure: Right Port Removal and Right Subclavian Powerport replacement and Flouroscopy.;  Surgeon: Caleb Brady MD;  Location: [facility] OR     IR CHEST PORT PLACEMENT > 5 YRS OF AGE  4/21/2022     IR CVC TUNNEL PLACEMENT > 5 YRS OF AGE  4/29/2020     IR CVC TUNNEL REMOVAL RIGHT  11/16/2021     US MUSCLE BIOPSY  3/12/2020     Social History:  Social History     Socioeconomic History     Marital  status: Single     Spouse name: None     Number of children: None     Years of education: None     Highest education level: None   Tobacco Use     Smoking status: Never     Smokeless tobacco: Never   Substance and Sexual Activity     Alcohol use: Never     Drug use: Never      Family History  Family History   Problem Relation Age of Onset     Hypertension Mother      Anesthesia Reaction No family hx of      Deep Vein Thrombosis (DVT) No family hx of        Outpatient Medications:  sodium chloride (PF) 0.9% PF flush 30 mL    acetaminophen (TYLENOL) 325 MG tablet, Take 2 tablets (650 mg) by mouth every 6 hours  buprenorphine (BUTRANS) 10 MCG/HR WK patch, Place 1 patch onto the skin once a week  cyanocobalamin (VITAMIN B-12) 100 MCG tablet, Take 100 mcg by mouth daily  loperamide (IMODIUM) 2 MG capsule, Take 2 capsules (4 mg) by mouth 4 times daily as needed for diarrhea (Use if pt having having diarrhea, do not use if pt is constipated)  melatonin 1 MG TABS tablet, Take 1 tablet (1 mg) by mouth nightly as needed for sleep  metoprolol tartrate (LOPRESSOR) 25 MG tablet, Take 1 tablet (25 mg) by mouth 2 times daily  naloxone (NARCAN) 4 MG/0.1ML nasal spray, Spray 1 spray (4 mg) into one nostril alternating nostrils as needed for opioid reversal every 2-3 minutes until assistance arrives  oxyCODONE (ROXICODONE) 5 MG tablet, Take 1 tablet (5 mg) by mouth every 6 hours as needed for severe pain (7-10) or moderate pain (4-6)  potassium chloride ER (KLOR-CON M) 20 MEQ CR tablet, Take 1 tablet (20 mEq) by mouth 2 times daily  prochlorperazine (COMPAZINE) 5 MG tablet, Take 1 tablet (5 mg) by mouth every 6 hours as needed for nausea or vomiting  sennosides (SENOKOT) 8.6 MG tablet, Take 2-4 tablets by mouth 2 times daily as needed for constipation  thiamine (B-1) 100 MG tablet, Take 1 tablet (100 mg) by mouth daily  triamcinolone (KENALOG) 0.1 % external ointment, Apply topically 2 times daily as needed for irritation  Vitamin D,  Cholecalciferol, 25 MCG (1000 UT) TABS, Take 1 tablet by mouth daily      Physical Exam:    Blood pressure 125/75, pulse 115, temperature 97.8  F (36.6  C), temperature source Oral, resp. rate 18, weight 108.2 kg (238 lb 9.6 oz), SpO2 98 %.  General: alert and cooperative, sitting in bed, no acute distress  Resp: Breathing comfortable on room air, speaking in full sentences   GI: Appears comfortable  Skin: no rashes on limited exam, no jaundice  Neuro: Alert and interactive, moves all extremities equally, no focal deficits    Labs & Studies: I personally reviewed the following studies:  ROUTINE LABS (Last four results):  CMP  Recent Labs   Lab 03/09/23  0533 03/08/23  0532 03/07/23  2109 03/07/23  1327 03/07/23  0532 03/03/23  0955 03/03/23  0521 03/02/23  1637 03/02/23  1258    136 138  --  140   < > 142   < > 140   POTASSIUM 4.2 4.2 4.3  --  4.4   < > 2.8*  2.8*   < > 2.2*   CHLORIDE 106 103 107  --  106   < > 106   < > 99   CO2 23 21* 19*  --  23   < > 24   < > 27   ANIONGAP 10 12 12  --  11   < > 12   < > 14   GLC 80 78 102*  --  89   < > 74   < > 83   BUN 7.8 8.0 8.8  --  8.4   < > 1.9*   < > 1.5*   CR 0.51* 0.53* 0.47*  --  0.56*   < > 0.66*   < > 0.56*   GFRESTIMATED >90 >90 >90  --  >90   < > >90   < > >90   FAISAL 8.3* 8.0* 7.4*  --  8.0*   < > 6.5*   < > 6.9*   MAG 1.6* 1.6*  --  2.1 1.5*   < > 2.0   < > 1.2*   PHOS 3.0 2.3* 2.4*  --  2.6   < >  --   --  2.2*   PROTTOTAL  --   --   --   --   --   --  6.0*  --  6.9   ALBUMIN  --   --  2.6*  --   --   --  2.5*  --  2.9*   BILITOTAL  --   --   --   --   --   --  0.6  --  0.8   ALKPHOS  --   --   --   --   --   --  98  --  117   AST  --   --   --   --   --   --  14  --  19   ALT  --   --   --   --   --   --  6*  --  6*    < > = values in this interval not displayed.     CBC  Recent Labs   Lab 03/09/23  0533 03/08/23  0532 03/07/23  0532 03/06/23  0436 03/05/23  0550   WBC 19.5* 19.2* 18.7* 18.8* 20.7*   RBC 2.76* 2.82* 2.71* 2.71* 2.86*   HGB 7.6* 7.6*  7.4* 7.4* 7.7*   HCT 25.7* 26.4* 25.2* 25.1* 26.1*   MCV 93 94 93 93 91   MCH 27.5 27.0 27.3 27.3 26.9   MCHC 29.6* 28.8* 29.4* 29.5* 29.5*   RDW 20.2* 19.7* 19.7* 19.5* 19.3*     --  336 311 316     INRNo lab results found in last 7 days.    History of Present Illness:    Diego Buchanan is a 27 year old male with medical history as above, currently admitted for acute on chronic diarrhea,and weakness, and was found to have cdiff. GI symptoms improved on oral vancomycin.    Robert mentions he is feeling better today and is eating ok. Diarrhea and pain have improved. Pt seen with primary team, plan to discharge today. Discussed follow up with Dr. Sims tomorrow, pt voiced understanding.     Oncologic History:  1. He presented initially with abdominal pain, diarrhea, and progressive abdominal distention for 3 months duration. 2. Initial CT scan in February 2020 showed severe peritoneal carcinomatosis with large peritoneal tumor implants throughout the entire abdomen and pelvis, but mostly prominently in the pelvis.   2. PETCT scan showed interval increase in the amount of peritoneal carcinomatosis.  3. On 3/12/2020, he had ultrasound-guided core needle biopsy of a peritoneal implant (Case: [id number]), which showed a completely undifferentiated appearance, but stains with pancytokeratin, indicating an epithelial origin. 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 Tumor markers for CA-19-9, CEA, beta-hCG, alpha-fetoprotein were unremarkable. Cancer type ID molecular testing by [facility] sent to determine the exact diagnosis and to assist in further treatment planning. 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 Relative probability of less than 5% of other subtype of sarcoma. EWSR1-WT1 fusion detected.  4. 5/1/2020, MRI brain negative for brain metastasis, and  baseline CT-CAP obtained showing extensive mixed solid and cystic masses throughout the abdomen and pelvis consistent with peritoneal carcinomatosis. Largest mass measures approximately 20 cm in the pelvis. Metastatic mixed solid and cystic masses seen in hepatic segments 5 and 6 and hepatic segment 7. The masses appear to be contiguous with the retrohepatic peritoneal carcinomatosis. Small volume fluid about the spleen favored to represent malignant ascites, stable mild-to-moderate right hydronephrosis related to mass effect upon the distal ureter in the pelvis, the IVC and iliac veins are compressed due to the extensive peritoneal carcinomatosis without findings to suggest deep venous thrombosis.  5. 5/4/2020, he begins CAV/IMV alternating chemotherapy. He receives 6 cycles of treatment. He symptomatically improves.  6. 9/11/2020, CT-CAP shows stable to mildly improved extensive peritoneal carcinomatosis. He would like to omit Ifosfamide/etoposide cycles and continue only with CAV.  7. 10/9/2020, he starts CAV every 21 days.  8. 1/6/2021, CT CAP showed a mixed response in the mixed solid and cystic masses throughout the peritoneum. Some of the tumors are stable to mildly worse, with some of the peritoneal masses a bit larger, a few are smaller, one cystic tumor in the left abdomen is smaller, while tumors lower in the pelvis appear slightly larger.  9. 3/24/2021, CT CAP showed continued slight decrease in overall burden of peritoneal carcinomatosis with persistent encasement of bowel without evidence of bowel obstruction.  10. 6/25/2021, he receives cycle 19 CAV, then takes a chemotherapy holiday.  11. 4/22/2022, he resumes CAV/IMV chemotherapy.  12. 7/13/22, CT CAP showed interval enlargement of hepatic and splenic metastases with other overall disease stable. CAV/IMV continued.  13.  10/04/22, CT CAP  showed evidence of progressive disease, predominantly in liver. There are small sub-centimeter lung nodules,  which are suspicious for metastasis. CAV/IMV discontinued. Start irinotecan/temozolomide (TMZ) on 11/28/22.   14. 12/7/22-12/21/22: Hospitalized for neutropenic fever with RSV, rash (drug vs. Viral exanthem), and pancytopenia with transfusions.   15. Cycle 2 of TMZ/irinotecan has been delayed due to hospitalization and need for additional recovery (ongoing fatigue and diarrhea) as well as planned port placement. Following port placement, treatment has been delayed an additional week s/t self-cancelled appointment as a result of pain after port placement.  16. 1/30/23 Cycle 2 of TMZ/irinotecan at lower dose

## 2023-03-10 NOTE — DISCHARGE SUMMARY
[facility]  Hospitalist Discharge Summary      Date of Admission:  3/2/2023  Date of Discharge:  3/9/2023  1:19 PM  Discharging Provider: Rene Clemens MD  Discharge Service: Hospitalist Service, GOLD TEAM 12    Discharge Diagnoses   See hospital course    Follow-ups Needed After Discharge   Follow-up Appointments     Follow Up and recommended labs and tests      Oncology 3/10/2023             Unresulted Labs Ordered in the Past 30 Days of this Admission     Date and Time Order Name Status Description    2/21/2023  7:07 AM PREPARE RED BLOOD CELLS (UNIT) Preliminary     2/2/2023  4:55 PM PREPARE RED BLOOD CELLS (UNIT) Preliminary       These results will be followed up by n/a    Discharge Disposition   Discharged to home  Condition at discharge: Stable      Hospital Course   Diego Buchanan is a 27 year old male w/ hx of  desmoplastic small round cell carcinoma with extensive mets, HTN, and a learning disability admitted on 3/2/2023 with hypokalemia and hypomagnesia from C. Diff diarrhea.    Hypokalemia  Hypomagnesemia   Acute on Chronic Diarrhea   C.diff colitis  Elevated lactic acid   Leukocytosis   Patient sent into the hospital from oncology clinic after labs showed a potassium of 2.2. EKG was done with sinus tach. He was given 40 meQ of potassium then sent to ED where K recheck was 2.3 and more aggressive replacement was begun. Potassium and magnesium derangements are very likely due to diarrhea and poor PO intake. He has previously been on K and Mg supplements, but stopped them after last hospitalization. He is positive for C. Diff toxin and antigen and w treated with oral vancomycin.   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   Chronic pain   Patient is currently on treatment with irinotecan and Temodar. With cycle 2, which began on 1/30/23, irinotecan was dose reduced by 10% due to prolonged neutropenia. They recently discussed further treatment in clinic which Robert wants to pursue at this time over comfort focused care.  - Continue butrans patch (new patch placed 3/2) and oxycodone  - CT A/P obtained 3/4/23 shows progression of metastases in chest; stable Abdomen/pelvis   - Oncology consulted, appreciate recommendations --> will discuss overall prognosis and goals of care at outpatient oncology visit day after discharge given multiple recent admissions and progression of disease in chest    Anemia 2/2 chemotherapy  Baseline thought to be around 8.2 .Most likely anemia of chronic disease and chemotherapy.  - Hemoglobin 6.2 3/3/23; transfused 1 unit pRBC, repeat 7.7     HTN   Tachycardia  Longstanding issue, multifactorial in setting of metastatic disease. EKG shows sinus tachycardia. HR ranges from low 100's to 150's.   -continue PTA metoprolol        Consultations This Hospital Stay   ONCOLOGY ADULT IP CONSULT  NURSING TO CONSULT FOR VASCULAR ACCESS CARE IP CONSULT  CARE MANAGEMENT / SOCIAL WORK IP CONSULT  NURSING TO CONSULT FOR VASCULAR ACCESS CARE IP CONSULT    Code Status   Prior    Time Spent on this Encounter   I, Rene Clemens MD, personally saw the patient today and spent greater than 30 minutes discharging this patient.       Rene Clemens MD  [facility]  [address] [place] [id number]  Phone: [phone]  Fax: [phone]  ______________________________________________________________________    Physical Exam   Vital Signs: Temp: 97.8  F (36.6  C) Temp src: Oral BP: 125/75 Pulse: 115   Resp: 18 SpO2: 98 % O2 Device: None (Room air)    Weight: 238 lbs 9.6 oz    Physical Exam   Constitutional:  NAD  HEENT: EOMI  Neck:  Symmetric  Cardiovascular: regular without murmurs or gallops  Pulmonary/Chest: CTAB. No respiratory distress.  GI: Soft, non-tender , significantly distended and firm, unchanged   Musculoskeletal:  1+ bilateral lower extremity edema   Skin: Skin is warm and dry  Neurological: Alert and oriented   Psychiatric:  euthymic         Primary Care Physician   Jc Glass    Discharge Orders      Reason for your hospital stay    You had diarrhea from C. Diff infection. We treated you with vancomycin pills to kill eat, finish all pills at home. We gave you fluids and electrolytes and you got better.    It has been a pleasure caring for you,  - Your [facility] Medicine Team     Activity    Your activity upon discharge: activity as tolerated     Follow Up and recommended labs and tests    Oncology 3/10/2023     Miscellaneous DME Order    DME Documentation:   Describe the reason for need to support medical necessity: advanced metastatic cancer with weakness from C. Diff colitis and instability.     I, the undersigned, certify that the above prescribed supplies are medically necessary for this patient and is both reasonable and necessary in reference to accepted standards of medical and necessary in reference to accepted standards of medical practice in the treatment of this patient's condition and is not prescribed as a convenience.     Diet    Follow this diet upon discharge: Orders Placed This Encounter      Combination Diet Regular Diet Adult       Significant Results and Procedures   Most Recent 3 CBC's:Recent Labs   Lab Test 03/09/23  0533 03/08/23  0532 03/07/23  0532 03/06/23  0436   WBC 19.5* 19.2* 18.7* 18.8*   HGB 7.6* 7.6* 7.4* 7.4*   MCV 93 94 93 93     --  336 311     Most Recent 3 BMP's:Recent Labs   Lab Test 03/09/23  0533 03/08/23  0532 03/07/23  2109    136 138   POTASSIUM 4.2 4.2 4.3   CHLORIDE 106 103 107   CO2 23 21* 19*   BUN 7.8 8.0 8.8   CR 0.51* 0.53* 0.47*   ANIONGAP 10 12 12    FAISAL 8.3* 8.0* 7.4*   GLC 80 78 102*       Discharge Medications   Discharge Medication List as of 3/9/2023 12:15 PM      START taking these medications    Details   vancomycin (VANCOCIN) 125 MG capsule Take 1 capsule (125 mg) by mouth 4 times daily for 4 days, Disp-14 capsule, R-0, E-Prescribe         CONTINUE these medications which have NOT CHANGED    Details   acetaminophen (TYLENOL) 325 MG tablet Take 2 tablets (650 mg) by mouth every 6 hours, Disp-30 tablet, R-0, E-Prescribe      buprenorphine (BUTRANS) 10 MCG/HR WK patch Place 1 patch onto the skin once a week, Disp-10 patch, R-0, E-Prescribe      cyanocobalamin (VITAMIN B-12) 100 MCG tablet Take 100 mcg by mouth daily, Historical      loperamide (IMODIUM) 2 MG capsule Take 2 capsules (4 mg) by mouth 4 times daily as needed for diarrhea (Use if pt having having diarrhea, do not use if pt is constipated), Disp-30 capsule, R-0, LILLIE, E-Prescribe      melatonin 1 MG TABS tablet Take 1 tablet (1 mg) by mouth nightly as needed for sleep, Disp-30 tablet, R-0, E-Prescribe      metoprolol tartrate (LOPRESSOR) 25 MG tablet Take 1 tablet (25 mg) by mouth 2 times daily, Disp-60 tablet, R-1, E-Prescribe      naloxone (NARCAN) 4 MG/0.1ML nasal spray Spray 1 spray (4 mg) into one nostril alternating nostrils as needed for opioid reversal every 2-3 minutes until assistance arrives, Disp-0.2 mL, R-0, E-Prescribe      oxyCODONE (ROXICODONE) 5 MG tablet Take 1 tablet (5 mg) by mouth every 6 hours as needed for severe pain (7-10) or moderate pain (4-6), Disp-15 tablet, R-0, E-Prescribe      potassium chloride ER (KLOR-CON M) 20 MEQ CR tablet Take 1 tablet (20 mEq) by mouth 2 times daily, Disp-28 tablet, R-0, E-Prescribe      prochlorperazine (COMPAZINE) 5 MG tablet Take 1 tablet (5 mg) by mouth every 6 hours as needed for nausea or vomiting, Disp-20 tablet, R-0, E-Prescribe      sennosides (SENOKOT) 8.6 MG tablet Take 2-4 tablets by mouth 2 times daily as needed for  constipation, Disp-30 tablet, R-0, E-Prescribe      thiamine (B-1) 100 MG tablet Take 1 tablet (100 mg) by mouth daily, Disp-30 tablet, R-0, E-Prescribe      triamcinolone (KENALOG) 0.1 % external ointment Apply topically 2 times daily as needed for irritationDisp-15 g, [id number]      Vitamin D, Cholecalciferol, 25 MCG (1000 UT) TABS Take 1 tablet by mouth daily, Historical         STOP taking these medications       dexamethasone (DECADRON) 4 MG tablet Comments:   Reason for Stopping:             Allergies   Allergies   Allergen Reactions     Cefepime Rash     Morbiliform rash     Tegaderm Chg Dressing [Chlorhexidine]      Tegaderm Transparent Dressing (Informational Only) Itching     Tegaderm dressing; Okay for short periods of time

## 2023-03-10 NOTE — PROGRESS NOTES
[facility]    Background: Transitional Care Management program identified per system criteria and reviewed by [facility] team for possible outreach.    Assessment: Upon chart review, [facility] Team member will not proceed with patient outreach related to this episode of Transitional Care Management program due to reason below:    Patient has a follow up appointment with an appropriate provider today for hospital discharge    Plan: Transitional Care Management episode addressed appropriately per reason noted above.      Dea Lopez RN  [facility], [facility]    *Connected Care Resource Team does NOT follow patient ongoing. Referrals are identified based on internal discharge reports and the outreach is to ensure patient has an understanding of their discharge instructions.

## 2023-03-13 NOTE — PROGRESS NOTES
Clinic Care Coordination Contact  [facility] [place]/Voicemail      Clinical Data: [facility] Outreach    Outreach attempted x 1 regarding CCC enrollment.  Left message on patient's voicemail with call back information and requested return call.    Plan: [facility] will attempt another outreach to the patient via phone regarding enrollment into CCC in 1-2 business days.    ADRIANO Nicolas  Clinic Care Coordination   [facility]   Phone: [phone]  [email or web address]

## 2023-03-14 NOTE — PROGRESS NOTES
[facility] Oral Chemotherapy Monitoring Program    Thank you for the opportunity to be a part in the care of this patient's oral chemotherapy. The oncology pharmacy will no longer be following this patient for oral chemotherapy. If there are any questions or the plan changes, feel free to contact us.    ORAL CHEMOTHERAPY 10/19/2022 11/21/2022 11/28/2022 12/22/2022 12/28/2022 1/27/2023 3/14/2023   Assessment Type Discontinuation Initial Work up New Teach Initial Follow up Refill Refill Discontinuation   Stop Date 10/18/2022 - - - - - -   Reason for Discontinuation Therapy complete - - - - - -   Diagnosis Code (No Data) (No Data) (No Data) (No Data) (No Data) (No Data) -   Providers Dr. Morales Sims   [facility]   Drug Name Etoposide Temodar (temozolomide) Temodar (temozolomide) Temodar (temozolomide) Temodar (temozolomide) Temodar (temozolomide) Temodar (temozolomide)   Dose - 250 mg 250 mg 250 mg 250 mg 250 mg -   Current Schedule - Daily Daily Daily Daily Daily -   Cycle Details - (No Data) 5 days on, 23 days off 5 days on, 23 days off 5 days on, 23 days off 5 days on, 23 days off -   Start Date of Last Cycle - - - 11/28/2022 - - -   Planned next cycle start date - 11/30/2022 11/28/2022 1/2/2022 1/2/2023 1/30/2023 -   Doses missed in last 2 weeks - - - 0 - - -   Adherence Assessment - - - Adherent - - -   Adverse Effects - - - No AE identified during assessment - - -   Nausea - - - - - - -   Pharmacist Intervention(nausea) - - - - - - -   Any new drug interactions? - - - No - - -   Is the dose as ordered appropriate for the patient? - - - Yes - - -   Is the patient currently in pain? - - - - - - -   Has the patient been assessed within the past 6 months for depression? - - - - - - -   Has  the patient missed any days of school, work, or other routine activity? - - - - - - -   Since the last time we talked, have you been hospitalized or used the emergency room? - - - Yes - - -   What medical service did you use? - - - Hospitalization - - -   How many hospitalizations? - - - 1 - - -   How many hospitalizations were related to the cancer medication? - - - 0 - - -     Dr Morales Sims confirmed no longer using oral temozolomide.  Marcella Lennon, PharmD, [facility]  Oral Chemotherapy Monitoring Program  [facility]  [phone]

## 2023-03-14 NOTE — PROGRESS NOTES
Clinic Care Coordination Contact  Community Health Worker Initial Outreach  Spoke with Mariela Gamino (Mother)    CHW Initial Information Gathering:  Referral Source: IP Report  Current living arrangement:: I live in a private home with family  Type of residence:: Private home - stairs     Patient accepts CC: Yes. Patient scheduled for assessment with LIEN Guillory  on 3/14/23 at 2PM. Patient noted desire to discuss CCC services and support with navigating medical appointments and community resources.     Chart Review: Referral from [facility]. In-patient from 3/2/23 to 3/9/23  Reason for Referral: Clarify CCC services       CHW introduced self and role with CCC. CHW inquired if patient needs any support or resources. Mariela Gamino shares that she cancelled 3/6 hospital follow up because patient still has swelling in both feet from IV treatment in hospital. It's difficult for patient to walk. Hospital follow up is rescheduled on 3/21 at 9:45AM with Dr. Nelson.     CHW encouraged consult with nurse triage today regarding swelling in feet. Mariela Gamino agrees but had to end the call for another appointment and would like nurse to call her.     Milagros Atkins  Clinic Care Coordination  [facility]    Phone: [phone]

## 2023-03-15 NOTE — PROGRESS NOTES
Clinic Care Coordination Contact    Clinic Care Coordination Contact  OUTREACH    Referral Information:         Chief Complaint   Patient presents with     Clinic Care Coordination - Initial        Universal Utilization:      Utilization    Hospital Admissions  5             ED Visits  3             No Show Count (past year)  14                Current as of: 3/14/2023 11:38 PM          RN CC spoke with mom, she is primary care giver and notes that patient is present during call but may not understand  I do not see a consent of file, only ACD - discussed adding consent   Discussed acute concern for leg swelling - recommended follow up with speciality who discharged and if concern to take to ED for evaluation   RN CC suspended assessment to allow for family to follow up   Will call after follow up vsiit   Family agree and will reach out if need CC support earlier.         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)     Neutropenic fever (H)     SBO (small bowel obstruction) (H)     Hypokalemia      Medication Management:  Current Outpatient Medications   Medication     acetaminophen (TYLENOL) 325 MG tablet     buprenorphine (BUTRANS) 10 MCG/HR WK patch     cyanocobalamin (VITAMIN B-12) 100 MCG tablet     loperamide (IMODIUM) 2 MG capsule     melatonin 1 MG TABS tablet     metoprolol tartrate (LOPRESSOR) 25 MG tablet     naloxone (NARCAN) 4 MG/0.1ML nasal spray     oxyCODONE (ROXICODONE) 5 MG tablet     potassium chloride ER (KLOR-CON M) 20 MEQ CR tablet     prochlorperazine (COMPAZINE) 5 MG tablet     sennosides (SENOKOT) 8.6 MG tablet     thiamine (B-1) 100 MG tablet     triamcinolone (KENALOG) 0.1 % external ointment     Vitamin D, Cholecalciferol, 25  MCG (1000 UT) TABS     No current facility-administered medications for this visit.     Facility-Administered Medications Ordered in Other Visits   Medication     sodium chloride (PF) 0.9% PF flush 30 mL          Care Plan:  Care Plan: General     Problem: HP GENERAL PROBLEM     Goal: I will update documentation to have consent to communicate updated in chart to reflect family within 1 month                   Care Plan: Medical     Problem: HP GENERAL PROBLEM     Goal: I will work with oncology and PCP to develop a plan of care                       Patient/Caregiver understanding: Patient verbalized understanding via interpretive services. Engaged in [employer]ID[employer] communication during visit         Future Appointments              In 2 days Farzaneh Young MD; VIDEO,  [facility], [facility]    In 6 days Sima Nelson MD [facility]    In 2 weeks [facility] CCC RN [facility]          Plan:   Patient will schedule and attend recommended follow up visits with speciality providers and primary care provider.    RN CC will outreach in 2 weeks to support ongoing recommendations and plan of care will be available sooner if needed.

## 2023-03-29 NOTE — PROGRESS NOTES
"   Clinic Care Coordination Contact    Follow Up Progress Note      Assessment:    RN CC outreach to family for status update  Pt has no show for several visit, mom reports that his \"legs are puffy\" and it is unable to stand  RN CC explained the importance of follow up   Mom declined support to schedule but was ok reaching out to daughter - left message with daughter  After additional conversation, mom was open to scheduling a visit at [facility]. Declined assistance to schedule visit with oncology or palliative team   Set up visit for next week, at patient family request     Care Gaps:    Health Maintenance Due   Topic Date Due     Pneumococcal Vaccine: Pediatrics (0 to 5 Years) and At-Risk Patients (6 to 64 Years) (1 - PCV) Never done     HIV SCREENING  Never done     YEARLY PREVENTIVE VISIT  10/12/2016     DTAP/TDAP/TD IMMUNIZATION (7 - Td or Tdap) 10/17/2017     INFLUENZA VACCINE (1) 09/01/2022     COVID-19 Vaccine (4 - Booster for Pfizer series) 10/21/2022       address acute admission issues then address care gaps    Care Plans  Care Plan: General     Problem: HP GENERAL PROBLEM     Goal: I will update documentation to have consent to communicate updated in chart to reflect family within 1 month                   Care Plan: Medical     Problem: HP GENERAL PROBLEM     Goal: I will work with oncology and PCP to develop a plan of care                       Intervention/Education provided during outreach: schedule ED follow up          Plan: Patient will schedule and attend recommended follow up visits with speciality providers and primary care provider.  Future Appointments   Date Time Provider Department Center   4/7/2023 10:00 AM Sima Nelson MD [facility]     Community Health Worker to outreach per standard work and updated on goal progression      RN CC will outreach following PCP visit  to support ongoing recommendations and plan of care will be available sooner if needed.    "

## 2023-03-31 NOTE — NURSING NOTE
Is the patient currently in the state of [place]? YES    Visit mode:VIDEO    If the visit is dropped, the patient can be reconnected by: VIDEO VISIT: Send to e-mail at: [email or web address]    Will anyone else be joining the visit? YES: How would they like to receive their invitation?       How would you like to obtain your AVS? MyChart    Are changes needed to the allergy or medication list? NO    Reason for visit: Return CCSL      Kimi Atkins       ABRASION/BLEEDING/PAIN

## 2023-03-31 NOTE — PROGRESS NOTES
Virtual Visit Details    Type of service:  Video Visit   Video Start Time: 1:15 PM  Video End Time:1:45 PM    Originating Location (pt. Location): Home    Distant Location (provider location):  On-site  Platform used for Video Visit: Valentina

## 2023-03-31 NOTE — LETTER
3/31/2023         RE: Diego Buchanan  [address]  [place] [address]        Dear Colleague,    Thank you for referring your patient, Diego Buchanan, to the [facility]. Please see a copy of my visit note below.      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 He presented initially with abdominal pain, diarrhea, and progressive abdominal distention for 3 months duration. 2. Initial CT scan in February 2020 showed severe peritoneal carcinomatosis with large peritoneal tumor implants throughout the entire abdomen and pelvis, but mostly prominently in the pelvis.   2. PETCT scan showed interval increase in the amount of peritoneal carcinomatosis.  3. On 3/12/2020, he had ultrasound-guided core needle biopsy of a peritoneal implant (Case: [id number]), which showed a completely undifferentiated appearance, but stains with pancytokeratin, indicating an epithelial origin. 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 Tumor markers for CA-19-9, CEA, beta-hCG, alpha-fetoprotein were unremarkable. Cancer type ID molecular testing by [employer] sent to determine the exact diagnosis and to assist in further treatment planning. 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 Relative probability of less than 5% of other subtype of sarcoma. [id number] fusion detected.  4. 5/1/2020, MRI brain negative for brain metastasis, and baseline CT-CAP obtained showing extensive mixed solid and cystic masses throughout the abdomen and pelvis consistent with peritoneal carcinomatosis. Largest mass measures approximately 20 cm in the pelvis. Metastatic mixed solid and cystic masses seen in hepatic segments 5 and 6 and hepatic segment 7. The masses appear to be  contiguous with the retrohepatic peritoneal carcinomatosis. Small volume fluid about the spleen favored to represent malignant ascites, stable mild-to-moderate right hydronephrosis related to mass effect upon the distal ureter in the pelvis, the IVC and iliac veins are compressed due to the extensive peritoneal carcinomatosis without findings to suggest deep venous thrombosis.  5. 5/4/2020, he begins CAV/IMV alternating chemotherapy. He receives 6 cycles of treatment. He symptomatically improves.  6. 9/11/2020, CT-CAP shows stable to mildly improved extensive peritoneal carcinomatosis. He would like to omit Ifosfamide/etoposide cycles and continue only with CAV.  7. 10/9/2020, he starts CAV every 21 days.  8. 1/6/2021, CT CAP showed a mixed response in the mixed solid and cystic masses throughout the peritoneum. Some of the tumors are stable to mildly worse, with some of the peritoneal masses a bit larger, a few are smaller, one cystic tumor in the left abdomen is smaller, while tumors lower in the pelvis appear slightly larger.  9. 3/24/2021, CT CAP showed continued slight decrease in overall burden of peritoneal carcinomatosis with persistent encasement of bowel without evidence of bowel obstruction.  10. 6/25/2021, he receives cycle 19 CAV, then takes a chemotherapy holiday.  11. 4/22/2022, he resumes CAV/IMV chemotherapy.  12. 7/13/22, CT CAP showed interval enlargement of hepatic and splenic metastases with other overall disease stable. CAV/IMV continued.  13.  10/04/22, CT CAP  showed evidence of progressive disease, predominantly in liver. There are small sub-centimeter lung nodules, which are suspicious for metastasis. CAV/IMV discontinued. Start irinotecan/temozolomide (TMZ) on 11/28/22.   14. 12/7/22-12/21/22: Hospitalized for neutropenic fever with RSV, rash (drug vs. Viral exanthem), and pancytopenia with transfusions.   15. 1/30/23, He received cycle 2 of TMZ/irinotecan with 10% dose reduction of  irinotecan. Treatment was delayed due to hospitalization and need for additional recovery (ongoing fatigue and diarrhea) as well as planned port placement.   16. 2/10/23-2/20/23, he was hospitalized for impaired oral intake and subsequent electrolyte disturbances s/t an increase in peritoneal carcinomatosis. Stool studies given diarrhea were negative. Imodium was used for diarrhea management. He required blood transfusions for anemia. Was seen by palliative and started on Butrans, oxycodone and tylenol for pain.     History of Present Illness:  Robert Buchanan is a 27 year old man with metastatic desmoplastic small round cell tumor. His last dose of TMZ/irinotecan was in January 2023. He was thereafter hospitalized in February.    He was again recently admitted to the hospital for hypokalemia and hypomagnesemia from c difficile diarrhea. He was treated with PO Vancomycin. He also had CT-CAP on 3/4/23, which showed progression of metastases in chest, with new left pleural effusion, and overall stable appearing disease in the abdomen/pelvis.     However, since then he has had a further decline in overall status and has been unable to leave his home. Today he is unable to walk on his own. His feet are swollen and painful. He spends the majority of time laying in bed and family notes he's been losing a lot of weight. He is also having increasing difficulty with early satiety. Despite the weight loss, his abdomen has gotten a lot bigger and protrudes more.    Robert also notes he is having loose stools. He is worried that he may be infectious because of prior history of c diff and doesn't want to get his family sick. He is having trouble controlling his stooling, though, and sometimes does not make it to the bathroom in time. Fortunately he is wearing adult undergarments, which helps. He mentioned today that he is scared that the cancer will take over his body.     Current Outpatient Medications   Medication Sig Dispense Refill     acetaminophen (TYLENOL) 325 MG tablet Take 2 tablets (650 mg) by mouth every 6 hours 30 tablet 0    buprenorphine (BUTRANS) 10 MCG/HR WK patch Place 1 patch onto the skin once a week 10 patch 0    cyanocobalamin (VITAMIN B-12) 100 MCG tablet Take 100 mcg by mouth daily      loperamide (IMODIUM) 2 MG capsule Take 2 capsules (4 mg) by mouth 4 times daily as needed for diarrhea (Use if pt having having diarrhea, do not use if pt is constipated) 30 capsule 0    melatonin 1 MG TABS tablet Take 1 tablet (1 mg) by mouth nightly as needed for sleep 30 tablet 0    metoprolol tartrate (LOPRESSOR) 25 MG tablet Take 1 tablet (25 mg) by mouth 2 times daily 60 tablet 1    naloxone (NARCAN) 4 MG/0.1ML nasal spray Spray 1 spray (4 mg) into one nostril alternating nostrils as needed for opioid reversal every 2-3 minutes until assistance arrives 0.2 mL 0    oxyCODONE (ROXICODONE) 5 MG tablet Take 1 tablet (5 mg) by mouth every 6 hours as needed for severe pain (7-10) or moderate pain (4-6) 15 tablet 0    potassium chloride ER (KLOR-CON M) 20 MEQ CR tablet Take 1 tablet (20 mEq) by mouth 2 times daily 28 tablet 0    prochlorperazine (COMPAZINE) 5 MG tablet Take 1 tablet (5 mg) by mouth every 6 hours as needed for nausea or vomiting 20 tablet 0    sennosides (SENOKOT) 8.6 MG tablet Take 2-4 tablets by mouth 2 times daily as needed for constipation 30 tablet 0    thiamine (B-1) 100 MG tablet Take 1 tablet (100 mg) by mouth daily 30 tablet 0    triamcinolone (KENALOG) 0.1 % external ointment Apply topically 2 times daily as needed for irritation 15 g 0    Vitamin D, Cholecalciferol, 25 MCG (1000 UT) TABS Take 1 tablet by mouth daily       Physical Exam:  There were no vitals taken for this visit.  Wt Readings from Last 10 Encounters:   03/09/23 108.2 kg (238 lb 9.6 oz)   02/19/23 101 kg (222 lb 11.2 oz)   02/02/23 111.1 kg (244 lb 14.4 oz)   01/31/23 109.6 kg (241 lb 11.2 oz)   01/30/23 109.3 kg (240 lb 14.4 oz)   01/30/23 109.3 kg  (240 lb 15.4 oz)   01/20/23 111.4 kg (245 lb 9.5 oz)   01/18/23 110.8 kg (244 lb 4.8 oz)   01/16/23 111.3 kg (245 lb 4.8 oz)   01/06/23 113.8 kg (250 lb 12.8 oz)   General: Fatigued appearing male, laying in bed on his side. In no acute distress.  Eyes: EOMI, PERRL. No scleral icterus.  Respiratory: He appears to take shallow breaths. No accessory muscle use. STAHL. Able to speak in phrases (baseline). Gastrointestinal: Abdomen rounded, distended.  Neurologic: Cranial nerves II through XII are grossly intact.  Skin: No rashes, petechiae, or bruising noted on exposed skin.  Psych: Mood is depressed and anxious.     Labs:   Most Recent 3 CBC's:  Recent Labs   Lab Test 03/09/23  0533 03/08/23  0532 03/07/23  0532 03/06/23  0436 03/03/23  1257 03/03/23  0521 03/02/23  1637 03/02/23  1258 02/02/23  1335 01/31/23  1343   WBC 19.5* 19.2* 18.7* 18.8*   < > 16.0*   < > 21.5*   < > 18.0*   HGB 7.6* 7.6* 7.4* 7.4*   < > 6.2*   < > 7.4*   < > 8.1*   MCV 93 94 93 93   < > 92   < > 87   < > 90     --  336 311   < > 242   < > 267   < > 435   ANEUTAUTO  --   --   --   --   --  13.2*  --  17.8*  --  15.8*    < > = values in this interval not displayed.     Most Recent 3 BMP's:  Recent Labs   Lab Test 03/09/23  0533 03/08/23  0532 03/07/23  2109 03/03/23  1548 03/03/23  0521 03/02/23  1637 03/02/23  1258 02/11/23  0545 02/10/23  1017    136 138   < > 142   < > 140   < > 131*   POTASSIUM 4.2 4.2 4.3   < > 2.8*  2.8*   < > 2.2*   < > 4.2   CHLORIDE 106 103 107   < > 106   < > 99   < > 96*   CO2 23 21* 19*   < > 24   < > 27   < > 19*   BUN 7.8 8.0 8.8   < > 1.9*   < > 1.5*   < > 16.0   CR 0.51* 0.53* 0.47*   < > 0.66*   < > 0.56*   < > 1.11   ANIONGAP 10 12 12   < > 12   < > 14   < > 16*   FAISAL 8.3* 8.0* 7.4*   < > 6.5*   < > 6.9*   < > 9.5   GLC 80 78 102*   < > 74   < > 83   < > 131*   PROTTOTAL  --   --   --   --  6.0*  --  6.9  --  8.3   ALBUMIN  --   --  2.6*  --  2.5*  --  2.9*  --  4.0    < > = values in this interval  not displayed.    Most Recent 3 LFT's:  Recent Labs   Lab Test 03/03/23  0521 03/02/23  1258 02/10/23  1017   AST 14 19 10   ALT 6* 6* 22   ALKPHOS 98 117 114   BILITOTAL 0.6 0.8 1.3*     I reviewed the above labs today.    IMAGING  CT Chest/Abdomen/Pelvis w Contrast  Narrative: EXAMINATION: CT CHEST/ABDOMEN/PELVIS W CONTRAST, 3/4/2023 9:37 AM    TECHNIQUE:  Helical CT images from the thoracic inlet through the  symphysis pubis were obtained  with contrast.     Contrast dose: iopamidol (ISOVUE-370) solution 135 mL       COMPARISON: 2/10/2023 CT CAP    HISTORY: Increased abdominal pain and diarrhea; evaluate for interval  change or malignancy progression from previous CT 1 month ago.  Desmoplastic small round cell tumor.    FINDINGS:  Lower neck: Multiple enlarged cervical lymph nodes without significant  change compared to prior study.    Chest: The central tracheal bronchial tree is patent. Heart size is  normal. Innumerable centrally necrotic mediastinal and hilar lymph  nodes, increased in size compared to prior study, for example left  hilar node measuring 3.2 cm previously measured 2.7 cm. Nodular  interstitial thickening in the right and left lower lobes. Increased  size of bilateral pulmonary nodules, for example right upper lobe  nodule (series 5, image 58) measuring 11 mm previously measured 8 mm.  Multiple left lower lobe nodules cannot be measured due to surrounding  atelectasis. Small left pleural effusion. No pneumothorax.    Abdomen and pelvis: Stable metastatic burden throughout the abdomen  and pelvis including but not limited to metastatic lesions throughout  the liver, spleen, extensive/diffuse/profound peritoneal  carcinomatosis that exerts mass effect on the bladder, large and small  bowel, stomach. No free fluid in the abdomen or pelvis.    Bones and soft tissues: Stable appearing sclerotic metastases in the  S1 vertebral body and manubrium.  Impression: IMPRESSION:   1.  Increasing metastatic  lesions in the chest with new left pleural  effusion and nodular interstitial thickening which may be due to  atelectasis or lymphangitic spread.   2.  Stable metastatic burden in abdomen and pelvis as described in the  report.   3.  No new bowel abnormality.    I have personally reviewed the examination and initial interpretation  and I agree with the findings.    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   Diego was recently on ttreatment with irinotecan and Temodar. With cycle 2, which began on 1/30/23, irinotecan was dose reduced by 10% due to prolonged neutropenia. He was then hospitalized from 2/10/23-2/20/23 due to inability to maintain adequate oral intake along with pancytopenia, loose stools (stool studies negative for infection) and poor pain control. He was again hospitalized 3/2/23-3/9/23 with c difficile diarrhea, electrolyte abnormalities, tachycardia.    Given the inability to treat Diego given his current clinical status, his prognosis is unfortunately poor given the extent of his disease. Prioritizing his comfort is a reasonable goal at this time. The family prefers that he remain home with them and that they be responsible for his daily cares. We discussed a comfort focussed approach at home to keep Robert comfortable. We discussed having adequate pain control and control of anxiety. We discussed hospice and the support they provide and the family feels hospice would be the best next step in his care. I explained the hospice benefit would mean he would no longer have outpatient clinical visits. They understand and agree to proceed with home hospice. Hospice referrral placed.    It has been an absolute privilege to be involved in Robert's care. I will gladly serve as his hospice physician.    -hospice referral    Diarrhea, possible c  jason Jones is having diarrhea and is unable at time to control his stooling. Given his recent c difficile diagnosis, I will treat for recurrent c diff. As it is not possible to test him I will send oral vancomycin, 14 day supply to help with infectious diarrhea, which should also contribute to his comfort. Robert and family should be sure to wash their hands frequently and clean all bathroom surfaces regularly.  -PO vancomycin    Abdominal bloating/cancer pain.  This is secondary to his known peritoneal carcinomatosis. He is following with palliative medicine. Pain is still problematic. He is on Butrans. He has oxycodone available. I explained the use of morphine concentrate to the family and they prefer it as needed rather than oxycodone tabs for more rapid onset of pain relief.    -send morphine concentrate to pharmacy    Anxiety  Due to rising anxiety with this process, I will send prescription for Ativan for as needed use.     -send lorazepam prescription to pharmacy    Farzaneh Burciaga M.D.   of Medicine  Hematology, Oncology and Transplantation  Pager: [phone]        Virtual Visit Details    Type of service:  Video Visit   Video Start Time: 1:15 PM  Video End Time:1:45 PM    Originating Location (pt. Location): Home    Distant Location (provider location):  On-site  Platform used for Video Visit: Valentina

## 2023-03-31 NOTE — PROGRESS NOTES
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 He presented initially with abdominal pain, diarrhea, and progressive abdominal distention for 3 months duration. 2. Initial CT scan in February 2020 showed severe peritoneal carcinomatosis with large peritoneal tumor implants throughout the entire abdomen and pelvis, but mostly prominently in the pelvis.   2. PETCT scan showed interval increase in the amount of peritoneal carcinomatosis.  3. On 3/12/2020, he had ultrasound-guided core needle biopsy of a peritoneal implant (Case: [id number]), which showed a completely undifferentiated appearance, but stains with pancytokeratin, indicating an epithelial origin. 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 Tumor markers for CA-19-9, CEA, beta-hCG, alpha-fetoprotein were unremarkable. Cancer type ID molecular testing by [employer] sent to determine the exact diagnosis and to assist in further treatment planning. 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 Relative probability of less than 5% of other subtype of sarcoma. EWSR1-WT1 fusion detected.  4. 5/1/2020, MRI brain negative for brain metastasis, and baseline CT-CAP obtained showing extensive mixed solid and cystic masses throughout the abdomen and pelvis consistent with peritoneal carcinomatosis. Largest mass measures approximately 20 cm in the pelvis. Metastatic mixed solid and cystic masses seen in hepatic segments 5 and 6 and hepatic segment 7. The masses appear to be contiguous with the retrohepatic peritoneal carcinomatosis. Small volume fluid about the spleen favored to represent malignant ascites, stable mild-to-moderate right hydronephrosis related to mass effect upon the distal ureter in the pelvis, the IVC and iliac  veins are compressed due to the extensive peritoneal carcinomatosis without findings to suggest deep venous thrombosis.  5. 5/4/2020, he begins CAV/IMV alternating chemotherapy. He receives 6 cycles of treatment. He symptomatically improves.  6. 9/11/2020, CT-CAP shows stable to mildly improved extensive peritoneal carcinomatosis. He would like to omit Ifosfamide/etoposide cycles and continue only with CAV.  7. 10/9/2020, he starts CAV every 21 days.  8. 1/6/2021, CT CAP showed a mixed response in the mixed solid and cystic masses throughout the peritoneum. Some of the tumors are stable to mildly worse, with some of the peritoneal masses a bit larger, a few are smaller, one cystic tumor in the left abdomen is smaller, while tumors lower in the pelvis appear slightly larger.  9. 3/24/2021, CT CAP showed continued slight decrease in overall burden of peritoneal carcinomatosis with persistent encasement of bowel without evidence of bowel obstruction.  10. 6/25/2021, he receives cycle 19 CAV, then takes a chemotherapy holiday.  11. 4/22/2022, he resumes CAV/IMV chemotherapy.  12. 7/13/22, CT CAP showed interval enlargement of hepatic and splenic metastases with other overall disease stable. CAV/IMV continued.  13.  10/04/22, CT CAP  showed evidence of progressive disease, predominantly in liver. There are small sub-centimeter lung nodules, which are suspicious for metastasis. CAV/IMV discontinued. Start irinotecan/temozolomide (TMZ) on 11/28/22.   14. 12/7/22-12/21/22: Hospitalized for neutropenic fever with RSV, rash (drug vs. Viral exanthem), and pancytopenia with transfusions.   15. 1/30/23, He received cycle 2 of TMZ/irinotecan with 10% dose reduction of irinotecan. Treatment was delayed due to hospitalization and need for additional recovery (ongoing fatigue and diarrhea) as well as planned port placement.   16. 2/10/23-2/20/23, he was hospitalized for impaired oral intake and subsequent electrolyte disturbances  s/t an increase in peritoneal carcinomatosis. Stool studies given diarrhea were negative. Imodium was used for diarrhea management. He required blood transfusions for anemia. Was seen by palliative and started on Butrans, oxycodone and tylenol for pain.     History of Present Illness:  Robert Buchanan is a 27 year old man with metastatic desmoplastic small round cell tumor. His last dose of TMZ/irinotecan was in January 2023. He was thereafter hospitalized in February.    He was again recently admitted to the hospital for hypokalemia and hypomagnesemia from c difficile diarrhea. He was treated with PO Vancomycin. He also had CT-CAP on 3/4/23, which showed progression of metastases in chest, with new left pleural effusion, and overall stable appearing disease in the abdomen/pelvis.     However, since then he has had a further decline in overall status and has been unable to leave his home. Today he is unable to walk on his own. His feet are swollen and painful. He spends the majority of time laying in bed and family notes he's been losing a lot of weight. He is also having increasing difficulty with early satiety. Despite the weight loss, his abdomen has gotten a lot bigger and protrudes more.    Robert also notes he is having loose stools. He is worried that he may be infectious because of prior history of c diff and doesn't want to get his family sick. He is having trouble controlling his stooling, though, and sometimes does not make it to the bathroom in time. Fortunately he is wearing adult undergarments, which helps. He mentioned today that he is scared that the cancer will take over his body.     Current Outpatient Medications   Medication Sig Dispense Refill     acetaminophen (TYLENOL) 325 MG tablet Take 2 tablets (650 mg) by mouth every 6 hours 30 tablet 0     buprenorphine (BUTRANS) 10 MCG/HR WK patch Place 1 patch onto the skin once a week 10 patch 0     cyanocobalamin (VITAMIN B-12) 100 MCG tablet Take 100 mcg by  mouth daily       loperamide (IMODIUM) 2 MG capsule Take 2 capsules (4 mg) by mouth 4 times daily as needed for diarrhea (Use if pt having having diarrhea, do not use if pt is constipated) 30 capsule 0     melatonin 1 MG TABS tablet Take 1 tablet (1 mg) by mouth nightly as needed for sleep 30 tablet 0     metoprolol tartrate (LOPRESSOR) 25 MG tablet Take 1 tablet (25 mg) by mouth 2 times daily 60 tablet 1     naloxone (NARCAN) 4 MG/0.1ML nasal spray Spray 1 spray (4 mg) into one nostril alternating nostrils as needed for opioid reversal every 2-3 minutes until assistance arrives 0.2 mL 0     oxyCODONE (ROXICODONE) 5 MG tablet Take 1 tablet (5 mg) by mouth every 6 hours as needed for severe pain (7-10) or moderate pain (4-6) 15 tablet 0     potassium chloride ER (KLOR-CON M) 20 MEQ CR tablet Take 1 tablet (20 mEq) by mouth 2 times daily 28 tablet 0     prochlorperazine (COMPAZINE) 5 MG tablet Take 1 tablet (5 mg) by mouth every 6 hours as needed for nausea or vomiting 20 tablet 0     sennosides (SENOKOT) 8.6 MG tablet Take 2-4 tablets by mouth 2 times daily as needed for constipation 30 tablet 0     thiamine (B-1) 100 MG tablet Take 1 tablet (100 mg) by mouth daily 30 tablet 0     triamcinolone (KENALOG) 0.1 % external ointment Apply topically 2 times daily as needed for irritation 15 g 0     Vitamin D, Cholecalciferol, 25 MCG (1000 UT) TABS Take 1 tablet by mouth daily       Physical Exam:  There were no vitals taken for this visit.  Wt Readings from Last 10 Encounters:   03/09/23 108.2 kg (238 lb 9.6 oz)   02/19/23 101 kg (222 lb 11.2 oz)   02/02/23 111.1 kg (244 lb 14.4 oz)   01/31/23 109.6 kg (241 lb 11.2 oz)   01/30/23 109.3 kg (240 lb 14.4 oz)   01/30/23 109.3 kg (240 lb 15.4 oz)   01/20/23 111.4 kg (245 lb 9.5 oz)   01/18/23 110.8 kg (244 lb 4.8 oz)   01/16/23 111.3 kg (245 lb 4.8 oz)   01/06/23 113.8 kg (250 lb 12.8 oz)   General: Fatigued appearing male, laying in bed on his side. In no acute  distress.  Eyes: EOMI, PERRL. No scleral icterus.  Respiratory: He appears to take shallow breaths. No accessory muscle use. STAHL. Able to speak in phrases (baseline). Gastrointestinal: Abdomen rounded, distended.  Neurologic: Cranial nerves II through XII are grossly intact.  Skin: No rashes, petechiae, or bruising noted on exposed skin.  Psych: Mood is depressed and anxious.     Labs:   Most Recent 3 CBC's:  Recent Labs   Lab Test 03/09/23  0533 03/08/23  0532 03/07/23  0532 03/06/23  0436 03/03/23  1257 03/03/23  0521 03/02/23  1637 03/02/23  1258 02/02/23  1335 01/31/23  1343   WBC 19.5* 19.2* 18.7* 18.8*   < > 16.0*   < > 21.5*   < > 18.0*   HGB 7.6* 7.6* 7.4* 7.4*   < > 6.2*   < > 7.4*   < > 8.1*   MCV 93 94 93 93   < > 92   < > 87   < > 90     --  336 311   < > 242   < > 267   < > 435   ANEUTAUTO  --   --   --   --   --  13.2*  --  17.8*  --  15.8*    < > = values in this interval not displayed.     Most Recent 3 BMP's:  Recent Labs   Lab Test 03/09/23  0533 03/08/23  0532 03/07/23  2109 03/03/23  1548 03/03/23  0521 03/02/23  1637 03/02/23  1258 02/11/23  0545 02/10/23  1017    136 138   < > 142   < > 140   < > 131*   POTASSIUM 4.2 4.2 4.3   < > 2.8*  2.8*   < > 2.2*   < > 4.2   CHLORIDE 106 103 107   < > 106   < > 99   < > 96*   CO2 23 21* 19*   < > 24   < > 27   < > 19*   BUN 7.8 8.0 8.8   < > 1.9*   < > 1.5*   < > 16.0   CR 0.51* 0.53* 0.47*   < > 0.66*   < > 0.56*   < > 1.11   ANIONGAP 10 12 12   < > 12   < > 14   < > 16*   FAISAL 8.3* 8.0* 7.4*   < > 6.5*   < > 6.9*   < > 9.5   GLC 80 78 102*   < > 74   < > 83   < > 131*   PROTTOTAL  --   --   --   --  6.0*  --  6.9  --  8.3   ALBUMIN  --   --  2.6*  --  2.5*  --  2.9*  --  4.0    < > = values in this interval not displayed.    Most Recent 3 LFT's:  Recent Labs   Lab Test 03/03/23  0521 03/02/23  1258 02/10/23  1017   AST 14 19 10   ALT 6* 6* 22   ALKPHOS 98 117 114   BILITOTAL 0.6 0.8 1.3*     I reviewed the above labs today.    IMAGING  CT  Chest/Abdomen/Pelvis w Contrast  Narrative: EXAMINATION: CT CHEST/ABDOMEN/PELVIS W CONTRAST, 3/4/2023 9:37 AM    TECHNIQUE:  Helical CT images from the thoracic inlet through the  symphysis pubis were obtained  with contrast.     Contrast dose: iopamidol (ISOVUE-370) solution 135 mL       COMPARISON: 2/10/2023 CT CAP    HISTORY: Increased abdominal pain and diarrhea; evaluate for interval  change or malignancy progression from previous CT 1 month ago.  Desmoplastic small round cell tumor.    FINDINGS:  Lower neck: Multiple enlarged cervical lymph nodes without significant  change compared to prior study.    Chest: The central tracheal bronchial tree is patent. Heart size is  normal. Innumerable centrally necrotic mediastinal and hilar lymph  nodes, increased in size compared to prior study, for example left  hilar node measuring 3.2 cm previously measured 2.7 cm. Nodular  interstitial thickening in the right and left lower lobes. Increased  size of bilateral pulmonary nodules, for example right upper lobe  nodule (series 5, image 58) measuring 11 mm previously measured 8 mm.  Multiple left lower lobe nodules cannot be measured due to surrounding  atelectasis. Small left pleural effusion. No pneumothorax.    Abdomen and pelvis: Stable metastatic burden throughout the abdomen  and pelvis including but not limited to metastatic lesions throughout  the liver, spleen, extensive/diffuse/profound peritoneal  carcinomatosis that exerts mass effect on the bladder, large and small  bowel, stomach. No free fluid in the abdomen or pelvis.    Bones and soft tissues: Stable appearing sclerotic metastases in the  S1 vertebral body and manubrium.  Impression: IMPRESSION:   1.  Increasing metastatic lesions in the chest with new left pleural  effusion and nodular interstitial thickening which may be due to  atelectasis or lymphangitic spread.   2.  Stable metastatic burden in abdomen and pelvis as described in the  report.   3.  No  new bowel abnormality.    I have personally reviewed the examination and initial interpretation  and I agree with the findings.    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   Diego was recently on ttreatment with irinotecan and Temodar. With cycle 2, which began on 1/30/23, irinotecan was dose reduced by 10% due to prolonged neutropenia. He was then hospitalized from 2/10/23-2/20/23 due to inability to maintain adequate oral intake along with pancytopenia, loose stools (stool studies negative for infection) and poor pain control. He was again hospitalized 3/2/23-3/9/23 with c difficile diarrhea, electrolyte abnormalities, tachycardia.    Given the inability to treat Diego given his current clinical status, his prognosis is unfortunately poor given the extent of his disease. Prioritizing his comfort is a reasonable goal at this time. The family prefers that he remain home with them and that they be responsible for his daily cares. We discussed a comfort focussed approach at home to keep Robert comfortable. We discussed having adequate pain control and control of anxiety. We discussed hospice and the support they provide and the family feels hospice would be the best next step in his care. I explained the hospice benefit would mean he would no longer have outpatient clinical visits. They understand and agree to proceed with home hospice. Hospice referrral placed.    It has been an absolute privilege to be involved in Robert's care. I will gladly serve as his hospice physician.    -hospice referral    Diarrhea, possible c difficle  Robert is having diarrhea and is unable at time to control his stooling. Given his recent c difficile diagnosis, I will treat for recurrent c diff. As it is not possible to test him I will send oral vancomycin, 14 day supply to help with  infectious diarrhea, which should also contribute to his comfort. Robert and family should be sure to wash their hands frequently and clean all bathroom surfaces regularly.  -PO vancomycin    Abdominal bloating/cancer pain.  This is secondary to his known peritoneal carcinomatosis. He is following with palliative medicine. Pain is still problematic. He is on Butrans. He has oxycodone available. I explained the use of morphine concentrate to the family and they prefer it as needed rather than oxycodone tabs for more rapid onset of pain relief.    -send morphine concentrate to pharmacy    Anxiety  Due to rising anxiety with this process, I will send prescription for Ativan for as needed use.     -send lorazepam prescription to pharmacy    Farzaneh Burciaga M.D.   of Medicine  Hematology, Oncology and Transplantation  Pager: [phone]

## 2023-04-06 NOTE — PROGRESS NOTES
Clinic Care Coordination Contact    Situation: Patient chart reviewed by care coordinator.    Background: RN CC reviewed for status update and engagement     Assessment: Pt attended virtual visit with oncology team, hospice referral placed  Pt does have visit with PCP schedule for later this month     Plan/Recommendations: RN CC will review chart following PCP visit and ensure support in place before closing program, available if need identified

## 2023-04-06 NOTE — TELEPHONE ENCOUNTER
Precious from [facility] calling to update care team that they met with patient and family today and the pt/family is deciding not to enroll in hospice at this time. Call Precious back with any additional questions: [phone].

## 2023-04-14 NOTE — PROGRESS NOTES
Clinic Care Coordination Contact  [facility]       Clinical Data: Care Coordinator Outreach  Outreach attempted x 1.    Pt no show for PCP visit  Pt and family met with hospice team, declined enrollment - message sent to oncology provider      Plan: RN CC will monitor chart and outreach within one month to support goal progression

## 2023-05-04 PROBLEM — E16.2 HYPOGLYCEMIA: Status: ACTIVE | Noted: 2023-01-01

## 2023-05-04 PROBLEM — E83.42 HYPOMAGNESEMIA: Status: ACTIVE | Noted: 2023-01-01

## 2023-05-04 NOTE — PROGRESS NOTES
Pt. Arrived from ED at this time with Mother. Lethargic. Unable to state the date or where he is. Follows commands. 2 RN skin assessment done with Beryl GOMEZ. Large suspected pressure ulcer noted to left hip. Call light explained.     Maxi garcias called asking if he still wants a one time dose of glucagon given with glucose now in the 100's. MD said ok to not give this medication. Jose RN asked if MD could place a WOCN consult for the left hip wound. MD acknowledged.

## 2023-05-04 NOTE — ED NOTES
Bed: ED01  Expected date:   Expected time:   Means of arrival:   Comments:  SPF  26 y/o M  Unresponsive  Passed out from Pain  History of CA

## 2023-05-04 NOTE — PHARMACY-VANCOMYCIN DOSING SERVICE
"Pharmacy Vancomycin Initial Note  Date of Service May 4, 2023  Patient's  1995  27 year old, male    Indication: Sepsis    Current estimated CrCl = Estimated Creatinine Clearance: 97.8 mL/min (based on SCr of 1.3 mg/dL).    Creatinine for last 3 days  5/3/2023: 11:13 PM Creatinine 1.20 mg/dL; 11:23 PM Creatinine POCT 1.3 mg/dL    Recent Vancomycin Level(s) for last 3 days  No results found for requested labs within last 3 days.      Vancomycin IV Administrations (past 72 hours)      No vancomycin orders with administrations in past 72 hours.                Nephrotoxins and other renal medications (From now, onward)    Start     Dose/Rate Route Frequency Ordered Stop    23 1530  vancomycin (VANCOCIN) 1,250 mg in 0.9% NaCl 250 mL intermittent infusion         1,250 mg  over 90 Minutes Intravenous EVERY 12 HOURS 23 0230      23 0330  vancomycin (VANCOCIN) 2,000 mg in sodium chloride 0.9 % 500 mL intermittent infusion         2,000 mg  over 120 Minutes Intravenous ONCE 23 0230      23 0230  piperacillin-tazobactam (ZOSYN) 4.5 g vial to attach to  mL bag        Note to Pharmacy: For [facility], [facility] and [facility]: For Zosyn-naive patients, use the \"Zosyn initial dose + extended infusion\" order panel.    4.5 g  over 30 Minutes Intravenous ONCE 23 0225            Contrast Orders - past 72 hours (72h ago, onward)    Start     Dose/Rate Route Frequency Stop    23 2350  iopamidol (ISOVUE-370) solution 123 mL         123 mL Intravenous ONCE 23 2345          [employer] Prediction of Planned Initial Vancomycin Regimen  Loading dose: 2000 mg at 03:30 2023.  Regimen: 1250 mg IV every 12 hours.  Exposure target: AUC24 (range)400-600 mg/L.hr   AUC24,ss: 533 mg/L.hr  Probability of AUC24 > 400: 78 %  Ctrough,ss: 17 mg/L  Probability of Ctrough,ss > 20: 37 %  Probability of nephrotoxicity (Lodise JCARLOS ): 13 %        Plan:  1. Load vancomycin 2000mg IV once now  2. Followed by " vancomycin  1250 mg IV q12h.   3. Vancomycin monitoring method: AUC  4. Vancomycin therapeutic monitoring goal: 400-600 mg*h/L  5. Pharmacy will check vancomycin levels as appropriate in 1-3 Days.    6. Serum creatinine levels will be ordered daily for the first week of therapy and at least twice weekly for subsequent weeks.      Mark Anthony Ivy, [facility]

## 2023-05-04 NOTE — PROGRESS NOTES
[facility]    Medicine Progress Note - Hospitalist Service, GOLD TEAM 4    Date of Admission:  5/3/2023    Assessment & Plan      Diego Buchanan is a 27 year old male admitted on 5/3/2023. 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  #Cancer Related Pain  Initially diagnosed in 2020 with peritoneal carcinomatosis. Progressive disease despite treatment (last dose of TMZ/irinotecan in 1/2023) with ongoing decline, and recently referred to hospice by primary oncologist though declined enrollment as of 4/6.     Held care conference meeting today (5/4) that included medicine LISY, medicine attending (Dr. Carlita Jacobsen), palliative care LISY (Piedad Mondragon), palliative social work, and the patient's nurse Emperatriz. Also present with patient was his mother (Mariela), his father, his older brother, and his sister (Mariela). The meeting was held with an in person [employer] . The topic surrounded the goals of care for Robert.   We discussed that we have not discovered anything reversible that would improve Robert's condition and discussed a life expectancy of most likely several short days, maybe weeks. Emphasized that he is receiving quite a bit of support here (blood transfusions, D20 via his port, NS via PIV, IV medications, oxygen for comfort, pain medication, nursing and other medical staff support) and expressed our concern that taking Robert home could be risky and involve a sooner passing and/or seizures (2/2 hypoglycemia) and other medical complications that would be difficult to treat/prevent for the sake of pt comfort without an IV/port access and medical staff  "support. Not only would this be distressing to family, but would like be uncomfortable and scary for Robert.   Family expressed understanding of the concerns associated with taking Robert home. However, they would also really like to bring him home if possible. Discussed the hospice options available (inpatient here, home, or other facility) and stated that we felt he would do best remaining here but would respect and work toward whichever option they chose. Discussed that setting up home hospice could take several days. They would like to wait until the patient's uncle arrives so that they can make a decision as a group. They will notify their nurse of their decision and we will alter his treatment plan accordingly. At this time, the family decided to continue the current cares being provided to Robert, but they would NOT like any escalation of treatments at this time. Mom decided to update code status to DNR/DNI, which is reflected in the chart.   Robert and his family follow traditional [employer] practices and family would like to organize all the proper things needed for his passing in order to maintain the traditions. Very importantly, Robert MUST pass wearing traditional Hmong clothing. The family was counseled to bring this to him at the hospital and that we would be able to dress him in these clothes in preparation as a precaution in case they decide to bring him home but his condition precludes this and causes passing sooner than expected by family.     - pain and symptom management per palliative care  - did discuss idea of paracentesis for symptom relief, but given pt has carcinomatosis, fluid may be \"cakey\" and the procedure may cause significant pain, so chose to defer this option  - oxygen prn for patient comfort  - code status updated  - please see to dressing Robert in his traditional clothing once it is brought  - accommodate to the best of our ability cultural/traditional practices that do not interfere with " proper level of medical care being delivered    --------------    **Please follow treatment plan as below until decision is made regarding hospice and comfort cares. No escalation of treatment is desired at this time by family. Plan of care will be updated according to family decision. **    #Persisting Hypoglycemia  #Lethargy  EMS was called to home after he lost consciousness. Glucose was 'low' and he was started on D10 infusion and brought to ED. In the ED, her was persistently hypoglycemic despite D10 infusion up to 150ml/hr. CT head was negative. Not on any hypoglycemic agent, concerning for hypoglycemia due to liver mets and overall cancer burden along with decreased po intake. Switched to D20 infusion via port-a-cath and NS. Tapered D20 down to 60mL/hr in the morning of 5/4. Maintaining glucose level of around 100 since this morning. Scheduled for Q2H glucose checks.   Differentials outside of cancer burden considered included severe sepsis, adrenal insufficiency, myxedema coma. Pt is tachypneic and tacycardic, no hypoxia but placed on nasal cannula for comfort. Lungs clear on ausculation, UA w/o signs of infection. Abdomen is distended and tender, so pt may have SBP or other intraperitoneal infection source, but this could also be attributed to cancer burden. Pt placed on broad spectrum AM cortisol is 26.7, which is above the reference range, not consistent with adrenal insufficiency. TSH is elevated at 6.28 and T4 is slightly low at 0.88. Pt w/ soft BP, but no true hypotension. Findings not convincing for myxedema coma. Highly suspect that pt presentation is a progression of his cancer and does not have a reversible cause.   He is interemitently awake on admission and responds by nodding or shaking heads.   - Receiving D20 via port at 60mL/hr and NS via PIV at 50ml/hr  - q2h glucose checks  - zosyn and vancomycin started for possible severe sepsis: continue. follow up blood  cultures.    #Hypokalemia  #Hypomagnesemia  #Hypocalcemia  #Hx of recent C Diff colitis  Has not been tolerating much po recently. No significant diarrhea. Takes potassium supplement. K 2.3 and Mg 1.4 on admission. Ionized calcium 2.9.Electrolyte disturbances likely 2/2 decreased PO intake. Will replace as able.  - monitor K/Mg and replace as needed  - s/p 1g calcium gluconate; repeat 1g dose at 1800.  - BMP Q6H, next one at 2000 5/4, assess need for more calcium gluconate    #WILLIAM  Cre baseline around 0.5, currently up to 1.2. pre-renal from decreased po intake likely though possibly due to carcinomatosis, also received IV contrast.  - continue IV fluid    #Anemia  Baseline Hb mid 7s. Hb 6.7 on admission without history of bleed. 1unit PRBC ordered in the ED, hgb remained below 7. Pt received additional unit of blood over 4 hours on 5/4 given patient fluid status.   - Per discussion with family noted above, they do not want to escalate care. Due to pt volume up status and minimal response to blood products, would be hesitant to hang another unit even if the hgb remained below 7. Will allow family time to decide on course of action, and if they decide to decline end of life/comfort cares, can reassess need for additional unit of blood.   - Repeat hgb ordered in order to determine if 2nd unit was effective.     #Hx of Hypertension  #Tachycardia  On metoprolol at home. BP 90s/40s, tachycardic to 120s. Sinus tachy  - hold metoprolol for now       Diet: Combination Diet Regular Diet Adult    DVT Prophylaxis: Enoxaparin (Lovenox) subcutaneous -- CURRENTLY HELD  Busby Catheter: Not present  Lines: PRESENT      Port a Cath 04/04/23 Single Lumen Right Chest wall-Site Assessment: WDL      Cardiac Monitoring: ACTIVE order. Indication: Electrolyte Imbalance (24 hours)- Magnesium <1.3 mg/ml; Potassium < =2.8 or > 5.5 mg/ml  Code Status: No CPR- Pre-arrest intubation OK      Clinically Significant Risk Factors Present on  "Admission        # Hypokalemia: Lowest K = 1.4 mmol/L in last 2 days, will replace as needed   # Hypocalcemia: Lowest iCa = 2.9 mg/dL in last 2 days, will monitor and replace as appropriate   # Hypomagnesemia: Lowest Mg = 1.4 mg/dL in last 2 days, will replace as needed   # Hypoalbuminemia: Lowest albumin = 2.2 g/dL at 5/3/2023 11:13 PM, will monitor as appropriate  # Coagulation Defect: INR = 1.34 (Ref range: 0.85 - 1.15) and/or PTT = N/A, will monitor for bleeding   # Acute Kidney Injury, unspecified: based on a >150% or 0.3 mg/dL increase in last creatinine compared to past 90 day average, will monitor renal function       # Obesity: Estimated body mass index is 33.92 kg/m  as calculated from the following:    Height as of this encounter: 1.727 m (5' 8\").    Weight as of this encounter: 101.2 kg (223 lb 1.7 oz).           Disposition Plan      Expected Discharge Date: 05/06/2023                The patient's care was discussed with the Attending Physician, Dr. Jacobsen.    Jerry Patel PA-C  Hospitalist Service, [address]  Securely message with [employer] (more info)  Text page via [facility] Paging/Directory   See signed in provider for up to date coverage information  ______________________________________________________________________    Interval History   Patient glucose steady around 100 on D20 via his port and NS via PIV. Pt is lethargic and intermittently awake during interview/exam. Sister was present in the morning with patient. He is experiencing significant generalized pain and is feeling very air hungry. Is not hypoxic but feels less short of breath when oxygen is running. States he is very tired.     Sister states that mom and dad would be responsible for major decisions regarding Robert's care. They plan to be present later in the day.     Physical Exam   Vital Signs: Temp: (P) 98.2  F (36.8  C) Temp src: (P) Oral BP: (P) 99/55 Pulse: (!) (P) 127   " Resp: (P) 24 SpO2: (P) 100 % O2 Device: (P) Nasal cannula Oxygen Delivery: (P) 4 LPM  Weight: 223 lbs 1.69 oz    General Appearance: Patient is very ill appearing, noticeably distended and fluid overloaded, air hungry, lethargic. Sister at bedside.   Respiratory: Tachypneic. Not hypoxic but on oxygen for air hunger. No adventitious sounds appreciated, exam not consistent with pulmonary edema.   Cardiovascular: Tachycardic, regular rhythm. No murmurs appreciated. Generally edematous.  GI: Abdomen is very distended, very tender to touch, very firm.   Skin: Pale, diaphoretic.   Neuro: Fluctuating between awake and asleep. Communicates with simple words or head nods/shakes.      Medical Decision Making       60 MINUTES SPENT BY ME on the date of service doing chart review, history, exam, documentation & further activities per the note.      Data   ------------------------- PAST 24 HR DATA REVIEWED -----------------------------------------------    I have personally reviewed the following data over the past 24 hrs:    22.3 (H)  \   7.5 (L)   / 177     135 (L) 101 15.2 /  129 (H)   2.8 (L) 21 (L) 1.23 (H) \       ALT: 18 AST: 63 (H) AP: 100 TBILI: 0.6   ALB: 2.2 (L) TOT PROTEIN: 6.1 (L) LIPASE: 18       Trop: 43 (H) BNP: N/A       TSH: 6.28 (H) T4: 0.88 (L) A1C: N/A       Procal: N/A CRP: N/A Lactic Acid: 1.0       INR:  1.34 (H) PTT:  N/A   D-dimer:  N/A Fibrinogen:  N/A       Imaging results reviewed over the past 24 hrs:   Recent Results (from the past 24 hour(s))   POC US ABDOMEN LIMITED    Impression    Poor acoustic windows.  No interpretable images   XR Chest Port 1 View    Narrative    EXAM: XR CHEST PORT 1 VIEW  LOCATION: [facility]  DATE/TIME: 5/3/2023 11:25 PM CDT    INDICATION: unresponsive  COMPARISON: 03/02/2023      Impression    IMPRESSION: Lungs hypoaerated. Heart size within normal limits. Right chest port catheter tip at superior cavoatrial junction. No  pleural collections.   CT Chest (PE) Abdomen Pelvis w Contrast    Narrative    EXAM: CT CHEST PE ABDOMEN PELVIS W CONTRAST  LOCATION: [facility]  DATE/TIME: 5/4/2023 12:00 AM CDT    INDICATION: Short of air, syncope, abdominal distention  COMPARISON: CT chest and pelvis 02/10/2023  TECHNIQUE: CT chest pulmonary angiogram and routine CT abdomen pelvis with IV contrast. Arterial phase through the chest and venous phase through the abdomen and pelvis. Multiplanar reformats and MIP reconstructions were performed. Dose reduction   techniques were used.   CONTRAST: 123 ml isovue 370     FINDINGS:  ANGIOGRAM CHEST: Pulmonary arteries are normal caliber and negative for pulmonary emboli. Thoracic aorta is negative for dissection. No CT evidence of right heart strain.     LUNGS AND PLEURA: Small pleural effusions. Numerous enlarging pulmonary nodules, for example, right upper lobe 13 mm (series 10 image 21), previously 10 mm.     MEDIASTINUM/AXILLAE: Interval enlargement of left hilar lymph nodes for example 3.5 x 2.6 cm series 10 image 35. Enlarging mediastinal nodes including subcarinal node, 2.3 x 3.3 cm. Enlarged supraclavicular internal mamillary and cardiophrenic nodes,   mildly increased in the interval. Right IJ chest port catheter in the upper right atrium.    CORONARY ARTERY CALCIFICATION: None.    HEPATOBILIARY: Numerous liver lesions have enlarged in the interval, for example, posterior right hepatic lobe 3.9 x 3.8 cm (series 5 image 40), slightly increased. Extensive circumferential soft tissue implants surrounding the liver, and throughout the   upper abdomen.    PANCREAS: Normal.    SPLEEN: Extensive soft tissue implants around the spleen. Splenic metastasis redemonstrated.    ADRENAL GLANDS: Normal.    KIDNEYS/BLADDER: No hydronephrosis.    BOWEL: No obstruction. Interval progressive increase in widespread peritoneal confluent masses throughout the abdomen and  pelvis. Multifocal encasement of bowel loops which aren't inferoapical to delineate from tumor. No evidence for bowel obstruction.   No definite free intraperitoneal gas. Moderate ascites predominantly in the inferior abdomen similar to prior.    LYMPH NODES: Enlarged lymph nodes in the abdomen and pelvis slightly increased from prior.    VASCULATURE: Portal veins SMV and splenic vein are patent. Hepatic veins. Intrahepatic IVC narrowed but patent.    PELVIC ORGANS: Normal.    MUSCULOSKELETAL: No acute osseous abnormality. Sclerotic lesion in the sacrum. Similar to prior      Impression    IMPRESSION:  1.  Interval progression of tumor in the abdomen and pelvis including extensive peritoneal tumor. Notably significant increase in upper abdominal tumor encasing the liver and spleen. Moderate ascites in the lower abdomen similar to prior. Enlarging   hepatic metastases and lymphadenopathy.  2.  Small pleural effusions. Slight increase in pulmonary nodules and hilar and mediastinal lymphadenopathy.  3.  No pulmonary embolism.   CT Head w/o Contrast    Narrative    EXAM: CT HEAD W/O CONTRAST  LOCATION: [facility]  DATE/TIME: 5/4/2023 12:06 AM CDT    INDICATION: Altered mental status  COMPARISON: 05/01/2020  TECHNIQUE: Routine CT Head without IV contrast. Multiplanar reformats. Dose reduction techniques were used.    FINDINGS:  INTRACRANIAL CONTENTS: No intracranial hemorrhage, extraaxial collection, or mass effect.  No CT evidence of acute infarct. Normal parenchymal attenuation. Normal ventricles and sulci. Partially empty sella.    VISUALIZED ORBITS/SINUSES/MASTOIDS: No intraorbital abnormality. No paranasal sinus mucosal disease. No middle ear or mastoid effusion.    BONES/SOFT TISSUES: No acute abnormality.      Impression    IMPRESSION:  1.  No acute intracranial process.  2.  Partially empty sella. Findings can be seen in the setting of idiopathic intracranial  hypertension. Correlate for history of chronic headaches and papilledema.     Recent Labs   Lab 05/04/23  1650 05/04/23  1623 05/04/23  1413 05/04/23  1146 05/04/23  1126 05/04/23  0859 05/04/23  0821 05/04/23  0535 05/04/23  0528 05/04/23  0022 05/03/23  2359 05/03/23  2333 05/03/23  2323 05/03/23  2314 05/03/23  2313   WBC  --   --   --   --   --   --  22.3*  --   --   --   --   --   --   --  23.1*   HGB  --  7.5*  --   --   --   --  6.5*  --   --   --   --   --   --  6.8* 6.5*   MCV  --   --   --   --   --   --  109*  --   --   --   --   --   --   --  111*   PLT  --   --   --   --   --   --  177  --   --   --   --   --   --   --  207   INR  --   --   --   --   --   --   --   --   --   --   --   --   --   --  1.34*   NA  --   --   --   --   --   --   --   --  135*  --   --   --   --  136 137   POTASSIUM  --   --   --   --  2.8*  --   --   --  2.3*  --  1.4*  --   --  2.7* 2.3*   CHLORIDE  --   --   --   --   --   --   --   --  101  --   --   --   --   --  101   CO2  --   --   --   --   --   --   --   --  21*  --   --   --   --   --  23   BUN  --   --   --   --   --   --   --   --  15.2  --   --   --   --   --  16.5   CR  --   --   --   --   --   --   --   --  1.23*  --   --   --  1.3  --  1.20*   ANIONGAP  --   --   --   --   --   --   --   --  13  --   --   --   --   --  13   FAISAL  --   --   --   --   --   --   --   --  6.8*  --   --   --   --   --  7.2*   *  --  108* 104*  --    < >  --    < > 98   < >  --    < >  --  115*  125* 37*   ALBUMIN  --   --   --   --   --   --   --   --   --   --   --   --   --   --  2.2*   PROTTOTAL  --   --   --   --   --   --   --   --   --   --   --   --   --   --  6.1*   BILITOTAL  --   --   --   --   --   --   --   --   --   --   --   --   --   --  0.6   ALKPHOS  --   --   --   --   --   --   --   --   --   --   --   --   --   --  100   ALT  --   --   --   --   --   --   --   --   --   --   --   --   --   --  18   AST  --   --   --   --   --   --   --   --   --   --   --    --   --   --  63*   LIPASE  --   --   --   --   --   --   --   --   --   --   --   --   --   --  18    < > = values in this interval not displayed.

## 2023-05-04 NOTE — CONSULTS
[facility]  WO Nurse Inpatient Assessment     Consulted for: right thigh and left hip    Summary: per family pt favors laying on side secondary to back pain     Patient History (according to provider note(s):      Diego Buchanan is a 27 year old male admitted on 5/3/2023. 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    Assessment:      Areas visualized during today's visit: Focused:, Sacrum/coccyx and hips and left ear    Pressure Injury Location: left hip    Last photo: 5/4  Wound type: Pressure Injury     Pressure Injury Stage: 2, present on admission   Wound history/plan of care:   POA    Wound base: 100 % superficial scab     Palpation of the wound bed: firm      Drainage: none     Description of drainage: none     Measurements (length x width x depth, in cm) 3.5  x 1.1  x  0 cm      Tunneling N/A     Undermining N/A  Periwound skin: Scar tissue      Color: pink      Temperature: normal   Odor: none  Pain: no grimacing or signs of discomfort, none  Pain intervention prior to dressing change: patient tolerated well  Treatment goal: Protection  STATUS: initial assessment  Supplies ordered: supplies stored on unit    Pressure Injury Location: left ear    Last photo: 5/4  Wound type: Pressure Injury     Pressure Injury Stage: 2, present on admission   Wound history/plan of care:   POA    Wound base: 100 % superficial scab     Palpation of the wound bed: firm      Drainage: none     Description of drainage: none     Measurements (length x width x depth, in cm) 0.2  x 1  x  0 cm      Tunneling N/A     Undermining N/A  Periwound skin: Intact      Color: normal and consistent with surrounding tissue      Temperature: normal   Odor: none  Pain: no grimacing or signs of discomfort,  "none  Pain intervention prior to dressing change: patient tolerated well  Treatment goal: Protection  STATUS: initial assessment  Supplies ordered: supplies stored on unit    My PI Risk Assessment     Sensory Perception: 3 - Slightly Limited     Moisture: 3 - Occasionally moist      Activity: 1 - Bedfast      Mobility: 2 - Very limited     Nutrition: 2 - Probably inadequate      Friction/Shear: 1 - Problem     TOTAL: 12      Treatment Plan:     Left hip wound(s): Every 3 days and PRN cleanse with wound cleanser and pat dry. Paint kaity wound skin with no sting. Conform mepilex over wound.      Left ear wound(s): Daily cleanse with saline and pat dry. Apply thing layer of Vaseline over wound. Ensure pt is not laying directly on wound.      Pressure Injury Prevention (PIP) Plan:  If patient is declining pressure injury prevention interventions: Explore reason why and address patient's concerns, Educate on pressure injury risk and prevention intervention(s), If patient is still declining, document \"informed refusal\"  and Ensure Care team is aware ( provider, charge nurse, etc)  Mattress: Follow bed algorithm, reassess daily and order specialty mattress, if indicated.  HOB: Maintain at or below 30 degrees, unless contraindicated  Repositioning in bed: Every 1-2 hours , Left/right positioning; avoid supine and Raise foot of bed prior to raising head of bed, to reduce patient sliding down (shear)  Heels: Keep elevated off mattress and Pillows under calves  Protective Dressing: Sacral Mepilex for prevention (#707786),  especially for the agitated patient   Positioning Equipment: Seated Positioning System (SPS)  (#[id number]) Sling must have contact with chair, no pillow or chair cushion beneath  Chair positioning: Assist patient to reposition hourly   If patient has a buttock pressure injury, or high risk for PI use chair cushion or SPS.  Moisture Management: Perineal cleansing /protection: Follow Incontinence Protocol, Avoid " brief in bed and Clean and dry skin folds with bathing   Under Devices: Inspect skin under all medical devices during skin inspection , Ensure tubes are stabilized without tension and Ensure patient is not lying on medical devices or equipment when repositioned  Ask provider to discontinue device when no longer needed.      Orders: Written    RECOMMEND PRIMARY TEAM ORDER: None, at this time  Education provided: plan of care and wound progress  Discussed plan of care with: Patient, Family and Nurse  [facility] nurse follow-up plan: weekly  Notify WOC if wound(s) deteriorate.  Nursing to notify the Provider(s) and re-consult the [facility] Nurse if new skin concern.    DATA:     Current support surface: Standard  Standard gel/foam mattress (IsoFlex, Atmos air, etc), pulsate ordered- in parrish  Containment of urine/stool: Incontinent pad in bed  BMI: Body mass index is 33.92 kg/m .   Active diet order: Orders Placed This Encounter      Combination Diet Regular Diet Adult     Output: I/O last 3 completed shifts:  In: 1230 [I.V.:930]  Out: -      Labs: Recent Labs   Lab 05/03/23  2314 05/03/23  2313   ALBUMIN  --  2.2*   HGB 6.8* 6.5*   INR  --  1.34*   WBC  --  23.1*     Pressure injury risk assessment:   Sensory Perception: 3-->slightly limited  Moisture: 3-->occasionally moist  Activity: 1-->bedfast  Mobility: 2-->very limited  Nutrition: 2-->probably inadequate  Friction and Shear: 2-->potential problem  Christiano Score: 13    Ivy Martinez RN CWOCN   Pager no longer is use, please contact through [employer]: [facility] Nurse  Dept. Office Number: [phone]

## 2023-05-04 NOTE — ED PROVIDER NOTES
"ED Provider Note  [facility]      History     Chief Complaint   Patient presents with     Loss of Consciousness     HPI  Diego Buchanan is a 27 year old male with a PMH of desmoplastic small round cell tumor, Garnica's sarcoma, peritoneal carcinomatosis, SBO and HTN who presents to the ED via EMS with altered mental status.  The patient clutched his abdomen and complained of severe abdominal pain earlier this evening then passed out and did not regain consciousness.  After approximately 1 hour, the patient's family called EMS.  [facility] found the patient to have a \"L0\" blood sugar.  D10 was initiated and the patient was transported to the emergency department.  He had normal vital signs in the field.  The patient's sister and mother arrived a while later and reports that he has been off chemotherapy.  He has not been eating or drinking much due to early satiety.  His legs have become swollen and he can no longer walk.  The patient was initially unresponsive with poor respiratory effort.  His blood sugar at time of arrival in the emergency department was only in the 30s.  He was given an amp of D50.  The patient subsequently regained consciousness and was able to answer questions.  The patient complains of shortness of breath but denies any chest pain.  He denies ongoing abdominal pain.  He denies any fever.    Past Medical History  Past Medical History:   Diagnosis Date     Hypertension      Learning disability     but pt is his own gaurdian     Peritoneal carcinomatosis (H)      Past Surgical History:   Procedure Laterality Date     BIOPSY      liver     INSERT PORT VASCULAR ACCESS Right 4/21/2022    Procedure: INSERTION, VASCULAR ACCESS PORT;  Surgeon: Daniel Sarmiento MD;  Location: [facility] OR     INSERT PORT VASCULAR ACCESS N/A 1/20/2023    Procedure: Right Port Removal and Right Subclavian Powerport replacement and Flouroscopy.;  Surgeon: Caleb Brady MD;  Location: " "[facility] OR     IR CHEST PORT PLACEMENT > 5 YRS OF AGE  4/21/2022     IR CVC TUNNEL PLACEMENT > 5 YRS OF AGE  4/29/2020     IR CVC TUNNEL REMOVAL RIGHT  11/16/2021     US MUSCLE BIOPSY  3/12/2020     acetaminophen (TYLENOL) 325 MG tablet  buprenorphine (BUTRANS) 10 MCG/HR WK patch  cyanocobalamin (VITAMIN B-12) 100 MCG tablet  loperamide (IMODIUM) 2 MG capsule  LORazepam (ATIVAN) 0.5 MG tablet  melatonin 1 MG TABS tablet  metoprolol tartrate (LOPRESSOR) 25 MG tablet  morphine 10 MG/5ML solution  naloxone (NARCAN) 4 MG/0.1ML nasal spray  oxyCODONE (ROXICODONE) 5 MG tablet  potassium chloride ER (KLOR-CON M) 20 MEQ CR tablet  prochlorperazine (COMPAZINE) 5 MG tablet  sennosides (SENOKOT) 8.6 MG tablet  thiamine (B-1) 100 MG tablet  triamcinolone (KENALOG) 0.1 % external ointment  Vitamin D3 (VITAMIN D (CHOLECALCIFEROL)) 25 mcg (1000 units) tablet      Allergies   Allergen Reactions     Cefepime Rash     Morbiliform rash     Tegaderm Chg Dressing [Chlorhexidine]      Tegaderm Transparent Dressing (Informational Only) Itching     Tegaderm dressing; Okay for short periods of time     Family History  Family History   Problem Relation Age of Onset     Hypertension Mother      Anesthesia Reaction No family hx of      Deep Vein Thrombosis (DVT) No family hx of      Social History   Social History     Tobacco Use     Smoking status: Never     Smokeless tobacco: Never   Substance Use Topics     Alcohol use: Never     Drug use: Never         A medically appropriate review of systems was performed with pertinent positives and negatives noted in the HPI, and all other systems negative.    Physical Exam   BP: 117/66  Pulse: (!) 128  Temp: 98  F (36.7  C)  Resp: (!) 46  Height: 172.7 cm (5' 8\")  Weight: 99.8 kg (220 lb)  SpO2: 100 %  Physical Exam  Vitals and nursing note reviewed.   Constitutional:       Comments: Nonverbal.  Decreased respiratory drive.  Mucous membranes dry.   HENT:      Mouth/Throat:      Mouth: Mucous membranes are " dry.   Cardiovascular:      Rate and Rhythm: Tachycardia present.   Pulmonary:      Breath sounds: Normal breath sounds.   Abdominal:      General: There is distension.   Musculoskeletal:         General: Swelling present.   Neurological:      Mental Status: He is alert.           ED Course, Procedures, & Data     ED Course as of 05/04/23 0225   Thu May 04, 2023   0220 WBC(!): 23.1     Procedures  Results for orders placed during the hospital encounter of 05/03/23    POC US ABDOMEN LIMITED    Impression  Poor acoustic windows.  No interpretable images      POC US ECHO LIMITED       ED Course Selections:        EKG Interpretation:      Interpreted by SRIKANTH DURBIN MD, MD  Time reviewed: 2305  Symptoms at time of EKG: AMS   Rhythm: sinus tachycardia  Rate: 130  Axis: Normal  Ectopy: none  Conduction: normal  ST Segments/ T Waves: No acute ischemic changes  Q Waves: none  Comparison to prior: No old EKG available    Clinical Impression: sinus tachycardia       Results for orders placed or performed during the hospital encounter of 05/03/23   XR Chest Port 1 View     Status: None    Narrative    EXAM: XR CHEST PORT 1 VIEW  LOCATION: [facility]  DATE/TIME: 5/3/2023 11:25 PM CDT    INDICATION: unresponsive  COMPARISON: 03/02/2023      Impression    IMPRESSION: Lungs hypoaerated. Heart size within normal limits. Right chest port catheter tip at superior cavoatrial junction. No pleural collections.   CT Head w/o Contrast     Status: None    Narrative    EXAM: CT HEAD W/O CONTRAST  LOCATION: [facility]  DATE/TIME: 5/4/2023 12:06 AM CDT    INDICATION: Altered mental status  COMPARISON: 05/01/2020  TECHNIQUE: Routine CT Head without IV contrast. Multiplanar reformats. Dose reduction techniques were used.    FINDINGS:  INTRACRANIAL CONTENTS: No intracranial hemorrhage, extraaxial collection, or mass effect.  No CT evidence of acute  infarct. Normal parenchymal attenuation. Normal ventricles and sulci. Partially empty sella.    VISUALIZED ORBITS/SINUSES/MASTOIDS: No intraorbital abnormality. No paranasal sinus mucosal disease. No middle ear or mastoid effusion.    BONES/SOFT TISSUES: No acute abnormality.      Impression    IMPRESSION:  1.  No acute intracranial process.  2.  Partially empty sella. Findings can be seen in the setting of idiopathic intracranial hypertension. Correlate for history of chronic headaches and papilledema.   CT Chest (PE) Abdomen Pelvis w Contrast     Status: None    Narrative    EXAM: CT CHEST PE ABDOMEN PELVIS W CONTRAST  LOCATION: [facility]  DATE/TIME: 5/4/2023 12:00 AM CDT    INDICATION: Short of air, syncope, abdominal distention  COMPARISON: CT chest and pelvis 02/10/2023  TECHNIQUE: CT chest pulmonary angiogram and routine CT abdomen pelvis with IV contrast. Arterial phase through the chest and venous phase through the abdomen and pelvis. Multiplanar reformats and MIP reconstructions were performed. Dose reduction   techniques were used.   CONTRAST: 123 ml isovue 370     FINDINGS:  ANGIOGRAM CHEST: Pulmonary arteries are normal caliber and negative for pulmonary emboli. Thoracic aorta is negative for dissection. No CT evidence of right heart strain.     LUNGS AND PLEURA: Small pleural effusions. Numerous enlarging pulmonary nodules, for example, right upper lobe 13 mm (series 10 image 21), previously 10 mm.     MEDIASTINUM/AXILLAE: Interval enlargement of left hilar lymph nodes for example 3.5 x 2.6 cm series 10 image 35. Enlarging mediastinal nodes including subcarinal node, 2.3 x 3.3 cm. Enlarged supraclavicular internal mamillary and cardiophrenic nodes,   mildly increased in the interval. Right IJ chest port catheter in the upper right atrium.    CORONARY ARTERY CALCIFICATION: None.    HEPATOBILIARY: Numerous liver lesions have enlarged in the interval, for  example, posterior right hepatic lobe 3.9 x 3.8 cm (series 5 image 40), slightly increased. Extensive circumferential soft tissue implants surrounding the liver, and throughout the   upper abdomen.    PANCREAS: Normal.    SPLEEN: Extensive soft tissue implants around the spleen. Splenic metastasis redemonstrated.    ADRENAL GLANDS: Normal.    KIDNEYS/BLADDER: No hydronephrosis.    BOWEL: No obstruction. Interval progressive increase in widespread peritoneal confluent masses throughout the abdomen and pelvis. Multifocal encasement of bowel loops which aren't inferoapical to delineate from tumor. No evidence for bowel obstruction.   No definite free intraperitoneal gas. Moderate ascites predominantly in the inferior abdomen similar to prior.    LYMPH NODES: Enlarged lymph nodes in the abdomen and pelvis slightly increased from prior.    VASCULATURE: Portal veins SMV and splenic vein are patent. Hepatic veins. Intrahepatic IVC narrowed but patent.    PELVIC ORGANS: Normal.    MUSCULOSKELETAL: No acute osseous abnormality. Sclerotic lesion in the sacrum. Similar to prior      Impression    IMPRESSION:  1.  Interval progression of tumor in the abdomen and pelvis including extensive peritoneal tumor. Notably significant increase in upper abdominal tumor encasing the liver and spleen. Moderate ascites in the lower abdomen similar to prior. Enlarging   hepatic metastases and lymphadenopathy.  2.  Small pleural effusions. Slight increase in pulmonary nodules and hilar and mediastinal lymphadenopathy.  3.  No pulmonary embolism.   POC US ABDOMEN LIMITED     Status: None    Impression    Poor acoustic windows.  No interpretable images   [place] Draw     Status: None    Narrative    The following orders were created for panel order [place] Draw.  Procedure                               Abnormality         Status                     ---------                               -----------         ------                     Extra Blue  Top Tube[[id number]]                              Final result               Extra Red Top Tube[[id number]]                               Final result               Extra Green Top (Lithium...[[id number]]                      Final result               Extra Purple Top Tube[[id number]]                            Final result                 Please view results for these tests on the individual orders.   Extra Blue Top Tube     Status: None   Result Value Ref Range    Hold Specimen JIC    Extra Red Top Tube     Status: None   Result Value Ref Range    Hold Specimen JIC    Extra Green Top (Lithium Heparin) Tube     Status: None   Result Value Ref Range    Hold Specimen JIC    Extra Purple Top Tube     Status: None   Result Value Ref Range    Hold Specimen JIC    Lactic acid whole blood     Status: Abnormal   Result Value Ref Range    Lactic Acid 1.0 0.7 - 2.0 mmol/L    Potassium 1.4 (LL) 3.4 - 5.3 mmol/L    Calcium Ionized 2.9 (LL) 4.4 - 5.2 mg/dL   Glucose by meter     Status: Abnormal   Result Value Ref Range    GLUCOSE BY METER POCT 31 (LL) 70 - 99 mg/dL   iStat Gases (lactate) venous, POCT     Status: Abnormal   Result Value Ref Range    Lactic Acid POCT 0.9 <=2.0 mmol/L    Bicarbonate Venous POCT 26 21 - 28 mmol/L    O2 Sat, Venous POCT 93 (L) 94 - 100 %    pCO2V Venous POCT 38 (L) 40 - 50 mm Hg    pH Venous POCT 7.45 (H) 7.32 - 7.43    pO2 Venous POCT 63 (H) 25 - 47 mm Hg   Glucose by meter     Status: Abnormal   Result Value Ref Range    GLUCOSE BY METER POCT 115 (H) 70 - 99 mg/dL   iStat Gases Electrolytes ICA Glucose Venous, POCT     Status: Abnormal   Result Value Ref Range    CPB Applied No     Hematocrit POCT 20 (L) 40 - 53 %    Calcium, Ionized Whole Blood POCT 3.9 (L) 4.4 - 5.2 mg/dL    Glucose Whole Blood POCT 125 (H) 70 - 99 mg/dL    Bicarbonate Venous POCT 26 21 - 28 mmol/L    Hemoglobin POCT 6.8 (LL) 13.3 - 17.7 g/dL    Potassium POCT 2.7 (L) 3.4 - 5.3 mmol/L    Sodium POCT 136 133 - 144 mmol/L    pCO2  Venous POCT 38 (L) 40 - 50 mm Hg    pO2 Venous POCT 60 (H) 25 - 47 mm Hg    pH Venous POCT 7.45 (H) 7.32 - 7.43    O2 Sat, Venous POCT 92 (L) 94 - 100 %   Creatinine POCT     Status: Normal   Result Value Ref Range    Creatinine POCT 1.3 0.7 - 1.3 mg/dL    GFR, ESTIMATED POCT >60 >60 mL/min/1.73m2   Asymptomatic COVID-19 Virus (Coronavirus) by PCR Nose     Status: Normal    Specimen: Nose; Swab   Result Value Ref Range    SARS CoV2 PCR Negative Negative    Narrative    Testing was performed using the Xpert Xpress SARS-CoV-2 Assay on the Cepheid Gene-Xpert Instrument Systems. Additional information about this Emergency Use Authorization (EUA) assay can be found via the Lab Guide. This test should be ordered for the detection of SARS-CoV-2 in individuals who meet SARS-CoV-2 clinical and/or epidemiological criteria as well as from individuals without symptoms or other reasons to suspect COVID-19. Test performance for asymptomatic patients has only been established in anterior nasal swab specimens. This test is for in vitro diagnostic use under the FDA EUA for laboratories certified under CLIA to perform high complexity testing. This test has not been FDA cleared or approved. A negative result does not rule out the presence of PCR inhibitors in the specimen or target RNA concentration below the limit of detection for the assay. The possibility of a false negative should be considered if the patient's recent exposure or clinical presentation suggests COVID-19. This test was validated by [facility]. These Laboratories are certified under the Clinical Laboratory Improvement Amendments (CLIA) as qualified to perform high complexity testing.     Glucose by meter     Status: Abnormal   Result Value Ref Range    GLUCOSE BY METER POCT 55 (L) 70 - 99 mg/dL   Comprehensive metabolic panel     Status: Abnormal   Result Value Ref Range    Sodium 137 136 - 145 mmol/L    Potassium 2.3 (LL) 3.4 - 5.3 mmol/L     Chloride 101 98 - 107 mmol/L    Carbon Dioxide (CO2) 23 22 - 29 mmol/L    Anion Gap 13 7 - 15 mmol/L    Urea Nitrogen 16.5 6.0 - 20.0 mg/dL    Creatinine 1.20 (H) 0.67 - 1.17 mg/dL    Calcium 7.2 (L) 8.6 - 10.0 mg/dL    Glucose 37 (LL) 70 - 99 mg/dL    Alkaline Phosphatase 100 40 - 129 U/L    AST 63 (H) 10 - 50 U/L    ALT 18 10 - 50 U/L    Protein Total 6.1 (L) 6.4 - 8.3 g/dL    Albumin 2.2 (L) 3.5 - 5.2 g/dL    Bilirubin Total 0.6 <=1.2 mg/dL    GFR Estimate 85 >60 mL/min/1.73m2   INR     Status: Abnormal   Result Value Ref Range    INR 1.34 (H) 0.85 - 1.15   Lipase     Status: Normal   Result Value Ref Range    Lipase 18 13 - 60 U/L   Magnesium     Status: Abnormal   Result Value Ref Range    Magnesium 1.4 (L) 1.7 - 2.3 mg/dL   Troponin T, High Sensitivity     Status: Abnormal   Result Value Ref Range    Troponin T, High Sensitivity 43 (H) <=22 ng/L   TSH with free T4 reflex     Status: Abnormal   Result Value Ref Range    TSH 6.28 (H) 0.30 - 4.20 uIU/mL   Glucose by meter     Status: Abnormal   Result Value Ref Range    GLUCOSE BY METER POCT 107 (H) 70 - 99 mg/dL   CBC with platelets and differential     Status: Abnormal   Result Value Ref Range    WBC Count 23.1 (H) 4.0 - 11.0 10e3/uL    RBC Count 1.97 (L) 4.40 - 5.90 10e6/uL    Hemoglobin 6.5 (LL) 13.3 - 17.7 g/dL    Hematocrit 21.9 (L) 40.0 - 53.0 %     (H) 78 - 100 fL    MCH 33.0 26.5 - 33.0 pg    MCHC 29.7 (L) 31.5 - 36.5 g/dL    RDW 30.2 (H) 10.0 - 15.0 %    Platelet Count 207 150 - 450 10e3/uL    % Neutrophils 91 %    % Lymphocytes 2 %    % Monocytes 4 %    % Eosinophils 2 %    % Basophils 0 %    % Immature Granulocytes 1 %    NRBCs per 100 WBC 1 (H) <1 /100    Absolute Neutrophils 21.1 (H) 1.6 - 8.3 10e3/uL    Absolute Lymphocytes 0.5 (L) 0.8 - 5.3 10e3/uL    Absolute Monocytes 0.8 0.0 - 1.3 10e3/uL    Absolute Eosinophils 0.4 0.0 - 0.7 10e3/uL    Absolute Basophils 0.0 0.0 - 0.2 10e3/uL    Absolute Immature Granulocytes 0.3 <=0.4 10e3/uL     Absolute NRBCs 0.1 10e3/uL   Glucose by meter     Status: Normal   Result Value Ref Range    GLUCOSE BY METER POCT 78 70 - 99 mg/dL   Glucose by meter     Status: Normal   Result Value Ref Range    GLUCOSE BY METER POCT 71 70 - 99 mg/dL   Glucose by meter     Status: Normal   Result Value Ref Range    GLUCOSE BY METER POCT 72 70 - 99 mg/dL   Glucose by meter     Status: Abnormal   Result Value Ref Range    GLUCOSE BY METER POCT 68 (L) 70 - 99 mg/dL   Glucose by meter     Status: Normal   Result Value Ref Range    GLUCOSE BY METER POCT 95 70 - 99 mg/dL   Glucose by meter     Status: Normal   Result Value Ref Range    GLUCOSE BY METER POCT 90 70 - 99 mg/dL   Adult Type and Screen     Status: None (Preliminary result)   Result Value Ref Range    ABO/RH(D) B POS     SPECIMEN EXPIRATION DATE [id number]    ABO/Rh type and screen *Canceled*     Status: None ()    Narrative    The following orders were created for panel order ABO/Rh type and screen.  Procedure                               Abnormality         Status                     ---------                               -----------         ------                       Please view results for these tests on the individual orders.   CBC with platelets differential     Status: Abnormal    Narrative    The following orders were created for panel order CBC with platelets differential.  Procedure                               Abnormality         Status                     ---------                               -----------         ------                     CBC with platelets and d...[[id number]]  Abnormal            Final result                 Please view results for these tests on the individual orders.   ABO/Rh type and screen     Status: None (In process)    Narrative    The following orders were created for panel order ABO/Rh type and screen.  Procedure                               Abnormality         Status                     ---------                                -----------         ------                     Adult Type and Screen[[id number]]                            Preliminary result           Please view results for these tests on the individual orders.     Medications   dextrose 10% and 0.45% NaCl infusion ( Intravenous Rate/Dose Change 5/4/23 0116)   magnesium sulfate 2 g in 50 mL sterile water intermittent infusion (2 g Intravenous $New Bag 5/4/23 0141)   potassium chloride 10 mEq in 100 mL sterile water infusion (10 mEq Intravenous $New Bag 5/4/23 0153)   0.9% sodium chloride BOLUS (0 mLs Intravenous Stopped 5/4/23 0052)   dextrose 50 % injection 50 mL (50 mLs Intravenous $Given 5/3/23 2335)   iopamidol (ISOVUE-370) solution 123 mL (123 mLs Intravenous $Given 5/3/23 2345)   sodium chloride 0.9 % bag 500mL for CT scan flush use (90 mLs Intravenous $Given 5/3/23 2345)   dextrose 50 % injection 50 mL (50 mLs Intravenous $Given 5/3/23 2311)   potassium chloride 10 mEq in 100 mL sterile water infusion (0 mEq Intravenous Stopped 5/4/23 0142)   dextrose 50 % injection 25 mL (25 mLs Intravenous $Given 5/4/23 0110)     Labs Ordered and Resulted from Time of ED Arrival to Time of ED Departure   LACTIC ACID WHOLE BLOOD - Abnormal       Result Value    Lactic Acid 1.0      Potassium 1.4 (*)     Calcium Ionized 2.9 (*)    GLUCOSE BY METER - Abnormal    GLUCOSE BY METER POCT 31 (*)    ISTAT GASES LACTATE VENOUS POCT - Abnormal    Lactic Acid POCT 0.9      Bicarbonate Venous POCT 26      O2 Sat, Venous POCT 93 (*)     pCO2V Venous POCT 38 (*)     pH Venous POCT 7.45 (*)     pO2 Venous POCT 63 (*)    GLUCOSE BY METER - Abnormal    GLUCOSE BY METER POCT 115 (*)    ISTAT GASES ELECTROLYTES ICA GLUCOSE VENOUS POCT - Abnormal    CPB Applied No      Hematocrit POCT 20 (*)     Calcium, Ionized Whole Blood POCT 3.9 (*)     Glucose Whole Blood POCT 125 (*)     Bicarbonate Venous POCT 26      Hemoglobin POCT 6.8 (*)     Potassium POCT 2.7 (*)     Sodium POCT 136      pCO2 Venous POCT 38  (*)     pO2 Venous POCT 60 (*)     pH Venous POCT 7.45 (*)     O2 Sat, Venous POCT 92 (*)    GLUCOSE BY METER - Abnormal    GLUCOSE BY METER POCT 55 (*)    COMPREHENSIVE METABOLIC PANEL - Abnormal    Sodium 137      Potassium 2.3 (*)     Chloride 101      Carbon Dioxide (CO2) 23      Anion Gap 13      Urea Nitrogen 16.5      Creatinine 1.20 (*)     Calcium 7.2 (*)     Glucose 37 (*)     Alkaline Phosphatase 100      AST 63 (*)     ALT 18      Protein Total 6.1 (*)     Albumin 2.2 (*)     Bilirubin Total 0.6      GFR Estimate 85     INR - Abnormal    INR 1.34 (*)    MAGNESIUM - Abnormal    Magnesium 1.4 (*)    TROPONIN T, HIGH SENSITIVITY - Abnormal    Troponin T, High Sensitivity 43 (*)    TSH WITH FREE T4 REFLEX - Abnormal    TSH 6.28 (*)    GLUCOSE BY METER - Abnormal    GLUCOSE BY METER POCT 107 (*)    CBC WITH PLATELETS AND DIFFERENTIAL - Abnormal    WBC Count 23.1 (*)     RBC Count 1.97 (*)     Hemoglobin 6.5 (*)     Hematocrit 21.9 (*)      (*)     MCH 33.0      MCHC 29.7 (*)     RDW 30.2 (*)     Platelet Count 207      % Neutrophils 91      % Lymphocytes 2      % Monocytes 4      % Eosinophils 2      % Basophils 0      % Immature Granulocytes 1      NRBCs per 100 WBC 1 (*)     Absolute Neutrophils 21.1 (*)     Absolute Lymphocytes 0.5 (*)     Absolute Monocytes 0.8      Absolute Eosinophils 0.4      Absolute Basophils 0.0      Absolute Immature Granulocytes 0.3      Absolute NRBCs 0.1     GLUCOSE BY METER - Abnormal    GLUCOSE BY METER POCT 68 (*)    ISTAT CREATININE POCT - Normal    Creatinine POCT 1.3      GFR, ESTIMATED POCT >60     COVID-19 VIRUS (CORONAVIRUS) BY PCR - Normal    SARS CoV2 PCR Negative     LIPASE - Normal    Lipase 18     GLUCOSE BY METER - Normal    GLUCOSE BY METER POCT 78     GLUCOSE BY METER - Normal    GLUCOSE BY METER POCT 71     GLUCOSE BY METER - Normal    GLUCOSE BY METER POCT 72     GLUCOSE BY METER - Normal    GLUCOSE BY METER POCT 95     GLUCOSE BY METER - Normal     GLUCOSE BY METER POCT 90     T4 FREE   ISTAT CREATININE POCT   TYPE AND SCREEN, ADULT    ABO/RH(D) B POS      SPECIMEN EXPIRATION DATE [id number]     PREPARE RED BLOOD CELLS (UNIT)   ABO/RH TYPE AND SCREEN     CT Head w/o Contrast   Final Result   IMPRESSION:   1.  No acute intracranial process.   2.  Partially empty sella. Findings can be seen in the setting of idiopathic intracranial hypertension. Correlate for history of chronic headaches and papilledema.      CT Chest (PE) Abdomen Pelvis w Contrast   Final Result   IMPRESSION:   1.  Interval progression of tumor in the abdomen and pelvis including extensive peritoneal tumor. Notably significant increase in upper abdominal tumor encasing the liver and spleen. Moderate ascites in the lower abdomen similar to prior. Enlarging    hepatic metastases and lymphadenopathy.   2.  Small pleural effusions. Slight increase in pulmonary nodules and hilar and mediastinal lymphadenopathy.   3.  No pulmonary embolism.      XR Chest Port 1 View   Final Result   IMPRESSION: Lungs hypoaerated. Heart size within normal limits. Right chest port catheter tip at superior cavoatrial junction. No pleural collections.      POC US ECHO LIMITED   Final Result      POC US ABDOMEN LIMITED   Final Result   Poor acoustic windows.  No interpretable images             Critical care was performed.   Critical Care Addendum  My initial assessment, based on my review of prehospital provider report, review of nursing observations, review of vital signs, focused history and physical exam, established a high suspicion that Diego Buchanan has altered mental status, which requires immediate intervention, and therefore he is critically ill.     After the initial assessment, the care team initiated multiple lab tests, initiated IV fluid administration and initiated medication therapy with D50, D10 infusion, IV potassium replacement, IV magnesium replacement, blood transfusion to provide stabilization  care. Due to the critical nature of this patient, I reassessed nursing observations, vital signs, physical exam, mental status and respiratory status multiple times prior to his disposition.     Time also spent performing documentation, discussion with family to obtain medical information for decision making, reviewing test results and coordination of care.     Critical care time (excluding teaching time and procedures): 60 minutes.     Assessment & Plan    27 year old male with history of desmoplastic small round cell tumor, Garnica sarcoma, peritoneal carcinomatosis not on chemotherapy any longer to the emergency department with abdominal pain and resultant altered mental status.  He was hypoglycemic with a low blood sugar in the prehospital phase.  A D10 infusion was initiated.  His mental status had not improved by the time he was transported to the emergency department.  Fingerstick glucose here in the emergency department was only in the 30s.  The patient was given D50 and regained consciousness soon thereafter.  He then complained of some shortness of breath but denied ongoing abdominal pain.  His abdomen is distended but not tender on exam.  Point-of-care testing is remarkable for worsened anemia, severe hypokalemia.  He has normal lactate levels do not suspect sepsis.  Laboratory evaluation confirms patient is anemic with a hemoglobin of 6.5.  He is hypokalemic with a potassium of 2.9 and hypomagnesemic with a magnesium of 1.4.  IV magnesium, potassium replacement initiated.  Blood transfusion ordered.  Patient did require subsequent D50 administration as well as a half amp of D50 to maintain blood sugar above 70 despite being on D10 infusion.  D10 infusion titrated.  CT chest does not reveal any evidence for pulmonary embolism.  CT abdomen/pelvis reveals worsened neoplastic process but no perforation or obstruction.  The patient's white count is elevated 23,000.  He has had significant leukocytosis for the  last 2 months or so.  Unclear if this represents acute infection or stress response.  Will cover with Zosyn and vancomycin for now.  The patient is significantly ill and require admission to the stepdown unit for further evaluation and management.    I have reviewed the nursing notes. I have reviewed the findings, diagnosis, plan and need for follow up with the patient.    New Prescriptions    No medications on file       Final diagnoses:   Hypoglycemia   Hypomagnesemia   Hypokalemia   Anemia, unspecified type     Chart documentation was completed with Dragon voice-recognition software. Even though reviewed, this chart may still contain some grammatical, spelling, and word errors.       [facility] EMERGENCY DEPARTMENT  5/3/2023     Rodriguez Cummings MD  05/04/23 0210       Rodriguez Cummings MD  05/04/23 0228

## 2023-05-04 NOTE — CONSULTS
Oncology Consult Note   Date of Service: 05/04/2023    Patient: Diego Buchanan  MRN: [id number]  Admission Date: 5/3/2023  Hospital Day # Hospital Day: 2  Primary Outpatient Oncologist: Dr. 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 He was found to  hypoglycemia and electrolyte abnormality.  Currently remains on  D20 infusion for persistent hypoglycemia.     Patient was recently seen at Dr. Sims's clinic on 3/31. Given patient's metastatic  desmoplastic small round cell carcinoma with multiple hospitalizations due to chemo side effects and poor performance status, Dr. Sims had discussed considering comfort care. After extensive discussion at clinic, family had initially decided with home hospice. However, patient's mother decline hospice and wants to continue care for other medical conditions.     Patient's sister who was at bedside this morning understands patient's current clinically condition and his poor prognosis. She is open to talk to palliative team to discuss hospice care. Mother and rest of the family is planning to be here later this after for care conference.     No new recommendations from oncology stand point. Continue symptoms management and  goals of care discussion with family.     Oncology team will sign off at this time. Please do not hesitate to contact us with any questions.     Patient was seen and plan of care was discussed with attending physician Dr. Obrien.     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 He was found to  hypoglycemia  and electrolyte abnormality.  Currently remains on  D20 infusion for persistent hypoglycemia.     Today, he complains of abd pain, SOB and generalized weakness. Sister was at bedside and was concerned about patient's clinical condition. Sister states that they are willing to consider comfort care. However, patient's mother wants to continue care for now for his current acute illness.     Oncologic History:  1. He presented initially with abdominal pain, diarrhea, and progressive abdominal distention for 3 months duration. 2. Initial CT scan in February 2020 showed severe peritoneal carcinomatosis with large peritoneal tumor implants throughout the entire abdomen and pelvis, but mostly prominently in the pelvis.   2. PETCT scan showed interval increase in the amount of peritoneal carcinomatosis.  3. On 3/12/2020, he had ultrasound-guided core needle biopsy of a peritoneal implant (Case: [id number]), which showed a completely undifferentiated appearance, but stains with pancytokeratin, indicating an epithelial origin. 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 Tumor markers for CA-19-9, CEA, beta-hCG, alpha-fetoprotein were unremarkable. Cancer type ID molecular testing by [facility] sent to determine the exact diagnosis and to assist in further treatment planning. 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 Relative probability of less than 5% of other subtype of sarcoma. EWSR1-WT1 fusion detected.  4. 5/1/2020, MRI brain negative for brain metastasis, and baseline CT-CAP obtained showing extensive mixed solid and cystic masses throughout the abdomen and pelvis consistent with peritoneal carcinomatosis. Largest mass measures approximately 20 cm in the pelvis. Metastatic mixed solid and cystic masses seen in hepatic segments 5 and 6 and hepatic segment 7. The masses appear  to be contiguous with the retrohepatic peritoneal carcinomatosis. Small volume fluid about the spleen favored to represent malignant ascites, stable mild-to-moderate right hydronephrosis related to mass effect upon the distal ureter in the pelvis, the IVC and iliac veins are compressed due to the extensive peritoneal carcinomatosis without findings to suggest deep venous thrombosis.  5. 5/4/2020, he begins CAV/IMV alternating chemotherapy. He receives 6 cycles of treatment. He symptomatically improves.  6. 9/11/2020, CT-CAP shows stable to mildly improved extensive peritoneal carcinomatosis. He would like to omit Ifosfamide/etoposide cycles and continue only with CAV.  7. 10/9/2020, he starts CAV every 21 days.  8. 1/6/2021, CT CAP showed a mixed response in the mixed solid and cystic masses throughout the peritoneum. Some of the tumors are stable to mildly worse, with some of the peritoneal masses a bit larger, a few are smaller, one cystic tumor in the left abdomen is smaller, while tumors lower in the pelvis appear slightly larger.  9. 3/24/2021, CT CAP showed continued slight decrease in overall burden of peritoneal carcinomatosis with persistent encasement of bowel without evidence of bowel obstruction.  10. 6/25/2021, he receives cycle 19 CAV, then takes a chemotherapy holiday.  11. 4/22/2022, he resumes CAV/IMV chemotherapy.  12. 7/13/22, CT CAP showed interval enlargement of hepatic and splenic metastases with other overall disease stable. CAV/IMV continued.  13.  10/04/22, CT CAP  showed evidence of progressive disease, predominantly in liver. There are small sub-centimeter lung nodules, which are suspicious for metastasis. CAV/IMV discontinued. Start irinotecan/temozolomide (TMZ) on 11/28/22.   14. 12/7/22-12/21/22: Hospitalized for neutropenic fever with RSV, rash (drug vs. Viral exanthem), and pancytopenia with transfusions.   15. 1/30/23, He received cycle 2 of TMZ/irinotecan with 10% dose reduction of  irinotecan. Treatment was delayed due to hospitalization and need for additional recovery (ongoing fatigue and diarrhea) as well as planned port placement.   16. 2/10/23-2/20/23, he was hospitalized for impaired oral intake and subsequent electrolyte disturbances s/t an increase in peritoneal carcinomatosis. Stool studies given diarrhea were negative. Imodium was used for diarrhea management. He required blood transfusions for anemia. Was seen by palliative and started on Butrans, oxycodone and tylenol for pain.    Review of Systems: Pertinent positive and negative systems described in HPI; the remainder of the 14 systems are negative    Past Medical History:  Past Medical History:   Diagnosis Date     Hypertension      Learning disability     but pt is his own gaurdian     Peritoneal carcinomatosis (H)        Past Surgical History:  Past Surgical History:   Procedure Laterality Date     BIOPSY      liver     INSERT PORT VASCULAR ACCESS Right 4/21/2022    Procedure: INSERTION, VASCULAR ACCESS PORT;  Surgeon: Daniel Sarmiento MD;  Location: [facility] OR     INSERT PORT VASCULAR ACCESS N/A 1/20/2023    Procedure: Right Port Removal and Right Subclavian Powerport replacement and Flouroscopy.;  Surgeon: Caleb Brady MD;  Location: [facility] OR     IR CHEST PORT PLACEMENT > 5 YRS OF AGE  4/21/2022     IR CVC TUNNEL PLACEMENT > 5 YRS OF AGE  4/29/2020     IR CVC TUNNEL REMOVAL RIGHT  11/16/2021     US MUSCLE BIOPSY  3/12/2020       Social History:  Social History     Socioeconomic History     Marital status: Single   Tobacco Use     Smoking status: Never     Smokeless tobacco: Never   Substance and Sexual Activity     Alcohol use: Never     Drug use: Never        Family History  Family History   Problem Relation Age of Onset     Hypertension Mother      Anesthesia Reaction No family hx of      Deep Vein Thrombosis (DVT) No family hx of        Outpatient Medications:  sodium chloride (PF) 0.9% PF flush 30  "mL    acetaminophen (TYLENOL) 325 MG tablet, Take 2 tablets (650 mg) by mouth every 6 hours  buprenorphine (BUTRANS) 10 MCG/HR WK patch, Place 1 patch onto the skin once a week  cyanocobalamin (VITAMIN B-12) 100 MCG tablet, Take 100 mcg by mouth daily  loperamide (IMODIUM) 2 MG capsule, Take 2 capsules (4 mg) by mouth 4 times daily as needed for diarrhea (Use if pt having having diarrhea, do not use if pt is constipated)  LORazepam (ATIVAN) 0.5 MG tablet, Take 1 tablet (0.5 mg) by mouth every 6 hours as needed for anxiety  melatonin 1 MG TABS tablet, Take 1 tablet (1 mg) by mouth nightly as needed for sleep  metoprolol tartrate (LOPRESSOR) 25 MG tablet, Take 1 tablet (25 mg) by mouth 2 times daily  morphine 10 MG/5ML solution, Take 2.5 mLs (5 mg) by mouth every 4 hours as needed for pain  naloxone (NARCAN) 4 MG/0.1ML nasal spray, Spray 1 spray (4 mg) into one nostril alternating nostrils as needed for opioid reversal every 2-3 minutes until assistance arrives  oxyCODONE (ROXICODONE) 5 MG tablet, Take 1 tablet (5 mg) by mouth every 6 hours as needed for severe pain (7-10) or moderate pain (4-6)  potassium chloride ER (KLOR-CON M) 20 MEQ CR tablet, Take 1 tablet (20 mEq) by mouth 2 times daily  prochlorperazine (COMPAZINE) 5 MG tablet, Take 1 tablet (5 mg) by mouth every 6 hours as needed for nausea or vomiting  sennosides (SENOKOT) 8.6 MG tablet, Take 2-4 tablets by mouth 2 times daily as needed for constipation  thiamine (B-1) 100 MG tablet, Take 1 tablet (100 mg) by mouth daily  triamcinolone (KENALOG) 0.1 % external ointment, Apply topically 2 times daily as needed for irritation  Vitamin D3 (VITAMIN D (CHOLECALCIFEROL)) 25 mcg (1000 units) tablet, Take 1 tablet by mouth daily         Physical Exam:    BP 99/55 (BP Location: Left arm)   Pulse (!) 129   Temp 98.1  F (36.7  C) (Oral)   Resp (S) (!) 40   Ht 1.727 m (5' 8\")   Wt 101.2 kg (223 lb 1.7 oz)   SpO2 100%   BMI 33.92 kg/m    Gen:  NAD  HEENT: CYNTHIA, " PERRL,   CV: Normal rate, regular rhythm  Pulm:no wheezing, normal work of breathing  Abd: distended with mild tenderness  Ext: Warm and well perfused. No lower extremity edema  Skin: No rash, cyanosis or petechial lesion  Neuro: Alert and answering questions appropriately.     Labs & Studies: I personally reviewed the following studies:  ROUTINE LABS (Last four results):  CMP  Recent Labs   Lab 05/04/23  0859 05/04/23  0746 05/04/23  0736 05/04/23  0635 05/04/23  0535 05/04/23  0528 05/04/23  0022 05/03/23  2359 05/03/23  2333 05/03/23  2323 05/03/23  2314 05/03/23  2313   NA  --   --   --   --   --  135*  --   --   --   --  136 137   POTASSIUM  --   --   --   --   --  2.3*  --  1.4*  --   --  2.7* 2.3*   CHLORIDE  --   --   --   --   --  101  --   --   --   --   --  101   CO2  --   --   --   --   --  21*  --   --   --   --   --  23   ANIONGAP  --   --   --   --   --  13  --   --   --   --   --  13   * 109* 110* 104*   < > 98   < >  --    < >  --  115*  125* 37*   BUN  --   --   --   --   --  15.2  --   --   --   --   --  16.5   CR  --   --   --   --   --  1.23*  --   --   --  1.3  --  1.20*   GFRESTIMATED  --   --   --   --   --  83  --   --   --  >60  --  85   FAISAL  --   --   --   --   --  6.8*  --   --   --   --   --  7.2*   MAG  --   --   --   --   --   --   --   --   --   --   --  1.4*   PHOS  --   --   --   --   --  3.0  --   --   --   --   --   --    PROTTOTAL  --   --   --   --   --   --   --   --   --   --   --  6.1*   ALBUMIN  --   --   --   --   --   --   --   --   --   --   --  2.2*   BILITOTAL  --   --   --   --   --   --   --   --   --   --   --  0.6   ALKPHOS  --   --   --   --   --   --   --   --   --   --   --  100   AST  --   --   --   --   --   --   --   --   --   --   --  63*   ALT  --   --   --   --   --   --   --   --   --   --   --  18    < > = values in this interval not displayed.     CBC  Recent Labs   Lab 05/04/23  0821 05/03/23 2314 05/03/23 2313   WBC 22.3*  --  23.1*   RBC  2.03*  --  1.97*   HGB 6.5* 6.8* 6.5*   HCT 22.2* 20* 21.9*   *  --  111*   MCH 32.0  --  33.0   MCHC 29.3*  --  29.7*   RDW 28.3*  --  30.2*     --  207     INR  Recent Labs   Lab 05/03/23  2313   INR 1.34*

## 2023-05-04 NOTE — CONSULTS
[facility]  Palliative Care Consultation Note    Patient: Diego Buchanan  Date of Admission:  5/3/2023    Requesting Clinician / Team: Hospitalist  Reason for consult: Symptom management  Goals of care    Impression & Recommendations:  Family meeting this afternoon from 15:15 to 16:00 with in-person Fatuma  at bedside. Those present included Mariela Jones (mother), father, Mariela (sister), Caryn (brother), primary team, Palliative SW and RNs.  Summary:    Robert wanted to be present for meeting but was unable to participate much due to fatigue. He defers decisions to his family.    Delivered news that Robert's cancer continues to progress and his time appears short. Primary team shared prognosis of days to short weeks.    Explained that Robert is requiring a high level of support here in the hospital (blood transfusions, dextrose infusion, electrolyte repletion, etc). These are temporizing measures that will not reverse the course of his underlying disease.    Discussed options of hospice/end of life care here in the hospital vs home. Explained that it would be very challenging to get Robert home given his current needs. The medical team is concerned that he might only have a few hours at home.    Family would like some time to discuss further amongst themselves before coming to a decision.     In the interim, recommended no escalation of care and DNR/DNI code status. Family is in agreement.    Encouraged family to bring in traveling clothes given prognosis.    Goals of Care: comfort focused (NOT comfort care)  - No escalation of care, DNR/DNI  - Continue current treatments including dextrose infusion, blood transfusions, antibiotics and electrolyte repletion  - Aggressive symptom management     Advanced Care Planning:  Advance directive: Yes on file  POLST: None  Code status: DNR/pre-arrest intubation OK  Health care agent/surrogate: Mariela (mother/HCA) supported by Mariela (sister)    Symptom  Management:  #Abdominal pain, distension  - Imaging shows progression of tumor, extensive peritoneal tumor, moderate ascites, enlarging hepatic mets and lymphadenopathy  - Discontinue home Butrans patch  - Change PO morphine to PO oxycodone intensol solution 5-10mg q2h prn pain or dyspnea (avoid morphine in renal impairment)  - Change IV morphine to IV dilaudid 0.2-0.4mg q2h prn pain or dyspnea (avoid morphine in renal impairment)    #Dyspnea  - Small pleural effusions on imaging, no evidence of PE. Possibly due to abdominal distension and volume overload  - Opioids for dyspnea as above  - Fan at bedside    #Anxiety  - Change PO ativan tablet to PO ativan solution 0.5mg q3h prn anxiety  - Add IV ativan 0.5mg q3h prn anxiety or nausea    #Nausea  - Continue IV/PO zofran prn  - Continue IV/PO compazine prn  - IV ativan available for anxiety or intractable nausea    #Constipation  - No signs of obstruction on imaging. Patient reported BM several days ago.  - Caution with stimulant laxatives given peritoneal disease.  - Start prn glycerin suppository    Psychosocial & Spiritual:   - Supported by parents and 2 adult siblings  - Practices traditional [employer]    Palliative medicine will continue to follow. These recommendations were given to the primary team in person.    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 Also with WILLIAM and acute anemia requiring transfusion.  - Per chart review, outpatient Oncology referred to [facility] hospice and met with family on 4/6. Family declined enrollment.  - Admitted 3/2-3/9 for electrolyte derangements in setting of Cdiff diarrhea.   -  "Admitted 2/10-2/20 for progressive abdominal pain and bloating, signs of cancer progression on imaging. Palliative consulted and started on Butrans patch, po oxycodone and dexamethasone. Referred for outpatient Palliative, does not appear he has been seen in outpatient clinic.    Today, the patient was seen for:  Symptom management, goals of care    Prognosis, Goals, & Planning:     Prognosis, Goals, and/or Advance Care Planning were addressed today: Yes      Patient's decision making preferences: shared with support from loved ones          Patient has decision-making capacity today for complex decisions: No            I have concerns about the patient/family's health literacy today: No           Patient has a completed Health Care Directive: Yes, and on file.      Code status: No CPR; pre-arrest intubation OK    Coping, Meaning, & Spirituality:   Mood, coping, and/or meaning in the context of serious illness were addressed today: Yes  Summary/Comments: Robert is anxious and uncomfortable. He speaks in short phrases and said \"I'm not gonna make it.\" Provided support and validation. Robert says he is not Holiness.    Social:     Living situation: home with family    Key family / caregivers: Mariela (mother), father, Mariela (sister), Caryn (brother), several younger siblings    History of Present Illness:      Robert is accompanied by sister Mariela during my visit today. He is laying in bed with eyes open. He nods/shakes his head or speaks in short phrases in response to my questions. His sister answers some questions for him. Robert has intense pain in the abdomen, which is very distended. He also complains of nausea. Has not been eating much due to increased pain after eating. He also has constipation and attempting to have a BM worsens abdominal pain.    Sister Mariela is at bedside and reports mom Mariela will be here this afternoon. They met with hospice recently and were not ready to enroll yet, but Mariela expresses interest in hospice " "today. They want to make Robert comfortable.     Discussed case with primary team including symptom management as above. Arranged for care conference this afternoon with Mariela (mom) and in-person [facility] .    Medications:  Notable for:  Tylenol 650mg q6h  Butrans patch 10mcg/hr weekly  D20 infusion at 60mL/hr  Lorazepam 0.5mg q6h prn anxiety  IV morphine 1mg q3h prn severe pain or dyspnea  PO morphine 5mg q4h prn moderate pain or dyspnea  ODT/IV zofran prn  PO/IV compazine prn    PERTINENT PHYSICAL EXAMINATION:  Vital Signs: Blood pressure 99/55, pulse (!) 129, temperature 98.1  F (36.7  C), temperature source Oral, resp. rate (S) (!) 40, height 1.727 m (5' 8\"), weight 101.2 kg (223 lb 1.7 oz), SpO2 100 %.   GENERAL: laying in bed with eyes open, nods/shakes head and answers questions in short phrases, fatigued  SKIN: Warm and dry   HEENT: Normocephalic, anicteric sclera, moist mucous membranes  LUNGS: +mild dyspnea  ABDOMINAL: +distended, +tender to light touch  NEUROLOGIC: A&Ox4, speaks in short phrases    Data reviewed:  Recent imaging reviewed, my comments on pertinents:   CTH 5/4  IMPRESSION:  1.  No acute intracranial process.  2.  Partially empty sella. Findings can be seen in the setting of idiopathic intracranial hypertension. Correlate for history of chronic headaches and papilledema.    CT C/A/P 5/4  IMPRESSION:  1.  Interval progression of tumor in the abdomen and pelvis including extensive peritoneal tumor. Notably significant increase in upper abdominal tumor encasing the liver and spleen. Moderate ascites in the lower abdomen similar to prior. Enlarging hepatic metastases and lymphadenopathy.  2.  Small pleural effusions. Slight increase in pulmonary nodules and hilar and mediastinal lymphadenopathy.  3.  No pulmonary embolism.    Recent lab data reviewed, my comments on pertinents:   Potassium 2.3 (up from 1.4)  Creatinine 1.23 (uptrending)  Calcium 6.8  Hemoglobin 6.5  WBC 22.3    90 MINUTES " SPENT BY ME on the date of service doing chart review, history, exam, documentation & further activities per the note.

## 2023-05-04 NOTE — ED TRIAGE NOTES
BIBA. Was using bathroom with family and reported sharp abdominal pain before becoming unresponsive.   BG Lo  /70        Triage Assessment     Row Name 05/03/23 0571       Triage Assessment (Adult)    Airway WDL WDL       Respiratory WDL    Respiratory WDL WDL       Skin Circulation/Temperature WDL    Skin Circulation/Temperature WDL WDL       Cardiac WDL    Cardiac WDL X;rhythm    Pulse Rate & Regularity tachycardic       Cognitive/Neuro/Behavioral WDL    Cognitive/Neuro/Behavioral WDL X;arousability;level of consciousness    Level of Consciousness unresponsive    Arousal Level unresponsive    Orientation disoriented to;person;place;time;situation    Speech CIERRA (unable to assess)       [place] Coma Scale    Best Eye Response 3-->(E3) to speech    Best Motor Response 5-->(M5) localizes pain    Best Verbal Response 1-->(V1) none    B[place] Coma Scale Score 9

## 2023-05-04 NOTE — H&P
[facility]    History and Physical - Hospitalist Service, GOLD TEAM        Date of Admission:  5/3/2023    Assessment & Plan      Diego Buchanan is a 27 year old male admitted on 5/3/2023. 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    Persisting hypoglycemia  Lethargy  EMS was called to home after he lost consciousness. Glucose was 'low' and he was started on D10 infusion and brought to ED. In the ED, her was persistently hypoglycemic despite D10 infusion up to 150ml/hr. CT head was negative. Not on any hypoglycemic agent, concerning for hypoglycemia due to liver mets and overall cancer burden along with decreased po intake. He is interemitently awake on admission and responds by nodding or shaking heads. Additional DDx include severe sepsis, adrenal insufficiency.  - switch to D20 infusion via port-a-cath along with NS  - q1h glucose check  - zosyn and vancomycin started for possible severe sepsis: continue. follow up blood cultures  - trial of subcutaneous glucagon 1mg once    Hypokalemia  Hypomagnesemia  Hx of recent C Diff colitis  Has not been tolerating much po recently. No significant diarrhea. Takes potassium supplement. K 2.3 and Mg 1.4 on admission. Hypokalemia and hypomagnesemia likely due to decreased po intake  - monitor K/Mg and replace as needed    WILLIAM  Cre baseline around 0.5, currently up to 1.2. pre-renal from decreased po intake likely though possibly due to carcinomatosis, also received IV contrast.  - continue IV fluid    Anemia  Baseline Hb mid 7s. Hb 6.7 on admission without history of bleed. 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  Cancer related pain  Initially diagnosed in 2020 with peritoneal carcinomatosis. Progressive disease despite treatment (last dose of TMZ/irinotecan in 1/2023) with ongoing decline, and recently referred to hospice by primary oncologist though declined enrollment as of 4/6. Family hopes to continue treatment for things that are reversible, though also indicated that they do not want aggressive CPR if there is no chance of recovery.  - oncology and palliative care consulted  - continue home pain regimen of oral morphine, buprenorphine patch  - add low dose iv morphine prn  - hold prn oxycodone given WILLIAM  - need ongoing goals of care conversation given his further decline.    Hx of hypertension  Tachycardia  On metoprolol at home. BP 90s/40s, tachycardic to 120s. Sinus tachy  - hold metoprolol for now     Diet:  regular  DVT Prophylaxis: Enoxaparin (Lovenox) SQ  Busby Catheter: Not present  Lines: None     Cardiac Monitoring: None  Code Status:   DNR, ok with intubation    Clinically Significant Risk Factors Present on Admission        # Hypokalemia: Lowest K = 1.4 mmol/L in last 2 days, will replace as needed   # Hypocalcemia: Lowest iCa = 2.9 mg/dL in last 2 days, will monitor and replace as appropriate   # Hypomagnesemia: Lowest Mg = 1.4 mg/dL in last 2 days, will replace as needed   # Hypoalbuminemia: Lowest albumin = 2.2 g/dL at 5/3/2023 11:13 PM, will monitor as appropriate  # Coagulation Defect: INR = 1.34 (Ref range: 0.85 - 1.15) and/or PTT = N/A, will monitor for bleeding   # Acute Kidney Injury, unspecified: based on a >150% or 0.3 mg/dL increase in last creatinine compared to past 90 day average, will monitor renal function       # Obesity: Estimated body mass index is 33.45 kg/m  as calculated from the following:    Height as of this encounter: 1.727 m (5' 8\").    Weight as of this encounter: 99.8 kg (220 lb).           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 He went to the bathroom after complaining of abdominal pain, and lost his consciousness. Family was monitoring him, but since he was not waking up after some time, called EMS. On arrival, he was found to have 'low' glucose level, and brought to ED on D10 infusion. The mother says that he has not been eating much, has been needing significant assistance with activities, unable to get comfortable nor rest at night. No reports of diarrhea or vomiting.  Unable to obtain detailed ROS, but he endorsed pain in his buttock.      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     Peritoneal carcinomatosis (H)        Past Surgical History   Past Surgical History:   Procedure Laterality Date     BIOPSY      liver     INSERT PORT VASCULAR ACCESS Right 4/21/2022    Procedure: INSERTION, VASCULAR ACCESS PORT;  Surgeon: Daniel Sarmiento MD;  Location: [facility] OR     INSERT PORT VASCULAR ACCESS N/A 1/20/2023    Procedure: Right Port Removal and Right Subclavian Powerport replacement and Flouroscopy.;  Surgeon: Caleb Brady MD;  Location: [facility] OR     IR CHEST PORT PLACEMENT > 5 YRS OF AGE  4/21/2022     IR CVC TUNNEL PLACEMENT > 5 YRS OF AGE  4/29/2020     IR CVC TUNNEL REMOVAL RIGHT  11/16/2021     US MUSCLE BIOPSY  3/12/2020        Prior to Admission Medications   Prior to Admission Medications   Prescriptions Last Dose Informant Patient Reported? Taking?   LORazepam (ATIVAN) 0.5 MG tablet   No No   Sig: Take 1 tablet (0.5 mg) by mouth every 6 hours as needed for anxiety   Vitamin D3 (VITAMIN D (CHOLECALCIFEROL)) 25 mcg (1000 units) tablet   Yes No   Sig: Take 1 tablet by mouth daily   acetaminophen (TYLENOL) 325 MG tablet   No No   Sig: Take 2 tablets (650 mg) by mouth every 6 hours   buprenorphine (BUTRANS) 10 MCG/HR WK patch   No No   Sig: Place 1 patch onto the skin once a week   cyanocobalamin (VITAMIN B-12) 100 MCG tablet   Yes No   Sig: Take 100 mcg by mouth daily   loperamide (IMODIUM) 2 MG capsule   No No   Sig: Take 2 capsules (4 mg) by mouth 4 times daily as needed for diarrhea (Use if pt having having diarrhea, do not use if pt is constipated)   melatonin 1 MG TABS tablet   No No   Sig: Take 1 tablet (1 mg) by mouth nightly as needed for sleep   metoprolol tartrate (LOPRESSOR) 25 MG tablet   No No   Sig: Take 1 tablet (25 mg) by mouth 2 times daily   morphine 10 MG/5ML solution   No No   Sig: Take 2.5 mLs (5 mg) by mouth every 4 hours as needed for pain   naloxone (NARCAN) 4 MG/0.1ML nasal spray   No No   Sig: Spray 1 spray (4 mg) into one nostril alternating nostrils as needed for opioid reversal every 2-3 minutes until assistance arrives   oxyCODONE (ROXICODONE) 5 MG tablet   No No   Sig: Take 1 tablet (5 mg) by mouth every 6 hours as needed for severe pain (7-10) or moderate pain (4-6)   potassium chloride ER (KLOR-CON M) 20 MEQ CR tablet   No No   Sig: Take 1 tablet (20 mEq) by mouth 2 times daily   prochlorperazine (COMPAZINE) 5 MG tablet   No No   Sig: Take 1 tablet (5 mg) by mouth every 6 hours as needed for nausea or vomiting   sennosides (SENOKOT) 8.6 MG tablet   No No   Sig: Take 2-4 tablets by mouth 2 times daily as needed for constipation   thiamine (B-1) 100 MG tablet   No No   Sig: Take 1 tablet (100 mg) by  mouth daily   triamcinolone (KENALOG) 0.1 % external ointment   No No   Sig: Apply topically 2 times daily as needed for irritation      Facility-Administered Medications: None      Physical Exam   Vital Signs: Temp: 97.3  F (36.3  C) Temp src: Oral BP: 98/43 Pulse: (!) 123   Resp: (!) 33 SpO2: 100 % O2 Device: Nasal cannula Oxygen Delivery: 5 LPM  Weight: 220 lbs 0 oz    General Appearance: Lethargic, intermittently opens eyes and tracks, responds by nodding or shaking head  Respiratory: distant breath sound, no wheezing or stridor  Cardiovascular: regular tachy  GI: distended, firm  Skin: warm, perfusing 3+ edema in loer extremities  Other: Weak throughout.    Medical Decision Making       75 MINUTES SPENT BY ME on the date of service doing chart review, history, exam, documentation & further activities per the note.      Data     I have personally reviewed the following data over the past 24 hrs:    23.1 (H)  \   6.8 (LL)   / 207     136 101 16.5 /  86   1.4 (LL) 23 1.3 \       ALT: 18 AST: 63 (H) AP: 100 TBILI: 0.6   ALB: 2.2 (L) TOT PROTEIN: 6.1 (L) LIPASE: 18       Trop: 43 (H) BNP: N/A       TSH: 6.28 (H) T4: 0.88 (L) A1C: N/A       Procal: N/A CRP: N/A Lactic Acid: 1.0       INR:  1.34 (H) PTT:  N/A   D-dimer:  N/A Fibrinogen:  N/A       Imaging results reviewed over the past 24 hrs:   Recent Results (from the past 24 hour(s))   POC US ABDOMEN LIMITED    Impression    Poor acoustic windows.  No interpretable images   XR Chest Port 1 View    Narrative    EXAM: XR CHEST PORT 1 VIEW  LOCATION: [facility]  DATE/TIME: 5/3/2023 11:25 PM CDT    INDICATION: unresponsive  COMPARISON: 03/02/2023      Impression    IMPRESSION: Lungs hypoaerated. Heart size within normal limits. Right chest port catheter tip at superior cavoatrial junction. No pleural collections.   CT Chest (PE) Abdomen Pelvis w Contrast    Narrative    EXAM: CT CHEST PE ABDOMEN PELVIS W CONTRAST  LOCATION:  [facility]  DATE/TIME: 5/4/2023 12:00 AM CDT    INDICATION: Short of air, syncope, abdominal distention  COMPARISON: CT chest and pelvis 02/10/2023  TECHNIQUE: CT chest pulmonary angiogram and routine CT abdomen pelvis with IV contrast. Arterial phase through the chest and venous phase through the abdomen and pelvis. Multiplanar reformats and MIP reconstructions were performed. Dose reduction   techniques were used.   CONTRAST: 123 ml isovue 370     FINDINGS:  ANGIOGRAM CHEST: Pulmonary arteries are normal caliber and negative for pulmonary emboli. Thoracic aorta is negative for dissection. No CT evidence of right heart strain.     LUNGS AND PLEURA: Small pleural effusions. Numerous enlarging pulmonary nodules, for example, right upper lobe 13 mm (series 10 image 21), previously 10 mm.     MEDIASTINUM/AXILLAE: Interval enlargement of left hilar lymph nodes for example 3.5 x 2.6 cm series 10 image 35. Enlarging mediastinal nodes including subcarinal node, 2.3 x 3.3 cm. Enlarged supraclavicular internal mamillary and cardiophrenic nodes,   mildly increased in the interval. Right IJ chest port catheter in the upper right atrium.    CORONARY ARTERY CALCIFICATION: None.    HEPATOBILIARY: Numerous liver lesions have enlarged in the interval, for example, posterior right hepatic lobe 3.9 x 3.8 cm (series 5 image 40), slightly increased. Extensive circumferential soft tissue implants surrounding the liver, and throughout the   upper abdomen.    PANCREAS: Normal.    SPLEEN: Extensive soft tissue implants around the spleen. Splenic metastasis redemonstrated.    ADRENAL GLANDS: Normal.    KIDNEYS/BLADDER: No hydronephrosis.    BOWEL: No obstruction. Interval progressive increase in widespread peritoneal confluent masses throughout the abdomen and pelvis. Multifocal encasement of bowel loops which aren't inferoapical to delineate from tumor. No evidence for bowel obstruction.   No  definite free intraperitoneal gas. Moderate ascites predominantly in the inferior abdomen similar to prior.    LYMPH NODES: Enlarged lymph nodes in the abdomen and pelvis slightly increased from prior.    VASCULATURE: Portal veins SMV and splenic vein are patent. Hepatic veins. Intrahepatic IVC narrowed but patent.    PELVIC ORGANS: Normal.    MUSCULOSKELETAL: No acute osseous abnormality. Sclerotic lesion in the sacrum. Similar to prior      Impression    IMPRESSION:  1.  Interval progression of tumor in the abdomen and pelvis including extensive peritoneal tumor. Notably significant increase in upper abdominal tumor encasing the liver and spleen. Moderate ascites in the lower abdomen similar to prior. Enlarging   hepatic metastases and lymphadenopathy.  2.  Small pleural effusions. Slight increase in pulmonary nodules and hilar and mediastinal lymphadenopathy.  3.  No pulmonary embolism.   CT Head w/o Contrast    Narrative    EXAM: CT HEAD W/O CONTRAST  LOCATION: [facility]  DATE/TIME: 5/4/2023 12:06 AM CDT    INDICATION: Altered mental status  COMPARISON: 05/01/2020  TECHNIQUE: Routine CT Head without IV contrast. Multiplanar reformats. Dose reduction techniques were used.    FINDINGS:  INTRACRANIAL CONTENTS: No intracranial hemorrhage, extraaxial collection, or mass effect.  No CT evidence of acute infarct. Normal parenchymal attenuation. Normal ventricles and sulci. Partially empty sella.    VISUALIZED ORBITS/SINUSES/MASTOIDS: No intraorbital abnormality. No paranasal sinus mucosal disease. No middle ear or mastoid effusion.    BONES/SOFT TISSUES: No acute abnormality.      Impression    IMPRESSION:  1.  No acute intracranial process.  2.  Partially empty sella. Findings can be seen in the setting of idiopathic intracranial hypertension. Correlate for history of chronic headaches and papilledema.

## 2023-05-04 NOTE — PHARMACY-ADMISSION MEDICATION HISTORY
Pharmacist Admission Medication History    Admission medication history is complete. The information provided in this note is only as accurate as the sources available at the time of the update.    Medication reconciliation/reorder completed by provider prior to medication history? Yes    Information Source(s): Family member and CareEverywhere/SureScripts via in-person    Pertinent Information:     Mariela reported rare use of morphine, lorazepam and oxyCODONE. I didn't find a fill of morphine on patient's [place]  search. OxyCODONE was last filled 2/20/23 for a qty of 15. Butrans last filled 2/21/23 for a qty of 4    Last time metoprolol filled was 1/16/23 for a 30 day supply     Mariela reported patient doesn't take any scheduled medications except supplements    Changes made to PTA medication list:    Added: None    Deleted: None    Changed:   o Acetaminophen changed to prn    Medication Affordability:       Allergies reviewed with patient and updates made in EHR: no    Medication History Completed By: Mic Hickey RPH 5/4/2023 10:57 AM    Prior to Admission medications    Medication Sig Last Dose Taking? Auth Provider Long Term End Date   buprenorphine (BUTRANS) 10 MCG/HR WK patch Place 1 patch onto the skin once a week More than a month Yes Tyson Gómez MD     cyanocobalamin (VITAMIN B-12) 100 MCG tablet Take 100 mcg by mouth daily Past Week Yes Unknown, Entered By History     loperamide (IMODIUM) 2 MG capsule Take 2 capsules (4 mg) by mouth 4 times daily as needed for diarrhea (Use if pt having having diarrhea, do not use if pt is constipated) Unknown Yes Tyson Gómez MD No    LORazepam (ATIVAN) 0.5 MG tablet Take 1 tablet (0.5 mg) by mouth every 6 hours as needed for anxiety Unknown Yes Farzaneh Young MD     metoprolol tartrate (LOPRESSOR) 25 MG tablet Take 1 tablet (25 mg) by mouth 2 times daily Unknown Yes Scheierl, Amber J, APRN CNP Yes    morphine 10 MG/5ML solution Take 2.5 mLs (5 mg) by  mouth every 4 hours as needed for pain Unknown Yes Farzaneh Young MD     oxyCODONE (ROXICODONE) 5 MG tablet Take 1 tablet (5 mg) by mouth every 6 hours as needed for severe pain (7-10) or moderate pain (4-6) Unknown Yes Tyson Gómez MD     potassium chloride ER (KLOR-CON M) 20 MEQ CR tablet Take 1 tablet (20 mEq) by mouth 2 times daily Unknown Yes Scheierl, Amber J, APRN CNP     acetaminophen (TYLENOL) 325 MG tablet Take 2 tablets (650 mg) by mouth every 6 hours  Patient taking differently: Take 650 mg by mouth every 6 hours as needed for pain   Tyson Gómez MD No    melatonin 1 MG TABS tablet Take 1 tablet (1 mg) by mouth nightly as needed for sleep   Tyson Gómez MD     naloxone (NARCAN) 4 MG/0.1ML nasal spray Spray 1 spray (4 mg) into one nostril alternating nostrils as needed for opioid reversal every 2-3 minutes until assistance arrives   Tyson Gómez MD Yes    prochlorperazine (COMPAZINE) 5 MG tablet Take 1 tablet (5 mg) by mouth every 6 hours as needed for nausea or vomiting   Tyson Gómez MD No    sennosides (SENOKOT) 8.6 MG tablet Take 2-4 tablets by mouth 2 times daily as needed for constipation   Tyson Gómez MD     thiamine (B-1) 100 MG tablet Take 1 tablet (100 mg) by mouth daily   Tyson Gómez MD     triamcinolone (KENALOG) 0.1 % external ointment Apply topically 2 times daily as needed for irritation   Phuong Rodriguez MD     Vitamin D3 (VITAMIN D (CHOLECALCIFEROL)) 25 mcg (1000 units) tablet Take 1 tablet by mouth daily   Unknown, Entered By History

## 2023-05-04 NOTE — PLAN OF CARE
Neuro: Alert to self and place. Some lethargy/drowsiness noted. GCS = 14. Some anxiety due to SOB. Cooperative throughout shift.   Cardiac:  Tachycardia (between 120 and 130). +3 edema on thighs and scrotum, +4 pitting edema on legs and feet. Elevated legs on pillow.   Respiratory: Sating 100% on O2 at 3-4L/min via nasal cannula. Client is tachypneic at 40-50 breaths/min. Complaining of SOB. Morphine given PRN for air hunger. IV Dilaudid ordered for air hunger.   GI/: Had difficulty voiding, Bladder scan result of 205 ml, Inserted indwelling 16 FR eaton catheter with 10 CC balloon attached to standard drainage bag. Immediate return of 300 ml clear yellowish urine noted.  Abdomen firm on palpation and distended, bowel sounds hypoactive. No BM on shift.   Diet/appetite: Poor appetite, refuses to eat. Tolerates H20 with medication.   Activity: Client is bed bound and assist of 1-2 with repositioning. Patient refused pulsate mattress. Repositioned every 2 hours.   Pain: No pain reported throughout shift.    Skin: Scabbed pressure injuries on tip of left ear and left hip. WOC nurse consult done.   LDA's: R PIV on lower forearm saline locked, R PIV on upper antecubital area of right arm currently infusing calcium gluconate, Left PIV infusing D20W at 60 mL/hr, Port currently infusing PRBC (HgB at 6.5), recheck after blood is complete.   Labs: Replaced potassium and magnesium per protocol. Awaiting recheck. Blood sugars checked Q2. Has been averaging 107 - 109 mg/dL.  Plan: Care conference scheduled with palliative and primary team at 3:15.  ordered for family.   Socials: Family at bedside.

## 2023-05-04 NOTE — PROVIDER NOTIFICATION
MD notified at 0856 that patients hgb 6.5.  No new orders.      MD notified at 0925 patient is complaining of feeling increased SOB and RR 40s.  Pt states feels urge to void, bladder scan 205- unable to void.  Awaiting call back.

## 2023-05-04 NOTE — PROGRESS NOTES
"/65 (BP Location: Left arm)   Pulse (!) 131   Temp 98  F (36.7  C) (Oral)   Resp 16   Ht 1.727 m (5' 8\")   Wt 101.2 kg (223 lb 1.7 oz)   SpO2 100%   BMI 33.92 kg/m      Remains ST on tele with rates 120's-130's. All other VSS. Afebrile. 2L NC. Lethargic. Alert to self only. Follows commands. Denies pain, n/v. Glucose checks q 1 hour. Has not vided since arriving to unit at 05:20. No BM. Port accessed with D20 at 75 ml/hr. PIV x 3. NS MIVF infusing at 50 ml/hr. Pt. Received 1 unit PRBC. Re-check CBC ordered for 0730. Potassium 2.4. MD notified. Will replace 60 meq and re-check at 11:15. Left hip suspected pressure ulcer added to chart. MD added WOCN consult. Mother at bedside supportive of cares. Continue to monitor.  "

## 2023-05-05 NOTE — PROGRESS NOTES
CLINICAL NUTRITION SERVICES    RD following d/t positive MST upon admission. Per chart review and discussion with , pt and family are moving towards comfort-focused/hospice care. Full nutrition assessment not warranted at this time.    RD will continue to follow via interdisciplinary rounds. Please consult RD if nutrition-related concerns should be addressed.    Paloma Alvarado RD, LD   6B/Obs RD pager [phone]  Weekend/Holiday RD pager [phone]

## 2023-05-05 NOTE — PLAN OF CARE
Neuro: Alert. Disoriented to time.   Cardiac: Sinus tachy, 120s.  Respiratory: Sating >98% on 3 L NC. RR: >40.  GI/: Minimal urine output via eaton.    Diet/appetite: Regular diet. Poor appetite.  Activity: Lift.  Pain: At acceptable level on current regimen. PRN dilaudid given for 10/10 abdominal pain.  Skin: No new deficits noted. L ear scab - applied Vaseline, L hip pressure injury - applied mepi. Generalized edema.  LDA's: R port a cath. Upper R PIV - SL, Lower R PIV - infusing, L PIV - infusing.  Lab: Replaced K+.    Plan: Continue with POC. Notify primary team with changes.

## 2023-05-05 NOTE — PROGRESS NOTES
PALLIATIVE CARE SOCIAL WORK Progress Note   Location: [facility]      Joint visit this morning with Palliative Care PA, Robert, brother, & dad.     Brother reports that their mom will be here shortly and would appreciate [place]  to be available to talk about Hospice options at home. Robert was awake and able to participate in conversation this morning. He and brother were able to acknowledge how heavy of a conversation yesterday's meeting was for them. After lots of conversation he and his parents want to talk more about going home with hospice.     Robert talked about his pain and what's been helping & what hasn't been helping.     Plan: PCSW asked Unit SW to bring in resources regarding home hospice.     PCSW will continue to be available for ongoing support while Palliative Care Team is following.     Clinical Social Work Interventions:   Assessment of palliative specific issues    Facilitation of processing of thoughts/feelings  Family communication facilitated    MIRYAM Ivy  [facility], Palliative Care  Securely message with the [employer] (learn more here) or  Text page via [employer] Paging/Directory

## 2023-05-05 NOTE — PROGRESS NOTES
[facility]  Palliative Care Daily Progress Note       Recommendations & Counseling       FCC today. Patient and family are in agreement to discharge home with hospice. They understand the risks, but want to honor Robert's wishes and also wish to perform cultural/Muslim practices at home prior to his death.    Decision to transition to CMO, with exception of glucose checks and continuation of D10 while awaiting discharge.    Please ensure patient is wearing traditional Hmong clothing if he were to become actively dying  At discharge recommend including the following standard hospice PRN orders:  - Bisacodyl 10 mg Suppository TX daily to bid prn constipation  - Tylenol 650 mg suppository PO/TX q4hr prn pain, fever  - Lorazepam  0.5-1.0 mg PO/SL/TX q4h prn anxiety/restlessness  - Atropine sulfate 1% opth solution 1-2 drops SL q2h prn secretions  - Senna 8.6 mg soln PO prn constipation  - Haldol 1-2 mg PO/SL/TX q6h prn nausea, agitation or delirium.   -Morphine 5-10 mg concentrated soln SL/PO q1h prn     Thank you for the opportunity to participate in the care of this patient and family. Our team: will continue to follow.     During regular M-F work hours (0625-1284) -- if you are not sure who specifically to contact -- please contact us in [facility] Smart Web.     After regular work hours and on weekends/holidays, you can call our answering service at [phone].       Case was reviewed with Maxi Castellon PA-C  [facility]  Contact information available via [facility] Paging/Directory      Attestation:  Total time on the floor involved in the patient's care: 25 minutes in chart review, medication review, assessment of patient; additional 55 minutes in FCC with patient and family.      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 Also with WILLIAM and acute anemia requiring transfusion.  - Per chart review, outpatient Oncology referred to [facility] hospice and met with family on 4/6. Family declined enrollment.  - Admitted 3/2-3/9 for electrolyte derangements in setting of Cdiff diarrhea.   - Admitted 2/10-2/20 for progressive abdominal pain and bloating, signs of cancer progression on imaging. Palliative consulted and started on Butrans patch, po oxycodone and dexamethasone. Referred for outpatient Palliative, does not appear he has been seen in outpatient clinic.     Today, the patient was seen for:  Symptom management, goals of care    Prognosis, Goals, or Advance Care Planning was addressed today with: Yes.  Mood, coping, and/or meaning in the context of serious illness were addressed today: Yes.              Interval History:     Chart review/discussion with unit or clinical team members:   Reviewed notes from hospitalist team, nursing.     Per patient or family/caregivers today:  Covisit with palliative SW to visit Robert today. His father and brother at bedside. Robert states his pain is ok right now. Brother thought he had just gotten dilaudid, but confirmed with RN that Robert has not had any pain medication, including iv dilaudid since 4:27 AM today. Robert does feel that dilaudid is effective--brother thinks for at least 4-6 hours. No constipation--RN confirmed BM today. No nausea. Brother states family has decided to take him home on hospice.     Discussed medications with unit pharmacist. Per pharmacist, family reported that Robert was not consistently using Butrans patch, and that he did not have a patch on when admitted or during this hospitalizattion. Given goal to take patient home, scheduled oxycodone 5 mg soln SL q6h in hopes to avoid further iv pain medication for breakthrough pain.  Discussed Jerry WANG, hospitalist  team. Plan to meet family with  at 12:30 to discuss plan of care. Present at 12:30 was patient's mother. Patient's sister Mariela and other family members joined about 1pm. See note from hospitalist team. After lengthy discussion, family would like to honor Robert's wish to go home. They understand that his prognosis could be short, as short as hours to days once sugar water is discontinued. They stated due to their cultural beliefs and Robert's wishes, dying at home would be the priority. Recommended he go home with hospice if this is the case, to ensure he is comfortable at home. Discussed hospice is end of life care, focusing on symptom management and not doing life-prolonging interventions such as labs, iv medications, etc. Family asked if sugar water could be continued. Discussed that we could ask hospice agencies if this could be continued short term to allow the Jainism/cultural ceremonies they would like to perform before his death. We also recommended they bring his traveling clothes to the hospital and consider dressing him in these clothing. They preferred to wait until he was dying to change his clothing, but agreed to bring clothing to the hospital in case he unexpected took a turn for the worse prior to discharge. We discussed that it may take 24=48 hours to arrange for discharge home with hospice. Discussed option of comfort focused care while he was waiting here, meaning we would stop labs other than glucose checks, stop iv medications other than D10 to prevent hypoglycemia. Family were in agreement with this plan.     Key Palliative Symptoms:  # Pain severity the last 12 hours: low    Patient is on opioids: assessed and bowels ok/no needed changes to plan of care today.               Medications:     I have reviewed this patient's medication profile and medications during this hospitalization.    Noted meds:    Dilaudid 0.4 mg iv q2h prn--total 1 mg in past 24h  Morphine 1 mg iv in past 24h            Physical Exam:   Temp: 97.6  F (36.4  C) Temp src: Oral BP: 98/50 Pulse: (!) 131 (Transitioned to comfort cares)   Resp: (!) 36 SpO2: 95 % O2 Device: Nasal cannula Oxygen Delivery: 3 LPM  Gen: Lying in bed, in NAD  Resp: No increased work of breathing  Abd: firm, distended, TTP with light touch  Skin:  Warm, dry  Neuro: grossly non-focal  Mental status/Psych: alert. Oriented. Asks/answers questions appropriately. Affect is appropriate.               Data Reviewed:       Reviewed recent labs, comments:   Na 134  K 3.7  Cr 1.56  Hgb 7.2

## 2023-05-05 NOTE — PROGRESS NOTES
[facility]    Medicine Progress Note - Hospitalist Service, GOLD TEAM 4    Date of Admission:  5/3/2023    Assessment & Plan   Diego Buchanan is a 27 year old male admitted on 5/3/2023. 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 His family has chosen home hospice for his cares.     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  #Cancer Related Pain  Initially diagnosed in 2020 with peritoneal carcinomatosis. Progressive disease despite treatment (last dose of TMZ/irinotecan in 1/2023) with ongoing decline, and recently referred to hospice by primary oncologist though declined enrollment as of 4/6.     Held care conference meeting on 5/4 that included medicine LISY, medicine attending (Dr. Carlita Jacobsen), palliative care LISY (Piedad Mondragon), palliative social work, and the patient's nurse Emperatriz. Also present with patient was his mother (Mariela), his father, his older brother, and his sister (Mariela). The meeting was held with an in person [facility] . The topic surrounded the goals of care for Robert.   In short, we discussed the terminal state of Robert's illness and his imminent passing. Discussed the role that hospice would play in his final days/weeks. Offered option of home hospice vs inpatient hospice, but discussed risks of transporting patient home and likely shortened life span with this choice (hours to live). At this point, family was not prepared to make a final decision, so they asked us to continue Robert's current care without escalating any of the cares. Code status  was changed to DNR/DNI.     From 5/4:  Robert and his family follow traditional Hmong practices and family would like to organize all the proper things needed for his passing in order to maintain the traditions. Very importantly, Robert MUST pass wearing traditional Hmong clothing. The family was counseled to bring this to him at the hospital and that we would be able to dress him in these clothes in preparation as a precaution in case they decide to bring him home but his condition precludes this and causes passing sooner than expected by family.     ---Please see Medicine Progress Note from 5/4 for full details of first care conference.---    Held second care conference today, 5/5 that included medicine LISY, medicine attending (Dr. Jacobsen), palliative care LISY (Marie), social work staff (Cathy), and in person Hmong  (Helena). Also present with Robert was his mom for most of the meeting. Toward the end of the meeting, Robert's sister, Mariela, joined via phone call and then in person. Other members of Robert's family were present at the end of the meeting as well.   The emphasis today was determining if they had decided on comfort/hospice cares and if they decided on their preferred location for hospice. Mom was still uncertain as she was aware of the good care he was receiving in the hospital but also knew that Robert's wish was to go home, and she and the family want to respect that. In addition, there are traditional Hmong practices surrounding death that are performed at home. It was explained that hospice was the best option for Robert if he wants to go home. Discussed the risks and potential barriers to organizing hospice at home including transport with adequate glucose replacement. Discussed that patient was at high risk of seizure once the D20 was discontinued. Discussed that we would check with hospice agencies to see if they would be willing to run D10 for Robert while the preparations were made for his death by  family at home. Emphasized that the D10 would not be done for an extended period of time, but instead as a temporizing measure so that the family could perform their desired cultural preparations. aCthy explained the process of enrolling in hospice, which Mariela (sister) agreed to. The meeting ended with a summary of the decisions that were communicated to the whole family via the . The decisions made are as follows:    - Goal is home with hospice. We will do everything we can to carry this out.  - Mariela (sister) will review hospice agencies and Marlee will make referrals accordingly. Marlee will also inquire about running D5 or D10 at home for a short period of time.   - D20 will continue to run and glucose checks will be Q4H.  - Outside of the glucose management, the focus will shift to comfort for Robert, which would include discontinuing the abx, electrolyte replacement, and other labs/tests.    - Comfort cares with the exception of sugar checks Q4H and D20 infusion w/ the goal of getting patient home safely for traditional preparations surrounding death and home hospice.  - pain and symptom management per palliative care  - oxygen prn for patient comfort  - code status updated to DNR/DNI  - please see to dressing Robert in his traditional clothing once it is brought to hospital  - accommodate to the best of our ability cultural/traditional practices that do not interfere with proper level of medical care being delivered    --------------    #Persisting Hypoglycemia  #Lethargy  EMS was called to home after he lost consciousness. Glucose was 'low' and he was started on D10 infusion and brought to ED. In the ED, her was persistently hypoglycemic despite D10 infusion up to 150ml/hr. CT head was negative. Not on any hypoglycemic agent, concerning for hypoglycemia due to liver mets and overall cancer burden along with decreased po intake. Switched to D20 infusion via port-a-cath and NS. Tapered D20 down to  60mL/hr in the morning of 5/4. Maintaining glucose level of around 100 since this morning. Scheduled for Q2H glucose checks.   Differentials outside of cancer burden considered included severe sepsis, adrenal insufficiency, myxedema coma. Pt is tachypneic and tacycardic, no hypoxia but placed on nasal cannula for comfort. Lungs clear on ausculation, UA w/o signs of infection. Abdomen is distended and tender, so pt may have SBP or other intraperitoneal infection source, but this could also be attributed to cancer burden. Pt placed on broad spectrum AM cortisol is 26.7, which is above the reference range, not consistent with adrenal insufficiency. TSH is elevated at 6.28 and T4 is slightly low at 0.88. Pt w/ soft BP, but no true hypotension. Findings not convincing for myxedema coma. Highly suspect that pt presentation is a progression of his cancer and does not have a reversible cause.   He is interemitently awake on admission and responds by nodding or shaking heads.   - Receiving D20 via port at 60mL/hr and NS via PIV at 50ml/hr  - qh glucose checks  - zosyn and vancomycin discontinued    #Hypokalemia  #Hypomagnesemia  #Hypocalcemia  #Hx of recent C Diff colitis  Has not been tolerating much po recently. No significant diarrhea. Takes potassium supplement. K 2.3 and Mg 1.4 on admission. Ionized calcium 2.9.Electrolyte disturbances likely 2/2 decreased PO intake. Will replace as able.  - electrolyte replacement protocol discontinued    #WILLIAM  Cre baseline around 0.5, currently up to 1.2. pre-renal from decreased po intake likely though possibly due to carcinomatosis, also received IV contrast.    #Anemia  Baseline Hb mid 7s. Hb 6.7 on admission without history of bleed. 1unit PRBC ordered in the ED, hgb remained below 7. Pt received additional unit of blood over 4 hours on 5/4 given patient fluid status.   - Per discussion with family noted above, they do not want to escalate care. Due to pt volume up status and  "minimal response to blood products, would be hesitant to hang another unit even if the hgb remained below 7. Family decided on comfort cares with the exception of D20 and Q4H glucose checks.    #Hx of Hypertension  #Tachycardia  On metoprolol at home. BP 90s/40s, tachycardic to 120s. Sinus tachy  - hold metoprolol for now      Diet: Combination Diet Regular Diet Adult    DVT Prophylaxis: Heparin held by provider  Busby Catheter: PRESENT, indication: End of Life  Lines: PRESENT      Port a Cath 04/04/23 Single Lumen Right Chest wall-Site Assessment: WDL      Cardiac Monitoring: None  Code Status: No CPR- Do NOT Intubate      Clinically Significant Risk Factors        # Hypokalemia: Lowest K = 1.4 mmol/L in last 2 days, will replace as needed   # Hypocalcemia: Lowest iCa = 2.9 mg/dL in last 2 days, will monitor and replace as appropriate   # Hypomagnesemia: Lowest Mg = 1.4 mg/dL in last 2 days, will replace as needed   # Hypoalbuminemia: Lowest albumin = 2.2 g/dL at 5/3/2023 11:13 PM, will monitor as appropriate           # Obesity: Estimated body mass index is 34.73 kg/m  as calculated from the following:    Height as of this encounter: 1.727 m (5' 8\").    Weight as of this encounter: 103.6 kg (228 lb 6.3 oz)., PRESENT ON ADMISSION         Disposition Plan         The patient's care was discussed with the Attending Physician, Dr. Jacobsen.    Jerry Patel PA-C  Hospitalist Service, [address]  Securely message with [employer] (more info)  Text page via [facility] Paging/Directory   See signed in provider for up to date coverage information  ______________________________________________________________________    Interval History   Patient is still intermittently alert. History is mostly obtained via nursing notes and discussion and through family present. Pt is in significant pain and is having a lot of trouble breathing. Swallowing pills is painful for patient. He " is not eating.     His biggest wish he has communicated to family is that he would like to be at home. Family expresses desire to respect his wishes.     Physical Exam   Vital Signs: Temp: 97.6  F (36.4  C) Temp src: Oral BP: 98/50 Pulse: (!) 126   Resp: (!) 32 SpO2: 100 % O2 Device: Nasal cannula Oxygen Delivery: 3 LPM  Weight: 228 lbs 6.34 oz    General Appearance: Patient is very ill appearing, pale, shallow breathing, uncomfortable appearing.   Respiratory: Tachypneic. Noticeably struggling to breath, shallow breaths.   Cardiovascular: No obvious signs of cardiac distress. Tachycardic.  GI: Abdomen very distended.   Skin: Pale, dry.  Neuro: Pt mental status apparently intact. Appears very weak globally.    Medical Decision Making       60 MINUTES SPENT BY ME on the date of service doing chart review, history, exam, documentation & further activities per the note.      Data   ------------------------- PAST 24 HR DATA REVIEWED -----------------------------------------------    I have personally reviewed the following data over the past 24 hrs:    21.1 (H)  \   7.2 (L)   / 183     134 (L) 103 15.8 /  119 (H)   4.5 18 (L) 1.56 (H) \       Imaging results reviewed over the past 24 hrs:   No results found for this or any previous visit (from the past 24 hour(s)).  Recent Labs   Lab 05/05/23  1239 05/05/23  0955 05/05/23  0834 05/05/23  0607 05/05/23  0137 05/04/23  2356 05/04/23  1650 05/04/23  1623 05/04/23  0859 05/04/23  0821 05/03/23  2314 05/03/23  2313   WBC  --   --   --  21.1*  --   --   --   --   --  22.3*  --  23.1*   HGB  --   --   --  7.2*  --   --   --  7.5*  --  6.5*   < > 6.5*   MCV  --   --   --  108*  --   --   --   --   --  109*  --  111*   PLT  --   --   --  183  --   --   --   --   --  177  --  207   INR  --   --   --   --   --   --   --   --   --   --   --  1.34*   NA  --  134*  --  136  --   --   --  136  --   --    < > 137   POTASSIUM  --  4.5  --  3.2*  --  3.5  --  2.7*  2.7*   < >  --    < >  2.3*   CHLORIDE  --  103  --  103  --   --   --  103  --   --    < > 101   CO2  --  18*  --  18*  --   --   --  19*  --   --    < > 23   BUN  --  15.8  --  16.0  --   --   --  14.9  --   --    < > 16.5   CR  --  1.56*  --  1.54*  --   --   --  1.36*  --   --    < > 1.20*   ANIONGAP  --  13  --  15  --   --   --  14  --   --    < > 13   FAISAL  --  7.4*  --  7.4*  --   --   --  7.1*  --   --    < > 7.2*   * 126* 132* 137*   < >  --    < > 143*   < >  --    < > 37*   ALBUMIN  --   --   --   --   --   --   --   --   --   --   --  2.2*   PROTTOTAL  --   --   --   --   --   --   --   --   --   --   --  6.1*   BILITOTAL  --   --   --   --   --   --   --   --   --   --   --  0.6   ALKPHOS  --   --   --   --   --   --   --   --   --   --   --  100   ALT  --   --   --   --   --   --   --   --   --   --   --  18   AST  --   --   --   --   --   --   --   --   --   --   --  63*   LIPASE  --   --   --   --   --   --   --   --   --   --   --  18    < > = values in this interval not displayed.

## 2023-05-05 NOTE — PROGRESS NOTES
PALLIATIVE CARE SOCIAL WORK Progress Note   Location: [facility]      PCSW participated in care conference with Primary Team, Palliative Care PA, bedside RNs, Hmong , Robert (although not awake to participate much), sister, brother, mom, & dad.     Robert and family were given update as to Robert's condition and what to expect. Team is concerned about his ability to remain comfortable at home and that he'd have very limited time at home. Family heard these concerns and want to have some time to talk it over together with additional family members. They appreciated the Teams clear communication and that their worries were shared.     Family is Traditional Hmong and were encouraged to bring traveling clothes and other items to the hospital, in case things change rapidly.     Plan: PCSW will continue to follow for family support while Palliative Care Team is following.     Clinical Social Work Interventions:   Assessment of palliative specific issues    Introduction of Palliative clinical social work interventions  Attended/participated in care conference   Resource referral -updated Unit SW with family's potential request for home hospice information    MIRYAM Ivy  [facility], Palliative Care  Securely message with the Vocera Web Console (learn more here) or  Text page via [employer] Paging/Directory

## 2023-05-05 NOTE — PROGRESS NOTES
Care Management Follow Up    Length of Stay (days): 1    Expected Discharge Date:       Concerns to be Addressed: discharge planning, home with hospice       Patient plan of care discussed at interdisciplinary rounds: Yes    Anticipated Discharge Disposition: home with hospice      Anticipated Discharge Services: transportation    Anticipated Discharge DME: TBD     Patient/family educated on Medicare website which has current facility and service quality ratings: yes  Education Provided on the Discharge Plan:    Patient/Family in Agreement with the Plan:      Referrals Placed by CM/SW:      [facility]   Intake PH: [phone]   Main PH: [phone]  FAX: [phone]    Private pay costs discussed: Not applicable    Additional Information:    A care conference was held with pt and family at 12:30PM in his room. [facility] providers and palliative, and a [facility]  were also present. Pt's mother had questions regarding pt going home and respecting his wishes. Home with hospice was discussed with pt and family. Pt and family are in agreement that this is the best way to get pt home. Pt's sister Mariela (PH: [phone]) was involved in choosing hospice agencies.     MIKE called [facility] intake and faxed over pt's general information. They can do an intake in person at 9:00AM on Sunday, or an at-home intake at 1:00PM on Sunday. It needs to be determined if pt can get transported with D10 or D15 infusion. MIKE reached out to [facility] transportation (PH: [phone]) to inquire if this is possible. MIKE was told that it can be done, however pt would have to be disconnected once transport arrives at final destination, and someone would have to be available to reconnect. [facility] also needs to know if pt would be on any other medications or pumps during transportation. MIKE paged Gold 4 team to discuss the above questions and concerns.     Update 4:00PM: MIKE has confirmed with  transportation ([facility]) that they can accommodate the D10/D15 infusion. The pickup range time is between 11:40AM and 12:26PM, but will try to get pt to his home as close to 1:00PM as possible. [facility] nurse is going to meet pt at home to reconnect the D10 after transport. They will complete the intake at that time. Pt will need at least 1-3 days of comfort medications. An ambulance PCS form is completed and in pt's hard chart on unit 6B. MIKE confirmed that this plan will work with pt's family and they are in agreement with this discharge and plan.     Update 4:20PM: [facility] confirmed that hospital equipment will be delivered tomorrow Saturday (hospital bed, wheelchair, and commode). MIKE updated Mariela and [facility] will reach out to her with a timeframe.     ERICKA Blount, LGSW   6B    Phone: [phone]  Pager: [phone]

## 2023-05-05 NOTE — PLAN OF CARE
Transitioned to Comforts    Tele discontinued, On 3 lpm via NC for comfort. RR 30-40's. Denied pain. Continue BG q4hrs D 20% 60 ml/hr and NS 50 ml/hr. R and L PIV, R Port. Busby in place, BM x2. Sister will be staying the night. Plan for discharge to home hospice on Sunday.

## 2023-05-06 NOTE — PROGRESS NOTES
[facility]    Medicine Progress Note - Hospitalist Service, GOLD TEAM 4    Date of Admission:  5/3/2023    Assessment & Plan    Diego Buchanan is a 27 year old male admitted on 5/3/2023. 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 His family has chosen home hospice for his cares.     Updates today:     Cont comfort-focused cares    Plan is to discharge tomorrow with home hospice, and D10-15 infusion to be run with transport, D20 to be run at home until his traditional rites can be performed. Dextrose infusion to be stopped following.     Cont D20 at 60 mL/hr and Q4H BG. Discontinue NS due to peripheral edema. Patient was offered edema wraps but declined.     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 Progressive disease despite treatment (last dose of TMZ/irinotecan in 1/2023) with ongoing decline, and recently referred to hospice by primary oncologist though initially declined enrollment.  EMS was called to home prior to this admission after he lost consciousness.  Glucose was found to be in the 30s.  He has been treated with continuous dextrose infusion.  Etiology is felt to be related to the progression of his cancer.  Felt not to be consistent with myxedema coma or adrenal insufficiency.  - 5/4 care conference: attended by  medicine LISY, medicine attending (Dr. Carlita Jacobsen), palliative care LISY (Piedad Mondragon), palliative social work, the patient's nurse, the patient, his father and mother, his Sister Mariela, his older brother, and an in person Hmong .  Summary of discussion: Robert is terminal illness and imminent passing, the role of hospice and hospice options.  Family was not prepared to make a decision at that time.  CODE STATUS was changed to DNR/DNI.  Family communicated importance of cultural Hmong practices that are needed for his passing -Robert must passed wearing traditional Hmong clothing. Family was counseled to bring the clothing into the hospital just in case of his passing.  - 5/5 care conference: attended by medicine LISY, medicine attending (Dr. Jacobsen), palliative care LISY (Marie), social work staff (Cathy), in person Hmong  (Helena), Robert, his mom, and his sister and other family members via phone for a portion of the meeting. Robert and family decided on home with hospice as a goal. Patient would need dextrose infusion to sustain his life given his persistent hypoglycemia, and a hospice agency must be able to accommodate this for him to go home and be able to go through with the traditional Hmong rites before stopping the infusion. Cares were shifted to comfort - focus at that time.   --Plan:     Cont comfort cares + D20 at 60 mL/hr and Q4H POC glucose    Appreciate palliative care involvement    Appreciate SW arranging OP hospice    Add Biotene spray PRN for dry throat and vocal hoarseness     Add Imodium Q4H PRN    O2 PRN for comfort    Plan discharge to home hospice tomorrow     #Hypokalemia  #Hypomagnesemia  #Hypocalcemia  #Hx of recent C Diff colitis  #WILLIAM  #Anemia Baseline Hb mid 7s. Hb 6.7 on admission without history of bleed. S/p 2 unit PRBC here.   #Hx of Hypertension  #Tachycardia       Diet: Combination Diet Regular Diet Adult    DVT Prophylaxis: Not indicated  Busby Catheter: PRESENT,  "indication: Strict 1-2 Hour I&O  Lines: PRESENT      Port a Cath 04/04/23 Single Lumen Right Chest wall-Site Assessment: WDL      Cardiac Monitoring: None  Code Status: No CPR- Do NOT Intubate      Clinically Significant Risk Factors        # Hypokalemia: Lowest K = 2.7 mmol/L in last 2 days, will replace as needed       # Hypoalbuminemia: Lowest albumin = 2.2 g/dL at 5/3/2023 11:13 PM, will monitor as appropriate           # Obesity: Estimated body mass index is 34.73 kg/m  as calculated from the following:    Height as of this encounter: 1.727 m (5' 8\").    Weight as of this encounter: 103.6 kg (228 lb 6.3 oz)., PRESENT ON ADMISSION         Disposition Plan      Expected Discharge Date: 05/07/2023                The patient's care was discussed with the Attending Physician, Dr. Jacobsen, Bedside Nurse, Patient and patient's sister.    Leona Haro PA-C  Hospitalist Service, GOLD TEAM [address] [place] [address]  Securely message with [employer] info)  Text page via [facility] Paging/Directory   See signed in provider for up to date coverage information  ______________________________________________________________________    Interval History   Patient was seen at the bedside.  He denies any significant changes since yesterday.  He does complain of some vocal hoarseness and dry throat.  He also requests Imodium for his diarrhea.  He and family have no questions about his plan or disposition.    Physical Exam   Vital Signs:       Pulse: (!) 131 (Transitioned to comfort cares)   Resp: 30 SpO2: 95 % O2 Device: Nasal cannula Oxygen Delivery: 3 LPM  Weight: 228 lbs 6.34 oz    Limited physical exam:  General: Young male resting comfortably reclined in bed.  NAD.   Neuro: Alert and interactive.  Converses appropriately.  Respiratory: Nonlabored breathing on 3 L via NC  Skin: No rashes or lesions to exposed areas    Medical Decision Making       45 MINUTES SPENT BY ME on the date of service " doing chart review, history, exam, documentation & further activities per the note.      Data   Recent Labs   Lab 05/06/23  1210 05/06/23  1104 05/06/23  0805 05/05/23  1529 05/05/23  1400 05/05/23  1239 05/05/23  0955 05/05/23  0834 05/05/23  0607 05/04/23  1650 05/04/23  1623 05/04/23  0859 05/04/23  0821 05/03/23  2314 05/03/23  2313   WBC  --   --   --   --   --   --   --   --  21.1*  --   --   --  22.3*  --  23.1*   HGB  --   --   --   --   --   --   --   --  7.2*  --  7.5*  --  6.5*   < > 6.5*   MCV  --   --   --   --   --   --   --   --  108*  --   --   --  109*  --  111*   PLT  --   --   --   --   --   --   --   --  183  --   --   --  177  --  207   INR  --   --   --   --   --   --   --   --   --   --   --   --   --   --  1.34*   NA  --   --   --   --   --   --  134*  --  136  --  136  --   --    < > 137   POTASSIUM  --   --   --   --  3.7  --  4.5  --  3.2*   < > 2.7*  2.7*   < >  --    < > 2.3*   CHLORIDE  --   --   --   --   --   --  103  --  103  --  103  --   --    < > 101   CO2  --   --   --   --   --   --  18*  --  18*  --  19*  --   --    < > 23   BUN  --   --   --   --   --   --  15.8  --  16.0  --  14.9  --   --    < > 16.5   CR  --   --   --   --   --   --  1.56*  --  1.54*  --  1.36*  --   --    < > 1.20*   ANIONGAP  --   --   --   --   --   --  13  --  15  --  14  --   --    < > 13   FAISAL  --   --   --   --   --   --  7.4*  --  7.4*  --  7.1*  --   --    < > 7.2*   * 105* 120*   < >  --    < > 126*   < > 137*   < > 143*   < >  --    < > 37*   ALBUMIN  --   --   --   --   --   --   --   --   --   --   --   --   --   --  2.2*   PROTTOTAL  --   --   --   --   --   --   --   --   --   --   --   --   --   --  6.1*   BILITOTAL  --   --   --   --   --   --   --   --   --   --   --   --   --   --  0.6   ALKPHOS  --   --   --   --   --   --   --   --   --   --   --   --   --   --  100   ALT  --   --   --   --   --   --   --   --   --   --   --   --   --   --  18   AST  --   --   --   --   --   --    --   --   --   --   --   --   --   --  63*   LIPASE  --   --   --   --   --   --   --   --   --   --   --   --   --   --  18    < > = values in this interval not displayed.

## 2023-05-06 NOTE — PLAN OF CARE
Neuro: A&Ox4.   Cardiac: No tele  Respiratory: 3LNC for comfort, calm.   GI/: eaton cath draining.   Diet/appetite: Regular diet, poor appetite.   Activity: Refused to be turn to sides.   Pain: At acceptable level on current regimen.   Skin: No new deficits noted.  LDA's: Right Bre Cath , Right PIV  0939: 221: A. Chanel- Patient and family is asking if patient can have Imodium. Thanks-Shannon- [phone]    Plan: Continue with POC. Notify primary team with changes.

## 2023-05-06 NOTE — PLAN OF CARE
"Goal Outcome Evaluation:    Neuro: A&Ox4. Neuro status stable.   Cardiac: No tele or VS. Comfort care.  Respiratory: On 3LNC for comfort. Meds for air hunger/SOB.  GI/: UCO w/ LUOP. BM X2.  Diet/appetite: Tolerating Regular diet.  Activity:  T/R q2hrs or PRN.  Pain: At acceptable level on current regimen.   Skin: No new deficits noted.  LDA's: PIV x2, Portx1, UCI.    Sister stayed with patient overnight. Patient rested comfortably overnight.    Plan: Continue with POC. Notify primary team with changes.       Problem: Plan of Care - These are the overarching goals to be used throughout the patient stay.    Goal: Plan of Care Review  Description: The Plan of Care Review/Shift note should be completed every shift.  The Outcome Evaluation is a brief statement about your assessment that the patient is improving, declining, or no change.  This information will be displayed automatically on your shift note.  Outcome: Progressing  Goal: Patient-Specific Goal (Individualized)  Description: You can add care plan individualizations to a care plan. Examples of Individualization might be:  \"Parent requests to be called daily at 9am for status\", \"I have a hard time hearing out of my right ear\", or \"Do not touch me to wake me up as it startles me\".  Outcome: Progressing  Goal: Absence of Hospital-Acquired Illness or Injury  Outcome: Progressing  Intervention: Prevent Skin Injury  Recent Flowsheet Documentation  Taken 5/6/2023 0415 by Yasmine Day RN  Body Position:    turned    right  Taken 5/6/2023 0100 by Yasmine Day RN  Body Position: refuses positioning  Taken 5/5/2023 2256 by Yasmine Day RN  Body Position:    turned    left  Taken 5/5/2023 2000 by Yasmine Day RN  Body Position: refuses positioning  Goal: Optimal Comfort and Wellbeing  Outcome: Progressing  Intervention: Monitor Pain and Promote Comfort  Recent Flowsheet Documentation  Taken 5/5/2023 2211 by Yasmine Day RN  Pain Management Interventions: " medication (see MAR)  Goal: Readiness for Transition of Care  Outcome: Progressing     Problem: Infection  Goal: Absence of Infection Signs and Symptoms  Outcome: Progressing

## 2023-05-06 NOTE — PLAN OF CARE
Neuro: A&Ox4.   Cardiac: Not on tele.   Respiratory: On 3lpm 02 via nasal cannula for comfort.  GI/: Busby catheter in place with minimal urine.  BM x 4 loose, prn Loperamide given.  Diet/appetite: Poor appetite.  Ate his first meal today late in the afternoon.  Activity:  Bed rest, on comfort cares, turned every q2hr if patient allows.  Pain: At acceptable level on current regimen.   Skin: No new deficits noted.  LDA's: Right port a cath infusing with continuous D20 @ 60ml/hr.  Left and right PIV saline lock.    Plan: Continue with POC. Notify primary team with changes.

## 2023-05-07 PROBLEM — L29.9 ITCHING: Status: ACTIVE | Noted: 2023-01-01

## 2023-05-07 PROBLEM — J02.9 SORE THROAT: Status: ACTIVE | Noted: 2023-01-01

## 2023-05-07 NOTE — PLAN OF CARE
Goal Outcome Evaluation:    Neuro: A&Ox4. Developmental delay, neuro status stable.   Cardiac: No tele or VS, comfort cares.   Respiratory: 3L for comfort. Denies SOB tonight but meds available if needed.  GI/: UCI w/ LUOP. LBM 5/7. Imodium given PRN, having small smears. Firm rigid abdomen.   Diet/appetite: Regular diet, poor appetite.   Activity:  T/R q2 hours as tolerating.   Pain: At acceptable level on current regimen. Family at bedside, did not want tylenol given.  Skin: No new deficits noted.  LDA's: UCI. PIV x2, Port x1.    D20 gtt continues at 60, RMGs stable.     Plan: Continue with POC. Notify primary team with changes.

## 2023-05-07 NOTE — DISCHARGE SUMMARY
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     Discharge Disposition   Discharged to home  Condition at discharge: Terminal    Hospital Course    Diego Buchanan is a 27 year old male admitted on 5/3/2023. 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  He was able to be stabilized initially in regards to the above, he was also given 2 unit PRBC for anemia. His hypoglycemia and electrolyte abnormalities were felt to be due to the progression of his cancer.  Findings were not consistent with myxedema coma or adrenal insufficiency.  It was felt that his disease and its sequela were not reversible.  Multiple care conferences were held with patient, palliative care provider, attending team, patient's family, and [facility] .  After some deliberation, ultimately decision was made for patient to be transferred to comfort based approach of cares, and discharged to home with hospice cares.  It was felt to be very important for the family that patient is able to go through traditional rites of their culture prior  to patient's passing.  Patient expressed desire to go home, and these rights would be performed at home.     Patient's comfort cares and hospice cares are with the adjunct of dextrose infusion until patient is able to fully complete the Hmong traditions. Dextrose to be turned off at that time.  Patient's dextrose has been running of D20 at 60 mils per hour inpatient, he received an amp of D50, followed by infusion of D15 at transfer (transport is not able to accommodate higher), and he is being sent with a supply of D10 for home to be administered by hospice team.     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 Progressive disease despite treatment (last dose of TMZ/irinotecan in 1/2023) with ongoing decline, and recently referred to hospice by primary oncologist though initially declined enrollment.  EMS was called to home prior to this admission after he lost consciousness.  Glucose was found to be in the 30s.  He has been treated with continuous dextrose infusion.  Etiology is felt to be related to the progression of his cancer.  Felt not to be consistent with myxedema coma or adrenal insufficiency.  - 5/4 care conference: attended by medicine LISY, medicine attending (Dr. Carlita Jacobsen), palliative care LISY (Piedad Mondragon), palliative social work, the patient's nurse, the patient, his father and mother, his Sister Mariela, his older brother, and an in person ong .  Summary of discussion: Robert is terminal illness and imminent passing, the role of hospice and hospice options.  Family was not prepared to make a decision at that time.  CODE STATUS was changed to DNR/DNI.  Family communicated importance of cultural Hmong practices that are needed for his passing -Robert must passed wearing traditional  Hmong clothing. Family was counseled to bring the clothing into the hospital just in case of his passing.  - 5/5 care conference: attended by medicine LISY, medicine attending (Dr. Jacobsen), palliative care LISY (Marie), social work staff (Cathy), in person Hmong  ([place]), Robert, his mom, and his sister and other family members via phone for a portion of the meeting. Robert and family decided on home with hospice as a goal. Patient would need dextrose infusion to sustain his life given his persistent hypoglycemia, and a hospice agency must be able to accommodate this for him to go home and be able to go through with the traditional [employer] rites before stopping the infusion. Cares were shifted to comfort - focus at that time.     --being discharged with:   -3 L of D10 to be administered at 120 mL/hr until patient finishes his Hmong traditional rites.   - Morphine 5-10 mg concentrated soln SL/PO q1h prn   - Tylenol 650 mg suppository PO/KS q4hr prn pain, fever  - Lorazepam  0.5-1.0 mg PO/SL/KS q4h prn anxiety/restlessness  - Atropine sulfate 1% opth solution 1-2 drops SL q2h prn secretions  - Senna 8.6 mg soln PO prn constipation  - Bisacodyl 10 mg Suppository KS daily to bid prn constipation  - Haldol 1-2 mg PO/SL/KS q6h prn nausea, agitation or delirium.   - Biotene spray PRN for dry throat and vocal hoarseness   - chloraseptic Max benzocaine menthol lozenges for dry throat/mouth  -Camphor- menthol lotion for pruritus  -Imodium 2 mg 4 times daily as needed for diarrhea    #Hypokalemia  #Hypomagnesemia  #Hypocalcemia  #Hx of recent C Diff colitis  #WILLIAM  #Anemia  #Hx of Hypertension  #Tachycardia    Consultations This Hospital Stay   PHARMACY TO DOSE VANCO  PALLIATIVE CARE ADULT IP CONSULT  ONCOLOGY ADULT IP CONSULT  WOUND OSTOMY CONTINENCE NURSE  IP CONSULT  WOUND OSTOMY CONTINENCE NURSE  IP CONSULT  PHARMACY IP CONSULT    Code Status   No CPR- Do NOT Intubate    Time Spent on this Encounter   ILeona,  ADELE, personally saw the patient today and spent greater than 30 minutes discharging this patient.       Leona Haro PA-C  [facility] [address] [facility]  [address] [place] [id number]  Phone: [phone]  ______________________________________________________________________    Physical Exam   Vital Signs:          Resp: 30   O2 Device: Nasal cannula Oxygen Delivery: 3 LPM  Weight: 230 lbs 9.62 oz    Limited physical exam:  General: Young male resting comfortably reclined in bed.  NAD.   Neuro: Alert and interactive.  Converses appropriately.  Respiratory: Nonlabored breathing on 3 L via NC  Skin: No rashes or lesions to exposed areas       Primary Care Physician   Jc Glass    Discharge Orders      Follow Up ([facility])    Follow up with primary care provider, LUIS Liang    Appointments on [place] and/or [facility] (with [facility] or [facility] provider or service). Call [phone] if you haven't heard regarding these appointments within 7 days of discharge.     Reason for your hospital stay    You were admitted due to low blood sugar and low blood electrolytes.  This is due to the spread of your cancer.  Although we were able to stabilize you, overall we are not able to give you treatments to reverse this disease. Decision was made for transition to comfort / hospice based approach to your care. You are being discharged with hospice cares to home.     Activity    Your activity upon discharge: activity as tolerated     Diet    Follow this diet upon discharge: No restrictions       Significant Results and Procedures   Results for orders placed or performed during the hospital encounter of 05/03/23   XR Chest Port 1 View    Narrative    EXAM: XR CHEST PORT 1 VIEW  LOCATION: [facility]  DATE/TIME: 5/3/2023 11:25 PM CDT    INDICATION: unresponsive  COMPARISON: 03/02/2023      Impression    IMPRESSION: Lungs hypoaerated. Heart size within normal  limits. Right chest port catheter tip at superior cavoatrial junction. No pleural collections.   CT Head w/o Contrast    Narrative    EXAM: CT HEAD W/O CONTRAST  LOCATION: [facility]  DATE/TIME: 5/4/2023 12:06 AM CDT    INDICATION: Altered mental status  COMPARISON: 05/01/2020  TECHNIQUE: Routine CT Head without IV contrast. Multiplanar reformats. Dose reduction techniques were used.    FINDINGS:  INTRACRANIAL CONTENTS: No intracranial hemorrhage, extraaxial collection, or mass effect.  No CT evidence of acute infarct. Normal parenchymal attenuation. Normal ventricles and sulci. Partially empty sella.    VISUALIZED ORBITS/SINUSES/MASTOIDS: No intraorbital abnormality. No paranasal sinus mucosal disease. No middle ear or mastoid effusion.    BONES/SOFT TISSUES: No acute abnormality.      Impression    IMPRESSION:  1.  No acute intracranial process.  2.  Partially empty sella. Findings can be seen in the setting of idiopathic intracranial hypertension. Correlate for history of chronic headaches and papilledema.   CT Chest (PE) Abdomen Pelvis w Contrast    Narrative    EXAM: CT CHEST PE ABDOMEN PELVIS W CONTRAST  LOCATION: [facility]  DATE/TIME: 5/4/2023 12:00 AM CDT    INDICATION: Short of air, syncope, abdominal distention  COMPARISON: CT chest and pelvis 02/10/2023  TECHNIQUE: CT chest pulmonary angiogram and routine CT abdomen pelvis with IV contrast. Arterial phase through the chest and venous phase through the abdomen and pelvis. Multiplanar reformats and MIP reconstructions were performed. Dose reduction   techniques were used.   CONTRAST: 123 ml isovue 370     FINDINGS:  ANGIOGRAM CHEST: Pulmonary arteries are normal caliber and negative for pulmonary emboli. Thoracic aorta is negative for dissection. No CT evidence of right heart strain.     LUNGS AND PLEURA: Small pleural effusions. Numerous enlarging pulmonary  nodules, for example, right upper lobe 13 mm (series 10 image 21), previously 10 mm.     MEDIASTINUM/AXILLAE: Interval enlargement of left hilar lymph nodes for example 3.5 x 2.6 cm series 10 image 35. Enlarging mediastinal nodes including subcarinal node, 2.3 x 3.3 cm. Enlarged supraclavicular internal mamillary and cardiophrenic nodes,   mildly increased in the interval. Right IJ chest port catheter in the upper right atrium.    CORONARY ARTERY CALCIFICATION: None.    HEPATOBILIARY: Numerous liver lesions have enlarged in the interval, for example, posterior right hepatic lobe 3.9 x 3.8 cm (series 5 image 40), slightly increased. Extensive circumferential soft tissue implants surrounding the liver, and throughout the   upper abdomen.    PANCREAS: Normal.    SPLEEN: Extensive soft tissue implants around the spleen. Splenic metastasis redemonstrated.    ADRENAL GLANDS: Normal.    KIDNEYS/BLADDER: No hydronephrosis.    BOWEL: No obstruction. Interval progressive increase in widespread peritoneal confluent masses throughout the abdomen and pelvis. Multifocal encasement of bowel loops which aren't inferoapical to delineate from tumor. No evidence for bowel obstruction.   No definite free intraperitoneal gas. Moderate ascites predominantly in the inferior abdomen similar to prior.    LYMPH NODES: Enlarged lymph nodes in the abdomen and pelvis slightly increased from prior.    VASCULATURE: Portal veins SMV and splenic vein are patent. Hepatic veins. Intrahepatic IVC narrowed but patent.    PELVIC ORGANS: Normal.    MUSCULOSKELETAL: No acute osseous abnormality. Sclerotic lesion in the sacrum. Similar to prior      Impression    IMPRESSION:  1.  Interval progression of tumor in the abdomen and pelvis including extensive peritoneal tumor. Notably significant increase in upper abdominal tumor encasing the liver and spleen. Moderate ascites in the lower abdomen similar to prior. Enlarging   hepatic metastases and  lymphadenopathy.  2.  Small pleural effusions. Slight increase in pulmonary nodules and hilar and mediastinal lymphadenopathy.  3.  No pulmonary embolism.   POC US ABDOMEN LIMITED    Impression    Poor acoustic windows.  No interpretable images       Discharge Medications   Current Discharge Medication List      START taking these medications    Details   acetaminophen (TYLENOL) 650 MG suppository Place 1 suppository (650 mg) rectally every 4 hours as needed for fever or mild pain  Qty: 4 suppository, Refills: 0    Associated Diagnoses: Desmoplastic small round cell tumor (H)      atropine 1 % ophthalmic solution Place 2 drops under the tongue every 4 hours as needed for secretions  Qty: 2 mL, Refills: 0    Associated Diagnoses: Desmoplastic small round cell tumor (H)      benzocaine-menthol (CHLORASEPTIC MAX) 15-10 MG lozenge Place 1 lozenge inside cheek every hour as needed  Qty: 90 lozenge, Refills: 0    Associated Diagnoses: Sore throat      bisacodyl (DULCOLAX) 10 MG suppository Place 1 suppository (10 mg) rectally daily as needed for constipation  Qty: 4 suppository, Refills: 0    Associated Diagnoses: Desmoplastic small round cell tumor (H)      camphor-menthol (DERMASARRA) 0.5-0.5 % external lotion Apply 1 mL topically every 6 hours as needed for skin care  Qty: 222 mL, Refills: 0    Associated Diagnoses: Itching      dextrose 10% infusion Inject 120 mL/hr into the vein continuous  Qty: 6000 mL, Refills: 0    Associated Diagnoses: DSRCT (desmoplastic small round cell tumor) (H)      haloperidol (HALDOL) 2 MG/ML (HIGH CONC) solution Take 0.5-1 mLs (1-2 mg) by mouth every 6 hours as needed for agitation (nausea, delirium)  Qty: 15 mL, Refills: 0    Associated Diagnoses: Desmoplastic small round cell tumor (H)      LORazepam (ATIVAN) 2 MG/ML (HIGH CONC) oral solution Take 0.25 mLs (0.5 mg) by mouth every 3 hours as needed for anxiety  Qty: 30 mL, Refills: 1    Associated Diagnoses: Desmoplastic small round cell  tumor (H)      LORazepam (ATIVAN) 2 MG/ML injection Inject 0.25-0.5 mLs (0.5-1 mg) into the vein every 3 hours as needed for anxiety or nausea  Qty: 10 mL, Refills: 0    Associated Diagnoses: Desmoplastic small round cell tumor (H)      morphine sulfate (ROXANOL) 20 mg/mL (HIGH CONC) soln Place 0.25 mLs (5 mg) under the tongue every hour as needed for moderate pain or shortness of breath (or prevention of moderate pain/dyspnea)  Qty: 30 mL, Refills: 0    Associated Diagnoses: Desmoplastic small round cell tumor (H)      sennosides (SENNA-GRX) 8.8 MG/5ML syrup Take 5 mLs by mouth daily as needed for constipation  Qty: 15 mL, Refills: 1    Associated Diagnoses: Desmoplastic small round cell tumor (H)         CONTINUE these medications which have CHANGED    Details   loperamide (IMODIUM) 2 MG capsule Take 1 capsule (2 mg) by mouth 4 times daily as needed for diarrhea  Qty: 10 capsule, Refills: 1    Associated Diagnoses: Desmoplastic small round cell tumor (H)         CONTINUE these medications which have NOT CHANGED    Details   melatonin 1 MG TABS tablet Take 1 tablet (1 mg) by mouth nightly as needed for sleep  Qty: 30 tablet, Refills: 0    Associated Diagnoses: Learning disabilities         STOP taking these medications       acetaminophen (TYLENOL) 325 MG tablet Comments:   Reason for Stopping:         buprenorphine (BUTRANS) 10 MCG/HR WK patch Comments:   Reason for Stopping:         cyanocobalamin (VITAMIN B-12) 100 MCG tablet Comments:   Reason for Stopping:         LORazepam (ATIVAN) 0.5 MG tablet Comments:   Reason for Stopping:         metoprolol tartrate (LOPRESSOR) 25 MG tablet Comments:   Reason for Stopping:         morphine 10 MG/5ML solution Comments:   Reason for Stopping:         naloxone (NARCAN) 4 MG/0.1ML nasal spray Comments:   Reason for Stopping:         oxyCODONE (ROXICODONE) 5 MG tablet Comments:   Reason for Stopping:         potassium chloride ER (KLOR-CON M) 20 MEQ CR tablet Comments:    Reason for Stopping:         prochlorperazine (COMPAZINE) 5 MG tablet Comments:   Reason for Stopping:         sennosides (SENOKOT) 8.6 MG tablet Comments:   Reason for Stopping:         thiamine (B-1) 100 MG tablet Comments:   Reason for Stopping:         triamcinolone (KENALOG) 0.1 % external ointment Comments:   Reason for Stopping:         Vitamin D3 (VITAMIN D (CHOLECALCIFEROL)) 25 mcg (1000 units) tablet Comments:   Reason for Stopping:             Allergies   Allergies   Allergen Reactions     Cefepime Rash     Morbiliform rash     Tegaderm Chg Dressing [Chlorhexidine]      Tegaderm Transparent Dressing (Informational Only) Itching     Tegaderm dressing; Okay for short periods of time

## 2023-05-08 NOTE — PROGRESS NOTES
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

## 2023-05-18 NOTE — PROGRESS NOTES
This is a recent snapshot of the patient's [facility] medical record.  For current drug dose and complete information and questions, call [phone] or In Basket pool, fv home infusion ([id number])  CSN Number:  [id number]

## 2023-08-07 NOTE — PROGRESS NOTES
This is a recent snapshot of the patient's [facility] medical record.  For current drug dose and complete information and questions, call [phone] or In Basket pool, fv home infusion ([id number])  CSN Number:  [id number]

## (undated) DEVICE — ESU ELEC BLADE 2.75" COATED/INSULATED E1455

## (undated) DEVICE — GOWN XLG DISP 9545

## (undated) DEVICE — PREP CHLORAPREP 26ML TINTED HI-LITE ORANGE 930815

## (undated) DEVICE — SYR 10ML FINGER CONTROL W/O NDL 309695

## (undated) DEVICE — GLOVE PROTEXIS POWDER FREE SMT 8.0  2D72PT80X

## (undated) DEVICE — KIT INTRODUCER FLUENT MICRO 5FRX10CM ECHO TIP KIT-038-04

## (undated) DEVICE — ESU GROUND PAD ADULT W/CORD E7507

## (undated) DEVICE — LINEN TOWEL PACK X5 5464

## (undated) DEVICE — PITCHER STERILE 1000ML  SSK9004A

## (undated) DEVICE — SU VICRYL 4-0 PS-2 18" UND J496H

## (undated) DEVICE — COVER ULTRASOUND PROBE W/GEL FLEXI-FEEL 6"X58" LF  25-FF658

## (undated) DEVICE — GLOVE BIOGEL PI MICRO INDICATOR UNDERGLOVE SZ 8.0 48980

## (undated) DEVICE — SU MONOCRYL 4-0 P-3 18" UND Y494G

## (undated) DEVICE — ADH SKIN CLOSURE PREMIERPRO EXOFIN 1.0ML 3470

## (undated) DEVICE — SUCTION TIP YANKAUER STR K87

## (undated) DEVICE — GLOVE PROTEXIS POWDER FREE SMT 7.5  2D72PT75X

## (undated) DEVICE — PACK CENTRAL LINE INSERTION SAN32CLFCG

## (undated) DEVICE — DRAPE U SPLIT 74X120" 29440

## (undated) DEVICE — SUCTION MANIFOLD NEPTUNE 2 SYS 4 PORT 0702-020-000

## (undated) DEVICE — LINEN GOWN XLG 5407

## (undated) DEVICE — Device

## (undated) DEVICE — SU VICRYL 4-0 P-3 18" UND  J494H

## (undated) DEVICE — SU VICRYL 3-0 SH 27" J316H

## (undated) DEVICE — KNIFE HANDLE W/15 BLADE 371615

## (undated) DEVICE — DECANTER BAG 2002S

## (undated) DEVICE — DECANTER VIAL 2006S

## (undated) DEVICE — SU VICRYL 3-0 SH 27" UND J416H

## (undated) DEVICE — SOL NACL 0.9% INJ 250ML BAG 2B1322Q

## (undated) DEVICE — DRSG STERI STRIP 1/2X4" R1547

## (undated) DEVICE — DRAPE C-ARM W/STRAPS 42X72" 07-CA104

## (undated) DEVICE — DRAPE IOBAN INCISE 23X17" 6650EZ

## (undated) DEVICE — NDL PERC ENTRY THINWALL 18GA 7.0" G00166

## (undated) DEVICE — CONNECTOR MALE TO MALE LL

## (undated) DEVICE — SYR 10ML LL W/O NDL 302995

## (undated) DEVICE — SOL NACL 0.9% IRRIG 1000ML BOTTLE 2F7124

## (undated) DEVICE — LINEN TOWEL PACK X30 5481

## (undated) DEVICE — SYR 03ML LL W/O NDL 309657

## (undated) DEVICE — DRSG PRIMAPORE 02X3" 7133

## (undated) DEVICE — SU PROLENE 2-0 SHDA 36" 8523H

## (undated) DEVICE — GLOVE BIOGEL PI MICRO SZ 7.5 48575

## (undated) DEVICE — NDL BLUNT 18GA 1" W/O FILTER 305181

## (undated) DEVICE — DRAPE SHEET REV FOLD 3/4 9349

## (undated) RX ORDER — ACETAMINOPHEN 325 MG/1
TABLET ORAL
Status: DISPENSED
Start: 2022-04-21

## (undated) RX ORDER — HEPARIN SODIUM (PORCINE) LOCK FLUSH IV SOLN 100 UNIT/ML 100 UNIT/ML
SOLUTION INTRAVENOUS
Status: DISPENSED
Start: 2021-11-02

## (undated) RX ORDER — LIDOCAINE HYDROCHLORIDE 10 MG/ML
INJECTION, SOLUTION INFILTRATION; PERINEURAL
Status: DISPENSED
Start: 2020-04-29

## (undated) RX ORDER — DEXAMETHASONE SODIUM PHOSPHATE 4 MG/ML
INJECTION, SOLUTION INTRA-ARTICULAR; INTRALESIONAL; INTRAMUSCULAR; INTRAVENOUS; SOFT TISSUE
Status: DISPENSED
Start: 2023-01-01

## (undated) RX ORDER — HEPARIN SODIUM (PORCINE) LOCK FLUSH IV SOLN 100 UNIT/ML 100 UNIT/ML
SOLUTION INTRAVENOUS
Status: DISPENSED
Start: 2021-01-06

## (undated) RX ORDER — LIDOCAINE HYDROCHLORIDE 10 MG/ML
INJECTION, SOLUTION EPIDURAL; INFILTRATION; INTRACAUDAL; PERINEURAL
Status: DISPENSED
Start: 2022-01-01

## (undated) RX ORDER — LIDOCAINE HYDROCHLORIDE 10 MG/ML
INJECTION, SOLUTION EPIDURAL; INFILTRATION; INTRACAUDAL; PERINEURAL
Status: DISPENSED
Start: 2023-01-01

## (undated) RX ORDER — HEPARIN SODIUM (PORCINE) LOCK FLUSH IV SOLN 100 UNIT/ML 100 UNIT/ML
SOLUTION INTRAVENOUS
Status: DISPENSED
Start: 2020-04-29

## (undated) RX ORDER — HEPARIN SODIUM (PORCINE) LOCK FLUSH IV SOLN 100 UNIT/ML 100 UNIT/ML
SOLUTION INTRAVENOUS
Status: DISPENSED
Start: 2022-01-01

## (undated) RX ORDER — CEFAZOLIN SODIUM 1 G/3ML
INJECTION, POWDER, FOR SOLUTION INTRAMUSCULAR; INTRAVENOUS
Status: DISPENSED
Start: 2023-01-01

## (undated) RX ORDER — BUPIVACAINE HYDROCHLORIDE 2.5 MG/ML
INJECTION, SOLUTION EPIDURAL; INFILTRATION; INTRACAUDAL
Status: DISPENSED
Start: 2023-01-01

## (undated) RX ORDER — FENTANYL CITRATE-0.9 % NACL/PF 10 MCG/ML
PLASTIC BAG, INJECTION (ML) INTRAVENOUS
Status: DISPENSED
Start: 2023-01-01

## (undated) RX ORDER — HEPARIN SODIUM 1000 [USP'U]/ML
INJECTION, SOLUTION INTRAVENOUS; SUBCUTANEOUS
Status: DISPENSED
Start: 2023-01-01

## (undated) RX ORDER — CLINDAMYCIN PHOSPHATE 900 MG/50ML
INJECTION, SOLUTION INTRAVENOUS
Status: DISPENSED
Start: 2023-01-01

## (undated) RX ORDER — LIDOCAINE HYDROCHLORIDE AND EPINEPHRINE 10; 10 MG/ML; UG/ML
INJECTION, SOLUTION INFILTRATION; PERINEURAL
Status: DISPENSED
Start: 2023-01-01

## (undated) RX ORDER — FENTANYL CITRATE 50 UG/ML
INJECTION, SOLUTION INTRAMUSCULAR; INTRAVENOUS
Status: DISPENSED
Start: 2020-04-29

## (undated) RX ORDER — LIDOCAINE HYDROCHLORIDE 10 MG/ML
INJECTION, SOLUTION EPIDURAL; INFILTRATION; INTRACAUDAL; PERINEURAL
Status: DISPENSED
Start: 2021-11-16

## (undated) RX ORDER — HEPARIN SODIUM,PORCINE 10 UNIT/ML
VIAL (ML) INTRAVENOUS
Status: DISPENSED
Start: 2021-08-18

## (undated) RX ORDER — ONDANSETRON 2 MG/ML
INJECTION INTRAMUSCULAR; INTRAVENOUS
Status: DISPENSED
Start: 2023-01-01

## (undated) RX ORDER — HEPARIN SODIUM (PORCINE) LOCK FLUSH IV SOLN 100 UNIT/ML 100 UNIT/ML
SOLUTION INTRAVENOUS
Status: DISPENSED
Start: 2021-08-18

## (undated) RX ORDER — HEPARIN SODIUM 1000 [USP'U]/ML
INJECTION, SOLUTION INTRAVENOUS; SUBCUTANEOUS
Status: DISPENSED
Start: 2020-04-29

## (undated) RX ORDER — CEFAZOLIN SODIUM 1 G/3ML
INJECTION, POWDER, FOR SOLUTION INTRAMUSCULAR; INTRAVENOUS
Status: DISPENSED
Start: 2022-01-01

## (undated) RX ORDER — CEFAZOLIN SODIUM 1 G/3ML
INJECTION, POWDER, FOR SOLUTION INTRAMUSCULAR; INTRAVENOUS
Status: DISPENSED
Start: 2022-04-21

## (undated) RX ORDER — CEFAZOLIN SODIUM 2 G/100ML
INJECTION, SOLUTION INTRAVENOUS
Status: DISPENSED
Start: 2020-04-29